# Patient Record
Sex: MALE | Race: WHITE | NOT HISPANIC OR LATINO | Employment: OTHER | URBAN - METROPOLITAN AREA
[De-identification: names, ages, dates, MRNs, and addresses within clinical notes are randomized per-mention and may not be internally consistent; named-entity substitution may affect disease eponyms.]

---

## 2017-08-08 ENCOUNTER — TELEPHONE (OUTPATIENT)
Dept: PRIMARY CARE CLINIC | Facility: CLINIC | Age: 52
End: 2017-08-08

## 2017-08-08 NOTE — TELEPHONE ENCOUNTER
----- Message from Vika Delcid sent at 8/4/2017  3:48 PM CDT -----  Contact: carmelo Kraft want to speak with a nurse regarding patient last visit with Dr. Stover please call back at [phone] patient [phone]

## 2017-09-14 RX ORDER — ZOLPIDEM TARTRATE 10 MG/1
TABLET ORAL
Qty: 30 TABLET | OUTPATIENT
Start: 2017-09-14

## 2017-09-14 RX ORDER — TRAMADOL HYDROCHLORIDE 50 MG/1
TABLET ORAL
Qty: 90 TABLET | OUTPATIENT
Start: 2017-09-14

## 2017-09-15 RX ORDER — LISINOPRIL AND HYDROCHLOROTHIAZIDE 10; 12.5 MG/1; MG/1
1 TABLET ORAL DAILY
Qty: 30 TABLET | Refills: 3 | Status: SHIPPED | OUTPATIENT
Start: 2017-09-15 | End: 2018-02-14

## 2017-09-22 RX ORDER — TIZANIDINE 4 MG/1
TABLET ORAL
Qty: 90 TABLET | Refills: 0 | Status: SHIPPED | OUTPATIENT
Start: 2017-09-22 | End: 2017-11-03 | Stop reason: SDUPTHER

## 2017-11-06 RX ORDER — TIZANIDINE 4 MG/1
TABLET ORAL
Qty: 90 TABLET | Refills: 0 | Status: SHIPPED | OUTPATIENT
Start: 2017-11-06 | End: 2018-03-15 | Stop reason: SDUPTHER

## 2017-11-14 ENCOUNTER — OFFICE VISIT (OUTPATIENT)
Dept: PRIMARY CARE CLINIC | Facility: CLINIC | Age: 52
End: 2017-11-14
Payer: MEDICAID

## 2017-11-14 VITALS
WEIGHT: 219.38 LBS | BODY MASS INDEX: 33.25 KG/M2 | SYSTOLIC BLOOD PRESSURE: 107 MMHG | TEMPERATURE: 98 F | HEIGHT: 68 IN | RESPIRATION RATE: 18 BRPM | DIASTOLIC BLOOD PRESSURE: 69 MMHG | HEART RATE: 84 BPM

## 2017-11-14 DIAGNOSIS — E10.8 TYPE 1 DIABETES MELLITUS WITH COMPLICATION: ICD-10-CM

## 2017-11-14 DIAGNOSIS — I10 ESSENTIAL HYPERTENSION: ICD-10-CM

## 2017-11-14 DIAGNOSIS — R01.1 MURMUR, CARDIAC: ICD-10-CM

## 2017-11-14 DIAGNOSIS — E78.5 HYPERLIPIDEMIA LDL GOAL <70: ICD-10-CM

## 2017-11-14 DIAGNOSIS — M79.672 PAIN OF LEFT HEEL: Primary | ICD-10-CM

## 2017-11-14 DIAGNOSIS — Z12.5 PROSTATE CANCER SCREENING: ICD-10-CM

## 2017-11-14 PROCEDURE — 99999 PR PBB SHADOW E&M-EST. PATIENT-LVL IV: CPT | Mod: PBBFAC,,, | Performed by: NURSE PRACTITIONER

## 2017-11-14 PROCEDURE — 99214 OFFICE O/P EST MOD 30 MIN: CPT | Mod: PBBFAC,PN | Performed by: NURSE PRACTITIONER

## 2017-11-14 PROCEDURE — 99214 OFFICE O/P EST MOD 30 MIN: CPT | Mod: S$PBB,,, | Performed by: NURSE PRACTITIONER

## 2017-11-14 RX ORDER — CANAGLIFLOZIN 300 MG/1
300 TABLET, FILM COATED ORAL DAILY
Refills: 5 | COMMUNITY
Start: 2017-11-03 | End: 2018-02-14

## 2017-11-14 RX ORDER — METFORMIN HYDROCHLORIDE 500 MG/1
1 TABLET, EXTENDED RELEASE ORAL DAILY
Refills: 0 | Status: ON HOLD | COMMUNITY
Start: 2017-10-31 | End: 2018-01-15 | Stop reason: HOSPADM

## 2017-11-14 NOTE — PROGRESS NOTES
Chief Complaint  Chief Complaint   Patient presents with    Ankle Pain       HPI  Cj Barnes is a 52 y.o. male with multiple medical diagnoses as listed in the medical history and problem list that presents for new onset left heel pain.  Patient is new to me but is known to this clinic. Reports new onset left heel pain X 3 weeks, described as worsening. No associated fall or trauma. H/o plantar fasciitis treated with surgical intervention. Pain worsens with pressure and walking. No temporal factors. Has been taking tizanidine and flexeril without noted improvement. H/o DMI currently on insulin pump, reports sugars 120s-140s in am. Reports difficulty getting insulin covered under current insurance due to limitations placed on the amount of insulin allowed monthly. Patient his having to pay out of pocket 3-5 vials/month. Denies cp, sob, palpitations.     PAST MEDICAL HISTORY:  Past Medical History:   Diagnosis Date    Diabetes mellitus type I     since in his 30's    HTN (hypertension)     Hyperlipidemia LDL goal < 70     Insulin pump in place        PAST SURGICAL HISTORY:  Past Surgical History:   Procedure Laterality Date    BACK SURGERY      FOOT TENDON SURGERY      TRIGGER FINGER RELEASE      x 2       SOCIAL HISTORY:  Social History     Social History    Marital status:      Spouse name: N/A    Number of children: N/A    Years of education: N/A     Occupational History    Not on file.     Social History Main Topics    Smoking status: Never Smoker    Smokeless tobacco: Current User     Types: Snuff      Comment: since he was 12 yrs old    Alcohol use No    Drug use: No    Sexual activity: Not on file     Other Topics Concern    Not on file     Social History Narrative        2 grown kids    Delivers seafood       FAMILY HISTORY:  Family History   Problem Relation Age of Onset    Family history unknown: Yes       ALLERGIES AND MEDICATIONS: updated and reviewed.  Review of  patient's allergies indicates:  No Known Allergies  Current Outpatient Prescriptions   Medication Sig Dispense Refill    blood sugar diagnostic Strp 1 each by Misc.(Non-Drug; Combo Route) route 6 (six) times daily. 200 strip 6    insulin lispro (HUMALOG) 100 unit/mL injection Basal rate of 2.35u/h, I:CHO- 1: 10 via insulin pump (animas-one touch ping), needs 5 vials 5 vial 6    INVOKANA 300 mg Tab tablet Take 300 mg by mouth once daily.  5    lisinopril-hydrochlorothiazide (PRINZIDE,ZESTORETIC) 10-12.5 mg per tablet Take 1 tablet by mouth once daily. 30 tablet 3    metFORMIN (GLUCOPHAGE-XR) 500 MG 24 hr tablet Take 1 tablet by mouth once daily.  0    oxycodone-acetaminophen 5-325 mg (PERCOCET) 5-325 mg per tablet Take 1 tablet by mouth every 4 (four) hours as needed for Pain. 40 tablet 0    simvastatin (ZOCOR) 20 MG tablet Take 1 tablet (20 mg total) by mouth every evening. 1 tablet 4    tiZANidine (ZANAFLEX) 4 MG tablet TAKE ONE TABLET BY MOUTH EVERY 8 HOURS AS NEEDED FOR MUSCLE SPASMS 90 tablet 0    tramadol (ULTRAM) 50 mg tablet TAKE ONE TABLET BY MOUTH EVERY 6 HOURS AS NEEDED FOR PAIN 30 tablet 0     No current facility-administered medications for this visit.          ROS  Review of Systems   Constitutional: Negative for chills, fatigue and fever.   HENT: Negative for congestion, rhinorrhea, sinus pressure and sore throat.    Respiratory: Negative for cough, chest tightness and shortness of breath.    Cardiovascular: Negative for chest pain and palpitations.   Gastrointestinal: Negative for abdominal pain, blood in stool, diarrhea, nausea and vomiting.   Endocrine: Negative for polydipsia, polyphagia and polyuria.   Genitourinary: Negative for dysuria, frequency, hematuria and urgency.   Musculoskeletal: Positive for arthralgias. Negative for joint swelling.   Skin: Negative for rash and wound.   Neurological: Negative for dizziness and headaches.   Psychiatric/Behavioral: Negative for dysphoric mood  "and sleep disturbance. The patient is not nervous/anxious.          PHYSICAL EXAM  Vitals:    11/14/17 1025   BP: 107/69   BP Location: Left arm   Patient Position: Sitting   BP Method: Medium (Automatic)   Pulse: 84   Resp: 18   Temp: 97.9 °F (36.6 °C)   TempSrc: Oral   Weight: 99.5 kg (219 lb 6.4 oz)   Height: 5' 8" (1.727 m)    Body mass index is 33.36 kg/m².  Weight: 99.5 kg (219 lb 6.4 oz)   Height: 5' 8" (172.7 cm)     Physical Exam   Constitutional: He is oriented to person, place, and time. He appears well-developed and well-nourished.   HENT:   Head: Normocephalic.   Mouth/Throat: Uvula is midline, oropharynx is clear and moist and mucous membranes are normal.   Eyes: Conjunctivae are normal.   Cardiovascular: Normal rate and regular rhythm.    Murmur heard.   Systolic murmur is present with a grade of 3/6   Pulses:       Radial pulses are 2+ on the right side, and 2+ on the left side.        Dorsalis pedis pulses are 2+ on the right side, and 2+ on the left side.   No noted LE swelling   Pulmonary/Chest: Effort normal and breath sounds normal. He has no wheezes.   Abdominal: Soft. Bowel sounds are normal. There is no tenderness.   Musculoskeletal: He exhibits no edema.   Lymphadenopathy:     He has no cervical adenopathy.   Neurological: He is alert and oriented to person, place, and time.   Skin: Skin is warm and dry. No rash noted.   Psychiatric: He has a normal mood and affect.         Health Maintenance       Date Due Completion Date    Hepatitis C Screening 1965 ---    Foot Exam 11/09/1973 ---    Eye Exam 11/09/1973 ---    TETANUS VACCINE 11/09/1981 ---    Hemoglobin A1c 07/31/2013 1/31/2013    Colonoscopy 11/09/2013 ---    Lipid Panel 01/31/2014 1/31/2013    Influenza Vaccine 08/01/2017 ---            Assessment & Plan    Cj was seen today for ankle pain.    Diagnoses and all orders for this visit:    Pain of left heel  -     Cancel: X-Ray Foot 2 View Left; Future  -     X-Ray " Calcaneus 2 View Left; Future    Murmur, cardiac  -     Echo 2d complete; Future    Type 1 diabetes mellitus with complication  -     Hemoglobin A1c; Future  -     Microalbumin/creatinine urine ratio; Future  -     TSH; Future  -     T4, free; Future    Hyperlipidemia LDL goal <70  -     Lipid panel; Future    Essential hypertension  -     CBC auto differential; Future  -     Comprehensive metabolic panel; Future    Prostate cancer screening  -     PSA, Screening; Future    Health Maintenance reviewed.    Follow-up: dr. joel after labwork for routine visit

## 2017-11-15 ENCOUNTER — TELEPHONE (OUTPATIENT)
Dept: PRIMARY CARE CLINIC | Facility: CLINIC | Age: 52
End: 2017-11-15

## 2017-11-15 DIAGNOSIS — M77.30 CALCANEAL SPUR, UNSPECIFIED LATERALITY: Primary | ICD-10-CM

## 2017-11-15 DIAGNOSIS — I35.0 AORTIC VALVE STENOSIS, ETIOLOGY OF CARDIAC VALVE DISEASE UNSPECIFIED: ICD-10-CM

## 2017-11-15 NOTE — TELEPHONE ENCOUNTER
----- Message from Demetria Morrissey NP sent at 11/15/2017  8:18 AM CST -----  Please call patient and let him know that his xray did show a large heel spur on his left foot. I would recommend NSAIDS, ICE and I will refer him to podiatry if he is interested. Let me know.

## 2017-11-15 NOTE — TELEPHONE ENCOUNTER
----- Message from Idalia Street sent at 11/15/2017  3:40 PM CST -----  Contact: Patient  Cj, patient [phone], Calling for xray results, done yesterday. Please advise. Thanks.

## 2017-11-17 ENCOUNTER — TELEPHONE (OUTPATIENT)
Dept: PRIMARY CARE CLINIC | Facility: CLINIC | Age: 52
End: 2017-11-17

## 2017-11-17 NOTE — TELEPHONE ENCOUNTER
----- Message from Demetria Morrissey NP sent at 11/15/2017  6:23 PM CST -----  Please let patient know that the echocardiogram which he completed demonstrated aortic stenosis. Although he is asymptomatic, I would like to refer him to see cardiology for an evaluation and to determine if any further testing or intervention is needed. Thank you.

## 2017-12-11 ENCOUNTER — CLINICAL SUPPORT (OUTPATIENT)
Dept: PRIMARY CARE CLINIC | Facility: CLINIC | Age: 52
End: 2017-12-11
Payer: MEDICAID

## 2017-12-11 DIAGNOSIS — E78.5 HYPERLIPIDEMIA LDL GOAL <70: ICD-10-CM

## 2017-12-11 DIAGNOSIS — Z12.5 PROSTATE CANCER SCREENING: ICD-10-CM

## 2017-12-11 DIAGNOSIS — I10 ESSENTIAL HYPERTENSION: ICD-10-CM

## 2017-12-11 DIAGNOSIS — E10.8 TYPE 1 DIABETES MELLITUS WITH COMPLICATION: ICD-10-CM

## 2017-12-11 LAB
ALBUMIN SERPL BCP-MCNC: 3.9 G/DL
ALP SERPL-CCNC: 82 U/L
ALT SERPL W/O P-5'-P-CCNC: 27 U/L
ANION GAP SERPL CALC-SCNC: 10 MMOL/L
AST SERPL-CCNC: 25 U/L
BASOPHILS # BLD AUTO: 0.06 K/UL
BASOPHILS NFR BLD: 0.8 %
BILIRUB SERPL-MCNC: 0.8 MG/DL
BUN SERPL-MCNC: 16 MG/DL
CALCIUM SERPL-MCNC: 9.9 MG/DL
CHLORIDE SERPL-SCNC: 103 MMOL/L
CHOLEST SERPL-MCNC: 150 MG/DL
CHOLEST/HDLC SERPL: 3.4 {RATIO}
CO2 SERPL-SCNC: 26 MMOL/L
CREAT SERPL-MCNC: 1 MG/DL
CREAT UR-MCNC: 34 MG/DL
DIFFERENTIAL METHOD: NORMAL
EOSINOPHIL # BLD AUTO: 0.2 K/UL
EOSINOPHIL NFR BLD: 2.2 %
ERYTHROCYTE [DISTWIDTH] IN BLOOD BY AUTOMATED COUNT: 13.3 %
EST. GFR  (AFRICAN AMERICAN): >60 ML/MIN/1.73 M^2
EST. GFR  (NON AFRICAN AMERICAN): >60 ML/MIN/1.73 M^2
GLUCOSE SERPL-MCNC: 187 MG/DL
HCT VFR BLD AUTO: 48.3 %
HDLC SERPL-MCNC: 44 MG/DL
HDLC SERPL: 29.3 %
HGB BLD-MCNC: 16 G/DL
IMM GRANULOCYTES # BLD AUTO: 0.02 K/UL
IMM GRANULOCYTES NFR BLD AUTO: 0.3 %
LDLC SERPL CALC-MCNC: 92.8 MG/DL
LYMPHOCYTES # BLD AUTO: 1.5 K/UL
LYMPHOCYTES NFR BLD: 20.6 %
MCH RBC QN AUTO: 29.4 PG
MCHC RBC AUTO-ENTMCNC: 33.1 G/DL
MCV RBC AUTO: 89 FL
MICROALBUMIN UR DL<=1MG/L-MCNC: <2.5 UG/ML
MICROALBUMIN/CREATININE RATIO: NORMAL UG/MG
MONOCYTES # BLD AUTO: 0.5 K/UL
MONOCYTES NFR BLD: 6.3 %
NEUTROPHILS # BLD AUTO: 5 K/UL
NEUTROPHILS NFR BLD: 69.8 %
NONHDLC SERPL-MCNC: 106 MG/DL
NRBC BLD-RTO: 0 /100 WBC
PLATELET # BLD AUTO: 200 K/UL
PMV BLD AUTO: 11 FL
POTASSIUM SERPL-SCNC: 4.7 MMOL/L
PROT SERPL-MCNC: 7.5 G/DL
RBC # BLD AUTO: 5.45 M/UL
SODIUM SERPL-SCNC: 139 MMOL/L
T4 FREE SERPL-MCNC: 1.24 NG/DL
TRIGL SERPL-MCNC: 66 MG/DL
TSH SERPL DL<=0.005 MIU/L-ACNC: 0.84 UIU/ML
WBC # BLD AUTO: 7.14 K/UL

## 2017-12-11 PROCEDURE — 99212 OFFICE O/P EST SF 10 MIN: CPT | Mod: PBBFAC,PN

## 2017-12-11 PROCEDURE — 82570 ASSAY OF URINE CREATININE: CPT

## 2017-12-11 PROCEDURE — 84443 ASSAY THYROID STIM HORMONE: CPT

## 2017-12-11 PROCEDURE — 84153 ASSAY OF PSA TOTAL: CPT

## 2017-12-11 PROCEDURE — 84439 ASSAY OF FREE THYROXINE: CPT

## 2017-12-11 PROCEDURE — 80053 COMPREHEN METABOLIC PANEL: CPT

## 2017-12-11 PROCEDURE — 85025 COMPLETE CBC W/AUTO DIFF WBC: CPT

## 2017-12-11 PROCEDURE — 80061 LIPID PANEL: CPT

## 2017-12-11 PROCEDURE — 99999 PR PBB SHADOW E&M-EST. PATIENT-LVL II: CPT | Mod: PBBFAC,,,

## 2017-12-11 PROCEDURE — 83036 HEMOGLOBIN GLYCOSYLATED A1C: CPT

## 2017-12-12 LAB
COMPLEXED PSA SERPL-MCNC: 0.38 NG/ML
ESTIMATED AVG GLUCOSE: 194 MG/DL
HBA1C MFR BLD HPLC: 8.4 %

## 2017-12-18 ENCOUNTER — TELEPHONE (OUTPATIENT)
Dept: PRIMARY CARE CLINIC | Facility: CLINIC | Age: 52
End: 2017-12-18

## 2017-12-18 DIAGNOSIS — E10.8 TYPE 1 DIABETES MELLITUS WITH COMPLICATION: Primary | ICD-10-CM

## 2017-12-29 ENCOUNTER — TELEPHONE (OUTPATIENT)
Dept: PRIMARY CARE CLINIC | Facility: CLINIC | Age: 52
End: 2017-12-29

## 2017-12-29 NOTE — TELEPHONE ENCOUNTER
----- Message from Hood Orozco sent at 12/28/2017  9:35 AM CST -----  Contact: [employer] - Rena [phone]  A certificate for medical necessity was faxed and request for patient chart notes was faxed 12/20/2017 and 12/27/2017.Thanks!

## 2018-01-02 NOTE — TELEPHONE ENCOUNTER
Tried calling the company again and was on hold for over 10 minutes without someone picking up. The pt says its to get a new pump and he will call the person he has been working with and have them fax over the paper I need.

## 2018-01-09 ENCOUNTER — TELEPHONE (OUTPATIENT)
Dept: PRIMARY CARE CLINIC | Facility: CLINIC | Age: 53
End: 2018-01-09

## 2018-01-09 NOTE — TELEPHONE ENCOUNTER
----- Message from Mary Mcmillan sent at 1/9/2018 10:11 AM CST -----  Rena with [employer] is requesting conformation of certificate of medical necessity for an insulin pump and chart notes, she states she has faxed on 12/26/17 and 01/05/18 with no response, contact her at [phone]. Or fax information to [phone].    Thank you

## 2018-01-09 NOTE — TELEPHONE ENCOUNTER
Spoke to Bharti with the supply dept and she will have the rep fax over the medical form that we need to sign for pts pump

## 2018-01-12 ENCOUNTER — TELEPHONE (OUTPATIENT)
Dept: PRIMARY CARE CLINIC | Facility: CLINIC | Age: 53
End: 2018-01-12

## 2018-01-12 NOTE — TELEPHONE ENCOUNTER
----- Message from Idalia Street sent at 1/11/2018  2:32 PM CST -----  Contact: Rena with [employer] phone [phone] fax [phone]  Rena with [employer] phone [phone] fax [phone], Calling to inquire about certificate of medical necessity for insulin pump and also needs clinical notes, it was faxed on 01/09/2018. Please advise. Thanks.

## 2018-01-15 PROBLEM — Q23.1 AORTIC REGURGITATION, CONGENITAL: Status: ACTIVE | Noted: 2018-01-15

## 2018-01-23 ENCOUNTER — OFFICE VISIT (OUTPATIENT)
Dept: CARDIOTHORACIC SURGERY | Facility: CLINIC | Age: 53
End: 2018-01-23
Payer: MEDICAID

## 2018-01-23 VITALS
HEIGHT: 68 IN | WEIGHT: 218.13 LBS | BODY MASS INDEX: 33.06 KG/M2 | DIASTOLIC BLOOD PRESSURE: 69 MMHG | HEART RATE: 61 BPM | TEMPERATURE: 97 F | SYSTOLIC BLOOD PRESSURE: 125 MMHG | OXYGEN SATURATION: 98 %

## 2018-01-23 DIAGNOSIS — I35.0 NONRHEUMATIC AORTIC VALVE STENOSIS: Primary | ICD-10-CM

## 2018-01-23 PROCEDURE — 99213 OFFICE O/P EST LOW 20 MIN: CPT | Mod: PBBFAC | Performed by: THORACIC SURGERY (CARDIOTHORACIC VASCULAR SURGERY)

## 2018-01-23 PROCEDURE — 99999 PR PBB SHADOW E&M-EST. PATIENT-LVL III: CPT | Mod: PBBFAC,,, | Performed by: THORACIC SURGERY (CARDIOTHORACIC VASCULAR SURGERY)

## 2018-01-23 PROCEDURE — 99205 OFFICE O/P NEW HI 60 MIN: CPT | Mod: S$PBB,,, | Performed by: THORACIC SURGERY (CARDIOTHORACIC VASCULAR SURGERY)

## 2018-01-23 RX ORDER — HUMAN INSULIN 100 [IU]/ML
INJECTION, SUSPENSION SUBCUTANEOUS
Refills: 0 | COMMUNITY
Start: 2017-11-07 | End: 2018-02-14

## 2018-01-23 NOTE — PROGRESS NOTES
History & Physical    SUBJECTIVE:     History of Present Illness:  Patient is a 52 y.o. male with bicuspid aortic valve, AS, diabetes type2, and CAD. He denies GORDON, SOB, or angina but underwent catheterization and echo due to severe diabetes and family history of heart disease. Cath showed mild to moderate 3 vessel CAD; 55% midcircumflex, 60% 4th OM, mild LAD and diagonal. EF 55%, no aortic coarctation. Aortic valve area measured 0.9cm2. Patient reports several uncles who have  in their 50's from heart disease. He is currently controlling his diabetes with an insulin pump and his last HbA1c was 8.2. Patient is a nonsmoker.   Patient has history of stage 2 colon cancer in 2015 with resection, no chemo or radiation.     NYHA I  STS mortality 1.3%, morbidity or mortality 14%, stroke 1.3%.    Chief Complaint   Patient presents with    Consult       Review of patient's allergies indicates:  No Known Allergies    Current Outpatient Prescriptions   Medication Sig Dispense Refill    blood sugar diagnostic Strp 1 each by Misc.(Non-Drug; Combo Route) route 6 (six) times daily. 200 strip 6    insulin lispro (HUMALOG) 100 unit/mL injection Basal rate of 2.35u/h, I:CHO- 1: 10 via insulin pump (animas-one touch ping), needs 5 vials 5 vial 6    INVOKANA 300 mg Tab tablet Take 300 mg by mouth once daily.  5    lisinopril-hydrochlorothiazide (PRINZIDE,ZESTORETIC) 10-12.5 mg per tablet Take 1 tablet by mouth once daily. 30 tablet 3    NOVOLIN N 100 unit/mL injection USE AS DIRECTED WITH INSULIN PUMP  0    simvastatin (ZOCOR) 20 MG tablet Take 1 tablet (20 mg total) by mouth every evening. 1 tablet 4    tiZANidine (ZANAFLEX) 4 MG tablet TAKE ONE TABLET BY MOUTH EVERY 8 HOURS AS NEEDED FOR MUSCLE SPASMS 90 tablet 0    zolpidem (AMBIEN CR) 12.5 MG CR tablet Take 12.5 mg by mouth nightly as needed for Insomnia.      oxycodone-acetaminophen 5-325 mg (PERCOCET) 5-325 mg per tablet Take 1 tablet by mouth every 4 (four) hours  "as needed for Pain. 40 tablet 0    tramadol (ULTRAM) 50 mg tablet TAKE ONE TABLET BY MOUTH EVERY 6 HOURS AS NEEDED FOR PAIN 30 tablet 0     No current facility-administered medications for this visit.        Past Medical History:   Diagnosis Date    Aortic stenosis 2018    Cancer 2014    Diabetes mellitus type I     since in his 30's    Heel fracture     HTN (hypertension)     Hyperlipidemia LDL goal < 70     Insulin pump in place     MVP (mitral valve prolapse)      Past Surgical History:   Procedure Laterality Date    BACK SURGERY      COLON SURGERY  2014    FOOT TENDON SURGERY      right and left heart cath Bilateral 01/15/2018    TRIGGER FINGER RELEASE      x 2     Family History   Problem Relation Age of Onset    Family history unknown: Yes     Social History   Substance Use Topics    Smoking status: Never Smoker    Smokeless tobacco: Current User     Types: Snuff      Comment: since he was 12 yrs old    Alcohol use Yes      Comment: socially        Review of Systems:  Review of Systems   Constitutional: Negative for activity change and appetite change.   HENT: Negative for congestion and rhinorrhea.    Respiratory: Negative for chest tightness and shortness of breath.    Cardiovascular: Negative for chest pain and palpitations.   Gastrointestinal: Negative for abdominal distention and abdominal pain.   Genitourinary: Negative for difficulty urinating and dysuria.   Musculoskeletal: Positive for back pain and myalgias.   Neurological: Negative for dizziness and headaches.   Psychiatric/Behavioral: Negative for agitation and behavioral problems.       OBJECTIVE:     Vital Signs (Most Recent)  Temp: 97.4 °F (36.3 °C) (01/23/18 1051)  Pulse: 61 (01/23/18 1051)  BP: 125/69 (01/23/18 1051)  SpO2: 98 % (01/23/18 1051)  5' 8" (1.727 m)  98.9 kg (218 lb 2.3 oz)     Physical Exam:  Physical Exam   Constitutional: He is oriented to person, place, and time. He appears well-developed and well-nourished. " No distress.   HENT:   Head: Normocephalic.   Eyes: EOM are normal.   Cardiovascular: Normal rate and regular rhythm.    Murmur heard.  Pulmonary/Chest: Effort normal and breath sounds normal.   Abdominal: Soft. He exhibits no distension. There is no tenderness.   Neurological: He is alert and oriented to person, place, and time.   Skin: Skin is warm and dry.   Psychiatric: He has a normal mood and affect. His behavior is normal.       Laboratory  CBC: Reviewed  BMP: Reviewed  HbA1c 8.2        ASSESSMENT/PLAN:     Cj Barnes is a 52 y.o. male with asymptomatic bicuspid aortic valve and mild to moderate CAD.     PLAN:    Discussed with patient that given his AS is not causing symptoms, it would be better to wait for surgical repair. At this time, does not require bypass surgery and operating on AS while asymptomatic would potentially set him up for a redo sternotomy and increased complications if he needs a bypass at a later date.   Patient to return to clinic if having worsening symptoms. Until then, continue ASA and statin.   Will refer to Endocrinology clinic for better glucose control.      CTS Attending Note:    I have personally taken the history and examined this patient and agree with the resident's note as stated above.  52-year-old gentleman with at least moderate aortic stenosis.  He and his wife were quite concerned about his risk of sudden cardiac death given his family history of cardiac disease.  I reviewed his angiogram.  Despite his long history of diabetes, his coronaries looked surprisingly good.  I did not feel that any of his coronary lesions were of sufficient severity to support a bypass.  He is asymptomatic for his aortic stenosis.  He is able to walk several blocks until he has to stop, and this is due to back pain.  I recommended ongoing observation.  When he ultimately does become symptomatic for his aortic stenosis, I believe it would be reasonable to repeat an angiogram.  It may  be that by then his coronary lesions will have progressed, and a mammary graft could be an option for him.  If his coronaries continue to look good, then aortic valve replacement alone could be performed.  I felt that currently it was best 6 it tight rather than to proceed with any sort of cardiac operation.

## 2018-01-23 NOTE — LETTER
[facility]  [address]  [facility] [address]  Phone: [phone] January 23, 2018      Palomo Turner MD  [address] [place] [address]  [address] Floor  [facility] [address]    Patient: Cj Barnes   MR Number: [id number]   YOB: 1965   Date of Visit: 1/23/2018     Dear Dr. Turner:    I had the pleasure of seeing your patient Mr. Cj Barnes in clinic today.  As you know, he is a very pleasant 52-year-old gentleman with a family history of cardiac disease. He is an insulin-dependent diabetic. He recently underwent a thorough cardiac evaluation.  An echo demonstrated at least moderate aortic stenosis.  He is asymptomatic for this, as he can walk several blocks before being limited by back pain.  He denies dyspnea.  He also had an angiogram which, given his long history of diabetes, I thought looked surprisingly good. I recommended no intervention at this time.  I did not think that any of his coronary lesions were of sufficient severity to support a bypass.  I do not believe we should webster to address his aortic valve surgically, as he is asymptomatic.  It may be several years before he develops symptoms.  At that time, his coronary disease may have progressed and a mammary graft to the anterior wall could be an option for him.    Thank you for sending this pleasant gentleman to me.  It was a pleasure to meet him.  If I can be of assistance in the future, please do not hesitate to let me know.    Sincerely,        Ryan Knight MD   Chief, Division of Thoracic & Cardiovascular Surgery  [facility]    PEP/ecs      CC  Garry Stover MD

## 2018-02-14 ENCOUNTER — LAB VISIT (OUTPATIENT)
Dept: LAB | Facility: HOSPITAL | Age: 53
End: 2018-02-14
Attending: INTERNAL MEDICINE
Payer: MEDICAID

## 2018-02-14 ENCOUNTER — OFFICE VISIT (OUTPATIENT)
Dept: ENDOCRINOLOGY | Facility: CLINIC | Age: 53
End: 2018-02-14
Payer: MEDICAID

## 2018-02-14 VITALS
DIASTOLIC BLOOD PRESSURE: 60 MMHG | HEIGHT: 68 IN | BODY MASS INDEX: 33.18 KG/M2 | WEIGHT: 218.94 LBS | SYSTOLIC BLOOD PRESSURE: 100 MMHG

## 2018-02-14 DIAGNOSIS — E10.65 TYPE 1 DIABETES MELLITUS WITH HYPERGLYCEMIA: ICD-10-CM

## 2018-02-14 LAB
ALBUMIN SERPL BCP-MCNC: 4 G/DL
ALP SERPL-CCNC: 95 U/L
ALT SERPL W/O P-5'-P-CCNC: 33 U/L
ANION GAP SERPL CALC-SCNC: 11 MMOL/L
AST SERPL-CCNC: 24 U/L
BILIRUB SERPL-MCNC: 0.9 MG/DL
BUN SERPL-MCNC: 17 MG/DL
C PEPTIDE SERPL-MCNC: 1.55 NG/ML
CALCIUM SERPL-MCNC: 9.7 MG/DL
CHLORIDE SERPL-SCNC: 100 MMOL/L
CO2 SERPL-SCNC: 28 MMOL/L
CREAT SERPL-MCNC: 0.9 MG/DL
EST. GFR  (AFRICAN AMERICAN): >60 ML/MIN/1.73 M^2
EST. GFR  (NON AFRICAN AMERICAN): >60 ML/MIN/1.73 M^2
GLUCOSE SERPL-MCNC: 166 MG/DL
POTASSIUM SERPL-SCNC: 4.2 MMOL/L
PROT SERPL-MCNC: 7.7 G/DL
SODIUM SERPL-SCNC: 139 MMOL/L

## 2018-02-14 PROCEDURE — 80053 COMPREHEN METABOLIC PANEL: CPT

## 2018-02-14 PROCEDURE — 3008F BODY MASS INDEX DOCD: CPT | Mod: ,,, | Performed by: INTERNAL MEDICINE

## 2018-02-14 PROCEDURE — 99204 OFFICE O/P NEW MOD 45 MIN: CPT | Mod: S$PBB,,, | Performed by: INTERNAL MEDICINE

## 2018-02-14 PROCEDURE — 99999 PR PBB SHADOW E&M-EST. PATIENT-LVL III: CPT | Mod: PBBFAC,,, | Performed by: INTERNAL MEDICINE

## 2018-02-14 PROCEDURE — 84681 ASSAY OF C-PEPTIDE: CPT

## 2018-02-14 PROCEDURE — 82985 ASSAY OF GLYCATED PROTEIN: CPT

## 2018-02-14 PROCEDURE — 99213 OFFICE O/P EST LOW 20 MIN: CPT | Mod: PBBFAC | Performed by: INTERNAL MEDICINE

## 2018-02-14 RX ORDER — LISINOPRIL 10 MG/1
10 TABLET ORAL DAILY
Qty: 90 TABLET | Refills: 3 | Status: SHIPPED | OUTPATIENT
Start: 2018-02-14 | End: 2019-04-09 | Stop reason: SDUPTHER

## 2018-02-14 RX ORDER — INSULIN LISPRO 100 [IU]/ML
INJECTION, SOLUTION INTRAVENOUS; SUBCUTANEOUS
Qty: 6 VIAL | Refills: 6 | Status: SHIPPED | OUTPATIENT
Start: 2018-02-14 | End: 2018-04-30 | Stop reason: SDUPTHER

## 2018-02-14 RX ORDER — SIMVASTATIN 20 MG/1
20 TABLET, FILM COATED ORAL NIGHTLY
Qty: 90 TABLET | Refills: 3 | Status: SHIPPED | OUTPATIENT
Start: 2018-02-14 | End: 2019-04-09 | Stop reason: SDUPTHER

## 2018-02-14 NOTE — PROGRESS NOTES
"    /60   Ht 5' 8" (1.727 m)   Wt 99.3 kg (218 lb 14.7 oz)   BMI 33.29 kg/m²      Patient Name: Cj Barnes  YOB: 1965    PRESENTING HISTORY       History of Present Illness:  Mr. Cj Barnes is a 52 y.o. male w/ significant PMHx of DM (diagnosed in his early 30s, first as Type II then Type I after 5 years) on an insulin pump, aortic stenosis, HTN, HLD, Stage II CRC s/p resection in 2015 without recurrence who was referred to   Endocrinology clinic after his A1c increased to 8.4% from 7.2% over a period of approximately 6-8 months. He says that he has not had any changes to his diet, medications, or other medical problems that may have contributed and says that he still uses the same amount of insulin. He has used an insulin pump since 2006, having used his current pump for approx 1.5-2 years. It has a basal rate of 2.6U and he uses carb counting to bolus himself prandially, estimating 10-15U per meal. Pt was initially managed by an endocrinology NP but for the last 2-3 years says he has been managed by his PCP; he was put on Invokana approx 2 years ago to try to control his A1c.    He has never been in DKA / hospitalized for his diabetes. Denies recent weight gain/loss, changes in appetite, thirst, or urine output.    Has [place] Ping pump, uses humalog  Bolus  1:15  1:50 sensitivity  120 +/- 10  Basal midnight 2.6, noon 2.5  FS reviewed - most in low to mid 100s, 2 episodes of FS in 50-60s (per pt has hypoglycemic symptom of eye twitching) after he overbolused for the amount of food.    Also on Invokana.    May be started on medtronic pump soon, currently getting authorized.             Review of Systems   Constitutional: Negative for chills, fever and weight loss.   Eyes: Negative for blurred vision.   Respiratory: Negative for cough.    Cardiovascular: Negative for chest pain.   Gastrointestinal: Negative for nausea and vomiting.   Genitourinary: Negative for dysuria and " hematuria.   Musculoskeletal: Negative for joint pain and myalgias.   Skin: Negative for itching and rash.   Neurological: Negative for dizziness and headaches.   Endo/Heme/Allergies: Does not bruise/bleed easily.         PAST HISTORY:     Past Medical History:   Diagnosis Date    Aortic stenosis 2018    Cancer 2014    Diabetes mellitus type I     since in his 30's    Heel fracture     HTN (hypertension)     Hyperlipidemia LDL goal < 70     Insulin pump in place     MVP (mitral valve prolapse)        Past Surgical History:   Procedure Laterality Date    BACK SURGERY      COLON SURGERY  2014    FOOT TENDON SURGERY      right and left heart cath Bilateral 01/15/2018    TRIGGER FINGER RELEASE      x 2       Family History   Problem Relation Age of Onset    Family history unknown: Yes       Social History     Social History    Marital status:      Spouse name: N/A    Number of children: N/A    Years of education: N/A     Social History Main Topics    Smoking status: Never Smoker    Smokeless tobacco: Current User     Types: Snuff      Comment: since he was 12 yrs old    Alcohol use Yes      Comment: socially    Drug use: No    Sexual activity: Not Asked     Other Topics Concern    None     Social History Narrative        2 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    oxycodone-acetaminophen 5-325 mg (PERCOCET) 5-325 mg per tablet Take 1 tablet by mouth every 4 (four) hours as needed for Pain.    tiZANidine (ZANAFLEX) 4 MG tablet TAKE ONE TABLET BY MOUTH EVERY 8 HOURS AS NEEDED FOR MUSCLE SPASMS    tramadol (ULTRAM) 50 mg tablet TAKE ONE TABLET BY MOUTH EVERY 6 HOURS AS NEEDED FOR PAIN    zolpidem (AMBIEN CR) 12.5 MG CR tablet Take 12.5 mg by mouth nightly as needed for Insomnia.     "[DISCONTINUED] insulin lispro (HUMALOG) 100 unit/mL injection Basal rate of 2.35u/h, I:CHO- 1: 10 via insulin pump (animas-one touch ping), needs 5 vials    [DISCONTINUED] INVOKANA 300 mg Tab tablet Take 300 mg by mouth once daily.    [DISCONTINUED] lisinopril-hydrochlorothiazide (PRINZIDE,ZESTORETIC) 10-12.5 mg per tablet Take 1 tablet by mouth once daily.    [DISCONTINUED] NOVOLIN N 100 unit/mL injection USE AS DIRECTED WITH INSULIN PUMP    [DISCONTINUED] simvastatin (ZOCOR) 20 MG tablet Take 1 tablet (20 mg total) by mouth every evening.     No current facility-administered medications on file prior to visit.        Review of patient's allergies indicates:  No Known Allergies    OBJECTIVE:   Vital Signs:  Vitals:    02/14/18 0910   BP: 100/60   Weight: 99.3 kg (218 lb 14.7 oz)   Height: 5' 8" (1.727 m)       Physical Exam   Constitutional: He appears well-developed.   HENT:   Head: Normocephalic and atraumatic.   Right Ear: External ear normal.   Left Ear: External ear normal.   Nose: Nose normal.   Eyes: EOM are normal. Pupils are equal, round, and reactive to light.   Neck: No tracheal deviation present. No thyromegaly present.   Cardiovascular: Normal rate.    Murmur heard.   Systolic murmur is present with a grade of 3/6   Pulses:       Dorsalis pedis pulses are 2+ on the right side, and 2+ on the left side.        Posterior tibial pulses are 2+ on the right side, and 2+ on the left side.   Aortic window murmur 3/6   Pulmonary/Chest: Effort normal and breath sounds normal.   Abdominal: Soft. There is no tenderness. No hernia.   Musculoskeletal: He exhibits no edema.        Right foot: There is normal range of motion and no deformity.        Left foot: There is normal range of motion and no deformity.   Feet:   Right Foot:   Protective Sensation: 5 sites tested. 3 sites sensed.   Left Foot:   Protective Sensation: 5 sites tested. 2 sites sensed.   Neurological: He displays normal reflexes. No cranial nerve " deficit.   Skin: No rash noted.   Psychiatric: He has a normal mood and affect. Judgment normal.   Vitals reviewed.      ASSESSMENT & PLAN:       Mr. Cj Barnes is a 52 y.o. male w/ significant PMHx of DM1 on an insulin pump, aortic stenosis, HTN, HLD, Stage II CRC s/p resection in 2015, here for Dm.     Dm1  A1c seems higher than sugars would suggest, FS are fairly good although pt checks twice daily only sometimes, counseled against excessive bolusing, continue current settings; will check fructosamine  Stop Invokana due to inc risk of DKA  Obtain C peptide and cmp to document low native insulin reserves  Refer to ophtho  Pt asked to call us when medtronic pump received, will refer to DM educator to set this up  Hypoglycemia counseling provided, prescribed glucagon kit    Hl: LDL in 90s, continue simvastatin, may need dose elevation in the future    HTN: will stop HCTZ component of lisinopril/HCTZ due to some lower bps    RTC 3 months

## 2018-02-14 NOTE — LETTER
February 14, 2018      Demetria Morrissey, KELSIE  [address] [address]           [facility]  [address]  [place] [address]  Phone: [phone]  Fax: [phone]          Patient: Cj Barnes   MR Number: [id number]   YOB: 1965   Date of Visit: 2/14/2018       Dear Demetria Morrissey:    Thank you for referring Cj Barnes to me for evaluation. Attached you will find relevant portions of my assessment and plan of care.    If you have questions, please do not hesitate to call me. I look forward to following Cj Barnes along with you.    Sincerely,    Rossy Barger MD    Enclosure  CC:  No Recipients    If you would like to receive this communication electronically, please contact [email or web address] or [phone] to request more information on [employer] Link access.    For providers and/or their staff who would like to refer a patient to [facility], please contact us through our one-stop-shop provider referral line, [facility], at [phone].    If you feel you have received this communication in error or would no longer like to receive these types of communications, please e-mail [email or web address]

## 2018-02-15 ENCOUNTER — TELEPHONE (OUTPATIENT)
Dept: ENDOCRINOLOGY | Facility: CLINIC | Age: 53
End: 2018-02-15

## 2018-02-15 DIAGNOSIS — E11.8 TYPE 2 DIABETES MELLITUS WITH COMPLICATION, UNSPECIFIED LONG TERM INSULIN USE STATUS: Primary | ICD-10-CM

## 2018-02-15 RX ORDER — METFORMIN HYDROCHLORIDE 500 MG/1
500 TABLET, EXTENDED RELEASE ORAL
Qty: 90 TABLET | Refills: 3 | Status: SHIPPED | OUTPATIENT
Start: 2018-02-15 | End: 2019-06-24 | Stop reason: SDUPTHER

## 2018-02-15 RX ORDER — INSULIN PUMP SYRINGE, 3 ML
EACH MISCELLANEOUS
Qty: 1 EACH | Refills: 0 | Status: SHIPPED | OUTPATIENT
Start: 2018-02-15 | End: 2018-07-25 | Stop reason: SDUPTHER

## 2018-02-15 NOTE — TELEPHONE ENCOUNTER
Talk to pt he agreed to go to ophthalmology doctor on 3/1/18    ----- Message from Rossy Barger MD sent at 2/15/2018  2:07 PM CST -----  Regarding: scheduling ophtho  Can we call pt to schedule ophthomalogy followup here? Thanks

## 2018-02-15 NOTE — PROGRESS NOTES
Discussed w pt, labs not indicative of type 1, especially as he has fair amt of C peptide level despite having DM since his 30s.    Pt can resume SGLT2i, will switch to Jardiance due to lack of amputation risk, can continue metformin 500mg XR daily in addition to pump.  Will also prescribe new meter, as possibly his old meter may be measuring glucoses inaccurately as his FS and A1c don't correspond well.    Pt is agreeable.    Rossy Barger MD    Component      Latest Ref Rng & Units 2/14/2018   Sodium      136 - 145 mmol/L 139   Potassium      3.5 - 5.1 mmol/L 4.2   Chloride      95 - 110 mmol/L 100   CO2      23 - 29 mmol/L 28   Glucose      70 - 110 mg/dL 166 (H)   BUN, Bld      6 - 20 mg/dL 17   Creatinine      0.5 - 1.4 mg/dL 0.9   Calcium      8.7 - 10.5 mg/dL 9.7   Total Protein      6.0 - 8.4 g/dL 7.7   Albumin      3.5 - 5.2 g/dL 4.0   Total Bilirubin      0.1 - 1.0 mg/dL 0.9   Alkaline Phosphatase      55 - 135 U/L 95   AST      10 - 40 U/L 24   ALT      10 - 44 U/L 33   Anion Gap      8 - 16 mmol/L 11   eGFR if African American      >60 mL/min/1.73 m:2 >60.0   eGFR if non African American      >60 mL/min/1.73 m:2 >60.0   C-Peptide      0.78 - 5.19 ng/mL 1.55

## 2018-02-16 LAB — FRUCTOSAMINE SERPL-SCNC: 328 UMOL /L (ref 0–285)

## 2018-02-21 ENCOUNTER — TELEPHONE (OUTPATIENT)
Dept: PRIMARY CARE CLINIC | Facility: CLINIC | Age: 53
End: 2018-02-21

## 2018-02-21 NOTE — TELEPHONE ENCOUNTER
----- Message from Idalia Street sent at 2/21/2018  1:35 PM CST -----  Contact: Bettie with [employer] phone [phone] ext [phone] fax [phone]  Bettie with [employer] phone [phone] ext [phone] fax [phone], Calling for last two clinicals to be faxed, they have received the order for his diabetic supplies. Please advise. Thanks.

## 2018-02-28 DIAGNOSIS — E10.65 UNCONTROLLED TYPE 1 DIABETES MELLITUS WITH HYPERGLYCEMIA: Primary | ICD-10-CM

## 2018-03-01 ENCOUNTER — OFFICE VISIT (OUTPATIENT)
Dept: OPTOMETRY | Facility: CLINIC | Age: 53
End: 2018-03-01
Payer: MEDICAID

## 2018-03-01 DIAGNOSIS — E10.3299 TYPE 1 DIABETES MELLITUS WITH MILD NONPROLIFERATIVE RETINOPATHY WITHOUT MACULAR EDEMA, UNSPECIFIED LATERALITY: ICD-10-CM

## 2018-03-01 DIAGNOSIS — H52.4 PRESBYOPIA: ICD-10-CM

## 2018-03-01 DIAGNOSIS — E11.3299 NPDR (NONPROLIFERATIVE DIABETIC RETINOPATHY): Primary | ICD-10-CM

## 2018-03-01 DIAGNOSIS — H25.13 NS (NUCLEAR SCLEROSIS), BILATERAL: ICD-10-CM

## 2018-03-01 DIAGNOSIS — I10 ESSENTIAL HYPERTENSION: ICD-10-CM

## 2018-03-01 PROCEDURE — 92004 COMPRE OPH EXAM NEW PT 1/>: CPT | Mod: S$PBB,,, | Performed by: OPTOMETRIST

## 2018-03-01 PROCEDURE — 99999 PR PBB SHADOW E&M-EST. PATIENT-LVL III: CPT | Mod: PBBFAC,,, | Performed by: OPTOMETRIST

## 2018-03-01 PROCEDURE — 92250 FUNDUS PHOTOGRAPHY W/I&R: CPT | Mod: PBBFAC | Performed by: OPTOMETRIST

## 2018-03-01 PROCEDURE — 92015 DETERMINE REFRACTIVE STATE: CPT | Mod: ,,, | Performed by: OPTOMETRIST

## 2018-03-01 PROCEDURE — 99213 OFFICE O/P EST LOW 20 MIN: CPT | Mod: PBBFAC | Performed by: OPTOMETRIST

## 2018-03-01 NOTE — LETTER
March 1, 2018      Rossy Bagrer MD  [address]  9th [address]  [facility] [address]           [facility] - Optometry  [address]  [place] [address]  Phone: [phone]  Fax: [phone]          Patient: Cj Barnes   MR Number: [id number]   YOB: 1965   Date of Visit: 3/1/2018       Dear Dr. Rossy Barger:    Thank you for referring Cj Barnes to me for evaluation. Attached you will find relevant portions of my assessment and plan of care.    If you have questions, please do not hesitate to call me. I look forward to following Cj Barnes along with you.    Sincerely,    Agnes Meredith, OD    Enclosure  CC:  No Recipients    If you would like to receive this communication electronically, please contact [email or web address] or [phone] to request more information on [employer] Link access.    For providers and/or their staff who would like to refer a patient to [facility], please contact us through our one-stop-shop provider referral line, [facility], at [phone].    If you feel you have received this communication in error or would no longer like to receive these types of communications, please e-mail [email or web address]

## 2018-03-07 ENCOUNTER — CLINICAL SUPPORT (OUTPATIENT)
Dept: DIABETES | Facility: CLINIC | Age: 53
End: 2018-03-07
Payer: MEDICAID

## 2018-03-07 DIAGNOSIS — E10.65 UNCONTROLLED TYPE 1 DIABETES MELLITUS WITH HYPERGLYCEMIA: ICD-10-CM

## 2018-03-07 PROCEDURE — G0108 DIAB MANAGE TRN  PER INDIV: HCPCS | Mod: PBBFAC | Performed by: DIETITIAN, REGISTERED

## 2018-03-07 NOTE — PROGRESS NOTES
Diabetes Education  Author: Mary Choudhary RD, CDE  Date: 3/7/2018    Diabetes Education Visit  Diabetes Education Record Assessment/Progress: Initial    Diabetes Type  Diabetes Type : Type II    [place] pump for years previously managed by Nenita Harris NP in [place] however moved back to [place] and was seeing PCP for diabetes. Current animas pump has been on for about 4 years. It was sent as a replacement and he started himself on it. On downloaded report it appears he does not use meter remote (it broke) and insulin on board feature was turned off on pump. TDD 98 units/day (~ 58 units from basal, 40 units bolus).     Patient was seen in clinic today for insulin pump upgrade training and will be switching from animas to a Medtronic 670G Minimed Insulin pump. Pump training was provided per Medtronic protocol using Getting Started Guide.  Details of pump therapy were covered with the patient to include basic features of pump programming, filling of reservoir and priming infusion set. These features were reviewed in detail. Patient arrived with infusion set already inserted to abdomen. Instructed patient on use of new pump features and set change when appropriate. Reviewed site selection, rotation, storage of insulin, treatment of hypoglycemia, hyperglycemia and troubleshooting of pump. Patient instructed to change reservoir and all tubing every three days. No changes were made to pump settings today. Below are from Dr. Barger note.     Pump Settings:   Basal rate:   12:00am-12:00pm 2.6u/hr  12 pm- 12 am 2.5 u/hr    ISF: 50  CHO RATIO:   12:00-12:00 1:15     Blood glucose goal:   12:00-12:00 120-120  Active insulin: 4 hr  Auto off on  Low [place] 50 units  Max Bolus 20units  Max Basal 3.0 u/hr     Written materials provided. Patient verbalized understanding of all instructions given.   Encouraged pt to call me for questions.     Monitoring   Self Monitoring : irregularly  Blood Glucose Logs:  (stored in  meter. meter is only a week old however he has just a few readings)    Exercise   Exercise Type: none    Current Diabetes Treatment   Current Treatment: Insulin pump, Insulin    Social History  Occupation: disability previously     PHQ-2 Total Score: 4    DDS-2 Score  ( > 3 = SIGNIFICANT DISTRESS): 2.5    Barriers to Change  Barriers to Change: None  Learning Challenges : None    Readiness to Learn   Readiness to Learn : Eager    Cultural Influences  Cultural Influences: No    Diabetes Education Assessment/Progress  Diabetes Disease Process (diabetes disease process and treatment options): Discussion, Individual Session, Written Materials Provided  Nutrition (Incorporating nutritional management into one's lifestyle): Discussion  Medications (states correct name, dose, onset, peak, duration, side effects & timing of meds): Discussion, Written Materials Provided, Individual Session, Demonstrates Understanding/Competency(verbalizes/demonstrates)  Monitoring (monitoring blood glucose/other parameters & using results): Discussion, Demonstrates Understanding/Competency (verbalizes/demonstrates), Individual Session, Written Materials Provided  Cognitive (knowledge of self-management skills, functional health literacy): Discussion  Psychosocial (emotional response to diabetes): Discussion  Behavioral (readiness for change, lifestyle practices, self-care behaviors): Discussion    Goals  Patient has selected/evaluated goals during today's session: Yes, selected  Monitoring: Set (monitor BG ac meals and hs)  Start Date: 03/07/18  Target Date: 03/21/18    Diabetes Care Plan/Intervention  Education Plan/Intervention: Individual Follow-Up DSMT    Diabetes Meal Plan  Carbohydrate Per Meal: 45-60g    Education Units of Time   Time Spent: 120 min    Health Maintenance was reviewed today with patient. Discussed with patient importance of routine eye exams, foot exams/foot care, blood work (i.e.: A1c, microalbumin, and lipid),  dental visits, yearly flu vaccine, and pneumonia vaccine as indicated by PCP. Patient verbalized understanding.     Health Maintenance Topics with due status: Not Due       Topic Last Completion Date    Lipid Panel 12/11/2017    Hemoglobin A1c 12/11/2017    Foot Exam 02/14/2018    Low Dose Statin 03/01/2018    Eye Exam 03/01/2018     Health Maintenance Due   Topic Date Due    Hepatitis C Screening  1965    TETANUS VACCINE  11/09/1981    Pneumococcal PPSV23 (Medium Risk) (1) 11/09/1981    Colonoscopy  11/09/2013    Influenza Vaccine  08/01/2017

## 2018-03-15 RX ORDER — TIZANIDINE 4 MG/1
TABLET ORAL
Qty: 90 TABLET | Refills: 0 | Status: SHIPPED | OUTPATIENT
Start: 2018-03-15 | End: 2018-12-17

## 2018-03-20 ENCOUNTER — CLINICAL SUPPORT (OUTPATIENT)
Dept: DIABETES | Facility: CLINIC | Age: 53
End: 2018-03-20
Payer: MEDICAID

## 2018-03-20 DIAGNOSIS — E10.3299 TYPE 1 DIABETES MELLITUS WITH MILD NONPROLIFERATIVE RETINOPATHY WITHOUT MACULAR EDEMA, UNSPECIFIED LATERALITY: ICD-10-CM

## 2018-03-20 PROCEDURE — 99999 PR PBB SHADOW E&M-EST. PATIENT-LVL I: CPT | Mod: PBBFAC,,, | Performed by: DIETITIAN, REGISTERED

## 2018-03-20 PROCEDURE — G0108 DIAB MANAGE TRN  PER INDIV: HCPCS | Mod: PBBFAC | Performed by: DIETITIAN, REGISTERED

## 2018-03-20 PROCEDURE — 99211 OFF/OP EST MAY X REQ PHY/QHP: CPT | Mod: PBBFAC | Performed by: DIETITIAN, REGISTERED

## 2018-03-23 ENCOUNTER — PATIENT MESSAGE (OUTPATIENT)
Dept: DIABETES | Facility: CLINIC | Age: 53
End: 2018-03-23

## 2018-04-03 ENCOUNTER — TELEPHONE (OUTPATIENT)
Dept: ENDOCRINOLOGY | Facility: CLINIC | Age: 53
End: 2018-04-03

## 2018-04-04 NOTE — PROGRESS NOTES
Diabetes Education  Author: Chantal Lubin RD  Date: 3/20/2018    Diabetes Education Visit  Diabetes Education Record Assessment/Progress: Comprehensive/Group (670 pump upgrade - 1 week f/u)    Diabetes Type  Diabetes Type : Type I    Diabetes History  Diabetes Diagnosis: >10 years (per chart review: dx in 30's as Type 2, then confirmed Type 1 5yrs later; Current CPeptide low/normal - 1.55)    Nutrition  Meal Planning:  (typically 2-3 meals daily)    Monitoring   Self Monitoring :  (SMBG 3-4 times daily (does not use CGM sensor); range )  Blood Glucose Logs: Yes (from pump download; see Media tab)  In the last month, how often have you had a low blood sugar reaction?: once  What are your symptoms of low blood sugar?:  (weak)  How do you treat low blood sugar?:  (something sweet)      Current Diabetes Treatment   Current Treatment: Insulin pump, Oral Medication, Insulin (Humalog via 670G, no sensor; Jardiance; Metformin)      Pump Settings:  Basal:   MN - 12p: 2.6 un/hr   12p - MN: 2.5 un/hr    ICR: 1:15     ISF: 1:50    Target B-120          Social History  Preferred Learning Method: Face to Face  Primary Support: Self  Smoking Status: Never a Smoker    Barriers to Change  Barriers to Change: None  Learning Challenges : None    Readiness to Learn   Readiness to Learn : Acceptance    Cultural Influences  Cultural Influences: No      Diabetes Education Assessment/Progress  Nutrition (Incorporating nutritional management into one's lifestyle): Discussion, Instructed    Medications (states correct name, dose, onset, peak, duration, side effects & timing of meds): Discussion, Demonstration, Instructed, Individual Session, Written Materials Provided, Comprehends Key Points; Reviewed basic pump functions. Discussed recent use of basal rates, boluses, corrections, and overrides. Daylight savings time was last week and when pt tried to change the time, he accidentally switched AM and PM; fixed today. See media tab  for full pump download. No changes to settings made today.    Monitoring (monitoring blood glucose/other parameters & using results): Discussion, Instructed  Acute Complications (preventing, detecting, and treating acute complications): Discussion, Instructed, Demonstrates Understanding/Competency (verbalizes/demonstrates) Encouraged to continue testing BG at least AC/HS. Reviewed goal BG's.        Goals  Patient has selected/evaluated goals during today's session: Yes, evaluated    Monitoring: In Progress (monitor BG ac meals and hs)         Diabetes Care Plan/Intervention  Education Plan/Intervention: Individual Follow-Up DSMT      Diabetes Meal Plan  Carbohydrate Per Meal: 45-60g      Education Units of Time   Time Spent: 60 min      Health Maintenance was reviewed today with patient. Discussed with patient importance of routine eye exams, foot exams/foot care, blood work (i.e.: A1c, microalbumin, and lipid), dental visits, yearly flu vaccine, and pneumonia vaccine as indicated by PCP. Patient verbalized understanding.       Health Maintenance Topics with due status: Not Due       Topic Last Completion Date    Lipid Panel 12/11/2017    Hemoglobin A1c 12/11/2017    Foot Exam 02/14/2018    Low Dose Statin 03/01/2018    Eye Exam 03/01/2018     Health Maintenance Due   Topic Date Due    Hepatitis C Screening  1965    TETANUS VACCINE  11/09/1981    Pneumococcal PPSV23 (Medium Risk) (1) 11/09/1981    Colonoscopy  11/09/2013    Influenza Vaccine  08/01/2017

## 2018-04-11 ENCOUNTER — PATIENT MESSAGE (OUTPATIENT)
Dept: ENDOCRINOLOGY | Facility: CLINIC | Age: 53
End: 2018-04-11

## 2018-04-11 NOTE — TELEPHONE ENCOUNTER
Arnel Cornejo - the patient states insurance is not covering Contour next strips - does he get his supplies through a intermediary company we can contact?    Best,  Rossy Barger

## 2018-04-18 ENCOUNTER — PATIENT MESSAGE (OUTPATIENT)
Dept: ENDOCRINOLOGY | Facility: CLINIC | Age: 53
End: 2018-04-18

## 2018-04-18 DIAGNOSIS — E10.65 TYPE 1 DIABETES MELLITUS WITH HYPERGLYCEMIA: ICD-10-CM

## 2018-04-30 DIAGNOSIS — E10.65 TYPE 1 DIABETES MELLITUS WITH HYPERGLYCEMIA: ICD-10-CM

## 2018-04-30 RX ORDER — INSULIN LISPRO 100 [IU]/ML
INJECTION, SOLUTION INTRAVENOUS; SUBCUTANEOUS
Qty: 6 VIAL | Refills: 6 | Status: SHIPPED | OUTPATIENT
Start: 2018-04-30 | End: 2018-08-31

## 2018-05-21 PROBLEM — E16.2 HYPOGLYCEMIA: Status: ACTIVE | Noted: 2018-05-21

## 2018-05-21 NOTE — PROGRESS NOTES
CHIEF COMPLAINT: Type 1 Diabetes     HPI:  Mr. Cj Barnes is a 52 y.o. 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    He has used an insulin pump since 2006, having used his current pump for approx 1.5-2 years (animas).    It had a basal rate of 2.6U and he uses carb counting to bolus himself prandially, estimating 10-15U per meal. Pt was initially managed by an endocrinology NP but for the last 2-3 years says he has been managed by his PCP; he was put on Invokana approx 2 years ago to try to control his A1c.    Seen by Dr. Barger in 2/2018 and is now being seen by me for the second time. Seen by me in 2013.    F/u w/ Merry RD in 3/2018.    He is now on  670 G medtronic pump.     Lab Results   Component Value Date    HGBA1C 8.4 (H) 12/11/2017     Needs a1c.    PREVIOUS DIABETES MEDICATIONS TRIED  [place] pump  invokana    CURRENT DIABETIC MEDS: medtronic insulin pump 670 G, humalog, metformin 500 mg w/ breakfast, jardiance 25 mg daily    Pt is monitoring blood glucose readings 4 times a day.  Needs >100 strips per month related to fluctuations with blood glucose reading, a1c trends, and activity level.    Last Podiatry Exam: 2018    REVIEW OF SYSTEMS  General: no weakness, fatigue, or weight changes #5 gain .   Eyes: no double or blurred vision, eye pain, or redness; Last Eye Exam=March 2018, wears bifocals  Cardiovascular: no chest pain, palpitations, edema, or +murmurs.   Respiratory: no cough or dyspnea.   GI: no heartburn, nausea, or changes in bowel patterns; good appetite.   Skin: no rashes, dryness, itching, or reactions at insulin injection sites.  Neuro: no numbness, tingling, tremors, or vertigo. (L) foot fx/spurx few weeks  "ago, +lower back pain  Endocrine: no polyuria, polydipsia, polyphagia, heat or cold intolerance.     Vital Signs  /82   Pulse 68   Ht 5' 8" (1.727 m)   Wt 97.4 kg (214 lb 12.8 oz)   BMI 32.66 kg/m²     Hemoglobin A1C   Date Value Ref Range Status   12/11/2017 8.4 (H) 4.0 - 5.6 % Final     Comment:     According to ADA guidelines, hemoglobin A1c <7.0% represents  optimal control in non-pregnant diabetic patients. Different  metrics may apply to specific patient populations.   Standards of Medical Care in Diabetes-2016.  For the purpose of screening for the presence of diabetes:  <5.7%     Consistent with the absence of diabetes  5.7-6.4%  Consistent with increasing risk for diabetes   (prediabetes)  >or=6.5%  Consistent with diabetes  Currently, no consensus exists for use of hemoglobin A1c  for diagnosis of diabetes for children.  This Hemoglobin A1c assay has significant interference with fetal   hemoglobin   (HbF). The results are invalid for patients with abnormal amounts of   HbF,   including those with known Hereditary Persistence   of Fetal Hemoglobin. Heterozygous hemoglobin variants (HbAS, HbAC,   HbAD, HbAE, HbA2) do not significantly interfere with this assay;   however, presence of multiple variants in a sample may impact the %   interference.     01/31/2013 7.7 (H) 4.0 - 6.2 % Final   12/21/2011 7.5 (H) 4.0 - 6.2 % Final        Chemistry        Component Value Date/Time     02/14/2018 1137    K 4.2 02/14/2018 1137     02/14/2018 1137    CO2 28 02/14/2018 1137    BUN 17 02/14/2018 1137    CREATININE 0.9 02/14/2018 1137     (H) 02/14/2018 1137        Component Value Date/Time    CALCIUM 9.7 02/14/2018 1137    ALKPHOS 95 02/14/2018 1137    AST 24 02/14/2018 1137    ALT 33 02/14/2018 1137    BILITOT 0.9 02/14/2018 1137    ESTGFRAFRICA >60.0 02/14/2018 1137    EGFRNONAA >60.0 02/14/2018 1137           Lab Results   Component Value Date    TSH 0.844 12/11/2017      Lab Results "   Component Value Date    CHOL 150 12/11/2017    CHOL 154 01/31/2013    CHOL 140 09/24/2010     Lab Results   Component Value Date    HDL 44 12/11/2017    HDL 39 (L) 01/31/2013    HDL 40 09/24/2010     Lab Results   Component Value Date    LDLCALC 92.8 12/11/2017    LDLCALC 100.0 01/31/2013    LDLCALC 82.8 09/24/2010     Lab Results   Component Value Date    TRIG 66 12/11/2017    TRIG 73 01/31/2013    TRIG 86 09/24/2010     Lab Results   Component Value Date    CHOLHDL 29.3 12/11/2017    CHOLHDL 25.3 01/31/2013    CHOLHDL 28.6 09/24/2010         PHYSICAL EXAMINATION  Constitutional: Appears well, no distress  Neck: Supple, trachea midline.   Respiratory: CTA without wheezes, even and unlabored.  Cardiovascular: RRR; no carotid bruits or + murmurs; (+) DP pulses; no edema.   Lymph: no lymphadenopathy palpated  Skin: warm and dry; no injection site reactions, no acanthosis nigracans observed.  Neuro:patient alert and cooperative, normal affect; steady gait.    Diabetes Foot Exam:   Protective Sensation (w/ 10 gram monofilament):  Right: Intact  Left: Intact    Visual Inspection:  Dry Skin -  Bilateral and Onychomycosis -  Left    Pedal Pulses:   Right: Present  Left: Present    (R) foot 1st, 3rd digits (discolored, whitish upper part of nails)  (L) great toe -fungal infection     Assessment/Plan  1. 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  a1c has improved, change iob to 3 hours next visit w/ DE  No changes at this time, noted basal is weighed more throughout day, ICR 1:15, basal 2.6 u/hr mn-12n, 2.5 u/hr noon-mn    Hemoglobin A1c next time     Ambulatory Referral to Diabetes Education in 1-2 weeks.  Counseling >35 mins   2. Hyperlipidemia with target low density lipoprotein (LDL) cholesterol less than 70  mg/dL  Lab Results   Component Value Date    LDLCALC 92.8 12/11/2017     Continue statin   3. Insulin pump fitting or adjustment  See download, no changes   4. Insulin pump in place  See above   5. Essential hypertension  Controlled, continue med(s)   6. Tobacco use  No tobacco, on tobaccoless (snuff)   7. Hypoglycemia  Not often, see download   8. Aortic regurgitation, congenital  F/u with cards.         FOLLOW UP  Follow-up in about 3 months (around 8/22/2018).

## 2018-05-22 ENCOUNTER — OFFICE VISIT (OUTPATIENT)
Dept: ENDOCRINOLOGY | Facility: CLINIC | Age: 53
End: 2018-05-22
Payer: MEDICAID

## 2018-05-22 ENCOUNTER — LAB VISIT (OUTPATIENT)
Dept: LAB | Facility: HOSPITAL | Age: 53
End: 2018-05-22
Payer: MEDICAID

## 2018-05-22 VITALS
BODY MASS INDEX: 32.56 KG/M2 | DIASTOLIC BLOOD PRESSURE: 82 MMHG | SYSTOLIC BLOOD PRESSURE: 128 MMHG | HEIGHT: 68 IN | WEIGHT: 214.81 LBS | HEART RATE: 68 BPM

## 2018-05-22 DIAGNOSIS — E78.5 HYPERLIPIDEMIA WITH TARGET LOW DENSITY LIPOPROTEIN (LDL) CHOLESTEROL LESS THAN 70 MG/DL: ICD-10-CM

## 2018-05-22 DIAGNOSIS — Q23.1 AORTIC REGURGITATION, CONGENITAL: ICD-10-CM

## 2018-05-22 DIAGNOSIS — Z46.81 INSULIN PUMP FITTING OR ADJUSTMENT: ICD-10-CM

## 2018-05-22 DIAGNOSIS — E10.3299 TYPE 1 DIABETES MELLITUS WITH MILD NONPROLIFERATIVE RETINOPATHY WITHOUT MACULAR EDEMA, UNSPECIFIED LATERALITY: ICD-10-CM

## 2018-05-22 DIAGNOSIS — I10 ESSENTIAL HYPERTENSION: ICD-10-CM

## 2018-05-22 DIAGNOSIS — Z96.41 INSULIN PUMP IN PLACE: ICD-10-CM

## 2018-05-22 DIAGNOSIS — E16.2 HYPOGLYCEMIA: ICD-10-CM

## 2018-05-22 DIAGNOSIS — E10.3299 TYPE 1 DIABETES MELLITUS WITH MILD NONPROLIFERATIVE RETINOPATHY WITHOUT MACULAR EDEMA, UNSPECIFIED LATERALITY: Primary | ICD-10-CM

## 2018-05-22 DIAGNOSIS — Z72.0 TOBACCO USE: ICD-10-CM

## 2018-05-22 LAB
ESTIMATED AVG GLUCOSE: 160 MG/DL
HBA1C MFR BLD HPLC: 7.2 %

## 2018-05-22 PROCEDURE — 83036 HEMOGLOBIN GLYCOSYLATED A1C: CPT

## 2018-05-22 PROCEDURE — 99215 OFFICE O/P EST HI 40 MIN: CPT | Mod: S$PBB,,, | Performed by: NURSE PRACTITIONER

## 2018-05-22 PROCEDURE — 99214 OFFICE O/P EST MOD 30 MIN: CPT | Mod: PBBFAC | Performed by: NURSE PRACTITIONER

## 2018-05-22 PROCEDURE — 36415 COLL VENOUS BLD VENIPUNCTURE: CPT

## 2018-05-22 PROCEDURE — 99999 PR PBB SHADOW E&M-EST. PATIENT-LVL IV: CPT | Mod: PBBFAC,,, | Performed by: NURSE PRACTITIONER

## 2018-05-22 RX ORDER — ASPIRIN 81 MG/1
81 TABLET ORAL DAILY
Status: ON HOLD | COMMUNITY
End: 2019-08-14 | Stop reason: HOSPADM

## 2018-05-22 NOTE — PATIENT INSTRUCTIONS
Snacks can be an important part of a balanced, healthy meal plan. They allow you to eat more frequently, feeling full and satisfied throughout the day. Also, they allow you to spread carbohydrates evenly, which may stabilize blood sugars.  Plus, snacks are enjoyable!     The amount of carbohydrate needed at snacks varies. Generally, about 15-30 grams of carbohydrate per snack is recommended.  Below you will find some tasty treats.       0-5 gm carb   Crystal Light   Vitamin Water Zero   Herbal tea, unsweetened   2 tsp peanut butter on celery   1./2 cup sugar-free jell-o   1 sugar-free popsicle   ¼ cup blueberries   8oz Blue Yanni unsweetened almond milk   5 baby carrots & celery sticks, cucumbers, bell peppers dipped in ¼ cup salsa, 2Tbsp light ranch dressing or 2Tbsp plain Greek yogurt   10 Goldfish crackers   ½ oz low-fat cheese or string cheese   1 closed handful of nuts, unsalted   1 Tbsp of sunflower seeds, unsalted   1 cup Smart Pop popcorn   1 whole grain brown rice cake        15 gm carb   1 small piece of fruit or ½ banana or 1/2 cup lite canned fruit   3 adan cracker squares   3 cups Smart Pop popcorn, top spray butter, [place] lite salt or cinnamon and Truvia   5 Vanilla Wafers   ½ cup low fat, no added sugar ice cream or frozen yogurt (Blue bell, Blue Bunny, Weight Watchers, Skinny Cow)   ½ turkey, ham, or chicken sandwich   ½ c fruit with ½ c Cottage cheese   4-6 unsalted wheat crackers with 1 oz low fat cheese or 1 tbsp peanut butter    30-45 goldfish crackers (depending on flavor)    7-8 Orthodoxy mini brown rice cakes (caramel, apple cinnamon, chocolate)    12 Orthodoxy mini brown rice cakes (cheddar, bbq, ranch)    1/3 cup hummus dip with raw veg   1/2 whole wheat robni, 1Tbsp hummus   Mini Pizza (1/2 whole wheat English muffin, low-fat  cheese, tomato sauce)   100 calorie snack pack (Oreo, Chips Ahoy, Ritz Mix, Baked Cheetos)   4-6 oz. light or Greek Style yogurt  (Gin Harris, Candida, [facility])   ½ cup sugar-free pudding     6 in. wheat tortilla or robin oven toasted chips (topped with spray butter flavoring, cinnamon, Truvia OR spray butter, garlic powder, chili powder)    18 BBQ Popchips (available at Target, Whole Foods, Fresh Market)                   Diabetes Support Group Meetings         Date: Topic:   February 8 Health Promotion/Cooking Demo   March 8 Taking Care of Your Kidneys   April 12 Taking Care of Your Feet   May 10 Ease Your Mind with Diabetes   Meena 14 Summer Treats/Cooking Demo   July 12 Super Market Sweep   August 9 Taking Care of Your Eyes   Sept 13 Technology/ADA updates   October 11 Recipes & Treats/Cooking Demo   November 8 Heart Health/Pump it up!   December 13 Year-End Close Out        Meetings are held in the Any Room (A) of the [facility] located at [address] [place] [address]. Please call [phone] for additional information.    Free service, offered every 2nd Thursday of every month! Family members and/or friends are welcome as well!  Support group is for patients with type 1 or type 2 diabetes.    From 3:30p to 4:30p

## 2018-05-25 PROBLEM — R29.90 NEUROLOGICAL SYMPTOMS: Status: ACTIVE | Noted: 2018-05-25

## 2018-05-26 PROBLEM — G45.9 TIA (TRANSIENT ISCHEMIC ATTACK): Status: ACTIVE | Noted: 2018-05-26

## 2018-06-04 ENCOUNTER — PATIENT OUTREACH (OUTPATIENT)
Dept: DIABETES | Facility: CLINIC | Age: 53
End: 2018-06-04

## 2018-06-04 ENCOUNTER — PATIENT MESSAGE (OUTPATIENT)
Dept: DIABETES | Facility: CLINIC | Age: 53
End: 2018-06-04

## 2018-06-04 NOTE — PROGRESS NOTES
Pt was scheduled for diabetes education today for automode start. However, he is not wearing the CGMS, has Medicaid which does not currently cover the CGM, and only way to do automode is if pt wants to pay out of pocket for it. I called and discussed this with him. He verbalized understanding and is not interested in paying out of pocket for CGM. Offered can still keep appt for downloading pump for pattern management and/or troubleshooting. He verbalized pump use is going very well, BGs much more controlled and does not feel he needs to come in at this time. Is having difficulty with getting strips for linking meter covered. I discussed the [employer] Reimbursement Program, which can help get coverage. He is willing to get set up with them. I sent contact information via [facility] portal.

## 2018-06-07 ENCOUNTER — TELEPHONE (OUTPATIENT)
Dept: PRIMARY CARE CLINIC | Facility: CLINIC | Age: 53
End: 2018-06-07

## 2018-06-07 NOTE — TELEPHONE ENCOUNTER
Patient discharged from the hospital on May 27th. Appointment made to see Dr. Stover tomorrow morning

## 2018-06-07 NOTE — TELEPHONE ENCOUNTER
----- Message from Jenny Herrera sent at 6/7/2018  9:48 AM CDT -----  Contact: self  Patient [phone] is calling/he was at [facility] Emergency Room on 05 25 18 and diagnosed as having a mini stroke/he is to followup in 7 days from that date with his primary doctor/I do not show any appts soon/please advise

## 2018-06-08 ENCOUNTER — OFFICE VISIT (OUTPATIENT)
Dept: PRIMARY CARE CLINIC | Facility: CLINIC | Age: 53
End: 2018-06-08
Payer: MEDICAID

## 2018-06-08 VITALS
HEART RATE: 75 BPM | HEIGHT: 67 IN | RESPIRATION RATE: 18 BRPM | WEIGHT: 213 LBS | DIASTOLIC BLOOD PRESSURE: 77 MMHG | OXYGEN SATURATION: 97 % | SYSTOLIC BLOOD PRESSURE: 127 MMHG | BODY MASS INDEX: 33.43 KG/M2 | TEMPERATURE: 98 F

## 2018-06-08 DIAGNOSIS — Z11.59 NEED FOR HEPATITIS C SCREENING TEST: ICD-10-CM

## 2018-06-08 DIAGNOSIS — I35.0 NONRHEUMATIC AORTIC VALVE STENOSIS: ICD-10-CM

## 2018-06-08 DIAGNOSIS — G45.9 TRANSIENT CEREBRAL ISCHEMIA, UNSPECIFIED TYPE: Primary | ICD-10-CM

## 2018-06-08 DIAGNOSIS — E78.5 HYPERLIPIDEMIA WITH TARGET LOW DENSITY LIPOPROTEIN (LDL) CHOLESTEROL LESS THAN 70 MG/DL: ICD-10-CM

## 2018-06-08 PROBLEM — R29.90 NEUROLOGICAL SYMPTOMS: Status: RESOLVED | Noted: 2018-05-25 | Resolved: 2018-06-08

## 2018-06-08 PROCEDURE — 99213 OFFICE O/P EST LOW 20 MIN: CPT | Mod: PBBFAC,PN | Performed by: FAMILY MEDICINE

## 2018-06-08 PROCEDURE — 99214 OFFICE O/P EST MOD 30 MIN: CPT | Mod: S$PBB,,, | Performed by: FAMILY MEDICINE

## 2018-06-08 PROCEDURE — 99999 PR PBB SHADOW E&M-EST. PATIENT-LVL III: CPT | Mod: PBBFAC,,, | Performed by: FAMILY MEDICINE

## 2018-06-08 PROCEDURE — 99495 TRANSJ CARE MGMT MOD F2F 14D: CPT | Mod: PBBFAC,PN | Performed by: FAMILY MEDICINE

## 2018-06-08 NOTE — PROGRESS NOTES
"Subjective:       Patient ID: Cj Barnes is a 52 y.o. male.    Chief Complaint: Transient Ischemic Attack (patient says he was in [facility] and was told he had a TIA )    Had episode of visual disturbance at home ~2 weeks ago, went back to bed after drinking few sips of soda. Woke up in the middle of the night and got dressed saying he needed to go to work, was confused and disoriented, was "dragging my left arm." Was taken to ER, symptoms resolved after a few hours. MRI brain negative, carotid U/S normal, echo showed severe AS. He remains asymptomatic from his AS (no CP or GORDON). Was on baby ASA prior to TIA, discharged on Plavix and low-dose ASA.      Review of Systems   Constitutional: Negative for fever.   HENT: Negative for trouble swallowing.    Eyes: Negative for photophobia.   Respiratory: Negative for shortness of breath.    Cardiovascular: Negative for chest pain and palpitations.   Gastrointestinal: Negative for nausea and vomiting.   Genitourinary: Negative for difficulty urinating.   Musculoskeletal: Negative for joint swelling.   Skin: Negative for rash.   Neurological: Negative for dizziness and seizures.   Hematological: Does not bruise/bleed easily.   Psychiatric/Behavioral: Negative for confusion.       Objective:      Vitals:    06/08/18 0815   BP: 127/77   BP Location: Left arm   Patient Position: Sitting   BP Method: Large (Automatic)   Pulse: 75   Resp: 18   Temp: 97.8 °F (36.6 °C)   TempSrc: Oral   SpO2: 97%   Weight: 96.6 kg (213 lb)   Height: 5' 7" (1.702 m)     Lab Results   Component Value Date    WBC 7.20 05/26/2018    HGB 15.6 05/26/2018    HCT 46.0 05/26/2018     05/26/2018    CHOL 127 05/25/2018    TRIG 81 05/25/2018    HDL 44 05/25/2018    ALT 24 05/26/2018    AST 23 05/26/2018     05/26/2018    K 3.5 05/26/2018     05/26/2018    CREATININE 0.9 05/26/2018    BUN 16 05/26/2018    CO2 28 05/26/2018    TSH 0.57 05/25/2018    PSA 0.38 12/11/2017    INR 1.0 05/25/2018 "    HGBA1C 7.2 (H) 05/22/2018     Physical Exam   Constitutional: He is oriented to person, place, and time. He appears well-developed and well-nourished.   HENT:   Head: Normocephalic and atraumatic.   Eyes: EOM are normal.   Neck: Neck supple. No JVD present.   Cardiovascular: Normal rate and regular rhythm.    Murmur heard.   Systolic murmur is present with a grade of 3/6   Pulmonary/Chest: Effort normal and breath sounds normal.   Musculoskeletal: He exhibits no edema.   Neurological: He is alert and oriented to person, place, and time. No cranial nerve deficit.   Skin: Skin is warm and dry.   Psychiatric: He has a normal mood and affect. His behavior is normal.   Nursing note and vitals reviewed.      Assessment:       1. Transient cerebral ischemia, unspecified type    2. Nonrheumatic aortic valve stenosis    3. Hyperlipidemia with target low density lipoprotein (LDL) cholesterol less than 70 mg/dL    4. Need for hepatitis C screening test        Plan:       Transient cerebral ischemia, unspecified type  Comments:  continue Plavix and low-dose ASA, statin  Orders:  -     Ambulatory referral to Cardiology    Nonrheumatic aortic valve stenosis  Comments:  wants to transfer care to [facility] cardiologist  Orders:  -     Ambulatory referral to Cardiology    Hyperlipidemia with target low density lipoprotein (LDL) cholesterol less than 70 mg/dL  Comments:  LDL at goal, continue statin    Need for hepatitis C screening test  -     Hepatitis C antibody; Future; Expected date: 06/08/2018      Medication List with Changes/Refills   Current Medications    ASPIRIN (ECOTRIN) 81 MG EC TABLET    Take 81 mg by mouth once daily.    BLOOD SUGAR DIAGNOSTIC STRP    1 each by Misc.(Non-Drug; Combo Route) route 6 (six) times daily. Contour next strips    BLOOD-GLUCOSE METER KIT    To check BG 3-4 times daily, give pt same meter as his old one    CLOPIDOGREL (PLAVIX) 75 MG TABLET    Take 1 tablet (75 mg total) by mouth once daily.     EMPAGLIFLOZIN (JARDIANCE) 25 MG TAB    Take 25 mg by mouth once daily.    GLUCAGON (HUMAN RECOMBINANT) INJ 1MG/ML KIT    Inject 1 mL (1 mg total) into the muscle as needed.    INSULIN LISPRO (HUMALOG) 100 UNIT/ML INJECTION    180 units every 1.5 days    LISINOPRIL 10 MG TABLET    Take 1 tablet (10 mg total) by mouth once daily.    METFORMIN (GLUCOPHAGE-XR) 500 MG 24 HR TABLET    Take 1 tablet (500 mg total) by mouth daily with breakfast.    SIMVASTATIN (ZOCOR) 20 MG TABLET    Take 1 tablet (20 mg total) by mouth every evening.    TIZANIDINE (ZANAFLEX) 4 MG TABLET    TAKE ONE TABLET BY MOUTH EVERY 8 HOURS AS NEEDED FOR MUSCLE SPASMS    TRAMADOL (ULTRAM) 50 MG TABLET    TAKE ONE TABLET BY MOUTH EVERY 6 HOURS AS NEEDED FOR PAIN    ZOLPIDEM (AMBIEN CR) 12.5 MG CR TABLET    Take 12.5 mg by mouth nightly as needed for Insomnia.   Discontinued Medications    ETODOLAC (LODINE) 200 MG CAP    Take 1 capsule (200 mg total) by mouth 3 (three) times daily.

## 2018-06-11 ENCOUNTER — PATIENT MESSAGE (OUTPATIENT)
Dept: ENDOCRINOLOGY | Facility: CLINIC | Age: 53
End: 2018-06-11

## 2018-06-12 RX ORDER — INSULIN LISPRO 100 [IU]/ML
INJECTION, SOLUTION INTRAVENOUS; SUBCUTANEOUS
Qty: 6 VIAL | Refills: 11 | Status: SHIPPED | OUTPATIENT
Start: 2018-06-12 | End: 2018-07-13 | Stop reason: SDUPTHER

## 2018-06-14 ENCOUNTER — OFFICE VISIT (OUTPATIENT)
Dept: CARDIOLOGY | Facility: CLINIC | Age: 53
End: 2018-06-14
Payer: MEDICAID

## 2018-06-14 VITALS
HEIGHT: 67 IN | SYSTOLIC BLOOD PRESSURE: 126 MMHG | HEART RATE: 79 BPM | DIASTOLIC BLOOD PRESSURE: 78 MMHG | WEIGHT: 215.5 LBS | BODY MASS INDEX: 33.82 KG/M2

## 2018-06-14 DIAGNOSIS — I35.0 AORTIC STENOSIS: Primary | ICD-10-CM

## 2018-06-14 DIAGNOSIS — E10.3299 TYPE 1 DIABETES MELLITUS WITH MILD NONPROLIFERATIVE RETINOPATHY WITHOUT MACULAR EDEMA, UNSPECIFIED LATERALITY: ICD-10-CM

## 2018-06-14 DIAGNOSIS — G45.9 TRANSIENT CEREBRAL ISCHEMIA, UNSPECIFIED TYPE: ICD-10-CM

## 2018-06-14 DIAGNOSIS — I25.10 CORONARY ARTERY DISEASE INVOLVING NATIVE CORONARY ARTERY OF NATIVE HEART WITHOUT ANGINA PECTORIS: ICD-10-CM

## 2018-06-14 DIAGNOSIS — I35.0 NONRHEUMATIC AORTIC VALVE STENOSIS: ICD-10-CM

## 2018-06-14 PROCEDURE — 99999 PR PBB SHADOW E&M-EST. PATIENT-LVL III: CPT | Mod: PBBFAC,,, | Performed by: INTERNAL MEDICINE

## 2018-06-14 PROCEDURE — 99205 OFFICE O/P NEW HI 60 MIN: CPT | Mod: S$PBB,,, | Performed by: INTERNAL MEDICINE

## 2018-06-14 PROCEDURE — 93005 ELECTROCARDIOGRAM TRACING: CPT | Mod: PBBFAC,PN | Performed by: INTERNAL MEDICINE

## 2018-06-14 PROCEDURE — 99213 OFFICE O/P EST LOW 20 MIN: CPT | Mod: PBBFAC,PN,25 | Performed by: INTERNAL MEDICINE

## 2018-06-14 PROCEDURE — 93010 ELECTROCARDIOGRAM REPORT: CPT | Mod: S$PBB,,, | Performed by: INTERNAL MEDICINE

## 2018-06-14 NOTE — LETTER
June 14, 2018      Garry Stover MD  [address]  [address]  [place] [address]           [facility]  [address], [facility] [address]  [place] [address]  Phone: [phone]  Fax: [phone]          Patient: Cj Barnes   MR Number: [id number]   YOB: 1965   Date of Visit: 6/14/2018       Dear Dr. Garry Stover:    Thank you for referring Cj Barnes to me for evaluation. Attached you will find relevant portions of my assessment and plan of care.    If you have questions, please do not hesitate to call me. I look forward to following Cj Barnes along with you.    Sincerely,    Ryan Alexander MD    Enclosure  CC:  No Recipients    If you would like to receive this communication electronically, please contact [email or web address] or [phone] to request more information on [employer] access.    For providers and/or their staff who would like to refer a patient to [facility], please contact us through our one-stop-shop provider referral line, [facility], at [phone].    If you feel you have received this communication in error or would no longer like to receive these types of communications, please e-mail [email or web address]

## 2018-06-14 NOTE — PROGRESS NOTES
"  Subjective:      Patient ID: Cj Barnes is a 52 y.o. male.    Chief Complaint: Transient Ischemic Attack    HPI:Pt referred by Dr Stover.  Pt diagnosed with bicuspid aortic valve by Dr Turner about 6 months ago.  Pt also had a coronary angiogram which showed 55% mid circumflex stenosis and 60% 4th o.m. Stenosis and mild LAD disease. CAM 0.9 cm squared at time of cath. LVEF WNLPt obtained a second opinion from Dr Knight who did not recommend surgery at this time. Pt walks. Pt cuts the grass.  Pt is mostly limited by back pain (he has had prior back surgery).  There is no hx of chest pain or shortness of breath or fainting.    In May pt switched insulin pumps.  Pt awoke at 11 to urinate.  While standing at urinal pt began to feel lightheaded. "Every which way I would turn I would see the same image."  Pt lay down in bed and had a couple of swallows of Dr Pepper and went back to sleep.  Pt awoke from sleep at 3 AM and got dressed and left house to go to work (but pt is unemployed.)  At that point pt's wife took pt to ER at [facility][note 3].  Pt was admitted to hospital and diagnosed with a TIA.  Pt has had no reoccurrence of any visual problems or confusion.  Pt thinks he may have had an insulin reaction.   Review of Systems   Cardiovascular: Negative for chest pain, claudication, dyspnea on exertion, irregular heartbeat, leg swelling, near-syncope, orthopnea, palpitations and syncope.      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  Past Medical History:   Diagnosis Date    Aortic stenosis 2018    Cancer 2014    Colon cancer     Coronary artery disease     Diabetes mellitus type I     since in his 30's    Heart murmur     Heel fracture     HTN (hypertension)     Hyperlipidemia LDL goal < 70     Insulin pump in place     MVP (mitral valve prolapse)     Stenosis of aortic and mitral valves         Past Surgical History:   Procedure Laterality Date    BACK SURGERY      COLON SURGERY  2014    " FOOT TENDON SURGERY      right and left heart cath Bilateral 01/15/2018    TRIGGER FINGER RELEASE      x 2       Family History   Problem Relation Age of Onset    Heart disease Mother     Lung cancer Mother     Heart attack Father     Diabetes Father     HIV Brother        Social History     Social History    Marital status:      Spouse name: N/A    Number of children: N/A    Years of education: N/A     Social History Main Topics    Smoking status: Never Smoker    Smokeless tobacco: Current User     Types: Snuff      Comment: since he was 12 yrs old    Alcohol use No      Comment: socially    Drug use: No    Sexual activity: Not Asked     Other Topics Concern    None     Social History Narrative        2 grown kids    Delivers seafood       Current Outpatient Prescriptions on File Prior to Visit   Medication Sig Dispense Refill    aspirin (ECOTRIN) 81 MG EC tablet Take 81 mg by mouth once daily.      blood sugar diagnostic Strp 1 each by Misc.(Non-Drug; Combo Route) route 6 (six) times daily. Contour next strips 200 strip 6    blood-glucose meter kit To check BG 3-4 times daily, give pt same meter as his old one 1 each 0    clopidogrel (PLAVIX) 75 mg tablet Take 1 tablet (75 mg total) by mouth once daily. 30 tablet 11    empagliflozin (JARDIANCE) 25 mg Tab Take 25 mg by mouth once daily. 30 tablet 5    glucagon (human recombinant) inj 1mg/mL kit Inject 1 mL (1 mg total) into the muscle as needed. 1 kit 0    insulin lispro (ADMELOG U-100 INSULIN LISPRO) 100 unit/mL injection Uses 180 units every 1.5 days via pump 6 vial 11    insulin lispro (HUMALOG) 100 unit/mL injection 180 units every 1.5 days (Patient taking differently:  Units once daily. 180 units every 1.5 days) 6 vial 6    lisinopril 10 MG tablet Take 1 tablet (10 mg total) by mouth once daily. 90 tablet 3    metFORMIN (GLUCOPHAGE-XR) 500 MG 24 hr tablet Take 1 tablet (500 mg total) by mouth daily with breakfast.  "90 tablet 3    simvastatin (ZOCOR) 20 MG tablet Take 1 tablet (20 mg total) by mouth every evening. 90 tablet 3    tiZANidine (ZANAFLEX) 4 MG tablet TAKE ONE TABLET BY MOUTH EVERY 8 HOURS AS NEEDED FOR MUSCLE SPASMS 90 tablet 0    tramadol (ULTRAM) 50 mg tablet TAKE ONE TABLET BY MOUTH EVERY 6 HOURS AS NEEDED FOR PAIN 30 tablet 0    zolpidem (AMBIEN CR) 12.5 MG CR tablet Take 12.5 mg by mouth nightly as needed for Insomnia.       No current facility-administered medications on file prior to visit.        Review of patient's allergies indicates:  No Known Allergies  Objective:     Vitals:    06/14/18 0809   BP: 126/78   BP Location: Right arm   Patient Position: Sitting   BP Method: Medium (Automatic)   Pulse: 79   Weight: 97.8 kg (215 lb 8 oz)   Height: 5' 7" (1.702 m)        Physical Exam   Constitutional: He is oriented to person, place, and time. He appears well-developed and well-nourished. No distress.   Eyes: No scleral icterus.   Neck: No JVD present. Carotid bruit is not present.   Cardiovascular: Regular rhythm.  Exam reveals no gallop and no friction rub.    Murmur (Iv/VI systolic ejection murmur radiates to neck) heard.  Pulses:       Posterior tibial pulses are 2+ on the right side, and 2+ on the left side.   Pulmonary/Chest: Effort normal and breath sounds normal. No respiratory distress.   Abdominal: Soft. He exhibits no abdominal bruit, no pulsatile midline mass and no mass. There is no hepatosplenomegaly. There is no tenderness.   Musculoskeletal: He exhibits no edema.   Neurological: He is alert and oriented to person, place, and time.   Skin: Skin is warm and dry. He is not diaphoretic.   Psychiatric: He has a normal mood and affect. His behavior is normal. Judgment and thought content normal.   Vitals reviewed.   Insulin pump noted    ECG today--NSR, low T wave lead I    Carotid ultrasond with plaque but no stenosis    MRI of brain -- no stroke    Recent CBC and CMP OK except for " hyperglycemia    CXR OK    Assessment:     1. Aortic stenosis    2. Nonrheumatic aortic valve stenosis    3. Transient cerebral ischemia, unspecified type    4. Type 1 diabetes mellitus with mild nonproliferative retinopathy without macular edema, unspecified laterality    5. Coronary artery disease involving native coronary artery of native heart without angina pectoris      Plan:   Cj was seen today for transient ischemic attack.    Diagnoses and all orders for this visit:    Aortic stenosis  -     IN OFFICE EKG 12-LEAD (to Muse)  -     Transthoracic echo (TTE) complete; Future    Nonrheumatic aortic valve stenosis  -     IN OFFICE EKG 12-LEAD (to Muse)    Transient cerebral ischemia, unspecified type    Type 1 diabetes mellitus with mild nonproliferative retinopathy without macular edema, unspecified laterality    Coronary artery disease involving native coronary artery of native heart without angina pectoris     Note that TIA could have been triggered by hypoglycemia.  Pt knows to check his sugar if he has any funny feelings.    Pt is aware that he will be a candidate for AVR or TAVR if he develops symptoms of chest pain or shortness of breath or fainting.  Discussed in detail with pt.    RTC 6 months with echocardiogram    Agree with current medical regimen    Pt encouraged to discontinue using oral tobacco.    Follow-up in about 6 months (around 12/14/2018), or with echo.

## 2018-07-12 RX ORDER — EMPAGLIFLOZIN 25 MG/1
TABLET, FILM COATED ORAL
Qty: 30 TABLET | Refills: 11 | Status: SHIPPED | OUTPATIENT
Start: 2018-07-12 | End: 2018-09-04 | Stop reason: SDUPTHER

## 2018-07-13 RX ORDER — INSULIN LISPRO 100 [IU]/ML
INJECTION, SOLUTION INTRAVENOUS; SUBCUTANEOUS
Qty: 6 VIAL | Refills: 11 | Status: SHIPPED | OUTPATIENT
Start: 2018-07-13 | End: 2019-04-09 | Stop reason: SDUPTHER

## 2018-07-13 NOTE — TELEPHONE ENCOUNTER
----- Message from Bernie Huerta sent at 7/13/2018 10:37 AM CDT -----  Contact: [facility] Medicaid  FYI    She just called to inform you that medicaid is nolonger covering the insulin lispro (HUMALOG) 100 unit/mL injection and you can substitute it with Admelog.      Thank you

## 2018-07-25 DIAGNOSIS — E10.8 TYPE 1 DIABETES MELLITUS WITH COMPLICATION: Primary | ICD-10-CM

## 2018-07-25 RX ORDER — BLOOD-GLUCOSE METER
EACH MISCELLANEOUS
Qty: 200 STRIP | Refills: 11 | Status: SHIPPED | OUTPATIENT
Start: 2018-07-25 | End: 2019-06-17

## 2018-08-10 ENCOUNTER — TELEPHONE (OUTPATIENT)
Dept: ENDOCRINOLOGY | Facility: CLINIC | Age: 53
End: 2018-08-10

## 2018-08-10 NOTE — TELEPHONE ENCOUNTER
----- Message from Cecilia Damian sent at 8/10/2018 10:37 AM CDT -----  Contact: Cover my meds  .Pharmacy Calling    Reason for call:  If received the PA for Humalog  Pharmacy Name: [facility]  Prescription Name: insulin lispro (HUMALOG) 100 unit/mL injection  Phone Number: [phone], Ref- [id number]  Additional Information:

## 2018-08-31 ENCOUNTER — OFFICE VISIT (OUTPATIENT)
Dept: ENDOCRINOLOGY | Facility: CLINIC | Age: 53
End: 2018-08-31
Payer: MEDICAID

## 2018-08-31 VITALS
BODY MASS INDEX: 33.38 KG/M2 | WEIGHT: 220.25 LBS | DIASTOLIC BLOOD PRESSURE: 80 MMHG | SYSTOLIC BLOOD PRESSURE: 128 MMHG | HEIGHT: 68 IN | HEART RATE: 74 BPM

## 2018-08-31 DIAGNOSIS — I10 ESSENTIAL HYPERTENSION: ICD-10-CM

## 2018-08-31 DIAGNOSIS — Q23.1 AORTIC REGURGITATION, CONGENITAL: ICD-10-CM

## 2018-08-31 DIAGNOSIS — E78.5 HYPERLIPIDEMIA WITH TARGET LOW DENSITY LIPOPROTEIN (LDL) CHOLESTEROL LESS THAN 70 MG/DL: ICD-10-CM

## 2018-08-31 DIAGNOSIS — E10.42 DIABETIC POLYNEUROPATHY ASSOCIATED WITH TYPE 1 DIABETES MELLITUS: ICD-10-CM

## 2018-08-31 DIAGNOSIS — I25.10 CORONARY ARTERY DISEASE INVOLVING NATIVE CORONARY ARTERY OF NATIVE HEART WITHOUT ANGINA PECTORIS: ICD-10-CM

## 2018-08-31 DIAGNOSIS — E16.2 HYPOGLYCEMIA: ICD-10-CM

## 2018-08-31 DIAGNOSIS — E10.3299 TYPE 1 DIABETES MELLITUS WITH MILD NONPROLIFERATIVE RETINOPATHY WITHOUT MACULAR EDEMA, UNSPECIFIED LATERALITY: Primary | ICD-10-CM

## 2018-08-31 DIAGNOSIS — E10.65 TYPE 1 DIABETES MELLITUS WITH HYPERGLYCEMIA: ICD-10-CM

## 2018-08-31 DIAGNOSIS — Z46.81 INSULIN PUMP FITTING OR ADJUSTMENT: ICD-10-CM

## 2018-08-31 PROCEDURE — 99999 PR PBB SHADOW E&M-EST. PATIENT-LVL V: CPT | Mod: PBBFAC,,, | Performed by: NURSE PRACTITIONER

## 2018-08-31 PROCEDURE — 99214 OFFICE O/P EST MOD 30 MIN: CPT | Mod: S$PBB,,, | Performed by: NURSE PRACTITIONER

## 2018-08-31 PROCEDURE — 99215 OFFICE O/P EST HI 40 MIN: CPT | Mod: PBBFAC | Performed by: NURSE PRACTITIONER

## 2018-08-31 RX ORDER — PREGABALIN 150 MG/1
150 CAPSULE ORAL 2 TIMES DAILY
Qty: 60 CAPSULE | Refills: 6 | Status: SHIPPED | OUTPATIENT
Start: 2018-08-31 | End: 2018-12-06

## 2018-08-31 NOTE — PATIENT INSTRUCTIONS
Snacks can be an important part of a balanced, healthy meal plan. They allow you to eat more frequently, feeling full and satisfied throughout the day. Also, they allow you to spread carbohydrates evenly, which may stabilize blood sugars.  Plus, snacks are enjoyable!     The amount of carbohydrate needed at snacks varies. Generally, about 15-30 grams of carbohydrate per snack is recommended.  Below you will find some tasty treats.       0-5 gm carb   Crystal Light   Vitamin Water Zero   Herbal tea, unsweetened   2 tsp peanut butter on celery   1./2 cup sugar-free jell-o   1 sugar-free popsicle   ¼ cup blueberries   8oz Blue Yanni unsweetened almond milk   5 baby carrots & celery sticks, cucumbers, bell peppers dipped in ¼ cup salsa, 2Tbsp light ranch dressing or 2Tbsp plain Greek yogurt   10 Goldfish crackers   ½ oz low-fat cheese or string cheese   1 closed handful of nuts, unsalted   1 Tbsp of sunflower seeds, unsalted   1 cup Smart Pop popcorn   1 whole grain brown rice cake        15 gm carb   1 small piece of fruit or ½ banana or 1/2 cup lite canned fruit   3 adan cracker squares   3 cups Smart Pop popcorn, top spray butter, [place] lite salt or cinnamon and Truvia   5 Vanilla Wafers   ½ cup low fat, no added sugar ice cream or frozen yogurt (Blue bell, Blue Bunny, Weight Watchers, Skinny Cow)   ½ turkey, ham, or chicken sandwich   ½ c fruit with ½ c Cottage cheese   4-6 unsalted wheat crackers with 1 oz low fat cheese or 1 tbsp peanut butter    30-45 goldfish crackers (depending on flavor)    7-8 Temple mini brown rice cakes (caramel, apple cinnamon, chocolate)    12 Temple mini brown rice cakes (cheddar, bbq, ranch)    1/3 cup hummus dip with raw veg   1/2 whole wheat robin, 1Tbsp hummus   Mini Pizza (1/2 whole wheat English muffin, low-fat  cheese, tomato sauce)   100 calorie snack pack (Oreo, Chips Ahoy, Ritz Mix, Baked Cheetos)   4-6 oz. light or Greek Style yogurt  (Gin Harris, Candida, [facility])   ½ cup sugar-free pudding     6 in. wheat tortilla or robin oven toasted chips (topped with spray butter flavoring, cinnamon, Truvia OR spray butter, garlic powder, chili powder)    18 BBQ Popchips (available at Target, Whole Foods, Fresh Market)                   Diabetes Support Group Meetings         Date: Topic:   February 8 Health Promotion/Cooking Demo   March 8 Taking Care of Your Kidneys   April 12 Taking Care of Your Feet   May 10 Ease Your Mind with Diabetes   Meena 14 Summer Treats/Cooking Demo   July 12 Super Market Sweep   August 9 Taking Care of Your Eyes   Sept 13 Technology/ADA updates   October 11 Recipes & Treats/Cooking Demo   November 8 Heart Health/Pump it up!   December 13 Year-End Close Out        Meetings are held in the Any Room (A) of the [facility] located at [address] [place] [address]. Please call [phone] for additional information.    Free service, offered every 2nd Thursday of every month! Family members and/or friends are welcome as well!  Support group is for patients with type 1 or type 2 diabetes.    From 3:30p to 4:30p

## 2018-09-03 ENCOUNTER — PATIENT MESSAGE (OUTPATIENT)
Dept: ENDOCRINOLOGY | Facility: CLINIC | Age: 53
End: 2018-09-03

## 2018-09-06 ENCOUNTER — PATIENT MESSAGE (OUTPATIENT)
Dept: ENDOCRINOLOGY | Facility: CLINIC | Age: 53
End: 2018-09-06

## 2018-09-06 ENCOUNTER — TELEPHONE (OUTPATIENT)
Dept: PHARMACY | Facility: CLINIC | Age: 53
End: 2018-09-06

## 2018-09-12 ENCOUNTER — TELEPHONE (OUTPATIENT)
Dept: ENDOCRINOLOGY | Facility: CLINIC | Age: 53
End: 2018-09-12

## 2018-09-12 ENCOUNTER — PATIENT MESSAGE (OUTPATIENT)
Dept: ENDOCRINOLOGY | Facility: CLINIC | Age: 53
End: 2018-09-12

## 2018-09-12 DIAGNOSIS — L98.9 LESION OF SKIN OF FOOT: ICD-10-CM

## 2018-09-13 ENCOUNTER — TELEPHONE (OUTPATIENT)
Dept: PODIATRY | Facility: CLINIC | Age: 53
End: 2018-09-13

## 2018-09-13 NOTE — TELEPHONE ENCOUNTER
Call placed to patient re [facility] referral.  Pt has not seen Podiatry at [facility] since 2013.  Saw a different podiatrist on [place] once since that time.  Has small laceration to heel.  First available appt is not for a few months.  Currently lives in [place].  Contact info provided for [employer] as well as my contact info if no appt available with them.

## 2018-12-06 ENCOUNTER — TELEPHONE (OUTPATIENT)
Dept: ENDOCRINOLOGY | Facility: CLINIC | Age: 53
End: 2018-12-06

## 2018-12-06 ENCOUNTER — PATIENT MESSAGE (OUTPATIENT)
Dept: ENDOCRINOLOGY | Facility: CLINIC | Age: 53
End: 2018-12-06

## 2018-12-06 DIAGNOSIS — E10.3299 TYPE 1 DIABETES MELLITUS WITH MILD NONPROLIFERATIVE RETINOPATHY WITHOUT MACULAR EDEMA, UNSPECIFIED LATERALITY: Primary | ICD-10-CM

## 2018-12-06 RX ORDER — GABAPENTIN 300 MG/1
600 CAPSULE ORAL 3 TIMES DAILY
Qty: 180 CAPSULE | Refills: 6 | Status: SHIPPED | OUTPATIENT
Start: 2018-12-06 | End: 2019-12-12 | Stop reason: SDUPTHER

## 2018-12-17 ENCOUNTER — OFFICE VISIT (OUTPATIENT)
Dept: CARDIOLOGY | Facility: CLINIC | Age: 53
End: 2018-12-17
Payer: MEDICARE

## 2018-12-17 VITALS
HEART RATE: 73 BPM | SYSTOLIC BLOOD PRESSURE: 120 MMHG | WEIGHT: 226.5 LBS | HEIGHT: 68 IN | BODY MASS INDEX: 34.33 KG/M2 | DIASTOLIC BLOOD PRESSURE: 76 MMHG

## 2018-12-17 DIAGNOSIS — I35.0 AORTIC VALVE STENOSIS, ETIOLOGY OF CARDIAC VALVE DISEASE UNSPECIFIED: Primary | ICD-10-CM

## 2018-12-17 DIAGNOSIS — Q23.1 AORTIC REGURGITATION, CONGENITAL: ICD-10-CM

## 2018-12-17 DIAGNOSIS — Z46.81 INSULIN PUMP FITTING OR ADJUSTMENT: ICD-10-CM

## 2018-12-17 DIAGNOSIS — I35.0 AORTIC STENOSIS, SEVERE: ICD-10-CM

## 2018-12-17 DIAGNOSIS — E78.5 HYPERLIPIDEMIA WITH TARGET LOW DENSITY LIPOPROTEIN (LDL) CHOLESTEROL LESS THAN 70 MG/DL: ICD-10-CM

## 2018-12-17 DIAGNOSIS — I10 ESSENTIAL HYPERTENSION: ICD-10-CM

## 2018-12-17 PROCEDURE — 3078F DIAST BP <80 MM HG: CPT | Mod: CPTII,S$GLB,, | Performed by: INTERNAL MEDICINE

## 2018-12-17 PROCEDURE — 99214 OFFICE O/P EST MOD 30 MIN: CPT | Mod: 25,S$GLB,, | Performed by: INTERNAL MEDICINE

## 2018-12-17 PROCEDURE — 93000 ELECTROCARDIOGRAM COMPLETE: CPT | Mod: S$GLB,,, | Performed by: INTERNAL MEDICINE

## 2018-12-17 PROCEDURE — 3008F BODY MASS INDEX DOCD: CPT | Mod: CPTII,S$GLB,, | Performed by: INTERNAL MEDICINE

## 2018-12-17 PROCEDURE — 3074F SYST BP LT 130 MM HG: CPT | Mod: CPTII,S$GLB,, | Performed by: INTERNAL MEDICINE

## 2018-12-17 PROCEDURE — 99999 PR PBB SHADOW E&M-EST. PATIENT-LVL II: CPT | Mod: PBBFAC,,, | Performed by: INTERNAL MEDICINE

## 2018-12-17 RX ORDER — CYCLOBENZAPRINE HCL 10 MG
10 TABLET ORAL DAILY PRN
Refills: 1 | Status: ON HOLD | COMMUNITY
Start: 2018-10-24 | End: 2019-08-14 | Stop reason: HOSPADM

## 2018-12-17 RX ORDER — ERTUGLIFLOZIN 15 MG/1
TABLET, FILM COATED ORAL
Refills: 2 | COMMUNITY
Start: 2018-12-01 | End: 2019-01-07 | Stop reason: SDUPTHER

## 2018-12-17 NOTE — PROGRESS NOTES
Subjective:      Patient ID: Cj Barnes is a 53 y.o. male.    Chief Complaint: Follow-up (Aortic Stenosis)    HPI:  Mostly limited by chronic low back spasms (pt has had low back surgery).  Pt can walk a half mile to a mile.  Two weeks ago pt was cleaning out dog cage and struck xyphoid process which was sore for a couple of weeks.  Pt just switched from   Review of Systems   Cardiovascular: Negative for chest pain, claudication, dyspnea on exertion, irregular heartbeat, leg swelling, near-syncope, orthopnea, palpitations and syncope.      Last HgbA1C 7  Past Medical History:   Diagnosis Date    Aortic stenosis 2018    Cancer 2014    Colon cancer     Coronary artery disease     Diabetes mellitus type I     since in his 30's    Heart murmur     Heel fracture     HTN (hypertension)     Hyperlipidemia LDL goal < 70     Insulin pump in place     MVP (mitral valve prolapse)     Stenosis of aortic and mitral valves         Past Surgical History:   Procedure Laterality Date    BACK SURGERY      COLON SURGERY  2014    FASCIOTOMY, PLANTAR Right 10/4/2013    [facility]formed [facility] at [facility]ST FLR    FASCIOTOMY, PLANTAR, ENDOSCOPIC Right 10/4/2013    Performed by R[facility]bins [facility] at [facility] OR 1ST FLR    FOOT TENDON SURGERY      Left heart cath Left 1/15/2018    Performed [facility] Turner [facility] at [facility]    right and left heart cath Bilateral 01/15/2018    Right heart cath Right 1/15/2018    Performed by [facility] at [facility]    TRIGGER FINGER RELEASE      x 2       Family History   Problem Relation Age of Onset    Heart disease Mother     Lung cancer Mother     Heart attack Father     Diabetes Father     HIV Brother        Social History     Socioeconomic History    Marital status:      Spouse name: None    Number of children: None    Years of education: None    Highest education level: None   Social Needs    Financial resource strain: None     Food insecurity - worry: None    Food insecurity - inability: None    Transportation needs - medical: None    Transportation needs - non-medical: None   Occupational History    None   Tobacco Use    Smoking status: Never Smoker    Smokeless tobacco: Current User     Types: Snuff    Tobacco comment: since he was 12 yrs old   Substance and Sexual Activity    Alcohol use: No     Comment: socially    Drug use: No    Sexual activity: None   Other Topics Concern    None   Social History Narrative        2 grown kids    Delivers seafood       Current Outpatient Medications on File Prior to Visit   Medication Sig Dispense Refill    aspirin (ECOTRIN) 81 MG EC tablet Take 81 mg by mouth once daily.      blood sugar diagnostic Strp 1 each by Misc.(Non-Drug; Combo Route) route 6 (six) times daily. Contour next strips. Pt needs contour jackelyn for compatibility w/ insulin pump (medtronic 670g). Necessity 200 strip 6    clopidogrel (PLAVIX) 75 mg tablet Take 1 tablet (75 mg total) by mouth once daily. 30 tablet 11    cyclobenzaprine (FLEXERIL) 10 MG tablet Take 10 mg by mouth daily as needed.  1    flash glucose scanning reader (FREESTYLE BUCK READER) Misc 1 Device by Misc.(Non-Drug; Combo Route) route continuous. 1 each 1    flash glucose sensor (FREESTYLE BUCK SENSOR) Kit 1 Device by Misc.(Non-Drug; Combo Route) route continuous. 3 kit 11    gabapentin (NEURONTIN) 300 MG capsule Take 2 capsules (600 mg total) by mouth 3 (three) times daily. 180 capsule 6    insulin lispro (ADMELOG U-100 INSULIN LISPRO) 100 unit/mL injection Uses 180 units every 1.5 days via pump 6 vial 11    lisinopril 10 MG tablet Take 1 tablet (10 mg total) by mouth once daily. 90 tablet 3    metFORMIN (GLUCOPHAGE-XR) 500 MG 24 hr tablet Take 1 tablet (500 mg total) by mouth daily with breakfast. 90 tablet 3    ONETOUCH VERIO Strp USE TO TEST for blood sugar THREE TO FOUR TIMES DAILY 200 strip 11    simvastatin (ZOCOR) 20 MG  "tablet Take 1 tablet (20 mg total) by mouth every evening. 90 tablet 3    STEGLATRO 15 mg Tab TAKE ONE TABLET BY MOUTH DAILY FOR DIABETES  2    tramadol (ULTRAM) 50 mg tablet TAKE ONE TABLET BY MOUTH EVERY 6 HOURS AS NEEDED FOR PAIN 30 tablet 0    zolpidem (AMBIEN CR) 12.5 MG CR tablet Take 12.5 mg by mouth nightly as needed for Insomnia.      [DISCONTINUED] canagliflozin (INVOKANA) 300 mg Tab tablet Take 1 tablet (300 mg total) by mouth once daily. 30 tablet 6    [DISCONTINUED] empagliflozin (JARDIANCE) 25 mg Tab Take 1 tablet by mouth once daily. 30 tablet 3    [DISCONTINUED] glucagon (human recombinant) inj 1mg/mL kit Inject 1 mL (1 mg total) into the muscle as needed. 1 kit 0    [DISCONTINUED] tiZANidine (ZANAFLEX) 4 MG tablet TAKE ONE TABLET BY MOUTH EVERY 8 HOURS AS NEEDED FOR MUSCLE SPASMS 90 tablet 0     No current facility-administered medications on file prior to visit.        Review of patient's allergies indicates:  No Known Allergies  Objective:     Vitals:    12/17/18 0821   BP: 120/76   BP Location: Left arm   Patient Position: Sitting   BP Method: Large (Automatic)   Pulse: 73   Weight: 102.7 kg (226 lb 8.4 oz)   Height: 5' 8" (1.727 m)        Physical Exam   Constitutional: He is oriented to person, place, and time. He appears well-developed and well-nourished. No distress.   Eyes: No scleral icterus.   Neck: No JVD present. Carotid bruit is not present.   Cardiovascular: Regular rhythm. Exam reveals no gallop and no friction rub.   Murmur (IV/VI systolic murmur) heard.  Pulmonary/Chest: Effort normal and breath sounds normal. No respiratory distress.   Musculoskeletal: He exhibits no edema.   Neurological: He is alert and oriented to person, place, and time.   Skin: Skin is warm and dry. He is not diaphoretic.   Psychiatric: He has a normal mood and affect. His behavior is normal. Judgment and thought content normal.   Vitals reviewed.     Recent echocardiogram shows severe aortic stenosis " and mild aortic insufficiency  Assessment:     1. Aortic valve stenosis, etiology of cardiac valve disease unspecified    2. Aortic regurgitation, congenital    3. Hyperlipidemia with target low density lipoprotein (LDL) cholesterol less than 70 mg/dL    4. Essential hypertension    5. Insulin pump fitting or adjustment    6. Aortic stenosis, severe      Plan:   Cj was seen today for follow-up.    Diagnoses and all orders for this visit:    Aortic valve stenosis, etiology of cardiac valve disease unspecified  -     IN OFFICE EKG 12-LEAD (to Muse)    Aortic regurgitation, congenital    Hyperlipidemia with target low density lipoprotein (LDL) cholesterol less than 70 mg/dL    Essential hypertension    Insulin pump fitting or adjustment    Aortic stenosis, severe  -     IN OFFICE EKG 12-LEAD (to Muse)     Long discussion with pt regarding timing of aortic valve surgery hinges on symptoms of chest pain or shortness of breath or fainting.  Pt does not have these symptoms at present.  Pt is aware that he has severe aortic stenosis. Reviewed also that Dr Knight felt medical management was appropriate at the time of pt's visit with him.    RTC 6 months    F/u with Hannah Stover and endocrinologist    Follow-up in about 6 months (around 6/17/2019).

## 2019-01-07 ENCOUNTER — PATIENT MESSAGE (OUTPATIENT)
Dept: ENDOCRINOLOGY | Facility: CLINIC | Age: 54
End: 2019-01-07

## 2019-01-07 RX ORDER — ERTUGLIFLOZIN 15 MG/1
TABLET, FILM COATED ORAL
Qty: 30 TABLET | Refills: 6 | Status: SHIPPED | OUTPATIENT
Start: 2019-01-07 | End: 2019-04-23 | Stop reason: ALTCHOICE

## 2019-02-08 ENCOUNTER — OFFICE VISIT (OUTPATIENT)
Dept: PODIATRY | Facility: CLINIC | Age: 54
End: 2019-02-08
Payer: MEDICARE

## 2019-02-08 VITALS
DIASTOLIC BLOOD PRESSURE: 82 MMHG | HEART RATE: 67 BPM | BODY MASS INDEX: 34.25 KG/M2 | RESPIRATION RATE: 18 BRPM | HEIGHT: 68 IN | WEIGHT: 226 LBS | SYSTOLIC BLOOD PRESSURE: 139 MMHG

## 2019-02-08 DIAGNOSIS — E10.3299 TYPE 1 DIABETES MELLITUS WITH MILD NONPROLIFERATIVE RETINOPATHY WITHOUT MACULAR EDEMA, UNSPECIFIED LATERALITY: Primary | ICD-10-CM

## 2019-02-08 PROCEDURE — 3008F BODY MASS INDEX DOCD: CPT | Mod: CPTII,S$GLB,, | Performed by: PODIATRIST

## 2019-02-08 PROCEDURE — 99499 UNLISTED E&M SERVICE: CPT | Mod: S$GLB,,, | Performed by: PODIATRIST

## 2019-02-08 PROCEDURE — 99999 PR PBB SHADOW E&M-EST. PATIENT-LVL IV: ICD-10-PCS | Mod: PBBFAC,,, | Performed by: PODIATRIST

## 2019-02-08 PROCEDURE — 99499 RISK ADDL DX/OHS AUDIT: ICD-10-PCS | Mod: S$GLB,,, | Performed by: PODIATRIST

## 2019-02-08 PROCEDURE — 3079F PR MOST RECENT DIASTOLIC BLOOD PRESSURE 80-89 MM HG: ICD-10-PCS | Mod: CPTII,S$GLB,, | Performed by: PODIATRIST

## 2019-02-08 PROCEDURE — 99999 PR PBB SHADOW E&M-EST. PATIENT-LVL IV: CPT | Mod: PBBFAC,,, | Performed by: PODIATRIST

## 2019-02-08 PROCEDURE — 99203 PR OFFICE/OUTPT VISIT, NEW, LEVL III, 30-44 MIN: ICD-10-PCS | Mod: S$GLB,,, | Performed by: PODIATRIST

## 2019-02-08 PROCEDURE — 3075F PR MOST RECENT SYSTOLIC BLOOD PRESS GE 130-139MM HG: ICD-10-PCS | Mod: CPTII,S$GLB,, | Performed by: PODIATRIST

## 2019-02-08 PROCEDURE — 3045F PR MOST RECENT HEMOGLOBIN A1C LEVEL 7.0-9.0%: ICD-10-PCS | Mod: CPTII,S$GLB,, | Performed by: PODIATRIST

## 2019-02-08 PROCEDURE — 3045F PR MOST RECENT HEMOGLOBIN A1C LEVEL 7.0-9.0%: CPT | Mod: CPTII,S$GLB,, | Performed by: PODIATRIST

## 2019-02-08 PROCEDURE — 99203 OFFICE O/P NEW LOW 30 MIN: CPT | Mod: S$GLB,,, | Performed by: PODIATRIST

## 2019-02-08 PROCEDURE — 3079F DIAST BP 80-89 MM HG: CPT | Mod: CPTII,S$GLB,, | Performed by: PODIATRIST

## 2019-02-08 PROCEDURE — 3008F PR BODY MASS INDEX (BMI) DOCUMENTED: ICD-10-PCS | Mod: CPTII,S$GLB,, | Performed by: PODIATRIST

## 2019-02-08 PROCEDURE — 3075F SYST BP GE 130 - 139MM HG: CPT | Mod: CPTII,S$GLB,, | Performed by: PODIATRIST

## 2019-02-08 RX ORDER — EMPAGLIFLOZIN 25 MG/1
TABLET, FILM COATED ORAL
Refills: 11 | COMMUNITY
Start: 2019-01-07 | End: 2019-11-14 | Stop reason: SDUPTHER

## 2019-02-08 RX ORDER — CLOTRIMAZOLE AND BETAMETHASONE DIPROPIONATE 10; .64 MG/G; MG/G
CREAM TOPICAL
Refills: 0 | COMMUNITY
Start: 2019-02-05 | End: 2021-06-10

## 2019-02-08 NOTE — PATIENT INSTRUCTIONS
Vitamin B complex ( B6 and B12)  Alpha Lipoic Acid ( ALA)            Long-Term Complications of Diabetes    Diabetes can cause health problems over time. These are called complications. They are more likely to happen if your blood sugar is often too high. Over time, high blood sugar can damage blood vessels in your body. It is important to keep your blood sugar in your target range. This can help prevent or delay complications from diabetes.  Possible complications  Complications of diabetes include:  · Eye problems, including damage to the blood vessels in the eyes (retinopathy), pressure in the eye (glaucoma), and clouding of the eyes lens (a cataract). Eye problems can eventually lead to irreversible blindness.   · Tooth and gum problems (periodontal disease), causing loss of teeth and bone  · Blood vessel (vascular) disease leading to circulation problems, heart attack or stroke, or a need for amputation of a limb   · Problems with sexual function leading to erectile dysfunction in men and sexual discomfort in women   · Kidney disease (nephropathy) can eventually lead to kidney failure, which may require dialysis or kidney transplant   · Nerve problems (neuropathy), causing pain or loss of feeling in your feet and other parts of your body, potentially leading to an amputation of a limb   · High blood pressure (hypertension), putting strain on your heart and blood vessels  · Serious infections, possibly leading to loss of toes, feet, or limbs  How to avoid complications  The serious consequences of these complications may be avoidable for most people with diabetes by managing your blood glucose, blood pressure, and cholesterol levels. This can help you feel better and stay healthy. You can manage diabetes by tracking your blood sugar. You can also eat healthy and exercise to avoid gaining weight. And you should take medicine if directed by your healthcare provider.  Date Last Reviewed: 5/1/2016  © 3837-4219 [employer] [address] [place] [address], [place] [address]. All rights reserved. This information is not intended as a substitute for professional medical care. Always follow your healthcare professional's instructions.

## 2019-02-10 NOTE — PROGRESS NOTES
Subjective:      Patient ID: Cj Barnes is a 53 y.o. male.    Chief Complaint: PCP (LAISHA Archer, JULIENNE 8/31/18 RODRIGUE); Diabetic Foot Exam; Foot Problem (Burning, Cramps at night ); and Callouses    Cj is a 53 y.o. male who presents to the clinic upon referral from Dr. Dan ref. provider found  for evaluation and treatment of diabetic feet. Cj has a past medical history of Aortic stenosis (2018), Cancer (2014), Colon cancer, Coronary artery disease, Diabetes mellitus type I, Heart murmur, Heel fracture, HTN (hypertension), Hyperlipidemia LDL goal < 70, Insulin pump in place, MVP (mitral valve prolapse), and Stenosis of aortic and mitral valves. Patient relates no major problem with feet. Only complaints today consist of yearly comprehensive diabetic  foot examination  .    PCP: Garry Stover MD    Date Last Seen by PCP:   Chief Complaint   Patient presents with    PCP     LAISHA Archer, JULIENNE 8/31/18 ENDO    Diabetic Foot Exam    Foot Problem     Burning, Cramps at night     Callouses         Current shoe gear: Casual shoes    Hemoglobin A1C   Date Value Ref Range Status   08/24/2018 7.0 (H) 4.0 - 5.6 % Final     Comment:     ADA Screening Guidelines:  5.7-6.4%  Consistent with prediabetes  >or=6.5%  Consistent with diabetes  High levels of fetal hemoglobin interfere with the HbA1C  assay. Heterozygous hemoglobin variants (HbS, HgC, etc)do  not significantly interfere with this assay.   However, presence of multiple variants may affect accuracy.     05/22/2018 7.2 (H) 4.0 - 5.6 % Final     Comment:     According to ADA guidelines, hemoglobin A1c <7.0% represents  optimal control in non-pregnant diabetic patients. Different  metrics may apply to specific patient populations.   Standards of Medical Care in Diabetes-2016.  For the purpose of screening for the presence of diabetes:  <5.7%     Consistent with the absence of diabetes  5.7-6.4%  Consistent with increasing risk  for diabetes   (prediabetes)  >or=6.5%  Consistent with diabetes  Currently, no consensus exists for use of hemoglobin A1c  for diagnosis of diabetes for children.  This Hemoglobin A1c assay has significant interference with fetal   hemoglobin   (HbF). The results are invalid for patients with abnormal amounts of   HbF,   including those with known Hereditary Persistence   of Fetal Hemoglobin. Heterozygous hemoglobin variants (HbAS, HbAC,   HbAD, HbAE, HbA2) do not significantly interfere with this assay;   however, presence of multiple variants in a sample may impact the %   interference.     12/11/2017 8.4 (H) 4.0 - 5.6 % Final     Comment:     According to ADA guidelines, hemoglobin A1c <7.0% represents  optimal control in non-pregnant diabetic patients. Different  metrics may apply to specific patient populations.   Standards of Medical Care in Diabetes-2016.  For the purpose of screening for the presence of diabetes:  <5.7%     Consistent with the absence of diabetes  5.7-6.4%  Consistent with increasing risk for diabetes   (prediabetes)  >or=6.5%  Consistent with diabetes  Currently, no consensus exists for use of hemoglobin A1c  for diagnosis of diabetes for children.  This Hemoglobin A1c assay has significant interference with fetal   hemoglobin   (HbF). The results are invalid for patients with abnormal amounts of   HbF,   including those with known Hereditary Persistence   of Fetal Hemoglobin. Heterozygous hemoglobin variants (HbAS, HbAC,   HbAD, HbAE, HbA2) do not significantly interfere with this assay;   however, presence of multiple variants in a sample may impact the %   interference.                 Patient Active Problem List   Diagnosis    Essential hypertension    Insulin pump fitting or adjustment    HTN (hypertension)    Diabetes mellitus type I    Hyperlipidemia with target low density lipoprotein (LDL) cholesterol less than 70 mg/dL    Insulin pump in place    Tobacco use    Plantar  fasciitis    Pain in limb    Aortic stenosis    Aortic regurgitation, congenital    Hypoglycemia    TIA (transient ischemic attack)    Coronary artery disease involving native coronary artery of native heart without angina pectoris    Lesion of skin of foot       Current Outpatient Medications on File Prior to Visit   Medication Sig Dispense Refill    aspirin (ECOTRIN) 81 MG EC tablet Take 81 mg by mouth once daily.      blood sugar diagnostic Strp 1 each by Misc.(Non-Drug; Combo Route) route 6 (six) times daily. Contour next strips. Pt needs contour jackelyn for compatibility w/ insulin pump (medtronic 670g). Necessity 200 strip 6    clopidogrel (PLAVIX) 75 mg tablet Take 1 tablet (75 mg total) by mouth once daily. 30 tablet 11    clotrimazole-betamethasone 1-0.05% (LOTRISONE) cream APPLY A SMALL AMOUNT TO AFFECTED AREA THREE TIMES DAILY UNTIL CLEAR  0    cyclobenzaprine (FLEXERIL) 10 MG tablet Take 10 mg by mouth daily as needed.  1    flash glucose scanning reader (FREESTYLE BUCK READER) Misc 1 Device by Misc.(Non-Drug; Combo Route) route continuous. 1 each 1    flash glucose sensor (FREESTYLE BUCK SENSOR) Kit 1 Device by Misc.(Non-Drug; Combo Route) route continuous. 3 kit 11    gabapentin (NEURONTIN) 300 MG capsule Take 2 capsules (600 mg total) by mouth 3 (three) times daily. 180 capsule 6    insulin lispro (ADMELOG U-100 INSULIN LISPRO) 100 unit/mL injection Uses 180 units every 1.5 days via pump 6 vial 11    JARDIANCE 25 mg Tab TAKE ONE TABLET BY MOUTH DAILY FOR DIABETES  11    lisinopril 10 MG tablet Take 1 tablet (10 mg total) by mouth once daily. 90 tablet 3    metFORMIN (GLUCOPHAGE-XR) 500 MG 24 hr tablet Take 1 tablet (500 mg total) by mouth daily with breakfast. 90 tablet 3    ONETOUCH VERIO Strp USE TO TEST for blood sugar THREE TO FOUR TIMES DAILY 200 strip 11    simvastatin (ZOCOR) 20 MG tablet Take 1 tablet (20 mg total) by mouth every evening. 90 tablet 3    STEGLATRO 15 mg  Tab Take 1 tablet by mouth for diabetes. 30 tablet 6    tramadol (ULTRAM) 50 mg tablet TAKE ONE TABLET BY MOUTH EVERY 6 HOURS AS NEEDED FOR PAIN 30 tablet 0    zolpidem (AMBIEN CR) 12.5 MG CR tablet Take 12.5 mg by mouth nightly as needed for Insomnia.       No current facility-administered medications on file prior to visit.        Review of patient's allergies indicates:  No Known Allergies    Past Surgical History:   Procedure Laterality Date    BACK SURGERY      COLON SURGERY  2014    FASCIOTOMY, PLANTAR Right 10/4/2013    [facility]formed [facility] at [facility] OR 1ST FLR    FASCIOTOMY, PLANTAR, ENDOSCOPIC Right 10/4/2013    Per[facility] by [facility] at [facility] OR 1ST FLR    FOOT TENDON SURGERY      Left heart cath Left 1/15/2018    [facility]    right and left heart cath Bilateral 01/15/2018    Right heart cath Right 1/15/2018    [facility]    TRIGGER FINGER RELEASE      x 2       Family History   Problem Relation Age of Onset    Heart disease Mother     Lung cancer Mother     Heart attack Father     Diabetes Father     HIV Brother        Social History     Socioeconomic History    Marital status:      Spouse name: Not on file    Number of children: Not on file    Years of education: Not on file    Highest education level: Not on file   Social Needs    Financial resource strain: Not on file    Food insecurity - worry: Not on file    Food insecurity - inability: Not on file    Transportation needs - medical: Not on file    Transportation needs - non-medical: Not on file   Occupational History    Not on file   Tobacco Use    Smoking status: Never Smoker    Smokeless tobacco: Current User     Types: Snuff    Tobacco comment: since he was 12 yrs old   Substance and Sexual Activity    Alcohol use: No     Comment: socially    Drug use: No    Sexual activity: Not on file   Other Topics Concern  "   Not on file   Social History Narrative        2 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   Cardiovascular: Negative for leg swelling.   Musculoskeletal: Negative for arthritis, joint pain, joint swelling and myalgias.   Gastrointestinal: Negative for nausea and vomiting.   Neurological: Negative for loss of balance, numbness and paresthesias.           Objective:       Vitals:    02/08/19 1012   BP: 139/82   Pulse: 67   Resp: 18   Weight: 102.5 kg (226 lb)   Height: 5' 8" (1.727 m)   PainSc: 0-No pain        Physical Exam   Constitutional: He appears well-developed and well-nourished.  Non-toxic appearance. He does not have a sickly appearance. No distress.   alert and oriented x 3.    Cardiovascular:   Pulses:       Dorsalis pedis pulses are 2+ on the right side, and 2+ on the left side.        Posterior tibial pulses are 2+ on the right side, and 2+ on the left side.    Capillary refill time is within normal limits. Digital hair present.    Pulmonary/Chest: No respiratory distress.   Musculoskeletal: He exhibits no deformity.        Right ankle: No tenderness. No lateral malleolus, no medial malleolus, no AITFL, no CF ligament and no posterior TFL tenderness found. Achilles tendon exhibits no pain, no defect and normal Tse's test results.        Left ankle: No tenderness. No lateral malleolus, no medial malleolus, no AITFL, no CF ligament and no posterior TFL tenderness found. Achilles tendon exhibits no pain, no defect and normal Tse's test results.        Right foot: There is no tenderness and no bony tenderness.        Left foot: There is no tenderness and no bony tenderness.   Adequate joint range of motion without pain, limitation, nor crepitation Bilateral feet and ankle joints. Muscle strength is 5/5 in all groups bilaterally.           Feet:   Right Foot:   Protective Sensation: 5 sites tested. 5 sites " sensed.   Left Foot:   Protective Sensation: 5 sites tested. 5 sites sensed.   Lymphadenopathy:   No lymphatic streaking     Neurological: He displays no atrophy. No sensory deficit.   Light touch present     Skin: Skin is warm, dry and intact. No rash noted. He is not diaphoretic. No cyanosis. No pallor. Nails show no clubbing.   Skin is of normal turgor.   Normal temperature gradient.  Examination of the skin reveals no evidence of significant rashes, open lesions, suspicious appearing nevi or other concerning lesions.      Toenails 1-5 bilaterally are neatly trimmed; of normal color and thickness         Psychiatric: His mood appears not anxious. His affect is not inappropriate. His speech is not slurred. He is not combative. He is communicative. He is attentive.   Nursing note and vitals reviewed.            Assessment:       Encounter Diagnosis   Name Primary?   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      - Shoe inspection. Diabetic Foot Education. Patient reminded of the importance of good nutrition and blood sugar control to help prevent podiatric complications of diabetes. Patient instructed on proper foot hygeine. We discussed wearing proper shoe gear, daily foot inspections, never walking without protective shoe gear, caution putting sharp instruments to feet     - Discussed DM foot care:  Wear comfortable, proper fitting shoes. Wash feet daily. Dry well. After drying, apply moisturizer to feet (no lotion to webspaces). Inspect feet daily for skin breaks, blisters, swelling, or  redness. Wear cotton socks (preferably white)  Change socks every day. Do NOT walk barefoot. Do NOT use heating pads or warm/hot water soaks     - Discussed importance of daily moisturizer to the feet such as Gold bonds diabetic foot cream    - Patient is low risk for developing lower extremity issues secondary to diabetes. I recommend continued yearly diabetic foot examinations.     - Patients PCP can perform yearly foot checks . Currently  patient has no pedal manifestations of DM    - Patients with out pedal manifestations of DM, do not qualify for nail/callus trimming     - RTC in  PRN     .

## 2019-03-26 ENCOUNTER — TELEPHONE (OUTPATIENT)
Dept: PRIMARY CARE CLINIC | Facility: CLINIC | Age: 54
End: 2019-03-26

## 2019-03-26 NOTE — TELEPHONE ENCOUNTER
----- Message from Bethanie Hays sent at 3/26/2019 11:08 AM CDT -----  Contact: Ofe with [facility]  Type: Needs Medical Advice    Who Called:  Ofe  Best Call Back Number: [phone] option 2  Additional Information: Ofe is calling to verify if the request for the patient's diabetic supply and pump needs any additional things.Please advise.

## 2019-04-08 ENCOUNTER — PATIENT MESSAGE (OUTPATIENT)
Dept: ENDOCRINOLOGY | Facility: CLINIC | Age: 54
End: 2019-04-08

## 2019-04-08 DIAGNOSIS — E10.65 TYPE 1 DIABETES MELLITUS WITH HYPERGLYCEMIA: ICD-10-CM

## 2019-04-09 RX ORDER — INSULIN LISPRO 100 [IU]/ML
INJECTION, SOLUTION INTRAVENOUS; SUBCUTANEOUS
Qty: 6 VIAL | Refills: 11 | Status: SHIPPED | OUTPATIENT
Start: 2019-04-09 | End: 2019-04-23

## 2019-04-09 RX ORDER — LISINOPRIL 10 MG/1
10 TABLET ORAL DAILY
Qty: 90 TABLET | Refills: 3 | Status: ON HOLD | OUTPATIENT
Start: 2019-04-09 | End: 2019-08-14 | Stop reason: HOSPADM

## 2019-04-09 RX ORDER — SIMVASTATIN 20 MG/1
20 TABLET, FILM COATED ORAL NIGHTLY
Qty: 90 TABLET | Refills: 3 | Status: SHIPPED | OUTPATIENT
Start: 2019-04-09 | End: 2020-01-02

## 2019-04-23 ENCOUNTER — OFFICE VISIT (OUTPATIENT)
Dept: ENDOCRINOLOGY | Facility: CLINIC | Age: 54
End: 2019-04-23
Payer: MEDICARE

## 2019-04-23 ENCOUNTER — CLINICAL SUPPORT (OUTPATIENT)
Dept: PRIMARY CARE CLINIC | Facility: CLINIC | Age: 54
End: 2019-04-23
Payer: MEDICARE

## 2019-04-23 VITALS
DIASTOLIC BLOOD PRESSURE: 78 MMHG | OXYGEN SATURATION: 98 % | SYSTOLIC BLOOD PRESSURE: 114 MMHG | HEIGHT: 68 IN | WEIGHT: 223 LBS | BODY MASS INDEX: 33.8 KG/M2 | HEART RATE: 64 BPM

## 2019-04-23 DIAGNOSIS — H60.501 ACUTE OTITIS EXTERNA OF RIGHT EAR, UNSPECIFIED TYPE: ICD-10-CM

## 2019-04-23 DIAGNOSIS — E10.3299 TYPE 1 DIABETES MELLITUS WITH MILD NONPROLIFERATIVE RETINOPATHY WITHOUT MACULAR EDEMA, UNSPECIFIED LATERALITY: ICD-10-CM

## 2019-04-23 DIAGNOSIS — E78.5 HYPERLIPIDEMIA WITH TARGET LOW DENSITY LIPOPROTEIN (LDL) CHOLESTEROL LESS THAN 70 MG/DL: ICD-10-CM

## 2019-04-23 DIAGNOSIS — I10 ESSENTIAL HYPERTENSION: ICD-10-CM

## 2019-04-23 DIAGNOSIS — G45.9 TIA (TRANSIENT ISCHEMIC ATTACK): ICD-10-CM

## 2019-04-23 DIAGNOSIS — Z46.81 INSULIN PUMP FITTING OR ADJUSTMENT: ICD-10-CM

## 2019-04-23 DIAGNOSIS — E10.649 TYPE 1 DIABETES MELLITUS WITH HYPOGLYCEMIA UNAWARENESS: ICD-10-CM

## 2019-04-23 DIAGNOSIS — E66.09 CLASS 1 OBESITY DUE TO EXCESS CALORIES WITH SERIOUS COMORBIDITY AND BODY MASS INDEX (BMI) OF 33.0 TO 33.9 IN ADULT: ICD-10-CM

## 2019-04-23 DIAGNOSIS — Z96.41 INSULIN PUMP IN PLACE: ICD-10-CM

## 2019-04-23 DIAGNOSIS — I25.10 CORONARY ARTERY DISEASE INVOLVING NATIVE CORONARY ARTERY OF NATIVE HEART WITHOUT ANGINA PECTORIS: ICD-10-CM

## 2019-04-23 DIAGNOSIS — E10.3299 TYPE 1 DIABETES MELLITUS WITH MILD NONPROLIFERATIVE RETINOPATHY WITHOUT MACULAR EDEMA, UNSPECIFIED LATERALITY: Primary | ICD-10-CM

## 2019-04-23 DIAGNOSIS — E10.42 TYPE 1 DIABETES MELLITUS WITH DIABETIC POLYNEUROPATHY: ICD-10-CM

## 2019-04-23 PROBLEM — E16.2 HYPOGLYCEMIA: Status: RESOLVED | Noted: 2018-05-21 | Resolved: 2019-04-23

## 2019-04-23 PROBLEM — E66.811 CLASS 1 OBESITY DUE TO EXCESS CALORIES WITH SERIOUS COMORBIDITY AND BODY MASS INDEX (BMI) OF 33.0 TO 33.9 IN ADULT: Status: ACTIVE | Noted: 2019-04-23

## 2019-04-23 PROBLEM — L98.9 LESION OF SKIN OF FOOT: Status: RESOLVED | Noted: 2018-09-12 | Resolved: 2019-04-23

## 2019-04-23 PROCEDURE — 99499 RISK ADDL DX/OHS AUDIT: ICD-10-PCS | Mod: S$GLB,,, | Performed by: NURSE PRACTITIONER

## 2019-04-23 PROCEDURE — 99499 UNLISTED E&M SERVICE: CPT | Mod: S$GLB,,, | Performed by: NURSE PRACTITIONER

## 2019-04-23 PROCEDURE — 3008F BODY MASS INDEX DOCD: CPT | Mod: CPTII,S$GLB,, | Performed by: NURSE PRACTITIONER

## 2019-04-23 PROCEDURE — 3074F PR MOST RECENT SYSTOLIC BLOOD PRESSURE < 130 MM HG: ICD-10-PCS | Mod: CPTII,S$GLB,, | Performed by: NURSE PRACTITIONER

## 2019-04-23 PROCEDURE — 3045F PR MOST RECENT HEMOGLOBIN A1C LEVEL 7.0-9.0%: CPT | Mod: CPTII,S$GLB,, | Performed by: NURSE PRACTITIONER

## 2019-04-23 PROCEDURE — 99214 OFFICE O/P EST MOD 30 MIN: CPT | Mod: S$GLB,,, | Performed by: NURSE PRACTITIONER

## 2019-04-23 PROCEDURE — 99999 PR PBB SHADOW E&M-EST. PATIENT-LVL IV: ICD-10-PCS | Mod: PBBFAC,,, | Performed by: NURSE PRACTITIONER

## 2019-04-23 PROCEDURE — 3074F SYST BP LT 130 MM HG: CPT | Mod: CPTII,S$GLB,, | Performed by: NURSE PRACTITIONER

## 2019-04-23 PROCEDURE — 82043 UR ALBUMIN QUANTITATIVE: CPT

## 2019-04-23 PROCEDURE — 3078F PR MOST RECENT DIASTOLIC BLOOD PRESSURE < 80 MM HG: ICD-10-PCS | Mod: CPTII,S$GLB,, | Performed by: NURSE PRACTITIONER

## 2019-04-23 PROCEDURE — 99999 PR PBB SHADOW E&M-EST. PATIENT-LVL IV: CPT | Mod: PBBFAC,,, | Performed by: NURSE PRACTITIONER

## 2019-04-23 PROCEDURE — 3045F PR MOST RECENT HEMOGLOBIN A1C LEVEL 7.0-9.0%: ICD-10-PCS | Mod: CPTII,S$GLB,, | Performed by: NURSE PRACTITIONER

## 2019-04-23 PROCEDURE — 3078F DIAST BP <80 MM HG: CPT | Mod: CPTII,S$GLB,, | Performed by: NURSE PRACTITIONER

## 2019-04-23 PROCEDURE — 3008F PR BODY MASS INDEX (BMI) DOCUMENTED: ICD-10-PCS | Mod: CPTII,S$GLB,, | Performed by: NURSE PRACTITIONER

## 2019-04-23 PROCEDURE — 99214 PR OFFICE/OUTPT VISIT, EST, LEVL IV, 30-39 MIN: ICD-10-PCS | Mod: S$GLB,,, | Performed by: NURSE PRACTITIONER

## 2019-04-23 RX ORDER — INSULIN ASPART 100 [IU]/ML
INJECTION, SOLUTION INTRAVENOUS; SUBCUTANEOUS
Refills: 11 | COMMUNITY
Start: 2019-04-11 | End: 2020-04-14 | Stop reason: SDUPTHER

## 2019-04-23 RX ORDER — FLUTICASONE PROPIONATE 50 MCG
SPRAY, SUSPENSION (ML) NASAL
COMMUNITY
End: 2019-07-23

## 2019-04-23 RX ORDER — NEOMYCIN SULFATE, POLYMYXIN B SULFATE AND HYDROCORTISONE 10; 3.5; 1 MG/ML; MG/ML; [USP'U]/ML
3 SUSPENSION/ DROPS AURICULAR (OTIC) 3 TIMES DAILY
Qty: 10 ML | Refills: 0 | Status: SHIPPED | OUTPATIENT
Start: 2019-04-23 | End: 2019-04-30

## 2019-04-23 NOTE — PROGRESS NOTES
CC:   Chief Complaint   Patient presents with    Diabetes     T1       HPI: Cj Barnes is a 53 y.o. male presents for a follow up visit today for the management of T1DM. He previously followed at [facility]. Seen last by NAVIN Hinton NP in 8/2018.     The patient was diagnosed in his early 30's. Initially diagnoseyd with T2DM, 5 years later he was dx as T1.    He has used an insulin pump since 2006, previous on animas.  He is now on  670 G medtronic pump, does not have coverage for guardian sensor (out of pocket $1800).  However he has not tried to submit for coverage since switching to [employer] Medicare insurance.    Family hx of diabetes: Mother, father, 3 brothers - no one with T1    Hospitalized for diabetes: denies     No personal or FH of thyroid cancer or personal of pancreatic cancer or pancreatitis.     Now on disability.    Started on [employer] in December 2018  Medicaid kicked him off in January 2019.   [employer] is going to kick him off in June 2019    Freestyle nannette? - he never went to get it filled.  He did not think it would be worth anything since it does not communicate directly to his pump.  He likes 90 day supplies on his medications.     Patient also reports right ear canal tenderness. He reports taking a sharp object in his ear last week when he was on a cruise.  He reports scant bloody drainage.  The ear canal is tender when he puts his hearing aids in.  He would like for me to look at it today in clinic.    MEDICATIONS, ALLERGIES, LABS, VS's, MEDICAL, SURGICAL, SOCIAL, AND FAMILY HISTORY reviewed and updated in the appropriate sections during this encounter    DIABETES COMPLICATIONS: retinopathy, peripheral neuropathy, cardiovascular disease and cerebrovascular disease  TIA     Diabetes Management Status    ASA:  Yes - 81 mg daily and on Plavix     Statin: Taking  ACE/ARB: Taking    Screening or Prevention Patient's value Goal Complete/Controlled?   HgA1C Testing  and Control   Lab Results   Component Value Date    HGBA1C 7.2 (H) 04/24/2019      Annually/Less than 8% Yes   Lipid profile : 04/24/2019 Annually Yes   LDL control Lab Results   Component Value Date    LDLCALC 77 04/24/2019    Annually/Less than 100 mg/dl  Yes   Nephropathy screening Lab Results   Component Value Date    LABMICR <2.5 04/23/2019     Lab Results   Component Value Date    PROTEINUA Negative 05/25/2018    Annually Yes   Blood pressure BP Readings from Last 1 Encounters:   04/23/19 114/78    Less than 140/90 Yes   Dilated retinal exam : 03/01/2018- has an appointment scheduled for 5/1/19 Annually No   Foot exam   : 04/23/2019 Annually Yes       CURRENT A1C:    Hemoglobin A1C   Date Value Ref Range Status   04/24/2019 7.2 (H) 4.0 - 5.6 % Final     Comment:     ADA Screening Guidelines:  5.7-6.4%  Consistent with prediabetes  >or=6.5%  Consistent with diabetes  High levels of fetal hemoglobin interfere with the HbA1C  assay. Heterozygous hemoglobin variants (HbS, HgC, etc)do  not significantly interfere with this assay.   However, presence of multiple variants may affect accuracy.     08/24/2018 7.0 (H) 4.0 - 5.6 % Final     Comment:     ADA Screening Guidelines:  5.7-6.4%  Consistent with prediabetes  >or=6.5%  Consistent with diabetes  High levels of fetal hemoglobin interfere with the HbA1C  assay. Heterozygous hemoglobin variants (HbS, HgC, etc)do  not significantly interfere with this assay.   However, presence of multiple variants may affect accuracy.     05/22/2018 7.2 (H) 4.0 - 5.6 % Final     Comment:     According to ADA guidelines, hemoglobin A1c <7.0% represents  optimal control in non-pregnant diabetic patients. Different  metrics may apply to specific patient populations.   Standards of Medical Care in Diabetes-2016.  For the purpose of screening for the presence of diabetes:  <5.7%     Consistent with the absence of diabetes  5.7-6.4%  Consistent with increasing risk for diabetes    (prediabetes)  >or=6.5%  Consistent with diabetes  Currently, no consensus exists for use of hemoglobin A1c  for diagnosis of diabetes for children.  This Hemoglobin A1c assay has significant interference with fetal   hemoglobin   (HbF). The results are invalid for patients with abnormal amounts of   HbF,   including those with known Hereditary Persistence   of Fetal Hemoglobin. Heterozygous hemoglobin variants (HbAS, HbAC,   HbAD, HbAE, HbA2) do not significantly interfere with this assay;   however, presence of multiple variants in a sample may impact the %   interference.         GOAL A1C: 6.5-7% - without hypoglycemia    DM MEDICATIONS USED IN THE PAST: Medtronic 670 G   Metformin   Steglatro- lack of coverage.   Jardiance     CURRENT DIABETES MEDICATIONS: Medtronic insulin pump, Metformin 500 mg daily with breakfast, Jardiance 25 mg daily     Insulin Pump: Medtronic 670G with Novolog in manual mood as he has no sensor- 100% of the time.   Average BG entered into pump is +/- 131  Pump settings:  Basal:  2.65 units/hr     ICR: 1:12    ISF: 20    Target:  120    IOB:  3. 15     Temp basals: none     Pump site change: q 3 days   Cartridge change: q 3 days  Insulin TDD:  80 units   Basal 79%   Bolus 21 %   CHO intake: 2.1- meals per day- CHO - 188 +/- 64 grams per day.   Using the bolus wizard: yes   Overriding: no    Back up Lantus/Levemir: none     Patient's pump was downloaded in clinic today and reviewed with patient.   Please see attached documents for pump download.     Sensor type: No sensors.       DO YOU USE THE [facility] EMAIL SYSTEM? Yes-     BLOOD GLUCOSE MONITORING:  BG monitoring 2-4 times per day.   No logs or meter to clinic today for review.   He enters BG readings into his pump   Reports readings on average run 130-170's     HYPOGLYCEMIA:  Rarely   He doesn't feel BG readings until the 40-50's - once in the 50's he will have twitching to his eye.   Glucagon kit: no   Medic alert bracelet: yes      MEALS: eating 2 meals per day   BF: ~ 1-2 breakfast burrito   Lunch: bag of chips   Dinner: ~ eating out home cooked style- [employer], [address]. He loves salads- light Italian or michael   Snack: occ- dry cereal   Drinks: un sweet tea, water, coke zero.   He CHO counts- he feels comfortable.      CURRENT EXERCISE:  No- due to back pain.     Review of Systems  Review of Systems   Constitutional: Negative for appetite change, fatigue and unexpected weight change.   HENT: Positive for ear pain. Negative for trouble swallowing.    Eyes: Negative for visual disturbance.   Respiratory: Negative for shortness of breath.    Cardiovascular: Negative for chest pain.   Gastrointestinal: Negative for nausea.   Endocrine: Negative for polydipsia, polyphagia and polyuria.   Genitourinary:        No Nocturia    Skin: Negative for wound.   Neurological: Negative for numbness.       Physical Exam   Physical Exam   Constitutional: He is oriented to person, place, and time. He appears well-developed and well-nourished. No distress.   HENT:   Head: Normocephalic and atraumatic.   Left Ear: External ear normal.   Nose: Nose normal.   Right ear canal with small amount of dry blood.  No active bleeding.  His TM is fully intact. No purulent drainage or erythema or swelling to canal noted.   Neck: Normal range of motion. Neck supple. No tracheal deviation present. No thyromegaly present.   Cardiovascular: Normal rate and regular rhythm.   Murmur heard.  Pulses:       Dorsalis pedis pulses are 2+ on the right side, and 2+ on the left side.        Posterior tibial pulses are 2+ on the right side, and 2+ on the left side.   No edema    Pulmonary/Chest: Effort normal and breath sounds normal. No respiratory distress.   Abdominal: Soft. There is no tenderness. No hernia.   Musculoskeletal: He exhibits no edema.        Right foot: There is normal range of motion and no deformity.   Feet:   Right Foot:   Protective Sensation: 10 sites tested. 10  sites sensed.   Skin Integrity: Negative for ulcer, blister, callus or dry skin.   Left Foot:   Protective Sensation: 10 sites tested. 10 sites sensed.   Skin Integrity: Negative for ulcer, blister, callus or dry skin.   Neurological: He is alert and oriented to person, place, and time. No cranial nerve deficit.   Skin: Skin is warm and dry. Capillary refill takes less than 2 seconds. No rash noted. He is not diaphoretic.   Insulin pump sites are normal appearing. No lipo hypertropthy or atrophy- to abdomen      Psychiatric: He has a normal mood and affect. His behavior is normal. Judgment normal.   Nursing note and vitals reviewed.      FOOT EXAMINATION: Appropriate footwear   Decreased vibratory sensation bilaterally     Protective Sensation (w/ 10 gram monofilament):  Right: Intact  Left: Intact    Visual Inspection:  Normal -  Bilateral, Nails Intact - without Evidence of Foot Deformity- Bilateral and Onychomycosis -  Right     Pedal Pulses:   Right: Present  Left: Present    Posterior tibialis:   Right:Present  Left: Present        Lab Results   Component Value Date    TSH 1.13 04/24/2019           Diabetes mellitus type I  Controlled based on reported BG readings entered into pump.   Need labs today.   Will evaluate A1c level.   Patient is very basal heavy with 79% total daily dose of insulin coming from basal and only 20% coming from prandial.  Would like for patient to meet with diabetes education to discussed pump optimization and may need insulin dose adjustments particularly with cutting back on basal insulin and increasing his insulin to carb ratio    I will reach out to [employer] reps.  To see if patient would be eligible for the guardian sensor as that would allow him to fully optimized his pump and use auto mode.  This would also be imperative as patient has a history of hypoglycemia unawareness.     -- Reviewed goals of therapy are to get the best control we can without hypoglycemia  -- Refer to  diabetes education- pump optimization. May need to adjust ICR as patient is very basal heavy.  If we are able to switch him into auto mode he will definitely need adjustments to his insulin to carb ratio.    Continue with current pump settings for now  Basal:  2.65 units/hr   ICR: 1:12  ISF: 20  Target:  120  IOB:  3. 15     -- Reviewed patient's current insulin regimen. Clarified proper insulin dose and timing in relation to meals.  Encouraged pump optimization  -- Advised frequent self blood glucose monitoring.  Patient encouraged to document glucose results and bring them to every clinic visit.  Enter all blood sugar readings into pump.  Please monitor blood sugar 4 times per day.  Account for all carbohydrate intake.  He reports he feels confident his carbohydrate counting.  -- Hypoglycemia precautions discussed. Instructed on precautions before driving.    -- Call for Bg repeatedly < 90 or > 180.   -- Close adherence to lifestyle changes recommended.   -- Periodic follow ups for eye evaluations, foot care and dental care suggested.    -- follow up with Ophthalmology as scheduled  --referral to pharmacy assistance to help us with insurance management.  --if unable to get personal sensor consider professional CGM in the future to evaluate insulin pump settings.  -- rx sent for glucagon kit  --continue to wear medic alert bracelet  --Back up basal insulin sent to be on follow-up pharmacy in case of emergencies.- this would be for insulin pump failure    Insulin pump fitting or adjustment  No insulin pump changes today   Awaiting lab results.   As of now patient is very basal heavy and may need adjustments to lower basal rate increase insulin carb ratio to make balance more a 50/50.    Insulin pump in place  See above    Type 1 diabetes mellitus with hypoglycemia unawareness  Avoid hypoglycemia.  Patient would greatly benefit from a personal continuous glucose monitor such as the guardian sensor made by [employer]  as patient is already wearing a Medtronic 670 G pump this would allow patient to go into auto mode and be able to predict low blood sugars.  It will allow the pump to turn off prior to hypoglycemia occurring.  In addition to helping to manage glycemic control, it will also help to prevent hypoglycemia.  At this time, patient is not fully optimizing his insulin pump therapy as he does not have the sensor that communicates with his insulin pump    Coronary artery disease involving native coronary artery of native heart without angina pectoris  Avoid hypoglycemia  On aspirin and Plavix    Essential hypertension  BP goal is < 140/90.   Tolerating ACEi  Controlled   Blood pressure goals discussed with patient      Hyperlipidemia with target low density lipoprotein (LDL) cholesterol less than 70 mg/dL  On statin per ADA recommendations  LDL goal < 70 (due to hx of TIA). LDL at goal. LFTs WNL. Continue statin.   Lipids tomorrow.       TIA (transient ischemic attack)  Optimize blood sugar readings    Class 1 obesity due to excess calories with serious comorbidity and body mass index (BMI) of 33.0 to 33.9 in adult  Body mass index is 33.91 kg/m².  Increases insulin resistance.   Discussed DM diet and exercise.   Patient does exhibit insulin resistance and is on an SGLT 2 and metformin.    Regarding otitis externa due to self-inflicted injury will sent in antibiotic eyedrops to prevent infection.  Discussed warning signs symptoms for infection.  Discussed following up immediately signs symptoms of infection were to occur.  Patient verbalized understanding plan of care.      Follow up in about 3 months (around 7/23/2019).  Will get labs tomorrow as patient will then be fasting.  Will get labs prior to patient's next appointment.  Will work on insurance coverage and trying to get patient the sensor that communicates to his pump.    Addendum 4/29/19- patient is very basal heavy. Will adjust pump to prepare for possible auto mode  in near future when patient is able to get his sensor. Based on weight and current TDD of 80 units per day   Basal 1.7 units/hr   ICR 1:6   ISF: 1:20  Target 120  IOB 3 hours.   Patient will see education in 2 weeks and insulin pump changes will be made at that time.  Patient will then follow up 2 weeks later for pump download to evaluate effectiveness of insulin pump changes      Orders Placed This Encounter   Procedures    Hemoglobin A1c     Standing Status:   Future     Number of Occurrences:   1     Standing Expiration Date:   6/21/2020    Lipid panel     Standing Status:   Future     Number of Occurrences:   1     Standing Expiration Date:   6/21/2020    Microalbumin/creatinine urine ratio     Standing Status:   Future     Number of Occurrences:   1     Standing Expiration Date:   10/23/2020     Order Specific Question:   Specimen Source     Answer:   Urine    TSH     Standing Status:   Future     Number of Occurrences:   1     Standing Expiration Date:   10/23/2020    Comprehensive metabolic panel     Standing Status:   Future     Number of Occurrences:   1     Standing Expiration Date:   10/23/2020    Ambulatory Referral to Pharmacy Assistance     Referral Priority:   Routine     Referral Type:   Consultation     Referral Reason:   Specialty Services Required     Number of Visits Requested:   1    Ambulatory Referral to Diabetes Education     Referral Priority:   Routine     Referral Type:   Consultation     Referral Reason:   Specialty Services Required     Requested Specialty:   Diabetes     Number of Visits Requested:   1         Recommendations were discussed with the patient in detail  The patient verbalized understanding and agrees with the plan outlined as above.

## 2019-04-23 NOTE — PATIENT INSTRUCTIONS
Please contact Pharmacy Patient Assistance Program to determine eligibility for possible medication financial assistance [phone] or [phone].           Snacks can be an important part of a balanced, healthy meal plan. They allow you to eat more frequently, feeling full and satisfied throughout the day. Also, they allow you to spread carbohydrates evenly, which may stabilize blood sugars.  Plus, snacks are enjoyable!     The amount of carbohydrate needed at snacks varies. Generally, about 15-30 grams of carbohydrate per snack is recommended.  Below you will find some tasty treats.       0-5 gm carb   Crystal Light   Vitamin Water Zero   Herbal tea, unsweetened   2 tsp peanut butter on celery   1./2 cup sugar-free jell-o   1 sugar-free popsicle   ¼ cup blueberries   8oz Blue Yanni unsweetened almond milk   5 baby carrots & celery sticks, cucumbers, bell peppers dipped in ¼ cup salsa, 2Tbsp light ranch dressing or 2Tbsp plain Greek yogurt   10 Goldfish crackers   ½ oz low-fat cheese or string cheese   1 closed handful of nuts, unsalted   1 Tbsp of sunflower seeds, unsalted   1 cup Smart Pop popcorn   1 whole grain brown rice cake        15 gm carb   1 small piece of fruit or ½ banana or 1/2 cup lite canned fruit   3 adan cracker squares   3 cups Smart Pop popcorn, top spray butter, [place] lite salt or cinnamon and Truvia   5 Vanilla Wafers   ½ cup low fat, no added sugar ice cream or frozen yogurt (Blue bell, Blue Bunny, Weight Watchers, Skinny Cow)   ½ turkey, ham, or chicken sandwich   ½ c fruit with ½ c Cottage cheese   4-6 unsalted wheat crackers with 1 oz low fat cheese or 1 tbsp peanut butter    30-45 goldfish crackers (depending on flavor)    7-8 Islam mini brown rice cakes (caramel, apple cinnamon, chocolate)    12 Islam mini brown rice cakes (cheddar, bbq, ranch)    1/3 cup hummus dip with raw veg   1/2 whole wheat robin, 1Tbsp hummus   Mini Pizza (1/2 whole wheat  English muffin, low-fat  cheese, tomato sauce)   100 calorie snack pack (Oreo, Chips Ahoy, Ritz Mix, Baked Cheetos)   4-6 oz. light or Greek Style yogurt (Chobani, Yoplait, Okios, Stoneyfield)   ½ cup sugar-free pudding     6 in. wheat tortilla or robin oven toasted chips (topped with spray butter flavoring, cinnamon, Truvia OR spray butter, garlic powder, chili powder)    18 BBQ Popchips (available at [employer], [employer], [employer])

## 2019-04-24 LAB
ALBUMIN/CREAT UR: NORMAL UG/MG (ref 0–30)
CREAT UR-MCNC: 29 MG/DL (ref 23–375)
MICROALBUMIN UR DL<=1MG/L-MCNC: <2.5 UG/ML

## 2019-04-24 NOTE — ASSESSMENT & PLAN NOTE
On statin per ADA recommendations  LDL goal < 70 (due to hx of TIA). LDL at goal. LFTs WNL. Continue statin.   Lipids tomorrow.

## 2019-04-24 NOTE — ASSESSMENT & PLAN NOTE
Controlled based on reported BG readings entered into pump.   Need labs today.   Will evaluate A1c level.   Patient is very basal heavy with 79% total daily dose of insulin coming from basal and only 20% coming from prandial.  Would like for patient to meet with diabetes education to discussed pump optimization and may need insulin dose adjustments particularly with cutting back on basal insulin and increasing his insulin to carb ratio    I will reach out to [employer] reps.  To see if patient would be eligible for the guardian sensor as that would allow him to fully optimized his pump and use auto mode.  This would also be imperative as patient has a history of hypoglycemia unawareness.     -- Reviewed goals of therapy are to get the best control we can without hypoglycemia  -- Refer to diabetes education- pump optimization. May need to adjust ICR as patient is very basal heavy.  If we are able to switch him into auto mode he will definitely need adjustments to his insulin to carb ratio.    Continue with current pump settings for now  Basal:  2.65 units/hr   ICR: 1:12  ISF: 20  Target:  120  IOB:  3. 15     -- Reviewed patient's current insulin regimen. Clarified proper insulin dose and timing in relation to meals.  Encouraged pump optimization  -- Advised frequent self blood glucose monitoring.  Patient encouraged to document glucose results and bring them to every clinic visit.  Enter all blood sugar readings into pump.  Please monitor blood sugar 4 times per day.  Account for all carbohydrate intake.  He reports he feels confident his carbohydrate counting.  -- Hypoglycemia precautions discussed. Instructed on precautions before driving.    -- Call for Bg repeatedly < 90 or > 180.   -- Close adherence to lifestyle changes recommended.   -- Periodic follow ups for eye evaluations, foot care and dental care suggested.    -- follow up with Ophthalmology as scheduled  --referral to pharmacy assistance to help us with  insurance management.  --if unable to get personal sensor consider professional CGM in the future to evaluate insulin pump settings.  -- rx sent for glucagon kit  --continue to wear medic alert bracelet  --Back up basal insulin sent to be on follow-up pharmacy in case of emergencies.- this would be for insulin pump failure

## 2019-04-24 NOTE — ASSESSMENT & PLAN NOTE
Avoid hypoglycemia.  Patient would greatly benefit from a personal continuous glucose monitor such as the guardian sensor made by [employer] as patient is already wearing a Medtronic 670 G pump this would allow patient to go into auto mode and be able to predict low blood sugars.  It will allow the pump to turn off prior to hypoglycemia occurring.  In addition to helping to manage glycemic control, it will also help to prevent hypoglycemia.  At this time, patient is not fully optimizing his insulin pump therapy as he does not have the sensor that communicates with his insulin pump

## 2019-04-24 NOTE — ASSESSMENT & PLAN NOTE
No insulin pump changes today   Awaiting lab results.   As of now patient is very basal heavy and may need adjustments to lower basal rate increase insulin carb ratio to make balance more a 50/50.

## 2019-04-24 NOTE — ASSESSMENT & PLAN NOTE
BP goal is < 140/90.   Tolerating ACEi  Controlled   Blood pressure goals discussed with patient

## 2019-04-24 NOTE — ASSESSMENT & PLAN NOTE
Body mass index is 33.91 kg/m².  Increases insulin resistance.   Discussed DM diet and exercise.   Patient does exhibit insulin resistance and is on an SGLT 2 and metformin.

## 2019-04-25 ENCOUNTER — TELEPHONE (OUTPATIENT)
Dept: PHARMACY | Facility: CLINIC | Age: 54
End: 2019-04-25

## 2019-04-25 ENCOUNTER — TELEPHONE (OUTPATIENT)
Dept: ENDOCRINOLOGY | Facility: CLINIC | Age: 54
End: 2019-04-25

## 2019-04-25 DIAGNOSIS — Z23 IMMUNIZATION DUE: Primary | ICD-10-CM

## 2019-04-25 DIAGNOSIS — E10.42 TYPE 1 DIABETES MELLITUS WITH DIABETIC POLYNEUROPATHY: ICD-10-CM

## 2019-04-25 DIAGNOSIS — Z46.81 INSULIN PUMP FITTING OR ADJUSTMENT: ICD-10-CM

## 2019-04-25 NOTE — TELEPHONE ENCOUNTER
----- Message from Jodi Quintero NP sent at 4/25/2019 10:35 AM CDT -----  Please call patient and have him schedule an appointment with Katia within the next 1-2 weeks for pump optimization.  Referrals been placed    Please also complete people[employer] necessity form for insulin pump paperwork.  Patient is due for new prescription for his pump supplies.  He wears a Medtronic 670 G

## 2019-04-25 NOTE — TELEPHONE ENCOUNTER
Discussed lab results with patient.  Paperwork has been faxed to [employer] to try to get patient the Medtronic guardian 3 sensor to that patient will be able to go into auto mode.  Will be in touch with patient's once we here information regarding the sensor coverage.  Also provided patient with information for the [facility] Care coordinator Marisa that he can call her to discuss this [employer] coverage and plans that will be available as he no longer has Medicaid nor does he qualify for Medicaid.    Discussed with patient again that he is very basal heavy and would like to make insulin pump changes to that he is more 50/50 to basal and prandial insulin coverage.   Will place referral for diabetes education for patient to come in and meet with Gena to discussed pump optimization.    Patient would like a shingle shot.  Rx sent to pharmacy 1st dose and then repeat in 2-6 months

## 2019-04-25 NOTE — TELEPHONE ENCOUNTER
I spoke with the patient ans at this moment he is not in need of assistance with his mediocation because he is receiving asisstance that where he can afford his medication. I will mail my card for fuure assistance if needed.

## 2019-05-01 ENCOUNTER — PATIENT OUTREACH (OUTPATIENT)
Dept: ADMINISTRATIVE | Facility: HOSPITAL | Age: 54
End: 2019-05-01

## 2019-05-01 ENCOUNTER — OFFICE VISIT (OUTPATIENT)
Dept: OPTOMETRY | Facility: CLINIC | Age: 54
End: 2019-05-01
Payer: MEDICARE

## 2019-05-01 DIAGNOSIS — E10.3299 TYPE 1 DIABETES MELLITUS WITH MILD NONPROLIFERATIVE RETINOPATHY WITHOUT MACULAR EDEMA, UNSPECIFIED LATERALITY: Primary | ICD-10-CM

## 2019-05-01 DIAGNOSIS — H25.13 NS (NUCLEAR SCLEROSIS), BILATERAL: ICD-10-CM

## 2019-05-01 DIAGNOSIS — Z11.59 NEED FOR HEPATITIS C SCREENING TEST: ICD-10-CM

## 2019-05-01 DIAGNOSIS — I10 ESSENTIAL HYPERTENSION: ICD-10-CM

## 2019-05-01 DIAGNOSIS — H35.049 DIABETIC RETINAL MICROANEURYSMS: ICD-10-CM

## 2019-05-01 DIAGNOSIS — E11.319 DIABETIC RETINAL MICROANEURYSMS: ICD-10-CM

## 2019-05-01 DIAGNOSIS — E10.3293 MILD NONPROLIFERATIVE DIABETIC RETINOPATHY OF BOTH EYES WITHOUT MACULAR EDEMA ASSOCIATED WITH TYPE 1 DIABETES MELLITUS: ICD-10-CM

## 2019-05-01 DIAGNOSIS — H52.4 PRESBYOPIA: ICD-10-CM

## 2019-05-01 PROCEDURE — 99499 RISK ADDL DX/OHS AUDIT: ICD-10-PCS | Mod: S$GLB,,, | Performed by: OPTOMETRIST

## 2019-05-01 PROCEDURE — 92014 PR EYE EXAM, EST PATIENT,COMPREHESV: ICD-10-PCS | Mod: S$GLB,,, | Performed by: OPTOMETRIST

## 2019-05-01 PROCEDURE — 92014 COMPRE OPH EXAM EST PT 1/>: CPT | Mod: S$GLB,,, | Performed by: OPTOMETRIST

## 2019-05-01 PROCEDURE — 99999 PR PBB SHADOW E&M-EST. PATIENT-LVL II: ICD-10-PCS | Mod: PBBFAC,,, | Performed by: OPTOMETRIST

## 2019-05-01 PROCEDURE — 99999 PR PBB SHADOW E&M-EST. PATIENT-LVL II: CPT | Mod: PBBFAC,,, | Performed by: OPTOMETRIST

## 2019-05-01 PROCEDURE — 92015 PR REFRACTION: ICD-10-PCS | Mod: S$GLB,,, | Performed by: OPTOMETRIST

## 2019-05-01 PROCEDURE — 92015 DETERMINE REFRACTIVE STATE: CPT | Mod: S$GLB,,, | Performed by: OPTOMETRIST

## 2019-05-01 PROCEDURE — 99499 UNLISTED E&M SERVICE: CPT | Mod: S$GLB,,, | Performed by: OPTOMETRIST

## 2019-05-01 NOTE — PROGRESS NOTES
HPI     Last eye exam was 3/1/18 with Dr Meredith.    Patient denies diplopia,  flashes/floaters, pain, and   itching/burning/tearing.    Finds it to be a little blurry reading thru bifocal. Thinks its time for   new rx.  Headaches 2-3 times a week more towards end of day.     Using any eye gtts? No     Hemoglobin A1C       Date                     Value               Ref Range             Status                04/24/2019               7.2 (H)             4.0 - 5.6 %           Final                 08/24/2018               7.0 (H)             4.0 - 5.6 %           Final                  05/22/2018               7.2 (H)             4.0 - 5.6 %           Final                  Last edited by Amy Lira on 5/1/2019  1:50 PM. (History)            Assessment /Plan     For exam results, see Encounter Report.    Type 1 diabetes mellitus with mild nonproliferative retinopathy without macular edema, unspecified laterality  Mild nonproliferative diabetic retinopathy of both eyes without macular edema associated with type 1 diabetes mellitus  Diabetic retinal microaneurysms   Stable since photos in March 2018   Discussed importance of A1c control, monitor yearly with DFE/photos    Essential hypertension  NS (nuclear sclerosis), bilateral   Mild, monitor    Presbyopia   Rx specs    RTC 1 year

## 2019-05-01 NOTE — Clinical Note
Dilated retinal exam today with Mild NPDR, no progression since last visit, will continue to monitor yearly

## 2019-05-01 NOTE — PROGRESS NOTES
Immunizations reviewed. Legacy reviewed. Placed ordered for Hepatitis C Screening. Patient scheduled for ophthalmology appt - routine eye exam 05/01/2019. Message sent to patient through portal to verify who ophthalmologist is. EFAX sent to Dr. Vasquez to obtain Colonoscopy report to upload to patient's chart. Pre-visit chart review completed.

## 2019-05-01 NOTE — LETTER
May 1, 2019    Dr. Guru Vasquez and staff              [facility]   [address] [place] [address]  P: [phone]  F: [phone] May 1, 2019     Patient: Cj Barnes    YOB: 1965   Date of Visit: 5/1/2019         [facility]    We are seeing Cj Barnes in the clinic today at [facility].  Garry Stover MD is their PCP.  She/He has an outstanding lab/procedure at this time when reviewing their chart.  To help with our Health Maintenance records will you please supply the following:      []  Mammogram                             [x]  Colonoscopy   []  Pap Smear                                []  Outside Lab Results   []  Dexa scans                               []  Eye Exam   []  Foot Exam                                 [] Other___________   []  Outside Immunizations                                               Please Fax to [facility] at [phone].    Thank you for your help, Alvin Hernandez LPN-GLORIA.  If I can be of any assistance you can call at [phone]

## 2019-05-08 ENCOUNTER — PATIENT MESSAGE (OUTPATIENT)
Dept: ENDOCRINOLOGY | Facility: CLINIC | Age: 54
End: 2019-05-08

## 2019-05-08 DIAGNOSIS — E10.42 TYPE 1 DIABETES MELLITUS WITH DIABETIC POLYNEUROPATHY: Primary | ICD-10-CM

## 2019-05-09 DIAGNOSIS — E10.65 TYPE 1 DIABETES MELLITUS WITH HYPERGLYCEMIA: ICD-10-CM

## 2019-05-09 NOTE — TELEPHONE ENCOUNTER
[facility] rep Marisa called stating that the patient is requesting Em Contour test strips.

## 2019-05-10 DIAGNOSIS — E10.65 TYPE 1 DIABETES MELLITUS WITH HYPERGLYCEMIA: ICD-10-CM

## 2019-05-16 ENCOUNTER — CLINICAL SUPPORT (OUTPATIENT)
Dept: DIABETES | Facility: CLINIC | Age: 54
End: 2019-05-16
Payer: MEDICARE

## 2019-05-16 ENCOUNTER — TELEPHONE (OUTPATIENT)
Dept: DIABETES | Facility: CLINIC | Age: 54
End: 2019-05-16

## 2019-05-16 VITALS — BODY MASS INDEX: 33.8 KG/M2 | WEIGHT: 223 LBS | HEIGHT: 68 IN

## 2019-05-16 DIAGNOSIS — E10.649 TYPE 1 DIABETES MELLITUS WITH HYPOGLYCEMIA UNAWARENESS: Primary | ICD-10-CM

## 2019-05-16 DIAGNOSIS — E10.42 TYPE 1 DIABETES MELLITUS WITH DIABETIC POLYNEUROPATHY: ICD-10-CM

## 2019-05-16 PROCEDURE — 99999 PR PBB SHADOW E&M-EST. PATIENT-LVL III: CPT | Mod: PBBFAC,,, | Performed by: DIETITIAN, REGISTERED

## 2019-05-16 PROCEDURE — G0108 DIAB MANAGE TRN  PER INDIV: HCPCS | Mod: S$GLB,,, | Performed by: DIETITIAN, REGISTERED

## 2019-05-16 PROCEDURE — 99999 PR PBB SHADOW E&M-EST. PATIENT-LVL III: ICD-10-PCS | Mod: PBBFAC,,, | Performed by: DIETITIAN, REGISTERED

## 2019-05-16 PROCEDURE — G0108 PR DIAB MANAGE TRN  PER INDIV: ICD-10-PCS | Mod: S$GLB,,, | Performed by: DIETITIAN, REGISTERED

## 2019-05-16 RX ORDER — ALCOHOL ANTISEPTIC PADS
PADS, MEDICATED (EA) TOPICAL
Refills: 11 | COMMUNITY
Start: 2019-04-30

## 2019-05-17 NOTE — TELEPHONE ENCOUNTER
Patient received a letter from [facility] stating he was approved for the Guardian sensor and that he should contact [facility]. I reached out to Daniel. Daniel states that the patient needs to contact [facility] not them. Left message for nurse navigator at [facility]

## 2019-05-17 NOTE — PROGRESS NOTES
Diabetes Education  Author: Katia Pham RD  Date: 5/16/2019    Diabetes Care Management Summary  Diabetes Education Record Assessment/Progress: Initial(670 pump upgrade - 1 week f/u)  Current Diabetes Risk Level: Moderate     Last A1c:   Lab Results   Component Value Date    HGBA1C 7.2 (H) 04/24/2019     Last visit with Diabetes Educator: : 03/20/2018    Discussed ICR and ISF, patient approved for sensor but has not received it, I spoke with Daniel he suggested contacting N to see where supplies will be coming from, patient is not apposed to adjusting basal rate, ICR, or ISF would like to have sensor to help better guide adjustments, currently checking blood sugars qac, diet is high in carbohydrates - patient doses insulin to cover the carbohydrates he eats    Diabetes Type  Diabetes Type : Type I    Diabetes History  Diabetes Diagnosis: >10 years  Current Treatment: Insulin pump  Reviewed Problem List with Patient: Yes    Health Maintenance was reviewed today with patient. Discussed with patient importance of routine eye exams, foot exams/foot care, blood work (i.e.: A1c, microalbumin, and lipid), dental visits, yearly flu vaccine, and pneumonia vaccine as indicated by PCP. Patient verbalized understanding.     Health Maintenance Topics with due status: Not Due       Topic Last Completion Date    Colonoscopy 04/25/2016    Influenza Vaccine 12/17/2018    Foot Exam 04/23/2019    Lipid Panel 04/24/2019    Hemoglobin A1c 04/24/2019    High Dose Statin 05/01/2019    Eye Exam 05/01/2019     Health Maintenance Due   Topic Date Due    Hepatitis C Screening  1965    TETANUS VACCINE  11/09/1981       Nutrition  Meal Planning: 3 meals per day, water, snacks between meal, diet drinks  What type of beverages do you drink?: water, diet soda/tea  Meal Plan 24 Hour Recall - Breakfast: Tornatos x2 and Twist Doughnut x1  Meal Plan 24 Hour Recall - Lunch: Chips or [place]  Meal Plan 24 Hour Recall - Dinner: Pizza Thin  Crust  Meal Plan 24 Hour Recall - Snack: Chips     Monitoring   Monitoring: Cont Next EZ USB  Self Monitoring : SMBG 3 tiimes a day, approved for a sensor, waiting for sensor to come in(SMBG 3-4 times daily (does not use CGM sensor); range )  Blood Glucose Logs: Yes(from pump download; see Media tab)  Do you use a personal continuous glucose monitor?: No  In the last month, how often have you had a low blood sugar reaction?: once  What are your symptoms of low blood sugar?: shaky, dizzy  How do you treat low blood sugar?: juice, glucose tablets  Can you tell when your blood sugar is too high?: sometimes  How do you treat high blood sugar?: insulin    Exercise   Exercise Type: none  Frequency: Never    Current Diabetes Treatment   Current Treatment: Insulin pump    Social History  Preferred Learning Method: Face to Face  Primary Support: Self, Spouse  Educational Level: High School  Occupation: retired  Smoking Status: Never a Smoker  Alcohol Use: Never    PHQ-2 Total Score: 0       DDS-2 Score  ( > 3 = SIGNIFICANT DISTRESS): 1.5                   Barriers to Change  Barriers to Change: None  Learning Challenges : None    Readiness to Learn   Readiness to Learn : Acceptance    Cultural Influences  Cultural Influences: No    Diabetes Education Assessment/Progress  Diabetes Disease Process (diabetes disease process and treatment options): Discussion, Individual Session  Nutrition (Incorporating nutritional management into one's lifestyle): Discussion, Individual Session  Physical Activity (incorporating physical activity into one's lifestyle): Discussion, Individual Session  Medications (states correct name, dose, onset, peak, duration, side effects & timing of meds): Discussion, Individual Session, Comprehends Key Points  Monitoring (monitoring blood glucose/other parameters & using results): Discussion, Instructed, Individual Session, Comprehends Key Points, Written Materials Provided(Guardian Sensor)  Acute  Complications (preventing, detecting, and treating acute complications): Discussion, Individual Session, Comprehends Key Points  Chronic Complications (preventing, detecting, and treating chronic complications): Discussion, Individual Session  Clinical (diabetes, other pertinent medical history, and relevant comorbidities reviewed during visit): Discussion, Individual Session  Cognitive (knowledge of self-management skills, functional health literacy): Discussion  Psychosocial (emotional response to diabetes): Discussion, Individual Session  Diabetes Distress and Support Systems: Discussion, Individual Session  Behavioral (readiness for change, lifestyle practices, self-care behaviors): Discussion    Goals  Patient has selected/evaluated goals during today's session: Yes, evaluated  Monitoring: In Progress         Diabetes Care Plan/Intervention  Education Plan/Intervention: Individual Follow-Up DSMT, Sensor Start, Sensor Training    Diabetes Meal Plan  Restrictions: Low Fat, Low Sodium, Restricted Carbohydrate  Calories: 1800  Carbohydrate Per Meal: 30-45g  Carbohydrate Per Snack : 15-20g  Fat: 50  Protein: 135    Today's Self-Management Care Plan was developed with the patient's input and is based on barriers identified during today's assessment.    The long and short-term goals in the care plan were written with the patient/caregiver's input. The patient has agreed to work toward these goals to improve his overall diabetes control.      The patient received a copy of today's self-management plan and verbalized understanding of the care plan, goals, and all of today's instructions.      The patient was encouraged to communicate with his physician and care team regarding his condition(s) and treatment.  I provided the patient with my contact information today and encouraged him to contact me via phone or patient portal as needed.     Education Units of Time   Time Spent: 60 min

## 2019-05-21 ENCOUNTER — TELEPHONE (OUTPATIENT)
Dept: ENDOCRINOLOGY | Facility: CLINIC | Age: 54
End: 2019-05-21

## 2019-05-21 NOTE — TELEPHONE ENCOUNTER
----- Message from Jodi Quintero NP sent at 5/20/2019 10:22 PM CDT -----  Can we please contact [employer] and see what is going on with his guardian sensor.  It was reported that it was approved but he has not yet received it.  Thank you   Katia has already reached out last week and has not heard anything about it.

## 2019-06-17 ENCOUNTER — OFFICE VISIT (OUTPATIENT)
Dept: CARDIOLOGY | Facility: CLINIC | Age: 54
End: 2019-06-17
Payer: MEDICARE

## 2019-06-17 VITALS
SYSTOLIC BLOOD PRESSURE: 129 MMHG | HEART RATE: 58 BPM | DIASTOLIC BLOOD PRESSURE: 75 MMHG | WEIGHT: 225.31 LBS | BODY MASS INDEX: 34.15 KG/M2 | HEIGHT: 68 IN

## 2019-06-17 DIAGNOSIS — I25.10 CORONARY ARTERY DISEASE INVOLVING NATIVE CORONARY ARTERY OF NATIVE HEART WITHOUT ANGINA PECTORIS: ICD-10-CM

## 2019-06-17 DIAGNOSIS — I10 ESSENTIAL HYPERTENSION: ICD-10-CM

## 2019-06-17 DIAGNOSIS — G45.9 TIA (TRANSIENT ISCHEMIC ATTACK): ICD-10-CM

## 2019-06-17 DIAGNOSIS — E78.5 HYPERLIPIDEMIA WITH TARGET LOW DENSITY LIPOPROTEIN (LDL) CHOLESTEROL LESS THAN 70 MG/DL: ICD-10-CM

## 2019-06-17 DIAGNOSIS — I35.0 NONRHEUMATIC AORTIC (VALVE) STENOSIS: ICD-10-CM

## 2019-06-17 DIAGNOSIS — Q23.1 AORTIC REGURGITATION, CONGENITAL: ICD-10-CM

## 2019-06-17 DIAGNOSIS — I35.0 NONRHEUMATIC AORTIC VALVE STENOSIS: Primary | ICD-10-CM

## 2019-06-17 DIAGNOSIS — Z96.41 INSULIN PUMP IN PLACE: ICD-10-CM

## 2019-06-17 PROCEDURE — 3078F PR MOST RECENT DIASTOLIC BLOOD PRESSURE < 80 MM HG: ICD-10-PCS | Mod: CPTII,S$GLB,, | Performed by: INTERNAL MEDICINE

## 2019-06-17 PROCEDURE — 3074F PR MOST RECENT SYSTOLIC BLOOD PRESSURE < 130 MM HG: ICD-10-PCS | Mod: CPTII,S$GLB,, | Performed by: INTERNAL MEDICINE

## 2019-06-17 PROCEDURE — 3008F PR BODY MASS INDEX (BMI) DOCUMENTED: ICD-10-PCS | Mod: CPTII,S$GLB,, | Performed by: INTERNAL MEDICINE

## 2019-06-17 PROCEDURE — 93005 ELECTROCARDIOGRAM TRACING: CPT | Mod: S$GLB,,, | Performed by: INTERNAL MEDICINE

## 2019-06-17 PROCEDURE — 3078F DIAST BP <80 MM HG: CPT | Mod: CPTII,S$GLB,, | Performed by: INTERNAL MEDICINE

## 2019-06-17 PROCEDURE — 99214 OFFICE O/P EST MOD 30 MIN: CPT | Mod: S$GLB,,, | Performed by: INTERNAL MEDICINE

## 2019-06-17 PROCEDURE — 93005 EKG 12-LEAD: ICD-10-PCS | Mod: S$GLB,,, | Performed by: INTERNAL MEDICINE

## 2019-06-17 PROCEDURE — 99214 PR OFFICE/OUTPT VISIT, EST, LEVL IV, 30-39 MIN: ICD-10-PCS | Mod: S$GLB,,, | Performed by: INTERNAL MEDICINE

## 2019-06-17 PROCEDURE — 99999 PR PBB SHADOW E&M-EST. PATIENT-LVL III: ICD-10-PCS | Mod: PBBFAC,,, | Performed by: INTERNAL MEDICINE

## 2019-06-17 PROCEDURE — 93010 EKG 12-LEAD: ICD-10-PCS | Mod: S$GLB,,, | Performed by: INTERNAL MEDICINE

## 2019-06-17 PROCEDURE — 99499 RISK ADDL DX/OHS AUDIT: ICD-10-PCS | Mod: S$GLB,,, | Performed by: INTERNAL MEDICINE

## 2019-06-17 PROCEDURE — 3074F SYST BP LT 130 MM HG: CPT | Mod: CPTII,S$GLB,, | Performed by: INTERNAL MEDICINE

## 2019-06-17 PROCEDURE — 93010 ELECTROCARDIOGRAM REPORT: CPT | Mod: S$GLB,,, | Performed by: INTERNAL MEDICINE

## 2019-06-17 PROCEDURE — 3008F BODY MASS INDEX DOCD: CPT | Mod: CPTII,S$GLB,, | Performed by: INTERNAL MEDICINE

## 2019-06-17 PROCEDURE — 99999 PR PBB SHADOW E&M-EST. PATIENT-LVL III: CPT | Mod: PBBFAC,,, | Performed by: INTERNAL MEDICINE

## 2019-06-17 PROCEDURE — 99499 UNLISTED E&M SERVICE: CPT | Mod: S$GLB,,, | Performed by: INTERNAL MEDICINE

## 2019-06-17 NOTE — PROGRESS NOTES
"  Subjective:      Patient ID: Cj Barnes is a 53 y.o. male.    Chief Complaint: Follow-up (Aortic valve stenosis)    HPI:  "I walk a lot."  Pt has to stop and rest after walking a half mile due to low back pain.    "When I am stressed I feel tight in the chest"  The chest tightness lasts a minute or two and abates by "sitting and chilling." Pt is under stress since wife is ill.  Pt reports no change in the pattern of chest tightness over the past year.    No falls or faints.    Last HgbA1 C 7.2.    New insurance covers a new sugar sensor    I have chronic right hip and right shoulder pain.  The shoulder pain is positional.    Review of Systems   Cardiovascular: Negative for chest pain, claudication, dyspnea on exertion, irregular heartbeat, leg swelling, near-syncope, orthopnea, palpitations and syncope.      Pt states his TIA was really an episode of hypoglycemia  Past Medical History:   Diagnosis Date    Aortic stenosis 2018    Cancer 2014    Colon cancer     Coronary artery disease     Diabetes mellitus type I     since in his 30's    Heart murmur     Heel fracture     HTN (hypertension)     Hyperlipidemia LDL goal < 70     Insulin pump in place     MVP (mitral valve prolapse)     Stenosis of aortic and mitral valves         Past Surgical History:   Procedure Laterality Date    BACK SURGERY      COLON SURGERY  2014    FASCIOTOMY, PLANTAR Right 10/4/2013    [facility] OR [facility]ST FLR    FASCIOTOMY, PLANTAR, ENDOSCOPIC Right 10/4/2013    [facility] at [facility] OR [facility]ST FLR    FOOT TENDON SURGERY      Left heart cath Left 1/15/2018    [facility] C[facility]    right and left heart cath Bilateral 01/15/2018    Right heart cath Right 1/15/2018    Per[facility] C[facility]    TRIGGER FINGER RELEASE      x 2       Family History   Problem Relation Age of Onset    Heart disease Mother     Lung " cancer Mother     Heart attack Father     Diabetes Father     HIV Brother        Social History     Socioeconomic History    Marital status:      Spouse name: Not on file    Number of children: Not on file    Years of education: Not on file    Highest education level: Not on file   Occupational History    Not on file   Social Needs    Financial resource strain: Not on file    Food insecurity:     Worry: Not on file     Inability: Not on file    Transportation needs:     Medical: Not on file     Non-medical: Not on file   Tobacco Use    Smoking status: Never Smoker    Smokeless tobacco: Current User     Types: Snuff    Tobacco comment: since he was 12 yrs old   Substance and Sexual Activity    Alcohol use: No     Comment: socially    Drug use: No    Sexual activity: Not on file   Lifestyle    Physical activity:     Days per week: Not on file     Minutes per session: Not on file    Stress: Not on file   Relationships    Social connections:     Talks on phone: Not on file     Gets together: Not on file     Attends Jehovah's witness service: Not on file     Active member of club or organization: Not on file     Attends meetings of clubs or organizations: Not on file     Relationship status: Not on file   Other Topics Concern    Not on file   Social History Narrative        2 grown kids    Delivers seafood       Current Outpatient Medications on File Prior to Visit   Medication Sig Dispense Refill    aspirin (ECOTRIN) 81 MG EC tablet Take 81 mg by mouth once daily.      blood sugar diagnostic Strp 1 each by Misc.(Non-Drug; Combo Route) route 6 (six) times daily. Contour next strips. Pt needs contour jackelyn for compatibility w/ insulin pump (medtronic 670g). Necessity 200 strip 6    blood-glucose sensor (GUARDIAN SENSOR 3) Apple 1 each by Misc.(Non-Drug; Combo Route) route once a week. 12 each 3    clopidogrel (PLAVIX) 75 mg tablet Take 1 tablet (75 mg total) by mouth once daily. 30 tablet 11  "   clotrimazole-betamethasone 1-0.05% (LOTRISONE) cream APPLY A SMALL AMOUNT TO AFFECTED AREA THREE TIMES DAILY UNTIL CLEAR  0    COMFORT EZ PEN NEEDLES 33 gauge x 3/16" Ndle USE AS DIRECTED with Levemir pens  11    cyclobenzaprine (FLEXERIL) 10 MG tablet Take 10 mg by mouth daily as needed.  1    fluticasone (FLONASE) 50 mcg/actuation nasal spray fluticasone propionate 50 mcg/actuation nasal spray,suspension   Spray 2 sprays every day by intranasal route.      gabapentin (NEURONTIN) 300 MG capsule Take 2 capsules (600 mg total) by mouth 3 (three) times daily. 180 capsule 6    glucagon, human recombinant, (GLUCAGON EMERGENCY KIT, HUMAN,) 1 mg SolR Inject 1 mg into the muscle as needed. 1 each 0    insulin detemir U-100 (LEVEMIR FLEXTOUCH U-100 INSULN) 100 unit/mL (3 mL) SubQ InPn pen Inject 48 Units into the skin every evening. 3 mL 0    JARDIANCE 25 mg Tab TAKE ONE TABLET BY MOUTH DAILY FOR DIABETES  11    lisinopril 10 MG tablet Take 1 tablet (10 mg total) by mouth once daily. 90 tablet 3    metFORMIN (GLUCOPHAGE-XR) 500 MG 24 hr tablet Take 1 tablet (500 mg total) by mouth daily with breakfast. 90 tablet 3    NOVOLOG U-100 INSULIN ASPART 100 unit/mL injection INJECT 180 UNITS SUBCUTANEOUSLY EVERY ONE AND A HALF DAYS  11    simvastatin (ZOCOR) 20 MG tablet Take 1 tablet (20 mg total) by mouth every evening. 90 tablet 3    [DISCONTINUED] ONETOUCH VERIO Strp USE TO TEST for blood sugar THREE TO FOUR TIMES DAILY 200 strip 11     No current facility-administered medications on file prior to visit.        Review of patient's allergies indicates:  No Known Allergies  Objective:     Vitals:    06/17/19 0802   BP: 129/75   BP Location: Left arm   Patient Position: Sitting   BP Method: Large (Automatic)   Pulse: (!) 58   Weight: 102.2 kg (225 lb 5 oz)   Height: 5' 8" (1.727 m)        Physical Exam   Constitutional: He is oriented to person, place, and time. He appears well-developed and well-nourished. No " distress.   Eyes: No scleral icterus.   Neck: No JVD present. Carotid bruit is not present.   Cardiovascular: Regular rhythm. Exam reveals no gallop and no friction rub.   Murmur (IV/VI musical systolic ejection murmur) heard.  Pulmonary/Chest: Effort normal and breath sounds normal. No respiratory distress.   Musculoskeletal: He exhibits no edema.   Neurological: He is alert and oriented to person, place, and time.   Skin: Skin is warm and dry. He is not diaphoretic.   Psychiatric: He has a normal mood and affect. His behavior is normal. Judgment and thought content normal.   Vitals reviewed.     ECG: sinus bradycardia, probable LVH, f;at ST segments, low T waves    Not cor angio last year by Dr Turner showed CAD best treated medically    NOte last echo 12/19 showed severe AS  Assessment:     1. Nonrheumatic aortic valve stenosis    2. Aortic regurgitation, congenital    3. Coronary artery disease involving native coronary artery of native heart without angina pectoris    4. Insulin pump in place    5. Hyperlipidemia with target low density lipoprotein (LDL) cholesterol less than 70 mg/dL    6. Essential hypertension    7. TIA (transient ischemic attack)    8. Nonrheumatic aortic (valve) stenosis      Plan:   Cj was seen today for follow-up.    Diagnoses and all orders for this visit:    Nonrheumatic aortic valve stenosis  -     IN OFFICE EKG 12-LEAD (to Muse)  -     Transthoracic echo (TTE) 2D with Color Flow; Future    Aortic regurgitation, congenital    Coronary artery disease involving native coronary artery of native heart without angina pectoris    Insulin pump in place    Hyperlipidemia with target low density lipoprotein (LDL) cholesterol less than 70 mg/dL    Essential hypertension    TIA (transient ischemic attack)    Nonrheumatic aortic (valve) stenosis  -     IN OFFICE EKG 12-LEAD (to Muse)     It is possible that pt's chest tightness triggered by strong emotion is a symptom related to his  aortic stenosis and non-flow-restrictive CAD.  However pt has no symptoms with exertion.    Will repeat echocardiogram with doppler    Discussed timing of AVR typically hinges on symptoms of chest pain or shortness of breath or fainting.  Long discussion with pt regarding SAVR vs TAVR and how best choice may also be related to whether or not pt's CAD progresses (whether or not CABG or PTCA could be warranted.    ADA wt reducing diet discussed in detail.  Follow up in about 6 months (around 12/17/2019).

## 2019-06-18 RX ORDER — CLOPIDOGREL BISULFATE 75 MG/1
75 TABLET ORAL DAILY
Qty: 30 TABLET | Refills: 11 | Status: ON HOLD | OUTPATIENT
Start: 2019-06-18 | End: 2019-08-14 | Stop reason: HOSPADM

## 2019-06-24 ENCOUNTER — PATIENT MESSAGE (OUTPATIENT)
Dept: DIABETES | Facility: CLINIC | Age: 54
End: 2019-06-24

## 2019-06-24 DIAGNOSIS — E10.649 TYPE 1 DIABETES MELLITUS WITH HYPOGLYCEMIA UNAWARENESS: Primary | ICD-10-CM

## 2019-06-24 RX ORDER — METFORMIN HYDROCHLORIDE 500 MG/1
500 TABLET, EXTENDED RELEASE ORAL
Qty: 90 TABLET | Refills: 3 | Status: SHIPPED | OUTPATIENT
Start: 2019-06-24 | End: 2020-03-17

## 2019-07-01 ENCOUNTER — TELEPHONE (OUTPATIENT)
Dept: DIABETES | Facility: CLINIC | Age: 54
End: 2019-07-01

## 2019-07-01 NOTE — TELEPHONE ENCOUNTER
----- Message from Palomo Caceres sent at 7/1/2019 10:09 AM CDT -----  Type: Needs Medical Advice    Who Called:  Altagracia/[facility]    Best Call Back Number: [phone],  Fax [phone]  Additional Information: Caller states that the patient had orders for a Medtronic Guardian Insulin pump but [employer] doesn't cover for that item.    Dexcom G5 insulin pump or a Freestyle Bear are available.  Please call to advise

## 2019-07-01 NOTE — TELEPHONE ENCOUNTER
Returned a phone call to  regarding a request for Medtronic insulin pump. I spoke with the rep but we were trying to figure out when it was sent in and who sent it. The request was for the Medtronic Guardian 3 sensors. After finding the paperwork she said she would fax a copy to me to compare to the paperwork in the office. Will go over the paperwork once it's received.

## 2019-07-01 NOTE — TELEPHONE ENCOUNTER
----- Message from Alina Poole sent at 7/1/2019 11:35 AM CDT -----  Type: Needs Medical Advice    Who Called:  Patient  Best Call Back Number: [phone] (home)     Additional Information: Insurance will cover the Dextron Insulin Pump with the continuous meter. Insurance will be sending in the paper work. Please call to advise.

## 2019-07-05 ENCOUNTER — TELEPHONE (OUTPATIENT)
Dept: DIABETES | Facility: CLINIC | Age: 54
End: 2019-07-05

## 2019-07-05 NOTE — TELEPHONE ENCOUNTER
Spoke with patient regarding the changes to our schedule. The patient was fine with moving to the 11am spot. Patient also states that he received a call from [email or web address] saying the the Guardian 3 sensors were not covered but they will cover dexcom pump and sensors. Patient states that he would like to get the dexcom pump and CGM. Will check in to this and get back to the patient with and update.

## 2019-07-09 ENCOUNTER — PATIENT OUTREACH (OUTPATIENT)
Dept: ADMINISTRATIVE | Facility: HOSPITAL | Age: 54
End: 2019-07-09

## 2019-07-09 NOTE — PROGRESS NOTES
Immunizations reviewed. Legacy reviewed. Portal message sent to patient. Pre-visit chart review completed.

## 2019-07-11 ENCOUNTER — TELEPHONE (OUTPATIENT)
Dept: DIABETES | Facility: CLINIC | Age: 54
End: 2019-07-11

## 2019-07-11 NOTE — TELEPHONE ENCOUNTER
----- Message from Idalia Street sent at 7/11/2019 11:06 AM CDT -----  Contact: [facility] [phone]  [facility] [phone], Calling for an order, Medical Necessity Form and clinicals for a Free Style Reina glucometer and supplies. Please advise. Thanks.

## 2019-07-11 NOTE — TELEPHONE ENCOUNTER
Left a VM in regards to a request of information from [facility] for a Freestyle Bear. Neither of the providers put the request in and we wanted to be sure this was made per patient's request.

## 2019-07-11 NOTE — TELEPHONE ENCOUNTER
Spoke with Hilda about an order request for the Freestyle Bear. We are currently waiting on a call from the patient regarding this mater. Once we have the permission of the patient the orders will be sent.

## 2019-07-17 ENCOUNTER — TELEPHONE (OUTPATIENT)
Dept: CARDIOLOGY | Facility: CLINIC | Age: 54
End: 2019-07-17

## 2019-07-18 NOTE — TELEPHONE ENCOUNTER
Echocardiogram shows severe aortic stenosis is slightly worse than last year.  Pt is experiencing atypical chest discomfort which may be due to aortic stenosis.  Recommend f/u consultation with Dr Knight to be evaluated for AVR

## 2019-07-23 ENCOUNTER — TELEPHONE (OUTPATIENT)
Dept: DIABETES | Facility: CLINIC | Age: 54
End: 2019-07-23

## 2019-07-23 ENCOUNTER — OFFICE VISIT (OUTPATIENT)
Dept: DIABETES | Facility: CLINIC | Age: 54
End: 2019-07-23
Payer: MEDICARE

## 2019-07-23 VITALS
BODY MASS INDEX: 35.28 KG/M2 | DIASTOLIC BLOOD PRESSURE: 70 MMHG | SYSTOLIC BLOOD PRESSURE: 126 MMHG | HEIGHT: 67 IN | WEIGHT: 224.81 LBS | HEART RATE: 60 BPM

## 2019-07-23 DIAGNOSIS — E10.42 TYPE 1 DIABETES MELLITUS WITH DIABETIC POLYNEUROPATHY: Primary | ICD-10-CM

## 2019-07-23 DIAGNOSIS — G45.9 TIA (TRANSIENT ISCHEMIC ATTACK): ICD-10-CM

## 2019-07-23 DIAGNOSIS — Z96.41 INSULIN PUMP IN PLACE: ICD-10-CM

## 2019-07-23 DIAGNOSIS — Z46.81 INSULIN PUMP FITTING OR ADJUSTMENT: ICD-10-CM

## 2019-07-23 DIAGNOSIS — E10.649 TYPE 1 DIABETES MELLITUS WITH HYPOGLYCEMIA UNAWARENESS: ICD-10-CM

## 2019-07-23 DIAGNOSIS — E66.09 CLASS 1 OBESITY DUE TO EXCESS CALORIES WITH SERIOUS COMORBIDITY AND BODY MASS INDEX (BMI) OF 33.0 TO 33.9 IN ADULT: ICD-10-CM

## 2019-07-23 DIAGNOSIS — I25.10 CORONARY ARTERY DISEASE INVOLVING NATIVE CORONARY ARTERY OF NATIVE HEART WITHOUT ANGINA PECTORIS: ICD-10-CM

## 2019-07-23 DIAGNOSIS — E78.5 HYPERLIPIDEMIA WITH TARGET LOW DENSITY LIPOPROTEIN (LDL) CHOLESTEROL LESS THAN 70 MG/DL: ICD-10-CM

## 2019-07-23 DIAGNOSIS — I35.0 NONRHEUMATIC AORTIC VALVE STENOSIS: ICD-10-CM

## 2019-07-23 DIAGNOSIS — I10 ESSENTIAL HYPERTENSION: ICD-10-CM

## 2019-07-23 PROCEDURE — 99999 PR PBB SHADOW E&M-EST. PATIENT-LVL III: CPT | Mod: PBBFAC,,, | Performed by: NURSE PRACTITIONER

## 2019-07-23 PROCEDURE — 3008F PR BODY MASS INDEX (BMI) DOCUMENTED: ICD-10-PCS | Mod: CPTII,S$GLB,, | Performed by: NURSE PRACTITIONER

## 2019-07-23 PROCEDURE — 99499 UNLISTED E&M SERVICE: CPT | Mod: S$GLB,,, | Performed by: NURSE PRACTITIONER

## 2019-07-23 PROCEDURE — 3078F PR MOST RECENT DIASTOLIC BLOOD PRESSURE < 80 MM HG: ICD-10-PCS | Mod: CPTII,S$GLB,, | Performed by: NURSE PRACTITIONER

## 2019-07-23 PROCEDURE — 3045F PR MOST RECENT HEMOGLOBIN A1C LEVEL 7.0-9.0%: ICD-10-PCS | Mod: CPTII,S$GLB,, | Performed by: NURSE PRACTITIONER

## 2019-07-23 PROCEDURE — 3078F DIAST BP <80 MM HG: CPT | Mod: CPTII,S$GLB,, | Performed by: NURSE PRACTITIONER

## 2019-07-23 PROCEDURE — 3045F PR MOST RECENT HEMOGLOBIN A1C LEVEL 7.0-9.0%: CPT | Mod: CPTII,S$GLB,, | Performed by: NURSE PRACTITIONER

## 2019-07-23 PROCEDURE — 3074F PR MOST RECENT SYSTOLIC BLOOD PRESSURE < 130 MM HG: ICD-10-PCS | Mod: CPTII,S$GLB,, | Performed by: NURSE PRACTITIONER

## 2019-07-23 PROCEDURE — 99499 RISK ADDL DX/OHS AUDIT: ICD-10-PCS | Mod: S$GLB,,, | Performed by: NURSE PRACTITIONER

## 2019-07-23 PROCEDURE — 3008F BODY MASS INDEX DOCD: CPT | Mod: CPTII,S$GLB,, | Performed by: NURSE PRACTITIONER

## 2019-07-23 PROCEDURE — 99999 PR PBB SHADOW E&M-EST. PATIENT-LVL III: ICD-10-PCS | Mod: PBBFAC,,, | Performed by: NURSE PRACTITIONER

## 2019-07-23 PROCEDURE — 99214 PR OFFICE/OUTPT VISIT, EST, LEVL IV, 30-39 MIN: ICD-10-PCS | Mod: S$GLB,,, | Performed by: NURSE PRACTITIONER

## 2019-07-23 PROCEDURE — 3074F SYST BP LT 130 MM HG: CPT | Mod: CPTII,S$GLB,, | Performed by: NURSE PRACTITIONER

## 2019-07-23 PROCEDURE — 99214 OFFICE O/P EST MOD 30 MIN: CPT | Mod: S$GLB,,, | Performed by: NURSE PRACTITIONER

## 2019-07-23 NOTE — Clinical Note
Once we switch him into auto mode, I want to get him back 1-2 weeks later to do a download and see if we need to adjust his ICR. His target is at 120 and I think that is fair. Let me know what you think about this plan! I told him he would be making 3 trips here to get all this done and he was on board!! Thanks, Jodi Amatory it wouldn't let me send it all at once!

## 2019-07-23 NOTE — ASSESSMENT & PLAN NOTE
Last A1c near goal  Reported BG readings at goal.   A1c today.   Medication changes:   Discussed with patient again today that he is very basal heavy.  He prefers to stay with current settings for now until we switch him over into auto mode once he receives his sensor.  When we place him into auto mode we will need to adjust his insulin to carbohydrate ratio.  He verbalized understanding.   For now, continue current pump settings: adjusted IOB to 3 hours.   Pump settings:  Basal:  2.65 units/hr     ICR: 1:12    ISF: 20    Target:  120    IOB:  3 hours       ADDENDUM 9/5/17- he presents to clinic to start Guardian 3 Sensor today with diabetes education. His pump settings were adjusted during recent admission for AVR and then readmission for Sepsis. Basal rate decreased to 0.4 units/hr and ICR at 1:12. He reports his BG readings have been 150-180's. Significantly decreased insulin needs. He attest to 20 # weight loss and major dietary changes since AVR. Adjust basal rate to 0.45 units/hr. Continue ICR 1:12. Start Guardian Sensor and then he will follow up in 10-14 days for download and then possible auto mode conversion. Will evaluate ICR at that time.       -- Reviewed goals of therapy are to get the best control we can without hypoglycemia  -- Refer to diabetes education-- Guardian Sensor.  -- adjust pump settings with sensor start.   -- Reviewed patient's current insulin regimen. Clarified proper insulin dose and timing in relation to meals, etc. Insulin injection sites and proper rotation instructed.    -- Advised frequent self blood glucose monitoring.  Patient encouraged to document glucose results and bring them to every clinic visit  -- discussed with patient that with the guardian sensor he will need to calibrate his pump at least 3 times per day in order to stay in auto mode.  He verbalized understanding  -- Hypoglycemia precautions discussed. Instructed on precautions before driving.    -- Call for Bg  repeatedly < 90 or > 180.   -- Close adherence to lifestyle changes recommended.   -- Periodic follow ups for eye evaluations, foot care and dental care suggested.    -- discussed the importance of optimizing his blood sugar readings before he has cardiac surgery.

## 2019-07-23 NOTE — Clinical Note
I anticipate that calibration may be a challenge for him as he reports he checks his blood sugar 2-4 times per day but what he is entering into the pump is only about 1.2 calibration per day.  I did explain to him that he has to calibrate the pump 3 times per day to be in auto mode and he agreed to do so.At last visit I attempted to make him more basal and bolus even.  But somehow he went back to his old settings were he is very basal heavy we will need to adjust his pump when we put him on the guardian sensor because he is going to need a higher carb ratio than a 1-12.  So my plan is when he comes in to have the sensor placed let us drop his basal rate to 2 units/hour and adjust his insulin to carb ratio to 1:8. I will want him to wear the sensor for about 7-10 days for the sensor to get to know him and then get him back in to download his pump and switch him over to auto mode. When we switch him into auto mode we will likely need to adjust his ICR to 1:6, but we will see how his download looks first.

## 2019-07-23 NOTE — ASSESSMENT & PLAN NOTE
No insulin pump changes today - he prefers to wait until we start him on the guardian sensor to make some changes..   As of now patient is very basal heavy and may need adjustments to lower basal rate increase insulin carb ratio to make balance more a 50/50.-- adjustments will have to be made when he starts the sensor and goes into auto mode.

## 2019-07-23 NOTE — ASSESSMENT & PLAN NOTE
Avoid hypoglycemia.  Patient would greatly benefit from a personal continuous glucose monitor such as the guardian sensor made by [employer] as patient is already wearing a Medtronic 670 G pump this would allow patient to go into auto mode and be able to predict low blood sugars.  It will allow the pump to turn off prior to hypoglycemia occurring.  In addition to helping to manage glycemic control, it will also help to prevent hypoglycemia.  At this time, patient is not fully optimizing his insulin pump therapy as he does not have the sensor that communicates with his insulin pump

## 2019-07-23 NOTE — TELEPHONE ENCOUNTER
Spoke with Tristen at [facility] and she says the codes are incorrect and she needs a pending CAROL from [facility].

## 2019-07-23 NOTE — ASSESSMENT & PLAN NOTE
Patient reports that he needs to have aortic valve replacement surgery.  He is meeting with the cardiothoracic surgeon the near future to discuss surgical treatment options.  We discussed the importance of optimizing his blood sugar readings prior to surgery.

## 2019-07-23 NOTE — PROGRESS NOTES
CC:   Chief Complaint   Patient presents with    Diabetes Mellitus     type 1        HPI: Cj Barnes is a 53 y.o. male presents for a follow up visit today for the management of T1DM.   The patient was diagnosed in his early 30's. Initially diagnosed with T2DM, 5 years later he was dx as T1.    He has used an insulin pump since 2006, previous on animas.  He is now on  670 G medtronic pump, does not have the guardian sensor-- approved as of yesterday for sensor- Guardian 3-- per [employer] being sent to [employer] awaiting send out after we went through the appeal process. We have been working on this since his last visit.    Following last visit we attempted to make him more basal and bolus even.  He reports he followed up with diabetes education where he reported his blood sugar readings were running above goal so his pump settings were placed back on his previous pump settings were he is very basal heavy and his BG readings have been at goal     Family hx of diabetes: Mother, father, 3 brothers - no one with T1     Hospitalized for diabetes: denies     No personal or FH of thyroid cancer or personal of pancreatic cancer or pancreatitis.     He needs to have open heart surgery for aortic valve replacement.   Echo from 7/2019  · Moderate concentric left ventricular hypertrophy.  · Normal left ventricular systolic function. The estimated ejection fraction is 60%  · Normal LV diastolic function.  · No wall motion abnormalities.  · Normal right ventricular systolic function.  · The aortic valve is bileaflet.  · Mild aortic regurgitation.  · Severe aortic valve stenosis.  · Aortic valve area is 0.54 cm2; peak velocity is 5.13 m/s; mean gradient is 60 mmHg.       DIABETES COMPLICATIONS: retinopathy, peripheral neuropathy, cardiovascular disease and cerebrovascular disease  TIA     Diabetes Management Status    ASA:  Yes - 81 mg daily and on Plavix     Statin: Taking  ACE/ARB: Taking    Screening or  Prevention Patient's value Goal Complete/Controlled?   HgA1C Testing and Control   Lab Results   Component Value Date    HGBA1C 6.6 (H) 07/23/2019      Annually/Less than 8% Yes   Lipid profile : 04/24/2019 Annually Yes   LDL control Lab Results   Component Value Date    LDLCALC 77 04/24/2019    Annually/Less than 100 mg/dl  Yes   Nephropathy screening Lab Results   Component Value Date    LABMICR <2.5 04/23/2019     Lab Results   Component Value Date    PROTEINUA Negative 08/16/2019    Annually Yes   Blood pressure BP Readings from Last 1 Encounters:   09/05/19 (!) 154/76    Less than 140/90 Yes   Dilated retinal exam : 05/01/2019- Annually No   Foot exam   : 04/23/2019 Annually Yes       CURRENT A1C:    Hemoglobin A1C   Date Value Ref Range Status   07/23/2019 6.6 (H) 4.0 - 5.6 % Final     Comment:     ADA Screening Guidelines:  5.7-6.4%  Consistent with prediabetes  >or=6.5%  Consistent with diabetes  High levels of fetal hemoglobin interfere with the HbA1C  assay. Heterozygous hemoglobin variants (HbS, HgC, etc)do  not significantly interfere with this assay.   However, presence of multiple variants may affect accuracy.     04/24/2019 7.2 (H) 4.0 - 5.6 % Final     Comment:     ADA Screening Guidelines:  5.7-6.4%  Consistent with prediabetes  >or=6.5%  Consistent with diabetes  High levels of fetal hemoglobin interfere with the HbA1C  assay. Heterozygous hemoglobin variants (HbS, HgC, etc)do  not significantly interfere with this assay.   However, presence of multiple variants may affect accuracy.     08/24/2018 7.0 (H) 4.0 - 5.6 % Final     Comment:     ADA Screening Guidelines:  5.7-6.4%  Consistent with prediabetes  >or=6.5%  Consistent with diabetes  High levels of fetal hemoglobin interfere with the HbA1C  assay. Heterozygous hemoglobin variants (HbS, HgC, etc)do  not significantly interfere with this assay.   However, presence of multiple variants may affect accuracy.         GOAL A1C: 6.5-7% - without  hypoglycemia    DM MEDICATIONS USED IN THE PAST: Medtronic 670 G   Metformin   Steglatro- lack of coverage.   Jardiance     CURRENT DIABETES MEDICATIONS: Medtronic insulin pump, Metformin 500 mg daily with breakfast, Jardiance 25 mg daily     Insulin Pump: Medtronic 670G with Novolog in manual mood as he has no sensor- 100% of the time.   Average BG entered into pump is 119 +/- 27    Pump settings:  Basal:  2.65 units/hr     ICR: 1:12    ISF: 20    Target:  120    IOB:  3.15    Temp basals: none     Pump site change: q 3.3 days   Cartridge change: q 3.3 days  Insulin TDD:  88 units   Basal 72%   Bolus 28 %   CHO intake: 2.7- meals per day- CHO - 291 +/-  42 grams per day.   Using the bolus wizard: yes   Overriding: no    Back up Lantus/Levemir: Yes     Patient's pump was downloaded in clinic today and reviewed with patient.   Please see attached documents for pump download.     Sensor type: No sensors yet     BLOOD GLUCOSE MONITORING:  BG monitoring 2-4 times per day.   No logs or meter to clinic today for review.   He enters BG readings into his pump   Reports readings on average run 100-110's--- once it was high because he didn't take his insulin when he ate.     HYPOGLYCEMIA:  Rarely   He doesn't feel BG readings until the 40-50's - once in the 50's he will have twitching to his eye.   Glucagon kit: yes  Medic alert bracelet: yes     MEALS: eating 2 meals per day   He accounts for all CHO intake into his pump.     BF: ~ 1-2 breakfast burrito    Lunch: bag of chips or something light.   Dinner: ~ eating out home cooked style- [employer], [address]. He loves salads- light Italian or michael   Snack: occ- dry cereal   Drinks: un sweet tea, water, coke zero.   He CHO counts- he feels comfortable.      CURRENT EXERCISE:  No- due to back pain.     Review of Systems  Review of Systems   Constitutional: Positive for fatigue. Negative for appetite change and unexpected weight change.   HENT: Negative for trouble swallowing.     Eyes: Negative for visual disturbance.   Respiratory: Negative for shortness of breath.    Cardiovascular: Negative for chest pain.   Gastrointestinal: Negative for nausea.   Endocrine: Negative for polydipsia, polyphagia and polyuria.   Genitourinary:        No Nocturia    Musculoskeletal: Positive for back pain.   Skin: Negative for wound.   Neurological: Negative for numbness.   Psychiatric/Behavioral: Positive for sleep disturbance (due to thoughts of surgery ).       Physical Exam   Physical Exam   Constitutional: He is oriented to person, place, and time. He appears well-developed and well-nourished. No distress.   HENT:   Head: Normocephalic and atraumatic.   Left Ear: External ear normal.   Nose: Nose normal.   Neck: Normal range of motion. Neck supple. No tracheal deviation present. No thyromegaly present.   Cardiovascular: Normal rate and regular rhythm.   Murmur heard.  No edema    Pulmonary/Chest: Effort normal and breath sounds normal. No respiratory distress.   Abdominal: Soft. There is no tenderness. No hernia.   Musculoskeletal: He exhibits no edema.   Neurological: He is alert and oriented to person, place, and time. No cranial nerve deficit.   Skin: Skin is warm and dry. Capillary refill takes less than 2 seconds. No rash noted. He is not diaphoretic.   Insulin pump sites are normal appearing. No lipo hypertropthy or atrophy- to abdomen    Psychiatric: He has a normal mood and affect. His behavior is normal. Judgment normal.   Nursing note and vitals reviewed.      FOOT EXAMINATION: Appropriate footwear       Lab Results   Component Value Date    TSH 1.13 04/24/2019       Diabetes mellitus type I  Last A1c near goal  Reported BG readings at goal.   A1c today.   Medication changes:   Discussed with patient again today that he is very basal heavy.  He prefers to stay with current settings for now until we switch him over into auto mode once he receives his sensor.  When we place him into auto mode  we will need to adjust his insulin to carbohydrate ratio.  He verbalized understanding.   For now, continue current pump settings: adjusted IOB to 3 hours.   Pump settings:  Basal:  2.65 units/hr     ICR: 1:12    ISF: 20    Target:  120    IOB:  3 hours       ADDENDUM 9/5/17- he presents to clinic to start Guardian 3 Sensor today with diabetes education. His pump settings were adjusted during recent admission for AVR and then readmission for Sepsis. Basal rate decreased to 0.4 units/hr and ICR at 1:12. He reports his BG readings have been 150-180's. Significantly decreased insulin needs. He attest to 20 # weight loss and major dietary changes since AVR. Adjust basal rate to 0.45 units/hr. Continue ICR 1:12. Start Guardian Sensor and then he will follow up in 10-14 days for download and then possible auto mode conversion. Will evaluate ICR at that time.       -- Reviewed goals of therapy are to get the best control we can without hypoglycemia  -- Refer to diabetes education-- Guardian Sensor.  -- adjust pump settings with sensor start.   -- Reviewed patient's current insulin regimen. Clarified proper insulin dose and timing in relation to meals, etc. Insulin injection sites and proper rotation instructed.    -- Advised frequent self blood glucose monitoring.  Patient encouraged to document glucose results and bring them to every clinic visit  -- discussed with patient that with the guardian sensor he will need to calibrate his pump at least 3 times per day in order to stay in auto mode.  He verbalized understanding  -- Hypoglycemia precautions discussed. Instructed on precautions before driving.    -- Call for Bg repeatedly < 90 or > 180.   -- Close adherence to lifestyle changes recommended.   -- Periodic follow ups for eye evaluations, foot care and dental care suggested.    -- discussed the importance of optimizing his blood sugar readings before he has cardiac surgery.      Insulin pump fitting or adjustment  No  insulin pump changes today - he prefers to wait until we start him on the guardian sensor to make some changes..   As of now patient is very basal heavy and may need adjustments to lower basal rate increase insulin carb ratio to make balance more a 50/50.-- adjustments will have to be made when he starts the sensor and goes into auto mode.    Insulin pump in place  See above    Type 1 diabetes mellitus with hypoglycemia unawareness  Avoid hypoglycemia.  Patient would greatly benefit from a personal continuous glucose monitor such as the guardian sensor made by [employer] as patient is already wearing a Medtronic 670 G pump this would allow patient to go into auto mode and be able to predict low blood sugars.  It will allow the pump to turn off prior to hypoglycemia occurring.  In addition to helping to manage glycemic control, it will also help to prevent hypoglycemia.  At this time, patient is not fully optimizing his insulin pump therapy as he does not have the sensor that communicates with his insulin pump    Coronary artery disease involving native coronary artery of native heart without angina pectoris  Avoid hypoglycemia  On aspirin and Plavix    Essential hypertension  BP goal is < 140/90.   Tolerating ACEi  Controlled   Blood pressure goals discussed with patient      Hyperlipidemia with target low density lipoprotein (LDL) cholesterol less than 70 mg/dL  On statin per ADA recommendations  LDL goal < 70 (due to hx of TIA). LDL near goal. LFTs WNL. Continue statin.   Follows with cardiology     TIA (transient ischemic attack)  Optimize blood sugar readings    Class 1 obesity due to excess calories with serious comorbidity and body mass index (BMI) of 33.0 to 33.9 in adult  Body mass index is 35.21 kg/m².  Increases insulin resistance.   Discussed DM diet and exercise.   Patient does exhibit insulin resistance and is on an SGLT 2 and metformin.    Aortic stenosis  Patient reports that he needs to have aortic  valve replacement surgery.  He is meeting with the cardiothoracic surgeon the near future to discuss surgical treatment options.  We discussed the importance of optimizing his blood sugar readings prior to surgery.      Follow up in about 4 months (around 11/23/2019).  CMP, A1, and Hep C antibody today.   F/u with me in 4-5 months with labs prior  DM education once he gets his sensor.       Orders Placed This Encounter   Procedures    Hemoglobin A1c     Standing Status:   Future     Number of Occurrences:   1     Standing Expiration Date:   9/20/2020    Comprehensive metabolic panel     Standing Status:   Future     Number of Occurrences:   1     Standing Expiration Date:   1/23/2021       Recommendations were discussed with the patient in detail  The patient verbalized understanding and agrees with the plan outlined as above.

## 2019-07-23 NOTE — ASSESSMENT & PLAN NOTE
On statin per ADA recommendations  LDL goal < 70 (due to hx of TIA). LDL near goal. LFTs WNL. Continue statin.   Follows with cardiology

## 2019-07-23 NOTE — TELEPHONE ENCOUNTER
Spoke to a rep with [email or web address] in regards to a request for the Kanari 3 continuous glucose monitor. CGM was approved on 07/22/2019 by [employer] through [employer]. [employer] will contact the patient for shipping details.

## 2019-07-23 NOTE — ASSESSMENT & PLAN NOTE
Body mass index is 35.21 kg/m².  Increases insulin resistance.   Discussed DM diet and exercise.   Patient does exhibit insulin resistance and is on an SGLT 2 and metformin.

## 2019-07-23 NOTE — Clinical Note
Hey!So he got approved for the guardian 3 sensor through [employer]!It only took 3 months and me appealing the denial.Supposedly has been sent over to [employer] and we are just waiting for them to ship it to the patient.I told him once he receives the sensor he needs to call and schedule an appointment with you to have the sensor placed in to learn how to use the sensor.

## 2019-07-23 NOTE — PROGRESS NOTES
Patient, Cj Barnes (MRN #[id number]), presented with a recorded BMI of 35.21 kg/m^2 and a documented comorbidity(s):  - Hypertension  - Hyperlipidemia  to which the severe obesity is a contributing factor. This is consistent with the definition of severe obesity (BMI 35.0-39.9) with comorbidity (ICD-10 E66.01, Z68.35). The patient's severe obesity was monitored, evaluated, addressed and/or treated. This addendum to the medical record is made on 09/05/2019.

## 2019-07-25 ENCOUNTER — OFFICE VISIT (OUTPATIENT)
Dept: CARDIOTHORACIC SURGERY | Facility: CLINIC | Age: 54
End: 2019-07-25
Payer: MEDICARE

## 2019-07-25 VITALS
BODY MASS INDEX: 29.86 KG/M2 | HEART RATE: 66 BPM | WEIGHT: 220.44 LBS | DIASTOLIC BLOOD PRESSURE: 73 MMHG | HEIGHT: 72 IN | SYSTOLIC BLOOD PRESSURE: 129 MMHG | TEMPERATURE: 98 F

## 2019-07-25 DIAGNOSIS — I35.0 NONRHEUMATIC AORTIC VALVE STENOSIS: Primary | ICD-10-CM

## 2019-07-25 DIAGNOSIS — I25.10 CORONARY ARTERY DISEASE INVOLVING NATIVE CORONARY ARTERY OF NATIVE HEART WITHOUT ANGINA PECTORIS: ICD-10-CM

## 2019-07-25 PROCEDURE — 3078F PR MOST RECENT DIASTOLIC BLOOD PRESSURE < 80 MM HG: ICD-10-PCS | Mod: CPTII,S$GLB,, | Performed by: THORACIC SURGERY (CARDIOTHORACIC VASCULAR SURGERY)

## 2019-07-25 PROCEDURE — 3078F DIAST BP <80 MM HG: CPT | Mod: CPTII,S$GLB,, | Performed by: THORACIC SURGERY (CARDIOTHORACIC VASCULAR SURGERY)

## 2019-07-25 PROCEDURE — 99999 PR PBB SHADOW E&M-EST. PATIENT-LVL III: CPT | Mod: PBBFAC,,, | Performed by: THORACIC SURGERY (CARDIOTHORACIC VASCULAR SURGERY)

## 2019-07-25 PROCEDURE — 3074F PR MOST RECENT SYSTOLIC BLOOD PRESSURE < 130 MM HG: ICD-10-PCS | Mod: CPTII,S$GLB,, | Performed by: THORACIC SURGERY (CARDIOTHORACIC VASCULAR SURGERY)

## 2019-07-25 PROCEDURE — 99499 RISK ADDL DX/OHS AUDIT: ICD-10-PCS | Mod: S$GLB,,, | Performed by: THORACIC SURGERY (CARDIOTHORACIC VASCULAR SURGERY)

## 2019-07-25 PROCEDURE — 99215 OFFICE O/P EST HI 40 MIN: CPT | Mod: S$GLB,,, | Performed by: THORACIC SURGERY (CARDIOTHORACIC VASCULAR SURGERY)

## 2019-07-25 PROCEDURE — 3074F SYST BP LT 130 MM HG: CPT | Mod: CPTII,S$GLB,, | Performed by: THORACIC SURGERY (CARDIOTHORACIC VASCULAR SURGERY)

## 2019-07-25 PROCEDURE — 99999 PR PBB SHADOW E&M-EST. PATIENT-LVL III: ICD-10-PCS | Mod: PBBFAC,,, | Performed by: THORACIC SURGERY (CARDIOTHORACIC VASCULAR SURGERY)

## 2019-07-25 PROCEDURE — 3008F BODY MASS INDEX DOCD: CPT | Mod: CPTII,S$GLB,, | Performed by: THORACIC SURGERY (CARDIOTHORACIC VASCULAR SURGERY)

## 2019-07-25 PROCEDURE — 99215 PR OFFICE/OUTPT VISIT, EST, LEVL V, 40-54 MIN: ICD-10-PCS | Mod: S$GLB,,, | Performed by: THORACIC SURGERY (CARDIOTHORACIC VASCULAR SURGERY)

## 2019-07-25 PROCEDURE — 99499 UNLISTED E&M SERVICE: CPT | Mod: S$GLB,,, | Performed by: THORACIC SURGERY (CARDIOTHORACIC VASCULAR SURGERY)

## 2019-07-25 PROCEDURE — 3008F PR BODY MASS INDEX (BMI) DOCUMENTED: ICD-10-PCS | Mod: CPTII,S$GLB,, | Performed by: THORACIC SURGERY (CARDIOTHORACIC VASCULAR SURGERY)

## 2019-07-25 NOTE — PROGRESS NOTES
Subjective:      Patient ID: Cj Barnes is a 53 y.o. male.    Chief Complaint: Follow-up      HPI:  Cj Barnes is a 53 y.o. male who presents for follow up aortic stenosis. Last seen in clinic in 2018. Medical conditions include bicuspid aortic valve, AS, diabetes type2, and CAD. Has noticed SOB over the past 1 month. Can walk 1.5-2 blocks before having difficulty. Reports some mid-sternal chest discomfort when stressed which resolves with rest. Energy level has been very low. Denies dizziness, orthopnea, and swelling. Has family history of severe diabetes and heart disease. Cath from 2018 showed mild to moderate 3 vessel CAD; 55% midcircumflex, 60% 4th OM, mild LAD and diagonal. EF 55%, no aortic coarctation. In 2018 aortic valve area measured 0.9cm2 and today is 0.54cm2. Patient reports several uncles who have  in their 50's from heart disease. He is currently controlling his diabetes with an insulin pump and his last HbA1c was 8.2. Patient is a nonsmoker. Patient has history of stage 2 colon cancer in 2015 with resection, no chemo or radiation.      NYHA I  STS mortality 1.3%, morbidity or mortality 14%, stroke 1.3%.  Current medications Reviewed    Review of Systems   Constitutional: Positive for activity change and fatigue.   HENT: Negative for nosebleeds.    Eyes: Negative for visual disturbance.   Respiratory: Positive for shortness of breath (with exertion ). Negative for cough.    Cardiovascular: Negative for chest pain, palpitations and leg swelling.   Gastrointestinal: Negative for nausea and vomiting.   Musculoskeletal: Negative for gait problem.   Skin: Negative for color change and pallor.   Neurological: Negative for dizziness, seizures, syncope, weakness, numbness and headaches.   Hematological: Does not bruise/bleed easily.   Psychiatric/Behavioral: Negative for sleep disturbance.     Objective:   Physical Exam   Constitutional: He is oriented to person, place, and time. He  appears well-developed and well-nourished. No distress.   HENT:   Head: Normocephalic and atraumatic.   Eyes: Pupils are equal, round, and reactive to light. EOM are normal.   Neck: Normal range of motion. No JVD present.   Cardiovascular: Normal rate, regular rhythm and normal pulses.   Murmur heard.  Pulmonary/Chest: Effort normal and breath sounds normal. No respiratory distress.   Abdominal: Soft. He exhibits no distension.   Musculoskeletal: Normal range of motion. He exhibits no edema.   Neurological: He is alert and oriented to person, place, and time.   Skin: Skin is warm, dry and intact. Capillary refill takes less than 2 seconds. He is not diaphoretic. No erythema. No pallor.   Psychiatric: He has a normal mood and affect. His speech is normal and behavior is normal. Judgment and thought content normal.   Vitals reviewed.      Diagnotic Results:  TTE 2019  · Moderate concentric left ventricular hypertrophy.  · Normal left ventricular systolic function. The estimated ejection fraction is 60%  · Normal LV diastolic function.  · No wall motion abnormalities.  · Normal right ventricular systolic function.  · The aortic valve is bileaflet.  · Mild aortic regurgitation.  · Severe aortic valve stenosis.  · Aortic valve area is 0.54 cm2; peak velocity is 5.13 m/s; mean gradient is 60 mmHg.    TTE 2018  · Normal left ventricular systolic function. The estimated ejection fraction is 60%  · Normal LV diastolic function.  · Normal right ventricular systolic function.  · Normal atrial size.  · Mild aortic regurgitation.  · Severe aortic valve stenosis.  · Aortic valve area is 0.39 cm2; peak velocity is 4.52 m/s; mean gradient is 53.66 mmHg.  · Mild concentric left ventricular hypertrophy  · Congenitally bicuspid aortic valve  · The maximum instantaneous gradient across the aortic valve during systolie is between 64 and 82 mm Hg  Assessment:   1. Severe AS with bicuspid valve  Plan:       CTS Attending Note:    I have  personally taken the history and examined this patient and agree with the WALE's note as stated above. 53-year-old gentleman who I had originally seen when he was asymptomatic.  He now notes significant dyspnea on exertion.  His echo demonstrated severe or even critical aortic stenosis.  He has a bicuspid valve.  I discussed his situation with him and his wife in detail.  I recommended aortic valve replacement, and he agreed with this.  We discussed the advantages and disadvantages of tissue and mechanical prostheses.  He desires a durable solution, and as a result requested a mechanical valve.  I reviewed his angiogram from 18 months ago.  Given the mild to moderate severity of the lesions demonstrated, I do not believe that another angiogram is needed given the fact that he would like to proceed sooner rather than later with aortic valve replacement.  We did not discuss the risks and benefits of the proposed procedure since he has not yet had a CT scan and it is unclear if he will require aortic root replacement or just aortic valve replacement.

## 2019-07-29 DIAGNOSIS — I35.0 NONRHEUMATIC AORTIC VALVE STENOSIS: Primary | ICD-10-CM

## 2019-07-31 ENCOUNTER — TELEPHONE (OUTPATIENT)
Dept: DIABETES | Facility: CLINIC | Age: 54
End: 2019-07-31

## 2019-07-31 NOTE — TELEPHONE ENCOUNTER
----- Message from Idalia Jad sent at 7/31/2019  2:22 PM CDT -----  Contact: Patient  Type: Needs Medical Advice    Who Called:  Daniel, patient  Symptoms (please be specific):  N/A  How long has patient had these symptoms:  N/A  Pharmacy name and phone #:    [facility] [place] [address]  [address]  [place] [address]  Phone: [phone] Fax: [phone]  Best Call Back Number: [phone]  Additional Information: Calling to talk to someone regarding the Guardian 3 Sensor. [email or web address] Daric is telling him that Jodi needs to submit another Medical Necessity Form. Please call him. Thanks.

## 2019-07-31 NOTE — TELEPHONE ENCOUNTER
Spoke with Brenda and she spoke with Ms. Stevens who says that she is changing the codes now and sending them to [facility].

## 2019-07-31 NOTE — TELEPHONE ENCOUNTER
Brenda with [facility] is saying that the CAROL plus code corrections were sent to [facility] on 07/30. She says she will send them again.

## 2019-07-31 NOTE — TELEPHONE ENCOUNTER
Briana Quesada stated the CAORL and codes were sent after 3pm yesterday but codes were never changed.

## 2019-07-31 NOTE — TELEPHONE ENCOUNTER
Spoke with the pt who says he called [employer] and they are asking that "[employer] change the codes for the sensors and send an CAROL. "[employer] is saying they want us to resubmit the necessity form with proper codes. Will contact CLAUDIO.

## 2019-07-31 NOTE — TELEPHONE ENCOUNTER
----- Message from Nhan Rutledge sent at 7/31/2019  2:15 PM CDT -----  Contact: Enriqueta with [facility]  Enriqueta is requesting medical necessity forms for CGM diabetic supplies.    CPT:  and  need to be changed to  with 3 units reqeusted    Fax #: [phone]  Call Back #: [phone]  Thanks

## 2019-08-01 ENCOUNTER — TELEPHONE (OUTPATIENT)
Dept: PREADMISSION TESTING | Facility: HOSPITAL | Age: 54
End: 2019-08-01

## 2019-08-01 NOTE — TELEPHONE ENCOUNTER
----- Message from Grisel Hardwick RN sent at 8/1/2019  3:08 PM CDT -----  Hello,  Would you schedule this pt for an Anes visit. His preop is 8/8 and surgery 8/9 with . Thank you.  -Grisel  [id number]

## 2019-08-02 ENCOUNTER — TELEPHONE (OUTPATIENT)
Dept: PREADMISSION TESTING | Facility: HOSPITAL | Age: 54
End: 2019-08-02

## 2019-08-02 NOTE — TELEPHONE ENCOUNTER
----- Message from Grisel Hardwick RN sent at 8/1/2019  3:08 PM CDT -----  Hello,  Would you schedule this pt for an Anes visit. His preop is 8/8 and surgery 8/9 with . Thank you.  -Grisel  [id number]

## 2019-08-05 DIAGNOSIS — I35.0 NONRHEUMATIC AORTIC VALVE STENOSIS: Primary | ICD-10-CM

## 2019-08-05 DIAGNOSIS — Z79.01 MONITORING FOR ANTICOAGULANT USE: ICD-10-CM

## 2019-08-05 DIAGNOSIS — Z51.81 MONITORING FOR ANTICOAGULANT USE: ICD-10-CM

## 2019-08-05 DIAGNOSIS — Z79.899 ENCOUNTER FOR LONG-TERM (CURRENT) USE OF MEDICATIONS: ICD-10-CM

## 2019-08-06 ENCOUNTER — PATIENT MESSAGE (OUTPATIENT)
Dept: DIABETES | Facility: CLINIC | Age: 54
End: 2019-08-06

## 2019-08-08 ENCOUNTER — HOSPITAL ENCOUNTER (OUTPATIENT)
Dept: RADIOLOGY | Facility: HOSPITAL | Age: 54
Discharge: HOME | DRG: 220 | End: 2019-08-08
Attending: THORACIC SURGERY (CARDIOTHORACIC VASCULAR SURGERY)
Payer: MEDICARE

## 2019-08-08 ENCOUNTER — ANESTHESIA EVENT (OUTPATIENT)
Dept: SURGERY | Facility: HOSPITAL | Age: 54
DRG: 220 | End: 2019-08-08
Payer: MEDICARE

## 2019-08-08 ENCOUNTER — HOSPITAL ENCOUNTER (OUTPATIENT)
Dept: PULMONOLOGY | Facility: CLINIC | Age: 54
Discharge: HOME | DRG: 220 | End: 2019-08-08
Payer: MEDICARE

## 2019-08-08 ENCOUNTER — DOCUMENTATION ONLY (OUTPATIENT)
Dept: CARDIOTHORACIC SURGERY | Facility: CLINIC | Age: 54
End: 2019-08-08

## 2019-08-08 ENCOUNTER — CLINICAL SUPPORT (OUTPATIENT)
Dept: CARDIOLOGY | Facility: CLINIC | Age: 54
DRG: 220 | End: 2019-08-08
Attending: THORACIC SURGERY (CARDIOTHORACIC VASCULAR SURGERY)
Payer: MEDICARE

## 2019-08-08 ENCOUNTER — OFFICE VISIT (OUTPATIENT)
Dept: CARDIOTHORACIC SURGERY | Facility: CLINIC | Age: 54
DRG: 220 | End: 2019-08-08
Payer: MEDICARE

## 2019-08-08 ENCOUNTER — HOSPITAL ENCOUNTER (OUTPATIENT)
Dept: PREADMISSION TESTING | Facility: HOSPITAL | Age: 54
Discharge: HOME | DRG: 220 | End: 2019-08-08
Attending: ANESTHESIOLOGY
Payer: MEDICARE

## 2019-08-08 VITALS
BODY MASS INDEX: 34.04 KG/M2 | HEART RATE: 52 BPM | SYSTOLIC BLOOD PRESSURE: 129 MMHG | OXYGEN SATURATION: 97 % | HEIGHT: 68 IN | WEIGHT: 224.63 LBS | DIASTOLIC BLOOD PRESSURE: 74 MMHG | TEMPERATURE: 98 F

## 2019-08-08 VITALS
WEIGHT: 225.81 LBS | DIASTOLIC BLOOD PRESSURE: 68 MMHG | TEMPERATURE: 98 F | OXYGEN SATURATION: 97 % | HEART RATE: 66 BPM | BODY MASS INDEX: 35.44 KG/M2 | SYSTOLIC BLOOD PRESSURE: 120 MMHG | HEIGHT: 67 IN

## 2019-08-08 DIAGNOSIS — I35.0 NONRHEUMATIC AORTIC VALVE STENOSIS: ICD-10-CM

## 2019-08-08 DIAGNOSIS — I35.0 NONRHEUMATIC AORTIC VALVE STENOSIS: Primary | ICD-10-CM

## 2019-08-08 LAB
LEFT CBA DIAS: 23 CM/S
LEFT CBA SYS: 85 CM/S
LEFT CCA DIST DIAS: 21 CM/S
LEFT CCA DIST SYS: 58 CM/S
LEFT CCA MID DIAS: 21 CM/S
LEFT CCA MID SYS: 66 CM/S
LEFT CCA PROX DIAS: 15 CM/S
LEFT CCA PROX SYS: 59 CM/S
LEFT ECA DIAS: 10 CM/S
LEFT ECA SYS: 67 CM/S
LEFT ICA DIST DIAS: 25 CM/S
LEFT ICA DIST SYS: 60 CM/S
LEFT ICA MID DIAS: 20 CM/S
LEFT ICA MID SYS: 45 CM/S
LEFT ICA PROX DIAS: 14 CM/S
LEFT ICA PROX SYS: 43 CM/S
LEFT VERTEBRAL DIAS: 13 CM/S
LEFT VERTEBRAL SYS: 40 CM/S
OHS CV CAROTID RIGHT ICA EDV HIGHEST: 25
OHS CV CAROTID ULTRASOUND LEFT ICA/CCA RATIO: 0.91
OHS CV CAROTID ULTRASOUND RIGHT ICA/CCA RATIO: 0.94
OHS CV PV CAROTID LEFT HIGHEST CCA: 66
OHS CV PV CAROTID LEFT HIGHEST ICA: 60
OHS CV PV CAROTID RIGHT HIGHEST CCA: 63
OHS CV PV CAROTID RIGHT HIGHEST ICA: 59
OHS CV US CAROTID LEFT HIGHEST EDV: 25
PRE FEV1 FVC: 81
PRE FEV1: 2.75
PRE FVC: 3.41
PREDICTED FEV1 FVC: 81
PREDICTED FEV1: 3.38
PREDICTED FVC: 4.15
RIGHT ARM DIASTOLIC BLOOD PRESSURE: 60 MMHG
RIGHT ARM SYSTOLIC BLOOD PRESSURE: 100 MMHG
RIGHT CBA DIAS: 17 CM/S
RIGHT CBA SYS: 57 CM/S
RIGHT CCA DIST DIAS: 17 CM/S
RIGHT CCA DIST SYS: 63 CM/S
RIGHT CCA MID DIAS: 15 CM/S
RIGHT CCA MID SYS: 51 CM/S
RIGHT CCA PROX DIAS: 15 CM/S
RIGHT CCA PROX SYS: 56 CM/S
RIGHT ECA DIAS: 13 CM/S
RIGHT ECA SYS: 67 CM/S
RIGHT ICA DIST DIAS: 25 CM/S
RIGHT ICA DIST SYS: 59 CM/S
RIGHT ICA MID DIAS: 21 CM/S
RIGHT ICA MID SYS: 51 CM/S
RIGHT ICA PROX DIAS: 17 CM/S
RIGHT ICA PROX SYS: 41 CM/S
RIGHT VERTEBRAL DIAS: 8 CM/S
RIGHT VERTEBRAL SYS: 24 CM/S

## 2019-08-08 PROCEDURE — 99999 PR PBB SHADOW E&M-EST. PATIENT-LVL I: ICD-10-PCS | Mod: PBBFAC,,,

## 2019-08-08 PROCEDURE — 93880 EXTRACRANIAL BILAT STUDY: CPT | Mod: S$GLB,,, | Performed by: INTERNAL MEDICINE

## 2019-08-08 PROCEDURE — 71046 XR CHEST PA AND LATERAL: ICD-10-PCS | Mod: 26,,, | Performed by: RADIOLOGY

## 2019-08-08 PROCEDURE — 94727 GAS DIL/WSHOT DETER LNG VOL: CPT | Mod: S$GLB,,, | Performed by: INTERNAL MEDICINE

## 2019-08-08 PROCEDURE — 93880 CV US DOPPLER CAROTID (CUPID ONLY): ICD-10-PCS | Mod: S$GLB,,, | Performed by: INTERNAL MEDICINE

## 2019-08-08 PROCEDURE — 94729 PR C02/MEMBANE DIFFUSE CAPACITY: ICD-10-PCS | Mod: S$GLB,,, | Performed by: INTERNAL MEDICINE

## 2019-08-08 PROCEDURE — 71046 X-RAY EXAM CHEST 2 VIEWS: CPT | Mod: TC,FY

## 2019-08-08 PROCEDURE — 71250 CT THORAX DX C-: CPT | Mod: TC

## 2019-08-08 PROCEDURE — 94010 BREATHING CAPACITY TEST: ICD-10-PCS | Mod: S$GLB,,, | Performed by: INTERNAL MEDICINE

## 2019-08-08 PROCEDURE — 99999 PR PBB SHADOW E&M-EST. PATIENT-LVL IV: CPT | Mod: PBBFAC,,, | Performed by: THORACIC SURGERY (CARDIOTHORACIC VASCULAR SURGERY)

## 2019-08-08 PROCEDURE — 71046 X-RAY EXAM CHEST 2 VIEWS: CPT | Mod: 26,,, | Performed by: RADIOLOGY

## 2019-08-08 PROCEDURE — 94729 DIFFUSING CAPACITY: CPT | Mod: S$GLB,,, | Performed by: INTERNAL MEDICINE

## 2019-08-08 PROCEDURE — 94010 BREATHING CAPACITY TEST: CPT | Mod: S$GLB,,, | Performed by: INTERNAL MEDICINE

## 2019-08-08 PROCEDURE — 99999 PR PBB SHADOW E&M-EST. PATIENT-LVL IV: ICD-10-PCS | Mod: PBBFAC,,, | Performed by: THORACIC SURGERY (CARDIOTHORACIC VASCULAR SURGERY)

## 2019-08-08 PROCEDURE — 99499 UNLISTED E&M SERVICE: CPT | Mod: S$GLB,,, | Performed by: THORACIC SURGERY (CARDIOTHORACIC VASCULAR SURGERY)

## 2019-08-08 PROCEDURE — 71250 CT CHEST WITHOUT CONTRAST: ICD-10-PCS | Mod: 26,,, | Performed by: RADIOLOGY

## 2019-08-08 PROCEDURE — 99499 NO LOS: ICD-10-PCS | Mod: S$GLB,,, | Performed by: THORACIC SURGERY (CARDIOTHORACIC VASCULAR SURGERY)

## 2019-08-08 PROCEDURE — 94727 PR PULM FUNCTION TEST BY GAS: ICD-10-PCS | Mod: S$GLB,,, | Performed by: INTERNAL MEDICINE

## 2019-08-08 PROCEDURE — 99999 PR PBB SHADOW E&M-EST. PATIENT-LVL I: CPT | Mod: PBBFAC,,,

## 2019-08-08 PROCEDURE — 71250 CT THORAX DX C-: CPT | Mod: 26,,, | Performed by: RADIOLOGY

## 2019-08-08 RX ORDER — METOCLOPRAMIDE HYDROCHLORIDE 5 MG/ML
5 INJECTION INTRAMUSCULAR; INTRAVENOUS EVERY 6 HOURS PRN
Status: CANCELLED | OUTPATIENT
Start: 2019-08-08

## 2019-08-08 RX ORDER — PROPOFOL 10 MG/ML
5 INJECTION, EMULSION INTRAVENOUS CONTINUOUS
Status: CANCELLED | OUTPATIENT
Start: 2019-08-08

## 2019-08-08 RX ORDER — FENTANYL CITRATE 50 UG/ML
25 INJECTION, SOLUTION INTRAMUSCULAR; INTRAVENOUS
Status: CANCELLED | OUTPATIENT
Start: 2019-08-08

## 2019-08-08 RX ORDER — OXYCODONE HYDROCHLORIDE 5 MG/1
5 TABLET ORAL EVERY 4 HOURS PRN
Status: CANCELLED | OUTPATIENT
Start: 2019-08-08

## 2019-08-08 RX ORDER — PANTOPRAZOLE SODIUM 40 MG/10ML
40 INJECTION, POWDER, LYOPHILIZED, FOR SOLUTION INTRAVENOUS DAILY
Status: CANCELLED | OUTPATIENT
Start: 2019-08-09

## 2019-08-08 RX ORDER — FENTANYL CITRATE 50 UG/ML
50 INJECTION, SOLUTION INTRAMUSCULAR; INTRAVENOUS
Status: CANCELLED | OUTPATIENT
Start: 2019-08-11

## 2019-08-08 RX ORDER — IPRATROPIUM BROMIDE AND ALBUTEROL SULFATE 2.5; .5 MG/3ML; MG/3ML
3 SOLUTION RESPIRATORY (INHALATION) EVERY 4 HOURS
Status: CANCELLED | OUTPATIENT
Start: 2019-08-08 | End: 2019-08-09

## 2019-08-08 RX ORDER — BISACODYL 10 MG
10 SUPPOSITORY, RECTAL RECTAL EVERY 12 HOURS PRN
Status: CANCELLED | OUTPATIENT
Start: 2019-08-08

## 2019-08-08 RX ORDER — MUPIROCIN 20 MG/G
1 OINTMENT TOPICAL 2 TIMES DAILY
Status: CANCELLED | OUTPATIENT
Start: 2019-08-08 | End: 2019-08-13

## 2019-08-08 RX ORDER — OXYCODONE HYDROCHLORIDE 5 MG/1
10 TABLET ORAL EVERY 4 HOURS PRN
Status: CANCELLED | OUTPATIENT
Start: 2019-08-08

## 2019-08-08 RX ORDER — ASPIRIN 300 MG/1
300 SUPPOSITORY RECTAL ONCE AS NEEDED
Status: CANCELLED | OUTPATIENT
Start: 2019-08-08 | End: 2031-01-04

## 2019-08-08 RX ORDER — POTASSIUM CHLORIDE 750 MG/1
20 TABLET, EXTENDED RELEASE ORAL EVERY 12 HOURS
Status: CANCELLED | OUTPATIENT
Start: 2019-08-08

## 2019-08-08 RX ORDER — ATORVASTATIN CALCIUM 10 MG/1
40 TABLET, FILM COATED ORAL NIGHTLY
Status: CANCELLED | OUTPATIENT
Start: 2019-08-08

## 2019-08-08 RX ORDER — ASPIRIN 325 MG
325 TABLET ORAL ONCE
Status: CANCELLED | OUTPATIENT
Start: 2019-08-08 | End: 2019-08-08

## 2019-08-08 RX ORDER — FENTANYL CITRATE 50 UG/ML
25 INJECTION, SOLUTION INTRAMUSCULAR; INTRAVENOUS
Status: CANCELLED | OUTPATIENT
Start: 2019-08-08 | End: 2019-08-10

## 2019-08-08 RX ORDER — DOCUSATE SODIUM 100 MG/1
100 CAPSULE, LIQUID FILLED ORAL 2 TIMES DAILY
Status: CANCELLED | OUTPATIENT
Start: 2019-08-08

## 2019-08-08 RX ORDER — ACETAMINOPHEN 325 MG/1
650 TABLET ORAL EVERY 4 HOURS PRN
Status: CANCELLED | OUTPATIENT
Start: 2019-08-08

## 2019-08-08 RX ORDER — POLYETHYLENE GLYCOL 3350 17 G/17G
17 POWDER, FOR SOLUTION ORAL DAILY
Status: CANCELLED | OUTPATIENT
Start: 2019-08-09

## 2019-08-08 RX ORDER — PANTOPRAZOLE SODIUM 40 MG/1
40 TABLET, DELAYED RELEASE ORAL
Status: CANCELLED | OUTPATIENT
Start: 2019-08-09

## 2019-08-08 RX ORDER — POTASSIUM CHLORIDE 14.9 MG/ML
60 INJECTION INTRAVENOUS
Status: CANCELLED | OUTPATIENT
Start: 2019-08-08

## 2019-08-08 RX ORDER — ONDANSETRON 2 MG/ML
4 INJECTION INTRAMUSCULAR; INTRAVENOUS EVERY 12 HOURS PRN
Status: CANCELLED | OUTPATIENT
Start: 2019-08-08

## 2019-08-08 RX ORDER — SODIUM CHLORIDE 9 MG/ML
INJECTION, SOLUTION INTRAVENOUS CONTINUOUS
Status: CANCELLED | OUTPATIENT
Start: 2019-08-08

## 2019-08-08 RX ORDER — LIDOCAINE HYDROCHLORIDE 10 MG/ML
1 INJECTION, SOLUTION EPIDURAL; INFILTRATION; INTRACAUDAL; PERINEURAL
Status: CANCELLED | OUTPATIENT
Start: 2019-08-08

## 2019-08-08 RX ORDER — ASPIRIN 325 MG
325 TABLET ORAL DAILY
Status: CANCELLED | OUTPATIENT
Start: 2019-08-09

## 2019-08-08 RX ORDER — POTASSIUM CHLORIDE 29.8 MG/ML
40 INJECTION INTRAVENOUS
Status: CANCELLED | OUTPATIENT
Start: 2019-08-08

## 2019-08-08 RX ORDER — ALBUMIN HUMAN 50 G/1000ML
500 SOLUTION INTRAVENOUS ONCE AS NEEDED
Status: CANCELLED | OUTPATIENT
Start: 2019-08-08 | End: 2031-01-04

## 2019-08-08 RX ORDER — DEXTROSE MONOHYDRATE, SODIUM CHLORIDE, AND POTASSIUM CHLORIDE 50; 1.49; 4.5 G/1000ML; G/1000ML; G/1000ML
INJECTION, SOLUTION INTRAVENOUS CONTINUOUS
Status: CANCELLED | OUTPATIENT
Start: 2019-08-08

## 2019-08-08 RX ORDER — METOPROLOL TARTRATE 25 MG/1
25 TABLET ORAL
Status: CANCELLED | OUTPATIENT
Start: 2019-08-08

## 2019-08-08 RX ORDER — ASPIRIN 325 MG
325 TABLET, DELAYED RELEASE (ENTERIC COATED) ORAL DAILY
Status: CANCELLED | OUTPATIENT
Start: 2019-08-09

## 2019-08-08 RX ORDER — IPRATROPIUM BROMIDE AND ALBUTEROL SULFATE 2.5; .5 MG/3ML; MG/3ML
3 SOLUTION RESPIRATORY (INHALATION) EVERY 4 HOURS PRN
Status: CANCELLED | OUTPATIENT
Start: 2019-08-08 | End: 2019-08-09

## 2019-08-08 RX ORDER — MUPIROCIN 20 MG/G
1 OINTMENT TOPICAL
Status: CANCELLED | OUTPATIENT
Start: 2019-08-08

## 2019-08-08 RX ORDER — POTASSIUM CHLORIDE 14.9 MG/ML
20 INJECTION INTRAVENOUS
Status: CANCELLED | OUTPATIENT
Start: 2019-08-08

## 2019-08-08 RX ORDER — SODIUM CHLORIDE 0.9 % (FLUSH) 0.9 %
10 SYRINGE (ML) INJECTION
Status: CANCELLED | OUTPATIENT
Start: 2019-08-08

## 2019-08-08 NOTE — PROGRESS NOTES
Subjective:      Patient ID: Cj Barnes is a 53 y.o. male.     Chief Complaint: Follow-up        HPI:  Cj Barnes is a 53 y.o. male who presents for follow up aortic stenosis. Last seen in clinic in 2018. Medical conditions include bicuspid aortic valve, AS, diabetes type2, and CAD. Has noticed SOB over the past 1 month. Can walk 1.5-2 blocks before having difficulty. Reports some mid-sternal chest discomfort when stressed which resolves with rest. Energy level has been very low. Denies dizziness, orthopnea, and swelling. Has family history of severe diabetes and heart disease. Cath from 2018 showed mild to moderate 3 vessel CAD; 55% midcircumflex, 60% 4th OM, mild LAD and diagonal. EF 55%, no aortic coarctation. In 2018 aortic valve area measured 0.9cm2 and today is 0.54cm2. Patient reports several uncles who have  in their 50's from heart disease. He is currently controlling his diabetes with an insulin pump and his last HbA1c was 8.2. Patient is a nonsmoker. Patient has history of stage 2 colon cancer in 2015 with resection, no chemo or radiation.      NYHA I  STS mortality 1.3%, morbidity or mortality 14%, stroke 1.3%.  Current medications Reviewed     Review of Systems   Constitutional: Positive for activity change and fatigue.   HENT: Negative for nosebleeds.    Eyes: Negative for visual disturbance.   Respiratory: Positive for shortness of breath (with exertion ). Negative for cough.    Cardiovascular: Negative for chest pain, palpitations and leg swelling.   Gastrointestinal: Negative for nausea and vomiting.   Musculoskeletal: Negative for gait problem.   Skin: Negative for color change and pallor.   Neurological: Negative for dizziness, seizures, syncope, weakness, numbness and headaches.   Hematological: Does not bruise/bleed easily.   Psychiatric/Behavioral: Negative for sleep disturbance.      Objective:   Physical Exam   Constitutional: He is oriented to person, place, and time. He  appears well-developed and well-nourished. No distress.   HENT:   Head: Normocephalic and atraumatic.   Eyes: Pupils are equal, round, and reactive to light. EOM are normal.   Neck: Normal range of motion. No JVD present.   Cardiovascular: Normal rate, regular rhythm and normal pulses.   Murmur heard.  Pulmonary/Chest: Effort normal and breath sounds normal. No respiratory distress.   Abdominal: Soft. He exhibits no distension.   Musculoskeletal: Normal range of motion. He exhibits no edema.   Neurological: He is alert and oriented to person, place, and time.   Skin: Skin is warm, dry and intact. Capillary refill takes less than 2 seconds. He is not diaphoretic. No erythema. No pallor.   Psychiatric: He has a normal mood and affect. His speech is normal and behavior is normal. Judgment and thought content normal.   Vitals reviewed.        Diagnotic Results:  TTE 2019  · Moderate concentric left ventricular hypertrophy.  · Normal left ventricular systolic function. The estimated ejection fraction is 60%  · Normal LV diastolic function.  · No wall motion abnormalities.  · Normal right ventricular systolic function.  · The aortic valve is bileaflet.  · Mild aortic regurgitation.  · Severe aortic valve stenosis.  · Aortic valve area is 0.54 cm2; peak velocity is 5.13 m/s; mean gradient is 60 mmHg.     TTE 2018  · Normal left ventricular systolic function. The estimated ejection fraction is 60%  · Normal LV diastolic function.  · Normal right ventricular systolic function.  · Normal atrial size.  · Mild aortic regurgitation.  · Severe aortic valve stenosis.  · Aortic valve area is 0.39 cm2; peak velocity is 4.52 m/s; mean gradient is 53.66 mmHg.  · Mild concentric left ventricular hypertrophy  · Congenitally bicuspid aortic valve  · The maximum instantaneous gradient across the aortic valve during systolie is between 64 and 82 mm Hg\    Carotid US   · Scattered areas of mild to moderate heterogeneous mildly calcified  plaque in the left carotid bulb and left common carotid artery with no significant stenosis in either carotid artery system.  · Normal antegrade vertebral flow bilaterally.    CT Chest   Dense calcification of the aortic valve and aortic valve annulus including the junction of the aortic and mitral valve annuli.  Ascending thoracic aorta is free of calcification.  Findings compatible with hepatic steatosis.  Assessment:   1. Severe AS with bicuspid valve  Plan:     CTS Attending Note:    I have personally taken the history and examined this patient and agree with the WALE's note as stated above. 53-year-old gentleman with severe aortic stenosis.  I reviewed his CT scan.  His ascending aorta is roughly 4.1 cm in diameter.  I recommended aortic root replacement.  He agreed with this.  We reviewed the risks and benefits.  His questions have been answered, and he wishes to proceed.  He desires a mechanical prosthesis.

## 2019-08-08 NOTE — ANESTHESIA PREPROCEDURE EVALUATION
[facility]  Anesthesia Pre-Operative Evaluation         Patient Name: Cj Barnes  YOB: 1965  MRN: [id number]    SUBJECTIVE:     Pre-operative evaluation for Procedure(s) (LRB):  Replacement-valve-aortic (N/A)  BENTALL PROCEDURE (N/A)     08/08/2019    Cj Barens is a 53 y.o. male w/ a significant PMHx of T1DM w/insulin pump, TIA, HTN, stage 2 colon CA s/p resection '15, bicuspid aortic valve with critical stenosis (CAM 0.54 cm2). Scheduled for AVR and possible Bentall procedure with Dr. Knight. [facility] Jan 2018 mild to moderate 3 vessel CAD; 55% midcircumflex, 60% 4th OM, mild LAD and diagonal. EF 60%    Seen in pre-op clinic 8/8, patient with reports of progressively worsening AS symptoms in past several months including GORDON and angina. Has carotid US, PFT, CT chest pending for pre-op workup. Discussed with pt possible regional nerve block, awake a-line, intraop monitoring with swan-capo and CAMI, likely need to remain intubated post-operatively in SICU if Bentall is performed. Case discussed with Dr. Santiago.   8/8/2019  Cj Kumari, CA1     Prev airway: None documented.      Patient Active Problem List   Diagnosis    Essential hypertension    Insulin pump fitting or adjustment    Diabetes mellitus type I    Hyperlipidemia with target low density lipoprotein (LDL) cholesterol less than 70 mg/dL    Insulin pump in place    Tobacco use    Plantar fasciitis    Aortic stenosis    Aortic regurgitation, congenital    TIA (transient ischemic attack)    Coronary artery disease involving native coronary artery of native heart without angina pectoris    Type 1 diabetes mellitus with hypoglycemia unawareness    Class 1 obesity due to excess calories with serious comorbidity and body mass index (BMI) of 33.0 to 33.9 in adult       Review of patient's allergies indicates:  No Known Allergies    Current Inpatient Medications:      Current Outpatient Medications on  "File Prior to Encounter   Medication Sig Dispense Refill    aspirin (ECOTRIN) 81 MG EC tablet Take 81 mg by mouth once daily.      blood sugar diagnostic Strp 1 each by Misc.(Non-Drug; Combo Route) route 6 (six) times daily. Contour next strips. Pt needs contour jackelyn for compatibility w/ insulin pump (medtronic 670g). Necessity 200 strip 6    blood-glucose sensor (GUARDIAN SENSOR 3) Apple 1 each by Misc.(Non-Drug; Combo Route) route once a week. 12 each 3    clopidogrel (PLAVIX) 75 mg tablet Take 1 tablet (75 mg total) by mouth once daily. 30 tablet 11    clotrimazole-betamethasone 1-0.05% (LOTRISONE) cream APPLY A SMALL AMOUNT TO AFFECTED AREA THREE TIMES DAILY UNTIL CLEAR  0    COMFORT EZ PEN NEEDLES 33 gauge x 3/16" Ndle USE AS DIRECTED with Levemir pens  11    cyclobenzaprine (FLEXERIL) 10 MG tablet Take 10 mg by mouth daily as needed.  1    gabapentin (NEURONTIN) 300 MG capsule Take 2 capsules (600 mg total) by mouth 3 (three) times daily. 180 capsule 6    glucagon, human recombinant, (GLUCAGON EMERGENCY KIT, HUMAN,) 1 mg SolR Inject 1 mg into the muscle as needed. 1 each 0    insulin detemir U-100 (LEVEMIR FLEXTOUCH U-100 INSULN) 100 unit/mL (3 mL) SubQ InPn pen Inject 48 Units into the skin every evening. 3 mL 0    JARDIANCE 25 mg Tab TAKE ONE TABLET BY MOUTH DAILY FOR DIABETES  11    lisinopril 10 MG tablet Take 1 tablet (10 mg total) by mouth once daily. 90 tablet 3    metFORMIN (GLUCOPHAGE-XR) 500 MG 24 hr tablet Take 1 tablet (500 mg total) by mouth daily with breakfast. 90 tablet 3    NOVOLOG U-100 INSULIN ASPART 100 unit/mL injection INJECT 180 UNITS SUBCUTANEOUSLY EVERY ONE AND A HALF DAYS  11    simvastatin (ZOCOR) 20 MG tablet Take 1 tablet (20 mg total) by mouth every evening. 90 tablet 3     No current facility-administered medications on file prior to encounter.        Past Surgical History:   Procedure Laterality Date    BACK SURGERY      COLON SURGERY  2014    FASCIOTOMY, " PLANTAR Right 10/4/2013    Performed by Ruma Robbins DPM at [facility] OR 1ST FLR    FASCIOTOMY, PLANTAR, ENDOSCOPIC Right 10/4/2013    Performed by Ruma Robbins DPM at [facility] OR 1ST FLR    FOOT TENDON SURGERY      Left heart cath Left 1/15/2018    [facility] Palomo Turner [facility] LAB    right and left heart cath Bilateral 01/15/2018    Right heart cath Right 1/15/2018    Per[facility] Palomo Turner [facility] C[facility] LAB    TRIGGER FINGER RELEASE      x 2       Social History     Socioeconomic History    Marital status:      Spouse name: Not on file    Number of children: Not on file    Years of education: Not on file    Highest education level: Not on file   Occupational History    Not on file   Social Needs    Financial resource strain: Not on file    Food insecurity:     Worry: Not on file     Inability: Not on file    Transportation needs:     Medical: Not on file     Non-medical: Not on file   Tobacco Use    Smoking status: Never Smoker    Smokeless tobacco: Current User     Types: Snuff    Tobacco comment: since he was 12 yrs old   Substance and Sexual Activity    Alcohol use: No     Comment: socially    Drug use: No    Sexual activity: Not on file   Lifestyle    Physical activity:     Days per week: Not on file     Minutes per session: Not on file    Stress: Not on file   Relationships    Social connections:     Talks on phone: Not on file     Gets together: Not on file     Attends Voodoo service: Not on file     Active member of club or organization: Not on file     Attends meetings of clubs or organizations: Not on file     Relationship status: Not on file   Other Topics Concern    Not on file   Social History Narrative        2 grown kids    Delivers seafood       OBJECTIVE:     Vital Signs Range (Last 24H):  Temp:  [36.7 °C (98 °F)]   Pulse:  [66]   BP: (120)/(68)   SpO2:  [97 %]       Significant Labs:  Lab Results   Component Value Date    WBC 7.20  05/26/2018    HGB 15.6 05/26/2018    HCT 46.0 05/26/2018     05/26/2018    CHOL 123 04/24/2019    TRIG 44 04/24/2019    HDL 37 (L) 04/24/2019    ALT 24 07/23/2019    AST 21 07/23/2019     07/23/2019    K 3.9 07/23/2019     07/23/2019    CREATININE 0.9 07/23/2019    BUN 13 07/23/2019    CO2 28 07/23/2019    TSH 1.13 04/24/2019    PSA 0.38 12/11/2017    INR 1.0 05/25/2018    HGBA1C 6.6 (H) 07/23/2019       Diagnostic Studies: No relevant studies.    EKG:   Results for orders placed or performed in visit on 06/17/19   IN OFFICE EKG 12-LEAD (to Elora)    Collection Time: 06/17/19  8:03 AM    Narrative    Test Reason : I35.0,I35.0,    Vent. Rate : 059 BPM     Atrial Rate : 059 BPM     P-R Int : 170 ms          QRS Dur : 084 ms      QT Int : 416 ms       P-R-T Axes : 059 003 063 degrees     QTc Int : 411 ms    Sinus bradycardia  Nonspecific T wave abnormality  Borderline Abnormal ECG  When compared with ECG of 17-DEC-2018 09:26,  No significant change was found  Confirmed by ANANT COLÓN MD ([id number]) on 6/19/2019 9:10:27 AM    Referred By:  LAURENT           Confirmed By:ANANT COLÓN MD         2D ECHO:  TTE 7/17/2019  · Moderate concentric left ventricular hypertrophy.  · Normal left ventricular systolic function. The estimated ejection fraction is 60%  · Normal LV diastolic function.  · No wall motion abnormalities.  · Normal right ventricular systolic function.  · The aortic valve is bileaflet.  · Mild aortic regurgitation.  · Severe aortic valve stenosis.  · Aortic valve area is 0.54 cm2; peak velocity is 5.13 m/s; mean gradient is 60 mmHg.        ASSESSMENT/PLAN:       Anesthesia Evaluation    I have reviewed the Patient Summary Reports.    I have reviewed the Nursing Notes.   I have reviewed the Medications.     Review of Systems  Anesthesia Hx:  History of prior surgery of interest to airway management or planning: Denies Family Hx of Anesthesia complications.   Denies Personal Hx of Anesthesia  complications.   Hematology/Oncology:         -- Denies Anemia: --  Cancer in past history:    Cardiovascular:   Hypertension Valvular problems/Murmurs CAD   Denies CABG/stent.  GORDON    Pulmonary:   Denies Pneumonia Denies COPD.  Denies Asthma. Shortness of breath    Renal/:   Denies Chronic Renal Disease.     Hepatic/GI:   Denies GERD. Denies Liver Disease.  Denies Hepatitis.    Neurological:   TIA,    Endocrine:   Diabetes, well controlled, type 1        Physical Exam  General:  Well nourished, Obesity    Airway/Jaw/Neck:  Airway Findings: Mouth Opening: Normal Tongue: Normal  General Airway Assessment: Adult  Mallampati: II  Improves to I with phonation.  TM Distance: Normal, at least 6 cm  Jaw/Neck Findings:  Neck ROM: Normal ROM     Eyes/Ears/Nose:  EYES/EARS/NOSE FINDINGS: Normal   Dental:  Dental Findings: In tact    Chest/Lungs:  Chest/Lungs Findings: Clear to auscultation     Heart/Vascular:  Heart Findings: Rate: Normal  Rhythm: Regular Rhythm  Sounds: Normal     Abdomen:  Abdomen Findings:  Normal     Musculoskeletal:  Musculoskeletal Findings:    Skin:  Skin Findings:     Mental Status:  Mental Status Findings:  Cooperative, Alert and Oriented         Anesthesia Plan  Type of Anesthesia, risks & benefits discussed:  Anesthesia Type:  general, regional  Patient's Preference:   Intra-op Monitoring Plan: arterial line, central line, standard ASA monitors and Stanton-Salvador  Intra-op Monitoring Plan Comments:   Post Op Pain Control Plan: multimodal analgesia, IV/PO Opioids PRN, peripheral nerve block and per primary service following discharge from PACU  Post Op Pain Control Plan Comments:   Induction:   IV  Beta Blocker:  Patient is not currently on a Beta-Blocker (No further documentation required).       Informed Consent:    ASA Score: 4     Day of Surgery Review of History & Physical:    H&P update referred to the surgeon.         Ready For Surgery From Anesthesia Perspective.

## 2019-08-08 NOTE — DISCHARGE INSTRUCTIONS
Your surgery has been scheduled for:__________________________________________    You should report to:  ____[facility], located on the [place] side of the first floor of the           [facility] ([phone])  ____[facility], located on the [facility] side of the            Second floor of the [facility] ([phone])  ____3rd Floor [facility] located on the [facility] side of the [facility] [phone]  Please Note   - Tell your doctor if you take Aspirin, products containing Aspirin, herbal medications  or blood thinners, such as Coumadin, Ticlid, or Plavix.  (Consult your provider regarding holding or stopping before surgery).  - Arrange for someone to drive you home following surgery.  You will not be allowed to leave the surgical facility alone or drive yourself home following sedation and anesthesia.  Before Surgery  - Stop taking all herbal medications 14days prior to surgery  - No Motrin/Advil (Ibuprofen) 7 days before surgery  - No Aleve (Naproxen) 7 days before surgery  - No Goody's/BC powder 7 days before surgery  - Stop Taking Asprin, products containing Asprin _____days before surgery  - Stop taking blood thinners_______days before surgery  - Refrain from drinking alcoholic beverages for 24hours before and after surgery  - Stop or limit smoking _________days before surgery  - You may take Tylenol for pain  Night before Surgery  - Take a shower or bath (shower is recommended).  Bathe with Hibiclens soap or an antibacterial soap from the neck down.  If not supplied by your surgeon, hibiclens soap will need to be purchased over the counter in pharmacy.  Rinse soap off thoroughly.  - Shampoo your hair with your regular shampoo                           Food and Beverage Instructions  1. Stop ALL solid food, gum, candy (including vitamins) 8 hours before surgery/procedure time.  2. The patient should be  ENCOURAGED to drink carbohydrate-rich clear liquids (sports drinks, clear juices) until 2 hours prior to surgery/procedure time.  3. CLEAR liquids include only water, black coffee NO creamer, clear oral rehydration drinks, clear sports drinks or clear fruit juices (no orange juice, no pulpy juices, no apple cider). Advise patients if they can read newsprint through the liquid, it qualifies as clear liquid.   4. IF IN DOUBT, drink water instead.   5. NOTHING  TO DRINK 2 hours before to arrival for surgery/procedure time. If you are told to take medication on the morning of surgery, it may be taken with a sip of water.     FOLLOW YOUR SURGEON'S INSTRUCTIONS REGARDING FOOD AND BEVERAGES IF DIFFERENT THAN ABOVE INSTRUCTIONS.    The Day of Surgery  - Take another bath or shower with hibiclens or any antibacterial soap, to reduce the chance of infection.  - Take heart and blood pressure medications with a small sip of water, as advised by the perioperative team.  - Do not take fluid pills  - You may brush your teeth and rinse your mouth, but do not swall any additional water.   - Do not apply perfumes, powder, body lotions or deodorant on the day of surgery.  - Nail polish should be removed.  - Do not wear makeup or moisturizer  - Wear comfortable clothes, such as a button front shirt and loose fitting pants.  - Leave all jewelry, including body piercings, and valuables at home.    - Bring any devices you will neeed after surgery such as crutches or canes.  - If you have sleep apnea, please bring your CPAP machine  In the event that your physical condition changes including the onset of a cold or respiratory illness, or if you have to delay or cancel your surgery, please notify your surgeon.    Anesthesia: General Anesthesia     You are watched continuously during your procedure by your anesthesia provider.     Youre due to have surgery. During surgery, youll be given medicine called anesthesia or anesthetic. This will  keep you comfortable and pain-free. Your anesthesia provider will use general anesthesia.  What is general anesthesia?  General anesthesia puts you into a state like deep sleep. It goes into the bloodstream (IV anesthetics), into the lungs (gas anesthetics), or both. You feel nothing during the procedure. You will not remember it. During the procedure, the anesthesia provider monitors you continuously. He or she checks your heart rate and rhythm, blood pressure, breathing, and blood oxygen.  · IV anesthetics. IV anesthetics are given through an IV line in your arm. Theyre often given first. This is so you are asleep before a gas anesthetic is started. Some kinds of IV anesthetics relieve pain. Others relax you. Your doctor will decide which kind is best in your case.  · Gas anesthetics. Gas anesthetics are breathed into the lungs. They are often used to keep you asleep. They can be given through a facemask or a tube placed in your larynx or trachea (breathing tube).  ? If you have a facemask, your anesthesia provider will most likely place it over your nose and mouth while youre still awake. Youll breathe oxygen through the mask as your IV anesthetic is started. Gas anesthetic may be added through the mask.  ? If you have a tube in the larynx or trachea, it will be inserted into your throat after youre asleep.  Anesthesia tools and medicines  You will likely have:  · IV anesthetics. These are put into an IV line into your bloodstream.  · Gas anesthetics. You breathe these anesthetics into your lungs, where they pass into your bloodstream.  · Pulse oximeter. This is a small clip that is attached to the end of your finger. This measures your blood oxygen level.  · Electrocardiography leads (electrodes). These are small sticky pads that are placed on your chest. They record your heart rate and rhythm.  · Blood pressure cuff. This reads your blood pressure.  Risks and possible complications  General anesthesia has  some risks. These include:  · Breathing problems  · Nausea and vomiting  · Sore throat or hoarseness (usually temporary)  · Allergic reaction to the anesthetic  · Irregular heartbeat (rare)  · Cardiac arrest (rare)   Anesthesia safety  · Follow all instructions you are given for how long not to eat or drink before your procedure.  · Be sure your doctor knows what medicines and drugs you take. This includes over-the-counter medicines, herbs, supplements, alcohol or other drugs. You will be asked when those were last taken.  · Have an adult family member or friend drive you home after the procedure.  · For the first 24 hours after your surgery:  ? Do not drive or use heavy equipment.  ? Do not make important decisions or sign legal documents. If important decisions or signing legal documents is necessary during the first 24 hours after surgery, have a trusted family member or spouse act on your behalf.  ? Avoid alcohol.  ? Have a responsible adult stay with you. He or she can watch for problems and help keep you safe.  Date Last Reviewed: 12/1/2016 © 2000-2017 [employer]. [address] [place] [address], [place] [address]. All rights reserved. This information is not intended as a substitute for professional medical care. Always follow your healthcare professional's instructions

## 2019-08-08 NOTE — PROGRESS NOTES
"PREPARING FOR SURGERY  Your surgery has been scheduled for:   Day:  Friday___            Date:  ___8/9/19__  Arrival Time: 5:00 am  Start Time: 7:00am  You should report to the second-floor surgery center, located on the [facility] side of the second floor of the [facility]. The phone number is [phone].     PLEASE NOTE  · If you are allergic to any medications, please inform your doctor or the nurse responsible for your care.  · Tell the doctor if you take aspirin, products containing aspirin, herbal medications or blood thinners, such as Coumadin, Pradaxa, or Plavix.  · Notify your doctor if you are diabetic and provide information about the medications you take.  · Arrange for someone to drive you home following surgery. You will not be allowed to leave the surgical facility alone or drive yourself home following sedation and anesthesia.  · If you have not already done so, please bring a list of your medications with you the day of your surgery.     BEFORE SURGERY  Stop taking all herbal medications 14 days prior to surgery  Stop taking aspirin, products containing aspirin 0 days before surgery  Stop taking blood thinners 5 days before surgery  Refrain from drinking alcohol beverages for 24 hours before and after surgery  Stop or limit smoking 0 days before surgery  Other:        ------  THE NIGHT BEFORE SURGERY  Eat a light supper on the night before your surgery, no greasy or fatty foods.  DO NOT EAT OR DRINK ANYTHING AFTER MIDNIGHT, INCLUDING GUM, HARD CANDY, MINTS, OR CHEWING TOBACCO  Take a complete shower. Wash your body from the neck down with Hibiclens (chlorhexidine gluconate) soap. Hibiclens soap may be purchased over the counter at the pharmacy. Keep the soap away from your eyes, ears, and mouth. After washing with Hibiclens, rinse thoroughly. You may also use any soap labeled "antibacterial". Shampoo your hair with your regular shampoo.     THE DAY OF SURGERY  Take another shower " with Hibiclens or any antibacterial soap, to reduce the change of infection.  Medications to take the morning of surgery: N/A_ with a small sip of water. Do not take diuretics or fluid pills.  Diabetic medication instructions will be given by the preop center. They will call you before your surgery.  You may brush your teeth and rinse your mouth, but do not shallow any water.  Do not apply perfume, powder, body lotions or deodorant on the day of surgery.  Do not wear any makeup, including mascara and false eyelashes.  Nail polish should be removed.  Wear comfortable clothes, such as button front shirt and loose-fitting pants.  Leave all jewelry, including body piercings and valuables at home.  Hairpins and clasps must be removed before you enter the operating room.  You may wear glasses, dentures and hearing aids before and after surgery. They may need to be removed before going into the operating room. Contact lenses worn before surgery must be removed before entering the operating room. Please bring a case for your hearing aids, glasses and/or contacts.  Bring any devices you will need after surgery such as crutches or canes.  If you have sleep apnea, please bring your CPAP machine.  If you have an implantable device, such as a pacemaker or AICD, please bring the device information card, if you have one.     If you have any questions or concerns, please don't hesitate to call.     Grisel Hardwick RN, MSN  [facility]  [facility] [place] [address]  [phone] fax

## 2019-08-09 ENCOUNTER — ANESTHESIA (OUTPATIENT)
Dept: SURGERY | Facility: HOSPITAL | Age: 54
DRG: 220 | End: 2019-08-09
Payer: MEDICARE

## 2019-08-09 ENCOUNTER — HOSPITAL ENCOUNTER (INPATIENT)
Facility: HOSPITAL | Age: 54
LOS: 5 days | Discharge: HOME | DRG: 220 | End: 2019-08-14
Attending: THORACIC SURGERY (CARDIOTHORACIC VASCULAR SURGERY) | Admitting: THORACIC SURGERY (CARDIOTHORACIC VASCULAR SURGERY)
Payer: MEDICARE

## 2019-08-09 DIAGNOSIS — Z98.890 HISTORY OF HEART SURGERY: ICD-10-CM

## 2019-08-09 DIAGNOSIS — Z95.828 S/P ASCENDING AORTIC REPLACEMENT: Primary | ICD-10-CM

## 2019-08-09 DIAGNOSIS — I49.9 ARRHYTHMIA: ICD-10-CM

## 2019-08-09 DIAGNOSIS — E66.09 CLASS 1 OBESITY DUE TO EXCESS CALORIES WITH SERIOUS COMORBIDITY AND BODY MASS INDEX (BMI) OF 33.0 TO 33.9 IN ADULT: ICD-10-CM

## 2019-08-09 DIAGNOSIS — Z96.41 INSULIN PUMP IN PLACE: ICD-10-CM

## 2019-08-09 DIAGNOSIS — E10.42 TYPE 1 DIABETES MELLITUS WITH DIABETIC POLYNEUROPATHY: ICD-10-CM

## 2019-08-09 DIAGNOSIS — I35.0 NONRHEUMATIC AORTIC VALVE STENOSIS: ICD-10-CM

## 2019-08-09 LAB
ALLENS TEST: ABNORMAL
ANION GAP SERPL CALC-SCNC: 8 MMOL/L (ref 8–16)
APTT BLDCRRT: 22.9 SEC (ref 21–32)
BASOPHILS # BLD AUTO: 0.07 K/UL (ref 0–0.2)
BASOPHILS NFR BLD: 0.3 % (ref 0–1.9)
BLD PROD TYP BPU: NORMAL
BLOOD UNIT EXPIRATION DATE: NORMAL
BLOOD UNIT TYPE CODE: 5100
BLOOD UNIT TYPE CODE: 8400
BLOOD UNIT TYPE CODE: 9500
BLOOD UNIT TYPE: NORMAL
BUN SERPL-MCNC: 19 MG/DL (ref 6–20)
CALCIUM SERPL-MCNC: 8.8 MG/DL (ref 8.7–10.5)
CHLORIDE SERPL-SCNC: 112 MMOL/L (ref 95–110)
CO2 SERPL-SCNC: 19 MMOL/L (ref 23–29)
CODING SYSTEM: NORMAL
CREAT SERPL-MCNC: 0.8 MG/DL (ref 0.5–1.4)
DELSYS: ABNORMAL
DIFFERENTIAL METHOD: ABNORMAL
DISPENSE STATUS: NORMAL
EOSINOPHIL # BLD AUTO: 0 K/UL (ref 0–0.5)
EOSINOPHIL NFR BLD: 0.1 % (ref 0–8)
ERYTHROCYTE [DISTWIDTH] IN BLOOD BY AUTOMATED COUNT: 13.6 % (ref 11.5–14.5)
EST. GFR  (AFRICAN AMERICAN): >60 ML/MIN/1.73 M^2
EST. GFR  (NON AFRICAN AMERICAN): >60 ML/MIN/1.73 M^2
FIO2: 40
FIO2: 50
FIO2: 55
FIO2: 60
FIO2: 70
FIO2: 80
GLUCOSE SERPL-MCNC: 126 MG/DL (ref 70–110)
GLUCOSE SERPL-MCNC: 134 MG/DL (ref 70–110)
GLUCOSE SERPL-MCNC: 135 MG/DL (ref 70–110)
GLUCOSE SERPL-MCNC: 137 MG/DL (ref 70–110)
GLUCOSE SERPL-MCNC: 140 MG/DL (ref 70–110)
GLUCOSE SERPL-MCNC: 146 MG/DL (ref 70–110)
GLUCOSE SERPL-MCNC: 156 MG/DL (ref 70–110)
GLUCOSE SERPL-MCNC: 168 MG/DL (ref 70–110)
HCO3 UR-SCNC: 18.7 MMOL/L (ref 24–28)
HCO3 UR-SCNC: 19.7 MMOL/L (ref 24–28)
HCO3 UR-SCNC: 21.1 MMOL/L (ref 24–28)
HCO3 UR-SCNC: 21.4 MMOL/L (ref 24–28)
HCO3 UR-SCNC: 22.3 MMOL/L (ref 24–28)
HCO3 UR-SCNC: 22.5 MMOL/L (ref 24–28)
HCO3 UR-SCNC: 24.6 MMOL/L (ref 24–28)
HCO3 UR-SCNC: 24.8 MMOL/L (ref 24–28)
HCO3 UR-SCNC: 24.8 MMOL/L (ref 24–28)
HCO3 UR-SCNC: 25.2 MMOL/L (ref 24–28)
HCO3 UR-SCNC: 25.4 MMOL/L (ref 24–28)
HCO3 UR-SCNC: 26 MMOL/L (ref 24–28)
HCO3 UR-SCNC: 27 MMOL/L (ref 24–28)
HCO3 UR-SCNC: 27.7 MMOL/L (ref 24–28)
HCO3 UR-SCNC: 30.1 MMOL/L (ref 24–28)
HCT VFR BLD AUTO: 35.7 % (ref 40–54)
HCT VFR BLD CALC: 29 %PCV (ref 36–54)
HCT VFR BLD CALC: 33 %PCV (ref 36–54)
HCT VFR BLD CALC: 35 %PCV (ref 36–54)
HGB BLD-MCNC: 11.9 G/DL (ref 14–18)
IMM GRANULOCYTES # BLD AUTO: 0.19 K/UL (ref 0–0.04)
IMM GRANULOCYTES NFR BLD AUTO: 0.8 % (ref 0–0.5)
INR PPP: 1.2 (ref 0.8–1.2)
LDH SERPL L TO P-CCNC: 0.85 MMOL/L (ref 0.36–1.25)
LDH SERPL L TO P-CCNC: 1.05 MMOL/L (ref 0.36–1.25)
LDH SERPL L TO P-CCNC: 1.14 MMOL/L (ref 0.5–2.2)
LDH SERPL L TO P-CCNC: 1.44 MMOL/L (ref 0.36–1.25)
LDH SERPL L TO P-CCNC: 2.95 MMOL/L (ref 0.36–1.25)
LYMPHOCYTES # BLD AUTO: 1.1 K/UL (ref 1–4.8)
LYMPHOCYTES NFR BLD: 4.9 % (ref 18–48)
MAGNESIUM SERPL-MCNC: 2.6 MG/DL (ref 1.6–2.6)
MAGNESIUM SERPL-MCNC: 2.6 MG/DL (ref 1.6–2.6)
MCH RBC QN AUTO: 29.3 PG (ref 27–31)
MCHC RBC AUTO-ENTMCNC: 33.3 G/DL (ref 32–36)
MCV RBC AUTO: 88 FL (ref 82–98)
MODE: ABNORMAL
MONOCYTES # BLD AUTO: 1.4 K/UL (ref 0.3–1)
MONOCYTES NFR BLD: 6.4 % (ref 4–15)
NEUTROPHILS # BLD AUTO: 19.7 K/UL (ref 1.8–7.7)
NEUTROPHILS NFR BLD: 87.5 % (ref 38–73)
NRBC BLD-RTO: 0 /100 WBC
NUM UNITS TRANS FFP: NORMAL
NUM UNITS TRANS FFP: NORMAL
PCO2 BLDA: 33.3 MMHG (ref 35–45)
PCO2 BLDA: 35.6 MMHG (ref 35–45)
PCO2 BLDA: 36.9 MMHG (ref 35–45)
PCO2 BLDA: 39.3 MMHG (ref 35–45)
PCO2 BLDA: 40 MMHG (ref 35–45)
PCO2 BLDA: 40.3 MMHG (ref 35–45)
PCO2 BLDA: 41.4 MMHG (ref 35–45)
PCO2 BLDA: 41.4 MMHG (ref 35–45)
PCO2 BLDA: 43.5 MMHG (ref 35–45)
PCO2 BLDA: 46.2 MMHG (ref 35–45)
PCO2 BLDA: 46.6 MMHG (ref 35–45)
PCO2 BLDA: 48 MMHG (ref 35–45)
PCO2 BLDA: 48.2 MMHG (ref 35–45)
PCO2 BLDA: 51.6 MMHG (ref 35–45)
PCO2 BLDA: 52.7 MMHG (ref 35–45)
PH SMN: 7.22 [PH] (ref 7.35–7.45)
PH SMN: 7.28 [PH] (ref 7.35–7.45)
PH SMN: 7.33 [PH] (ref 7.35–7.45)
PH SMN: 7.34 [PH] (ref 7.35–7.45)
PH SMN: 7.35 [PH] (ref 7.35–7.45)
PH SMN: 7.36 [PH] (ref 7.35–7.45)
PH SMN: 7.36 [PH] (ref 7.35–7.45)
PH SMN: 7.37 [PH] (ref 7.35–7.45)
PH SMN: 7.39 [PH] (ref 7.35–7.45)
PH SMN: 7.39 [PH] (ref 7.35–7.45)
PH SMN: 7.4 [PH] (ref 7.35–7.45)
PH SMN: 7.41 [PH] (ref 7.35–7.45)
PH SMN: 7.49 [PH] (ref 7.35–7.45)
PHOSPHATE SERPL-MCNC: 3.7 MG/DL (ref 2.7–4.5)
PHOSPHATE SERPL-MCNC: 3.7 MG/DL (ref 2.7–4.5)
PLATELET # BLD AUTO: 176 K/UL (ref 150–350)
PMV BLD AUTO: 10.1 FL (ref 9.2–12.9)
PO2 BLDA: 130 MMHG (ref 80–100)
PO2 BLDA: 144 MMHG (ref 80–100)
PO2 BLDA: 185 MMHG (ref 80–100)
PO2 BLDA: 206 MMHG (ref 80–100)
PO2 BLDA: 227 MMHG (ref 80–100)
PO2 BLDA: 247 MMHG (ref 80–100)
PO2 BLDA: 250 MMHG (ref 80–100)
PO2 BLDA: 265 MMHG (ref 80–100)
PO2 BLDA: 287 MMHG (ref 80–100)
PO2 BLDA: 304 MMHG (ref 80–100)
PO2 BLDA: 305 MMHG (ref 80–100)
PO2 BLDA: 312 MMHG (ref 80–100)
PO2 BLDA: 47 MMHG (ref 40–60)
PO2 BLDA: 501 MMHG (ref 80–100)
PO2 BLDA: 87 MMHG (ref 80–100)
POC BE: -1 MMOL/L
POC BE: -2 MMOL/L
POC BE: -4 MMOL/L
POC BE: -4 MMOL/L
POC BE: -6 MMOL/L
POC BE: -6 MMOL/L
POC BE: -8 MMOL/L
POC BE: 0 MMOL/L
POC BE: 0 MMOL/L
POC BE: 1 MMOL/L
POC BE: 2 MMOL/L
POC BE: 2 MMOL/L
POC BE: 7 MMOL/L
POC IONIZED CALCIUM: 0.92 MMOL/L (ref 1.06–1.42)
POC IONIZED CALCIUM: 1.04 MMOL/L (ref 1.06–1.42)
POC IONIZED CALCIUM: 1.05 MMOL/L (ref 1.06–1.42)
POC IONIZED CALCIUM: 1.08 MMOL/L (ref 1.06–1.42)
POC IONIZED CALCIUM: 1.24 MMOL/L (ref 1.06–1.42)
POC SATURATED O2: 100 % (ref 95–100)
POC SATURATED O2: 82 % (ref 95–100)
POC SATURATED O2: 96 % (ref 95–100)
POC SATURATED O2: 98 % (ref 95–100)
POC SATURATED O2: 99 % (ref 95–100)
POC TCO2: 20 MMOL/L (ref 23–27)
POC TCO2: 21 MMOL/L (ref 23–27)
POC TCO2: 22 MMOL/L (ref 23–27)
POC TCO2: 23 MMOL/L (ref 23–27)
POC TCO2: 24 MMOL/L (ref 23–27)
POC TCO2: 24 MMOL/L (ref 23–27)
POC TCO2: 26 MMOL/L (ref 23–27)
POC TCO2: 26 MMOL/L (ref 23–27)
POC TCO2: 26 MMOL/L (ref 24–29)
POC TCO2: 27 MMOL/L (ref 23–27)
POC TCO2: 28 MMOL/L (ref 23–27)
POC TCO2: 29 MMOL/L (ref 23–27)
POC TCO2: 31 MMOL/L (ref 23–27)
POCT GLUCOSE: 116 MG/DL (ref 70–110)
POCT GLUCOSE: 127 MG/DL (ref 70–110)
POCT GLUCOSE: 133 MG/DL (ref 70–110)
POCT GLUCOSE: 134 MG/DL (ref 70–110)
POCT GLUCOSE: 143 MG/DL (ref 70–110)
POCT GLUCOSE: 150 MG/DL (ref 70–110)
POCT GLUCOSE: 154 MG/DL (ref 70–110)
POCT GLUCOSE: 155 MG/DL (ref 70–110)
POCT GLUCOSE: 178 MG/DL (ref 70–110)
POCT GLUCOSE: 91 MG/DL (ref 70–110)
POTASSIUM BLD-SCNC: 3.5 MMOL/L (ref 3.5–5.1)
POTASSIUM BLD-SCNC: 3.9 MMOL/L (ref 3.5–5.1)
POTASSIUM BLD-SCNC: 4.4 MMOL/L (ref 3.5–5.1)
POTASSIUM BLD-SCNC: 4.5 MMOL/L (ref 3.5–5.1)
POTASSIUM BLD-SCNC: 4.6 MMOL/L (ref 3.5–5.1)
POTASSIUM BLD-SCNC: 4.7 MMOL/L (ref 3.5–5.1)
POTASSIUM SERPL-SCNC: 4.1 MMOL/L (ref 3.5–5.1)
POTASSIUM SERPL-SCNC: 4.6 MMOL/L (ref 3.5–5.1)
PROTHROMBIN TIME: 11.9 SEC (ref 9–12.5)
RBC # BLD AUTO: 4.06 M/UL (ref 4.6–6.2)
SAMPLE: ABNORMAL
SITE: ABNORMAL
SODIUM BLD-SCNC: 140 MMOL/L (ref 136–145)
SODIUM BLD-SCNC: 140 MMOL/L (ref 136–145)
SODIUM BLD-SCNC: 141 MMOL/L (ref 136–145)
SODIUM BLD-SCNC: 141 MMOL/L (ref 136–145)
SODIUM BLD-SCNC: 142 MMOL/L (ref 136–145)
SODIUM BLD-SCNC: 146 MMOL/L (ref 136–145)
SODIUM SERPL-SCNC: 139 MMOL/L (ref 136–145)
SP02: 100
TRANS PLATPHERESIS VOL PATIENT: NORMAL ML
WBC # BLD AUTO: 22.54 K/UL (ref 3.9–12.7)

## 2019-08-09 PROCEDURE — 94799 UNLISTED PULMONARY SVC/PX: CPT

## 2019-08-09 PROCEDURE — 37000008 HC ANESTHESIA 1ST 15 MINUTES: Performed by: THORACIC SURGERY (CARDIOTHORACIC VASCULAR SURGERY)

## 2019-08-09 PROCEDURE — 93312 ECHO TRANSESOPHAGEAL: CPT | Mod: 26,59,, | Performed by: ANESTHESIOLOGY

## 2019-08-09 PROCEDURE — 88304 TISSUE EXAM BY PATHOLOGIST: CPT | Mod: 26,,, | Performed by: PATHOLOGY

## 2019-08-09 PROCEDURE — 36000712 HC OR TIME LEV V 1ST 15 MIN: Performed by: THORACIC SURGERY (CARDIOTHORACIC VASCULAR SURGERY)

## 2019-08-09 PROCEDURE — 36000713 HC OR TIME LEV V EA ADD 15 MIN: Performed by: THORACIC SURGERY (CARDIOTHORACIC VASCULAR SURGERY)

## 2019-08-09 PROCEDURE — 63600175 PHARM REV CODE 636 W HCPCS: Performed by: PHYSICIAN ASSISTANT

## 2019-08-09 PROCEDURE — 27000175 HC ADULT BYPASS PUMP

## 2019-08-09 PROCEDURE — C2618 PROBE/NEEDLE, CRYO: HCPCS | Performed by: THORACIC SURGERY (CARDIOTHORACIC VASCULAR SURGERY)

## 2019-08-09 PROCEDURE — 80048 BASIC METABOLIC PNL TOTAL CA: CPT

## 2019-08-09 PROCEDURE — 93312 TEE: ICD-10-PCS | Mod: 26,59,, | Performed by: ANESTHESIOLOGY

## 2019-08-09 PROCEDURE — 25000003 PHARM REV CODE 250: Performed by: STUDENT IN AN ORGANIZED HEALTH CARE EDUCATION/TRAINING PROGRAM

## 2019-08-09 PROCEDURE — 99223 1ST HOSP IP/OBS HIGH 75: CPT | Mod: ,,, | Performed by: SURGERY

## 2019-08-09 PROCEDURE — 27100088 HC CELL SAVER

## 2019-08-09 PROCEDURE — 25000003 PHARM REV CODE 250: Performed by: THORACIC SURGERY (CARDIOTHORACIC VASCULAR SURGERY)

## 2019-08-09 PROCEDURE — 36556 INSERT NON-TUNNEL CV CATH: CPT | Mod: 59,,, | Performed by: ANESTHESIOLOGY

## 2019-08-09 PROCEDURE — P9035 PLATELET PHERES LEUKOREDUCED: HCPCS

## 2019-08-09 PROCEDURE — 36592 COLLECT BLOOD FROM PICC: CPT

## 2019-08-09 PROCEDURE — 63600175 PHARM REV CODE 636 W HCPCS: Performed by: STUDENT IN AN ORGANIZED HEALTH CARE EDUCATION/TRAINING PROGRAM

## 2019-08-09 PROCEDURE — 93010 ELECTROCARDIOGRAM REPORT: CPT | Mod: ,,, | Performed by: INTERNAL MEDICINE

## 2019-08-09 PROCEDURE — 94003 VENT MGMT INPAT SUBQ DAY: CPT

## 2019-08-09 PROCEDURE — 36620 ARTERIAL: ICD-10-PCS | Mod: 59,,, | Performed by: ANESTHESIOLOGY

## 2019-08-09 PROCEDURE — 37799 UNLISTED PX VASCULAR SURGERY: CPT

## 2019-08-09 PROCEDURE — 84132 ASSAY OF SERUM POTASSIUM: CPT

## 2019-08-09 PROCEDURE — 88305 TISSUE EXAM BY PATHOLOGIST: CPT | Performed by: PATHOLOGY

## 2019-08-09 PROCEDURE — 94761 N-INVAS EAR/PLS OXIMETRY MLT: CPT

## 2019-08-09 PROCEDURE — 25000003 PHARM REV CODE 250

## 2019-08-09 PROCEDURE — 83735 ASSAY OF MAGNESIUM: CPT

## 2019-08-09 PROCEDURE — 20000000 HC ICU ROOM

## 2019-08-09 PROCEDURE — 27201037 HC PRESSURE MONITORING SET UP

## 2019-08-09 PROCEDURE — 27800595 HC HEART VALVES

## 2019-08-09 PROCEDURE — D9220A PRA ANESTHESIA: ICD-10-PCS | Mod: ,,, | Performed by: ANESTHESIOLOGY

## 2019-08-09 PROCEDURE — 88304 TISSUE SPECIMEN TO PATHOLOGY - SURGERY: ICD-10-PCS | Mod: 26,,, | Performed by: PATHOLOGY

## 2019-08-09 PROCEDURE — D9220A PRA ANESTHESIA: Mod: ,,, | Performed by: ANESTHESIOLOGY

## 2019-08-09 PROCEDURE — 27000191 HC C-V MONITORING

## 2019-08-09 PROCEDURE — 99223 PR INITIAL HOSPITAL CARE,LEVL III: ICD-10-PCS | Mod: ,,, | Performed by: SURGERY

## 2019-08-09 PROCEDURE — 27201423 OPTIME MED/SURG SUP & DEVICES STERILE SUPPLY: Performed by: THORACIC SURGERY (CARDIOTHORACIC VASCULAR SURGERY)

## 2019-08-09 PROCEDURE — 27200953 HC CARDIOPLEGIA SYSTEM

## 2019-08-09 PROCEDURE — 88305 TISSUE SPECIMEN TO PATHOLOGY - SURGERY: ICD-10-PCS | Mod: 26,,, | Performed by: PATHOLOGY

## 2019-08-09 PROCEDURE — 36556 PR INSERT NON-TUNNEL CV CATH 5+ YRS OLD: ICD-10-PCS | Mod: 59,,, | Performed by: ANESTHESIOLOGY

## 2019-08-09 PROCEDURE — 82803 BLOOD GASES ANY COMBINATION: CPT

## 2019-08-09 PROCEDURE — 27000221 HC OXYGEN, UP TO 24 HOURS

## 2019-08-09 PROCEDURE — 25000242 PHARM REV CODE 250 ALT 637 W/ HCPCS: Performed by: PHYSICIAN ASSISTANT

## 2019-08-09 PROCEDURE — 84100 ASSAY OF PHOSPHORUS: CPT

## 2019-08-09 PROCEDURE — 64450: ICD-10-PCS | Mod: 59,50,, | Performed by: ANESTHESIOLOGY

## 2019-08-09 PROCEDURE — 85002 BLEEDING TIME TEST: CPT

## 2019-08-09 PROCEDURE — 36620 INSERTION CATHETER ARTERY: CPT | Mod: 59,,, | Performed by: ANESTHESIOLOGY

## 2019-08-09 PROCEDURE — 94640 AIRWAY INHALATION TREATMENT: CPT

## 2019-08-09 PROCEDURE — 25000003 PHARM REV CODE 250: Performed by: PHYSICIAN ASSISTANT

## 2019-08-09 PROCEDURE — C1729 CATH, DRAINAGE: HCPCS | Performed by: THORACIC SURGERY (CARDIOTHORACIC VASCULAR SURGERY)

## 2019-08-09 PROCEDURE — 99900035 HC TECH TIME PER 15 MIN (STAT)

## 2019-08-09 PROCEDURE — P9045 ALBUMIN (HUMAN), 5%, 250 ML: HCPCS | Mod: JG | Performed by: PHYSICIAN ASSISTANT

## 2019-08-09 PROCEDURE — 64450 NJX AA&/STRD OTHER PN/BRANCH: CPT | Mod: 59,50,, | Performed by: ANESTHESIOLOGY

## 2019-08-09 PROCEDURE — C1768 GRAFT, VASCULAR: HCPCS | Performed by: THORACIC SURGERY (CARDIOTHORACIC VASCULAR SURGERY)

## 2019-08-09 PROCEDURE — 82962 GLUCOSE BLOOD TEST: CPT | Performed by: THORACIC SURGERY (CARDIOTHORACIC VASCULAR SURGERY)

## 2019-08-09 PROCEDURE — C1898 LEAD, PMKR, OTHER THAN TRANS: HCPCS | Performed by: THORACIC SURGERY (CARDIOTHORACIC VASCULAR SURGERY)

## 2019-08-09 PROCEDURE — 85025 COMPLETE CBC W/AUTO DIFF WBC: CPT

## 2019-08-09 PROCEDURE — 37000009 HC ANESTHESIA EA ADD 15 MINS: Performed by: THORACIC SURGERY (CARDIOTHORACIC VASCULAR SURGERY)

## 2019-08-09 PROCEDURE — 93005 ELECTROCARDIOGRAM TRACING: CPT

## 2019-08-09 PROCEDURE — 85520 HEPARIN ASSAY: CPT

## 2019-08-09 PROCEDURE — 93010 EKG 12-LEAD: ICD-10-PCS | Mod: ,,, | Performed by: INTERNAL MEDICINE

## 2019-08-09 PROCEDURE — 99223 1ST HOSP IP/OBS HIGH 75: CPT | Mod: ,,, | Performed by: NURSE PRACTITIONER

## 2019-08-09 PROCEDURE — 25000003 PHARM REV CODE 250: Performed by: ANESTHESIOLOGY

## 2019-08-09 PROCEDURE — 85610 PROTHROMBIN TIME: CPT

## 2019-08-09 PROCEDURE — 85730 THROMBOPLASTIN TIME PARTIAL: CPT

## 2019-08-09 PROCEDURE — 99223 PR INITIAL HOSPITAL CARE,LEVL III: ICD-10-PCS | Mod: ,,, | Performed by: NURSE PRACTITIONER

## 2019-08-09 DEVICE — FELT TEFLON 1INX6IN: Type: IMPLANTABLE DEVICE | Site: HEART | Status: FUNCTIONAL

## 2019-08-09 DEVICE — IMPLANTABLE DEVICE: Type: IMPLANTABLE DEVICE | Site: HEART | Status: FUNCTIONAL

## 2019-08-09 RX ORDER — CEFAZOLIN SODIUM 1 G/3ML
2 INJECTION, POWDER, FOR SOLUTION INTRAMUSCULAR; INTRAVENOUS
Status: COMPLETED | OUTPATIENT
Start: 2019-08-09 | End: 2019-08-11

## 2019-08-09 RX ORDER — ROCURONIUM BROMIDE 10 MG/ML
INJECTION, SOLUTION INTRAVENOUS
Status: DISCONTINUED | OUTPATIENT
Start: 2019-08-09 | End: 2019-08-09

## 2019-08-09 RX ORDER — POTASSIUM CHLORIDE 29.8 MG/ML
40 INJECTION INTRAVENOUS
Status: DISCONTINUED | OUTPATIENT
Start: 2019-08-09 | End: 2019-08-11

## 2019-08-09 RX ORDER — PANTOPRAZOLE SODIUM 40 MG/10ML
40 INJECTION, POWDER, LYOPHILIZED, FOR SOLUTION INTRAVENOUS DAILY
Status: DISCONTINUED | OUTPATIENT
Start: 2019-08-09 | End: 2019-08-09

## 2019-08-09 RX ORDER — LIDOCAINE HYDROCHLORIDE 10 MG/ML
1 INJECTION, SOLUTION EPIDURAL; INFILTRATION; INTRACAUDAL; PERINEURAL
Status: COMPLETED | OUTPATIENT
Start: 2019-08-09 | End: 2019-08-09

## 2019-08-09 RX ORDER — BACITRACIN 50000 [IU]/1
INJECTION, POWDER, FOR SOLUTION INTRAMUSCULAR
Status: DISCONTINUED | OUTPATIENT
Start: 2019-08-09 | End: 2019-08-09

## 2019-08-09 RX ORDER — ACETAMINOPHEN 10 MG/ML
INJECTION, SOLUTION INTRAVENOUS
Status: DISCONTINUED | OUTPATIENT
Start: 2019-08-09 | End: 2019-08-09

## 2019-08-09 RX ORDER — POTASSIUM CHLORIDE 14.9 MG/ML
60 INJECTION INTRAVENOUS
Status: DISCONTINUED | OUTPATIENT
Start: 2019-08-09 | End: 2019-08-11

## 2019-08-09 RX ORDER — PROPOFOL 10 MG/ML
VIAL (ML) INTRAVENOUS
Status: DISCONTINUED | OUTPATIENT
Start: 2019-08-09 | End: 2019-08-09

## 2019-08-09 RX ORDER — CLOPIDOGREL BISULFATE 75 MG/1
75 TABLET ORAL DAILY
Status: DISCONTINUED | OUTPATIENT
Start: 2019-08-10 | End: 2019-08-10

## 2019-08-09 RX ORDER — GABAPENTIN 300 MG/1
600 CAPSULE ORAL 3 TIMES DAILY
Status: DISCONTINUED | OUTPATIENT
Start: 2019-08-11 | End: 2019-08-14 | Stop reason: HOSPADM

## 2019-08-09 RX ORDER — NICARDIPINE HYDROCHLORIDE 0.2 MG/ML
INJECTION INTRAVENOUS
Status: COMPLETED
Start: 2019-08-09 | End: 2019-08-09

## 2019-08-09 RX ORDER — HEPARIN SODIUM 1000 [USP'U]/ML
INJECTION, SOLUTION INTRAVENOUS; SUBCUTANEOUS
Status: DISCONTINUED | OUTPATIENT
Start: 2019-08-09 | End: 2019-08-09

## 2019-08-09 RX ORDER — PROPOFOL 10 MG/ML
5 INJECTION, EMULSION INTRAVENOUS CONTINUOUS
Status: DISCONTINUED | OUTPATIENT
Start: 2019-08-09 | End: 2019-08-10

## 2019-08-09 RX ORDER — TRANEXAMIC ACID 100 MG/ML
INJECTION, SOLUTION INTRAVENOUS
Status: DISCONTINUED | OUTPATIENT
Start: 2019-08-09 | End: 2019-08-09

## 2019-08-09 RX ORDER — SIMVASTATIN 5 MG/1
20 TABLET, FILM COATED ORAL NIGHTLY
Status: DISCONTINUED | OUTPATIENT
Start: 2019-08-10 | End: 2019-08-14 | Stop reason: HOSPADM

## 2019-08-09 RX ORDER — ASPIRIN 300 MG/1
300 SUPPOSITORY RECTAL ONCE AS NEEDED
Status: DISCONTINUED | OUTPATIENT
Start: 2019-08-09 | End: 2019-08-11

## 2019-08-09 RX ORDER — LIDOCAINE HCL/PF 100 MG/5ML
SYRINGE (ML) INTRAVENOUS
Status: DISCONTINUED | OUTPATIENT
Start: 2019-08-09 | End: 2019-08-09

## 2019-08-09 RX ORDER — MIDAZOLAM HYDROCHLORIDE 1 MG/ML
INJECTION INTRAMUSCULAR; INTRAVENOUS
Status: DISCONTINUED | OUTPATIENT
Start: 2019-08-09 | End: 2019-08-09

## 2019-08-09 RX ORDER — OXYCODONE HYDROCHLORIDE 5 MG/1
5 TABLET ORAL EVERY 4 HOURS PRN
Status: DISCONTINUED | OUTPATIENT
Start: 2019-08-09 | End: 2019-08-14 | Stop reason: HOSPADM

## 2019-08-09 RX ORDER — SODIUM CHLORIDE 9 MG/ML
INJECTION, SOLUTION INTRAVENOUS CONTINUOUS PRN
Status: DISCONTINUED | OUTPATIENT
Start: 2019-08-09 | End: 2019-08-09

## 2019-08-09 RX ORDER — SODIUM CHLORIDE 0.9 % (FLUSH) 0.9 %
10 SYRINGE (ML) INJECTION
Status: DISCONTINUED | OUTPATIENT
Start: 2019-08-09 | End: 2019-08-14 | Stop reason: HOSPADM

## 2019-08-09 RX ORDER — ASPIRIN 325 MG
325 TABLET ORAL ONCE
Status: COMPLETED | OUTPATIENT
Start: 2019-08-09 | End: 2019-08-09

## 2019-08-09 RX ORDER — MUPIROCIN 20 MG/G
1 OINTMENT TOPICAL
Status: COMPLETED | OUTPATIENT
Start: 2019-08-09 | End: 2019-08-09

## 2019-08-09 RX ORDER — MAGNESIUM SULFATE HEPTAHYDRATE 40 MG/ML
4 INJECTION, SOLUTION INTRAVENOUS
Status: DISCONTINUED | OUTPATIENT
Start: 2019-08-09 | End: 2019-08-11

## 2019-08-09 RX ORDER — IPRATROPIUM BROMIDE AND ALBUTEROL SULFATE 2.5; .5 MG/3ML; MG/3ML
3 SOLUTION RESPIRATORY (INHALATION) EVERY 4 HOURS
Status: COMPLETED | OUTPATIENT
Start: 2019-08-09 | End: 2019-08-10

## 2019-08-09 RX ORDER — ACETAMINOPHEN 500 MG
1000 TABLET ORAL ONCE
Status: COMPLETED | OUTPATIENT
Start: 2019-08-09 | End: 2019-08-09

## 2019-08-09 RX ORDER — TRANEXAMIC ACID 100 MG/ML
INJECTION, SOLUTION INTRAVENOUS CONTINUOUS PRN
Status: DISCONTINUED | OUTPATIENT
Start: 2019-08-09 | End: 2019-08-09

## 2019-08-09 RX ORDER — ASPIRIN 325 MG
325 TABLET ORAL DAILY
Status: DISCONTINUED | OUTPATIENT
Start: 2019-08-10 | End: 2019-08-11

## 2019-08-09 RX ORDER — FENTANYL CITRATE 50 UG/ML
INJECTION, SOLUTION INTRAMUSCULAR; INTRAVENOUS
Status: DISCONTINUED | OUTPATIENT
Start: 2019-08-09 | End: 2019-08-09

## 2019-08-09 RX ORDER — BISACODYL 10 MG
10 SUPPOSITORY, RECTAL RECTAL EVERY 12 HOURS PRN
Status: DISCONTINUED | OUTPATIENT
Start: 2019-08-09 | End: 2019-08-14 | Stop reason: HOSPADM

## 2019-08-09 RX ORDER — POTASSIUM CHLORIDE 14.9 MG/ML
20 INJECTION INTRAVENOUS
Status: DISCONTINUED | OUTPATIENT
Start: 2019-08-09 | End: 2019-08-11

## 2019-08-09 RX ORDER — NOREPINEPHRINE BITARTRATE/D5W 4MG/250ML
0.02 PLASTIC BAG, INJECTION (ML) INTRAVENOUS CONTINUOUS
Status: DISCONTINUED | OUTPATIENT
Start: 2019-08-09 | End: 2019-08-10

## 2019-08-09 RX ORDER — IPRATROPIUM BROMIDE AND ALBUTEROL SULFATE 2.5; .5 MG/3ML; MG/3ML
3 SOLUTION RESPIRATORY (INHALATION) EVERY 4 HOURS PRN
Status: ACTIVE | OUTPATIENT
Start: 2019-08-09 | End: 2019-08-10

## 2019-08-09 RX ORDER — NICARDIPINE HYDROCHLORIDE 0.2 MG/ML
1 INJECTION INTRAVENOUS CONTINUOUS
Status: DISCONTINUED | OUTPATIENT
Start: 2019-08-09 | End: 2019-08-11

## 2019-08-09 RX ORDER — MUPIROCIN 20 MG/G
1 OINTMENT TOPICAL 2 TIMES DAILY
Status: COMPLETED | OUTPATIENT
Start: 2019-08-09 | End: 2019-08-14

## 2019-08-09 RX ORDER — PHENYLEPHRINE HYDROCHLORIDE 10 MG/ML
INJECTION INTRAVENOUS
Status: DISCONTINUED | OUTPATIENT
Start: 2019-08-09 | End: 2019-08-09

## 2019-08-09 RX ORDER — DEXTROSE MONOHYDRATE, SODIUM CHLORIDE, AND POTASSIUM CHLORIDE 50; 1.49; 4.5 G/1000ML; G/1000ML; G/1000ML
INJECTION, SOLUTION INTRAVENOUS CONTINUOUS
Status: DISCONTINUED | OUTPATIENT
Start: 2019-08-09 | End: 2019-08-10

## 2019-08-09 RX ORDER — CEFAZOLIN SODIUM 1 G/3ML
2 INJECTION, POWDER, FOR SOLUTION INTRAMUSCULAR; INTRAVENOUS
Status: COMPLETED | OUTPATIENT
Start: 2019-08-09 | End: 2019-08-09

## 2019-08-09 RX ORDER — METOPROLOL TARTRATE 25 MG/1
25 TABLET, FILM COATED ORAL
Status: COMPLETED | OUTPATIENT
Start: 2019-08-09 | End: 2019-08-09

## 2019-08-09 RX ORDER — FENTANYL CITRATE 50 UG/ML
50 INJECTION, SOLUTION INTRAMUSCULAR; INTRAVENOUS
Status: DISCONTINUED | OUTPATIENT
Start: 2019-08-12 | End: 2019-08-11

## 2019-08-09 RX ORDER — DOCUSATE SODIUM 100 MG/1
100 CAPSULE, LIQUID FILLED ORAL 2 TIMES DAILY
Status: DISCONTINUED | OUTPATIENT
Start: 2019-08-09 | End: 2019-08-14 | Stop reason: HOSPADM

## 2019-08-09 RX ORDER — ONDANSETRON 2 MG/ML
4 INJECTION INTRAMUSCULAR; INTRAVENOUS EVERY 12 HOURS PRN
Status: DISCONTINUED | OUTPATIENT
Start: 2019-08-09 | End: 2019-08-14 | Stop reason: HOSPADM

## 2019-08-09 RX ORDER — POLYETHYLENE GLYCOL 3350 17 G/17G
17 POWDER, FOR SOLUTION ORAL DAILY
Status: DISCONTINUED | OUTPATIENT
Start: 2019-08-10 | End: 2019-08-14 | Stop reason: HOSPADM

## 2019-08-09 RX ORDER — FAMOTIDINE 20 MG/50ML
20 INJECTION, SOLUTION INTRAVENOUS 2 TIMES DAILY
Status: DISCONTINUED | OUTPATIENT
Start: 2019-08-09 | End: 2019-08-09

## 2019-08-09 RX ORDER — ACETAMINOPHEN 325 MG/1
650 TABLET ORAL EVERY 4 HOURS PRN
Status: DISCONTINUED | OUTPATIENT
Start: 2019-08-09 | End: 2019-08-14 | Stop reason: HOSPADM

## 2019-08-09 RX ORDER — ONDANSETRON 2 MG/ML
INJECTION INTRAMUSCULAR; INTRAVENOUS
Status: DISCONTINUED | OUTPATIENT
Start: 2019-08-09 | End: 2019-08-09

## 2019-08-09 RX ORDER — FENTANYL CITRATE 50 UG/ML
25 INJECTION, SOLUTION INTRAMUSCULAR; INTRAVENOUS
Status: DISCONTINUED | OUTPATIENT
Start: 2019-08-09 | End: 2019-08-11

## 2019-08-09 RX ORDER — SODIUM CHLORIDE 9 MG/ML
INJECTION, SOLUTION INTRAVENOUS CONTINUOUS
Status: DISCONTINUED | OUTPATIENT
Start: 2019-08-09 | End: 2019-08-09

## 2019-08-09 RX ORDER — KETAMINE HYDROCHLORIDE 10 MG/ML
INJECTION, SOLUTION INTRAMUSCULAR; INTRAVENOUS
Status: DISCONTINUED | OUTPATIENT
Start: 2019-08-09 | End: 2019-08-09

## 2019-08-09 RX ORDER — FAMOTIDINE 10 MG/ML
20 INJECTION INTRAVENOUS 2 TIMES DAILY
Status: DISCONTINUED | OUTPATIENT
Start: 2019-08-09 | End: 2019-08-14 | Stop reason: HOSPADM

## 2019-08-09 RX ORDER — ALBUMIN HUMAN 50 G/1000ML
500 SOLUTION INTRAVENOUS ONCE AS NEEDED
Status: COMPLETED | OUTPATIENT
Start: 2019-08-09 | End: 2019-08-09

## 2019-08-09 RX ORDER — MAGNESIUM SULFATE HEPTAHYDRATE 40 MG/ML
2 INJECTION, SOLUTION INTRAVENOUS
Status: DISCONTINUED | OUTPATIENT
Start: 2019-08-09 | End: 2019-08-11

## 2019-08-09 RX ORDER — METOCLOPRAMIDE HYDROCHLORIDE 5 MG/ML
5 INJECTION INTRAMUSCULAR; INTRAVENOUS EVERY 6 HOURS PRN
Status: DISCONTINUED | OUTPATIENT
Start: 2019-08-09 | End: 2019-08-14 | Stop reason: HOSPADM

## 2019-08-09 RX ORDER — BUPIVACAINE HYDROCHLORIDE AND EPINEPHRINE 5; 5 MG/ML; UG/ML
INJECTION, SOLUTION EPIDURAL; INTRACAUDAL; PERINEURAL
Status: COMPLETED | OUTPATIENT
Start: 2019-08-09 | End: 2019-08-09

## 2019-08-09 RX ORDER — DEXMEDETOMIDINE HYDROCHLORIDE 100 UG/ML
INJECTION, SOLUTION INTRAVENOUS
Status: DISCONTINUED | OUTPATIENT
Start: 2019-08-09 | End: 2019-08-09

## 2019-08-09 RX ORDER — OXYCODONE HYDROCHLORIDE 5 MG/1
10 TABLET ORAL EVERY 4 HOURS PRN
Status: DISCONTINUED | OUTPATIENT
Start: 2019-08-09 | End: 2019-08-14 | Stop reason: HOSPADM

## 2019-08-09 RX ADMIN — SODIUM CHLORIDE: 0.9 INJECTION, SOLUTION INTRAVENOUS at 06:08

## 2019-08-09 RX ADMIN — KETAMINE HYDROCHLORIDE 20 MG: 10 INJECTION, SOLUTION INTRAMUSCULAR; INTRAVENOUS at 09:08

## 2019-08-09 RX ADMIN — HEPARIN SODIUM 5000 UNITS: 1000 INJECTION, SOLUTION INTRAVENOUS; SUBCUTANEOUS at 09:08

## 2019-08-09 RX ADMIN — MIDAZOLAM HYDROCHLORIDE 2 MG: 1 INJECTION, SOLUTION INTRAMUSCULAR; INTRAVENOUS at 06:08

## 2019-08-09 RX ADMIN — FENTANYL CITRATE 150 MCG: 50 INJECTION, SOLUTION INTRAMUSCULAR; INTRAVENOUS at 12:08

## 2019-08-09 RX ADMIN — NICARDIPINE HYDROCHLORIDE 5 MG/HR: 0.2 INJECTION, SOLUTION INTRAVENOUS at 08:08

## 2019-08-09 RX ADMIN — ACETAMINOPHEN 1000 MG: 10 INJECTION, SOLUTION INTRAVENOUS at 12:08

## 2019-08-09 RX ADMIN — SODIUM CHLORIDE, SODIUM GLUCONATE, SODIUM ACETATE, POTASSIUM CHLORIDE, MAGNESIUM CHLORIDE, SODIUM PHOSPHATE, DIBASIC, AND POTASSIUM PHOSPHATE: .53; .5; .37; .037; .03; .012; .00082 INJECTION, SOLUTION INTRAVENOUS at 07:08

## 2019-08-09 RX ADMIN — HEPARIN SODIUM 25000 UNITS: 1000 INJECTION, SOLUTION INTRAVENOUS; SUBCUTANEOUS at 08:08

## 2019-08-09 RX ADMIN — ROCURONIUM BROMIDE 100 MG: 10 INJECTION, SOLUTION INTRAVENOUS at 07:08

## 2019-08-09 RX ADMIN — CALCIUM CHLORIDE 1 G: 100 INJECTION, SOLUTION INTRAVENOUS at 12:08

## 2019-08-09 RX ADMIN — KETAMINE HYDROCHLORIDE 20 MG: 10 INJECTION, SOLUTION INTRAMUSCULAR; INTRAVENOUS at 08:08

## 2019-08-09 RX ADMIN — CEFAZOLIN 2 G: 1 INJECTION, POWDER, FOR SOLUTION INTRAMUSCULAR; INTRAVENOUS at 08:08

## 2019-08-09 RX ADMIN — ROCURONIUM BROMIDE 20 MG: 10 INJECTION, SOLUTION INTRAVENOUS at 11:08

## 2019-08-09 RX ADMIN — EPINEPHRINE 0.04 MCG/KG/MIN: 1 INJECTION INTRAMUSCULAR; INTRAVENOUS; SUBCUTANEOUS at 12:08

## 2019-08-09 RX ADMIN — FENTANYL CITRATE 25 MCG: 50 INJECTION INTRAMUSCULAR; INTRAVENOUS at 04:08

## 2019-08-09 RX ADMIN — SODIUM CHLORIDE 2 UNITS/HR: 9 INJECTION, SOLUTION INTRAVENOUS at 07:08

## 2019-08-09 RX ADMIN — IPRATROPIUM BROMIDE AND ALBUTEROL SULFATE 3 ML: .5; 3 SOLUTION RESPIRATORY (INHALATION) at 04:08

## 2019-08-09 RX ADMIN — ROCURONIUM BROMIDE 30 MG: 10 INJECTION, SOLUTION INTRAVENOUS at 08:08

## 2019-08-09 RX ADMIN — PHENYLEPHRINE HYDROCHLORIDE 100 MCG: 10 INJECTION INTRAVENOUS at 08:08

## 2019-08-09 RX ADMIN — ROCURONIUM BROMIDE 20 MG: 10 INJECTION, SOLUTION INTRAVENOUS at 01:08

## 2019-08-09 RX ADMIN — LIDOCAINE HYDROCHLORIDE 100 MG: 20 INJECTION, SOLUTION INTRAVENOUS at 07:08

## 2019-08-09 RX ADMIN — ACETAMINOPHEN 1000 MG: 500 TABLET, FILM COATED ORAL at 06:08

## 2019-08-09 RX ADMIN — NOREPINEPHRINE BITARTRATE 0.04 MCG/KG/MIN: 1 INJECTION, SOLUTION, CONCENTRATE INTRAVENOUS at 07:08

## 2019-08-09 RX ADMIN — MIDAZOLAM HYDROCHLORIDE 2 MG: 1 INJECTION, SOLUTION INTRAMUSCULAR; INTRAVENOUS at 07:08

## 2019-08-09 RX ADMIN — BUPIVACAINE HYDROCHLORIDE AND EPINEPHRINE BITARTRATE 30 ML: 5; .005 INJECTION, SOLUTION EPIDURAL; INTRACAUDAL; PERINEURAL at 07:08

## 2019-08-09 RX ADMIN — ASPIRIN 325 MG ORAL TABLET 325 MG: 325 PILL ORAL at 08:08

## 2019-08-09 RX ADMIN — PROPOFOL 90 MG: 10 INJECTION, EMULSION INTRAVENOUS at 07:08

## 2019-08-09 RX ADMIN — KETAMINE HYDROCHLORIDE 20 MG: 10 INJECTION, SOLUTION INTRAMUSCULAR; INTRAVENOUS at 10:08

## 2019-08-09 RX ADMIN — MUPIROCIN 1 G: 20 OINTMENT TOPICAL at 06:08

## 2019-08-09 RX ADMIN — CEFAZOLIN 2 G: 330 INJECTION, POWDER, FOR SOLUTION INTRAMUSCULAR; INTRAVENOUS at 12:08

## 2019-08-09 RX ADMIN — FENTANYL CITRATE 200 MCG: 50 INJECTION, SOLUTION INTRAMUSCULAR; INTRAVENOUS at 07:08

## 2019-08-09 RX ADMIN — PROPOFOL 40 MG: 10 INJECTION, EMULSION INTRAVENOUS at 08:08

## 2019-08-09 RX ADMIN — DOCUSATE SODIUM 100 MG: 100 CAPSULE, LIQUID FILLED ORAL at 08:08

## 2019-08-09 RX ADMIN — LIDOCAINE HYDROCHLORIDE 0.2 MG: 10 INJECTION, SOLUTION EPIDURAL; INFILTRATION; INTRACAUDAL; PERINEURAL at 06:08

## 2019-08-09 RX ADMIN — POTASSIUM CHLORIDE, DEXTROSE MONOHYDRATE AND SODIUM CHLORIDE: 150; 5; 450 INJECTION, SOLUTION INTRAVENOUS at 04:08

## 2019-08-09 RX ADMIN — FENTANYL CITRATE 50 MCG: 50 INJECTION, SOLUTION INTRAMUSCULAR; INTRAVENOUS at 08:08

## 2019-08-09 RX ADMIN — ROCURONIUM BROMIDE 30 MG: 10 INJECTION, SOLUTION INTRAVENOUS at 12:08

## 2019-08-09 RX ADMIN — PROPOFOL 50 MCG/KG/MIN: 10 INJECTION, EMULSION INTRAVENOUS at 03:08

## 2019-08-09 RX ADMIN — IPRATROPIUM BROMIDE AND ALBUTEROL SULFATE 3 ML: .5; 3 SOLUTION RESPIRATORY (INHALATION) at 11:08

## 2019-08-09 RX ADMIN — DEXMEDETOMIDINE HYDROCHLORIDE 25 MCG: 100 INJECTION, SOLUTION, CONCENTRATE INTRAVENOUS at 01:08

## 2019-08-09 RX ADMIN — KETAMINE HYDROCHLORIDE 30 MG: 10 INJECTION, SOLUTION INTRAMUSCULAR; INTRAVENOUS at 07:08

## 2019-08-09 RX ADMIN — NICARDIPINE HYDROCHLORIDE 5 MG/HR: 0.2 INJECTION, SOLUTION INTRAVENOUS at 04:08

## 2019-08-09 RX ADMIN — OXYCODONE HYDROCHLORIDE 10 MG: 10 TABLET ORAL at 08:08

## 2019-08-09 RX ADMIN — ONDANSETRON 4 MG: 2 INJECTION INTRAMUSCULAR; INTRAVENOUS at 02:08

## 2019-08-09 RX ADMIN — CALCIUM CHLORIDE 1 G: 100 INJECTION, SOLUTION INTRAVENOUS at 01:08

## 2019-08-09 RX ADMIN — TRANEXAMIC ACID 100 MG: 100 INJECTION, SOLUTION INTRAVENOUS at 07:08

## 2019-08-09 RX ADMIN — METOPROLOL TARTRATE 25 MG: 25 TABLET ORAL at 06:08

## 2019-08-09 RX ADMIN — TRANEXAMIC ACID 1 MG/KG/HR: 100 INJECTION, SOLUTION INTRAVENOUS at 07:08

## 2019-08-09 RX ADMIN — IPRATROPIUM BROMIDE AND ALBUTEROL SULFATE 3 ML: .5; 3 SOLUTION RESPIRATORY (INHALATION) at 08:08

## 2019-08-09 RX ADMIN — ALBUMIN (HUMAN) 500 ML: 12.5 SOLUTION INTRAVENOUS at 03:08

## 2019-08-09 RX ADMIN — KETAMINE HYDROCHLORIDE 10 MG: 10 INJECTION, SOLUTION INTRAMUSCULAR; INTRAVENOUS at 12:08

## 2019-08-09 RX ADMIN — MUPIROCIN 1 G: 20 OINTMENT TOPICAL at 08:08

## 2019-08-09 RX ADMIN — CEFAZOLIN 2 G: 330 INJECTION, POWDER, FOR SOLUTION INTRAMUSCULAR; INTRAVENOUS at 08:08

## 2019-08-09 NOTE — PROGRESS NOTES
Pt extubated to 2L NC. Pt tolerated well. Pt resting comfortably with no issues. O2 sats 98%. Will continue to monitor.

## 2019-08-09 NOTE — SUBJECTIVE & OBJECTIVE
Interval HPI:   Overnight events:   BG is above goal on current Intensive IV insulin infusion.   Patient is intubated in SICU.   Eating:   NPO  Nausea: No  Hypoglycemia and intervention: No  Fever: No  TPN and/or TF: No  If yes, type of TF/TPN and rate: None    PMH, PSH, FH, SH reviewed     ROS:  Unable to obtain due to: Sedation,Intubation,Altered mental status,Critical illness,Reviewed ROS from note dated 07/25/2019 per Yasmin Ch MD    Current Medications and/or Treatments Impacting Glycemic Control  Immunotherapy:    Immunosuppressants     None        Steroids:   Hormones (From admission, onward)    None        Pressors:    Autonomic Drugs (From admission, onward)    Start     Stop Route Frequency Ordered    08/09/19 1530  norepinephrine 4 mg in dextrose 5% 250 mL infusion (premix) (titrating)     Question Answer Comment   Titrate by: (in mcg/kg/min) 0.02    Titrate interval: (in minutes) 0.02    Titrate to maintain: (MAP or SBP) MAP    Greater than: (in mmHg) 60    Maximum dose: (in mcg/kg/min) 3        -- IV Continuous 08/09/19 1519    08/09/19 1500  EPINEPHrine (ADRENALIN) 5 mg in sodium chloride 0.9% 250 mL infusion     Question Answer Comment   Titrate by: (in mcg/kg/min) 0.02    Titrate interval: (in minutes) 5    Titrate to maintain: (SBP or MAP or Cardiac Index) MAP    Greater than: (in mmHg) 65    Cardiac index greater than: (in L/min) 2.2    Maximum dose: (in mcg/kg/min) 2        -- IV Continuous 08/09/19 1448        Hyperglycemia/Diabetes Medications:   Antihyperglycemics (From admission, onward)    Start     Stop Route Frequency Ordered    08/09/19 1500  insulin regular (Humulin R) 100 Units in sodium chloride 0.9% 100 mL infusion     Question:  Insulin rate changes (DO NOT MODIFY ANSWER)  Answer:  \\[email or web address] [email or web address]    -- IV Continuous 08/09/19 1448             PHYSICAL EXAMINATION:  Vitals:    08/09/19 1530   BP:    Pulse: 79    Resp: (!) 7   Temp:      Body mass index is 33.45 kg/m².    Physical Exam   Constitutional: Well developed, well nourished, NAD. Sedated.  ENT: External ears no masses with nose patent  Neck: Supple; trachea midline; no thyromegaly.  Cardiovascular: Heart sounds present with no LE edema.   Lungs: lungs anterior bilaterally clear to auscultation. Intubated on ventilator.  Abdomen: Soft, no masses, no hernias.  MS: No clubbing or cyanosis of nails noted unable to assess gait.  Skin: No rashes, lesions, or ulcers; no nodules. Mid-sternal incision with dressing; chest tubes x2.   Foot: nails in good condition, no amputations noted.

## 2019-08-09 NOTE — ANESTHESIA PROCEDURE NOTES
[facility]    Diagnosis: severe AS  Patient location during procedure: OR  Procedure start time: 8/9/2019 8:01 AM  Timeout: 8/9/2019 8:00 AM  Procedure end time: 8/9/2019 8:15 AM  Surgery related to: severe AS  Staffing  Anesthesiologist: Valentino Tran Jr., MD  Performed: anesthesiologist   Preanesthetic Checklist  Completed: patient identified, surgical consent, pre-op evaluation, timeout performed, risks and benefits discussed, monitors and equipment checked, anesthesia consent given, oxygen available, suction available, hand hygiene performed and patient being monitored  Setup & Induction  Patient preparation: bite block inserted  Probe Insertion: easy  Exam: complete      Findings  Impression  Other Findings  Pre:  Severe AS, mild AI of true bicuspid AV  Effaced ascending aorta with maximum diameter 4  All other valves structurally and functionally normal  Normal biventricular function  LVH    Post:  AVR with no paravalvular leak, mean gradient ~18mmHg  No other significant changes  Probe Removal      Exam     Left Heart  Left Atruim: normal        LVAD:no  Estimated Ejection Fraction: > 55% normal  Regional Wall Abnormalities: no RWMA        Left Ventricle Diastolic Function    Lateral E': 11 cm/s  E/E' Ratio: 7    Right Heart  Right Ventricle: normal  Right Atria: cm and normal    Intra Atrial Septum  PFO: no shunt by color flow doppler  no IAS aneurysm  no lipomatous hypertrophy  no Atrial Septal Defect (Asd)    Right Ventricle  Size: normal    Aortic Valve:  Stenosis: severe  Morphology: bicuspid aortic valve  Regurgitation: mild (2+) aortic regurgitation     Mitral Valve:  Morphology:normal  Jet Description: noneStenosis: no significant stenosis.    Tricuspid Valve:  Morphology: normal  Regurgitation: none    Pulmonic Valve:  Morphology:normal  Regurgitation(color flow): none    Great Vessels  Ascending Aorta Diameter: 4 cm ascending aorta   Ascending Aorta Atherosclerosis: 1=nl-min dz  Aortic Arch  Atherosclerosis: 1=nl-min dz  IABP: no  Descending Aorta Atherosclerosis: 1=nl-min dz  Aorta    Descending aorta IABP: no    Effusions  Effusions: none    Summary  Findings discussed with surgeon.    Other Findings   Pre:  Severe AS, mild AI of true bicuspid AV  Effaced ascending aorta with maximum diameter 4  All other valves structurally and functionally normal  Normal biventricular function  LVH    Post:  AVR with no paravalvular leak, mean gradient ~18mmHg  No other significant changes

## 2019-08-09 NOTE — ANESTHESIA PROCEDURE NOTES
Central Line    Diagnosis: Aortic Stenosis  Patient location during procedure: done in OR  Procedure start time: 8/9/2019 7:21 AM  Timeout: 8/9/2019 7:20 AM  Procedure end time: 8/9/2019 7:30 AM    Staffing  Authorizing Provider: Valentino Tran Jr., MD  Performing Provider: Haja Hameed MD    Staffing  Anesthesiologist: Valentino Tran Jr., MD  Resident/CRNA: Haja Hameed MD  Performed: resident/CRNA   Anesthesiologist was present at the time of the procedure.  Preanesthetic Checklist  Completed: patient identified, site marked, surgical consent, pre-op evaluation, timeout performed, IV checked, risks and benefits discussed, monitors and equipment checked and anesthesia consent given  Indication   Indication: vascular access, hemodynamic monitoring, med administration     Anesthesia   general anesthesia    Central Line   Skin Prep: skin prepped with ChloraPrep, skin prep agent completely dried prior to procedure  maximum sterile barriers used during central venous catheter insertion  hand hygiene performed prior to central venous catheter insertion  Location: right, internal jugular.   Catheter type: double lumen  Catheter Size: 9 Fr  Inserted Catheter Length: 16 cm  Ultrasound: vascular probe with ultrasound  Vessel Caliber: medium, patent, compressibility normal  Needle advanced into vessel with real time Ultrasound guidance.   Manometry: Venous cannualation confirmed by visual estimation of blood vessel pressure using manometry.  Insertion Attempts: 1   Securement:line sutured, chlorhexidine patch, sterile dressing applied and blood return through all ports    Post-Procedure   Adverse Events:none    Guidewire   Additional Notes  7Fr Franklin inserted steriley through introducer

## 2019-08-09 NOTE — HOSPITAL COURSE
Cj Barnes is a 53 y.o. male with history of type 2 DM (controlled with insulin pump), CAD, Bicuspid AV with aortic stenosis now s/p Bentall procedure with a 23 mm Carbomedics mechanical valved conduit on 8/9. Arrived to [facility] intubated on multiple pressors. Past surgical history includes Stage 2 colon cancer in 2015 with resection, no chemotherapy or radiation.

## 2019-08-09 NOTE — ANESTHESIA PROCEDURE NOTES
Transverse Thoracic Block    Patient location during procedure: OR   Block not for primary anesthetic.  Reason for block: at surgeon's request and post-op pain management   Post-op Pain Location: Sternum  Start time: 8/9/2019 7:36 AM  Timeout: 8/9/2019 7:35 AM   End time: 8/9/2019 7:41 AM    Staffing  Authorizing Provider: Valentino Tran Jr., MD  Performing Provider: Haja Hameed MD    Preanesthetic Checklist  Completed: patient identified, site marked, surgical consent, pre-op evaluation, timeout performed, IV checked, risks and benefits discussed and monitors and equipment checked  Peripheral Block  Patient position: supine  Prep: ChloraPrep  Patient monitoring: heart rate, cardiac monitor, continuous pulse ox, continuous capnometry and frequent blood pressure checks  Block type: Transversus thoracis (transverse thoracic)  Laterality: bilateral  Injection technique: single shot  Needle  Needle type: Stimuplex   Needle gauge: 22 G  Needle length: 2 in  Needle localization: ultrasound guidance   -ultrasound image captured on disc.  Assessment  Injection assessment: negative aspiration, negative parasthesia and local visualized surrounding nerve  Paresthesia pain: none  Heart rate change: no  Slow fractionated injection: yes  Additional Notes  15cc of 0.5% bupivacaine injected bilaterally under ultrasound guidance.  No evidence of pneumothorax or intravascular injection

## 2019-08-09 NOTE — H&P
[facility]  Critical Care - Surgery  Progress Note    Patient Name: Cj Barnes  MRN: [id number]  Admission Date: 8/9/2019  Hospital Length of Stay: 0 days  Code Status: Full Code  Attending Provider: Ryan Knight MD  Primary Care Provider: Garry Stover MD   Principal Problem: S/P ascending aortic replacement    Subjective:     Hospital/ICU Course:  Cj Barnes is a 53 y.o. male  with history of type 2 DM (controlled with insulin pump), CAD, Bicuspid AV with aortic stenosis now s/p Bentall procedure with a 23 mm Carbomedics mechanical valved conduit on 8/9. Arrived to [facility] intubated on multiple pressors. Past surgical history includes Stage 2 colon cancer in 2015 with resection, no chemotherapy or radiation.     Interval History/Significant Events: Patient arrived to [facility] on minimal pressors (epi, levo) intubated.     Follow-up For: Procedure(s) (LRB):  Replacement-valve-aortic (N/A)  BENTALL PROCEDURE (N/A)    Post-Operative Day: Day of Surgery    Objective:     Vital Signs (Most Recent):  Temp: 98.1 °F (36.7 °C) (08/09/19 1515)  Pulse: 83 (08/09/19 1640)  Resp: 11 (08/09/19 1640)  BP: 125/73 (08/09/19 1630)  SpO2: 100 % (08/09/19 1640) Vital Signs (24h Range):  Temp:  [97.7 °F (36.5 °C)-98.1 °F (36.7 °C)] 98.1 °F (36.7 °C)  Pulse:  [52-87] 83  Resp:  [0-16] 11  SpO2:  [96 %-100 %] 100 %  BP: (104-139)/(59-74) 125/73  Arterial Line BP: ()/(52-64) 113/61     Weight: 99.8 kg (220 lb)  Body mass index is 33.45 kg/m².      Intake/Output Summary (Last 24 hours) at 8/9/2019 1649  Last data filed at 8/9/2019 1600  Gross per 24 hour   Intake 3011 ml   Output 1235 ml   Net 1776 ml       Physical Exam   Constitutional: He appears well-developed and well-nourished.   HENT:   Intubated   Eyes: Pupils are equal, round, and reactive to light. Conjunctivae and EOM are normal.   Neck:   Right IJ CVC c/d/i   Cardiovascular: Normal rate, regular rhythm, normal heart sounds and intact  distal pulses.   Chest drains with serosanguinous output   Pulmonary/Chest:   Mehcanically intubated   Abdominal: Soft. Bowel sounds are normal.   Musculoskeletal: He exhibits no deformity.   Neurological:   Pharmacologically sedated   Skin: Skin is warm and dry.   Nursing note and vitals reviewed.      Vents:  Vent Mode: SIMV (08/09/19 1625)  Set Rate: 12 bmp (08/09/19 1625)  Vt Set: 500 mL (08/09/19 1625)  Pressure Support: 0 cmH20 (08/09/19 1625)  PEEP/CPAP: 5 cmH20 (08/09/19 1625)  Oxygen Concentration (%): 39 (08/09/19 1640)  Peak Airway Pressure: 18 cmH2O (08/09/19 1625)  Plateau Pressure: 0 cmH20 (08/09/19 1625)  Total Ve: 7.72 mL (08/09/19 1625)  F/VT Ratio<105 (RSBI): (!) 0 (08/09/19 1625)    Lines/Drains/Airways     Central Venous Catheter Line                 Percutaneous Central Line Insertion/Assessment - double lumen  08/09/19 0721 less than 1 day          Drain                 Urethral Catheter 08/09/19 0740 Straight-tip;Non-latex;Temperature probe 14 Fr. less than 1 day         Y Chest Tube 1 and 2 08/09/19 1244 1 Anterior Mediastinal 19 Fr. 2 Posterior Mediastinal 19 Fr. less than 1 day          Airway                 Airway - Non-Surgical 08/09/19 0622 less than 1 day         Airway - Non-Surgical 08/09/19 0717 Endotracheal Tube less than 1 day          Arterial Line                 Arterial Line 08/09/19 0705 Left Radial less than 1 day          Line                 Pacer Wires 08/09/19 1240 less than 1 day          Peripheral Intravenous Line                 Peripheral IV - Single Lumen 08/09/19 0634 18 G Left Forearm less than 1 day         Peripheral IV - Single Lumen 08/09/19 0719 16 G Right Hand less than 1 day                Significant Labs:    CBC/Anemia Profile:  Recent Labs   Lab 08/08/19  0958  08/09/19  1224 08/09/19  1515 08/09/19  1612   WBC 7.79  --   --  22.54*  --    HGB 15.3  --   --  11.9*  --    HCT 46.2   < > 33* 35.7* 29*     --   --  176  --    MCV 88  --   --  88  --     RDW 13.6  --   --  13.6  --     < > = values in this interval not displayed.        Chemistries:  Recent Labs   Lab 08/08/19  0958 08/09/19  1515    139   K 3.9 4.1    112*   CO2 23 19*   BUN 19 19   CREATININE 0.9 0.8   CALCIUM 9.4 8.8   MG  --  2.6  2.6   PHOS  --  3.7  3.7       All pertinent labs within the past 24 hours have been reviewed.    Significant Imaging:  I have reviewed all pertinent imaging results/findings within the past 24 hours.    Assessment/Plan:     Aortic stenosis  52 yo Man with history of T2DM, CAD , AS 2/2 congenital bicuspid AV s/p Bentall with 23mm Mechanical aortic valve. Received 3L crystalloid and no cell saver intraoperatively.    Neuro:  Sedation: Propofol, wean for SBT  Analgesia: Fentanyl, dilaudid oxycodone when tolerating PO.    CV:   AS s/p Bentall 23mm   Cardene gtt prn for goal Systolic <110, MAP goal 60-70  Lactate 2.95 on arrival , Resuscitate with albumin  Trend ABG, Lactate Q6    Pulm:   Intubated  SIMV 500/RR 14/ Peep 5 - wean to extubate    Renal:   Monitor UOP  qAM BMP    FEN/GI  Resuscitate with albumin  Goal K > 4, Mg > 2.0  NPO for now pending extubation    Heme/ID  Afebrile, post op leukocyotosis  H/H stable  Ancef 2g Q8 for 5 doses postop    Endo:  Type 2 DM with insulin pump     Dispo: Continue SICU care    Discussed with CTS      Critical secondary to Patient has a condition that poses threat to life and bodily function: post op from [facility]     Critical care was time spent personally by me on the following activities: development of treatment plan with patient or surrogate and bedside caregivers, discussions with consultants, evaluation of patient's response to treatment, examination of patient, ordering and performing treatments and interventions, ordering and review of laboratory studies, ordering and review of radiographic studies, pulse oximetry, re-evaluation of patient's condition.  This critical care time did not overlap with that of any  other provider or involve time for any procedures.     Sj Kennedy MD  Critical Care - Surgery  [facility]

## 2019-08-09 NOTE — ANESTHESIA PROCEDURE NOTES
Arterial    Diagnosis: Aortic Stenosis    Patient location during procedure: done in OR  Procedure start time: 8/9/2019 7:05 AM  Timeout: 8/9/2019 7:04 AM  Procedure end time: 8/9/2019 7:07 AM    Staffing  Authorizing Provider: Valentino Tran Jr., MD  Performing Provider: Haja Hameed MD    Anesthesiologist was present at the time of the procedure.    Preanesthetic Checklist  Completed: patient identified, site marked, surgical consent, pre-op evaluation, timeout performed, IV checked, risks and benefits discussed, monitors and equipment checked and anesthesia consent givenArterial  Skin Prep: chlorhexidine gluconate  Local Infiltration: lidocaine  Orientation: left  Location: radial  Catheter Size: 20 G  Catheter placement by Ultrasound guidance. Heme positive aspiration all ports.  Vessel Caliber: patent  Needle advanced into vessel with real time Ultrasound guidance.Insertion Attempts: 2  Assessment  Dressing: secured with tape and tegaderm  Patient: Tolerated well

## 2019-08-09 NOTE — ASSESSMENT & PLAN NOTE
54 yo Man with history of T2DM, CAD , AS 2/2 congenital bicuspid AV s/p Bentall with 23mm Mechanical aortic valve. Received 3L crystalloid and no cell saver intraoperatively.    Neuro:  Sedation: Propofol, wean for SBT  Analgesia: Fentanyl, dilaudid oxycodone when tolerating PO.    CV:   AS s/p Bentall 23mm   Cardene gtt prn for goal Systolic <110, MAP goal 60-70  Lactate 2.95 on arrival , Resuscitate with albumin  Trend ABG, Lactate Q6    Pulm:   Intubated  SIMV 500/RR 14/ Peep 5 - wean to extubate    Renal:   Monitor UOP  qAM BMP    FEN/GI  Resuscitate with albumin  Goal K > 4, Mg > 2.0  NPO for now pending extubation    Heme/ID  Afebrile, post op leukocyotosis  H/H stable  Ancef 2g Q8 for 5 doses postop    Endo:  Type 2 DM with insulin pump     Dispo: Continue SICU care    Discussed with CTS

## 2019-08-09 NOTE — OP NOTE
DATE OF PROCEDURE:  08/09/2019.    ATTENDING SURGEON:  Ryan Knight M.D.    ASSISTANT:  Yasmin Ch M.D., cardiothoracic resident.    PREOPERATIVE DIAGNOSES:  1.  Severe aortic stenosis.  2.  Ascending aortic aneurysm.  3.  Diabetes mellitus.  4.  Obesity.    POSTOPERATIVE DIAGNOSES:  1.  Severe aortic stenosis.  2.  Ascending aortic aneurysm.  3.  Diabetes mellitus.  4.  Obesity.    OPERATION PERFORMED:  Aortic root replacement (Bentall procedure) with a 23 mm   Carbomedics mechanical valved conduit.    ANESTHESIA:  General endotracheal.    ESTIMATED BLOOD LOSS:  100 mL    BRIEF HISTORY:  Mr. Barnes is a 53-year-old gentleman with severe aortic stenosis.    He has a bicuspid aortic valve.  He has been followed for some time for his   aortic stenosis, had been asymptomatic; however, he noted progressive dyspnea on   exertion.  A repeat echo demonstrated severe aortic stenosis.  A preoperative   CT scan demonstrated dilation of the proximal ascending aorta greater than 4 cm.    He now presents for aortic root replacement.    PROCEDURE IN DETAIL:  After obtaining informed and written consent, the patient   was brought to the Operating Room and placed on the operating table in supine   position.  After induction of adequate general endotracheal anesthesia, the   patient's chest, abdomen, pelvis and bilateral lower extremities were prepped   and draped in the usual sterile fashion.  An upper midline skin incision was   made and a median sternotomy was performed.  A pericardial well was created.    The patient was systemically heparinized.  Cannulation sutures were placed in   the proximal aortic arch and in the right atrial appendage.  The aortic cannula   was inserted, followed by the venous.  Antegrade and retrograde cardioplegia   catheters were placed.  The patient was then put on cardiopulmonary bypass.    Once on bypass, a left ventricular vent was placed through the right superior   pulmonary vein.   The aorta was  from the pulmonary artery.  The aortic   cross-clamp was applied and the heart was arrested using cold blood enhanced   antegrade cardioplegia.  A prompt electromechanical arrest was achieved.  Once   the cardioplegia was all in, the ascending aorta was transected at the level of   the right pulmonary artery.  It was resected distally to within 1 cm of the   aortic clamp, which had been placed just proximal to the origin of the   innominate artery.  Proximally, it was resected to the level of the sinotubular   junction.  Valve exposure sutures were placed.  The valve was evaluated.  It was   congenitally bicuspid, with fusion between the left and right cusps.  The cusp   themselves were very heavily calcified.  The ostium of the left main coronary   was identified and cut out as a Carrel patch.  It was marked with a silk suture   to prevent rotation.  The same was done for the right.  We then addressed the   valve.  The leaflets were resected sharply.  Extensive annular debridement was   performed.  Once the annulus was satisfactory, the ventricle was irrigated with   a liter of cold saline.  The annulus was sized and a 23 mm valve was selected.    While the valve was being retrieved, multiple pledgeted 2-0 Ethibond sutures   were placed circumferentially around the annulus.  Once the sutures were all in,   the valved conduit was brought up.  The sutures were passed through the sewing   ring and it was slid into position.  It seated nicely.  The sutures were tied   and then cut.  The valve leaflets were tested and demonstrated full range of   motion.  A site suitable for reimplantation of the left main was identified and   marked.  A defect was created in the Valsalva portion of the Dacron tube graft   using the eye cautery.  The left main button was then reimplanted using a   running 5-0 Prolene suture.  After reimplantation, retrograde cardioplegia was   administered.  Brisk effluent was  seen to come from the ostium of the left main.    The distal portion of the aortic graft was tailored and the graft to aorta   anastomosis was performed using a running 4-0 Prolene suture reinforced with a   felt strip.  A site suitable for reimplantation of the right coronary was   identified and marked.  A defect was created in the Valsalva portion of the   Dacron tube graft using the eye cautery.  The right coronary was then   reimplanted using a running 5-0 Prolene suture.  The hot shot was given   retrograde and de-airing maneuvers were performed.  The aortic cross-clamp was   removed and the patient was subsequently weaned from cardiopulmonary bypass.    The patient did separate easily from bypass.  Once off bypass, all surgical   sites were inspected.  There was good hemostasis.  The valve was evaluated using   CAMI and appeared to be functioning properly.  The test dose of protamine was   administered and this was well tolerated.  The total dose was then given.    [place] through the total dose, all cannulas were removed.  All surgical sites   were again inspected, again there was good hemostasis.  Ventricular pacing wires   were placed.  Drains were placed.  After again confirming adequate hemostasis,   the patient's chest was closed using #6 stainless steel wires to reapproximate   the sternum.  The overlying soft tissues were reapproximated using absorbable   suture material.  The patient's chest was washed and dried and a dry dressing   was applied.  The patient tolerated the procedure well, there were no   complications.  At the conclusion of the case, sponge and instrument counts were   correct.      PB/ABEL  dd: 08/09/2019 15:39:20 (CDT)  td: 08/09/2019 20:13:12 (CD)  Doc ID   #[id number]  Job ID #[id number]    CC:     [id number]

## 2019-08-09 NOTE — TRANSFER OF CARE
"Anesthesia Transfer of Care Note    Patient: Cj Barnes    Procedure(s) Performed: Procedure(s) (LRB):  Replacement-valve-aortic (N/A)  BENTALL PROCEDURE (N/A)    Patient location: ICU    Anesthesia Type: general    Transport from OR: Transported from OR intubated on 100% O2 by AMBU with adequate controlled ventilation. Upon arrival to PACU/ICU, patient attached to ventilator and auscultated to confirm bilateral breath sounds and adequate TV. Continuous ECG monitoring in transport. Continuous SpO2 monitoring in transport. Continuos invasive BP monitoring in transport    Post pain: adequate analgesia    Post assessment: no apparent anesthetic complications    Post vital signs: stable    Level of consciousness: sedated    Nausea/Vomiting: no nausea/vomiting    Complications: none    Transfer of care protocol was followed      Last vitals:   Visit Vitals  BP (!) 104/59   Pulse 79   Temp 36.7 °C (98.1 °F) (Oral)   Resp (!) 7   Ht 5' 8" (1.727 m)   Wt 99.8 kg (220 lb)   SpO2 100%   BMI 33.45 kg/m²     "

## 2019-08-09 NOTE — PLAN OF CARE
Patient prepped for procedure. Questions answered. Insulin pump still intact. Patient clipped and prepped. Form sent to blood bank for type and screen drawn yesterday.

## 2019-08-09 NOTE — BRIEF OP NOTE
[facility]  Cardiothoracic Surgery  Operative Note    SUMMARY     Date of Procedure: 8/9/2019     Procedure: Procedure(s) (LRB):    BENTALL PROCEDURE with a 23 mm Carbomedics mechanical valved conduit [id number]    Surgeon(s) and Role:     * Ryan Knight MD - Primary     * Yasmin Ch MD - Fellow    Assisting Surgeon: None    Pre-Operative Diagnosis: Nonrheumatic aortic valve stenosis [I35.0]    Post-Operative Diagnosis: Post-Op Diagnosis Codes:     * Nonrheumatic aortic valve stenosis [I35.0]    Anesthesia: General        Description of the Findings of the Procedure: Heavily calcified congenitally bicuspid  (L-R fusion) valve.        Complications: None    Estimated Blood Loss (EBL): 100 mL           Implants:   Implant Name Type Inv. Item Serial No.  Lot No. LRB No. Used   LEAD PACEMAKER MYOCARDIAL - [id number]  LEAD PACEMAKER MYOCARDIAL  [employer] [facility] [id number] N/A 2   FELT LESLIE [id number] - [id number]  FELT LESLIE [id number]  [employer] [id number] N/A 1   Carbomedics Carbo-seal Valsalva   [id number] [employer] N/A N/A 1       Specimens:   Specimen (12h ago, onward)    Start     Ordered    08/09/19 1038  Specimen to Pathology - Surgery  Once     Comments:  1.  Aortic valve leaflet for permanent2.  Aortic Aneurysm for permanent     Start Status     08/09/19 1038 Collected (08/09/19 1041) Order ID: [id number]       08/09/19 1040                  Attestation:  I was present and scrubbed for the procedure.

## 2019-08-09 NOTE — SUBJECTIVE & OBJECTIVE
Interval History/Significant Events: Patient arrived to sicu on minimal pressors (epi, levo) intubated.     Follow-up For: Procedure(s) (LRB):  Replacement-valve-aortic (N/A)  BENTALL PROCEDURE (N/A)    Post-Operative Day: Day of Surgery    Objective:     Vital Signs (Most Recent):  Temp: 98.1 °F (36.7 °C) (08/09/19 1515)  Pulse: 83 (08/09/19 1640)  Resp: 11 (08/09/19 1640)  BP: 125/73 (08/09/19 1630)  SpO2: 100 % (08/09/19 1640) Vital Signs (24h Range):  Temp:  [97.7 °F (36.5 °C)-98.1 °F (36.7 °C)] 98.1 °F (36.7 °C)  Pulse:  [52-87] 83  Resp:  [0-16] 11  SpO2:  [96 %-100 %] 100 %  BP: (104-139)/(59-74) 125/73  Arterial Line BP: ()/(52-64) 113/61     Weight: 99.8 kg (220 lb)  Body mass index is 33.45 kg/m².      Intake/Output Summary (Last 24 hours) at 8/9/2019 1649  Last data filed at 8/9/2019 1600  Gross per 24 hour   Intake 3011 ml   Output 1235 ml   Net 1776 ml       Physical Exam   Constitutional: He appears well-developed and well-nourished.   HENT:   Intubated   Eyes: Pupils are equal, round, and reactive to light. Conjunctivae and EOM are normal.   Neck:   Right IJ CVC c/d/i   Cardiovascular: Normal rate, regular rhythm, normal heart sounds and intact distal pulses.   Chest drains with serosanguinous output   Pulmonary/Chest:   Mehcanically intubated   Abdominal: Soft. Bowel sounds are normal.   Musculoskeletal: He exhibits no deformity.   Neurological:   Pharmacologically sedated   Skin: Skin is warm and dry.   Nursing note and vitals reviewed.      Vents:  Vent Mode: SIMV (08/09/19 1625)  Set Rate: 12 bmp (08/09/19 1625)  Vt Set: 500 mL (08/09/19 1625)  Pressure Support: 0 cmH20 (08/09/19 1625)  PEEP/CPAP: 5 cmH20 (08/09/19 1625)  Oxygen Concentration (%): 39 (08/09/19 1640)  Peak Airway Pressure: 18 cmH2O (08/09/19 1625)  Plateau Pressure: 0 cmH20 (08/09/19 1625)  Total Ve: 7.72 mL (08/09/19 1625)  F/VT Ratio<105 (RSBI): (!) 0 (08/09/19 1625)    Lines/Drains/Airways     Central Venous Catheter Line                  Percutaneous Central Line Insertion/Assessment - double lumen  08/09/19 0721 less than 1 day          Drain                 Urethral Catheter 08/09/19 0740 Straight-tip;Non-latex;Temperature probe 14 Fr. less than 1 day         Y Chest Tube 1 and 2 08/09/19 1244 1 Anterior Mediastinal 19 Fr. 2 Posterior Mediastinal 19 Fr. less than 1 day          Airway                 Airway - Non-Surgical 08/09/19 0622 less than 1 day         Airway - Non-Surgical 08/09/19 0717 Endotracheal Tube less than 1 day          Arterial Line                 Arterial Line 08/09/19 0705 Left Radial less than 1 day          Line                 Pacer Wires 08/09/19 1240 less than 1 day          Peripheral Intravenous Line                 Peripheral IV - Single Lumen 08/09/19 0634 18 G Left Forearm less than 1 day         Peripheral IV - Single Lumen 08/09/19 0719 16 G Right Hand less than 1 day                Significant Labs:    CBC/Anemia Profile:  Recent Labs   Lab 08/08/19  0958  08/09/19  1224 08/09/19  1515 08/09/19  1612   WBC 7.79  --   --  22.54*  --    HGB 15.3  --   --  11.9*  --    HCT 46.2   < > 33* 35.7* 29*     --   --  176  --    MCV 88  --   --  88  --    RDW 13.6  --   --  13.6  --     < > = values in this interval not displayed.        Chemistries:  Recent Labs   Lab 08/08/19  0958 08/09/19  1515    139   K 3.9 4.1    112*   CO2 23 19*   BUN 19 19   CREATININE 0.9 0.8   CALCIUM 9.4 8.8   MG  --  2.6  2.6   PHOS  --  3.7  3.7       All pertinent labs within the past 24 hours have been reviewed.    Significant Imaging:  I have reviewed all pertinent imaging results/findings within the past 24 hours.

## 2019-08-09 NOTE — PLAN OF CARE
Problem: Adult Inpatient Plan of Care  Goal: Plan of Care Review  Outcome: Ongoing (interventions implemented as appropriate)  POC reviewed with Pt and family. Pt is AAOX4. Pt follows commands. Pt extubated to 2L NC at 1515. Pt is tolerating it well, no distress noted. Pts HR has been 80s NSR. Pts SBP 110s with MAPs 60-70. Pressors titrated off. Cardene started for MAP goals. Insulin Q1 and titrated per algorithm. CT intact with 20-30cc/hr. Midsternal incision CDI. Saavedra intact with U/O of 60-125cc/hr since arrival. Pain being controlled with PRN meds. Family at bedside. Labs trended and ABGs PRN. VSS. Will continue to monitor.

## 2019-08-09 NOTE — PLAN OF CARE
08/09/19 1452   Discharge Assessment   Assessment Type Discharge Planning Assessment   Confirmed/corrected address and phone number on facesheet? Yes   Assessment information obtained from? Medical Record   Expected Length of Stay (days) 5   Communicated expected length of stay with patient/caregiver yes   Prior to hospitilization cognitive status: Alert/Oriented   Prior to hospitalization functional status: Independent   Current cognitive status: Unable to Assess  (Pt is currently in OR)   Current Functional Status: Partially Dependent   Facility Arrived From: home for planned surgery   Lives With spouse   Able to Return to Prior Arrangements yes   Is patient able to care for self after discharge? No  (Pt will need assistance with driving, sternal precautions)   Who are your caregiver(s) and their phone number(s)? spouse Susannah Barnes [phone]   Readmission Within the Last 30 Days no previous admission in last 30 days   Patient currently being followed by outpatient case management? No   Patient currently receives any other outside agency services? No   Equipment Currently Used at Home none   Do you have any problems affording any of your prescribed medications? No   Is the patient taking medications as prescribed? yes   Does the patient have transportation home? Yes   Transportation Anticipated family or friend will provide   Does the patient receive services at the Coumadin Clinic? No  (Will need referral to [facility] anticoag clinic upon discharge for AVR-mechanical)   Discharge Plan A Home with family   Discharge Plan B Home with family;Home Health   DME Needed Upon Discharge  other (see comments)  (TBD)   Patient/Family in Agreement with Plan unable to assess  (Pt is currently in OR)     Pt admitted for planned WSL-Qjpypxk-rkxlbqslzc valve. The patient is followed by Dr. Ryan Alexander for general cardiology [facility] office. He will need an ambulatory referral to [facility] coumadin clinic with  Dr. Alexander as the following providers for life. The patient lives in [place], [place] with his spouse and has transportation. He was independent with ADL's prior to admission. PT/OT will be consulted to help with discharge dispo. If home health is needed a referral to [facility] will be initiated. Will continue to follow and help with discharge planning throughout admission. Will follow up with patient/caregive upon stabilization on the unit vs step down.       [facility] [place] [address]  [address]  [facility] [address]  Phone: [phone] Fax: [phone]    PCP  Garry Stover MD   [phone]     Cardiologist:  Dr. Ryan Alexander  [facility]  [phone] phone    Payor: [employer] MANAGED MEDICARE / Plan: [employer] CHOICES 65 / Product Type: Medicare Advantage /     Addendum:    The patient is insulin dependent with home insulin pump. Endocrine will be consulted and follow.

## 2019-08-09 NOTE — ASSESSMENT & PLAN NOTE
BG goal 110 - 140    Continue IV insulin infusion protocol  Requires intensive BG monitoring while on protocol     Discharge planning: WILLI

## 2019-08-09 NOTE — PROGRESS NOTES
Pt arrived to SICU [address], Pt connected to continuous monitoring and ventilator. SICU at bedside. Labs and ABG drawn. Orders to titrate off pressors and wean to extubated. Will continue to monitor.

## 2019-08-09 NOTE — CONSULTS
[facility]  Endocrinology  Diabetes Consult Note    Consult Requested by: Ryan Knight MD   Reason for admit: S/P ascending aortic replacement    HISTORY OF PRESENT ILLNESS:  Reason for Consult: Management of T1DM, Hyperglycemia     Surgical Procedure and Date:   BENTALL PROCEDURE: 08/09/2019    Diabetes diagnosis year:    ~ 20 years ago    Home Diabetes Medications:    Medtronic 670G    Basal: 2.65   ICR: 12   ISF: 20   IOB: 3.15   Target: 120     How often checking glucose at home? KELSEY  BG readings on regimen: KELSEY  Hypoglycemia on the regimen?  KELSEY  Missed doses on regimen?  KELSEY    Diabetes Complications include:     Hyperglycemia, Hypoglycemia , Diabetic retinopathy  and Diabetic peripheral neuropathy     Complicating diabetes co morbidities:   History of CVA and Active Cancer, severe obesity, HTN, HLD, CAD, Aortic Stenosis.       HPI:   Patient is a 53 y.o. male with a diagnosis of aortic stenosis, diabetes type2, and CAD. Has noticed SOB over the past 1 month. Patient has history of stage 2 colon cancer in 2015 with resection, no chemo or radiation. Receives primary DM care from BEATRIS Quinetro NP. Endocrinology consulted to manage DM1/hyperglycemia post cardiac surgery.     Lab Results   Component Value Date    HGBA1C 6.6 (H) 07/23/2019       Interval HPI:   Overnight events:   BG is above goal on current Intensive IV insulin infusion.   Patient is intubated in SICU.   Eating:   NPO  Nausea: No  Hypoglycemia and intervention: No  Fever: No  TPN and/or TF: No  If yes, type of TF/TPN and rate: None    PMH, PSH, FH, SH reviewed     ROS:  Unable to obtain due to: Sedation,Intubation,Altered mental status,Critical illness,Reviewed ROS from note dated 07/25/2019 per Yasmin Ch MD    Current Medications and/or Treatments Impacting Glycemic Control  Immunotherapy:    Immunosuppressants     None        Steroids:   Hormones (From admission, onward)    None        Pressors:    Autonomic  Drugs (From admission, onward)    Start     Stop Route Frequency Ordered    08/09/19 1530  norepinephrine 4 mg in dextrose 5% 250 mL infusion (premix) (titrating)     Question Answer Comment   Titrate by: (in mcg/kg/min) 0.02    Titrate interval: (in minutes) 0.02    Titrate to maintain: (MAP or SBP) MAP    Greater than: (in mmHg) 60    Maximum dose: (in mcg/kg/min) 3        -- IV Continuous 08/09/19 1519    08/09/19 1500  EPINEPHrine (ADRENALIN) 5 mg in sodium chloride 0.9% 250 mL infusion     Question Answer Comment   Titrate by: (in mcg/kg/min) 0.02    Titrate interval: (in minutes) 5    Titrate to maintain: (SBP or MAP or Cardiac Index) MAP    Greater than: (in mmHg) 65    Cardiac index greater than: (in L/min) 2.2    Maximum dose: (in mcg/kg/min) 2        -- IV Continuous 08/09/19 1448        Hyperglycemia/Diabetes Medications:   Antihyperglycemics (From admission, onward)    Start     Stop Route Frequency Ordered    08/09/19 1500  insulin regular (Humulin R) 100 Units in sodium chloride 0.9% 100 mL infusion     Question:  Insulin rate changes (DO NOT MODIFY ANSWER)  Answer:  \\[email or web address]    -- IV Continuous 08/09/19 1448             PHYSICAL EXAMINATION:  Vitals:    08/09/19 1530   BP:    Pulse: 79   Resp: (!) 7   Temp:      Body mass index is 33.45 kg/m².    Physical Exam   Constitutional: Well developed, well nourished, NAD. Sedated.  ENT: External ears no masses with nose patent  Neck: Supple; trachea midline; no thyromegaly.  Cardiovascular: Heart sounds present with no LE edema.   Lungs: lungs anterior bilaterally clear to auscultation. Intubated on ventilator.  Abdomen: Soft, no masses, no hernias.  MS: No clubbing or cyanosis of nails noted unable to assess gait.  Skin: No rashes, lesions, or ulcers; no nodules. Mid-sternal incision with dressing; chest tubes x2.   Foot: nails in good condition, no amputations noted.        Labs Reviewed and  Include   Recent Labs   Lab 08/09/19  1515   *   CALCIUM 8.8      K 4.1   CO2 19*   *   BUN 19   CREATININE 0.8     Lab Results   Component Value Date    WBC 22.54 (H) 08/09/2019    HGB 11.9 (L) 08/09/2019    HCT 29 (L) 08/09/2019    MCV 88 08/09/2019     08/09/2019     No results for input(s): TSH, FREET4 in the last 168 hours.  Lab Results   Component Value Date    HGBA1C 6.6 (H) 07/23/2019       Nutritional status:   Body mass index is 33.45 kg/m².  Lab Results   Component Value Date    ALBUMIN 4.6 07/23/2019    ALBUMIN 4.3 04/24/2019    ALBUMIN 4.1 05/26/2018     No results found for: PREALBUMIN    Estimated Creatinine Clearance: 119.5 mL/min (based on SCr of 0.8 mg/dL).    Accu-Checks  Recent Labs     08/09/19  0632 08/09/19  1513 08/09/19  1519   POCTGLUCOSE 116* 150* 178*        ASSESSMENT and PLAN    * S/P ascending aortic replacement  Managed per primary team  Avoid hypoglycemia        Diabetes mellitus type I  BG goal 110 - 140    Continue IV insulin infusion protocol  Requires intensive BG monitoring while on protocol     Discharge planning: TBD        Class 1 obesity due to excess calories with serious comorbidity and body mass index (BMI) of 33.0 to 33.9 in adult  may contribute to insulin resistance            Plan discussed with patient, family, and RN at bedside.       Erickson Cunningham NP  Endocrinology  [facility]

## 2019-08-09 NOTE — HPI
Reason for Consult: Management of T1DM, Hyperglycemia     Surgical Procedure and Date:   BENTALL PROCEDURE: 08/09/2019    Diabetes diagnosis year:    ~ 20 years ago    Home Diabetes Medications:    Medtronic 670G    Basal: 2.65   ICR: 12   ISF: 20   IOB: 3.15   Target: 120     How often checking glucose at home? KELSEY  BG readings on regimen: KELSEY  Hypoglycemia on the regimen?  KELSEY  Missed doses on regimen?  KELSEY    Diabetes Complications include:     Hyperglycemia, Hypoglycemia , Diabetic retinopathy  and Diabetic peripheral neuropathy     Complicating diabetes co morbidities:   History of CVA and Active Cancer, severe obesity, HTN, HLD, CAD, Aortic Stenosis.       HPI:   Patient is a 53 y.o. male with a diagnosis of aortic stenosis, diabetes type2, and CAD. Has noticed SOB over the past 1 month. Patient has history of stage 2 colon cancer in 2015 with resection, no chemo or radiation. Receives primary DM care from BEATRIS Quintero NP. Endocrinology consulted to manage DM1/hyperglycemia post cardiac surgery.     Lab Results   Component Value Date    HGBA1C 6.6 (H) 07/23/2019

## 2019-08-09 NOTE — CARE UPDATE
Received pt. Placed on CMV/ SIMV. ekg and ABG done. Pt. Resting comfortably with size 8 ETT 21 @ the lip.

## 2019-08-09 NOTE — INTERVAL H&P NOTE
The patient has been examined and the H&P has been reviewed:    I concur with the findings and no changes have occurred since H&P was written.    Anesthesia/Surgery risks, benefits and alternative options discussed and understood by patient/family.    Active Hospital Problems    Diagnosis  POA    Nonrheumatic aortic valve stenosis [I35.0]  Yes      Resolved Hospital Problems   No resolved problems to display.

## 2019-08-10 LAB
ALLENS TEST: ABNORMAL
ANION GAP SERPL CALC-SCNC: 13 MMOL/L (ref 8–16)
APTT BLDCRRT: 22.9 SEC (ref 21–32)
APTT BLDCRRT: 23.2 SEC (ref 21–32)
APTT BLDCRRT: 27.1 SEC (ref 21–32)
APTT BLDCRRT: 31.6 SEC (ref 21–32)
BASOPHILS # BLD AUTO: 0.04 K/UL (ref 0–0.2)
BASOPHILS NFR BLD: 0.2 % (ref 0–1.9)
BUN SERPL-MCNC: 19 MG/DL (ref 6–20)
CALCIUM SERPL-MCNC: 8.4 MG/DL (ref 8.7–10.5)
CHLORIDE SERPL-SCNC: 111 MMOL/L (ref 95–110)
CO2 SERPL-SCNC: 18 MMOL/L (ref 23–29)
CREAT SERPL-MCNC: 0.8 MG/DL (ref 0.5–1.4)
DELSYS: ABNORMAL
DIFFERENTIAL METHOD: ABNORMAL
EOSINOPHIL # BLD AUTO: 0 K/UL (ref 0–0.5)
EOSINOPHIL NFR BLD: 0 % (ref 0–8)
ERYTHROCYTE [DISTWIDTH] IN BLOOD BY AUTOMATED COUNT: 14 % (ref 11.5–14.5)
EST. GFR  (AFRICAN AMERICAN): >60 ML/MIN/1.73 M^2
EST. GFR  (NON AFRICAN AMERICAN): >60 ML/MIN/1.73 M^2
FLOW: 2
GLUCOSE SERPL-MCNC: 120 MG/DL (ref 70–110)
GLUCOSE SERPL-MCNC: 123 MG/DL (ref 70–110)
GLUCOSE SERPL-MCNC: 168 MG/DL (ref 70–110)
GLUCOSE SERPL-MCNC: 178 MG/DL (ref 70–110)
GLUCOSE SERPL-MCNC: 191 MG/DL (ref 70–110)
HCO3 UR-SCNC: 18.9 MMOL/L (ref 24–28)
HCO3 UR-SCNC: 20.7 MMOL/L (ref 24–28)
HCO3 UR-SCNC: 20.7 MMOL/L (ref 24–28)
HCO3 UR-SCNC: 21 MMOL/L (ref 24–28)
HCO3 UR-SCNC: 21.1 MMOL/L (ref 24–28)
HCT VFR BLD AUTO: 31.9 % (ref 40–54)
HCT VFR BLD CALC: 29 %PCV (ref 36–54)
HCT VFR BLD CALC: 29 %PCV (ref 36–54)
HCT VFR BLD CALC: 40 %PCV (ref 36–54)
HCT VFR BLD CALC: 41 %PCV (ref 36–54)
HGB BLD-MCNC: 10.4 G/DL (ref 14–18)
IMM GRANULOCYTES # BLD AUTO: 0.13 K/UL (ref 0–0.04)
IMM GRANULOCYTES NFR BLD AUTO: 0.6 % (ref 0–0.5)
INR PPP: 1.1 (ref 0.8–1.2)
LDH SERPL L TO P-CCNC: 1.39 MMOL/L (ref 0.36–1.25)
LYMPHOCYTES # BLD AUTO: 0.7 K/UL (ref 1–4.8)
LYMPHOCYTES NFR BLD: 3.4 % (ref 18–48)
MAGNESIUM SERPL-MCNC: 2 MG/DL (ref 1.6–2.6)
MCH RBC QN AUTO: 29.4 PG (ref 27–31)
MCHC RBC AUTO-ENTMCNC: 32.6 G/DL (ref 32–36)
MCV RBC AUTO: 90 FL (ref 82–98)
MODE: ABNORMAL
MONOCYTES # BLD AUTO: 1.3 K/UL (ref 0.3–1)
MONOCYTES NFR BLD: 6.3 % (ref 4–15)
NEUTROPHILS # BLD AUTO: 19 K/UL (ref 1.8–7.7)
NEUTROPHILS NFR BLD: 89.5 % (ref 38–73)
NRBC BLD-RTO: 0 /100 WBC
PCO2 BLDA: 33 MMHG (ref 35–45)
PCO2 BLDA: 33.2 MMHG (ref 35–45)
PCO2 BLDA: 34.5 MMHG (ref 35–45)
PCO2 BLDA: 34.7 MMHG (ref 35–45)
PCO2 BLDA: 36.6 MMHG (ref 35–45)
PH SMN: 7.32 [PH] (ref 7.35–7.45)
PH SMN: 7.39 [PH] (ref 7.35–7.45)
PH SMN: 7.39 [PH] (ref 7.35–7.45)
PH SMN: 7.41 [PH] (ref 7.35–7.45)
PH SMN: 7.41 [PH] (ref 7.35–7.45)
PHOSPHATE SERPL-MCNC: 4.1 MG/DL (ref 2.7–4.5)
PLATELET # BLD AUTO: 147 K/UL (ref 150–350)
PMV BLD AUTO: 10.7 FL (ref 9.2–12.9)
PO2 BLDA: 132 MMHG (ref 80–100)
PO2 BLDA: 151 MMHG (ref 80–100)
PO2 BLDA: 234 MMHG (ref 80–100)
PO2 BLDA: 326 MMHG (ref 80–100)
PO2 BLDA: 78 MMHG (ref 80–100)
POC BE: -4 MMOL/L
POC BE: -7 MMOL/L
POC IONIZED CALCIUM: 1.12 MMOL/L (ref 1.06–1.42)
POC IONIZED CALCIUM: 1.13 MMOL/L (ref 1.06–1.42)
POC IONIZED CALCIUM: 1.22 MMOL/L (ref 1.06–1.42)
POC IONIZED CALCIUM: 1.27 MMOL/L (ref 1.06–1.42)
POC SATURATED O2: 100 % (ref 95–100)
POC SATURATED O2: 100 % (ref 95–100)
POC SATURATED O2: 94 % (ref 95–100)
POC SATURATED O2: 99 % (ref 95–100)
POC SATURATED O2: 99 % (ref 95–100)
POC TCO2: 20 MMOL/L (ref 23–27)
POC TCO2: 22 MMOL/L (ref 23–27)
POCT GLUCOSE: 117 MG/DL (ref 70–110)
POCT GLUCOSE: 129 MG/DL (ref 70–110)
POCT GLUCOSE: 141 MG/DL (ref 70–110)
POCT GLUCOSE: 152 MG/DL (ref 70–110)
POCT GLUCOSE: 164 MG/DL (ref 70–110)
POCT GLUCOSE: 165 MG/DL (ref 70–110)
POCT GLUCOSE: 172 MG/DL (ref 70–110)
POCT GLUCOSE: 172 MG/DL (ref 70–110)
POCT GLUCOSE: 177 MG/DL (ref 70–110)
POCT GLUCOSE: 192 MG/DL (ref 70–110)
POCT GLUCOSE: 88 MG/DL (ref 70–110)
POTASSIUM BLD-SCNC: 3.6 MMOL/L (ref 3.5–5.1)
POTASSIUM BLD-SCNC: 3.7 MMOL/L (ref 3.5–5.1)
POTASSIUM BLD-SCNC: 3.8 MMOL/L (ref 3.5–5.1)
POTASSIUM BLD-SCNC: 4.1 MMOL/L (ref 3.5–5.1)
POTASSIUM SERPL-SCNC: 3.9 MMOL/L (ref 3.5–5.1)
POTASSIUM SERPL-SCNC: 4 MMOL/L (ref 3.5–5.1)
POTASSIUM SERPL-SCNC: 4.4 MMOL/L (ref 3.5–5.1)
PROTHROMBIN TIME: 10.9 SEC (ref 9–12.5)
RBC # BLD AUTO: 3.54 M/UL (ref 4.6–6.2)
SAMPLE: ABNORMAL
SITE: ABNORMAL
SODIUM BLD-SCNC: 141 MMOL/L (ref 136–145)
SODIUM BLD-SCNC: 143 MMOL/L (ref 136–145)
SODIUM SERPL-SCNC: 142 MMOL/L (ref 136–145)
WBC # BLD AUTO: 21.21 K/UL (ref 3.9–12.7)

## 2019-08-10 PROCEDURE — 85730 THROMBOPLASTIN TIME PARTIAL: CPT | Mod: 91

## 2019-08-10 PROCEDURE — 37799 UNLISTED PX VASCULAR SURGERY: CPT

## 2019-08-10 PROCEDURE — 82803 BLOOD GASES ANY COMBINATION: CPT

## 2019-08-10 PROCEDURE — 85610 PROTHROMBIN TIME: CPT

## 2019-08-10 PROCEDURE — 63600175 PHARM REV CODE 636 W HCPCS: Performed by: STUDENT IN AN ORGANIZED HEALTH CARE EDUCATION/TRAINING PROGRAM

## 2019-08-10 PROCEDURE — P9045 ALBUMIN (HUMAN), 5%, 250 ML: HCPCS | Mod: JG | Performed by: STUDENT IN AN ORGANIZED HEALTH CARE EDUCATION/TRAINING PROGRAM

## 2019-08-10 PROCEDURE — 99900035 HC TECH TIME PER 15 MIN (STAT)

## 2019-08-10 PROCEDURE — 99233 SBSQ HOSP IP/OBS HIGH 50: CPT | Mod: ,,, | Performed by: SURGERY

## 2019-08-10 PROCEDURE — 84132 ASSAY OF SERUM POTASSIUM: CPT | Mod: 91

## 2019-08-10 PROCEDURE — 94761 N-INVAS EAR/PLS OXIMETRY MLT: CPT

## 2019-08-10 PROCEDURE — 80048 BASIC METABOLIC PNL TOTAL CA: CPT

## 2019-08-10 PROCEDURE — 83605 ASSAY OF LACTIC ACID: CPT

## 2019-08-10 PROCEDURE — 99232 SBSQ HOSP IP/OBS MODERATE 35: CPT | Mod: ,,, | Performed by: NURSE PRACTITIONER

## 2019-08-10 PROCEDURE — 25000003 PHARM REV CODE 250: Performed by: PHYSICIAN ASSISTANT

## 2019-08-10 PROCEDURE — 63600175 PHARM REV CODE 636 W HCPCS: Performed by: PHYSICIAN ASSISTANT

## 2019-08-10 PROCEDURE — 25000003 PHARM REV CODE 250: Performed by: THORACIC SURGERY (CARDIOTHORACIC VASCULAR SURGERY)

## 2019-08-10 PROCEDURE — 99232 PR SUBSEQUENT HOSPITAL CARE,LEVL II: ICD-10-PCS | Mod: ,,, | Performed by: NURSE PRACTITIONER

## 2019-08-10 PROCEDURE — 84100 ASSAY OF PHOSPHORUS: CPT

## 2019-08-10 PROCEDURE — 85730 THROMBOPLASTIN TIME PARTIAL: CPT

## 2019-08-10 PROCEDURE — 94640 AIRWAY INHALATION TREATMENT: CPT

## 2019-08-10 PROCEDURE — 99233 PR SUBSEQUENT HOSPITAL CARE,LEVL III: ICD-10-PCS | Mod: ,,, | Performed by: SURGERY

## 2019-08-10 PROCEDURE — 25000003 PHARM REV CODE 250: Performed by: STUDENT IN AN ORGANIZED HEALTH CARE EDUCATION/TRAINING PROGRAM

## 2019-08-10 PROCEDURE — 63600175 PHARM REV CODE 636 W HCPCS: Performed by: NURSE PRACTITIONER

## 2019-08-10 PROCEDURE — 85025 COMPLETE CBC W/AUTO DIFF WBC: CPT

## 2019-08-10 PROCEDURE — 83735 ASSAY OF MAGNESIUM: CPT

## 2019-08-10 PROCEDURE — 25000242 PHARM REV CODE 250 ALT 637 W/ HCPCS: Performed by: PHYSICIAN ASSISTANT

## 2019-08-10 PROCEDURE — 20000000 HC ICU ROOM

## 2019-08-10 RX ORDER — GLUCAGON 1 MG
1 KIT INJECTION
Status: DISCONTINUED | OUTPATIENT
Start: 2019-08-10 | End: 2019-08-12

## 2019-08-10 RX ORDER — INSULIN ASPART 100 [IU]/ML
0-4 INJECTION, SOLUTION INTRAVENOUS; SUBCUTANEOUS
Status: DISCONTINUED | OUTPATIENT
Start: 2019-08-10 | End: 2019-08-12

## 2019-08-10 RX ORDER — ALBUMIN HUMAN 50 G/1000ML
12.5 SOLUTION INTRAVENOUS ONCE
Status: COMPLETED | OUTPATIENT
Start: 2019-08-10 | End: 2019-08-10

## 2019-08-10 RX ORDER — IBUPROFEN 200 MG
16 TABLET ORAL
Status: DISCONTINUED | OUTPATIENT
Start: 2019-08-10 | End: 2019-08-12

## 2019-08-10 RX ORDER — METOPROLOL TARTRATE 25 MG/1
25 TABLET, FILM COATED ORAL 2 TIMES DAILY
Status: DISCONTINUED | OUTPATIENT
Start: 2019-08-10 | End: 2019-08-14 | Stop reason: HOSPADM

## 2019-08-10 RX ORDER — IBUPROFEN 200 MG
24 TABLET ORAL
Status: DISCONTINUED | OUTPATIENT
Start: 2019-08-10 | End: 2019-08-12

## 2019-08-10 RX ORDER — HYDROMORPHONE HYDROCHLORIDE 1 MG/ML
0.5 INJECTION, SOLUTION INTRAMUSCULAR; INTRAVENOUS; SUBCUTANEOUS ONCE
Status: COMPLETED | OUTPATIENT
Start: 2019-08-10 | End: 2019-08-10

## 2019-08-10 RX ORDER — HEPARIN SODIUM 10000 [USP'U]/100ML
600 INJECTION, SOLUTION INTRAVENOUS CONTINUOUS
Status: DISCONTINUED | OUTPATIENT
Start: 2019-08-10 | End: 2019-08-10

## 2019-08-10 RX ORDER — HEPARIN SODIUM 10000 [USP'U]/100ML
1000 INJECTION, SOLUTION INTRAVENOUS CONTINUOUS
Status: DISCONTINUED | OUTPATIENT
Start: 2019-08-10 | End: 2019-08-11

## 2019-08-10 RX ADMIN — CEFAZOLIN 2 G: 1 INJECTION, POWDER, FOR SOLUTION INTRAMUSCULAR; INTRAVENOUS at 01:08

## 2019-08-10 RX ADMIN — DOCUSATE SODIUM 100 MG: 100 CAPSULE, LIQUID FILLED ORAL at 08:08

## 2019-08-10 RX ADMIN — IPRATROPIUM BROMIDE AND ALBUTEROL SULFATE 3 ML: .5; 3 SOLUTION RESPIRATORY (INHALATION) at 11:08

## 2019-08-10 RX ADMIN — ASPIRIN 325 MG ORAL TABLET 325 MG: 325 PILL ORAL at 08:08

## 2019-08-10 RX ADMIN — METOPROLOL TARTRATE 25 MG: 25 TABLET ORAL at 08:08

## 2019-08-10 RX ADMIN — SODIUM CHLORIDE 0.4 UNITS/HR: 9 INJECTION, SOLUTION INTRAVENOUS at 10:08

## 2019-08-10 RX ADMIN — CEFAZOLIN 2 G: 1 INJECTION, POWDER, FOR SOLUTION INTRAMUSCULAR; INTRAVENOUS at 04:08

## 2019-08-10 RX ADMIN — POLYETHYLENE GLYCOL 3350 17 G: 17 POWDER, FOR SOLUTION ORAL at 08:08

## 2019-08-10 RX ADMIN — INSULIN ASPART 1 UNITS: 100 INJECTION, SOLUTION INTRAVENOUS; SUBCUTANEOUS at 05:08

## 2019-08-10 RX ADMIN — IPRATROPIUM BROMIDE AND ALBUTEROL SULFATE 3 ML: .5; 3 SOLUTION RESPIRATORY (INHALATION) at 07:08

## 2019-08-10 RX ADMIN — CLOPIDOGREL BISULFATE 75 MG: 75 TABLET ORAL at 08:08

## 2019-08-10 RX ADMIN — HEPARIN SODIUM AND DEXTROSE 600 UNITS/HR: 10000; 5 INJECTION INTRAVENOUS at 10:08

## 2019-08-10 RX ADMIN — ONDANSETRON 4 MG: 2 INJECTION INTRAMUSCULAR; INTRAVENOUS at 03:08

## 2019-08-10 RX ADMIN — HYDROMORPHONE HYDROCHLORIDE 0.5 MG: 1 INJECTION, SOLUTION INTRAMUSCULAR; INTRAVENOUS; SUBCUTANEOUS at 02:08

## 2019-08-10 RX ADMIN — ALBUMIN (HUMAN) 12.5 G: 12.5 SOLUTION INTRAVENOUS at 10:08

## 2019-08-10 RX ADMIN — OXYCODONE HYDROCHLORIDE 10 MG: 10 TABLET ORAL at 02:08

## 2019-08-10 RX ADMIN — NICARDIPINE HYDROCHLORIDE 2.5 MG/HR: 0.2 INJECTION, SOLUTION INTRAVENOUS at 07:08

## 2019-08-10 RX ADMIN — POTASSIUM CHLORIDE 20 MEQ: 200 INJECTION, SOLUTION INTRAVENOUS at 09:08

## 2019-08-10 RX ADMIN — MUPIROCIN 1 G: 20 OINTMENT TOPICAL at 08:08

## 2019-08-10 RX ADMIN — SIMVASTATIN 20 MG: 5 TABLET, FILM COATED ORAL at 08:08

## 2019-08-10 RX ADMIN — CEFAZOLIN 2 G: 1 INJECTION, POWDER, FOR SOLUTION INTRAMUSCULAR; INTRAVENOUS at 07:08

## 2019-08-10 RX ADMIN — IPRATROPIUM BROMIDE AND ALBUTEROL SULFATE 3 ML: .5; 3 SOLUTION RESPIRATORY (INHALATION) at 04:08

## 2019-08-10 RX ADMIN — METOPROLOL TARTRATE 25 MG: 25 TABLET ORAL at 10:08

## 2019-08-10 RX ADMIN — NICARDIPINE HYDROCHLORIDE 7.5 MG/HR: 0.2 INJECTION, SOLUTION INTRAVENOUS at 03:08

## 2019-08-10 RX ADMIN — OXYCODONE HYDROCHLORIDE 10 MG: 10 TABLET ORAL at 01:08

## 2019-08-10 NOTE — PLAN OF CARE
Problem: Adult Inpatient Plan of Care  Goal: Patient-Specific Goal (Individualization)  Dx: AVR, Bentall procedure  Hx: DM1 (controlled with insulin pump), CAD, Bicuspid AV with aortic stenosis     8/9: AVR and bentall. Post of SICU, Extubated     MAP 60-70  Accu check q1h  Outcome: Ongoing (interventions implemented as appropriate)  Plan of care reviewed with patient. HR 70-90, MAP 60-70 CVP 9-14. Patient wean off oxygen, SPO2> 95%. Patient wean off Cardene. Chest tube output 10-30cc/hr of serosanguinous fluid. Urine output via roa 120-220/hr. Patient medicated with PRN pain medication. Patient out of bed to chair. Plan is to continue to monitor patient vitals, labs, and keep patient comfortable.

## 2019-08-10 NOTE — ANESTHESIA POSTPROCEDURE EVALUATION
Anesthesia Post Evaluation    Patient: Cj Barnes    Procedure(s) Performed: Procedure(s) (LRB):  Replacement-valve-aortic (N/A)  BENTALL PROCEDURE (N/A)    Final Anesthesia Type: general  Patient location during evaluation: PACU  Patient participation: Yes- Able to Participate  Level of consciousness: awake and alert and oriented  Post-procedure vital signs: reviewed and stable  Pain management: adequate  Airway patency: patent  PONV status at discharge: No PONV  Anesthetic complications: no      Cardiovascular status: blood pressure returned to baseline, hemodynamically stable and stable  Respiratory status: unassisted, room air and spontaneous ventilation  Hydration status: euvolemic  Follow-up not needed.          Vitals Value Taken Time   /59 8/9/2019  7:16 PM   Temp 36.9 °C (98.5 °F) 8/9/2019  7:00 PM   Pulse 83 8/9/2019  7:39 PM   Resp 22 8/9/2019  7:39 PM   SpO2 100 % 8/9/2019  7:39 PM   Vitals shown include unvalidated device data.      No case tracking events are documented in the log.      Pain/Isa Score: Pain Rating Prior to Med Admin: 10 (8/9/2019  4:07 PM)

## 2019-08-10 NOTE — PLAN OF CARE
Pt remained free from falls and injury this shift. VSS at this time. Pt pain controlled with PRN medication. Pt SpO2 92-95% on 2L NC. MAP maintained between 60-70, cardene currently @  2.5 mg/hr. Pt midline incision and chest tube dsg remain intact, no drainage noted. Pt had no BM this shift. Pt roa remains intact, output 175-225cc/hr. Pt drips: heparin @ 800units/hr (PTT goal 60-80), insulin @ 0.4units, cardene @ 2.5mg/hr. Pt left a-line came out during shift, replaced with right radial a-line per MD Ariana. No other significant events this shift. Plan of care reviewed with pt and family, all questions answered. Will continue to monitor.

## 2019-08-10 NOTE — ASSESSMENT & PLAN NOTE
52 yo Man with history of T2DM, CAD , AS 2/2 congenital bicuspid AV s/p Bentall with 23mm Mechanical aortic valve. Received 3L crystalloid and no cell saver intraoperatively.    Neuro:  Sedation: None  Analgesia: prn oxycodone    CV:   AS s/p Bentall 23mm   Cardene gtt prn for goal Systolic <110, MAP goal 60-70  Lactate 2.95 on arrival trended down to 1.39 , Resuscitate with albumin if neccessary  ABG, lactate trend    Pulm:   Extubated on room air by 8/10  IS, OOB    Renal:   Monitor UOP  qAM BMP    FEN/GI  Resuscitate with albumin  Goal K > 4, Mg > 2.0  NPO for now pending extubation    Heme/ID  Afebrile, post op leukocyotosis  H/H stable  Ancef 2g Q8 for 5 doses postop  On heparin gtt aptt goal 60-70    Endo:  Type 2 DM with insulin pump (off)  On insulin drip, endo following, appreciate recs    Dispo: Continue SICU care, wean cardene to off    Discussed with CTS

## 2019-08-10 NOTE — SUBJECTIVE & OBJECTIVE
"Interval HPI:   Overnight events:  BG Patient has fallen off of Intensive IV insulin infusion overnight. However, patient is Type 1 and will expect future IV insulin needs. Patient is now extubated as per chart review. Remains on pressor therapy.     Eating:   <25%  Nausea: No  Hypoglycemia and intervention: No  Fever: No  TPN and/or TF: No  If yes, type of TF/TPN and rate: None    BP (!) 120/59 (BP Location: Right arm, Patient Position: Sitting)   Pulse 92   Temp 97.9 °F (36.6 °C) (Oral)   Resp 15   Ht 5' 8" (1.727 m)   Wt 99.8 kg (220 lb)   SpO2 (!) 93%   BMI 33.45 kg/m²     Labs Reviewed and Include    Recent Labs   Lab 08/10/19  0330 08/10/19  0817   *  --    CALCIUM 8.4*  --      --    K 4.4 4.0   CO2 18*  --    *  --    BUN 19  --    CREATININE 0.8  --      Lab Results   Component Value Date    WBC 21.21 (H) 08/10/2019    HGB 10.4 (L) 08/10/2019    HCT 31.9 (L) 08/10/2019    MCV 90 08/10/2019     (L) 08/10/2019     No results for input(s): TSH, FREET4 in the last 168 hours.  Lab Results   Component Value Date    HGBA1C 6.6 (H) 07/23/2019       Nutritional status:   Body mass index is 33.45 kg/m².  Lab Results   Component Value Date    ALBUMIN 4.6 07/23/2019    ALBUMIN 4.3 04/24/2019    ALBUMIN 4.1 05/26/2018     No results found for: PREALBUMIN    Estimated Creatinine Clearance: 119.5 mL/min (based on SCr of 0.8 mg/dL).    Accu-Checks  Recent Labs     08/09/19  2004 08/09/19 2059 08/09/19  2321 08/10/19  0100 08/10/19  0133 08/10/19  0206 08/10/19  0330 08/10/19  0416 08/10/19  0510 08/10/19  0606   POCTGLUCOSE 143* 154* 91 88 117* 129* 165* 164* 152* 141*       Current Medications and/or Treatments Impacting Glycemic Control  Immunotherapy:    Immunosuppressants     None        Steroids:   Hormones (From admission, onward)    None        Pressors:    Autonomic Drugs (From admission, onward)    Start     Stop Route Frequency Ordered    08/09/19 1530  norepinephrine 4 mg in " dextrose 5% 250 mL infusion (premix) (titrating)     Question Answer Comment   Titrate by: (in mcg/kg/min) 0.02    Titrate interval: (in minutes) 0.02    Titrate to maintain: (MAP or SBP) MAP    Greater than: (in mmHg) 60    Maximum dose: (in mcg/kg/min) 3        -- IV Continuous 08/09/19 1519    08/09/19 1500  EPINEPHrine (ADRENALIN) 5 mg in sodium chloride 0.9% 250 mL infusion     Question Answer Comment   Titrate by: (in mcg/kg/min) 0.02    Titrate interval: (in minutes) 5    Titrate to maintain: (SBP or MAP or Cardiac Index) MAP    Greater than: (in mmHg) 65    Cardiac index greater than: (in L/min) 2.2    Maximum dose: (in mcg/kg/min) 2        -- IV Continuous 08/09/19 1448        Hyperglycemia/Diabetes Medications:   Antihyperglycemics (From admission, onward)    Start     Stop Route Frequency Ordered    08/09/19 1500  insulin regular (Humulin R) 100 Units in sodium chloride 0.9% 100 mL infusion     Question:  Insulin rate changes (DO NOT MODIFY ANSWER)  Answer:  [email or web address]    -- IV Continuous 08/09/19 1443

## 2019-08-10 NOTE — ASSESSMENT & PLAN NOTE
BG goal 110 - 140  BG is within or slightly above goal. Patient is Type 1 DM. Expect ongoing need for insulin therapy.     Discontinue IV insulin infusion protocol  Start  transition IV insulin infusion with stepdown parameters. Initial rate starting at 0.4 units/hr.   Patient is type 1 do NOT stop insulin gtt longer than 30 mins      - If Bg > 100 in 30 mins restart insulin gtt at    0.1   and resume previous monitoring schedule.        -If Bg remains < 80, check bg q30 mins until BG > 100 and resume gtt at    0.1    and resume previous monitoring schedule.    Start Low Dose SQ Insulin Correction Scale.  BG monitoring AC/HS/0200    Discharge planning:   WILLI

## 2019-08-10 NOTE — PROGRESS NOTES
"[facility]  Endocrinology  Progress Note    Admit Date: 8/9/2019     Reason for Consult: Management of T1DM, Hyperglycemia     Surgical Procedure and Date:   BENTALL PROCEDURE: 08/09/2019    Diabetes diagnosis year:    ~ 20 years ago    Home Diabetes Medications:    Medtronic 670G    Basal: 2.65   ICR: 12   ISF: 20   IOB: 3.15   Target: 120     How often checking glucose at home? KELSEY  BG readings on regimen: KELSEY  Hypoglycemia on the regimen?  KELSEY  Missed doses on regimen?  KELSEY    Diabetes Complications include:     Hyperglycemia, Hypoglycemia , Diabetic retinopathy  and Diabetic peripheral neuropathy     Complicating diabetes co morbidities:   History of CVA and Active Cancer, severe obesity, HTN, HLD, CAD, Aortic Stenosis.       HPI:   Patient is a 53 y.o. male with a diagnosis of aortic stenosis, diabetes type2, and CAD. Has noticed SOB over the past 1 month. Patient has history of stage 2 colon cancer in 2015 with resection, no chemo or radiation. Receives primary DM care from BEATRIS Quintero NP. Endocrinology consulted to manage DM1/hyperglycemia post cardiac surgery.     Lab Results   Component Value Date    HGBA1C 6.6 (H) 07/23/2019       Interval HPI:   Overnight events:  BG Patient has fallen off of Intensive IV insulin infusion overnight. However, patient is Type 1 and will expect future IV insulin needs. Patient is now extubated as per chart review. Remains on pressor therapy.     Eating:   <25%  Nausea: No  Hypoglycemia and intervention: No  Fever: No  TPN and/or TF: No  If yes, type of TF/TPN and rate: None    BP (!) 120/59 (BP Location: Right arm, Patient Position: Sitting)   Pulse 92   Temp 97.9 °F (36.6 °C) (Oral)   Resp 15   Ht 5' 8" (1.727 m)   Wt 99.8 kg (220 lb)   SpO2 (!) 93%   BMI 33.45 kg/m²      Labs Reviewed and Include    Recent Labs   Lab 08/10/19  0330 08/10/19  0817   *  --    CALCIUM 8.4*  --      --    K 4.4 4.0   CO2 18*  --    *  --    BUN 19  " --    CREATININE 0.8  --      Lab Results   Component Value Date    WBC 21.21 (H) 08/10/2019    HGB 10.4 (L) 08/10/2019    HCT 31.9 (L) 08/10/2019    MCV 90 08/10/2019     (L) 08/10/2019     No results for input(s): TSH, FREET4 in the last 168 hours.  Lab Results   Component Value Date    HGBA1C 6.6 (H) 07/23/2019       Nutritional status:   Body mass index is 33.45 kg/m².  Lab Results   Component Value Date    ALBUMIN 4.6 07/23/2019    ALBUMIN 4.3 04/24/2019    ALBUMIN 4.1 05/26/2018     No results found for: PREALBUMIN    Estimated Creatinine Clearance: 119.5 mL/min (based on SCr of 0.8 mg/dL).    Accu-Checks  Recent Labs     08/09/19  2004 08/09/19 2059 08/09/19  2321 08/10/19  0100 08/10/19  0133 08/10/19  0206 08/10/19  0330 08/10/19  0416 08/10/19  0510 08/10/19  0606   POCTGLUCOSE 143* 154* 91 88 117* 129* 165* 164* 152* 141*       Current Medications and/or Treatments Impacting Glycemic Control  Immunotherapy:    Immunosuppressants     None        Steroids:   Hormones (From admission, onward)    None        Pressors:    Autonomic Drugs (From admission, onward)    Start     Stop Route Frequency Ordered    08/09/19 1530  norepinephrine 4 mg in dextrose 5% 250 mL infusion (premix) (titrating)     Question Answer Comment   Titrate by: (in mcg/kg/min) 0.02    Titrate interval: (in minutes) 0.02    Titrate to maintain: (MAP or SBP) MAP    Greater than: (in mmHg) 60    Maximum dose: (in mcg/kg/min) 3        -- IV Continuous 08/09/19 1519    08/09/19 1500  EPINEPHrine (ADRENALIN) 5 mg in sodium chloride 0.9% 250 mL infusion     Question Answer Comment   Titrate by: (in mcg/kg/min) 0.02    Titrate interval: (in minutes) 5    Titrate to maintain: (SBP or MAP or Cardiac Index) MAP    Greater than: (in mmHg) 65    Cardiac index greater than: (in L/min) 2.2    Maximum dose: (in mcg/kg/min) 2        -- IV Continuous 08/09/19 4069        Hyperglycemia/Diabetes Medications:   Antihyperglycemics (From admission,  onward)    Start     Stop Route Frequency Ordered    08/09/19 1500  insulin regular (Humulin R) 100 Units in sodium chloride 0.9% 100 mL infusion     Question:  Insulin rate changes (DO NOT MODIFY ANSWER)  Answer:  [email or web address]\CTS [email or web address] [id number]    -- IV Continuous 08/09/19 1448          ASSESSMENT and PLAN    * S/P ascending aortic replacement  Managed per primary team  Avoid hypoglycemia        Diabetes mellitus type I  BG goal 110 - 140  BG is within or slightly above goal. Patient is Type 1 DM. Expect ongoing need for insulin therapy.     Discontinue IV insulin infusion protocol  Start  transition IV insulin infusion with stepdown parameters. Initial rate starting at 0.4 units/hr.   Patient is type 1 do NOT stop insulin gtt longer than 30 mins      - If Bg > 100 in 30 mins restart insulin gtt at    0.1   and resume previous monitoring schedule.        -If Bg remains < 80, check bg q30 mins until BG > 100 and resume gtt at    0.1    and resume previous monitoring schedule.    Start Low Dose SQ Insulin Correction Scale.  BG monitoring /HS/0200    Discharge planning:   TBD        Class 1 obesity due to excess calories with serious comorbidity and body mass index (BMI) of 33.0 to 33.9 in adult  may contribute to insulin resistance            Erickson Cunningham NP  Endocrinology  [facility]

## 2019-08-10 NOTE — SUBJECTIVE & OBJECTIVE
Interval History/Significant Events: No acute events overnight. Patient intermittently needs cardene for MAPs 60-70.    Follow-up For: Procedure(s) (LRB):  Replacement-valve-aortic (N/A)  BENTALL PROCEDURE (N/A)    Post-Operative Day: 1 Day Post-Op    Objective:     Vital Signs (Most Recent):  Temp: 97.9 °F (36.6 °C) (08/10/19 1500)  Pulse: 81 (08/10/19 1545)  Resp: 19 (08/10/19 1545)  BP: (!) 113/55 (08/10/19 1545)  SpO2: 95 % (08/10/19 1545) Vital Signs (24h Range):  Temp:  [97.9 °F (36.6 °C)-99.1 °F (37.3 °C)] 97.9 °F (36.6 °C)  Pulse:  [76-98] 81  Resp:  [11-33] 19  SpO2:  [76 %-100 %] 95 %  BP: (102-163)/(55-74) 113/55  Arterial Line BP: ()/(40-71) 129/45     Weight: 99.8 kg (220 lb)  Body mass index is 33.45 kg/m².      Intake/Output Summary (Last 24 hours) at 8/10/2019 1639  Last data filed at 8/10/2019 1500  Gross per 24 hour   Intake 2487.6 ml   Output 3730 ml   Net -1242.4 ml       Physical Exam   Constitutional: He is oriented to person, place, and time. No distress.   HENT:   Head: Normocephalic and atraumatic.   Eyes: Conjunctivae and EOM are normal.   Cardiovascular: Normal rate, regular rhythm and normal heart sounds.   Pulmonary/Chest: Effort normal and breath sounds normal. No stridor. He has no wheezes. He has no rales.   Chest tubes in place, SS output   Abdominal: Soft. Bowel sounds are normal.   Musculoskeletal: He exhibits no edema.   Neurological: He is alert and oriented to person, place, and time.   Skin: He is not diaphoretic.       Vents:  Vent Mode: SIMV (08/09/19 1625)  Set Rate: 12 bmp (08/09/19 1625)  Vt Set: 500 mL (08/09/19 1625)  Pressure Support: 0 cmH20 (08/09/19 1625)  PEEP/CPAP: 5 cmH20 (08/09/19 1625)  Oxygen Concentration (%): 39 (08/09/19 1710)  Peak Airway Pressure: 18 cmH2O (08/09/19 1625)  Plateau Pressure: 0 cmH20 (08/09/19 1625)  Total Ve: 7.72 mL (08/09/19 1625)  Negative Inspiratory Force (cm H2O): -22 (08/09/19 1449)  F/VT Ratio<105 (RSBI): (!) 0 (08/09/19  1625)    Lines/Drains/Airways     Central Venous Catheter Line                 Introducer 08/09/19 right internal jugular 1 day         Percutaneous Central Line Insertion/Assessment - double lumen  08/09/19 0721 1 day          Drain                 Urethral Catheter 08/09/19 0740 Straight-tip;Non-latex;Temperature probe 14 Fr. 1 day         Y Chest Tube 1 and 2 08/09/19 1244 1 Anterior Mediastinal 19 Fr. 2 Posterior Mediastinal 19 Fr. 1 day          Line                 Pacer Wires 08/09/19 1240 1 day          Peripheral Intravenous Line                 Peripheral IV - Single Lumen 08/09/19 0634 18 G Left Forearm 1 day         Peripheral IV - Single Lumen 08/09/19 0719 16 G Right Hand 1 day                Significant Labs:    CBC/Anemia Profile:  Recent Labs   Lab 08/09/19  1515 08/09/19  1612 08/10/19  0330   WBC 22.54*  --  21.21*   HGB 11.9*  --  10.4*   HCT 35.7* 29* 31.9*     --  147*   MCV 88  --  90   RDW 13.6  --  14.0        Chemistries:  Recent Labs   Lab 08/09/19  1515 08/09/19  1905 08/10/19  0330 08/10/19  0817     --  142  --    K 4.1 4.6 4.4 4.0   *  --  111*  --    CO2 19*  --  18*  --    BUN 19  --  19  --    CREATININE 0.8  --  0.8  --    CALCIUM 8.8  --  8.4*  --    MG 2.6  2.6  --  2.0  --    PHOS 3.7  3.7  --  4.1  --        All pertinent labs within the past 24 hours have been reviewed.    Significant Imaging:  I have reviewed and interpreted all pertinent imaging results/findings within the past 24 hours.

## 2019-08-10 NOTE — PROGRESS NOTES
[facility]  Critical Care - Surgery  Progress Note    Patient Name: Cj Barnes  MRN: [id number]  Admission Date: 8/9/2019  Hospital Length of Stay: 1 days  Code Status: Full Code  Attending Provider: Ryan Knight MD  Primary Care Provider: Garry Stover MD   Principal Problem: S/P ascending aortic replacement    Subjective:     Hospital/ICU Course:  Cj Barnes is a 53 y.o. male  with history of type 2 DM (controlled with insulin pump), CAD, Bicuspid AV with aortic stenosis now s/p Bentall procedure with a 23 mm [employer] mechanical valved conduit on 8/9. Arrived to [facility] intubated on multiple pressors. Past surgical history includes Stage 2 colon cancer in 2015 with resection, no chemotherapy or radiation.     Interval History/Significant Events: No acute events overnight. Patient intermittently needs cardene for MAPs 60-70.    Follow-up For: Procedure(s) (LRB):  Replacement-valve-aortic (N/A)  BENTALL PROCEDURE (N/A)    Post-Operative Day: 1 Day Post-Op    Objective:     Vital Signs (Most Recent):  Temp: 97.9 °F (36.6 °C) (08/10/19 1500)  Pulse: 81 (08/10/19 1545)  Resp: 19 (08/10/19 1545)  BP: (!) 113/55 (08/10/19 1545)  SpO2: 95 % (08/10/19 1545) Vital Signs (24h Range):  Temp:  [97.9 °F (36.6 °C)-99.1 °F (37.3 °C)] 97.9 °F (36.6 °C)  Pulse:  [76-98] 81  Resp:  [11-33] 19  SpO2:  [76 %-100 %] 95 %  BP: (102-163)/(55-74) 113/55  Arterial Line BP: ()/(40-71) 129/45     Weight: 99.8 kg (220 lb)  Body mass index is 33.45 kg/m².      Intake/Output Summary (Last 24 hours) at 8/10/2019 1639  Last data filed at 8/10/2019 1500  Gross per 24 hour   Intake 2487.6 ml   Output 3730 ml   Net -1242.4 ml       Physical Exam   Constitutional: He is oriented to person, place, and time. No distress.   HENT:   Head: Normocephalic and atraumatic.   Eyes: Conjunctivae and EOM are normal.   Cardiovascular: Normal rate, regular rhythm and normal heart sounds.   Pulmonary/Chest: Effort  normal and breath sounds normal. No stridor. He has no wheezes. He has no rales.   Chest tubes in place, SS output   Abdominal: Soft. Bowel sounds are normal.   Musculoskeletal: He exhibits no edema.   Neurological: He is alert and oriented to person, place, and time.   Skin: He is not diaphoretic.       Vents:  Vent Mode: SIMV (08/09/19 1625)  Set Rate: 12 bmp (08/09/19 1625)  Vt Set: 500 mL (08/09/19 1625)  Pressure Support: 0 cmH20 (08/09/19 1625)  PEEP/CPAP: 5 cmH20 (08/09/19 1625)  Oxygen Concentration (%): 39 (08/09/19 1710)  Peak Airway Pressure: 18 cmH2O (08/09/19 1625)  Plateau Pressure: 0 cmH20 (08/09/19 1625)  Total Ve: 7.72 mL (08/09/19 1625)  Negative Inspiratory Force (cm H2O): -22 (08/09/19 1449)  F/VT Ratio<105 (RSBI): (!) 0 (08/09/19 1625)    Lines/Drains/Airways     Central Venous Catheter Line                 Introducer 08/09/19 right internal jugular 1 day         Percutaneous Central Line Insertion/Assessment - double lumen  08/09/19 0721 1 day          Drain                 Urethral Catheter 08/09/19 0740 Straight-tip;Non-latex;Temperature probe 14 Fr. 1 day         Y Chest Tube 1 and 2 08/09/19 1244 1 Anterior Mediastinal 19 Fr. 2 Posterior Mediastinal 19 Fr. 1 day          Line                 Pacer Wires 08/09/19 1240 1 day          Peripheral Intravenous Line                 Peripheral IV - Single Lumen 08/09/19 0634 18 G Left Forearm 1 day         Peripheral IV - Single Lumen 08/09/19 0719 16 G Right Hand 1 day                Significant Labs:    CBC/Anemia Profile:  Recent Labs   Lab 08/09/19  1515 08/09/19  1612 08/10/19  0330   WBC 22.54*  --  21.21*   HGB 11.9*  --  10.4*   HCT 35.7* 29* 31.9*     --  147*   MCV 88  --  90   RDW 13.6  --  14.0        Chemistries:  Recent Labs   Lab 08/09/19  1515 08/09/19  1905 08/10/19  0330 08/10/19  0817     --  142  --    K 4.1 4.6 4.4 4.0   *  --  111*  --    CO2 19*  --  18*  --    BUN 19  --  19  --    CREATININE 0.8  --  0.8   --    CALCIUM 8.8  --  8.4*  --    MG 2.6  2.6  --  2.0  --    PHOS 3.7  3.7  --  4.1  --        All pertinent labs within the past 24 hours have been reviewed.    Significant Imaging:  I have reviewed and interpreted all pertinent imaging results/findings within the past 24 hours.    Assessment/Plan:     Aortic stenosis  54 yo Man with history of T2DM, CAD , AS 2/2 congenital bicuspid AV s/p Bentall with 23mm Mechanical aortic valve. Received 3L crystalloid and no cell saver intraoperatively.    Neuro:  Sedation: None  Analgesia: prn oxycodone    CV:   AS s/p Bentall 23mm   Cardene gtt prn for goal Systolic <110, MAP goal 60-70  Lactate 2.95 on arrival trended down to 1.39 , Resuscitate with albumin if neccessary  ABG, lactate trend    Pulm:   Extubated on room air by 8/10  IS, OOB    Renal:   Monitor UOP  qAM BMP    FEN/GI  Resuscitate with albumin  Goal K > 4, Mg > 2.0  NPO for now pending extubation    Heme/ID  Afebrile, post op leukocyotosis  H/H stable  Ancef 2g Q8 for 5 doses postop  On heparin gtt aptt goal 60-70    Endo:  Type 2 DM with insulin pump (off)  On insulin drip, endo following, appreciate recs    Dispo: Continue SICU care, wean cardene to off    Discussed with CTS       Critical care was time spent personally by me on the following activities: development of treatment plan with patient or surrogate and bedside caregivers, discussions with consultants, evaluation of patient's response to treatment, examination of patient, ordering and performing treatments and interventions, ordering and review of laboratory studies, ordering and review of radiographic studies, pulse oximetry, re-evaluation of patient's condition.  This critical care time did not overlap with that of any other provider or involve time for any procedures.     Lis Lane MD  Critical Care - Surgery  [facility]

## 2019-08-11 LAB
ALLENS TEST: ABNORMAL
ANION GAP SERPL CALC-SCNC: 12 MMOL/L (ref 8–16)
ANISOCYTOSIS BLD QL SMEAR: SLIGHT
APTT BLDCRRT: 34.6 SEC (ref 21–32)
APTT BLDCRRT: 36.8 SEC (ref 21–32)
BASOPHILS # BLD AUTO: 0.06 K/UL (ref 0–0.2)
BASOPHILS NFR BLD: 0.2 % (ref 0–1.9)
BUN SERPL-MCNC: 30 MG/DL (ref 6–20)
CALCIUM SERPL-MCNC: 8.2 MG/DL (ref 8.7–10.5)
CHLORIDE SERPL-SCNC: 104 MMOL/L (ref 95–110)
CO2 SERPL-SCNC: 20 MMOL/L (ref 23–29)
CREAT SERPL-MCNC: 1 MG/DL (ref 0.5–1.4)
DELSYS: ABNORMAL
DIFFERENTIAL METHOD: ABNORMAL
EOSINOPHIL # BLD AUTO: 0 K/UL (ref 0–0.5)
EOSINOPHIL NFR BLD: 0 % (ref 0–8)
ERYTHROCYTE [DISTWIDTH] IN BLOOD BY AUTOMATED COUNT: 14.3 % (ref 11.5–14.5)
EST. GFR  (AFRICAN AMERICAN): >60 ML/MIN/1.73 M^2
EST. GFR  (NON AFRICAN AMERICAN): >60 ML/MIN/1.73 M^2
FLOW: 3
GLUCOSE SERPL-MCNC: 200 MG/DL (ref 70–110)
HCO3 UR-SCNC: 18.9 MMOL/L (ref 24–28)
HCT VFR BLD AUTO: 26.4 % (ref 40–54)
HGB BLD-MCNC: 8.8 G/DL (ref 14–18)
IMM GRANULOCYTES # BLD AUTO: 0.4 K/UL (ref 0–0.04)
IMM GRANULOCYTES NFR BLD AUTO: 1.5 % (ref 0–0.5)
LYMPHOCYTES # BLD AUTO: 1.3 K/UL (ref 1–4.8)
LYMPHOCYTES NFR BLD: 4.8 % (ref 18–48)
MAGNESIUM SERPL-MCNC: 2.1 MG/DL (ref 1.6–2.6)
MCH RBC QN AUTO: 29.7 PG (ref 27–31)
MCHC RBC AUTO-ENTMCNC: 33.3 G/DL (ref 32–36)
MCV RBC AUTO: 89 FL (ref 82–98)
MODE: ABNORMAL
MONOCYTES # BLD AUTO: 2 K/UL (ref 0.3–1)
MONOCYTES NFR BLD: 7.3 % (ref 4–15)
NEUTROPHILS # BLD AUTO: 23.1 K/UL (ref 1.8–7.7)
NEUTROPHILS NFR BLD: 86.2 % (ref 38–73)
NRBC BLD-RTO: 0 /100 WBC
PCO2 BLDA: 33.3 MMHG (ref 35–45)
PH SMN: 7.36 [PH] (ref 7.35–7.45)
PHOSPHATE SERPL-MCNC: 2.9 MG/DL (ref 2.7–4.5)
PLATELET # BLD AUTO: 142 K/UL (ref 150–350)
PLATELET BLD QL SMEAR: ABNORMAL
PMV BLD AUTO: 10.8 FL (ref 9.2–12.9)
PO2 BLDA: 58 MMHG (ref 80–100)
POC BE: -6 MMOL/L
POC SATURATED O2: 89 % (ref 95–100)
POC TCO2: 20 MMOL/L (ref 23–27)
POCT GLUCOSE: 176 MG/DL (ref 70–110)
POCT GLUCOSE: 186 MG/DL (ref 70–110)
POCT GLUCOSE: 199 MG/DL (ref 70–110)
POCT GLUCOSE: 219 MG/DL (ref 70–110)
POTASSIUM SERPL-SCNC: 3.9 MMOL/L (ref 3.5–5.1)
POTASSIUM SERPL-SCNC: 4 MMOL/L (ref 3.5–5.1)
RBC # BLD AUTO: 2.96 M/UL (ref 4.6–6.2)
SAMPLE: ABNORMAL
SITE: ABNORMAL
SODIUM SERPL-SCNC: 136 MMOL/L (ref 136–145)
WBC # BLD AUTO: 26.85 K/UL (ref 3.9–12.7)

## 2019-08-11 PROCEDURE — 94664 DEMO&/EVAL PT USE INHALER: CPT

## 2019-08-11 PROCEDURE — 27000221 HC OXYGEN, UP TO 24 HOURS

## 2019-08-11 PROCEDURE — 25000003 PHARM REV CODE 250: Performed by: STUDENT IN AN ORGANIZED HEALTH CARE EDUCATION/TRAINING PROGRAM

## 2019-08-11 PROCEDURE — 94799 UNLISTED PULMONARY SVC/PX: CPT

## 2019-08-11 PROCEDURE — 82803 BLOOD GASES ANY COMBINATION: CPT

## 2019-08-11 PROCEDURE — 63600175 PHARM REV CODE 636 W HCPCS: Performed by: NURSE PRACTITIONER

## 2019-08-11 PROCEDURE — 85025 COMPLETE CBC W/AUTO DIFF WBC: CPT

## 2019-08-11 PROCEDURE — 84132 ASSAY OF SERUM POTASSIUM: CPT

## 2019-08-11 PROCEDURE — 94761 N-INVAS EAR/PLS OXIMETRY MLT: CPT

## 2019-08-11 PROCEDURE — 27000646 HC AEROBIKA DEVICE

## 2019-08-11 PROCEDURE — 85730 THROMBOPLASTIN TIME PARTIAL: CPT | Mod: 91

## 2019-08-11 PROCEDURE — 63600175 PHARM REV CODE 636 W HCPCS: Performed by: PHYSICIAN ASSISTANT

## 2019-08-11 PROCEDURE — 97161 PT EVAL LOW COMPLEX 20 MIN: CPT

## 2019-08-11 PROCEDURE — 84100 ASSAY OF PHOSPHORUS: CPT

## 2019-08-11 PROCEDURE — 25000003 PHARM REV CODE 250: Performed by: PHYSICIAN ASSISTANT

## 2019-08-11 PROCEDURE — 99232 PR SUBSEQUENT HOSPITAL CARE,LEVL II: ICD-10-PCS | Mod: ,,, | Performed by: NURSE PRACTITIONER

## 2019-08-11 PROCEDURE — 85730 THROMBOPLASTIN TIME PARTIAL: CPT

## 2019-08-11 PROCEDURE — 63600175 PHARM REV CODE 636 W HCPCS: Performed by: STUDENT IN AN ORGANIZED HEALTH CARE EDUCATION/TRAINING PROGRAM

## 2019-08-11 PROCEDURE — 80048 BASIC METABOLIC PNL TOTAL CA: CPT

## 2019-08-11 PROCEDURE — 20600001 HC STEP DOWN PRIVATE ROOM

## 2019-08-11 PROCEDURE — 37799 UNLISTED PX VASCULAR SURGERY: CPT

## 2019-08-11 PROCEDURE — 99900035 HC TECH TIME PER 15 MIN (STAT)

## 2019-08-11 PROCEDURE — 97530 THERAPEUTIC ACTIVITIES: CPT

## 2019-08-11 PROCEDURE — 99232 SBSQ HOSP IP/OBS MODERATE 35: CPT | Mod: ,,, | Performed by: NURSE PRACTITIONER

## 2019-08-11 PROCEDURE — 99233 SBSQ HOSP IP/OBS HIGH 50: CPT | Mod: ,,, | Performed by: SURGERY

## 2019-08-11 PROCEDURE — 83735 ASSAY OF MAGNESIUM: CPT

## 2019-08-11 PROCEDURE — 99233 PR SUBSEQUENT HOSPITAL CARE,LEVL III: ICD-10-PCS | Mod: ,,, | Performed by: SURGERY

## 2019-08-11 RX ORDER — FUROSEMIDE 10 MG/ML
20 INJECTION INTRAMUSCULAR; INTRAVENOUS 2 TIMES DAILY
Status: DISCONTINUED | OUTPATIENT
Start: 2019-08-11 | End: 2019-08-14 | Stop reason: HOSPADM

## 2019-08-11 RX ORDER — PANTOPRAZOLE SODIUM 40 MG/1
40 TABLET, DELAYED RELEASE ORAL
Status: DISCONTINUED | OUTPATIENT
Start: 2019-08-12 | End: 2019-08-14 | Stop reason: HOSPADM

## 2019-08-11 RX ORDER — HYDROMORPHONE HYDROCHLORIDE 1 MG/ML
0.5 INJECTION, SOLUTION INTRAMUSCULAR; INTRAVENOUS; SUBCUTANEOUS ONCE
Status: DISCONTINUED | OUTPATIENT
Start: 2019-08-11 | End: 2019-08-11

## 2019-08-11 RX ORDER — AMLODIPINE BESYLATE 5 MG/1
5 TABLET ORAL DAILY
Status: DISCONTINUED | OUTPATIENT
Start: 2019-08-11 | End: 2019-08-14 | Stop reason: HOSPADM

## 2019-08-11 RX ORDER — HEPARIN SODIUM 10000 [USP'U]/100ML
1300 INJECTION, SOLUTION INTRAVENOUS CONTINUOUS
Status: DISCONTINUED | OUTPATIENT
Start: 2019-08-11 | End: 2019-08-12

## 2019-08-11 RX ORDER — HEPARIN SODIUM 10000 [USP'U]/100ML
1200 INJECTION, SOLUTION INTRAVENOUS CONTINUOUS
Status: DISCONTINUED | OUTPATIENT
Start: 2019-08-11 | End: 2019-08-11

## 2019-08-11 RX ORDER — ATORVASTATIN CALCIUM 20 MG/1
40 TABLET, FILM COATED ORAL NIGHTLY
Status: DISCONTINUED | OUTPATIENT
Start: 2019-08-11 | End: 2019-08-11

## 2019-08-11 RX ORDER — ASPIRIN 325 MG
325 TABLET, DELAYED RELEASE (ENTERIC COATED) ORAL DAILY
Status: DISCONTINUED | OUTPATIENT
Start: 2019-08-12 | End: 2019-08-14 | Stop reason: HOSPADM

## 2019-08-11 RX ORDER — WARFARIN SODIUM 5 MG/1
5 TABLET ORAL ONCE
Status: COMPLETED | OUTPATIENT
Start: 2019-08-11 | End: 2019-08-11

## 2019-08-11 RX ORDER — LISINOPRIL 5 MG/1
5 TABLET ORAL DAILY
Status: DISCONTINUED | OUTPATIENT
Start: 2019-08-11 | End: 2019-08-14 | Stop reason: HOSPADM

## 2019-08-11 RX ORDER — POTASSIUM CHLORIDE 20 MEQ/1
20 TABLET, EXTENDED RELEASE ORAL EVERY 12 HOURS
Status: DISCONTINUED | OUTPATIENT
Start: 2019-08-11 | End: 2019-08-14 | Stop reason: HOSPADM

## 2019-08-11 RX ADMIN — ASPIRIN 325 MG ORAL TABLET 325 MG: 325 PILL ORAL at 08:08

## 2019-08-11 RX ADMIN — MUPIROCIN 1 G: 20 OINTMENT TOPICAL at 08:08

## 2019-08-11 RX ADMIN — HEPARIN SODIUM AND DEXTROSE 1000 UNITS/HR: 10000; 5 INJECTION INTRAVENOUS at 12:08

## 2019-08-11 RX ADMIN — METOPROLOL TARTRATE 25 MG: 25 TABLET ORAL at 08:08

## 2019-08-11 RX ADMIN — SIMVASTATIN 20 MG: 5 TABLET, FILM COATED ORAL at 09:08

## 2019-08-11 RX ADMIN — AMLODIPINE BESYLATE 5 MG: 5 TABLET ORAL at 11:08

## 2019-08-11 RX ADMIN — FUROSEMIDE 20 MG: 10 INJECTION, SOLUTION INTRAMUSCULAR; INTRAVENOUS at 05:08

## 2019-08-11 RX ADMIN — HEPARIN SODIUM AND DEXTROSE 1200 UNITS/HR: 10000; 5 INJECTION INTRAVENOUS at 06:08

## 2019-08-11 RX ADMIN — CEFAZOLIN 2 G: 1 INJECTION, POWDER, FOR SOLUTION INTRAMUSCULAR; INTRAVENOUS at 03:08

## 2019-08-11 RX ADMIN — DOCUSATE SODIUM 100 MG: 100 CAPSULE, LIQUID FILLED ORAL at 09:08

## 2019-08-11 RX ADMIN — HEPARIN SODIUM AND DEXTROSE 1200 UNITS/HR: 10000; 5 INJECTION INTRAVENOUS at 12:08

## 2019-08-11 RX ADMIN — GABAPENTIN 600 MG: 300 CAPSULE ORAL at 03:08

## 2019-08-11 RX ADMIN — OXYCODONE HYDROCHLORIDE 10 MG: 10 TABLET ORAL at 09:08

## 2019-08-11 RX ADMIN — WARFARIN SODIUM 5 MG: 5 TABLET ORAL at 05:08

## 2019-08-11 RX ADMIN — INSULIN ASPART 1 UNITS: 100 INJECTION, SOLUTION INTRAVENOUS; SUBCUTANEOUS at 11:08

## 2019-08-11 RX ADMIN — INSULIN ASPART 1 UNITS: 100 INJECTION, SOLUTION INTRAVENOUS; SUBCUTANEOUS at 05:08

## 2019-08-11 RX ADMIN — FUROSEMIDE 20 MG: 10 INJECTION, SOLUTION INTRAMUSCULAR; INTRAVENOUS at 10:08

## 2019-08-11 RX ADMIN — OXYCODONE HYDROCHLORIDE 10 MG: 10 TABLET ORAL at 12:08

## 2019-08-11 RX ADMIN — METOPROLOL TARTRATE 25 MG: 25 TABLET ORAL at 09:08

## 2019-08-11 RX ADMIN — LISINOPRIL 5 MG: 2.5 TABLET ORAL at 10:08

## 2019-08-11 RX ADMIN — MUPIROCIN 1 G: 20 OINTMENT TOPICAL at 09:08

## 2019-08-11 RX ADMIN — GABAPENTIN 600 MG: 300 CAPSULE ORAL at 09:08

## 2019-08-11 RX ADMIN — POTASSIUM CHLORIDE 20 MEQ: 20 TABLET, EXTENDED RELEASE ORAL at 09:08

## 2019-08-11 RX ADMIN — DOCUSATE SODIUM 100 MG: 100 CAPSULE, LIQUID FILLED ORAL at 08:08

## 2019-08-11 RX ADMIN — INSULIN ASPART 1 UNITS: 100 INJECTION, SOLUTION INTRAVENOUS; SUBCUTANEOUS at 10:08

## 2019-08-11 RX ADMIN — OXYCODONE HYDROCHLORIDE 10 MG: 10 TABLET ORAL at 08:08

## 2019-08-11 RX ADMIN — INSULIN ASPART 1 UNITS: 100 INJECTION, SOLUTION INTRAVENOUS; SUBCUTANEOUS at 07:08

## 2019-08-11 RX ADMIN — GABAPENTIN 600 MG: 300 CAPSULE ORAL at 08:08

## 2019-08-11 RX ADMIN — POLYETHYLENE GLYCOL 3350 17 G: 17 POWDER, FOR SOLUTION ORAL at 08:08

## 2019-08-11 RX ADMIN — POTASSIUM CHLORIDE 20 MEQ: 200 INJECTION, SOLUTION INTRAVENOUS at 09:08

## 2019-08-11 NOTE — PROGRESS NOTES
"[facility]  Endocrinology  Progress Note    Admit Date: 8/9/2019     Reason for Consult: Management of T1DM, Hyperglycemia     Surgical Procedure and Date:   BENTALL PROCEDURE: 08/09/2019    Diabetes diagnosis year:    ~ 20 years ago    Home Diabetes Medications:    Medtronic 670G    Basal: 2.65   ICR: 12   ISF: 20   IOB: 3.15   Target: 120     How often checking glucose at home? KELSEY  BG readings on regimen: KELSEY  Hypoglycemia on the regimen?  KELSEY  Missed doses on regimen?  KELSEY    Diabetes Complications include:     Hyperglycemia, Hypoglycemia , Diabetic retinopathy  and Diabetic peripheral neuropathy     Complicating diabetes co morbidities:   History of CVA and Active Cancer, severe obesity, HTN, HLD, CAD, Aortic Stenosis.       HPI:   Patient is a 53 y.o. male with a diagnosis of aortic stenosis, diabetes type2, and CAD. Has noticed SOB over the past 1 month. Patient has history of stage 2 colon cancer in 2015 with resection, no chemo or radiation. Receives primary DM care from BEATRIS Quintero NP. Endocrinology consulted to manage DM1/hyperglycemia post cardiac surgery.     Lab Results   Component Value Date    HGBA1C 6.6 (H) 07/23/2019       Interval HPI:   Overnight events:  BG is above goal on current IV/SQ insulin regimen. Otherwise NAEON. Patient does not currently have his home pump supplies with him in hospital.     Eating:   <25% - 50%  Nausea: No  Hypoglycemia and intervention: No  Fever: No  TPN and/or TF: No  If yes, type of TF/TPN and rate: None    /61 (BP Location: Right arm, Patient Position: Sitting)   Pulse 74   Temp 98.9 °F (37.2 °C) (Oral)   Resp 16   Ht 5' 8" (1.727 m)   Wt 107.5 kg (236 lb 15.9 oz)   SpO2 99%   BMI 36.03 kg/m²      Labs Reviewed and Include    Recent Labs   Lab 08/11/19  0359 08/11/19  0724   *  --    CALCIUM 8.2*  --      --    K 4.0 3.9   CO2 20*  --      --    BUN 30*  --    CREATININE 1.0  --      Lab Results   Component Value " Date    WBC 26.85 (H) 08/11/2019    HGB 8.8 (L) 08/11/2019    HCT 26.4 (L) 08/11/2019    MCV 89 08/11/2019     (L) 08/11/2019     No results for input(s): TSH, FREET4 in the last 168 hours.  Lab Results   Component Value Date    HGBA1C 6.6 (H) 07/23/2019       Nutritional status:   Body mass index is 36.03 kg/m².  Lab Results   Component Value Date    ALBUMIN 4.6 07/23/2019    ALBUMIN 4.3 04/24/2019    ALBUMIN 4.1 05/26/2018     No results found for: PREALBUMIN    Estimated Creatinine Clearance: 99.2 mL/min (based on SCr of 1 mg/dL).    Accu-Checks  Recent Labs     08/10/19  0206 08/10/19  0330 08/10/19  0416 08/10/19  0510 08/10/19  0606 08/10/19  0816 08/10/19  1041 08/10/19  1706 08/10/19  2114 08/11/19  0118   POCTGLUCOSE 129* 165* 164* 152* 141* 172* 172* 192* 177* 176*       Current Medications and/or Treatments Impacting Glycemic Control  Immunotherapy:    Immunosuppressants     None        Steroids:   Hormones (From admission, onward)    None        Pressors:    Autonomic Drugs (From admission, onward)    None        Hyperglycemia/Diabetes Medications:   Antihyperglycemics (From admission, onward)    Start     Stop Route Frequency Ordered    08/10/19 1017  insulin aspart U-100 pen 0-4 Units      -- SubQ As needed (PRN) 08/10/19 0918    08/10/19 1015  insulin regular (Humulin R) 100 Units in sodium chloride 0.9% 100 mL infusion      -- IV Continuous 08/10/19 0918          ASSESSMENT and PLAN    * S/P ascending aortic replacement  Managed per primary team  Avoid hypoglycemia        Diabetes mellitus type I  BG goal 110 - 140  BG is within or slightly above goal. Patient is Type 1 DM. Expect ongoing need for insulin therapy.     Discontinue IV insulin infusion protocol  Increase transition IV insulin infusion with stepdown parameters. Initial rate starting at 0.5 units/hr. BG above goal in regards to basal requirements.   Patient is type 1 do NOT stop insulin gtt longer than 30 mins      - If Bg > 100 in  30 mins restart insulin gtt at    0.1   and resume previous monitoring schedule.        -If Bg remains < 80, check bg q30 mins until BG > 100 and resume gtt at    0.1    and resume previous monitoring schedule.    Plan to have patient use his home insulin infusion pump once he has all of his supplies, and is able to sign release at bedside.     Start Low Dose SQ Insulin Correction Scale.  BG monitoring AC/HS/0200    Discharge planning:   TBD        Class 1 obesity due to excess calories with serious comorbidity and body mass index (BMI) of 33.0 to 33.9 in adult  may contribute to insulin resistance            Erickson Cunningham NP  Endocrinology  [facility]

## 2019-08-11 NOTE — PROGRESS NOTES
[facility]  Critical Care - Surgery  Progress Note    Patient Name: Cj Barnes  MRN: [id number]  Admission Date: 8/9/2019  Hospital Length of Stay: 2 days  Code Status: Full Code  Attending Provider: Ryan Knight MD  Primary Care Provider: Garry Stover MD   Principal Problem: S/P ascending aortic replacement    Subjective:     Hospital/ICU Course:  Cj Barnes is a 53 y.o. male  with history of type 2 DM (controlled with insulin pump), CAD, Bicuspid AV with aortic stenosis now s/p Bentall procedure with a 23 mm [employer] mechanical valved conduit on 8/9. Arrived to [facility] intubated on multiple pressors. Past surgical history includes Stage 2 colon cancer in 2015 with resection, no chemotherapy or radiation.     Interval History/Significant Events: No acute events overnight. Patient off cardene this am. Started norvasc 10mg.    Follow-up For: Procedure(s) (LRB):  Replacement-valve-aortic (N/A)  BENTALL PROCEDURE (N/A)    Post-Operative Day: 1 Day Post-Op    Objective:     Vital Signs (Most Recent):  Temp: 98.9 °F (37.2 °C) (08/11/19 0700)  Pulse: 76 (08/11/19 1049)  Resp: 19 (08/11/19 1049)  BP: (!) 114/49 (08/11/19 1000)  SpO2: 99 % (08/11/19 1049) Vital Signs (24h Range):  Temp:  [97.9 °F (36.6 °C)-99.5 °F (37.5 °C)] 98.9 °F (37.2 °C)  Pulse:  [71-98] 76  Resp:  [13-30] 19  SpO2:  [87 %-100 %] 99 %  BP: (113-163)/(49-74) 114/49  Arterial Line BP: ()/(36-55) 118/50     Weight: 107.5 kg (236 lb 15.9 oz)  Body mass index is 36.03 kg/m².      Intake/Output Summary (Last 24 hours) at 8/11/2019 1053  Last data filed at 8/11/2019 1000  Gross per 24 hour   Intake 2451 ml   Output 3670 ml   Net -1219 ml       Physical Exam   Constitutional: He is oriented to person, place, and time. No distress.   HENT:   Head: Normocephalic and atraumatic.   Eyes: Conjunctivae and EOM are normal.   Cardiovascular: Normal rate, regular rhythm and normal heart sounds.   Pulmonary/Chest: Effort  normal and breath sounds normal. No stridor. He has no wheezes. He has no rales.   Chest tubes in place, SS output   Abdominal: Soft. Bowel sounds are normal.   Musculoskeletal: He exhibits no edema.   Neurological: He is alert and oriented to person, place, and time.   Skin: He is not diaphoretic.       Vents:  Vent Mode: SIMV (08/09/19 1625)  Set Rate: 12 bmp (08/09/19 1625)  Vt Set: 500 mL (08/09/19 1625)  Pressure Support: 0 cmH20 (08/09/19 1625)  PEEP/CPAP: 5 cmH20 (08/09/19 1625)  Oxygen Concentration (%): 39 (08/09/19 1710)  Peak Airway Pressure: 18 cmH2O (08/09/19 1625)  Plateau Pressure: 0 cmH20 (08/09/19 1625)  Total Ve: 7.72 mL (08/09/19 1625)  Negative Inspiratory Force (cm H2O): -22 (08/09/19 1449)  F/VT Ratio<105 (RSBI): (!) 0 (08/09/19 1625)    Lines/Drains/Airways     Central Venous Catheter Line                 Introducer 08/09/19 right internal jugular 2 days         Percutaneous Central Line Insertion/Assessment - double lumen  08/09/19 0721 2 days          Drain                 Urethral Catheter 08/09/19 0740 Straight-tip;Non-latex;Temperature probe 14 Fr. 2 days         Y Chest Tube 1 and 2 08/09/19 1244 1 Anterior Mediastinal 19 Fr. 2 Posterior Mediastinal 19 Fr. 1 day          Arterial Line                 Arterial Line 08/10/19 Right Radial 1 day          Line                 Pacer Wires 08/09/19 1240 1 day          Peripheral Intravenous Line                 Peripheral IV - Single Lumen 08/09/19 0634 18 G Left Forearm 2 days         Peripheral IV - Single Lumen 08/09/19 0719 16 G Right Hand 2 days                Significant Labs:    CBC/Anemia Profile:  Recent Labs   Lab 08/09/19  1515 08/09/19  1612 08/10/19  0330 08/11/19  0359   WBC 22.54*  --  21.21* 26.85*   HGB 11.9*  --  10.4* 8.8*   HCT 35.7* 29* 31.9* 26.4*     --  147* 142*   MCV 88  --  90 89   RDW 13.6  --  14.0 14.3        Chemistries:  Recent Labs   Lab 08/09/19  1515  08/10/19  0330  08/10/19  1934 08/11/19  0354  08/11/19  0724     --  142  --   --  136  --    K 4.1   < > 4.4   < > 3.9 4.0 3.9   *  --  111*  --   --  104  --    CO2 19*  --  18*  --   --  20*  --    BUN 19  --  19  --   --  30*  --    CREATININE 0.8  --  0.8  --   --  1.0  --    CALCIUM 8.8  --  8.4*  --   --  8.2*  --    MG 2.6  2.6  --  2.0  --   --  2.1  --    PHOS 3.7  3.7  --  4.1  --   --  2.9  --     < > = values in this interval not displayed.       All pertinent labs within the past 24 hours have been reviewed.    Significant Imaging:  I have reviewed and interpreted all pertinent imaging results/findings within the past 24 hours.    Assessment/Plan:     Aortic stenosis  54 yo Man with history of T2DM, CAD , AS 2/2 congenital bicuspid AV s/p Bentall with 23mm Mechanical aortic valve. Received 3L crystalloid and no cell saver intraoperatively.    Neuro:  Sedation: None  Analgesia: prn oxycodone, home gabapentin TID    CV:   AS s/p Bentall 23mm   Cardene gtt prn for goal Systolic <110, MAP goal 60-70  Lactate 2.95 on arrival trended down to 1.39 , Resuscitate with albumin if neccessary  ABG, lactate trend  norvasc 5mg and lisinopril 5mg started    Pulm:   Extubated on room air by 8/10  IS, OOB    Renal:   Monitor UOP  qAM BMP    FEN/GI  Resuscitate with albumin  Goal K > 4, Mg > 2.0  Diabetic diet with fluid restriction    Heme/ID  Afebrile, post op leukocyotosis  H/H stable  Ancef 2g Q8 for 5 doses postop  On heparin gtt aptt goal 60-70    Endo:  Type 2 DM with insulin pump (off)  On insulin drip, endo following, appreciate recs    Dispo: Stable for step down.  Discussed with CTS         Critical care was time spent personally by me on the following activities: development of treatment plan with patient or surrogate and bedside caregivers, discussions with consultants, evaluation of patient's response to treatment, examination of patient, ordering and performing treatments and interventions, ordering and review of laboratory studies,  ordering and review of radiographic studies, pulse oximetry, re-evaluation of patient's condition.  This critical care time did not overlap with that of any other provider or involve time for any procedures.     Lis Lane MD  Critical Care - Surgery  [facility]

## 2019-08-11 NOTE — SUBJECTIVE & OBJECTIVE
"Interval HPI:   Overnight events:  BG is above goal on current IV/SQ insulin regimen. Otherwise NAEON. Patient does not currently have his home pump supplies with him in hospital.     Eating:   <25% - 50%  Nausea: No  Hypoglycemia and intervention: No  Fever: No  TPN and/or TF: No  If yes, type of TF/TPN and rate: None    /61 (BP Location: Right arm, Patient Position: Sitting)   Pulse 74   Temp 98.9 °F (37.2 °C) (Oral)   Resp 16   Ht 5' 8" (1.727 m)   Wt 107.5 kg (236 lb 15.9 oz)   SpO2 99%   BMI 36.03 kg/m²     Labs Reviewed and Include    Recent Labs   Lab 08/11/19  0359 08/11/19  0724   *  --    CALCIUM 8.2*  --      --    K 4.0 3.9   CO2 20*  --      --    BUN 30*  --    CREATININE 1.0  --      Lab Results   Component Value Date    WBC 26.85 (H) 08/11/2019    HGB 8.8 (L) 08/11/2019    HCT 26.4 (L) 08/11/2019    MCV 89 08/11/2019     (L) 08/11/2019     No results for input(s): TSH, FREET4 in the last 168 hours.  Lab Results   Component Value Date    HGBA1C 6.6 (H) 07/23/2019       Nutritional status:   Body mass index is 36.03 kg/m².  Lab Results   Component Value Date    ALBUMIN 4.6 07/23/2019    ALBUMIN 4.3 04/24/2019    ALBUMIN 4.1 05/26/2018     No results found for: PREALBUMIN    Estimated Creatinine Clearance: 99.2 mL/min (based on SCr of 1 mg/dL).    Accu-Checks  Recent Labs     08/10/19  0206 08/10/19  0330 08/10/19  0416 08/10/19  0510 08/10/19  0606 08/10/19  0816 08/10/19  1041 08/10/19  1706 08/10/19  2114 08/11/19  0118   POCTGLUCOSE 129* 165* 164* 152* 141* 172* 172* 192* 177* 176*       Current Medications and/or Treatments Impacting Glycemic Control  Immunotherapy:    Immunosuppressants     None        Steroids:   Hormones (From admission, onward)    None        Pressors:    Autonomic Drugs (From admission, onward)    None        Hyperglycemia/Diabetes Medications:   Antihyperglycemics (From admission, onward)    Start     Stop Route Frequency Ordered    " 08/10/19 1017  insulin aspart U-100 pen 0-4 Units      -- SubQ As needed (PRN) 08/10/19 0918    08/10/19 1015  insulin regular (Humulin R) 100 Units in sodium chloride 0.9% 100 mL infusion      -- IV Continuous 08/10/19 0918

## 2019-08-11 NOTE — ASSESSMENT & PLAN NOTE
BG goal 110 - 140  BG is within or slightly above goal. Patient is Type 1 DM. Expect ongoing need for insulin therapy.     Discontinue IV insulin infusion protocol  Increase transition IV insulin infusion with stepdown parameters. Initial rate starting at 0.5 units/hr. BG above goal in regards to basal requirements.   Patient is type 1 do NOT stop insulin gtt longer than 30 mins      - If Bg > 100 in 30 mins restart insulin gtt at    0.1   and resume previous monitoring schedule.        -If Bg remains < 80, check bg q30 mins until BG > 100 and resume gtt at    0.1    and resume previous monitoring schedule.    Plan to have patient use his home insulin infusion pump once he has all of his supplies, and is able to sign release at bedside.     Start Low Dose SQ Insulin Correction Scale.  BG monitoring AC/HS/0200    Discharge planning:   WILLI

## 2019-08-11 NOTE — SUBJECTIVE & OBJECTIVE
Interval History/Significant Events: No acute events overnight. Patient off cardene this am. Started norvasc 10mg.    Follow-up For: Procedure(s) (LRB):  Replacement-valve-aortic (N/A)  BENTALL PROCEDURE (N/A)    Post-Operative Day: 1 Day Post-Op    Objective:     Vital Signs (Most Recent):  Temp: 98.9 °F (37.2 °C) (08/11/19 0700)  Pulse: 76 (08/11/19 1049)  Resp: 19 (08/11/19 1049)  BP: (!) 114/49 (08/11/19 1000)  SpO2: 99 % (08/11/19 1049) Vital Signs (24h Range):  Temp:  [97.9 °F (36.6 °C)-99.5 °F (37.5 °C)] 98.9 °F (37.2 °C)  Pulse:  [71-98] 76  Resp:  [13-30] 19  SpO2:  [87 %-100 %] 99 %  BP: (113-163)/(49-74) 114/49  Arterial Line BP: ()/(36-55) 118/50     Weight: 107.5 kg (236 lb 15.9 oz)  Body mass index is 36.03 kg/m².      Intake/Output Summary (Last 24 hours) at 8/11/2019 1053  Last data filed at 8/11/2019 1000  Gross per 24 hour   Intake 2451 ml   Output 3670 ml   Net -1219 ml       Physical Exam   Constitutional: He is oriented to person, place, and time. No distress.   HENT:   Head: Normocephalic and atraumatic.   Eyes: Conjunctivae and EOM are normal.   Cardiovascular: Normal rate, regular rhythm and normal heart sounds.   Pulmonary/Chest: Effort normal and breath sounds normal. No stridor. He has no wheezes. He has no rales.   Chest tubes in place, SS output   Abdominal: Soft. Bowel sounds are normal.   Musculoskeletal: He exhibits no edema.   Neurological: He is alert and oriented to person, place, and time.   Skin: He is not diaphoretic.       Vents:  Vent Mode: SIMV (08/09/19 1625)  Set Rate: 12 bmp (08/09/19 1625)  Vt Set: 500 mL (08/09/19 1625)  Pressure Support: 0 cmH20 (08/09/19 1625)  PEEP/CPAP: 5 cmH20 (08/09/19 1625)  Oxygen Concentration (%): 39 (08/09/19 1710)  Peak Airway Pressure: 18 cmH2O (08/09/19 1625)  Plateau Pressure: 0 cmH20 (08/09/19 1625)  Total Ve: 7.72 mL (08/09/19 1625)  Negative Inspiratory Force (cm H2O): -22 (08/09/19 1449)  F/VT Ratio<105 (RSBI): (!) 0 (08/09/19  1625)    Lines/Drains/Airways     Central Venous Catheter Line                 Introducer 08/09/19 right internal jugular 2 days         Percutaneous Central Line Insertion/Assessment - double lumen  08/09/19 0721 2 days          Drain                 Urethral Catheter 08/09/19 0740 Straight-tip;Non-latex;Temperature probe 14 Fr. 2 days         Y Chest Tube 1 and 2 08/09/19 1244 1 Anterior Mediastinal 19 Fr. 2 Posterior Mediastinal 19 Fr. 1 day          Arterial Line                 Arterial Line 08/10/19 Right Radial 1 day          Line                 Pacer Wires 08/09/19 1240 1 day          Peripheral Intravenous Line                 Peripheral IV - Single Lumen 08/09/19 0634 18 G Left Forearm 2 days         Peripheral IV - Single Lumen 08/09/19 0719 16 G Right Hand 2 days                Significant Labs:    CBC/Anemia Profile:  Recent Labs   Lab 08/09/19  1515 08/09/19  1612 08/10/19  0330 08/11/19 0359   WBC 22.54*  --  21.21* 26.85*   HGB 11.9*  --  10.4* 8.8*   HCT 35.7* 29* 31.9* 26.4*     --  147* 142*   MCV 88  --  90 89   RDW 13.6  --  14.0 14.3        Chemistries:  Recent Labs   Lab 08/09/19  1515  08/10/19  0330  08/10/19  1934 08/11/19  0359 08/11/19  0724     --  142  --   --  136  --    K 4.1   < > 4.4   < > 3.9 4.0 3.9   *  --  111*  --   --  104  --    CO2 19*  --  18*  --   --  20*  --    BUN 19  --  19  --   --  30*  --    CREATININE 0.8  --  0.8  --   --  1.0  --    CALCIUM 8.8  --  8.4*  --   --  8.2*  --    MG 2.6  2.6  --  2.0  --   --  2.1  --    PHOS 3.7  3.7  --  4.1  --   --  2.9  --     < > = values in this interval not displayed.       All pertinent labs within the past 24 hours have been reviewed.    Significant Imaging:  I have reviewed and interpreted all pertinent imaging results/findings within the past 24 hours.

## 2019-08-11 NOTE — NURSING
Pt admitted to room. AAOX4. Denies any pain. SR on monitor. VS stable. Chest tube to suction. IV heparin and insulin infusing as ordered to L arm IV. Call light in reach. Pt oriented to room.

## 2019-08-11 NOTE — ASSESSMENT & PLAN NOTE
54 yo Man with history of T2DM, CAD , AS 2/2 congenital bicuspid AV s/p Bentall with 23mm Mechanical aortic valve. Received 3L crystalloid and no cell saver intraoperatively.    Neuro:  Sedation: None  Analgesia: prn oxycodone, home gabapentin TID    CV:   AS s/p Bentall 23mm   Cardene gtt prn for goal Systolic <110, MAP goal 60-70  Lactate 2.95 on arrival trended down to 1.39 , Resuscitate with albumin if neccessary  ABG, lactate trend  norvasc 5mg and lisinopril 5mg started    Pulm:   Extubated on room air by 8/10  IS, OOB    Renal:   Monitor UOP  qAM BMP    FEN/GI  Resuscitate with albumin  Goal K > 4, Mg > 2.0  Diabetic diet with fluid restriction    Heme/ID  Afebrile, post op leukocyotosis  H/H stable  Ancef 2g Q8 for 5 doses postop  On heparin gtt aptt goal 60-70    Endo:  Type 2 DM with insulin pump (off)  On insulin drip, endo following, appreciate recs    Dispo: Stable for step down.  Discussed with CTS

## 2019-08-11 NOTE — PT/OT/SLP EVAL
"Physical Therapy Evaluation    Patient Name:  Cj Barnes   MRN:  [id number]    Recommendations:     Discharge Recommendations:  home   Discharge Equipment Recommendations: none   Barriers to discharge: Inaccessible home    Assessment:     Cj Barnes is a 53 y.o. male admitted with a medical diagnosis of S/P ascending aortic replacement.  He presents with the following impairments/functional limitations:  weakness, impaired endurance, impaired functional mobilty, gait instability, impaired cardiopulmonary response to activity, pain, decreased upper extremity function . Pt is motivated to progress with functional mobility.     Rehab Prognosis: Good; patient would benefit from acute skilled PT services to address these deficits and reach maximum level of function.    Recent Surgery: Procedure(s) (LRB):  Replacement-valve-aortic (N/A)  BENTALL PROCEDURE (N/A) 2 Days Post-Op    Plan:     During this hospitalization, patient to be seen 4 x/week to address the identified rehab impairments via gait training, therapeutic activities, therapeutic exercises, neuromuscular re-education and progress toward the following goals:    · Plan of Care Expires:  09/10/19    Subjective   "I have bursitis in my R shoulder"    Pain/Comfort:  · Pain Rating 1: 7/10  · Location - Side 1: Right  · Location 1: shoulder(pt states he has bursitis and it is really hurting him)  · Pain Addressed 1: Pre-medicate for activity, Reposition, Nurse notified  · Pain Rating Post-Intervention 1: 7/10    Patients cultural, spiritual, Adventism conflicts given the current situation: no    Living Environment:  Pt lives with his wife in a 1 story home with 2 CHARANJIT with single rail  Prior to admission, patients level of function was independent.  Equipment used at home: none.  Upon discharge, patient will have assistance from wife.    Objective:     Communicated with nurse prior to session.  Patient found up in chair with arterial line, blood " pressure cuff, chest tube, roa catheter, oxygen, peripheral IV, telemetry, pulse ox (continuous)  upon PT entry to room.    General Precautions: Standard, fall, sternal   Orthopedic Precautions:N/A   Braces: N/A     Exams:  · Cognitive Exam:  Patient is oriented to Person, Place, Time and Situation  · Sensation:    · -       Intact  light/touch B LE  · RLE ROM: WFL  · RLE Strength: WFL  · LLE ROM: WFL  · LLE Strength: WFL    Functional Mobility:  · Transfers:     · Sit to Stand:  minimum assistance with no AD; pt educated to scoot to the edge of the chair and to maintain sternal prec when coming to stand and when sitting.  · Gait: 120ft with no AD with minimal assist due to instability at times, especially with change of direction. pt improved as he progressed with gait trial. Limited gait distance due to SOB.  · Nurse present throughout gait trial with portable monitor and oxygen    Therapeutic Activities and Exercises:   pt educated in sternal prec with functional mobility    AM-PAC 6 CLICK MOBILITY  Total Score:16     Patient left up in chair with all lines intact, call button in reach, nurse notified and nurse present.    GOALS:   Multidisciplinary Problems     Physical Therapy Goals        Problem: Physical Therapy Goal    Goal Priority Disciplines Outcome Goal Variances Interventions   Physical Therapy Goal     PT, PT/OT Ongoing (interventions implemented as appropriate)     Description:  Pt goals until 8/20/19    1. Pt supine to sit with sternal prec with SBA-not met  2. Pt sit to supine with sternal prec with SBA-not met  3. Pt sit to stand with no AD with supervision-not met  4. Pt to perform gait 200ft with no AD with supervision.-not met  5. Pt to up/down 2 steps with R UE rail with CGA.-not met                        History:     Past Medical History:   Diagnosis Date    Aortic stenosis 2018    Cancer 2014    Colon cancer     Coronary artery disease     Diabetes mellitus type I     since in his  30's    Heart murmur     Heel fracture     HTN (hypertension)     Hyperlipidemia LDL goal < 70     Insulin pump in place     MVP (mitral valve prolapse)     Stenosis of aortic and mitral valves        Past Surgical History:   Procedure Laterality Date    BACK SURGERY      COLON SURGERY  2014    FASCIOTOMY, PLANTAR Right 10/4/2013    [facility]formed [facility]ST [facility]    FASCIOTOMY, PLANTAR, ENDOSCOPIC Right 10/4/2013    Performed [facility] R[facility]bins D[facility] at [facility] OR [facility]ST [facility]    FOOT TENDON SURGERY      Left heart cath Left 1/15/2018    Performed [facility] Palomo Turner [facility] at [facility]    right and left heart cath Bilateral 01/15/2018    Right heart cath Right 1/15/2018    Performed by Palomo Turner MD at [facility]    TRIGGER FINGER RELEASE      x 2       Time Tracking:     PT Received On: 08/11/19  PT Start Time: 0944     PT Stop Time: 1006  PT Total Time (min): 22 min     Billable Minutes: Evaluation 12 and Therapeutic Activity 10      Yadira Ibrahim, PT  08/11/2019

## 2019-08-11 NOTE — PLAN OF CARE
Problem: Adult Inpatient Plan of Care  Goal: Patient-Specific Goal (Individualization)  Dx: AVR, Bentall procedure  Hx: DM1 (controlled with insulin pump), CAD, Bicuspid AV with aortic stenosis     8/9: AVR and bentall. Post of SICU, Extubated     MAP 60-70  Accu check q1h   Outcome: Ongoing (interventions implemented as appropriate)  Plan of care reviewed with patient. HR 70-90, MAP 60-70 CVP 13. Oxygen requirements increased  2-5L NC SPO2> 92%. Patient wean off Cardene. Chest tube output 20-40cc/q4hr of serosanguinous fluid. Urine output via roa /hr. Patient medicated with PRN pain medication. Patient out of bed to chair. Plan is to continue to monitor patient vitals, labs, and keep patient comfortable.

## 2019-08-11 NOTE — NURSING TRANSFER
Nursing Transfer Note      8/11/2019     Transfer To: [id number] from [id number], Report given to BLADIMIR Saul    Transfer via stretcher    Transfer with O2, cardiac monitoring    Transported by RN and PCT    Medicines sent: Mupirocin, insulin pen, insulin drip, heparin, drip    Chart send with patient: Yes    Notified: daughter    Patient reassessed at: (08/11/19, 1810)    Upon arrival to floor: cardiac monitor applied, patient oriented to room, call bell in reach and bed in lowest position

## 2019-08-12 PROBLEM — D62 ACUTE BLOOD LOSS ANEMIA: Status: ACTIVE | Noted: 2019-08-12

## 2019-08-12 PROBLEM — E83.39 HYPOPHOSPHATEMIA: Status: ACTIVE | Noted: 2019-08-12

## 2019-08-12 LAB
ALLENS TEST: ABNORMAL
ANION GAP SERPL CALC-SCNC: 12 MMOL/L (ref 8–16)
APTT BLDCRRT: 36.3 SEC (ref 21–32)
APTT BLDCRRT: 39.9 SEC (ref 21–32)
APTT BLDCRRT: 39.9 SEC (ref 21–32)
BASOPHILS # BLD AUTO: 0.05 K/UL (ref 0–0.2)
BASOPHILS NFR BLD: 0.3 % (ref 0–1.9)
BUN SERPL-MCNC: 30 MG/DL (ref 6–20)
CALCIUM SERPL-MCNC: 8.4 MG/DL (ref 8.7–10.5)
CHLORIDE SERPL-SCNC: 101 MMOL/L (ref 95–110)
CO2 SERPL-SCNC: 23 MMOL/L (ref 23–29)
CREAT SERPL-MCNC: 0.7 MG/DL (ref 0.5–1.4)
DELSYS: ABNORMAL
DIFFERENTIAL METHOD: ABNORMAL
EOSINOPHIL # BLD AUTO: 0 K/UL (ref 0–0.5)
EOSINOPHIL NFR BLD: 0.2 % (ref 0–8)
ERYTHROCYTE [DISTWIDTH] IN BLOOD BY AUTOMATED COUNT: 13.9 % (ref 11.5–14.5)
EST. GFR  (AFRICAN AMERICAN): >60 ML/MIN/1.73 M^2
EST. GFR  (NON AFRICAN AMERICAN): >60 ML/MIN/1.73 M^2
GLUCOSE SERPL-MCNC: 148 MG/DL (ref 70–110)
HCT VFR BLD AUTO: 25.5 % (ref 40–54)
HGB BLD-MCNC: 8.5 G/DL (ref 14–18)
IMM GRANULOCYTES # BLD AUTO: 0.2 K/UL (ref 0–0.04)
IMM GRANULOCYTES NFR BLD AUTO: 1.1 % (ref 0–0.5)
INR PPP: 1.3 (ref 0.8–1.2)
LDH SERPL L TO P-CCNC: 3.84 MMOL/L (ref 0.36–1.25)
LYMPHOCYTES # BLD AUTO: 1.3 K/UL (ref 1–4.8)
LYMPHOCYTES NFR BLD: 7 % (ref 18–48)
MAGNESIUM SERPL-MCNC: 2.1 MG/DL (ref 1.6–2.6)
MCH RBC QN AUTO: 29.6 PG (ref 27–31)
MCHC RBC AUTO-ENTMCNC: 33.3 G/DL (ref 32–36)
MCV RBC AUTO: 89 FL (ref 82–98)
MODE: ABNORMAL
MONOCYTES # BLD AUTO: 1.3 K/UL (ref 0.3–1)
MONOCYTES NFR BLD: 7.1 % (ref 4–15)
NEUTROPHILS # BLD AUTO: 15.2 K/UL (ref 1.8–7.7)
NEUTROPHILS NFR BLD: 84.3 % (ref 38–73)
NRBC BLD-RTO: 0 /100 WBC
PHOSPHATE SERPL-MCNC: 2 MG/DL (ref 2.7–4.5)
PLATELET # BLD AUTO: 139 K/UL (ref 150–350)
PMV BLD AUTO: 10.9 FL (ref 9.2–12.9)
POCT GLUCOSE: 130 MG/DL (ref 70–110)
POCT GLUCOSE: 145 MG/DL (ref 70–110)
POCT GLUCOSE: 148 MG/DL (ref 70–110)
POCT GLUCOSE: 185 MG/DL (ref 70–110)
POCT GLUCOSE: 204 MG/DL (ref 70–110)
POCT GLUCOSE: 215 MG/DL (ref 70–110)
POCT GLUCOSE: >500 MG/DL (ref 70–110)
POTASSIUM SERPL-SCNC: 3.9 MMOL/L (ref 3.5–5.1)
PROTHROMBIN TIME: 12.6 SEC (ref 9–12.5)
RBC # BLD AUTO: 2.87 M/UL (ref 4.6–6.2)
SAMPLE: ABNORMAL
SITE: ABNORMAL
SODIUM SERPL-SCNC: 136 MMOL/L (ref 136–145)
WBC # BLD AUTO: 17.97 K/UL (ref 3.9–12.7)

## 2019-08-12 PROCEDURE — 63600175 PHARM REV CODE 636 W HCPCS: Performed by: STUDENT IN AN ORGANIZED HEALTH CARE EDUCATION/TRAINING PROGRAM

## 2019-08-12 PROCEDURE — 84100 ASSAY OF PHOSPHORUS: CPT

## 2019-08-12 PROCEDURE — 85025 COMPLETE CBC W/AUTO DIFF WBC: CPT

## 2019-08-12 PROCEDURE — 20600001 HC STEP DOWN PRIVATE ROOM

## 2019-08-12 PROCEDURE — 97802 MEDICAL NUTRITION INDIV IN: CPT

## 2019-08-12 PROCEDURE — 85730 THROMBOPLASTIN TIME PARTIAL: CPT

## 2019-08-12 PROCEDURE — 99232 PR SUBSEQUENT HOSPITAL CARE,LEVL II: ICD-10-PCS | Mod: ,,, | Performed by: NURSE PRACTITIONER

## 2019-08-12 PROCEDURE — 83735 ASSAY OF MAGNESIUM: CPT

## 2019-08-12 PROCEDURE — 25000003 PHARM REV CODE 250: Performed by: STUDENT IN AN ORGANIZED HEALTH CARE EDUCATION/TRAINING PROGRAM

## 2019-08-12 PROCEDURE — 36415 COLL VENOUS BLD VENIPUNCTURE: CPT

## 2019-08-12 PROCEDURE — 85610 PROTHROMBIN TIME: CPT

## 2019-08-12 PROCEDURE — 80048 BASIC METABOLIC PNL TOTAL CA: CPT

## 2019-08-12 PROCEDURE — 25000003 PHARM REV CODE 250: Performed by: PHYSICIAN ASSISTANT

## 2019-08-12 PROCEDURE — 99232 SBSQ HOSP IP/OBS MODERATE 35: CPT | Mod: ,,, | Performed by: NURSE PRACTITIONER

## 2019-08-12 RX ORDER — HEPARIN SODIUM 10000 [USP'U]/100ML
1400 INJECTION, SOLUTION INTRAVENOUS CONTINUOUS
Status: DISCONTINUED | OUTPATIENT
Start: 2019-08-12 | End: 2019-08-13

## 2019-08-12 RX ORDER — GLUCAGON 1 MG
1 KIT INJECTION
Status: DISCONTINUED | OUTPATIENT
Start: 2019-08-12 | End: 2019-08-14 | Stop reason: HOSPADM

## 2019-08-12 RX ORDER — WARFARIN SODIUM 5 MG/1
5 TABLET ORAL ONCE
Status: COMPLETED | OUTPATIENT
Start: 2019-08-12 | End: 2019-08-12

## 2019-08-12 RX ORDER — IBUPROFEN 200 MG
24 TABLET ORAL
Status: DISCONTINUED | OUTPATIENT
Start: 2019-08-12 | End: 2019-08-14 | Stop reason: HOSPADM

## 2019-08-12 RX ORDER — IBUPROFEN 200 MG
16 TABLET ORAL
Status: DISCONTINUED | OUTPATIENT
Start: 2019-08-12 | End: 2019-08-14 | Stop reason: HOSPADM

## 2019-08-12 RX ADMIN — METOPROLOL TARTRATE 25 MG: 25 TABLET ORAL at 08:08

## 2019-08-12 RX ADMIN — INSULIN ASPART 1 UNITS: 100 INJECTION, SOLUTION INTRAVENOUS; SUBCUTANEOUS at 11:08

## 2019-08-12 RX ADMIN — POTASSIUM CHLORIDE 20 MEQ: 20 TABLET, EXTENDED RELEASE ORAL at 08:08

## 2019-08-12 RX ADMIN — GABAPENTIN 600 MG: 300 CAPSULE ORAL at 09:08

## 2019-08-12 RX ADMIN — SODIUM CHLORIDE 0.4 UNITS/HR: 9 INJECTION, SOLUTION INTRAVENOUS at 08:08

## 2019-08-12 RX ADMIN — MUPIROCIN 1 G: 20 OINTMENT TOPICAL at 09:08

## 2019-08-12 RX ADMIN — LISINOPRIL 5 MG: 2.5 TABLET ORAL at 09:08

## 2019-08-12 RX ADMIN — POLYETHYLENE GLYCOL 3350 17 G: 17 POWDER, FOR SOLUTION ORAL at 09:08

## 2019-08-12 RX ADMIN — WARFARIN SODIUM 5 MG: 5 TABLET ORAL at 05:08

## 2019-08-12 RX ADMIN — DIBASIC SODIUM PHOSPHATE, MONOBASIC POTASSIUM PHOSPHATE AND MONOBASIC SODIUM PHOSPHATE 2 TABLET: 852; 155; 130 TABLET ORAL at 09:08

## 2019-08-12 RX ADMIN — ASPIRIN 325 MG: 325 TABLET, DELAYED RELEASE ORAL at 09:08

## 2019-08-12 RX ADMIN — POTASSIUM CHLORIDE 20 MEQ: 20 TABLET, EXTENDED RELEASE ORAL at 09:08

## 2019-08-12 RX ADMIN — GABAPENTIN 600 MG: 300 CAPSULE ORAL at 08:08

## 2019-08-12 RX ADMIN — AMLODIPINE BESYLATE 5 MG: 5 TABLET ORAL at 09:08

## 2019-08-12 RX ADMIN — GABAPENTIN 600 MG: 300 CAPSULE ORAL at 02:08

## 2019-08-12 RX ADMIN — HEPARIN SODIUM AND DEXTROSE 1400 UNITS/HR: 10000; 5 INJECTION INTRAVENOUS at 05:08

## 2019-08-12 RX ADMIN — DOCUSATE SODIUM 100 MG: 100 CAPSULE, LIQUID FILLED ORAL at 09:08

## 2019-08-12 RX ADMIN — METOPROLOL TARTRATE 25 MG: 25 TABLET ORAL at 09:08

## 2019-08-12 RX ADMIN — OXYCODONE HYDROCHLORIDE 10 MG: 10 TABLET ORAL at 08:08

## 2019-08-12 RX ADMIN — SIMVASTATIN 20 MG: 5 TABLET, FILM COATED ORAL at 08:08

## 2019-08-12 RX ADMIN — PANTOPRAZOLE SODIUM 40 MG: 40 TABLET, DELAYED RELEASE ORAL at 09:08

## 2019-08-12 RX ADMIN — FUROSEMIDE 20 MG: 10 INJECTION, SOLUTION INTRAMUSCULAR; INTRAVENOUS at 09:08

## 2019-08-12 RX ADMIN — FUROSEMIDE 20 MG: 10 INJECTION, SOLUTION INTRAMUSCULAR; INTRAVENOUS at 05:08

## 2019-08-12 NOTE — SUBJECTIVE & OBJECTIVE
Interval History: NAEON. APTT came back at >150 so will hold heparin drip and re-draw lab. Wean O2 as tolerated. Reports history of bursitis in right shoulder which is flaring up.     Review of Systems   Constitution: Negative for malaise/fatigue.   Cardiovascular: Negative for chest pain, dyspnea on exertion, leg swelling and palpitations.   Respiratory: Negative for cough and shortness of breath.    Hematologic/Lymphatic: Negative for bleeding problem.   Gastrointestinal: Negative for abdominal pain.   Genitourinary: Negative for dysuria.   Neurological: Negative for headaches and weakness.     Medications:  Continuous Infusions:   heparin (porcine) in 5 % dex Stopped (08/12/19 0720)    insulin (HUMAN R) infusion (adults) 0.4 Units/hr (08/12/19 0801)     Scheduled Meds:   amLODIPine  5 mg Oral Daily    aspirin  325 mg Oral Daily    docusate sodium  100 mg Oral BID    furosemide  20 mg Intravenous BID    gabapentin  600 mg Oral TID    lisinopril  5 mg Oral Daily    metoprolol tartrate  25 mg Oral BID    mupirocin  1 g Nasal BID    pantoprazole  40 mg Oral Before breakfast    polyethylene glycol  17 g Oral Daily    potassium chloride  20 mEq Oral Q12H    simvastatin  20 mg Oral QHS     PRN Meds:acetaminophen, bisacodyl, Dextrose 10% Bolus, Dextrose 10% Bolus, glucagon (human recombinant), glucose, glucose, insulin aspart U-100, metoclopramide HCl, ondansetron, oxyCODONE, oxyCODONE, sodium chloride 0.9%     Objective:     Vital Signs (Most Recent):  Temp: 97.9 °F (36.6 °C) (08/12/19 0803)  Pulse: 79 (08/12/19 0806)  Resp: 18 (08/12/19 0803)  BP: (!) 114/57 (08/12/19 0803)  SpO2: 97 % (08/12/19 0803) Vital Signs (24h Range):  Temp:  [97.9 °F (36.6 °C)-98.5 °F (36.9 °C)] 97.9 °F (36.6 °C)  Pulse:  [73-82] 79  Resp:  [13-32] 18  SpO2:  [93 %-100 %] 97 %  BP: (101-128)/(46-68) 114/57  Arterial Line BP: ()/(37-66) 92/37     Weight: 99.2 kg (218 lb 11.1 oz)  Body mass index is 33.25 kg/m².    SpO2: 97  %  O2 Device (Oxygen Therapy): nasal cannula    Intake/Output - Last 3 Shifts       08/10 0700 - 08/11 0659 08/11 0700 - 08/12 0659 08/12 0700 - 08/13 0659    P.O. 1980 480     I.V. (mL/kg) 831 (7.7) 332.1 (3.1)     Blood       Total Intake(mL/kg) 2811 (26.1) 812.1 (7.6)     Urine (mL/kg/hr) 3425 (1.3) 2510 (1)     Stool  0     Chest Tube 230 110     Total Output 3655 2620     Net -844 -1807.9            Stool Occurrence  3 x           Lines/Drains/Airways     Drain                 Y Chest Tube 1 and 2 08/09/19 1244 1 Anterior Mediastinal 19 Fr. 2 Posterior Mediastinal 19 Fr. 2 days          Line                 Pacer Wires 08/09/19 1240 2 days          Peripheral Intravenous Line                 Peripheral IV - Single Lumen 08/09/19 0634 18 G Left Forearm 3 days         Peripheral IV - Single Lumen 08/09/19 0719 16 G Right Hand 3 days                Physical Exam   Constitutional: He is oriented to person, place, and time. He appears well-developed and well-nourished. No distress.   HENT:   Head: Normocephalic and atraumatic.   Eyes: Pupils are equal, round, and reactive to light. EOM are normal.   Neck: Normal range of motion.   Cardiovascular: Normal rate, regular rhythm and normal pulses.   Pulmonary/Chest: Effort normal.   2L NC    Abdominal: Soft. He exhibits no distension.   Musculoskeletal: Normal range of motion. He exhibits no edema.   Neurological: He is alert and oriented to person, place, and time.   Skin: Skin is warm, dry and intact. Capillary refill takes less than 2 seconds. He is not diaphoretic.   Midline incision with island dressing   Chest tube sites with dressing    Psychiatric: He has a normal mood and affect. His speech is normal and behavior is normal. Judgment and thought content normal.   Vitals reviewed.      Significant Labs:  BMP:   Recent Labs   Lab 08/12/19  0550   *      K 3.9      CO2 23   BUN 30*   CREATININE 0.7   CALCIUM 8.4*   MG 2.1     CBC:   Recent Labs    Lab 08/12/19  0551   WBC 17.97*   RBC 2.87*   HGB 8.5*   HCT 25.5*   *   MCV 89   MCH 29.6   MCHC 33.3       Significant Diagnostics:  I have reviewed all pertinent imaging results/findings within the past 24 hours.

## 2019-08-12 NOTE — ASSESSMENT & PLAN NOTE
BG goal 110 - 140  BG is within  goal. Patient is Type 1 DM.     Discontinue  transition IV insulin infusion with stepdown parameters. Initial rate starting at 0.4 units/hr.  Discontinue Low Dose SQ Insulin Correction Scale.  Allow patient to be start his home insulin pump.       ** Please call Endocrine for any BG related issues **  ** Please notify Endocrine for any change and/or advance in diet**      Discharge Recommendations:  Patient is to continue his home insulin pump.    After discussing evaluation results, patient agrees with proposed plan of care, has no further concerns, and agrees to follow-up with his PCP/DM specialist in [place], [place].

## 2019-08-12 NOTE — HOSPITAL COURSE
On 8/9/19, the patient was taken to the Operating Room for the above stated procedure. Please see the previously dictated operative report for complete details. Postoperatively, the patient was taken from the  Operating Room to the ICU where the vital signs were monitored and pain was kept under control. The patient was weaned from the drips and extubated in the ICU per protocol. Once hemodynamically stable, the patient was transferred to the Cardiac Step-Down floor for continued strengthening and ambulation. On postoperative day 5, the patient was ready for discharge to home. At the time of discharge, the patient was ambulating unassisted. Pain was well controlled with oral analgesics and the patient was tolerating the diet.     MOBILITY AND ACTIVITY: As tolerated. Patient may shower. No heavy lifting of greater than 5 pounds and no driving.     DIET: An 1800-calorie ADA with a 1500 mL fluid restriction.     WOUND CARE INSTRUCTIONS: Check for redness, swelling and drainage around the  incision or wound. Patient is to call for any obvious bleeding, drainage, pus from the wound, unusual problems or difficulties or temperature of greater than 101   degrees.     FOLLOWUP: Follow up with Dr. Knight in approximately 3 weeks. Prior to this  appointment, the patient will have a chest x-ray and EKG.     Patient not placed on Ace-Inhibitor at the time of discharge due to potential for hypotension       DISCHARGE CONDITION: At the time of discharge, the patient was in sinus rhythm and afebrile with stable vital signs.

## 2019-08-12 NOTE — PROGRESS NOTES
1030 AM: Patient ambulated in the colbert 250 feet. Patient tolerated well, no SOB, was visibly tired by the end of the walk and stated that he wanted to rest for a while.    1400: Patient ambulated in the colbert 250 feet with PCT. Patient tolerated well, no SOB, no complaints of pain.    1550: Patient ambulated in colbert 250 feet. Patient tolerated well, no SOB, no complaints of pain, patient was able to walk the whole time without feeling tired.

## 2019-08-12 NOTE — SUBJECTIVE & OBJECTIVE
"Interval HPI:   Overnight events:  BG is within goal on current IV/SQ insulin regimen. NAEON. Patient requesting to be placed on his home insulin pump.     Eatin%  Nausea: No  Hypoglycemia and intervention: No  Fever: No  TPN and/or TF: No  If yes, type of TF/TPN and rate: None    BP (!) 111/59 (BP Location: Left arm, Patient Position: Sitting)   Pulse 77   Temp 97.8 °F (36.6 °C) (Oral)   Resp 18   Ht 5' 8" (1.727 m)   Wt 99.2 kg (218 lb 11.1 oz)   SpO2 (!) 93%   BMI 33.25 kg/m²     Labs Reviewed and Include    Recent Labs   Lab 19  0550   *   CALCIUM 8.4*      K 3.9   CO2 23      BUN 30*   CREATININE 0.7     Lab Results   Component Value Date    WBC 17.97 (H) 2019    HGB 8.5 (L) 2019    HCT 30 (L) 2019    MCV 89 2019     (L) 2019     No results for input(s): TSH, FREET4 in the last 168 hours.  Lab Results   Component Value Date    HGBA1C 6.6 (H) 2019       Nutritional status:   Body mass index is 33.25 kg/m².  Lab Results   Component Value Date    ALBUMIN 4.6 2019    ALBUMIN 4.3 2019    ALBUMIN 4.1 2018     No results found for: PREALBUMIN    Estimated Creatinine Clearance: 136.1 mL/min (based on SCr of 0.7 mg/dL).    Accu-Checks  Recent Labs     19  0118 19  0723 19  1139 19  1142 19  1702 19  2212 19  0129 19  0741 19  1145 19  1637   POCTGLUCOSE 176* 199* >500* 219* 215* 186* 148* 130* 204* 145*       Current Medications and/or Treatments Impacting Glycemic Control  Immunotherapy:    Immunosuppressants     None        Steroids:   Hormones (From admission, onward)    None        Pressors:    Autonomic Drugs (From admission, onward)    None        Hyperglycemia/Diabetes Medications:   Antihyperglycemics (From admission, onward)    Start     Stop Route Frequency Ordered    08/10/19 1017  insulin aspart U-100 pen 0-4 Units      -- SubQ As needed (PRN) " 08/10/19 0918    08/10/19 1015  insulin regular (Humulin R) 100 Units in sodium chloride 0.9% 100 mL infusion      -- IV Continuous 08/10/19 0918

## 2019-08-12 NOTE — HPI
Mr. Barnes is a 53-year-old gentleman with severe aortic stenosis.    He has a bicuspid aortic valve.  He has been followed for some time for his   aortic stenosis, had been asymptomatic; however, he noted progressive dyspnea on   exertion.  A repeat echo demonstrated severe aortic stenosis.  A preoperative   CT scan demonstrated dilation of the proximal ascending aorta greater than 4 cm.    He now presents for aortic root replacement.

## 2019-08-12 NOTE — PROGRESS NOTES
Patient's aPTT was 39.9. Notified MD [place], orders given to increase heparin drip to 1400 units/hr. Will continue to monitor.

## 2019-08-12 NOTE — CONSULTS
"  [facility]  Adult Nutrition  Consult Note    SUMMARY     Recommendations    Recommendation:   1. Continue diabetic diet with fluid restriction per MD.   2. If PO intake remains < 50%, recommend Boost Plus TID (chocolate or strawberry, pt does not like vanilla).   3. RD to monitor & follow up.    Goals: PO intake > 50%  Nutrition Goal Status: new  Communication of RD Recs: other (comment)(POC)    Reason for Assessment    Reason For Assessment: consult  Diagnosis: (s/p ascending aortic replacement)  Relevant Medical History: HTN, DM, HLD, aortic stenosis, CAD, colon ca s/p resection 2015  Interdisciplinary Rounds: did not attend  General Information Comments: Pt reports fair appetite and 25% intake of meals since surgery on Friday. Per RN documentation, intake appears to have improved today to 100%. Pt endorses good appetite/PO intake PTA. Pt endorses losing a pound or two since admission, states his UBW is 220-225 lbs. Pt is currently 218 lbs. Per chart, wt has been stable between 218-223 lbs x 4 months. Pt appears nourished, NFPE not indicated at this time.     Provided and discussed handouts on low Na and cardiac TLC diets. Reviewed sodium restriction, foods high in sodium, and encouraed pt to use herbs/spices/salt-free seasonings to flavor food instead of salt. Encouraged pt to limit saturated/trans fat and consume more omega-3 fats. Reviewed food sources of these fats. Pt voiced understanding; all questions and concerns answered.    Nutrition Discharge Planning: Adequate PO intake to meet needs    Nutrition Risk Screen    Nutrition Risk Screen: no indicators present    Nutrition/Diet History    Spiritual, Cultural Beliefs, Congregational Practices, Values that Affect Care: no    Anthropometrics    Temp: 97.8 °F (36.6 °C)  Height Method: Stated  Height: 5' 8" (172.7 cm)  Height (inches): 68 in  Weight Method: Standard Scale  Weight: 99.2 kg (218 lb 11.1 oz)  Weight (lb): 218.7 lb  Ideal Body " Weight (IBW), Male: 154 lb  % Ideal Body Weight, Male (lb): 142.86 lb  BMI (Calculated): 33.5       Lab/Procedures/Meds    Pertinent Labs Reviewed: reviewed  Pertinent Labs Comments: BUN 30, Glu 148, Ca 8.4, phos 2.0, Mg 2.1  Pertinent Medications Reviewed: reviewed  Pertinent Medications Comments: lasix, heparin, insulin, lisinopril, pantoprazole, warfarin    Estimated/Assessed Needs    Weight Used For Calorie Calculations: 99.2 kg (218 lb 11.1 oz)  Energy Calorie Requirements (kcal): 1982 kcal/day  Energy Need Method: [place]-St Jeor(PAL 1.1)  Protein Requirements:  g/day  Weight Used For Protein Calculations: 99.2 kg (218 lb 11.1 oz)  Fluid Requirements (mL): per MD or 1 mL/kcal     RDA Method (mL): 1982  CHO Requirement: 248g CHO      Nutrition Prescription Ordered    Current Diet Order: Diabetic  Nutrition Order Comments: 1500 mL FR    Evaluation of Received Nutrient/Fluid Intake    I/O: -2.1L since admit  Comments: LBM 8/12  % Intake of Estimated Energy Needs: 25 - 100 %  % Meal Intake: 25 - 100 %    Nutrition Risk    Level of Risk/Frequency of Follow-up: (1x/week)     Assessment and Plan    Nutrition Problem  Inadequate energy intake    Related to (etiology):   Decreased appetite s/p surgery    Signs and Symptoms (as evidenced by):   Decreased PO intake ~25% of meals since Friday    Interventions (treatment strategy):  Collaboration of care with other providers  Boost Plus TID    Nutrition Diagnosis Status:   New     Monitor and Evaluation    Food and Nutrient Intake: energy intake, food and beverage intake  Food and Nutrient Adminstration: diet order  Knowledge/Beliefs/Attitudes: food and nutrition knowledge/skill  Anthropometric Measurements: weight, weight change, body mass index  Biochemical Data, Medical Tests and Procedures: electrolyte and renal panel, gastrointestinal profile, glucose/endocrine profile, inflammatory profile, lipid profile  Nutrition-Focused Physical Findings: overall appearance      Malnutrition Assessment  Pt does not meet criteria for malnutrition at this time.    Nutrition Follow-Up    RD Follow-up?: Yes

## 2019-08-12 NOTE — ASSESSMENT & PLAN NOTE
54 yo Man with history of T2DM, CAD , AS 2/2 congenital bicuspid AV s/p Bentall with 23mm Mechanical aortic valve. Received 3L crystalloid and no cell saver intraoperatively.    -prn oxycodone, home gabapentin TID  - MAP goal 60-70  - norvasc 5mg and lisinopril 5mg started  - NC 2L, wean as tolerated   - IS, OOB  - Monitor UOP  - Goal K > 4, Mg > 2.0  Afebrile, post op leukocyotosis expected   Ancef 2g Q8 for 5 doses postop  On heparin gtt aptt goal 60-70

## 2019-08-12 NOTE — PT/OT/SLP PROGRESS
Occupational Therapy      Patient Name:  Cj Barnes   MRN:  [id number]    Patient not seen today secondary to Pt seen up in bedside chair upon attempt 1129; pt completed mobility with nursing staff this AM.    Cont progressive mobility with nursing staff. OT to follow up at later time as appropriate.   PT recommendation for d/c: Home.    IFEANYI Bunch  8/12/2019

## 2019-08-12 NOTE — ASSESSMENT & PLAN NOTE
Insulin Pump:  Patient has the ability and wherewithal to manage the pump.  Order has been placed to notify nurse of patient using own pump.     Settings:  Basal rate: 0.4 unit/hr  Bolus dose/Carb ratio: 12  ISF for Correction Dose: 20    Pump type:  Medtronic Minimed 670G     SN: [id number]  Infusion set type: plastic cannula  Change infusion set: Every 2-3 days (last change was 08/12/2019)  Change insertion site: with change of infusion set. (Last change was 08/12/2019)  Location of insertion site: Abdomen  State of insertion site: appears clean

## 2019-08-12 NOTE — CONSULTS
"Cardiac Rehab     Cj Barnes   [id number]   8/12/2019       Activity taught: Yes    Cardiac Rehab Phase Taught: Phase 1 & 2    Risk Factors-Modifiable: diabetes, hyperlipidemia, hypertension, obesity    Risk Factors-Non modifiable: gender    Teaching Method: Verbal, Living with Heart Disease    Understanding: yes    Response: Patient states that he will consider attending Phase II cardiac rehab.  Questions answered & encouraged for patient to contact rehab team with any additional questions.      Comments: S/P AVR. Discussed cardiac rehab and risk factor modification. Team to refer patient to [facility] Phase [facility]. Educational materials were used in the process and given to the patient. They included "Your Guide to Living with Heart Disease", Phase Two Cardiac Rehabilitation information along with a sample Mediterranean diet.The patient expressed understanding of the teaching and expressed desire to take a role in modifying the risk factors when they return home.    Hilda Chavarria RN  Cardiac Rehab Nurse      "

## 2019-08-12 NOTE — PLAN OF CARE
Problem: Adult Inpatient Plan of Care  Goal: Plan of Care Review  Outcome: Revised  Plan of care discussed with patient. Patient is free of fall/trauma/injury. Denies CP, SOB, or pain/discomfort. Patient has mediastinal chest tube in place hooked up to suction. IV heparin and IV insulin infusing. Coumadin to start tonight. Patient ambulating x1 assist. Patient had 2 BM today. All questions addressed. Will continue to monitor

## 2019-08-12 NOTE — PROGRESS NOTES
"[facility]  Endocrinology  Progress Note    Admit Date: 2019     Reason for Consult: Management of T1DM, Hyperglycemia     Surgical Procedure and Date:   BENTALL PROCEDURE: 2019    Diabetes diagnosis year:    ~ 20 years ago    Home Diabetes Medications:    Medtronic 670G    Basal: 2.65   ICR: 12   ISF: 20   IOB: 3.15   Target: 120     How often checking glucose at home? KELSEY  BG readings on regimen: KELSEY  Hypoglycemia on the regimen?  KELSEY  Missed doses on regimen?  KELSEY    Diabetes Complications include:     Hyperglycemia, Hypoglycemia , Diabetic retinopathy  and Diabetic peripheral neuropathy     Complicating diabetes co morbidities:   History of CVA and Active Cancer, severe obesity, HTN, HLD, CAD, Aortic Stenosis.       HPI:   Patient is a 53 y.o. male with a diagnosis of aortic stenosis, diabetes type2, and CAD. Has noticed SOB over the past 1 month. Patient has history of stage 2 colon cancer in  with resection, no chemo or radiation. Receives primary DM care from BEATRIS Quintero NP. Endocrinology consulted to manage DM1/hyperglycemia post cardiac surgery.     Lab Results   Component Value Date    HGBA1C 6.6 (H) 2019       Interval HPI:   Overnight events:  BG is within goal on current IV/SQ insulin regimen. NAEON. Patient requesting to be placed on his home insulin pump.     Eatin%  Nausea: No  Hypoglycemia and intervention: No  Fever: No  TPN and/or TF: No  If yes, type of TF/TPN and rate: None    BP (!) 111/59 (BP Location: Left arm, Patient Position: Sitting)   Pulse 77   Temp 97.8 °F (36.6 °C) (Oral)   Resp 18   Ht 5' 8" (1.727 m)   Wt 99.2 kg (218 lb 11.1 oz)   SpO2 (!) 93%   BMI 33.25 kg/m²      Labs Reviewed and Include    Recent Labs   Lab 19  0550   *   CALCIUM 8.4*      K 3.9   CO2 23      BUN 30*   CREATININE 0.7     Lab Results   Component Value Date    WBC 17.97 (H) 2019    HGB 8.5 (L) 2019    HCT 30 (L) " 08/12/2019    MCV 89 08/12/2019     (L) 08/12/2019     No results for input(s): TSH, FREET4 in the last 168 hours.  Lab Results   Component Value Date    HGBA1C 6.6 (H) 07/23/2019       Nutritional status:   Body mass index is 33.25 kg/m².  Lab Results   Component Value Date    ALBUMIN 4.6 07/23/2019    ALBUMIN 4.3 04/24/2019    ALBUMIN 4.1 05/26/2018     No results found for: PREALBUMIN    Estimated Creatinine Clearance: 136.1 mL/min (based on SCr of 0.7 mg/dL).    Accu-Checks  Recent Labs     08/11/19  0118 08/11/19  0723 08/11/19  1139 08/11/19  1142 08/11/19  1702 08/11/19  2212 08/12/19  0129 08/12/19  0741 08/12/19  1145 08/12/19  1637   POCTGLUCOSE 176* 199* >500* 219* 215* 186* 148* 130* 204* 145*       Current Medications and/or Treatments Impacting Glycemic Control  Immunotherapy:    Immunosuppressants     None        Steroids:   Hormones (From admission, onward)    None        Pressors:    Autonomic Drugs (From admission, onward)    None        Hyperglycemia/Diabetes Medications:   Antihyperglycemics (From admission, onward)    Start     Stop Route Frequency Ordered    08/10/19 1017  insulin aspart U-100 pen 0-4 Units      -- SubQ As needed (PRN) 08/10/19 0918    08/10/19 1015  insulin regular (Humulin R) 100 Units in sodium chloride 0.9% 100 mL infusion      -- IV Continuous 08/10/19 0918          ASSESSMENT and PLAN    * S/P ascending aortic replacement  Managed per primary team  Avoid hypoglycemia        Diabetes mellitus type I  BG goal 110 - 140  BG is within  goal. Patient is Type 1 DM.     Discontinue  transition IV insulin infusion with stepdown parameters. Initial rate starting at 0.4 units/hr.  Discontinue Low Dose SQ Insulin Correction Scale.  Allow patient to be start his home insulin pump.       ** Please call Endocrine for any BG related issues **  ** Please notify Endocrine for any change and/or advance in diet**      Discharge Recommendations:  Patient is to continue his home  insulin pump.    After discussing evaluation results, patient agrees with proposed plan of care, has no further concerns, and agrees to follow-up with his PCP/DM specialist in [place], [place].       Class 1 obesity due to excess calories with serious comorbidity and body mass index (BMI) of 33.0 to 33.9 in adult  may contribute to insulin resistance        Insulin pump in place  Insulin Pump:  Patient has the ability and wherewithal to manage the pump.  Order has been placed to notify nurse of patient using own pump.     Settings:  Basal rate: 0.4 unit/hr  Bolus dose/Carb ratio: 12  ISF for Correction Dose: 20    Pump type:  DialedIN Minimed 670G     SN: [id number]  Infusion set type: plastic cannula  Change infusion set: Every 2-3 days (last change was 08/12/2019)  Change insertion site: with change of infusion set. (Last change was 08/12/2019)  Location of insertion site: Abdomen  State of insertion site: appears clean            Erickson Cunningham NP  Endocrinology  [facility]

## 2019-08-12 NOTE — PROGRESS NOTES
[facility]  Cardiothoracic Surgery  Progress Note    Patient Name: Cj Barnes  MRN: [id number]  Admission Date: 8/9/2019  Hospital Length of Stay: 3 days  Code Status: Full Code   Attending Physician: Ryan Knight MD   Referring Provider: Ryan Knight MD  Principal Problem:S/P ascending aortic replacement            Subjective:     Post-Op Info:  Procedure(s) (LRB):  Replacement-valve-aortic (N/A)  BENTALL PROCEDURE (N/A)   3 Days Post-Op     Interval History: NAEON. APTT came back at >150 so will hold heparin drip and re-draw lab. Wean O2 as tolerated. Reports history of bursitis in right shoulder which is flaring up.     Review of Systems   Constitution: Negative for malaise/fatigue.   Cardiovascular: Negative for chest pain, dyspnea on exertion, leg swelling and palpitations.   Respiratory: Negative for cough and shortness of breath.    Hematologic/Lymphatic: Negative for bleeding problem.   Gastrointestinal: Negative for abdominal pain.   Genitourinary: Negative for dysuria.   Neurological: Negative for headaches and weakness.     Medications:  Continuous Infusions:   heparin (porcine) in 5 % dex Stopped (08/12/19 0720)    insulin (HUMAN R) infusion (adults) 0.4 Units/hr (08/12/19 0801)     Scheduled Meds:   amLODIPine  5 mg Oral Daily    aspirin  325 mg Oral Daily    docusate sodium  100 mg Oral BID    furosemide  20 mg Intravenous BID    gabapentin  600 mg Oral TID    lisinopril  5 mg Oral Daily    metoprolol tartrate  25 mg Oral BID    mupirocin  1 g Nasal BID    pantoprazole  40 mg Oral Before breakfast    polyethylene glycol  17 g Oral Daily    potassium chloride  20 mEq Oral Q12H    simvastatin  20 mg Oral QHS     PRN Meds:acetaminophen, bisacodyl, Dextrose 10% Bolus, Dextrose 10% Bolus, glucagon (human recombinant), glucose, glucose, insulin aspart U-100, metoclopramide HCl, ondansetron, oxyCODONE, oxyCODONE, sodium chloride 0.9%     Objective:      Vital Signs (Most Recent):  Temp: 97.9 °F (36.6 °C) (08/12/19 0803)  Pulse: 79 (08/12/19 0806)  Resp: 18 (08/12/19 0803)  BP: (!) 114/57 (08/12/19 0803)  SpO2: 97 % (08/12/19 0803) Vital Signs (24h Range):  Temp:  [97.9 °F (36.6 °C)-98.5 °F (36.9 °C)] 97.9 °F (36.6 °C)  Pulse:  [73-82] 79  Resp:  [13-32] 18  SpO2:  [93 %-100 %] 97 %  BP: (101-128)/(46-68) 114/57  Arterial Line BP: ()/(37-66) 92/37     Weight: 99.2 kg (218 lb 11.1 oz)  Body mass index is 33.25 kg/m².    SpO2: 97 %  O2 Device (Oxygen Therapy): nasal cannula    Intake/Output - Last 3 Shifts       08/10 0700 - 08/11 0659 08/11 0700 - 08/12 0659 08/12 0700 - 08/13 0659    P.O. 1980 480     I.V. (mL/kg) 831 (7.7) 332.1 (3.1)     Blood       Total Intake(mL/kg) 2811 (26.1) 812.1 (7.6)     Urine (mL/kg/hr) 3425 (1.3) 2510 (1)     Stool  0     Chest Tube 230 110     Total Output 3655 2620     Net -844 -1807.9            Stool Occurrence  3 x           Lines/Drains/Airways     Drain                 Y Chest Tube 1 and 2 08/09/19 1244 1 Anterior Mediastinal 19 Fr. 2 Posterior Mediastinal 19 Fr. 2 days          Line                 Pacer Wires 08/09/19 1240 2 days          Peripheral Intravenous Line                 Peripheral IV - Single Lumen 08/09/19 0634 18 G Left Forearm 3 days         Peripheral IV - Single Lumen 08/09/19 0719 16 G Right Hand 3 days                Physical Exam   Constitutional: He is oriented to person, place, and time. He appears well-developed and well-nourished. No distress.   HENT:   Head: Normocephalic and atraumatic.   Eyes: Pupils are equal, round, and reactive to light. EOM are normal.   Neck: Normal range of motion.   Cardiovascular: Normal rate, regular rhythm and normal pulses.   Pulmonary/Chest: Effort normal.   2L NC    Abdominal: Soft. He exhibits no distension.   Musculoskeletal: Normal range of motion. He exhibits no edema.   Neurological: He is alert and oriented to person, place, and time.   Skin: Skin is warm,  dry and intact. Capillary refill takes less than 2 seconds. He is not diaphoretic.   Midline incision with island dressing   Chest tube sites with dressing    Psychiatric: He has a normal mood and affect. His speech is normal and behavior is normal. Judgment and thought content normal.   Vitals reviewed.      Significant Labs:  BMP:   Recent Labs   Lab 08/12/19  0550   *      K 3.9      CO2 23   BUN 30*   CREATININE 0.7   CALCIUM 8.4*   MG 2.1     CBC:   Recent Labs   Lab 08/12/19  0551   WBC 17.97*   RBC 2.87*   HGB 8.5*   HCT 25.5*   *   MCV 89   MCH 29.6   MCHC 33.3       Significant Diagnostics:  I have reviewed all pertinent imaging results/findings within the past 24 hours.    Assessment/Plan:     * S/P ascending aortic replacement  54 yo Man with history of T2DM, CAD , AS 2/2 congenital bicuspid AV s/p Bentall with 23mm Mechanical aortic valve. Received 3L crystalloid and no cell saver intraoperatively.    -prn oxycodone, home gabapentin TID  - MAP goal 60-70  - norvasc 5mg and lisinopril 5mg started  - NC 2L, wean as tolerated   - IS, OOB  - Monitor UOP  - Goal K > 4, Mg > 2.0  Afebrile, post op leukocyotosis expected   Ancef 2g Q8 for 5 doses postop  On heparin gtt aptt goal 60-70            Hypophosphatemia  Phos level 2 today   Replaced with k phos     Acute blood loss anemia  Expected post operatively   CBC daily   H/H 8.5/25.5    Class 1 obesity due to excess calories with serious comorbidity and body mass index (BMI) of 33.0 to 33.9 in adult  Cardiac diet     Aortic stenosis  See s/p aortic root replacement     Diabetes mellitus type I  Endocrine following       Dispo: D/C later this week     Bhumika Reyes PA-C  Cardiothoracic Surgery  [facility]

## 2019-08-12 NOTE — PLAN OF CARE
Problem: Adult Inpatient Plan of Care  Goal: Plan of Care Review  Recommendations     Recommendation:   1. Continue diabetic diet with fluid restriction per MD.   2. If PO intake remains < 50%, recommend Boost Plus TID (chocolate or strawberry, pt does not like vanilla).   3. RD to monitor & follow up.     Goals: PO intake > 50%  Nutrition Goal Status: new  Communication of RD Recs: other (comment)(POC)    Assessment and Plan     Nutrition Problem  Inadequate energy intake     Related to (etiology):   Decreased appetite s/p surgery     Signs and Symptoms (as evidenced by):   Decreased PO intake ~25% of meals since Friday     Interventions (treatment strategy):  Collaboration of care with other providers  Boost Plus TID     Nutrition Diagnosis Status:   New

## 2019-08-13 LAB
ANION GAP SERPL CALC-SCNC: 11 MMOL/L (ref 8–16)
APTT BLDCRRT: 47.9 SEC (ref 21–32)
APTT BLDCRRT: 67.7 SEC (ref 21–32)
APTT BLDCRRT: 67.7 SEC (ref 21–32)
BASOPHILS # BLD AUTO: 0.04 K/UL (ref 0–0.2)
BASOPHILS NFR BLD: 0.4 % (ref 0–1.9)
BUN SERPL-MCNC: 24 MG/DL (ref 6–20)
CALCIUM SERPL-MCNC: 8.2 MG/DL (ref 8.7–10.5)
CHLORIDE SERPL-SCNC: 98 MMOL/L (ref 95–110)
CO2 SERPL-SCNC: 28 MMOL/L (ref 23–29)
CREAT SERPL-MCNC: 0.7 MG/DL (ref 0.5–1.4)
DIFFERENTIAL METHOD: ABNORMAL
EOSINOPHIL # BLD AUTO: 0.1 K/UL (ref 0–0.5)
EOSINOPHIL NFR BLD: 0.9 % (ref 0–8)
ERYTHROCYTE [DISTWIDTH] IN BLOOD BY AUTOMATED COUNT: 14.1 % (ref 11.5–14.5)
EST. GFR  (AFRICAN AMERICAN): >60 ML/MIN/1.73 M^2
EST. GFR  (NON AFRICAN AMERICAN): >60 ML/MIN/1.73 M^2
GLUCOSE SERPL-MCNC: 112 MG/DL (ref 70–110)
HCT VFR BLD AUTO: 26.4 % (ref 40–54)
HGB BLD-MCNC: 8.6 G/DL (ref 14–18)
IMM GRANULOCYTES # BLD AUTO: 0.12 K/UL (ref 0–0.04)
IMM GRANULOCYTES NFR BLD AUTO: 1.1 % (ref 0–0.5)
INR PPP: 3.3 (ref 0.8–1.2)
LYMPHOCYTES # BLD AUTO: 1.3 K/UL (ref 1–4.8)
LYMPHOCYTES NFR BLD: 11.7 % (ref 18–48)
MAGNESIUM SERPL-MCNC: 2 MG/DL (ref 1.6–2.6)
MCH RBC QN AUTO: 29.7 PG (ref 27–31)
MCHC RBC AUTO-ENTMCNC: 32.6 G/DL (ref 32–36)
MCV RBC AUTO: 91 FL (ref 82–98)
MONOCYTES # BLD AUTO: 0.8 K/UL (ref 0.3–1)
MONOCYTES NFR BLD: 7.7 % (ref 4–15)
NEUTROPHILS # BLD AUTO: 8.5 K/UL (ref 1.8–7.7)
NEUTROPHILS NFR BLD: 78.2 % (ref 38–73)
NRBC BLD-RTO: 0 /100 WBC
PHOSPHATE SERPL-MCNC: 1.9 MG/DL (ref 2.7–4.5)
PLATELET # BLD AUTO: 173 K/UL (ref 150–350)
PMV BLD AUTO: 10.7 FL (ref 9.2–12.9)
POCT GLUCOSE: 108 MG/DL (ref 70–110)
POCT GLUCOSE: 139 MG/DL (ref 70–110)
POCT GLUCOSE: 145 MG/DL (ref 70–110)
POCT GLUCOSE: 153 MG/DL (ref 70–110)
POCT GLUCOSE: 228 MG/DL (ref 70–110)
POTASSIUM SERPL-SCNC: 3.8 MMOL/L (ref 3.5–5.1)
PROTHROMBIN TIME: 32.4 SEC (ref 9–12.5)
RBC # BLD AUTO: 2.9 M/UL (ref 4.6–6.2)
SODIUM SERPL-SCNC: 137 MMOL/L (ref 136–145)
WBC # BLD AUTO: 10.82 K/UL (ref 3.9–12.7)

## 2019-08-13 PROCEDURE — 94799 UNLISTED PULMONARY SVC/PX: CPT

## 2019-08-13 PROCEDURE — 94761 N-INVAS EAR/PLS OXIMETRY MLT: CPT

## 2019-08-13 PROCEDURE — 85730 THROMBOPLASTIN TIME PARTIAL: CPT

## 2019-08-13 PROCEDURE — 99232 SBSQ HOSP IP/OBS MODERATE 35: CPT | Mod: ,,, | Performed by: NURSE PRACTITIONER

## 2019-08-13 PROCEDURE — 97165 OT EVAL LOW COMPLEX 30 MIN: CPT

## 2019-08-13 PROCEDURE — 25000003 PHARM REV CODE 250: Performed by: PHYSICIAN ASSISTANT

## 2019-08-13 PROCEDURE — 25000003 PHARM REV CODE 250: Performed by: STUDENT IN AN ORGANIZED HEALTH CARE EDUCATION/TRAINING PROGRAM

## 2019-08-13 PROCEDURE — 63600175 PHARM REV CODE 636 W HCPCS: Performed by: PHYSICIAN ASSISTANT

## 2019-08-13 PROCEDURE — 85610 PROTHROMBIN TIME: CPT

## 2019-08-13 PROCEDURE — 99232 PR SUBSEQUENT HOSPITAL CARE,LEVL II: ICD-10-PCS | Mod: ,,, | Performed by: NURSE PRACTITIONER

## 2019-08-13 PROCEDURE — 83735 ASSAY OF MAGNESIUM: CPT

## 2019-08-13 PROCEDURE — 20600001 HC STEP DOWN PRIVATE ROOM

## 2019-08-13 PROCEDURE — 63600175 PHARM REV CODE 636 W HCPCS: Performed by: STUDENT IN AN ORGANIZED HEALTH CARE EDUCATION/TRAINING PROGRAM

## 2019-08-13 PROCEDURE — 36415 COLL VENOUS BLD VENIPUNCTURE: CPT

## 2019-08-13 PROCEDURE — 84100 ASSAY OF PHOSPHORUS: CPT

## 2019-08-13 PROCEDURE — 97116 GAIT TRAINING THERAPY: CPT

## 2019-08-13 PROCEDURE — 85025 COMPLETE CBC W/AUTO DIFF WBC: CPT

## 2019-08-13 PROCEDURE — 80048 BASIC METABOLIC PNL TOTAL CA: CPT

## 2019-08-13 RX ORDER — POTASSIUM CHLORIDE 20 MEQ/1
TABLET, EXTENDED RELEASE ORAL
Qty: 30 TABLET | Refills: 11 | Status: CANCELLED | OUTPATIENT
Start: 2019-08-13

## 2019-08-13 RX ORDER — METOPROLOL TARTRATE 25 MG/1
25 TABLET, FILM COATED ORAL 2 TIMES DAILY
Qty: 60 TABLET | Refills: 11 | Status: CANCELLED | OUTPATIENT
Start: 2019-08-13 | End: 2020-08-12

## 2019-08-13 RX ORDER — POTASSIUM CHLORIDE 20 MEQ/1
20 TABLET, EXTENDED RELEASE ORAL ONCE
Status: COMPLETED | OUTPATIENT
Start: 2019-08-13 | End: 2019-08-13

## 2019-08-13 RX ORDER — LISINOPRIL 5 MG/1
5 TABLET ORAL DAILY
Qty: 90 TABLET | Refills: 3 | Status: CANCELLED | OUTPATIENT
Start: 2019-08-14 | End: 2020-08-13

## 2019-08-13 RX ORDER — ASPIRIN 325 MG
325 TABLET, DELAYED RELEASE (ENTERIC COATED) ORAL DAILY
Refills: 0 | Status: CANCELLED | COMMUNITY
Start: 2019-08-14 | End: 2020-08-13

## 2019-08-13 RX ORDER — PANTOPRAZOLE SODIUM 40 MG/1
40 TABLET, DELAYED RELEASE ORAL
Qty: 30 TABLET | Refills: 11 | Status: CANCELLED | OUTPATIENT
Start: 2019-08-14 | End: 2020-08-13

## 2019-08-13 RX ORDER — FUROSEMIDE 20 MG/1
TABLET ORAL
Qty: 30 TABLET | Refills: 11 | Status: CANCELLED | OUTPATIENT
Start: 2019-08-13

## 2019-08-13 RX ORDER — DOCUSATE SODIUM 100 MG/1
100 CAPSULE, LIQUID FILLED ORAL 2 TIMES DAILY
Refills: 0 | Status: CANCELLED | COMMUNITY
Start: 2019-08-13

## 2019-08-13 RX ORDER — AMLODIPINE BESYLATE 5 MG/1
5 TABLET ORAL DAILY
Qty: 30 TABLET | Refills: 11 | Status: CANCELLED | OUTPATIENT
Start: 2019-08-14 | End: 2020-08-13

## 2019-08-13 RX ORDER — OXYCODONE HYDROCHLORIDE 5 MG/1
5 TABLET ORAL EVERY 4 HOURS PRN
Qty: 42 TABLET | Refills: 0 | Status: CANCELLED | OUTPATIENT
Start: 2019-08-13

## 2019-08-13 RX ADMIN — OXYCODONE HYDROCHLORIDE 10 MG: 10 TABLET ORAL at 09:08

## 2019-08-13 RX ADMIN — PANTOPRAZOLE SODIUM 40 MG: 40 TABLET, DELAYED RELEASE ORAL at 05:08

## 2019-08-13 RX ADMIN — METOPROLOL TARTRATE 25 MG: 25 TABLET ORAL at 09:08

## 2019-08-13 RX ADMIN — AMLODIPINE BESYLATE 5 MG: 5 TABLET ORAL at 08:08

## 2019-08-13 RX ADMIN — POTASSIUM CHLORIDE 20 MEQ: 20 TABLET, EXTENDED RELEASE ORAL at 08:08

## 2019-08-13 RX ADMIN — SODIUM PHOSPHATE, MONOBASIC, MONOHYDRATE 15 MMOL: 276; 142 INJECTION, SOLUTION INTRAVENOUS at 09:08

## 2019-08-13 RX ADMIN — METOPROLOL TARTRATE 25 MG: 25 TABLET ORAL at 08:08

## 2019-08-13 RX ADMIN — POTASSIUM CHLORIDE 20 MEQ: 20 TABLET, EXTENDED RELEASE ORAL at 09:08

## 2019-08-13 RX ADMIN — GABAPENTIN 600 MG: 300 CAPSULE ORAL at 09:08

## 2019-08-13 RX ADMIN — FUROSEMIDE 20 MG: 10 INJECTION, SOLUTION INTRAMUSCULAR; INTRAVENOUS at 05:08

## 2019-08-13 RX ADMIN — DOCUSATE SODIUM 100 MG: 100 CAPSULE, LIQUID FILLED ORAL at 08:08

## 2019-08-13 RX ADMIN — SIMVASTATIN 20 MG: 5 TABLET, FILM COATED ORAL at 09:08

## 2019-08-13 RX ADMIN — MUPIROCIN 1 G: 20 OINTMENT TOPICAL at 08:08

## 2019-08-13 RX ADMIN — MUPIROCIN 1 G: 20 OINTMENT TOPICAL at 09:08

## 2019-08-13 RX ADMIN — ASPIRIN 325 MG: 325 TABLET, DELAYED RELEASE ORAL at 08:08

## 2019-08-13 RX ADMIN — LISINOPRIL 5 MG: 2.5 TABLET ORAL at 08:08

## 2019-08-13 RX ADMIN — HEPARIN SODIUM AND DEXTROSE 1600 UNITS/HR: 10000; 5 INJECTION INTRAVENOUS at 05:08

## 2019-08-13 RX ADMIN — GABAPENTIN 600 MG: 300 CAPSULE ORAL at 02:08

## 2019-08-13 RX ADMIN — GABAPENTIN 600 MG: 300 CAPSULE ORAL at 08:08

## 2019-08-13 RX ADMIN — FUROSEMIDE 20 MG: 10 INJECTION, SOLUTION INTRAMUSCULAR; INTRAVENOUS at 08:08

## 2019-08-13 NOTE — SUBJECTIVE & OBJECTIVE
"Interval HPI:   Overnight events:   BG is reasonably controlled on current home insulin pump. NAEON    Eatin%  Nausea: No  Hypoglycemia and intervention: No  Fever: No  TPN and/or TF: No  If yes, type of TF/TPN and rate: None    /60 (BP Location: Right arm, Patient Position: Sitting)   Pulse 77   Temp 98.8 °F (37.1 °C) (Oral)   Resp 18   Ht 5' 8" (1.727 m)   Wt 99.2 kg (218 lb 11.1 oz)   SpO2 97%   BMI 33.25 kg/m²     Labs Reviewed and Include    Recent Labs   Lab 19  0446   *   CALCIUM 8.2*      K 3.8   CO2 28   CL 98   BUN 24*   CREATININE 0.7     Lab Results   Component Value Date    WBC 10.82 2019    HGB 8.6 (L) 2019    HCT 26.4 (L) 2019    MCV 91 2019     2019     No results for input(s): TSH, FREET4 in the last 168 hours.  Lab Results   Component Value Date    HGBA1C 6.6 (H) 2019       Nutritional status:   Body mass index is 33.25 kg/m².  Lab Results   Component Value Date    ALBUMIN 4.6 2019    ALBUMIN 4.3 2019    ALBUMIN 4.1 2018     No results found for: PREALBUMIN    Estimated Creatinine Clearance: 136.1 mL/min (based on SCr of 0.7 mg/dL).    Accu-Checks  Recent Labs     19  1139 19  1142 19  1702 19  2212 19  0129 19  0741 19  1145 19  1637 19  2048 19  0147   POCTGLUCOSE >500* 219* 215* 186* 148* 130* 204* 145* 185* 108       Current Medications and/or Treatments Impacting Glycemic Control  Immunotherapy:    Immunosuppressants     None        Steroids:   Hormones (From admission, onward)    None        Pressors:    Autonomic Drugs (From admission, onward)    None        Hyperglycemia/Diabetes Medications:   Antihyperglycemics (From admission, onward)    Start     Stop Route Frequency Ordered    19 1845  insulin regular (Humulin R) insulin pump from home     Question Answer Comment   Basal Rate #1 0.4    Target number 120    Sensitivity #1 " 20    Carbohydrate coverage #1 12    Is the patient competent? Yes    Basal rate #1 time 1308-0667    Carbohydrate coverage #1 time 12    Sensitivity #1 time 9981-2270        -- SubQ Continuous 08/12/19 1730

## 2019-08-13 NOTE — PROGRESS NOTES
1010: Patient ambulated 500 feet without complications, no SOB, no complaints of pain. Patient stated that it felt good to walk.    1030: Patient ambulated with OT.    1045: Patient ambulated with PT.    1530: Patient ambulated with  feet, no SOB, no complaints of pain, patient stated that he knew how to do all of the PT/OT therapies due to having back surgery prior to admit.

## 2019-08-13 NOTE — PLAN OF CARE
Problem: Occupational Therapy Goal  Goal: Occupational Therapy Goal  Outcome: Outcome(s) achieved Date Met: 08/13/19  Intial eval completed   No goals established; pt at baseline with mobility and ADLs. Pt compliant with sternal precautions   Pt d/c from acute OT 8/13/19    Clair Zuñiga, OTR/L  Pager: [phone]  8/13/2019

## 2019-08-13 NOTE — ASSESSMENT & PLAN NOTE
Insulin Pump:  Patient has the ability and wherewithal to manage the pump.  Order has been placed to notify nurse of patient using own pump.     Settings:  Basal rate: 0.4 unit/hr  Bolus dose/Carb ratio: 12  ISF for Correction Dose: 20    Pump type:  Medtronic Minimed 670G     SN: [id number]  Infusion set type: plastic cannula  Change infusion set: Every 2-3 days (last change was 08/12/2019)  Change insertion site: with change of infusion set. (Last change was 08/12/2019)  Location of insertion site: Left Anterior Thigh.   State of insertion site: appears clean

## 2019-08-13 NOTE — ASSESSMENT & PLAN NOTE
54 yo Man with history of T2DM, CAD , AS 2/2 congenital bicuspid AV s/p Bentall with 23mm Mechanical aortic valve. Received 3L crystalloid and no cell saver intraoperatively.    -prn oxycodone, home gabapentin TID  - MAP goal 60-70  - norvasc 5mg and lisinopril 5mg started  - Room air  - IS, OOB  - Monitor UOP  - Goal K > 4, Mg > 2.0  Afebrile, post op leukocyotosis expected   Ancef 2g Q8 for 5 doses postop  D/C heparin drip for INR 3.3  Hold coumadin tonight

## 2019-08-13 NOTE — ASSESSMENT & PLAN NOTE
BG goal 110 - 140  BG is within  goal. Patient is Type 1 DM.       Allow patient to continue to use his home insulin pump.       ** Please call Endocrine for any BG related issues **  ** Please notify Endocrine for any change and/or advance in diet**      Discharge Recommendations:  Patient is to continue his home insulin pump.    After discussing evaluation results, patient agrees with proposed plan of care, has no further concerns, and agrees to follow-up with his PCP/DM specialist in [place]

## 2019-08-13 NOTE — PLAN OF CARE
08/13/19 1636   Discharge Assessment   Assessment Type Discharge Planning Reassessment   Discharge Plan A Home with family   Discharge Plan B Home with family;Home Health   DME Needed Upon Discharge  none   Patient/Family in Agreement with Plan yes     PT/OT recs home no needs. The patient will need ambulatory referral to [facility] coumadin clinic under the care of Dr. Ryan Alexander for INR monitoring for AVR w/mechanical valve for life. Will confirm dietician has educated patient on dietary constraints with coumadin. Possible discharge to home tomorrow. No anticipated discharge needs.

## 2019-08-13 NOTE — PLAN OF CARE
Problem: Adult Inpatient Plan of Care  Goal: Plan of Care Review  Outcome: Ongoing (interventions implemented as appropriate)  Patient remained free of falls, injury, trauma, and skin breakdown. VSS. Patient complaint of shoulder pain; oxycodone administered. Upon reassessment pt stated he had no pain.  Strict I&O.  Chest tube site cleaned with chlorhexidine and dressing changed.  Midsternal incision assessed; steri strips intact and wound edges approximated.  Blood glucose monitored and insulin administered via patient's personal insulin pump.  Chest tubes assessed and output monitored and documented.  Plan of care reviewed; patient verbalized understanding.  All questions and concerns addressed.  Will continue to monitor.

## 2019-08-13 NOTE — PROGRESS NOTES
"[facility]  Endocrinology  Progress Note    Admit Date: 2019     Reason for Consult: Management of T1DM, Hyperglycemia     Surgical Procedure and Date:   BENTALL PROCEDURE: 2019    Diabetes diagnosis year:    ~ 20 years ago    Home Diabetes Medications:    Medtronic 670G    Basal: 2.65   ICR: 12   ISF: 20   IOB: 3.15   Target: 120     How often checking glucose at home? KELSEY  BG readings on regimen: KELSEY  Hypoglycemia on the regimen?  KELSEY  Missed doses on regimen?  KELSEY    Diabetes Complications include:     Hyperglycemia, Hypoglycemia , Diabetic retinopathy  and Diabetic peripheral neuropathy     Complicating diabetes co morbidities:   History of CVA and Active Cancer, severe obesity, HTN, HLD, CAD, Aortic Stenosis.       HPI:   Patient is a 53 y.o. male with a diagnosis of aortic stenosis, diabetes type2, and CAD. Has noticed SOB over the past 1 month. Patient has history of stage 2 colon cancer in  with resection, no chemo or radiation. Receives primary DM care from BEATRIS Quintero NP. Endocrinology consulted to manage DM1/hyperglycemia post cardiac surgery.     Lab Results   Component Value Date    HGBA1C 6.6 (H) 2019       Interval HPI:   Overnight events:   BG is reasonably controlled on current home insulin pump. NAEON    Eatin%  Nausea: No  Hypoglycemia and intervention: No  Fever: No  TPN and/or TF: No  If yes, type of TF/TPN and rate: None    /60 (BP Location: Right arm, Patient Position: Sitting)   Pulse 77   Temp 98.8 °F (37.1 °C) (Oral)   Resp 18   Ht 5' 8" (1.727 m)   Wt 99.2 kg (218 lb 11.1 oz)   SpO2 97%   BMI 33.25 kg/m²      Labs Reviewed and Include    Recent Labs   Lab 19  0446   *   CALCIUM 8.2*      K 3.8   CO2 28   CL 98   BUN 24*   CREATININE 0.7     Lab Results   Component Value Date    WBC 10.82 2019    HGB 8.6 (L) 2019    HCT 26.4 (L) 2019    MCV 91 2019     2019     No results for " input(s): TSH, FREET4 in the last 168 hours.  Lab Results   Component Value Date    HGBA1C 6.6 (H) 07/23/2019       Nutritional status:   Body mass index is 33.25 kg/m².  Lab Results   Component Value Date    ALBUMIN 4.6 07/23/2019    ALBUMIN 4.3 04/24/2019    ALBUMIN 4.1 05/26/2018     No results found for: PREALBUMIN    Estimated Creatinine Clearance: 136.1 mL/min (based on SCr of 0.7 mg/dL).    Accu-Checks  Recent Labs     08/11/19  1139 08/11/19  1142 08/11/19  1702 08/11/19  2212 08/12/19  0129 08/12/19  0741 08/12/19  1145 08/12/19  1637 08/12/19  2048 08/13/19  0147   POCTGLUCOSE >500* 219* 215* 186* 148* 130* 204* 145* 185* 108       Current Medications and/or Treatments Impacting Glycemic Control  Immunotherapy:    Immunosuppressants     None        Steroids:   Hormones (From admission, onward)    None        Pressors:    Autonomic Drugs (From admission, onward)    None        Hyperglycemia/Diabetes Medications:   Antihyperglycemics (From admission, onward)    Start     Stop Route Frequency Ordered    08/12/19 1845  insulin regular (Humulin R) insulin pump from home     Question Answer Comment   Basal Rate #1 0.4    Target number 120    Sensitivity #1 20    Carbohydrate coverage #1 12    Is the patient competent? Yes    Basal rate #1 time 3212-5631    Carbohydrate coverage #1 time 12    Sensitivity #1 time [id number]        -- SubQ Continuous 08/12/19 7153          ASSESSMENT and PLAN    * S/P ascending aortic replacement  Managed per primary team  Avoid hypoglycemia        Diabetes mellitus type I  BG goal 110 - 140  BG is within  goal. Patient is Type 1 DM.       Allow patient to continue to use his home insulin pump.       ** Please call Endocrine for any BG related issues **  ** Please notify Endocrine for any change and/or advance in diet**      Discharge Recommendations:  Patient is to continue his home insulin pump.    After discussing evaluation results, patient agrees with proposed plan of care, has  no further concerns, and agrees to follow-up with his PCP/DM specialist in [place], [place].       Insulin pump in place  Insulin Pump:  Patient has the ability and wherewithal to manage the pump.  Order has been placed to notify nurse of patient using own pump.     Settings:  Basal rate: 0.4 unit/hr  Bolus dose/Carb ratio: 12  ISF for Correction Dose: 20    Pump type:  Medtronic Minimed 670G     SN: [id number]  Infusion set type: plastic cannula  Change infusion set: Every 2-3 days (last change was 08/12/2019)  Change insertion site: with change of infusion set. (Last change was 08/12/2019)  Location of insertion site: Left Anterior Thigh.   State of insertion site: appears clean        Class 1 obesity due to excess calories with serious comorbidity and body mass index (BMI) of 33.0 to 33.9 in adult  may contribute to insulin resistance            Erickson Cunningham NP  Endocrinology  [facility]

## 2019-08-13 NOTE — PT/OT/SLP EVAL
Occupational Therapy   Evaluation and Discharge Note    Name: Cj Barnes  MRN: [id number]  Admitting Diagnosis:  S/P ascending aortic replacement 4 Days Post-Op    Recommendations:     Discharge Recommendations: home  Discharge Equipment Recommendations:  Shower chair   Barriers to discharge:  None    Assessment:     Cj Barnes is a 53 y.o. male with a medical diagnosis of S/P ascending aortic replacement. At this time, patient is functioning at their prior level of function and does not require further acute OT services.     Plan:     During this hospitalization, patient does not require further acute OT services.  Please re-consult if situation changes.    · Plan of Care Reviewed with: patient    Subjective     Chief Complaint: none stated   Patient/Family Comments/goals: to return home     Occupational Profile:  Living Environment: Pt lives with his wife in a [facility] with 2 CHARANJIT. Upon d/c, pt plans to stay at his sons house for a few days which is a [place] with no CHARANJIT. Pt has a tub/shower combo with no DME present.  Previous level of function: PTA, pt was (I) with ADLs and functional mobility   Roles and Routines: , father   Equipment Used at home:  none  Assistance upon Discharge: Pt will have assistance from family upon d/c.     Pain/Comfort:  · Pain Rating 1: 0/10  · Pain Rating Post-Intervention 1: 0/10    Patients cultural, spiritual, Roman Catholic conflicts given the current situation: no    Objective:     Communicated with: RN prior to session.  Patient found ambulating hallway with PT with telemetry, peripheral IV upon OT entry to room. Pt agreeable to therapy session     General Precautions: Standard, fall, sternal   Orthopedic Precautions:N/A   Braces: N/A     Occupational Performance:    Bed Mobility:    ·  NP this date.     Functional Mobility/Transfers:  · Patient completed Sit <> Stand Transfer with supervision  with  no assistive device   · Functional Mobility: Pt found ambulating hallway  with PT with supervision using no AD. See PT noted for further assessment of gait.     Activities of Daily Living:  · Lower Body Dressing: independence for socks   · Toileting: independence voiding in urinal     Cognitive/Visual Perceptual:  Cognitive/Psychosocial Skills:     -       Oriented to: Person, Place, Time and Situation   -       Follows Commands/attention:Follows multistep  commands  -       Communication: clear/fluent  -       Memory: No Deficits noted  -       Safety awareness/insight to disability: intact   -       Mood/Affect/Coping skills/emotional control: Appropriate to situation  Visual/Perceptual:      -Intact wears glasses    Physical Exam:  Balance:    - Static sit: (I)  -  Dynamic sit: (I)  - Static standing: (I)  - Dynamic Standing: (S)    Postural examination/scapula alignment:    -       No postural abnormalities identified  Skin integrity: Visible skin intact  Edema:  None noted  Sensation:    -       Intact  Upper Extremity Range of Motion:     -       Right Upper Extremity: WFL  -       Left Upper Extremity: WFL  Upper Extremity Strength:    -       Right Upper Extremity: WFL  -       Left Upper Extremity: WFL    AMPAC 6 Click ADL:  AMPAC Total Score: 23    Treatment & Education:  - Pt educated on role of OT, POC, and DME recs: shower chair   - Pt verbalized 3/3 sternal precautions with education provided on the importance of maintaining precautions with bed mobility and functional transfers from various surfaces. Pt verbalized understanding and demonstrated understanding by adhering to precautions with use of cardiac pillow throughout session.   - Educated pt on being appropriate to transfer with nsg and PCT with supervision for safety   - All questions and concerns answered within OT scope of practice.   - Pt completed ADLs and functional mobility for treatment session as noted above   - Pt verbalized understanding. Pt expressed no further concerns/questions.  - whiteboard updated    Education:    Patient left up in chair with all lines intact, call button in reach and RN notified    GOALS:   Multidisciplinary Problems     Occupational Therapy Goals     Not on file          Multidisciplinary Problems (Resolved)        Problem: Occupational Therapy Goal    Goal Priority Disciplines Outcome Interventions   Occupational Therapy Goal   (Resolved)     OT, PT/OT Outcome(s) achieved                    History:     Past Medical History:   Diagnosis Date    Aortic stenosis 2018    Cancer 2014    Colon cancer     Coronary artery disease     Diabetes mellitus type I     since in his 30's    Heart murmur     Heel fracture     HTN (hypertension)     Hyperlipidemia LDL goal < 70     Insulin pump in place     MVP (mitral valve prolapse)     Stenosis of aortic and mitral valves        Past Surgical History:   Procedure Laterality Date    BACK SURGERY      BENTALL PROCEDURE N/A 8/9/2019    Performed by [facility] at [facility] OR 2ND FLR    COLON SURGERY  2014    FASCIOTOMY, PLANTAR Right 10/4/2013    Performed by Ruma oRbbins DPM at [facility] OR 1ST FLR    FASCIOTOMY, PLANTAR, ENDOSCOPIC Right 10/4/2013    Performed by Ruma Robbins DPM at [facility] OR 1ST FLR    FOOT TENDON SURGERY      Left heart cath Left 1/15/2018    Performed by Palomo Turner [facility] at [facility] CATH LAB    Replacement-valve-aortic N/A 8/9/2019    Performed by Ryan Knight MD at [facility] OR 2ND FLR    right and left heart cath Bilateral 01/15/2018    Right heart cath Right 1/15/2018    Performed by Palomo Turner MD at [facility] CATH LAB    TRIGGER FINGER RELEASE      x 2       Time Tracking:     OT Date of Treatment: 08/13/19  OT Start Time: 1015  OT Stop Time: 1023  OT Total Time (min): 8 min    Billable Minutes:Evaluation 8    Clair Zuñiga OT  8/13/2019

## 2019-08-13 NOTE — PT/OT/SLP PROGRESS
"Physical Therapy Treatment    Patient Name:  Cj Barnes   MRN:  [id number]    Recommendations:     Discharge Recommendations:  home   Discharge Equipment Recommendations: none   Barriers to discharge: Inaccessible home    Assessment:     Cj Barnes is a 53 y.o. male admitted with a medical diagnosis of S/P ascending aortic replacement.  He presents with the following impairments/functional limitations:  impaired endurance, impaired cardiopulmonary response to activity, orthopedic precautions. Pt tolerates session well with focus on transfers, gait training, and stair training. Pt progressing excellently with pt performing stair training with Supervision while performing transfers and gait with independence. Pts endurance improving and pt is ambulating with NSG per progressive mobility. Pt will continue to benefit from therapy services to address impairments listed above.     Rehab Prognosis: Good; patient would benefit from acute skilled PT services to address these deficits and reach maximum level of function.    Recent Surgery: Procedure(s) (LRB):  Replacement-valve-aortic (N/A)  BENTALL PROCEDURE (N/A) 4 Days Post-Op    Plan:     During this hospitalization, patient to be seen 4 x/week to address the identified rehab impairments via gait training, therapeutic activities, therapeutic exercises, neuromuscular re-education and progress toward the following goals:    · Plan of Care Expires:  09/10/19    Subjective     Chief Complaint: no c/o  Patient/Family Comments/goals: "Oh I feel great. I just am a bit sore."   Pain/Comfort:  · Pain Rating 1: 0/10  · Pain Rating Post-Intervention 1: 0/10      Objective:     Communicated with NSG prior to session.  Patient found up in chair with telemetry, peripheral IV upon PTA entry to room.     General Precautions: Standard, fall, sternal   Orthopedic Precautions:N/A   Braces: N/A     Functional Mobility:  · Transfers:     · Sit to Stand:  independence with no " AD  · Gait: Pt ambulates 178 ft with no AD and independence. Pt with slightly slowed leona and increased lateral sway. Pt clutches sternal brace pillow throughout gait trial. No overt LOB noted.   · Stairs:  Pt ascended/descended 2 stair(s) with No Assistive Device with no handrails with Supervision or Set-up Assistance.       AM-PAC 6 CLICK MOBILITY  Turning over in bed (including adjusting bedclothes, sheets and blankets)?: 2  Sitting down on and standing up from a chair with arms (e.g., wheelchair, bedside commode, etc.): 4  Moving from lying on back to sitting on the side of the bed?: 2  Moving to and from a bed to a chair (including a wheelchair)?: 4  Need to walk in hospital room?: 4  Climbing 3-5 steps with a railing?: 3  Basic Mobility Total Score: 19       Therapeutic Activities and Exercises:  Pt assisted with functional mobility as noted above.     Patient left up in chair with all lines intact, call button in reach and OT present.    GOALS:   Multidisciplinary Problems     Physical Therapy Goals        Problem: Physical Therapy Goal    Goal Priority Disciplines Outcome Goal Variances Interventions   Physical Therapy Goal     PT, PT/OT Ongoing (interventions implemented as appropriate)     Description:  Pt goals until 8/20/19    1. Pt supine to sit with sternal prec with SBA-not met  2. Pt sit to supine with sternal prec with SBA-not met  3. Pt sit to stand with no AD with supervision- met  4. Pt to perform gait 200ft with no AD with supervision.-not met  5. Pt to up/down 2 steps with R UE rail with CGA.- (met - no rail use d/t sternal prec.)                         Time Tracking:     PT Received On: 08/13/19  PT Start Time: 1012     PT Stop Time: 1020  PT Total Time (min): 8 min     Billable Minutes: Gait Training 8    Treatment Type: Treatment  PT/PTA: PTA     PTA Visit Number: 1     John Maki, PTA  08/13/2019

## 2019-08-13 NOTE — SUBJECTIVE & OBJECTIVE
Interval History: NAEON. Denies shoulder pain this AM. Chest tubes and pacer wires removed. INR 3.3 so will hold coumadin tonight and discontinue heparin. Plan to work with PT today and possibly discharge tomorrow.     Review of Systems   Constitution: Negative for malaise/fatigue.   Cardiovascular: Negative for chest pain, dyspnea on exertion, leg swelling and palpitations.   Respiratory: Negative for cough and shortness of breath.    Hematologic/Lymphatic: Negative for bleeding problem.   Gastrointestinal: Negative for abdominal pain.   Genitourinary: Negative for dysuria.   Neurological: Negative for headaches and weakness.     Medications:  Continuous Infusions:   Home Insulin Pump       Scheduled Meds:   amLODIPine  5 mg Oral Daily    aspirin  325 mg Oral Daily    docusate sodium  100 mg Oral BID    furosemide  20 mg Intravenous BID    gabapentin  600 mg Oral TID    lisinopril  5 mg Oral Daily    metoprolol tartrate  25 mg Oral BID    mupirocin  1 g Nasal BID    pantoprazole  40 mg Oral Before breakfast    polyethylene glycol  17 g Oral Daily    potassium chloride  20 mEq Oral Q12H    simvastatin  20 mg Oral QHS    sodium phosphate IVPB  15 mmol Intravenous Once     PRN Meds:acetaminophen, bisacodyl, Dextrose 10% Bolus, Dextrose 10% Bolus, glucagon (human recombinant), glucose, glucose, metoclopramide HCl, ondansetron, oxyCODONE, oxyCODONE, sodium chloride 0.9%     Objective:     Vital Signs (Most Recent):  Temp: 96.4 °F (35.8 °C) (08/13/19 1124)  Pulse: 73 (08/13/19 1124)  Resp: 16 (08/13/19 1124)  BP: (!) 117/56 (08/13/19 1124)  SpO2: 95 % (08/13/19 1124) Vital Signs (24h Range):  Temp:  [96.4 °F (35.8 °C)-98.8 °F (37.1 °C)] 96.4 °F (35.8 °C)  Pulse:  [66-88] 73  Resp:  [16-18] 16  SpO2:  [91 %-97 %] 95 %  BP: ()/(51-60) 117/56     Weight: 99.2 kg (218 lb 11.1 oz)  Body mass index is 33.25 kg/m².    SpO2: 95 %  O2 Device (Oxygen Therapy): room air    Intake/Output - Last 3 Shifts        08/11 0700 - 08/12 0659 08/12 0700 - 08/13 0659 08/13 0700 - 08/14 0659    P.O. 480 240 480    I.V. (mL/kg) 332.1 (3.1)  16 (0.2)    Total Intake(mL/kg) 812.1 (7.6) 240 (2.4) 496 (5)    Urine (mL/kg/hr) 2510 (1) 2860 (1.2) 700 (1.2)    Stool 0 0 0    Chest Tube 110 65 10    Total Output 2620 2925 710    Net -1807.9 -2685 -214           Stool Occurrence 3 x 3 x 2 x          Lines/Drains/Airways     Drain                 Y Chest Tube 1 and 2 08/09/19 1244 1 Anterior Mediastinal 19 Fr. 2 Posterior Mediastinal 19 Fr. 4 days          Line                 Pacer Wires 08/09/19 1240 4 days          Peripheral Intravenous Line                 Peripheral IV - Single Lumen 08/09/19 0634 18 G Left Forearm 4 days         Peripheral IV - Single Lumen 08/09/19 0719 16 G Right Hand 4 days                Physical Exam   Constitutional: He is oriented to person, place, and time. He appears well-developed and well-nourished. No distress.   HENT:   Head: Normocephalic and atraumatic.   Eyes: Pupils are equal, round, and reactive to light. EOM are normal.   Neck: Normal range of motion.   Cardiovascular: Normal rate, regular rhythm and normal pulses.   Pulmonary/Chest: Effort normal. No respiratory distress.   Abdominal: Soft.   Musculoskeletal: Normal range of motion. He exhibits no edema.   Neurological: He is alert and oriented to person, place, and time.   Skin: Skin is warm, dry and intact. Capillary refill takes less than 2 seconds. He is not diaphoretic.   Midline sternal incision c/d/i      Psychiatric: He has a normal mood and affect. His speech is normal and behavior is normal. Judgment and thought content normal.   Vitals reviewed.      Significant Labs:  BMP:   Recent Labs   Lab 08/13/19  0446   *      K 3.8   CL 98   CO2 28   BUN 24*   CREATININE 0.7   CALCIUM 8.2*   MG 2.0     CBC:   Recent Labs   Lab 08/13/19 0446   WBC 10.82   RBC 2.90*   HGB 8.6*   HCT 26.4*      MCV 91   MCH 29.7   MCHC 32.6        Significant Diagnostics:  I have reviewed all pertinent imaging results/findings within the past 24 hours.

## 2019-08-13 NOTE — PROGRESS NOTES
[facility]  Cardiothoracic Surgery  Progress Note    Patient Name: Cj Barnes  MRN: [id number]  Admission Date: 8/9/2019  Hospital Length of Stay: 4 days  Code Status: Full Code   Attending Physician: Ryan Knight MD   Referring Provider: Ryan Knight MD  Principal Problem:S/P ascending aortic replacement            Subjective:     Post-Op Info:  Procedure(s) (LRB):  Replacement-valve-aortic (N/A)  BENTALL PROCEDURE (N/A)   4 Days Post-Op     Interval History: NAEON. Denies shoulder pain this AM. Chest tubes and pacer wires removed. INR 3.3 so will hold coumadin tonight and discontinue heparin. Plan to work with PT today and possibly discharge tomorrow.     Review of Systems   Constitution: Negative for malaise/fatigue.   Cardiovascular: Negative for chest pain, dyspnea on exertion, leg swelling and palpitations.   Respiratory: Negative for cough and shortness of breath.    Hematologic/Lymphatic: Negative for bleeding problem.   Gastrointestinal: Negative for abdominal pain.   Genitourinary: Negative for dysuria.   Neurological: Negative for headaches and weakness.     Medications:  Continuous Infusions:   Home Insulin Pump       Scheduled Meds:   amLODIPine  5 mg Oral Daily    aspirin  325 mg Oral Daily    docusate sodium  100 mg Oral BID    furosemide  20 mg Intravenous BID    gabapentin  600 mg Oral TID    lisinopril  5 mg Oral Daily    metoprolol tartrate  25 mg Oral BID    mupirocin  1 g Nasal BID    pantoprazole  40 mg Oral Before breakfast    polyethylene glycol  17 g Oral Daily    potassium chloride  20 mEq Oral Q12H    simvastatin  20 mg Oral QHS    sodium phosphate IVPB  15 mmol Intravenous Once     PRN Meds:acetaminophen, bisacodyl, Dextrose 10% Bolus, Dextrose 10% Bolus, glucagon (human recombinant), glucose, glucose, metoclopramide HCl, ondansetron, oxyCODONE, oxyCODONE, sodium chloride 0.9%     Objective:     Vital Signs (Most Recent):  Temp: 96.4  °F (35.8 °C) (08/13/19 1124)  Pulse: 73 (08/13/19 1124)  Resp: 16 (08/13/19 1124)  BP: (!) 117/56 (08/13/19 1124)  SpO2: 95 % (08/13/19 1124) Vital Signs (24h Range):  Temp:  [96.4 °F (35.8 °C)-98.8 °F (37.1 °C)] 96.4 °F (35.8 °C)  Pulse:  [66-88] 73  Resp:  [16-18] 16  SpO2:  [91 %-97 %] 95 %  BP: ()/(51-60) 117/56     Weight: 99.2 kg (218 lb 11.1 oz)  Body mass index is 33.25 kg/m².    SpO2: 95 %  O2 Device (Oxygen Therapy): room air    Intake/Output - Last 3 Shifts       08/11 0700 - 08/12 0659 08/12 0700 - 08/13 0659 08/13 0700 - 08/14 0659    P.O. 480 240 480    I.V. (mL/kg) 332.1 (3.1)  16 (0.2)    Total Intake(mL/kg) 812.1 (7.6) 240 (2.4) 496 (5)    Urine (mL/kg/hr) 2510 (1) 2860 (1.2) 700 (1.2)    Stool 0 0 0    Chest Tube 110 65 10    Total Output 2620 2925 710    Net -1807.9 -2685 -214           Stool Occurrence 3 x 3 x 2 x          Lines/Drains/Airways     Drain                 Y Chest Tube 1 and 2 08/09/19 1244 1 Anterior Mediastinal 19 Fr. 2 Posterior Mediastinal 19 Fr. 4 days          Line                 Pacer Wires 08/09/19 1240 4 days          Peripheral Intravenous Line                 Peripheral IV - Single Lumen 08/09/19 0634 18 G Left Forearm 4 days         Peripheral IV - Single Lumen 08/09/19 0719 16 G Right Hand 4 days                Physical Exam   Constitutional: He is oriented to person, place, and time. He appears well-developed and well-nourished. No distress.   HENT:   Head: Normocephalic and atraumatic.   Eyes: Pupils are equal, round, and reactive to light. EOM are normal.   Neck: Normal range of motion.   Cardiovascular: Normal rate, regular rhythm and normal pulses.   Pulmonary/Chest: Effort normal. No respiratory distress.   Abdominal: Soft.   Musculoskeletal: Normal range of motion. He exhibits no edema.   Neurological: He is alert and oriented to person, place, and time.   Skin: Skin is warm, dry and intact. Capillary refill takes less than 2 seconds. He is not diaphoretic.    Midline sternal incision c/d/i      Psychiatric: He has a normal mood and affect. His speech is normal and behavior is normal. Judgment and thought content normal.   Vitals reviewed.      Significant Labs:  BMP:   Recent Labs   Lab 08/13/19  0446   *      K 3.8   CL 98   CO2 28   BUN 24*   CREATININE 0.7   CALCIUM 8.2*   MG 2.0     CBC:   Recent Labs   Lab 08/13/19  0446   WBC 10.82   RBC 2.90*   HGB 8.6*   HCT 26.4*      MCV 91   MCH 29.7   MCHC 32.6       Significant Diagnostics:  I have reviewed all pertinent imaging results/findings within the past 24 hours.    Assessment/Plan:     * S/P ascending aortic replacement  52 yo Man with history of T2DM, CAD , AS 2/2 congenital bicuspid AV s/p Bentall with 23mm Mechanical aortic valve. Received 3L crystalloid and no cell saver intraoperatively.    -prn oxycodone, home gabapentin TID  - MAP goal 60-70  - norvasc 5mg and lisinopril 5mg started  - Room air  - IS, OOB  - Monitor UOP  - Goal K > 4, Mg > 2.0  Afebrile, post op leukocyotosis expected   Ancef 2g Q8 for 5 doses postop  D/C heparin drip for INR 3.3  Hold coumadin tonight             Hypophosphatemia  Phos level 1.9  Replaced with IV phos     Acute blood loss anemia  Expected post operatively   CBC daily   H/H 8.6/26.4    Class 1 obesity due to excess calories with serious comorbidity and body mass index (BMI) of 33.0 to 33.9 in adult  Cardiac diet     Aortic stenosis  See s/p aortic root replacement     Diabetes mellitus type I  Endocrine following   Insulin pump resumed     Dispo: CTSU. Likely discharge tomorrow     Bhumika Reyes PA-C  Cardiothoracic Surgery  [facility]

## 2019-08-13 NOTE — PLAN OF CARE
Problem: Physical Therapy Goal  Goal: Physical Therapy Goal  Pt goals until 8/20/19    1. Pt supine to sit with sternal prec with SBA-not met  2. Pt sit to supine with sternal prec with SBA-not met  3. Pt sit to stand with no AD with supervision- met  4. Pt to perform gait 200ft with no AD with supervision.-not met  5. Pt to up/down 2 steps with R UE rail with CGA.- (met - no rail use d/t sternal prec.)       Outcome: Ongoing (interventions implemented as appropriate)  2 goals met. Goals remain appropriate.

## 2019-08-13 NOTE — PROGRESS NOTES
RN notified MD Gregorio of pt's recent aPTT result of 47.9.  Telephone orders provided by MD to increase Heparin infusion to 1600 u/hr. No patient complaints at this time. Will continue to monitor.

## 2019-08-14 ENCOUNTER — ANTI-COAG VISIT (OUTPATIENT)
Dept: CARDIOLOGY | Facility: CLINIC | Age: 54
End: 2019-08-14
Payer: MEDICARE

## 2019-08-14 VITALS
BODY MASS INDEX: 32.58 KG/M2 | SYSTOLIC BLOOD PRESSURE: 112 MMHG | HEIGHT: 68 IN | HEART RATE: 71 BPM | OXYGEN SATURATION: 97 % | WEIGHT: 214.94 LBS | RESPIRATION RATE: 16 BRPM | TEMPERATURE: 98 F | DIASTOLIC BLOOD PRESSURE: 56 MMHG

## 2019-08-14 DIAGNOSIS — Z95.828 S/P ASCENDING AORTIC REPLACEMENT: ICD-10-CM

## 2019-08-14 DIAGNOSIS — G45.9 TIA (TRANSIENT ISCHEMIC ATTACK): ICD-10-CM

## 2019-08-14 DIAGNOSIS — Z79.01 LONG TERM (CURRENT) USE OF ANTICOAGULANTS: ICD-10-CM

## 2019-08-14 LAB
ANION GAP SERPL CALC-SCNC: 9 MMOL/L (ref 8–16)
BASOPHILS # BLD AUTO: 0.04 K/UL (ref 0–0.2)
BASOPHILS NFR BLD: 0.4 % (ref 0–1.9)
BUN SERPL-MCNC: 17 MG/DL (ref 6–20)
CALCIUM SERPL-MCNC: 8.2 MG/DL (ref 8.7–10.5)
CHLORIDE SERPL-SCNC: 98 MMOL/L (ref 95–110)
CO2 SERPL-SCNC: 30 MMOL/L (ref 23–29)
CREAT SERPL-MCNC: 0.7 MG/DL (ref 0.5–1.4)
DIFFERENTIAL METHOD: ABNORMAL
EOSINOPHIL # BLD AUTO: 0.1 K/UL (ref 0–0.5)
EOSINOPHIL NFR BLD: 0.9 % (ref 0–8)
ERYTHROCYTE [DISTWIDTH] IN BLOOD BY AUTOMATED COUNT: 14.1 % (ref 11.5–14.5)
EST. GFR  (AFRICAN AMERICAN): >60 ML/MIN/1.73 M^2
EST. GFR  (NON AFRICAN AMERICAN): >60 ML/MIN/1.73 M^2
GLUCOSE SERPL-MCNC: 153 MG/DL (ref 70–110)
HCT VFR BLD AUTO: 27.1 % (ref 40–54)
HGB BLD-MCNC: 9 G/DL (ref 14–18)
IMM GRANULOCYTES # BLD AUTO: 0.12 K/UL (ref 0–0.04)
IMM GRANULOCYTES NFR BLD AUTO: 1.2 % (ref 0–0.5)
INR PPP: 2.7 (ref 0.8–1.2)
LYMPHOCYTES # BLD AUTO: 1.4 K/UL (ref 1–4.8)
LYMPHOCYTES NFR BLD: 13.1 % (ref 18–48)
MAGNESIUM SERPL-MCNC: 2 MG/DL (ref 1.6–2.6)
MCH RBC QN AUTO: 29.4 PG (ref 27–31)
MCHC RBC AUTO-ENTMCNC: 33.2 G/DL (ref 32–36)
MCV RBC AUTO: 89 FL (ref 82–98)
MONOCYTES # BLD AUTO: 1.1 K/UL (ref 0.3–1)
MONOCYTES NFR BLD: 10.2 % (ref 4–15)
NEUTROPHILS # BLD AUTO: 7.6 K/UL (ref 1.8–7.7)
NEUTROPHILS NFR BLD: 74.2 % (ref 38–73)
NRBC BLD-RTO: 0 /100 WBC
PHOSPHATE SERPL-MCNC: 2.4 MG/DL (ref 2.7–4.5)
PLATELET # BLD AUTO: 199 K/UL (ref 150–350)
PMV BLD AUTO: 10.3 FL (ref 9.2–12.9)
POCT GLUCOSE: 140 MG/DL (ref 70–110)
POCT GLUCOSE: 158 MG/DL (ref 70–110)
POTASSIUM SERPL-SCNC: 4.1 MMOL/L (ref 3.5–5.1)
PROTHROMBIN TIME: 26.2 SEC (ref 9–12.5)
RBC # BLD AUTO: 3.06 M/UL (ref 4.6–6.2)
SODIUM SERPL-SCNC: 137 MMOL/L (ref 136–145)
WBC # BLD AUTO: 10.29 K/UL (ref 3.9–12.7)

## 2019-08-14 PROCEDURE — 85610 PROTHROMBIN TIME: CPT

## 2019-08-14 PROCEDURE — 25000003 PHARM REV CODE 250: Performed by: STUDENT IN AN ORGANIZED HEALTH CARE EDUCATION/TRAINING PROGRAM

## 2019-08-14 PROCEDURE — 25000003 PHARM REV CODE 250: Performed by: PHYSICIAN ASSISTANT

## 2019-08-14 PROCEDURE — 83735 ASSAY OF MAGNESIUM: CPT

## 2019-08-14 PROCEDURE — 63600175 PHARM REV CODE 636 W HCPCS: Performed by: STUDENT IN AN ORGANIZED HEALTH CARE EDUCATION/TRAINING PROGRAM

## 2019-08-14 PROCEDURE — 93793 PR ANTICOAGULANT MGMT FOR PT TAKING WARFARIN: ICD-10-PCS | Mod: S$GLB,,,

## 2019-08-14 PROCEDURE — 93793 ANTICOAG MGMT PT WARFARIN: CPT | Mod: S$GLB,,,

## 2019-08-14 PROCEDURE — 84100 ASSAY OF PHOSPHORUS: CPT

## 2019-08-14 PROCEDURE — 36415 COLL VENOUS BLD VENIPUNCTURE: CPT

## 2019-08-14 PROCEDURE — 80048 BASIC METABOLIC PNL TOTAL CA: CPT

## 2019-08-14 PROCEDURE — 85025 COMPLETE CBC W/AUTO DIFF WBC: CPT

## 2019-08-14 RX ORDER — FUROSEMIDE 20 MG/1
TABLET ORAL
Qty: 30 TABLET | Refills: 11 | Status: ON HOLD | OUTPATIENT
Start: 2019-08-14 | End: 2019-08-21 | Stop reason: HOSPADM

## 2019-08-14 RX ORDER — LISINOPRIL 5 MG/1
5 TABLET ORAL DAILY
Qty: 90 TABLET | Refills: 3 | Status: ON HOLD | OUTPATIENT
Start: 2019-08-14 | End: 2019-08-21 | Stop reason: HOSPADM

## 2019-08-14 RX ORDER — DOCUSATE SODIUM 100 MG/1
100 CAPSULE, LIQUID FILLED ORAL 2 TIMES DAILY
Refills: 0 | COMMUNITY
Start: 2019-08-14 | End: 2019-09-05 | Stop reason: ALTCHOICE

## 2019-08-14 RX ORDER — WARFARIN 3 MG/1
3 TABLET ORAL DAILY
Qty: 30 TABLET | Refills: 11 | Status: ON HOLD | OUTPATIENT
Start: 2019-08-14 | End: 2019-08-21 | Stop reason: HOSPADM

## 2019-08-14 RX ORDER — ASPIRIN 325 MG
325 TABLET, DELAYED RELEASE (ENTERIC COATED) ORAL DAILY
Refills: 0 | Status: ON HOLD | COMMUNITY
Start: 2019-08-14 | End: 2019-08-21 | Stop reason: HOSPADM

## 2019-08-14 RX ORDER — OXYCODONE HYDROCHLORIDE 5 MG/1
5 TABLET ORAL EVERY 4 HOURS PRN
Qty: 42 TABLET | Refills: 0 | Status: ON HOLD | OUTPATIENT
Start: 2019-08-14 | End: 2019-08-21 | Stop reason: HOSPADM

## 2019-08-14 RX ORDER — PANTOPRAZOLE SODIUM 40 MG/1
40 TABLET, DELAYED RELEASE ORAL
Qty: 30 TABLET | Refills: 11 | Status: SHIPPED | OUTPATIENT
Start: 2019-08-15 | End: 2019-12-12 | Stop reason: SDUPTHER

## 2019-08-14 RX ORDER — POTASSIUM CHLORIDE 20 MEQ/1
TABLET, EXTENDED RELEASE ORAL
Qty: 42 TABLET | Refills: 3 | Status: ON HOLD | OUTPATIENT
Start: 2019-08-14 | End: 2019-08-21 | Stop reason: HOSPADM

## 2019-08-14 RX ORDER — AMLODIPINE BESYLATE 5 MG/1
5 TABLET ORAL DAILY
Qty: 30 TABLET | Refills: 11 | Status: ON HOLD | OUTPATIENT
Start: 2019-08-14 | End: 2019-08-21 | Stop reason: HOSPADM

## 2019-08-14 RX ORDER — METOPROLOL TARTRATE 25 MG/1
25 TABLET, FILM COATED ORAL 2 TIMES DAILY
Qty: 60 TABLET | Refills: 11 | Status: ON HOLD | OUTPATIENT
Start: 2019-08-14 | End: 2019-08-21 | Stop reason: HOSPADM

## 2019-08-14 RX ADMIN — DOCUSATE SODIUM 100 MG: 100 CAPSULE, LIQUID FILLED ORAL at 09:08

## 2019-08-14 RX ADMIN — PANTOPRAZOLE SODIUM 40 MG: 40 TABLET, DELAYED RELEASE ORAL at 06:08

## 2019-08-14 RX ADMIN — GABAPENTIN 600 MG: 300 CAPSULE ORAL at 09:08

## 2019-08-14 RX ADMIN — LISINOPRIL 5 MG: 2.5 TABLET ORAL at 09:08

## 2019-08-14 RX ADMIN — POTASSIUM CHLORIDE 20 MEQ: 20 TABLET, EXTENDED RELEASE ORAL at 09:08

## 2019-08-14 RX ADMIN — ASPIRIN 325 MG: 325 TABLET, DELAYED RELEASE ORAL at 09:08

## 2019-08-14 RX ADMIN — METOPROLOL TARTRATE 25 MG: 25 TABLET ORAL at 09:08

## 2019-08-14 RX ADMIN — DIBASIC SODIUM PHOSPHATE, MONOBASIC POTASSIUM PHOSPHATE AND MONOBASIC SODIUM PHOSPHATE 1 TABLET: 852; 155; 130 TABLET ORAL at 09:08

## 2019-08-14 RX ADMIN — POLYETHYLENE GLYCOL 3350 17 G: 17 POWDER, FOR SOLUTION ORAL at 09:08

## 2019-08-14 RX ADMIN — MUPIROCIN 1 G: 20 OINTMENT TOPICAL at 09:08

## 2019-08-14 RX ADMIN — AMLODIPINE BESYLATE 5 MG: 5 TABLET ORAL at 09:08

## 2019-08-14 RX ADMIN — FUROSEMIDE 20 MG: 10 INJECTION, SOLUTION INTRAMUSCULAR; INTRAVENOUS at 09:08

## 2019-08-14 NOTE — PLAN OF CARE
Problem: Adult Inpatient Plan of Care  Goal: Patient-Specific Goal (Individualization)  Dx: AVR, Bentall procedure  Hx: DM1 (controlled with insulin pump), CAD, Bicuspid AV with aortic stenosis     8/9: AVR and bentall. Post of SICU, Extubated     MAP 60-70  Accu check q1h   Outcome: Ongoing (interventions implemented as appropriate)  Plan of care discussed with patient.  Patient ambulating with standby assistance, fall precautions in place.Continuing to encourage sternal precautions, IS, and ambulation. Patient has no complaints of pain. Discussed medications and care. Patient has no questions at this time. Will continue to monitor.

## 2019-08-14 NOTE — PROGRESS NOTES
54 y/o male s/p s/p Bentall procedure with 23mm Mechanical aortic valve replacement. His other PMH includes aortic stenosis 2/2 congenital bicuspid AV, DM, CAD, HTN, HLD.    Calendar updated with inpatient warfarin doses/INRs. Will contact patient to confirm receipt of warfarin and set him up for education and monitoring.

## 2019-08-14 NOTE — NURSING
Patient is ready for discharge. Patient stable alert and oriented. IVs removed. No complaints of pain. Discussed discharge plan. Reviewed medications and side effects, appointments, and answered questions with patient and family. Oxycodone RX given to patient. CTS home care instructions reviewed with pt, copy given to pt.

## 2019-08-14 NOTE — PLAN OF CARE
Problem: Adult Inpatient Plan of Care  Goal: Plan of Care Review  Outcome: Ongoing (interventions implemented as appropriate)  Patient remained free of falls, injury, trauma, and skin breakdown. VSS. BG ranged from 228-140; insulin administered via patient personal pump.  Chest tubes removed; dressing monitored and to be changed in AM.  Patient titrated off of oxygen via NC.  Plan of care reviewed; patient verbalized understanding. No patient complaints at this time; will continue to monitor.

## 2019-08-14 NOTE — DISCHARGE SUMMARY
[facility]  Cardiothoracic Surgery  Discharge Summary      Patient Name: Cj Barnes  MRN: [id number]  Admission Date: 8/9/2019  Hospital Length of Stay: 5 days  Discharge Date and Time:  08/14/2019 8:08 AM  Attending Physician: Ryan Knight MD   Discharging Provider: Bhumika Reyes PA-C  Primary Care Provider: Garry Stover MD    HPI:   Mr. Barnes is a 53-year-old gentleman with severe aortic stenosis.    He has a bicuspid aortic valve.  He has been followed for some time for his   aortic stenosis, had been asymptomatic; however, he noted progressive dyspnea on   exertion.  A repeat echo demonstrated severe aortic stenosis.  A preoperative   CT scan demonstrated dilation of the proximal ascending aorta greater than 4 cm.    He now presents for aortic root replacement.    Procedure(s) (LRB):  Replacement-valve-aortic (N/A)  BENTALL PROCEDURE (N/A)        Hospital Course: On 8/9/19, the patient was taken to the Operating Room for the above stated procedure. Please see the previously dictated operative report for complete details. Postoperatively, the patient was taken from the  Operating Room to the ICU where the vital signs were monitored and pain was kept under control. The patient was weaned from the drips and extubated in the ICU per protocol. Once hemodynamically stable, the patient was transferred to the Cardiac Step-Down floor for continued strengthening and ambulation. On postoperative day 5, the patient was ready for discharge to home. At the time of discharge, the patient was ambulating unassisted. Pain was well controlled with oral analgesics and the patient was tolerating the diet.     MOBILITY AND ACTIVITY: As tolerated. Patient may shower. No heavy lifting of greater than 5 pounds and no driving.     DIET: An 1800-calorie ADA with a 1500 mL fluid restriction.     WOUND CARE INSTRUCTIONS: Check for redness, swelling and drainage around the  incision or wound. Patient  is to call for any obvious bleeding, drainage, pus from the wound, unusual problems or difficulties or temperature of greater than 101   degrees.     FOLLOWUP: Follow up with Dr. Knight in approximately 3 weeks. Prior to this  appointment, the patient will have a chest x-ray and EKG.     Patient not placed on Ace-Inhibitor at the time of discharge due to potential for hypotension       DISCHARGE CONDITION: At the time of discharge, the patient was in sinus rhythm and afebrile with stable vital signs.      Consults (From admission, onward)        Status Ordering Provider     Consult Case Management/Social Work  Once     Provider:  (Not yet assigned)    Completed KEYA EID     Consult to Endocrinology  Once     Provider:  (Not yet assigned)    Completed KEYA EID     Inpatient consult to Cardiac Rehab  Once     Provider:  (Not yet assigned)    Completed KEYA EID     Inpatient consult to Registered Dietitian/Nutritionist  Once     Provider:  (Not yet assigned)    Completed KEYA EID.            No new Assessment & Plan notes have been filed under this hospital service since the last note was generated.  Service: Cardiothoracic Surgery    Final Active Diagnoses:    Diagnosis Date Noted POA    PRINCIPAL PROBLEM:  S/P ascending aortic replacement [Z95.828] 08/09/2019 Not Applicable    Acute blood loss anemia [D62] 08/12/2019 No    Hypophosphatemia [E83.39] 08/12/2019 No    Class 1 obesity due to excess calories with serious comorbidity and body mass index (BMI) of 33.0 to 33.9 in adult [E66.09, Z68.33] 04/23/2019 Not Applicable    Aortic stenosis [I35.0] 11/15/2017 Yes    Diabetes mellitus type I [E10.9]  Yes    Insulin pump in place [Z96.41]  Not Applicable      Problems Resolved During this Admission:    Diagnosis Date Noted Date Resolved POA    Nonrheumatic aortic valve stenosis [I35.0] 08/09/2019 08/09/2019 Yes      Discharged Condition: stable    Disposition:  Home or Self Care    Follow Up:  Follow-up Information     Ryan Knight MD In 3 weeks.    Specialties:  Cardiothoracic Surgery, Transplant  Contact information:  Angelo Medina  [facility] [id number] [phone]                 Patient Instructions:      Ambulatory referral to Anticoagulation Monitoring   Referral Priority: Routine Referral Type: Consultation   Referral Reason: Specialty Services Required   Requested Specialty: Cardiology   Number of Visits Requested: 1     Medications:  Reconciled Home Medications:      Medication List      START taking these medications    amLODIPine 5 MG tablet  Commonly known as:  NORVASC  Take 1 tablet (5 mg total) by mouth once daily.     docusate sodium 100 MG capsule  Commonly known as:  COLACE  Take 1 capsule (100 mg total) by mouth 2 (two) times daily.     furosemide 20 MG tablet  Commonly known as:  LASIX  Take one tablet by mouth twice daily for 7 days then decrease to once daily     insulin regular 100 unit/mL Soln  Use home settings Basal 0.4    Bolus/carb 12    ISF 20     metoprolol tartrate 25 MG tablet  Commonly known as:  LOPRESSOR  Take 1 tablet (25 mg total) by mouth 2 (two) times daily.     oxyCODONE 5 MG immediate release tablet  Commonly known as:  ROXICODONE  Take 1 tablet (5 mg total) by mouth every 4 (four) hours as needed.     pantoprazole 40 MG tablet  Commonly known as:  PROTONIX  Take 1 tablet (40 mg total) by mouth before breakfast.  Start taking on:  8/15/2019     potassium chloride SA 20 MEQ tablet  Commonly known as:  K-DUR,KLOR-CON  Take one tablet by mouth twice daily for 7 days then decrease to once daily     warfarin 3 MG tablet  Commonly known as:  COUMADIN  Take 1 tablet (3 mg total) by mouth Daily.        CHANGE how you take these medications    aspirin 325 MG EC tablet  Commonly known as:  ECOTRIN  Take 1 tablet (325 mg total) by mouth once daily.  What changed:    · medication strength  · how much to take     lisinopril 5 MG  "tablet  Commonly known as:  PRINIVIL,ZESTRIL  Take 1 tablet (5 mg total) by mouth once daily.  What changed:    · medication strength  · how much to take        CONTINUE taking these medications    blood sugar diagnostic Strp  1 each by Misc.(Non-Drug; Combo Route) route 6 (six) times daily. Contour next strips. Pt needs contour jackelyn for compatibility w/ insulin pump (medtronic 670g). Necessity     blood-glucose sensor Apple  Commonly known as:  GUARDIAN SENSOR 3  1 each by Misc.(Non-Drug; Combo Route) route once a week.     clotrimazole-betamethasone 1-0.05% cream  Commonly known as:  LOTRISONE  APPLY A SMALL AMOUNT TO AFFECTED AREA THREE TIMES DAILY UNTIL CLEAR     COMFORT EZ PEN NEEDLES 33 gauge x 3/16" Ndle  Generic drug:  pen needle, diabetic  USE AS DIRECTED with Levemir pens     gabapentin 300 MG capsule  Commonly known as:  NEURONTIN  Take 2 capsules (600 mg total) by mouth 3 (three) times daily.     glucagon (human recombinant) 1 mg Solr  Commonly known as:  GLUCAGON EMERGENCY KIT (HUMAN)  Inject 1 mg into the muscle as needed.     insulin detemir U-100 100 unit/mL (3 mL) Inpn pen  Commonly known as:  LEVEMIR FLEXTOUCH U-100 INSULN  Inject 48 Units into the skin every evening.     JARDIANCE 25 mg Tab  Generic drug:  empagliflozin  TAKE ONE TABLET BY MOUTH DAILY FOR DIABETES     metFORMIN 500 MG 24 hr tablet  Commonly known as:  GLUCOPHAGE-XR  Take 1 tablet (500 mg total) by mouth daily with breakfast.     NovoLOG U-100 Insulin aspart 100 unit/mL injection  Generic drug:  insulin aspart U-100  INJECT 180 UNITS SUBCUTANEOUSLY EVERY ONE AND A HALF DAYS     simvastatin 20 MG tablet  Commonly known as:  ZOCOR  Take 1 tablet (20 mg total) by mouth every evening.        STOP taking these medications    clopidogrel 75 mg tablet  Commonly known as:  PLAVIX     cyclobenzaprine 10 MG tablet  Commonly known as:  FLEXERIL          Time spent on the discharge of patient: 35 minutes  INR at discharge 2.7  Sent home with " Coumadin 3mg  Bhumika Reyes PA-C  Cardiothoracic Surgery  [facility]

## 2019-08-15 ENCOUNTER — ANTI-COAG VISIT (OUTPATIENT)
Dept: CARDIOLOGY | Facility: CLINIC | Age: 54
End: 2019-08-15
Payer: MEDICARE

## 2019-08-15 DIAGNOSIS — G45.9 TIA (TRANSIENT ISCHEMIC ATTACK): ICD-10-CM

## 2019-08-15 DIAGNOSIS — Z95.828 S/P ASCENDING AORTIC REPLACEMENT: ICD-10-CM

## 2019-08-15 DIAGNOSIS — Z79.01 LONG TERM (CURRENT) USE OF ANTICOAGULANTS: Primary | ICD-10-CM

## 2019-08-15 LAB — INR PPP: 3.6 (ref 2–3)

## 2019-08-15 PROCEDURE — 85610 POCT INR: ICD-10-PCS | Mod: QW,S$GLB,,

## 2019-08-15 PROCEDURE — 93793 ANTICOAG MGMT PT WARFARIN: CPT | Mod: S$GLB,,,

## 2019-08-15 PROCEDURE — 93793 PR ANTICOAGULANT MGMT FOR PT TAKING WARFARIN: ICD-10-PCS | Mod: S$GLB,,,

## 2019-08-15 PROCEDURE — 85610 PROTHROMBIN TIME: CPT | Mod: QW,S$GLB,,

## 2019-08-15 NOTE — PROGRESS NOTES
INR not at goal. Medications, chart, and patient findings reviewed. See calendar for adjustments to dose and follow up plan.  Pt remains elevated after only having a few doses of coumadin (3 doses).  He denies any changes.  Will reduce pt's dose substantially & re-assess 8/19. Pt would like to incorporate cabbage once a week into his diet (which is fine at this time).

## 2019-08-15 NOTE — PLAN OF CARE
08/15/19 1001   Post-Acute Status   Post-Acute Authorization Other   Other Status No Post-Acute Service Needs     Home no needs.

## 2019-08-15 NOTE — PROGRESS NOTES
Patient's daughter in law Yadira verified his Coumadin tablet as 3 mg strength tab. Calendar updated on doses he took at home since discharge.   I instructed her for him to take 1 tab daily after 5 pm and clinic education appointment today which she verbalized understanding.

## 2019-08-15 NOTE — PLAN OF CARE
08/15/19 0956   Final Note   Assessment Type Final Discharge Note   Anticipated Discharge Disposition Home   What phone number can be called within the next 1-3 days to see how you are doing after discharge? [phone]   Hospital Follow Up  Appt(s) scheduled? Yes   Discharge plans and expectations educations in teach back method with documentation complete? Yes   Right Care Referral Info   Post Acute Recommendation No Care     Pt discharge to home in care of his spouse. [facility] referral for anticoagulation with coumadin initiated under his general cardiologist care Dr. Ryan Alexander. No other discharge needs assessed. Follow up appt to be scheduled by clinic staff.    Future Appointments   Date Time Provider Department Center   8/15/2019  2:30 PM LAB, COUMADIN 2 [facility] COUMAD Gigi azucena   11/25/2019  8:00 AM Jodi Quintero NP [facility]

## 2019-08-16 ENCOUNTER — NURSE TRIAGE (OUTPATIENT)
Dept: ADMINISTRATIVE | Facility: CLINIC | Age: 54
End: 2019-08-16

## 2019-08-16 ENCOUNTER — HOSPITAL ENCOUNTER (INPATIENT)
Facility: HOSPITAL | Age: 54
LOS: 5 days | Discharge: HOME | DRG: 309 | End: 2019-08-21
Attending: THORACIC SURGERY (CARDIOTHORACIC VASCULAR SURGERY) | Admitting: THORACIC SURGERY (CARDIOTHORACIC VASCULAR SURGERY)
Payer: MEDICARE

## 2019-08-16 DIAGNOSIS — I49.9 ARRHYTHMIA: ICD-10-CM

## 2019-08-16 DIAGNOSIS — D64.9 ANEMIA, UNSPECIFIED TYPE: ICD-10-CM

## 2019-08-16 DIAGNOSIS — A41.9 SEPSIS: ICD-10-CM

## 2019-08-16 DIAGNOSIS — I49.9 CARDIAC ARRHYTHMIA: ICD-10-CM

## 2019-08-16 DIAGNOSIS — Z98.890 H/O AORTIC ROOT REPAIR: ICD-10-CM

## 2019-08-16 DIAGNOSIS — Z96.41 INSULIN PUMP IN PLACE: ICD-10-CM

## 2019-08-16 DIAGNOSIS — I48.92 ATRIAL FLUTTER, UNSPECIFIED TYPE: ICD-10-CM

## 2019-08-16 DIAGNOSIS — I48.92 ATRIAL FLUTTER WITH RAPID VENTRICULAR RESPONSE: ICD-10-CM

## 2019-08-16 DIAGNOSIS — I48.91 A-FIB: ICD-10-CM

## 2019-08-16 DIAGNOSIS — I48.91 ATRIAL FIBRILLATION: ICD-10-CM

## 2019-08-16 DIAGNOSIS — R00.0 TACHYCARDIA: ICD-10-CM

## 2019-08-16 DIAGNOSIS — E10.42 TYPE 1 DIABETES MELLITUS WITH DIABETIC POLYNEUROPATHY: ICD-10-CM

## 2019-08-16 DIAGNOSIS — Z95.2 H/O MECHANICAL AORTIC VALVE REPLACEMENT: ICD-10-CM

## 2019-08-16 DIAGNOSIS — I48.91 ATRIAL FIBRILLATION STATUS POST CARDIOVERSION: ICD-10-CM

## 2019-08-16 DIAGNOSIS — Z95.2 S/P AVR (AORTIC VALVE REPLACEMENT): ICD-10-CM

## 2019-08-16 DIAGNOSIS — R13.10 DYSPHAGIA, UNSPECIFIED TYPE: ICD-10-CM

## 2019-08-16 LAB
ALBUMIN SERPL BCP-MCNC: 2.4 G/DL (ref 3.5–5.2)
ALLENS TEST: ABNORMAL
ALP SERPL-CCNC: 61 U/L (ref 55–135)
ALT SERPL W/O P-5'-P-CCNC: 18 U/L (ref 10–44)
ANION GAP SERPL CALC-SCNC: 14 MMOL/L (ref 8–16)
APTT BLDCRRT: 38.7 SEC (ref 21–32)
AST SERPL-CCNC: 21 U/L (ref 10–40)
BASOPHILS # BLD AUTO: 0.05 K/UL (ref 0–0.2)
BASOPHILS NFR BLD: 0.3 % (ref 0–1.9)
BILIRUB SERPL-MCNC: 0.8 MG/DL (ref 0.1–1)
BUN SERPL-MCNC: 15 MG/DL (ref 6–20)
CALCIUM SERPL-MCNC: 8.1 MG/DL (ref 8.7–10.5)
CHLORIDE SERPL-SCNC: 104 MMOL/L (ref 95–110)
CO2 SERPL-SCNC: 16 MMOL/L (ref 23–29)
CREAT SERPL-MCNC: 0.8 MG/DL (ref 0.5–1.4)
DELSYS: ABNORMAL
DIFFERENTIAL METHOD: ABNORMAL
EOSINOPHIL # BLD AUTO: 0.1 K/UL (ref 0–0.5)
EOSINOPHIL NFR BLD: 0.5 % (ref 0–8)
ERYTHROCYTE [DISTWIDTH] IN BLOOD BY AUTOMATED COUNT: 14.6 % (ref 11.5–14.5)
EST. GFR  (AFRICAN AMERICAN): >60 ML/MIN/1.73 M^2
EST. GFR  (NON AFRICAN AMERICAN): >60 ML/MIN/1.73 M^2
FLOW: 2
GLUCOSE SERPL-MCNC: 74 MG/DL (ref 70–110)
HCO3 UR-SCNC: 13 MMOL/L (ref 24–28)
HCT VFR BLD AUTO: 28.8 % (ref 40–54)
HGB BLD-MCNC: 9 G/DL (ref 14–18)
IMM GRANULOCYTES # BLD AUTO: 0.57 K/UL (ref 0–0.04)
IMM GRANULOCYTES NFR BLD AUTO: 3.6 % (ref 0–0.5)
INR PPP: 5.2 (ref 0.8–1.2)
LACTATE SERPL-SCNC: 0.9 MMOL/L (ref 0.5–2.2)
LDH SERPL L TO P-CCNC: 0.75 MMOL/L (ref 0.36–1.25)
LYMPHOCYTES # BLD AUTO: 1.4 K/UL (ref 1–4.8)
LYMPHOCYTES NFR BLD: 8.5 % (ref 18–48)
MAGNESIUM SERPL-MCNC: 2.1 MG/DL (ref 1.6–2.6)
MCH RBC QN AUTO: 29.4 PG (ref 27–31)
MCHC RBC AUTO-ENTMCNC: 31.3 G/DL (ref 32–36)
MCV RBC AUTO: 94 FL (ref 82–98)
MODE: ABNORMAL
MONOCYTES # BLD AUTO: 1.1 K/UL (ref 0.3–1)
MONOCYTES NFR BLD: 6.5 % (ref 4–15)
NEUTROPHILS # BLD AUTO: 12.9 K/UL (ref 1.8–7.7)
NEUTROPHILS NFR BLD: 80.6 % (ref 38–73)
NRBC BLD-RTO: 0 /100 WBC
PCO2 BLDA: 25.2 MMHG (ref 35–45)
PH SMN: 7.32 [PH] (ref 7.35–7.45)
PHOSPHATE SERPL-MCNC: 3.3 MG/DL (ref 2.7–4.5)
PLATELET # BLD AUTO: 333 K/UL (ref 150–350)
PMV BLD AUTO: 9.4 FL (ref 9.2–12.9)
PO2 BLDA: 119 MMHG (ref 80–100)
POC BE: -13 MMOL/L
POC SATURATED O2: 98 % (ref 95–100)
POC TCO2: 14 MMOL/L (ref 23–27)
POCT GLUCOSE: 80 MG/DL (ref 70–110)
POTASSIUM SERPL-SCNC: 4.2 MMOL/L (ref 3.5–5.1)
PROT SERPL-MCNC: 5.8 G/DL (ref 6–8.4)
PROTHROMBIN TIME: 51.7 SEC (ref 9–12.5)
RBC # BLD AUTO: 3.06 M/UL (ref 4.6–6.2)
SAMPLE: ABNORMAL
SITE: ABNORMAL
SODIUM SERPL-SCNC: 134 MMOL/L (ref 136–145)
WBC # BLD AUTO: 16.04 K/UL (ref 3.9–12.7)

## 2019-08-16 PROCEDURE — 99291 CRITICAL CARE FIRST HOUR: CPT | Mod: ,,, | Performed by: ANESTHESIOLOGY

## 2019-08-16 PROCEDURE — 25000003 PHARM REV CODE 250: Performed by: STUDENT IN AN ORGANIZED HEALTH CARE EDUCATION/TRAINING PROGRAM

## 2019-08-16 PROCEDURE — 93005 ELECTROCARDIOGRAM TRACING: CPT

## 2019-08-16 PROCEDURE — 36415 COLL VENOUS BLD VENIPUNCTURE: CPT

## 2019-08-16 PROCEDURE — 85730 THROMBOPLASTIN TIME PARTIAL: CPT | Mod: 91

## 2019-08-16 PROCEDURE — 85347 COAGULATION TIME ACTIVATED: CPT

## 2019-08-16 PROCEDURE — 25000003 PHARM REV CODE 250

## 2019-08-16 PROCEDURE — 27000221 HC OXYGEN, UP TO 24 HOURS

## 2019-08-16 PROCEDURE — 93010 EKG 12-LEAD: ICD-10-PCS | Mod: ,,, | Performed by: INTERNAL MEDICINE

## 2019-08-16 PROCEDURE — 84100 ASSAY OF PHOSPHORUS: CPT

## 2019-08-16 PROCEDURE — 85025 COMPLETE CBC W/AUTO DIFF WBC: CPT

## 2019-08-16 PROCEDURE — 94761 N-INVAS EAR/PLS OXIMETRY MLT: CPT

## 2019-08-16 PROCEDURE — 85610 PROTHROMBIN TIME: CPT | Mod: 91

## 2019-08-16 PROCEDURE — 20000000 HC ICU ROOM

## 2019-08-16 PROCEDURE — 36600 WITHDRAWAL OF ARTERIAL BLOOD: CPT

## 2019-08-16 PROCEDURE — 82803 BLOOD GASES ANY COMBINATION: CPT

## 2019-08-16 PROCEDURE — S0028 INJECTION, FAMOTIDINE, 20 MG: HCPCS | Performed by: STUDENT IN AN ORGANIZED HEALTH CARE EDUCATION/TRAINING PROGRAM

## 2019-08-16 PROCEDURE — 93010 ELECTROCARDIOGRAM REPORT: CPT | Mod: ,,, | Performed by: INTERNAL MEDICINE

## 2019-08-16 PROCEDURE — 83605 ASSAY OF LACTIC ACID: CPT

## 2019-08-16 PROCEDURE — 80053 COMPREHEN METABOLIC PANEL: CPT | Mod: 91

## 2019-08-16 PROCEDURE — 83605 ASSAY OF LACTIC ACID: CPT | Mod: 91

## 2019-08-16 PROCEDURE — 83735 ASSAY OF MAGNESIUM: CPT | Mod: 91

## 2019-08-16 PROCEDURE — 99291 PR CRITICAL CARE, E/M 30-74 MINUTES: ICD-10-PCS | Mod: ,,, | Performed by: ANESTHESIOLOGY

## 2019-08-16 PROCEDURE — 99900035 HC TECH TIME PER 15 MIN (STAT)

## 2019-08-16 PROCEDURE — 63600175 PHARM REV CODE 636 W HCPCS: Performed by: STUDENT IN AN ORGANIZED HEALTH CARE EDUCATION/TRAINING PROGRAM

## 2019-08-16 RX ORDER — OXYCODONE HYDROCHLORIDE 5 MG/1
5 TABLET ORAL EVERY 4 HOURS PRN
Status: DISCONTINUED | OUTPATIENT
Start: 2019-08-16 | End: 2019-08-21 | Stop reason: HOSPADM

## 2019-08-16 RX ORDER — POTASSIUM CHLORIDE 20 MEQ/15ML
40 SOLUTION ORAL
Status: DISCONTINUED | OUTPATIENT
Start: 2019-08-16 | End: 2019-08-20

## 2019-08-16 RX ORDER — INDOMETHACIN 25 MG/1
50 CAPSULE ORAL ONCE
Status: DISCONTINUED | OUTPATIENT
Start: 2019-08-16 | End: 2019-08-16

## 2019-08-16 RX ORDER — POLYETHYLENE GLYCOL 3350 17 G/17G
17 POWDER, FOR SOLUTION ORAL DAILY
Status: DISCONTINUED | OUTPATIENT
Start: 2019-08-17 | End: 2019-08-21 | Stop reason: HOSPADM

## 2019-08-16 RX ORDER — GLUCAGON 1 MG
1 KIT INJECTION
Status: DISCONTINUED | OUTPATIENT
Start: 2019-08-16 | End: 2019-08-20

## 2019-08-16 RX ORDER — LANOLIN ALCOHOL/MO/W.PET/CERES
800 CREAM (GRAM) TOPICAL
Status: DISCONTINUED | OUTPATIENT
Start: 2019-08-16 | End: 2019-08-20

## 2019-08-16 RX ORDER — POTASSIUM CHLORIDE 20 MEQ/15ML
60 SOLUTION ORAL
Status: DISCONTINUED | OUTPATIENT
Start: 2019-08-16 | End: 2019-08-20

## 2019-08-16 RX ORDER — SODIUM,POTASSIUM PHOSPHATES 280-250MG
2 POWDER IN PACKET (EA) ORAL
Status: DISCONTINUED | OUTPATIENT
Start: 2019-08-16 | End: 2019-08-20

## 2019-08-16 RX ORDER — VANCOMYCIN 1.75 GRAM/500 ML IN 0.9 % SODIUM CHLORIDE INTRAVENOUS
1750
Status: DISCONTINUED | OUTPATIENT
Start: 2019-08-16 | End: 2019-08-18

## 2019-08-16 RX ORDER — INDOMETHACIN 25 MG/1
100 CAPSULE ORAL ONCE
Status: COMPLETED | OUTPATIENT
Start: 2019-08-16 | End: 2019-08-16

## 2019-08-16 RX ORDER — AMOXICILLIN 250 MG
1 CAPSULE ORAL 2 TIMES DAILY
Status: DISCONTINUED | OUTPATIENT
Start: 2019-08-16 | End: 2019-08-21 | Stop reason: HOSPADM

## 2019-08-16 RX ORDER — ACETAMINOPHEN 325 MG/1
650 TABLET ORAL EVERY 4 HOURS PRN
Status: DISCONTINUED | OUTPATIENT
Start: 2019-08-16 | End: 2019-08-21

## 2019-08-16 RX ORDER — OXYCODONE HYDROCHLORIDE 5 MG/1
10 TABLET ORAL EVERY 4 HOURS PRN
Status: DISCONTINUED | OUTPATIENT
Start: 2019-08-16 | End: 2019-08-21

## 2019-08-16 RX ORDER — INSULIN ASPART 100 [IU]/ML
1-10 INJECTION, SOLUTION INTRAVENOUS; SUBCUTANEOUS EVERY 6 HOURS PRN
Status: DISCONTINUED | OUTPATIENT
Start: 2019-08-16 | End: 2019-08-20

## 2019-08-16 RX ORDER — SODIUM CHLORIDE 0.9 % (FLUSH) 0.9 %
10 SYRINGE (ML) INJECTION
Status: DISCONTINUED | OUTPATIENT
Start: 2019-08-16 | End: 2019-08-21

## 2019-08-16 RX ORDER — ESMOLOL HYDROCHLORIDE 10 MG/ML
INJECTION INTRAVENOUS
Status: COMPLETED
Start: 2019-08-16 | End: 2019-08-16

## 2019-08-16 RX ORDER — FAMOTIDINE 10 MG/ML
20 INJECTION INTRAVENOUS EVERY 12 HOURS
Status: DISCONTINUED | OUTPATIENT
Start: 2019-08-16 | End: 2019-08-18

## 2019-08-16 RX ORDER — ESMOLOL HYDROCHLORIDE 10 MG/ML
50 INJECTION INTRAVENOUS ONCE
Status: COMPLETED | OUTPATIENT
Start: 2019-08-16 | End: 2019-08-16

## 2019-08-16 RX ADMIN — OXYCODONE HYDROCHLORIDE 10 MG: 10 TABLET ORAL at 09:08

## 2019-08-16 RX ADMIN — SODIUM CHLORIDE, SODIUM LACTATE, POTASSIUM CHLORIDE, AND CALCIUM CHLORIDE 1000 ML: .6; .31; .03; .02 INJECTION, SOLUTION INTRAVENOUS at 06:08

## 2019-08-16 RX ADMIN — ESMOLOL HYDROCHLORIDE 50 MG: 10 INJECTION INTRAVENOUS at 07:08

## 2019-08-16 RX ADMIN — SENNOSIDES,DOCUSATE SODIUM 1 TABLET: 8.6; 5 TABLET, FILM COATED ORAL at 09:08

## 2019-08-16 RX ADMIN — PIPERACILLIN AND TAZOBACTAM 4.5 G: 4; .5 INJECTION, POWDER, LYOPHILIZED, FOR SOLUTION INTRAVENOUS; PARENTERAL at 10:08

## 2019-08-16 RX ADMIN — AMIODARONE HYDROCHLORIDE 1 MG/MIN: 1.8 INJECTION, SOLUTION INTRAVENOUS at 08:08

## 2019-08-16 RX ADMIN — SODIUM BICARBONATE 100 MEQ: 84 INJECTION, SOLUTION INTRAVENOUS at 09:08

## 2019-08-16 RX ADMIN — FAMOTIDINE 20 MG: 10 INJECTION, SOLUTION INTRAVENOUS at 09:08

## 2019-08-16 RX ADMIN — AMIODARONE HYDROCHLORIDE 150 MG: 1.5 INJECTION, SOLUTION INTRAVENOUS at 07:08

## 2019-08-16 NOTE — TELEPHONE ENCOUNTER
Spoke with patient, he is going to drop his grandson off at school and then report to the emergency room. Verbalized understanding.

## 2019-08-16 NOTE — TELEPHONE ENCOUNTER
Reason for Disposition   [1] Difficulty breathing AND [2] not severe    Additional Information   Negative: Severe difficulty breathing (e.g., struggling for each breath, speaks in single words, stridor)   Negative: Sounds like a life-threatening emergency to the triager   Negative: [1] Drooling or spitting out saliva (because can't swallow) AND [2] new onset   Negative: [1] Bleeding from mouth AND [2] won't stop after 10 minutes of direct pressure   Negative: Electrical burn of mouth   Negative: Chemical burn of mouth    Protocols used: MOUTH SYMPTOMS-A-  Pt called c/o tongue dryness since surgery on 8/9/19. Pt stated he has pain 6-7/10 to tongue and throat. Pt is also c/o of difficulty breathing. Pt will be going to [facility].

## 2019-08-17 PROBLEM — I48.91 ATRIAL FIBRILLATION WITH RVR: Status: ACTIVE | Noted: 2019-08-16

## 2019-08-17 LAB
ANION GAP SERPL CALC-SCNC: 16 MMOL/L (ref 8–16)
APTT BLDCRRT: 41.8 SEC (ref 21–32)
ASCENDING AORTA: 2.71 CM
AV INDEX (PROSTH): 0.4
AV INDEX (PROSTH): 0.74
AV MEAN GRADIENT: 10 MMHG
AV MEAN GRADIENT: 12 MMHG
AV PEAK GRADIENT: 20 MMHG
AV PEAK GRADIENT: 20 MMHG
AV VALVE AREA: 1.68 CM2
AV VALVE AREA: 2.99 CM2
AV VELOCITY RATIO: 0.33
AV VELOCITY RATIO: 0.7
BASOPHILS # BLD AUTO: 0.04 K/UL (ref 0–0.2)
BASOPHILS NFR BLD: 0.3 % (ref 0–1.9)
BSA FOR ECHO PROCEDURE: 2.23 M2
BSA FOR ECHO PROCEDURE: 2.23 M2
BUN SERPL-MCNC: 13 MG/DL (ref 6–20)
CALCIUM SERPL-MCNC: 7.8 MG/DL (ref 8.7–10.5)
CHLORIDE SERPL-SCNC: 104 MMOL/L (ref 95–110)
CO2 SERPL-SCNC: 17 MMOL/L (ref 23–29)
CREAT SERPL-MCNC: 0.7 MG/DL (ref 0.5–1.4)
CV ECHO LV RWT: 0.31 CM
CV ECHO LV RWT: 0.4 CM
DIFFERENTIAL METHOD: ABNORMAL
DOP CALC AO PEAK VEL: 2.21 M/S
DOP CALC AO PEAK VEL: 2.21 M/S
DOP CALC AO VTI: 26.65 CM
DOP CALC AO VTI: 28.92 CM
DOP CALC LVOT AREA: 4 CM2
DOP CALC LVOT AREA: 4.2 CM2
DOP CALC LVOT DIAMETER: 2.27 CM
DOP CALC LVOT DIAMETER: 2.3 CM
DOP CALC LVOT PEAK VEL: 0.73 M/S
DOP CALC LVOT PEAK VEL: 1.54 M/S
DOP CALC LVOT STROKE VOLUME: 44.81 CM3
DOP CALC LVOT STROKE VOLUME: 86.56 CM3
DOP CALCLVOT PEAK VEL VTI: 10.79 CM
DOP CALCLVOT PEAK VEL VTI: 21.4 CM
E WAVE DECELERATION TIME: 97.5 MSEC
E/A RATIO: 2.66
E/E' RATIO: 10.38 M/S
E/E' RATIO: 10.64 M/S
ECHO LV POSTERIOR WALL: 0.84 CM (ref 0.6–1.1)
ECHO LV POSTERIOR WALL: 1 CM (ref 0.6–1.1)
EOSINOPHIL # BLD AUTO: 0.1 K/UL (ref 0–0.5)
EOSINOPHIL NFR BLD: 0.8 % (ref 0–8)
ERYTHROCYTE [DISTWIDTH] IN BLOOD BY AUTOMATED COUNT: 14.9 % (ref 11.5–14.5)
EST. GFR  (AFRICAN AMERICAN): >60 ML/MIN/1.73 M^2
EST. GFR  (NON AFRICAN AMERICAN): >60 ML/MIN/1.73 M^2
FRACTIONAL SHORTENING: 22 % (ref 28–44)
FRACTIONAL SHORTENING: 23 % (ref 28–44)
GLUCOSE SERPL-MCNC: 117 MG/DL (ref 70–110)
HCT VFR BLD AUTO: 26 % (ref 40–54)
HGB BLD-MCNC: 8 G/DL (ref 14–18)
IMM GRANULOCYTES # BLD AUTO: 0.53 K/UL (ref 0–0.04)
IMM GRANULOCYTES NFR BLD AUTO: 4.2 % (ref 0–0.5)
INR PPP: 4.8 (ref 0.8–1.2)
INTERVENTRICULAR SEPTUM: 0.84 CM (ref 0.6–1.1)
INTERVENTRICULAR SEPTUM: 1.15 CM (ref 0.6–1.1)
IVRT: 0.07 MSEC
LA MAJOR: 3.56 CM
LA MAJOR: 5.17 CM
LA MINOR: 3.62 CM
LA MINOR: 5.25 CM
LA WIDTH: 3.53 CM
LA WIDTH: 4.17 CM
LEFT ATRIUM SIZE: 3.16 CM
LEFT ATRIUM SIZE: 4.06 CM
LEFT ATRIUM VOLUME INDEX: 15.7 ML/M2
LEFT ATRIUM VOLUME INDEX: 34.6 ML/M2
LEFT ATRIUM VOLUME: 34.04 CM3
LEFT ATRIUM VOLUME: 74.97 CM3
LEFT INTERNAL DIMENSION IN SYSTOLE: 3.9 CM (ref 2.1–4)
LEFT INTERNAL DIMENSION IN SYSTOLE: 4.14 CM (ref 2.1–4)
LEFT VENTRICLE DIASTOLIC VOLUME INDEX: 21.25 ML/M2
LEFT VENTRICLE DIASTOLIC VOLUME INDEX: 65.29 ML/M2
LEFT VENTRICLE DIASTOLIC VOLUME: 141.36 ML
LEFT VENTRICLE DIASTOLIC VOLUME: 46.04 ML
LEFT VENTRICLE MASS INDEX: 76 G/M2
LEFT VENTRICLE MASS INDEX: 93 G/M2
LEFT VENTRICLE SYSTOLIC VOLUME INDEX: 13.3 ML/M2
LEFT VENTRICLE SYSTOLIC VOLUME INDEX: 35.1 ML/M2
LEFT VENTRICLE SYSTOLIC VOLUME: 28.75 ML
LEFT VENTRICLE SYSTOLIC VOLUME: 76.04 ML
LEFT VENTRICULAR INTERNAL DIMENSION IN DIASTOLE: 5 CM (ref 3.5–6)
LEFT VENTRICULAR INTERNAL DIMENSION IN DIASTOLE: 5.4 CM (ref 3.5–6)
LEFT VENTRICULAR MASS: 164.86 G
LEFT VENTRICULAR MASS: 200.72 G
LV LATERAL E/E' RATIO: 10.64 M/S
LV LATERAL E/E' RATIO: 9.08 M/S
LV SEPTAL E/E' RATIO: 10.64 M/S
LV SEPTAL E/E' RATIO: 12.11 M/S
LYMPHOCYTES # BLD AUTO: 1.1 K/UL (ref 1–4.8)
LYMPHOCYTES NFR BLD: 9 % (ref 18–48)
MAGNESIUM SERPL-MCNC: 2 MG/DL (ref 1.6–2.6)
MCH RBC QN AUTO: 29 PG (ref 27–31)
MCHC RBC AUTO-ENTMCNC: 30.8 G/DL (ref 32–36)
MCV RBC AUTO: 94 FL (ref 82–98)
MONOCYTES # BLD AUTO: 0.9 K/UL (ref 0.3–1)
MONOCYTES NFR BLD: 6.8 % (ref 4–15)
MV PEAK A VEL: 0.44 M/S
MV PEAK E VEL: 1.09 M/S
MV PEAK E VEL: 1.17 M/S
NEUTROPHILS # BLD AUTO: 9.9 K/UL (ref 1.8–7.7)
NEUTROPHILS NFR BLD: 78.9 % (ref 38–73)
NRBC BLD-RTO: 0 /100 WBC
PHOSPHATE SERPL-MCNC: 3 MG/DL (ref 2.7–4.5)
PISA TR MAX VEL: 2.3 M/S
PLATELET # BLD AUTO: 287 K/UL (ref 150–350)
PMV BLD AUTO: 9 FL (ref 9.2–12.9)
POCT GLUCOSE: 69 MG/DL (ref 70–110)
POCT GLUCOSE: 72 MG/DL (ref 70–110)
POCT GLUCOSE: 75 MG/DL (ref 70–110)
POCT GLUCOSE: 77 MG/DL (ref 70–110)
POTASSIUM SERPL-SCNC: 3.9 MMOL/L (ref 3.5–5.1)
PROTHROMBIN TIME: 47 SEC (ref 9–12.5)
RA MAJOR: 3.58 CM
RA MAJOR: 5.28 CM
RA PRESSURE: 8 MMHG
RA WIDTH: 4.16 CM
RA WIDTH: 4.54 CM
RBC # BLD AUTO: 2.76 M/UL (ref 4.6–6.2)
RIGHT VENTRICULAR END-DIASTOLIC DIMENSION: 4.3 CM
RIGHT VENTRICULAR END-DIASTOLIC DIMENSION: 4.33 CM
RV TISSUE DOPPLER FREE WALL SYSTOLIC VELOCITY 1 (APICAL 4 CHAMBER VIEW): 4.79 CM/S
SINUS: 2.81 CM
SODIUM SERPL-SCNC: 137 MMOL/L (ref 136–145)
STJ: 2.6 CM
TDI LATERAL: 0.11 M/S
TDI LATERAL: 0.12 M/S
TDI SEPTAL: 0.09 M/S
TDI SEPTAL: 0.11 M/S
TDI: 0.11 M/S
TDI: 0.11 M/S
TR MAX PG: 21 MMHG
TRICUSPID ANNULAR PLANE SYSTOLIC EXCURSION: 1.13 CM
TV REST PULMONARY ARTERY PRESSURE: 29 MMHG
WBC # BLD AUTO: 12.52 K/UL (ref 3.9–12.7)

## 2019-08-17 PROCEDURE — S0028 INJECTION, FAMOTIDINE, 20 MG: HCPCS | Performed by: STUDENT IN AN ORGANIZED HEALTH CARE EDUCATION/TRAINING PROGRAM

## 2019-08-17 PROCEDURE — 99291 PR CRITICAL CARE, E/M 30-74 MINUTES: ICD-10-PCS | Mod: ,,, | Performed by: ANESTHESIOLOGY

## 2019-08-17 PROCEDURE — 93005 ELECTROCARDIOGRAM TRACING: CPT

## 2019-08-17 PROCEDURE — 93010 ELECTROCARDIOGRAM REPORT: CPT | Mod: ,,, | Performed by: INTERNAL MEDICINE

## 2019-08-17 PROCEDURE — 20000000 HC ICU ROOM

## 2019-08-17 PROCEDURE — 85730 THROMBOPLASTIN TIME PARTIAL: CPT

## 2019-08-17 PROCEDURE — 83735 ASSAY OF MAGNESIUM: CPT

## 2019-08-17 PROCEDURE — 93010 EKG 12-LEAD: ICD-10-PCS | Mod: ,,, | Performed by: INTERNAL MEDICINE

## 2019-08-17 PROCEDURE — 80048 BASIC METABOLIC PNL TOTAL CA: CPT

## 2019-08-17 PROCEDURE — 63600175 PHARM REV CODE 636 W HCPCS: Performed by: STUDENT IN AN ORGANIZED HEALTH CARE EDUCATION/TRAINING PROGRAM

## 2019-08-17 PROCEDURE — 25000003 PHARM REV CODE 250: Performed by: STUDENT IN AN ORGANIZED HEALTH CARE EDUCATION/TRAINING PROGRAM

## 2019-08-17 PROCEDURE — 85610 PROTHROMBIN TIME: CPT

## 2019-08-17 PROCEDURE — 27000221 HC OXYGEN, UP TO 24 HOURS

## 2019-08-17 PROCEDURE — 99291 CRITICAL CARE FIRST HOUR: CPT | Mod: ,,, | Performed by: ANESTHESIOLOGY

## 2019-08-17 PROCEDURE — 85025 COMPLETE CBC W/AUTO DIFF WBC: CPT

## 2019-08-17 PROCEDURE — 84100 ASSAY OF PHOSPHORUS: CPT

## 2019-08-17 PROCEDURE — 99900035 HC TECH TIME PER 15 MIN (STAT)

## 2019-08-17 PROCEDURE — 94761 N-INVAS EAR/PLS OXIMETRY MLT: CPT

## 2019-08-17 RX ORDER — METOPROLOL TARTRATE 25 MG/1
25 TABLET, FILM COATED ORAL 2 TIMES DAILY
Status: DISCONTINUED | OUTPATIENT
Start: 2019-08-17 | End: 2019-08-20

## 2019-08-17 RX ADMIN — AMIODARONE HYDROCHLORIDE 0.5 MG/MIN: 1.8 INJECTION, SOLUTION INTRAVENOUS at 09:08

## 2019-08-17 RX ADMIN — OXYCODONE HYDROCHLORIDE 5 MG: 5 TABLET ORAL at 04:08

## 2019-08-17 RX ADMIN — FAMOTIDINE 20 MG: 10 INJECTION, SOLUTION INTRAVENOUS at 08:08

## 2019-08-17 RX ADMIN — PIPERACILLIN AND TAZOBACTAM 4.5 G: 4; .5 INJECTION, POWDER, LYOPHILIZED, FOR SOLUTION INTRAVENOUS; PARENTERAL at 08:08

## 2019-08-17 RX ADMIN — SENNOSIDES,DOCUSATE SODIUM 1 TABLET: 8.6; 5 TABLET, FILM COATED ORAL at 08:08

## 2019-08-17 RX ADMIN — AMIODARONE HYDROCHLORIDE 1 MG/MIN: 1.8 INJECTION, SOLUTION INTRAVENOUS at 01:08

## 2019-08-17 RX ADMIN — POLYETHYLENE GLYCOL 3350 17 G: 17 POWDER, FOR SOLUTION ORAL at 08:08

## 2019-08-17 RX ADMIN — METOPROLOL TARTRATE 25 MG: 25 TABLET ORAL at 08:08

## 2019-08-17 RX ADMIN — VANCOMYCIN HYDROCHLORIDE 1750 MG: 100 INJECTION, POWDER, LYOPHILIZED, FOR SOLUTION INTRAVENOUS at 12:08

## 2019-08-17 RX ADMIN — AMIODARONE HYDROCHLORIDE 0.5 MG/MIN: 1.8 INJECTION, SOLUTION INTRAVENOUS at 10:08

## 2019-08-17 RX ADMIN — PIPERACILLIN AND TAZOBACTAM 4.5 G: 4; .5 INJECTION, POWDER, LYOPHILIZED, FOR SOLUTION INTRAVENOUS; PARENTERAL at 05:08

## 2019-08-17 RX ADMIN — METOPROLOL TARTRATE 25 MG: 25 TABLET ORAL at 10:08

## 2019-08-17 RX ADMIN — VANCOMYCIN HYDROCHLORIDE 1750 MG: 100 INJECTION, POWDER, LYOPHILIZED, FOR SOLUTION INTRAVENOUS at 10:08

## 2019-08-17 RX ADMIN — PIPERACILLIN AND TAZOBACTAM 4.5 G: 4; .5 INJECTION, POWDER, LYOPHILIZED, FOR SOLUTION INTRAVENOUS; PARENTERAL at 10:08

## 2019-08-17 NOTE — H&P
[facility]  Critical Care - Surgery  History & Physical    Patient Name: Cj Barnes  MRN: [id number]  Admission Date: 8/16/2019  Code Status: Full Code  Attending Physician: Ryan Knight MD   Primary Care Provider: Garry Stover MD   Principal Problem: <principal problem not specified>    Subjective:     HPI:  53-year-old white male presents to the ER complaining of dry tongue, sore throat, hoarse voice and decrease appetite since last Friday.  Patient is status post Bentall w/ aortic valve replacement by  on 8/9/19.  Patient is having difficulty swallowing, has noticed a change in his voice and has generalized throat pain. During this work up he was found to be tachycardic and hypotensive. In the ED he received 3 L of NS and was given adenosine IV x1 for SVT. He remained tachycardic. Patient denied SOB, CP, diaphoresis or dizziness. Patient claims compliance with home medications with no changes in medications recently. Denied any symptoms except voice hoarseness.        Hospital/ICU Course:  He arrived to the SICU with no acute complaints. EKG was obtained showing most likely atrial flutter with ventricular response of 150 bpm. MAPs remained in 70s. He was given an additional bolus of 1L LR. CXR was obtained that was negative for acute pathology. Labs were drawn and CT chest from 08/12 was interpreted. Esmolol bolus 50mg was administered with little improvement in tachycardia. Amiodarone bolus and gtt was started. Cultures pending.                 Past Medical History:   Diagnosis Date    Aortic stenosis 2018    Cancer 2014    Colon cancer     Coronary artery disease     Diabetes mellitus type I     since in his 30's    Heart murmur     Heel fracture     HTN (hypertension)     Hyperlipidemia LDL goal < 70     Insulin pump in place     MVP (mitral valve prolapse)     Stenosis of aortic and mitral valves        Past Surgical History:   Procedure Laterality  Date    BACK SURGERY      BENTALL PROCEDURE N/A 8/9/2019    Performed by [facility] at [facility] OR 2ND FLR    COLON SURGERY  2014    FASCIOTOMY, PLANTAR Right 10/4/2013    Performed by Ruma Robbins DPM at [facility] OR 1ST FLR    FASCIOTOMY, PLANTAR, ENDOSCOPIC Right 10/4/2013    Performed by Ruma [facility]bins DPM at [facility] OR 1ST FLR    FOOT TENDON SURGERY      Left heart cath Left 1/15/2018    Performed by Palomo Turner [facility] at [facility]    Replacement-valve-aortic N/A 8/9/2019    Performed by [facility] MD at [facility] OR 2ND FLR    right and left heart cath Bilateral 01/15/2018    Right heart cath Right 1/15/2018    Performed by [facility] at [facility] [place] [facility]    TRIGGER FINGER RELEASE      x 2       Review of patient's allergies indicates:  No Known Allergies    Family History     Problem Relation (Age of Onset)    Diabetes Father    HIV Brother    Heart attack Father    Heart disease Mother    Lung cancer Mother        Tobacco Use    Smoking status: Never Smoker    Smokeless tobacco: Current User     Types: Snuff    Tobacco comment: since he was 12 yrs old   Substance and Sexual Activity    Alcohol use: No     Comment: socially    Drug use: No    Sexual activity: Not on file      Review of Systems   Negative except aforementioned in HPI  Objective:     Vital Signs (Most Recent):  Temp: 98.1 °F (36.7 °C) (08/16/19 1900)  Pulse: (!) 145 (08/16/19 2100)  Resp: 18 (08/16/19 2100)  BP: 104/65 (08/16/19 2100)  SpO2: 100 % (08/16/19 2100) Vital Signs (24h Range):  Temp:  [98.1 °F (36.7 °C)-98.7 °F (37.1 °C)] 98.1 °F (36.7 °C)  Pulse:  [127-151] 145  Resp:  [10-30] 18  SpO2:  [98 %-100 %] 100 %  BP: ()/(46-84) 104/65     Weight: 102 kg (224 lb 13.9 oz)  Body mass index is 34.19 kg/m².      Intake/Output Summary (Last 24 hours) at 8/16/2019 2122  Last data filed at 8/16/2019 2016  Gross per 24 hour   Intake 100 ml   Output 900 ml   Net -800 ml       Physical Exam    Constitutional: He is oriented to person, place, and time. He appears well-developed. No distress.   HENT:   Head: Normocephalic and atraumatic.   Eyes: Pupils are equal, round, and reactive to light. EOM are normal.   Neck: Normal range of motion. Neck supple. No JVD present.   Cardiovascular: Intact distal pulses.   Regularly irregular w/ RVR.    Pulmonary/Chest: Effort normal and breath sounds normal. No stridor. No respiratory distress.   Abdominal: Soft. He exhibits no distension. There is no guarding.   Musculoskeletal: Normal range of motion. He exhibits no edema.   Neurological: He is alert and oriented to person, place, and time.   Skin: Skin is warm and dry. Capillary refill takes less than 2 seconds.   Psychiatric: He has a normal mood and affect.       Vents:       Lines/Drains/Airways     Peripheral Intravenous Line                 Peripheral IV - Single Lumen 08/16/19 0821 18 G Right Antecubital less than 1 day         Peripheral IV - Single Lumen 08/16/19 1218 18 G Right Forearm less than 1 day                Significant Labs:    CBC/Anemia Profile:  Recent Labs   Lab 08/16/19  0822 08/16/19  1749   WBC 20.30* 16.04*   HGB 9.9* 9.0*   HCT 30.9* 28.8*   * 333   MCV 90 94   RDW 14.7* 14.6*        Chemistries:  Recent Labs   Lab 08/16/19  0822 08/16/19  0906 08/16/19  1749   *  --  134*   K 4.7  --  4.2   CL 95*  --  104   CO2 15*  --  16*   BUN 20  --  15   CREATININE 0.9  --  0.8   CALCIUM 7.8*  --  8.1*   ALBUMIN 3.2*  --  2.4*   PROT 6.7  --  5.8*   BILITOT 1.7*  --  0.8   ALKPHOS 59  --  61   ALT 23  --  18   AST 23  --  21   MG  --  2.1 2.1   PHOS  --   --  3.3       ABGs:   Recent Labs   Lab 08/16/19 2005   PH 7.321*   PCO2 25.2*   HCO3 13.0*   POCSATURATED 98   BE -13     Blood Culture: No results for input(s): LABBLOO in the last 48 hours.  Coagulation:   Recent Labs   Lab 08/16/19  1830   INR 5.2*   APTT 38.7*     Lactic Acid:   Recent Labs   Lab 08/16/19  0822 08/16/19  1218  08/16/19  1749   LACTATE 1.7 1.4 0.9       Significant Imaging: I have reviewed all pertinent imaging results/findings within the past 24 hours.    Assessment/Plan:     Cardiac arrhythmia  Pt is a 52y/o WM w/ a PMHx of DM type 1, HTN, HLD, and CAD s/p Bentall procedure 08/9/2019 who was incidentally found to have aymptomatic tachycardia and hypotension in the ED.    Neuro:  Sedation: none  Pain: oxy PRNs  AAOx3    CV:   EKG suggestive of atrial flutter w/ RVR.   HD stable 100s/60s  Amio gtt   Withholding home anti-HTNs     Pulm:  CXR clear  ABG: metabolic acidosis w/ respiratory compensation. 50mEq HCO3 - x2   ABG q4H.   Sating well on 2L NC    Renal:  Monitoring UOP.   Cr stable.   mIVF and boluses off.   Trend BMP    FENGI:  No fluids  NPO  Famotidine BID    ID:  Pending cultures.  Vanc and zoysn IV    Heme:  INR 5.   Withholding all anticoagulation  CBC daily. H/H stable.    Endo:   Accuchecks q4.   Insulin pump in place.   SSI    PPx:   Famotidine BID  SCDs           Critical care was time spent personally by me on the following activities: development of treatment plan with patient or surrogate and bedside caregivers, discussions with consultants, evaluation of patient's response to treatment, examination of patient, ordering and performing treatments and interventions, ordering and review of laboratory studies, ordering and review of radiographic studies, pulse oximetry, re-evaluation of patient's condition.  This critical care time did not overlap with that of any other provider or involve time for any procedures.     Derek Redding MD  Critical Care - Surgery  [facility]

## 2019-08-17 NOTE — SUBJECTIVE & OBJECTIVE
Past Medical History:   Diagnosis Date    Aortic stenosis 2018    Cancer 2014    Colon cancer     Coronary artery disease     Diabetes mellitus type I     since in his 30's    Heart murmur     Heel fracture     HTN (hypertension)     Hyperlipidemia LDL goal < 70     Insulin pump in place     MVP (mitral valve prolapse)     Stenosis of aortic and mitral valves        Past Surgical History:   Procedure Laterality Date    BACK SURGERY      BENTALL PROCEDURE N/A 8/9/2019    Performed by [facility] at [facility] 2ND FLR    COLON SURGERY  2014    FASCIOTOMY, PLANTAR Right 10/4/2013    Performed by R[facility]bins DPM at [facility] OR 1ST FLR    FASCIOTOMY, PLANTAR, ENDOSCOPIC Right 10/4/2013    Performed by [facility] D[facility] at [facility] OR 1ST FLR    FOOT TENDON SURGERY      Left heart cath Left 1/15/2018    Performed by [facility] at [facility] CATH LAB    Replacement-valve-aortic N/A 8/9/2019    Performed by [facility] at [facility] OR 2ND FLR    right and left heart cath Bilateral 01/15/2018    Right heart cath Right 1/15/2018    Performed by [facility] at [facility] CATH LAB    TRIGGER FINGER RELEASE      x 2       Review of patient's allergies indicates:  No Known Allergies    No current facility-administered medications on file prior to encounter.      Current Outpatient Medications on File Prior to Encounter   Medication Sig    amLODIPine (NORVASC) 5 MG tablet Take 1 tablet (5 mg total) by mouth once daily.    aspirin (ECOTRIN) 325 MG EC tablet Take 1 tablet (325 mg total) by mouth once daily.    blood sugar diagnostic Strp 1 each by Misc.(Non-Drug; Combo Route) route 6 (six) times daily. Contour next strips. Pt needs contour jackelyn for compatibility w/ insulin pump (medtronic 670g). Necessity    blood-glucose sensor (GUARDIAN SENSOR 3) Apple 1 each by Misc.(Non-Drug; Combo Route) route once a week.    clotrimazole-betamethasone 1-0.05% (LOTRISONE) cream APPLY A SMALL AMOUNT  "TO AFFECTED AREA THREE TIMES DAILY UNTIL CLEAR    COMFORT EZ PEN NEEDLES 33 gauge x 3/16" Ndle USE AS DIRECTED with Levemir pens    docusate sodium (COLACE) 100 MG capsule Take 1 capsule (100 mg total) by mouth 2 (two) times daily.    furosemide (LASIX) 20 MG tablet Take one tablet by mouth twice daily for 7 days then decrease to once daily    gabapentin (NEURONTIN) 300 MG capsule Take 2 capsules (600 mg total) by mouth 3 (three) times daily.    glucagon, human recombinant, (GLUCAGON EMERGENCY KIT, HUMAN,) 1 mg SolR Inject 1 mg into the muscle as needed.    insulin detemir U-100 (LEVEMIR FLEXTOUCH U-100 INSULN) 100 unit/mL (3 mL) SubQ InPn pen Inject 48 Units into the skin every evening.    insulin regular 100 unit/mL Soln Use home settings Basal 0.4    Bolus/carb 12    ISF 20    JARDIANCE 25 mg Tab TAKE ONE TABLET BY MOUTH DAILY FOR DIABETES    lisinopril (PRINIVIL,ZESTRIL) 5 MG tablet Take 1 tablet (5 mg total) by mouth once daily.    metFORMIN (GLUCOPHAGE-XR) 500 MG 24 hr tablet Take 1 tablet (500 mg total) by mouth daily with breakfast.    metoprolol tartrate (LOPRESSOR) 25 MG tablet Take 1 tablet (25 mg total) by mouth 2 (two) times daily.    NOVOLOG U-100 INSULIN ASPART 100 unit/mL injection INJECT 180 UNITS SUBCUTANEOUSLY EVERY ONE AND A HALF DAYS    oxyCODONE (ROXICODONE) 5 MG immediate release tablet Take 1 tablet (5 mg total) by mouth every 4 (four) hours as needed.    pantoprazole (PROTONIX) 40 MG tablet Take 1 tablet (40 mg total) by mouth before breakfast.    potassium chloride SA (K-DUR,KLOR-CON) 20 MEQ tablet Take one tablet by mouth twice daily for 7 days then decrease to once daily    simvastatin (ZOCOR) 20 MG tablet Take 1 tablet (20 mg total) by mouth every evening.    warfarin (COUMADIN) 3 MG tablet Take 1 tablet (3 mg total) by mouth Daily.     Family History     Problem Relation (Age of Onset)    Diabetes Father    HIV Brother    Heart attack Father    Heart disease Mother    " Lung cancer Mother        Tobacco Use    Smoking status: Never Smoker    Smokeless tobacco: Current User     Types: Snuff    Tobacco comment: since he was 12 yrs old   Substance and Sexual Activity    Alcohol use: No     Comment: socially    Drug use: No    Sexual activity: Not on file     Review of Systems   Constitution: Negative for chills and fever.   HENT: Positive for hoarse voice.    Cardiovascular: Negative for chest pain, claudication, dyspnea on exertion, irregular heartbeat, leg swelling, near-syncope, orthopnea, palpitations, paroxysmal nocturnal dyspnea and syncope.   Respiratory: Negative for cough and shortness of breath.    Musculoskeletal: Negative for joint pain and joint swelling.   Gastrointestinal: Negative for abdominal pain, nausea and vomiting.   Neurological: Negative for focal weakness and headaches.   All other systems reviewed and are negative.    Objective:     Vital Signs (Most Recent):  Temp: 98.5 °F (36.9 °C) (08/17/19 1100)  Pulse: 110 (08/17/19 1400)  Resp: (!) 24 (08/17/19 1400)  BP: 109/75 (08/17/19 1400)  SpO2: 100 % (08/17/19 1400) Vital Signs (24h Range):  Temp:  [97.8 °F (36.6 °C)-98.5 °F (36.9 °C)] 98.5 °F (36.9 °C)  Pulse:  [106-150] 110  Resp:  [7-34] 24  SpO2:  [97 %-100 %] 100 %  BP: ()/() 109/75     Weight: 103.9 kg (229 lb)  Body mass index is 34.82 kg/m².    SpO2: 100 %  O2 Device (Oxygen Therapy): nasal cannula      Intake/Output Summary (Last 24 hours) at 8/17/2019 1748  Last data filed at 8/17/2019 1300  Gross per 24 hour   Intake 2069 ml   Output 3725 ml   Net -1656 ml       Lines/Drains/Airways     Peripheral Intravenous Line                 Peripheral IV - Single Lumen 08/16/19 0821 18 G Right Antecubital 1 day         Peripheral IV - Single Lumen 08/16/19 1218 18 G Right Forearm 1 day                Physical Exam   Constitutional: He appears well-developed and well-nourished. No distress.   HENT:   Head: Normocephalic and atraumatic.   Right  Ear: External ear normal.   Left Ear: External ear normal.   Mouth/Throat: No oropharyngeal exudate.   Eyes: Pupils are equal, round, and reactive to light. Conjunctivae and EOM are normal. Right eye exhibits no discharge. Left eye exhibits no discharge. No scleral icterus.   Neck: Normal range of motion. Neck supple.   Cardiovascular: Normal rate, S1 normal, S2 normal, normal heart sounds and intact distal pulses. An irregularly irregular rhythm present.  No extrasystoles are present. Exam reveals no gallop, no S3, no S4, no distant heart sounds, no friction rub and no opening snap.   No murmur heard.  Pulses:       Carotid pulses are 2+ on the right side, and 2+ on the left side.       Radial pulses are 2+ on the right side, and 2+ on the left side.        Femoral pulses are 2+ on the right side, and 2+ on the left side.       Dorsalis pedis pulses are 2+ on the right side, and 2+ on the left side.        Posterior tibial pulses are 2+ on the right side, and 2+ on the left side.   Pulmonary/Chest: Effort normal and breath sounds normal. No respiratory distress. He has no wheezes. He has no rales. He exhibits no tenderness.   Healing incision site. No evidence of infection.   Abdominal: Soft. Bowel sounds are normal. He exhibits no distension. There is no tenderness.   Musculoskeletal: Normal range of motion. He exhibits no edema or deformity.   Neurological: He is alert.   Skin: Skin is warm and dry. No rash noted. He is not diaphoretic. No erythema.   Nursing note and vitals reviewed.      Significant Labs: All pertinent lab results from the last 24 hours have been reviewed.    Significant Imaging:     CLINICAL HISTORY:  tachycardia;    TECHNIQUE:  Single frontal view of the chest was performed.    COMPARISON:  CT thorax same day and chest radiograph 08/12/2019    FINDINGS:  Monitoring leads overlie the chest.  Cardiomediastinal silhouette is midline and prominent grossly similar to prior noting postoperative  changes of prior sternotomy and cardiac valve repair.  Previous mediastinal drain appears to been removed.  There are small bilateral pleural effusions.  No large consolidation or pneumothorax.  No acute osseous process seen.    ECG: . Course afib versus flutter with variable block. Nonspecific ST-T changes.

## 2019-08-17 NOTE — TREATMENT PLAN
SICU Staff Addendum --> link to resident progress note from 8/17/19  I have reviewed and concur with the resident's history, physical, assessment, and plan.  I have personally interviewed and examined the patient at bedside.  See below for any additional findings.    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 Lactate low, blood pressure stable despite poor rate control.  - Atrial flutter rate uncontrolled on amiodarone infusion; metoprolol started this morning; seek EP consult for further mgmt recommendations  - No further studies/interventions planned; OK to eat  - May hold anticoagulation if patient is ambulating regularly  - D/C abx when outside culture results confirmed to be negative  - appreciate ENT recs re: dysphagia/dysphonia; Can start steroids after cultures NGTD    35 minutes of critical care time was spent personally by me on the following activities: development of treatment plan with patient or surrogate and bedside caregivers, discussions with consultants, evaluation of patient's response to treatment, examination of patient, ordering and performing treatments and interventions, ordering and review of laboratory studies, ordering and review of radiographic studies, pulse oximetry, re-evaluation of patient's condition.  This critical care time did not overlap with that of any other provider or involve time for any procedures.    Jose David Hanks MD  Anesthesia Critical Care  [facility] [phone]

## 2019-08-17 NOTE — PROGRESS NOTES
Pharmacokinetic Initial Assessment: IV Vancomycin    Assessment/Plan:    Received intravenous vancomycin 2000 mg in ED on 8/16 at 0938.  Start maintenance dose of vancomycin 1750mg IV every 12 hours.  Desired empiric serum trough concentration is 15 to 20 mcg/mL  Draw vancomycin trough level 30 min prior to fourth dose on 8/17 at approximately 2230.    Pharmacy will continue to follow and monitor vancomycin.      Please contact pharmacy at extension [phone] with any questions regarding this assessment.     Thank you for the consult,   Kasia ChaviraD     Patient brief summary:  Cj Barnes is a 53 y.o. male initiated on antimicrobial therapy with IV Vancomycin for treatment of suspected bacteremia    Drug Allergies:   Review of patient's allergies indicates:  No Known Allergies    Actual Body Weight:   102 kg    Renal Function:   Estimated Creatinine Clearance: 120.7 mL/min (based on SCr of 0.8 mg/dL).,         CBC (last 72 hours):  Recent Labs   Lab Result Units 08/14/19  0340 08/16/19  0822 08/16/19  1749   WBC K/uL 10.29 20.30* 16.04*   Hemoglobin g/dL 9.0* 9.9* 9.0*   Hematocrit % 27.1* 30.9* 28.8*   Platelets K/uL 199 366* 333   Gran% % 74.2* 70.0 80.6*   Lymph% % 13.1* 6.0* 8.5*   Mono% % 10.2 8.0 6.5   Eosinophil% % 0.9 0.0 0.5   Basophil% % 0.4 0.0 0.3   Differential Method  Automated Manual Automated       Metabolic Panel (last 72 hours):  Recent Labs   Lab Result Units 08/14/19  0340 08/16/19  0822 08/16/19  0906 08/16/19  1204 08/16/19  1749   Sodium mmol/L 137 128*  --   --  134*   Potassium mmol/L 4.1 4.7  --   --  4.2   Chloride mmol/L 98 95*  --   --  104   CO2 mmol/L 30* 15*  --   --  16*   Glucose mg/dL 153* 119*  --   --  74   Glucose, UA   --   --   --  3+*  --    BUN, Bld mg/dL 17 20  --   --  15   Creatinine mg/dL 0.7 0.9  --   --  0.8   Albumin g/dL  --  3.2*  --   --  2.4*   Total Bilirubin mg/dL  --  1.7*  --   --  0.8   Alkaline Phosphatase U/L  --  59  --   --  61   AST U/L  --   23  --   --  21   ALT U/L  --  23  --   --  18   Magnesium mg/dL 2.0  --  2.1  --  2.1   Phosphorus mg/dL 2.4*  --   --   --  3.3       Drug levels (last 3 results):  No results for input(s): VANCOMYCINRA, VANCOMYCINPE, VANCOMYCINTR in the last 72 hours.    Microbiologic Results:  Microbiology Results (last 7 days)     ** No results found for the last 168 hours. **

## 2019-08-17 NOTE — PLAN OF CARE
Problem: Adult Inpatient Plan of Care  Goal: Plan of Care Review  Dr. Knight at the bedside to the discuss the plan of care in regards to ENT consult, EP, and continuation of abx therapy.  All questions and concerns were answered and addressed.  Patient verbalized understanding.    Problem: Diabetes Comorbidity  Goal: Blood Glucose Level Within Desired Range  Outcome: Ongoing (interventions implemented as appropriate)  Continuation of blood glucose monitoring. Patient currently wearing a glucose pump.  Patient given results from capillary glucose to enter into his glucose pump.  Patient verbalized understanding to plan of care    Problem: Fall Injury Risk  Goal: Absence of Fall and Fall-Related Injury  Outcome: Ongoing (interventions implemented as appropriate)  Patient instructed to use the call light that is within reach when he feel the need to ambulate.  Patient verbalized understanding to plan of care

## 2019-08-17 NOTE — HPI
53-year-old white male presents to the ER complaining of dry tongue, sore throat, hoarse voice and decrease appetite since last Friday.  Patient is status post Bentall w/ aortic valve replacement by  on 8/9/19.  Patient is having difficulty swallowing, has noticed a change in his voice and has generalized throat pain. During this work up he was found to be tachycardic and hypotensive. In the ED he received 3 L of NS and was given adenosine IV x1 for SVT. He remained tachycardic. Patient denied SOB, CP, diaphoresis or dizziness. Patient claims compliance with home medications with no changes in medications recently. Denied any symptoms except voice hoarseness.

## 2019-08-17 NOTE — PROGRESS NOTES
[facility]  Critical Care - Surgery  Progress Note    Patient Name: Cj Barnes  MRN: [id number]  Admission Date: 8/16/2019  Hospital Length of Stay: 1 days  Code Status: Full Code  Attending Provider: Ryan Knight MD  Primary Care Provider: Garry Stover MD   Principal Problem: <principal problem not specified>    Subjective:     Hospital/ICU Course:  He arrived to the SICU with no acute complaints. EKG was obtained showing most likely atrial flutter with ventricular response of 150 bpm. MAPs remained in 70s. He was given an additional bolus of 1L LR. CXR was obtained that was negative for acute pathology. Labs were drawn and CT chest from 08/12 was interpreted. Esmolol bolus 50mg was administered with little improvement in tachycardia. Amiodarone bolus and gtt was started. Cultures pending.            Interval History/Significant Events: Clinically stable overnight. Remains in aflutter/afibb, blood pressures stable.     Objective:     Vital Signs (Most Recent):  Temp: 97.8 °F (36.6 °C) (08/17/19 0700)  Pulse: (!) 143 (08/17/19 1114)  Resp: 17 (08/17/19 1114)  BP: 115/71 (08/17/19 1045)  SpO2: 100 % (08/17/19 1114) Vital Signs (24h Range):  Temp:  [97.8 °F (36.6 °C)-98.5 °F (36.9 °C)] 97.8 °F (36.6 °C)  Pulse:  [114-151] 143  Resp:  [7-34] 17  SpO2:  [99 %-100 %] 100 %  BP: ()/() 115/71     Weight: 104 kg (229 lb 4.5 oz)  Body mass index is 34.86 kg/m².      Intake/Output Summary (Last 24 hours) at 8/17/2019 1309  Last data filed at 8/17/2019 0800  Gross per 24 hour   Intake 2069 ml   Output 2750 ml   Net -681 ml       Physical Exam  Constitutional: He is oriented to person, place, and time. He appears well-developed. No distress.   HENT:   Head: Normocephalic and atraumatic.   Eyes: Pupils are equal, round, and reactive to light. EOM are normal.   Neck: Normal range of motion. Neck supple. No JVD present.  No stridor  Cardiovascular: Intact distal pulses.   Irregularly  irregular  Pulmonary/Chest: Effort normal and breath sounds normal. No stridor. No respiratory distress.   Abdominal: Soft. He exhibits no distension. There is no guarding.   Musculoskeletal: Normal range of motion. He exhibits no edema.   Neurological: He is alert and oriented to person, place, and time.   Skin: Skin is warm and dry. Capillary refill takes less than 2 seconds.   Psychiatric: He has a normal mood and affect.     Vents:       Lines/Drains/Airways     Peripheral Intravenous Line                 Peripheral IV - Single Lumen 08/16/19 0821 18 G Right Antecubital 1 day         Peripheral IV - Single Lumen 08/16/19 1218 18 G Right Forearm 1 day                Significant Labs:    CBC/Anemia Profile:  Recent Labs   Lab 08/16/19 0822 08/16/19 1749 08/17/19  0250   WBC 20.30* 16.04* 12.52   HGB 9.9* 9.0* 8.0*   HCT 30.9* 28.8* 26.0*   * 333 287   MCV 90 94 94   RDW 14.7* 14.6* 14.9*        Chemistries:  Recent Labs   Lab 08/16/19 0822 08/16/19  0906 08/16/19 1749 08/17/19  0250   *  --  134* 137   K 4.7  --  4.2 3.9   CL 95*  --  104 104   CO2 15*  --  16* 17*   BUN 20  --  15 13   CREATININE 0.9  --  0.8 0.7   CALCIUM 7.8*  --  8.1* 7.8*   ALBUMIN 3.2*  --  2.4*  --    PROT 6.7  --  5.8*  --    BILITOT 1.7*  --  0.8  --    ALKPHOS 59  --  61  --    ALT 23  --  18  --    AST 23  --  21  --    MG  --  2.1 2.1 2.0   PHOS  --   --  3.3 3.0       All pertinent labs within the past 24 hours have been reviewed.    Significant Imaging:  I have reviewed all pertinent imaging results/findings within the past 24 hours.    Assessment/Plan:     Cardiac arrhythmia  Pt is a 52y/o WM w/ a PMHx of DM type 1, HTN, HLD, and CAD s/p Bentall procedure 08/9/2019 who was incidentally found to have aymptomatic tachycardia and hypotension in the ED.    Plan: Can discontinue antibiotics when outside cultures result with no growth. Consulted electrophysiology for recommenations regarding tachyarrythmia. Beta blocker  continued.  Neuro:  Sedation: none  Pain: oxy PRNs  AAOx3    CV:   EKG suggestive of atrial flutter w/ RVR.   Blood pressure stables  Amio gtt   Continue home metoprolol    Pulm:  CXR clear  ABG PRN  Sating well on 2L NC   ENT consulted, post-extubation granulomas. Can start prednisone 5mg BID tomorrow when sepsis is ruled out.    Renal:  Monitoring UOP.   Cr stable.   Trend BMP    FENGI:  Cardiac Diet as tolerated, pending SPLP evaluation   Dual anti-acid. Famotidine + PPI    ID:  Pending cultures.  Vanc and zoysn IV    Heme:  INR 5.   Withholding all anticoagulation  CBC daily. H/H stable.    Endo:   Accuchecks q4.   Insulin pump in place.   SSI    PPx:   Famotidine BID  SCDs             Critical Care Daily Checklist:    A: Awake: RASS Goal/Actual Goal:    Actual: Macias Agitation Sedation Scale (RASS): Alert and calm   B: Spontaneous Breathing Trial Performed?     C: SAT & SBT Coordinated?  Extubated                      D: Delirium: CAM-ICU     E: Early Mobility Performed? Yes   F: Feeding Goal:    Status:     Current Diet Order   Procedures    Diet NPO      AS: Analgesia/Sedation Analgesics prn   T: Thromboembolic Prophylaxis Lovenox   H: HOB > 300 Yes   U: Stress Ulcer Prophylaxis (if needed) Famotidine   G: Glucose Control SSI   B: Bowel Function     I: Indwelling Catheter (Lines & Saavedra) Necessity none    D: De-escalation of Antimicrobials/Pharmacotherapies Pending cultures    Plan for the day/ETD Consult electrophysiology    Code Status:  Family/Goals of Care: Full Code         Critical secondary to cardiogenic shock     Critical care was time spent personally by me on the following activities: development of treatment plan with patient or surrogate and bedside caregivers, discussions with consultants, evaluation of patient's response to treatment, examination of patient, ordering and performing treatments and interventions, ordering and review of laboratory studies, ordering and review of radiographic  studies, pulse oximetry, re-evaluation of patient's condition.  This critical care time did not overlap with that of any other provider or involve time for any procedures.     Sj Kennedy MD  Critical Care - Surgery  [facility]

## 2019-08-17 NOTE — CONSULTS
[facility]  Cardiology  Consult Note    Patient Name: Cj Barnes  MRN: [id number]  Admission Date: 8/16/2019  Hospital Length of Stay: 1 days  Code Status: Full Code   Attending Provider: Ryan Knight MD   Consulting Provider: Michael Hollingsworth MD  Primary Care Physician: Garry Stover MD  Principal Problem:<principal problem not specified>    Patient information was obtained from patient and ER records.     Inpatient consult to Electrophysiology  Consult performed by: Michael Hollingsworth MD  Consult ordered by: Sj Kennedy MD        Subjective:     Chief Complaint:  dysphagia     HPI:   53-year-old white male presents to the ER complaining of dry tongue, sore throat, hoarse voice and decrease appetite since last Friday.  Patient is status post Bentall w/ aortic valve replacement by  on 8/9/19.  Patient is having difficulty swallowing, has noticed a change in his voice and has generalized throat pain. During this work up he was found to be tachycardic and hypotensive. In the ED he received 3 L of NS and was given adenosine IV x1 for SVT. He remained tachycardic. Patient denied SOB, CP, diaphoresis or dizziness. Patient claims compliance with home medications with no changes in medications recently. Denied any symptoms except voice hoarseness.      Since admit patient has had narrow complex tachyarryhtmia that appears irregular. Started on amiodarone gtt and given lopressor today. HR up to 150. Cardiology consulted to assist with management. Of note, TTE with new depressed EF 40%. Patient denies CP, dyspnea, palpitations, presyncope or syncope.       TTE 8/17  · Mildly decreased left ventricular systolic function. The estimated ejection fraction is 40%  · Left ventricular diastolic dysfunction.  · There is a 23 mm bileaflet tilting disc mechanical aortic valve present. Prosthetic aortic valve is normal.  · Prosthetic graft present in the ascending aorta.  · Fluid  collection partially visualized on image 8,9,11 no appreciable flow  · Mild tricuspid regurgitation.  · Mildly reduced right ventricular systolic function.  · Mild right ventricular enlargement.  · Mild right atrial enlargement.  · Septal wall has abnormal motion.  · The estimated PA systolic pressure is 29 mm Hg  Intermediate central venous pressure (8 mm Hg).    Past Medical History:   Diagnosis Date    Aortic stenosis 2018    Cancer 2014    Colon cancer     Coronary artery disease     Diabetes mellitus type I     since in his 30's    Heart murmur     Heel fracture     HTN (hypertension)     Hyperlipidemia LDL goal < 70     Insulin pump in place     MVP (mitral valve prolapse)     Stenosis of aortic and mitral valves        Past Surgical History:   Procedure Laterality Date    BACK SURGERY      BENTALL PROCEDURE N/A 8/9/2019    Performed by [facility] at [facility] OR 2ND FLR    COLON SURGERY  2014    FASCIOTOMY, PLANTAR Right 10/4/2013    Performed by Ruma Robbins DPM at [facility] OR 1ST FLR    FASCIOTOMY, PLANTAR, ENDOSCOPIC Right 10/4/2013    Performed by Ruma Robbins DPM at [facility] OR 1ST FLR    FOOT TENDON SURGERY      Left heart cath Left 1/15/2018    Performed by Palomo Turner MD at [facility] CATH LAB    Replacement-valve-aortic N/A 8/9/2019    Performed by Ryan Knight MD at [facility] OR 2ND FLR    right and left heart cath Bilateral 01/15/2018    Right heart cath Right 1/15/2018    Performed by Palomo Turner MD at [facility] CATH LAB    TRIGGER FINGER RELEASE      x 2       Review of patient's allergies indicates:  No Known Allergies    No current facility-administered medications on file prior to encounter.      Current Outpatient Medications on File Prior to Encounter   Medication Sig    amLODIPine (NORVASC) 5 MG tablet Take 1 tablet (5 mg total) by mouth once daily.    aspirin (ECOTRIN) 325 MG EC tablet Take 1 tablet (325 mg total) by mouth once daily.    blood sugar  "diagnostic Strp 1 each by Misc.(Non-Drug; Combo Route) route 6 (six) times daily. Contour next strips. Pt needs contour jackelyn for compatibility w/ insulin pump (medtronic 670g). Necessity    blood-glucose sensor (GUARDIAN SENSOR 3) Apple 1 each by Misc.(Non-Drug; Combo Route) route once a week.    clotrimazole-betamethasone 1-0.05% (LOTRISONE) cream APPLY A SMALL AMOUNT TO AFFECTED AREA THREE TIMES DAILY UNTIL CLEAR    COMFORT EZ PEN NEEDLES 33 gauge x 3/16" Ndle USE AS DIRECTED with Levemir pens    docusate sodium (COLACE) 100 MG capsule Take 1 capsule (100 mg total) by mouth 2 (two) times daily.    furosemide (LASIX) 20 MG tablet Take one tablet by mouth twice daily for 7 days then decrease to once daily    gabapentin (NEURONTIN) 300 MG capsule Take 2 capsules (600 mg total) by mouth 3 (three) times daily.    glucagon, human recombinant, (GLUCAGON EMERGENCY KIT, HUMAN,) 1 mg SolR Inject 1 mg into the muscle as needed.    insulin detemir U-100 (LEVEMIR FLEXTOUCH U-100 INSULN) 100 unit/mL (3 mL) SubQ InPn pen Inject 48 Units into the skin every evening.    insulin regular 100 unit/mL Soln Use home settings Basal 0.4    Bolus/carb 12    ISF 20    JARDIANCE 25 mg Tab TAKE ONE TABLET BY MOUTH DAILY FOR DIABETES    lisinopril (PRINIVIL,ZESTRIL) 5 MG tablet Take 1 tablet (5 mg total) by mouth once daily.    metFORMIN (GLUCOPHAGE-XR) 500 MG 24 hr tablet Take 1 tablet (500 mg total) by mouth daily with breakfast.    metoprolol tartrate (LOPRESSOR) 25 MG tablet Take 1 tablet (25 mg total) by mouth 2 (two) times daily.    NOVOLOG U-100 INSULIN ASPART 100 unit/mL injection INJECT 180 UNITS SUBCUTANEOUSLY EVERY ONE AND A HALF DAYS    oxyCODONE (ROXICODONE) 5 MG immediate release tablet Take 1 tablet (5 mg total) by mouth every 4 (four) hours as needed.    pantoprazole (PROTONIX) 40 MG tablet Take 1 tablet (40 mg total) by mouth before breakfast.    potassium chloride SA (K-DUR,KLOR-CON) 20 MEQ tablet Take one " tablet by mouth twice daily for 7 days then decrease to once daily    simvastatin (ZOCOR) 20 MG tablet Take 1 tablet (20 mg total) by mouth every evening.    warfarin (COUMADIN) 3 MG tablet Take 1 tablet (3 mg total) by mouth Daily.     Family History     Problem Relation (Age of Onset)    Diabetes Father    HIV Brother    Heart attack Father    Heart disease Mother    Lung cancer Mother        Tobacco Use    Smoking status: Never Smoker    Smokeless tobacco: Current User     Types: Snuff    Tobacco comment: since he was 12 yrs old   Substance and Sexual Activity    Alcohol use: No     Comment: socially    Drug use: No    Sexual activity: Not on file     Review of Systems   Constitution: Negative for chills and fever.   HENT: Positive for hoarse voice.    Cardiovascular: Negative for chest pain, claudication, dyspnea on exertion, irregular heartbeat, leg swelling, near-syncope, orthopnea, palpitations, paroxysmal nocturnal dyspnea and syncope.   Respiratory: Negative for cough and shortness of breath.    Musculoskeletal: Negative for joint pain and joint swelling.   Gastrointestinal: Negative for abdominal pain, nausea and vomiting.   Neurological: Negative for focal weakness and headaches.   All other systems reviewed and are negative.    Objective:     Vital Signs (Most Recent):  Temp: 98.5 °F (36.9 °C) (08/17/19 1100)  Pulse: 110 (08/17/19 1400)  Resp: (!) 24 (08/17/19 1400)  BP: 109/75 (08/17/19 1400)  SpO2: 100 % (08/17/19 1400) Vital Signs (24h Range):  Temp:  [97.8 °F (36.6 °C)-98.5 °F (36.9 °C)] 98.5 °F (36.9 °C)  Pulse:  [106-150] 110  Resp:  [7-34] 24  SpO2:  [97 %-100 %] 100 %  BP: ()/() 109/75     Weight: 103.9 kg (229 lb)  Body mass index is 34.82 kg/m².    SpO2: 100 %  O2 Device (Oxygen Therapy): nasal cannula      Intake/Output Summary (Last 24 hours) at 8/17/2019 2824  Last data filed at 8/17/2019 1300  Gross per 24 hour   Intake 2069 ml   Output 3725 ml   Net -1656 ml        Lines/Drains/Airways     Peripheral Intravenous Line                 Peripheral IV - Single Lumen 08/16/19 0821 18 G Right Antecubital 1 day         Peripheral IV - Single Lumen 08/16/19 1218 18 G Right Forearm 1 day                Physical Exam   Constitutional: He appears well-developed and well-nourished. No distress.   HENT:   Head: Normocephalic and atraumatic.   Right Ear: External ear normal.   Left Ear: External ear normal.   Mouth/Throat: No oropharyngeal exudate.   Eyes: Pupils are equal, round, and reactive to light. Conjunctivae and EOM are normal. Right eye exhibits no discharge. Left eye exhibits no discharge. No scleral icterus.   Neck: Normal range of motion. Neck supple.   Cardiovascular: Normal rate, S1 normal, S2 normal, normal heart sounds and intact distal pulses. An irregularly irregular rhythm present.  No extrasystoles are present. Exam reveals no gallop, no S3, no S4, no distant heart sounds, no friction rub and no opening snap.   No murmur heard.  Pulses:       Carotid pulses are 2+ on the right side, and 2+ on the left side.       Radial pulses are 2+ on the right side, and 2+ on the left side.        Femoral pulses are 2+ on the right side, and 2+ on the left side.       Dorsalis pedis pulses are 2+ on the right side, and 2+ on the left side.        Posterior tibial pulses are 2+ on the right side, and 2+ on the left side.   Pulmonary/Chest: Effort normal and breath sounds normal. No respiratory distress. He has no wheezes. He has no rales. He exhibits no tenderness.   Healing incision site. No evidence of infection.   Abdominal: Soft. Bowel sounds are normal. He exhibits no distension. There is no tenderness.   Musculoskeletal: Normal range of motion. He exhibits no edema or deformity.   Neurological: He is alert.   Skin: Skin is warm and dry. No rash noted. He is not diaphoretic. No erythema.   Nursing note and vitals reviewed.      Significant Labs: All pertinent lab results from  the last 24 hours have been reviewed.    Significant Imaging:     CLINICAL HISTORY:  tachycardia;    TECHNIQUE:  Single frontal view of the chest was performed.    COMPARISON:  CT thorax same day and chest radiograph 08/12/2019    FINDINGS:  Monitoring leads overlie the chest.  Cardiomediastinal silhouette is midline and prominent grossly similar to prior noting postoperative changes of prior sternotomy and cardiac valve repair.  Previous mediastinal drain appears to been removed.  There are small bilateral pleural effusions.  No large consolidation or pneumothorax.  No acute osseous process seen.    ECG: . Course afib versus flutter with variable block. Nonspecific ST-T changes.     Assessment and Plan:     Atrial fibrillation with RVR  CHADS-VASc 4 (4.8% annual CVA risk). S/p Bentall procedure on 8/9. Newly diagnosed afib. Started on amiodarone gtt and oral metoprolol with HR in the low 100s at this time. ECG with likely course afib, but other consideration would be aflutter with variable block. TSH checked 3 months prior and normal. Electrolytes stable. TTE with newly depressed EF 40% (?tachycardia induced CM). No symptoms currently. On coumadin with supratherapeutic INR (held at this time).   - Continue amiodarone gtt at 0.5 mg/min for now  - Continue lopressor 25 mg PO BID  - Restart anticoagulation when appropriate        VTE Risk Mitigation (From admission, onward)        Ordered     Place sequential compression device  Until discontinued      08/16/19 1819     IP VTE HIGH RISK PATIENT  Once      08/16/19 1819          Thank you for your consult. I will follow-up with patient. Please contact us if you have any additional questions.    Michael Hollingsworth MD  Cardiology   [facility]

## 2019-08-17 NOTE — CARE UPDATE
Mr. Barnes is a 53 y.o. M with h/o bicuspid aortic valve s/p aortic root replacement on 8/9/19 who presents today for 1-week h/o sore throat and cough. There is moderate amount of complex fluid at the base of the aortic root.     Review of stat echo ordered by primary team:    EF 30-40% (reduced due to tachycardia)  RV appears mildly enlarged  Placement of recently placed aortic valve difficult to visualize however no significant AI  Mitral valve okay  IVC not visualized    Formal TTE report to finalize in the morning.    A/P: Echo suggestive of largely normal findings except for the tachycardia induced cardiomyopathy. Recommend formal 2D ECHO in the morning, especially with better rate control.   If concern for perivalvular abscess, will need to order CAMI to evaluate AV root.    Results discussed with Dr. Shun Carlson.    Shanthi Tracy MD  Cardiology, PGY IV  [phone]

## 2019-08-17 NOTE — CONSULTS
Otolaryngology-Head & Neck Surgery           History & Physical    CHIEF COMPLAINT:  Dysphagia/dysphonia following       HISTORY OF PRESENT ILLNESS:  Cj Barnes is a 53 y.o. male 1 week s/p Bentall procedure admitted as a transfer from OSH for hypotension and tachycardia (concerning for pericardial effusion/SOB/sepsis). Reports dysphonia and dysphagia ever since surgery; Otolaryngology consulted for evaluation. Reports that his voice sounds different. Has phonatory dyspnea but uncertain if this is from his lungs or voice. No hemoptysis. No recent trauma outside of intubation jack-postoperatively. States that foods go down but has some difficulty getting dry foods down, needing liquids after. Has not seen SLP. No MBSS yet. No prior ENT/HNS type surgery, no other facial/neck surgery. Prior to surgery, he never had any of the above dysphonia symptoms. Does reports longterm prior dysphagia symptoms.       REVIEW OF SYSTEMS: 12 point ROS reviewed and pertinent discussed above    MEDICATIONS:   Reviewed and/or reconciled in EPIC    ALLERGIES:  Reviewed and/or reconciled in [facility]    PAST MEDICAL/SURGICAL HISTORY:   Past Medical History:   Diagnosis Date    Aortic stenosis 2018    Cancer 2014    Colon cancer     Coronary artery disease     Diabetes mellitus type I     since in his 30's    Heart murmur     Heel fracture     HTN (hypertension)     Hyperlipidemia LDL goal < 70     Insulin pump in place     MVP (mitral valve prolapse)     Stenosis of aortic and mitral valves       Past Surgical History:   Procedure Laterality Date    BACK SURGERY      BENTALL PROCEDURE N/A 8/9/2019    Performed by Ryan Knight MD at [facility] OR 2ND FLR    COLON SURGERY  2014    FASCIOTOMY, PLANTAR Right 10/4/2013    Performed by Ruma Robbins DPM at [facility] OR 1ST FLR    FASCIOTOMY, PLANTAR, ENDOSCOPIC Right 10/4/2013    Performed by Ruma Robbins DPM at [facility] OR 1ST FLR    FOOT TENDON SURGERY      Left  heart cath Left 1/15/2018    Performed by Palomo Turner [facility]    Replacement-valve-aortic N/A 8/9/2019    [facility]    right and left heart cath Bilateral 01/15/2018    Right heart cath Right 1/15/2018    Performed by Palomo Turner [facility] at [facility] CATH LAB    TRIGGER FINGER RELEASE      x 2       FAMILY HISTORY:    Family History   Problem Relation Age of Onset    Heart disease Mother     Lung cancer Mother     Heart attack Father     Diabetes Father     HIV Brother        SOCIAL HISTORY:    Social History     Socioeconomic History    Marital status:      Spouse name: Not on file    Number of children: Not on file    Years of education: Not on file    Highest education level: Not on file   Occupational History    Not on file   Social Needs    Financial resource strain: Not on file    Food insecurity:     Worry: Not on file     Inability: Not on file    Transportation needs:     Medical: Not on file     Non-medical: Not on file   Tobacco Use    Smoking status: Never Smoker    Smokeless tobacco: Current User     Types: Snuff    Tobacco comment: since he was 12 yrs old   Substance and Sexual Activity    Alcohol use: No     Comment: socially    Drug use: No    Sexual activity: Not on file   Lifestyle    Physical activity:     Days per week: Not on file     Minutes per session: Not on file    Stress: Not on file   Relationships    Social connections:     Talks on phone: Not on file     Gets together: Not on file     Attends [employer] service: Not on file     Active member of club or organization: Not on file     Attends meetings of clubs or organizations: Not on file     Relationship status: Not on file   Other Topics Concern    Not on file   Social History Narrative        2 grown kids    Delivers seafood       PHYSICAL EXAM:  VITAL SIGNS:   Vitals:    08/17/19 0800 08/17/19 0815 08/17/19 0830 08/17/19 1045   BP: (!)  160/59  (!) 155/61 115/71   BP Location: Left arm      Patient Position: Lying      Pulse: (!) 142 (!) 145 (!) 132 (!) 150   Resp: (!) 22 12 20    Temp:       TempSrc:       SpO2: 100% 100% 100%    Weight:       Height:         GENERAL:  NAD but appears chronically sick  VOICE: Weak slighty breathy dysphonia  HEAD: Atraumatic, Normocephalic, no lesions  FACE: Symmetric, HB 1/6 bilat, no lesions, no obvious sinus tenderness, salivary glands nontender  EYES: Sclera anicteric, PERRLA, EOMI  NOSE: Dorsum straight, septum midline, normal turbinate size, normal mucosa  RIGHT EAR: Pinna and external ear appears normal  LEFT EAR: Pinna and external ear appears normal  HEARING: Grossly intact  ORAL CAVITY: Healthy mucosa, no masses or lesions including lips, gums, floor of mouth, palate, or tongue.  OROPHARYNX: Tonsils 2+, palate intact, normal pharyngeal wall movement  NECK: Supple, no palpable nodes, no masses, trachea midline, no thyroid masses  Cardiovascular system: Pulse intact, s/p Bentall procedure  RESPIRATORY:  NC in place. Does have phonatory dyspnea maintaining extending conversation.  EXTREMITIES:  2+ DP pulses b/l, no edema.  SKIN:  Warm, no lesions on exposed skin.  NEUROMUSCULAR:  Cranial nerves II-XII grossly intact.    PSYCH:  Patient is alert and oriented to person, time, place.     Nasal/Nasopharyngo/Laryn/Hypopharyngoscopy Procedures    Procedure:  Diagnostic nasal, nasopharyngoscopy, laryngoscopy and hypopharyngoscopy.    Routine preparation with local atomizer with 1% neosynephrine and lidocaine . With customary flexible endoscope.     NOSE:   External:  No gross deformity   Intranasal:    Mucosa:  No polyps, ulcers or lesions.    Septum:  No gross deformity.    Turbinates:  Not enlarged.    Nasopharynx:  No lesions.   Mucosa:  No lesions.   Adenoids:  Present.   Posterior Choanae:  Patent.   Eustachian Tubes:  Patent.  Larynx/hypopharynx:   Epiglottis:  No lesions, without edema.   AE Folds:  No  lesions.   Vocal cords:  Normal mobility. Bilateral TVC and posterior glottic intubation granulomas. Non obstructing. Full closure of glottis at maximum adduction. No over mass/tumor process.   Subglottis: No obvious stenosis   Hypopharynx:  No lesions.   Piriform sinus:  No pooling or lesions.   Post Cricoid:  No edema or erythema        LABORATORY/IMAGING STUDIES: Reviewed    ASSESSMENT/PLAN: Cj Barnes is a 53 y.o. male 1 week s/p Bentall procedure admitted as a transfer from OSH for hypotension and tachycardia (concerning for pericardial effusion/SOB/sepsis). Reports dysphonia and dysphagia ever since surgery.  Bilateral intubations (non obstructing) granulomas. Bilateral TVC mobility.     -No surgical intervention per ENT/HNS  -Recommend SLP evaluation and treatment  -Recommend dual acid suppression  -Sometimes steroid taper may be considered, but given concern for infection, will avoid this  -Recommend patient follow up with Dr. Mena as outpatient to ensure resolution of symptoms/laryngeal process  -Page with questions/concerns    Humberto Espinoza MD  Otolaryngology-HNS   Resident Physician PGY4  [phone]

## 2019-08-17 NOTE — HPI
53-year-old white male presents to the ER complaining of dry tongue, sore throat, hoarse voice and decrease appetite since last Friday.  Patient is status post Bentall w/ aortic valve replacement by  on 8/9/19.  Patient is having difficulty swallowing, has noticed a change in his voice and has generalized throat pain. During this work up he was found to be tachycardic and hypotensive. In the ED he received 3 L of NS and was given adenosine IV x1 for SVT. He remained tachycardic. Patient denied SOB, CP, diaphoresis or dizziness. Patient claims compliance with home medications with no changes in medications recently. Denied any symptoms except voice hoarseness.      Since admit patient has had narrow complex tachyarryhtmia that appears irregular. Started on amiodarone gtt and given lopressor today. HR up to 150. Cardiology consulted to assist with management. Of note, TTE with new depressed EF 40%. Patient denies CP, dyspnea, palpitations, presyncope or syncope.       TTE 8/17  · Mildly decreased left ventricular systolic function. The estimated ejection fraction is 40%  · Left ventricular diastolic dysfunction.  · There is a 23 mm bileaflet tilting disc mechanical aortic valve present. Prosthetic aortic valve is normal.  · Prosthetic graft present in the ascending aorta.  · Fluid collection partially visualized on image 8,9,11 no appreciable flow  · Mild tricuspid regurgitation.  · Mildly reduced right ventricular systolic function.  · Mild right ventricular enlargement.  · Mild right atrial enlargement.  · Septal wall has abnormal motion.  · The estimated PA systolic pressure is 29 mm Hg  · Intermediate central venous pressure (8 mm Hg).

## 2019-08-17 NOTE — ASSESSMENT & PLAN NOTE
Pt is a 52y/o WM w/ a PMHx of DM type 1, HTN, HLD, and CAD s/p Bentall procedure 08/9/2019 who was incidentally found to have aymptomatic tachycardia and hypotension in the ED.    Plan: Can discontinue antibiotics when outside cultures result with no growth. Consulted electrophysiology for recommenations regarding tachyarrythmia. Beta blocker continued.  Neuro:  Sedation: none  Pain: oxy PRNs  AAOx3    CV:   EKG suggestive of atrial flutter w/ RVR.   Blood pressure stables  Amio gtt   Continue home metoprolol    Pulm:  CXR clear  ABG PRN  Sating well on 2L NC   ENT consulted, post-extubation granulomas. Can start prednisone 5mg BID tomorrow when sepsis is ruled out.    Renal:  Monitoring UOP.   Cr stable.   Trend BMP    FENGI:  Cardiac Diet as tolerated, pending SPLP evaluation   Dual anti-acid. Famotidine + PPI    ID:  Pending cultures.  Vanc and zoysn IV    Heme:  INR 5.   Withholding all anticoagulation  CBC daily. H/H stable.    Endo:   Accuchecks q4.   Insulin pump in place.   SSI    PPx:   Famotidine BID  SCDs

## 2019-08-17 NOTE — SUBJECTIVE & OBJECTIVE
Past Medical History:   Diagnosis Date    Aortic stenosis 2018    Cancer 2014    Colon cancer     Coronary artery disease     Diabetes mellitus type I     since in his 30's    Heart murmur     Heel fracture     HTN (hypertension)     Hyperlipidemia LDL goal < 70     Insulin pump in place     MVP (mitral valve prolapse)     Stenosis of aortic and mitral valves        Past Surgical History:   Procedure Laterality Date    BACK SURGERY      BENTALL PROCEDURE N/A 8/9/2019    Performed [facility] OR 2ND FLR    COLON SURGERY  2014    FASCIOTOMY, PLANTAR Right 10/4/2013    Performed by [facility]bins DPM at [facility] OR 1ST FLR    FASCIOTOMY, PLANTAR, ENDOSCOPIC Right 10/4/2013    Performed by [facility] D[facility] at [facility] OR 1ST FLR    FOOT TENDON SURGERY      Left heart cath Left 1/15/2018    [facility] CATH LAB    Replacement-valve-aortic N/A 8/9/2019    Performed by [facility] MD at [facility] OR 2ND FLR    right and left heart cath Bilateral 01/15/2018    Right heart cath Right 1/15/2018    Performed by Palomo [facility] at [facility] CATH LAB    TRIGGER FINGER RELEASE      x 2       Review of patient's allergies indicates:  No Known Allergies    Family History     Problem Relation (Age of Onset)    Diabetes Father    HIV Brother    Heart attack Father    Heart disease Mother    Lung cancer Mother        Tobacco Use    Smoking status: Never Smoker    Smokeless tobacco: Current User     Types: Snuff    Tobacco comment: since he was 12 yrs old   Substance and Sexual Activity    Alcohol use: No     Comment: socially    Drug use: No    Sexual activity: Not on file      Review of Systems   Negative except aforementioned in HPI  Objective:     Vital Signs (Most Recent):  Temp: 98.1 °F (36.7 °C) (08/16/19 1900)  Pulse: (!) 145 (08/16/19 2100)  Resp: 18 (08/16/19 2100)  BP: 104/65 (08/16/19 2100)  SpO2: 100 % (08/16/19 2100) Vital Signs (24h  Range):  Temp:  [98.1 °F (36.7 °C)-98.7 °F (37.1 °C)] 98.1 °F (36.7 °C)  Pulse:  [127-151] 145  Resp:  [10-30] 18  SpO2:  [98 %-100 %] 100 %  BP: ()/(46-84) 104/65     Weight: 102 kg (224 lb 13.9 oz)  Body mass index is 34.19 kg/m².      Intake/Output Summary (Last 24 hours) at 8/16/2019 2122  Last data filed at 8/16/2019 2016  Gross per 24 hour   Intake 100 ml   Output 900 ml   Net -800 ml       Physical Exam   Constitutional: He is oriented to person, place, and time. He appears well-developed. No distress.   HENT:   Head: Normocephalic and atraumatic.   Eyes: Pupils are equal, round, and reactive to light. EOM are normal.   Neck: Normal range of motion. Neck supple. No JVD present.   Cardiovascular: Intact distal pulses.   Regularly irregular w/ RVR.    Pulmonary/Chest: Effort normal and breath sounds normal. No stridor. No respiratory distress.   Abdominal: Soft. He exhibits no distension. There is no guarding.   Musculoskeletal: Normal range of motion. He exhibits no edema.   Neurological: He is alert and oriented to person, place, and time.   Skin: Skin is warm and dry. Capillary refill takes less than 2 seconds.   Psychiatric: He has a normal mood and affect.       Vents:       Lines/Drains/Airways     Peripheral Intravenous Line                 Peripheral IV - Single Lumen 08/16/19 0821 18 G Right Antecubital less than 1 day         Peripheral IV - Single Lumen 08/16/19 1218 18 G Right Forearm less than 1 day                Significant Labs:    CBC/Anemia Profile:  Recent Labs   Lab 08/16/19  0822 08/16/19  1749   WBC 20.30* 16.04*   HGB 9.9* 9.0*   HCT 30.9* 28.8*   * 333   MCV 90 94   RDW 14.7* 14.6*        Chemistries:  Recent Labs   Lab 08/16/19  0822 08/16/19  0906 08/16/19  1749   *  --  134*   K 4.7  --  4.2   CL 95*  --  104   CO2 15*  --  16*   BUN 20  --  15   CREATININE 0.9  --  0.8   CALCIUM 7.8*  --  8.1*   ALBUMIN 3.2*  --  2.4*   PROT 6.7  --  5.8*   BILITOT 1.7*  --  0.8    ALKPHOS 59  --  61   ALT 23  --  18   AST 23  --  21   MG  --  2.1 2.1   PHOS  --   --  3.3       ABGs:   Recent Labs   Lab 08/16/19 2005   PH 7.321*   PCO2 25.2*   HCO3 13.0*   POCSATURATED 98   BE -13     Blood Culture: No results for input(s): LABBLOO in the last 48 hours.  Coagulation:   Recent Labs   Lab 08/16/19  1830   INR 5.2*   APTT 38.7*     Lactic Acid:   Recent Labs   Lab 08/16/19  0822 08/16/19  1218 08/16/19  1749   LACTATE 1.7 1.4 0.9       Significant Imaging: I have reviewed all pertinent imaging results/findings within the past 24 hours.

## 2019-08-17 NOTE — HOSPITAL COURSE
He arrived to the SICU with no acute complaints. EKG was obtained showing most likely atrial flutter with ventricular response of 150 bpm. MAPs remained in 70s. He was given an additional bolus of 1L LR. CXR was obtained that was negative for acute pathology. Labs were drawn and CT chest from 08/12 was interpreted. Esmolol bolus 50mg was administered with little improvement in tachycardia. Amiodarone bolus and gtt was started. Cultures pending.

## 2019-08-17 NOTE — ASSESSMENT & PLAN NOTE
Pt is a 54y/o WM w/ a PMHx of DM type 1, HTN, HLD, and CAD s/p Bentall procedure 08/9/2019 who was incidentally found to have aymptomatic tachycardia and hypotension in the ED.    Neuro:  Sedation: none  Pain: oxy PRNs  AAOx3    CV:   EKG suggestive of atrial flutter w/ RVR.   HD stable 100s/60s  Amio gtt   Withholding home anti-HTNs     Pulm:  CXR clear  ABG: metabolic acidosis w/ respiratory compensation. 50mEq HCO3 - x2   ABG q4H.   Sating well on 2L NC    Renal:  Monitoring UOP.   Cr stable.   mIVF and boluses off.   Trend BMP    FENGI:  No fluids  NPO  Famotidine BID    ID:  Pending cultures.  Vanc and zoysn IV    Heme:  INR 5.   Withholding all anticoagulation  CBC daily. H/H stable.    Endo:   Accuchecks q4.   Insulin pump in place.   SSI    PPx:   Famotidine BID  SCDs

## 2019-08-17 NOTE — NURSING
Patient arrived to SICU room 61902 via ambulance. Report taken from [place] Nurse Mason.  SICU team called to bedside to assess patient. HR in 150s upon arrival, BP stable.  Dr. Hanks and team arrived to room, EKG ordered and obtained, all ordered labs sent. Chest xray ordered and obtained. 1 L LR bolus given per MD order. IV push esmolol bolus ordered and given by Dr. Hanks. Pt HR remained in 140s after bolus given, MAP >60 maintained. Pt AOx4, following commands. Pt denies chest pain at this time. Pt afebrile afebrile. O2 sats 100% on 2L NC. BG/labs closely monitored and reported results to MD. Plan to give amio bolus and start amio gtt. MD updated on current condition, vitals, labs, and gtts.

## 2019-08-17 NOTE — ASSESSMENT & PLAN NOTE
CHADS-VASc 4 (4.8% annual CVA risk). S/p Bentall procedure on 8/9. Newly diagnosed afib. Started on amiodarone gtt and oral metoprolol with HR in the low 100s at this time. ECG with likely course afib, but other consideration would be aflutter with variable block. TSH checked 3 months prior and normal. Electrolytes stable. TTE with newly depressed EF 40% (?tachycardia induced CM). No symptoms currently. On coumadin with supratherapeutic INR (held at this time).   - Continue amiodarone gtt at 0.5 mg/min for now  - Continue lopressor 25 mg PO BID  - Restart anticoagulation when appropriate

## 2019-08-17 NOTE — SUBJECTIVE & OBJECTIVE
Interval History/Significant Events: Clinically stable overnight. Remains in aflutter/afibb, blood pressures stable.     Objective:     Vital Signs (Most Recent):  Temp: 97.8 °F (36.6 °C) (08/17/19 0700)  Pulse: (!) 143 (08/17/19 1114)  Resp: 17 (08/17/19 1114)  BP: 115/71 (08/17/19 1045)  SpO2: 100 % (08/17/19 1114) Vital Signs (24h Range):  Temp:  [97.8 °F (36.6 °C)-98.5 °F (36.9 °C)] 97.8 °F (36.6 °C)  Pulse:  [114-151] 143  Resp:  [7-34] 17  SpO2:  [99 %-100 %] 100 %  BP: ()/() 115/71     Weight: 104 kg (229 lb 4.5 oz)  Body mass index is 34.86 kg/m².      Intake/Output Summary (Last 24 hours) at 8/17/2019 1309  Last data filed at 8/17/2019 0800  Gross per 24 hour   Intake 2069 ml   Output 2750 ml   Net -681 ml       Physical Exam  Constitutional: He is oriented to person, place, and time. He appears well-developed. No distress.   HENT:   Head: Normocephalic and atraumatic.   Eyes: Pupils are equal, round, and reactive to light. EOM are normal.   Neck: Normal range of motion. Neck supple. No JVD present.  No stridor  Cardiovascular: Intact distal pulses.   Irregularly irregular  Pulmonary/Chest: Effort normal and breath sounds normal. No stridor. No respiratory distress.   Abdominal: Soft. He exhibits no distension. There is no guarding.   Musculoskeletal: Normal range of motion. He exhibits no edema.   Neurological: He is alert and oriented to person, place, and time.   Skin: Skin is warm and dry. Capillary refill takes less than 2 seconds.   Psychiatric: He has a normal mood and affect.     Vents:       Lines/Drains/Airways     Peripheral Intravenous Line                 Peripheral IV - Single Lumen 08/16/19 0821 18 G Right Antecubital 1 day         Peripheral IV - Single Lumen 08/16/19 1218 18 G Right Forearm 1 day                Significant Labs:    CBC/Anemia Profile:  Recent Labs   Lab 08/16/19  0822 08/16/19  1749 08/17/19  0250   WBC 20.30* 16.04* 12.52   HGB 9.9* 9.0* 8.0*   HCT 30.9* 28.8*  26.0*   * 333 287   MCV 90 94 94   RDW 14.7* 14.6* 14.9*        Chemistries:  Recent Labs   Lab 08/16/19  0822 08/16/19  0906 08/16/19  1749 08/17/19  0250   *  --  134* 137   K 4.7  --  4.2 3.9   CL 95*  --  104 104   CO2 15*  --  16* 17*   BUN 20  --  15 13   CREATININE 0.9  --  0.8 0.7   CALCIUM 7.8*  --  8.1* 7.8*   ALBUMIN 3.2*  --  2.4*  --    PROT 6.7  --  5.8*  --    BILITOT 1.7*  --  0.8  --    ALKPHOS 59  --  61  --    ALT 23  --  18  --    AST 23  --  21  --    MG  --  2.1 2.1 2.0   PHOS  --   --  3.3 3.0       All pertinent labs within the past 24 hours have been reviewed.    Significant Imaging:  I have reviewed all pertinent imaging results/findings within the past 24 hours.

## 2019-08-18 PROBLEM — R49.0 HOARSE VOICE QUALITY: Status: ACTIVE | Noted: 2019-08-18

## 2019-08-18 PROBLEM — A41.9 SEPSIS: Status: ACTIVE | Noted: 2019-08-18

## 2019-08-18 PROBLEM — I48.92 ATRIAL FLUTTER, PAROXYSMAL: Status: ACTIVE | Noted: 2019-08-16

## 2019-08-18 PROBLEM — R13.10 TROUBLE SWALLOWING: Status: ACTIVE | Noted: 2019-08-18

## 2019-08-18 LAB
ANION GAP SERPL CALC-SCNC: 13 MMOL/L (ref 8–16)
APTT BLDCRRT: 42.4 SEC (ref 21–32)
BASOPHILS # BLD AUTO: 0.04 K/UL (ref 0–0.2)
BASOPHILS NFR BLD: 0.4 % (ref 0–1.9)
BUN SERPL-MCNC: 12 MG/DL (ref 6–20)
CALCIUM SERPL-MCNC: 7.9 MG/DL (ref 8.7–10.5)
CHLORIDE SERPL-SCNC: 106 MMOL/L (ref 95–110)
CO2 SERPL-SCNC: 18 MMOL/L (ref 23–29)
CREAT SERPL-MCNC: 0.8 MG/DL (ref 0.5–1.4)
DIFFERENTIAL METHOD: ABNORMAL
EOSINOPHIL # BLD AUTO: 0.1 K/UL (ref 0–0.5)
EOSINOPHIL NFR BLD: 1.4 % (ref 0–8)
ERYTHROCYTE [DISTWIDTH] IN BLOOD BY AUTOMATED COUNT: 15.4 % (ref 11.5–14.5)
EST. GFR  (AFRICAN AMERICAN): >60 ML/MIN/1.73 M^2
EST. GFR  (NON AFRICAN AMERICAN): >60 ML/MIN/1.73 M^2
GLUCOSE SERPL-MCNC: 166 MG/DL (ref 70–110)
HCT VFR BLD AUTO: 27.6 % (ref 40–54)
HGB BLD-MCNC: 8.3 G/DL (ref 14–18)
IMM GRANULOCYTES # BLD AUTO: 0.39 K/UL (ref 0–0.04)
IMM GRANULOCYTES NFR BLD AUTO: 3.9 % (ref 0–0.5)
INR PPP: 3.9 (ref 0.8–1.2)
LYMPHOCYTES # BLD AUTO: 1 K/UL (ref 1–4.8)
LYMPHOCYTES NFR BLD: 10.3 % (ref 18–48)
MAGNESIUM SERPL-MCNC: 2.1 MG/DL (ref 1.6–2.6)
MCH RBC QN AUTO: 28.5 PG (ref 27–31)
MCHC RBC AUTO-ENTMCNC: 30.1 G/DL (ref 32–36)
MCV RBC AUTO: 95 FL (ref 82–98)
MONOCYTES # BLD AUTO: 0.7 K/UL (ref 0.3–1)
MONOCYTES NFR BLD: 6.5 % (ref 4–15)
NEUTROPHILS # BLD AUTO: 7.8 K/UL (ref 1.8–7.7)
NEUTROPHILS NFR BLD: 77.5 % (ref 38–73)
NRBC BLD-RTO: 0 /100 WBC
PHOSPHATE SERPL-MCNC: 2.7 MG/DL (ref 2.7–4.5)
PLATELET # BLD AUTO: 307 K/UL (ref 150–350)
PMV BLD AUTO: 9 FL (ref 9.2–12.9)
POCT GLUCOSE: 101 MG/DL (ref 70–110)
POCT GLUCOSE: 105 MG/DL (ref 70–110)
POCT GLUCOSE: 147 MG/DL (ref 70–110)
POCT GLUCOSE: 223 MG/DL (ref 70–110)
POCT GLUCOSE: 64 MG/DL (ref 70–110)
POCT GLUCOSE: 99 MG/DL (ref 70–110)
POTASSIUM SERPL-SCNC: 3.9 MMOL/L (ref 3.5–5.1)
PROTHROMBIN TIME: 37.8 SEC (ref 9–12.5)
RBC # BLD AUTO: 2.91 M/UL (ref 4.6–6.2)
SODIUM SERPL-SCNC: 137 MMOL/L (ref 136–145)
VANCOMYCIN TROUGH SERPL-MCNC: 8.6 UG/ML (ref 10–22)
WBC # BLD AUTO: 10.11 K/UL (ref 3.9–12.7)

## 2019-08-18 PROCEDURE — 99233 PR SUBSEQUENT HOSPITAL CARE,LEVL III: ICD-10-PCS | Mod: ,,, | Performed by: ANESTHESIOLOGY

## 2019-08-18 PROCEDURE — 25000003 PHARM REV CODE 250: Performed by: STUDENT IN AN ORGANIZED HEALTH CARE EDUCATION/TRAINING PROGRAM

## 2019-08-18 PROCEDURE — 83735 ASSAY OF MAGNESIUM: CPT

## 2019-08-18 PROCEDURE — 99223 1ST HOSP IP/OBS HIGH 75: CPT | Mod: ,,, | Performed by: INTERNAL MEDICINE

## 2019-08-18 PROCEDURE — 63600175 PHARM REV CODE 636 W HCPCS: Performed by: STUDENT IN AN ORGANIZED HEALTH CARE EDUCATION/TRAINING PROGRAM

## 2019-08-18 PROCEDURE — 97535 SELF CARE MNGMENT TRAINING: CPT

## 2019-08-18 PROCEDURE — 94761 N-INVAS EAR/PLS OXIMETRY MLT: CPT

## 2019-08-18 PROCEDURE — 93010 EKG 12-LEAD: ICD-10-PCS | Mod: ,,, | Performed by: INTERNAL MEDICINE

## 2019-08-18 PROCEDURE — S0028 INJECTION, FAMOTIDINE, 20 MG: HCPCS | Performed by: STUDENT IN AN ORGANIZED HEALTH CARE EDUCATION/TRAINING PROGRAM

## 2019-08-18 PROCEDURE — 93010 ELECTROCARDIOGRAM REPORT: CPT | Mod: ,,, | Performed by: INTERNAL MEDICINE

## 2019-08-18 PROCEDURE — 20600001 HC STEP DOWN PRIVATE ROOM

## 2019-08-18 PROCEDURE — 93005 ELECTROCARDIOGRAM TRACING: CPT

## 2019-08-18 PROCEDURE — 27000221 HC OXYGEN, UP TO 24 HOURS

## 2019-08-18 PROCEDURE — 63600175 PHARM REV CODE 636 W HCPCS: Performed by: THORACIC SURGERY (CARDIOTHORACIC VASCULAR SURGERY)

## 2019-08-18 PROCEDURE — 99223 PR INITIAL HOSPITAL CARE,LEVL III: ICD-10-PCS | Mod: ,,, | Performed by: INTERNAL MEDICINE

## 2019-08-18 PROCEDURE — 99233 SBSQ HOSP IP/OBS HIGH 50: CPT | Mod: ,,, | Performed by: ANESTHESIOLOGY

## 2019-08-18 PROCEDURE — 99900035 HC TECH TIME PER 15 MIN (STAT)

## 2019-08-18 PROCEDURE — 85610 PROTHROMBIN TIME: CPT

## 2019-08-18 PROCEDURE — 80202 ASSAY OF VANCOMYCIN: CPT

## 2019-08-18 PROCEDURE — 80048 BASIC METABOLIC PNL TOTAL CA: CPT

## 2019-08-18 PROCEDURE — 85730 THROMBOPLASTIN TIME PARTIAL: CPT

## 2019-08-18 PROCEDURE — 84100 ASSAY OF PHOSPHORUS: CPT

## 2019-08-18 PROCEDURE — 85025 COMPLETE CBC W/AUTO DIFF WBC: CPT

## 2019-08-18 RX ORDER — PANTOPRAZOLE SODIUM 40 MG/1
40 TABLET, DELAYED RELEASE ORAL DAILY
Status: DISCONTINUED | OUTPATIENT
Start: 2019-08-18 | End: 2019-08-21 | Stop reason: HOSPADM

## 2019-08-18 RX ORDER — AMIODARONE HYDROCHLORIDE 200 MG/1
400 TABLET ORAL 2 TIMES DAILY
Status: DISCONTINUED | OUTPATIENT
Start: 2019-08-18 | End: 2019-08-21 | Stop reason: HOSPADM

## 2019-08-18 RX ADMIN — METOPROLOL TARTRATE 25 MG: 25 TABLET ORAL at 08:08

## 2019-08-18 RX ADMIN — FAMOTIDINE 20 MG: 10 INJECTION, SOLUTION INTRAVENOUS at 08:08

## 2019-08-18 RX ADMIN — POTASSIUM & SODIUM PHOSPHATES POWDER PACK 280-160-250 MG 2 PACKET: 280-160-250 PACK at 02:08

## 2019-08-18 RX ADMIN — POLYETHYLENE GLYCOL 3350 17 G: 17 POWDER, FOR SOLUTION ORAL at 08:08

## 2019-08-18 RX ADMIN — SENNOSIDES,DOCUSATE SODIUM 1 TABLET: 8.6; 5 TABLET, FILM COATED ORAL at 08:08

## 2019-08-18 RX ADMIN — AMIODARONE HYDROCHLORIDE 400 MG: 200 TABLET ORAL at 08:08

## 2019-08-18 RX ADMIN — PIPERACILLIN AND TAZOBACTAM 4.5 G: 4; .5 INJECTION, POWDER, LYOPHILIZED, FOR SOLUTION INTRAVENOUS; PARENTERAL at 02:08

## 2019-08-18 RX ADMIN — PANTOPRAZOLE SODIUM 40 MG: 40 TABLET, DELAYED RELEASE ORAL at 08:08

## 2019-08-18 RX ADMIN — OXYCODONE HYDROCHLORIDE 10 MG: 10 TABLET ORAL at 02:08

## 2019-08-18 RX ADMIN — PIPERACILLIN AND TAZOBACTAM 4.5 G: 4; .5 INJECTION, POWDER, LYOPHILIZED, FOR SOLUTION INTRAVENOUS; PARENTERAL at 08:08

## 2019-08-18 RX ADMIN — VANCOMYCIN HYDROCHLORIDE 1250 MG: 1.25 INJECTION, POWDER, LYOPHILIZED, FOR SOLUTION INTRAVENOUS at 11:08

## 2019-08-18 RX ADMIN — AMIODARONE HYDROCHLORIDE 400 MG: 200 TABLET ORAL at 10:08

## 2019-08-18 RX ADMIN — DEXTROSE 125 ML: 10 SOLUTION INTRAVENOUS at 12:08

## 2019-08-18 NOTE — TREATMENT PLAN
SICU Staff Addendum --> Link to resident progress note from 8/18/19  I have reviewed and concur with the resident's history, physical, assessment, and plan.  I have personally interviewed and examined the patient at bedside.  See below for any additional findings.    Reason for admission:  Atrial flutter, paroxysmal  Present on Admission:   Atrial flutter, paroxysmal   Trouble swallowing   Hoarse voice quality   Sepsis      Goals for Today:   - Stable for stepdown out of ICU  - EP consult for possible DCCV  - INR remains supratherapeutic, holding warfarin    Jose David Hanks MD  Anesthesia Critical Care  Spectra [id number]

## 2019-08-18 NOTE — SUBJECTIVE & OBJECTIVE
"No current facility-administered medications on file prior to encounter.      Current Outpatient Medications on File Prior to Encounter   Medication Sig    amLODIPine (NORVASC) 5 MG tablet Take 1 tablet (5 mg total) by mouth once daily.    aspirin (ECOTRIN) 325 MG EC tablet Take 1 tablet (325 mg total) by mouth once daily.    blood sugar diagnostic Strp 1 each by Misc.(Non-Drug; Combo Route) route 6 (six) times daily. Contour next strips. Pt needs contour jackelyn for compatibility w/ insulin pump (medtronic 670g). Necessity    blood-glucose sensor (GUARDIAN SENSOR 3) Apple 1 each by Misc.(Non-Drug; Combo Route) route once a week.    clotrimazole-betamethasone 1-0.05% (LOTRISONE) cream APPLY A SMALL AMOUNT TO AFFECTED AREA THREE TIMES DAILY UNTIL CLEAR    COMFORT EZ PEN NEEDLES 33 gauge x 3/16" Ndle USE AS DIRECTED with Levemir pens    docusate sodium (COLACE) 100 MG capsule Take 1 capsule (100 mg total) by mouth 2 (two) times daily.    furosemide (LASIX) 20 MG tablet Take one tablet by mouth twice daily for 7 days then decrease to once daily    gabapentin (NEURONTIN) 300 MG capsule Take 2 capsules (600 mg total) by mouth 3 (three) times daily.    glucagon, human recombinant, (GLUCAGON EMERGENCY KIT, HUMAN,) 1 mg SolR Inject 1 mg into the muscle as needed.    insulin detemir U-100 (LEVEMIR FLEXTOUCH U-100 INSULN) 100 unit/mL (3 mL) SubQ InPn pen Inject 48 Units into the skin every evening.    insulin regular 100 unit/mL Soln Use home settings Basal 0.4    Bolus/carb 12    ISF 20    JARDIANCE 25 mg Tab TAKE ONE TABLET BY MOUTH DAILY FOR DIABETES    lisinopril (PRINIVIL,ZESTRIL) 5 MG tablet Take 1 tablet (5 mg total) by mouth once daily.    metFORMIN (GLUCOPHAGE-XR) 500 MG 24 hr tablet Take 1 tablet (500 mg total) by mouth daily with breakfast.    metoprolol tartrate (LOPRESSOR) 25 MG tablet Take 1 tablet (25 mg total) by mouth 2 (two) times daily.    NOVOLOG U-100 INSULIN ASPART 100 unit/mL injection " INJECT 180 UNITS SUBCUTANEOUSLY EVERY ONE AND A HALF DAYS    oxyCODONE (ROXICODONE) 5 MG immediate release tablet Take 1 tablet (5 mg total) by mouth every 4 (four) hours as needed.    pantoprazole (PROTONIX) 40 MG tablet Take 1 tablet (40 mg total) by mouth before breakfast.    potassium chloride SA (K-DUR,KLOR-CON) 20 MEQ tablet Take one tablet by mouth twice daily for 7 days then decrease to once daily    simvastatin (ZOCOR) 20 MG tablet Take 1 tablet (20 mg total) by mouth every evening.    warfarin (COUMADIN) 3 MG tablet Take 1 tablet (3 mg total) by mouth Daily.       Review of patient's allergies indicates:  No Known Allergies    Past Medical History:   Diagnosis Date    Aortic stenosis 2018    Cancer 2014    Colon cancer     Coronary artery disease     Diabetes mellitus type I     since in his 30's    Heart murmur     Heel fracture     HTN (hypertension)     Hyperlipidemia LDL goal < 70     Insulin pump in place     MVP (mitral valve prolapse)     Stenosis of aortic and mitral valves      Past Surgical History:   Procedure Laterality Date    BACK SURGERY      BENTALL PROCEDURE N/A 8/9/2019    [facility] at [facility] OR 2ND FLR    COLON SURGERY  2014    FASCIOTOMY, PLANTAR Right 10/4/2013    Performed by Ruma Robbins DPM at [facility] OR 1ST FLR    FASCIOTOMY, PLANTAR, ENDOSCOPIC Right 10/4/2013    Performed by [facility]bins D[facility] at [facility] OR 1ST FLR    FOOT TENDON SURGERY      Left heart cath Left 1/15/2018    [facility] Palomo Turner [facility] at [facility] CATH LAB    Replacement-valve-aortic N/A 8/9/2019    Performed by [facility] Knight [facility] at [facility] 2ND FLR    right and left heart cath Bilateral 01/15/2018    Right heart cath Right 1/15/2018    Per[facility] by [facility]omo Turner [facility] at [facility] CATH LAB    TRIGGER FINGER RELEASE      x 2     Family History     Problem Relation (Age of Onset)    Diabetes Father    HIV Brother    Heart attack Father    Heart disease Mother     Lung cancer Mother        Tobacco Use    Smoking status: Never Smoker    Smokeless tobacco: Current User     Types: Snuff    Tobacco comment: since he was 12 yrs old   Substance and Sexual Activity    Alcohol use: No     Comment: socially    Drug use: No    Sexual activity: Not on file     Review of Systems   Constitutional: Negative for activity change, fatigue and fever.   HENT: Positive for trouble swallowing and voice change. Negative for facial swelling.    Eyes: Negative for pain.   Respiratory: Negative for chest tightness and shortness of breath.    Cardiovascular: Negative for chest pain and palpitations.   Gastrointestinal: Negative for abdominal distention and constipation.   Musculoskeletal: Negative for arthralgias.   Skin: Negative for pallor.   Neurological: Negative for dizziness and light-headedness.     Objective:     Vital Signs (Most Recent):  Temp: 97.8 °F (36.6 °C) (08/18/19 0700)  Pulse: 90 (08/18/19 1030)  Resp: 18 (08/18/19 1030)  BP: 109/65 (08/18/19 1030)  SpO2: 100 % (08/18/19 1030) Vital Signs (24h Range):  Temp:  [97.8 °F (36.6 °C)-98.5 °F (36.9 °C)] 97.8 °F (36.6 °C)  Pulse:  [] 90  Resp:  [11-32] 18  SpO2:  [95 %-100 %] 100 %  BP: ()/(56-94) 109/65     Weight: 103.9 kg (229 lb 0.9 oz)  Body mass index is 34.83 kg/m².    SpO2: 100 %  O2 Device (Oxygen Therapy): nasal cannula     Intake/Output - Last 3 Shifts       08/16 0700 - 08/17 0659 08/17 0700 - 08/18 0659 08/18 0700 - 08/19 0659    P.O.   600    I.V. (mL/kg) 369 (3.5) 582 (5.6)     IV Piggyback 1700 1225     Total Intake(mL/kg) 2069 (19.9) 1807 (17.4) 600 (5.8)    Urine (mL/kg/hr) 2300 (0.9) 3525 (1.4) 500 (1.2)    Stool  0     Total Output 2300 3525 500    Net -231 -1718 +100           Urine Occurrence  1 x     Stool Occurrence  1 x            Lines/Drains/Airways     Peripheral Intravenous Line                 Peripheral IV - Single Lumen 08/16/19 0821 18 G Right Antecubital 2 days         Peripheral IV -  Single Lumen 08/16/19 1218 18 G Right Forearm 1 day                 Physical Exam   Constitutional: He is oriented to person, place, and time.   HENT:   Head: Normocephalic and atraumatic.   Hoarse voice   Eyes: Pupils are equal, round, and reactive to light. No scleral icterus.   Neck: Normal range of motion.   Cardiovascular:   Tachycardia at 150   Pulmonary/Chest: Effort normal and breath sounds normal.   Abdominal: There is no tenderness.   Musculoskeletal: He exhibits no edema.   Neurological: He is alert and oriented to person, place, and time.   Skin: Skin is warm. Capillary refill takes less than 2 seconds.   Psychiatric: His behavior is normal.       Significant Labs:  All pertinent labs from the last 24 hours have been reviewed.    Significant Diagnostics:  CT: I have reviewed all pertinent results/findings within the past 24 hours

## 2019-08-18 NOTE — PLAN OF CARE
"Dx: Cardiac Arrhythmia     Shift Events: D10 admin for glucose > 70 x1    Neuro: AAO x4, Follows Commands and Moves All Extremities    Vital Signs: /63 (BP Location: Left arm, Patient Position: Lying)   Pulse 89   Temp 97.8 °F (36.6 °C) (Oral)   Resp (!) 21   Ht 5' 8" (1.727 m)   Wt 103.9 kg (229 lb)   SpO2 100%   BMI 34.82 kg/m²  2L NC    Diet: NPO and Sips with Meds    Gtts: Amiodarone    Urine Output: Voids Spontaneously 1600 cc/shift    Drains: Personal Insulin Pump     Labs/Accuchecks: Accu q6h, patient w/ insulin pump to left thigh    Skin: Heels, elbows, and sacrum w/o redness or breakdown.       "

## 2019-08-18 NOTE — ASSESSMENT & PLAN NOTE
Atypical atrial flutter with variable block, likely exacerbated by post-op state. CHADS-VASc 4 (4.8% annual CVA risk). S/p Bentall procedure on 8/9. Started on amiodarone gtt and oral metoprolol with variable heart rate. TSH checked 3 months prior and normal. Electrolytes stable. TTE with newly depressed EF 40% (?tachycardia induced CM). No symptoms currently. On coumadin with supratherapeutic INR (held at this time).     - agree with transition to PO amiodartone  - Continue lopressor 25 mg PO BID      - plan for CAMI/DCCV once CAMI available

## 2019-08-18 NOTE — ASSESSMENT & PLAN NOTE
Pt is a 54y/o WM w/ a PMHx of DM type 1, HTN, HLD, and CAD s/p Bentall procedure 08/9/2019 who was incidentally found to have aymptomatic tachycardia and hypotension in the ED.    Neuro:  Sedation: none  Pain: oxy PRNs  AAOx3    CV:   EKG suggestive of atrial flutter w/ RVR.   Plan for DCCV with EP tomorrow 8/19  Blood pressure stables  D/c Amio gtt - start PO amio  Continue home metoprolol    Pulm:  CXR clear  ABG PRN  Sating well on 2L NC  ENT consulted, post-extubation granulomas. Can start prednisone 5mg BID tomorrow when sepsis is ruled out.    Renal:  Monitoring UOP.   Cr stable.   Trend BMP    FENGI:  Cardiac Diet as tolerated, passed bedside swallow, good per SLP  Dual anti-acid. Famotidine + PPI    ID:  Cultures NGTD, No leukocytosis, afebrile   D/c vanc and zosyn      Heme:  INR 3.9, continue to hold coumadin.   Withholding all anticoagulation  CBC daily. H/H stable.    Endo:   Accuchecks q4.   Insulin pump in place.   SSI    PPx:   Famotidine BID  SCDs    Dispo:  Stepdown to CTSU

## 2019-08-18 NOTE — PROGRESS NOTES
[facility]  Cardiac Electrophysiology  Progress Note    Admission Date: 8/16/2019  Code Status: Full Code   Attending Physician: Ryan Knight MD   Expected Discharge Date:   Principal Problem:<principal problem not specified>    Subjective:     Interval History: Intermittent RVR overnight, otherwise doing well, HR improved while sleeping    Tele: Jumps from 3:1 and 4:1 conduction up to 2:1 conduction    ROS  Objective:     Vital Signs (Most Recent):  Temp: 97.8 °F (36.6 °C) (08/18/19 0300)  Pulse: 97 (08/18/19 0945)  Resp: 14 (08/18/19 0945)  BP: 110/64 (08/18/19 0930)  SpO2: 100 % (08/18/19 0945) Vital Signs (24h Range):  Temp:  [97.8 °F (36.6 °C)-98.5 °F (36.9 °C)] 97.8 °F (36.6 °C)  Pulse:  [] 97  Resp:  [11-32] 14  SpO2:  [95 %-100 %] 100 %  BP: ()/(56-94) 110/64     Weight: 103.9 kg (229 lb 0.9 oz)  Body mass index is 34.83 kg/m².     SpO2: 100 %  O2 Device (Oxygen Therapy): nasal cannula    Physical Exam   Constitutional: He appears well-developed and well-nourished. No distress.   HENT:   Head: Normocephalic and atraumatic.   Right Ear: External ear normal.   Left Ear: External ear normal.   Mouth/Throat: No oropharyngeal exudate.   Eyes: Pupils are equal, round, and reactive to light. Conjunctivae and EOM are normal. Right eye exhibits no discharge. Left eye exhibits no discharge. No scleral icterus.   Neck: Normal range of motion. Neck supple.   Cardiovascular: Normal rate, S1 normal, S2 normal, normal heart sounds and intact distal pulses. An irregularly irregular rhythm present.  No extrasystoles are present. Exam reveals no gallop, no S3, no S4, no distant heart sounds, no friction rub and no opening snap.   No murmur heard.  Pulses:       Carotid pulses are 2+ on the right side, and 2+ on the left side.       Radial pulses are 2+ on the right side, and 2+ on the left side.        Femoral pulses are 2+ on the right side, and 2+ on the left side.       Dorsalis pedis  pulses are 2+ on the right side, and 2+ on the left side.        Posterior tibial pulses are 2+ on the right side, and 2+ on the left side.   Pulmonary/Chest: Effort normal and breath sounds normal. No respiratory distress. He has no wheezes. He has no rales. He exhibits no tenderness.   Healing incision site. No evidence of infection.   Abdominal: Soft. Bowel sounds are normal. He exhibits no distension. There is no tenderness.   Musculoskeletal: Normal range of motion. He exhibits no edema or deformity.   Neurological: He is alert.   Skin: Skin is warm and dry. No rash noted. He is not diaphoretic. No erythema.   Nursing note and vitals reviewed.      Significant Labs:   EP:   Recent Labs   Lab 08/16/19  1749 08/16/19  1830 08/17/19  0250 08/18/19  0250   *  --  137 137   K 4.2  --  3.9 3.9     --  104 106   CO2 16*  --  17* 18*   GLU 74  --  117* 166*   BUN 15  --  13 12   CREATININE 0.8  --  0.7 0.8   CALCIUM 8.1*  --  7.8* 7.9*   PROT 5.8*  --   --   --    ALBUMIN 2.4*  --   --   --    BILITOT 0.8  --   --   --    ALKPHOS 61  --   --   --    AST 21  --   --   --    ALT 18  --   --   --    ANIONGAP 14  --  16 13   ESTGFRAFRICA >60.0  --  >60.0 >60.0   EGFRNONAA >60.0  --  >60.0 >60.0   WBC 16.04*  --  12.52 10.11   HGB 9.0*  --  8.0* 8.3*   HCT 28.8*  --  26.0* 27.6*     --  287 307   INR  --  5.2* 4.8* 3.9*       Significant Imaging:   EKG: atypical (appears counterclockwise) atrial flutter    Assessment and Plan:     Atrial flutter, paroxysmal  Atypical atrial flutter with variable block, likely exacerbated by post-op state. CHADS-VASc 4 (4.8% annual CVA risk). S/p Bentall procedure on 8/9. Started on amiodarone gtt and oral metoprolol with variable heart rate. TSH checked 3 months prior and normal. Electrolytes stable. TTE with newly depressed EF 40% (?tachycardia induced CM). No symptoms currently. On coumadin with supratherapeutic INR (held at this time).     - agree with transition to PO  amiodartone  - Continue lopressor 25 mg PO BID      - plan for CAMI/DCCV once CAMI available          Rashel Sunshine MD  Cardiac Electrophysiology  [facility]

## 2019-08-18 NOTE — SUBJECTIVE & OBJECTIVE
Interval History: Intermittent RVR overnight, otherwise doing well, HR improved while sleeping    Tele: Jumps from 3:1 and 4:1 conduction up to 2:1 conduction    ROS  Objective:     Vital Signs (Most Recent):  Temp: 97.8 °F (36.6 °C) (08/18/19 0300)  Pulse: 97 (08/18/19 0945)  Resp: 14 (08/18/19 0945)  BP: 110/64 (08/18/19 0930)  SpO2: 100 % (08/18/19 0945) Vital Signs (24h Range):  Temp:  [97.8 °F (36.6 °C)-98.5 °F (36.9 °C)] 97.8 °F (36.6 °C)  Pulse:  [] 97  Resp:  [11-32] 14  SpO2:  [95 %-100 %] 100 %  BP: ()/(56-94) 110/64     Weight: 103.9 kg (229 lb 0.9 oz)  Body mass index is 34.83 kg/m².     SpO2: 100 %  O2 Device (Oxygen Therapy): nasal cannula    Physical Exam   Constitutional: He appears well-developed and well-nourished. No distress.   HENT:   Head: Normocephalic and atraumatic.   Right Ear: External ear normal.   Left Ear: External ear normal.   Mouth/Throat: No oropharyngeal exudate.   Eyes: Pupils are equal, round, and reactive to light. Conjunctivae and EOM are normal. Right eye exhibits no discharge. Left eye exhibits no discharge. No scleral icterus.   Neck: Normal range of motion. Neck supple.   Cardiovascular: Normal rate, S1 normal, S2 normal, normal heart sounds and intact distal pulses. An irregularly irregular rhythm present.  No extrasystoles are present. Exam reveals no gallop, no S3, no S4, no distant heart sounds, no friction rub and no opening snap.   No murmur heard.  Pulses:       Carotid pulses are 2+ on the right side, and 2+ on the left side.       Radial pulses are 2+ on the right side, and 2+ on the left side.        Femoral pulses are 2+ on the right side, and 2+ on the left side.       Dorsalis pedis pulses are 2+ on the right side, and 2+ on the left side.        Posterior tibial pulses are 2+ on the right side, and 2+ on the left side.   Pulmonary/Chest: Effort normal and breath sounds normal. No respiratory distress. He has no wheezes. He has no rales. He exhibits  no tenderness.   Healing incision site. No evidence of infection.   Abdominal: Soft. Bowel sounds are normal. He exhibits no distension. There is no tenderness.   Musculoskeletal: Normal range of motion. He exhibits no edema or deformity.   Neurological: He is alert.   Skin: Skin is warm and dry. No rash noted. He is not diaphoretic. No erythema.   Nursing note and vitals reviewed.      Significant Labs:   EP:   Recent Labs   Lab 08/16/19  1749 08/16/19  1830 08/17/19  0250 08/18/19  0250   *  --  137 137   K 4.2  --  3.9 3.9     --  104 106   CO2 16*  --  17* 18*   GLU 74  --  117* 166*   BUN 15  --  13 12   CREATININE 0.8  --  0.7 0.8   CALCIUM 8.1*  --  7.8* 7.9*   PROT 5.8*  --   --   --    ALBUMIN 2.4*  --   --   --    BILITOT 0.8  --   --   --    ALKPHOS 61  --   --   --    AST 21  --   --   --    ALT 18  --   --   --    ANIONGAP 14  --  16 13   ESTGFRAFRICA >60.0  --  >60.0 >60.0   EGFRNONAA >60.0  --  >60.0 >60.0   WBC 16.04*  --  12.52 10.11   HGB 9.0*  --  8.0* 8.3*   HCT 28.8*  --  26.0* 27.6*     --  287 307   INR  --  5.2* 4.8* 3.9*       Significant Imaging:   EKG: atypical (appears counterclockwise) atrial flutter

## 2019-08-18 NOTE — PT/OT/SLP PROGRESS
Speech Language Pathology      Cj Barnes  MRN: [id number]    Consult received and chart reviewed. Pt readmitted 8/16 for voice disturbance and difficulty swallowing. Pt originally admitted 8/9 for  CAD s/p Bentall procedure. Pt noted voice and swallow discomfort has been persistent since extubation and initial surgery.   ENT assessment 8/17 revealed bilateral granulomas of true vocal folds posteriorly with otherwise intact true vocal fold mobility. Pt not appropriate for any voice treatment at this time with presence of true vocal fold granulomas.     Given overall profile, SLP discussed with team pt most appropriate for a modified barium swallow study to objectively evaluated swallow function and help determine least restrictive diet. Team in agreement with plan. Orders place and study to be scheduled 08/19/19.    SLP to bedside and RN reports pt did well on RN dysphagia screen and team starting pt on cardiac diet. Despite pt being advance on diet team would like to move forward with modified barium swallow assessment. SLP discussed plan directly with pt who demonstrated understanding and agreement.       Vero Farmer, GLORIA-SLP

## 2019-08-18 NOTE — PROGRESS NOTES
Patient admitted to CTSU. Patient alert and oriented to room. Cardiac monitoring maintained throughout transfer. Patient connected to telemetry, assessed, denies any pain, oriented to room, and instructed to call for assistance or any needs. Call bell in reach. Reviewed goals for today. Will continue to monitor

## 2019-08-18 NOTE — ASSESSMENT & PLAN NOTE
ICU admission. Pt is currently stable without hypotension. EKG in ICU demonstrates likely atrial flutter. Rate control with esmolol was unsuccessful. Start Amiodarone for rhythm control. Support as necessary. Start abx for possible sepsis but will likely d/c if cx are negative. Consult EP for recs. Obtain ECHO.

## 2019-08-18 NOTE — SUBJECTIVE & OBJECTIVE
Interval History/Significant Events: No acute events overnight.  Cardiology planning for DCCV tomorrow 8/19. Stable for stepdown to floor. Cardiac diet, passed bedside SLP.       Objective:     Vital Signs (Most Recent):  Temp: 98 °F (36.7 °C) (08/18/19 1500)  Pulse: (!) 145 (08/18/19 1730)  Resp: 19 (08/18/19 1730)  BP: 116/86 (08/18/19 1730)  SpO2: 100 % (08/18/19 1730) Vital Signs (24h Range):  Temp:  [97.8 °F (36.6 °C)-98.7 °F (37.1 °C)] 98 °F (36.7 °C)  Pulse:  [] 145  Resp:  [11-30] 19  SpO2:  [73 %-100 %] 100 %  BP: ()/(55-94) 116/86     Weight: 103.9 kg (229 lb 0.9 oz)  Body mass index is 34.83 kg/m².      Intake/Output Summary (Last 24 hours) at 8/18/2019 1757  Last data filed at 8/18/2019 1742  Gross per 24 hour   Intake 3270.4 ml   Output 3400 ml   Net -129.6 ml       Physical Exam   Constitutional: He is oriented to person, place, and time.   HENT:   Head: Normocephalic and atraumatic.   Hoarse voice   Eyes: Pupils are equal, round, and reactive to light. No scleral icterus.   Neck: Normal range of motion.   Cardiovascular:   Tachycardia at 100   Pulmonary/Chest: Effort normal and breath sounds normal.   Abdominal: There is no tenderness.   Musculoskeletal: He exhibits no edema.   Neurological: He is alert and oriented to person, place, and time.   Skin: Skin is warm. Capillary refill takes less than 2 seconds.   Psychiatric: His behavior is normal.       Vents:       Lines/Drains/Airways     Peripheral Intravenous Line                 Peripheral IV - Single Lumen 08/16/19 0821 18 G Right Antecubital 2 days         Peripheral IV - Single Lumen 08/16/19 1218 18 G Right Forearm 2 days                Significant Labs:    CBC/Anemia Profile:  Recent Labs   Lab 08/17/19  0250 08/18/19  0250   WBC 12.52 10.11   HGB 8.0* 8.3*   HCT 26.0* 27.6*    307   MCV 94 95   RDW 14.9* 15.4*        Chemistries:  Recent Labs   Lab 08/17/19  0250 08/18/19  0250    137   K 3.9 3.9    106   CO2  17* 18*   BUN 13 12   CREATININE 0.7 0.8   CALCIUM 7.8* 7.9*   MG 2.0 2.1   PHOS 3.0 2.7       Coagulation:   Recent Labs   Lab 08/18/19  0250   INR 3.9*   APTT 42.4*       Significant Imaging:  I have reviewed all pertinent imaging results/findings within the past 24 hours.

## 2019-08-18 NOTE — PROGRESS NOTES
Pharmacokinetic Assessment Follow Up: IV Vancomycin    Vancomycin serum concentration assessment(s):  Vancomycin trough concentration today resulted at 8.6 mcg/mL (~45 min late; dosing regimen has been very sporadic with most doses given earlier than 12 hours apart).  RF appears adequate - Scr stable; UOP 1.4 mL/kg/hr    Vancomycin Regimen Plan:  1.  Vancomycin 1250 mg IV Q8H (only increase TDD by 250 mg given more frequent administration)  2.  Obtain vanc trough prior to fourth dose of new regimen: 8/19 @ 1200  3.  Continue to monitor RF closely and make adjustments as needed    Drug levels (last 3 results):  Recent Labs   Lab Result Units 08/18/19  1000   Vancomycin-Trough ug/mL 8.6*     Pharmacy will continue to follow and monitor vancomycin.    Thank you for the consult,   Belinda Palacios, PharmD, [facility]  [id number]           Patient brief summary:  Cj Barnes is a 53 y.o. male initiated on antimicrobial therapy with IV Vancomycin for treatment of bacteremia    Drug Allergies:   Review of patient's allergies indicates:  No Known Allergies    Actual Body Weight:   103.9 kg    Renal Function:   Estimated Creatinine Clearance: 121.9 mL/min (based on SCr of 0.8 mg/dL).,         CBC (last 72 hours):  Recent Labs   Lab Result Units 08/16/19  0822 08/16/19  1749 08/17/19  0250 08/18/19  0250   WBC K/uL 20.30* 16.04* 12.52 10.11   Hemoglobin g/dL 9.9* 9.0* 8.0* 8.3*   Hematocrit % 30.9* 28.8* 26.0* 27.6*   Platelets K/uL 366* 333 287 307   Gran% % 70.0 80.6* 78.9* 77.5*   Lymph% % 6.0* 8.5* 9.0* 10.3*   Mono% % 8.0 6.5 6.8 6.5   Eosinophil% % 0.0 0.5 0.8 1.4   Basophil% % 0.0 0.3 0.3 0.4   Differential Method  Manual Automated Automated Automated       Metabolic Panel (last 72 hours):  Recent Labs   Lab Result Units 08/16/19  0822 08/16/19  0906 08/16/19  1204 08/16/19  1749 08/17/19  0250 08/18/19  0250   Sodium mmol/L 128*  --   --  134* 137 137   Potassium mmol/L 4.7  --   --  4.2 3.9 3.9   Chloride mmol/L 95*  --    --  104 104 106   CO2 mmol/L 15*  --   --  16* 17* 18*   Glucose mg/dL 119*  --   --  74 117* 166*   Glucose, UA   --   --  3+*  --   --   --    BUN, Bld mg/dL 20  --   --  15 13 12   Creatinine mg/dL 0.9  --   --  0.8 0.7 0.8   Albumin g/dL 3.2*  --   --  2.4*  --   --    Total Bilirubin mg/dL 1.7*  --   --  0.8  --   --    Alkaline Phosphatase U/L 59  --   --  61  --   --    AST U/L 23  --   --  21  --   --    ALT U/L 23  --   --  18  --   --    Magnesium mg/dL  --  2.1  --  2.1 2.0 2.1   Phosphorus mg/dL  --   --   --  3.3 3.0 2.7       Vancomycin Administrations:  vancomycin given in the last 96 hours                   vancomycin 1750 mg in 0.9% sodium chloride 500 mL IVPB (mg) 1,750 mg New Bag 08/17/19 2216     1,750 mg New Bag  1200     1,750 mg New Bag  0000    vancomycin (VANCOCIN) 2,000 mg in dextrose 5 % 500 mL IVPB (mg) 2,000 mg New Bag 08/16/19 0938                Microbiologic Results:  Microbiology Results (last 7 days)     ** No results found for the last 168 hours. **

## 2019-08-18 NOTE — PROGRESS NOTES
Therapy with Vancomycin complete and/or consult discontinued by provider.  Pharmacy will sign off, please re-consult as needed.

## 2019-08-18 NOTE — H&P
"[facility]  Cardiothoracic Surgery  History & Physical    Patient Name: Cj Barnes  MRN: [id number]  Admission Date: 8/16/2019  Attending Physician: Ryan Knight MD  Referring Provider: Umang Infante    Patient information was obtained from patient and ER records.     Subjective:     Chief Complaint/Reason for Admission: tachyrhythmia     History of Present Illness: 52 yo male who underwent a Bentall procedure with mech valve on 8/9 presenting to ED with trouble swallowing and dry tongue but found to have significant leukocytosis, tachycardia with intermittent hypotension, and fluid collection around surgical area on CT. He denies fevers, chest pain, SOB, palpitations. He was given 3L of fluids in the ED with no change in HR. Given concern of possible intrathoracic infection given CT findings and labile hemodynamics in the setting of recent heart surgery, the CTS team was consulted.    No current facility-administered medications on file prior to encounter.      Current Outpatient Medications on File Prior to Encounter   Medication Sig    amLODIPine (NORVASC) 5 MG tablet Take 1 tablet (5 mg total) by mouth once daily.    aspirin (ECOTRIN) 325 MG EC tablet Take 1 tablet (325 mg total) by mouth once daily.    blood sugar diagnostic Strp 1 each by Misc.(Non-Drug; Combo Route) route 6 (six) times daily. Contour next strips. Pt needs contour jackelyn for compatibility w/ insulin pump (medtronic 670g). Necessity    blood-glucose sensor (GUARDIAN SENSOR 3) Apple 1 each by Misc.(Non-Drug; Combo Route) route once a week.    clotrimazole-betamethasone 1-0.05% (LOTRISONE) cream APPLY A SMALL AMOUNT TO AFFECTED AREA THREE TIMES DAILY UNTIL CLEAR    COMFORT EZ PEN NEEDLES 33 gauge x 3/16" Ndle USE AS DIRECTED with Levemir pens    docusate sodium (COLACE) 100 MG capsule Take 1 capsule (100 mg total) by mouth 2 (two) times daily.    furosemide (LASIX) 20 MG tablet Take one tablet by " mouth twice daily for 7 days then decrease to once daily    gabapentin (NEURONTIN) 300 MG capsule Take 2 capsules (600 mg total) by mouth 3 (three) times daily.    glucagon, human recombinant, (GLUCAGON EMERGENCY KIT, HUMAN,) 1 mg SolR Inject 1 mg into the muscle as needed.    insulin detemir U-100 (LEVEMIR FLEXTOUCH U-100 INSULN) 100 unit/mL (3 mL) SubQ InPn pen Inject 48 Units into the skin every evening.    insulin regular 100 unit/mL Soln Use home settings Basal 0.4    Bolus/carb 12    ISF 20    JARDIANCE 25 mg Tab TAKE ONE TABLET BY MOUTH DAILY FOR DIABETES    lisinopril (PRINIVIL,ZESTRIL) 5 MG tablet Take 1 tablet (5 mg total) by mouth once daily.    metFORMIN (GLUCOPHAGE-XR) 500 MG 24 hr tablet Take 1 tablet (500 mg total) by mouth daily with breakfast.    metoprolol tartrate (LOPRESSOR) 25 MG tablet Take 1 tablet (25 mg total) by mouth 2 (two) times daily.    NOVOLOG U-100 INSULIN ASPART 100 unit/mL injection INJECT 180 UNITS SUBCUTANEOUSLY EVERY ONE AND A HALF DAYS    oxyCODONE (ROXICODONE) 5 MG immediate release tablet Take 1 tablet (5 mg total) by mouth every 4 (four) hours as needed.    pantoprazole (PROTONIX) 40 MG tablet Take 1 tablet (40 mg total) by mouth before breakfast.    potassium chloride SA (K-DUR,KLOR-CON) 20 MEQ tablet Take one tablet by mouth twice daily for 7 days then decrease to once daily    simvastatin (ZOCOR) 20 MG tablet Take 1 tablet (20 mg total) by mouth every evening.    warfarin (COUMADIN) 3 MG tablet Take 1 tablet (3 mg total) by mouth Daily.       Review of patient's allergies indicates:  No Known Allergies    Past Medical History:   Diagnosis Date    Aortic stenosis 2018    Cancer 2014    Colon cancer     Coronary artery disease     Diabetes mellitus type I     since in his 30's    Heart murmur     Heel fracture     HTN (hypertension)     Hyperlipidemia LDL goal < 70     Insulin pump in place     MVP (mitral valve prolapse)     Stenosis of aortic and  mitral valves      Past Surgical History:   Procedure Laterality Date    BACK SURGERY      BENTALL PROCEDURE N/A 8/9/2019    Performed by Ryan Knight MD at [facility] OR 2ND FLR    COLON SURGERY  2014    FASCIOTOMY, PLANTAR Right 10/4/2013    Performed by Ruma Robbins DPM at [facility] OR 1ST FLR    FASCIOTOMY, PLANTAR, ENDOSCOPIC Right 10/4/2013    Performed by Ruma Robbins DPM at [facility] OR 1ST FLR    FOOT TENDON SURGERY      Left heart cath Left 1/15/2018    Performed by Palomo Turner [facility] at [facility] C[facility]    Replacement-valve-aortic N/A 8/9/2019    Performed by Ryan Knight MD at [facility] OR 2ND [facility]    right and left heart cath Bilateral 01/15/2018    Right heart cath Right 1/15/2018    Performed by Palomo Turner MD at [facility] CATH LAB    TRIGGER FINGER RELEASE      x 2     Family History     Problem Relation (Age of Onset)    Diabetes Father    HIV Brother    Heart attack Father    Heart disease Mother    Lung cancer Mother        Tobacco Use    Smoking status: Never Smoker    Smokeless tobacco: Current User     Types: Snuff    Tobacco comment: since he was 12 yrs old   Substance and Sexual Activity    Alcohol use: No     Comment: socially    Drug use: No    Sexual activity: Not on file     Review of Systems   Constitutional: Negative for activity change, fatigue and fever.   HENT: Positive for trouble swallowing and voice change. Negative for facial swelling.    Eyes: Negative for pain.   Respiratory: Negative for chest tightness and shortness of breath.    Cardiovascular: Negative for chest pain and palpitations.   Gastrointestinal: Negative for abdominal distention and constipation.   Musculoskeletal: Negative for arthralgias.   Skin: Negative for pallor.   Neurological: Negative for dizziness and light-headedness.     Objective:     Vital Signs (Most Recent):  Temp: 97.8 °F (36.6 °C) (08/18/19 0700)  Pulse: 90 (08/18/19 1030)  Resp: 18 (08/18/19 1030)  BP: 109/65 (08/18/19 1030)  SpO2:  100 % (08/18/19 1030) Vital Signs (24h Range):  Temp:  [97.8 °F (36.6 °C)-98.5 °F (36.9 °C)] 97.8 °F (36.6 °C)  Pulse:  [] 90  Resp:  [11-32] 18  SpO2:  [95 %-100 %] 100 %  BP: ()/(56-94) 109/65     Weight: 103.9 kg (229 lb 0.9 oz)  Body mass index is 34.83 kg/m².    SpO2: 100 %  O2 Device (Oxygen Therapy): nasal cannula     Intake/Output - Last 3 Shifts       08/16 0700 - 08/17 0659 08/17 0700 - 08/18 0659 08/18 0700 - 08/19 0659    P.O.   600    I.V. (mL/kg) 369 (3.5) 582 (5.6)     IV Piggyback 1700 1225     Total Intake(mL/kg) 2069 (19.9) 1807 (17.4) 600 (5.8)    Urine (mL/kg/hr) 2300 (0.9) 3525 (1.4) 500 (1.2)    Stool  0     Total Output 2300 3525 500    Net -231 -1718 +100           Urine Occurrence  1 x     Stool Occurrence  1 x            Lines/Drains/Airways     Peripheral Intravenous Line                 Peripheral IV - Single Lumen 08/16/19 0821 18 G Right Antecubital 2 days         Peripheral IV - Single Lumen 08/16/19 1218 18 G Right Forearm 1 day                 Physical Exam   Constitutional: He is oriented to person, place, and time.   HENT:   Head: Normocephalic and atraumatic.   Hoarse voice   Eyes: Pupils are equal, round, and reactive to light. No scleral icterus.   Neck: Normal range of motion.   Cardiovascular:   Tachycardia at 150   Pulmonary/Chest: Effort normal and breath sounds normal.   Abdominal: There is no tenderness.   Musculoskeletal: He exhibits no edema.   Neurological: He is alert and oriented to person, place, and time.   Skin: Skin is warm. Capillary refill takes less than 2 seconds.   Psychiatric: His behavior is normal.       Significant Labs:  All pertinent labs from the last 24 hours have been reviewed.    Significant Diagnostics:  CT: I have reviewed all pertinent results/findings within the past 24 hours    Assessment/Plan:     * Atrial flutter, paroxysmal  ICU admission. Pt is currently stable without hypotension. EKG in ICU demonstrates likely atrial flutter.  Rate control with esmolol was unsuccessful. Start Amiodarone for rhythm control. Support as necessary. Start abx for possible sepsis but will likely d/c if cx are negative. Consult EP for recs. Obtain ECHO.    Trouble swallowing  Keep NPO for now. Consult ENT for trouble swallowing and hoarse voice.        Jordon Bush MD  Cardiothoracic Surgery  [facility]

## 2019-08-18 NOTE — PROGRESS NOTES
[facility]  Critical Care - Surgery  Progress Note    Patient Name: Cj Barnes  MRN: [id number]  Admission Date: 8/16/2019  Hospital Length of Stay: 2 days  Code Status: Full Code  Attending Provider: Ryan Knight MD  Primary Care Provider: Garry Stover MD   Principal Problem: Atrial flutter    Subjective:     Hospital/ICU Course:  He arrived to the SICU with no acute complaints. EKG was obtained showing most likely atrial flutter with ventricular response of 150 bpm. MAPs remained in 70s. He was given an additional bolus of 1L LR. CXR was obtained that was negative for acute pathology. Labs were drawn and CT chest from 08/12 was interpreted. Esmolol bolus 50mg was administered with little improvement in tachycardia. Amiodarone bolus and gtt was started. Cultures pending.            Interval History/Significant Events: No acute events overnight.  Cardiology planning for DCCV tomorrow 8/19. Stable for stepdown to floor. Cardiac diet, passed bedside SLP.       Objective:     Vital Signs (Most Recent):  Temp: 98 °F (36.7 °C) (08/18/19 1500)  Pulse: (!) 145 (08/18/19 1730)  Resp: 19 (08/18/19 1730)  BP: 116/86 (08/18/19 1730)  SpO2: 100 % (08/18/19 1730) Vital Signs (24h Range):  Temp:  [97.8 °F (36.6 °C)-98.7 °F (37.1 °C)] 98 °F (36.7 °C)  Pulse:  [] 145  Resp:  [11-30] 19  SpO2:  [73 %-100 %] 100 %  BP: ()/(55-94) 116/86     Weight: 103.9 kg (229 lb 0.9 oz)  Body mass index is 34.83 kg/m².      Intake/Output Summary (Last 24 hours) at 8/18/2019 1757  Last data filed at 8/18/2019 1742  Gross per 24 hour   Intake 3270.4 ml   Output 3400 ml   Net -129.6 ml       Physical Exam   Constitutional: He is oriented to person, place, and time.   HENT:   Head: Normocephalic and atraumatic.   Hoarse voice   Eyes: Pupils are equal, round, and reactive to light. No scleral icterus.   Neck: Normal range of motion.   Cardiovascular:   Tachycardia at 100   Pulmonary/Chest: Effort normal  and breath sounds normal.   Abdominal: There is no tenderness.   Musculoskeletal: He exhibits no edema.   Neurological: He is alert and oriented to person, place, and time.   Skin: Skin is warm. Capillary refill takes less than 2 seconds.   Psychiatric: His behavior is normal.       Vents:       Lines/Drains/Airways     Peripheral Intravenous Line                 Peripheral IV - Single Lumen 08/16/19 0821 18 G Right Antecubital 2 days         Peripheral IV - Single Lumen 08/16/19 1218 18 G Right Forearm 2 days                Significant Labs:    CBC/Anemia Profile:  Recent Labs   Lab 08/17/19  0250 08/18/19  0250   WBC 12.52 10.11   HGB 8.0* 8.3*   HCT 26.0* 27.6*    307   MCV 94 95   RDW 14.9* 15.4*        Chemistries:  Recent Labs   Lab 08/17/19  0250 08/18/19  0250    137   K 3.9 3.9    106   CO2 17* 18*   BUN 13 12   CREATININE 0.7 0.8   CALCIUM 7.8* 7.9*   MG 2.0 2.1   PHOS 3.0 2.7       Coagulation:   Recent Labs   Lab 08/18/19  0250   INR 3.9*   APTT 42.4*       Significant Imaging:  I have reviewed all pertinent imaging results/findings within the past 24 hours.    Assessment/Plan:     * Atrial flutter  Pt is a 52y/o WM w/ a PMHx of DM type 1, HTN, HLD, and CAD s/p Bentall procedure 08/9/2019 who was incidentally found to have aymptomatic tachycardia and hypotension in the ED.    Neuro:  Sedation: none  Pain: oxy PRNs  AAOx3    CV:   EKG suggestive of atrial flutter w/ RVR.   Plan for DCCV with EP tomorrow 8/19  Blood pressure stables  D/c Amio gtt - start PO amio  Continue home metoprolol    Pulm:  CXR clear  ABG PRN  Sating well on 2L NC  ENT consulted, post-extubation granulomas. Can start prednisone 5mg BID tomorrow when sepsis is ruled out.    Renal:  Monitoring UOP.   Cr stable.   Trend BMP    FENGI:  Cardiac Diet as tolerated, passed bedside swallow, good per SLP  Dual anti-acid. Famotidine + PPI    ID:  Cultures NGTD, No leukocytosis, afebrile   D/c vanc and zosyn      Heme:  INR  3.9, continue to hold coumadin.   Withholding all anticoagulation  CBC daily. H/H stable.    Endo:   Accuchecks q4.   Insulin pump in place.   SSI    PPx:   Famotidine BID  SCDs    Dispo:  Stepdown to CTSU      Critical care was time spent personally by me on the following activities: development of treatment plan with patient or surrogate and bedside caregivers, discussions with consultants, evaluation of patient's response to treatment, examination of patient, ordering and performing treatments and interventions, ordering and review of laboratory studies, ordering and review of radiographic studies, pulse oximetry, re-evaluation of patient's condition.  This critical care time did not overlap with that of any other provider or involve time for any procedures.     Caren Gregorio MD  Critical Care - Surgery  [facility]

## 2019-08-19 ENCOUNTER — ANESTHESIA EVENT (OUTPATIENT)
Dept: MEDSURG UNIT | Facility: HOSPITAL | Age: 54
DRG: 309 | End: 2019-08-19
Payer: MEDICARE

## 2019-08-19 ENCOUNTER — ANESTHESIA (OUTPATIENT)
Dept: MEDSURG UNIT | Facility: HOSPITAL | Age: 54
DRG: 309 | End: 2019-08-19
Payer: MEDICARE

## 2019-08-19 LAB
ALLENS TEST: ABNORMAL
APTT BLDCRRT: 36 SEC (ref 21–32)
BASOPHILS # BLD AUTO: 0.04 K/UL (ref 0–0.2)
BASOPHILS NFR BLD: 0.4 % (ref 0–1.9)
DELSYS: ABNORMAL
DIFFERENTIAL METHOD: ABNORMAL
EOSINOPHIL # BLD AUTO: 0.1 K/UL (ref 0–0.5)
EOSINOPHIL NFR BLD: 1.2 % (ref 0–8)
ERYTHROCYTE [DISTWIDTH] IN BLOOD BY AUTOMATED COUNT: 15.2 % (ref 11.5–14.5)
HCO3 UR-SCNC: 13.6 MMOL/L (ref 24–28)
HCT VFR BLD AUTO: 27.4 % (ref 40–54)
HGB BLD-MCNC: 8.8 G/DL (ref 14–18)
IMM GRANULOCYTES # BLD AUTO: 0.34 K/UL (ref 0–0.04)
IMM GRANULOCYTES NFR BLD AUTO: 3.6 % (ref 0–0.5)
INR PPP: 2.7 (ref 0.8–1.2)
LDH SERPL L TO P-CCNC: 0.93 MMOL/L (ref 0.36–1.25)
LYMPHOCYTES # BLD AUTO: 1.3 K/UL (ref 1–4.8)
LYMPHOCYTES NFR BLD: 13.4 % (ref 18–48)
MAGNESIUM SERPL-MCNC: 1.9 MG/DL (ref 1.6–2.6)
MCH RBC QN AUTO: 29.1 PG (ref 27–31)
MCHC RBC AUTO-ENTMCNC: 32.1 G/DL (ref 32–36)
MCV RBC AUTO: 91 FL (ref 82–98)
MONOCYTES # BLD AUTO: 0.7 K/UL (ref 0.3–1)
MONOCYTES NFR BLD: 7.2 % (ref 4–15)
NEUTROPHILS # BLD AUTO: 7 K/UL (ref 1.8–7.7)
NEUTROPHILS NFR BLD: 74.2 % (ref 38–73)
NRBC BLD-RTO: 0 /100 WBC
PCO2 BLDA: 25.3 MMHG (ref 35–45)
PH SMN: 7.34 [PH] (ref 7.35–7.45)
PHOSPHATE SERPL-MCNC: 3.2 MG/DL (ref 2.7–4.5)
PLATELET # BLD AUTO: 330 K/UL (ref 150–350)
PMV BLD AUTO: 8.9 FL (ref 9.2–12.9)
PO2 BLDA: 112 MMHG (ref 80–100)
POC BE: -12 MMOL/L
POC SATURATED O2: 98 % (ref 95–100)
POC TCO2: 14 MMOL/L (ref 23–27)
POCT GLUCOSE: 137 MG/DL (ref 70–110)
POCT GLUCOSE: 145 MG/DL (ref 70–110)
POCT GLUCOSE: 150 MG/DL (ref 70–110)
POCT GLUCOSE: 151 MG/DL (ref 70–110)
POCT GLUCOSE: 193 MG/DL (ref 70–110)
PROTHROMBIN TIME: 26.2 SEC (ref 9–12.5)
RBC # BLD AUTO: 3.02 M/UL (ref 4.6–6.2)
SAMPLE: ABNORMAL
SITE: ABNORMAL
WBC # BLD AUTO: 9.36 K/UL (ref 3.9–12.7)

## 2019-08-19 PROCEDURE — 93010 EKG 12-LEAD: ICD-10-PCS | Mod: ,,, | Performed by: INTERNAL MEDICINE

## 2019-08-19 PROCEDURE — 84100 ASSAY OF PHOSPHORUS: CPT

## 2019-08-19 PROCEDURE — 25000003 PHARM REV CODE 250: Performed by: STUDENT IN AN ORGANIZED HEALTH CARE EDUCATION/TRAINING PROGRAM

## 2019-08-19 PROCEDURE — D9220A PRA ANESTHESIA: Mod: CRNA,,, | Performed by: NURSE ANESTHETIST, CERTIFIED REGISTERED

## 2019-08-19 PROCEDURE — 82962 GLUCOSE BLOOD TEST: CPT | Performed by: INTERNAL MEDICINE

## 2019-08-19 PROCEDURE — 93010 ELECTROCARDIOGRAM REPORT: CPT | Mod: ,,, | Performed by: INTERNAL MEDICINE

## 2019-08-19 PROCEDURE — 37000009 HC ANESTHESIA EA ADD 15 MINS: Performed by: INTERNAL MEDICINE

## 2019-08-19 PROCEDURE — 85730 THROMBOPLASTIN TIME PARTIAL: CPT

## 2019-08-19 PROCEDURE — 36415 COLL VENOUS BLD VENIPUNCTURE: CPT

## 2019-08-19 PROCEDURE — D9220A PRA ANESTHESIA: ICD-10-PCS | Mod: ANES,,, | Performed by: ANESTHESIOLOGY

## 2019-08-19 PROCEDURE — D9220A PRA ANESTHESIA: Mod: ANES,,, | Performed by: ANESTHESIOLOGY

## 2019-08-19 PROCEDURE — 92960 CARDIOVERSION ELECTRIC EXT: CPT | Performed by: INTERNAL MEDICINE

## 2019-08-19 PROCEDURE — 92960 CARDIOVERSION ELECTRIC EXT: CPT | Mod: ,,, | Performed by: INTERNAL MEDICINE

## 2019-08-19 PROCEDURE — 85025 COMPLETE CBC W/AUTO DIFF WBC: CPT

## 2019-08-19 PROCEDURE — 37000008 HC ANESTHESIA 1ST 15 MINUTES: Performed by: INTERNAL MEDICINE

## 2019-08-19 PROCEDURE — 20600001 HC STEP DOWN PRIVATE ROOM

## 2019-08-19 PROCEDURE — 63600175 PHARM REV CODE 636 W HCPCS: Performed by: STUDENT IN AN ORGANIZED HEALTH CARE EDUCATION/TRAINING PROGRAM

## 2019-08-19 PROCEDURE — 63600175 PHARM REV CODE 636 W HCPCS: Performed by: NURSE ANESTHETIST, CERTIFIED REGISTERED

## 2019-08-19 PROCEDURE — 93005 ELECTROCARDIOGRAM TRACING: CPT

## 2019-08-19 PROCEDURE — 92611 MOTION FLUOROSCOPY/SWALLOW: CPT

## 2019-08-19 PROCEDURE — 25000003 PHARM REV CODE 250: Performed by: INTERNAL MEDICINE

## 2019-08-19 PROCEDURE — 85610 PROTHROMBIN TIME: CPT

## 2019-08-19 PROCEDURE — 92960 PR CARDIOVERSION, ELECTIVE;EXTERN: ICD-10-PCS | Mod: ,,, | Performed by: INTERNAL MEDICINE

## 2019-08-19 PROCEDURE — 83735 ASSAY OF MAGNESIUM: CPT

## 2019-08-19 PROCEDURE — D9220A PRA ANESTHESIA: ICD-10-PCS | Mod: CRNA,,, | Performed by: NURSE ANESTHETIST, CERTIFIED REGISTERED

## 2019-08-19 PROCEDURE — 25000003 PHARM REV CODE 250: Performed by: NURSE ANESTHETIST, CERTIFIED REGISTERED

## 2019-08-19 RX ORDER — SILVER SULFADIAZINE 10 G/1000G
CREAM TOPICAL
Status: DISCONTINUED | OUTPATIENT
Start: 2019-08-19 | End: 2019-08-21

## 2019-08-19 RX ORDER — FENTANYL CITRATE 50 UG/ML
25 INJECTION, SOLUTION INTRAMUSCULAR; INTRAVENOUS EVERY 5 MIN PRN
Status: DISCONTINUED | OUTPATIENT
Start: 2019-08-19 | End: 2019-08-20

## 2019-08-19 RX ORDER — MEPERIDINE HYDROCHLORIDE 50 MG/ML
12.5 INJECTION INTRAMUSCULAR; INTRAVENOUS; SUBCUTANEOUS ONCE AS NEEDED
Status: ACTIVE | OUTPATIENT
Start: 2019-08-19 | End: 2019-08-20

## 2019-08-19 RX ORDER — HYDROMORPHONE HYDROCHLORIDE 1 MG/ML
0.2 INJECTION, SOLUTION INTRAMUSCULAR; INTRAVENOUS; SUBCUTANEOUS EVERY 5 MIN PRN
Status: DISCONTINUED | OUTPATIENT
Start: 2019-08-19 | End: 2019-08-20

## 2019-08-19 RX ORDER — ONDANSETRON 2 MG/ML
4 INJECTION INTRAMUSCULAR; INTRAVENOUS ONCE AS NEEDED
Status: DISCONTINUED | OUTPATIENT
Start: 2019-08-19 | End: 2019-08-21

## 2019-08-19 RX ORDER — DIPHENHYDRAMINE HYDROCHLORIDE 50 MG/ML
25 INJECTION INTRAMUSCULAR; INTRAVENOUS EVERY 6 HOURS PRN
Status: DISCONTINUED | OUTPATIENT
Start: 2019-08-19 | End: 2019-08-21

## 2019-08-19 RX ORDER — EPHEDRINE SULFATE 50 MG/ML
INJECTION, SOLUTION INTRAVENOUS
Status: DISCONTINUED | OUTPATIENT
Start: 2019-08-19 | End: 2019-08-19

## 2019-08-19 RX ORDER — PROPOFOL 10 MG/ML
VIAL (ML) INTRAVENOUS
Status: DISCONTINUED | OUTPATIENT
Start: 2019-08-19 | End: 2019-08-19

## 2019-08-19 RX ORDER — LIDOCAINE HCL/PF 100 MG/5ML
SYRINGE (ML) INTRAVENOUS
Status: DISCONTINUED | OUTPATIENT
Start: 2019-08-19 | End: 2019-08-19

## 2019-08-19 RX ADMIN — EPHEDRINE SULFATE 10 MG: 50 INJECTION, SOLUTION INTRAMUSCULAR; INTRAVENOUS; SUBCUTANEOUS at 12:08

## 2019-08-19 RX ADMIN — AMIODARONE HYDROCHLORIDE 400 MG: 200 TABLET ORAL at 08:08

## 2019-08-19 RX ADMIN — METOPROLOL TARTRATE 25 MG: 25 TABLET ORAL at 10:08

## 2019-08-19 RX ADMIN — PANTOPRAZOLE SODIUM 40 MG: 40 TABLET, DELAYED RELEASE ORAL at 10:08

## 2019-08-19 RX ADMIN — SENNOSIDES,DOCUSATE SODIUM 1 TABLET: 8.6; 5 TABLET, FILM COATED ORAL at 10:08

## 2019-08-19 RX ADMIN — LIDOCAINE HYDROCHLORIDE 100 MG: 20 INJECTION, SOLUTION INTRAVENOUS at 12:08

## 2019-08-19 RX ADMIN — INSULIN ASPART 2 UNITS: 100 INJECTION, SOLUTION INTRAVENOUS; SUBCUTANEOUS at 08:08

## 2019-08-19 RX ADMIN — INSULIN ASPART 2 UNITS: 100 INJECTION, SOLUTION INTRAVENOUS; SUBCUTANEOUS at 11:08

## 2019-08-19 RX ADMIN — PROPOFOL 100 MG: 10 INJECTION, EMULSION INTRAVENOUS at 12:08

## 2019-08-19 RX ADMIN — AMIODARONE HYDROCHLORIDE 400 MG: 200 TABLET ORAL at 10:08

## 2019-08-19 RX ADMIN — SODIUM CHLORIDE, SODIUM GLUCONATE, SODIUM ACETATE, POTASSIUM CHLORIDE, MAGNESIUM CHLORIDE, SODIUM PHOSPHATE, DIBASIC, AND POTASSIUM PHOSPHATE: .53; .5; .37; .037; .03; .012; .00082 INJECTION, SOLUTION INTRAVENOUS at 12:08

## 2019-08-19 RX ADMIN — METOPROLOL TARTRATE 25 MG: 25 TABLET ORAL at 08:08

## 2019-08-19 RX ADMIN — POLYETHYLENE GLYCOL 3350 17 G: 17 POWDER, FOR SOLUTION ORAL at 10:08

## 2019-08-19 NOTE — PLAN OF CARE
Problem: Adult Inpatient Plan of Care  Goal: Plan of Care Review  Outcome: Ongoing (interventions implemented as appropriate)  Patient free of falls/traumas/injuries. TTE/Cardioversion successful. Transitioned to an Adult Regular Diet. Mg 1.9 today. Cough drops at bedside. BG q6hr. SSI administered as needed. Mid-sternal chest incision C/D/I. Pending d/c home tomorrow. Skin clean, dry, and intact. Patient educated on plan of care and verbalized understanding. Patients VS stable and no distress. Will continue to monitor.

## 2019-08-19 NOTE — PLAN OF CARE
Problem: SLP Goal  Goal: SLP Goal  Outcome: Ongoing (interventions implemented as appropriate)  Regular diet with thin liquids recommended.  Sit up at 90 degree angle and alternate solids and liquids.    Micaela Simon MA/[facility]  Speech Language Pathologist  Pager [phone]  8/19/2019

## 2019-08-19 NOTE — NURSING TRANSFER
Nursing Transfer Note      8/19/2019     Transfer To: EP    Transfer via stretcher    Transfer with cardiac monitoring    Transported by Transport    Medicines sent: no    Chart send with patient: Yes

## 2019-08-19 NOTE — PROGRESS NOTES
Pt's wife re updated over phone by ep pacu rn. Verbalizes understanding.  Pt's wife in pt's room 6726.  12 lead ekg done per md order. nsr noted.

## 2019-08-19 NOTE — NURSING TRANSFER
Nursing Transfer Note      8/19/2019     Transfer From: EP LAB    Transfer via stretcher    Transfer with cardiac monitoring    Transported by PCT    Medicines sent: no    Chart send with patient: Yes    Notified: spouse    Patient reassessed at: 14:30 8/19/2019   Upon arrival to floor: cardiac monitor applied, patient oriented to room, call bell in reach and bed in lowest position, diet order obtained

## 2019-08-19 NOTE — PLAN OF CARE
Extended Emergency Contact Information  Primary Emergency Contact: Susannah Barnes  [place] [address]            [place] [address] [place] [facility]  Home Phone: [phone]  Mobile Phone: [phone]  Relation: Spouse    Garry Stover MD  [address] / [place] [address]    Future Appointments   Date Time Provider Department Center   9/19/2019  1:00 PM Angel Mena MD [facility]   11/25/2019  8:00 AM [facility]     Payor: [employer] MEDICARE / Plan: Kidbox 65 / Product Type: Medicare Advantage /       [facility] [place] [address]  [address]  [place] [address]  Phone: [phone] Fax: [phone]       08/19/19 1697   Discharge Assessment   Assessment Type Discharge Planning Assessment   Confirmed/corrected address and phone number on facesheet? No   Assessment information obtained from? Medical Record   Expected Length of Stay (days) 4   Communicated expected length of stay with patient/caregiver no   Prior to hospitilization cognitive status: Alert/Oriented   Prior to hospitalization functional status: Needs Assistance   Current cognitive status: Alert/Oriented   Current Functional Status: Needs Assistance   Lives With spouse   Is patient able to care for self after discharge? Yes   Patient's perception of discharge disposition home or selfcare   Readmission Within the Last 30 Days previous discharge plan unsuccessful   If yes, most recent facility name: [facility]   Patient currently being followed by outpatient case management? No   Patient currently receives any other outside agency services? No   Equipment Currently Used at Home glucometer;medication pump   Do you have any problems affording any of your prescribed medications? No   Is the patient taking medications as prescribed? yes   Does the patient have transportation home? Yes    Transportation Anticipated family or friend will provide   Does the patient receive services at the [facility]mad[facility] Clinic? Yes   Discharge Plan A Home   Discharge Plan B Home;Home Health   DME Needed Upon Discharge  none   Patient/Family in Agreement with Plan unable to assess

## 2019-08-19 NOTE — PLAN OF CARE
HR 70s-130s (not sustained, team aware) a-flutter, confirmed with 12 lead EKG in the AM. Amiodarone gtt stopped, pt started on PO Amiodarone. Up to [facility] and in recliner from [phone]. Pt transferred to CTSU, tolerated well.

## 2019-08-19 NOTE — CARE UPDATE
"RAPID RESPONSE NURSE PROACTIVE ROUNDING NOTE     Time of Visit:     Admit Date: 2019  LOS: 2  Code Status: Full Code   Date of Visit: 2019  : 1965  Age: 53 y.o.  Sex: male  Race: White  Bed: [facility] 3092/[facility] 3092 A:   MRN: [id number]  Was the patient discharged from an ICU this admission? yes   Was the patient discharged from a PACU within last 24 hours?  no  Did the patient receive conscious sedation/general anesthesia in last 24 hours?  no  Was the patient in the ED within the past 24 hours?  no  Was the patient started on NIPPV within the past 24 hours?  no  Attending Physician: Ryan Knight MD  Primary Service: Networked reference to record PCT     ASSESSMENT     Diagnosis: Atrial flutter    Abnormal Vital Signs: /63 (Patient Position: Lying)   Pulse (!) 135   Temp 97.8 °F (36.6 °C) (Oral)   Resp 16   Ht 5' 8" (1.727 m)   Wt 103.9 kg (229 lb 0.9 oz)   SpO2 96%   BMI 34.83 kg/m²      Clinical Issues: Dysrythmia    Patient  has a past medical history of Aortic stenosis, Cancer, Colon cancer, Coronary artery disease, Diabetes mellitus type I, Heart murmur, Heel fracture, HTN (hypertension), Hyperlipidemia LDL goal < 70, Insulin pump in place, MVP (mitral valve prolapse), and Stenosis of aortic and mitral valves.    Proactive rounding for MEWS 4. SD from SICU this evening. Pt remains in A Flutter rate 120's-130's, scheduled for DCC tomorrow. Pt states that he feels great. Alert talkative, NAD. Taking PO Amio      INTERVENTIONS/ RECOMMENDATIONS     Continue to monitor    Discussed plan of care with RNCoral.    PHYSICIAN ESCALATION     Yes/No  no    Orders received and case discussed with NA.    Disposition: Remain in room 3092.    FOLLOW-UP     Call back the Rapid Response Nurse, Lupe Clemens RN at [phone] for additional questions or concerns.          "

## 2019-08-19 NOTE — PLAN OF CARE
Pt's vss. nsr noted.  sats 96% on room air. Cardioversion x1, silvadene to chest and back. Transferred with pt in pt's gown pocket.  Tele box on, confirmed by tele tech.  Pt's wife re updated over phone by ep pacu rn. Verbalizes understanding. See flowsheet for full assessment.  midsternal incision amol, well approx with steri strips amol.  Pt denies pain. See flowsheet for full assessment. 12 lead ekg done per md order. nsr noted.

## 2019-08-19 NOTE — PLAN OF CARE
Problem: Adult Inpatient Plan of Care  Goal: Plan of Care Review  Outcome: Ongoing (interventions implemented as appropriate)  POC reviewed with pt with all questions answered. VSS and pt has no complaint of SOB or pain. O2 >95% on room air. Aflutter on tele. HR 110s-120s. Pt given cough drops for cough. NPO after 0000 for CAMI and cardioversion. Midsternal incision SIGRID with steristrips and was cleaned with soap and water. Fall precautions maintained and pt remains free from falls.

## 2019-08-19 NOTE — NURSING TRANSFER
Nursing Transfer Note      8/19/2019     Transfer To: Cath Lab    Transfer via stretcher    Transfer with cardiac monitoring    Transported by Transport    Medicines sent: no    Chart send with patient: Yes    Notified: spouse

## 2019-08-19 NOTE — NURSING TRANSFER
Nursing Transfer Note      8/18/2019     [facility]    Transfer via wheelchair    Transfer with cardiac monitoring, 2L NC O2    Transported by RN and PCT    Medicines sent: Vancomycin    Chart send with patient: Yes    Notified: spouse    Patient reassessed at: 1700 08/18/2019    Upon arrival to floor: cardiac monitor applied, patient oriented to room, call bell in reach and bed in lowest position

## 2019-08-19 NOTE — NURSING TRANSFER
Nursing Transfer Note      8/19/2019     Transfer To: ep pacu 4 to 3092    Transfer via stretcher    Transfer with cardiac monitoring, tele box on 3092. Confirmed by tele tech    Transported by suzanne cath lab pct    Medicines sent: silvadene    Chart send with patient: Yes    Notified: spouse    Patient reassessed at: 8/19/19 1345  Upon arrival to floor: cardiac monitor applied, patient oriented to room, call bell in reach and bed in lowest position

## 2019-08-19 NOTE — ANESTHESIA PREPROCEDURE EVALUATION
[facility]  Anesthesia Pre-Operative Evaluation         Patient Name: Cj Barnes  YOB: 1965  MRN: [id number]    SUBJECTIVE:     Pre-operative evaluation for Procedure(s) (LRB):  cardioversion     08/19/2019    Cj Barnes is a 53 y.o. male w/ a significant PMHx of T1DM w/insulin pump, TIA, HTN, stage 2 colon CA s/p resection '15     Seen in pre-op clinic 8/8, patient with reports of progressively worsening AS symptoms in past several months including GORDON and angina. Now s/p bentall    Prev airway:  Easy mask with oral airway - grade 3 view with palmer 2      Patient Active Problem List   Diagnosis    Essential hypertension    Insulin pump fitting or adjustment    Diabetes mellitus type I    Hyperlipidemia with target low density lipoprotein (LDL) cholesterol less than 70 mg/dL    Insulin pump in place    Tobacco use    Plantar fasciitis    Aortic stenosis    Aortic regurgitation, congenital    TIA (transient ischemic attack)    Coronary artery disease involving native coronary artery of native heart without angina pectoris    Type 1 diabetes mellitus with hypoglycemia unawareness    Class 1 obesity due to excess calories with serious comorbidity and body mass index (BMI) of 33.0 to 33.9 in adult    S/P ascending aortic replacement    Acute blood loss anemia    Hypophosphatemia    Long term (current) use of anticoagulants    Atrial flutter    Trouble swallowing    Hoarse voice quality    Sepsis       Review of patient's allergies indicates:  No Known Allergies    Current Inpatient Medications:      Current Facility-Administered Medications on File Prior to Visit   Medication Dose Route Frequency Provider Last Rate Last Dose    acetaminophen tablet 650 mg  650 mg Oral Q4H PRN Caren Gregorio MD        amiodarone tablet 400 mg  400 mg Oral BID Caren Gregorio MD   400 mg at 08/19/19 1045    dextrose 10% (D10W) Bolus  12.5 g  Intravenous PRN Derek Redding MD   125 mL at 08/18/19 0009    glucagon (human recombinant) injection 1 mg  1 mg Intramuscular PRN Derek Redding MD        insulin aspart U-100 pen 1-10 Units  1-10 Units Subcutaneous Q6H PRN Derek Redding MD   2 Units at 08/19/19 1154    lactated ringers bolus 1,000 mL  1,000 mL Intravenous Once Derek Redding MD        magnesium oxide tablet 800 mg  800 mg Oral PRN Caren Gregorio MD        magnesium oxide tablet 800 mg  800 mg Oral PRN Caren Gregorio MD        metoprolol tartrate (LOPRESSOR) tablet 25 mg  25 mg Oral BID Caren Gregorio MD   25 mg at 08/19/19 1044    oxyCODONE immediate release tablet 5 mg  5 mg Oral Q4H PRN Caren Gregorio MD   5 mg at 08/17/19 0400    oxyCODONE immediate release tablet Tab 10 mg  10 mg Oral Q4H PRN Caren Gregorio MD   10 mg at 08/18/19 0205    pantoprazole EC tablet 40 mg  40 mg Oral Daily Sj Kennedy MD   40 mg at 08/19/19 1045    polyethylene glycol packet 17 g  17 g Oral Daily Caren Gregorio MD   17 g at 08/19/19 1044    potassium chloride 10% oral solution 40 mEq  40 mEq Oral PRN Caren Gregorio MD        potassium chloride 10% oral solution 40 mEq  40 mEq Oral PRN Caren Gregorio MD        potassium chloride 10% oral solution 60 mEq  60 mEq Oral PRN Caren Gregorio MD        potassium, sodium phosphates 280-160-250 mg packet 2 packet  2 packet Oral PRN Caren Gregoiro MD   2 packet at 08/18/19 1445    potassium, sodium phosphates 280-160-250 mg packet 2 packet  2 packet Oral PRN Caren Gregorio MD        potassium, sodium phosphates 280-160-250 mg packet 2 packet  2 packet Oral PRN Caren Gregorio MD        promethazine (PHENERGAN) 6.25 mg in dextrose 5 % 50 mL IVPB  6.25 mg Intravenous Q6H PRN EstrellaMD Beau Durant-docusate 8.6-50 mg per tablet 1 tablet  1 tablet Oral BID Caren  "Dilcia Gregorio MD   1 tablet at 08/19/19 1045    sodium chloride 0.9% flush 10 mL  10 mL Intravenous PRN EstrellaDilcia Gregorio MD         Current Outpatient Medications on File Prior to Visit   Medication Sig Dispense Refill    amLODIPine (NORVASC) 5 MG tablet Take 1 tablet (5 mg total) by mouth once daily. 30 tablet 11    aspirin (ECOTRIN) 325 MG EC tablet Take 1 tablet (325 mg total) by mouth once daily.  0    blood sugar diagnostic Strp 1 each by Misc.(Non-Drug; Combo Route) route 6 (six) times daily. Contour next strips. Pt needs contour jackelyn for compatibility w/ insulin pump (medtronic 670g). Necessity 200 strip 6    blood-glucose sensor (GUARDIAN SENSOR 3) Apple 1 each by Misc.(Non-Drug; Combo Route) route once a week. 12 each 3    clotrimazole-betamethasone 1-0.05% (LOTRISONE) cream APPLY A SMALL AMOUNT TO AFFECTED AREA THREE TIMES DAILY UNTIL CLEAR  0    COMFORT EZ PEN NEEDLES 33 gauge x 3/16" Ndle USE AS DIRECTED with Levemir pens  11    docusate sodium (COLACE) 100 MG capsule Take 1 capsule (100 mg total) by mouth 2 (two) times daily.  0    furosemide (LASIX) 20 MG tablet Take one tablet by mouth twice daily for 7 days then decrease to once daily 30 tablet 11    gabapentin (NEURONTIN) 300 MG capsule Take 2 capsules (600 mg total) by mouth 3 (three) times daily. 180 capsule 6    glucagon, human recombinant, (GLUCAGON EMERGENCY KIT, HUMAN,) 1 mg SolR Inject 1 mg into the muscle as needed. 1 each 0    insulin detemir U-100 (LEVEMIR FLEXTOUCH U-100 INSULN) 100 unit/mL (3 mL) SubQ InPn pen Inject 48 Units into the skin every evening. 3 mL 0    insulin regular 100 unit/mL Soln Use home settings Basal 0.4    Bolus/carb 12    ISF 20 1 Product 0    JARDIANCE 25 mg Tab TAKE ONE TABLET BY MOUTH DAILY FOR DIABETES  11    lisinopril (PRINIVIL,ZESTRIL) 5 MG tablet Take 1 tablet (5 mg total) by mouth once daily. 90 tablet 3    metFORMIN (GLUCOPHAGE-XR) 500 MG 24 hr tablet Take 1 tablet (500 mg " total) by mouth daily with breakfast. 90 tablet 3    metoprolol tartrate (LOPRESSOR) 25 MG tablet Take 1 tablet (25 mg total) by mouth 2 (two) times daily. 60 tablet 11    NOVOLOG U-100 INSULIN ASPART 100 unit/mL injection INJECT 180 UNITS SUBCUTANEOUSLY EVERY ONE AND A HALF DAYS  11    oxyCODONE (ROXICODONE) 5 MG immediate release tablet Take 1 tablet (5 mg total) by mouth every 4 (four) hours as needed. 42 tablet 0    pantoprazole (PROTONIX) 40 MG tablet Take 1 tablet (40 mg total) by mouth before breakfast. 30 tablet 11    potassium chloride SA (K-DUR,KLOR-CON) 20 MEQ tablet Take one tablet by mouth twice daily for 7 days then decrease to once daily 42 tablet 3    simvastatin (ZOCOR) 20 MG tablet Take 1 tablet (20 mg total) by mouth every evening. 90 tablet 3    warfarin (COUMADIN) 3 MG tablet Take 1 tablet (3 mg total) by mouth Daily. 30 tablet 11       Past Surgical History:   Procedure Laterality Date    BACK SURGERY      BENTALL PROCEDURE N/A 8/9/2019    [facility]    COLON SURGERY  2014    FASCIOTOMY, PLANTAR Right 10/4/2013    Performed by Ruma Robbins DPM at [facility]    FASCIOTOMY, PLANTAR, ENDOSCOPIC Right 10/4/2013    Performed by Ruma [facility]bins [facility]    FOOT TENDON SURGERY      Left heart cath Left 1/15/2018    Performed by Palomo Turner MD at [facility]    Replacement-valve-aortic N/A 8/9/2019    Performed by Ryan Knight MD at [facility]    right and left heart cath Bilateral 01/15/2018    Right heart cath Right 1/15/2018    Performed by Palomo Turner MD at [facility]    TRIGGER FINGER RELEASE      x 2       Social History     Socioeconomic History    Marital status:      Spouse name: Not on file    Number of children: Not on file    Years of education: Not on file    Highest education level: Not on file   Occupational History    Not on file   Social Needs    Financial  resource strain: Not on file    Food insecurity:     Worry: Not on file     Inability: Not on file    Transportation needs:     Medical: Not on file     Non-medical: Not on file   Tobacco Use    Smoking status: Never Smoker    Smokeless tobacco: Current User     Types: Snuff    Tobacco comment: since he was 12 yrs old   Substance and Sexual Activity    Alcohol use: No     Comment: socially    Drug use: No    Sexual activity: Not on file   Lifestyle    Physical activity:     Days per week: Not on file     Minutes per session: Not on file    Stress: Not on file   Relationships    Social connections:     Talks on phone: Not on file     Gets together: Not on file     Attends Evangelical service: Not on file     Active member of club or organization: Not on file     Attends meetings of clubs or organizations: Not on file     Relationship status: Not on file   Other Topics Concern    Not on file   Social History Narrative        2 grown kids    Delivers seafood       OBJECTIVE:     Vital Signs Range (Last 24H):  Temp:  [36.4 °C (97.5 °F)-36.8 °C (98.2 °F)]   Pulse:  []   Resp:  [14-31]   BP: ()/(56-86)   SpO2:  [73 %-100 %]       Significant Labs:  Lab Results   Component Value Date    WBC 9.36 08/19/2019    HGB 8.8 (L) 08/19/2019    HCT 27.4 (L) 08/19/2019     08/19/2019    CHOL 123 04/24/2019    TRIG 44 04/24/2019    HDL 37 (L) 04/24/2019    ALT 18 08/16/2019    AST 21 08/16/2019     08/18/2019    K 3.9 08/18/2019     08/18/2019    CREATININE 0.8 08/18/2019    BUN 12 08/18/2019    CO2 18 (L) 08/18/2019    TSH 1.13 04/24/2019    PSA 0.38 12/11/2017    INR 2.7 (H) 08/19/2019    HGBA1C 6.6 (H) 07/23/2019       Diagnostic Studies: No relevant studies.    EKG:   Results for orders placed or performed during the hospital encounter of 08/16/19   EKG 12-lead    Collection Time: 08/18/19  8:35 AM    Narrative    Test Reason : I49.9,    Vent. Rate : 109 BPM     Atrial Rate : 278  BPM     P-R Int : 000 ms          QRS Dur : 090 ms      QT Int : 328 ms       P-R-T Axes : 042 044 -63 degrees     QTc Int : 441 ms    Atrial flutter with variable A-V block  T wave abnormality, consider inferior ischemia  Abnormal ECG  When compared with ECG of 17-AUG-2019 14:40,  Nonspecific T wave abnormality no longer evident in Anterior leads  QT has lengthened    Referred By: RANGEL LEE           Confirmed By:          2D ECHO:  · Mildly decreased left ventricular systolic function. The estimated ejection fraction is 40%  · Left ventricular diastolic dysfunction.  · There is a 23 mm bileaflet tilting disc mechanical aortic valve present. Prosthetic aortic valve is normal.  · Prosthetic graft present in the ascending aorta.  · Fluid collection partially visualized on image 8,9,11 no appreciable flow  · Mild tricuspid regurgitation.  · Mildly reduced right ventricular systolic function.  · Mild right ventricular enlargement.  · Mild right atrial enlargement.  · Septal wall has abnormal motion.  · The estimated PA systolic pressure is 29 mm Hg  · Intermediate central venous pressure (8 mm Hg).      ASSESSMENT/PLAN:       Pre-op Assessment    I have reviewed the Patient Summary Reports.     I have reviewed the Nursing Notes.   I have reviewed the Medications.     Review of Systems  Anesthesia Hx:  History of prior surgery of interest to airway management or planning: Denies Family Hx of Anesthesia complications.   Denies Personal Hx of Anesthesia complications.   Hematology/Oncology:         -- Denies Anemia: --  Cancer in past history:    Cardiovascular:   Hypertension Valvular problems/Murmurs CAD   Denies CABG/stent.  GORDON    Pulmonary:   Denies Pneumonia Denies COPD.  Denies Asthma. Shortness of breath    Renal/:   Denies Chronic Renal Disease.     Hepatic/GI:   Denies GERD. Denies Liver Disease.  Denies Hepatitis.    Neurological:   TIA,    Endocrine:   Diabetes, well controlled, type 1         Physical Exam  General:  Well nourished, Obesity    Airway/Jaw/Neck:  Airway Findings: Mouth Opening: Normal Tongue: Normal  General Airway Assessment: Adult, Possible difficult intubation  Mallampati: II  Improves to I with phonation.  TM Distance: Normal, at least 6 cm  Jaw/Neck Findings:  Neck ROM: Normal ROM     Eyes/Ears/Nose:  EYES/EARS/NOSE FINDINGS: Normal   Dental:  Dental Findings: In tact    Chest/Lungs:  Chest/Lungs Findings: Clear to auscultation     Heart/Vascular:  Heart Findings: Rate: Normal  Rhythm: Regular Rhythm  Sounds: Normal     Abdomen:  Abdomen Findings:  Normal     Musculoskeletal:  Musculoskeletal Findings:    Skin:  Skin Findings:     Mental Status:  Mental Status Findings:  Cooperative, Alert and Oriented         Anesthesia Plan  Type of Anesthesia, risks & benefits discussed:  Anesthesia Type:  general, MAC  Patient's Preference:   Intra-op Monitoring Plan: standard ASA monitors  Intra-op Monitoring Plan Comments:   Post Op Pain Control Plan: multimodal analgesia, IV/PO Opioids PRN, peripheral nerve block and per primary service following discharge from PACU  Post Op Pain Control Plan Comments:   Induction:   IV  Beta Blocker:  Patient is not currently on a Beta-Blocker (No further documentation required).       Informed Consent:    ASA Score: 3     Day of Surgery Review of History & Physical:    H&P update referred to the surgeon.         Ready For Surgery From Anesthesia Perspective.

## 2019-08-19 NOTE — PROCEDURES
Modified Barium Swallow    Patient Name:  Cj Barnes   MRN:  [id number]      Recommendations:     Recommendations:                 Diet recommendations:  Regular, Thin   Aspiration Precautions: Alternating bites/sips, HOB to 90 degrees, Small bites/sips and Standard aspiration precautions   General Precautions: Standard, aspiration  Communication strategies:  none    Referral     Reason for Referral  Patient was referred for a Modified Barium Swallow Study to assess the efficiency of his/her swallow function, rule out aspiration and make recommendations regarding safe dietary consistencies, effective compensatory strategies, and safe eating environment.     Diagnosis: Atrial flutter       History:     Past Medical History:   Diagnosis Date    Aortic stenosis 2018    Cancer 2014    Colon cancer     Coronary artery disease     Diabetes mellitus type I     since in his 30's    Heart murmur     Heel fracture     HTN (hypertension)     Hyperlipidemia LDL goal < 70     Insulin pump in place     MVP (mitral valve prolapse)     Stenosis of aortic and mitral valves        Objective:     Current Respiratory Status: 08/19/19    Alert: yes    Cooperative: yes    Follows Directions: yes    Visualization  · Patient was seen in the lateral view    Oral Peripheral Examination  ·  wfl    Consistencies Assessed  · Thin 2 teaspoons followed by 10 sips of thin liquid via cup with and without a straw  · Puree 2teaspoons  · Solids 1/2 cracker    Oral Preparation/Oral Phase  · WFL- Pt with adequate bolus acceptance, containment, control and timely A-P transfer across consistencies     Pharyngeal Phase   No delay in initiation of pharyngeal swallow with adequate laryngeal excursion noted.  Trace flash penetration observed x2 when swallowing larger bolus' of thin liquid.  NO aspiration observed on any consistency.  Mild-mod valleculae and pyriform sinus stasis was noted across all consistencies  followingg the swallow most  of which was cleared with liquid wash or dry swallow.      Cervical Esophageal Phase  · UES appeared to accommodate all bolus types without stasis or retrograde movement observed     Assessment:     Impressions  ·   Patient demonstrates mild pharyngeal dysphagia characterized by pharyngeal stasis with no aspiration on any consistency.  .     Prognosis: Good    Barriers:  · None    Plan  REgular diet with thin liquids recommended.  Sit up at 90 degree angle during meals.  Alternate solids and liquids.      Education  Results were discussed with patient.    Goals:   Multidisciplinary Problems     SLP Goals        Problem: SLP Goal    Goal Priority Disciplines Outcome   SLP Goal     SLP Ongoing (interventions implemented as appropriate)                   Plan:   · Patient to be seen:      · Plan of Care expires:     · Plan of Care reviewed with:  patient        Discharge recommendations:  home   Barriers to Discharge:  None    Time Tracking:   SLP Treatment Date:   08/19/19  Speech Start Time:  0810  Speech Stop Time:  0830     Speech Total Time (min):  20 min    Micaela Simon MA, CCC-SLP  08/19/2019

## 2019-08-19 NOTE — ASSESSMENT & PLAN NOTE
52 yo male who underwent a Bentall procedure with mech valve on 8/9 presenting to ED with trouble swallowing and dry tongue but found to have significant leukocytosis, tachycardia with intermittent hypotension, and fluid collection around surgical area on CT. He denies fevers, chest pain, SOB, palpitations. He was given 3L of fluids in the ED with no change in HR.     Pt is currently stable without hypotension. EKG in ICU demonstrates likely atrial flutter. Rate control with esmolol was unsuccessful. Start Amiodarone for rhythm control. Support as necessary.     INR 2.7  Withholding all anticoagulation       CBC daily. H/H stable.    EKG suggestive of atrial flutter w/ RVR.   Plan for DCCV with EP today 8/19  Blood pressure stable  D/c Amio gtt - start PO amio  Continue home metoprolol

## 2019-08-19 NOTE — PROGRESS NOTES
[facility]  Cardiothoracic Surgery  Progress Note    Patient Name: Cj Barnes  MRN: [id number]  Admission Date: 8/16/2019  Hospital Length of Stay: 3 days  Code Status: Full Code   Attending Physician: Ryan Knight MD   Referring Provider: Umang Infante  Principal Problem:Atrial flutter            Subjective:     Post-Op Info:  Procedure(s) (LRB):  CARDIOVERSION (N/A)   Day of Surgery     Interval History: Pt going for cardioversion today. Had barium swallow this AM and SLP recommend regular diet with thin liquids. Cultures remain negative.     Review of Systems   Constitution: Negative for malaise/fatigue.   Cardiovascular: Negative for chest pain, dyspnea on exertion and leg swelling.   Respiratory:        Hoarse    Hematologic/Lymphatic: Negative for bleeding problem.   Gastrointestinal: Negative for abdominal pain.     Medications:  Continuous Infusions:  Scheduled Meds:   amiodarone  400 mg Oral BID    lactated ringers  1,000 mL Intravenous Once    metoprolol tartrate  25 mg Oral BID    pantoprazole  40 mg Oral Daily    polyethylene glycol  17 g Oral Daily    senna-docusate 8.6-50 mg  1 tablet Oral BID     PRN Meds:acetaminophen, Dextrose 10% Bolus, glucagon (human recombinant), insulin aspart U-100, magnesium oxide, magnesium oxide, oxyCODONE, oxyCODONE, potassium chloride 10%, potassium chloride 10%, potassium chloride 10%, potassium, sodium phosphates, potassium, sodium phosphates, potassium, sodium phosphates, promethazine (PHENERGAN) IVPB, sodium chloride 0.9%     Objective:     Vital Signs (Most Recent):  Temp: 98.1 °F (36.7 °C) (08/19/19 1038)  Pulse: (!) 122 (08/19/19 0912)  Resp: 18 (08/19/19 1038)  BP: 114/75 (08/19/19 1038)  SpO2: 98 % (08/19/19 1038) Vital Signs (24h Range):  Temp:  [97.5 °F (36.4 °C)-98.2 °F (36.8 °C)] 98.1 °F (36.7 °C)  Pulse:  [] 122  Resp:  [14-31] 18  SpO2:  [73 %-100 %] 98 %  BP: ()/(56-86) 114/75     Weight: 103.9  kg (229 lb 0.9 oz)  Body mass index is 34.83 kg/m².    SpO2: 98 %  O2 Device (Oxygen Therapy): room air    Intake/Output - Last 3 Shifts       08/17 0700 - 08/18 0659 08/18 0700 - 08/19 0659 08/19 0700 - 08/20 0659    P.O.  1560 0    I.V. (mL/kg) 582 (5.6) 33.4 (0.3)     IV Piggyback 1225 350     Total Intake(mL/kg) 1807 (17.4) 1943.4 (18.7) 0 (0)    Urine (mL/kg/hr) 3525 (1.4) 1800 (0.7) 900 (1.7)    Stool 0 0 0    Total Output 3525 1800 900    Net -1718 +143.4 -900           Urine Occurrence 1 x      Stool Occurrence 1 x 1 x 1 x          Lines/Drains/Airways     Peripheral Intravenous Line                 Peripheral IV - Single Lumen 08/16/19 0821 18 G Right Antecubital 3 days         Peripheral IV - Single Lumen 08/16/19 1218 18 G Right Forearm 2 days                Physical Exam   Constitutional: He is oriented to person, place, and time. He appears well-developed and well-nourished. No distress.   HENT:   Head: Normocephalic and atraumatic.   Eyes: Pupils are equal, round, and reactive to light. EOM are normal.   Neck: Normal range of motion.   Cardiovascular: Regular rhythm and normal pulses. Exam reveals no gallop and no friction rub.   No murmur heard.  Tachycardic   Afib/flutter    Pulmonary/Chest: Effort normal. No respiratory distress.   Hoarse voice    Musculoskeletal: Normal range of motion.   Neurological: He is alert and oriented to person, place, and time.   Skin: Skin is warm, dry and intact. Capillary refill takes less than 2 seconds. He is not diaphoretic.   Psychiatric: He has a normal mood and affect. His speech is normal and behavior is normal. Judgment and thought content normal.   Vitals reviewed.      Significant Labs:  BMP:   Recent Labs   Lab 08/18/19  0250 08/19/19  0003   *  --      --    K 3.9  --      --    CO2 18*  --    BUN 12  --    CREATININE 0.8  --    CALCIUM 7.9*  --    MG 2.1 1.9     CBC:   Recent Labs   Lab 08/19/19  0003   WBC 9.36   RBC 3.02*   HGB 8.8*    HCT 27.4*      MCV 91   MCH 29.1   MCHC 32.1       Significant Diagnostics:  Barium swallow   Transit: Normal oral transit time.    Penetration/Aspiration: Laryngeal penetration with thin liquids.  No subglottic aspiration.  Vallecular and piriform sinus stasis which partially cleared with subsequent swallows.    Miscellaneous: N/A.    TTE 8/17/19  · Mildly decreased left ventricular systolic function. The estimated ejection fraction is 40%  · Left ventricular diastolic dysfunction.  · There is a 23 mm bileaflet tilting disc mechanical aortic valve present. Prosthetic aortic valve is normal.  · Prosthetic graft present in the ascending aorta.  · Fluid collection partially visualized on image 8,9,11 no appreciable flow  · Mild tricuspid regurgitation.  · Mildly reduced right ventricular systolic function.  · Mild right ventricular enlargement.  · Mild right atrial enlargement.  · Septal wall has abnormal motion.  · The estimated PA systolic pressure is 29 mm Hg  · Intermediate central venous pressure (8 mm Hg).         Assessment/Plan:     * Atrial flutter  54 yo male who underwent a Bentall procedure with mech valve on 8/9 presenting to ED with trouble swallowing and dry tongue but found to have significant leukocytosis, tachycardia with intermittent hypotension, and fluid collection around surgical area on CT. He denies fevers, chest pain, SOB, palpitations. He was given 3L of fluids in the ED with no change in HR.     Pt is currently stable without hypotension. EKG in ICU demonstrates likely atrial flutter. Rate control with esmolol was unsuccessful. Start Amiodarone for rhythm control. Support as necessary.     INR 2.7  Withholding all anticoagulation       CBC daily. H/H stable.    EKG suggestive of atrial flutter w/ RVR.   Plan for DCCV with EP today 8/19  Blood pressure stable  D/c Amio gtt - start PO amio  Continue home metoprolol    Trouble swallowing  Had barium swallow today --> Patient  demonstrates mild pharyngeal dysphagia characterized by pharyngeal stasis with no aspiration on any consistency.  Thin liquid diet     CXR clear  ABG PRN  Sating well on room air   ENT consulted, post-extubation granulomas. Discuss starting prednisone 5mg BID      Cardiac Diet as tolerated, passed bedside swallow, good per SLP  Dual anti-acid. Famotidine + PPI     Cultures NGTD, No leukocytosis, afebrile   D/c vanc and zosyn            Dispo: [facility]TSU. Likely discharge tomorrow     Bhumika Reyes PA-C  Cardiothoracic Surgery  [facility]

## 2019-08-19 NOTE — TRANSFER OF CARE
"Anesthesia Transfer of Care Note    Patient: Cj Barnes    Procedure(s) Performed: Procedure(s) (LRB):  CARDIOVERSION (N/A)    Patient location: PACU    Anesthesia Type: general    Transport from OR: Transported from OR on room air with adequate spontaneous ventilation    Post pain: adequate analgesia    Post assessment: no apparent anesthetic complications and tolerated procedure well    Post vital signs: stable    Level of consciousness: awake and alert    Nausea/Vomiting: no nausea/vomiting    Complications: none    Transfer of care protocol was followed      Last vitals:   Visit Vitals  /75 (BP Location: Left arm, Patient Position: Sitting)   Pulse 98   Temp 36.7 °C (98.1 °F) (Oral)   Resp 18   Ht 5' 8" (1.727 m)   Wt 103.9 kg (229 lb 0.9 oz)   SpO2 98%   BMI 34.83 kg/m²     "

## 2019-08-19 NOTE — ASSESSMENT & PLAN NOTE
Had barium swallow today --> Patient demonstrates mild pharyngeal dysphagia characterized by pharyngeal stasis with no aspiration on any consistency.  Thin liquid diet     CXR clear  ABG PRN  Sating well on room air   ENT consulted, post-extubation granulomas. Discuss starting prednisone 5mg BID      Cardiac Diet as tolerated, passed bedside swallow, good per SLP  Dual anti-acid. Famotidine + PPI     Cultures NGTD, No leukocytosis, afebrile   D/c vanc and zosyn

## 2019-08-19 NOTE — PROGRESS NOTES
[facility]  Cardiothoracic Surgery  Progress Note    Patient Name: Cj Barnes  MRN: [id number]  Admission Date: 8/16/2019  Hospital Length of Stay: 3 days  Code Status: Full Code   Attending Physician: Ryan Knight MD   Referring Provider: Umang Infante  Principal Problem:Atrial flutter            Subjective:     Post-Op Info:  Procedure(s) (LRB):  CARDIOVERSION (N/A)   Day of Surgery     No new subjective & objective note has been filed under this hospital service since the last note was generated.    Assessment/Plan:     * Atrial flutter  52 yo male who [facility] presenting to ED with trouble swallowing and dry tongue but found to have significant leukocytosis, tachycardia with intermittent hypotension, and fluid collection around surgical area on CT. He denies fevers, chest pain, SOB, palpitations. He was given 3L of fluids in the ED with no change in HR.     Pt is currently stable without hypotension. EKG in ICU demonstrates likely atrial flutter. Rate control with esmolol was unsuccessful. Start Amiodarone for rhythm control. Support as necessary.     INR 2.7  Withholding all anticoagulation       CBC daily. H/H stable.    EKG suggestive of atrial flutter w/ RVR.   Plan for DCCV with EP today 8/19  Blood pressure stable  D/c Amio gtt - start PO amio  Continue home metoprolol    Trouble swallowing  Had barium swallow today --> Patient demonstrates mild pharyngeal dysphagia characterized by pharyngeal stasis with no aspiration on any consistency.  Thin liquid diet     CXR clear  ABG PRN  Sating well on room air   ENT consulted, post-extubation granulomas. Discuss starting prednisone 5mg BID      Cardiac Diet as tolerated, passed bedside swallow, good per SLP  Dual anti-acid. Famotidine + PPI     Cultures NGTD, No leukocytosis, afebrile   D/c vanc and zosyn            Dispo: CTSU. Likely discharge tomorrow     Bhumika LAY  BULL Reyes  Cardiothoracic Surgery  [facility]

## 2019-08-19 NOTE — SUBJECTIVE & OBJECTIVE
Interval History: Pt going for cardioversion today. Had barium swallow this AM and SLP recommend regular diet with thin liquids. Cultures remain negative.     Review of Systems   Constitution: Negative for malaise/fatigue.   Cardiovascular: Negative for chest pain, dyspnea on exertion and leg swelling.   Respiratory:        Hoarse    Hematologic/Lymphatic: Negative for bleeding problem.   Gastrointestinal: Negative for abdominal pain.     Medications:  Continuous Infusions:  Scheduled Meds:   amiodarone  400 mg Oral BID    lactated ringers  1,000 mL Intravenous Once    metoprolol tartrate  25 mg Oral BID    pantoprazole  40 mg Oral Daily    polyethylene glycol  17 g Oral Daily    senna-docusate 8.6-50 mg  1 tablet Oral BID     PRN Meds:acetaminophen, Dextrose 10% Bolus, glucagon (human recombinant), insulin aspart U-100, magnesium oxide, magnesium oxide, oxyCODONE, oxyCODONE, potassium chloride 10%, potassium chloride 10%, potassium chloride 10%, potassium, sodium phosphates, potassium, sodium phosphates, potassium, sodium phosphates, promethazine (PHENERGAN) IVPB, sodium chloride 0.9%     Objective:     Vital Signs (Most Recent):  Temp: 98.1 °F (36.7 °C) (08/19/19 1038)  Pulse: (!) 122 (08/19/19 0912)  Resp: 18 (08/19/19 1038)  BP: 114/75 (08/19/19 1038)  SpO2: 98 % (08/19/19 1038) Vital Signs (24h Range):  Temp:  [97.5 °F (36.4 °C)-98.2 °F (36.8 °C)] 98.1 °F (36.7 °C)  Pulse:  [] 122  Resp:  [14-31] 18  SpO2:  [73 %-100 %] 98 %  BP: ()/(56-86) 114/75     Weight: 103.9 kg (229 lb 0.9 oz)  Body mass index is 34.83 kg/m².    SpO2: 98 %  O2 Device (Oxygen Therapy): room air    Intake/Output - Last 3 Shifts       08/17 0700 - 08/18 0659 08/18 0700 - 08/19 0659 08/19 0700 - 08/20 0659    P.O.  1560 0    I.V. (mL/kg) 582 (5.6) 33.4 (0.3)     IV Piggyback 1225 350     Total Intake(mL/kg) 1807 (17.4) 1943.4 (18.7) 0 (0)    Urine (mL/kg/hr) 3525 (1.4) 1800 (0.7) 900 (1.7)    Stool 0 0 0    Total Output  3525 1800 900    Net -1718 +143.4 -900           Urine Occurrence 1 x      Stool Occurrence 1 x 1 x 1 x          Lines/Drains/Airways     Peripheral Intravenous Line                 Peripheral IV - Single Lumen 08/16/19 0821 18 G Right Antecubital 3 days         Peripheral IV - Single Lumen 08/16/19 1218 18 G Right Forearm 2 days                Physical Exam   Constitutional: He is oriented to person, place, and time. He appears well-developed and well-nourished. No distress.   HENT:   Head: Normocephalic and atraumatic.   Eyes: Pupils are equal, round, and reactive to light. EOM are normal.   Neck: Normal range of motion.   Cardiovascular: Regular rhythm and normal pulses. Exam reveals no gallop and no friction rub.   No murmur heard.  Tachycardic   Afib/flutter    Pulmonary/Chest: Effort normal. No respiratory distress.   Hoarse voice    Musculoskeletal: Normal range of motion.   Neurological: He is alert and oriented to person, place, and time.   Skin: Skin is warm, dry and intact. Capillary refill takes less than 2 seconds. He is not diaphoretic.   Psychiatric: He has a normal mood and affect. His speech is normal and behavior is normal. Judgment and thought content normal.   Vitals reviewed.      Significant Labs:  BMP:   Recent Labs   Lab 08/18/19  0250 08/19/19  0003   *  --      --    K 3.9  --      --    CO2 18*  --    BUN 12  --    CREATININE 0.8  --    CALCIUM 7.9*  --    MG 2.1 1.9     CBC:   Recent Labs   Lab 08/19/19  0003   WBC 9.36   RBC 3.02*   HGB 8.8*   HCT 27.4*      MCV 91   MCH 29.1   MCHC 32.1       Significant Diagnostics:  Barium swallow   Transit: Normal oral transit time.    Penetration/Aspiration: Laryngeal penetration with thin liquids.  No subglottic aspiration.  Vallecular and piriform sinus stasis which partially cleared with subsequent swallows.    Miscellaneous: N/A.    TTE 8/17/19  · Mildly decreased left ventricular systolic function. The estimated  ejection fraction is 40%  · Left ventricular diastolic dysfunction.  · There is a 23 mm bileaflet tilting disc mechanical aortic valve present. Prosthetic aortic valve is normal.  · Prosthetic graft present in the ascending aorta.  · Fluid collection partially visualized on image 8,9,11 no appreciable flow  · Mild tricuspid regurgitation.  · Mildly reduced right ventricular systolic function.  · Mild right ventricular enlargement.  · Mild right atrial enlargement.  · Septal wall has abnormal motion.  · The estimated PA systolic pressure is 29 mm Hg  · Intermediate central venous pressure (8 mm Hg).

## 2019-08-19 NOTE — ANESTHESIA POSTPROCEDURE EVALUATION
Anesthesia Post Evaluation    Patient: Cj Barnes    Procedure(s) Performed: Procedure(s) (LRB):  CARDIOVERSION (N/A)    Final Anesthesia Type: general  Patient location during evaluation: PACU  Patient participation: Yes- Able to Participate  Level of consciousness: awake and alert  Post-procedure vital signs: reviewed and stable  Pain management: adequate  Airway patency: patent  PONV status at discharge: No PONV  Anesthetic complications: no      Cardiovascular status: blood pressure returned to baseline  Respiratory status: unassisted  Hydration status: euvolemic  Follow-up not needed.          Vitals Value Taken Time   /61 8/19/2019  3:34 PM   Temp 36.5 °C (97.7 °F) 8/19/2019  3:34 PM   Pulse 68 8/19/2019  6:00 PM   Resp 18 8/19/2019  3:34 PM   SpO2 96 % 8/19/2019  3:34 PM         No case tracking events are documented in the log.      Pain/Isa Score: Pain Rating Prior to Med Admin: 0 (8/19/2019  1:45 PM)  Pain Rating Post Med Admin: 0 (8/18/2019  3:05 AM)  Isa Score: 9 (8/19/2019  1:45 PM)

## 2019-08-19 NOTE — NURSING
Rhythm: normal sinus rhythm    Ulcers: NA    Mobility: ad jeffrey    Bowel regimen: no issues Last BM: 2019    Line days: PIV: 3,    Labs:   H/H: 8.8/27.4   Cr: 0.8   INR: 2.7    Electrolytes:   K+: -   M.9   Phos: 3.2

## 2019-08-19 NOTE — NURSING
Received a call from EP fellow. EP fellow wondering why patient is not receiving coumadin especially after being cardioverted today. Paged Transplant Resident. Transplant resident informed Surgery Resident about situation. Patient is supra-therapeutic currently (INR 2.7). Resident said they would look into the situation and see what will be done. No new orders at this time. Tried to page EP fellow and inform him. No answer. Will pass onto night shift nurse.

## 2019-08-19 NOTE — ASSESSMENT & PLAN NOTE
54 yo male who underwent a Bentall procedure with mech valve on 8/9 presenting to ED with trouble swallowing and dry tongue but found to have significant leukocytosis, tachycardia with intermittent hypotension, and fluid collection around surgical area on CT. He denies fevers, chest pain, SOB, palpitations. He was given 3L of fluids in the ED with no change in HR.     Pt is currently stable without hypotension. EKG in ICU demonstrates likely atrial flutter. Rate control with esmolol was unsuccessful. Start Amiodarone for rhythm control. Support as necessary.     INR 3.9, continue to hold coumadin.   Withholding all anticoagulation       CBC daily. H/H stable.    EKG suggestive of atrial flutter w/ RVR.   Plan for DCCV with EP today 8/19  Blood pressure stable  D/c Amio gtt - start PO amio  Continue home metoprolol

## 2019-08-20 DIAGNOSIS — I35.0 SEVERE AORTIC STENOSIS: Primary | ICD-10-CM

## 2019-08-20 PROBLEM — A41.9 SEPSIS: Status: RESOLVED | Noted: 2019-08-18 | Resolved: 2019-08-20

## 2019-08-20 PROBLEM — Z95.2 S/P AVR (AORTIC VALVE REPLACEMENT): Status: ACTIVE | Noted: 2019-08-20

## 2019-08-20 PROBLEM — E87.1 HYPONATREMIA: Status: ACTIVE | Noted: 2019-08-20

## 2019-08-20 PROBLEM — D64.9 ANEMIA: Status: ACTIVE | Noted: 2019-08-20

## 2019-08-20 PROBLEM — E83.51 HYPOCALCEMIA: Status: ACTIVE | Noted: 2019-08-20

## 2019-08-20 PROBLEM — D72.829 LEUKOCYTOSIS: Status: ACTIVE | Noted: 2019-08-20

## 2019-08-20 LAB
ANION GAP SERPL CALC-SCNC: 15 MMOL/L (ref 8–16)
APTT BLDCRRT: 29 SEC (ref 21–32)
APTT BLDCRRT: 29.4 SEC (ref 21–32)
APTT BLDCRRT: 29.7 SEC (ref 21–32)
BASOPHILS # BLD AUTO: 0.06 K/UL (ref 0–0.2)
BASOPHILS NFR BLD: 0.5 % (ref 0–1.9)
BUN SERPL-MCNC: 9 MG/DL (ref 6–20)
CALCIUM SERPL-MCNC: 8.6 MG/DL (ref 8.7–10.5)
CHLORIDE SERPL-SCNC: 100 MMOL/L (ref 95–110)
CO2 SERPL-SCNC: 19 MMOL/L (ref 23–29)
CREAT SERPL-MCNC: 0.7 MG/DL (ref 0.5–1.4)
DIFFERENTIAL METHOD: ABNORMAL
EOSINOPHIL # BLD AUTO: 0.1 K/UL (ref 0–0.5)
EOSINOPHIL NFR BLD: 0.4 % (ref 0–8)
ERYTHROCYTE [DISTWIDTH] IN BLOOD BY AUTOMATED COUNT: 15.1 % (ref 11.5–14.5)
EST. GFR  (AFRICAN AMERICAN): >60 ML/MIN/1.73 M^2
EST. GFR  (NON AFRICAN AMERICAN): >60 ML/MIN/1.73 M^2
GLUCOSE SERPL-MCNC: 133 MG/DL (ref 70–110)
HCT VFR BLD AUTO: 28.8 % (ref 40–54)
HGB BLD-MCNC: 8.9 G/DL (ref 14–18)
IMM GRANULOCYTES # BLD AUTO: 0.45 K/UL (ref 0–0.04)
IMM GRANULOCYTES NFR BLD AUTO: 3.5 % (ref 0–0.5)
INR PPP: 1.7 (ref 0.8–1.2)
LYMPHOCYTES # BLD AUTO: 1.3 K/UL (ref 1–4.8)
LYMPHOCYTES NFR BLD: 10.2 % (ref 18–48)
MAGNESIUM SERPL-MCNC: 1.8 MG/DL (ref 1.6–2.6)
MCH RBC QN AUTO: 28.8 PG (ref 27–31)
MCHC RBC AUTO-ENTMCNC: 30.9 G/DL (ref 32–36)
MCV RBC AUTO: 93 FL (ref 82–98)
MONOCYTES # BLD AUTO: 1 K/UL (ref 0.3–1)
MONOCYTES NFR BLD: 7.4 % (ref 4–15)
NEUTROPHILS # BLD AUTO: 10.1 K/UL (ref 1.8–7.7)
NEUTROPHILS NFR BLD: 78 % (ref 38–73)
NRBC BLD-RTO: 0 /100 WBC
PHOSPHATE SERPL-MCNC: 3.2 MG/DL (ref 2.7–4.5)
PLATELET # BLD AUTO: 351 K/UL (ref 150–350)
PMV BLD AUTO: 9 FL (ref 9.2–12.9)
POCT GLUCOSE: 151 MG/DL (ref 70–110)
POCT GLUCOSE: 181 MG/DL (ref 70–110)
POTASSIUM SERPL-SCNC: 3.5 MMOL/L (ref 3.5–5.1)
PROTHROMBIN TIME: 16.9 SEC (ref 9–12.5)
RBC # BLD AUTO: 3.09 M/UL (ref 4.6–6.2)
SODIUM SERPL-SCNC: 134 MMOL/L (ref 136–145)
WBC # BLD AUTO: 12.9 K/UL (ref 3.9–12.7)

## 2019-08-20 PROCEDURE — 20600001 HC STEP DOWN PRIVATE ROOM

## 2019-08-20 PROCEDURE — 99223 PR INITIAL HOSPITAL CARE,LEVL III: ICD-10-PCS | Mod: ,,, | Performed by: NURSE PRACTITIONER

## 2019-08-20 PROCEDURE — 83735 ASSAY OF MAGNESIUM: CPT

## 2019-08-20 PROCEDURE — 25000003 PHARM REV CODE 250: Performed by: STUDENT IN AN ORGANIZED HEALTH CARE EDUCATION/TRAINING PROGRAM

## 2019-08-20 PROCEDURE — 85730 THROMBOPLASTIN TIME PARTIAL: CPT | Mod: 91

## 2019-08-20 PROCEDURE — 85025 COMPLETE CBC W/AUTO DIFF WBC: CPT

## 2019-08-20 PROCEDURE — 99223 1ST HOSP IP/OBS HIGH 75: CPT | Mod: ,,, | Performed by: NURSE PRACTITIONER

## 2019-08-20 PROCEDURE — 85610 PROTHROMBIN TIME: CPT

## 2019-08-20 PROCEDURE — 97802 MEDICAL NUTRITION INDIV IN: CPT

## 2019-08-20 PROCEDURE — 36415 COLL VENOUS BLD VENIPUNCTURE: CPT

## 2019-08-20 PROCEDURE — 94761 N-INVAS EAR/PLS OXIMETRY MLT: CPT

## 2019-08-20 PROCEDURE — 80048 BASIC METABOLIC PNL TOTAL CA: CPT

## 2019-08-20 PROCEDURE — 63600175 PHARM REV CODE 636 W HCPCS: Performed by: NURSE PRACTITIONER

## 2019-08-20 PROCEDURE — 85730 THROMBOPLASTIN TIME PARTIAL: CPT

## 2019-08-20 PROCEDURE — 25000003 PHARM REV CODE 250: Performed by: NURSE PRACTITIONER

## 2019-08-20 PROCEDURE — 84100 ASSAY OF PHOSPHORUS: CPT

## 2019-08-20 RX ORDER — WARFARIN SODIUM 5 MG/1
5 TABLET ORAL DAILY
Status: DISCONTINUED | OUTPATIENT
Start: 2019-08-20 | End: 2019-08-21 | Stop reason: HOSPADM

## 2019-08-20 RX ORDER — ASPIRIN 81 MG/1
81 TABLET ORAL DAILY
Status: DISCONTINUED | OUTPATIENT
Start: 2019-08-20 | End: 2019-08-21 | Stop reason: HOSPADM

## 2019-08-20 RX ORDER — IBUPROFEN 200 MG
24 TABLET ORAL
Status: DISCONTINUED | OUTPATIENT
Start: 2019-08-20 | End: 2019-08-21

## 2019-08-20 RX ORDER — IBUPROFEN 200 MG
16 TABLET ORAL
Status: DISCONTINUED | OUTPATIENT
Start: 2019-08-20 | End: 2019-08-21

## 2019-08-20 RX ORDER — HEPARIN SODIUM 10000 [USP'U]/100ML
1200 INJECTION, SOLUTION INTRAVENOUS CONTINUOUS
Status: DISCONTINUED | OUTPATIENT
Start: 2019-08-20 | End: 2019-08-21

## 2019-08-20 RX ORDER — GLUCAGON 1 MG
1 KIT INJECTION
Status: DISCONTINUED | OUTPATIENT
Start: 2019-08-20 | End: 2019-08-21

## 2019-08-20 RX ORDER — METOPROLOL TARTRATE 25 MG/1
12.5 TABLET ORAL 2 TIMES DAILY
Status: DISCONTINUED | OUTPATIENT
Start: 2019-08-20 | End: 2019-08-21 | Stop reason: HOSPADM

## 2019-08-20 RX ADMIN — ASPIRIN 81 MG: 81 TABLET, COATED ORAL at 11:08

## 2019-08-20 RX ADMIN — METOPROLOL TARTRATE 12.5 MG: 25 TABLET, FILM COATED ORAL at 09:08

## 2019-08-20 RX ADMIN — HEPARIN SODIUM AND DEXTROSE 800 UNITS/HR: 10000; 5 INJECTION INTRAVENOUS at 11:08

## 2019-08-20 RX ADMIN — WARFARIN SODIUM 5 MG: 5 TABLET ORAL at 04:08

## 2019-08-20 RX ADMIN — HEPARIN SODIUM AND DEXTROSE 900 UNITS/HR: 10000; 5 INJECTION INTRAVENOUS at 05:08

## 2019-08-20 RX ADMIN — AMIODARONE HYDROCHLORIDE 400 MG: 200 TABLET ORAL at 08:08

## 2019-08-20 RX ADMIN — PANTOPRAZOLE SODIUM 40 MG: 40 TABLET, DELAYED RELEASE ORAL at 08:08

## 2019-08-20 RX ADMIN — SENNOSIDES,DOCUSATE SODIUM 1 TABLET: 8.6; 5 TABLET, FILM COATED ORAL at 08:08

## 2019-08-20 RX ADMIN — METOPROLOL TARTRATE 12.5 MG: 25 TABLET, FILM COATED ORAL at 08:08

## 2019-08-20 RX ADMIN — POLYETHYLENE GLYCOL 3350 17 G: 17 POWDER, FOR SOLUTION ORAL at 08:08

## 2019-08-20 NOTE — PLAN OF CARE
Atrial fibrillation with RVR : s/p DCCV 8/19/19 : now in sinus rhythm:   CHADS-VASc 4 (4.8% annual CVA risk). S/p Bentall procedure on 8/9. Newly diagnosed afib. TTE with newly depressed EF 40% (?tachycardia induced CM). No symptoms currently. On coumadin subtherapeutic(brdging per primary team)    -Continues to be in sinus rhythm this am. - Continue amiodarone loading , 400 bid for 14 days and then 200 qd thereafter.   - Continue BB   - cont theraupatic anticoagulation to reduce stroke risk.    EP service will sign off, please call back if there are any new questions or concerns.

## 2019-08-20 NOTE — ASSESSMENT & PLAN NOTE
Had barium swallow today --> Patient demonstrates mild pharyngeal dysphagia characterized by pharyngeal stasis with no aspiration on any consistency.  Thin liquid diet     Sating well on room air   ENT consulted, post-extubation granulomas.  Cardiac Diet as tolerated, passed bedside swallow, good per SLP  Dual anti-acid. Famotidine + PPI

## 2019-08-20 NOTE — PLAN OF CARE
Problem: Adult Inpatient Plan of Care  Goal: Plan of Care Review  Outcome: Ongoing (interventions implemented as appropriate)  POC reviewed with pt with all questions answered. VSS and pt has no complaint of pain or SOB. Blood glucose monitored and pt given insulin per MD order. NSR on tele 60s. Fall and sternal precautions maintained and pt remains free from falls.

## 2019-08-20 NOTE — HPI
Reason for Consult: Management of T1DM, Hyperglycemia     Surgical Procedure and Date: BENTALL PROCEDURE: 08/09/2019    Diabetes diagnosis year: 20 years ago    Home Diabetes Medications:  Metformin 500 mg daily and Jardiance 25 mg daily  Medtronic 670G    Basal: 0.4    ICR: 12   ISF: 20   IOB: 3 hr    Target: 120     How often checking glucose at home? 4-5x daily   BG readings on regimen: 130-140s  Hypoglycemia on the regimen? No  Missed doses on regimen?  No    Diabetes Complications include:     Hyperglycemia, Hypoglycemia , Diabetic retinopathy and Diabetic peripheral neuropathy     Complicating diabetes co morbidities:   History of CVA and Active Cancer, severe obesity, HTN, HLD, CAD, Aortic Stenosis.     Lab Results   Component Value Date    HGBA1C 6.6 (H) 07/23/2019       HPI:   Patient is a 53 y.o. male with a diagnosis of aortic stenosis s/p Bentall w/ aortic valve replacement by  on 8/9/19, diabetes type 2, CAD. Also with history of stage 2 colon cancer with resection in 2015. He presented to the ED 8/16/19 with complaints of dry tongue, sore throat, and decreased appetite. In the ED, he was found to be tachycardic and hypotensive and admitted to CTSU. Receives primary DM care from BEATRIS Quintero NP. Endocrinology consulted to manage DM1/hyperglycemia.

## 2019-08-20 NOTE — NURSING
BG @ 16:30 before dinner: 156. Charted in log. Notified patient. Patient administered bolus from insulin pump. Instructed patient to log his carb count after dinner on log paper @ bedside.

## 2019-08-20 NOTE — ASSESSMENT & PLAN NOTE
NSR today   EP following  INR 1.7  Restart heparin gtt   Coumadin 5 tonight  ASA 81  DCCV with EP 8/19  PO amio  Continue metoprolol

## 2019-08-20 NOTE — CONSULTS
Food & Nutrition  Education    Diet Education: Vitamin K and Coumadin  Time Spent: 15 minutes  Learners: Pt    Nutrition Education provided with handouts: Yes    Comments: Provided and discussed handout on Vitamin K and Coumadin. Reviewed food sources of vitamin K and emphasized the importance of keeping vitamin K intake consistent while on Coumadin. Pt voiced understanding.    All questions and concerns answered. Dietitian's contact information provided.     Follow-Up: Yes  Please Re-consult as needed    Thanks!

## 2019-08-20 NOTE — PLAN OF CARE
Problem: Adult Inpatient Plan of Care  Goal: Plan of Care Review  Outcome: Ongoing (interventions implemented as appropriate)  Patient free of falls/traumas/injuries. Heparin gtts initiated and infusing at 800 units/hr. APTT goal 60-80. BG monitored AC/HS. Patient with own insulin pump. HR closely monitored 58-60s. INR 1.7. Mid-sternal chest incision C/D/I and cleansed with chlorhexidine. Skin clean, dry, and intact. Patient educated on plan of care and verbalized understanding. Patients VS stable and no distress. Will continue to monitor.

## 2019-08-20 NOTE — NURSING
Informed Transplant Resident Gurwinder Ervin MD of patient's latest APTT of 29.0 to see if any infusion rates need to be changed. MD Arcadio said to increase Heparin infusion rate by 100 units. Heparin rate will be adjusted to 900 units/hour.

## 2019-08-20 NOTE — SUBJECTIVE & OBJECTIVE
Interval History: NAEON. Pt will be placed back on heparin gtt due to low INR and will likey D/C tomorrow     Review of Systems   Constitution: Negative for malaise/fatigue.   HENT: Positive for hoarse voice.    Cardiovascular: Negative for chest pain, claudication, dyspnea on exertion, irregular heartbeat, leg swelling and palpitations.   Respiratory: Negative for cough and shortness of breath.    Hematologic/Lymphatic: Negative for bleeding problem.   Gastrointestinal: Negative for abdominal pain.   Genitourinary: Negative for dysuria.   Neurological: Negative for headaches and weakness.     Medications:  Continuous Infusions:   heparin (porcine) in 5 % dex       Scheduled Meds:   amiodarone  400 mg Oral BID    aspirin  81 mg Oral Daily    metoprolol tartrate  12.5 mg Oral BID    pantoprazole  40 mg Oral Daily    polyethylene glycol  17 g Oral Daily    senna-docusate 8.6-50 mg  1 tablet Oral BID    warfarin  5 mg Oral Daily     PRN Meds:acetaminophen, Dextrose 10% Bolus, diphenhydrAMINE, glucagon (human recombinant), insulin aspart U-100, meperidine, ondansetron, oxyCODONE, oxyCODONE, promethazine (PHENERGAN) IVPB, silver sulfADIAZINE 1%, sodium chloride 0.9%     Objective:     Vital Signs (Most Recent):  Temp: 98.5 °F (36.9 °C) (08/20/19 0722)  Pulse: (!) 57 (08/20/19 1000)  Resp: 18 (08/20/19 0722)  BP: 134/60 (08/20/19 0722)  SpO2: 100 % (08/20/19 0722) Vital Signs (24h Range):  Temp:  [97.5 °F (36.4 °C)-99.6 °F (37.6 °C)] 98.5 °F (36.9 °C)  Pulse:  [57-98] 57  Resp:  [16-20] 18  SpO2:  [96 %-100 %] 100 %  BP: (106-149)/(55-69) 134/60     Weight: 103.9 kg (229 lb 0.9 oz)  Body mass index is 34.83 kg/m².    SpO2: 100 %  O2 Device (Oxygen Therapy): room air    Intake/Output - Last 3 Shifts       08/18 0700 - 08/19 0659 08/19 0700 - 08/20 0659 08/20 0700 - 08/21 0659    P.O. 1560 720     I.V. (mL/kg) 33.4 (0.3) 250 (2.4)     IV Piggyback 350      Total Intake(mL/kg) 1943.4 (18.7) 970 (9.3)     Urine  (mL/kg/hr) 1800 (0.7) 1850 (0.7)     Emesis/NG output  6     Stool 0 0     Total Output 1800 1856     Net +143.4 -886            Stool Occurrence 1 x 1 x 1 x          Lines/Drains/Airways     Peripheral Intravenous Line                 Peripheral IV - Single Lumen 08/16/19 0821 18 G Right Antecubital 4 days         Peripheral IV - Single Lumen 08/16/19 1218 18 G Right Forearm 3 days                Physical Exam   Constitutional: He is oriented to person, place, and time. He appears well-developed and well-nourished.   Cardiovascular: Normal rate, regular rhythm and normal heart sounds.   Pulmonary/Chest: Effort normal and breath sounds normal.   Abdominal: Soft.   Neurological: He is alert and oriented to person, place, and time.   Skin: Skin is warm, dry and intact.   Psychiatric: He has a normal mood and affect.       Significant Labs:  BMP:   Recent Labs   Lab 08/20/19  0321   *   *   K 3.5      CO2 19*   BUN 9   CREATININE 0.7   CALCIUM 8.6*   MG 1.8     CBC:   Recent Labs   Lab 08/20/19 0321   WBC 12.90*   RBC 3.09*   HGB 8.9*   HCT 28.8*   *   MCV 93   MCH 28.8   MCHC 30.9*     Coagulation:   Recent Labs   Lab 08/20/19 0321   INR 1.7*   APTT 29.4       Significant Diagnostics:  I have reviewed all pertinent imaging results/findings within the past 24 hours.

## 2019-08-20 NOTE — ASSESSMENT & PLAN NOTE
Insulin Pump:  Patient has the ability and wherewithal to manage the pump.  Order has been placed to notify nurse of patient using own pump.      Settings:  Basal rate: 0.4 unit/hr  Bolus dose/Carb ratio: 12  ISF for Correction Dose: 20     Pump type:  [employer] Minimed 670G     SN: [id number]  Infusion set type: plastic cannula  Change infusion set: Every 2-3 days  Change insertion site: with change of infusion set.  Location of insertion site: Left Anterior Thigh.   State of insertion site: appears clean

## 2019-08-20 NOTE — NURSING
Rhythm: normal sinus/atrial flutter  Ulcers: NA    Mobility: up with assistance    Bowel regimen: no issues Last BM: 2019    Line days:  PIV: 4     Labs:   H/H: 8.9/28.8   Cr: 0.8   INR: 1.7    Electrolytes:   K+: 3.5   M.8   Phos: -

## 2019-08-20 NOTE — NURSING
Received a phone call from EP fellow MD Shaikh () . He had concerns on why pt is not on coumadin after being cardioverted today. RN informed oncall CTS MD. CTS resident stated that she will let primary team make the decision on whether or not to add coumadin to the pt's MAR. EP fellow MD Shaikh informed.

## 2019-08-20 NOTE — CONSULTS
[facility]  Endocrinology  Diabetes Consult Note    Consult Requested by: Ryan Knight MD   Reason for admit: S/P AVR (aortic valve replacement)    HISTORY OF PRESENT ILLNESS:  Reason for Consult: Management of T1DM, Hyperglycemia     Surgical Procedure and Date: BENTALL PROCEDURE: 2019    Diabetes diagnosis year: 20 years ago    Home Diabetes Medications:  Metformin 500 mg daily and Jardiance 25 mg daily  Medtronic 670G    Basal:  0.4    ICR: 12   ISF: 20   IOB: 3 hr    Target: 120     How often checking glucose at home? 4-5x daily   BG readings on regimen: 130-140s  Hypoglycemia on the regimen? No  Missed doses on regimen?  No    Diabetes Complications include:     Hyperglycemia, Hypoglycemia , Diabetic retinopathy and Diabetic peripheral neuropathy     Complicating diabetes co morbidities:   History of CVA and Active Cancer, severe obesity, HTN, HLD, CAD, Aortic Stenosis.     Lab Results   Component Value Date    HGBA1C 6.6 (H) 2019       HPI:   Patient is a 53 y.o. male with a diagnosis of aortic stenosis s/p Bentall w/ aortic valve replacement by  on 19, diabetes type 2, CAD. Also with history of stage 2 colon cancer with resection in . He presented to the ED 19 with complaints of dry tongue, sore throat, and decreased appetite. In the ED, he was found to be tachycardic and hypotensive and admitted to CTSU. Receives primary DM care from BEATRIS Quintero NP. Endocrinology consulted to manage DM1/hyperglycemia.         Interval HPI:   Overnight events: Remains in CTSU. BG at or slightly above goal ranges on home insulin pump and receiving prn SQ insulin correction scale per primary team. Diet progressed to regular today.   Eatin%  Nausea: No  Hypoglycemia and intervention: No  Fever: No  TPN and/or TF: No  If yes, type of TF/TPN and rate: none    PMH, PSH, FH, SH  reviewed     ROS:  Constitutional: Negative for weight changes.  Eyes: Negative for  visual disturbance.  Respiratory: Negative for cough.   Cardiovascular: Negative for chest pain.  Gastrointestinal: Negative for nausea.  Endocrine: Negative for polyuria, polydipsia.  Musculoskeletal: Negative for back pain.  Skin: Negative for rash.  Neurological: Negative for syncope.  Psychiatric/Behavioral: Negative for depression.    Review of Systems    Current Medications and/or Treatments Impacting Glycemic Control  Immunotherapy:    Immunosuppressants     None        Steroids:   Hormones (From admission, onward)    None        Pressors:    Autonomic Drugs (From admission, onward)    Start     Stop Route Frequency Ordered    08/20/19 0900  metoprolol tartrate (LOPRESSOR) split tablet 12.5 mg      -- Oral 2 times daily 08/20/19 0847        Hyperglycemia/Diabetes Medications:   Antihyperglycemics (From admission, onward)    Start     Stop Route Frequency Ordered    08/20/19 1230  insulin aspart U-100 (NovoLOG) insulin pump from home     Question Answer Comment   Basal Rate #1 0.4    Target number 120    Sensitivity #1 20    Carbohydrate coverage #1 1:12    Is the patient competent? Yes    Basal rate #1 time 2485-8338    Carbohydrate coverage #1 time 3716-3777    Sensitivity #1 time 1358-3094        -- SubQ Continuous 08/20/19 1132             PHYSICAL EXAMINATION:  Vitals:    08/20/19 1121   BP:    Pulse: (!) 57   Resp:    Temp:      Body mass index is 34.83 kg/m².    Physical Exam   Constitutional: Well developed, well nourished, obese, NAD.  ENT: External ears no masses with nose patent; normal hearing.  Neck: Supple; trachea midline.  Cardiovascular: Normal heart sounds, no LE edema. DP +2 bilaterally.  Lungs: Normal effort; lungs anterior bilaterally clear to auscultation.  Abdomen: Soft, no masses, no hernias.  MS: No clubbing or cyanosis of nails noted; unable to assess gait.  Skin: No rashes, lesions, or ulcers; no nodules. Insulin pump in place, site CDI.   Psychiatric: Good judgement and insight;  normal mood and affect.  Neurological: Cranial nerves are grossly intact.   Foot: Nails in good condition, no amputations noted.          Labs Reviewed and Include   Recent Labs   Lab 08/20/19  0321   *   CALCIUM 8.6*   *   K 3.5   CO2 19*      BUN 9   CREATININE 0.7     Lab Results   Component Value Date    WBC 12.90 (H) 08/20/2019    HGB 8.9 (L) 08/20/2019    HCT 28.8 (L) 08/20/2019    MCV 93 08/20/2019     (H) 08/20/2019     No results for input(s): TSH, FREET4 in the last 168 hours.  Lab Results   Component Value Date    HGBA1C 6.6 (H) 07/23/2019       Nutritional status:   Body mass index is 34.83 kg/m².  Lab Results   Component Value Date    ALBUMIN 2.4 (L) 08/16/2019    ALBUMIN 3.2 (L) 08/16/2019    ALBUMIN 4.6 07/23/2019     No results found for: PREALBUMIN    Estimated Creatinine Clearance: 139.3 mL/min (based on SCr of 0.7 mg/dL).    Accu-Checks  Recent Labs     08/18/19  1203 08/18/19  1729 08/18/19  1730 08/19/19  0547 08/19/19  1144 08/19/19  1249 08/19/19  1334 08/19/19  2008 08/20/19  0709 08/20/19  1118   POCTGLUCOSE 101 223* 147* 137* 151* 150* 145* 193* 151* 181*        ASSESSMENT and PLAN    * S/P AVR (aortic valve replacement)  Managed per primary team  Optimize BG control      Diabetes mellitus type I  BG goal 140-180  BG is within goal. Patient is Type 1 DM.      Allow patient to continue to use his home insulin pump.      ** Please call Endocrine for any BG related issues **  ** Please notify Endocrine for any change and/or advance in diet**     Discharge Recommendations:  Patient is to continue his home insulin pump.    After discussing evaluation results, patient agrees with proposed plan of care, has no further concerns, and agrees to follow-up with his PCP/DM specialist in [place], [place].          Insulin pump in place  Insulin Pump:  Patient has the ability and wherewithal to manage the pump.  Order has been placed to notify nurse of patient using own pump.       Settings:  Basal rate: 0.4 unit/hr  Bolus dose/Carb ratio: 12  ISF for Correction Dose: 20     Pump type:  Medtronic Minimed 670G     SN: [id number]  Infusion set type: plastic cannula  Change infusion set: Every 2-3 days  Change insertion site: with change of infusion set.  Location of insertion site: Left Anterior Thigh.   State of insertion site: appears clean       Trouble swallowing  Progressed to regular thin liquid diet per primary      Anemia  May alter the accuracy of a1c results.           Plan discussed with patient, family, and RN at bedside.     Dilma Terrell NP  Endocrinology  [facility]

## 2019-08-20 NOTE — PROGRESS NOTES
[facility]  Cardiothoracic Surgery  Progress Note    Patient Name: Cj Barnes  MRN: [id number]  Admission Date: 8/16/2019  Hospital Length of Stay: 4 days  Code Status: Full Code   Attending Physician: Ryan Knight MD   Referring Provider: Umang Infante  Principal Problem:S/P AVR (aortic valve replacement)      Subjective:     Post-Op Info:  Procedure(s) (LRB):  CARDIOVERSION (N/A)   1 Day Post-Op     Interval History: NAEON. Pt will be placed back on heparin gtt due to low INR and will likey D/C tomorrow     Review of Systems   Constitution: Negative for malaise/fatigue.   HENT: Positive for hoarse voice.    Cardiovascular: Negative for chest pain, claudication, dyspnea on exertion, irregular heartbeat, leg swelling and palpitations.   Respiratory: Negative for cough and shortness of breath.    Hematologic/Lymphatic: Negative for bleeding problem.   Gastrointestinal: Negative for abdominal pain.   Genitourinary: Negative for dysuria.   Neurological: Negative for headaches and weakness.     Medications:  Continuous Infusions:   heparin (porcine) in 5 % dex       Scheduled Meds:   amiodarone  400 mg Oral BID    aspirin  81 mg Oral Daily    metoprolol tartrate  12.5 mg Oral BID    pantoprazole  40 mg Oral Daily    polyethylene glycol  17 g Oral Daily    senna-docusate 8.6-50 mg  1 tablet Oral BID    warfarin  5 mg Oral Daily     PRN Meds:acetaminophen, Dextrose 10% Bolus, diphenhydrAMINE, glucagon (human recombinant), insulin aspart U-100, meperidine, ondansetron, oxyCODONE, oxyCODONE, promethazine (PHENERGAN) IVPB, silver sulfADIAZINE 1%, sodium chloride 0.9%     Objective:     Vital Signs (Most Recent):  Temp: 98.5 °F (36.9 °C) (08/20/19 0722)  Pulse: (!) 57 (08/20/19 1000)  Resp: 18 (08/20/19 0722)  BP: 134/60 (08/20/19 0722)  SpO2: 100 % (08/20/19 0722) Vital Signs (24h Range):  Temp:  [97.5 °F (36.4 °C)-99.6 °F (37.6 °C)] 98.5 °F (36.9 °C)  Pulse:  [57-98]  57  Resp:  [16-20] 18  SpO2:  [96 %-100 %] 100 %  BP: (106-149)/(55-69) 134/60     Weight: 103.9 kg (229 lb 0.9 oz)  Body mass index is 34.83 kg/m².    SpO2: 100 %  O2 Device (Oxygen Therapy): room air    Intake/Output - Last 3 Shifts       08/18 0700 - 08/19 0659 08/19 0700 - 08/20 0659 08/20 0700 - 08/21 0659    P.O. 1560 720     I.V. (mL/kg) 33.4 (0.3) 250 (2.4)     IV Piggyback 350      Total Intake(mL/kg) 1943.4 (18.7) 970 (9.3)     Urine (mL/kg/hr) 1800 (0.7) 1850 (0.7)     Emesis/NG output  6     Stool 0 0     Total Output 1800 1856     Net +143.4 -886            Stool Occurrence 1 x 1 x 1 x          Lines/Drains/Airways     Peripheral Intravenous Line                 Peripheral IV - Single Lumen 08/16/19 0821 18 G Right Antecubital 4 days         Peripheral IV - Single Lumen 08/16/19 1218 18 G Right Forearm 3 days                Physical Exam   Constitutional: He is oriented to person, place, and time. He appears well-developed and well-nourished.   Cardiovascular: Normal rate, regular rhythm and normal heart sounds.   Pulmonary/Chest: Effort normal and breath sounds normal.   Abdominal: Soft.   Neurological: He is alert and oriented to person, place, and time.   Skin: Skin is warm, dry and intact.   Psychiatric: He has a normal mood and affect.       Significant Labs:  BMP:   Recent Labs   Lab 08/20/19  0321   *   *   K 3.5      CO2 19*   BUN 9   CREATININE 0.7   CALCIUM 8.6*   MG 1.8     CBC:   Recent Labs   Lab 08/20/19 0321   WBC 12.90*   RBC 3.09*   HGB 8.9*   HCT 28.8*   *   MCV 93   MCH 28.8   MCHC 30.9*     Coagulation:   Recent Labs   Lab 08/20/19 0321   INR 1.7*   APTT 29.4       Significant Diagnostics:  I have reviewed all pertinent imaging results/findings within the past 24 hours.    Assessment/Plan:     * S/P AVR (aortic valve replacement)  Sternal Precautions  PT/OT/SLP  Ambulate  ASA   Metop  Chest tubes and wires removed      Leukocytosis  Continue to monitor    CBC daily     Hyponatremia  Continue to monitor   BMP daily     Anemia  Expected ABLA  Continue to monitor   CBC daily     Hypocalcemia  Continue to monitor   BMP daily     Trouble swallowing  Had barium swallow today --> Patient demonstrates mild pharyngeal dysphagia characterized by pharyngeal stasis with no aspiration on any consistency.  Thin liquid diet     Sating well on room air   ENT consulted, post-extubation granulomas.  Cardiac Diet as tolerated, passed bedside swallow, good per SLP  Dual anti-acid. Famotidine + PPI            Atrial flutter  NSR today   EP following  INR 1.7  Restart heparin gtt   Coumadin 5 tonight  ASA 81  DCCV with EP 8/19  PO amio  Continue metoprolol    Diabetes mellitus type I  Endocrine consulted for medical management         Demetria Rodriguez, KELSIE  Cardiothoracic Surgery  [facility]

## 2019-08-20 NOTE — ASSESSMENT & PLAN NOTE
BG goal 140-180  BG is within goal. Patient is Type 1 DM.      Allow patient to continue to use his home insulin pump.      ** Please call Endocrine for any BG related issues **  ** Please notify Endocrine for any change and/or advance in diet**     Discharge Recommendations:  Patient is to continue his home insulin pump.    After discussing evaluation results, patient agrees with proposed plan of care, has no further concerns, and agrees to follow-up with his PCP/DM specialist in [place]

## 2019-08-20 NOTE — SUBJECTIVE & OBJECTIVE
Interval HPI:   Overnight events: Remains in CTSU. BG at or slightly above goal ranges on home insulin pump and receiving prn SQ insulin correction scale per primary team. Diet progressed to regular today.   Eatin%  Nausea: No  Hypoglycemia and intervention: No  Fever: No  TPN and/or TF: No  If yes, type of TF/TPN and rate: none    PMH, PSH, FH, SH  reviewed     ROS:  Constitutional: Negative for weight changes.  Eyes: Negative for visual disturbance.  Respiratory: Negative for cough.   Cardiovascular: Negative for chest pain.  Gastrointestinal: Negative for nausea.  Endocrine: Negative for polyuria, polydipsia.  Musculoskeletal: Negative for back pain.  Skin: Negative for rash.  Neurological: Negative for syncope.  Psychiatric/Behavioral: Negative for depression.    Review of Systems    Current Medications and/or Treatments Impacting Glycemic Control  Immunotherapy:    Immunosuppressants     None        Steroids:   Hormones (From admission, onward)    None        Pressors:    Autonomic Drugs (From admission, onward)    Start     Stop Route Frequency Ordered    19 0900  metoprolol tartrate (LOPRESSOR) split tablet 12.5 mg      -- Oral 2 times daily 19 0847        Hyperglycemia/Diabetes Medications:   Antihyperglycemics (From admission, onward)    Start     Stop Route Frequency Ordered    19 1230  insulin aspart U-100 (NovoLOG) insulin pump from home     Question Answer Comment   Basal Rate #1 0.4    Target number 120    Sensitivity #1 20    Carbohydrate coverage #1 1:12    Is the patient competent? Yes    Basal rate #1 time 4888-9092    Carbohydrate coverage #1 time 5197-3411    Sensitivity #1 time 7441-2263        -- SubQ Continuous 19 1132             PHYSICAL EXAMINATION:  Vitals:    19 1121   BP:    Pulse: (!) 57   Resp:    Temp:      Body mass index is 34.83 kg/m².    Physical Exam   Constitutional: Well developed, well nourished, obese, NAD.  ENT: External ears no masses  with nose patent; normal hearing.  Neck: Supple; trachea midline.  Cardiovascular: Normal heart sounds, no LE edema. DP +2 bilaterally.  Lungs: Normal effort; lungs anterior bilaterally clear to auscultation.  Abdomen: Soft, no masses, no hernias.  MS: No clubbing or cyanosis of nails noted; unable to assess gait.  Skin: No rashes, lesions, or ulcers; no nodules. Insulin pump in place, site CDI.   Psychiatric: Good judgement and insight; normal mood and affect.  Neurological: Cranial nerves are grossly intact.   Foot: Nails in good condition, no amputations noted.

## 2019-08-21 ENCOUNTER — NURSE TRIAGE (OUTPATIENT)
Dept: ADMINISTRATIVE | Facility: CLINIC | Age: 54
End: 2019-08-21

## 2019-08-21 ENCOUNTER — ANTI-COAG VISIT (OUTPATIENT)
Dept: CARDIOLOGY | Facility: CLINIC | Age: 54
End: 2019-08-21
Payer: MEDICARE

## 2019-08-21 VITALS
OXYGEN SATURATION: 100 % | HEART RATE: 77 BPM | TEMPERATURE: 98 F | WEIGHT: 229.06 LBS | HEIGHT: 68 IN | SYSTOLIC BLOOD PRESSURE: 143 MMHG | RESPIRATION RATE: 16 BRPM | BODY MASS INDEX: 34.72 KG/M2 | DIASTOLIC BLOOD PRESSURE: 67 MMHG

## 2019-08-21 DIAGNOSIS — G45.9 TIA (TRANSIENT ISCHEMIC ATTACK): ICD-10-CM

## 2019-08-21 DIAGNOSIS — Z79.01 LONG TERM (CURRENT) USE OF ANTICOAGULANTS: ICD-10-CM

## 2019-08-21 DIAGNOSIS — Z95.828 S/P ASCENDING AORTIC REPLACEMENT: ICD-10-CM

## 2019-08-21 LAB
ANION GAP SERPL CALC-SCNC: 13 MMOL/L (ref 8–16)
APTT BLDCRRT: 30.9 SEC (ref 21–32)
APTT BLDCRRT: 30.9 SEC (ref 21–32)
APTT BLDCRRT: 32.5 SEC (ref 21–32)
APTT BLDCRRT: 35.4 SEC (ref 21–32)
BASOPHILS # BLD AUTO: 0.03 K/UL (ref 0–0.2)
BASOPHILS NFR BLD: 0.3 % (ref 0–1.9)
BUN SERPL-MCNC: 9 MG/DL (ref 6–20)
CALCIUM SERPL-MCNC: 8.8 MG/DL (ref 8.7–10.5)
CHLORIDE SERPL-SCNC: 98 MMOL/L (ref 95–110)
CO2 SERPL-SCNC: 23 MMOL/L (ref 23–29)
CREAT SERPL-MCNC: 0.6 MG/DL (ref 0.5–1.4)
DIFFERENTIAL METHOD: ABNORMAL
EOSINOPHIL # BLD AUTO: 0 K/UL (ref 0–0.5)
EOSINOPHIL NFR BLD: 0.4 % (ref 0–8)
ERYTHROCYTE [DISTWIDTH] IN BLOOD BY AUTOMATED COUNT: 14.8 % (ref 11.5–14.5)
EST. GFR  (AFRICAN AMERICAN): >60 ML/MIN/1.73 M^2
EST. GFR  (NON AFRICAN AMERICAN): >60 ML/MIN/1.73 M^2
GLUCOSE SERPL-MCNC: 144 MG/DL (ref 70–110)
HCT VFR BLD AUTO: 27.8 % (ref 40–54)
HGB BLD-MCNC: 8.8 G/DL (ref 14–18)
IMM GRANULOCYTES # BLD AUTO: 0.2 K/UL (ref 0–0.04)
IMM GRANULOCYTES NFR BLD AUTO: 1.9 % (ref 0–0.5)
INR PPP: 1.8
INR PPP: 1.8 (ref 0.8–1.2)
LYMPHOCYTES # BLD AUTO: 1 K/UL (ref 1–4.8)
LYMPHOCYTES NFR BLD: 9.4 % (ref 18–48)
MAGNESIUM SERPL-MCNC: 1.7 MG/DL (ref 1.6–2.6)
MCH RBC QN AUTO: 28.8 PG (ref 27–31)
MCHC RBC AUTO-ENTMCNC: 31.7 G/DL (ref 32–36)
MCV RBC AUTO: 91 FL (ref 82–98)
MONOCYTES # BLD AUTO: 0.6 K/UL (ref 0.3–1)
MONOCYTES NFR BLD: 6 % (ref 4–15)
NEUTROPHILS # BLD AUTO: 8.7 K/UL (ref 1.8–7.7)
NEUTROPHILS NFR BLD: 82 % (ref 38–73)
NRBC BLD-RTO: 0 /100 WBC
PHOSPHATE SERPL-MCNC: 3.4 MG/DL (ref 2.7–4.5)
PLATELET # BLD AUTO: 312 K/UL (ref 150–350)
PMV BLD AUTO: 8.9 FL (ref 9.2–12.9)
POCT GLUCOSE: 144 MG/DL (ref 70–110)
POCT GLUCOSE: 147 MG/DL (ref 70–110)
POCT GLUCOSE: 156 MG/DL (ref 70–110)
POCT GLUCOSE: 169 MG/DL (ref 70–110)
POCT GLUCOSE: 205 MG/DL (ref 70–110)
POTASSIUM SERPL-SCNC: 3.7 MMOL/L (ref 3.5–5.1)
PROTHROMBIN TIME: 17.5 SEC (ref 9–12.5)
RBC # BLD AUTO: 3.06 M/UL (ref 4.6–6.2)
SODIUM SERPL-SCNC: 134 MMOL/L (ref 136–145)
WBC # BLD AUTO: 10.58 K/UL (ref 3.9–12.7)

## 2019-08-21 PROCEDURE — 36415 COLL VENOUS BLD VENIPUNCTURE: CPT

## 2019-08-21 PROCEDURE — 80048 BASIC METABOLIC PNL TOTAL CA: CPT

## 2019-08-21 PROCEDURE — 83735 ASSAY OF MAGNESIUM: CPT

## 2019-08-21 PROCEDURE — 85730 THROMBOPLASTIN TIME PARTIAL: CPT

## 2019-08-21 PROCEDURE — 25000003 PHARM REV CODE 250: Performed by: NURSE PRACTITIONER

## 2019-08-21 PROCEDURE — 99232 PR SUBSEQUENT HOSPITAL CARE,LEVL II: ICD-10-PCS | Mod: ,,, | Performed by: NURSE PRACTITIONER

## 2019-08-21 PROCEDURE — 84100 ASSAY OF PHOSPHORUS: CPT

## 2019-08-21 PROCEDURE — 25000003 PHARM REV CODE 250: Performed by: STUDENT IN AN ORGANIZED HEALTH CARE EDUCATION/TRAINING PROGRAM

## 2019-08-21 PROCEDURE — 85025 COMPLETE CBC W/AUTO DIFF WBC: CPT

## 2019-08-21 PROCEDURE — 99232 SBSQ HOSP IP/OBS MODERATE 35: CPT | Mod: ,,, | Performed by: NURSE PRACTITIONER

## 2019-08-21 PROCEDURE — 85730 THROMBOPLASTIN TIME PARTIAL: CPT | Mod: 91

## 2019-08-21 PROCEDURE — 93793 PR ANTICOAGULANT MGMT FOR PT TAKING WARFARIN: ICD-10-PCS | Mod: S$GLB,,,

## 2019-08-21 PROCEDURE — 63600175 PHARM REV CODE 636 W HCPCS: Performed by: NURSE PRACTITIONER

## 2019-08-21 PROCEDURE — 85610 PROTHROMBIN TIME: CPT

## 2019-08-21 PROCEDURE — 93793 ANTICOAG MGMT PT WARFARIN: CPT | Mod: S$GLB,,,

## 2019-08-21 RX ORDER — METOPROLOL TARTRATE 25 MG/1
12.5 TABLET, FILM COATED ORAL 2 TIMES DAILY
Qty: 30 TABLET | Refills: 11 | Status: SHIPPED | OUTPATIENT
Start: 2019-08-21 | End: 2019-09-05 | Stop reason: ALTCHOICE

## 2019-08-21 RX ORDER — LANOLIN ALCOHOL/MO/W.PET/CERES
800 CREAM (GRAM) TOPICAL ONCE
Status: COMPLETED | OUTPATIENT
Start: 2019-08-21 | End: 2019-08-21

## 2019-08-21 RX ORDER — WARFARIN SODIUM 5 MG/1
5 TABLET ORAL DAILY
Qty: 30 TABLET | Refills: 11 | Status: SHIPPED | OUTPATIENT
Start: 2019-08-21 | End: 2019-12-12

## 2019-08-21 RX ORDER — OXYCODONE HYDROCHLORIDE 5 MG/1
5 TABLET ORAL EVERY 4 HOURS PRN
Qty: 42 TABLET | Refills: 0 | Status: SHIPPED | OUTPATIENT
Start: 2019-08-21 | End: 2019-08-28

## 2019-08-21 RX ORDER — ASPIRIN 81 MG/1
81 TABLET ORAL DAILY
Refills: 0 | COMMUNITY
Start: 2019-08-22 | End: 2024-03-11

## 2019-08-21 RX ORDER — ENOXAPARIN SODIUM 100 MG/ML
1 INJECTION SUBCUTANEOUS 2 TIMES DAILY
Qty: 20 ML | Refills: 0 | Status: SHIPPED | OUTPATIENT
Start: 2019-08-21 | End: 2019-08-31

## 2019-08-21 RX ORDER — ENOXAPARIN SODIUM 100 MG/ML
1 INJECTION SUBCUTANEOUS ONCE
Status: COMPLETED | OUTPATIENT
Start: 2019-08-21 | End: 2019-08-21

## 2019-08-21 RX ORDER — POTASSIUM CHLORIDE 20 MEQ/1
40 TABLET, EXTENDED RELEASE ORAL ONCE
Status: COMPLETED | OUTPATIENT
Start: 2019-08-21 | End: 2019-08-21

## 2019-08-21 RX ORDER — AMIODARONE HYDROCHLORIDE 400 MG/1
400 TABLET ORAL 2 TIMES DAILY
Qty: 60 TABLET | Refills: 11 | Status: SHIPPED | OUTPATIENT
Start: 2019-08-21 | End: 2019-09-26

## 2019-08-21 RX ADMIN — MAGNESIUM OXIDE TAB 400 MG (241.3 MG ELEMENTAL MG) 800 MG: 400 (241.3 MG) TAB at 08:08

## 2019-08-21 RX ADMIN — POLYETHYLENE GLYCOL 3350 17 G: 17 POWDER, FOR SOLUTION ORAL at 08:08

## 2019-08-21 RX ADMIN — POTASSIUM CHLORIDE 40 MEQ: 20 TABLET, EXTENDED RELEASE ORAL at 10:08

## 2019-08-21 RX ADMIN — ASPIRIN 81 MG: 81 TABLET, COATED ORAL at 08:08

## 2019-08-21 RX ADMIN — PANTOPRAZOLE SODIUM 40 MG: 40 TABLET, DELAYED RELEASE ORAL at 08:08

## 2019-08-21 RX ADMIN — METOPROLOL TARTRATE 12.5 MG: 25 TABLET, FILM COATED ORAL at 08:08

## 2019-08-21 RX ADMIN — AMIODARONE HYDROCHLORIDE 400 MG: 200 TABLET ORAL at 08:08

## 2019-08-21 RX ADMIN — SENNOSIDES,DOCUSATE SODIUM 1 TABLET: 8.6; 5 TABLET, FILM COATED ORAL at 08:08

## 2019-08-21 RX ADMIN — ENOXAPARIN SODIUM 100 MG: 100 INJECTION SUBCUTANEOUS at 12:08

## 2019-08-21 NOTE — ASSESSMENT & PLAN NOTE
Insulin Pump:  Patient has the ability and wherewithal to manage the pump.  Order has been placed to notify nurse of patient using own pump.      Settings:  Basal rate: 0.4 unit/hr  Bolus dose/Carb ratio: 12  ISF for Correction Dose: 20     Pump type:  [employer]Racer[employer] Minimed 670G     SN: [id number]  Infusion set type: plastic cannula  Change infusion set: Every 2-3 days  Change insertion site: with change of infusion set.  Location of insertion site: Left Anterior Thigh.   State of insertion site: appears clean

## 2019-08-21 NOTE — PLAN OF CARE
Problem: Adult Inpatient Plan of Care  Goal: Plan of Care Review  Outcome: Ongoing (interventions implemented as appropriate)  POC reviewed with pt with all questions answered. VSS and pt has no complaint of pain or SOB. Pt receiving heparin gtt. Blood glucose monitored and pt is altering insulin pump as needed. NSR on tele, HR 60s. Fall and sternal precautions maintained and pt remains free from falls.

## 2019-08-21 NOTE — NURSING
"Telephone Encounter by Jelani Valdes RN at 01/18/17 10:18 AM     Author:  Jelani Valdes RN Service:  (none) Author Type:  Registered Nurse     Filed:  01/18/17 10:30 AM Encounter Date:  1/18/2017 Status:  Signed     :  Jelani Valdes RN (Registered Nurse)              Ana Crisostomo    Patient Age: 59year old    ACCT STATUS: COPAY  MESSAGE:   Patient notified[TD1.1T] of results of labs. [TD1.1M]  Vangie Meng verbalized understanding of information given.[TD1.1T]  Patient states last time she ate was 2 days ago, states she had an orange, low fat yogurt, the same day patient states she was nauseous, and vomited, and had diarrhea- one episode. This morning nauseous, no BM, no breakfast, took prilosec, drinking a lot of water. Patient states last night she had upper middle abdomen, feels like indigestion, 6/10 and patient took pain pill Tramadol. Denies fever, aches. Patient complaints of chills, light headedness and dizziness, fatigue-patient has MS. Patient wants to know what to eat this morning- advised plain oatmeal or plain toast.  Advised patient to proceed to the ER if symptoms would to worsen.[TD1.1M]   Vangie Meng verbalized understanding of information given.[TD1.1T]  Msg to EAG to advise.[TD1.1M]  Electronically Signed by: Jelani Valdes RN , 1/18/2017[TD1.2T]             Next and Last Visit with Provider and Department  Next visit with Arvin Alcantar. is on 01/25/2017 at  8:15 AM in [address]  Next visit with GASTROENTEROLOGY is on 01/25/2017 at  8:15 AM in [facility] visit with Arvin Alcantar. was on 10/31/2016 at  8:15 AM in [facility] visit with GASTROENTEROLOGY was on 10/31/2016 at  8:15 AM in [address]: As of 12/09/2016 weight is 192 lbs. (87.091 kg). Height is 5' 2""(1.575 m).    BMI is 35.11 kg/(m^2) calculated from:     Height 5' 2\"" (1.575 m) as of 12/9/16     Weight 192 lb (87.091 kg) as of " Patient completed 2/4 walks for a total of 800 feet.  Patient denied chest pain and SOB.   12/9/16      No Known Allergies  Current outpatient prescriptions       Medication  Sig Dispense Refill   â¢ Diclofenac Sodium 1 % GEL apply to top of R foot twice a day 100 g 2   â¢ tramadol (ULTRAM) 50 MG tablet Take 50 mg by mouth as needed. 0   â¢ timolol (TIMOPTIC) 0.5 % ophthalmic solution INSTILL 1 DROP INTO BOTH EYES TWICE DAILY 15 mL 3   â¢ Zolpidem Tartrate 10 MG TABS TAKE 1 TABLET EVERY NIGHT AS NEEDED FOR SLEEP 90 Tab 2   â¢ Meclizine HCl 25 MG TABS Take 25 mg by mouth every 6 (six) hours as needed (dizziness/vertigo). 30 Tab 5   â¢ Minoxidil 5 % FOAM Apply twice daily to scalp 60 g 11   â¢ betamethasone dipropionate (DIPROLENE) 0.05 % cream Apply only to the effected area and use only for 2 weeks. 45 g 0   â¢ MAGNESIUM OR Take  by mouth. â¢ Probiotic Product (PROBIOTIC OR) Take  by mouth. â¢ gabapentin (NEURONTIN) 300 MG Cap TAKE 1 CAPSULE IN THE MORNING, 1 CAPSULE IN THE AFTERNOON, 2 CAPSULES AT NIGHT & TAKE 1 EXTRA CAPSULE AT NIGHT AS NEEDED 450 Cap 3   â¢ baclofen (LIORESAL) 10 MG tablet TAKE 1 TABLET EVERY MORNING  AND TAKE 2 TABLETS EVERY NIGHT 270 Tab 3   â¢ Glatiramer Acetate (COPAXONE) 20 MG/ML SOSY INJECT 20 MG INTO THE SKIN DAILY. 180 mL 3   â¢ omeprazole (PRILOSEC) 40 MG Cap Take 1 Cap by mouth daily. 90 Cap 3   â¢ fluticasone (FLONASE) 50 MCG/ACT nasal spray Use 1 Spray in each nostril daily. 16 g 2   â¢ aspirin 325 MG tablet Take 1 Tab by mouth daily. 0   â¢ fexofenadine (ALLEGRA) 180 MG tablet Take 1 Tab by mouth daily. 30 Tab 1   â¢ Cholecalciferol (VITAMIN D) 400 UNITS Cap Take 400 Units by mouth daily. 30 Cap 5   â¢ Multiple Vitamin (MULTIVITAMIN OR) Take 1 Tab by mouth daily.         PHARMACY to use:[TD1.1T] n/a[TD1.1M]          Pharmacy preference(s) on file:    [address] INFO:[TD1.1T] DO NOT LEAVE A MESSAGE - contact patient directly[TD1.1M]  ROUTING:[TD1.1T] Patient's physician/staff[TD1.1M]        PCP: Oumou Cervantes MD         INS: [facility] MED RISK / Plan: N/A / Product Type: *No Product type* / Note: This is the primary coverage, but no account was found for this location or the patient's primary location.    ADDRESS:  [address] [place] [address][FP3.1D]         Revision History        User Key Date/Time User Provider Type Action    > TD1.2 01/18/17 10:30 AM Clayton Alonso RN Registered Nurse Sign     TD1.1 01/18/17 10:18 AM Clayton Alonso RN Registered Nurse     M - Manual, T - Template

## 2019-08-21 NOTE — TELEPHONE ENCOUNTER
Caretaker is calling to find out which medications pt was given today and which ones he is still supposed to take, I advised her that someone from his discharge floor would be contacting her to let her know. I then contacted Sofia Rowan and she informed me she would have bedside RN contact them.

## 2019-08-21 NOTE — ASSESSMENT & PLAN NOTE
BG goal 140-180  BG is within goal. Patient is Type 1 DM.      Allow patient to continue to use his home insulin pump.      ** Please call Endocrine for any BG related issues **  ** Please notify Endocrine for any change and/or advance in diet**     Discharge Recommendations:  Patient is to continue his home insulin pump with current settings.   After discussing evaluation results, patient agrees with proposed plan of care, has no further concerns, and agrees to follow-up with his PCP/DM specialist in [place], [place].

## 2019-08-21 NOTE — NURSING
Received and acknowledge discharge instructions ordered at 1048 hours.  Provided a copy of AVS to patient.  Discharge instructions explained to patient.   Discontinued PIVs in right forearm antecubital, tips intact.  Removed telemtry.  Informed patient of future follow-up appointments.  Administered all ordered medications.  Explained medication regime to include new, continued, and discontinued medications.  Patient informed when next dose is due for all medications. Completed MIS.  Discussed when to call the doctor. Discussed heart failure tips, diagnosing heart failure, treatment plan, diet, procedures, tracking weight, signs of a flare-up to include:  swelling, shortness of breath dizziness, chest pain and cough.  Incision and Lovenox education completed.  Patient acknowledged understanding of all instructions.  Will continue to monitor patient until off unit.

## 2019-08-21 NOTE — NURSING
Called [facility] @ [phone], to follow up with request for [place].  Patient's spouse did not bring clothes for patient to change into when discharged.

## 2019-08-21 NOTE — PLAN OF CARE
08/21/19 1341   Final Note   Assessment Type Final Discharge Note   Anticipated Discharge Disposition Home   What phone number can be called within the next 1-3 days to see how you are doing after discharge? [phone]   Hospital Follow Up  Appt(s) scheduled? Yes   Discharge plans and expectations educations in teach back method with documentation complete? Yes   Right Care Referral Info   Post Acute Recommendation No Care     Pt discharging home w/lovenox and follow up with [facility] coumadin clinic with lab draws to be done at [facility] lab under the care of Dr. Alexander. Bedside delivery of meds completed. Discharge to home in care of family. Follow up appt scheduled as follows:    Future Appointments   Date Time Provider Department Center   9/5/2019  3:30 PM [facility] [id number] LB LIMIT [facility] XRAY OP [facility]   9/5/2019  4:00 PM EKG, APPT [facility] EKG [facility]   9/5/2019  4:30 PM Ryan Knight MD [facility] CARDVAS [facility]   9/19/2019  1:00 PM Angel Mena MD [facility] VOI CLARISSA [facility]   11/25/2019  8:00 AM Jodi Quintero NP [facility]     Secure message sent form on call RN from [facility] On call line regarding confusion of AVS instructions for medications from the patients caregiver. Spoke with bedside RN to ask if the patient was given AVS instructions of last dose received and when the next dose would be and any medications adjustments being made to his medication regimen. Updated AVS reviewed with the patients caregiver Yadira Tammy, updated AVS emailed to [email or web address] per her request. Reviewed the AVS in it's entirety. She understood all instructions AEB repeating the information back to the  with 100 % accuracy. Will follow up tomorrow to confirm all instructions were followed and the patient/caregiver remain comfortable with the information received and discharge instructions.

## 2019-08-21 NOTE — NURSING
Aptt 30.9. Heparin gtt currently infusing at 900units/hr. Aptt goal 60-80. MD Ervin notified. Ordered to increase to 1000units/hr. Next Appt lab draw due at 0800.

## 2019-08-21 NOTE — SUBJECTIVE & OBJECTIVE
"Interval HPI:   Overnight events: Remains in CTSU. BG within goal ranges on home insulin pump.   Eatin-75%  Nausea: No  Hypoglycemia and intervention: No  Fever: No  TPN and/or TF: No  If yes, type of TF/TPN and rate: none    BP (!) 119/59 (BP Location: Left arm, Patient Position: Sitting)   Pulse 68   Temp 97.9 °F (36.6 °C) (Oral)   Resp 16   Ht 5' 8" (1.727 m)   Wt 103.9 kg (229 lb 0.9 oz)   SpO2 95%   BMI 34.83 kg/m²     Labs Reviewed and Include    Recent Labs   Lab 19  0447   *   CALCIUM 8.8   *   K 3.7   CO2 23   CL 98   BUN 9   CREATININE 0.6     Lab Results   Component Value Date    WBC 10.58 2019    HGB 8.8 (L) 2019    HCT 27.8 (L) 2019    MCV 91 2019     2019     No results for input(s): TSH, FREET4 in the last 168 hours.  Lab Results   Component Value Date    HGBA1C 6.6 (H) 2019       Nutritional status:   Body mass index is 34.83 kg/m².  Lab Results   Component Value Date    ALBUMIN 2.4 (L) 2019    ALBUMIN 3.2 (L) 2019    ALBUMIN 4.6 2019     No results found for: PREALBUMIN    Estimated Creatinine Clearance: 162.5 mL/min (based on SCr of 0.6 mg/dL).    Accu-Checks  Recent Labs     19  1144 19  1249 19  1334 19  0709 19  1118 19  1621 19  0022 19  0551 19  0722   POCTGLUCOSE 151* 150* 145* 193* 151* 181* 156* 169* 144* 147*       Current Medications and/or Treatments Impacting Glycemic Control  Immunotherapy:    Immunosuppressants     None        Steroids:   Hormones (From admission, onward)    None        Pressors:    Autonomic Drugs (From admission, onward)    Start     Stop Route Frequency Ordered    19 0900  metoprolol tartrate (LOPRESSOR) split tablet 12.5 mg      -- Oral 2 times daily 19 7186        Hyperglycemia/Diabetes Medications:   Antihyperglycemics (From admission, onward)    Start     Stop Route Frequency Ordered    " 08/20/19 1230  insulin aspart U-100 (NovoLOG) insulin pump from home     Question Answer Comment   Basal Rate #1 0.4    Target number 120    Sensitivity #1 20    Carbohydrate coverage #1 1:12    Is the patient competent? Yes    Basal rate #1 time 1123-2699    Carbohydrate coverage #1 time 0060-5967    Sensitivity #1 time [id number]        -- SubQ Continuous 08/20/19 1132

## 2019-08-21 NOTE — NURSING
Spoke with Demetria Rodriguez NP about extending change date of PIVs. She approved extension of one day.

## 2019-08-21 NOTE — PROGRESS NOTES
"[facility]  Endocrinology  Progress Note    Admit Date: 2019     Reason for Consult: Management of T1DM, Hyperglycemia     Surgical Procedure and Date: BENTALL PROCEDURE: 2019    Diabetes diagnosis year: 20 years ago    Home Diabetes Medications:  Metformin 500 mg daily and Jardiance 25 mg daily  Medtronic 670G    Basal:  0.4    ICR: 12   ISF: 20   IOB: 3 hr    Target: 120     How often checking glucose at home? 4-5x daily   BG readings on regimen: 130-140s  Hypoglycemia on the regimen? No  Missed doses on regimen?  No    Diabetes Complications include:     Hyperglycemia, Hypoglycemia , Diabetic retinopathy and Diabetic peripheral neuropathy     Complicating diabetes co morbidities:   History of CVA and Active Cancer, severe obesity, HTN, HLD, CAD, Aortic Stenosis.     Lab Results   Component Value Date    HGBA1C 6.6 (H) 2019       HPI:   Patient is a 53 y.o. male with a diagnosis of aortic stenosis s/p Bentall w/ aortic valve replacement by  on 19, diabetes type 2, CAD. Also with history of stage 2 colon cancer with resection in . He presented to the ED 19 with complaints of dry tongue, sore throat, and decreased appetite. In the ED, he was found to be tachycardic and hypotensive and admitted to CTSU. Receives primary DM care from BEATRIS Quintero NP. Endocrinology consulted to manage DM1/hyperglycemia.         Interval HPI:   Overnight events: Remains in CTSU. BG within goal ranges on home insulin pump.   Eatin-75%  Nausea: No  Hypoglycemia and intervention: No  Fever: No  TPN and/or TF: No  If yes, type of TF/TPN and rate: none    BP (!) 119/59 (BP Location: Left arm, Patient Position: Sitting)   Pulse 68   Temp 97.9 °F (36.6 °C) (Oral)   Resp 16   Ht 5' 8" (1.727 m)   Wt 103.9 kg (229 lb 0.9 oz)   SpO2 95%   BMI 34.83 kg/m²      Labs Reviewed and Include    Recent Labs   Lab 19  5457   *   CALCIUM 8.8   *   K 3.7   CO2 23 "   CL 98   BUN 9   CREATININE 0.6     Lab Results   Component Value Date    WBC 10.58 08/21/2019    HGB 8.8 (L) 08/21/2019    HCT 27.8 (L) 08/21/2019    MCV 91 08/21/2019     08/21/2019     No results for input(s): TSH, FREET4 in the last 168 hours.  Lab Results   Component Value Date    HGBA1C 6.6 (H) 07/23/2019       Nutritional status:   Body mass index is 34.83 kg/m².  Lab Results   Component Value Date    ALBUMIN 2.4 (L) 08/16/2019    ALBUMIN 3.2 (L) 08/16/2019    ALBUMIN 4.6 07/23/2019     No results found for: PREALBUMIN    Estimated Creatinine Clearance: 162.5 mL/min (based on SCr of 0.6 mg/dL).    Accu-Checks  Recent Labs     08/19/19  1144 08/19/19  1249 08/19/19  1334 08/19/19  2008 08/20/19  0709 08/20/19  1118 08/20/19  1621 08/21/19  0022 08/21/19  0551 08/21/19  0722   POCTGLUCOSE 151* 150* 145* 193* 151* 181* 156* 169* 144* 147*       Current Medications and/or Treatments Impacting Glycemic Control  Immunotherapy:    Immunosuppressants     None        Steroids:   Hormones (From admission, onward)    None        Pressors:    Autonomic Drugs (From admission, onward)    Start     Stop Route Frequency Ordered    08/20/19 0900  metoprolol tartrate (LOPRESSOR) split tablet 12.5 mg      -- Oral 2 times daily 08/20/19 0847        Hyperglycemia/Diabetes Medications:   Antihyperglycemics (From admission, onward)    Start     Stop Route Frequency Ordered    08/20/19 1230  insulin aspart U-100 (NovoLOG) insulin pump from home     Question Answer Comment   Basal Rate #1 0.4    Target number 120    Sensitivity #1 20    Carbohydrate coverage #1 1:12    Is the patient competent? Yes    Basal rate #1 time 1180-9103    Carbohydrate coverage #1 time 7973-8461    Sensitivity #1 time [phone]        -- SubQ Continuous 08/20/19 1132          ASSESSMENT and PLAN    * S/P AVR (aortic valve replacement)  Managed per primary team  Optimize BG control      Diabetes mellitus type I  BG goal 140-180  BG is within goal.  Patient is Type 1 DM.      Allow patient to continue to use his home insulin pump.      ** Please call Endocrine for any BG related issues **  ** Please notify Endocrine for any change and/or advance in diet**     Discharge Recommendations:  Patient is to continue his home insulin pump with current settings.   After discussing evaluation results, patient agrees with proposed plan of care, has no further concerns, and agrees to follow-up with his PCP/DM specialist in [place], [place].          Insulin pump in place  Insulin Pump:  Patient has the ability and wherewithal to manage the pump.  Order has been placed to notify nurse of patient using own pump.      Settings:  Basal rate: 0.4 unit/hr  Bolus dose/Carb ratio: 12  ISF for Correction Dose: 20     Pump type:  Medtronic Minimed 670G     SN: [id number]  Infusion set type: plastic cannula  Change infusion set: Every 2-3 days  Change insertion site: with change of infusion set.  Location of insertion site: Left Anterior Thigh.   State of insertion site: appears clean       Trouble swallowing  Progressed to regular thin liquid diet per primary      Anemia  May alter the accuracy of a1c results.           Dilma Terrell, KELSIE  Endocrinology  [facility]

## 2019-08-21 NOTE — HOSPITAL COURSE
On 8/16/19, the patient was taken to the Operating Room for the above stated procedure. Please see the previously dictated operative report for complete details. Postoperatively, the patient was taken from the  Operating Room to the ICU where the vital signs were monitored and pain was kept under control. The patient was weaned from the drips and extubated in the ICU per protocol. On POD # 3 patient required cardioversion for A.Flutter. Pt remained in NSR after procedure. Once hemodynamically stable, the patient was transferred to the Cardiac Step-Down floor for continued strengthening and ambulation. On postoperative day 5, the patient was ready for discharge to home. At the time of discharge, the patient was ambulating unassisted. Pain was well controlled with oral analgesics and the patient was tolerating the diet. Pt was d/c home with Lovenox for subtherapeutic INR of 1.8.      MOBILITY AND ACTIVITY: As tolerated. Patient may shower. No heavy lifting of greater than 5 pounds and no driving.     DIET: Cardiac Diet     WOUND CARE INSTRUCTIONS: Check for redness, swelling and drainage around the  incision or wound. Patient is to call for any obvious bleeding, drainage, pus from the wound, unusual problems or difficulties or temperature of greater than 101   degrees.     FOLLOWUP: Follow up with Dr. Knight in approximately 3 weeks. Prior to this  appointment, the patient will have a chest x-ray and EKG.     Patient not placed on Ace-Inhibitor at the time of discharge due to potential for hypotension     DISCHARGE CONDITION: At the time of discharge, the patient was in sinus rhythm and afebrile with stable vital signs.

## 2019-08-21 NOTE — DISCHARGE SUMMARY
[facility]  Cardiothoracic Surgery  Discharge Summary      Patient Name: Cj Barnes  MRN: [id number]  Admission Date: 8/16/2019  Hospital Length of Stay: 5 days  Discharge Date and Time:  08/21/2019 11:14 AM  Attending Physician: Ryan Knight MD   Discharging Provider: Demetria Rodriguez NP  Primary Care Provider: Garry Stover MD    HPI:   Mr. Barnes is a 53-year-old gentleman who underwent a Bentall procedure with mech valve on 8/9 presenting to ED with trouble swallowing and dry tongue but found to have significant leukocytosis, tachycardia with intermittent hypotension, and fluid collection around surgical area on CT. He denies fevers, chest pain, SOB, palpitations. He was given 3L of fluids in the ED with no change in HR. Given concern of possible intrathoracic infection given CT findings and labile hemodynamics in the setting of recent heart surgery, the CTS team was consulted.     Procedure(s) (LRB):  CARDIOVERSION (N/A)      Indwelling Lines/Drains at time of discharge:   Lines/Drains/Airways          None        Hospital Course: On 8/09/19, the patient was taken to the Operating Room for the above stated procedure. Please see the previously dictated operative report for complete details. Postoperatively, the patient was taken from the  Operating Room to the ICU where the vital signs were monitored and pain was kept under control. Pt was treated with IV abx that were d/c before discharge. On Day # 3 patient required cardioversion for A.Flutter. Pt remained in NSR after procedure. Once hemodynamically stable, the patient was transferred to the Cardiac Step-Down floor for continued strengthening and ambulation. On postoperative day 5, the patient was ready for discharge to home. At the time of discharge, the patient was ambulating unassisted. Pain was well controlled with oral analgesics and the patient was tolerating the diet. Pt was d/c home with Lovenox for subtherapeutic INR  of 1.8.      MOBILITY AND ACTIVITY: As tolerated. Patient may shower. No heavy lifting of greater than 5 pounds and no driving.     DIET: Cardiac Diet     WOUND CARE INSTRUCTIONS: Check for redness, swelling and drainage around the  incision or wound. Patient is to call for any obvious bleeding, drainage, pus from the wound, unusual problems or difficulties or temperature of greater than 101   degrees.     FOLLOWUP: Follow up with Dr. Knight in approximately 3 weeks. Prior to this  appointment, the patient will have a chest x-ray and EKG.     Patient not placed on Ace-Inhibitor at the time of discharge due to potential for hypotension     DISCHARGE CONDITION: At the time of discharge, the patient was in sinus rhythm and afebrile with stable vital signs.      Consults (From admission, onward)        Status Ordering Provider     Inpatient consult to Electrophysiology  Once     Provider:  (Not yet assigned)    Completed MARIANNE BOOKER     Inpatient consult to Endocrinology  Once     Provider:  (Not yet assigned)    Completed SRAVAN MCPHERSON     Inpatient consult to ENT  Once     Provider:  (Not yet assigned)    Completed CHERYL MONTELONGO     Inpatient consult to Registered Dietitian/Nutritionist  Once     Provider:  (Not yet assigned)    Completed SRAVAN MCPHERSON          Pending Diagnostic Studies:     Procedure Component Value Units Date/Time    APTT [[id number]] Collected:  08/21/19 1037    Order Status:  Sent Lab Status:  In process Updated:  08/21/19 1102    Specimen:  Blood     CBC auto differential [[id number]] Collected:  08/16/19 1830    Order Status:  Sent Lab Status:  In process Updated:  08/16/19 1930    Specimen:  Blood     Comprehensive metabolic panel [[id number]] Collected:  08/16/19 1830    Order Status:  Sent Lab Status:  In process Updated:  08/16/19 1930    Specimen:  Blood     Comprehensive metabolic panel [[id number]] Collected:  08/16/19 1830    Order Status:  Sent Lab Status:  In  process Updated:  08/16/19 1930    Specimen:  Blood     Magnesium [[id number]] Collected:  08/16/19 1830    Order Status:  Sent Lab Status:  In process Updated:  08/16/19 1930    Specimen:  Blood     Phosphorus [[id number]] Collected:  08/16/19 1830    Order Status:  Sent Lab Status:  In process Updated:  08/16/19 1930    Specimen:  Blood           No new Assessment & Plan notes have been filed under this hospital service since the last note was generated.  Service: Cardiothoracic Surgery    Final Active Diagnoses:    Diagnosis Date Noted POA    PRINCIPAL PROBLEM:  S/P AVR (aortic valve replacement) [Z95.2] 08/20/2019 Not Applicable    Hypocalcemia [E83.51] 08/20/2019 No    Anemia [D64.9] 08/20/2019 No    Hyponatremia [E87.1] 08/20/2019 No    Leukocytosis [D72.829] 08/20/2019 No    Trouble swallowing [R13.10] 08/18/2019 Yes    Hoarse voice quality [R49.0] 08/18/2019 Yes    Atrial flutter [I48.92] 08/16/2019 Yes    Diabetes mellitus type I [E10.9]  Yes    Insulin pump in place [Z96.41]  Not Applicable      Problems Resolved During this Admission:    Diagnosis Date Noted Date Resolved POA    Sepsis [A41.9] 08/18/2019 08/20/2019 Yes      Discharged Condition: stable    Disposition: Home or Self Care    Follow Up:  Follow-up Information     Ryan Knight MD In 3 weeks.    Specialties:  Cardiothoracic Surgery, Transplant  Contact information:  [address] [place] [address] [id number] [phone]                 Patient Instructions:   No discharge procedures on file.  Medications:  Reconciled Home Medications:      Medication List      START taking these medications    amiodarone 400 MG tablet  Commonly known as:  PACERONE  Take 1 tablet (400 mg total) by mouth 2 (two) times daily.     enoxaparin 100 mg/mL Syrg  Commonly known as:  LOVENOX  Inject 1 mL (100 mg total) into the skin 2 (two) times daily for 10 days        CHANGE how you take these medications    aspirin 81 MG EC tablet  Commonly known as:   "ECOTRIN  Take 1 tablet (81 mg total) by mouth once daily.  Start taking on:  8/22/2019  What changed:    · medication strength  · how much to take     metoprolol tartrate 25 MG tablet  Commonly known as:  LOPRESSOR  Take 0.5 tablets (12.5 mg total) by mouth 2 (two) times daily.  What changed:  how much to take     oxyCODONE 5 MG immediate release tablet  Commonly known as:  ROXICODONE  Take 1 tablet (5 mg total) by mouth every 4 (four) hours as needed for Pain.  What changed:  reasons to take this     warfarin 5 MG tablet  Commonly known as:  COUMADIN  Take 1 tablet (5 mg total) by mouth Daily.  What changed:    · medication strength  · how much to take        CONTINUE taking these medications    blood sugar diagnostic Strp  1 each by Misc.(Non-Drug; Combo Route) route 6 (six) times daily. Contour next strips. Pt needs contour jackelyn for compatibility w/ insulin pump (medtronic 670g). Necessity     blood-glucose sensor Apple  Commonly known as:  GUARDIAN SENSOR 3  1 each by Misc.(Non-Drug; Combo Route) route once a week.     clotrimazole-betamethasone 1-0.05% cream  Commonly known as:  LOTRISONE  APPLY A SMALL AMOUNT TO AFFECTED AREA THREE TIMES DAILY UNTIL CLEAR     COMFORT EZ PEN NEEDLES 33 gauge x 3/16" Ndle  Generic drug:  pen needle, diabetic  USE AS DIRECTED with Levemir pens     docusate sodium 100 MG capsule  Commonly known as:  COLACE  Take 1 capsule (100 mg total) by mouth 2 (two) times daily.     gabapentin 300 MG capsule  Commonly known as:  NEURONTIN  Take 2 capsules (600 mg total) by mouth 3 (three) times daily.     glucagon (human recombinant) 1 mg Solr  Commonly known as:  GLUCAGON EMERGENCY KIT (HUMAN)  Inject 1 mg into the muscle as needed.     insulin detemir U-100 100 unit/mL (3 mL) Inpn pen  Commonly known as:  LEVEMIR FLEXTOUCH U-100 INSULN  Inject 48 Units into the skin every evening.     insulin regular 100 unit/mL Soln  Use home settings Basal 0.4    Bolus/carb 12    ISF 20     JARDIANCE 25 mg " Tab  Generic drug:  empagliflozin  TAKE ONE TABLET BY MOUTH DAILY FOR DIABETES     metFORMIN 500 MG 24 hr tablet  Commonly known as:  GLUCOPHAGE-XR  Take 1 tablet (500 mg total) by mouth daily with breakfast.     NovoLOG U-100 Insulin aspart 100 unit/mL injection  Generic drug:  insulin aspart U-100  INJECT 180 UNITS SUBCUTANEOUSLY EVERY ONE AND A HALF DAYS     pantoprazole 40 MG tablet  Commonly known as:  PROTONIX  Take 1 tablet (40 mg total) by mouth before breakfast.     simvastatin 20 MG tablet  Commonly known as:  ZOCOR  Take 1 tablet (20 mg total) by mouth every evening.        STOP taking these medications    amLODIPine 5 MG tablet  Commonly known as:  NORVASC     furosemide 20 MG tablet  Commonly known as:  LASIX     lisinopril 5 MG tablet  Commonly known as:  PRINIVIL,ZESTRIL     potassium chloride SA 20 MEQ tablet  Commonly known as:  K-DUR,KLOR-CON          INR 1.8 at discharge, lovenox started at d/c. INR goal 2.5-3.5      Time spent on the discharge of patient: 35 minutes    Demetria Rodriguez NP  Cardiothoracic Surgery  [facility]

## 2019-08-22 ENCOUNTER — TELEPHONE (OUTPATIENT)
Dept: DIABETES | Facility: CLINIC | Age: 54
End: 2019-08-22

## 2019-08-22 NOTE — PHYSICIAN QUERY
PT Name: Cj Barnes  MR #: [id number]     Physician Query Form - Diagnosis Clarification      CDS/: Ashtyn Thornton               Contact information:[email or web address]    This form is a permanent document in the medical record.     Query Date: August 22, 2019    By submitting this query, we are merely seeking further clarification of documentation.  Please utilize your independent clinical judgment when addressing the question(s) below.     The medical record contains the following:      Findings Supporting Clinical Information Location in Medical Record   Cardiogenic shock Critical secondary to cardiogenic shock     BP: ()/()    EXTREMITIES:  2+ DP pulses b/l, no edema.   SKIN:  Warm, no lesions on exposed skin      During this work up he was found to be tachycardic and hypotensive. In the ED he received 3 L of  NS and was given adenosine IV x1 for SVT. He remained tachycardic   Critical care surgery pn 8-17 only       Otolaryngology-HNS cn 8-17            Cardiology cn 8-17     Please clarify if the Cardiogenic shock diagnosis has been:    [  ] Ruled In   [ X ] Ruled Out   [  ] Other/Clarification of findings (please specify):     [  ] Clinically undetermined     Please document in your progress notes daily for the duration of treatment, until resolved, and include in your discharge summary.

## 2019-08-22 NOTE — PROGRESS NOTES
Admitted [facility] 8/16-8/21 with c/o difficulty swallowing. INR initially high and warfarin held - subsequently subtherapeutic and required heparin gtt. Calendar updated with inpatient warfarin doses/INRs. Of note, patient was also started on amiodarone 800mg/day for newly diagnosed atrial fibrillation. Warfarin was increased to 5mg daily (likely will be much too high of a dose with addition of amiodarone). See calendar for dose/plan.

## 2019-08-22 NOTE — PROGRESS NOTES
I called and spoke to the patient who asked the instructions be given to his daughter in law Yadira.  I instructed Yadira for the patient to take Coumadin 3 mg 8/22/19, lab appointment for INR 8/23/19 at 9:00am which she verbalized understanding.  She reports new and/or changes to his home medications since he was discharged from hospital 8/21/19.  She reports he was started on Lovenox 100 mg BID, his Aspirin was changed to 81 mg daily, his Metoprolol was changed to 12.5 mg BID, she verified he takes his Amiodarone 400 mg BID.  Chart routed to pharmacist to review.

## 2019-08-22 NOTE — TELEPHONE ENCOUNTER
----- Message from Jodi Quintero NP sent at 8/22/2019  1:51 PM CDT -----  Please call patient and ask him if he has received his Guardian Sensor? If he has, we need to schedule an appointment for him to see Oksana. He will need an hour long appointment and let us know because someone is going to come and help teach oksana about the sensor.

## 2019-08-22 NOTE — TELEPHONE ENCOUNTER
Pt says he just go out the hospital and is in sever pain. He will try to schedule that appointment next week.

## 2019-08-23 ENCOUNTER — ANTI-COAG VISIT (OUTPATIENT)
Dept: CARDIOLOGY | Facility: CLINIC | Age: 54
End: 2019-08-23
Payer: MEDICARE

## 2019-08-23 ENCOUNTER — PATIENT OUTREACH (OUTPATIENT)
Dept: ADMINISTRATIVE | Facility: CLINIC | Age: 54
End: 2019-08-23

## 2019-08-23 DIAGNOSIS — Z95.828 S/P ASCENDING AORTIC REPLACEMENT: ICD-10-CM

## 2019-08-23 DIAGNOSIS — Z79.01 LONG TERM (CURRENT) USE OF ANTICOAGULANTS: ICD-10-CM

## 2019-08-23 DIAGNOSIS — G45.9 TIA (TRANSIENT ISCHEMIC ATTACK): ICD-10-CM

## 2019-08-23 PROCEDURE — 93793 ANTICOAG MGMT PT WARFARIN: CPT | Mod: S$GLB,,,

## 2019-08-23 PROCEDURE — 93793 PR ANTICOAGULANT MGMT FOR PT TAKING WARFARIN: ICD-10-PCS | Mod: S$GLB,,,

## 2019-08-23 NOTE — PROGRESS NOTES
Wife advised to have patient *Hold* coumadin today. Take (1.5mg Sat/Sun). Eat (Cabbage) once week. Wife verbalized understanding.

## 2019-08-23 NOTE — PATIENT INSTRUCTIONS
After Your Transcatheter Aortic Valve Replacement (TAVR)  You have just had surgery to replace your aortic valve with a new biological tissue valve. When you go home, follow all instructions for medicines, pain control, diet, activity, and wound care. Make sure to keep all your follow-up appointments. You should feel better after your surgery. Complete recovery may take several weeks. How long depends on whether the surgery was done through your groin, underneath the collarbone, or between your ribs. All of these methods are considered less invasive than open heart surgery. This means fewer complications and a shorter recovery time. Below are some general guidelines to follow as you heal.     A pill organizer can help you keep track of the medicines you take each day.   Recovering at home  Follow your healthcare providers directions for recovery at home. It may take several weeks to get back to your normal routine. Using the groin artery is the least invasive method and heals the fastest. TAVR done between your ribs requires a larger incision takes longer to recover from.  During your recovery:  · Take medicine as directed. Take pain medicine, blood thinners, and any other medicine exactly as your healthcare provider advises. You will likely need to take aspirin and another blood thinner (antiplatelets) for a period. Youll need to take the aspirin for the rest of your life. Be sure your doctor knows about all other medicines you take, including over-the-counter medicines and dietary supplements.  · Care for your incision. Its normal for your incision to be bruised, itchy, or sore while its healing. Your incision may take a week or more to heal. A surgery site between your ribs will take longer to heal than one in your groin. Care for the bandage and incision as advised. Wash it every day with warm water and soap. Gently pat it dry. Dont put powder, lotion, or ointment on the incision until its  healed.  · Shower carefully. Unless youre told otherwise, you can shower once you get home. Use warm water and a mild soap. Avoid hot water because it can make you feel lightheaded. Dont take a bath until your healthcare provider says its OK. Also dont sit in a swimming pool or hot tub until your doctor says its OK. If your surgery was between your ribs, you may need to keep the incision site dry for a certain period.  · Avoid activities as directed. Your doctor may tell you to avoid strenuous activities for a week or more. This includes no heavy lifting. Instructions on what to do are different depending on how your surgery was done. Your doctor will give you specific instructions that are appropriate for you.  · Dont drive if you are taking opioid pain medicine. These medicines can make you feel sleepy and it is unsafe to drive or operate heavy machinery while taking them.  · Walk regularly. One of the best ways to get stronger is to walk. If your doctor agrees, start with short walks at home. Walk a little more each day. Take someone with you until you feel OK to walk alone. Your healthcare provider may recommend a cardiac rehab program. This will allow you to be closely monitored  by trained personnel during exercise. Most insurance companies will cover these programs.  Call 911  Call 911 for immediate care if you have:  · Chest pain  · Severe difficulty breathing  · Signs of stroke such as sudden numbness or weakness in the face, arms, or legs  When should I call my healthcare provider?  Seek immediate medical help if you have any of the following symptoms:  · Bowel movement that is bright red  · Bleeding that is frequent or persistent, such as nosebleeds  · Chills or fever of 100.4°F (38°C) or higher  · Dizziness, lightheadedness, or fainting  · Redness, swelling, bleeding, warmth, or fluid draining at the incision site  · Weight gain of more than 2 to 3 pounds in 24 hours or more than 5 pounds in 1  week  · Swelling in your hands, feet, or ankles  · Shortness of breath that doesnt get better when you rest  · Pain that gets worse or doesnt go away  · Fast, slow, or irregular pulse  · Worsening or severe fatigue  · Other signs or symptoms as indicated by your healthcare provider   Follow-up care  Follow-up visits with your healthcare provider help make sure youre recovering well. In fact, to keep feeling your best, youll need regular checkups for the rest of your life. During these visits, you may have:  · Blood tests to monitor the function of your heart and kidneys, and check for anemia  · Echocardiograms to check how well your heart and new heart valve are working  · Electrocardiograms (ECGs) to show if your heart rhythm has changed after your valve replacement  Staying healthy after a heart valve replacement  · Learn to take your own blood pressure and pulse. Keep a record of your results. Ask your healthcare provider which readings mean that you need medical care.  · Weigh yourself everyday and keep a record of this. It is normal for your weight to fluctuate 2 to 3 pounds in a day, anything more than this could mean you are retaining fluid and developing heart failure  · Tell all your healthcare providers and dentists youve had a valve replacement. Before any dental procedure, you will need to take an antibiotic to protect your new heart valve.  · Take any prescribed blood thinners exactly as directed.  · Make lifestyle changes as advised by your healthcare provider. Keep in mind that healthy habits such as exercise, and a healthy diet can strengthen your heart.  · Alert your healthcare provider to any new symptoms.  Date Last Reviewed: 5/1/2016  © 8521-2461 [employer]. [address] [place] [address], [place] [address]. All rights reserved. This information is not intended as a substitute for professional medical care. Always follow your healthcare professional's instructions.

## 2019-08-26 ENCOUNTER — ANTI-COAG VISIT (OUTPATIENT)
Dept: CARDIOLOGY | Facility: CLINIC | Age: 54
End: 2019-08-26
Payer: MEDICARE

## 2019-08-26 DIAGNOSIS — Z95.828 S/P ASCENDING AORTIC REPLACEMENT: ICD-10-CM

## 2019-08-26 DIAGNOSIS — G45.9 TIA (TRANSIENT ISCHEMIC ATTACK): ICD-10-CM

## 2019-08-26 DIAGNOSIS — Z79.01 LONG TERM (CURRENT) USE OF ANTICOAGULANTS: ICD-10-CM

## 2019-08-26 PROCEDURE — 93793 PR ANTICOAGULANT MGMT FOR PT TAKING WARFARIN: ICD-10-PCS | Mod: S$GLB,,,

## 2019-08-26 PROCEDURE — 93793 ANTICOAG MGMT PT WARFARIN: CPT | Mod: S$GLB,,,

## 2019-08-26 NOTE — PROGRESS NOTES
INR now at goal. Medications, chart, and patient findings reviewed. See calendar for adjustments to dose and follow up plan.

## 2019-08-29 ENCOUNTER — ANTI-COAG VISIT (OUTPATIENT)
Dept: CARDIOLOGY | Facility: CLINIC | Age: 54
End: 2019-08-29
Payer: MEDICARE

## 2019-08-29 DIAGNOSIS — Z95.828 S/P ASCENDING AORTIC REPLACEMENT: ICD-10-CM

## 2019-08-29 DIAGNOSIS — G45.9 TIA (TRANSIENT ISCHEMIC ATTACK): ICD-10-CM

## 2019-08-29 DIAGNOSIS — Z79.01 LONG TERM (CURRENT) USE OF ANTICOAGULANTS: ICD-10-CM

## 2019-08-29 PROCEDURE — 93793 PR ANTICOAGULANT MGMT FOR PT TAKING WARFARIN: ICD-10-PCS | Mod: S$GLB,,,

## 2019-08-29 PROCEDURE — 93793 ANTICOAG MGMT PT WARFARIN: CPT | Mod: S$GLB,,,

## 2019-08-29 NOTE — PROGRESS NOTES
INR remains at lowest end of goal. Medications, chart, and patient findings reviewed. See calendar for adjustments to dose and follow up plan.

## 2019-09-03 ENCOUNTER — ANTI-COAG VISIT (OUTPATIENT)
Dept: CARDIOLOGY | Facility: CLINIC | Age: 54
End: 2019-09-03
Payer: MEDICARE

## 2019-09-03 DIAGNOSIS — Z95.828 S/P ASCENDING AORTIC REPLACEMENT: ICD-10-CM

## 2019-09-03 DIAGNOSIS — G45.9 TIA (TRANSIENT ISCHEMIC ATTACK): ICD-10-CM

## 2019-09-03 DIAGNOSIS — Z79.01 LONG TERM (CURRENT) USE OF ANTICOAGULANTS: ICD-10-CM

## 2019-09-03 PROCEDURE — 93793 PR ANTICOAGULANT MGMT FOR PT TAKING WARFARIN: ICD-10-PCS | Mod: S$GLB,,,

## 2019-09-03 PROCEDURE — 93793 ANTICOAG MGMT PT WARFARIN: CPT | Mod: S$GLB,,,

## 2019-09-05 ENCOUNTER — HOSPITAL ENCOUNTER (OUTPATIENT)
Dept: RADIOLOGY | Facility: HOSPITAL | Age: 54
Discharge: HOME | End: 2019-09-05
Attending: THORACIC SURGERY (CARDIOTHORACIC VASCULAR SURGERY)
Payer: MEDICARE

## 2019-09-05 ENCOUNTER — HOSPITAL ENCOUNTER (OUTPATIENT)
Dept: CARDIOLOGY | Facility: CLINIC | Age: 54
Discharge: HOME | End: 2019-09-05
Attending: THORACIC SURGERY (CARDIOTHORACIC VASCULAR SURGERY)
Payer: MEDICARE

## 2019-09-05 ENCOUNTER — OFFICE VISIT (OUTPATIENT)
Dept: CARDIOTHORACIC SURGERY | Facility: CLINIC | Age: 54
End: 2019-09-05
Payer: MEDICARE

## 2019-09-05 ENCOUNTER — OFFICE VISIT (OUTPATIENT)
Dept: PRIMARY CARE CLINIC | Facility: CLINIC | Age: 54
End: 2019-09-05
Payer: MEDICARE

## 2019-09-05 ENCOUNTER — ANTI-COAG VISIT (OUTPATIENT)
Dept: CARDIOLOGY | Facility: CLINIC | Age: 54
End: 2019-09-05
Payer: MEDICARE

## 2019-09-05 ENCOUNTER — CLINICAL SUPPORT (OUTPATIENT)
Dept: DIABETES | Facility: CLINIC | Age: 54
End: 2019-09-05
Payer: MEDICARE

## 2019-09-05 VITALS
RESPIRATION RATE: 18 BRPM | DIASTOLIC BLOOD PRESSURE: 71 MMHG | TEMPERATURE: 98 F | WEIGHT: 202.38 LBS | SYSTOLIC BLOOD PRESSURE: 169 MMHG | HEART RATE: 51 BPM | OXYGEN SATURATION: 99 % | HEIGHT: 68 IN | BODY MASS INDEX: 30.67 KG/M2

## 2019-09-05 VITALS — BODY MASS INDEX: 28.79 KG/M2 | HEIGHT: 68 IN | WEIGHT: 190 LBS

## 2019-09-05 VITALS
TEMPERATURE: 97 F | DIASTOLIC BLOOD PRESSURE: 76 MMHG | OXYGEN SATURATION: 99 % | WEIGHT: 203.63 LBS | HEART RATE: 58 BPM | HEIGHT: 68 IN | SYSTOLIC BLOOD PRESSURE: 154 MMHG | BODY MASS INDEX: 30.86 KG/M2

## 2019-09-05 DIAGNOSIS — T81.49XS INFECTION FOLLOWING A PROCEDURE, OTHER SURGICAL SITE, SEQUELA: ICD-10-CM

## 2019-09-05 DIAGNOSIS — E78.5 HYPERLIPIDEMIA WITH TARGET LOW DENSITY LIPOPROTEIN (LDL) CHOLESTEROL LESS THAN 70 MG/DL: ICD-10-CM

## 2019-09-05 DIAGNOSIS — Z95.828 S/P ASCENDING AORTIC REPLACEMENT: Primary | ICD-10-CM

## 2019-09-05 DIAGNOSIS — E10.42 TYPE 1 DIABETES MELLITUS WITH DIABETIC POLYNEUROPATHY: Primary | ICD-10-CM

## 2019-09-05 DIAGNOSIS — Z00.00 ROUTINE PHYSICAL EXAMINATION: Primary | ICD-10-CM

## 2019-09-05 DIAGNOSIS — I48.3 TYPICAL ATRIAL FLUTTER: ICD-10-CM

## 2019-09-05 DIAGNOSIS — R58 HEMORRHAGE: ICD-10-CM

## 2019-09-05 DIAGNOSIS — Z79.01 LONG TERM (CURRENT) USE OF ANTICOAGULANTS: ICD-10-CM

## 2019-09-05 DIAGNOSIS — Z96.41 INSULIN PUMP IN PLACE: ICD-10-CM

## 2019-09-05 DIAGNOSIS — Z95.828 S/P ASCENDING AORTIC REPLACEMENT: ICD-10-CM

## 2019-09-05 DIAGNOSIS — Z12.5 PROSTATE CANCER SCREENING: ICD-10-CM

## 2019-09-05 DIAGNOSIS — I35.0 SEVERE AORTIC STENOSIS: ICD-10-CM

## 2019-09-05 DIAGNOSIS — G45.9 TIA (TRANSIENT ISCHEMIC ATTACK): ICD-10-CM

## 2019-09-05 DIAGNOSIS — I10 ESSENTIAL HYPERTENSION: ICD-10-CM

## 2019-09-05 DIAGNOSIS — Z95.2 S/P AVR (AORTIC VALVE REPLACEMENT): ICD-10-CM

## 2019-09-05 DIAGNOSIS — Z23 NEED FOR VACCINATION: ICD-10-CM

## 2019-09-05 DIAGNOSIS — D62 ACUTE BLOOD LOSS ANEMIA: ICD-10-CM

## 2019-09-05 DIAGNOSIS — E10.649 TYPE 1 DIABETES MELLITUS WITH HYPOGLYCEMIA UNAWARENESS: ICD-10-CM

## 2019-09-05 DIAGNOSIS — R13.10 DYSPHAGIA, UNSPECIFIED TYPE: ICD-10-CM

## 2019-09-05 PROBLEM — D72.829 LEUKOCYTOSIS: Status: RESOLVED | Noted: 2019-08-20 | Resolved: 2019-09-05

## 2019-09-05 PROBLEM — E83.51 HYPOCALCEMIA: Status: RESOLVED | Noted: 2019-08-20 | Resolved: 2019-09-05

## 2019-09-05 PROBLEM — R49.0 HOARSE VOICE QUALITY: Status: RESOLVED | Noted: 2019-08-18 | Resolved: 2019-09-05

## 2019-09-05 PROBLEM — E87.1 HYPONATREMIA: Status: RESOLVED | Noted: 2019-08-20 | Resolved: 2019-09-05

## 2019-09-05 PROCEDURE — 99214 PR OFFICE/OUTPT VISIT, EST, LEVL IV, 30-39 MIN: ICD-10-PCS | Mod: S$GLB,,, | Performed by: NURSE PRACTITIONER

## 2019-09-05 PROCEDURE — 93793 PR ANTICOAGULANT MGMT FOR PT TAKING WARFARIN: ICD-10-PCS | Mod: S$GLB,,,

## 2019-09-05 PROCEDURE — 99999 PR PBB SHADOW E&M-EST. PATIENT-LVL III: ICD-10-PCS | Mod: PBBFAC,,, | Performed by: THORACIC SURGERY (CARDIOTHORACIC VASCULAR SURGERY)

## 2019-09-05 PROCEDURE — 71046 XR CHEST PA AND LATERAL: ICD-10-PCS | Mod: 26,,, | Performed by: RADIOLOGY

## 2019-09-05 PROCEDURE — 99499 UNLISTED E&M SERVICE: CPT | Mod: S$GLB,,, | Performed by: NURSE PRACTITIONER

## 2019-09-05 PROCEDURE — 99024 POSTOP FOLLOW-UP VISIT: CPT | Mod: S$GLB,,, | Performed by: THORACIC SURGERY (CARDIOTHORACIC VASCULAR SURGERY)

## 2019-09-05 PROCEDURE — 99024 PR POST-OP FOLLOW-UP VISIT: ICD-10-PCS | Mod: S$GLB,,, | Performed by: THORACIC SURGERY (CARDIOTHORACIC VASCULAR SURGERY)

## 2019-09-05 PROCEDURE — 99999 PR PBB SHADOW E&M-EST. PATIENT-LVL III: ICD-10-PCS | Mod: PBBFAC,,, | Performed by: DIETITIAN, REGISTERED

## 2019-09-05 PROCEDURE — G0108 PR DIAB MANAGE TRN  PER INDIV: ICD-10-PCS | Mod: S$GLB,,, | Performed by: DIETITIAN, REGISTERED

## 2019-09-05 PROCEDURE — 3077F SYST BP >= 140 MM HG: CPT | Mod: CPTII,S$GLB,, | Performed by: NURSE PRACTITIONER

## 2019-09-05 PROCEDURE — G0008 ADMIN INFLUENZA VIRUS VAC: HCPCS | Mod: S$GLB,,, | Performed by: NURSE PRACTITIONER

## 2019-09-05 PROCEDURE — G0108 DIAB MANAGE TRN  PER INDIV: HCPCS | Mod: S$GLB,,, | Performed by: DIETITIAN, REGISTERED

## 2019-09-05 PROCEDURE — 99999 PR PBB SHADOW E&M-EST. PATIENT-LVL IV: CPT | Mod: PBBFAC,,, | Performed by: NURSE PRACTITIONER

## 2019-09-05 PROCEDURE — 3077F PR MOST RECENT SYSTOLIC BLOOD PRESSURE >= 140 MM HG: ICD-10-PCS | Mod: CPTII,S$GLB,, | Performed by: NURSE PRACTITIONER

## 2019-09-05 PROCEDURE — 90686 FLU VACCINE (QUAD) GREATER THAN OR EQUAL TO 3YO PRESERVATIVE FREE IM: ICD-10-PCS | Mod: S$GLB,,, | Performed by: NURSE PRACTITIONER

## 2019-09-05 PROCEDURE — 93010 EKG 12-LEAD: ICD-10-PCS | Mod: S$GLB,,, | Performed by: INTERNAL MEDICINE

## 2019-09-05 PROCEDURE — 93005 ELECTROCARDIOGRAM TRACING: CPT | Mod: S$GLB,,, | Performed by: THORACIC SURGERY (CARDIOTHORACIC VASCULAR SURGERY)

## 2019-09-05 PROCEDURE — 99999 PR PBB SHADOW E&M-EST. PATIENT-LVL III: CPT | Mod: PBBFAC,,, | Performed by: DIETITIAN, REGISTERED

## 2019-09-05 PROCEDURE — 99999 PR PBB SHADOW E&M-EST. PATIENT-LVL III: CPT | Mod: PBBFAC,,, | Performed by: THORACIC SURGERY (CARDIOTHORACIC VASCULAR SURGERY)

## 2019-09-05 PROCEDURE — 3008F PR BODY MASS INDEX (BMI) DOCUMENTED: ICD-10-PCS | Mod: CPTII,S$GLB,, | Performed by: NURSE PRACTITIONER

## 2019-09-05 PROCEDURE — 71046 X-RAY EXAM CHEST 2 VIEWS: CPT | Mod: 26,,, | Performed by: RADIOLOGY

## 2019-09-05 PROCEDURE — 93005 EKG 12-LEAD: ICD-10-PCS | Mod: S$GLB,,, | Performed by: THORACIC SURGERY (CARDIOTHORACIC VASCULAR SURGERY)

## 2019-09-05 PROCEDURE — 3078F DIAST BP <80 MM HG: CPT | Mod: CPTII,S$GLB,, | Performed by: NURSE PRACTITIONER

## 2019-09-05 PROCEDURE — 99999 PR PBB SHADOW E&M-EST. PATIENT-LVL IV: ICD-10-PCS | Mod: PBBFAC,,, | Performed by: NURSE PRACTITIONER

## 2019-09-05 PROCEDURE — 93010 ELECTROCARDIOGRAM REPORT: CPT | Mod: S$GLB,,, | Performed by: INTERNAL MEDICINE

## 2019-09-05 PROCEDURE — 90686 IIV4 VACC NO PRSV 0.5 ML IM: CPT | Mod: S$GLB,,, | Performed by: NURSE PRACTITIONER

## 2019-09-05 PROCEDURE — 3008F BODY MASS INDEX DOCD: CPT | Mod: CPTII,S$GLB,, | Performed by: NURSE PRACTITIONER

## 2019-09-05 PROCEDURE — G0008 FLU VACCINE (QUAD) GREATER THAN OR EQUAL TO 3YO PRESERVATIVE FREE IM: ICD-10-PCS | Mod: S$GLB,,, | Performed by: NURSE PRACTITIONER

## 2019-09-05 PROCEDURE — 93793 ANTICOAG MGMT PT WARFARIN: CPT | Mod: S$GLB,,,

## 2019-09-05 PROCEDURE — 99214 OFFICE O/P EST MOD 30 MIN: CPT | Mod: S$GLB,,, | Performed by: NURSE PRACTITIONER

## 2019-09-05 PROCEDURE — 3078F PR MOST RECENT DIASTOLIC BLOOD PRESSURE < 80 MM HG: ICD-10-PCS | Mod: CPTII,S$GLB,, | Performed by: NURSE PRACTITIONER

## 2019-09-05 PROCEDURE — 71046 X-RAY EXAM CHEST 2 VIEWS: CPT | Mod: TC

## 2019-09-05 PROCEDURE — 99499 RISK ADDL DX/OHS AUDIT: ICD-10-PCS | Mod: S$GLB,,, | Performed by: NURSE PRACTITIONER

## 2019-09-05 NOTE — PROGRESS NOTES
Chief Complaint  Chief Complaint   Patient presents with    Hospital Follow Up     heart surgery       HPI    Cj Barnes is a 53 y.o. male that presents for hospital follow-up.    Patient with recent diagnosis of severe aortic valvular stenosis with incidentally noted dilatation of the ascending aorta.  Under went Bentall procedure and ascending aorta replacement on 08/09/2019 by Dr. Knight at [facility].  Tolerated procedure well and discharged from [facility] on 08/14/2019 to home.  Back to the emergency room on 08/16/2019 with complaints dizziness, palpitations, difficulty swallowing.  Noted oropharyngeal laceration with infection and noted hemorrhage an aortic root on CT of the chest.  Blood cultures negative during admission.  Underwent cardioversion 08/19/2019 for atrial flutter.  Discharged on 08/21/2019 on amiodarone, continues on Coumadin for artificial valve.  Currently under the care of Dr. Alexander for PT INR checks continues to be subtherapeutic.  Upcoming appointment today with Dr. Alexander.  Also has an upcoming appointment with Dr. navarro we know in the next month.  Patient with history of type 1 diabetes with recent A1c of 6.2 on 09/25/2019.  Currently under the care of Endocrinology Jodi Quintero.  Upcoming appointment this month.  Presents to clinic today for hospital follow-up.  Most recent lab work with significant anemia on 08/20/2019.  No repeat CBC since peer denies dizziness. Denies fever chills.  Denies chest pain or palpitations.         PAST MEDICAL HISTORY:  Past Medical History:   Diagnosis Date    Aortic stenosis 2018    Cancer 2014    Colon cancer     Coronary artery disease     Diabetes mellitus type I     since in his 30's    Heart murmur     Heel fracture     HTN (hypertension)     Hyperlipidemia LDL goal < 70     Insulin pump in place     MVP (mitral valve prolapse)     Stenosis of aortic and mitral valves        PAST SURGICAL HISTORY:  Past Surgical  History:   Procedure Laterality Date    BACK SURGERY      BENTALL PROCEDURE N/A 8/9/2019    Performed by [facility] at [facility] OR 2ND FLR    CARDIOVERSION N/A 8/19/2019    Performed by Juan Zapata MD at [facility] EP LAB    COLON SURGERY  2014    FASCIOTOMY, PLANTAR Right 10/4/2013    Performed by Ruma Robbins DPM at [facility] OR 1ST FLR    FASCIOTOMY, PLANTAR, ENDOSCOPIC Right 10/4/2013    Performed by Ruma Robbins DPM at [facility] OR 1ST FLR    FOOT TENDON SURGERY      Left heart cath Left 1/15/2018    Per[facility] at [facility] CATH LAB    Replacement-valve-aortic N/A 8/9/2019    Performed by Ryan Knight [facility] at [facility] OR 2ND FLR    right and left heart cath Bilateral 01/15/2018    Right heart cath Right 1/15/2018    Performed by Palomo Turner [facility] at [facility] CATH LAB    TRIGGER FINGER RELEASE      x 2       SOCIAL HISTORY:  Social History     Socioeconomic History    Marital status:      Spouse name: Not on file    Number of children: Not on file    Years of education: Not on file    Highest education level: Not on file   Occupational History    Not on file   Social Needs    Financial resource strain: Not on file    Food insecurity:     Worry: Not on file     Inability: Not on file    Transportation needs:     Medical: Not on file     Non-medical: Not on file   Tobacco Use    Smoking status: Never Smoker    Smokeless tobacco: Current User     Types: Snuff    Tobacco comment: since he was 12 yrs old   Substance and Sexual Activity    Alcohol use: No     Comment: socially    Drug use: No    Sexual activity: Not on file   Lifestyle    Physical activity:     Days per week: Not on file     Minutes per session: Not on file    Stress: Not on file   Relationships    Social connections:     Talks on phone: Not on file     Gets together: Not on file     Attends [employer] service: Not on file     Active member of club or organization: Not on file     Attends meetings  "of clubs or organizations: Not on file     Relationship status: Not on file   Other Topics Concern    Not on file   Social History Narrative        2 grown kids    Delivers seafood       FAMILY HISTORY:  Family History   Problem Relation Age of Onset    Heart disease Mother     Lung cancer Mother     Heart attack Father     Diabetes Father     HIV Brother        ALLERGIES AND MEDICATIONS: updated and reviewed.  Review of patient's allergies indicates:  No Known Allergies  Current Outpatient Medications   Medication Sig Dispense Refill    amiodarone (PACERONE) 400 MG tablet Take 1 tablet (400 mg total) by mouth 2 (two) times daily. 60 tablet 11    aspirin (ECOTRIN) 81 MG EC tablet Take 1 tablet (81 mg total) by mouth once daily.  0    blood sugar diagnostic Strp 1 each by Misc.(Non-Drug; Combo Route) route 6 (six) times daily. Contour next strips. Pt needs contour jackelyn for compatibility w/ insulin pump (medtronic 670g). Necessity 200 strip 6    blood-glucose sensor (GUARDIAN SENSOR 3) Apple 1 each by Misc.(Non-Drug; Combo Route) route once a week. 12 each 3    clotrimazole-betamethasone 1-0.05% (LOTRISONE) cream APPLY A SMALL AMOUNT TO AFFECTED AREA THREE TIMES DAILY UNTIL CLEAR  0    COMFORT EZ PEN NEEDLES 33 gauge x 3/16" Ndle USE AS DIRECTED with Levemir pens  11    docusate sodium (COLACE) 100 MG capsule Take 1 capsule (100 mg total) by mouth 2 (two) times daily.  0    gabapentin (NEURONTIN) 300 MG capsule Take 2 capsules (600 mg total) by mouth 3 (three) times daily. 180 capsule 6    glucagon, human recombinant, (GLUCAGON EMERGENCY KIT, HUMAN,) 1 mg SolR Inject 1 mg into the muscle as needed. 1 each 0    insulin detemir U-100 (LEVEMIR FLEXTOUCH U-100 INSULN) 100 unit/mL (3 mL) SubQ InPn pen Inject 48 Units into the skin every evening. 3 mL 0    insulin regular 100 unit/mL Soln Use home settings Basal 0.4    Bolus/carb 12    ISF 20 1 Product 0    JARDIANCE 25 mg Tab TAKE ONE TABLET BY " "MOUTH DAILY FOR DIABETES  11    metFORMIN (GLUCOPHAGE-XR) 500 MG 24 hr tablet Take 1 tablet (500 mg total) by mouth daily with breakfast. 90 tablet 3    metoprolol tartrate (LOPRESSOR) 25 MG tablet Take 0.5 tablets (12.5 mg total) by mouth 2 (two) times daily. 30 tablet 11    NOVOLOG U-100 INSULIN ASPART 100 unit/mL injection INJECT 180 UNITS SUBCUTANEOUSLY EVERY ONE AND A HALF DAYS  11    pantoprazole (PROTONIX) 40 MG tablet Take 1 tablet (40 mg total) by mouth before breakfast. 30 tablet 11    simvastatin (ZOCOR) 20 MG tablet Take 1 tablet (20 mg total) by mouth every evening. 90 tablet 3    warfarin (COUMADIN) 5 MG tablet Take 1 tablet (5 mg total) by mouth Daily. (Patient taking differently: Take 3 mg by mouth Daily. ) 30 tablet 11     No current facility-administered medications for this visit.          ROS  Review of Systems   Constitutional: Negative for chills and fever.   HENT: Negative for ear pain, postnasal drip and sinus pain.    Respiratory: Negative for cough and shortness of breath.    Cardiovascular: Negative for chest pain.   Gastrointestinal: Negative for diarrhea, nausea and vomiting.   Psychiatric/Behavioral: Positive for sleep disturbance.           PHYSICAL EXAM  Vitals:    09/05/19 0838   BP: (!) 169/71   BP Location: Right arm   Patient Position: Sitting   BP Method: Medium (Automatic)   Pulse: (!) 51   Resp: 18   Temp: 97.7 °F (36.5 °C)   TempSrc: Oral   SpO2: 99%   Weight: 91.8 kg (202 lb 6.4 oz)   Height: 5' 8" (1.727 m)    Body mass index is 30.77 kg/m².  Weight: 91.8 kg (202 lb 6.4 oz)   Height: 5' 8" (172.7 cm)     Physical Exam   Constitutional: He is oriented to person, place, and time. He appears well-developed and well-nourished.   HENT:   Head: Normocephalic.   Right Ear: Tympanic membrane normal.   Left Ear: Tympanic membrane normal.   Mouth/Throat: Uvula is midline, oropharynx is clear and moist and mucous membranes are normal.   Eyes: Conjunctivae are normal. "   Cardiovascular: Normal rate, regular rhythm, normal heart sounds and normal pulses.   No murmur heard.  Pulses:       Radial pulses are 2+ on the right side, and 2+ on the left side.   No LE swelling noted   Pulmonary/Chest: Effort normal and breath sounds normal. He has no wheezes.   Abdominal: Soft. Bowel sounds are normal. There is no tenderness.   Musculoskeletal: He exhibits no edema.   Lymphadenopathy:     He has no cervical adenopathy.   Neurological: He is alert and oriented to person, place, and time.   Skin: Skin is warm and dry. No rash noted.   Mid sternal incision clean dry and intact with Steri-Strips.  Well-approximated with no noted drainage or erythema.   Psychiatric: He has a normal mood and affect.         Health Maintenance       Date Due Completion Date    TETANUS VACCINE 11/09/1981 ---    Shingles Vaccine (1 of 2) 11/09/2013 ---    Influenza Vaccine (1) 09/01/2019 12/17/2018    Hemoglobin A1c 01/23/2020 7/23/2019    Foot Exam 04/23/2020 4/23/2019    Urine Microalbumin 04/23/2020 4/23/2019    Lipid Panel 04/24/2020 4/24/2019    Eye Exam 05/01/2020 5/1/2019    High Dose Statin 09/05/2020 9/5/2019    Colonoscopy 04/25/2026 4/25/2016 (Done)    Override on 4/25/2016: Done            Assessment & Plan    Problem List Items Addressed This Visit        Unprioritized    S/P ascending aortic replacement    S/P AVR (aortic valve replacement)  Follow-up with cardiothoracic surgery as planned.      Long term (current) use of anticoagulants  Patient expresses frustration with inability to control INR.  Instructed him to meet with Katia Pham with her education on what foods may influence his INR more than others.      Trouble swallowing    Relevant Orders    CBC auto differential    Atrial flutter  Continue on amiodarone.  Follow up with Dr. Alexander as planned.      Essential hypertension    Controlled at this time.  Continue on current regimen.    Hyperlipidemia with target low density lipoprotein (LDL)  "cholesterol less than 70 mg/dL    Relevant Orders    Lipid panel  Continue on Zocor 20 mg.  Recheck lipid panel today.      Acute blood loss anemia      Other Visit Diagnoses     Routine physical examination    -  Primary    Relevant Orders    CBC auto differential    Comprehensive metabolic panel    Need for vaccination        Relevant Orders    Influenza - Quadrivalent (6 months+) (PF) (Completed)    Prostate cancer screening        Relevant Orders    PSA, Screening    Infection following a procedure, other surgical site, sequela         Relevant Orders    CBC auto differential  Repeat CBC today to evaluate for continued leukocytosis.      Hemorrhage         Relevant Orders    CBC auto differential          Follow-up: Follow up in about 4 weeks (around 10/3/2019).    Demetria Morrissey    Medication List with Changes/Refills   Current Medications    AMIODARONE (PACERONE) 400 MG TABLET    Take 1 tablet (400 mg total) by mouth 2 (two) times daily.    ASPIRIN (ECOTRIN) 81 MG EC TABLET    Take 1 tablet (81 mg total) by mouth once daily.    BLOOD SUGAR DIAGNOSTIC STRP    1 each by Misc.(Non-Drug; Combo Route) route 6 (six) times daily. Contour next strips. Pt needs contour jackelyn for compatibility w/ insulin pump (medtronic 670g). Necessity    BLOOD-GLUCOSE SENSOR (GUARDIAN SENSOR 3) MORIS    1 each by Misc.(Non-Drug; Combo Route) route once a week.    CLOTRIMAZOLE-BETAMETHASONE 1-0.05% (LOTRISONE) CREAM    APPLY A SMALL AMOUNT TO AFFECTED AREA THREE TIMES DAILY UNTIL CLEAR    COMFORT EZ PEN NEEDLES 33 GAUGE X 3/16" NDLE    USE AS DIRECTED with Levemir pens    DOCUSATE SODIUM (COLACE) 100 MG CAPSULE    Take 1 capsule (100 mg total) by mouth 2 (two) times daily.    GABAPENTIN (NEURONTIN) 300 MG CAPSULE    Take 2 capsules (600 mg total) by mouth 3 (three) times daily.    GLUCAGON, HUMAN RECOMBINANT, (GLUCAGON EMERGENCY KIT, HUMAN,) 1 MG SOLR    Inject 1 mg into the muscle as needed.    INSULIN DETEMIR U-100 (LEVEMIR FLEXTOUCH " U-100 INSULN) 100 UNIT/ML (3 ML) SUBQ INPN PEN    Inject 48 Units into the skin every evening.    INSULIN REGULAR 100 UNIT/ML SOLN    Use home settings Basal 0.4    Bolus/carb 12    ISF 20    JARDIANCE 25 MG TAB    TAKE ONE TABLET BY MOUTH DAILY FOR DIABETES    METFORMIN (GLUCOPHAGE-XR) 500 MG 24 HR TABLET    Take 1 tablet (500 mg total) by mouth daily with breakfast.    METOPROLOL TARTRATE (LOPRESSOR) 25 MG TABLET    Take 0.5 tablets (12.5 mg total) by mouth 2 (two) times daily.    NOVOLOG U-100 INSULIN ASPART 100 UNIT/ML INJECTION    INJECT 180 UNITS SUBCUTANEOUSLY EVERY ONE AND A HALF DAYS    PANTOPRAZOLE (PROTONIX) 40 MG TABLET    Take 1 tablet (40 mg total) by mouth before breakfast.    SIMVASTATIN (ZOCOR) 20 MG TABLET    Take 1 tablet (20 mg total) by mouth every evening.    WARFARIN (COUMADIN) 5 MG TABLET    Take 1 tablet (5 mg total) by mouth Daily.

## 2019-09-05 NOTE — PROGRESS NOTES
Verified pt ID using name and . NKDA. Administered flu vaccine in left deltoid per physician order using aseptic technique. Aspirated and no blood return noted. Pt tolerated well with no adverse reactions noted.

## 2019-09-05 NOTE — PROGRESS NOTES
Diabetes Education  Author: Katia Pham RD  Date: 9/5/2019    Diabetes Care Management Summary  Diabetes Education Record Assessment/Progress: Comprehensive/Group(670 pump upgrade - 1 week f/u)  Current Diabetes Risk Level: Moderate     Last A1c:   Lab Results   Component Value Date    HGBA1C 6.6 (H) 07/23/2019     Last visit with Diabetes Educator: : 03/20/2018      Diabetes Type  Diabetes Type : Type I    Diabetes History  Diabetes Diagnosis: >10 years  Current Treatment: Oral Medication, Diet, Insulin pump, Insulin(novolog via Metronic 670G, metformin, jardiance)  Reviewed Problem List with Patient: Yes    Health Maintenance was reviewed today with patient. Discussed with patient importance of routine eye exams, foot exams/foot care, blood work (i.e.: A1c, microalbumin, and lipid), dental visits, yearly flu vaccine, and pneumonia vaccine as indicated by PCP. Patient verbalized understanding.     Health Maintenance Topics with due status: Not Due       Topic Last Completion Date    Colonoscopy 04/25/2016    Foot Exam 04/23/2019    Urine Microalbumin 04/23/2019    Lipid Panel 04/24/2019    Eye Exam 05/01/2019    Hemoglobin A1c 07/23/2019    High Dose Statin 09/05/2019     Health Maintenance Due   Topic Date Due    TETANUS VACCINE  11[birth year]1981       Nutrition  Meal Planning: water, 3 meals per day, snacks between meal, eats out seldom  What type of sweetener do you use?: none  What type of beverages do you drink?: water, diet soda/tea  Meal Plan 24 Hour Recall - Breakfast: Tornatos x2 and Twist Doughnut x1  Meal Plan 24 Hour Recall - Lunch: Chips or [place]  Meal Plan 24 Hour Recall - Dinner: Pizza Thin Crust  Meal Plan 24 Hour Recall - Snack: Chips     Monitoring   Self Monitoring : SMBG 3 tiimes a day, approved for a sensor, waiting for sensor to come in(SMBG 3-4 times daily (does not use CGM sensor); range )  Blood Glucose Logs: Yes(from pump download; see Media tab)  Do you use a personal continuous  glucose monitor?: Yes  What kind of glucose monitor do you use?: Medtronic Guardian(starting today at time of visit)  In the last month, how often have you had a low blood sugar reaction?: once  What are your symptoms of low blood sugar?: shaky, dizzy  How do you treat low blood sugar?:  juice, glucose tablets  Can you tell when your blood sugar is too high?: yes  How do you treat high blood sugar?: insulin    Exercise   Exercise Type: none  Frequency: Never    Current Diabetes Treatment   Current Treatment: Oral Medication, Diet, Insulin pump, Insulin(novolog via Metronic 670G, metformin, jardiance)    Social History  Preferred Learning Method: Face to Face  Primary Support: Self, Spouse, Family  Educational Level: High School  Occupation: retired  Smoking Status: Never a Smoker  Alcohol Use: Never            DDS-2 Score  ( > 3 = SIGNIFICANT DISTRESS): 1                   Barriers to Change  Barriers to Change: None  Learning Challenges : None    Readiness to Learn   Readiness to Learn : Eager    Cultural Influences  Cultural Influences: No    Diabetes Education Assessment/Progress  Diabetes Disease Process (diabetes disease process and treatment options): Discussion, Individual Session  Nutrition (Incorporating nutritional management into one's lifestyle): Discussion, Individual Session  Physical Activity (incorporating physical activity into one's lifestyle): Discussion, Individual Session  Medications (states correct name, dose, onset, peak, duration, side effects & timing of meds): Discussion, Individual Session, Comprehends Key Points, Instructed, Written Materials Provided, Demonstration  Monitoring (monitoring blood glucose/other parameters & using results): Discussion, Instructed, Individual Session, Comprehends Key Points, Written Materials Provided, Demonstration(Guardian Sensor)  Acute Complications (preventing, detecting, and treating acute complications): Discussion, Individual Session, Comprehends Key  Points  Chronic Complications (preventing, detecting, and treating chronic complications): Discussion, Individual Session, Comprehends Key Points  Clinical (diabetes, other pertinent medical history, and relevant comorbidities reviewed during visit): Discussion, Individual Session, Comprehends Key Points  Cognitive (knowledge of self-management skills, functional health literacy): Discussion, Comprehends Key Points, Individual Session  Psychosocial (emotional response to diabetes): Discussion, Individual Session, Comprehends Key Points  Diabetes Distress and Support Systems: Discussion, Individual Session, Comprehends Key Points  Behavioral (readiness for change, lifestyle practices, self-care behaviors): Discussion, Comprehends Key Points, Individual Session  Patient is here in clinic today for initial start of 4vets continuous glucose monitoring system (CGMA). Reviewed with patient how to enter blood sugar on pump with in two hours. Patient inserted sensor on left back of arm and inserted transmitter. High and low alerts were setup on pump. Hypoglycemia trigger set to alert and suspend basal insulin if blood sugar is approaching 60, and to alert for hyperglycemia if reaches 250. Patient provided with phone number for 24-hour help line if needed. Discussed various types of possible alarms and whatto do. Patient aware that blood glucose must be entered within every 12 hours for claibration. Questions addressed. Initialization finished. No futher questions.    New insulin pump settings:  Basal Rate: 0.45  ICR: 1:12  ISF: 20  Target BS: 120  IOB: 3.15    Goals  Patient has selected/evaluated goals during today's session: Yes, evaluated  Monitoring: In Progress    Diabetes Self-Management Support Plan  Diabetes Learning: DM support group  Stress Management: family  Review Status: Patient has selected and agrees to support plan., Patient was provided Diabetes Self-Management Support Plan document that includes support  options.    Diabetes Care Plan/Intervention  Education Plan/Intervention: Individual Follow-Up DSMT, Support Group for Diabetes Distress    Diabetes Meal Plan  Restrictions: Low Fat, Low Sodium, Restricted Carbohydrate  Calories: 1800  Carbohydrate Per Meal: 30-45g  Carbohydrate Per Snack : 15-20g  Fat: 50  Protein: 135    Today's Self-Management Care Plan was developed with the patient's input and is based on barriers identified during today's assessment.    The long and short-term goals in the care plan were written with the patient/caregiver's input. The patient has agreed to work toward these goals to improve his overall diabetes control.      The patient received a copy of today's self-management plan and verbalized understanding of the care plan, goals, and all of today's instructions.      The patient was encouraged to communicate with his physician and care team regarding his condition(s) and treatment.  I provided the patient with my contact information today and encouraged him to contact me via phone or patient portal as needed.     Education Units of Time   Time Spent: 60 min

## 2019-09-05 NOTE — PROGRESS NOTES
INR at goal. Medications and chart reviewed. No changes noted to necessitate adjustment of warfarin or follow-up plan. See calendar.

## 2019-09-05 NOTE — PROGRESS NOTES
Patient seen and examined. Patient is progressively increasing activity. No significant complaints.     Sternum: stable, incision CDI  Chest xray: Acceptable post op chest  EKG: Bradycardic, HR 56     Assessment:  S/P Bentall procedure 8/9/19     Plan:  Can begin driving as long as he has power steering  Can begin cardiac rehab in the next couple of weeks  We will refer to cardiology to assume care   DC lasix  DC potassium  DC Metoprolol due to bradycardia   Follow up with EP         No scheduled appointment, RTC prn    CTS Attending Note:    I have personally taken the history and examined this patient and agree with the WALE's note as stated above.

## 2019-09-06 ENCOUNTER — TELEPHONE (OUTPATIENT)
Dept: DIABETES | Facility: CLINIC | Age: 54
End: 2019-09-06

## 2019-09-06 DIAGNOSIS — Z95.828 S/P ASCENDING AORTIC REPLACEMENT: Primary | ICD-10-CM

## 2019-09-06 NOTE — TELEPHONE ENCOUNTER
----- Message from Jodi Quintero NP sent at 9/5/2019 10:32 PM CDT -----  Make sure to schedule a follow up visit with oksana for sensor / pump download in the next 10-14 days. Check with oksana to see if this was set up. It is not currently scheduled.

## 2019-09-09 RX ORDER — OXYCODONE HYDROCHLORIDE 5 MG/1
TABLET ORAL
Qty: 42 TABLET | OUTPATIENT
Start: 2019-09-09

## 2019-09-10 ENCOUNTER — DOCUMENTATION ONLY (OUTPATIENT)
Dept: CARDIOTHORACIC SURGERY | Facility: CLINIC | Age: 54
End: 2019-09-10

## 2019-09-10 ENCOUNTER — ANTI-COAG VISIT (OUTPATIENT)
Dept: CARDIOLOGY | Facility: CLINIC | Age: 54
End: 2019-09-10
Payer: MEDICARE

## 2019-09-10 DIAGNOSIS — I35.0 NONRHEUMATIC AORTIC VALVE STENOSIS: Primary | ICD-10-CM

## 2019-09-10 DIAGNOSIS — I48.91 ATRIAL FIBRILLATION, UNSPECIFIED TYPE: ICD-10-CM

## 2019-09-10 DIAGNOSIS — G45.9 TIA (TRANSIENT ISCHEMIC ATTACK): ICD-10-CM

## 2019-09-10 DIAGNOSIS — Z79.01 LONG TERM (CURRENT) USE OF ANTICOAGULANTS: ICD-10-CM

## 2019-09-10 DIAGNOSIS — Z95.828 S/P ASCENDING AORTIC REPLACEMENT: ICD-10-CM

## 2019-09-10 PROCEDURE — 93793 PR ANTICOAGULANT MGMT FOR PT TAKING WARFARIN: ICD-10-PCS | Mod: S$GLB,,,

## 2019-09-10 PROCEDURE — 93793 ANTICOAG MGMT PT WARFARIN: CPT | Mod: S$GLB,,,

## 2019-09-10 NOTE — PROGRESS NOTES
FROM 9/5/19 OFFICE VISIT:    Pt here for post-op appointment. Reminded patient to keep incision clean and dry, and to adhere to the 5 lb weight restriction for 6 wks post-surgery, then 20lb restriction for 6 additional wks. Patient verbalized understanding. Patient cleared to drive. Patients appt to be set up with EP for A-fib. Also, patient made aware that Phase II Cardiac Rehab orders will be placed ([facility] don't do CR II, pt chose [facility] location), and the C.R. Facility will reach out to them.

## 2019-09-16 ENCOUNTER — TELEPHONE (OUTPATIENT)
Dept: CARDIOLOGY | Facility: CLINIC | Age: 54
End: 2019-09-16

## 2019-09-16 ENCOUNTER — TELEPHONE (OUTPATIENT)
Dept: CARDIAC REHAB | Facility: CLINIC | Age: 54
End: 2019-09-16

## 2019-09-16 NOTE — TELEPHONE ENCOUNTER
Spoke with patient about setting an appointment with EP clinic. Made patient aware that EP clinic is not available in [facility] at this time and he would have to go to [facility] [place]. Patient verbalized understanding. Appointment established with Dr Ramirez for date and time selected by patient. No further issues discussed.

## 2019-09-16 NOTE — TELEPHONE ENCOUNTER
----- Message from Elba Marsh sent at 9/16/2019 12:43 PM CDT -----  Contact: Pt wife Susannah  Pt would like to schedule an appt with Cardiac Rehab. Please call pt wife Susannah @ [phone]. Thank you.

## 2019-09-16 NOTE — TELEPHONE ENCOUNTER
----- Message from Grisel Hardwick RN sent at 9/10/2019 11:04 AM CDT -----  Hi,  This pt saw Dr. Eugene ROWAN/BRENDA Riley and would like this pt to f/u with electrophysiology for A-fib at [facility]. Thank you.  -Grisel  [id number]

## 2019-09-19 ENCOUNTER — ANTI-COAG VISIT (OUTPATIENT)
Dept: CARDIOLOGY | Facility: CLINIC | Age: 54
End: 2019-09-19
Payer: MEDICARE

## 2019-09-19 ENCOUNTER — OFFICE VISIT (OUTPATIENT)
Dept: OTOLARYNGOLOGY | Facility: CLINIC | Age: 54
End: 2019-09-19
Payer: MEDICARE

## 2019-09-19 ENCOUNTER — LAB VISIT (OUTPATIENT)
Dept: LAB | Facility: HOSPITAL | Age: 54
End: 2019-09-19
Payer: MEDICARE

## 2019-09-19 VITALS
WEIGHT: 205 LBS | DIASTOLIC BLOOD PRESSURE: 65 MMHG | SYSTOLIC BLOOD PRESSURE: 119 MMHG | HEART RATE: 65 BPM | TEMPERATURE: 98 F | BODY MASS INDEX: 31.17 KG/M2

## 2019-09-19 DIAGNOSIS — J38.3 VOCAL FOLD ATROPHY: ICD-10-CM

## 2019-09-19 DIAGNOSIS — G45.9 TIA (TRANSIENT ISCHEMIC ATTACK): ICD-10-CM

## 2019-09-19 DIAGNOSIS — Z79.01 LONG TERM (CURRENT) USE OF ANTICOAGULANTS: ICD-10-CM

## 2019-09-19 DIAGNOSIS — Z95.828 S/P ASCENDING AORTIC REPLACEMENT: ICD-10-CM

## 2019-09-19 DIAGNOSIS — R13.14 DYSPHAGIA, PHARYNGOESOPHAGEAL: ICD-10-CM

## 2019-09-19 DIAGNOSIS — R49.0 DYSPHONIA: Primary | ICD-10-CM

## 2019-09-19 LAB
INR PPP: 2.1 (ref 0.8–1.2)
PROTHROMBIN TIME: 20.6 SEC (ref 9–12.5)

## 2019-09-19 PROCEDURE — 99999 PR PBB SHADOW E&M-EST. PATIENT-LVL III: CPT | Mod: PBBFAC,,, | Performed by: OTOLARYNGOLOGY

## 2019-09-19 PROCEDURE — 99203 OFFICE O/P NEW LOW 30 MIN: CPT | Mod: 25,S$GLB,, | Performed by: OTOLARYNGOLOGY

## 2019-09-19 PROCEDURE — 99203 PR OFFICE/OUTPT VISIT, NEW, LEVL III, 30-44 MIN: ICD-10-PCS | Mod: 25,S$GLB,, | Performed by: OTOLARYNGOLOGY

## 2019-09-19 PROCEDURE — 93793 PR ANTICOAGULANT MGMT FOR PT TAKING WARFARIN: ICD-10-PCS | Mod: S$GLB,,,

## 2019-09-19 PROCEDURE — 85610 PROTHROMBIN TIME: CPT

## 2019-09-19 PROCEDURE — 31579 PR LARYNGOSCOPY, FLEX/RIGID TELESCOPIC, W/STROBOSCOPY: ICD-10-PCS | Mod: S$GLB,,, | Performed by: OTOLARYNGOLOGY

## 2019-09-19 PROCEDURE — 93793 ANTICOAG MGMT PT WARFARIN: CPT | Mod: S$GLB,,,

## 2019-09-19 PROCEDURE — 3008F PR BODY MASS INDEX (BMI) DOCUMENTED: ICD-10-PCS | Mod: CPTII,S$GLB,, | Performed by: OTOLARYNGOLOGY

## 2019-09-19 PROCEDURE — 3078F PR MOST RECENT DIASTOLIC BLOOD PRESSURE < 80 MM HG: ICD-10-PCS | Mod: CPTII,S$GLB,, | Performed by: OTOLARYNGOLOGY

## 2019-09-19 PROCEDURE — 36415 COLL VENOUS BLD VENIPUNCTURE: CPT

## 2019-09-19 PROCEDURE — 3074F SYST BP LT 130 MM HG: CPT | Mod: CPTII,S$GLB,, | Performed by: OTOLARYNGOLOGY

## 2019-09-19 PROCEDURE — 3008F BODY MASS INDEX DOCD: CPT | Mod: CPTII,S$GLB,, | Performed by: OTOLARYNGOLOGY

## 2019-09-19 PROCEDURE — 99999 PR PBB SHADOW E&M-EST. PATIENT-LVL III: ICD-10-PCS | Mod: PBBFAC,,, | Performed by: OTOLARYNGOLOGY

## 2019-09-19 PROCEDURE — 3074F PR MOST RECENT SYSTOLIC BLOOD PRESSURE < 130 MM HG: ICD-10-PCS | Mod: CPTII,S$GLB,, | Performed by: OTOLARYNGOLOGY

## 2019-09-19 PROCEDURE — 31579 LARYNGOSCOPY TELESCOPIC: CPT | Mod: S$GLB,,, | Performed by: OTOLARYNGOLOGY

## 2019-09-19 PROCEDURE — 3078F DIAST BP <80 MM HG: CPT | Mod: CPTII,S$GLB,, | Performed by: OTOLARYNGOLOGY

## 2019-09-19 NOTE — PATIENT INSTRUCTIONS
WHAT TO EXPECT FROM VOICE THERAPY    Purpose  The purpose of voice therapy is to help you find a better, more efficient way to use your voice or to reduce symptoms such as coughing, throat clearing, or difficulty breathing.  Depending on your symptoms, you may learn how to produce clearer voice quality, how to reduce fatigue or pain associated with speaking, how to take care of your voice, and how to eliminate chronic coughing or throat clearing.      Process: Evaluation  First, you may go through some initial testing.  In most cases, a videostroboscopy will be performed in order to allow your speech pathologist and your physician to look at your vocal cords to aid in deciding if you would benefit from voice therapy.  Next, you may work with the speech pathologist to assess the current capabilities of your voice.  Following evaluation, your speech pathologist will design a therapeutic plan to improve your voice as well as other symptoms that may bother you.  At the time of evaluation, your speech pathologist may provide you with exercises to try at home.      Process: Therapy  Most patients benefit from 2-8 sessions over 1-3 months.  Voice therapy involves changing the behavior of your vocal cords and speaking habits, so it is very important that you attend your appointments and do home practices as instructed by your speech pathologist.  Home practice and participation in therapy are critical to meeting your desired voice goals, so of course, we are able to work with you to schedule appointments that are convenient for you.

## 2019-09-19 NOTE — LETTER
September 24, 2019      Gina Paul MD  [address] [place] [address] [place] [address]           [facility]  [address]  [facility] [address]  Phone: [phone]  Fax: [phone]          Patient: Cj Barnes   MR Number: [id number]   YOB: 1965   Date of Visit: 9/19/2019       Dear Gina Paul:    Thank you for referring Cj Barnes to me for evaluation. Attached you will find relevant portions of my assessment and plan of care.    If you have questions, please do not hesitate to call me. I look forward to following Cj Barnes along with you.    Sincerely,    Angel Mena MD    Enclosure  CC:  No Recipients    If you would like to receive this communication electronically, please contact [email or web address] or [phone] to request more information on ipsy Link access.    For providers and/or their staff who would like to refer a patient to [facility], please contact us through our one-stop-shop provider referral line, [facility] [place], at [phone].    If you feel you have received this communication in error or would no longer like to receive these types of communications, please e-mail [email or web address]

## 2019-09-19 NOTE — PROGRESS NOTES
[facility]  Department of Otorhinolaryngology and Communication Sciences    Cj Barnes is a 53 y.o. male who [facility] to the [facility] for consultation at the kind request of Gina Paul for further management of dysphonia.     He complains of sudden voice change and cough. Quieter, weaker, higher pitched. Onset was sudden. Duration is since August 9, the date of his open heart surgery (valve replacement, aorta repair). Time course is constant. Symptoms are stable; he initially had no voice immediately after surgery but his voice improved to what it is today about a day or two later.  He denies any exacerbating factors. He denies any alleviating factors. Associated symptoms include cough.  He has a persistent non-productive cough.  He feels like there is mucus in his chest but nothing comes up.  He reports it is not a cough in response to a tickle but rather to get something up.      He was readmitted for 5 days a few weeks after surgery for trouble swallowing, leukocytosis, tachycardia, hypotension, and fluid collection around surgical area on CT. He underwent a course of antibiotics.  Feels much better at this point other than the voice and the cough. Flexible laryngoscopy by the inpatient team showed some posterior glottic granulation.    Had an MBS 8/19/2019. SLP note is not available for review. Radiology report: Laryngeal penetration with thin liquids.  No subglottic aspiration.  Vallecular and piriform sinus stasis which partially cleared with subsequent swallows    He is tolrating an unrestircted diet PO at this point.    Voice Handicap Index-10 (VHI-10) score is 17.   Reflux Symptom Index (RSI) score is 26.   Eating Assessment Tool-10 (EAT-10) score is 0.   Dyspnea Index (DI) score is 0.  Cough Severity Index (CSI) score is 24.    Past Medical History  He has a past medical history of Aortic stenosis, Cancer, Colon cancer, Coronary artery disease, Diabetes mellitus type I, Heart  murmur, Heel fracture, HTN (hypertension), Hyperlipidemia LDL goal < 70, Insulin pump in place, MVP (mitral valve prolapse), and Stenosis of aortic and mitral valves.    Past Surgical History  He has a past surgical history that includes Trigger finger release; Back surgery; Colon surgery (2014); Foot Tendon Surgery; right and left heart cath (Bilateral, 01/15/2018); Aortic valve replacement (N/A, 8/9/2019); Bentall procedure for replacement of aortic valve, aortic root, and ascending aorta (N/A, 8/9/2019); and Treatment of cardiac arrhythmia (N/A, 8/19/2019).    Family History  His family history includes Diabetes in his father; HIV in his brother; Heart attack in his father; Heart disease in his mother; Lung cancer in his mother.    Social History  He reports that he has never smoked. His smokeless tobacco use includes snuff. He reports that he does not drink alcohol or use drugs.    Allergies  He has No Known Allergies.    Medications  He has a current medication list which includes the following prescription(s): amiodarone, aspirin, blood sugar diagnostic, blood-glucose sensor, clotrimazole-betamethasone 1-0.05%, comfort ez pen needles, gabapentin, glucagon (human recombinant), insulin detemir u-100, insulin regular 100 unit/mL Soln, jardiance, metformin, novolog u-100 insulin aspart, pantoprazole, simvastatin, and warfarin.    Review of Systems   Constitutional: Negative for fever.   HENT: Positive for sore throat.    Eyes: Negative for visual disturbance.   Respiratory: Positive for wheezing.    Cardiovascular: Positive for chest pain.   Gastrointestinal: Negative for nausea.   Musculoskeletal: Negative for arthralgias.   Skin: Negative for rash.   Neurological: Negative for tremors.   Hematological: Bruises/bleeds easily.   Psychiatric/Behavioral: The patient is not nervous/anxious.           Objective:     /65   Pulse 65   Temp 97.7 °F (36.5 °C)   Wt 93 kg (205 lb 0.4 oz)   BMI 31.17 kg/m²       Physical Exam    Constitutional: comfortable, well dressed  Psychiatric: appropriate affect  Respiratory: comfortably breathing, symmetric chest rise, no stridor  Voice: high pitch, breathy, weak, impaired projection  Cardiovascular: upper extremities non-edematous  Lymphatic: no cervical lymphadenopathy  Neurologic: alert and oriented to time, place, person, and situation; cranial nerves 3-12 grossly intact  Head: normocephalic  Eyes: conjunctivae and sclerae clear  Ears: normal pinnae, normal external auditory canals, tympanic membranes intact  Nose: mucosa pink and noncongested, no masses, no mucopurulence, no polyps  Oral cavity / oropharynx: no mucosal lesions  Neck: soft, full range of motion, laryngotracheal complex palpable with appropriate landmarks, larynx elevates on swallowing  Indirect laryngoscopy: limited due to gag    Procedure  Flexible Laryngeal Videostroboscopy (01662): Laryngeal videostroboscopy is indicated to assess laryngeal vibratory biomechanics and vocal fold oscillation, which cannot be assessed with a plain light examination. This was carried out transnasally with a distal chip videoendoscope. After verbal consent was obtained, the patient was positioned and the nose was topically decongested with 1% phenylephrine and topically anesthetized with 4% lidocaine. The endoscope was passed through the most patent nasal cavity and positioned to image the nasopharynx, larynx, and hypopharynx in detail. The following features were examined: nasopharyngeal, laryngeal, hypopharyngeal masses; velopharyngeal strength, closure, and symmetry of motion; vocal fold range and symmetry of motion; laryngeal mucosal edema, erythema, inflammation, and hydration; salivary pooling; and gross laryngeal sensation. During phonation, the vocal folds were assessed for glottal closure; mucosal wave; vocal fold lesions; vibratory periodicity, amplitude, and phase symmetry; and vertical height match. The equipment was  removed. The patient tolerated the procedure well without complication. All findings were normal except:  - minimal bilateral vocal fold atrophy  - minimal glottal edema with minimal pliability impairment bilaterally  - adduction, abduction, elongation intact  - visible portions of subglottis/trachea patent      Assessment:     Cj Barnes is a 53 y.o. male with mild dysphonia following CT surgery. I note mild vocal fold atrophy and glottic edema 2/2 coughing. He has a prior history of intubation related granulomas.     Plan:        I had a discussion with the patient regarding his condition and the further workup and management options.      Reassurance was provided. He may benefit from SLP voice therapy, but he defers on this at present.    He will follow up with me in the future on an as-needed basis.    All questions were answered, and the patient is in agreement with the above.     Angel Mena M.D.  [facility]  Department of Otorhinolaryngology and Communication Sciences

## 2019-09-20 ENCOUNTER — OFFICE VISIT (OUTPATIENT)
Dept: PRIMARY CARE CLINIC | Facility: CLINIC | Age: 54
End: 2019-09-20
Payer: MEDICARE

## 2019-09-20 ENCOUNTER — PATIENT OUTREACH (OUTPATIENT)
Dept: ADMINISTRATIVE | Facility: OTHER | Age: 54
End: 2019-09-20

## 2019-09-20 ENCOUNTER — CLINICAL SUPPORT (OUTPATIENT)
Dept: DIABETES | Facility: CLINIC | Age: 54
End: 2019-09-20
Payer: MEDICARE

## 2019-09-20 VITALS
HEIGHT: 68 IN | RESPIRATION RATE: 18 BRPM | DIASTOLIC BLOOD PRESSURE: 69 MMHG | OXYGEN SATURATION: 98 % | SYSTOLIC BLOOD PRESSURE: 125 MMHG | WEIGHT: 204 LBS | BODY MASS INDEX: 30.92 KG/M2 | TEMPERATURE: 98 F | HEART RATE: 68 BPM

## 2019-09-20 VITALS — BODY MASS INDEX: 30.8 KG/M2 | WEIGHT: 203.19 LBS | HEIGHT: 68 IN

## 2019-09-20 DIAGNOSIS — Z46.81 INSULIN PUMP FITTING OR ADJUSTMENT: ICD-10-CM

## 2019-09-20 DIAGNOSIS — E10.42 TYPE 1 DIABETES MELLITUS WITH DIABETIC POLYNEUROPATHY: ICD-10-CM

## 2019-09-20 DIAGNOSIS — E10.649 TYPE 1 DIABETES MELLITUS WITH HYPOGLYCEMIA UNAWARENESS: ICD-10-CM

## 2019-09-20 DIAGNOSIS — Z96.41 INSULIN PUMP IN PLACE: ICD-10-CM

## 2019-09-20 DIAGNOSIS — J40 BRONCHITIS: Primary | ICD-10-CM

## 2019-09-20 PROCEDURE — G0108 PR DIAB MANAGE TRN  PER INDIV: ICD-10-PCS | Mod: S$GLB,,, | Performed by: DIETITIAN, REGISTERED

## 2019-09-20 PROCEDURE — 3078F PR MOST RECENT DIASTOLIC BLOOD PRESSURE < 80 MM HG: ICD-10-PCS | Mod: CPTII,S$GLB,, | Performed by: NURSE PRACTITIONER

## 2019-09-20 PROCEDURE — 99999 PR PBB SHADOW E&M-EST. PATIENT-LVL III: ICD-10-PCS | Mod: PBBFAC,,, | Performed by: DIETITIAN, REGISTERED

## 2019-09-20 PROCEDURE — 95251 PR GLUCOSE MONITOR, 72 HOUR, PHYS INTERP: ICD-10-PCS | Mod: S$GLB,,, | Performed by: NURSE PRACTITIONER

## 2019-09-20 PROCEDURE — 94640 AIRWAY INHALATION TREATMENT: CPT | Mod: 59,S$GLB,, | Performed by: NURSE PRACTITIONER

## 2019-09-20 PROCEDURE — G0108 DIAB MANAGE TRN  PER INDIV: HCPCS | Mod: S$GLB,,, | Performed by: DIETITIAN, REGISTERED

## 2019-09-20 PROCEDURE — 3008F PR BODY MASS INDEX (BMI) DOCUMENTED: ICD-10-PCS | Mod: CPTII,S$GLB,, | Performed by: NURSE PRACTITIONER

## 2019-09-20 PROCEDURE — 3074F PR MOST RECENT SYSTOLIC BLOOD PRESSURE < 130 MM HG: ICD-10-PCS | Mod: CPTII,S$GLB,, | Performed by: NURSE PRACTITIONER

## 2019-09-20 PROCEDURE — 99999 PR PBB SHADOW E&M-EST. PATIENT-LVL V: ICD-10-PCS | Mod: PBBFAC,,, | Performed by: NURSE PRACTITIONER

## 2019-09-20 PROCEDURE — 3078F DIAST BP <80 MM HG: CPT | Mod: CPTII,S$GLB,, | Performed by: NURSE PRACTITIONER

## 2019-09-20 PROCEDURE — 95251 CONT GLUC MNTR ANALYSIS I&R: CPT | Mod: S$GLB,,, | Performed by: NURSE PRACTITIONER

## 2019-09-20 PROCEDURE — 94640 PR INHAL RX, AIRWAY OBST/DX SPUTUM INDUCT: ICD-10-PCS | Mod: 59,S$GLB,, | Performed by: NURSE PRACTITIONER

## 2019-09-20 PROCEDURE — 3008F BODY MASS INDEX DOCD: CPT | Mod: CPTII,S$GLB,, | Performed by: NURSE PRACTITIONER

## 2019-09-20 PROCEDURE — 99999 PR PBB SHADOW E&M-EST. PATIENT-LVL III: CPT | Mod: PBBFAC,,, | Performed by: DIETITIAN, REGISTERED

## 2019-09-20 PROCEDURE — 99999 PR PBB SHADOW E&M-EST. PATIENT-LVL V: CPT | Mod: PBBFAC,,, | Performed by: NURSE PRACTITIONER

## 2019-09-20 PROCEDURE — 99214 PR OFFICE/OUTPT VISIT, EST, LEVL IV, 30-39 MIN: ICD-10-PCS | Mod: 25,S$GLB,, | Performed by: NURSE PRACTITIONER

## 2019-09-20 PROCEDURE — 99214 OFFICE O/P EST MOD 30 MIN: CPT | Mod: 25,S$GLB,, | Performed by: NURSE PRACTITIONER

## 2019-09-20 PROCEDURE — 3074F SYST BP LT 130 MM HG: CPT | Mod: CPTII,S$GLB,, | Performed by: NURSE PRACTITIONER

## 2019-09-20 RX ORDER — IPRATROPIUM BROMIDE AND ALBUTEROL SULFATE 2.5; .5 MG/3ML; MG/3ML
3 SOLUTION RESPIRATORY (INHALATION) ONCE
Status: COMPLETED | OUTPATIENT
Start: 2019-09-20 | End: 2019-09-20

## 2019-09-20 RX ORDER — DOXYCYCLINE 100 MG/1
100 CAPSULE ORAL 2 TIMES DAILY
Qty: 14 CAPSULE | Refills: 0 | Status: SHIPPED | OUTPATIENT
Start: 2019-09-20 | End: 2020-02-04

## 2019-09-20 RX ORDER — ALBUTEROL SULFATE 90 UG/1
2 AEROSOL, METERED RESPIRATORY (INHALATION) EVERY 4 HOURS PRN
Qty: 1 INHALER | Refills: 5 | Status: SHIPPED | OUTPATIENT
Start: 2019-09-20 | End: 2020-02-04

## 2019-09-20 RX ORDER — CLOPIDOGREL BISULFATE 75 MG/1
75 TABLET ORAL DAILY
Refills: 11 | COMMUNITY
Start: 2019-09-09 | End: 2019-12-12

## 2019-09-20 RX ORDER — LISINOPRIL 10 MG/1
10 TABLET ORAL DAILY
Refills: 3 | COMMUNITY
Start: 2019-08-30 | End: 2019-10-01 | Stop reason: HOSPADM

## 2019-09-20 RX ORDER — PROMETHAZINE HYDROCHLORIDE AND CODEINE PHOSPHATE 6.25; 1 MG/5ML; MG/5ML
5 SOLUTION ORAL EVERY 4 HOURS PRN
Qty: 118 ML | Refills: 0 | Status: SHIPPED | OUTPATIENT
Start: 2019-09-20 | End: 2019-09-30

## 2019-09-20 RX ADMIN — IPRATROPIUM BROMIDE AND ALBUTEROL SULFATE 3 ML: 2.5; .5 SOLUTION RESPIRATORY (INHALATION) at 03:09

## 2019-09-20 NOTE — PROGRESS NOTES
Chief Complaint  Chief Complaint   Patient presents with    Chest Congestion    Cough       HPI    Cj Barnes is a 53 y.o. male that presents for cough, sore throat.    Reports the onset of symptoms approximately 7 days ago. Cough - non-productive white. Wheezing. No shortness of breath. Chest tightness. No h/o asthma or copd. No sore throat. Intermittent rhinorrhea. No n/v/d. Taking OTC diabetic tussin with no relief. No other OTC medications.  Recently underwent open heart surgery//Bentall procedure cleared by Cardiology.  Patient denies chest pain or lower extremity swelling.         PAST MEDICAL HISTORY:  Past Medical History:   Diagnosis Date    Aortic stenosis 2018    Cancer 2014    Colon cancer     Coronary artery disease     Diabetes mellitus type I     since in his 30's    Heart murmur     Heel fracture     HTN (hypertension)     Hyperlipidemia LDL goal < 70     Insulin pump in place     MVP (mitral valve prolapse)     Stenosis of aortic and mitral valves        PAST SURGICAL HISTORY:  Past Surgical History:   Procedure Laterality Date    BACK SURGERY      BENTALL PROCEDURE N/A 8/9/2019    Performed by Ryan Knight MD at [facility] OR 2ND FLR    CARDIOVERSION N/A 8/19/2019    Performed by Juan Zapata MD at [facility] EP LAB    COLON SURGERY  2014    FASCIOTOMY, PLANTAR Right 10/4/2013    Performed by Ruma Robbins DPM at [facility] OR 1ST FLR    FASCIOTOMY, PLANTAR, ENDOSCOPIC Right 10/4/2013    Performed by Ruma Robbins DPM at [facility] OR 1ST FLR    FOOT TENDON SURGERY      Left heart cath Left 1/15/2018    Performed by Palomo Turner MD at [facility] CATH LAB    Replacement-valve-aortic N/A 8/9/2019    Performed by Ryan Knight MD at [facility] OR 2ND FLR    right and left heart cath Bilateral 01/15/2018    Right heart cath Right 1/15/2018    Performed by Palomo Turner MD at [facility] CATH LAB    TRIGGER FINGER RELEASE      x 2       SOCIAL HISTORY:  Social History      Socioeconomic History    Marital status:      Spouse name: Not on file    Number of children: Not on file    Years of education: Not on file    Highest education level: Not on file   Occupational History    Not on file   Social Needs    Financial resource strain: Not on file    Food insecurity:     Worry: Not on file     Inability: Not on file    Transportation needs:     Medical: Not on file     Non-medical: Not on file   Tobacco Use    Smoking status: Never Smoker    Smokeless tobacco: Former User     Types: Snuff    Tobacco comment: since he was 12 yrs old   Substance and Sexual Activity    Alcohol use: No     Comment: socially    Drug use: No    Sexual activity: Not on file   Lifestyle    Physical activity:     Days per week: Not on file     Minutes per session: Not on file    Stress: Not on file   Relationships    Social connections:     Talks on phone: Not on file     Gets together: Not on file     Attends Spiritism service: Not on file     Active member of club or organization: Not on file     Attends meetings of clubs or organizations: Not on file     Relationship status: Not on file   Other Topics Concern    Not on file   Social History Narrative        2 grown kids    Delivers seafood       FAMILY HISTORY:  Family History   Problem Relation Age of Onset    Heart disease Mother     Lung cancer Mother     Heart attack Father     Diabetes Father     HIV Brother        ALLERGIES AND MEDICATIONS: updated and reviewed.  Review of patient's allergies indicates:  No Known Allergies  Current Outpatient Medications   Medication Sig Dispense Refill    amiodarone (PACERONE) 400 MG tablet Take 1 tablet (400 mg total) by mouth 2 (two) times daily. 60 tablet 11    aspirin (ECOTRIN) 81 MG EC tablet Take 1 tablet (81 mg total) by mouth once daily.  0    blood sugar diagnostic Strp 1 each by Misc.(Non-Drug; Combo Route) route 6 (six) times daily. Contour next strips. Pt needs  "contour jackelyn for compatibility w/ insulin pump (medtronic 670g). Necessity 200 strip 6    blood-glucose sensor (GUARDIAN SENSOR 3) Apple 1 each by Misc.(Non-Drug; Combo Route) route once a week. 12 each 3    clopidogrel (PLAVIX) 75 mg tablet Take 75 mg by mouth once daily.  11    clotrimazole-betamethasone 1-0.05% (LOTRISONE) cream APPLY A SMALL AMOUNT TO AFFECTED AREA THREE TIMES DAILY UNTIL CLEAR  0    COMFORT EZ PEN NEEDLES 33 gauge x 3/16" Ndle USE AS DIRECTED with Levemir pens  11    gabapentin (NEURONTIN) 300 MG capsule Take 2 capsules (600 mg total) by mouth 3 (three) times daily. 180 capsule 6    glucagon, human recombinant, (GLUCAGON EMERGENCY KIT, HUMAN,) 1 mg SolR Inject 1 mg into the muscle as needed. 1 each 0    insulin detemir U-100 (LEVEMIR FLEXTOUCH U-100 INSULN) 100 unit/mL (3 mL) SubQ InPn pen Inject 48 Units into the skin every evening. 3 mL 0    insulin regular 100 unit/mL Soln Use home settings Basal 0.4    Bolus/carb 12    ISF 20 1 Product 0    JARDIANCE 25 mg Tab TAKE ONE TABLET BY MOUTH DAILY FOR DIABETES  11    lisinopril 10 MG tablet Take 10 mg by mouth once daily.  3    metFORMIN (GLUCOPHAGE-XR) 500 MG 24 hr tablet Take 1 tablet (500 mg total) by mouth daily with breakfast. 90 tablet 3    NOVOLOG U-100 INSULIN ASPART 100 unit/mL injection INJECT 180 UNITS SUBCUTANEOUSLY EVERY ONE AND A HALF DAYS  11    pantoprazole (PROTONIX) 40 MG tablet Take 1 tablet (40 mg total) by mouth before breakfast. 30 tablet 11    simvastatin (ZOCOR) 20 MG tablet Take 1 tablet (20 mg total) by mouth every evening. 90 tablet 3    warfarin (COUMADIN) 5 MG tablet Take 1 tablet (5 mg total) by mouth Daily. (Patient taking differently: Take 3 mg by mouth Daily. ) 30 tablet 11    albuterol (PROVENTIL/VENTOLIN HFA) 90 mcg/actuation inhaler Inhale 2 puffs into the lungs every 4 (four) hours as needed for Wheezing or Shortness of Breath. Rescue 1 Inhaler 5    doxycycline (MONODOX) 100 MG capsule Take 1 " "capsule (100 mg total) by mouth 2 (two) times daily. 14 capsule 0    promethazine-codeine 6.25-10 mg/5 ml (PHENERGAN WITH CODEINE) 6.25-10 mg/5 mL syrup Take 5 mLs by mouth every 4 (four) hours as needed. 118 mL 0     Current Facility-Administered Medications   Medication Dose Route Frequency Provider Last Rate Last Dose    albuterol-ipratropium 2.5 mg-0.5 mg/3 mL nebulizer solution 3 mL  3 mL Nebulization Once Demetria Morrissey NP             ROS  Review of Systems   Constitutional: Negative for chills and fever.   HENT: Negative for ear pain, postnasal drip and sinus pain.    Respiratory: Positive for cough, chest tightness, shortness of breath and wheezing.    Cardiovascular: Negative for chest pain, palpitations and leg swelling.   Gastrointestinal: Negative for diarrhea, nausea and vomiting.           PHYSICAL EXAM  Vitals:    09/20/19 1511   BP: 125/69   BP Location: Right arm   Patient Position: Sitting   BP Method: Medium (Automatic)   Pulse: 68   Resp: 18   Temp: 97.9 °F (36.6 °C)   TempSrc: Oral   SpO2: 98%   Weight: 92.5 kg (204 lb)   Height: 5' 8" (1.727 m)    Body mass index is 31.02 kg/m².  Weight: 92.5 kg (204 lb)   Height: 5' 8" (172.7 cm)     Physical Exam   Constitutional: He is oriented to person, place, and time. He appears well-developed and well-nourished.   HENT:   Head: Normocephalic.   Right Ear: Tympanic membrane normal.   Left Ear: Tympanic membrane normal.   Mouth/Throat: Uvula is midline, oropharynx is clear and moist and mucous membranes are normal.   Eyes: Conjunctivae are normal.   Cardiovascular: Normal rate, regular rhythm, normal heart sounds and normal pulses.   No murmur heard.  Pulses:       Radial pulses are 2+ on the right side, and 2+ on the left side.   No LE swelling noted   Pulmonary/Chest: Effort normal. He has decreased breath sounds. He has wheezes.   Abdominal: Soft. Bowel sounds are normal. There is no tenderness.   Musculoskeletal: He exhibits no edema. "   Lymphadenopathy:     He has no cervical adenopathy.   Neurological: He is alert and oriented to person, place, and time.   Skin: Skin is warm and dry. No rash noted.   Psychiatric: He has a normal mood and affect.         Health Maintenance       Date Due Completion Date    TETANUS VACCINE 11/09/1981 ---    Shingles Vaccine (1 of 2) 11/09/2013 ---    Hemoglobin A1c 01/23/2020 7/23/2019    Foot Exam 04/23/2020 4/23/2019    Lipid Panel 04/24/2020 4/24/2019    Eye Exam 05/01/2020 5/1/2019    High Dose Statin 09/19/2020 9/19/2019    Colonoscopy 04/25/2026 4/25/2016 (Done)    Override on 4/25/2016: Done            Assessment & Plan    Problem List Items Addressed This Visit     None      Visit Diagnoses     Bronchitis    -  Primary    Relevant Medications    albuterol-ipratropium 2.5 mg-0.5 mg/3 mL nebulizer solution 3 mL (Start on 9/20/2019  4:30 PM)    albuterol (PROVENTIL/VENTOLIN HFA) 90 mcg/actuation inhaler    doxycycline (MONODOX) 100 MG capsule    promethazine-codeine 6.25-10 mg/5 ml (PHENERGAN WITH CODEINE) 6.25-10 mg/5 mL syrup          Follow-up: No follow-ups on file.    Demetria Morrissey    Medication List with Changes/Refills   New Medications    ALBUTEROL (PROVENTIL/VENTOLIN HFA) 90 MCG/ACTUATION INHALER    Inhale 2 puffs into the lungs every 4 (four) hours as needed for Wheezing or Shortness of Breath. Rescue    DOXYCYCLINE (MONODOX) 100 MG CAPSULE    Take 1 capsule (100 mg total) by mouth 2 (two) times daily.    PROMETHAZINE-CODEINE 6.25-10 MG/5 ML (PHENERGAN WITH CODEINE) 6.25-10 MG/5 ML SYRUP    Take 5 mLs by mouth every 4 (four) hours as needed.   Current Medications    AMIODARONE (PACERONE) 400 MG TABLET    Take 1 tablet (400 mg total) by mouth 2 (two) times daily.    ASPIRIN (ECOTRIN) 81 MG EC TABLET    Take 1 tablet (81 mg total) by mouth once daily.    BLOOD SUGAR DIAGNOSTIC STRP    1 each by Misc.(Non-Drug; Combo Route) route 6 (six) times daily. Contour next strips. Pt needs contour jackelyn for  "compatibility w/ insulin pump (medtronic 670g). Necessity    BLOOD-GLUCOSE SENSOR (GUARDIAN SENSOR 3) MORIS    1 each by Misc.(Non-Drug; Combo Route) route once a week.    CLOPIDOGREL (PLAVIX) 75 MG TABLET    Take 75 mg by mouth once daily.    CLOTRIMAZOLE-BETAMETHASONE 1-0.05% (LOTRISONE) CREAM    APPLY A SMALL AMOUNT TO AFFECTED AREA THREE TIMES DAILY UNTIL CLEAR    COMFORT EZ PEN NEEDLES 33 GAUGE X 3/16" NDLE    USE AS DIRECTED with Levemir pens    GABAPENTIN (NEURONTIN) 300 MG CAPSULE    Take 2 capsules (600 mg total) by mouth 3 (three) times daily.    GLUCAGON, HUMAN RECOMBINANT, (GLUCAGON EMERGENCY KIT, HUMAN,) 1 MG SOLR    Inject 1 mg into the muscle as needed.    INSULIN DETEMIR U-100 (LEVEMIR FLEXTOUCH U-100 INSULN) 100 UNIT/ML (3 ML) SUBQ INPN PEN    Inject 48 Units into the skin every evening.    INSULIN REGULAR 100 UNIT/ML SOLN    Use home settings Basal 0.4    Bolus/carb 12    ISF 20    JARDIANCE 25 MG TAB    TAKE ONE TABLET BY MOUTH DAILY FOR DIABETES    LISINOPRIL 10 MG TABLET    Take 10 mg by mouth once daily.    METFORMIN (GLUCOPHAGE-XR) 500 MG 24 HR TABLET    Take 1 tablet (500 mg total) by mouth daily with breakfast.    NOVOLOG U-100 INSULIN ASPART 100 UNIT/ML INJECTION    INJECT 180 UNITS SUBCUTANEOUSLY EVERY ONE AND A HALF DAYS    PANTOPRAZOLE (PROTONIX) 40 MG TABLET    Take 1 tablet (40 mg total) by mouth before breakfast.    SIMVASTATIN (ZOCOR) 20 MG TABLET    Take 1 tablet (20 mg total) by mouth every evening.    WARFARIN (COUMADIN) 5 MG TABLET    Take 1 tablet (5 mg total) by mouth Daily.     "

## 2019-09-20 NOTE — PROGRESS NOTES
Diabetes Education  Author: Katia Pham RD  Date: 9/20/2019    Diabetes Care Management Summary  Diabetes Education Record Assessment/Progress: Comprehensive/Group(670 pump upgrade - 1 week f/u)  Current Diabetes Risk Level: Moderate     Last A1c:   Lab Results   Component Value Date    HGBA1C 6.6 (H) 07/23/2019     Last visit with Diabetes Educator: : 03/20/2018      Diabetes Type  Diabetes Type : Type I    Diabetes History  Current Treatment: Insulin pump, Diet, Oral Medication((novolog via Metronic 670G, metformin, jardiance))  Reviewed Problem List with Patient: Yes    Health Maintenance was reviewed today with patient. Discussed with patient importance of routine eye exams, foot exams/foot care, blood work (i.e.: A1c, microalbumin, and lipid), dental visits, yearly flu vaccine, and pneumonia vaccine as indicated by PCP. Patient verbalized understanding.     Health Maintenance Topics with due status: Not Due       Topic Last Completion Date    Colonoscopy 04/25/2016    Foot Exam 04/23/2019    Lipid Panel 04/24/2019    Eye Exam 05/01/2019    Hemoglobin A1c 07/23/2019    High Dose Statin 09/19/2019     Health Maintenance Due   Topic Date Due    TETANUS VACCINE  11/09/1981       Nutrition  Meal Planning: 3 meals per day, snacks between meal, water, eats out seldom, diet drinks  What type of sweetener do you use?: none  What type of beverages do you drink?: water, diet soda/tea  Meal Plan 24 Hour Recall - Breakfast: 2 biscuits  Meal Plan 24 Hour Recall - Lunch: Chips or [place]  Meal Plan 24 Hour Recall - Dinner: Pizza Thin Crust  Meal Plan 24 Hour Recall - Snack: Chips     Monitoring   Self Monitoring : SMBG 3 times a day even with sensor(SMBG 3-4 times daily (does not use CGM sensor); range )  Blood Glucose Logs: Yes(from pump download; see Media tab)  Do you use a personal continuous glucose monitor?: Yes  What kind of glucose monitor do you use?: Medtronic Guardian  In the last month, how often have  you had a low blood sugar reaction?: once    Exercise   Exercise Type: none(will start cardiac rehab in a couple weeks)  Frequency: Never    Current Diabetes Treatment   Current Treatment: Insulin pump, Diet, Oral Medication((novolog via Metronic 670G, metformin, jardiance))    Social History  Preferred Learning Method: Face to Face  Primary Support: Self, Spouse, Family  Educational Level: High School  Occupation: retired  Smoking Status: Never a Smoker  Alcohol Use: Never                                Barriers to Change  Barriers to Change: None  Learning Challenges : None    Readiness to Learn   Readiness to Learn : Acceptance    Cultural Influences  Cultural Influences: No    Diabetes Education Assessment/Progress  Diabetes Disease Process (diabetes disease process and treatment options): Discussion, Individual Session  Nutrition (Incorporating nutritional management into one's lifestyle): Discussion, Individual Session  Physical Activity (incorporating physical activity into one's lifestyle): Discussion, Individual Session  Medications (states correct name, dose, onset, peak, duration, side effects & timing of meds): Discussion, Individual Session, Comprehends Key Points, Instructed  Monitoring (monitoring blood glucose/other parameters & using results): Discussion, Individual Session, Comprehends Key Points(Guardian Sensor)  Acute Complications (preventing, detecting, and treating acute complications): Discussion, Individual Session, Comprehends Key Points  Chronic Complications (preventing, detecting, and treating chronic complications): Discussion, Individual Session, Comprehends Key Points  Clinical (diabetes, other pertinent medical history, and relevant comorbidities reviewed during visit): Discussion, Individual Session, Comprehends Key Points  Cognitive (knowledge of self-management skills, functional health literacy): Discussion, Comprehends Key Points, Individual Session  Psychosocial (emotional  response to diabetes): Discussion, Individual Session, Comprehends Key Points  Diabetes Distress and Support Systems: Discussion, Individual Session, Comprehends Key Points  Behavioral (readiness for change, lifestyle practices, self-care behaviors): Discussion, Comprehends Key Points, Individual Session  Explained automode to patient, patient also was subtracting out the IOB from bolus - reviewed that when you bolus, the pump subtracts out the insulin on board for you, patient states understanding, patient also states he put in 130g carbohydrate for 2 biscuits, grand biscuits are 25g of carbohydrate each. Will continue to review carbohydrate counting    Goals  Patient has selected/evaluated goals during today's session: Yes, evaluated  Monitoring: % Met  Met Percentage : 100%         Diabetes Care Plan/Intervention  Education Plan/Intervention: Individual Follow-Up DSMT, Support Group for Diabetes Distress    Diabetes Meal Plan  Restrictions: Low Fat, Low Sodium, Restricted Carbohydrate  Calories: 1800  Carbohydrate Per Meal: 30-45g  Carbohydrate Per Snack : 15-20g  Fat: 50  Protein: 135    Today's Self-Management Care Plan was developed with the patient's input and is based on barriers identified during today's assessment.    The long and short-term goals in the care plan were written with the patient/caregiver's input. The patient has agreed to work toward these goals to improve his overall diabetes control.      The patient received a copy of today's self-management plan and verbalized understanding of the care plan, goals, and all of today's instructions.      The patient was encouraged to communicate with his physician and care team regarding his condition(s) and treatment.  I provided the patient with my contact information today and encouraged him to contact me via phone or patient portal as needed.     Education Units of Time   Time Spent: 60 min

## 2019-09-20 NOTE — PROGRESS NOTES
Verified pt ID using name and . NKDA. P: 66 O2 Sat: 96%. Administered Duo-Neb via inhalation per physician order. Pt tolerated well with no adverse reactions noted.

## 2019-09-23 ENCOUNTER — OFFICE VISIT (OUTPATIENT)
Dept: CARDIOLOGY | Facility: CLINIC | Age: 54
End: 2019-09-23
Payer: MEDICARE

## 2019-09-23 VITALS
SYSTOLIC BLOOD PRESSURE: 152 MMHG | HEART RATE: 62 BPM | WEIGHT: 205.56 LBS | BODY MASS INDEX: 31.26 KG/M2 | DIASTOLIC BLOOD PRESSURE: 67 MMHG

## 2019-09-23 DIAGNOSIS — I25.110 ATHEROSCLEROSIS OF NATIVE CORONARY ARTERY OF NATIVE HEART WITH UNSTABLE ANGINA PECTORIS: ICD-10-CM

## 2019-09-23 DIAGNOSIS — G89.29 CHRONIC CHEST PAIN: ICD-10-CM

## 2019-09-23 DIAGNOSIS — Z91.89 AT RISK FOR CORONARY ARTERY DISEASE: ICD-10-CM

## 2019-09-23 DIAGNOSIS — R07.9 CHRONIC CHEST PAIN: ICD-10-CM

## 2019-09-23 PROCEDURE — 3077F SYST BP >= 140 MM HG: CPT | Mod: CPTII,S$GLB,, | Performed by: INTERNAL MEDICINE

## 2019-09-23 PROCEDURE — 3078F DIAST BP <80 MM HG: CPT | Mod: CPTII,S$GLB,, | Performed by: INTERNAL MEDICINE

## 2019-09-23 PROCEDURE — 99999 PR PBB SHADOW E&M-EST. PATIENT-LVL IV: ICD-10-PCS | Mod: PBBFAC,,, | Performed by: INTERNAL MEDICINE

## 2019-09-23 PROCEDURE — 99214 OFFICE O/P EST MOD 30 MIN: CPT | Mod: 25,S$GLB,, | Performed by: INTERNAL MEDICINE

## 2019-09-23 PROCEDURE — 3077F PR MOST RECENT SYSTOLIC BLOOD PRESSURE >= 140 MM HG: ICD-10-PCS | Mod: CPTII,S$GLB,, | Performed by: INTERNAL MEDICINE

## 2019-09-23 PROCEDURE — 3008F PR BODY MASS INDEX (BMI) DOCUMENTED: ICD-10-PCS | Mod: CPTII,S$GLB,, | Performed by: INTERNAL MEDICINE

## 2019-09-23 PROCEDURE — 3008F BODY MASS INDEX DOCD: CPT | Mod: CPTII,S$GLB,, | Performed by: INTERNAL MEDICINE

## 2019-09-23 PROCEDURE — 93000 ELECTROCARDIOGRAM COMPLETE: CPT | Mod: S$GLB,,, | Performed by: INTERNAL MEDICINE

## 2019-09-23 PROCEDURE — 99214 PR OFFICE/OUTPT VISIT, EST, LEVL IV, 30-39 MIN: ICD-10-PCS | Mod: 25,S$GLB,, | Performed by: INTERNAL MEDICINE

## 2019-09-23 PROCEDURE — 93000 EKG 12-LEAD: ICD-10-PCS | Mod: S$GLB,,, | Performed by: INTERNAL MEDICINE

## 2019-09-23 PROCEDURE — 3078F PR MOST RECENT DIASTOLIC BLOOD PRESSURE < 80 MM HG: ICD-10-PCS | Mod: CPTII,S$GLB,, | Performed by: INTERNAL MEDICINE

## 2019-09-23 PROCEDURE — 99999 PR PBB SHADOW E&M-EST. PATIENT-LVL IV: CPT | Mod: PBBFAC,,, | Performed by: INTERNAL MEDICINE

## 2019-09-23 RX ORDER — METOPROLOL TARTRATE 25 MG/1
25 TABLET, FILM COATED ORAL 2 TIMES DAILY
COMMUNITY
End: 2019-12-12

## 2019-09-23 NOTE — ASSESSMENT & PLAN NOTE
Patient experiencing some chest pain and he has a history of native coronary artery disease as documented on his cardiac catheterization in 2018.  He will undergo a nuclear myocardial perfusion scan to evaluate the significance of his coronary artery disease.  In reviewing the operative report it is not clear that anything was done about the coronary artery disease that was present.

## 2019-09-23 NOTE — PROGRESS NOTES
Subjective:    Patient ID:  Cj Barnes is a 53 y.o. y.o. male who presents for initial visit for Aortic Stenosis      Patient is status post aortic root replacement.  The procedure was just done a couple months ago..  He is currently complaining of some atypical chest pain that he feels is related to the surgery.      Past Medical History:   Diagnosis Date    Aortic stenosis 2018    Cancer 2014    Colon cancer     Coronary artery disease     Diabetes mellitus type I     since in his 30's    Heart murmur     Heel fracture     HTN (hypertension)     Hyperlipidemia LDL goal < 70     Insulin pump in place     MVP (mitral valve prolapse)     Stenosis of aortic and mitral valves         Social History     Socioeconomic History    Marital status:      Spouse name: Not on file    Number of children: Not on file    Years of education: Not on file    Highest education level: Not on file   Occupational History    Not on file   Social Needs    Financial resource strain: Not on file    Food insecurity:     Worry: Not on file     Inability: Not on file    Transportation needs:     Medical: Not on file     Non-medical: Not on file   Tobacco Use    Smoking status: Never Smoker    Smokeless tobacco: Former User     Types: Snuff    Tobacco comment: since he was 12 yrs old   Substance and Sexual Activity    Alcohol use: No     Comment: socially    Drug use: No    Sexual activity: Not on file   Lifestyle    Physical activity:     Days per week: Not on file     Minutes per session: Not on file    Stress: Not on file   Relationships    Social connections:     Talks on phone: Not on file     Gets together: Not on file     Attends Sikhism service: Not on file     Active member of club or organization: Not on file     Attends meetings of clubs or organizations: Not on file     Relationship status: Not on file   Other Topics Concern    Not on file   Social History Narrative        2 grown  kids    Delivers seafood        Family History   Problem Relation Age of Onset    Heart disease Mother     Lung cancer Mother     Heart attack Father     Diabetes Father     HIV Brother         Review of Systems   Constitutional: Negative.    HENT: Negative.    Eyes: Negative.    Cardiovascular: Positive for chest pain.   Gastrointestinal: Negative.    Genitourinary: Negative.    Musculoskeletal: Negative.    Skin: Negative.    Neurological: Negative.    Endo/Heme/Allergies: Negative.    Psychiatric/Behavioral: Negative.         Objective:     BP (!) 152/67   Pulse 62   Wt 93.2 kg (205 lb 9.3 oz)   BMI 31.26 kg/m²     Physical Exam   Constitutional: He is oriented to person, place, and time and well-developed, well-nourished, and in no distress.   HENT:   Head: Normocephalic and atraumatic.   Eyes: Pupils are equal, round, and reactive to light. No scleral icterus.   Neck: Normal range of motion. Neck supple. No thyromegaly present.   Cardiovascular: Normal rate and regular rhythm. Exam reveals no gallop and no friction rub.   No murmur heard.  Midline surgical scars present.   Pulmonary/Chest: Effort normal and breath sounds normal.   Abdominal: Soft. Bowel sounds are normal. He exhibits no distension and no mass. There is no tenderness.   Musculoskeletal: Normal range of motion. He exhibits no edema.   Lymphadenopathy:     He has no cervical adenopathy.   Neurological: He is alert and oriented to person, place, and time. No cranial nerve deficit.   Skin: Skin is warm and dry. No erythema.   Psychiatric: Memory, affect and judgment normal.       Labs:     Lab Results   Component Value Date     (L) 08/21/2019    K 3.7 08/21/2019    CL 98 08/21/2019    CO2 23 08/21/2019    BUN 9 08/21/2019    CREATININE 0.6 08/21/2019    ANIONGAP 13 08/21/2019     Lab Results   Component Value Date    HGBA1C 6.6 (H) 07/23/2019     Lab Results   Component Value Date     (H) 08/16/2019       Lab Results   Component  "Value Date    WBC 10.58 08/21/2019    HGB 8.8 (L) 08/21/2019    HCT 27.8 (L) 08/21/2019    HCT 29 (L) 08/09/2019     08/21/2019    GRAN 8.7 (H) 08/21/2019    GRAN 82.0 (H) 08/21/2019     Lab Results   Component Value Date    CHOL 123 04/24/2019    HDL 37 (L) 04/24/2019    LDLCALC 77 04/24/2019    TRIG 44 04/24/2019              .  Meds:     Current Outpatient Medications:     albuterol (PROVENTIL/VENTOLIN HFA) 90 mcg/actuation inhaler, Inhale 2 puffs into the lungs every 4 (four) hours as needed for Wheezing or Shortness of Breath. Rescue, Disp: 1 Inhaler, Rfl: 5    amiodarone (PACERONE) 400 MG tablet, Take 1 tablet (400 mg total) by mouth 2 (two) times daily., Disp: 60 tablet, Rfl: 11    aspirin (ECOTRIN) 81 MG EC tablet, Take 1 tablet (81 mg total) by mouth once daily., Disp: , Rfl: 0    blood sugar diagnostic Strp, 1 each by Misc.(Non-Drug; Combo Route) route 6 (six) times daily. Contour next strips. Pt needs contour jackelyn for compatibility w/ insulin pump (medtronic 670g). Necessity, Disp: 200 strip, Rfl: 6    blood-glucose sensor (GUARDIAN SENSOR 3) Apple, 1 each by Misc.(Non-Drug; Combo Route) route once a week., Disp: 12 each, Rfl: 3    COMFORT EZ PEN NEEDLES 33 gauge x 3/16" Ndle, USE AS DIRECTED with Levemir pens, Disp: , Rfl: 11    doxycycline (MONODOX) 100 MG capsule, Take 1 capsule (100 mg total) by mouth 2 (two) times daily., Disp: 14 capsule, Rfl: 0    gabapentin (NEURONTIN) 300 MG capsule, Take 2 capsules (600 mg total) by mouth 3 (three) times daily., Disp: 180 capsule, Rfl: 6    glucagon, human recombinant, (GLUCAGON EMERGENCY KIT, HUMAN,) 1 mg SolR, Inject 1 mg into the muscle as needed., Disp: 1 each, Rfl: 0    insulin detemir U-100 (LEVEMIR FLEXTOUCH U-100 INSULN) 100 unit/mL (3 mL) SubQ InPn pen, Inject 48 Units into the skin every evening., Disp: 3 mL, Rfl: 0    insulin regular 100 unit/mL Soln, Use home settings Basal 0.4    Bolus/carb 12    ISF 20, Disp: 1 Product, Rfl: 0    " JARDIANCE 25 mg Tab, TAKE ONE TABLET BY MOUTH DAILY FOR DIABETES, Disp: , Rfl: 11    metFORMIN (GLUCOPHAGE-XR) 500 MG 24 hr tablet, Take 1 tablet (500 mg total) by mouth daily with breakfast., Disp: 90 tablet, Rfl: 3    metoprolol tartrate (LOPRESSOR) 25 MG tablet, Take 25 mg by mouth 2 (two) times daily., Disp: , Rfl:     NOVOLOG U-100 INSULIN ASPART 100 unit/mL injection, INJECT 180 UNITS SUBCUTANEOUSLY EVERY ONE AND A HALF DAYS, Disp: , Rfl: 11    pantoprazole (PROTONIX) 40 MG tablet, Take 1 tablet (40 mg total) by mouth before breakfast., Disp: 30 tablet, Rfl: 11    promethazine-codeine 6.25-10 mg/5 ml (PHENERGAN WITH CODEINE) 6.25-10 mg/5 mL syrup, Take 5 mLs by mouth every 4 (four) hours as needed., Disp: 118 mL, Rfl: 0    simvastatin (ZOCOR) 20 MG tablet, Take 1 tablet (20 mg total) by mouth every evening., Disp: 90 tablet, Rfl: 3    warfarin (COUMADIN) 5 MG tablet, Take 1 tablet (5 mg total) by mouth Daily. (Patient taking differently: Take 3 mg by mouth Daily. ), Disp: 30 tablet, Rfl: 11    clopidogrel (PLAVIX) 75 mg tablet, Take 75 mg by mouth once daily., Disp: , Rfl: 11    clotrimazole-betamethasone 1-0.05% (LOTRISONE) cream, APPLY A SMALL AMOUNT TO AFFECTED AREA THREE TIMES DAILY UNTIL CLEAR, Disp: , Rfl: 0    lisinopril 10 MG tablet, Take 10 mg by mouth once daily., Disp: , Rfl: 3      Assessment & Plan:     No problem-specific Assessment & Plan notes found for this encounter.

## 2019-09-24 DIAGNOSIS — G45.9 TIA (TRANSIENT ISCHEMIC ATTACK): Primary | ICD-10-CM

## 2019-09-26 ENCOUNTER — INITIAL CONSULT (OUTPATIENT)
Dept: ELECTROPHYSIOLOGY | Facility: CLINIC | Age: 54
End: 2019-09-26
Payer: MEDICARE

## 2019-09-26 ENCOUNTER — ANTI-COAG VISIT (OUTPATIENT)
Dept: CARDIOLOGY | Facility: CLINIC | Age: 54
End: 2019-09-26
Payer: MEDICARE

## 2019-09-26 ENCOUNTER — LAB VISIT (OUTPATIENT)
Dept: LAB | Facility: HOSPITAL | Age: 54
End: 2019-09-26
Attending: INTERNAL MEDICINE
Payer: MEDICARE

## 2019-09-26 ENCOUNTER — PATIENT OUTREACH (OUTPATIENT)
Dept: ADMINISTRATIVE | Facility: OTHER | Age: 54
End: 2019-09-26

## 2019-09-26 VITALS
BODY MASS INDEX: 30.01 KG/M2 | HEART RATE: 65 BPM | WEIGHT: 198 LBS | HEIGHT: 68 IN | SYSTOLIC BLOOD PRESSURE: 139 MMHG | DIASTOLIC BLOOD PRESSURE: 71 MMHG

## 2019-09-26 DIAGNOSIS — G45.9 TIA (TRANSIENT ISCHEMIC ATTACK): ICD-10-CM

## 2019-09-26 DIAGNOSIS — I10 ESSENTIAL HYPERTENSION: ICD-10-CM

## 2019-09-26 DIAGNOSIS — Z95.2 HEART VALVE REPLACED BY TRANSPLANT: Primary | ICD-10-CM

## 2019-09-26 DIAGNOSIS — E10.649 TYPE 1 DIABETES MELLITUS WITH HYPOGLYCEMIA UNAWARENESS: ICD-10-CM

## 2019-09-26 DIAGNOSIS — Z95.2 S/P AVR (AORTIC VALVE REPLACEMENT): ICD-10-CM

## 2019-09-26 DIAGNOSIS — I35.0 NONRHEUMATIC AORTIC VALVE STENOSIS: ICD-10-CM

## 2019-09-26 DIAGNOSIS — Z79.01 LONG TERM (CURRENT) USE OF ANTICOAGULANTS: ICD-10-CM

## 2019-09-26 DIAGNOSIS — E10.42 TYPE 1 DIABETES MELLITUS WITH DIABETIC POLYNEUROPATHY: ICD-10-CM

## 2019-09-26 DIAGNOSIS — I25.110 ATHEROSCLEROSIS OF NATIVE CORONARY ARTERY OF NATIVE HEART WITH UNSTABLE ANGINA PECTORIS: ICD-10-CM

## 2019-09-26 DIAGNOSIS — Z95.828 S/P ASCENDING AORTIC REPLACEMENT: ICD-10-CM

## 2019-09-26 DIAGNOSIS — I48.3 TYPICAL ATRIAL FLUTTER: Primary | ICD-10-CM

## 2019-09-26 LAB
INR PPP: 1.9 (ref 0.8–1.2)
PROTHROMBIN TIME: 18.5 SEC (ref 9–12.5)

## 2019-09-26 PROCEDURE — 93005 RHYTHM STRIP: ICD-10-PCS | Mod: S$GLB,,, | Performed by: INTERNAL MEDICINE

## 2019-09-26 PROCEDURE — 93793 ANTICOAG MGMT PT WARFARIN: CPT | Mod: S$GLB,,,

## 2019-09-26 PROCEDURE — 93010 ELECTROCARDIOGRAM REPORT: CPT | Mod: S$GLB,,, | Performed by: INTERNAL MEDICINE

## 2019-09-26 PROCEDURE — 3075F PR MOST RECENT SYSTOLIC BLOOD PRESS GE 130-139MM HG: ICD-10-PCS | Mod: CPTII,S$GLB,, | Performed by: INTERNAL MEDICINE

## 2019-09-26 PROCEDURE — 3044F HG A1C LEVEL LT 7.0%: CPT | Mod: CPTII,S$GLB,, | Performed by: INTERNAL MEDICINE

## 2019-09-26 PROCEDURE — 99999 PR PBB SHADOW E&M-EST. PATIENT-LVL IV: ICD-10-PCS | Mod: PBBFAC,,, | Performed by: INTERNAL MEDICINE

## 2019-09-26 PROCEDURE — 3044F PR MOST RECENT HEMOGLOBIN A1C LEVEL <7.0%: ICD-10-PCS | Mod: CPTII,S$GLB,, | Performed by: INTERNAL MEDICINE

## 2019-09-26 PROCEDURE — 93010 RHYTHM STRIP: ICD-10-PCS | Mod: S$GLB,,, | Performed by: INTERNAL MEDICINE

## 2019-09-26 PROCEDURE — 3075F SYST BP GE 130 - 139MM HG: CPT | Mod: CPTII,S$GLB,, | Performed by: INTERNAL MEDICINE

## 2019-09-26 PROCEDURE — 99214 OFFICE O/P EST MOD 30 MIN: CPT | Mod: S$GLB,,, | Performed by: INTERNAL MEDICINE

## 2019-09-26 PROCEDURE — 99999 PR PBB SHADOW E&M-EST. PATIENT-LVL IV: CPT | Mod: PBBFAC,,, | Performed by: INTERNAL MEDICINE

## 2019-09-26 PROCEDURE — 99214 PR OFFICE/OUTPT VISIT, EST, LEVL IV, 30-39 MIN: ICD-10-PCS | Mod: S$GLB,,, | Performed by: INTERNAL MEDICINE

## 2019-09-26 PROCEDURE — 3078F PR MOST RECENT DIASTOLIC BLOOD PRESSURE < 80 MM HG: ICD-10-PCS | Mod: CPTII,S$GLB,, | Performed by: INTERNAL MEDICINE

## 2019-09-26 PROCEDURE — 3008F BODY MASS INDEX DOCD: CPT | Mod: CPTII,S$GLB,, | Performed by: INTERNAL MEDICINE

## 2019-09-26 PROCEDURE — 36415 COLL VENOUS BLD VENIPUNCTURE: CPT

## 2019-09-26 PROCEDURE — 93005 ELECTROCARDIOGRAM TRACING: CPT | Mod: S$GLB,,, | Performed by: INTERNAL MEDICINE

## 2019-09-26 PROCEDURE — 99499 UNLISTED E&M SERVICE: CPT | Mod: S$GLB,,, | Performed by: INTERNAL MEDICINE

## 2019-09-26 PROCEDURE — 93793 PR ANTICOAGULANT MGMT FOR PT TAKING WARFARIN: ICD-10-PCS | Mod: S$GLB,,,

## 2019-09-26 PROCEDURE — 3078F DIAST BP <80 MM HG: CPT | Mod: CPTII,S$GLB,, | Performed by: INTERNAL MEDICINE

## 2019-09-26 PROCEDURE — 99499 RISK ADDL DX/OHS AUDIT: ICD-10-PCS | Mod: S$GLB,,, | Performed by: INTERNAL MEDICINE

## 2019-09-26 PROCEDURE — 3008F PR BODY MASS INDEX (BMI) DOCUMENTED: ICD-10-PCS | Mod: CPTII,S$GLB,, | Performed by: INTERNAL MEDICINE

## 2019-09-26 PROCEDURE — 85610 PROTHROMBIN TIME: CPT

## 2019-09-26 RX ORDER — AMIODARONE HYDROCHLORIDE 200 MG/1
200 TABLET ORAL DAILY
Qty: 30 TABLET | Refills: 3 | Status: SHIPPED | OUTPATIENT
Start: 2019-09-26 | End: 2019-12-12 | Stop reason: SDUPTHER

## 2019-09-26 NOTE — LETTER
September 26, 2019      Ryan Knight MD  [address]  [facility] [address]           [facility] - Arrhythmia  [address]  [facility] [address]  Phone: [phone]  Fax: [phone]          Patient: Cj Barnes   MR Number: [id number]   YOB: 1965   Date of Visit: 9/26/2019       Dear Dr. Ryan Knight:    Thank you for referring Cj Barnes to me for evaluation. Attached you will find relevant portions of my assessment and plan of care.    If you have questions, please do not hesitate to call me. I look forward to following Cj Barnes along with you.    Sincerely,    Danyel Ramirez MD    Enclosure  CC:  No Recipients    If you would like to receive this communication electronically, please contact [email or web address] or [phone] to request more information on [facility] access.    For providers and/or their staff who would like to refer a patient to [facility], please contact us through our one-stop-shop provider referral line, [facility] [place], at [phone].    If you feel you have received this communication in error or would no longer like to receive these types of communications, please e-mail [email or web address]

## 2019-09-26 NOTE — PROGRESS NOTES
Subjective:    Patient ID:  Cj Barens is a 53 y.o. male who presents for follow-up of Atrial Flutter    Primary Cardiologist: Adriel Palm MD  Primary Care Physician: Garry Stover MD    HPI  I had the pleasure of seeing Mr. Barnes today in our electrophysiology clinic in follow-up for his atrial arrhythmia. As you are aware he is a pleasant 53 year-old man with hypertension, type 1 diabetes mellitus, severe bicupid aortic valve stenosis and coronary artery disease. He underwent AVR on 8/9/2019. He returned to the ER on 8/16/2019 with sore throat and poor appetite. He was treated with antibiotics. He was noted to be in atrial flutter with RVR. He underwent DCCV by Dr. Zapata and was initiated on amiodarone. He remains on 400mg twice daily. His ECHO at that time noted a LVEF 40%. He presents for EP follow-up and feels well. Notes chest soreness from healing surgical area.    My interpretation of today's in clinic ECG is sinus rhythm at 65 bpm with normal intervals.    Review of Systems   Constitution: Negative for fever and malaise/fatigue.   HENT: Negative for congestion and sore throat.    Eyes: Negative for blurred vision and visual disturbance.   Cardiovascular: Negative for chest pain, dyspnea on exertion, irregular heartbeat, leg swelling, near-syncope, orthopnea, palpitations, paroxysmal nocturnal dyspnea and syncope.   Respiratory: Negative for cough and shortness of breath.    Hematologic/Lymphatic: Negative for bleeding problem. Does not bruise/bleed easily.   Skin: Negative.    Musculoskeletal:        Sternal discomfort     Gastrointestinal: Negative for bloating and abdominal pain.   Neurological: Negative for dizziness and focal weakness.        Objective:    Physical Exam   Constitutional: He is oriented to person, place, and time. He appears well-developed and well-nourished. No distress.   HENT:   Head: Normocephalic and atraumatic.   Eyes: Conjunctivae are normal. Right eye exhibits no  discharge. Left eye exhibits no discharge.   Neck: Neck supple. No JVD present.   Cardiovascular: Normal rate and regular rhythm. Exam reveals no gallop and no friction rub.   No murmur heard.  Pulmonary/Chest: Effort normal and breath sounds normal. No respiratory distress. He has no wheezes. He has no rales.   Abdominal: Soft. Bowel sounds are normal. He exhibits no distension. There is no tenderness. There is no rebound.   Musculoskeletal: He exhibits no edema.   Neurological: He is alert and oriented to person, place, and time.   Skin: Skin is warm and dry. He is not diaphoretic.   Psychiatric: He has a normal mood and affect. His behavior is normal. Judgment normal.   Vitals reviewed.        Assessment:       1. Typical atrial flutter    2. Essential hypertension    3. Atherosclerosis of native coronary artery of native heart with unstable angina pectoris    4. S/P ascending aortic replacement    5. S/P AVR (aortic valve replacement)    6. Type 1 diabetes mellitus with diabetic polyneuropathy         Plan:       In summary, Mr. Barnes  is a pleasant 53 year-old man with hypertension, type 1 diabetes mellitus, severe bicupid aortic valve stenosis and coronary artery disease with post-operative (post AVR) atrial flutter s/p DCCV. He is chronically anticoagulated now on warfarin for mechanical valve. He is maintaining sinus rhythm on amiodarone. Reduce amiodarone to 200mg daily. Will continue for 2 months then likely stop. Would like updated echo just prior to return visit to see if the EF of 40% during tachycardia was rate/rhythm related.    Thank you for allowing me to participate in the care of this patient. Please do not hesitate to call me with any questions or concerns.    Danyel Ramirez MD, PhD  Cardiac Electrophysiology

## 2019-10-01 ENCOUNTER — OFFICE VISIT (OUTPATIENT)
Dept: CARDIOLOGY | Facility: CLINIC | Age: 54
End: 2019-10-01
Payer: MEDICARE

## 2019-10-01 VITALS
BODY MASS INDEX: 30.11 KG/M2 | DIASTOLIC BLOOD PRESSURE: 82 MMHG | WEIGHT: 198 LBS | SYSTOLIC BLOOD PRESSURE: 130 MMHG | HEART RATE: 65 BPM

## 2019-10-01 DIAGNOSIS — I06.0 RHEUMATIC AORTIC STENOSIS: ICD-10-CM

## 2019-10-01 PROCEDURE — 99999 PR PBB SHADOW E&M-EST. PATIENT-LVL III: ICD-10-PCS | Mod: PBBFAC,,, | Performed by: INTERNAL MEDICINE

## 2019-10-01 PROCEDURE — 99212 PR OFFICE/OUTPT VISIT, EST, LEVL II, 10-19 MIN: ICD-10-PCS | Mod: 25,S$GLB,, | Performed by: INTERNAL MEDICINE

## 2019-10-01 PROCEDURE — 3075F PR MOST RECENT SYSTOLIC BLOOD PRESS GE 130-139MM HG: ICD-10-PCS | Mod: CPTII,S$GLB,, | Performed by: INTERNAL MEDICINE

## 2019-10-01 PROCEDURE — 3008F PR BODY MASS INDEX (BMI) DOCUMENTED: ICD-10-PCS | Mod: CPTII,S$GLB,, | Performed by: INTERNAL MEDICINE

## 2019-10-01 PROCEDURE — 99999 PR PBB SHADOW E&M-EST. PATIENT-LVL III: CPT | Mod: PBBFAC,,, | Performed by: INTERNAL MEDICINE

## 2019-10-01 PROCEDURE — 3075F SYST BP GE 130 - 139MM HG: CPT | Mod: CPTII,S$GLB,, | Performed by: INTERNAL MEDICINE

## 2019-10-01 PROCEDURE — 3008F BODY MASS INDEX DOCD: CPT | Mod: CPTII,S$GLB,, | Performed by: INTERNAL MEDICINE

## 2019-10-01 PROCEDURE — 3079F PR MOST RECENT DIASTOLIC BLOOD PRESSURE 80-89 MM HG: ICD-10-PCS | Mod: CPTII,S$GLB,, | Performed by: INTERNAL MEDICINE

## 2019-10-01 PROCEDURE — 99212 OFFICE O/P EST SF 10 MIN: CPT | Mod: 25,S$GLB,, | Performed by: INTERNAL MEDICINE

## 2019-10-01 PROCEDURE — 3079F DIAST BP 80-89 MM HG: CPT | Mod: CPTII,S$GLB,, | Performed by: INTERNAL MEDICINE

## 2019-10-01 NOTE — PROGRESS NOTES
Subjective:    Patient ID:  Cj Barnes is a 53 y.o. y.o. male who presents for initial visit for Follow-up      Patient presents for follow-up after stress test today in which she showed wide complex tachycardia.      Past Medical History:   Diagnosis Date    Aortic stenosis 2018    Cancer 2014    Colon cancer     Coronary artery disease     Diabetes mellitus type I     since in his 30's    Heart murmur     Heel fracture     HTN (hypertension)     Hyperlipidemia LDL goal < 70     Insulin pump in place     MVP (mitral valve prolapse)     Stenosis of aortic and mitral valves         Social History     Socioeconomic History    Marital status:      Spouse name: Not on file    Number of children: Not on file    Years of education: Not on file    Highest education level: Not on file   Occupational History    Not on file   Social Needs    Financial resource strain: Not on file    Food insecurity:     Worry: Not on file     Inability: Not on file    Transportation needs:     Medical: Not on file     Non-medical: Not on file   Tobacco Use    Smoking status: Never Smoker    Smokeless tobacco: Former User     Types: Snuff    Tobacco comment: since he was 12 yrs old   Substance and Sexual Activity    Alcohol use: No     Comment: socially    Drug use: No    Sexual activity: Not on file   Lifestyle    Physical activity:     Days per week: Not on file     Minutes per session: Not on file    Stress: Not on file   Relationships    Social connections:     Talks on phone: Not on file     Gets together: Not on file     Attends Islam service: Not on file     Active member of club or organization: Not on file     Attends meetings of clubs or organizations: Not on file     Relationship status: Not on file   Other Topics Concern    Not on file   Social History Narrative        2 grown kids    Delivers seafood        Family History   Problem Relation Age of Onset    Heart disease  Mother     Lung cancer Mother     Heart attack Father     Diabetes Father     HIV Brother         Review of Systems   Constitutional: Negative.    HENT: Negative.    Eyes: Negative.    Respiratory: Negative.    Cardiovascular:        History of aortic valve replacement with coagulation therapy.   Gastrointestinal: Negative.    Genitourinary: Negative.    Musculoskeletal: Negative.    Skin: Negative.    Neurological: Negative.    Endo/Heme/Allergies: Negative.    Psychiatric/Behavioral: Negative.         Objective:     /82   Pulse 65   Wt 89.8 kg (198 lb)   BMI 30.11 kg/m²     Physical Exam   Constitutional: He is oriented to person, place, and time and well-developed, well-nourished, and in no distress.   HENT:   Head: Normocephalic and atraumatic.   Eyes: Pupils are equal, round, and reactive to light. No scleral icterus.   Neck: Normal range of motion. Neck supple. No thyromegaly present.   Abdominal: Soft. Bowel sounds are normal. He exhibits no distension and no mass. There is no tenderness.   Musculoskeletal: Normal range of motion. He exhibits no edema.   Lymphadenopathy:     He has no cervical adenopathy.   Neurological: He is alert and oriented to person, place, and time. No cranial nerve deficit.   Skin: Skin is warm and dry. No erythema.   Psychiatric: Memory, affect and judgment normal.       Labs:     Lab Results   Component Value Date     (L) 08/21/2019    K 3.7 08/21/2019    CL 98 08/21/2019    CO2 23 08/21/2019    BUN 9 08/21/2019    CREATININE 0.6 08/21/2019    ANIONGAP 13 08/21/2019     Lab Results   Component Value Date    HGBA1C 6.1 (H) 09/30/2019     Lab Results   Component Value Date     (H) 08/16/2019       Lab Results   Component Value Date    WBC 8.40 09/30/2019    HGB 12.3 (L) 09/30/2019    HCT 38.4 (L) 09/30/2019    HCT 29 (L) 08/09/2019     09/30/2019    GRAN 6.1 09/30/2019    GRAN 71.9 09/30/2019     Lab Results   Component Value Date    CHOL 153  "09/30/2019    HDL 53 09/30/2019    LDLCALC 73 09/30/2019    TRIG 137 09/30/2019              .  Meds:     Current Outpatient Medications:     albuterol (PROVENTIL/VENTOLIN HFA) 90 mcg/actuation inhaler, Inhale 2 puffs into the lungs every 4 (four) hours as needed for Wheezing or Shortness of Breath. Rescue, Disp: 1 Inhaler, Rfl: 5    amiodarone (PACERONE) 200 MG Tab, Take 1 tablet (200 mg total) by mouth once daily., Disp: 30 tablet, Rfl: 3    aspirin (ECOTRIN) 81 MG EC tablet, Take 1 tablet (81 mg total) by mouth once daily., Disp: , Rfl: 0    blood sugar diagnostic Strp, 1 each by Misc.(Non-Drug; Combo Route) route 6 (six) times daily. Contour next strips. Pt needs contour jackelyn for compatibility w/ insulin pump (medtronic 670g). Necessity, Disp: 200 strip, Rfl: 6    blood-glucose sensor (GUARDIAN SENSOR 3) Apple, 1 each by Misc.(Non-Drug; Combo Route) route once a week., Disp: 12 each, Rfl: 3    clopidogrel (PLAVIX) 75 mg tablet, Take 75 mg by mouth once daily., Disp: , Rfl: 11    clotrimazole-betamethasone 1-0.05% (LOTRISONE) cream, APPLY A SMALL AMOUNT TO AFFECTED AREA THREE TIMES DAILY UNTIL CLEAR, Disp: , Rfl: 0    COMFORT EZ PEN NEEDLES 33 gauge x 3/16" Ndle, USE AS DIRECTED with Levemir pens, Disp: , Rfl: 11    doxycycline (MONODOX) 100 MG capsule, Take 1 capsule (100 mg total) by mouth 2 (two) times daily., Disp: 14 capsule, Rfl: 0    gabapentin (NEURONTIN) 300 MG capsule, Take 2 capsules (600 mg total) by mouth 3 (three) times daily., Disp: 180 capsule, Rfl: 6    glucagon, human recombinant, (GLUCAGON EMERGENCY KIT, HUMAN,) 1 mg SolR, Inject 1 mg into the muscle as needed., Disp: 1 each, Rfl: 0    insulin detemir U-100 (LEVEMIR FLEXTOUCH U-100 INSULN) 100 unit/mL (3 mL) SubQ InPn pen, Inject 48 Units into the skin every evening., Disp: 3 mL, Rfl: 0    insulin regular 100 unit/mL Soln, Use home settings Basal 0.4    Bolus/carb 12    ISF 20, Disp: 1 Product, Rfl: 0    JARDIANCE 25 mg Tab, TAKE " ONE TABLET BY MOUTH DAILY FOR DIABETES, Disp: , Rfl: 11    metFORMIN (GLUCOPHAGE-XR) 500 MG 24 hr tablet, Take 1 tablet (500 mg total) by mouth daily with breakfast., Disp: 90 tablet, Rfl: 3    metoprolol tartrate (LOPRESSOR) 25 MG tablet, Take 25 mg by mouth 2 (two) times daily., Disp: , Rfl:     NOVOLOG U-100 INSULIN ASPART 100 unit/mL injection, INJECT 180 UNITS SUBCUTANEOUSLY EVERY ONE AND A HALF DAYS, Disp: , Rfl: 11    pantoprazole (PROTONIX) 40 MG tablet, Take 1 tablet (40 mg total) by mouth before breakfast., Disp: 30 tablet, Rfl: 11    simvastatin (ZOCOR) 20 MG tablet, Take 1 tablet (20 mg total) by mouth every evening., Disp: 90 tablet, Rfl: 3    warfarin (COUMADIN) 5 MG tablet, Take 1 tablet (5 mg total) by mouth Daily. (Patient taking differently: Take 3 mg by mouth Daily. ), Disp: 30 tablet, Rfl: 11      Assessment & Plan:     No problem-specific Assessment & Plan notes found for this encounter.

## 2019-10-01 NOTE — ASSESSMENT & PLAN NOTE
Patient had wide complex tachycardia on stress testing today.  It was initially felt that this was ventricular tachycardia.  His electrophysiologist was contacted in the images were shown to him and he states that it is left bundle branch block rate related.

## 2019-10-02 ENCOUNTER — PATIENT MESSAGE (OUTPATIENT)
Dept: ADMINISTRATIVE | Facility: OTHER | Age: 54
End: 2019-10-02

## 2019-10-02 ENCOUNTER — PATIENT OUTREACH (OUTPATIENT)
Dept: ADMINISTRATIVE | Facility: OTHER | Age: 54
End: 2019-10-02

## 2019-10-03 ENCOUNTER — NUTRITION (OUTPATIENT)
Dept: DIABETES | Facility: CLINIC | Age: 54
End: 2019-10-03
Payer: MEDICARE

## 2019-10-03 ENCOUNTER — ANTI-COAG VISIT (OUTPATIENT)
Dept: CARDIOLOGY | Facility: CLINIC | Age: 54
End: 2019-10-03
Payer: MEDICARE

## 2019-10-03 VITALS — BODY MASS INDEX: 30.01 KG/M2 | HEIGHT: 68 IN | WEIGHT: 198 LBS

## 2019-10-03 DIAGNOSIS — Z95.828 S/P ASCENDING AORTIC REPLACEMENT: ICD-10-CM

## 2019-10-03 DIAGNOSIS — Z96.41 INSULIN PUMP IN PLACE: ICD-10-CM

## 2019-10-03 DIAGNOSIS — E10.649 TYPE 1 DIABETES MELLITUS WITH HYPOGLYCEMIA UNAWARENESS: ICD-10-CM

## 2019-10-03 DIAGNOSIS — E10.42 TYPE 1 DIABETES MELLITUS WITH DIABETIC POLYNEUROPATHY: Primary | ICD-10-CM

## 2019-10-03 DIAGNOSIS — Z79.01 LONG TERM (CURRENT) USE OF ANTICOAGULANTS: ICD-10-CM

## 2019-10-03 DIAGNOSIS — G45.9 TIA (TRANSIENT ISCHEMIC ATTACK): ICD-10-CM

## 2019-10-03 PROCEDURE — G0108 DIAB MANAGE TRN  PER INDIV: HCPCS | Mod: S$GLB,,, | Performed by: DIETITIAN, REGISTERED

## 2019-10-03 PROCEDURE — G0108 PR DIAB MANAGE TRN  PER INDIV: ICD-10-PCS | Mod: S$GLB,,, | Performed by: DIETITIAN, REGISTERED

## 2019-10-03 PROCEDURE — 93793 PR ANTICOAGULANT MGMT FOR PT TAKING WARFARIN: ICD-10-PCS | Mod: S$GLB,,,

## 2019-10-03 PROCEDURE — 99999 PR PBB SHADOW E&M-EST. PATIENT-LVL III: ICD-10-PCS | Mod: PBBFAC,,, | Performed by: DIETITIAN, REGISTERED

## 2019-10-03 PROCEDURE — 93793 ANTICOAG MGMT PT WARFARIN: CPT | Mod: S$GLB,,,

## 2019-10-03 PROCEDURE — 99999 PR PBB SHADOW E&M-EST. PATIENT-LVL III: CPT | Mod: PBBFAC,,, | Performed by: DIETITIAN, REGISTERED

## 2019-10-03 NOTE — PROGRESS NOTES
Diabetes Education  Author: Katia Pham RD  Date: 10/3/2019    Diabetes Care Management Summary  Diabetes Education Record Assessment/Progress: Comprehensive/Group(670 pump upgrade - 1 week f/u)  Current Diabetes Risk Level: Moderate     Last A1c:   Lab Results   Component Value Date    HGBA1C 6.1 (H) 09/30/2019     Last visit with Diabetes Educator: : 03/20/2018      Diabetes Type  Diabetes Type : Type I    Diabetes History  Current Treatment: Insulin pump, Insulin, Oral Medication  Reviewed Problem List with Patient: Yes    Health Maintenance was reviewed today with patient. Discussed with patient importance of routine eye exams, foot exams/foot care, blood work (i.e.: A1c, microalbumin, and lipid), dental visits, yearly flu vaccine, and pneumonia vaccine as indicated by PCP. Patient verbalized understanding.     Health Maintenance Topics with due status: Not Due       Topic Last Completion Date    Foot Exam 04/23/2019    Urine Microalbumin 04/23/2019    Eye Exam 05/01/2019    High Dose Statin 09/26/2019    Lipid Panel 09/30/2019    Hemoglobin A1c 09/30/2019     Health Maintenance Due   Topic Date Due    TETANUS VACCINE  11/09/1981    Colonoscopy  04/25/2017       Nutrition  What type of sweetener do you use?: none  What type of beverages do you drink?: water, diet soda/tea  Meal Plan 24 Hour Recall - Breakfast: 2 biscuits  Meal Plan 24 Hour Recall - Lunch: Chips or Line [place]  Meal Plan 24 Hour Recall - Dinner: Pizza Thin Crust  Meal Plan 24 Hour Recall - Snack: Chips     Monitoring   Self Monitoring : SMBG 3 times a day even with sensor(SMBG 3-4 times daily (does not use CGM sensor); range )  Blood Glucose Logs: Yes(from pump download; see Media tab)  Do you use a personal continuous glucose monitor?: Yes  What kind of glucose monitor do you use?: Medtronic Guardian  In the last month, how often have you had a low blood sugar reaction?: once  What are your symptoms of low blood sugar?: eye twitches  Can  you tell when your blood sugar is too high?: yes    Exercise   Exercise Type: none  Frequency: Never    Current Diabetes Treatment   Current Treatment: Insulin pump, Insulin, Oral Medication    Social History  Preferred Learning Method: Face to Face, Hands On  Primary Support: Self  Occupation: retired  Smoking Status: Never a Smoker  Alcohol Use: Never                                Barriers to Change  Barriers to Change: None  Learning Challenges : None    Readiness to Learn   Readiness to Learn : Acceptance    Cultural Influences  Cultural Influences: No    Diabetes Education Assessment/Progress  Diabetes Disease Process (diabetes disease process and treatment options): Discussion, Individual Session  Nutrition (Incorporating nutritional management into one's lifestyle): Discussion, Individual Session  Physical Activity (incorporating physical activity into one's lifestyle): Discussion, Individual Session  Medications (states correct name, dose, onset, peak, duration, side effects & timing of meds): Discussion, Individual Session, Comprehends Key Points, Instructed  Monitoring (monitoring blood glucose/other parameters & using results): Discussion, Individual Session, Comprehends Key Points(Guardian Sensor)  Acute Complications (preventing, detecting, and treating acute complications): Discussion, Individual Session, Comprehends Key Points  Chronic Complications (preventing, detecting, and treating chronic complications): Discussion, Individual Session, Comprehends Key Points  Clinical (diabetes, other pertinent medical history, and relevant comorbidities reviewed during visit): Discussion, Individual Session, Comprehends Key Points  Cognitive (knowledge of self-management skills, functional health literacy): Discussion, Comprehends Key Points, Individual Session  Psychosocial (emotional response to diabetes): Discussion, Individual Session, Comprehends Key Points  Diabetes Distress and Support Systems:  Discussion, Individual Session, Comprehends Key Points  Behavioral (readiness for change, lifestyle practices, self-care behaviors): Discussion, Comprehends Key Points, Individual Session    Education completed:   Discussed automode vs manual mode and how to change between the 2  Increased Basal rate from 0.45 to 0.475 during the hours of 12A-12P, continuing with 0.45 during the hours of 12P-12A  Should receive sensors soon and will come by to be put back into automode     Goals  Patient has selected/evaluated goals during today's session: Yes, evaluated    Diabetes Self-Management Support Plan  Diabetes Learning: DM support group  Medication: Medication Assistance  Review Status: Patient has selected and agrees to support plan., Patient was provided Diabetes Self-Management Support Plan document that includes support options.    Diabetes Care Plan/Intervention  Education Plan/Intervention: Individual Follow-Up DSMT, Support Group for Diabetes Distress    Diabetes Meal Plan  Restrictions: Low Fat, Low Sodium, Restricted Carbohydrate  Calories: 1800  Carbohydrate Per Meal: 30-45g  Carbohydrate Per Snack : 15-20g  Fat: 50  Protein: 135    Today's Self-Management Care Plan was developed with the patient's input and is based on barriers identified during today's assessment.    The long and short-term goals in the care plan were written with the patient/caregiver's input. The patient has agreed to work toward these goals to improve his overall diabetes control.      The patient received a copy of today's self-management plan and verbalized understanding of the care plan, goals, and all of today's instructions.      The patient was encouraged to communicate with his physician and care team regarding his condition(s) and treatment.  I provided the patient with my contact information today and encouraged him to contact me via phone or patient portal as needed.     Education Units of Time   Time Spent: 30 min

## 2019-10-08 ENCOUNTER — PATIENT OUTREACH (OUTPATIENT)
Dept: ADMINISTRATIVE | Facility: OTHER | Age: 54
End: 2019-10-08

## 2019-10-08 ENCOUNTER — HOSPITAL ENCOUNTER (OUTPATIENT)
Dept: CARDIOLOGY | Facility: CLINIC | Age: 54
Discharge: HOME | End: 2019-10-08
Attending: THORACIC SURGERY (CARDIOTHORACIC VASCULAR SURGERY)
Payer: MEDICARE

## 2019-10-08 VITALS
WEIGHT: 204 LBS | SYSTOLIC BLOOD PRESSURE: 138 MMHG | HEART RATE: 65 BPM | DIASTOLIC BLOOD PRESSURE: 76 MMHG | BODY MASS INDEX: 30.92 KG/M2 | HEIGHT: 68 IN

## 2019-10-08 DIAGNOSIS — Z95.2 HEART VALVE REPLACED BY TRANSPLANT: ICD-10-CM

## 2019-10-08 LAB
CV STRESS BASE HR: 65 BPM
DIASTOLIC BLOOD PRESSURE: 76 MMHG
OHS CV CPX 1 MINUTE RECOVERY HEART RATE: 126 BPM
OHS CV CPX 85 PERCENT MAX PREDICTED HEART RATE MALE: 140
OHS CV CPX ABDOMINAL GIRTH: 46.5 CM
OHS CV CPX ANAEROBIC THRESHOLD DIASTOLIC BLOOD PRESSURE: 66 MMHG
OHS CV CPX ANAEROBIC THRESHOLD HEART RATE: 108
OHS CV CPX ANAEROBIC THRESHOLD RATE PRESSURE PRODUCT: NORMAL
OHS CV CPX ANAEROBIC THRESHOLD SYSTOLIC BLOOD PRESSURE: 106
OHS CV CPX DATA GRADE - AT: 8.9
OHS CV CPX DATA GRADE - PEAK: 16
OHS CV CPX DATA O2 SAT - PEAK: 94
OHS CV CPX DATA O2 SAT - REST: 97
OHS CV CPX DATA SPEED - AT: 2.7
OHS CV CPX DATA SPEED - PEAK: 4.2
OHS CV CPX DATA TIME - AT: 4.47
OHS CV CPX DATA TIME - PEAK: 7.57
OHS CV CPX DATA VE/VCO2 - AT: 33
OHS CV CPX DATA VE/VCO2 - PEAK: 39
OHS CV CPX DATA VE/VO2 - AT: 33
OHS CV CPX DATA VE/VO2 - PEAK: 51
OHS CV CPX DATA VO2 - AT: 15
OHS CV CPX DATA VO2 - PEAK: 19.2
OHS CV CPX DATA VO2 - REST: 4
OHS CV CPX ESTIMATED METS: 13
OHS CV CPX FEV1/FVC: 0.88
OHS CV CPX FORCED EXPIRATORY VOLUME: 2.74
OHS CV CPX FORCED VITAL CAPACITY (FVC): 3.11
OHS CV CPX HIGHEST VO: 28.4
OHS CV CPX MAX PREDICTED HEART RATE: 165
OHS CV CPX MAXIMAL VOLUNTARY VENTILATION (MVV) PREDICTED: 109.6
OHS CV CPX MAXIMAL VOLUNTARY VENTILATION (MVV): 115
OHS CV CPX MAXIUMUM EXERCISE VENTILATION (VE MAX): 87.5
OHS CV CPX PATIENT AGE: 55
OHS CV CPX PATIENT HEIGHT IN: 68
OHS CV CPX PATIENT IS FEMALE AGE 11-19: 0
OHS CV CPX PATIENT IS FEMALE AGE GREATER THAN 19: 0
OHS CV CPX PATIENT IS FEMALE AGE LESS THAN 11: 0
OHS CV CPX PATIENT IS FEMALE: 0
OHS CV CPX PATIENT IS MALE AGE 11-25: 0
OHS CV CPX PATIENT IS MALE AGE GREATER THAN 25: 1
OHS CV CPX PATIENT IS MALE AGE LESS THAN 11: 0
OHS CV CPX PATIENT IS MALE GREATER THAN 18: 1
OHS CV CPX PATIENT IS MALE LESS THAN OR EQUAL TO 18: 0
OHS CV CPX PATIENT IS MALE: 1
OHS CV CPX PATIENT WEIGHT RETURNED IN OZ: 3264
OHS CV CPX PEAK DIASTOLIC BLOOD PRESSURE: 62 MMHG
OHS CV CPX PEAK HEAR RATE: 131 BPM
OHS CV CPX PEAK RATE PRESSURE PRODUCT: NORMAL
OHS CV CPX PEAK SYSTOLIC BLOOD PRESSURE: 128 MMHG
OHS CV CPX PERCENT BODY FAT: 20.8
OHS CV CPX PERCENT MAX PREDICTED HEART RATE ACHIEVED: 79
OHS CV CPX PREDICTED VO2: 28.4 ML/KG/MIN
OHS CV CPX RATE PRESSURE PRODUCT PRESENTING: 8970
OHS CV CPX REST PET CO2: 31
OHS CV CPX VE/VCO2 SLOPE: 30.9
STRESS ECHO POST EXERCISE DUR MIN: 7 MINUTES
STRESS ECHO POST EXERCISE DUR SEC: 34 SECONDS
STRESS ST DEPRESSION: 1.5 MM
SYSTOLIC BLOOD PRESSURE: 138 MMHG

## 2019-10-08 PROCEDURE — 94621 CARDIOPULM EXERCISE TESTING: CPT | Mod: S$GLB,,, | Performed by: INTERNAL MEDICINE

## 2019-10-08 PROCEDURE — 94621 CARDIOPULMONARY EXERCISE TESTING (CUPID ONLY): ICD-10-PCS | Mod: S$GLB,,, | Performed by: INTERNAL MEDICINE

## 2019-10-09 ENCOUNTER — OFFICE VISIT (OUTPATIENT)
Dept: CARDIOLOGY | Facility: CLINIC | Age: 54
End: 2019-10-09
Payer: MEDICARE

## 2019-10-09 ENCOUNTER — TELEPHONE (OUTPATIENT)
Dept: CARDIAC REHAB | Facility: CLINIC | Age: 54
End: 2019-10-09

## 2019-10-09 ENCOUNTER — PATIENT MESSAGE (OUTPATIENT)
Dept: DIABETES | Facility: CLINIC | Age: 54
End: 2019-10-09

## 2019-10-09 VITALS
WEIGHT: 207.25 LBS | HEART RATE: 82 BPM | SYSTOLIC BLOOD PRESSURE: 126 MMHG | OXYGEN SATURATION: 96 % | BODY MASS INDEX: 31.51 KG/M2 | DIASTOLIC BLOOD PRESSURE: 73 MMHG

## 2019-10-09 DIAGNOSIS — Q23.1 AORTIC REGURGITATION, CONGENITAL: ICD-10-CM

## 2019-10-09 PROCEDURE — 99213 OFFICE O/P EST LOW 20 MIN: CPT | Mod: S$GLB,,, | Performed by: INTERNAL MEDICINE

## 2019-10-09 PROCEDURE — 3008F BODY MASS INDEX DOCD: CPT | Mod: CPTII,S$GLB,, | Performed by: INTERNAL MEDICINE

## 2019-10-09 PROCEDURE — 3074F PR MOST RECENT SYSTOLIC BLOOD PRESSURE < 130 MM HG: ICD-10-PCS | Mod: CPTII,S$GLB,, | Performed by: INTERNAL MEDICINE

## 2019-10-09 PROCEDURE — 99213 PR OFFICE/OUTPT VISIT, EST, LEVL III, 20-29 MIN: ICD-10-PCS | Mod: S$GLB,,, | Performed by: INTERNAL MEDICINE

## 2019-10-09 PROCEDURE — 3078F DIAST BP <80 MM HG: CPT | Mod: CPTII,S$GLB,, | Performed by: INTERNAL MEDICINE

## 2019-10-09 PROCEDURE — 3074F SYST BP LT 130 MM HG: CPT | Mod: CPTII,S$GLB,, | Performed by: INTERNAL MEDICINE

## 2019-10-09 PROCEDURE — 99999 PR PBB SHADOW E&M-EST. PATIENT-LVL IV: CPT | Mod: PBBFAC,,, | Performed by: INTERNAL MEDICINE

## 2019-10-09 PROCEDURE — 3008F PR BODY MASS INDEX (BMI) DOCUMENTED: ICD-10-PCS | Mod: CPTII,S$GLB,, | Performed by: INTERNAL MEDICINE

## 2019-10-09 PROCEDURE — 3078F PR MOST RECENT DIASTOLIC BLOOD PRESSURE < 80 MM HG: ICD-10-PCS | Mod: CPTII,S$GLB,, | Performed by: INTERNAL MEDICINE

## 2019-10-09 PROCEDURE — 99999 PR PBB SHADOW E&M-EST. PATIENT-LVL IV: ICD-10-PCS | Mod: PBBFAC,,, | Performed by: INTERNAL MEDICINE

## 2019-10-09 NOTE — PROGRESS NOTES
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 FLUTTER, HTN, HX OF TIA, SMOKER, EF=40%(8-17-19)    ANTHROPOMETRICS:     PRE   Abdominal girth (in) 46.5   Height (in) 68   Weight (lbs) 204   BMI 31.1   % Body Fat 20.8%       EXERCISE RESULTS:     PRE   Peak VO2 (CPX only) 19.2   Actual METS (CPX only) 5.5   Estimated METS 13.0       LAB RESULTS:    Lab Results   Component Value Date    MPV 8.6 (L) 09/30/2019       Lab Results   Component Value Date    CHOL 153 09/30/2019     Lab Results   Component Value Date    HDL 53 09/30/2019     Lab Results   Component Value Date    LDLCALC 73 09/30/2019     Lab Results   Component Value Date    TRIG 137 09/30/2019     Lab Results   Component Value Date    CHOLHDL 34.6 09/30/2019       Lab Results   Component Value Date    GLUF 166 (H) 09/30/2019     Lab Results   Component Value Date    HGBA1C 6.1 (H) 09/30/2019        No results found for: HSCRP      DONALD SCORES:     PRE   Anxiety 15   Depression 13   Somatic 4   Hostility 13     SF-36 SCORES:     PRE   Physical Function 21   Social Function 9   Mental Health 17   Pain 7   Change in Health 4   Physical Role Limitation 0   Mental Role Limitation 0   Energy/Fatigue 13   Health Perceptions 13   Total Score 84     PHQ-9:     PRE   PHQ-9 11       EDUCATION SCORES:     PRE   Education Score 50

## 2019-10-09 NOTE — PROGRESS NOTES
Subjective:    Patient ID:  Cj Barnes is a 53 y.o. y.o. male who presents for initial visit for Follow-up      This is a follow up visit from stress testing. He develops exercise induced LBBB on the treadmill. He has no evidence of ischemia.      Past Medical History:   Diagnosis Date    Aortic stenosis 2018    Cancer 2014    Colon cancer     Coronary artery disease     Diabetes mellitus type I     since in his 30's    Heart murmur     Heel fracture     HTN (hypertension)     Hyperlipidemia LDL goal < 70     Insulin pump in place     MVP (mitral valve prolapse)     Stenosis of aortic and mitral valves         Social History     Socioeconomic History    Marital status:      Spouse name: Not on file    Number of children: Not on file    Years of education: Not on file    Highest education level: Not on file   Occupational History    Not on file   Social Needs    Financial resource strain: Not on file    Food insecurity:     Worry: Not on file     Inability: Not on file    Transportation needs:     Medical: Not on file     Non-medical: Not on file   Tobacco Use    Smoking status: Never Smoker    Smokeless tobacco: Former User     Types: Snuff    Tobacco comment: since he was 12 yrs old   Substance and Sexual Activity    Alcohol use: No     Comment: socially    Drug use: No    Sexual activity: Not on file   Lifestyle    Physical activity:     Days per week: Not on file     Minutes per session: Not on file    Stress: Not on file   Relationships    Social connections:     Talks on phone: Not on file     Gets together: Not on file     Attends Congregation service: Not on file     Active member of club or organization: Not on file     Attends meetings of clubs or organizations: Not on file     Relationship status: Not on file   Other Topics Concern    Not on file   Social History Narrative        2 grown kids    Delivers seafood        Family History   Problem Relation Age  of Onset    Heart disease Mother     Lung cancer Mother     Heart attack Father     Diabetes Father     HIV Brother         ROS     Objective:     /73   Pulse 82   Wt 94 kg (207 lb 3.7 oz)   SpO2 96%   BMI 31.51 kg/m²     Physical Exam   Constitutional: He is oriented to person, place, and time and well-developed, well-nourished, and in no distress.   HENT:   Head: Normocephalic and atraumatic.   Eyes: Pupils are equal, round, and reactive to light. No scleral icterus.   Neck: Normal range of motion. Neck supple. No thyromegaly present.   Cardiovascular: Normal rate, regular rhythm and S1 normal.   Murmur heard.   Crescendo systolic murmur is present with a grade of 2/6.  Pulses:       Carotid pulses are 3+ on the right side, and 3+ on the left side.  S 2 accentuated   Pulmonary/Chest: Effort normal and breath sounds normal.   Abdominal: Soft. Bowel sounds are normal. He exhibits no distension and no mass. There is no tenderness.   Musculoskeletal: Normal range of motion. He exhibits no edema.   Lymphadenopathy:     He has no cervical adenopathy.   Neurological: He is alert and oriented to person, place, and time. No cranial nerve deficit.   Skin: Skin is warm and dry. No erythema.   Psychiatric: Mood, memory, affect and judgment normal.       Labs:     Lab Results   Component Value Date     (L) 08/21/2019    K 3.7 08/21/2019    CL 98 08/21/2019    CO2 23 08/21/2019    BUN 9 08/21/2019    CREATININE 0.6 08/21/2019    ANIONGAP 13 08/21/2019     Lab Results   Component Value Date    HGBA1C 6.1 (H) 09/30/2019     Lab Results   Component Value Date     (H) 08/16/2019       Lab Results   Component Value Date    WBC 8.40 09/30/2019    HGB 12.3 (L) 09/30/2019    HCT 38.4 (L) 09/30/2019    HCT 29 (L) 08/09/2019     09/30/2019    GRAN 6.1 09/30/2019    GRAN 71.9 09/30/2019     Lab Results   Component Value Date    CHOL 153 09/30/2019    HDL 53 09/30/2019    LDLCALC 73 09/30/2019    TRIG 137  "09/30/2019              .  Meds:     Current Outpatient Medications:     albuterol (PROVENTIL/VENTOLIN HFA) 90 mcg/actuation inhaler, Inhale 2 puffs into the lungs every 4 (four) hours as needed for Wheezing or Shortness of Breath. Rescue, Disp: 1 Inhaler, Rfl: 5    amiodarone (PACERONE) 200 MG Tab, Take 1 tablet (200 mg total) by mouth once daily., Disp: 30 tablet, Rfl: 3    aspirin (ECOTRIN) 81 MG EC tablet, Take 1 tablet (81 mg total) by mouth once daily., Disp: , Rfl: 0    blood sugar diagnostic Strp, 1 each by Misc.(Non-Drug; Combo Route) route 6 (six) times daily. Contour next strips. Pt needs contour jackelyn for compatibility w/ insulin pump (medtronic 670g). Necessity, Disp: 200 strip, Rfl: 6    blood-glucose sensor (GUARDIAN SENSOR 3) Apple, 1 each by Misc.(Non-Drug; Combo Route) route once a week., Disp: 12 each, Rfl: 3    clotrimazole-betamethasone 1-0.05% (LOTRISONE) cream, APPLY A SMALL AMOUNT TO AFFECTED AREA THREE TIMES DAILY UNTIL CLEAR, Disp: , Rfl: 0    COMFORT EZ PEN NEEDLES 33 gauge x 3/16" Ndle, USE AS DIRECTED with Levemir pens, Disp: , Rfl: 11    doxycycline (MONODOX) 100 MG capsule, Take 1 capsule (100 mg total) by mouth 2 (two) times daily., Disp: 14 capsule, Rfl: 0    gabapentin (NEURONTIN) 300 MG capsule, Take 2 capsules (600 mg total) by mouth 3 (three) times daily., Disp: 180 capsule, Rfl: 6    glucagon, human recombinant, (GLUCAGON EMERGENCY KIT, HUMAN,) 1 mg SolR, Inject 1 mg into the muscle as needed., Disp: 1 each, Rfl: 0    insulin detemir U-100 (LEVEMIR FLEXTOUCH U-100 INSULN) 100 unit/mL (3 mL) SubQ InPn pen, Inject 48 Units into the skin every evening., Disp: 3 mL, Rfl: 0    insulin regular 100 unit/mL Soln, Use home settings Basal 0.4    Bolus/carb 12    ISF 20, Disp: 1 Product, Rfl: 0    JARDIANCE 25 mg Tab, TAKE ONE TABLET BY MOUTH DAILY FOR DIABETES, Disp: , Rfl: 11    metFORMIN (GLUCOPHAGE-XR) 500 MG 24 hr tablet, Take 1 tablet (500 mg total) by mouth daily with " breakfast., Disp: 90 tablet, Rfl: 3    metoprolol tartrate (LOPRESSOR) 25 MG tablet, Take 25 mg by mouth 2 (two) times daily., Disp: , Rfl:     NOVOLOG U-100 INSULIN ASPART 100 unit/mL injection, INJECT 180 UNITS SUBCUTANEOUSLY EVERY ONE AND A HALF DAYS, Disp: , Rfl: 11    pantoprazole (PROTONIX) 40 MG tablet, Take 1 tablet (40 mg total) by mouth before breakfast., Disp: 30 tablet, Rfl: 11    simvastatin (ZOCOR) 20 MG tablet, Take 1 tablet (20 mg total) by mouth every evening., Disp: 90 tablet, Rfl: 3    warfarin (COUMADIN) 5 MG tablet, Take 1 tablet (5 mg total) by mouth Daily. (Patient taking differently: Take 3 mg by mouth Daily. ), Disp: 30 tablet, Rfl: 11    clopidogrel (PLAVIX) 75 mg tablet, Take 75 mg by mouth once daily., Disp: , Rfl: 11      Assessment & Plan:     No problem-specific Assessment & Plan notes found for this encounter.

## 2019-10-10 ENCOUNTER — ANTI-COAG VISIT (OUTPATIENT)
Dept: CARDIOLOGY | Facility: CLINIC | Age: 54
End: 2019-10-10
Payer: MEDICARE

## 2019-10-10 ENCOUNTER — CLINICAL SUPPORT (OUTPATIENT)
Dept: CARDIAC REHAB | Facility: CLINIC | Age: 54
End: 2019-10-10
Payer: MEDICARE

## 2019-10-10 VITALS — DIASTOLIC BLOOD PRESSURE: 80 MMHG | HEART RATE: 82 BPM | OXYGEN SATURATION: 98 % | SYSTOLIC BLOOD PRESSURE: 142 MMHG

## 2019-10-10 DIAGNOSIS — Z79.01 LONG TERM (CURRENT) USE OF ANTICOAGULANTS: ICD-10-CM

## 2019-10-10 DIAGNOSIS — I35.0 NONRHEUMATIC AORTIC VALVE STENOSIS: ICD-10-CM

## 2019-10-10 DIAGNOSIS — Z95.828 S/P ASCENDING AORTIC REPLACEMENT: ICD-10-CM

## 2019-10-10 DIAGNOSIS — Z95.2 HEART VALVE REPLACED: ICD-10-CM

## 2019-10-10 DIAGNOSIS — G45.9 TIA (TRANSIENT ISCHEMIC ATTACK): ICD-10-CM

## 2019-10-10 DIAGNOSIS — I35.0 AORTIC VALVE STENOSIS, ETIOLOGY OF CARDIAC VALVE DISEASE UNSPECIFIED: ICD-10-CM

## 2019-10-10 PROCEDURE — 99999 PR PBB SHADOW E&M-EST. PATIENT-LVL IV: CPT | Mod: PBBFAC,,,

## 2019-10-10 PROCEDURE — 93798 PR CARDIAC REHAB/MONITOR: ICD-10-PCS | Mod: S$GLB,,, | Performed by: INTERNAL MEDICINE

## 2019-10-10 PROCEDURE — 93793 ANTICOAG MGMT PT WARFARIN: CPT | Mod: S$GLB,,,

## 2019-10-10 PROCEDURE — 93793 PR ANTICOAGULANT MGMT FOR PT TAKING WARFARIN: ICD-10-PCS | Mod: S$GLB,,,

## 2019-10-10 PROCEDURE — 99999 PR PBB SHADOW E&M-EST. PATIENT-LVL IV: ICD-10-PCS | Mod: PBBFAC,,,

## 2019-10-10 PROCEDURE — 93798 PHYS/QHP OP CAR RHAB W/ECG: CPT | Mod: S$GLB,,, | Performed by: INTERNAL MEDICINE

## 2019-10-10 NOTE — PROGRESS NOTES
INR not at goal - low again without explanation, will bridge and increase dose. Medications, chart, and patient findings reviewed. See calendar for adjustments to dose and follow up plan.

## 2019-10-10 NOTE — PROGRESS NOTES
Exercise:  Met with the patient for orientation.  Consent forms were signed, entry CPX test results were reviewed, proper attire and shoes were discussed, and strength assessment was performed.         Entry CPX test results included weight (204 lb), abdominal girth (46.5 in), BMI (31.1), and body composition (20.8%).  An estimated MET Level of 13.0 and a Peak VO2 of 19.2ml/kg/min (5.5 actual METS) were measured.  This places the patient in the moderate risk category.  Upon exit CPX an estimated MET Level of 16.9 will be desired to achieve the goal of a 30% improvement.     Based on entry CPX test, the target heart rate range for patient is 103 to 121 bpm.  The patient will attend cardiac rehab class 3 times per week which will include both aerobic and resistance training which will be modified to fit limitations.  Aerobic exercise will be 30-60 minutes with a goal intensity of 12-15 on the RPE scale.  Resistance training will incorporate free weights 1-2 sets, 10-15 repetitions with a beginning weight of 3 to 5 pounds based on strength assessment.    There were some limitations noted by patient due to an injury to both right shoulder and hip.  Patient was advised to express any discomfort during exercise.  The patient stated he is walking 2 days/week for about 20 to 25 minutes.  Patient was encouraged to continue exercising aerobically at least two additional days per week outside of attending cardiac rehab class for a minimum of 30 minutes.  The patient stated understanding.      The patient will begin Cardiac Rehab on Wednesday, October 16 at 9:00am until a place is available in the 7:00am class.    Exercise prescription will be adjusted based on tolerance of exercise intensity by patient.    Oliva Dawson., CEP

## 2019-10-10 NOTE — PROGRESS NOTES
Patient here for Phase II Cardiac Rehab orientation.     BP (!) 142/80 (BP Location: Left arm, Patient Position: Standing, BP Method: Large (Automatic))   Pulse 82   SpO2 98%     ASSESSMENT:  Heart Sounds: regular rate and rhythm  Prosthetic Valve: Yes  Lung Sounds: clear to auscultation bilaterally  Capillary Refill: normal  Left Radial Pulse: Normal (+2)  Right Radial Pulse: Normal (+2)  Left Pedal Pulse: Normal (+2)  Right Pedal Pulse: Normal (+2)  Right Edema: none  Left Edema none  Strength: normal  Range of Motion: full range of motion  Existing Limitations:    Site   [x] Arthritis, bursitis Right shoulder and hip   [] Amputation, atrophy    [] Other:    []     Diabetic patient's foot examination comments: Normal -  Bilateral  Incisional site: healing well, there is small pustular area proximal incision appears to look like a pimple on incision but no drainage and mild redness. Instructed pt to take a picture and send to Doctor Eugene to see if any intervention is necessary  Special needs: N/A    QOL:  Discussed [place], SF36, and PHQ-9 Questionnaire scores with patient. Patient does admit to feeling depressed but has not sought help. Discussed with pt and given referral to see someone in either Behavioral health or Psychiatry.  Patient denies any overwhelming stress or anxiety.  Patient has been instructed to notify staff in the event that circumstances worsen.  Patient verbalizes understanding.    RISK FACTORS:  The following risk factors are present:  diabetes, hyperlipidemia, hypertension, positive family history, stress    GOALS:  Patient has set the following goals:  Decrease cholesterol level  Increase exercise tolerance  Increase knowledge of CAD  Decrease blood pressure  Weight loss  Demonstrate accurate pulse taking  ID & manage personal areas of stress  Control diabetes by adjusting diet and exercise  Learn more about healthy eating    Discussed Cardiac Rehab program in depth with patient.   Medication list updated per patient & marked as reviewed.  Patient has been instructed to notify staff of any problems while attending rehab (ie: chest pain, shortness of breath, lightheadedness, dizziness).  Patient has been instructed to monitor blood pressure readings outside of rehab & to keep a daily log of the readings.  Patient verbalizes understanding.    Junaid Wisdom RN  Cardiac Rehab Nurse

## 2019-10-10 NOTE — PROGRESS NOTES
"Cj Barnes, a 53 y.o. male, is here for nutrition counseling and education pertinent to his diagnosis of   1. Nonrheumatic aortic valve stenosis        Patient will be starting Phase II Cardiac Rehabilitation.     NUTRITION ASSESSMENT:    Anthropometrics:  · Height: 5'8"  · Weight: 207 lbs  · BMI:  31.3  · Abdominal girth: 46.5 inches  · Body fat: 20.8%      Drug Allergies and Intolerances:  Review of patient's allergies indicates:  No Known Allergies    Food Allergies and Intolerances:  intollerant of broccoli and cucumbers, per patient    Past Medical History:  Past Medical History:   Diagnosis Date    Aortic stenosis 2018    Cancer 2014    Colon cancer     Coronary artery disease     Diabetes mellitus type I     since in his 30's    Heart murmur     Heel fracture     HTN (hypertension)     Hyperlipidemia LDL goal < 70     Insulin pump in place     MVP (mitral valve prolapse)     Stenosis of aortic and mitral valves        Past Surgical History:  Past Surgical History:   Procedure Laterality Date    AORTIC VALVE REPLACEMENT N/A 8/9/2019    Procedure: Replacement-valve-aortic;  Surgeon: Ryan Knight MD;  Location: [address];  Service: Cardiothoracic;  Laterality: N/A;    BACK SURGERY      BENTALL PROCEDURE FOR REPLACEMENT OF AORTIC VALVE, AORTIC ROOT, AND ASCENDING AORTA N/A 8/9/2019    Procedure: BENTALL PROCEDURE;  Surgeon: Ryan Knight MD;  Location: [address];  Service: Cardiothoracic;  Laterality: N/A;    COLON SURGERY  2014    FOOT TENDON SURGERY      right and left heart cath Bilateral 01/15/2018    TREATMENT OF CARDIAC ARRHYTHMIA N/A 8/19/2019    Procedure: CARDIOVERSION;  Surgeon: Juan Zapata MD;  Location: [facility];  Service: Cardiology;  Laterality: N/A;  afib, dccv only, anes, GP, 3092    TRIGGER FINGER RELEASE      x 2       Medications:  Current Outpatient Medications   Medication Sig    albuterol (PROVENTIL/VENTOLIN HFA) 90 mcg/actuation " "inhaler Inhale 2 puffs into the lungs every 4 (four) hours as needed for Wheezing or Shortness of Breath. Rescue    amiodarone (PACERONE) 200 MG Tab Take 1 tablet (200 mg total) by mouth once daily.    aspirin (ECOTRIN) 81 MG EC tablet Take 1 tablet (81 mg total) by mouth once daily.    blood sugar diagnostic Strp 1 each by Misc.(Non-Drug; Combo Route) route 6 (six) times daily. Contour next strips. Pt needs contour jackelyn for compatibility w/ insulin pump (medtronic 670g). Necessity    blood-glucose sensor (GUARDIAN SENSOR 3) Apple 1 each by Misc.(Non-Drug; Combo Route) route once a week.    clopidogrel (PLAVIX) 75 mg tablet Take 75 mg by mouth once daily.    clotrimazole-betamethasone 1-0.05% (LOTRISONE) cream APPLY A SMALL AMOUNT TO AFFECTED AREA THREE TIMES DAILY UNTIL CLEAR    COMFORT EZ PEN NEEDLES 33 gauge x 3/16" Ndle USE AS DIRECTED with Levemir pens    doxycycline (MONODOX) 100 MG capsule Take 1 capsule (100 mg total) by mouth 2 (two) times daily.    gabapentin (NEURONTIN) 300 MG capsule Take 2 capsules (600 mg total) by mouth 3 (three) times daily.    glucagon, human recombinant, (GLUCAGON EMERGENCY KIT, HUMAN,) 1 mg SolR Inject 1 mg into the muscle as needed.    insulin detemir U-100 (LEVEMIR FLEXTOUCH U-100 INSULN) 100 unit/mL (3 mL) SubQ InPn pen Inject 48 Units into the skin every evening.    insulin regular 100 unit/mL Soln Use home settings Basal 0.4    Bolus/carb 12    ISF 20    JARDIANCE 25 mg Tab TAKE ONE TABLET BY MOUTH DAILY FOR DIABETES    metFORMIN (GLUCOPHAGE-XR) 500 MG 24 hr tablet Take 1 tablet (500 mg total) by mouth daily with breakfast.    metoprolol tartrate (LOPRESSOR) 25 MG tablet Take 25 mg by mouth 2 (two) times daily.    NOVOLOG U-100 INSULIN ASPART 100 unit/mL injection INJECT 180 UNITS SUBCUTANEOUSLY EVERY ONE AND A HALF DAYS    pantoprazole (PROTONIX) 40 MG tablet Take 1 tablet (40 mg total) by mouth before breakfast.    simvastatin (ZOCOR) 20 MG tablet Take 1 " tablet (20 mg total) by mouth every evening.    warfarin (COUMADIN) 5 MG tablet Take 1 tablet (5 mg total) by mouth Daily. (Patient taking differently: Take 3 mg by mouth Daily. )     No current facility-administered medications for this visit.        Vitamins and Supplements:  none    Labs:  Labs reviewed with patient. Patient confirms he is taking Zocor for cholesterol control.    Lab Results   Component Value Date    CHOL 153 09/30/2019     Lab Results   Component Value Date    HDL 53 09/30/2019     Lab Results   Component Value Date    LDLCALC 73 09/30/2019     Lab Results   Component Value Date    TRIG 137 09/30/2019     Lab Results   Component Value Date    CHOLHDL 34.6 09/30/2019         Lab Results   Component Value Date    GLUF 166 (H) 09/30/2019     Lab Results   Component Value Date    HGBA1C 6.1 (H) 09/30/2019       Nutrition/Diet History:  Patient eats 2 meals daily and 1-2 snacks daily.  Seasons food with salt-free seasoning.  denies use of a salt shaker at the table on prepared foods. Dines out 4 per week at restaurants such as [employer], [employer], restaurants in [place].  Chooses fried foods 1 times per week.  Chooses fish 2 times per week.   Beverages:    Alcohol: nonsmoker, occasionally will have 2 beers    Typical Week:  · Breakfast: scrambled eggs OR sausage/egg biscuit OR croissant  · Lunch: chips, crackers, OR sandwich  · Dinner: red beans, pork chops and corn, steak and potatoes      Difficulty Chewing or Swallowing: no  Current Exercise: See Exercise Physiologist Note  Food Safety/Food Preparation: family member - pt either eats dinner out at casual restaurants or his daughter cooks for him  Living Arrangements/Family Support: Lives alone  Cultural/Spiritual/Personal Preferences: none identified  Barriers to Education: none identified  Stage of Change Related to Diet Habits: Patient is contemplation - ambivalent about change     History of diabetes since 1985 and is followed by [facility].   Diabetic medications currently taking include Metforming, jardiance, insulin with CGM.  Patient has a home glucometer and checks his glucose 4 times a day.  Reports typical morning glucose ranges between 100-130 mg/dL.  Patient verbalizes understanding to bring home glucometer and check glucose pre and post each exercise session.  Per cardiac rehab protocols, patient's glucose must be between 90 and 270 mg/dL to exercise.  Patient denies any recent glucose levels less than 60 mg/dL or greater than 300 mg/dL.  Patient aware of signs and symptoms of hypoglycemia  Patient verbalizes importance of notifying rehab staff if symptoms of hypoglycemia occur while at cardiac rehab.  Abnormal labs will be reported to Dr. Lopez by rehab staff.        NUTRITION DIAGNOSIS:  1. Food and nutrition related knowledge deficit related to the lack of prior nutrition education as evidenced by diet history and 24 hour recall        NUTRITION INTERVENTION:    Nutrition Prescription:  · Total Energy Estimated Needs: 2150 Kcal/d (for weight loss)  · Method for Estimating Needs: [place]  · Total Protein Estimated Needs:  g/d  · Method for Estimating Needs: 0.8-1.2 g/Kg BW  · Total Fluid Estimated Needs: 1 mL/Kcal    Meals and Snacks: 2 gram sodium, Mediterranean diet  Goal(s)  1. Patient has a rehab weight loss goal of 10 lbs, 1 lb per week  2. Patient willing to increase fish intake (non-fried varieties) to a goal of 2-3 servings per week.   3. Patient willing to increase fruit and vegetable intake    Nutrition Education-Content: Purpose of the nutrition education, priority modifications, nutrition relationship to health/disease, and recommended modifications  Goal(s)  1. Understand and adhere to Mediterranean diet      Comments: Discussed ways to incorporate healthy snacks, eating on a schedule, and monitoring sodium intake for heart health.    Nutrition Education:   Person taught: patient   Comprehension: good     Preferred Learning Method: verbal and written   Education Needed/Provided: Nutrition counseling and education related to cardiac rehabilitation   Outcome/Evaluation: Verbalizes understanding and was able to demonstrate comprehension on verification.    Nutrition Education Method: Weekly nutrition lectures on the Mediterranean diet, cooking, shopping, and dining out    Written Materials Provided:  3 Day Food Record, Introduction to Mediterranean Diet    Strategies Implemented: Motivational interviewing, Goal setting, Self-Monitoring, and Problem Solving    Provided RD Contact Information: Yes      NUTRITION MONITORING:  Patient to participate in Cardiac Rehab sessions three times a week  12, 24, and Discharge Session Follow Up  Weekly Dietitian Weight Check  Follow Up Plan for Ongoing Self-Management Support      Amy Arnold, MS, RD, LDN

## 2019-10-14 ENCOUNTER — ANTI-COAG VISIT (OUTPATIENT)
Dept: CARDIOLOGY | Facility: CLINIC | Age: 54
End: 2019-10-14
Payer: MEDICARE

## 2019-10-14 DIAGNOSIS — G45.9 TIA (TRANSIENT ISCHEMIC ATTACK): ICD-10-CM

## 2019-10-14 DIAGNOSIS — Z95.828 S/P ASCENDING AORTIC REPLACEMENT: ICD-10-CM

## 2019-10-14 DIAGNOSIS — Z79.01 LONG TERM (CURRENT) USE OF ANTICOAGULANTS: ICD-10-CM

## 2019-10-14 PROCEDURE — 93793 ANTICOAG MGMT PT WARFARIN: CPT | Mod: S$GLB,,,

## 2019-10-14 PROCEDURE — 93793 PR ANTICOAGULANT MGMT FOR PT TAKING WARFARIN: ICD-10-PCS | Mod: S$GLB,,,

## 2019-10-15 ENCOUNTER — TELEPHONE (OUTPATIENT)
Dept: DIABETES | Facility: CLINIC | Age: 54
End: 2019-10-15

## 2019-10-15 NOTE — TELEPHONE ENCOUNTER
----- Message from Demetria Boo sent at 10/14/2019  6:55 PM CDT -----  Contact: Pt daughter in law Yadira   Pt daughter in law May is requesting a call back in regards to pt medication dosage.     Pt daughter michele May can be contacted at [phone]

## 2019-10-16 ENCOUNTER — CLINICAL SUPPORT (OUTPATIENT)
Dept: CARDIAC REHAB | Facility: CLINIC | Age: 54
End: 2019-10-16
Payer: MEDICARE

## 2019-10-16 DIAGNOSIS — I35.0 AORTIC VALVE STENOSIS, ETIOLOGY OF CARDIAC VALVE DISEASE UNSPECIFIED: ICD-10-CM

## 2019-10-16 DIAGNOSIS — Z95.2 HEART VALVE REPLACED BY TRANSPLANT: ICD-10-CM

## 2019-10-16 PROCEDURE — 93798 PR CARDIAC REHAB/MONITOR: ICD-10-PCS | Mod: S$GLB,,, | Performed by: INTERNAL MEDICINE

## 2019-10-16 PROCEDURE — 93798 PHYS/QHP OP CAR RHAB W/ECG: CPT | Mod: S$GLB,,, | Performed by: INTERNAL MEDICINE

## 2019-10-16 NOTE — PROGRESS NOTES
Patient here for 1st day of Phase II Cardiac rehab session.  Normal sinus rhythm noted on monitor.  Patient tolerated exercise session well with no complaints.  Will continue to monitor patient.

## 2019-10-17 ENCOUNTER — ANTI-COAG VISIT (OUTPATIENT)
Dept: CARDIOLOGY | Facility: CLINIC | Age: 54
End: 2019-10-17
Payer: MEDICARE

## 2019-10-17 DIAGNOSIS — G45.9 TIA (TRANSIENT ISCHEMIC ATTACK): ICD-10-CM

## 2019-10-17 DIAGNOSIS — Z95.828 S/P ASCENDING AORTIC REPLACEMENT: ICD-10-CM

## 2019-10-17 DIAGNOSIS — Z79.01 LONG TERM (CURRENT) USE OF ANTICOAGULANTS: ICD-10-CM

## 2019-10-17 PROCEDURE — 93793 PR ANTICOAGULANT MGMT FOR PT TAKING WARFARIN: ICD-10-PCS | Mod: S$GLB,,,

## 2019-10-17 PROCEDURE — 93793 ANTICOAG MGMT PT WARFARIN: CPT | Mod: S$GLB,,,

## 2019-10-18 ENCOUNTER — CLINICAL SUPPORT (OUTPATIENT)
Dept: CARDIAC REHAB | Facility: CLINIC | Age: 54
End: 2019-10-18
Payer: MEDICARE

## 2019-10-18 ENCOUNTER — TELEPHONE (OUTPATIENT)
Dept: CARDIOLOGY | Facility: CLINIC | Age: 54
End: 2019-10-18

## 2019-10-18 DIAGNOSIS — I35.0 AORTIC VALVE STENOSIS, ETIOLOGY OF CARDIAC VALVE DISEASE UNSPECIFIED: ICD-10-CM

## 2019-10-18 DIAGNOSIS — Z95.2 HEART VALVE REPLACED: ICD-10-CM

## 2019-10-18 PROCEDURE — 93798 PHYS/QHP OP CAR RHAB W/ECG: CPT | Mod: S$GLB,,, | Performed by: INTERNAL MEDICINE

## 2019-10-18 PROCEDURE — 93798 PR CARDIAC REHAB/MONITOR: ICD-10-PCS | Mod: S$GLB,,, | Performed by: INTERNAL MEDICINE

## 2019-10-18 NOTE — TELEPHONE ENCOUNTER
"----- Message from Junaid Wisdom RN sent at 10/18/2019  8:47 AM CDT -----  Mr. Barnes is attending cardiac rehab and has some elevated B/P readings. Mr. Barnes states all of his blood pressure medicines have been discontinued by Dr. Ramirez. Mr. Barnes has stopped taking Lisinopril 10mg qd (pt states he was actually on higher dose before hospitalization) ; Metoprolol 25 mg twice a day; and Plavix 75 mg ("because of the warfarin").  I do not see evidence of these meds being discontinued in his Epic chart. Please advise because pt is not taking these medications.  Junaid Wisdom RN  [facility]  [phone]  "

## 2019-10-18 NOTE — TELEPHONE ENCOUNTER
Call was answered but patient was not available to talk. Call back message was left and will be passed to the patient.

## 2019-10-18 NOTE — TELEPHONE ENCOUNTER
Spoke with patient about his medications. He stated that he spoke with Dr Palm and was given instructions by the provider. No further issues discussed.

## 2019-10-21 ENCOUNTER — CLINICAL SUPPORT (OUTPATIENT)
Dept: CARDIAC REHAB | Facility: CLINIC | Age: 54
End: 2019-10-21
Payer: MEDICARE

## 2019-10-21 ENCOUNTER — ANTI-COAG VISIT (OUTPATIENT)
Dept: CARDIOLOGY | Facility: CLINIC | Age: 54
End: 2019-10-21
Payer: MEDICARE

## 2019-10-21 DIAGNOSIS — Z95.828 S/P ASCENDING AORTIC REPLACEMENT: ICD-10-CM

## 2019-10-21 DIAGNOSIS — G45.9 TIA (TRANSIENT ISCHEMIC ATTACK): ICD-10-CM

## 2019-10-21 DIAGNOSIS — Z79.01 LONG TERM (CURRENT) USE OF ANTICOAGULANTS: ICD-10-CM

## 2019-10-21 DIAGNOSIS — Z95.2 HEART VALVE REPLACED BY TRANSPLANT: ICD-10-CM

## 2019-10-21 DIAGNOSIS — I35.0 AORTIC VALVE STENOSIS, ETIOLOGY OF CARDIAC VALVE DISEASE UNSPECIFIED: ICD-10-CM

## 2019-10-21 PROCEDURE — 93798 PHYS/QHP OP CAR RHAB W/ECG: CPT | Mod: S$GLB,,, | Performed by: INTERNAL MEDICINE

## 2019-10-21 PROCEDURE — 93793 ANTICOAG MGMT PT WARFARIN: CPT | Mod: S$GLB,,,

## 2019-10-21 PROCEDURE — 93798 PR CARDIAC REHAB/MONITOR: ICD-10-PCS | Mod: S$GLB,,, | Performed by: INTERNAL MEDICINE

## 2019-10-21 PROCEDURE — 93793 PR ANTICOAGULANT MGMT FOR PT TAKING WARFARIN: ICD-10-PCS | Mod: S$GLB,,,

## 2019-10-21 NOTE — PROGRESS NOTES
INR at low end of goal. Medications, chart, and patient findings reviewed. See calendar for adjustments to dose and follow up plan.

## 2019-10-23 ENCOUNTER — CLINICAL SUPPORT (OUTPATIENT)
Dept: CARDIAC REHAB | Facility: CLINIC | Age: 54
End: 2019-10-23
Payer: MEDICARE

## 2019-10-23 DIAGNOSIS — Z95.2 HEART VALVE REPLACED: ICD-10-CM

## 2019-10-23 DIAGNOSIS — I35.0 AORTIC VALVE STENOSIS, ETIOLOGY OF CARDIAC VALVE DISEASE UNSPECIFIED: ICD-10-CM

## 2019-10-23 PROCEDURE — 93798 PHYS/QHP OP CAR RHAB W/ECG: CPT | Mod: S$GLB,,, | Performed by: INTERNAL MEDICINE

## 2019-10-23 PROCEDURE — 93798 PR CARDIAC REHAB/MONITOR: ICD-10-PCS | Mod: S$GLB,,, | Performed by: INTERNAL MEDICINE

## 2019-10-25 ENCOUNTER — CLINICAL SUPPORT (OUTPATIENT)
Dept: CARDIAC REHAB | Facility: CLINIC | Age: 54
End: 2019-10-25
Payer: MEDICARE

## 2019-10-25 DIAGNOSIS — I35.0 NODULAR CALCIFIC AORTIC VALVE STENOSIS: ICD-10-CM

## 2019-10-25 DIAGNOSIS — Z95.2 HEART VALVE REPLACED BY TRANSPLANT: ICD-10-CM

## 2019-10-25 PROCEDURE — 93798 PHYS/QHP OP CAR RHAB W/ECG: CPT | Mod: S$GLB,,, | Performed by: INTERNAL MEDICINE

## 2019-10-25 PROCEDURE — 93798 PR CARDIAC REHAB/MONITOR: ICD-10-PCS | Mod: S$GLB,,, | Performed by: INTERNAL MEDICINE

## 2019-10-28 ENCOUNTER — ANTI-COAG VISIT (OUTPATIENT)
Dept: CARDIOLOGY | Facility: CLINIC | Age: 54
End: 2019-10-28
Payer: MEDICARE

## 2019-10-28 ENCOUNTER — CLINICAL SUPPORT (OUTPATIENT)
Dept: CARDIAC REHAB | Facility: CLINIC | Age: 54
End: 2019-10-28
Payer: MEDICARE

## 2019-10-28 DIAGNOSIS — Z79.01 LONG TERM (CURRENT) USE OF ANTICOAGULANTS: ICD-10-CM

## 2019-10-28 DIAGNOSIS — G45.9 TIA (TRANSIENT ISCHEMIC ATTACK): ICD-10-CM

## 2019-10-28 DIAGNOSIS — Z95.828 S/P ASCENDING AORTIC REPLACEMENT: ICD-10-CM

## 2019-10-28 DIAGNOSIS — Z95.2 HEART VALVE REPLACED: ICD-10-CM

## 2019-10-28 DIAGNOSIS — I35.0 AORTIC VALVE STENOSIS, ETIOLOGY OF CARDIAC VALVE DISEASE UNSPECIFIED: ICD-10-CM

## 2019-10-28 PROCEDURE — 93793 PR ANTICOAGULANT MGMT FOR PT TAKING WARFARIN: ICD-10-PCS | Mod: S$GLB,,,

## 2019-10-28 PROCEDURE — 93798 PR CARDIAC REHAB/MONITOR: ICD-10-PCS | Mod: S$GLB,,, | Performed by: INTERNAL MEDICINE

## 2019-10-28 PROCEDURE — 93798 PHYS/QHP OP CAR RHAB W/ECG: CPT | Mod: S$GLB,,, | Performed by: INTERNAL MEDICINE

## 2019-10-28 PROCEDURE — 93793 ANTICOAG MGMT PT WARFARIN: CPT | Mod: S$GLB,,,

## 2019-10-30 ENCOUNTER — CLINICAL SUPPORT (OUTPATIENT)
Dept: CARDIAC REHAB | Facility: CLINIC | Age: 54
End: 2019-10-30
Payer: MEDICARE

## 2019-10-30 DIAGNOSIS — Z95.2 HEART VALVE REPLACED BY TRANSPLANT: ICD-10-CM

## 2019-10-30 DIAGNOSIS — I35.0 AORTIC VALVE STENOSIS, ETIOLOGY OF CARDIAC VALVE DISEASE UNSPECIFIED: ICD-10-CM

## 2019-10-30 PROCEDURE — 93798 PR CARDIAC REHAB/MONITOR: ICD-10-PCS | Mod: S$GLB,,, | Performed by: INTERNAL MEDICINE

## 2019-10-30 PROCEDURE — 93798 PHYS/QHP OP CAR RHAB W/ECG: CPT | Mod: S$GLB,,, | Performed by: INTERNAL MEDICINE

## 2019-11-01 ENCOUNTER — CLINICAL SUPPORT (OUTPATIENT)
Dept: CARDIAC REHAB | Facility: CLINIC | Age: 54
End: 2019-11-01
Payer: MEDICARE

## 2019-11-01 DIAGNOSIS — Z95.2 HEART VALVE REPLACED BY TRANSPLANT: ICD-10-CM

## 2019-11-01 DIAGNOSIS — I35.0 NODULAR CALCIFIC AORTIC VALVE STENOSIS: ICD-10-CM

## 2019-11-01 PROCEDURE — 93798 PR CARDIAC REHAB/MONITOR: ICD-10-PCS | Mod: S$GLB,,, | Performed by: DIETITIAN, REGISTERED

## 2019-11-01 PROCEDURE — 93798 PHYS/QHP OP CAR RHAB W/ECG: CPT | Mod: S$GLB,,, | Performed by: DIETITIAN, REGISTERED

## 2019-11-04 ENCOUNTER — CLINICAL SUPPORT (OUTPATIENT)
Dept: CARDIAC REHAB | Facility: CLINIC | Age: 54
End: 2019-11-04
Payer: MEDICARE

## 2019-11-04 DIAGNOSIS — I35.0 AORTIC VALVE STENOSIS, ETIOLOGY OF CARDIAC VALVE DISEASE UNSPECIFIED: ICD-10-CM

## 2019-11-04 DIAGNOSIS — Z95.2 HEART VALVE REPLACED: ICD-10-CM

## 2019-11-04 PROCEDURE — 93798 PR CARDIAC REHAB/MONITOR: ICD-10-PCS | Mod: S$GLB,,, | Performed by: INTERNAL MEDICINE

## 2019-11-04 PROCEDURE — 93798 PHYS/QHP OP CAR RHAB W/ECG: CPT | Mod: S$GLB,,, | Performed by: INTERNAL MEDICINE

## 2019-11-05 ENCOUNTER — PATIENT MESSAGE (OUTPATIENT)
Dept: ADMINISTRATIVE | Facility: OTHER | Age: 54
End: 2019-11-05

## 2019-11-06 ENCOUNTER — CLINICAL SUPPORT (OUTPATIENT)
Dept: CARDIAC REHAB | Facility: CLINIC | Age: 54
End: 2019-11-06
Payer: MEDICARE

## 2019-11-06 DIAGNOSIS — I35.0 NODULAR CALCIFIC AORTIC VALVE STENOSIS: ICD-10-CM

## 2019-11-06 DIAGNOSIS — Z95.2 HEART VALVE REPLACED BY TRANSPLANT: ICD-10-CM

## 2019-11-06 PROCEDURE — 93798 PR CARDIAC REHAB/MONITOR: ICD-10-PCS | Mod: S$GLB,,, | Performed by: DIETITIAN, REGISTERED

## 2019-11-06 PROCEDURE — 93798 PHYS/QHP OP CAR RHAB W/ECG: CPT | Mod: S$GLB,,, | Performed by: DIETITIAN, REGISTERED

## 2019-11-08 ENCOUNTER — CLINICAL SUPPORT (OUTPATIENT)
Dept: CARDIAC REHAB | Facility: CLINIC | Age: 54
End: 2019-11-08
Payer: MEDICARE

## 2019-11-08 DIAGNOSIS — Z95.2 HEART VALVE REPLACED BY TRANSPLANT: ICD-10-CM

## 2019-11-08 DIAGNOSIS — I35.0 NODULAR CALCIFIC AORTIC VALVE STENOSIS: ICD-10-CM

## 2019-11-08 PROCEDURE — 93798 PR CARDIAC REHAB/MONITOR: ICD-10-PCS | Mod: S$GLB,,, | Performed by: INTERNAL MEDICINE

## 2019-11-08 PROCEDURE — 93798 PHYS/QHP OP CAR RHAB W/ECG: CPT | Mod: S$GLB,,, | Performed by: INTERNAL MEDICINE

## 2019-11-08 PROCEDURE — 99999 PR PBB SHADOW E&M-EST. PATIENT-LVL I: CPT | Mod: PBBFAC,,,

## 2019-11-08 PROCEDURE — 99999 PR PBB SHADOW E&M-EST. PATIENT-LVL I: ICD-10-PCS | Mod: PBBFAC,,,

## 2019-11-11 ENCOUNTER — ANTI-COAG VISIT (OUTPATIENT)
Dept: CARDIOLOGY | Facility: CLINIC | Age: 54
End: 2019-11-11
Payer: MEDICARE

## 2019-11-11 ENCOUNTER — CLINICAL SUPPORT (OUTPATIENT)
Dept: CARDIAC REHAB | Facility: CLINIC | Age: 54
End: 2019-11-11
Payer: MEDICARE

## 2019-11-11 DIAGNOSIS — Z79.01 LONG TERM (CURRENT) USE OF ANTICOAGULANTS: ICD-10-CM

## 2019-11-11 DIAGNOSIS — Z95.2 HEART VALVE REPLACED: ICD-10-CM

## 2019-11-11 DIAGNOSIS — Z95.828 S/P ASCENDING AORTIC REPLACEMENT: ICD-10-CM

## 2019-11-11 DIAGNOSIS — G45.9 TIA (TRANSIENT ISCHEMIC ATTACK): ICD-10-CM

## 2019-11-11 DIAGNOSIS — I35.0 AORTIC VALVE STENOSIS, ETIOLOGY OF CARDIAC VALVE DISEASE UNSPECIFIED: ICD-10-CM

## 2019-11-11 PROCEDURE — 93798 PR CARDIAC REHAB/MONITOR: ICD-10-PCS | Mod: S$GLB,,, | Performed by: INTERNAL MEDICINE

## 2019-11-11 PROCEDURE — 93793 PR ANTICOAGULANT MGMT FOR PT TAKING WARFARIN: ICD-10-PCS | Mod: S$GLB,,,

## 2019-11-11 PROCEDURE — 93793 ANTICOAG MGMT PT WARFARIN: CPT | Mod: S$GLB,,,

## 2019-11-11 PROCEDURE — 93798 PHYS/QHP OP CAR RHAB W/ECG: CPT | Mod: S$GLB,,, | Performed by: INTERNAL MEDICINE

## 2019-11-13 ENCOUNTER — CLINICAL SUPPORT (OUTPATIENT)
Dept: CARDIAC REHAB | Facility: CLINIC | Age: 54
End: 2019-11-13
Payer: MEDICARE

## 2019-11-13 ENCOUNTER — PATIENT OUTREACH (OUTPATIENT)
Dept: ADMINISTRATIVE | Facility: OTHER | Age: 54
End: 2019-11-13

## 2019-11-13 ENCOUNTER — OFFICE VISIT (OUTPATIENT)
Dept: CARDIOLOGY | Facility: CLINIC | Age: 54
End: 2019-11-13
Payer: MEDICARE

## 2019-11-13 VITALS — WEIGHT: 209.56 LBS | BODY MASS INDEX: 31.86 KG/M2

## 2019-11-13 DIAGNOSIS — I35.0 AORTIC VALVE STENOSIS, ETIOLOGY OF CARDIAC VALVE DISEASE UNSPECIFIED: ICD-10-CM

## 2019-11-13 DIAGNOSIS — I35.1 NONRHEUMATIC AORTIC VALVE INSUFFICIENCY: ICD-10-CM

## 2019-11-13 DIAGNOSIS — Z95.2 HEART VALVE REPLACED BY TRANSPLANT: ICD-10-CM

## 2019-11-13 DIAGNOSIS — I10 HYPERTENSION, UNSPECIFIED TYPE: Primary | ICD-10-CM

## 2019-11-13 DIAGNOSIS — I25.10 CORONARY ARTERY DISEASE INVOLVING NATIVE CORONARY ARTERY OF NATIVE HEART WITHOUT ANGINA PECTORIS: ICD-10-CM

## 2019-11-13 PROCEDURE — 93005 EKG 12-LEAD: ICD-10-PCS | Mod: S$GLB,,, | Performed by: INTERNAL MEDICINE

## 2019-11-13 PROCEDURE — 99999 PR PBB SHADOW E&M-EST. PATIENT-LVL III: ICD-10-PCS | Mod: PBBFAC,,, | Performed by: INTERNAL MEDICINE

## 2019-11-13 PROCEDURE — 99213 OFFICE O/P EST LOW 20 MIN: CPT | Mod: S$GLB,,, | Performed by: INTERNAL MEDICINE

## 2019-11-13 PROCEDURE — 99499 UNLISTED E&M SERVICE: CPT | Mod: S$GLB,,, | Performed by: INTERNAL MEDICINE

## 2019-11-13 PROCEDURE — 3008F PR BODY MASS INDEX (BMI) DOCUMENTED: ICD-10-PCS | Mod: CPTII,S$GLB,, | Performed by: INTERNAL MEDICINE

## 2019-11-13 PROCEDURE — 99213 PR OFFICE/OUTPT VISIT, EST, LEVL III, 20-29 MIN: ICD-10-PCS | Mod: S$GLB,,, | Performed by: INTERNAL MEDICINE

## 2019-11-13 PROCEDURE — 93005 ELECTROCARDIOGRAM TRACING: CPT | Mod: S$GLB,,, | Performed by: INTERNAL MEDICINE

## 2019-11-13 PROCEDURE — 99999 PR PBB SHADOW E&M-EST. PATIENT-LVL I: CPT | Mod: PBBFAC,,,

## 2019-11-13 PROCEDURE — 99999 PR PBB SHADOW E&M-EST. PATIENT-LVL I: ICD-10-PCS | Mod: PBBFAC,,,

## 2019-11-13 PROCEDURE — 99499 RISK ADDL DX/OHS AUDIT: ICD-10-PCS | Mod: S$GLB,,, | Performed by: INTERNAL MEDICINE

## 2019-11-13 PROCEDURE — 3008F BODY MASS INDEX DOCD: CPT | Mod: CPTII,S$GLB,, | Performed by: INTERNAL MEDICINE

## 2019-11-13 PROCEDURE — 99999 PR PBB SHADOW E&M-EST. PATIENT-LVL III: CPT | Mod: PBBFAC,,, | Performed by: INTERNAL MEDICINE

## 2019-11-13 PROCEDURE — 93798 PR CARDIAC REHAB/MONITOR: ICD-10-PCS | Mod: S$GLB,,, | Performed by: INTERNAL MEDICINE

## 2019-11-13 PROCEDURE — 93798 PHYS/QHP OP CAR RHAB W/ECG: CPT | Mod: S$GLB,,, | Performed by: INTERNAL MEDICINE

## 2019-11-13 NOTE — ASSESSMENT & PLAN NOTE
The murmur that was heard by the visiting nurse is the valve in its to flow associated with the valve is no pathology there.

## 2019-11-13 NOTE — PROGRESS NOTES
Subjective:    Patient ID:  Cj Barnes is a 54 y.o. y.o. male who presents for initial visit for Follow-up      HPI this patient is known to have a mechanical aortic valve.  He also has a history of hypertension he has been taking lisinopril 10 mg p.o. q.day is blood pressure is recorded 3 times a week when he goes to the rehab center.  He states that sometimes his blood pressure is elevated.  On evaluation today he presents for blood pressure evaluation and also to listen to a heart murmur that he was told was heard by 1 of his visiting nurses so he is concerned about that.    Past Medical History:   Diagnosis Date    Aortic stenosis 2018    Cancer 2014    Colon cancer     Coronary artery disease     Diabetes mellitus type I     since in his 30's    Heart murmur     Heel fracture     HTN (hypertension)     Hyperlipidemia LDL goal < 70     Insulin pump in place     MVP (mitral valve prolapse)     Stenosis of aortic and mitral valves         Social History     Tobacco Use    Smoking status: Never Smoker    Smokeless tobacco: Former User     Types: Snuff    Tobacco comment: since he was 12 yrs old   Substance Use Topics    Alcohol use: No     Comment: socially    Drug use: No        family history includes Diabetes in his father; HIV in his brother; Heart attack in his father; Heart disease in his mother; Lung cancer in his mother.      Review of Systems   Constitutional: Negative.    HENT: Negative.    Eyes: Negative.    Cardiovascular:        Prosthetic aortic valve.  Rate-related right bundle branch block.  Hypertension.   Gastrointestinal: Negative.    Genitourinary: Negative.    Musculoskeletal: Negative.    Skin: Negative.    Neurological: Negative.    Endo/Heme/Allergies: Negative.    Psychiatric/Behavioral: Negative.         Objective:     Wt 95.1 kg (209 lb 8.8 oz)   BMI 31.86 kg/m²     Physical Exam   Constitutional: He is oriented to person, place, and time and well-developed,  well-nourished, and in no distress.   HENT:   Head: Normocephalic and atraumatic.   Eyes: Pupils are equal, round, and reactive to light. No scleral icterus.   Neck: Normal range of motion. Neck supple. No thyromegaly present.   Cardiovascular:   The patient has sharp valve clicks and a systolic murmur related to the flow the valve.   Abdominal: Soft. Bowel sounds are normal. He exhibits no distension and no mass. There is no tenderness.   Musculoskeletal: Normal range of motion. He exhibits no edema.   Lymphadenopathy:     He has no cervical adenopathy.   Neurological: He is alert and oriented to person, place, and time. No cranial nerve deficit.   Skin: Skin is warm and dry. No erythema.   Psychiatric: Memory, affect and judgment normal.       Labs:     Lab Results   Component Value Date     (L) 08/21/2019    K 3.7 08/21/2019    CL 98 08/21/2019    CO2 23 08/21/2019    BUN 9 08/21/2019    CREATININE 0.6 08/21/2019    ANIONGAP 13 08/21/2019     Lab Results   Component Value Date    HGBA1C 6.1 (H) 09/30/2019     Lab Results   Component Value Date     (H) 08/16/2019       Lab Results   Component Value Date    WBC 8.40 09/30/2019    HGB 12.3 (L) 09/30/2019    HCT 38.4 (L) 09/30/2019    HCT 29 (L) 08/09/2019     09/30/2019    GRAN 6.1 09/30/2019    GRAN 71.9 09/30/2019     Lab Results   Component Value Date    CHOL 153 09/30/2019    HDL 53 09/30/2019    LDLCALC 73 09/30/2019    TRIG 137 09/30/2019              .  Meds:     Current Outpatient Medications:     albuterol (PROVENTIL/VENTOLIN HFA) 90 mcg/actuation inhaler, Inhale 2 puffs into the lungs every 4 (four) hours as needed for Wheezing or Shortness of Breath. Rescue, Disp: 1 Inhaler, Rfl: 5    amiodarone (PACERONE) 200 MG Tab, Take 1 tablet (200 mg total) by mouth once daily., Disp: 30 tablet, Rfl: 3    aspirin (ECOTRIN) 81 MG EC tablet, Take 1 tablet (81 mg total) by mouth once daily., Disp: , Rfl: 0    blood sugar diagnostic Strp, 1 each  "by Misc.(Non-Drug; Combo Route) route 6 (six) times daily. Contour next strips. Pt needs contour jackelyn for compatibility w/ insulin pump (medtronic 670g). Necessity, Disp: 200 strip, Rfl: 6    blood-glucose sensor (GUARDIAN SENSOR 3) Apple, 1 each by Misc.(Non-Drug; Combo Route) route once a week., Disp: 12 each, Rfl: 3    clotrimazole-betamethasone 1-0.05% (LOTRISONE) cream, APPLY A SMALL AMOUNT TO AFFECTED AREA THREE TIMES DAILY UNTIL CLEAR, Disp: , Rfl: 0    COMFORT EZ PEN NEEDLES 33 gauge x 3/16" Ndle, USE AS DIRECTED with Levemir pens, Disp: , Rfl: 11    doxycycline (MONODOX) 100 MG capsule, Take 1 capsule (100 mg total) by mouth 2 (two) times daily., Disp: 14 capsule, Rfl: 0    gabapentin (NEURONTIN) 300 MG capsule, Take 2 capsules (600 mg total) by mouth 3 (three) times daily., Disp: 180 capsule, Rfl: 6    glucagon, human recombinant, (GLUCAGON EMERGENCY KIT, HUMAN,) 1 mg SolR, Inject 1 mg into the muscle as needed., Disp: 1 each, Rfl: 0    insulin detemir U-100 (LEVEMIR FLEXTOUCH U-100 INSULN) 100 unit/mL (3 mL) SubQ InPn pen, Inject 48 Units into the skin every evening., Disp: 3 mL, Rfl: 0    insulin regular 100 unit/mL Soln, Use home settings Basal 0.4    Bolus/carb 12    ISF 20, Disp: 1 Product, Rfl: 0    JARDIANCE 25 mg Tab, TAKE ONE TABLET BY MOUTH DAILY FOR DIABETES, Disp: , Rfl: 11    metFORMIN (GLUCOPHAGE-XR) 500 MG 24 hr tablet, Take 1 tablet (500 mg total) by mouth daily with breakfast., Disp: 90 tablet, Rfl: 3    metoprolol tartrate (LOPRESSOR) 25 MG tablet, Take 25 mg by mouth 2 (two) times daily., Disp: , Rfl:     NOVOLOG U-100 INSULIN ASPART 100 unit/mL injection, INJECT 180 UNITS SUBCUTANEOUSLY EVERY ONE AND A HALF DAYS, Disp: , Rfl: 11    pantoprazole (PROTONIX) 40 MG tablet, Take 1 tablet (40 mg total) by mouth before breakfast., Disp: 30 tablet, Rfl: 11    simvastatin (ZOCOR) 20 MG tablet, Take 1 tablet (20 mg total) by mouth every evening., Disp: 90 tablet, Rfl: 3    warfarin " (COUMADIN) 5 MG tablet, Take 1 tablet (5 mg total) by mouth Daily. (Patient taking differently: Take 3 mg by mouth Daily. ), Disp: 30 tablet, Rfl: 11    clopidogrel (PLAVIX) 75 mg tablet, Take 75 mg by mouth once daily., Disp: , Rfl: 11      Assessment & Plan:     No problem-specific Assessment & Plan notes found for this encounter.

## 2019-11-13 NOTE — PROGRESS NOTES
INR slightly above goal. Medications and chart reviewed. No changes noted to necessitate adjustment of warfarin or follow-up plan. See calendar.

## 2019-11-14 RX ORDER — EMPAGLIFLOZIN 25 MG/1
TABLET, FILM COATED ORAL
Qty: 30 TABLET | Refills: 4 | Status: SHIPPED | OUTPATIENT
Start: 2019-11-14 | End: 2020-03-10

## 2019-11-15 ENCOUNTER — CLINICAL SUPPORT (OUTPATIENT)
Dept: CARDIAC REHAB | Facility: CLINIC | Age: 54
End: 2019-11-15
Payer: MEDICARE

## 2019-11-15 ENCOUNTER — PATIENT MESSAGE (OUTPATIENT)
Dept: ADMINISTRATIVE | Facility: OTHER | Age: 54
End: 2019-11-15

## 2019-11-15 ENCOUNTER — TELEPHONE (OUTPATIENT)
Dept: CARDIOLOGY | Facility: CLINIC | Age: 54
End: 2019-11-15

## 2019-11-15 DIAGNOSIS — Z95.2 HEART VALVE REPLACED: ICD-10-CM

## 2019-11-15 DIAGNOSIS — I35.0 AORTIC VALVE STENOSIS, ETIOLOGY OF CARDIAC VALVE DISEASE UNSPECIFIED: ICD-10-CM

## 2019-11-15 PROCEDURE — 93798 PR CARDIAC REHAB/MONITOR: ICD-10-PCS | Mod: S$GLB,,, | Performed by: INTERNAL MEDICINE

## 2019-11-15 PROCEDURE — 93798 PHYS/QHP OP CAR RHAB W/ECG: CPT | Mod: S$GLB,,, | Performed by: INTERNAL MEDICINE

## 2019-11-15 NOTE — TELEPHONE ENCOUNTER
----- Message from Junaid Wisdom RN sent at 11/15/2019 10:32 AM CST -----  Patient's cumulative blood pressure readings can be reviewed under the Card Procedures 11/8/2019 scan. One scan has the class monitoring from that day the other scan has his blood pressures from Oct to now just open the scan to view.  Junaid Wisdom RN  [facility]  [phone]

## 2019-11-20 ENCOUNTER — CLINICAL SUPPORT (OUTPATIENT)
Dept: CARDIAC REHAB | Facility: CLINIC | Age: 54
End: 2019-11-20
Payer: MEDICARE

## 2019-11-20 ENCOUNTER — PATIENT OUTREACH (OUTPATIENT)
Dept: ADMINISTRATIVE | Facility: OTHER | Age: 54
End: 2019-11-20

## 2019-11-20 DIAGNOSIS — Z95.2 HEART VALVE REPLACED BY TRANSPLANT: ICD-10-CM

## 2019-11-20 DIAGNOSIS — I35.0 NODULAR CALCIFIC AORTIC VALVE STENOSIS: ICD-10-CM

## 2019-11-20 PROCEDURE — 93798 PR CARDIAC REHAB/MONITOR: ICD-10-PCS | Mod: S$GLB,,, | Performed by: INTERNAL MEDICINE

## 2019-11-20 PROCEDURE — 99999 PR PBB SHADOW E&M-EST. PATIENT-LVL I: ICD-10-PCS | Mod: PBBFAC,,,

## 2019-11-20 PROCEDURE — 93798 PHYS/QHP OP CAR RHAB W/ECG: CPT | Mod: S$GLB,,, | Performed by: INTERNAL MEDICINE

## 2019-11-20 PROCEDURE — 99999 PR PBB SHADOW E&M-EST. PATIENT-LVL I: CPT | Mod: PBBFAC,,,

## 2019-11-22 ENCOUNTER — CLINICAL SUPPORT (OUTPATIENT)
Dept: CARDIAC REHAB | Facility: CLINIC | Age: 54
End: 2019-11-22
Payer: MEDICARE

## 2019-11-22 DIAGNOSIS — Z95.2 HEART VALVE REPLACED BY TRANSPLANT: ICD-10-CM

## 2019-11-22 DIAGNOSIS — I06.0 RHEUMATIC AORTIC STENOSIS: ICD-10-CM

## 2019-11-22 PROCEDURE — 93798 PR CARDIAC REHAB/MONITOR: ICD-10-PCS | Mod: S$GLB,,, | Performed by: INTERNAL MEDICINE

## 2019-11-22 PROCEDURE — 93798 PHYS/QHP OP CAR RHAB W/ECG: CPT | Mod: S$GLB,,, | Performed by: INTERNAL MEDICINE

## 2019-11-25 ENCOUNTER — CLINICAL SUPPORT (OUTPATIENT)
Dept: CARDIAC REHAB | Facility: CLINIC | Age: 54
End: 2019-11-25
Payer: MEDICARE

## 2019-11-25 ENCOUNTER — TELEPHONE (OUTPATIENT)
Dept: CARDIOLOGY | Facility: CLINIC | Age: 54
End: 2019-11-25

## 2019-11-25 ENCOUNTER — ANTI-COAG VISIT (OUTPATIENT)
Dept: CARDIOLOGY | Facility: CLINIC | Age: 54
End: 2019-11-25
Payer: MEDICARE

## 2019-11-25 DIAGNOSIS — Z95.828 S/P ASCENDING AORTIC REPLACEMENT: ICD-10-CM

## 2019-11-25 DIAGNOSIS — I35.0 AORTIC VALVE STENOSIS, ETIOLOGY OF CARDIAC VALVE DISEASE UNSPECIFIED: ICD-10-CM

## 2019-11-25 DIAGNOSIS — Z95.2 HEART VALVE REPLACED: ICD-10-CM

## 2019-11-25 DIAGNOSIS — Z79.01 LONG TERM (CURRENT) USE OF ANTICOAGULANTS: ICD-10-CM

## 2019-11-25 DIAGNOSIS — G45.9 TIA (TRANSIENT ISCHEMIC ATTACK): ICD-10-CM

## 2019-11-25 PROCEDURE — 93793 PR ANTICOAGULANT MGMT FOR PT TAKING WARFARIN: ICD-10-PCS | Mod: S$GLB,,,

## 2019-11-25 PROCEDURE — 93798 PHYS/QHP OP CAR RHAB W/ECG: CPT | Mod: S$GLB,,, | Performed by: INTERNAL MEDICINE

## 2019-11-25 PROCEDURE — 93793 ANTICOAG MGMT PT WARFARIN: CPT | Mod: S$GLB,,,

## 2019-11-25 PROCEDURE — 93798 PR CARDIAC REHAB/MONITOR: ICD-10-PCS | Mod: S$GLB,,, | Performed by: INTERNAL MEDICINE

## 2019-11-25 NOTE — TELEPHONE ENCOUNTER
Spoke to Dorota Jain in the Lab who called in critical PT INR for the pt. Message forwarded to Demetria Rodriguez. NP

## 2019-11-26 ENCOUNTER — HOSPITAL ENCOUNTER (OUTPATIENT)
Dept: CARDIOLOGY | Facility: CLINIC | Age: 54
Discharge: HOME | End: 2019-11-26
Attending: INTERNAL MEDICINE
Payer: MEDICARE

## 2019-11-26 ENCOUNTER — PATIENT OUTREACH (OUTPATIENT)
Dept: ADMINISTRATIVE | Facility: OTHER | Age: 54
End: 2019-11-26

## 2019-11-26 VITALS
WEIGHT: 209 LBS | HEART RATE: 70 BPM | BODY MASS INDEX: 31.67 KG/M2 | DIASTOLIC BLOOD PRESSURE: 65 MMHG | SYSTOLIC BLOOD PRESSURE: 135 MMHG | HEIGHT: 68 IN

## 2019-11-26 DIAGNOSIS — I48.3 TYPICAL ATRIAL FLUTTER: ICD-10-CM

## 2019-11-26 LAB
ASCENDING AORTA: 3.6 CM
AV INDEX (PROSTH): 0.55
AV MEAN GRADIENT: 16 MMHG
AV PEAK GRADIENT: 34 MMHG
AV VALVE AREA: 1.66 CM2
AV VELOCITY RATIO: 0.57
BSA FOR ECHO PROCEDURE: 2.13 M2
CV ECHO LV RWT: 0.34 CM
DOP CALC AO PEAK VEL: 2.92 M/S
DOP CALC AO VTI: 63.93 CM
DOP CALC LVOT AREA: 3 CM2
DOP CALC LVOT DIAMETER: 1.97 CM
DOP CALC LVOT PEAK VEL: 1.66 M/S
DOP CALC LVOT STROKE VOLUME: 106.17 CM3
DOP CALCLVOT PEAK VEL VTI: 34.85 CM
E WAVE DECELERATION TIME: 147.03 MSEC
E/A RATIO: 1.41
E/E' RATIO: 10.36 M/S
ECHO LV POSTERIOR WALL: 0.95 CM (ref 0.6–1.1)
FRACTIONAL SHORTENING: 30 % (ref 28–44)
INTERVENTRICULAR SEPTUM: 0.85 CM (ref 0.6–1.1)
IVRT: 0.08 MSEC
LA MAJOR: 6.28 CM
LA MINOR: 6.2 CM
LA WIDTH: 4.64 CM
LEFT ATRIUM SIZE: 4.6 CM
LEFT ATRIUM VOLUME INDEX: 54.4 ML/M2
LEFT ATRIUM VOLUME: 113.2 CM3
LEFT INTERNAL DIMENSION IN SYSTOLE: 3.95 CM (ref 2.1–4)
LEFT VENTRICLE DIASTOLIC VOLUME INDEX: 74.06 ML/M2
LEFT VENTRICLE DIASTOLIC VOLUME: 154.23 ML
LEFT VENTRICLE MASS INDEX: 92 G/M2
LEFT VENTRICLE SYSTOLIC VOLUME INDEX: 32.5 ML/M2
LEFT VENTRICLE SYSTOLIC VOLUME: 67.75 ML
LEFT VENTRICULAR INTERNAL DIMENSION IN DIASTOLE: 5.61 CM (ref 3.5–6)
LEFT VENTRICULAR MASS: 192.22 G
LV LATERAL E/E' RATIO: 8.77 M/S
LV SEPTAL E/E' RATIO: 12.67 M/S
MV PEAK A VEL: 0.81 M/S
MV PEAK E VEL: 1.14 M/S
PISA TR MAX VEL: 2.45 M/S
PULM VEIN S/D RATIO: 1.24
PV PEAK D VEL: 0.55 M/S
PV PEAK S VEL: 0.68 M/S
RA MAJOR: 5.75 CM
RA PRESSURE: 3 MMHG
RA WIDTH: 3.96 CM
RIGHT VENTRICULAR END-DIASTOLIC DIMENSION: 4.32 CM
SINUS: 3.44 CM
STJ: 3.04 CM
TDI LATERAL: 0.13 M/S
TDI SEPTAL: 0.09 M/S
TDI: 0.11 M/S
TR MAX PG: 24 MMHG
TRICUSPID ANNULAR PLANE SYSTOLIC EXCURSION: 1.95 CM
TV REST PULMONARY ARTERY PRESSURE: 27 MMHG

## 2019-11-26 PROCEDURE — 93306 ECHO (CUPID ONLY): ICD-10-PCS | Mod: S$GLB,,, | Performed by: INTERNAL MEDICINE

## 2019-11-26 PROCEDURE — 93306 TTE W/DOPPLER COMPLETE: CPT | Mod: S$GLB,,, | Performed by: INTERNAL MEDICINE

## 2019-11-27 ENCOUNTER — ANTI-COAG VISIT (OUTPATIENT)
Dept: CARDIOLOGY | Facility: CLINIC | Age: 54
End: 2019-11-27
Payer: MEDICARE

## 2019-11-27 ENCOUNTER — CLINICAL SUPPORT (OUTPATIENT)
Dept: CARDIAC REHAB | Facility: CLINIC | Age: 54
End: 2019-11-27
Payer: MEDICARE

## 2019-11-27 ENCOUNTER — TELEPHONE (OUTPATIENT)
Dept: ELECTROPHYSIOLOGY | Facility: CLINIC | Age: 54
End: 2019-11-27

## 2019-11-27 DIAGNOSIS — I35.0 AORTIC VALVE STENOSIS, ETIOLOGY OF CARDIAC VALVE DISEASE UNSPECIFIED: ICD-10-CM

## 2019-11-27 DIAGNOSIS — Z95.2 HEART VALVE REPLACED: ICD-10-CM

## 2019-11-27 DIAGNOSIS — Z95.828 S/P ASCENDING AORTIC REPLACEMENT: ICD-10-CM

## 2019-11-27 DIAGNOSIS — G45.9 TIA (TRANSIENT ISCHEMIC ATTACK): ICD-10-CM

## 2019-11-27 DIAGNOSIS — Z79.01 LONG TERM (CURRENT) USE OF ANTICOAGULANTS: ICD-10-CM

## 2019-11-27 PROCEDURE — 93793 PR ANTICOAGULANT MGMT FOR PT TAKING WARFARIN: ICD-10-PCS | Mod: S$GLB,,,

## 2019-11-27 PROCEDURE — 93798 PHYS/QHP OP CAR RHAB W/ECG: CPT | Mod: S$GLB,,, | Performed by: INTERNAL MEDICINE

## 2019-11-27 PROCEDURE — 99999 PR PBB SHADOW E&M-EST. PATIENT-LVL I: ICD-10-PCS | Mod: PBBFAC,,,

## 2019-11-27 PROCEDURE — 93793 ANTICOAG MGMT PT WARFARIN: CPT | Mod: S$GLB,,,

## 2019-11-27 PROCEDURE — 99999 PR PBB SHADOW E&M-EST. PATIENT-LVL I: CPT | Mod: PBBFAC,,,

## 2019-11-27 PROCEDURE — 93798 PR CARDIAC REHAB/MONITOR: ICD-10-PCS | Mod: S$GLB,,, | Performed by: INTERNAL MEDICINE

## 2019-11-27 NOTE — PROGRESS NOTES
Wife advised to have patient take (Warfarin 3mg daily, except 4.5mg Mon/Fri). Also eat (Cabbage) once a week. Wife verbalized understanding.

## 2019-11-27 NOTE — TELEPHONE ENCOUNTER
----- Message from Danyel Ramirez MD sent at 11/26/2019  5:48 PM CST -----  Please notify the patient that his heart function has returned to normal.

## 2019-11-29 ENCOUNTER — CLINICAL SUPPORT (OUTPATIENT)
Dept: DIABETES | Facility: CLINIC | Age: 54
End: 2019-11-29
Payer: MEDICARE

## 2019-11-29 VITALS — BODY MASS INDEX: 31.67 KG/M2 | HEIGHT: 68 IN | WEIGHT: 209 LBS

## 2019-11-29 DIAGNOSIS — Z46.81 INSULIN PUMP FITTING OR ADJUSTMENT: ICD-10-CM

## 2019-11-29 DIAGNOSIS — Z96.41 INSULIN PUMP IN PLACE: ICD-10-CM

## 2019-11-29 DIAGNOSIS — E10.65 UNCONTROLLED TYPE 1 DIABETES MELLITUS WITH HYPERGLYCEMIA: ICD-10-CM

## 2019-11-29 DIAGNOSIS — E10.3299 TYPE 1 DIABETES MELLITUS WITH MILD NONPROLIFERATIVE RETINOPATHY WITHOUT MACULAR EDEMA, UNSPECIFIED LATERALITY: ICD-10-CM

## 2019-11-29 DIAGNOSIS — E10.649 TYPE 1 DIABETES MELLITUS WITH HYPOGLYCEMIA UNAWARENESS: ICD-10-CM

## 2019-11-29 DIAGNOSIS — E10.42 TYPE 1 DIABETES MELLITUS WITH DIABETIC POLYNEUROPATHY: Primary | ICD-10-CM

## 2019-11-29 PROCEDURE — 99999 PR PBB SHADOW E&M-EST. PATIENT-LVL III: ICD-10-PCS | Mod: PBBFAC,,, | Performed by: DIETITIAN, REGISTERED

## 2019-11-29 PROCEDURE — 99999 PR PBB SHADOW E&M-EST. PATIENT-LVL III: CPT | Mod: PBBFAC,,, | Performed by: DIETITIAN, REGISTERED

## 2019-11-29 PROCEDURE — G0108 PR DIAB MANAGE TRN  PER INDIV: ICD-10-PCS | Mod: S$GLB,,, | Performed by: DIETITIAN, REGISTERED

## 2019-11-29 PROCEDURE — G0108 DIAB MANAGE TRN  PER INDIV: HCPCS | Mod: S$GLB,,, | Performed by: DIETITIAN, REGISTERED

## 2019-11-29 NOTE — PROGRESS NOTES
Diabetes Education  Author: Katia Pham RD  Date: 11/29/2019    Diabetes Care Management Summary  Diabetes Education Record Assessment/Progress: Comprehensive/Group(670 pump upgrade - 1 week f/u)  Current Diabetes Risk Level: Moderate     Last A1c:   Lab Results   Component Value Date    HGBA1C 6.1 (H) 09/30/2019     Last visit with Diabetes Educator: : 03/20/2018      Diabetes Type  Diabetes Type : Type I    Diabetes History  Current Treatment: Insulin pump, Insulin, Oral Medication  Reviewed Problem List with Patient: Yes    Health Maintenance was reviewed today with patient. Discussed with patient importance of routine eye exams, foot exams/foot care, blood work (i.e.: A1c, microalbumin, and lipid), dental visits, yearly flu vaccine, and pneumonia vaccine as indicated by PCP. Patient verbalized understanding.     Health Maintenance Topics with due status: Not Due       Topic Last Completion Date    Foot Exam 04/23/2019    Eye Exam 05/01/2019    Urine Microalbumin 08/16/2019    Lipid Panel 09/30/2019    Hemoglobin A1c 09/30/2019    High Dose Statin 11/13/2019     Health Maintenance Due   Topic Date Due    TETANUS VACCINE  11/09/1981    Colonoscopy  04/25/2017       Nutrition  Meal Planning: water, 3 meals per day, diet drinks, eats out seldom  What type of sweetener do you use?: none  What type of beverages do you drink?: water, diet soda/tea  Meal Plan 24 Hour Recall - Breakfast: 2 biscuits  Meal Plan 24 Hour Recall - Lunch: Thanksgiving: [place] dressing, oyster dressing, artichoke and green bean casserole  Meal Plan 24 Hour Recall - Dinner: Thanksgiving: [place] dressing, oyster dressing, artichoke and green bean casserole  Meal Plan 24 Hour Recall - Snack: Chips, Thanksgiving desserts like pie    Monitoring   Self Monitoring : SMBG 3 times a day (SMBG 3-4 times daily (does not use CGM sensor); range )  Blood Glucose Logs: Yes(from pump download; see Media tab)  Do you use a personal continuous  glucose monitor?: No(quit using he guardian)  In the last month, how often have you had a low blood sugar reaction?: never  What are your symptoms of low blood sugar?: shaky, dizzy  How do you treat low blood sugar?: glucose tablets or juice  Can you tell when your blood sugar is too high?: sometimes  How do you treat high blood sugar?: bolus insulin    Pump settings:  Basal:  00:00 - 06:00 0.525 units/hr; 06:00 - 12:00 0.475; 12:00 - 22:00 - 0.45; 22:00 - 00:00 0.525     ICR: 1:12  ISF: 20  Target:  120  IOB:  3.15  Temp basals: none   Pump site change: q 3.3 days   Cartridge change: q 3.3 days    Exercise   Exercise Type: use exercise equipment(Cardiac rehab)  Intensity: Moderate  Frequency: 3-5 Times per week  Duration: 1 hour    Current Diabetes Treatment   Current Treatment: Insulin pump, Insulin, Oral Medication    Social History  Preferred Learning Method: Face to Face  Primary Support: Self, Spouse, Family  Educational Level: High School  Occupation: retired  Smoking Status: Never a Smoker  Alcohol Use: Never                                Barriers to Change  Barriers to Change: None  Learning Challenges : None    Readiness to Learn   Readiness to Learn : Acceptance    Cultural Influences  Cultural Influences: No    Diabetes Education Assessment/Progress  Diabetes Disease Process (diabetes disease process and treatment options): Discussion, Individual Session  Nutrition (Incorporating nutritional management into one's lifestyle): Discussion, Individual Session  Physical Activity (incorporating physical activity into one's lifestyle): Discussion, Individual Session  Medications (states correct name, dose, onset, peak, duration, side effects & timing of meds): Discussion, Individual Session, Comprehends Key Points, Instructed, Demonstration  Monitoring (monitoring blood glucose/other parameters & using results): Discussion, Individual Session, Comprehends Key Points, Demonstration, Instructed(Guardian  Sensor)  Acute Complications (preventing, detecting, and treating acute complications): Discussion, Individual Session, Comprehends Key Points  Chronic Complications (preventing, detecting, and treating chronic complications): Discussion, Individual Session, Comprehends Key Points  Clinical (diabetes, other pertinent medical history, and relevant comorbidities reviewed during visit): Discussion, Individual Session, Comprehends Key Points  Cognitive (knowledge of self-management skills, functional health literacy): Discussion, Comprehends Key Points, Individual Session  Psychosocial (emotional response to diabetes): Discussion, Individual Session, Comprehends Key Points  Diabetes Distress and Support Systems: Discussion, Individual Session, Comprehends Key Points  Behavioral (readiness for change, lifestyle practices, self-care behaviors): Discussion, Comprehends Key Points, Individual Session  Patient states automode was keeping his blood sugars too high, wants to adjust basal rate overnight in manual mode due to morning blood sugars being elevated. Discussed current meal patterns and blood sugar. Patient will return in 2 weeks for follow-up     Goals  Patient has selected/evaluated goals during today's session: Yes, evaluated  Monitoring: % Met  Met Percentage : 100%         Diabetes Care Plan/Intervention  Education Plan/Intervention: Individual Follow-Up DSMT, Support Group for Diabetes Distress    Diabetes Meal Plan  Restrictions: Low Fat, Low Sodium, Restricted Carbohydrate  Calories: 1800  Carbohydrate Per Meal: 30-45g  Carbohydrate Per Snack : 15-20g  Fat: 50  Protein: 135    Today's Self-Management Care Plan was developed with the patient's input and is based on barriers identified during today's assessment.    The long and short-term goals in the care plan were written with the patient/caregiver's input. The patient has agreed to work toward these goals to improve his overall diabetes control.      The  patient received a copy of today's self-management plan and verbalized understanding of the care plan, goals, and all of today's instructions.      The patient was encouraged to communicate with his physician and care team regarding his condition(s) and treatment.  I provided the patient with my contact information today and encouraged him to contact me via phone or patient portal as needed.     Education Units of Time   Time Spent: 30 min

## 2019-12-02 ENCOUNTER — CLINICAL SUPPORT (OUTPATIENT)
Dept: CARDIAC REHAB | Facility: CLINIC | Age: 54
End: 2019-12-02
Payer: MEDICARE

## 2019-12-02 DIAGNOSIS — I35.0 AORTIC VALVE STENOSIS, ETIOLOGY OF CARDIAC VALVE DISEASE UNSPECIFIED: ICD-10-CM

## 2019-12-02 DIAGNOSIS — Z95.2 HEART VALVE REPLACED: ICD-10-CM

## 2019-12-02 PROCEDURE — 93798 PHYS/QHP OP CAR RHAB W/ECG: CPT | Mod: S$GLB,,, | Performed by: INTERNAL MEDICINE

## 2019-12-02 PROCEDURE — 93798 PR CARDIAC REHAB/MONITOR: ICD-10-PCS | Mod: S$GLB,,, | Performed by: INTERNAL MEDICINE

## 2019-12-04 ENCOUNTER — ANTI-COAG VISIT (OUTPATIENT)
Dept: CARDIOLOGY | Facility: CLINIC | Age: 54
End: 2019-12-04
Payer: MEDICARE

## 2019-12-04 ENCOUNTER — CLINICAL SUPPORT (OUTPATIENT)
Dept: CARDIAC REHAB | Facility: CLINIC | Age: 54
End: 2019-12-04
Payer: MEDICARE

## 2019-12-04 DIAGNOSIS — Z79.01 LONG TERM (CURRENT) USE OF ANTICOAGULANTS: ICD-10-CM

## 2019-12-04 DIAGNOSIS — G45.9 TIA (TRANSIENT ISCHEMIC ATTACK): ICD-10-CM

## 2019-12-04 DIAGNOSIS — Z95.2 HEART VALVE REPLACED: ICD-10-CM

## 2019-12-04 DIAGNOSIS — I35.0 AORTIC VALVE STENOSIS, ETIOLOGY OF CARDIAC VALVE DISEASE UNSPECIFIED: ICD-10-CM

## 2019-12-04 DIAGNOSIS — Z95.828 S/P ASCENDING AORTIC REPLACEMENT: ICD-10-CM

## 2019-12-04 PROCEDURE — 93798 PHYS/QHP OP CAR RHAB W/ECG: CPT | Mod: S$GLB,,, | Performed by: INTERNAL MEDICINE

## 2019-12-04 PROCEDURE — 93793 ANTICOAG MGMT PT WARFARIN: CPT | Mod: S$GLB,,,

## 2019-12-04 PROCEDURE — 93798 PR CARDIAC REHAB/MONITOR: ICD-10-PCS | Mod: S$GLB,,, | Performed by: INTERNAL MEDICINE

## 2019-12-04 PROCEDURE — 93793 PR ANTICOAGULANT MGMT FOR PT TAKING WARFARIN: ICD-10-PCS | Mod: S$GLB,,,

## 2019-12-06 ENCOUNTER — CLINICAL SUPPORT (OUTPATIENT)
Dept: CARDIAC REHAB | Facility: CLINIC | Age: 54
End: 2019-12-06
Payer: MEDICARE

## 2019-12-06 DIAGNOSIS — I35.0 NODULAR CALCIFIC AORTIC VALVE STENOSIS: ICD-10-CM

## 2019-12-06 DIAGNOSIS — Z95.2 HEART VALVE REPLACED BY TRANSPLANT: ICD-10-CM

## 2019-12-06 PROCEDURE — 93798 PR CARDIAC REHAB/MONITOR: ICD-10-PCS | Mod: S$GLB,,, | Performed by: INTERNAL MEDICINE

## 2019-12-06 PROCEDURE — 93798 PHYS/QHP OP CAR RHAB W/ECG: CPT | Mod: S$GLB,,, | Performed by: INTERNAL MEDICINE

## 2019-12-09 ENCOUNTER — CLINICAL SUPPORT (OUTPATIENT)
Dept: CARDIAC REHAB | Facility: CLINIC | Age: 54
End: 2019-12-09
Payer: MEDICARE

## 2019-12-09 DIAGNOSIS — I35.0 AORTIC VALVE STENOSIS, ETIOLOGY OF CARDIAC VALVE DISEASE UNSPECIFIED: ICD-10-CM

## 2019-12-09 DIAGNOSIS — Z95.2 HEART VALVE REPLACED: ICD-10-CM

## 2019-12-09 PROCEDURE — 93798 PR CARDIAC REHAB/MONITOR: ICD-10-PCS | Mod: S$GLB,,, | Performed by: INTERNAL MEDICINE

## 2019-12-09 PROCEDURE — 93798 PHYS/QHP OP CAR RHAB W/ECG: CPT | Mod: S$GLB,,, | Performed by: INTERNAL MEDICINE

## 2019-12-11 ENCOUNTER — ANTI-COAG VISIT (OUTPATIENT)
Dept: CARDIOLOGY | Facility: CLINIC | Age: 54
End: 2019-12-11
Payer: MEDICARE

## 2019-12-11 ENCOUNTER — CLINICAL SUPPORT (OUTPATIENT)
Dept: CARDIAC REHAB | Facility: CLINIC | Age: 54
End: 2019-12-11
Payer: MEDICARE

## 2019-12-11 DIAGNOSIS — Z95.828 S/P ASCENDING AORTIC REPLACEMENT: ICD-10-CM

## 2019-12-11 DIAGNOSIS — I35.0 AORTIC VALVE STENOSIS, ETIOLOGY OF CARDIAC VALVE DISEASE UNSPECIFIED: ICD-10-CM

## 2019-12-11 DIAGNOSIS — Z95.2 HEART VALVE REPLACED: ICD-10-CM

## 2019-12-11 DIAGNOSIS — G45.9 TIA (TRANSIENT ISCHEMIC ATTACK): ICD-10-CM

## 2019-12-11 DIAGNOSIS — Z79.01 LONG TERM (CURRENT) USE OF ANTICOAGULANTS: ICD-10-CM

## 2019-12-11 PROCEDURE — 93793 PR ANTICOAGULANT MGMT FOR PT TAKING WARFARIN: ICD-10-PCS | Mod: S$GLB,,,

## 2019-12-11 PROCEDURE — 93798 PR CARDIAC REHAB/MONITOR: ICD-10-PCS | Mod: S$GLB,,, | Performed by: INTERNAL MEDICINE

## 2019-12-11 PROCEDURE — 93793 ANTICOAG MGMT PT WARFARIN: CPT | Mod: S$GLB,,,

## 2019-12-11 PROCEDURE — 93798 PHYS/QHP OP CAR RHAB W/ECG: CPT | Mod: S$GLB,,, | Performed by: INTERNAL MEDICINE

## 2019-12-12 ENCOUNTER — OFFICE VISIT (OUTPATIENT)
Dept: CARDIOLOGY | Facility: CLINIC | Age: 54
End: 2019-12-12
Payer: MEDICARE

## 2019-12-12 ENCOUNTER — PATIENT OUTREACH (OUTPATIENT)
Dept: ADMINISTRATIVE | Facility: OTHER | Age: 54
End: 2019-12-12

## 2019-12-12 VITALS
OXYGEN SATURATION: 97 % | WEIGHT: 209.31 LBS | HEART RATE: 60 BPM | BODY MASS INDEX: 31.83 KG/M2 | SYSTOLIC BLOOD PRESSURE: 129 MMHG | DIASTOLIC BLOOD PRESSURE: 56 MMHG

## 2019-12-12 DIAGNOSIS — I10 ESSENTIAL HYPERTENSION: ICD-10-CM

## 2019-12-12 DIAGNOSIS — Z95.2 S/P AVR (AORTIC VALVE REPLACEMENT): ICD-10-CM

## 2019-12-12 PROCEDURE — 3008F PR BODY MASS INDEX (BMI) DOCUMENTED: ICD-10-PCS | Mod: CPTII,S$GLB,, | Performed by: INTERNAL MEDICINE

## 2019-12-12 PROCEDURE — 99213 OFFICE O/P EST LOW 20 MIN: CPT | Mod: S$GLB,,, | Performed by: INTERNAL MEDICINE

## 2019-12-12 PROCEDURE — 99999 PR PBB SHADOW E&M-EST. PATIENT-LVL III: CPT | Mod: PBBFAC,,, | Performed by: INTERNAL MEDICINE

## 2019-12-12 PROCEDURE — 3008F BODY MASS INDEX DOCD: CPT | Mod: CPTII,S$GLB,, | Performed by: INTERNAL MEDICINE

## 2019-12-12 PROCEDURE — 3074F PR MOST RECENT SYSTOLIC BLOOD PRESSURE < 130 MM HG: ICD-10-PCS | Mod: CPTII,S$GLB,, | Performed by: INTERNAL MEDICINE

## 2019-12-12 PROCEDURE — 99213 PR OFFICE/OUTPT VISIT, EST, LEVL III, 20-29 MIN: ICD-10-PCS | Mod: S$GLB,,, | Performed by: INTERNAL MEDICINE

## 2019-12-12 PROCEDURE — 3078F DIAST BP <80 MM HG: CPT | Mod: CPTII,S$GLB,, | Performed by: INTERNAL MEDICINE

## 2019-12-12 PROCEDURE — 99999 PR PBB SHADOW E&M-EST. PATIENT-LVL III: ICD-10-PCS | Mod: PBBFAC,,, | Performed by: INTERNAL MEDICINE

## 2019-12-12 PROCEDURE — 3078F PR MOST RECENT DIASTOLIC BLOOD PRESSURE < 80 MM HG: ICD-10-PCS | Mod: CPTII,S$GLB,, | Performed by: INTERNAL MEDICINE

## 2019-12-12 PROCEDURE — 3074F SYST BP LT 130 MM HG: CPT | Mod: CPTII,S$GLB,, | Performed by: INTERNAL MEDICINE

## 2019-12-12 RX ORDER — LISINOPRIL 10 MG/1
10 TABLET ORAL DAILY
Qty: 90 TABLET | Refills: 3 | Status: SHIPPED | OUTPATIENT
Start: 2019-12-12 | End: 2020-06-05

## 2019-12-12 RX ORDER — GABAPENTIN 300 MG/1
600 CAPSULE ORAL 3 TIMES DAILY
Qty: 540 CAPSULE | Refills: 3 | Status: SHIPPED | OUTPATIENT
Start: 2019-12-12 | End: 2020-10-14

## 2019-12-12 RX ORDER — WARFARIN 3 MG/1
6 TABLET ORAL DAILY
Qty: 60 TABLET | Refills: 11 | Status: SHIPPED | OUTPATIENT
Start: 2019-12-12 | End: 2020-05-14 | Stop reason: SDUPTHER

## 2019-12-12 RX ORDER — AMIODARONE HYDROCHLORIDE 200 MG/1
200 TABLET ORAL DAILY
Qty: 90 TABLET | Refills: 3 | Status: SHIPPED | OUTPATIENT
Start: 2019-12-12 | End: 2020-07-15

## 2019-12-12 RX ORDER — PANTOPRAZOLE SODIUM 40 MG/1
40 TABLET, DELAYED RELEASE ORAL
Qty: 90 TABLET | Refills: 3 | Status: SHIPPED | OUTPATIENT
Start: 2019-12-12 | End: 2020-05-27

## 2019-12-12 NOTE — PROGRESS NOTES
Subjective:    Patient ID:  Cj Barnes is a 54 y.o. y.o. male who presents for initial visit for Follow-up      This is a follow-up visit for this gentleman who has a bioprosthetic aortic valve on Coumadin who also has a history of hypertension and arrhythmias.  He is currently feeling fine in mainly is interested in getting a device were he can check his own pro times.      Past Medical History:   Diagnosis Date    Aortic stenosis 2018    Cancer 2014    Colon cancer     Coronary artery disease     Diabetes mellitus type I     since in his 30's    Heart murmur     Heel fracture     HTN (hypertension)     Hyperlipidemia LDL goal < 70     Insulin pump in place     MVP (mitral valve prolapse)     Stenosis of aortic and mitral valves         Social History     Tobacco Use    Smoking status: Never Smoker    Smokeless tobacco: Former User     Types: Snuff    Tobacco comment: since he was 12 yrs old   Substance Use Topics    Alcohol use: No     Comment: socially    Drug use: No        family history includes Diabetes in his father; HIV in his brother; Heart attack in his father; Heart disease in his mother; Lung cancer in his mother.      Review of Systems   Constitutional: Negative.    HENT: Negative.    Eyes: Negative.    Cardiovascular:        History of bioprosthetic aortic valve for aortic stenosis.  History of hypertension.   Gastrointestinal: Negative.    Genitourinary: Negative.    Musculoskeletal: Negative.    Skin: Negative.    Neurological: Negative.    Endo/Heme/Allergies: Negative.    Psychiatric/Behavioral: Negative.         Objective:     BP (!) 129/56 (BP Location: Left arm, Patient Position: Sitting, BP Method: Large (Automatic))   Pulse 60   Wt 95 kg (209 lb 5.2 oz)   SpO2 97%   BMI 31.83 kg/m²     Physical Exam   Constitutional: He is oriented to person, place, and time and well-developed, well-nourished, and in no distress.   HENT:   Head: Normocephalic and atraumatic.    Eyes: Pupils are equal, round, and reactive to light. No scleral icterus.   Neck: Normal range of motion. Neck supple. No thyromegaly present.   Cardiovascular: Normal heart sounds.   Pulmonary/Chest: Effort normal and breath sounds normal.   Abdominal: Soft. Bowel sounds are normal. He exhibits no distension and no mass. There is no tenderness.   Musculoskeletal: Normal range of motion. He exhibits no edema.   Lymphadenopathy:     He has no cervical adenopathy.   Neurological: He is alert and oriented to person, place, and time. No cranial nerve deficit.   Skin: Skin is warm and dry. No erythema.   Psychiatric: Memory, affect and judgment normal.       Labs:     Lab Results   Component Value Date     (L) 08/21/2019    K 3.7 08/21/2019    CL 98 08/21/2019    CO2 23 08/21/2019    BUN 9 08/21/2019    CREATININE 0.6 08/21/2019    ANIONGAP 13 08/21/2019     Lab Results   Component Value Date    HGBA1C 6.1 (H) 09/30/2019     Lab Results   Component Value Date     (H) 08/16/2019       Lab Results   Component Value Date    WBC 8.40 09/30/2019    HGB 12.3 (L) 09/30/2019    HCT 38.4 (L) 09/30/2019    HCT 29 (L) 08/09/2019     09/30/2019    GRAN 6.1 09/30/2019    GRAN 71.9 09/30/2019     Lab Results   Component Value Date    CHOL 153 09/30/2019    HDL 53 09/30/2019    LDLCALC 73 09/30/2019    TRIG 137 09/30/2019         Results for orders placed or performed in visit on 11/13/19   IN OFFICE EKG 12-LEAD (to [place])    Collection Time: 11/13/19  5:07 PM    Narrative    Test Reason : I10,I35.1,    Vent. Rate : 068 BPM     Atrial Rate : 068 BPM     P-R Int : 176 ms          QRS Dur : 096 ms      QT Int : 380 ms       P-R-T Axes : 081 007 104 degrees     QTc Int : 404 ms    Normal sinus rhythm  Possible Anterior infarct ,age undetermined  Abnormal ECG  When compared with ECG of 08-OCT-2019 14:17,  Criteria for Anterior infarct Now present  QT has shortened  Confirmed by JAZLYN SALAS MD ([id number]) on 11/18/2019  "1:35:31 PM    Referred By:             Confirmed By:JAZLYN SALAS MD        .  Meds:     Current Outpatient Medications:     albuterol (PROVENTIL/VENTOLIN HFA) 90 mcg/actuation inhaler, Inhale 2 puffs into the lungs every 4 (four) hours as needed for Wheezing or Shortness of Breath. Rescue, Disp: 1 Inhaler, Rfl: 5    amiodarone (PACERONE) 200 MG Tab, Take 1 tablet (200 mg total) by mouth once daily., Disp: 90 tablet, Rfl: 3    aspirin (ECOTRIN) 81 MG EC tablet, Take 1 tablet (81 mg total) by mouth once daily., Disp: , Rfl: 0    blood sugar diagnostic Strp, 1 each by Misc.(Non-Drug; Combo Route) route 6 (six) times daily. Contour next strips. Pt needs contour jackelyn for compatibility w/ insulin pump (medtronic 670g). Necessity, Disp: 200 strip, Rfl: 6    blood-glucose sensor (GUARDIAN SENSOR 3) Apple, 1 each by Misc.(Non-Drug; Combo Route) route once a week., Disp: 12 each, Rfl: 3    clotrimazole-betamethasone 1-0.05% (LOTRISONE) cream, APPLY A SMALL AMOUNT TO AFFECTED AREA THREE TIMES DAILY UNTIL CLEAR, Disp: , Rfl: 0    COMFORT EZ PEN NEEDLES 33 gauge x 3/16" Ndle, USE AS DIRECTED with Levemir pens, Disp: , Rfl: 11    doxycycline (MONODOX) 100 MG capsule, Take 1 capsule (100 mg total) by mouth 2 (two) times daily., Disp: 14 capsule, Rfl: 0    glucagon, human recombinant, (GLUCAGON EMERGENCY KIT, HUMAN,) 1 mg SolR, Inject 1 mg into the muscle as needed., Disp: 1 each, Rfl: 0    insulin detemir U-100 (LEVEMIR FLEXTOUCH U-100 INSULN) 100 unit/mL (3 mL) SubQ InPn pen, Inject 48 Units into the skin every evening., Disp: 3 mL, Rfl: 0    insulin regular 100 unit/mL Soln, Use home settings Basal 0.4    Bolus/carb 12    ISF 20, Disp: 1 Product, Rfl: 0    JARDIANCE 25 mg Tab, TAKE ONE TABLET BY MOUTH DAILY FOR DIABETES, Disp: 30 tablet, Rfl: 4    metFORMIN (GLUCOPHAGE-XR) 500 MG 24 hr tablet, Take 1 tablet (500 mg total) by mouth daily with breakfast., Disp: 90 tablet, Rfl: 3    NOVOLOG U-100 INSULIN ASPART 100 " unit/mL injection, INJECT 180 UNITS SUBCUTANEOUSLY EVERY ONE AND A HALF DAYS, Disp: , Rfl: 11    pantoprazole (PROTONIX) 40 MG tablet, Take 1 tablet (40 mg total) by mouth before breakfast., Disp: 90 tablet, Rfl: 3    simvastatin (ZOCOR) 20 MG tablet, Take 1 tablet (20 mg total) by mouth every evening., Disp: 90 tablet, Rfl: 3    warfarin (COUMADIN) 3 MG tablet, Take 2 tablets (6 mg total) by mouth Daily., Disp: 60 tablet, Rfl: 11    gabapentin (NEURONTIN) 300 MG capsule, Take 2 capsules (600 mg total) by mouth 3 (three) times daily., Disp: 540 capsule, Rfl: 3    lisinopril 10 MG tablet, Take 1 tablet (10 mg total) by mouth once daily., Disp: 90 tablet, Rfl: 3      Assessment & Plan:     S/P AVR (aortic valve replacement)  Patient doing well with his aortic valve replacement and his blood pressure.    HTN (hypertension)  Patient's blood pressure is stable.

## 2019-12-12 NOTE — PROGRESS NOTES
Wife advised to have patient take (Warfarin 4.5mg Mon/Wed/Fri, except 3mg AOD). Also eat cabbage once a week. Wife verbalized understanding.

## 2019-12-13 ENCOUNTER — CLINICAL SUPPORT (OUTPATIENT)
Dept: CARDIAC REHAB | Facility: CLINIC | Age: 54
End: 2019-12-13
Payer: MEDICARE

## 2019-12-13 DIAGNOSIS — E78.00 HYPERCHOLESTEREMIA: ICD-10-CM

## 2019-12-13 DIAGNOSIS — E10.42 TYPE 1 DIABETES MELLITUS WITH POLYNEUROPATHY: ICD-10-CM

## 2019-12-13 DIAGNOSIS — Z95.2 HEART VALVE REPLACED BY TRANSPLANT: ICD-10-CM

## 2019-12-13 DIAGNOSIS — Z95.2 HEART VALVE REPLACED BY TRANSPLANT: Primary | ICD-10-CM

## 2019-12-13 DIAGNOSIS — I35.0 NODULAR CALCIFIC AORTIC VALVE STENOSIS: ICD-10-CM

## 2019-12-13 DIAGNOSIS — I35.0 AORTIC STENOSIS: ICD-10-CM

## 2019-12-13 DIAGNOSIS — I10 HYPERTENSION: ICD-10-CM

## 2019-12-13 PROCEDURE — 93798 PR CARDIAC REHAB/MONITOR: ICD-10-PCS | Mod: S$GLB,,, | Performed by: INTERNAL MEDICINE

## 2019-12-13 PROCEDURE — 93798 PHYS/QHP OP CAR RHAB W/ECG: CPT | Mod: S$GLB,,, | Performed by: INTERNAL MEDICINE

## 2019-12-16 ENCOUNTER — CLINICAL SUPPORT (OUTPATIENT)
Dept: CARDIAC REHAB | Facility: CLINIC | Age: 54
End: 2019-12-16
Payer: MEDICARE

## 2019-12-16 DIAGNOSIS — Z95.2 HEART VALVE REPLACED BY TRANSPLANT: ICD-10-CM

## 2019-12-16 DIAGNOSIS — I35.0 AORTIC VALVE STENOSIS, ETIOLOGY OF CARDIAC VALVE DISEASE UNSPECIFIED: ICD-10-CM

## 2019-12-16 PROCEDURE — 93798 PR CARDIAC REHAB/MONITOR: ICD-10-PCS | Mod: S$GLB,,, | Performed by: INTERNAL MEDICINE

## 2019-12-16 PROCEDURE — 93798 PHYS/QHP OP CAR RHAB W/ECG: CPT | Mod: S$GLB,,, | Performed by: INTERNAL MEDICINE

## 2019-12-18 ENCOUNTER — ANTI-COAG VISIT (OUTPATIENT)
Dept: CARDIOLOGY | Facility: CLINIC | Age: 54
End: 2019-12-18
Payer: MEDICARE

## 2019-12-18 ENCOUNTER — CLINICAL SUPPORT (OUTPATIENT)
Dept: CARDIAC REHAB | Facility: CLINIC | Age: 54
End: 2019-12-18
Payer: MEDICARE

## 2019-12-18 DIAGNOSIS — G45.9 TIA (TRANSIENT ISCHEMIC ATTACK): ICD-10-CM

## 2019-12-18 DIAGNOSIS — Z95.2 HEART VALVE REPLACED BY TRANSPLANT: ICD-10-CM

## 2019-12-18 DIAGNOSIS — Z95.828 S/P ASCENDING AORTIC REPLACEMENT: ICD-10-CM

## 2019-12-18 DIAGNOSIS — I35.0 AORTIC VALVE STENOSIS, ETIOLOGY OF CARDIAC VALVE DISEASE UNSPECIFIED: ICD-10-CM

## 2019-12-18 DIAGNOSIS — Z79.01 LONG TERM (CURRENT) USE OF ANTICOAGULANTS: ICD-10-CM

## 2019-12-18 PROCEDURE — 93793 PR ANTICOAGULANT MGMT FOR PT TAKING WARFARIN: ICD-10-PCS | Mod: S$GLB,,, | Performed by: PHARMACIST

## 2019-12-18 PROCEDURE — 93798 PHYS/QHP OP CAR RHAB W/ECG: CPT | Mod: S$GLB,,, | Performed by: INTERNAL MEDICINE

## 2019-12-18 PROCEDURE — 93798 PR CARDIAC REHAB/MONITOR: ICD-10-PCS | Mod: S$GLB,,, | Performed by: INTERNAL MEDICINE

## 2019-12-18 PROCEDURE — 93793 ANTICOAG MGMT PT WARFARIN: CPT | Mod: S$GLB,,, | Performed by: PHARMACIST

## 2019-12-19 NOTE — PROGRESS NOTES
I discussed with patient him being put on waiting list to be processed for a home INR meter which can take a while to get accomplished until he gets a meter he will have to get his INRs at clinic or lab appointments and patient needs to keep 12/23/19 lab appointment for his next INR which he verbalized understanding.  Patient states his cardiologist is not Dr Alexander anymore and is Dr Adriel Palm.

## 2019-12-20 ENCOUNTER — CLINICAL SUPPORT (OUTPATIENT)
Dept: CARDIAC REHAB | Facility: CLINIC | Age: 54
End: 2019-12-20
Payer: MEDICARE

## 2019-12-20 DIAGNOSIS — Z95.2 HEART VALVE REPLACED: ICD-10-CM

## 2019-12-20 DIAGNOSIS — I35.0 AORTIC VALVE STENOSIS, ETIOLOGY OF CARDIAC VALVE DISEASE UNSPECIFIED: ICD-10-CM

## 2019-12-20 PROCEDURE — 93798 PR CARDIAC REHAB/MONITOR: ICD-10-PCS | Mod: S$GLB,,, | Performed by: INTERNAL MEDICINE

## 2019-12-20 PROCEDURE — 93798 PHYS/QHP OP CAR RHAB W/ECG: CPT | Mod: S$GLB,,, | Performed by: INTERNAL MEDICINE

## 2019-12-23 ENCOUNTER — ANTI-COAG VISIT (OUTPATIENT)
Dept: CARDIOLOGY | Facility: CLINIC | Age: 54
End: 2019-12-23
Payer: MEDICARE

## 2019-12-23 ENCOUNTER — CLINICAL SUPPORT (OUTPATIENT)
Dept: CARDIAC REHAB | Facility: CLINIC | Age: 54
End: 2019-12-23
Payer: MEDICARE

## 2019-12-23 DIAGNOSIS — I35.0 AORTIC VALVE STENOSIS, ETIOLOGY OF CARDIAC VALVE DISEASE UNSPECIFIED: ICD-10-CM

## 2019-12-23 DIAGNOSIS — Z79.01 LONG TERM (CURRENT) USE OF ANTICOAGULANTS: ICD-10-CM

## 2019-12-23 DIAGNOSIS — Z95.828 S/P ASCENDING AORTIC REPLACEMENT: ICD-10-CM

## 2019-12-23 DIAGNOSIS — G45.9 TIA (TRANSIENT ISCHEMIC ATTACK): ICD-10-CM

## 2019-12-23 DIAGNOSIS — Z95.2 HEART VALVE REPLACED: ICD-10-CM

## 2019-12-23 PROCEDURE — 93793 ANTICOAG MGMT PT WARFARIN: CPT | Mod: S$GLB,,,

## 2019-12-23 PROCEDURE — 93793 PR ANTICOAGULANT MGMT FOR PT TAKING WARFARIN: ICD-10-PCS | Mod: S$GLB,,,

## 2019-12-23 PROCEDURE — 93798 PR CARDIAC REHAB/MONITOR: ICD-10-PCS | Mod: S$GLB,,, | Performed by: INTERNAL MEDICINE

## 2019-12-23 PROCEDURE — 93798 PHYS/QHP OP CAR RHAB W/ECG: CPT | Mod: S$GLB,,, | Performed by: INTERNAL MEDICINE

## 2019-12-24 NOTE — PROGRESS NOTES
Deandra advised to have patient take (Warfarin 6mg Tues) patient only took (4.5mg Mon) and resume 3mg daily except Mon/Wed/Fri 4.5mg). (Cabbage 1 week). Verbalized understanding.

## 2019-12-29 ENCOUNTER — PATIENT OUTREACH (OUTPATIENT)
Dept: ADMINISTRATIVE | Facility: OTHER | Age: 54
End: 2019-12-29

## 2019-12-30 ENCOUNTER — ANTI-COAG VISIT (OUTPATIENT)
Dept: CARDIOLOGY | Facility: CLINIC | Age: 54
End: 2019-12-30
Payer: MEDICARE

## 2019-12-30 DIAGNOSIS — Z79.01 LONG TERM (CURRENT) USE OF ANTICOAGULANTS: ICD-10-CM

## 2019-12-30 DIAGNOSIS — G45.9 TIA (TRANSIENT ISCHEMIC ATTACK): ICD-10-CM

## 2019-12-30 DIAGNOSIS — Z95.828 S/P ASCENDING AORTIC REPLACEMENT: ICD-10-CM

## 2019-12-30 PROCEDURE — 93793 ANTICOAG MGMT PT WARFARIN: CPT | Mod: S$GLB,,,

## 2019-12-30 PROCEDURE — 93793 PR ANTICOAGULANT MGMT FOR PT TAKING WARFARIN: ICD-10-PCS | Mod: S$GLB,,,

## 2020-01-02 DIAGNOSIS — E10.65 TYPE 1 DIABETES MELLITUS WITH HYPERGLYCEMIA: ICD-10-CM

## 2020-01-02 RX ORDER — SIMVASTATIN 20 MG/1
TABLET, FILM COATED ORAL
Qty: 90 TABLET | Refills: 3 | Status: SHIPPED | OUTPATIENT
Start: 2020-01-02 | End: 2020-04-14

## 2020-01-03 ENCOUNTER — CLINICAL SUPPORT (OUTPATIENT)
Dept: CARDIAC REHAB | Facility: CLINIC | Age: 55
End: 2020-01-03
Payer: MEDICARE

## 2020-01-03 ENCOUNTER — TELEPHONE (OUTPATIENT)
Dept: CARDIOLOGY | Facility: CLINIC | Age: 55
End: 2020-01-03

## 2020-01-03 DIAGNOSIS — I35.0 AORTIC VALVE STENOSIS, ETIOLOGY OF CARDIAC VALVE DISEASE UNSPECIFIED: ICD-10-CM

## 2020-01-03 DIAGNOSIS — Z95.2 HEART VALVE REPLACED: ICD-10-CM

## 2020-01-03 PROCEDURE — 93798 PHYS/QHP OP CAR RHAB W/ECG: CPT | Mod: S$GLB,,, | Performed by: INTERNAL MEDICINE

## 2020-01-03 PROCEDURE — 93798 PR CARDIAC REHAB/MONITOR: ICD-10-PCS | Mod: S$GLB,,, | Performed by: INTERNAL MEDICINE

## 2020-01-03 NOTE — TELEPHONE ENCOUNTER
Spoke with patient about the conversation with the  [facility]. I was informed by Keyla the coumadin would not need to be stopped for the procedure and they are requesting the notes from the most recent office visit, EKG, stress test results, and a notation clearing the patient for surgery from the provider. Patient verbalized understanding of the information provided to him. No further issues discussed.

## 2020-01-03 NOTE — TELEPHONE ENCOUNTER
----- Message from Kaitlin Garcia sent at 1/3/2020 12:00 PM CST -----  Contact: Leida with [facility] [place]#[phone]  She needs to speak with William regarding pt clearance. Please call

## 2020-01-03 NOTE — TELEPHONE ENCOUNTER
Information has been faxed to the clinic and they have been confirmed as received by Lupe at that location. No further issues discussed.

## 2020-01-03 NOTE — TELEPHONE ENCOUNTER
----- Message from Angelina Ochoa sent at 1/3/2020 10:45 AM CST -----  Contact: pt [phone]  Pt is returning call for Willaim

## 2020-01-03 NOTE — TELEPHONE ENCOUNTER
"I received a fax requested today to clear this patient for elbow surgery scheduled for January 6, 2019.  This patient is on anticoagulation for prosthetic aortic valve.  I have been inform by the LPN that the surgical center has stated that the anticoagulation is not a factor for them because they will be "tying" above the surgical site with a tourniquet.  I last saw the patient on December 12 2019.  He has no evidence of coronary artery disease based on a negative nuclear SPECT myocardial perfusion scan dated October 1, 2019.  The patient however does have a low ejection fraction of 40%.    There are no absolute contraindications to the planned elbow surgery.  However there are some associated risks that need to be addressed:    1) the patient needs to be prophylaxed with antibiotics 2 g of amoxicillin p.o. 30-60 minutes prior to surgery.    2) patient is on anticoagulation ( Warfarin)(1/2 life 20-60 hours) and that needs to be taken into consideration and adressed appropriately by the surgeon.     3) Low EF= 40-45%     His most recent INR was 2.9 on December 30th.  He therefore has an increased bleeding risk.              "

## 2020-01-06 ENCOUNTER — PATIENT OUTREACH (OUTPATIENT)
Dept: ADMINISTRATIVE | Facility: OTHER | Age: 55
End: 2020-01-06

## 2020-01-08 ENCOUNTER — CLINICAL SUPPORT (OUTPATIENT)
Dept: CARDIAC REHAB | Facility: CLINIC | Age: 55
End: 2020-01-08
Payer: MEDICARE

## 2020-01-08 ENCOUNTER — ANTI-COAG VISIT (OUTPATIENT)
Dept: CARDIOLOGY | Facility: CLINIC | Age: 55
End: 2020-01-08
Payer: MEDICARE

## 2020-01-08 DIAGNOSIS — I35.0 AORTIC VALVE STENOSIS, ETIOLOGY OF CARDIAC VALVE DISEASE UNSPECIFIED: ICD-10-CM

## 2020-01-08 DIAGNOSIS — G45.9 TIA (TRANSIENT ISCHEMIC ATTACK): ICD-10-CM

## 2020-01-08 DIAGNOSIS — Z79.01 LONG TERM (CURRENT) USE OF ANTICOAGULANTS: ICD-10-CM

## 2020-01-08 DIAGNOSIS — Z95.828 S/P ASCENDING AORTIC REPLACEMENT: ICD-10-CM

## 2020-01-08 DIAGNOSIS — Z95.2 HEART VALVE REPLACED: ICD-10-CM

## 2020-01-08 PROCEDURE — 93798 PHYS/QHP OP CAR RHAB W/ECG: CPT | Mod: S$GLB,,, | Performed by: INTERNAL MEDICINE

## 2020-01-08 PROCEDURE — 93793 ANTICOAG MGMT PT WARFARIN: CPT | Mod: S$GLB,,,

## 2020-01-08 PROCEDURE — 93798 PR CARDIAC REHAB/MONITOR: ICD-10-PCS | Mod: S$GLB,,, | Performed by: INTERNAL MEDICINE

## 2020-01-08 PROCEDURE — 93793 PR ANTICOAGULANT MGMT FOR PT TAKING WARFARIN: ICD-10-PCS | Mod: S$GLB,,,

## 2020-01-09 ENCOUNTER — HOSPITAL ENCOUNTER (OUTPATIENT)
Dept: CARDIOLOGY | Facility: CLINIC | Age: 55
Discharge: HOME | End: 2020-01-09
Attending: INTERNAL MEDICINE
Payer: MEDICARE

## 2020-01-09 VITALS
HEART RATE: 56 BPM | WEIGHT: 203 LBS | SYSTOLIC BLOOD PRESSURE: 116 MMHG | HEIGHT: 68 IN | DIASTOLIC BLOOD PRESSURE: 67 MMHG | BODY MASS INDEX: 30.77 KG/M2

## 2020-01-09 DIAGNOSIS — E78.00 HYPERCHOLESTEREMIA: ICD-10-CM

## 2020-01-09 DIAGNOSIS — I35.0 NODULAR CALCIFIC AORTIC VALVE STENOSIS: ICD-10-CM

## 2020-01-09 DIAGNOSIS — I10 HYPERTENSION: ICD-10-CM

## 2020-01-09 DIAGNOSIS — Z95.2 HEART VALVE REPLACED BY TRANSPLANT: ICD-10-CM

## 2020-01-09 LAB
CV STRESS BASE HR: 56 BPM
DIASTOLIC BLOOD PRESSURE: 67 MMHG
OHS CV CPX 1 MINUTE RECOVERY HEART RATE: 110 BPM
OHS CV CPX 85 PERCENT MAX PREDICTED HEART RATE MALE: 139
OHS CV CPX ABDOMINAL GIRTH: 43.5 CM
OHS CV CPX ANAEROBIC THRESHOLD DIASTOLIC BLOOD PRESSURE: 70 MMHG
OHS CV CPX ANAEROBIC THRESHOLD HEART RATE: 108
OHS CV CPX ANAEROBIC THRESHOLD RATE PRESSURE PRODUCT: NORMAL
OHS CV CPX ANAEROBIC THRESHOLD SYSTOLIC BLOOD PRESSURE: 143
OHS CV CPX DATA GRADE - AT: 11.4
OHS CV CPX DATA GRADE - PEAK: 14.5
OHS CV CPX DATA O2 SAT - PEAK: 96
OHS CV CPX DATA O2 SAT - REST: 97
OHS CV CPX DATA SPEED - AT: 3.2
OHS CV CPX DATA SPEED - PEAK: 3.8
OHS CV CPX DATA TIME - AT: 5.72
OHS CV CPX DATA TIME - PEAK: 6.88
OHS CV CPX DATA VE/VCO2 - AT: 32
OHS CV CPX DATA VE/VCO2 - PEAK: 32
OHS CV CPX DATA VE/VO2 - AT: 29
OHS CV CPX DATA VE/VO2 - PEAK: 35
OHS CV CPX DATA VO2 - AT: 17.6
OHS CV CPX DATA VO2 - PEAK: 19.7
OHS CV CPX DATA VO2 - REST: 3.8
OHS CV CPX ESTIMATED METS: 11
OHS CV CPX FEV1/FVC: 0.84
OHS CV CPX FORCED EXPIRATORY VOLUME: 2.96
OHS CV CPX FORCED VITAL CAPACITY (FVC): 3.54
OHS CV CPX HIGHEST VO: 28
OHS CV CPX MAX PREDICTED HEART RATE: 164
OHS CV CPX MAXIMAL VOLUNTARY VENTILATION (MVV) PREDICTED: 118.4
OHS CV CPX MAXIMAL VOLUNTARY VENTILATION (MVV): 125
OHS CV CPX MAXIUMUM EXERCISE VENTILATION (VE MAX): 60.7
OHS CV CPX PATIENT AGE: 56
OHS CV CPX PATIENT HEIGHT IN: 68
OHS CV CPX PATIENT IS FEMALE AGE 11-19: 0
OHS CV CPX PATIENT IS FEMALE AGE GREATER THAN 19: 0
OHS CV CPX PATIENT IS FEMALE AGE LESS THAN 11: 0
OHS CV CPX PATIENT IS FEMALE: 0
OHS CV CPX PATIENT IS MALE AGE 11-25: 0
OHS CV CPX PATIENT IS MALE AGE GREATER THAN 25: 1
OHS CV CPX PATIENT IS MALE AGE LESS THAN 11: 0
OHS CV CPX PATIENT IS MALE GREATER THAN 18: 1
OHS CV CPX PATIENT IS MALE LESS THAN OR EQUAL TO 18: 0
OHS CV CPX PATIENT IS MALE: 1
OHS CV CPX PATIENT WEIGHT RETURNED IN OZ: 3248
OHS CV CPX PEAK DIASTOLIC BLOOD PRESSURE: 65 MMHG
OHS CV CPX PEAK HEAR RATE: 123 BPM
OHS CV CPX PEAK RATE PRESSURE PRODUCT: NORMAL
OHS CV CPX PEAK SYSTOLIC BLOOD PRESSURE: 146 MMHG
OHS CV CPX PERCENT BODY FAT: 22
OHS CV CPX PERCENT MAX PREDICTED HEART RATE ACHIEVED: 75
OHS CV CPX PREDICTED VO2: 28 ML/KG/MIN
OHS CV CPX RATE PRESSURE PRODUCT PRESENTING: 6496
OHS CV CPX REST PET CO2: 31
OHS CV CPX VE/VCO2 SLOPE: 27.9
STRESS ECHO POST EXERCISE DUR MIN: 6 MINUTES
STRESS ECHO POST EXERCISE DUR SEC: 53 SECONDS
SYSTOLIC BLOOD PRESSURE: 116 MMHG

## 2020-01-09 PROCEDURE — 94621 CARDIOPULMONARY EXERCISE TESTING (CUPID ONLY): ICD-10-PCS | Mod: S$GLB,,, | Performed by: INTERNAL MEDICINE

## 2020-01-09 PROCEDURE — 94621 CARDIOPULM EXERCISE TESTING: CPT | Mod: S$GLB,,, | Performed by: INTERNAL MEDICINE

## 2020-01-10 ENCOUNTER — CLINICAL SUPPORT (OUTPATIENT)
Dept: CARDIAC REHAB | Facility: CLINIC | Age: 55
End: 2020-01-10
Payer: MEDICARE

## 2020-01-10 ENCOUNTER — OFFICE VISIT (OUTPATIENT)
Dept: CARDIOLOGY | Facility: CLINIC | Age: 55
End: 2020-01-10
Payer: MEDICARE

## 2020-01-10 VITALS
HEIGHT: 68 IN | HEART RATE: 56 BPM | OXYGEN SATURATION: 95 % | BODY MASS INDEX: 31.56 KG/M2 | SYSTOLIC BLOOD PRESSURE: 100 MMHG | DIASTOLIC BLOOD PRESSURE: 55 MMHG | WEIGHT: 208.25 LBS

## 2020-01-10 DIAGNOSIS — Z01.818 PRE-OP EVALUATION: ICD-10-CM

## 2020-01-10 DIAGNOSIS — I35.0 NODULAR CALCIFIC AORTIC VALVE STENOSIS: ICD-10-CM

## 2020-01-10 DIAGNOSIS — Z95.2 HEART VALVE REPLACED BY TRANSPLANT: ICD-10-CM

## 2020-01-10 DIAGNOSIS — Z95.2 S/P AVR (AORTIC VALVE REPLACEMENT): ICD-10-CM

## 2020-01-10 PROCEDURE — 3078F DIAST BP <80 MM HG: CPT | Mod: CPTII,S$GLB,, | Performed by: INTERNAL MEDICINE

## 2020-01-10 PROCEDURE — 99214 PR OFFICE/OUTPT VISIT, EST, LEVL IV, 30-39 MIN: ICD-10-PCS | Mod: S$GLB,,, | Performed by: INTERNAL MEDICINE

## 2020-01-10 PROCEDURE — 99999 PR PBB SHADOW E&M-EST. PATIENT-LVL III: CPT | Mod: PBBFAC,,, | Performed by: INTERNAL MEDICINE

## 2020-01-10 PROCEDURE — 93798 PHYS/QHP OP CAR RHAB W/ECG: CPT | Mod: S$GLB,,, | Performed by: INTERNAL MEDICINE

## 2020-01-10 PROCEDURE — 99999 PR PBB SHADOW E&M-EST. PATIENT-LVL III: ICD-10-PCS | Mod: PBBFAC,,, | Performed by: INTERNAL MEDICINE

## 2020-01-10 PROCEDURE — 3074F SYST BP LT 130 MM HG: CPT | Mod: CPTII,S$GLB,, | Performed by: INTERNAL MEDICINE

## 2020-01-10 PROCEDURE — 3074F PR MOST RECENT SYSTOLIC BLOOD PRESSURE < 130 MM HG: ICD-10-PCS | Mod: CPTII,S$GLB,, | Performed by: INTERNAL MEDICINE

## 2020-01-10 PROCEDURE — 99214 OFFICE O/P EST MOD 30 MIN: CPT | Mod: S$GLB,,, | Performed by: INTERNAL MEDICINE

## 2020-01-10 PROCEDURE — 3008F PR BODY MASS INDEX (BMI) DOCUMENTED: ICD-10-PCS | Mod: CPTII,S$GLB,, | Performed by: INTERNAL MEDICINE

## 2020-01-10 PROCEDURE — 3078F PR MOST RECENT DIASTOLIC BLOOD PRESSURE < 80 MM HG: ICD-10-PCS | Mod: CPTII,S$GLB,, | Performed by: INTERNAL MEDICINE

## 2020-01-10 PROCEDURE — 93798 PR CARDIAC REHAB/MONITOR: ICD-10-PCS | Mod: S$GLB,,, | Performed by: INTERNAL MEDICINE

## 2020-01-10 PROCEDURE — 3008F BODY MASS INDEX DOCD: CPT | Mod: CPTII,S$GLB,, | Performed by: INTERNAL MEDICINE

## 2020-01-10 RX ORDER — CICLOPIROX OLAMINE 7.7 MG/100ML
SUSPENSION TOPICAL
COMMUNITY
Start: 2019-12-27 | End: 2020-02-04

## 2020-01-10 RX ORDER — AMLODIPINE BESYLATE 5 MG/1
TABLET ORAL
COMMUNITY
Start: 2019-11-21 | End: 2020-02-04

## 2020-01-10 RX ORDER — AMOXICILLIN 500 MG/1
2000 CAPSULE ORAL DAILY PRN
Qty: 12 CAPSULE | Refills: 1 | Status: SHIPPED | OUTPATIENT
Start: 2020-01-10 | End: 2020-01-15

## 2020-01-10 RX ORDER — ENOXAPARIN SODIUM 100 MG/ML
80 INJECTION SUBCUTANEOUS 2 TIMES DAILY
Qty: 20 SYRINGE | Refills: 3 | Status: SHIPPED | OUTPATIENT
Start: 2020-01-10 | End: 2020-02-04

## 2020-01-10 RX ORDER — POTASSIUM CHLORIDE 20 MEQ/1
TABLET, EXTENDED RELEASE ORAL
COMMUNITY
Start: 2019-11-21 | End: 2020-02-04

## 2020-01-10 RX ORDER — CLINDAMYCIN PHOSPHATE 11.9 MG/ML
SOLUTION TOPICAL
COMMUNITY
Start: 2019-12-27 | End: 2020-02-04

## 2020-01-10 NOTE — PROGRESS NOTES
"Subjective:    Patient ID:  Cj Barnes is a 54 y.o. y.o. male who presents for initial visit for Follow-up      Pre op Tennis  (Right )elbow Jan 9 2020 Treadmil stress Test was negative.      Past Medical History:   Diagnosis Date    Aortic stenosis 2018    Cancer 2014    Colon cancer     Coronary artery disease     Diabetes mellitus type I     since in his 30's    Heart murmur     Heel fracture     HTN (hypertension)     Hyperlipidemia LDL goal < 70     Insulin pump in place     MVP (mitral valve prolapse)     Stenosis of aortic and mitral valves         Social History     Tobacco Use    Smoking status: Never Smoker    Smokeless tobacco: Former User     Types: Snuff    Tobacco comment: since he was 12 yrs old   Substance Use Topics    Alcohol use: No     Comment: socially    Drug use: No        family history includes Diabetes in his father; HIV in his brother; Heart attack in his father; Heart disease in his mother; Lung cancer in his mother.      Review of Systems   Constitutional: Negative.    HENT: Negative.    Eyes: Negative.    Cardiovascular:        Prosthetic aortic valve   Gastrointestinal: Negative.    Genitourinary: Negative.    Musculoskeletal: Negative.    Skin: Negative.    Neurological: Negative.    Endo/Heme/Allergies: Negative.    Psychiatric/Behavioral: Negative.         Objective:     BP (!) 100/55 (BP Location: Left arm, Patient Position: Sitting, BP Method: Large (Automatic))   Pulse (!) 56   Ht 5' 8" (1.727 m)   Wt 94.4 kg (208 lb 3.6 oz)   SpO2 95%   BMI 31.66 kg/m²     Physical Exam   Constitutional: He is oriented to person, place, and time and well-developed, well-nourished, and in no distress.   HENT:   Head: Normocephalic and atraumatic.   Eyes: Pupils are equal, round, and reactive to light. No scleral icterus.   Neck: Normal range of motion. Neck supple. No thyromegaly present.   Cardiovascular:   Mechanical aortic valve clicks are sharp   Pulmonary/Chest: " Effort normal and breath sounds normal.   Abdominal: Soft. Bowel sounds are normal. He exhibits no distension and no mass. There is no tenderness.   Musculoskeletal: Normal range of motion. He exhibits no edema.   Lymphadenopathy:     He has no cervical adenopathy.   Neurological: He is alert and oriented to person, place, and time. No cranial nerve deficit.   Skin: Skin is warm and dry. No erythema.   Psychiatric: Memory, affect and judgment normal.       Labs:     Lab Results   Component Value Date     (L) 08/21/2019    K 3.7 08/21/2019    CL 98 08/21/2019    CO2 23 08/21/2019    BUN 9 08/21/2019    CREATININE 0.6 08/21/2019    ANIONGAP 13 08/21/2019     Lab Results   Component Value Date    HGBA1C 7.3 (H) 01/09/2020     Lab Results   Component Value Date     (H) 08/16/2019       Lab Results   Component Value Date    WBC 6.31 01/09/2020    HGB 12.2 (L) 01/09/2020    HCT 42.5 01/09/2020    HCT 29 (L) 08/09/2019     01/09/2020    GRAN 4.5 01/09/2020    GRAN 71.6 01/09/2020     Lab Results   Component Value Date    CHOL 160 01/09/2020    HDL 52 01/09/2020    LDLCALC 92.8 01/09/2020    TRIG 76 01/09/2020         Results for orders placed or performed in visit on 11/13/19   IN OFFICE EKG 12-LEAD (to [place])    Collection Time: 11/13/19  5:07 PM    Narrative    Test Reason : I10,I35.1,    Vent. Rate : 068 BPM     Atrial Rate : 068 BPM     P-R Int : 176 ms          QRS Dur : 096 ms      QT Int : 380 ms       P-R-T Axes : 081 007 104 degrees     QTc Int : 404 ms    Normal sinus rhythm  Possible Anterior infarct ,age undetermined  Abnormal ECG  When compared with ECG of 08-OCT-2019 14:17,  Criteria for Anterior infarct Now present  QT has shortened  Confirmed by JAZLYN SALAS MD ([id number]) on 11/18/2019 1:35:31 PM    Referred By:             Confirmed By:JAZLYN SALAS MD        .  Meds:     Current Outpatient Medications:     amiodarone (PACERONE) 200 MG Tab, Take 1 tablet (200 mg total) by mouth once daily.,  "Disp: 90 tablet, Rfl: 3    aspirin (ECOTRIN) 81 MG EC tablet, Take 1 tablet (81 mg total) by mouth once daily., Disp: , Rfl: 0    blood sugar diagnostic Strp, 1 each by Misc.(Non-Drug; Combo Route) route 6 (six) times daily. Contour next strips. Pt needs contour jackelyn for compatibility w/ insulin pump (medtronic 670g). Necessity, Disp: 200 strip, Rfl: 6    blood-glucose sensor (GUARDIAN SENSOR 3) Apple, 1 each by Misc.(Non-Drug; Combo Route) route once a week., Disp: 12 each, Rfl: 3    ciclopirox (LOPROX) 0.77 % Susp, , Disp: , Rfl:     clindamycin (CLEOCIN T) 1 % external solution, , Disp: , Rfl:     clotrimazole-betamethasone 1-0.05% (LOTRISONE) cream, APPLY A SMALL AMOUNT TO AFFECTED AREA THREE TIMES DAILY UNTIL CLEAR, Disp: , Rfl: 0    COMFORT EZ PEN NEEDLES 33 gauge x 3/16" Ndle, USE AS DIRECTED with Levemir pens, Disp: , Rfl: 11    gabapentin (NEURONTIN) 300 MG capsule, Take 2 capsules (600 mg total) by mouth 3 (three) times daily., Disp: 540 capsule, Rfl: 3    glucagon, human recombinant, (GLUCAGON EMERGENCY KIT, HUMAN,) 1 mg SolR, Inject 1 mg into the muscle as needed., Disp: 1 each, Rfl: 0    insulin detemir U-100 (LEVEMIR FLEXTOUCH U-100 INSULN) 100 unit/mL (3 mL) SubQ InPn pen, Inject 48 Units into the skin every evening., Disp: 3 mL, Rfl: 0    insulin regular 100 unit/mL Soln, Use home settings Basal 0.4    Bolus/carb 12    ISF 20, Disp: 1 Product, Rfl: 0    JARDIANCE 25 mg Tab, TAKE ONE TABLET BY MOUTH DAILY FOR DIABETES, Disp: 30 tablet, Rfl: 4    lisinopril 10 MG tablet, Take 1 tablet (10 mg total) by mouth once daily., Disp: 90 tablet, Rfl: 3    metFORMIN (GLUCOPHAGE-XR) 500 MG 24 hr tablet, Take 1 tablet (500 mg total) by mouth daily with breakfast., Disp: 90 tablet, Rfl: 3    NOVOLOG U-100 INSULIN ASPART 100 unit/mL injection, INJECT 180 UNITS SUBCUTANEOUSLY EVERY ONE AND A HALF DAYS, Disp: , Rfl: 11    pantoprazole (PROTONIX) 40 MG tablet, Take 1 tablet (40 mg total) by mouth before " breakfast., Disp: 90 tablet, Rfl: 3    simvastatin (ZOCOR) 20 MG tablet, TAKE ONE TABLET BY MOUTH EVERY EVENING, Disp: 90 tablet, Rfl: 3    warfarin (COUMADIN) 3 MG tablet, Take 2 tablets (6 mg total) by mouth Daily., Disp: 60 tablet, Rfl: 11    albuterol (PROVENTIL/VENTOLIN HFA) 90 mcg/actuation inhaler, Inhale 2 puffs into the lungs every 4 (four) hours as needed for Wheezing or Shortness of Breath. Rescue (Patient not taking: Reported on 1/10/2020), Disp: 1 Inhaler, Rfl: 5    amLODIPine (NORVASC) 5 MG tablet, , Disp: , Rfl:     amoxicillin (AMOXIL) 500 MG capsule, Take 4 capsules (2,000 mg total) by mouth daily as needed., Disp: 12 capsule, Rfl: 1    doxycycline (MONODOX) 100 MG capsule, Take 1 capsule (100 mg total) by mouth 2 (two) times daily. (Patient not taking: Reported on 1/10/2020), Disp: 14 capsule, Rfl: 0    enoxaparin (LOVENOX) 80 mg/0.8 mL Syrg, Inject 0.8 mLs (80 mg total) into the skin 2 (two) times daily., Disp: 20 Syringe, Rfl: 3    potassium chloride SA (K-DUR,KLOR-CON) 20 MEQ tablet, , Disp: , Rfl:       Assessment & Plan:     S/P AVR (aortic valve replacement)   the patient needs to be prophylaxed with antibiotics 2 g of amoxicillin p.o. 30-60 minutes prior to surgery    Lovenox Bridge 80mg SQ q 12 hr starting on Jan 12, 2020 and ending on Jan 18,2020    Start Coumadin 4.5 mg post op M W-F then 3.0 mg on all other days     Amoxicillin 2.0 gram 60 mins pre op    [facility] IN[facility] post op for coumadin clinic 2.5-3.5    Pre-op evaluation  LVEF = 40 % and patient has prosthetic aortic valve . Patient cleared for surgery with the conditions as outline in this assessment underS/P AVR (aortic valve replacement)   the patient needs to be prophylaxed with antibiotics 2 g of amoxicillin p.o. 30-60 minutes prior to surgery    Lovenox Bridge 80mg SQ q 12 hr starting on Jan 12, 2020 and ending on Jan 18,2020    Start Coumadin 4.5 mg post op M W-F then 3.0 mg on all other days     Amoxicillin 2.0 gram 60  mins pre op    [facility] INR post op for coumadin clinic 2.5-3.5

## 2020-01-10 NOTE — ASSESSMENT & PLAN NOTE
LVEF = 40 % and patient has prosthetic aortic valve . Patient cleared for surgery with the conditions as outline in this assessment underS/P AVR (aortic valve replacement)   the patient needs to be prophylaxed with antibiotics 2 g of amoxicillin p.o. 30-60 minutes prior to surgery    Lovenox Bridge 80mg SQ q 12 hr starting on Jan 12, 2020 and ending on Jan 18,2020    Start Coumadin 4.5 mg post op M W-F then 3.0 mg on all other days     Amoxicillin 2.0 gram 60 mins pre op    [facility] INR post op for coumadin clinic 2.5-3.5

## 2020-01-10 NOTE — ASSESSMENT & PLAN NOTE
the patient needs to be prophylaxed with antibiotics 2 g of amoxicillin p.o. 30-60 minutes prior to surgery    Lovenox Bridge 80mg SQ q 12 hr starting on Jan 12, 2020 and ending on Jan 18,2020    Start Coumadin 4.5 mg post op M W-F then 3.0 mg on all other days     Amoxicillin 2.0 gram 60 mins pre op    [facility] INR post op for coumadin clinic 2.5-3.5

## 2020-01-13 ENCOUNTER — CLINICAL SUPPORT (OUTPATIENT)
Dept: CARDIAC REHAB | Facility: CLINIC | Age: 55
End: 2020-01-13
Payer: MEDICARE

## 2020-01-13 DIAGNOSIS — I35.0 AORTIC VALVE STENOSIS, ETIOLOGY OF CARDIAC VALVE DISEASE UNSPECIFIED: ICD-10-CM

## 2020-01-13 DIAGNOSIS — Z95.2 HEART VALVE REPLACED: ICD-10-CM

## 2020-01-13 PROCEDURE — 93798 PHYS/QHP OP CAR RHAB W/ECG: CPT | Mod: S$GLB,,, | Performed by: INTERNAL MEDICINE

## 2020-01-13 PROCEDURE — 93798 PR CARDIAC REHAB/MONITOR: ICD-10-PCS | Mod: S$GLB,,, | Performed by: INTERNAL MEDICINE

## 2020-01-13 NOTE — PROGRESS NOTES
HISTORY: S/P AVR (8-9-19), AORTIC STENOSIS, DM, A.FLUTTER, HTN, TIA, SMOKER, EF=40%(8-17-19)    ANTHROPOMETRICS:     PRE POST   Abdominal girth (in) 46.5 43.5   Height (in) 68 68   Weight (lbs) 204 203   BMI 31.1 31.1   % Body Fat 20.8% 22%     EXERCISE RESULTS:     PRE POST   Peak VO2 (CPX only) 19.2 19.7   Actual METS (CPX only) 5.5 5.63   Estimated METS 13.0 11.0       HOME PRESCRIPTION: Cj Smith.  You completed Cardiac Rehab.  Your frequency of exercise should be 5 to 6 times per week.  Intensity of aerobic exercise should keep you in your target heart range of 103 to 113 beats per minute.  Duration of aerobic exercise should be 30 to 60 minutes.  Be sure to do 3-5 minute warm up and cool down stretches.  Resistance training should be perfomed 2-3 days per week on non-consecutive days.  Good luck to you.  Keep up the hard work, and it has been a pleasure working with you.      LAB RESULTS:    Lab Results   Component Value Date    MPV 10.9 01/09/2020    MPV 8.6 (L) 09/30/2019    MPV 8.9 (L) 08/21/2019       Lab Results   Component Value Date    CHOL 160 01/09/2020    CHOL 153 09/30/2019    CHOL 123 04/24/2019     Lab Results   Component Value Date    HDL 52 01/09/2020    HDL 53 09/30/2019    HDL 37 (L) 04/24/2019     Lab Results   Component Value Date    LDLCALC 92.8 01/09/2020    LDLCALC 73 09/30/2019    LDLCALC 77 04/24/2019     Lab Results   Component Value Date    TRIG 76 01/09/2020    TRIG 137 09/30/2019    TRIG 44 04/24/2019     Lab Results   Component Value Date    CHOLHDL 32.5 01/09/2020    CHOLHDL 34.6 09/30/2019    CHOLHDL 30.1 04/24/2019       Lab Results   Component Value Date    GLUF 160 (H) 01/09/2020    GLUF 166 (H) 09/30/2019     Lab Results   Component Value Date    HGBA1C 7.3 (H) 01/09/2020    HGBA1C 6.1 (H) 09/30/2019    HGBA1C 6.6 (H) 07/23/2019        Lab Results   Component Value Date    HSCRP 1.19 01/09/2020         DONALD SCORES:     PRE POST   Anxiety 15 5    Depression 13 1   Somatic 4 3   Hostility 13 3     SF-36 SCORES:     PRE POST   Physical Function 21 18   Social Function 9 7   Mental Health 17 18   Pain 7 8   Change in Health 4 5   Physical Role Limitation 0 1   Mental Role Limitation 0 1   Energy/Fatigue 13 13   Health Perceptions 13 21   Total Score 84 92     PHQ-9:     PRE POST   PHQ-9 11 3       EDUCATION SCORES:     PRE POST   Education Score 50 70

## 2020-01-15 ENCOUNTER — CLINICAL SUPPORT (OUTPATIENT)
Dept: CARDIAC REHAB | Facility: CLINIC | Age: 55
End: 2020-01-15
Payer: MEDICARE

## 2020-01-15 DIAGNOSIS — Z95.2 HEART VALVE REPLACED BY TRANSPLANT: ICD-10-CM

## 2020-01-15 DIAGNOSIS — I35.0 NODULAR CALCIFIC AORTIC VALVE STENOSIS: ICD-10-CM

## 2020-01-15 PROCEDURE — 93798 PHYS/QHP OP CAR RHAB W/ECG: CPT | Mod: S$GLB,,, | Performed by: INTERNAL MEDICINE

## 2020-01-15 PROCEDURE — 93798 PR CARDIAC REHAB/MONITOR: ICD-10-PCS | Mod: S$GLB,,, | Performed by: INTERNAL MEDICINE

## 2020-01-15 RX ORDER — AMOXICILLIN 500 MG/1
CAPSULE ORAL
Qty: 12 CAPSULE | Refills: 1 | Status: SHIPPED | OUTPATIENT
Start: 2020-01-15 | End: 2020-02-04

## 2020-01-17 ENCOUNTER — CLINICAL SUPPORT (OUTPATIENT)
Dept: CARDIAC REHAB | Facility: CLINIC | Age: 55
End: 2020-01-17
Payer: MEDICARE

## 2020-01-17 DIAGNOSIS — Z95.2 HEART VALVE REPLACED: ICD-10-CM

## 2020-01-17 DIAGNOSIS — I35.0 AORTIC VALVE STENOSIS, ETIOLOGY OF CARDIAC VALVE DISEASE UNSPECIFIED: ICD-10-CM

## 2020-01-17 PROCEDURE — 99999 PR PBB SHADOW E&M-EST. PATIENT-LVL III: ICD-10-PCS | Mod: PBBFAC,,,

## 2020-01-17 PROCEDURE — 99999 PR PBB SHADOW E&M-EST. PATIENT-LVL III: CPT | Mod: PBBFAC,,,

## 2020-01-17 PROCEDURE — 93798 PHYS/QHP OP CAR RHAB W/ECG: CPT | Mod: S$GLB,,, | Performed by: INTERNAL MEDICINE

## 2020-01-17 PROCEDURE — 93798 PR CARDIAC REHAB/MONITOR: ICD-10-PCS | Mod: S$GLB,,, | Performed by: INTERNAL MEDICINE

## 2020-01-17 NOTE — PROGRESS NOTES
Exercise:  I met with the patient for exit interview.  The entry and exit CPX test results were discussed as well as current and future exercise routine.      On entry, an estimated MET Level of 13.0 and a Peak VO2 of 19.2ml/kg/min (5.5 actual METS) were measured.  Upon exit, the patient achieved 11.0 estimated METS on the CPX test and a Peak VO2 of 19.7ml/kg/min (5.63 actual METS).      The goal of a 30% improvement to 16.9 estimated METs was not achieved.    Based on the exit CPX test, the target heart range during aerobic exercise for this patient is 103 to 113 bpm.      Exit CPX test results also included weight (203 lb), abdominal girth (43.5 in), BMI (31.1), and body composition (22%).    The patient stated he has intentions to join a gym so that he can use a treadmill and elliptical 3 days/week for about 30 minutes.  He said he will also do resistance training exercises as well as stretching.  We also discussed his plans to walk around [employer] the other 2 days/week.  It was suggested he rethink [employer] walking and maybe increase the days/week to 5 so that he can get the best and most productive exercise routine.  He stated he understood the reasoning.        Aerobic exercise recommendations of 5 to 6 days/week for 30 to 60 minutes, resistance training 2 to 3 non-consecutive days/week and stretching after each session of exercise was reviewed and patient was asked to call if they have and questions in the future.  The patient stated understanding.    The patient was compliant with attendance to the rehab classes.    Oliva Dawson., CEP

## 2020-01-17 NOTE — PROGRESS NOTES
Patient here for exit interview for Phase II Cardiac rehab.    Discussed with patient pre/post labs, stress tests, QOL, risk factors, goals & home exercise prescription.    Pt's quality of life scores improved dramatically.                                 Patient has been instructed to seek attention via PCP/Psychiatry in the event that circumstances change.  Patient verbalizes understanding.       RISK FACTORS:  Diabetes-Hgb A1c has increased to 7.3 pt states he thinks is related to basal pump delivery was decreased not his diet; Hyperlipidemia- under good control; Hypertension- improved in control; Stress- QOL scores have improved greatly. Pt has decreased depression and anxiety.    GOALS:  The following goals have been met:  Decrease cholesterol level  Increase exercise tolerance  Increase knowledge of CAD  Decrease blood pressure  Weight loss is minimal but pt lost 3 inches in abdominal girth  Accurate pulse taking  ID & manage personal areas of stress  Control diabetes by adjusting diet and exercise  Learn more about healthy eating    Home exercise prescription discussed with patient.  Patient has been instructed to follow up with Cardiologist.   Information on Medical Fitness Program at [facility] given to patient.      Junaid Wisdom RN  Cardiac Rehab Nurse

## 2020-01-18 ENCOUNTER — NURSE TRIAGE (OUTPATIENT)
Dept: ADMINISTRATIVE | Facility: CLINIC | Age: 55
End: 2020-01-18

## 2020-01-18 NOTE — TELEPHONE ENCOUNTER
Pt's wife calling, states pt has not been able to sleep well since arm sx on Thursday. Sx was done at another health system. Per triage protocol, I advised that caller get in touch with on call for pt's surgeon within 24 hours. Warm transferred to [facility] .    Reason for Disposition   [1] Caller has NON-URGENT question AND [2] triager unable to answer question    Protocols used: POST-OP SYMPTOMS AND [id number]

## 2020-01-20 ENCOUNTER — CLINICAL SUPPORT (OUTPATIENT)
Dept: CARDIAC REHAB | Facility: CLINIC | Age: 55
End: 2020-01-20
Payer: MEDICARE

## 2020-01-20 DIAGNOSIS — Z95.2 HEART VALVE REPLACED BY TRANSPLANT: ICD-10-CM

## 2020-01-20 DIAGNOSIS — I35.0 NODULAR CALCIFIC AORTIC VALVE STENOSIS: ICD-10-CM

## 2020-01-20 PROCEDURE — 93798 PR CARDIAC REHAB/MONITOR: ICD-10-PCS | Mod: S$GLB,,, | Performed by: INTERNAL MEDICINE

## 2020-01-20 PROCEDURE — 93798 PHYS/QHP OP CAR RHAB W/ECG: CPT | Mod: S$GLB,,, | Performed by: INTERNAL MEDICINE

## 2020-01-30 ENCOUNTER — DOCUMENTATION ONLY (OUTPATIENT)
Dept: SURGERY | Facility: CLINIC | Age: 55
End: 2020-01-30

## 2020-01-30 DIAGNOSIS — Z12.11 SCREENING FOR COLON CANCER: Primary | ICD-10-CM

## 2020-01-30 RX ORDER — SODIUM CHLORIDE 0.9 % (FLUSH) 0.9 %
3 SYRINGE (ML) INJECTION
Status: CANCELLED | OUTPATIENT
Start: 2020-01-30

## 2020-01-30 RX ORDER — SODIUM CHLORIDE, SODIUM LACTATE, POTASSIUM CHLORIDE, CALCIUM CHLORIDE 600; 310; 30; 20 MG/100ML; MG/100ML; MG/100ML; MG/100ML
INJECTION, SOLUTION INTRAVENOUS CONTINUOUS
Status: CANCELLED | OUTPATIENT
Start: 2020-01-30

## 2020-01-30 NOTE — CARE UPDATE
Patient'S SPOUSE (Susannah Barnes) walked into the clinic in reference to a referral to Colorectal Surgery for colon cancer screening for the patient.. Patient's spouse  verbally consented to a Colonoscopy on behalf of the patient,  and requested for the patient to be scheduled for a Colonoscopy on 02/17/2020. A review of the patient's medical history indicates the patient has a Medical history profile that is suggestive of recommendation for Cardiac Clearance  And Medication Clearance to have a Colonoscopy The Colonoscopy was scheduled on the date of the patient's spouse requested, pending Cardiac Clearance/Medication Clearance for the patient to have a Colonoscopy. A Cardiac Clearance and  Medication Clearance request is being requested  to the applicable provider,  for the Colonoscopy procedure to be realized.. The patient's spouse was advised the Colonoscopy is being scheduled pending Cardiac Clearance. Detailed Colonoscopy Prep instructions were explained and discussed with the patient's spouse. Susannah was given the opportunity to ask any questions about the Colonoscopy and the Colonoscopy Prep instructions. Susannah  Acknowledges understanding of all instructions No further issues were discussed

## 2020-01-31 ENCOUNTER — PATIENT MESSAGE (OUTPATIENT)
Dept: CARDIOLOGY | Facility: CLINIC | Age: 55
End: 2020-01-31

## 2020-01-31 RX ORDER — POLYETHYLENE GLYCOL 3350, SODIUM SULFATE ANHYDROUS, SODIUM BICARBONATE, SODIUM CHLORIDE, POTASSIUM CHLORIDE 236; 22.74; 6.74; 5.86; 2.97 G/4L; G/4L; G/4L; G/4L; G/4L
4 POWDER, FOR SOLUTION ORAL ONCE
Qty: 4000 ML | Refills: 0 | Status: SHIPPED | OUTPATIENT
Start: 2020-01-31 | End: 2020-01-31

## 2020-02-06 ENCOUNTER — PATIENT OUTREACH (OUTPATIENT)
Dept: ADMINISTRATIVE | Facility: OTHER | Age: 55
End: 2020-02-06

## 2020-02-10 ENCOUNTER — OFFICE VISIT (OUTPATIENT)
Dept: CARDIOLOGY | Facility: CLINIC | Age: 55
End: 2020-02-10
Payer: MEDICARE

## 2020-02-10 VITALS
HEART RATE: 61 BPM | OXYGEN SATURATION: 98 % | WEIGHT: 211.19 LBS | DIASTOLIC BLOOD PRESSURE: 70 MMHG | BODY MASS INDEX: 32.11 KG/M2 | SYSTOLIC BLOOD PRESSURE: 148 MMHG

## 2020-02-10 DIAGNOSIS — Z95.2 S/P AVR (AORTIC VALVE REPLACEMENT): ICD-10-CM

## 2020-02-10 PROCEDURE — 99214 OFFICE O/P EST MOD 30 MIN: CPT | Mod: S$GLB,,, | Performed by: INTERNAL MEDICINE

## 2020-02-10 PROCEDURE — 3077F SYST BP >= 140 MM HG: CPT | Mod: CPTII,S$GLB,, | Performed by: INTERNAL MEDICINE

## 2020-02-10 PROCEDURE — 3077F PR MOST RECENT SYSTOLIC BLOOD PRESSURE >= 140 MM HG: ICD-10-PCS | Mod: CPTII,S$GLB,, | Performed by: INTERNAL MEDICINE

## 2020-02-10 PROCEDURE — 99214 PR OFFICE/OUTPT VISIT, EST, LEVL IV, 30-39 MIN: ICD-10-PCS | Mod: S$GLB,,, | Performed by: INTERNAL MEDICINE

## 2020-02-10 PROCEDURE — 3008F BODY MASS INDEX DOCD: CPT | Mod: CPTII,S$GLB,, | Performed by: INTERNAL MEDICINE

## 2020-02-10 PROCEDURE — 99999 PR PBB SHADOW E&M-EST. PATIENT-LVL II: ICD-10-PCS | Mod: PBBFAC,,, | Performed by: INTERNAL MEDICINE

## 2020-02-10 PROCEDURE — 3078F PR MOST RECENT DIASTOLIC BLOOD PRESSURE < 80 MM HG: ICD-10-PCS | Mod: CPTII,S$GLB,, | Performed by: INTERNAL MEDICINE

## 2020-02-10 PROCEDURE — 3078F DIAST BP <80 MM HG: CPT | Mod: CPTII,S$GLB,, | Performed by: INTERNAL MEDICINE

## 2020-02-10 PROCEDURE — 99999 PR PBB SHADOW E&M-EST. PATIENT-LVL II: CPT | Mod: PBBFAC,,, | Performed by: INTERNAL MEDICINE

## 2020-02-10 PROCEDURE — 3008F PR BODY MASS INDEX (BMI) DOCUMENTED: ICD-10-PCS | Mod: CPTII,S$GLB,, | Performed by: INTERNAL MEDICINE

## 2020-02-10 RX ORDER — ZOLPIDEM TARTRATE 10 MG/1
10 TABLET ORAL NIGHTLY PRN
Qty: 30 TABLET | Refills: 3 | Status: SHIPPED | OUTPATIENT
Start: 2020-02-10 | End: 2020-05-14 | Stop reason: SDUPTHER

## 2020-02-10 NOTE — PROGRESS NOTES
Subjective:    Patient ID:  Cj Barnes is a 54 y.o. y.o. male who presents for initial visit for No chief complaint on file.      This is a preoperative visit for colonoscopy.      Past Medical History:   Diagnosis Date    Aortic stenosis 2018    Cancer 2014    Colon cancer     Coronary artery disease     Diabetes mellitus type I     since in his 30's    Heart murmur     Heel fracture     HTN (hypertension)     Hyperlipidemia LDL goal < 70     Insulin pump in place     MVP (mitral valve prolapse)     Stenosis of aortic and mitral valves         Social History     Tobacco Use    Smoking status: Never Smoker    Smokeless tobacco: Former User     Types: Snuff    Tobacco comment: since he was 12 yrs old   Substance Use Topics    Alcohol use: No     Comment: socially    Drug use: No        family history includes Diabetes in his father; HIV in his brother; Heart attack in his father; Heart disease in his mother; Lung cancer in his mother.      Review of Systems   Constitutional: Negative.    HENT: Negative.    Eyes: Negative.    Cardiovascular:        Prosthetic aortic valve.     Gastrointestinal: Negative.    Genitourinary: Negative.    Musculoskeletal:        Right elbow injury   Skin: Negative.    Neurological: Negative.    Endo/Heme/Allergies: Negative.    Psychiatric/Behavioral: Negative.         Objective:     BP (!) 148/70 (BP Location: Left arm, Patient Position: Sitting, BP Method: Medium (Automatic))   Pulse 61   Wt 95.8 kg (211 lb 3.2 oz)   SpO2 98%   BMI 32.11 kg/m²     Physical Exam   Constitutional: He is oriented to person, place, and time and well-developed, well-nourished, and in no distress.   HENT:   Head: Normocephalic and atraumatic.   Eyes: Pupils are equal, round, and reactive to light. No scleral icterus.   Neck: Normal range of motion. Neck supple. No thyromegaly present.   Cardiovascular: Intact distal pulses.   Sharp valve clicks S1-S2 otherwise normal.    Pulmonary/Chest: Effort normal and breath sounds normal.   Abdominal: Soft. Bowel sounds are normal. He exhibits no distension and no mass. There is no tenderness.   Musculoskeletal: Normal range of motion. He exhibits no edema.   Lymphadenopathy:     He has no cervical adenopathy.   Neurological: He is alert and oriented to person, place, and time. No cranial nerve deficit.   Skin: Skin is warm and dry. No erythema.   Psychiatric: Memory, affect and judgment normal.       Labs:     Lab Results   Component Value Date     (L) 08/21/2019    K 3.7 08/21/2019    CL 98 08/21/2019    CO2 23 08/21/2019    BUN 9 08/21/2019    CREATININE 0.6 08/21/2019    ANIONGAP 13 08/21/2019     Lab Results   Component Value Date    HGBA1C 7.3 (H) 01/09/2020     Lab Results   Component Value Date     (H) 08/16/2019       Lab Results   Component Value Date    WBC 6.31 01/09/2020    HGB 12.2 (L) 01/09/2020    HCT 42.5 01/09/2020    HCT 29 (L) 08/09/2019     01/09/2020    GRAN 4.5 01/09/2020    GRAN 71.6 01/09/2020     Lab Results   Component Value Date    CHOL 160 01/09/2020    HDL 52 01/09/2020    LDLCALC 92.8 01/09/2020    TRIG 76 01/09/2020         Results for orders placed or performed in visit on 11/13/19   IN OFFICE EKG 12-LEAD (to [place])    Collection Time: 11/13/19  5:07 PM    Narrative    Test Reason : I10,I35.1,    Vent. Rate : 068 BPM     Atrial Rate : 068 BPM     P-R Int : 176 ms          QRS Dur : 096 ms      QT Int : 380 ms       P-R-T Axes : 081 007 104 degrees     QTc Int : 404 ms    Normal sinus rhythm  Possible Anterior infarct ,age undetermined  Abnormal ECG  When compared with ECG of 08-OCT-2019 14:17,  Criteria for Anterior infarct Now present  QT has shortened  Confirmed by JAZLYN SALAS MD ([id number]) on 11/18/2019 1:35:31 PM    Referred By:             Confirmed By:JAZLYN SALAS MD        .  Meds:     Current Outpatient Medications:     amiodarone (PACERONE) 200 MG Tab, Take 1 tablet (200 mg total) by  "mouth once daily., Disp: 90 tablet, Rfl: 3    aspirin (ECOTRIN) 81 MG EC tablet, Take 1 tablet (81 mg total) by mouth once daily., Disp: , Rfl: 0    blood sugar diagnostic Strp, 1 each by Misc.(Non-Drug; Combo Route) route 6 (six) times daily. Contour next strips. Pt needs contour jackelyn for compatibility w/ insulin pump (medtronic 670g). Necessity, Disp: 200 strip, Rfl: 6    blood-glucose sensor (GUARDIAN SENSOR 3) Apple, 1 each by Misc.(Non-Drug; Combo Route) route once a week., Disp: 12 each, Rfl: 3    clotrimazole-betamethasone 1-0.05% (LOTRISONE) cream, APPLY A SMALL AMOUNT TO AFFECTED AREA THREE TIMES DAILY UNTIL CLEAR, Disp: , Rfl: 0    COMFORT EZ PEN NEEDLES 33 gauge x 3/16" Ndle, USE AS DIRECTED with Levemir pens, Disp: , Rfl: 11    gabapentin (NEURONTIN) 300 MG capsule, Take 2 capsules (600 mg total) by mouth 3 (three) times daily., Disp: 540 capsule, Rfl: 3    glucagon, human recombinant, (GLUCAGON EMERGENCY KIT, HUMAN,) 1 mg SolR, Inject 1 mg into the muscle as needed., Disp: 1 each, Rfl: 0    insulin detemir U-100 (LEVEMIR FLEXTOUCH U-100 INSULN) 100 unit/mL (3 mL) SubQ InPn pen, Inject 48 Units into the skin every evening., Disp: 3 mL, Rfl: 0    insulin regular 100 unit/mL Soln, Use home settings Basal 0.4    Bolus/carb 12    ISF 20, Disp: 1 Product, Rfl: 0    JARDIANCE 25 mg Tab, TAKE ONE TABLET BY MOUTH DAILY FOR DIABETES, Disp: 30 tablet, Rfl: 4    lisinopril 10 MG tablet, Take 1 tablet (10 mg total) by mouth once daily., Disp: 90 tablet, Rfl: 3    metFORMIN (GLUCOPHAGE-XR) 500 MG 24 hr tablet, Take 1 tablet (500 mg total) by mouth daily with breakfast., Disp: 90 tablet, Rfl: 3    NOVOLOG U-100 INSULIN ASPART 100 unit/mL injection, INJECT 180 UNITS SUBCUTANEOUSLY EVERY ONE AND A HALF DAYS, Disp: , Rfl: 11    pantoprazole (PROTONIX) 40 MG tablet, Take 1 tablet (40 mg total) by mouth before breakfast., Disp: 90 tablet, Rfl: 3    simvastatin (ZOCOR) 20 MG tablet, TAKE ONE TABLET BY MOUTH " EVERY EVENING, Disp: 90 tablet, Rfl: 3    warfarin (COUMADIN) 3 MG tablet, Take 2 tablets (6 mg total) by mouth Daily., Disp: 60 tablet, Rfl: 11    zolpidem (AMBIEN) 10 mg Tab, Take 1 tablet (10 mg total) by mouth nightly as needed., Disp: 30 tablet, Rfl: 3      Assessment & Plan:     S/P AVR (aortic valve replacement)  Patient is cleared for colonoscopy.  He is advised to start using Lovenox twice a day starting tomorrow 2/11/2020.  Discontinue Lovenox on Saturday 2/15/2020.  And resume his Coumadin after the colonoscopy.  Patient is low risk for bacteremia there for antibiotic prophylaxis is not recommended.

## 2020-02-10 NOTE — ASSESSMENT & PLAN NOTE
Patient is cleared for colonoscopy.  He is advised to start using Lovenox twice a day starting tomorrow 2/11/2020.  Discontinue Lovenox on Saturday 2/15/2020.  And resume his Coumadin after the colonoscopy.  Patient is low risk for bacteremia there for antibiotic prophylaxis is not recommended.

## 2020-02-10 NOTE — TELEPHONE ENCOUNTER
Pt on regular insulin for pump. phn sent letter to switch from novolog to humalog for 2020 formulary

## 2020-02-12 ENCOUNTER — TELEPHONE (OUTPATIENT)
Dept: SURGERY | Facility: CLINIC | Age: 55
End: 2020-02-12

## 2020-02-12 NOTE — TELEPHONE ENCOUNTER
Placed a follow up call to patient related to upcoming Colonoscopy procedure. Patient acknowledges receiving Colonoscopy Prep instructions in the mail. Colonoscopy Prep instructions were summarized with the patient. Patient acknowledges understanding Prep instructions.Patient further acknowledges understanding of instructions as given by providing Cardiologist for Pre and Post medication administration. No further issues were discussed.

## 2020-02-19 ENCOUNTER — TELEPHONE (OUTPATIENT)
Dept: DIABETES | Facility: CLINIC | Age: 55
End: 2020-02-19

## 2020-02-19 NOTE — TELEPHONE ENCOUNTER
Please use this link to start you claim on your pump that was recalled.  [email or web address]

## 2020-02-26 ENCOUNTER — CLINICAL SUPPORT (OUTPATIENT)
Dept: DIABETES | Facility: CLINIC | Age: 55
End: 2020-02-26
Payer: MEDICARE

## 2020-02-26 DIAGNOSIS — E10.3299 TYPE 1 DIABETES MELLITUS WITH MILD NONPROLIFERATIVE RETINOPATHY WITHOUT MACULAR EDEMA, UNSPECIFIED LATERALITY: ICD-10-CM

## 2020-02-26 DIAGNOSIS — E10.42 TYPE 1 DIABETES MELLITUS WITH DIABETIC POLYNEUROPATHY: Primary | ICD-10-CM

## 2020-02-26 DIAGNOSIS — E10.649 TYPE 1 DIABETES MELLITUS WITH HYPOGLYCEMIA UNAWARENESS: ICD-10-CM

## 2020-02-26 DIAGNOSIS — Z96.41 INSULIN PUMP IN PLACE: ICD-10-CM

## 2020-02-26 DIAGNOSIS — E10.65 UNCONTROLLED TYPE 1 DIABETES MELLITUS WITH HYPERGLYCEMIA: ICD-10-CM

## 2020-02-26 PROCEDURE — G0108 DIAB MANAGE TRN  PER INDIV: HCPCS | Mod: S$GLB,,, | Performed by: DIETITIAN, REGISTERED

## 2020-02-26 PROCEDURE — G0108 PR DIAB MANAGE TRN  PER INDIV: ICD-10-PCS | Mod: S$GLB,,, | Performed by: DIETITIAN, REGISTERED

## 2020-02-26 PROCEDURE — 99999 PR PBB SHADOW E&M-EST. PATIENT-LVL III: CPT | Mod: PBBFAC,,, | Performed by: DIETITIAN, REGISTERED

## 2020-02-26 PROCEDURE — 99999 PR PBB SHADOW E&M-EST. PATIENT-LVL III: ICD-10-PCS | Mod: PBBFAC,,, | Performed by: DIETITIAN, REGISTERED

## 2020-02-27 VITALS — BODY MASS INDEX: 31.14 KG/M2 | WEIGHT: 205.5 LBS | HEIGHT: 68 IN

## 2020-02-27 NOTE — PROGRESS NOTES
Diabetes Education  Author: Katia Pham RD, CDE  Date: 2/27/2020    Diabetes Care Management Summary  Diabetes Education Record Assessment/Progress: Comprehensive/Group(670 pump upgrade - 1 week f/u)  Current Diabetes Risk Level: Moderate     Last A1c:   Lab Results   Component Value Date    HGBA1C 7.3 (H) 01/09/2020     Last visit with Diabetes Educator: : 03/20/2018      Diabetes Type  Diabetes Type : Type I    Diabetes History  Diabetes Diagnosis: >10 years  Current Treatment: Insulin pump, Oral Medication, Diet  Reviewed Problem List with Patient: Yes    Health Maintenance was reviewed today with patient. Discussed with patient importance of routine eye exams, foot exams/foot care, blood work (i.e.: A1c, microalbumin, and lipid), dental visits, yearly flu vaccine, and pneumonia vaccine as indicated by PCP. Patient verbalized understanding.     Health Maintenance Topics with due status: Not Due       Topic Last Completion Date    Eye Exam 05/01/2019    Lipid Panel 01/09/2020    Hemoglobin A1c 01/09/2020    High Dose Statin 02/17/2020    Colonoscopy 02/17/2020     Health Maintenance Due   Topic Date Due    TETANUS VACCINE  11/09/1981    Foot Exam  04/23/2020       Nutrition  Meal Planning: 3 meals per day, snacks between meal, diet drinks, water  What type of sweetener do you use?: none  What type of beverages do you drink?: water, diet soda/tea  Meal Plan 24 Hour Recall - Breakfast: 2 biscuits  Meal Plan 24 Hour Recall - Lunch: chips and sandwich  Meal Plan 24 Hour Recall - Dinner: some kind of meat, starch, vegetable  Meal Plan 24 Hour Recall - Snack: chips    Monitoring   Monitoring: Other(true metrix)  Self Monitoring : SMBG 3 times a day (SMBG 3-4 times daily (does not use CGM sensor); range )  Blood Glucose Logs: Yes(from pump download; see Media tab)  Do you use a personal continuous glucose monitor?: No  In the last month, how often have you had a low blood sugar reaction?: once  What are your  "symptoms of low blood sugar?: shaky, dizzy  How do you treat low blood sugar?: glucose tablets  Can you tell when your blood sugar is too high?: sometimes  How do you treat high blood sugar?: insulin bolus    Exercise   Exercise Type: exercise class(cardiac rehab)  Intensity: Moderate  Frequency: 3-5 Times per week  Duration: 1 hour    Current Diabetes Treatment   Current Treatment: Insulin pump, Oral Medication, Diet    Social History  Preferred Learning Method: Face to Face  Primary Support: Self, Spouse, Family  Educational Level: High School  Occupation: retired -   Smoking Status: Never a Smoker  Alcohol Use: Never    .  Current Outpatient Medications on File Prior to Visit   Medication Sig Dispense Refill    amiodarone (PACERONE) 200 MG Tab Take 1 tablet (200 mg total) by mouth once daily. 90 tablet 3    aspirin (ECOTRIN) 81 MG EC tablet Take 1 tablet (81 mg total) by mouth once daily.  0    blood sugar diagnostic Strp 1 each by Misc.(Non-Drug; Combo Route) route 6 (six) times daily. Contour next strips. Pt needs contour jackelyn for compatibility w/ insulin pump (medtronic 670g). Necessity 200 strip 6    blood-glucose sensor (GUARDIAN SENSOR 3) Apple 1 each by Misc.(Non-Drug; Combo Route) route once a week. 12 each 3    clotrimazole-betamethasone 1-0.05% (LOTRISONE) cream APPLY A SMALL AMOUNT TO AFFECTED AREA THREE TIMES DAILY UNTIL CLEAR  0    COMFORT EZ PEN NEEDLES 33 gauge x 3/16" Ndle USE AS DIRECTED with Levemir pens  11    gabapentin (NEURONTIN) 300 MG capsule Take 2 capsules (600 mg total) by mouth 3 (three) times daily. 540 capsule 3    glucagon, human recombinant, (GLUCAGON EMERGENCY KIT, HUMAN,) 1 mg SolR Inject 1 mg into the muscle as needed. 1 each 0    insulin detemir U-100 (LEVEMIR FLEXTOUCH U-100 INSULN) 100 unit/mL (3 mL) SubQ InPn pen Inject 48 Units into the skin every evening. 3 mL 0    insulin regular 100 unit/mL Soln Use home settings Basal 0.4    Bolus/carb 12    ISF 20 " 1 Product 0    JARDIANCE 25 mg Tab TAKE ONE TABLET BY MOUTH DAILY FOR DIABETES 30 tablet 4    lisinopril 10 MG tablet Take 1 tablet (10 mg total) by mouth once daily. 90 tablet 3    metFORMIN (GLUCOPHAGE-XR) 500 MG 24 hr tablet Take 1 tablet (500 mg total) by mouth daily with breakfast. 90 tablet 3    NOVOLOG U-100 INSULIN ASPART 100 unit/mL injection INJECT 180 UNITS SUBCUTANEOUSLY EVERY ONE AND A HALF DAYS  11    pantoprazole (PROTONIX) 40 MG tablet Take 1 tablet (40 mg total) by mouth before breakfast. 90 tablet 3    simvastatin (ZOCOR) 20 MG tablet TAKE ONE TABLET BY MOUTH EVERY EVENING 90 tablet 3    warfarin (COUMADIN) 3 MG tablet Take 2 tablets (6 mg total) by mouth Daily. 60 tablet 11    zolpidem (AMBIEN) 10 mg Tab Take 1 tablet (10 mg total) by mouth nightly as needed. 30 tablet 3     No current facility-administered medications on file prior to visit.      Pump settings:    Basal:  Midnight to noon:0.525 units/hr                Noon to midnight: 0.500     ICR: 1:10     ISF: 20     Target:  120     IOB:  3.15     Temp basals: none      Pump site change: q 3.3 days     Cartridge change: q 3.3 days    Using the bolus wizard: yes     Override: no                              Barriers to Change  Barriers to Change: None  Learning Challenges : None    Readiness to Learn   Readiness to Learn : Acceptance    Cultural Influences  Cultural Influences: No    Diabetes Education Assessment/Progress  Diabetes Disease Process (diabetes disease process and treatment options): Discussion, Individual Session  Nutrition (Incorporating nutritional management into one's lifestyle): Discussion, Individual Session  Physical Activity (incorporating physical activity into one's lifestyle): Discussion, Individual Session  Medications (states correct name, dose, onset, peak, duration, side effects & timing of meds): Discussion, Individual Session, Comprehends Key Points, Instructed, Demonstration  Monitoring (monitoring  blood glucose/other parameters & using results): Discussion, Individual Session, Comprehends Key Points, Demonstration, Instructed(Guardian Sensor)  Acute Complications (preventing, detecting, and treating acute complications): Discussion, Individual Session, Comprehends Key Points  Chronic Complications (preventing, detecting, and treating chronic complications): Discussion, Individual Session, Comprehends Key Points  Clinical (diabetes, other pertinent medical history, and relevant comorbidities reviewed during visit): Discussion, Individual Session, Comprehends Key Points  Cognitive (knowledge of self-management skills, functional health literacy): Discussion, Comprehends Key Points, Individual Session  Psychosocial (emotional response to diabetes): Discussion, Individual Session, Comprehends Key Points  Diabetes Distress and Support Systems: Discussion, Individual Session, Comprehends Key Points  Behavioral (readiness for change, lifestyle practices, self-care behaviors): Discussion, Comprehends Key Points, Individual Session    Goals  Patient has selected/evaluated goals during today's session: Yes, evaluated  Monitoring: % Met  Met Percentage : 75%         Diabetes Care Plan/Intervention  Education Plan/Intervention: Individual Follow-Up DSMT, Support Group for Diabetes Distress    Diabetes Meal Plan  Restrictions: Low Fat, Low Sodium, Restricted Carbohydrate  Calories: 1800  Carbohydrate Per Meal: 30-45g  Carbohydrate Per Snack : 15-20g  Fat: 50  Protein: 135    Today's Self-Management Care Plan was developed with the patient's input and is based on barriers identified during today's assessment.    The long and short-term goals in the care plan were written with the patient/caregiver's input. The patient has agreed to work toward these goals to improve his overall diabetes control.      The patient received a copy of today's self-management plan and verbalized understanding of the care plan, goals, and all of  today's instructions.      The patient was encouraged to communicate with his physician and care team regarding his condition(s) and treatment.  I provided the patient with my contact information today and encouraged him to contact me via phone or patient portal as needed.     Education Units of Time   Time Spent: 30 min

## 2020-03-10 RX ORDER — EMPAGLIFLOZIN 25 MG/1
TABLET, FILM COATED ORAL
Qty: 30 TABLET | Refills: 0 | Status: SHIPPED | OUTPATIENT
Start: 2020-03-10 | End: 2020-03-16 | Stop reason: SDUPTHER

## 2020-03-16 DIAGNOSIS — E10.649 TYPE 1 DIABETES MELLITUS WITH HYPOGLYCEMIA UNAWARENESS: ICD-10-CM

## 2020-03-17 RX ORDER — METFORMIN HYDROCHLORIDE 500 MG/1
TABLET, EXTENDED RELEASE ORAL
Qty: 90 TABLET | Refills: 3 | Status: SHIPPED | OUTPATIENT
Start: 2020-03-17 | End: 2020-05-27 | Stop reason: SDUPTHER

## 2020-03-24 ENCOUNTER — ANTI-COAG VISIT (OUTPATIENT)
Dept: CARDIOLOGY | Facility: CLINIC | Age: 55
End: 2020-03-24
Payer: MEDICARE

## 2020-03-24 DIAGNOSIS — Z95.828 S/P ASCENDING AORTIC REPLACEMENT: ICD-10-CM

## 2020-03-24 DIAGNOSIS — G45.9 TIA (TRANSIENT ISCHEMIC ATTACK): ICD-10-CM

## 2020-03-24 DIAGNOSIS — Z79.01 LONG TERM (CURRENT) USE OF ANTICOAGULANTS: ICD-10-CM

## 2020-03-24 PROCEDURE — 93793 PR ANTICOAGULANT MGMT FOR PT TAKING WARFARIN: ICD-10-PCS | Mod: S$GLB,,,

## 2020-03-24 PROCEDURE — 93793 ANTICOAG MGMT PT WARFARIN: CPT | Mod: S$GLB,,,

## 2020-04-09 ENCOUNTER — TELEPHONE (OUTPATIENT)
Dept: DIABETES | Facility: CLINIC | Age: 55
End: 2020-04-09

## 2020-04-09 NOTE — TELEPHONE ENCOUNTER
[facility] told patient he can no longer get insulin there because of medicare guideline and it is going through a device. Patient switched to [employer]. Last time he got a rx for insulin it was $300 for 3 vials, wondering if he was in the coverage gap or if it wasn't covered under part D and needs to go through part B because of the insulin pump?

## 2020-04-09 NOTE — TELEPHONE ENCOUNTER
Spoke to pt and his new pharmacy [facility]. Pharmacy informed me all that was needed for pt was PA. PA sent through [employer].

## 2020-04-13 ENCOUNTER — TELEPHONE (OUTPATIENT)
Dept: DIABETES | Facility: CLINIC | Age: 55
End: 2020-04-13

## 2020-04-13 NOTE — TELEPHONE ENCOUNTER
----- Message from Paulo Stafford sent at 2020 11:56 AM CDT -----  Contact: pt @ [phone]  Pt states he called insurance company and was advised that a new prescription is needed for insulin, due to one at pharmacy

## 2020-04-13 NOTE — TELEPHONE ENCOUNTER
----- Message from Paulo Stafford sent at 2020 11:56 AM CDT -----  Contact: pt @ [phone]  Pt states he called insurance company and was advised that a new prescription is needed for insulin, due to one at pharmacy

## 2020-04-13 NOTE — TELEPHONE ENCOUNTER
Informed pt through [employer] that he should contact the physician who prescribed the medication

## 2020-04-14 DIAGNOSIS — E10.65 TYPE 1 DIABETES MELLITUS WITH HYPERGLYCEMIA: ICD-10-CM

## 2020-04-14 RX ORDER — INSULIN ASPART 100 [IU]/ML
INJECTION, SOLUTION INTRAVENOUS; SUBCUTANEOUS
Qty: 3 VIAL | Refills: 11 | Status: SHIPPED | OUTPATIENT
Start: 2020-04-14 | End: 2020-04-16

## 2020-04-14 RX ORDER — INSULIN ASPART 100 [IU]/ML
INJECTION, SOLUTION INTRAVENOUS; SUBCUTANEOUS
Qty: 3 VIAL | Refills: 11 | Status: SHIPPED | OUTPATIENT
Start: 2020-04-14 | End: 2020-04-14 | Stop reason: SDUPTHER

## 2020-04-14 RX ORDER — SIMVASTATIN 20 MG/1
TABLET, FILM COATED ORAL
Qty: 90 TABLET | Refills: 3 | Status: SHIPPED | OUTPATIENT
Start: 2020-04-14 | End: 2020-05-27 | Stop reason: SDUPTHER

## 2020-04-14 NOTE — TELEPHONE ENCOUNTER
----- Message from Carmen Calvillo sent at 4/14/2020  3:11 PM CDT -----  Contact: pt   Urgent   WRONG PHARMACY     WILL NOT ACCEPT HIS INSURANCE   Please call in   oked 4/14 Mar    OUT OF INSULIN     NOVOLOG U-100 INSULIN ASPART 100 unit/mL injection    OK'ed   4/14    Wrong pharmacy       CALL IN TO  [facility] [facility]       ([address])    [place]       Once called in please call  [phone]        Thanks

## 2020-04-15 ENCOUNTER — PATIENT MESSAGE (OUTPATIENT)
Dept: DIABETES | Facility: CLINIC | Age: 55
End: 2020-04-15

## 2020-04-16 DIAGNOSIS — E10.649 TYPE 1 DIABETES MELLITUS WITH HYPOGLYCEMIA UNAWARENESS: Primary | ICD-10-CM

## 2020-04-16 RX ORDER — INSULIN LISPRO 100 [IU]/ML
INJECTION, SOLUTION INTRAVENOUS; SUBCUTANEOUS
Qty: 3 VIAL | Refills: 6 | Status: SHIPPED | OUTPATIENT
Start: 2020-04-16 | End: 2020-05-27 | Stop reason: SDUPTHER

## 2020-04-16 RX ORDER — INSULIN LISPRO 100 [IU]/ML
INJECTION, SOLUTION INTRAVENOUS; SUBCUTANEOUS
Qty: 3 BOX | Refills: 11 | OUTPATIENT
Start: 2020-04-16

## 2020-04-28 ENCOUNTER — TELEPHONE (OUTPATIENT)
Dept: CARDIOLOGY | Facility: CLINIC | Age: 55
End: 2020-04-28

## 2020-04-28 NOTE — TELEPHONE ENCOUNTER
----- Message from Cj Hdez sent at 4/28/2020 11:35 AM CDT -----  Contact: Pt  Patient called to speak w/ someone regarding being prescribed a sleep aid, requesting callback     Callback: [phone]

## 2020-05-03 ENCOUNTER — TELEPHONE (OUTPATIENT)
Dept: PRIMARY CARE CLINIC | Facility: CLINIC | Age: 55
End: 2020-05-03

## 2020-05-03 NOTE — TELEPHONE ENCOUNTER
Called patient in regards to his elevated BP at his last visit within the system. He says it was due to not being on his medication after his heart surgery and that he check it at home and it's fine. He says he was not around his machine right this second to tell me the latest numbers. I asked him to monitor it for 2 weeks and send me a message in the portal. He states understanding.     He also asked for a new referral for cardiology since Dr. Palm is no longer here but I reassured him that a cardiologist will be coming to see his patients. Either Dr. Vasquez or Dr. Alexander.

## 2020-05-14 ENCOUNTER — TELEPHONE (OUTPATIENT)
Dept: PRIMARY CARE CLINIC | Facility: CLINIC | Age: 55
End: 2020-05-14

## 2020-05-14 RX ORDER — ZOLPIDEM TARTRATE 10 MG/1
10 TABLET ORAL NIGHTLY PRN
Qty: 30 TABLET | Refills: 3 | Status: SHIPPED | OUTPATIENT
Start: 2020-05-14 | End: 2020-05-27 | Stop reason: SDUPTHER

## 2020-05-14 RX ORDER — WARFARIN 3 MG/1
6 TABLET ORAL DAILY
Qty: 60 TABLET | Refills: 11 | Status: SHIPPED | OUTPATIENT
Start: 2020-05-14 | End: 2021-06-02

## 2020-05-14 NOTE — TELEPHONE ENCOUNTER
Spoke with the patient's wife and informed her that the refill request was filled by Dr Vasquez today. Instructed her to contact the pharmacy to determine if the medications have been filled. She verbalized understanding of the information provided to her. No further issues discussed.

## 2020-05-14 NOTE — TELEPHONE ENCOUNTER
----- Message from Elma Christian sent at 5/14/2020  2:49 PM CDT -----  Contact: Pt`s wife  Pt`s wife called in to request an rx refill for warfarin (COUMADIN) 3 MG tablet She stated that the rx refill was requested 2 weeks ago but has not heard back. She also stated that Dr Adriel Palm was no longer at [facility] but needs the refill called in to day before 6pm    [facility] #[phone]    Pt`s wife can be reached at [phone].    Thank you

## 2020-05-19 ENCOUNTER — ANTI-COAG VISIT (OUTPATIENT)
Dept: CARDIOLOGY | Facility: CLINIC | Age: 55
End: 2020-05-19
Payer: MEDICARE

## 2020-05-19 DIAGNOSIS — Z79.01 LONG TERM (CURRENT) USE OF ANTICOAGULANTS: ICD-10-CM

## 2020-05-19 DIAGNOSIS — Z95.828 S/P ASCENDING AORTIC REPLACEMENT: ICD-10-CM

## 2020-05-19 DIAGNOSIS — G45.9 TIA (TRANSIENT ISCHEMIC ATTACK): ICD-10-CM

## 2020-05-19 PROCEDURE — 93793 ANTICOAG MGMT PT WARFARIN: CPT | Mod: S$GLB,,,

## 2020-05-19 PROCEDURE — 93793 PR ANTICOAGULANT MGMT FOR PT TAKING WARFARIN: ICD-10-PCS | Mod: S$GLB,,,

## 2020-05-25 RX ORDER — EMPAGLIFLOZIN 25 MG/1
TABLET, FILM COATED ORAL
Qty: 30 TABLET | Refills: 0 | Status: SHIPPED | OUTPATIENT
Start: 2020-05-25 | End: 2020-05-27 | Stop reason: SDUPTHER

## 2020-05-27 ENCOUNTER — TELEPHONE (OUTPATIENT)
Dept: PRIMARY CARE CLINIC | Facility: CLINIC | Age: 55
End: 2020-05-27

## 2020-05-27 ENCOUNTER — OFFICE VISIT (OUTPATIENT)
Dept: PRIMARY CARE CLINIC | Facility: CLINIC | Age: 55
End: 2020-05-27
Payer: MEDICARE

## 2020-05-27 ENCOUNTER — TELEPHONE (OUTPATIENT)
Dept: DIABETES | Facility: CLINIC | Age: 55
End: 2020-05-27

## 2020-05-27 VITALS — DIASTOLIC BLOOD PRESSURE: 67 MMHG | SYSTOLIC BLOOD PRESSURE: 123 MMHG

## 2020-05-27 DIAGNOSIS — E10.65 TYPE 1 DIABETES MELLITUS WITH HYPERGLYCEMIA: ICD-10-CM

## 2020-05-27 DIAGNOSIS — E10.649 TYPE 1 DIABETES MELLITUS WITH HYPOGLYCEMIA UNAWARENESS: ICD-10-CM

## 2020-05-27 DIAGNOSIS — G47.00 INSOMNIA, UNSPECIFIED TYPE: ICD-10-CM

## 2020-05-27 DIAGNOSIS — Z11.4 SCREENING FOR HIV (HUMAN IMMUNODEFICIENCY VIRUS): ICD-10-CM

## 2020-05-27 DIAGNOSIS — E78.5 HYPERLIPIDEMIA WITH TARGET LOW DENSITY LIPOPROTEIN (LDL) CHOLESTEROL LESS THAN 70 MG/DL: ICD-10-CM

## 2020-05-27 DIAGNOSIS — Z95.2 S/P AVR (AORTIC VALVE REPLACEMENT): Primary | ICD-10-CM

## 2020-05-27 DIAGNOSIS — I48.92 ATRIAL FLUTTER, UNSPECIFIED TYPE: ICD-10-CM

## 2020-05-27 PROCEDURE — 99214 OFFICE O/P EST MOD 30 MIN: CPT | Mod: 95,,, | Performed by: FAMILY MEDICINE

## 2020-05-27 PROCEDURE — 99499 UNLISTED E&M SERVICE: CPT | Mod: 95,,, | Performed by: FAMILY MEDICINE

## 2020-05-27 PROCEDURE — 99499 RISK ADDL DX/OHS AUDIT: ICD-10-PCS | Mod: 95,,, | Performed by: FAMILY MEDICINE

## 2020-05-27 PROCEDURE — 3051F HG A1C>EQUAL 7.0%<8.0%: CPT | Mod: CPTII,95,, | Performed by: FAMILY MEDICINE

## 2020-05-27 PROCEDURE — 3051F PR MOST RECENT HEMOGLOBIN A1C LEVEL 7.0 - < 8.0%: ICD-10-PCS | Mod: CPTII,95,, | Performed by: FAMILY MEDICINE

## 2020-05-27 PROCEDURE — 99214 PR OFFICE/OUTPT VISIT, EST, LEVL IV, 30-39 MIN: ICD-10-PCS | Mod: 95,,, | Performed by: FAMILY MEDICINE

## 2020-05-27 RX ORDER — SIMVASTATIN 20 MG/1
20 TABLET, FILM COATED ORAL NIGHTLY
Qty: 90 TABLET | Refills: 3 | Status: SHIPPED | OUTPATIENT
Start: 2020-05-27 | End: 2020-06-05

## 2020-05-27 RX ORDER — ZOLPIDEM TARTRATE 10 MG/1
10 TABLET ORAL NIGHTLY PRN
Qty: 30 TABLET | Refills: 2 | Status: SHIPPED | OUTPATIENT
Start: 2020-05-27 | End: 2020-07-15

## 2020-05-27 RX ORDER — PANTOPRAZOLE SODIUM 40 MG/1
40 TABLET, DELAYED RELEASE ORAL
Qty: 90 TABLET | Refills: 3 | Status: CANCELLED | OUTPATIENT
Start: 2020-05-27 | End: 2021-05-27

## 2020-05-27 RX ORDER — METFORMIN HYDROCHLORIDE 500 MG/1
500 TABLET, EXTENDED RELEASE ORAL
Qty: 90 TABLET | Refills: 0 | Status: SHIPPED | OUTPATIENT
Start: 2020-05-27 | End: 2020-06-26 | Stop reason: SDUPTHER

## 2020-05-27 RX ORDER — INSULIN LISPRO 100 [IU]/ML
INJECTION, SOLUTION INTRAVENOUS; SUBCUTANEOUS
Qty: 3 VIAL | Refills: 0 | Status: SHIPPED | OUTPATIENT
Start: 2020-05-27 | End: 2020-06-05 | Stop reason: SDUPTHER

## 2020-05-27 NOTE — TELEPHONE ENCOUNTER
----- Message from Garry Stover MD sent at 5/27/2020  3:13 PM CDT -----  Needs appointment with Dr. Alexander

## 2020-05-27 NOTE — TELEPHONE ENCOUNTER
His Jardiance is now costing $45 for a month supply.   Will send to mail order and see if it is cheaper.   MA will reach out to schedule labs and appointment.

## 2020-05-27 NOTE — PROGRESS NOTES
Subjective:       Patient ID: Cj Barnes is a 54 y.o. male.    Chief Complaint: No chief complaint on file.    The patient location is:  Home  The chief complaint leading to consultation is:  Checkup    Visit type: audiovisual    Face to Face time with patient:  12 minutes  Seventeen minutes of total time spent on the encounter, which includes face to face time and non-face to face time preparing to see the patient (eg, review of tests), Obtaining and/or reviewing separately obtained history, Documenting clinical information in the electronic or other health record, Independently interpreting results (not separately reported) and communicating results to the patient/family/caregiver, or Care coordination (not separately reported).         Each patient to whom he or she provides medical services by telemedicine is:  (1) informed of the relationship between the physician and patient and the respective role of any other health care provider with respect to management of the patient; and (2) notified that he or she may decline to receive medical services by telemedicine and may withdraw from such care at any time.    Notes:  Patient is status post aortic valve replacement last year.  Having trouble sleeping, says he can hear his valve clicking when he tries to rest, so he has been taking Ambien maybe every other day, requesting refill.  No adverse side effects.  Denies any recent hypoglycemia, but having trouble affording Jardiance.  No dizziness or lightheadedness.  Denies chest pain or palpitations.    Review of Systems   Constitutional: Negative for fever.   HENT: Negative for trouble swallowing.    Respiratory: Negative for shortness of breath.    Cardiovascular: Negative for chest pain and palpitations.   Gastrointestinal: Negative for abdominal pain, nausea and vomiting.   Skin: Negative for rash and wound.   Neurological: Negative for dizziness and light-headedness.   Hematological: Bruises/bleeds easily.    Psychiatric/Behavioral: Positive for sleep disturbance.       Objective:      There were no vitals filed for this visit.  Physical Exam   Constitutional: He is oriented to person, place, and time. He appears well-developed and well-nourished.   Pulmonary/Chest: No respiratory distress.   Neurological: He is alert and oriented to person, place, and time.   Psychiatric: He has a normal mood and affect. His behavior is normal.       Lab Results   Component Value Date    WBC 6.90 03/23/2020    HGB 11.6 (L) 03/23/2020    HCT 36.1 (L) 03/23/2020     03/23/2020    CHOL 156 03/23/2020    TRIG 95 03/23/2020    HDL 49 03/23/2020    ALT 23 03/23/2020    AST 25 03/23/2020     03/23/2020    K 3.7 03/23/2020     03/23/2020    CREATININE 1.0 03/23/2020    BUN 26 (H) 03/23/2020    CO2 24 03/23/2020    TSH 1.13 04/24/2019    PSA 0.25 03/23/2020    INR 2.6 (H) 05/19/2020    GLUF 160 (H) 01/09/2020    HGBA1C 7.3 (H) 01/09/2020      Assessment:       1. S/P AVR (aortic valve replacement)    2. Type 1 diabetes mellitus with hypoglycemia unawareness    3. Type 1 diabetes mellitus with hyperglycemia    4. Atrial flutter, unspecified type    5. Insomnia, unspecified type    6. Hyperlipidemia with target low density lipoprotein (LDL) cholesterol less than 70 mg/dL    7. Screening for HIV (human immunodeficiency virus)        Plan:       S/P AVR (aortic valve replacement)  -     Ambulatory referral/consult to Cardiology; Future; Expected date: 06/03/2020    Type 1 diabetes mellitus with hypoglycemia unawareness    Type 1 diabetes mellitus with hyperglycemia  -     empagliflozin (JARDIANCE) 25 mg Tab; Take 1 tablet by mouth once daily.  Dispense: 90 tablet; Refill: 0  -     metFORMIN (GLUCOPHAGE-XR) 500 MG XR 24hr tablet; Take 1 tablet (500 mg total) by mouth daily with breakfast.  Dispense: 90 tablet; Refill: 0  -     simvastatin (ZOCOR) 20 MG tablet; Take 1 tablet (20 mg total) by mouth every evening.  Dispense: 90  "tablet; Refill: 3  -     insulin lispro (HUMALOG U-100 INSULIN) 100 unit/mL injection; To use continuously with insulin pump.  Max total daily dose of 90 units  Dispense: 3 vial; Refill: 0  -     Hemoglobin A1C; Future; Expected date: 05/27/2020  Needs updated labs and endocrinology follow-up  Atrial flutter, unspecified type  -     Ambulatory referral/consult to Cardiology; Future; Expected date: 06/03/2020  Continue current regimen, needs new cardiologist  Insomnia, unspecified type  -     zolpidem (AMBIEN) 10 mg Tab; Take 1 tablet (10 mg total) by mouth nightly as needed.  Dispense: 30 tablet; Refill: 2  Advised to use Ambien sparingly  Hyperlipidemia with target low density lipoprotein (LDL) cholesterol less than 70 mg/dL  -     Comprehensive metabolic panel; Future; Expected date: 05/27/2020  -     Lipid Panel; Future; Expected date: 05/27/2020    Screening for HIV (human immunodeficiency virus)  -     HIV 1/2 Ag/Ab (4th Gen); Future; Expected date: 05/27/2020      Medication List with Changes/Refills   Current Medications    AMIODARONE (PACERONE) 200 MG TAB    Take 1 tablet (200 mg total) by mouth once daily.    ASPIRIN (ECOTRIN) 81 MG EC TABLET    Take 1 tablet (81 mg total) by mouth once daily.    BLOOD SUGAR DIAGNOSTIC STRP    1 each by Misc.(Non-Drug; Combo Route) route 6 (six) times daily. Contour next strips. Pt needs contour jackelyn for compatibility w/ insulin pump (medtronic 670g). Necessity    BLOOD-GLUCOSE SENSOR (GUARDIAN SENSOR 3) MORIS    1 each by Misc.(Non-Drug; Combo Route) route once a week.    CLOTRIMAZOLE-BETAMETHASONE 1-0.05% (LOTRISONE) CREAM    APPLY A SMALL AMOUNT TO AFFECTED AREA THREE TIMES DAILY UNTIL CLEAR    COMFORT EZ PEN NEEDLES 33 GAUGE X 3/16" NDLE    USE AS DIRECTED with Levemir pens    GABAPENTIN (NEURONTIN) 300 MG CAPSULE    Take 2 capsules (600 mg total) by mouth 3 (three) times daily.    GLUCAGON, HUMAN RECOMBINANT, (GLUCAGON EMERGENCY KIT, HUMAN,) 1 MG SOLR    Inject 1 mg " into the muscle as needed.    INSULIN DETEMIR U-100 (LEVEMIR FLEXTOUCH U-100 INSULN) 100 UNIT/ML (3 ML) SUBQ INPN PEN    Inject 48 Units into the skin every evening.    INSULIN REGULAR 100 UNIT/ML SOLN    Use home settings Basal 0.4    Bolus/carb 12    ISF 20    LISINOPRIL 10 MG TABLET    Take 1 tablet (10 mg total) by mouth once daily.    WARFARIN (COUMADIN) 3 MG TABLET    Take 2 tablets (6 mg total) by mouth Daily.   Changed and/or Refilled Medications    Modified Medication Previous Medication    EMPAGLIFLOZIN (JARDIANCE) 25 MG TAB JARDIANCE 25 mg Tab       Take 1 tablet by mouth once daily.    TAKE ONE TABLET BY MOUTH ONCE DAILY    INSULIN LISPRO (HUMALOG U-100 INSULIN) 100 UNIT/ML INJECTION insulin lispro (HUMALOG U-100 INSULIN) 100 unit/mL injection       To use continuously with insulin pump.  Max total daily dose of 90 units    To use continuously with insulin pump.  Max total daily dose of 90 units    METFORMIN (GLUCOPHAGE-XR) 500 MG XR 24HR TABLET metFORMIN (GLUCOPHAGE-XR) 500 MG XR 24hr tablet       Take 1 tablet (500 mg total) by mouth daily with breakfast.    TAKE ONE TABLET BY MOUTH DAILY WITH BREAKFAST    SIMVASTATIN (ZOCOR) 20 MG TABLET simvastatin (ZOCOR) 20 MG tablet       Take 1 tablet (20 mg total) by mouth every evening.    TAKE ONE TABLET BY MOUTH EVERY EVENING    ZOLPIDEM (AMBIEN) 10 MG TAB zolpidem (AMBIEN) 10 mg Tab       Take 1 tablet (10 mg total) by mouth nightly as needed.    Take 1 tablet (10 mg total) by mouth nightly as needed.   Discontinued Medications    PANTOPRAZOLE (PROTONIX) 40 MG TABLET    Take 1 tablet (40 mg total) by mouth before breakfast.

## 2020-05-27 NOTE — TELEPHONE ENCOUNTER
----- Message from Garry Stover MD sent at 5/27/2020  3:13 PM CDT -----  Having trouble affording Jardiance. Due for labs. Says he was waiting for you to get back from maternity leave. Labs ordered. I told him to get labs done next few weeks and f/u with you.

## 2020-06-03 ENCOUNTER — PATIENT OUTREACH (OUTPATIENT)
Dept: ADMINISTRATIVE | Facility: OTHER | Age: 55
End: 2020-06-03

## 2020-06-04 ENCOUNTER — OFFICE VISIT (OUTPATIENT)
Dept: PODIATRY | Facility: CLINIC | Age: 55
End: 2020-06-04
Payer: MEDICARE

## 2020-06-04 VITALS
HEIGHT: 68 IN | HEART RATE: 73 BPM | WEIGHT: 205 LBS | SYSTOLIC BLOOD PRESSURE: 127 MMHG | BODY MASS INDEX: 31.07 KG/M2 | DIASTOLIC BLOOD PRESSURE: 68 MMHG | RESPIRATION RATE: 18 BRPM

## 2020-06-04 DIAGNOSIS — E10.3299 TYPE 1 DIABETES MELLITUS WITH MILD NONPROLIFERATIVE RETINOPATHY WITHOUT MACULAR EDEMA, UNSPECIFIED LATERALITY: Primary | ICD-10-CM

## 2020-06-04 PROCEDURE — 3008F BODY MASS INDEX DOCD: CPT | Mod: CPTII,S$GLB,, | Performed by: PODIATRIST

## 2020-06-04 PROCEDURE — 3078F DIAST BP <80 MM HG: CPT | Mod: CPTII,S$GLB,, | Performed by: PODIATRIST

## 2020-06-04 PROCEDURE — 99213 OFFICE O/P EST LOW 20 MIN: CPT | Mod: S$GLB,,, | Performed by: PODIATRIST

## 2020-06-04 PROCEDURE — 99499 UNLISTED E&M SERVICE: CPT | Mod: S$GLB,,, | Performed by: PODIATRIST

## 2020-06-04 PROCEDURE — 99999 PR PBB SHADOW E&M-EST. PATIENT-LVL III: CPT | Mod: PBBFAC,,, | Performed by: PODIATRIST

## 2020-06-04 PROCEDURE — 3074F SYST BP LT 130 MM HG: CPT | Mod: CPTII,S$GLB,, | Performed by: PODIATRIST

## 2020-06-04 PROCEDURE — 99499 RISK ADDL DX/OHS AUDIT: ICD-10-PCS | Mod: S$GLB,,, | Performed by: PODIATRIST

## 2020-06-04 PROCEDURE — 3074F PR MOST RECENT SYSTOLIC BLOOD PRESSURE < 130 MM HG: ICD-10-PCS | Mod: CPTII,S$GLB,, | Performed by: PODIATRIST

## 2020-06-04 PROCEDURE — 3078F PR MOST RECENT DIASTOLIC BLOOD PRESSURE < 80 MM HG: ICD-10-PCS | Mod: CPTII,S$GLB,, | Performed by: PODIATRIST

## 2020-06-04 PROCEDURE — 3008F PR BODY MASS INDEX (BMI) DOCUMENTED: ICD-10-PCS | Mod: CPTII,S$GLB,, | Performed by: PODIATRIST

## 2020-06-04 PROCEDURE — 99213 PR OFFICE/OUTPT VISIT, EST, LEVL III, 20-29 MIN: ICD-10-PCS | Mod: S$GLB,,, | Performed by: PODIATRIST

## 2020-06-04 PROCEDURE — 99999 PR PBB SHADOW E&M-EST. PATIENT-LVL III: ICD-10-PCS | Mod: PBBFAC,,, | Performed by: PODIATRIST

## 2020-06-04 PROCEDURE — 3051F PR MOST RECENT HEMOGLOBIN A1C LEVEL 7.0 - < 8.0%: ICD-10-PCS | Mod: CPTII,S$GLB,, | Performed by: PODIATRIST

## 2020-06-04 PROCEDURE — 3051F HG A1C>EQUAL 7.0%<8.0%: CPT | Mod: CPTII,S$GLB,, | Performed by: PODIATRIST

## 2020-06-04 NOTE — PROGRESS NOTES
CC:   Chief Complaint   Patient presents with    Diabetes Mellitus       HPI: Cj Barnes is a 54 y.o. male presents for a follow up visit today for the management of T1DM.   The patient was diagnosed in his early 30's. Initially diagnosed with T2DM, 5 years later he was dx as T1.    He has used an insulin pump since 2006, previous on animas.  He is now on  670 G medtronic pump. He has the Guardian sensor but he has stopped wearing it as he reports that in Auto mode he was not receiving enough insulin. He did not like auto mode at all. He reports that he was entering in false CHO  Since last visit he received a new Medtronic pump- due to product recall  His A1c has increased to 7.9%. He feels that he needs more basal insulin. Following his valve replacement surgery last year, his basal rate went from 2.65 units/hr to 0.45 units/hr. He reports that his fasting blood sugar readings are in the 180-200. He reports that he enters all his CHO intake into his pump. He feels confident in his CHO counting.   He is concerned that he will not be able to continue with Jardiance as the cost has significantly increased from $45/month to $100/ month.   He is under a lot of stress with his job.   He is interested in the Dexcom sensor     His BP is markedly above goal in clinic today. Repeated manually by myself and still markedly above goal. He is monitoring his BP at home and shows me his readings which are mostly 130-140/70's   He attest to compliance with medications.    His cholesterol is above goal. He reports he use to take Lipitor but was switched to  Simvastatin for some reason- he is unsure why.       Family hx of diabetes: Mother, father, 3 brothers - no one with T1     Hospitalized for diabetes: denies     No personal or FH of thyroid cancer or personal of pancreatic cancer or pancreatitis.       DIABETES COMPLICATIONS: retinopathy, peripheral neuropathy, cardiovascular disease and cerebrovascular disease  TIA      Diabetes Management Status    ASA:  Yes - 81 mg daily and coumadin     Statin: Taking  ACE/ARB: Taking    Screening or Prevention Patient's value Goal Complete/Controlled?   HgA1C Testing and Control   Lab Results   Component Value Date    HGBA1C 7.9 (H) 06/01/2020      Annually/Less than 8% Yes   Lipid profile : 06/01/2020 Annually Yes   LDL control Lab Results   Component Value Date    LDLCALC 106 (H) 06/01/2020    Annually/Less than 100 mg/dl  Yes   Nephropathy screening Lab Results   Component Value Date    LABMICR <2.5 04/23/2019     Lab Results   Component Value Date    PROTEINUA Negative 08/16/2019    Annually Yes   Blood pressure BP Readings from Last 1 Encounters:   06/05/20 (!) 170/74    Less than 140/90 Yes   Dilated retinal exam : 05/01/2019- Annually No   Foot exam   : 06/05/2020 Annually Yes       CURRENT A1C:    Hemoglobin A1C   Date Value Ref Range Status   06/01/2020 7.9 (H) 4.0 - 5.6 % Final     Comment:     ADA Screening Guidelines:  5.7-6.4%  Consistent with prediabetes  >or=6.5%  Consistent with diabetes  High levels of fetal hemoglobin interfere with the HbA1C  assay. Heterozygous hemoglobin variants (HbS, HgC, etc)do  not significantly interfere with this assay.   However, presence of multiple variants may affect accuracy.     01/09/2020 7.3 (H) 4.0 - 5.6 % Final     Comment:     ADA Screening Guidelines:  5.7-6.4%  Consistent with prediabetes  >or=6.5%  Consistent with diabetes  High levels of fetal hemoglobin interfere with the HbA1C  assay. Heterozygous hemoglobin variants (HbS, HgC, etc)do  not significantly interfere with this assay.   However, presence of multiple variants may affect accuracy.     09/30/2019 6.1 (H) 4.0 - 5.6 % Final     Comment:     ADA Screening Guidelines:  5.7-6.4%  Consistent with prediabetes  >or=6.5%  Consistent with diabetes  High levels of fetal hemoglobin interfere with the HbA1C  assay. Heterozygous hemoglobin variants (HbS, HgC, etc)do  not significantly  interfere with this assay.   However, presence of multiple variants may affect accuracy.         GOAL A1C: 6.5-7% - without hypoglycemia    DM MEDICATIONS USED IN THE PAST: Medtronic 670 G   Metformin   Steglatro- lack of coverage.   Jardiance     CURRENT DIABETES MEDICATIONS: Medtronic insulin pump, Metformin 500 mg daily with breakfast, Jardiance 25 mg daily (now costing him $100 per month)     Insulin Pump: Medtronic 670G with Humalog       Pump settings:  Basal:    12A- 6A-- 0.525 Units/hr  6A-10A-   0.475  Units/hr  10A-12A- 0.525  Units/hr      ICR: 1:10    ISF: 20    Target:  120    IOB:  3.15    Temp basals: none     Pump site change: q 12 days   Cartridge change: q 12 days   Insulin TDD:  Unable to view   CHO intake: per oral recall: ~  CHO per meal   Using the bolus wizard: yes   Overriding: no    Back up Lantus/Levemir: Yes     Addendum--we were able to download his Medtronic pump.  Please see attachment for download details.  On average she is using 51 units of insulin per day with 75% of it being in bolus and only 25% basal.  On download review it looks like he is changing his reservoir and site every 12 days instead of every 3--will review this with patient as it will affect insulin absorption.  He is eating high carbohydrate on average 375 g per day.  Now that I am able to look at the log book he is only entering in his blood sugar readings occasionally.  Not consistently.  Will review this with patient  Sensor type: Not using the sensor     BLOOD GLUCOSE MONITORING:  BG monitoring 4 times per day.   No logs or meter to clinic today for review.   He enters most BG readings into his pump   FB-200's   Throughout the day: 140-160's       HYPOGLYCEMIA:  Rarely   He doesn't feel BG readings until the 40-50's - once in the 50's he will have twitching to his eye.   Glucagon kit: yes  Medic alert bracelet: yes       MEALS: eating 2 meals per day   He accounts for all CHO intake into his pump.      BF: ~ 1-2 breakfast burritos or egg mc muffin or croissant     Lunch: bag of chips or something light.   Dinner: ~ eating out home cooked style- [employer], [address]. He loves salads- light Italian or michael dressing   Snack: occ- dry cereal -- mid night snack   Drinks: un sweet tea, water, coke zero.   He CHO counts- he feels comfortable.      CURRENT EXERCISE:  Was in the gym prior to the virus. -- now walking.     Review of Systems  Review of Systems   Constitutional: Negative for appetite change, fatigue and unexpected weight change.   HENT: Negative for trouble swallowing.    Eyes: Negative for visual disturbance.   Respiratory: Negative for shortness of breath.    Cardiovascular: Negative for chest pain.   Gastrointestinal: Negative for nausea.   Endocrine: Negative for polydipsia, polyphagia and polyuria.   Genitourinary:        No Nocturia    Musculoskeletal: Negative for back pain.   Skin: Negative for wound.   Neurological: Negative for numbness.   Psychiatric/Behavioral: Positive for sleep disturbance (due to valve clicking sounds).       Physical Exam   Physical Exam   Constitutional: He is oriented to person, place, and time. He appears well-developed and well-nourished. No distress.   HENT:   Head: Normocephalic and atraumatic.   Left Ear: External ear normal.   Nose: Nose normal.   Neck: Normal range of motion. Neck supple. No tracheal deviation present. No thyromegaly present.   Cardiovascular: Normal rate and regular rhythm.   + mechanical valve clicks   No edema    Pulmonary/Chest: Effort normal and breath sounds normal. No respiratory distress.   Abdominal: Soft. There is no tenderness. No hernia.   Musculoskeletal: He exhibits no edema.   Neurological: He is alert and oriented to person, place, and time. No cranial nerve deficit.   Skin: Skin is warm and dry. Capillary refill takes less than 2 seconds. No rash noted. He is not diaphoretic.   Insulin pump sites are normal appearing. No lipo  hypertropthy or atrophy- to abdomen    Psychiatric: He has a normal mood and affect. His behavior is normal. Judgment normal.   Nursing note and vitals reviewed.      FOOT EXAMINATION: Appropriate footwear     Protective Sensation (w/ 10 gram monofilament):  Right: Intact  Left: Intact    Visual Inspection:  Normal -  Bilateral, Nails Intact - without Evidence of Foot Deformity- Bilateral and Onychomycosis -  Bilateral    Pedal Pulses:   Right: Present  Left: Present    Posterior tibialis:   Right:Present  Left: Present         Lab Results   Component Value Date    TSH 1.13 04/24/2019       Type 1 diabetes mellitus with hyperglycemia  Uncontrolled   A1c above goal   He may have to stop Jardiance due to cost.   Encouraged patient o cut back on CHO intake -- he reports on average  CHO with each meal.       Medication changes:   Continue Metformin and Jardiance (if able to afford Jardiance)    Pump settings:  Basal:  0.9 units/hr  -- weight based at 0.5units/kg/day TDD     ICR: 1:10    ISF: 20    Target:  120    IOB:  3 hours       -- Reviewed goals of therapy are to get the best control we can without hypoglycemia  -- Refer to diabetes education-- Dexcom start   -- Reviewed patient's current insulin regimen. Clarified proper insulin dose and timing in relation to meals, etc. Insulin injection sites and proper rotation instructed.    -- Advised frequent self blood glucose monitoring.  Patient encouraged to document glucose results and bring them to every clinic visit  --continue to monitor BG 4 times per day will submit paperwork for Dexcom to [employer] [employer]   -- Hypoglycemia precautions discussed. Instructed on precautions before driving.    -- Call for Bg repeatedly < 90 or > 180.   -- Close adherence to lifestyle changes recommended.   -- Periodic follow ups for eye evaluations, foot care and dental care suggested.      Type 1 diabetes mellitus with hypoglycemia unawareness  Avoid hypoglycemia.  Patient would  greatly benefit from a personal continuous glucose monitor such as the Dexcom. It will allow the patient to turn the pump off prior to hypoglycemia occurring.  In addition to helping to manage glycemic control, it will also help to prevent hypoglycemia.    Insulin pump in place  See above for pump changes     Insulin pump fitting or adjustment  See above for pump changes     Atherosclerotic heart disease of native coronary artery with unstable angina pectoris  Avoid hypoglycemia  On aspirin and coumadin     Essential hypertension  BP goal is < 140/90.   Tolerating ACEi  BP markedly above goal in clinic today. BP at/above goal at home.   Increase Lisinopril to 20 mg daily. Nurse visit in 1-2 weeks for BP check   Blood pressure goals discussed with patient      Hyperlipidemia with target low density lipoprotein (LDL) cholesterol less than 70 mg/dL  On statin per ADA recommendations  LDL goal < 70 (due to hx of TIA/cardaic hx). LDL above goal. LFTs WNL.  Change statin back to Lipitor. Rx sent to pharmacy   Follows with cardiology     TIA (transient ischemic attack)  Optimize blood sugar readings    Class 1 obesity due to excess calories with serious comorbidity and body mass index (BMI) of 33.0 to 33.9 in adult  Body mass index is 33.89 kg/m².  Increases insulin resistance.   Discussed DM diet and exercise.   Patient does exhibit insulin resistance and is on an SGLT 2 and metformin.      Follow up in about 4 weeks (around 7/3/2020).-- pump download      ADDENDUM-- called patient to discuss insulin pump download.  Encourage Center in all blood sugar readings in to his insulin pump.  Also encouraged to change his infusion site and insulin reservoir every 3 days.     No orders of the defined types were placed in this encounter.      Recommendations were discussed with the patient in detail  The patient verbalized understanding and agrees with the plan outlined as above.

## 2020-06-05 ENCOUNTER — OFFICE VISIT (OUTPATIENT)
Dept: DIABETES | Facility: CLINIC | Age: 55
End: 2020-06-05
Payer: MEDICARE

## 2020-06-05 VITALS
WEIGHT: 222.88 LBS | SYSTOLIC BLOOD PRESSURE: 170 MMHG | BODY MASS INDEX: 33.78 KG/M2 | OXYGEN SATURATION: 97 % | HEIGHT: 68 IN | HEART RATE: 58 BPM | DIASTOLIC BLOOD PRESSURE: 74 MMHG

## 2020-06-05 DIAGNOSIS — E78.5 HYPERLIPIDEMIA WITH TARGET LOW DENSITY LIPOPROTEIN (LDL) CHOLESTEROL LESS THAN 70 MG/DL: ICD-10-CM

## 2020-06-05 DIAGNOSIS — E10.65 TYPE 1 DIABETES MELLITUS WITH HYPERGLYCEMIA: Primary | ICD-10-CM

## 2020-06-05 DIAGNOSIS — I10 ESSENTIAL HYPERTENSION: ICD-10-CM

## 2020-06-05 DIAGNOSIS — E66.09 CLASS 1 OBESITY DUE TO EXCESS CALORIES WITH SERIOUS COMORBIDITY AND BODY MASS INDEX (BMI) OF 33.0 TO 33.9 IN ADULT: ICD-10-CM

## 2020-06-05 DIAGNOSIS — E10.649 TYPE 1 DIABETES MELLITUS WITH HYPOGLYCEMIA UNAWARENESS: ICD-10-CM

## 2020-06-05 DIAGNOSIS — I25.110 ATHEROSCLEROSIS OF NATIVE CORONARY ARTERY OF NATIVE HEART WITH UNSTABLE ANGINA PECTORIS: ICD-10-CM

## 2020-06-05 DIAGNOSIS — Z96.41 INSULIN PUMP IN PLACE: ICD-10-CM

## 2020-06-05 DIAGNOSIS — Z46.81 INSULIN PUMP FITTING OR ADJUSTMENT: ICD-10-CM

## 2020-06-05 DIAGNOSIS — G45.9 TIA (TRANSIENT ISCHEMIC ATTACK): ICD-10-CM

## 2020-06-05 PROCEDURE — 99499 RISK ADDL DX/OHS AUDIT: ICD-10-PCS | Mod: S$GLB,,, | Performed by: NURSE PRACTITIONER

## 2020-06-05 PROCEDURE — 3008F PR BODY MASS INDEX (BMI) DOCUMENTED: ICD-10-PCS | Mod: CPTII,S$GLB,, | Performed by: NURSE PRACTITIONER

## 2020-06-05 PROCEDURE — 3051F HG A1C>EQUAL 7.0%<8.0%: CPT | Mod: CPTII,S$GLB,, | Performed by: NURSE PRACTITIONER

## 2020-06-05 PROCEDURE — 99214 OFFICE O/P EST MOD 30 MIN: CPT | Mod: S$GLB,,, | Performed by: NURSE PRACTITIONER

## 2020-06-05 PROCEDURE — 99999 PR PBB SHADOW E&M-EST. PATIENT-LVL III: ICD-10-PCS | Mod: PBBFAC,,, | Performed by: NURSE PRACTITIONER

## 2020-06-05 PROCEDURE — 3077F PR MOST RECENT SYSTOLIC BLOOD PRESSURE >= 140 MM HG: ICD-10-PCS | Mod: CPTII,S$GLB,, | Performed by: NURSE PRACTITIONER

## 2020-06-05 PROCEDURE — 3008F BODY MASS INDEX DOCD: CPT | Mod: CPTII,S$GLB,, | Performed by: NURSE PRACTITIONER

## 2020-06-05 PROCEDURE — 3051F PR MOST RECENT HEMOGLOBIN A1C LEVEL 7.0 - < 8.0%: ICD-10-PCS | Mod: CPTII,S$GLB,, | Performed by: NURSE PRACTITIONER

## 2020-06-05 PROCEDURE — 3078F PR MOST RECENT DIASTOLIC BLOOD PRESSURE < 80 MM HG: ICD-10-PCS | Mod: CPTII,S$GLB,, | Performed by: NURSE PRACTITIONER

## 2020-06-05 PROCEDURE — 99214 PR OFFICE/OUTPT VISIT, EST, LEVL IV, 30-39 MIN: ICD-10-PCS | Mod: S$GLB,,, | Performed by: NURSE PRACTITIONER

## 2020-06-05 PROCEDURE — 99499 UNLISTED E&M SERVICE: CPT | Mod: S$GLB,,, | Performed by: NURSE PRACTITIONER

## 2020-06-05 PROCEDURE — 3077F SYST BP >= 140 MM HG: CPT | Mod: CPTII,S$GLB,, | Performed by: NURSE PRACTITIONER

## 2020-06-05 PROCEDURE — 99999 PR PBB SHADOW E&M-EST. PATIENT-LVL III: CPT | Mod: PBBFAC,,, | Performed by: NURSE PRACTITIONER

## 2020-06-05 PROCEDURE — 3078F DIAST BP <80 MM HG: CPT | Mod: CPTII,S$GLB,, | Performed by: NURSE PRACTITIONER

## 2020-06-05 RX ORDER — INSULIN LISPRO 100 [IU]/ML
INJECTION, SOLUTION INTRAVENOUS; SUBCUTANEOUS
Qty: 3 VIAL | Refills: 6 | Status: SHIPPED | OUTPATIENT
Start: 2020-06-05 | End: 2020-06-15 | Stop reason: SDUPTHER

## 2020-06-05 RX ORDER — ATORVASTATIN CALCIUM 40 MG/1
40 TABLET, FILM COATED ORAL NIGHTLY
Qty: 30 TABLET | Refills: 6 | Status: SHIPPED | OUTPATIENT
Start: 2020-06-05 | End: 2020-10-14 | Stop reason: SDUPTHER

## 2020-06-05 RX ORDER — LISINOPRIL 20 MG/1
20 TABLET ORAL DAILY
Qty: 90 TABLET | Refills: 1 | Status: SHIPPED | OUTPATIENT
Start: 2020-06-05 | End: 2020-08-31

## 2020-06-05 NOTE — ASSESSMENT & PLAN NOTE
Body mass index is 33.89 kg/m².  Increases insulin resistance.   Discussed DM diet and exercise.   Patient does exhibit insulin resistance and is on an SGLT 2 and metformin.

## 2020-06-05 NOTE — ASSESSMENT & PLAN NOTE
Uncontrolled   A1c above goal   He may have to stop Jardiance due to cost.   Encouraged patient o cut back on CHO intake -- he reports on average  CHO with each meal.       Medication changes:   Continue Metformin and Jardiance (if able to afford Jardiance)    Pump settings:  Basal:  0.9 units/hr  -- weight based at 0.5units/kg/day TDD     ICR: 1:10    ISF: 20    Target:  120    IOB:  3 hours       -- Reviewed goals of therapy are to get the best control we can without hypoglycemia  -- Refer to diabetes education-- Dexcom start   -- Reviewed patient's current insulin regimen. Clarified proper insulin dose and timing in relation to meals, etc. Insulin injection sites and proper rotation instructed.    -- Advised frequent self blood glucose monitoring.  Patient encouraged to document glucose results and bring them to every clinic visit  --continue to monitor BG 4 times per day will submit paperwork for Dexcom to [employer]   -- Hypoglycemia precautions discussed. Instructed on precautions before driving.    -- Call for Bg repeatedly < 90 or > 180.   -- Close adherence to lifestyle changes recommended.   -- Periodic follow ups for eye evaluations, foot care and dental care suggested.

## 2020-06-05 NOTE — ASSESSMENT & PLAN NOTE
On statin per ADA recommendations  LDL goal < 70 (due to hx of TIA/cardaic hx). LDL above goal. LFTs WNL.  Change statin back to Lipitor. Rx sent to pharmacy   Follows with cardiology

## 2020-06-05 NOTE — ASSESSMENT & PLAN NOTE
Avoid hypoglycemia.  Patient would greatly benefit from a personal continuous glucose monitor such as the Dexcom. It will allow the patient to turn the pump off prior to hypoglycemia occurring.  In addition to helping to manage glycemic control, it will also help to prevent hypoglycemia.

## 2020-06-05 NOTE — ASSESSMENT & PLAN NOTE
BP goal is < 140/90.   Tolerating ACEi  BP markedly above goal in clinic today. BP at/above goal at home.   Increase Lisinopril to 20 mg daily. Nurse visit in 1-2 weeks for BP check   Blood pressure goals discussed with patient

## 2020-06-09 NOTE — PROGRESS NOTES
Subjective:      Patient ID: Cj Barnes is a 54 y.o. male.    Chief Complaint: PCP (Garry Stover MD 5/27/20); Diabetic Foot Exam; and Nail Problem (nail krystal us )    Cj is a 54 y.o. male who presents to the clinic upon referral from Dr. Ni bedolla. provider found  for evaluation and treatment of diabetic feet. Cj has a past medical history of Aortic stenosis (2018), Cancer (2014), Colon cancer, Coronary artery disease, Diabetes mellitus type I, Heart murmur, Heel fracture, HTN (hypertension), Hyperlipidemia LDL goal < 70, Insulin pump in place, MVP (mitral valve prolapse), and Stenosis of aortic and mitral valves. Patient relates no major problem with feet. Only complaints today consist of yearly comprehensive diabetic  foot examination  .    PCP: Garry Stover MD    Date Last Seen by PCP:   Chief Complaint   Patient presents with    PCP     Garry Stover MD 5/27/20    Diabetic Foot Exam    Nail Problem     nail krystal us          Current shoe gear: Casual shoes    Hemoglobin A1C   Date Value Ref Range Status   06/01/2020 7.9 (H) 4.0 - 5.6 % Final     Comment:     ADA Screening Guidelines:  5.7-6.4%  Consistent with prediabetes  >or=6.5%  Consistent with diabetes  High levels of fetal hemoglobin interfere with the HbA1C  assay. Heterozygous hemoglobin variants (HbS, HgC, etc)do  not significantly interfere with this assay.   However, presence of multiple variants may affect accuracy.     01/09/2020 7.3 (H) 4.0 - 5.6 % Final     Comment:     ADA Screening Guidelines:  5.7-6.4%  Consistent with prediabetes  >or=6.5%  Consistent with diabetes  High levels of fetal hemoglobin interfere with the HbA1C  assay. Heterozygous hemoglobin variants (HbS, HgC, etc)do  not significantly interfere with this assay.   However, presence of multiple variants may affect accuracy.     09/30/2019 6.1 (H) 4.0 - 5.6 % Final     Comment:     ADA Screening Guidelines:  5.7-6.4%  Consistent with  prediabetes  >or=6.5%  Consistent with diabetes  High levels of fetal hemoglobin interfere with the HbA1C  assay. Heterozygous hemoglobin variants (HbS, HgC, etc)do  not significantly interfere with this assay.   However, presence of multiple variants may affect accuracy.             Review of Systems   Constitution: Negative for chills, decreased appetite and fever.   Cardiovascular: Negative for leg swelling.   Skin: Positive for nail changes.   Musculoskeletal: Negative for arthritis, joint pain, joint swelling and myalgias.   Gastrointestinal: Negative for nausea and vomiting.   Neurological: Negative for loss of balance, numbness and paresthesias.         Patient Active Problem List   Diagnosis    Essential hypertension    Insulin pump fitting or adjustment    Type 1 diabetes mellitus with hyperglycemia    Hyperlipidemia with target low density lipoprotein (LDL) cholesterol less than 70 mg/dL    Insulin pump in place    Tobacco use    Plantar fasciitis    Aortic stenosis    Aortic regurgitation, congenital    TIA (transient ischemic attack)    Atherosclerotic heart disease of native coronary artery with unstable angina pectoris    Type 1 diabetes mellitus with hypoglycemia unawareness    Class 1 obesity due to excess calories with serious comorbidity and body mass index (BMI) of 33.0 to 33.9 in adult    S/P ascending aortic replacement    Acute blood loss anemia    Hypophosphatemia    Long term (current) use of anticoagulants    Atrial flutter    Trouble swallowing    S/P AVR (aortic valve replacement)    Anemia       Current Outpatient Medications on File Prior to Visit   Medication Sig Dispense Refill    amiodarone (PACERONE) 200 MG Tab Take 1 tablet (200 mg total) by mouth once daily. 90 tablet 3    aspirin (ECOTRIN) 81 MG EC tablet Take 1 tablet (81 mg total) by mouth once daily.  0    blood sugar diagnostic Strp 1 each by Misc.(Non-Drug; Combo Route) route 6 (six) times daily.  "Contour next strips. Pt needs contour jackelyn for compatibility w/ insulin pump (medtronic 670g). Necessity 200 strip 6    clotrimazole-betamethasone 1-0.05% (LOTRISONE) cream APPLY A SMALL AMOUNT TO AFFECTED AREA THREE TIMES DAILY UNTIL CLEAR  0    COMFORT EZ PEN NEEDLES 33 gauge x 3/16" Ndle USE AS DIRECTED with Levemir pens  11    empagliflozin (JARDIANCE) 25 mg Tab Take 1 tablet by mouth once daily. 90 tablet 1    gabapentin (NEURONTIN) 300 MG capsule Take 2 capsules (600 mg total) by mouth 3 (three) times daily. 540 capsule 3    insulin regular 100 unit/mL Soln Use home settings Basal 0.4    Bolus/carb 12    ISF 20 1 Product 0    metFORMIN (GLUCOPHAGE-XR) 500 MG XR 24hr tablet Take 1 tablet (500 mg total) by mouth daily with breakfast. 90 tablet 0    warfarin (COUMADIN) 3 MG tablet Take 2 tablets (6 mg total) by mouth Daily. 60 tablet 11    zolpidem (AMBIEN) 10 mg Tab Take 1 tablet (10 mg total) by mouth nightly as needed. 30 tablet 2    glucagon, human recombinant, (GLUCAGON EMERGENCY KIT, HUMAN,) 1 mg SolR Inject 1 mg into the muscle as needed. 1 each 0    insulin detemir U-100 (LEVEMIR FLEXTOUCH U-100 INSULN) 100 unit/mL (3 mL) SubQ InPn pen Inject 48 Units into the skin every evening. 3 mL 0     No current facility-administered medications on file prior to visit.        Review of patient's allergies indicates:  No Known Allergies    Past Surgical History:   Procedure Laterality Date    AORTIC VALVE REPLACEMENT N/A 8/9/2019    Procedure: Replacement-valve-aortic;  Surgeon: Ryan Knight MD;  Location: [facility] OR [address] [place] [address];  Service: Cardiothoracic;  Laterality: N/A;    BACK SURGERY      BENTALL PROCEDURE FOR REPLACEMENT OF AORTIC VALVE, AORTIC ROOT, AND ASCENDING AORTA N/A 8/9/2019    Procedure: BENTALL PROCEDURE;  Surgeon: Ryan Knight MD;  Location: [facility] OR [address] [place] [address];  Service: Cardiothoracic;  Laterality: N/A;    COLON SURGERY  2014    COLONOSCOPY N/A 2/17/2020    Procedure: " COLONOSCOPY;  Surgeon: Richi Mock MD;  Location: [facility] ENDO;  Service: Colon and Rectal;  Laterality: N/A;    FOOT TENDON SURGERY      right and left heart cath Bilateral 01/15/2018    TREATMENT OF CARDIAC ARRHYTHMIA N/A 8/19/2019    Procedure: CARDIOVERSION;  Surgeon: Juan Zapata MD;  Location: [facility] EP LAB;  Service: Cardiology;  Laterality: N/A;  afib, dccv only, anes, GP, 3092    TRIGGER FINGER RELEASE      x 2       Family History   Problem Relation Age of Onset    Heart disease Mother     Lung cancer Mother     Heart attack Father     Diabetes Father     HIV Brother        Social History     Socioeconomic History    Marital status:      Spouse name: Not on file    Number of children: Not on file    Years of education: Not on file    Highest education level: Not on file   Occupational History    Not on file   Social Needs    Financial resource strain: Not on file    Food insecurity:     Worry: Not on file     Inability: Not on file    Transportation needs:     Medical: Not on file     Non-medical: Not on file   Tobacco Use    Smoking status: Never Smoker    Smokeless tobacco: Former User     Types: Snuff    Tobacco comment: since he was 12 yrs old   Substance and Sexual Activity    Alcohol use: No     Comment: socially    Drug use: No    Sexual activity: Not on file   Lifestyle    Physical activity:     Days per week: Not on file     Minutes per session: Not on file    Stress: Not on file   Relationships    Social connections:     Talks on phone: Not on file     Gets together: Not on file     Attends Jewish service: Not on file     Active member of club or organization: Not on file     Attends meetings of clubs or organizations: Not on file     Relationship status: Not on file   Other Topics Concern    Not on file   Social History Narrative        2 grown kids    Delivers seafood               Objective:       Vitals:    06/04/20 1400   BP: 127/68   Pulse:  "73   Resp: 18   Weight: 93 kg (205 lb 0.4 oz)   Height: 5' 8" (1.727 m)   PainSc: 0-No pain        Physical Exam   Constitutional: He appears well-developed and well-nourished.  Non-toxic appearance. He does not have a sickly appearance. No distress.   alert and oriented x 3.    Cardiovascular:   Pulses:       Dorsalis pedis pulses are 2+ on the right side, and 2+ on the left side.        Posterior tibial pulses are 2+ on the right side, and 2+ on the left side.    Capillary refill time is within normal limits. Digital hair present.    Pulmonary/Chest: No respiratory distress.   Musculoskeletal: He exhibits no deformity.        Right ankle: No tenderness. No lateral malleolus, no medial malleolus, no AITFL, no CF ligament and no posterior TFL tenderness found. Achilles tendon exhibits no pain, no defect and normal Tse's test results.        Left ankle: No tenderness. No lateral malleolus, no medial malleolus, no AITFL, no CF ligament and no posterior TFL tenderness found. Achilles tendon exhibits no pain, no defect and normal Tse's test results.        Right foot: There is no tenderness and no bony tenderness.        Left foot: There is no tenderness and no bony tenderness.   Adequate joint range of motion without pain, limitation, nor crepitation Bilateral feet and ankle joints. Muscle strength is 5/5 in all groups bilaterally.           Feet:   Right Foot:   Protective Sensation: 5 sites tested. 5 sites sensed.   Left Foot:   Protective Sensation: 5 sites tested. 5 sites sensed.   Lymphadenopathy:   No lymphatic streaking     Neurological: He displays no atrophy. No sensory deficit.   Light touch present     Skin: Skin is warm, dry and intact. No rash noted. He is not diaphoretic. No cyanosis. No pallor. Nails show no clubbing.   Skin is of normal turgor.   Normal temperature gradient.  Examination of the skin reveals no evidence of significant rashes, open lesions, suspicious appearing nevi or other " concerning lesions.     Thickened discolored nails w/ subungual debris X 4     Psychiatric: His mood appears not anxious. His affect is not inappropriate. His speech is not slurred. He is not combative. He is communicative. He is attentive.   Nursing note and vitals reviewed.            Assessment:       Encounter Diagnosis   Name Primary?   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      - Shoe inspection. Diabetic Foot Education. Patient reminded of the importance of good nutrition and blood sugar control to help prevent podiatric complications of diabetes. Patient instructed on proper foot hygeine. We discussed wearing proper shoe gear, daily foot inspections, never walking without protective shoe gear, caution putting sharp instruments to feet     - Discussed DM foot care:  Wear comfortable, proper fitting shoes. Wash feet daily. Dry well. After drying, apply moisturizer to feet (no lotion to webspaces). Inspect feet daily for skin breaks, blisters, swelling, or redness. Wear cotton socks (preferably white)  Change socks every day. Do NOT walk barefoot. Do NOT use heating pads or warm/hot water soaks     - Discussed importance of daily moisturizer to the feet such as Gold bonds diabetic foot cream    - Patient is low risk for developing lower extremity issues secondary to diabetes. I recommend continued yearly diabetic foot examinations.     - Patients PCP can perform yearly foot checks . Currently  patient has no pedal manifestations of DM    - Patients with out pedal manifestations of DM, do not qualify  for nail/callus trimming     - Discussed  options for nail fungus including topicals such as Jublia and Penlac, Oral Lamisil, Lasers, and topical OTC remedies such as vicks vapor rub and Kerasal.    - RTC in  PRN     .

## 2020-06-12 ENCOUNTER — TELEPHONE (OUTPATIENT)
Dept: DIABETES | Facility: CLINIC | Age: 55
End: 2020-06-12

## 2020-06-15 DIAGNOSIS — E10.65 TYPE 1 DIABETES MELLITUS WITH HYPERGLYCEMIA: ICD-10-CM

## 2020-06-15 RX ORDER — INSULIN LISPRO 100 [IU]/ML
INJECTION, SOLUTION INTRAVENOUS; SUBCUTANEOUS
Qty: 3 VIAL | Refills: 6 | Status: SHIPPED | OUTPATIENT
Start: 2020-06-15 | End: 2020-10-14 | Stop reason: SDUPTHER

## 2020-06-24 ENCOUNTER — TELEPHONE (OUTPATIENT)
Dept: PODIATRY | Facility: CLINIC | Age: 55
End: 2020-06-24

## 2020-06-24 NOTE — TELEPHONE ENCOUNTER
I called patient he did not request medication from [facility]          ----- Message from Parviz Clemens sent at 6/24/2020  1:52 PM CDT -----  Contact: [facility]  [facility] asking for a callback regarding medication for the pt               Contact info  [phone]

## 2020-06-26 DIAGNOSIS — E10.65 TYPE 1 DIABETES MELLITUS WITH HYPERGLYCEMIA: ICD-10-CM

## 2020-06-26 RX ORDER — METFORMIN HYDROCHLORIDE 500 MG/1
500 TABLET, EXTENDED RELEASE ORAL
Qty: 90 TABLET | Refills: 3 | Status: SHIPPED | OUTPATIENT
Start: 2020-06-26 | End: 2020-10-14 | Stop reason: SDUPTHER

## 2020-07-06 ENCOUNTER — PATIENT OUTREACH (OUTPATIENT)
Dept: ADMINISTRATIVE | Facility: OTHER | Age: 55
End: 2020-07-06

## 2020-07-08 ENCOUNTER — CLINICAL SUPPORT (OUTPATIENT)
Dept: DIABETES | Facility: CLINIC | Age: 55
End: 2020-07-08
Payer: MEDICARE

## 2020-07-08 DIAGNOSIS — E10.649 TYPE 1 DIABETES MELLITUS WITH HYPOGLYCEMIA UNAWARENESS: ICD-10-CM

## 2020-07-08 DIAGNOSIS — E10.65 TYPE 1 DIABETES MELLITUS WITH HYPERGLYCEMIA: Primary | ICD-10-CM

## 2020-07-08 DIAGNOSIS — Z96.41 INSULIN PUMP IN PLACE: ICD-10-CM

## 2020-07-08 PROCEDURE — 95249 CONT GLUC MNTR PT PROV EQP: CPT | Mod: S$GLB,,, | Performed by: DIETITIAN, REGISTERED

## 2020-07-08 PROCEDURE — 99999 PR PBB SHADOW E&M-EST. PATIENT-LVL IV: ICD-10-PCS | Mod: PBBFAC,,, | Performed by: DIETITIAN, REGISTERED

## 2020-07-08 PROCEDURE — 99999 PR PBB SHADOW E&M-EST. PATIENT-LVL IV: CPT | Mod: PBBFAC,,, | Performed by: DIETITIAN, REGISTERED

## 2020-07-08 PROCEDURE — 95249 PR GLUCOSE MONITORING, 72 HRS, SUB-Q SENSOR, PATIENT PROVIDED: ICD-10-PCS | Mod: S$GLB,,, | Performed by: DIETITIAN, REGISTERED

## 2020-07-08 PROCEDURE — G0108 PR DIAB MANAGE TRN  PER INDIV: ICD-10-PCS | Mod: S$GLB,,, | Performed by: DIETITIAN, REGISTERED

## 2020-07-08 PROCEDURE — G0108 DIAB MANAGE TRN  PER INDIV: HCPCS | Mod: S$GLB,,, | Performed by: DIETITIAN, REGISTERED

## 2020-07-09 VITALS — WEIGHT: 222.88 LBS | HEIGHT: 68 IN | BODY MASS INDEX: 33.78 KG/M2

## 2020-07-09 NOTE — PROGRESS NOTES
Diabetes Education  Author: Katia Pham RD, CDE  Date: 7/9/2020    Diabetes Care Management Summary  Diabetes Education Record Assessment/Progress: Comprehensive/Group(670 pump upgrade - 1 week f/u)  Current Diabetes Risk Level: Moderate     Last A1c:   Lab Results   Component Value Date    HGBA1C 7.9 (H) 06/01/2020     Last visit with Diabetes Educator: Last Education Visit: Not Found      Diabetes Type  Diabetes Type : Type I    Diabetes History  Diabetes Diagnosis: >10 years  Current Treatment: Insulin pump, Oral Medication, Diet  Reviewed Problem List with Patient: Yes    Health Maintenance was reviewed today with patient. Discussed with patient importance of routine eye exams, foot exams/foot care, blood work (i.e.: A1c, microalbumin, and lipid), dental visits, yearly flu vaccine, and pneumonia vaccine as indicated by PCP. Patient verbalized understanding.     Health Maintenance Topics with due status: Not Due       Topic Last Completion Date    Influenza Vaccine 09/05/2019    Colorectal Cancer Screening 02/17/2020    Lipid Panel 06/01/2020    Hemoglobin A1c 06/01/2020    High Dose Statin 06/05/2020    Foot Exam 06/05/2020     Health Maintenance Due   Topic Date Due    TETANUS VACCINE  11/09/1981    Shingles Vaccine (1 of 2) 11/09/2013       Nutrition  Meal Planning: diet drinks, 3 meals per day, eats out often, snacks between meal, water  What type of sweetener do you use?: none  What type of beverages do you drink?: water, diet soda/tea, sport drinks  Meal Plan 24 Hour Recall - Breakfast: [place] Pigs in a Speculator  Meal Plan 24 Hour Recall - Lunch: chips  Meal Plan 24 Hour Recall - Dinner: [employer] foot long with 3 macadamia nut cookies  Meal Plan 24 Hour Recall - Snack: chips    Monitoring   Self Monitoring : using Dexcom(SMBG 3-4 times daily (does not use CGM sensor); range )  Blood Glucose Logs: No(from pump download; see Media tab)  Do you use a personal continuous glucose monitor?: Yes  What  kind of glucose monitor do you use?: Dexcom  In the last month, how often have you had a low blood sugar reaction?: more than once a week  What are your symptoms of low blood sugar?: doesn't feel it  How do you treat low blood sugar?: eats something  Can you tell when your blood sugar is too high?: sometimes  How do you treat high blood sugar?: insulin bolus                        Exercise   Exercise Type: none  Frequency: Never    Current Diabetes Treatment   Current Treatment: Insulin pump, Oral Medication, Diet    Social History  Preferred Learning Method: Face to Face  Primary Support: Self, Spouse, Family  Educational Level: High School  Occupation: owns a cab company  Smoking Status: Never a Smoker  Alcohol Use: Never            DDS-2 Score  ( > 3 = SIGNIFICANT DISTRESS): 1                   Barriers to Change  Barriers to Change: None  Learning Challenges : None    Readiness to Learn   Readiness to Learn : Acceptance    Cultural Influences  Cultural Influences: No    Diabetes Education Assessment/Progress  Diabetes Disease Process (diabetes disease process and treatment options): Discussion, Individual Session  Nutrition (Incorporating nutritional management into one's lifestyle): Discussion, Individual Session  Physical Activity (incorporating physical activity into one's lifestyle): Discussion, Individual Session  Medications (states correct name, dose, onset, peak, duration, side effects & timing of meds): Discussion, Individual Session, Comprehends Key Points, Instructed, Demonstration  Monitoring (monitoring blood glucose/other parameters & using results): Discussion, Individual Session, Comprehends Key Points, Demonstration, Instructed, Return Demonstration, Demonstrates Understanding/Competency (verbalizes/demonstrates), Written Materials Provided(Guardian Sensor)  Acute Complications (preventing, detecting, and treating acute complications): Discussion, Individual Session, Comprehends Key  Points  Chronic Complications (preventing, detecting, and treating chronic complications): Discussion, Individual Session, Comprehends Key Points  Clinical (diabetes, other pertinent medical history, and relevant comorbidities reviewed during visit): Discussion, Individual Session, Comprehends Key Points  Cognitive (knowledge of self-management skills, functional health literacy): Discussion, Comprehends Key Points, Individual Session  Psychosocial (emotional response to diabetes): Discussion, Individual Session, Comprehends Key Points  Diabetes Distress and Support Systems: Discussion, Individual Session, Comprehends Key Points  Behavioral (readiness for change, lifestyle practices, self-care behaviors): Discussion, Comprehends Key Points, Individual Session  Dexcom G6 Education  Patient is here in clinic today for initial start of Dexcom continuous glucose monitoring system (CGMA). Reviewed with patient how to insert sensor and transmitter. Patient inserted sensor on right abdomen and inserted transmitter. Time and date was set on monitor and settings were set. Hypoglycemia trigger set for 70 and hyperglycemia set for 250. Patient provided with phone number for 24-hour help line if needed. Discussed various types of possible alarms and what to do. Questions addressed. Initialization finished. No futher questions.    Goals  Patient has selected/evaluated goals during today's session: Yes, evaluated  Monitoring: % Met  Met Percentage : 100%         Diabetes Care Plan/Intervention  Education Plan/Intervention: Individual Follow-Up DSMT, Support Group for Diabetes Distress    Diabetes Meal Plan  Restrictions: Low Fat, Low Sodium, Restricted Carbohydrate  Calories: 1800  Carbohydrate Per Meal: 30-45g  Carbohydrate Per Snack : 15-20g  Fat: 50  Protein: 135    Today's Self-Management Care Plan was developed with the patient's input and is based on barriers identified during today's assessment.    The long and short-term  goals in the care plan were written with the patient/caregiver's input. The patient has agreed to work toward these goals to improve his overall diabetes control.      The patient received a copy of today's self-management plan and verbalized understanding of the care plan, goals, and all of today's instructions.      The patient was encouraged to communicate with his physician and care team regarding his condition(s) and treatment.  I provided the patient with my contact information today and encouraged him to contact me via phone or patient portal as needed.     Education Units of Time   Time Spent: 60 min

## 2020-07-15 ENCOUNTER — OFFICE VISIT (OUTPATIENT)
Dept: DIABETES | Facility: CLINIC | Age: 55
End: 2020-07-15
Payer: MEDICARE

## 2020-07-15 ENCOUNTER — OFFICE VISIT (OUTPATIENT)
Dept: CARDIOLOGY | Facility: CLINIC | Age: 55
End: 2020-07-15
Payer: MEDICARE

## 2020-07-15 VITALS
HEART RATE: 52 BPM | BODY MASS INDEX: 34.92 KG/M2 | SYSTOLIC BLOOD PRESSURE: 151 MMHG | HEIGHT: 68 IN | WEIGHT: 230.38 LBS | OXYGEN SATURATION: 99 % | DIASTOLIC BLOOD PRESSURE: 67 MMHG

## 2020-07-15 VITALS
HEART RATE: 52 BPM | SYSTOLIC BLOOD PRESSURE: 155 MMHG | WEIGHT: 230.38 LBS | HEIGHT: 68 IN | DIASTOLIC BLOOD PRESSURE: 70 MMHG | BODY MASS INDEX: 34.92 KG/M2

## 2020-07-15 DIAGNOSIS — E10.649 TYPE 1 DIABETES MELLITUS WITH HYPOGLYCEMIA UNAWARENESS: ICD-10-CM

## 2020-07-15 DIAGNOSIS — Z79.899 AT RISK FOR AMIODARONE TOXICITY WITH LONG TERM USE: Primary | ICD-10-CM

## 2020-07-15 DIAGNOSIS — E78.5 HYPERLIPIDEMIA WITH TARGET LOW DENSITY LIPOPROTEIN (LDL) CHOLESTEROL LESS THAN 70 MG/DL: ICD-10-CM

## 2020-07-15 DIAGNOSIS — E66.09 CLASS 1 OBESITY DUE TO EXCESS CALORIES WITH SERIOUS COMORBIDITY AND BODY MASS INDEX (BMI) OF 33.0 TO 33.9 IN ADULT: ICD-10-CM

## 2020-07-15 DIAGNOSIS — Z46.81 INSULIN PUMP FITTING OR ADJUSTMENT: ICD-10-CM

## 2020-07-15 DIAGNOSIS — Z91.89 AT RISK FOR AMIODARONE TOXICITY WITH LONG TERM USE: Primary | ICD-10-CM

## 2020-07-15 DIAGNOSIS — I10 ESSENTIAL HYPERTENSION: ICD-10-CM

## 2020-07-15 DIAGNOSIS — I48.92 ATRIAL FLUTTER, UNSPECIFIED TYPE: ICD-10-CM

## 2020-07-15 DIAGNOSIS — Z95.2 S/P AVR (AORTIC VALVE REPLACEMENT): ICD-10-CM

## 2020-07-15 DIAGNOSIS — E10.65 TYPE 1 DIABETES MELLITUS WITH HYPERGLYCEMIA: Primary | ICD-10-CM

## 2020-07-15 DIAGNOSIS — Z95.2 H/O MECHANICAL AORTIC VALVE REPLACEMENT: ICD-10-CM

## 2020-07-15 DIAGNOSIS — G45.9 TIA (TRANSIENT ISCHEMIC ATTACK): ICD-10-CM

## 2020-07-15 DIAGNOSIS — Z95.828 S/P ASCENDING AORTIC REPLACEMENT: ICD-10-CM

## 2020-07-15 DIAGNOSIS — Z79.01 LONG TERM (CURRENT) USE OF ANTICOAGULANTS: ICD-10-CM

## 2020-07-15 DIAGNOSIS — Z96.41 INSULIN PUMP IN PLACE: ICD-10-CM

## 2020-07-15 DIAGNOSIS — Z71.9 HEALTH EDUCATION/COUNSELING: ICD-10-CM

## 2020-07-15 PROCEDURE — 3051F PR MOST RECENT HEMOGLOBIN A1C LEVEL 7.0 - < 8.0%: ICD-10-PCS | Mod: CPTII,S$GLB,, | Performed by: NURSE PRACTITIONER

## 2020-07-15 PROCEDURE — 99215 PR OFFICE/OUTPT VISIT, EST, LEVL V, 40-54 MIN: ICD-10-PCS | Mod: S$GLB,,, | Performed by: INTERNAL MEDICINE

## 2020-07-15 PROCEDURE — 99499 RISK ADDL DX/OHS AUDIT: ICD-10-PCS | Mod: S$GLB,,, | Performed by: INTERNAL MEDICINE

## 2020-07-15 PROCEDURE — 99499 UNLISTED E&M SERVICE: CPT | Mod: S$GLB,,, | Performed by: INTERNAL MEDICINE

## 2020-07-15 PROCEDURE — 3077F SYST BP >= 140 MM HG: CPT | Mod: CPTII,S$GLB,, | Performed by: NURSE PRACTITIONER

## 2020-07-15 PROCEDURE — 3077F PR MOST RECENT SYSTOLIC BLOOD PRESSURE >= 140 MM HG: ICD-10-PCS | Mod: CPTII,S$GLB,, | Performed by: INTERNAL MEDICINE

## 2020-07-15 PROCEDURE — 3051F HG A1C>EQUAL 7.0%<8.0%: CPT | Mod: CPTII,S$GLB,, | Performed by: NURSE PRACTITIONER

## 2020-07-15 PROCEDURE — 93000 EKG 12-LEAD: ICD-10-PCS | Mod: S$GLB,,, | Performed by: INTERNAL MEDICINE

## 2020-07-15 PROCEDURE — 3008F BODY MASS INDEX DOCD: CPT | Mod: CPTII,S$GLB,, | Performed by: INTERNAL MEDICINE

## 2020-07-15 PROCEDURE — 3008F PR BODY MASS INDEX (BMI) DOCUMENTED: ICD-10-PCS | Mod: CPTII,S$GLB,, | Performed by: INTERNAL MEDICINE

## 2020-07-15 PROCEDURE — 99999 PR PBB SHADOW E&M-EST. PATIENT-LVL V: CPT | Mod: PBBFAC,,, | Performed by: NURSE PRACTITIONER

## 2020-07-15 PROCEDURE — 3077F PR MOST RECENT SYSTOLIC BLOOD PRESSURE >= 140 MM HG: ICD-10-PCS | Mod: CPTII,S$GLB,, | Performed by: NURSE PRACTITIONER

## 2020-07-15 PROCEDURE — 93000 ELECTROCARDIOGRAM COMPLETE: CPT | Mod: S$GLB,,, | Performed by: INTERNAL MEDICINE

## 2020-07-15 PROCEDURE — 95251 CONT GLUC MNTR ANALYSIS I&R: CPT | Mod: S$GLB,,, | Performed by: NURSE PRACTITIONER

## 2020-07-15 PROCEDURE — 95251 PR GLUCOSE MONITOR, 72 HOUR, PHYS INTERP: ICD-10-PCS | Mod: S$GLB,,, | Performed by: NURSE PRACTITIONER

## 2020-07-15 PROCEDURE — 99215 OFFICE O/P EST HI 40 MIN: CPT | Mod: S$GLB,,, | Performed by: NURSE PRACTITIONER

## 2020-07-15 PROCEDURE — 3008F BODY MASS INDEX DOCD: CPT | Mod: CPTII,S$GLB,, | Performed by: NURSE PRACTITIONER

## 2020-07-15 PROCEDURE — 99499 RISK ADDL DX/OHS AUDIT: ICD-10-PCS | Mod: S$GLB,,, | Performed by: NURSE PRACTITIONER

## 2020-07-15 PROCEDURE — 99999 PR PBB SHADOW E&M-EST. PATIENT-LVL V: ICD-10-PCS | Mod: PBBFAC,,, | Performed by: NURSE PRACTITIONER

## 2020-07-15 PROCEDURE — 99215 OFFICE O/P EST HI 40 MIN: CPT | Mod: S$GLB,,, | Performed by: INTERNAL MEDICINE

## 2020-07-15 PROCEDURE — 99999 PR PBB SHADOW E&M-EST. PATIENT-LVL III: ICD-10-PCS | Mod: PBBFAC,,, | Performed by: INTERNAL MEDICINE

## 2020-07-15 PROCEDURE — 3078F DIAST BP <80 MM HG: CPT | Mod: CPTII,S$GLB,, | Performed by: INTERNAL MEDICINE

## 2020-07-15 PROCEDURE — 3008F PR BODY MASS INDEX (BMI) DOCUMENTED: ICD-10-PCS | Mod: CPTII,S$GLB,, | Performed by: NURSE PRACTITIONER

## 2020-07-15 PROCEDURE — 3078F PR MOST RECENT DIASTOLIC BLOOD PRESSURE < 80 MM HG: ICD-10-PCS | Mod: CPTII,S$GLB,, | Performed by: INTERNAL MEDICINE

## 2020-07-15 PROCEDURE — 99999 PR PBB SHADOW E&M-EST. PATIENT-LVL III: CPT | Mod: PBBFAC,,, | Performed by: INTERNAL MEDICINE

## 2020-07-15 PROCEDURE — 99215 PR OFFICE/OUTPT VISIT, EST, LEVL V, 40-54 MIN: ICD-10-PCS | Mod: S$GLB,,, | Performed by: NURSE PRACTITIONER

## 2020-07-15 PROCEDURE — 3078F DIAST BP <80 MM HG: CPT | Mod: CPTII,S$GLB,, | Performed by: NURSE PRACTITIONER

## 2020-07-15 PROCEDURE — 99499 UNLISTED E&M SERVICE: CPT | Mod: S$GLB,,, | Performed by: NURSE PRACTITIONER

## 2020-07-15 PROCEDURE — 3077F SYST BP >= 140 MM HG: CPT | Mod: CPTII,S$GLB,, | Performed by: INTERNAL MEDICINE

## 2020-07-15 PROCEDURE — 3078F PR MOST RECENT DIASTOLIC BLOOD PRESSURE < 80 MM HG: ICD-10-PCS | Mod: CPTII,S$GLB,, | Performed by: NURSE PRACTITIONER

## 2020-07-15 RX ORDER — AMIODARONE HYDROCHLORIDE 200 MG/1
100 TABLET ORAL DAILY
Qty: 45 TABLET | Refills: 0 | Status: SHIPPED | OUTPATIENT
Start: 2020-07-15 | End: 2020-07-15 | Stop reason: DRUGHIGH

## 2020-07-15 RX ORDER — HYDROCHLOROTHIAZIDE 12.5 MG/1
12.5 CAPSULE ORAL DAILY
Qty: 90 CAPSULE | Refills: 3 | Status: SHIPPED | OUTPATIENT
Start: 2020-07-15 | End: 2021-03-29

## 2020-07-15 RX ORDER — EMPAGLIFLOZIN 25 MG/1
25 TABLET, FILM COATED ORAL DAILY
Qty: 30 TABLET | Refills: 4 | Status: SHIPPED | OUTPATIENT
Start: 2020-07-15 | End: 2020-10-14 | Stop reason: SDUPTHER

## 2020-07-15 RX ORDER — AMOXICILLIN 500 MG/1
2000 CAPSULE ORAL
Qty: 28 CAPSULE | Refills: 11 | Status: SHIPPED | OUTPATIENT
Start: 2020-07-15 | End: 2021-08-19 | Stop reason: SDUPTHER

## 2020-07-15 RX ORDER — AMIODARONE HYDROCHLORIDE 100 MG/1
100 TABLET ORAL DAILY
Qty: 90 TABLET | Refills: 0 | Status: SHIPPED | OUTPATIENT
Start: 2020-07-15 | End: 2020-08-12

## 2020-07-15 NOTE — PROGRESS NOTES
CC:   Chief Complaint   Patient presents with    Diabetes Mellitus       HPI: Cj Barnes is a 54 y.o. male presents for a follow up visit today for the management of T1DM.   The patient was diagnosed in his early 30's. Initially diagnosed with T2DM, 5 years later he was dx as T1.    He has used an insulin pump since 2006, previous on animas.  He is now on  670 G medtronic pump.  He was previously wearing the Medtronic guardian sensor, but stopped wearing it as he did not feel that auto mode was giving him another insulin and his blood sugar readings were running high.  He did not like auto mode at all  He was entering in false carbohydrates to bring down his blood sugar readings.   Since our last visit he has been off of his Jardiance due to the cost--but he is considering to restart it as he felt his blood sugar readings were better when he was taking the Jardiance.  He is under a lot of stress with his job and his home life--his wife is addicted to Ambien--he is bring her to an addiction clinic tomorrow.  He started the Dexcom on 7/8/2020.  He is really liking the Dexcom and feels his blood sugar readings are better controlled.  When he met with Katia last week to start the Dexcom - we increased his insulin to carbohydrate ratio to a 1:9.  On Medtronic download he is bolus heavy at 70% of his insulin coming from boluses and 30% coming from basal.  On download review--he is entering in 400 carbohydrates per day---discuss this with patient--he reports he is really not eating that many carbohydrates.  He is and during in falls carbohydrates to give himself more insulin with meals.  He reports if he supposed to entering 56 g carbs he will really enter in 90 g of carbs.   He has questions regarding the dual square bolusing  He is now changing his sites more frequently every 3-4 days.     On his dexcom download his average blood sugar is 156 and he is rarely having hypoglycemia.  He is in range 78% of the  time and above range 21% of the time.    His blood pressure is above goal in clinic today--he saw cardiology prior to our appointment and had blood pressure medication adjustments.  He is following up with Cardiology in a month.       Family hx of diabetes: Mother, father, 3 brothers - no one with T1     Hospitalized for diabetes: denies     No personal or FH of thyroid cancer or personal of pancreatic cancer or pancreatitis.       DIABETES COMPLICATIONS: retinopathy, peripheral neuropathy, cardiovascular disease and cerebrovascular disease  TIA     Diabetes Management Status    ASA:  Yes - 81 mg daily and coumadin     Statin: Taking--recently changed to Lipitor at our last visit  ACE/ARB: Taking    Screening or Prevention Patient's value Goal Complete/Controlled?   HgA1C Testing and Control   Lab Results   Component Value Date    HGBA1C 7.9 (H) 06/01/2020      Annually/Less than 8% Yes   Lipid profile : 06/01/2020 Annually Yes   LDL control Lab Results   Component Value Date    LDLCALC 106 (H) 06/01/2020    Annually/Less than 100 mg/dl  Yes   Nephropathy screening Lab Results   Component Value Date    LABMICR <2.5 04/23/2019     Lab Results   Component Value Date    PROTEINUA Negative 08/16/2019    Annually Yes   Blood pressure BP Readings from Last 1 Encounters:   07/15/20 (!) 151/67    Less than 140/90 Yes   Dilated retinal exam : 05/01/2019- Annually No   Foot exam   : 06/05/2020 Annually Yes       CURRENT A1C:    Hemoglobin A1C   Date Value Ref Range Status   06/01/2020 7.9 (H) 4.0 - 5.6 % Final     Comment:     ADA Screening Guidelines:  5.7-6.4%  Consistent with prediabetes  >or=6.5%  Consistent with diabetes  High levels of fetal hemoglobin interfere with the HbA1C  assay. Heterozygous hemoglobin variants (HbS, HgC, etc)do  not significantly interfere with this assay.   However, presence of multiple variants may affect accuracy.     01/09/2020 7.3 (H) 4.0 - 5.6 % Final     Comment:     ADA Screening  Guidelines:  5.7-6.4%  Consistent with prediabetes  >or=6.5%  Consistent with diabetes  High levels of fetal hemoglobin interfere with the HbA1C  assay. Heterozygous hemoglobin variants (HbS, HgC, etc)do  not significantly interfere with this assay.   However, presence of multiple variants may affect accuracy.     2019 6.1 (H) 4.0 - 5.6 % Final     Comment:     ADA Screening Guidelines:  5.7-6.4%  Consistent with prediabetes  >or=6.5%  Consistent with diabetes  High levels of fetal hemoglobin interfere with the HbA1C  assay. Heterozygous hemoglobin variants (HbS, HgC, etc)do  not significantly interfere with this assay.   However, presence of multiple variants may affect accuracy.         GOAL A1C: 6.5-7% - without hypoglycemia    DM MEDICATIONS USED IN THE PAST: Medtronic 670 G   Metformin   Steglatro- lack of coverage.   Jardiance     CURRENT DIABETES MEDICATIONS: Medtronic insulin pump, Metformin 500 mg daily with breakfast  Off Jardiance for about a month now. -plans to restart     Insulin Pump: Medtronic 670G with Humalog   Manual mode only       Pump settings:  Basal:    12AM-12PM-- 0.9 unit/hr     ICR: 1:9     ISF: 20    Target:  120    IOB:  3 hours     Temp basals: none     Pump site change: q 3.3 days   Cartridge change: q 4.3 days   Insulin TDD:  69 units   70% bolus and 30% basal     Using the bolus wizard: yes   Overriding: no--but entering in false carbs    Back up Lantus/Levemir: Yes     BLOOD GLUCOSE MONITORING:   Sensor type: Dexcom   Average BG readin  Time in range: 78%   Limits:   Site change: q10 days    Sensor was downloaded in clinic today and reviewed with patient.   Please see attached document for download.       HYPOGLYCEMIA:  Rarely seen on Dexcom download.   1 episode yesterday and then with rebound hyperglycemia.    He doesn't feel BG readings until the 40-50's - once in the 50's he will have twitching to his eye.   Glucagon kit: yes  Medic alert bracelet: yes        MEALS:   BF: ~ 1-2 breakfast burritos or egg mc muffin or croissant     Lunch: bag of chips or something light.   Dinner: ~ eating out home cooked style- [employer], [address]. He loves salads- light Italian or michael dressing   Snack: occ- dry cereal -- mid night snack   Drinks: un sweet tea, water, coke zero.   He CHO counts- he feels comfortable.      CURRENT EXERCISE:  Was in the gym prior to the virus. -- now walking.     Review of Systems  Review of Systems   Constitutional: Negative for appetite change, fatigue and unexpected weight change.   HENT: Negative for trouble swallowing.    Eyes: Negative for visual disturbance.   Respiratory: Negative for shortness of breath.    Cardiovascular: Negative for chest pain.   Gastrointestinal: Negative for nausea.   Endocrine: Negative for polydipsia, polyphagia and polyuria.   Genitourinary:        No Nocturia    Musculoskeletal: Negative for back pain.   Skin: Negative for wound.   Neurological: Negative for numbness.   Psychiatric/Behavioral: Positive for sleep disturbance (due to valve clicking sounds). The patient is nervous/anxious.         Both personal and work stress       Physical Exam   Physical Exam  Vitals signs and nursing note reviewed.   Constitutional:       General: He is not in acute distress.     Appearance: He is well-developed. He is obese. He is not diaphoretic.   HENT:      Head: Normocephalic and atraumatic.      Left Ear: External ear normal.      Nose: Nose normal.   Neck:      Musculoskeletal: Normal range of motion and neck supple.      Thyroid: No thyromegaly.      Trachea: No tracheal deviation.   Cardiovascular:      Rate and Rhythm: Normal rate and regular rhythm.      Comments: + mechanical valve clicks   No edema   Pulmonary:      Effort: Pulmonary effort is normal. No respiratory distress.      Breath sounds: Normal breath sounds.   Abdominal:      Palpations: Abdomen is soft.      Tenderness: There is no abdominal tenderness.       Hernia: No hernia is present.   Skin:     General: Skin is warm and dry.      Capillary Refill: Capillary refill takes less than 2 seconds.      Findings: No rash.      Comments: Insulin pump sites are normal appearing. No lipo hypertropthy or atrophy- to abdomen    Neurological:      Mental Status: He is alert and oriented to person, place, and time.      Cranial Nerves: No cranial nerve deficit.   Psychiatric:         Behavior: Behavior normal.         Judgment: Judgment normal.         FOOT EXAMINATION: Appropriate footwear       Lab Results   Component Value Date    TSH 1.13 04/24/2019       Type 1 diabetes mellitus with hyperglycemia  Blood sugar readings are near goal on Dexcom download.   We need to work on insulin pump optimization---he needs to stop entering in false carbohydrate intake--we will adjust his insulin pump settings today to prevent him from doing this.  Encouraged patient on cut back on CHO intake -- he reports on average 50-100g CHO with each meal usually      Medication changes:   Continue Metformin  He plans to restart his Jardiance 25 mg daily -- for cardiac/ renal protection as well.     Pump settings:  Basal: 1.1 units/hr     ICR: 1:7    ISF: 20    Target:  120    IOB:  3 hours     Long discussion with patient that if he continues to have to enter in false carbohydrates that he needs to notify me and I will make further insulin pump setting changes    Reviewed with patient how to administer a dual squared bolus       -- Reviewed goals of therapy are to get the best control we can without hypoglycemia  -- Reviewed patient's current insulin regimen. Clarified proper insulin dose and timing in relation to meals, etc. Insulin injection sites and proper rotation instructed.    -- Advised frequent self blood glucose monitoring.  Patient encouraged to document glucose results and bring them to every clinic visit  -Continue to use Dexcom   -- Hypoglycemia precautions discussed. Instructed on  precautions before driving.    -- Call for Bg repeatedly < 90 or > 180.   -- Close adherence to lifestyle changes recommended.   -- Periodic follow ups for eye evaluations, foot care and dental care suggested.      Insulin pump fitting or adjustment  See above for pump changes     Insulin pump in place  See above for pump changes     Type 1 diabetes mellitus with hypoglycemia unawareness  Avoid hypoglycemia.  Continue to use Dexcom with real-time alerts    Essential hypertension  BP goal is < 140/90.   Tolerating ACEi  Blood pressure goals discussed with patient  Blood pressure above goal in clinic today--he is following with Cardiology and saw them this morning  Will defer to Cardiology for management    Hyperlipidemia with target low density lipoprotein (LDL) cholesterol less than 70 mg/dL  He was changed to Lipitor at her last visit  Lipids with RTC    Class 1 obesity due to excess calories with serious comorbidity and body mass index (BMI) of 33.0 to 33.9 in adult  Body mass index is 35.03 kg/m².  Increases insulin resistance.   Discussed DM diet and exercise.   Patient does exhibit insulin resistance and is on an SGLT 2 and metformin.    TIA (transient ischemic attack)  Optimize blood sugar readings        Spent 45 minutes with patient with >50% time spent in counseling, as noted above on insulin pump optimization.      Follow up in about 3 months (around 10/15/2020).   Labs prior        Orders Placed This Encounter   Procedures    Lipid Panel     Standing Status:   Future     Standing Expiration Date:   1/15/2022    Hemoglobin A1C     Standing Status:   Future     Standing Expiration Date:   1/15/2022    Basic metabolic panel     Standing Status:   Future     Standing Expiration Date:   1/15/2022       Recommendations were discussed with the patient in detail  The patient verbalized understanding and agrees with the plan outlined as above.

## 2020-07-15 NOTE — PROGRESS NOTES
Subjective:      Patient ID: Cj Barnes is a 54 y.o. male.    Chief Complaint: Follow-up (Pain in the upper chest, near neck)    HPI:  Under stress due to wife's illness.    Occasional pulling discomfort and sharp shooting pains under clavicles. The discomfort occurs a couple of times a week and lasts a few minutes.    Completed cardiac rehab after AVR and aortic root replacement.    There is no hx of chest pain or shortness of breath with exertion.     Mows the lawn without limitation.    Review of Systems   Cardiovascular: Positive for chest pain. Negative for claudication, dyspnea on exertion, irregular heartbeat, leg swelling, near-syncope, orthopnea, palpitations and syncope.      Pt has 3 mg warfarin one tablet TTSS and one and a half tablets on MWF    Cannot afford Jardience    Pt c/o pain in right tendon and numbness of left 4th and 5th fingers.  Dr Wells moved the ulnar nerve on right.    Past Medical History:   Diagnosis Date    Aortic stenosis 2018    Cancer 2014    Colon cancer     Coronary artery disease     Diabetes mellitus type I     since in his 30's    Heart murmur     Heel fracture     HTN (hypertension)     Hyperlipidemia LDL goal < 70     Insulin pump in place     MVP (mitral valve prolapse)     Stenosis of aortic and mitral valves         Past Surgical History:   Procedure Laterality Date    AORTIC VALVE REPLACEMENT N/A 8/9/2019    Procedure: Replacement-valve-aortic;  Surgeon: Ryan Knight MD;  Location: [address];  Service: Cardiothoracic;  Laterality: N/A;    BACK SURGERY      BENTALL PROCEDURE FOR REPLACEMENT OF AORTIC VALVE, AORTIC ROOT, AND ASCENDING AORTA N/A 8/9/2019    Procedure: BENTALL PROCEDURE;  Surgeon: Ryan Knight MD;  Location: [address];  Service: Cardiothoracic;  Laterality: N/A;    COLON SURGERY  2014    COLONOSCOPY N/A 2/17/2020    Procedure: COLONOSCOPY;  Surgeon: Richi Mock MD;  Location: [facility];  Service:  Colon and Rectal;  Laterality: N/A;    FOOT TENDON SURGERY      right and left heart cath Bilateral 01/15/2018    TREATMENT OF CARDIAC ARRHYTHMIA N/A 8/19/2019    Procedure: CARDIOVERSION;  Surgeon: Juan Zapata MD;  Location: [facility];  Service: Cardiology;  Laterality: N/A;  afib, dccv only, anes, GP, 3092    TRIGGER FINGER RELEASE      x 2       Family History   Problem Relation Age of Onset    Heart disease Mother     Lung cancer Mother     Heart attack Father     Diabetes Father     HIV Brother        Social History     Socioeconomic History    Marital status:      Spouse name: Not on file    Number of children: Not on file    Years of education: Not on file    Highest education level: Not on file   Occupational History    Not on file   Social Needs    Financial resource strain: Not on file    Food insecurity     Worry: Not on file     Inability: Not on file    Transportation needs     Medical: Not on file     Non-medical: Not on file   Tobacco Use    Smoking status: Never Smoker    Smokeless tobacco: Former User     Types: Snuff    Tobacco comment: since he was 12 yrs old   Substance and Sexual Activity    Alcohol use: No     Comment: socially    Drug use: No    Sexual activity: Not on file   Lifestyle    Physical activity     Days per week: Not on file     Minutes per session: Not on file    Stress: Not on file   Relationships    Social connections     Talks on phone: Not on file     Gets together: Not on file     Attends Voodoo service: Not on file     Active member of club or organization: Not on file     Attends meetings of clubs or organizations: Not on file     Relationship status: Not on file   Other Topics Concern    Not on file   Social History Narrative        2 grown kids    Delivers seafood       Current Outpatient Medications on File Prior to Visit   Medication Sig Dispense Refill    aspirin (ECOTRIN) 81 MG EC tablet Take 1 tablet (81 mg total) by  "mouth once daily.  0    atorvastatin (LIPITOR) 40 MG tablet Take 1 tablet (40 mg total) by mouth every evening. 30 tablet 6    gabapentin (NEURONTIN) 300 MG capsule Take 2 capsules (600 mg total) by mouth 3 (three) times daily. 540 capsule 3    insulin lispro (HUMALOG U-100 INSULIN) 100 unit/mL injection To use continuously with insulin pump.  Max total daily dose of 100 units 3 vial 6    insulin regular 100 unit/mL Soln Use home settings Basal 0.4    Bolus/carb 12    ISF 20 1 Product 0    lisinopriL (PRINIVIL,ZESTRIL) 20 MG tablet Take 1 tablet (20 mg total) by mouth once daily. 90 tablet 1    metFORMIN (GLUCOPHAGE-XR) 500 MG XR 24hr tablet Take 1 tablet (500 mg total) by mouth daily with breakfast. 90 tablet 3    warfarin (COUMADIN) 3 MG tablet Take 2 tablets (6 mg total) by mouth Daily. 60 tablet 11    [DISCONTINUED] amiodarone (PACERONE) 200 MG Tab Take 1 tablet (200 mg total) by mouth once daily. 90 tablet 3    blood sugar diagnostic Strp 1 each by Misc.(Non-Drug; Combo Route) route 6 (six) times daily. Contour next strips. Pt needs contour jackelyn for compatibility w/ insulin pump (medtronic 670g). Necessity 200 strip 6    blood-glucose meter,continuous (DEXCOM G6 ) Misc 1 Device by Misc.(Non-Drug; Combo Route) route once. for 1 dose 1 each 0    blood-glucose sensor (DEXCOM G6 SENSOR) Apple 1 sensor every 10 days 9 Device 3    blood-glucose transmitter (DEXCOM G6 TRANSMITTER) Apple 1 transmitter every 3 months 1 Device 3    clotrimazole-betamethasone 1-0.05% (LOTRISONE) cream APPLY A SMALL AMOUNT TO AFFECTED AREA THREE TIMES DAILY UNTIL CLEAR  0    COMFORT EZ PEN NEEDLES 33 gauge x 3/16" Ndle USE AS DIRECTED with Levemir pens  11    empagliflozin (JARDIANCE) 25 mg Tab Take 1 tablet by mouth once daily. 90 tablet 1    glucagon, human recombinant, (GLUCAGON EMERGENCY KIT, HUMAN,) 1 mg SolR Inject 1 mg into the muscle as needed. 1 each 0    insulin detemir U-100 (LEVEMIR FLEXTOUCH U-100 " "INSULN) 100 unit/mL (3 mL) SubQ InPn pen Inject 48 Units into the skin every evening. 3 mL 0    zolpidem (AMBIEN) 10 mg Tab Take 1 tablet (10 mg total) by mouth nightly as needed. 30 tablet 2     No current facility-administered medications on file prior to visit.        Review of patient's allergies indicates:  No Known Allergies  Objective:     Vitals:    07/15/20 0813 07/15/20 0818 07/15/20 0843   BP: (!) 173/72 (!) 154/69 (!) 155/70   BP Location: Left arm Left arm Left arm   Patient Position: Sitting Sitting Sitting   BP Method: Large (Automatic) Large (Automatic)    Pulse: (!) 52     Weight: 104.5 kg (230 lb 6.1 oz)     Height: 5' 8" (1.727 m)          Physical Exam   Constitutional: He is oriented to person, place, and time. He appears well-developed and well-nourished. No distress.   Eyes: No scleral icterus.   Neck: No JVD present. Carotid bruit is not present.   Cardiovascular: Regular rhythm and normal heart sounds. Exam reveals no gallop and no friction rub.   No murmur heard.  Crisp mechanical aortic valve open and close   Pulmonary/Chest: Effort normal and breath sounds normal. No respiratory distress.   Musculoskeletal:         General: No edema.   Neurological: He is alert and oriented to person, place, and time.   Skin: Skin is warm and dry. He is not diaphoretic.   Psychiatric: He has a normal mood and affect. His behavior is normal. Judgment and thought content normal.   Vitals reviewed.     Last INR 2.6    Lab 6/1/20  hgba1c 7.9  HDL 53    CMP WNL except glu 166    ECG: sinus bradycardia, low T waves    Wt up 25 lbs since February    Assessment:     1. At risk for amiodarone toxicity with long term use    2. S/P AVR (aortic valve replacement)    3. Atrial flutter, unspecified type    4. H/O mechanical aortic valve replacement    5. S/P ascending aortic replacement    6. Hyperlipidemia with target low density lipoprotein (LDL) cholesterol less than 70 mg/dL    7. Essential hypertension  "   8. Long term (current) use of anticoagulants    9. Insulin pump in place      Plan:   Cj was seen today for follow-up.    Diagnoses and all orders for this visit:    At risk for amiodarone toxicity with long term use  -     CBC auto differential; Future  -     Comprehensive metabolic panel; Future  -     TSH; Future  -     T4, FREE; Future    S/P AVR (aortic valve replacement)  -     Ambulatory referral/consult to Cardiology  -     IN OFFICE EKG 12-LEAD (to Muse)  -     Protime-INR; Standing    Atrial flutter, unspecified type  -     Ambulatory referral/consult to Cardiology    H/O mechanical aortic valve replacement  -     IN OFFICE EKG 12-LEAD (to Muse)    S/P ascending aortic replacement    Hyperlipidemia with target low density lipoprotein (LDL) cholesterol less than 70 mg/dL    Essential hypertension  -     CBC auto differential; Future  -     Comprehensive metabolic panel; Future    Long term (current) use of anticoagulants  -     Protime-INR; Standing    Insulin pump in place    Other orders  -     Discontinue: amiodarone (PACERONE) 200 MG Tab; Take 0.5 tablets (100 mg total) by mouth once daily.  -     amiodarone (PACERONE) 100 MG Tab; Take 1 tablet (100 mg total) by mouth once daily.  -     hydroCHLOROthiazide (MICROZIDE) 12.5 mg capsule; Take 1 capsule (12.5 mg total) by mouth once daily.  -     amoxicillin (AMOXIL) 500 MG capsule; Take 4 capsules (2,000 mg total) by mouth as needed (take one hour before dental work).    the chest discomfort is musculoskeletal chest wall     Low carb diet    Pt had post-op atrial flutter at the time of surgery last year requiring cardioversion..  Will reduce the amiodarone to 100 mg daily and anticipate stopping the amiodarone over the next month or so    Add HCTZ 12.5 mg daily for HBP    Amoxicillin 500 mg 4 capsules one hour before dental work.  Explained the need for antibiotic prophylaxis prior to dental work.    Discussed risk of stroke or valve thrombosis  from holding warfarin for any procedure vs  Risk of bleeding from procedure.    Rest of meds the same    RTC one month with lab    Monthly INR with f/u by coumadin clinic    Follow up in about 4 weeks (around 8/12/2020).

## 2020-07-15 NOTE — Clinical Note
Hey,   He needs a 3 month follow up with labs prior. He said just to send it to him in the portal please. Thank you

## 2020-07-15 NOTE — LETTER
July 15, 2020      Garry Stover MD  [address]  [address]  [place] [address]           [facility]  [address], [facility] [address]  [place] [address]  Phone: [phone]  Fax: [phone]          Patient: Cj Barnse   MR Number: [id number]   YOB: 1965   Date of Visit: 7/15/2020       Dear Dr. Garry Stover:    Thank you for referring Cj Barnes to me for evaluation. Attached you will find relevant portions of my assessment and plan of care.    If you have questions, please do not hesitate to call me. I look forward to following Cj Barnes along with you.    Sincerely,    Ryan Alexander MD    Enclosure  CC:  No Recipients    If you would like to receive this communication electronically, please contact [email or web address] or [phone] to request more information on [facility] [facility] access.    For providers and/or their staff who would like to refer a patient to [facility], please contact us through our one-stop-shop provider referral line, [facility], at [phone].    If you feel you have received this communication in error or would no longer like to receive these types of communications, please e-mail [email or web address]

## 2020-07-15 NOTE — PROGRESS NOTES
Patient, Cj Barnes (MRN #[id number]), presented with a recorded BMI of 35.03 kg/m^2 and a documented comorbidity(s):  - Hypertension  - Hyperlipidemia  to which the severe obesity is a contributing factor. This is consistent with the definition of severe obesity (BMI 35.0-39.9) with comorbidity (ICD-10 E66.01, Z68.35). The patient's severe obesity was monitored, evaluated, addressed and/or treated. This addendum to the medical record is made on 07/15/2020.

## 2020-07-15 NOTE — ASSESSMENT & PLAN NOTE
Body mass index is 35.03 kg/m².  Increases insulin resistance.   Discussed DM diet and exercise.   Patient does exhibit insulin resistance and is on an SGLT 2 and metformin.

## 2020-07-15 NOTE — ASSESSMENT & PLAN NOTE
Blood sugar readings are near goal on Dexcom download.   We need to work on insulin pump optimization---he needs to stop entering in false carbohydrate intake--we will adjust his insulin pump settings today to prevent him from doing this.  Encouraged patient on cut back on CHO intake -- he reports on average 50-100g CHO with each meal usually      Medication changes:   Continue Metformin  He plans to restart his Jardiance 25 mg daily -- for cardiac/ renal protection as well.     Pump settings:  Basal: 1.1 units/hr     ICR: 1:7    ISF: 20    Target:  120    IOB:  3 hours     Long discussion with patient that if he continues to have to enter in false carbohydrates that he needs to notify me and I will make further insulin pump setting changes    Reviewed with patient how to administer a dual squared bolus       -- Reviewed goals of therapy are to get the best control we can without hypoglycemia  -- Reviewed patient's current insulin regimen. Clarified proper insulin dose and timing in relation to meals, etc. Insulin injection sites and proper rotation instructed.    -- Advised frequent self blood glucose monitoring.  Patient encouraged to document glucose results and bring them to every clinic visit  -Continue to use Dexcom   -- Hypoglycemia precautions discussed. Instructed on precautions before driving.    -- Call for Bg repeatedly < 90 or > 180.   -- Close adherence to lifestyle changes recommended.   -- Periodic follow ups for eye evaluations, foot care and dental care suggested.

## 2020-07-17 ENCOUNTER — TELEPHONE (OUTPATIENT)
Dept: SURGERY | Facility: CLINIC | Age: 55
End: 2020-07-17

## 2020-07-17 NOTE — TELEPHONE ENCOUNTER
----- Message from Shukri Leonard sent at 7/17/2020  2:59 PM CDT -----  Regarding: for diabetes supplies          The caller (Arlin with [employer]) states that they faxed over the request on 7/14/20 for a refill on the Pt's diabetic supplies.      Phone # [phone] Option 2

## 2020-07-17 NOTE — TELEPHONE ENCOUNTER
Called patient at all available numbers in reference to a referral to  Ambulatory Colorectal Surgery for colon cancer screening. Patient's mother refused.

## 2020-07-19 ENCOUNTER — PATIENT OUTREACH (OUTPATIENT)
Dept: ADMINISTRATIVE | Facility: OTHER | Age: 55
End: 2020-07-19

## 2020-07-19 NOTE — PROGRESS NOTES
Chart reviewed.   Immunizations: updated  Orders placed: n/a  Upcoming appts to satisfy GARRETT topics: Optometry 7/20

## 2020-07-20 ENCOUNTER — OFFICE VISIT (OUTPATIENT)
Dept: OPTOMETRY | Facility: CLINIC | Age: 55
End: 2020-07-20
Payer: MEDICARE

## 2020-07-20 DIAGNOSIS — I10 ESSENTIAL HYPERTENSION: ICD-10-CM

## 2020-07-20 DIAGNOSIS — H52.4 PRESBYOPIA: ICD-10-CM

## 2020-07-20 DIAGNOSIS — H25.13 NS (NUCLEAR SCLEROSIS), BILATERAL: ICD-10-CM

## 2020-07-20 DIAGNOSIS — H35.049 DIABETIC RETINAL MICROANEURYSMS: ICD-10-CM

## 2020-07-20 DIAGNOSIS — E10.3293 MILD NONPROLIFERATIVE DIABETIC RETINOPATHY OF BOTH EYES WITHOUT MACULAR EDEMA ASSOCIATED WITH TYPE 1 DIABETES MELLITUS: ICD-10-CM

## 2020-07-20 DIAGNOSIS — E10.3299 TYPE 1 DIABETES MELLITUS WITH MILD NONPROLIFERATIVE RETINOPATHY WITHOUT MACULAR EDEMA, UNSPECIFIED LATERALITY: Primary | ICD-10-CM

## 2020-07-20 DIAGNOSIS — E11.319 DIABETIC RETINAL MICROANEURYSMS: ICD-10-CM

## 2020-07-20 PROCEDURE — 92015 PR REFRACTION: ICD-10-PCS | Mod: S$GLB,,, | Performed by: OPTOMETRIST

## 2020-07-20 PROCEDURE — 92015 DETERMINE REFRACTIVE STATE: CPT | Mod: S$GLB,,, | Performed by: OPTOMETRIST

## 2020-07-20 PROCEDURE — 99499 RISK ADDL DX/OHS AUDIT: ICD-10-PCS | Mod: S$GLB,,, | Performed by: OPTOMETRIST

## 2020-07-20 PROCEDURE — 92014 PR EYE EXAM, EST PATIENT,COMPREHESV: ICD-10-PCS | Mod: S$GLB,,, | Performed by: OPTOMETRIST

## 2020-07-20 PROCEDURE — 92014 COMPRE OPH EXAM EST PT 1/>: CPT | Mod: S$GLB,,, | Performed by: OPTOMETRIST

## 2020-07-20 PROCEDURE — 99999 PR PBB SHADOW E&M-EST. PATIENT-LVL III: ICD-10-PCS | Mod: PBBFAC,,, | Performed by: OPTOMETRIST

## 2020-07-20 PROCEDURE — 99499 UNLISTED E&M SERVICE: CPT | Mod: S$GLB,,, | Performed by: OPTOMETRIST

## 2020-07-20 PROCEDURE — 99999 PR PBB SHADOW E&M-EST. PATIENT-LVL III: CPT | Mod: PBBFAC,,, | Performed by: OPTOMETRIST

## 2020-07-20 NOTE — PROGRESS NOTES
HPI     Patient here for annual eye exam.   Pt did not update glasses last year   Feels like he needs more near [place]  Patient denies diplopia, headaches, flashes/floaters, pain, and   itching/burning/tearing.    Pt does not use any eye drops.    Hemoglobin A1C       Date                     Value               Ref Range             Status                06/01/2020               7.9 (H)             4.0 - 5.6 %           Final                     01/09/2020               7.3 (H)             4.0 - 5.6 %           Final                     09/30/2019               6.1 (H)             4.0 - 5.6 %           Final                  Last edited by Amy Lira on 7/20/2020  2:01 PM. (History)            Assessment /Plan     For exam results, see Encounter Report.          Type 1 diabetes mellitus with mild nonproliferative retinopathy without macular edema, unspecified laterality  Mild nonproliferative diabetic retinopathy of both eyes without macular edema associated with type 1 diabetes mellitus  Diabetic retinal microaneurysms              Stable since photos in March 2018              Discussed importance of A1c control  Monitor yearly   DFE/photos Next visit     Essential hypertension  NS (nuclear sclerosis), bilateral              Mild, monitor     Presbyopia              Rx specs     RTC 1 year

## 2020-07-28 ENCOUNTER — ANTI-COAG VISIT (OUTPATIENT)
Dept: CARDIOLOGY | Facility: CLINIC | Age: 55
End: 2020-07-28
Payer: MEDICARE

## 2020-07-28 DIAGNOSIS — Z79.01 LONG TERM (CURRENT) USE OF ANTICOAGULANTS: ICD-10-CM

## 2020-07-28 DIAGNOSIS — G45.9 TIA (TRANSIENT ISCHEMIC ATTACK): ICD-10-CM

## 2020-07-28 DIAGNOSIS — Z95.828 S/P ASCENDING AORTIC REPLACEMENT: ICD-10-CM

## 2020-07-28 PROCEDURE — 93793 PR ANTICOAGULANT MGMT FOR PT TAKING WARFARIN: ICD-10-PCS | Mod: S$GLB,,,

## 2020-07-28 PROCEDURE — 93793 ANTICOAG MGMT PT WARFARIN: CPT | Mod: S$GLB,,,

## 2020-07-29 ENCOUNTER — TELEPHONE (OUTPATIENT)
Dept: CARDIOLOGY | Facility: CLINIC | Age: 55
End: 2020-07-29

## 2020-07-29 DIAGNOSIS — D50.9 MICROCYTIC ANEMIA: Primary | ICD-10-CM

## 2020-07-29 NOTE — TELEPHONE ENCOUNTER
Discussed lab with pt:  CMP OK except nonfasting glucose 143  Hgb 13 with MCV 76  Pt reports that he is up to date with colonoscopy.  Will check iron levels.  Pt may need to take an iron supplement.

## 2020-07-31 ENCOUNTER — TELEPHONE (OUTPATIENT)
Dept: CARDIOLOGY | Facility: CLINIC | Age: 55
End: 2020-07-31

## 2020-07-31 NOTE — TELEPHONE ENCOUNTER
Lab suggests borderline iron deficiency..  Pt had colonoscopy last year  Recommend OTC iron supplement one a day for 3 months.

## 2020-08-10 ENCOUNTER — PATIENT OUTREACH (OUTPATIENT)
Dept: ADMINISTRATIVE | Facility: OTHER | Age: 55
End: 2020-08-10

## 2020-08-12 ENCOUNTER — OFFICE VISIT (OUTPATIENT)
Dept: CARDIOLOGY | Facility: CLINIC | Age: 55
End: 2020-08-12
Payer: MEDICARE

## 2020-08-12 VITALS
BODY MASS INDEX: 36.2 KG/M2 | HEART RATE: 57 BPM | HEIGHT: 67 IN | DIASTOLIC BLOOD PRESSURE: 67 MMHG | SYSTOLIC BLOOD PRESSURE: 115 MMHG | WEIGHT: 230.63 LBS

## 2020-08-12 DIAGNOSIS — Z79.01 LONG TERM (CURRENT) USE OF ANTICOAGULANTS: ICD-10-CM

## 2020-08-12 DIAGNOSIS — I10 ESSENTIAL HYPERTENSION: ICD-10-CM

## 2020-08-12 DIAGNOSIS — Z95.2 H/O MECHANICAL AORTIC VALVE REPLACEMENT: ICD-10-CM

## 2020-08-12 DIAGNOSIS — E78.5 HYPERLIPIDEMIA WITH TARGET LOW DENSITY LIPOPROTEIN (LDL) CHOLESTEROL LESS THAN 70 MG/DL: ICD-10-CM

## 2020-08-12 DIAGNOSIS — E10.65 TYPE 1 DIABETES MELLITUS WITH HYPERGLYCEMIA: ICD-10-CM

## 2020-08-12 DIAGNOSIS — Z95.828 S/P ASCENDING AORTIC REPLACEMENT: ICD-10-CM

## 2020-08-12 DIAGNOSIS — D50.0 IRON DEFICIENCY ANEMIA DUE TO CHRONIC BLOOD LOSS: Primary | ICD-10-CM

## 2020-08-12 PROCEDURE — 99214 OFFICE O/P EST MOD 30 MIN: CPT | Mod: S$GLB,,, | Performed by: INTERNAL MEDICINE

## 2020-08-12 PROCEDURE — 3008F PR BODY MASS INDEX (BMI) DOCUMENTED: ICD-10-PCS | Mod: CPTII,S$GLB,, | Performed by: INTERNAL MEDICINE

## 2020-08-12 PROCEDURE — 99214 PR OFFICE/OUTPT VISIT, EST, LEVL IV, 30-39 MIN: ICD-10-PCS | Mod: S$GLB,,, | Performed by: INTERNAL MEDICINE

## 2020-08-12 PROCEDURE — 3074F PR MOST RECENT SYSTOLIC BLOOD PRESSURE < 130 MM HG: ICD-10-PCS | Mod: CPTII,S$GLB,, | Performed by: INTERNAL MEDICINE

## 2020-08-12 PROCEDURE — 3008F BODY MASS INDEX DOCD: CPT | Mod: CPTII,S$GLB,, | Performed by: INTERNAL MEDICINE

## 2020-08-12 PROCEDURE — 99999 PR PBB SHADOW E&M-EST. PATIENT-LVL IV: CPT | Mod: PBBFAC,,, | Performed by: INTERNAL MEDICINE

## 2020-08-12 PROCEDURE — 3074F SYST BP LT 130 MM HG: CPT | Mod: CPTII,S$GLB,, | Performed by: INTERNAL MEDICINE

## 2020-08-12 PROCEDURE — 3078F PR MOST RECENT DIASTOLIC BLOOD PRESSURE < 80 MM HG: ICD-10-PCS | Mod: CPTII,S$GLB,, | Performed by: INTERNAL MEDICINE

## 2020-08-12 PROCEDURE — 3078F DIAST BP <80 MM HG: CPT | Mod: CPTII,S$GLB,, | Performed by: INTERNAL MEDICINE

## 2020-08-12 PROCEDURE — 3051F HG A1C>EQUAL 7.0%<8.0%: CPT | Mod: CPTII,S$GLB,, | Performed by: INTERNAL MEDICINE

## 2020-08-12 PROCEDURE — 99999 PR PBB SHADOW E&M-EST. PATIENT-LVL IV: ICD-10-PCS | Mod: PBBFAC,,, | Performed by: INTERNAL MEDICINE

## 2020-08-12 PROCEDURE — 3051F PR MOST RECENT HEMOGLOBIN A1C LEVEL 7.0 - < 8.0%: ICD-10-PCS | Mod: CPTII,S$GLB,, | Performed by: INTERNAL MEDICINE

## 2020-08-12 NOTE — PROGRESS NOTES
"  Subjective:      Patient ID: Cj Barnes is a 54 y.o. male.    Chief Complaint: Follow-up (Stopped Iron supplement due to muscle pain in chest wall.) and Pre-op Exam (needs clearance to hold blood thinner for dental extraction.)    HPI:  Pt went to the ER with RUQ pain attributed to iron.  Pt stopped the oral iron and has had no recurrent RUQ pain.    Pt feels well.    Walks the park    "I have to sleep sitting up because the heart valve is making so much noise"    Review of Systems   Cardiovascular: Positive for dyspnea on exertion (Mild) and orthopnea. Negative for chest pain, claudication, irregular heartbeat, leg swelling, near-syncope, palpitations and syncope.        Past Medical History:   Diagnosis Date    Aortic stenosis 2018    Cancer 2014    Colon cancer     Coronary artery disease     Diabetes mellitus type I     since in his 30's    Heart murmur     Heel fracture     HTN (hypertension)     Hyperlipidemia LDL goal < 70     Insulin pump in place     MVP (mitral valve prolapse)     Stenosis of aortic and mitral valves         Past Surgical History:   Procedure Laterality Date    AORTIC VALVE REPLACEMENT N/A 8/9/2019    Procedure: Replacement-valve-aortic;  Surgeon: Ryan Knight MD;  Location: [address];  Service: Cardiothoracic;  Laterality: N/A;    BACK SURGERY      BENTALL PROCEDURE FOR REPLACEMENT OF AORTIC VALVE, AORTIC ROOT, AND ASCENDING AORTA N/A 8/9/2019    Procedure: BENTALL PROCEDURE;  Surgeon: Ryan Knight MD;  Location: [address];  Service: Cardiothoracic;  Laterality: N/A;    COLON SURGERY  2014    COLONOSCOPY N/A 2/17/2020    Procedure: COLONOSCOPY;  Surgeon: Richi Mock MD;  Location: [facility];  Service: Colon and Rectal;  Laterality: N/A;    FOOT TENDON SURGERY      right and left heart cath Bilateral 01/15/2018    TREATMENT OF CARDIAC ARRHYTHMIA N/A 8/19/2019    Procedure: CARDIOVERSION;  Surgeon: Juan Zapata MD;  Location: [facility];  Service: Cardiology;  Laterality: N/A;  afib, dccv only, anes, GP, 3092    TRIGGER FINGER RELEASE      x 2       Family History   Problem Relation Age of Onset    Heart disease Mother     Lung cancer Mother     Heart attack Father     Diabetes Father     HIV Brother        Social History     Socioeconomic History    Marital status:      Spouse name: Not on file    Number of children: Not on file    Years of education: Not on file    Highest education level: Not on file   Occupational History    Not on file   Social Needs    Financial resource strain: Not on file    Food insecurity     Worry: Not on file     Inability: Not on file    Transportation needs     Medical: Not on file     Non-medical: Not on file   Tobacco Use    Smoking status: Never Smoker    Smokeless tobacco: Former User     Types: Snuff    Tobacco comment: since he was 12 yrs old   Substance and Sexual Activity    Alcohol use: No     Comment: socially    Drug use: No    Sexual activity: Not on file   Lifestyle    Physical activity     Days per week: Not on file     Minutes per session: Not on file    Stress: Not on file   Relationships    Social connections     Talks on phone: Not on file     Gets together: Not on file     Attends Restorationism service: Not on file     Active member of club or organization: Not on file     Attends meetings of clubs or organizations: Not on file     Relationship status: Not on file   Other Topics Concern    Not on file   Social History Narrative        2 grown kids    Delivers seafood       Current Outpatient Medications on File Prior to Visit   Medication Sig Dispense Refill    amiodarone (PACERONE) 100 MG Tab Take 1 tablet (100 mg total) by mouth once daily. 90 tablet 0    aspirin (ECOTRIN) 81 MG EC tablet Take 1 tablet (81 mg total) by mouth once daily.  0    atorvastatin (LIPITOR) 40 MG tablet Take 1 tablet (40 mg total) by mouth every evening. 30 tablet 6     "blood sugar diagnostic Strp 1 each by Misc.(Non-Drug; Combo Route) route 6 (six) times daily. Contour next strips. Pt needs contour jackelyn for compatibility w/ insulin pump ([employer] 670g). Necessity 200 strip 6    blood-glucose meter,continuous (DEXCOM G6 ) Misc 1 Device by Misc.(Non-Drug; Combo Route) route once. for 1 dose 1 each 0    blood-glucose sensor (DEXCOM G6 SENSOR) Apple 1 sensor every 10 days 9 Device 3    blood-glucose transmitter (DEXCOM G6 TRANSMITTER) Apple 1 transmitter every 3 months 1 Device 3    clotrimazole-betamethasone 1-0.05% (LOTRISONE) cream APPLY A SMALL AMOUNT TO AFFECTED AREA THREE TIMES DAILY UNTIL CLEAR  0    COMFORT EZ PEN NEEDLES 33 gauge x 3/16" Ndle USE AS DIRECTED with Levemir pens  11    empagliflozin (JARDIANCE) 25 mg tablet Take 1 tablet (25 mg total) by mouth once daily. 30 tablet 4    gabapentin (NEURONTIN) 300 MG capsule Take 2 capsules (600 mg total) by mouth 3 (three) times daily. 540 capsule 3    glucagon, human recombinant, (GLUCAGON EMERGENCY KIT, HUMAN,) 1 mg SolR Inject 1 mg into the muscle as needed. 1 each 0    hydroCHLOROthiazide (MICROZIDE) 12.5 mg capsule Take 1 capsule (12.5 mg total) by mouth once daily. 90 capsule 3    insulin detemir U-100 (LEVEMIR FLEXTOUCH U-100 INSULN) 100 unit/mL (3 mL) SubQ InPn pen Inject 48 Units into the skin every evening. 3 mL 0    insulin lispro (HUMALOG U-100 INSULIN) 100 unit/mL injection To use continuously with insulin pump.  Max total daily dose of 100 units 3 vial 6    insulin regular 100 unit/mL Soln Use home settings Basal 0.4    Bolus/carb 12    ISF 20 1 Product 0    lisinopriL (PRINIVIL,ZESTRIL) 20 MG tablet Take 1 tablet (20 mg total) by mouth once daily. 90 tablet 1    metFORMIN (GLUCOPHAGE-XR) 500 MG XR 24hr tablet Take 1 tablet (500 mg total) by mouth daily with breakfast. 90 tablet 3    warfarin (COUMADIN) 3 MG tablet Take 2 tablets (6 mg total) by mouth Daily. 60 tablet 11    amoxicillin " "(AMOXIL) 500 MG capsule Take 4 capsules (2,000 mg total) by mouth as needed (take one hour before dental work). (Patient not taking: Reported on 8/12/2020) 28 capsule 11     No current facility-administered medications on file prior to visit.        Review of patient's allergies indicates:  No Known Allergies  Objective:     Vitals:    08/12/20 0930   BP: 115/67   BP Location: Right arm   Patient Position: Sitting   BP Method: Large (Automatic)   Pulse: (!) 57   Weight: 104.6 kg (230 lb 9.6 oz)   Height: 5' 7" (1.702 m)        Physical Exam   Constitutional: He is oriented to person, place, and time. He appears well-developed and well-nourished. No distress.   Eyes: No scleral icterus.   Neck: No JVD present. Carotid bruit is present (bilateral carotid bruits vs. transmitted systolic murmur).   Cardiovascular: Regular rhythm and normal heart sounds. Exam reveals no gallop and no friction rub.   No murmur heard.  Pulmonary/Chest: Effort normal and breath sounds normal. No respiratory distress.   Musculoskeletal:         General: No edema.   Neurological: He is alert and oriented to person, place, and time.   Skin: Skin is warm and dry. He is not diaphoretic.   Psychiatric: He has a normal mood and affect. His behavior is normal. Judgment and thought content normal.   Vitals reviewed.     Wt up 25 lbs since February    Lab 7/28/20:  INR 2.8  Hgb 13  MCV 76  MCH 24.6  Fe 48  TIBC 554  9% iron sat      Assessment:     1. Iron deficiency anemia due to chronic blood loss    2. H/O mechanical aortic valve replacement    3. S/P ascending aortic replacement    4. Essential hypertension    5. Hyperlipidemia with target low density lipoprotein (LDL) cholesterol less than 70 mg/dL    6. Long term (current) use of anticoagulants    7. Type 1 diabetes mellitus with hyperglycemia      Plan:   Cj was seen today for follow-up and pre-op exam.    Diagnoses and all orders for this visit:    Iron deficiency anemia due to " chronic blood loss  -     Ambulatory referral/consult to Gastroenterology; Future  -     Ambulatory referral/consult to Hematology / Oncology; Future    H/O mechanical aortic valve replacement    S/P ascending aortic replacement    Essential hypertension    Hyperlipidemia with target low density lipoprotein (LDL) cholesterol less than 70 mg/dL    Long term (current) use of anticoagulants    Type 1 diabetes mellitus with hyperglycemia     HBP is now controlled with addition of HCTZ    Sinus rhythm is maintained with amiodarone 100 mg dose    I explained to pt that usually a tooth can be pulled while continuing the warfarin  I discussed the need for amoxicillin 2,000 mg an hour before the dental extraction  I discussed the small risk of valve thrombosis and stroke if the warfarin is held for 5 days prior to dental extraction which has to be weighed against the risk of bleeding from dental extraction and the benefit of dental extraction    Will consult GI regarding iron deficiency anemia--consider EGD,  Pt also has a hx of colon cancer (prior partial colectomy by Dr Guru Vasquez) but is apparently up to date with colonoscopy    Will consult hematology to consider IV iron in pt who is intolerant to oral iron    Will discontinue the amiodarone since pt had post - op a fib following heart surgery and has been on amiodarone for a year.    Repeat INR in one week--pt is followed by coumadin clinic    Follow up in about 4 weeks (around 9/9/2020). with ECG

## 2020-08-18 NOTE — PROGRESS NOTES
CC:  anemia    HPI:  Mr Barnes is a 55 yo man who was referred by Dr Alexander for iron deficiency anemia. He has CAD, T1DM, HTN,  MVP, stenosis of aortic and mitral valves s/p aortic valve replacement in Aug 2019 on coumadin. He had a colonoscopy on 2/7/2020 which showed a 5 mm polyp in the transverse colon, one 3 mm polyp in the ascending colon. Repeat colonoscopy in 3 years was recommended. Both are tubular adenomas. He has chronic anemia. CBC on 8/6/20 showed Hb 12.4, MCV 76, plt count 176. 7/30/20: iron 48, TIBC 554, iron saturation 9%. He presents today for further management. Cannot tolerate iron pills due to GI side effects. Has been tired for a few months. He told me he had a tear in the esophagus when he was extubated last year, which later got infected. He still has some trouble with swallowing. Scheduled to see GI on Sep 21.         Past Medical History:   Diagnosis Date    Aortic stenosis 2018    Cancer 2014    Colon cancer     Coronary artery disease     Diabetes mellitus type I     since in his 30's    Heart murmur     Heel fracture     HTN (hypertension)     Hyperlipidemia LDL goal < 70     Insulin pump in place     MVP (mitral valve prolapse)     Stenosis of aortic and mitral valves         Past Surgical History:   Procedure Laterality Date    AORTIC VALVE REPLACEMENT N/A 8/9/2019    Procedure: Replacement-valve-aortic;  Surgeon: Ryan Knight MD;  Location: [address];  Service: Cardiothoracic;  Laterality: N/A;    BACK SURGERY      BENTALL PROCEDURE FOR REPLACEMENT OF AORTIC VALVE, AORTIC ROOT, AND ASCENDING AORTA N/A 8/9/2019    Procedure: BENTALL PROCEDURE;  Surgeon: Ryan Knight MD;  Location: [address];  Service: Cardiothoracic;  Laterality: N/A;    COLON SURGERY  2014    COLONOSCOPY N/A 2/17/2020    Procedure: COLONOSCOPY;  Surgeon: Richi Mock MD;  Location: [facility];  Service: Colon and Rectal;  Laterality: N/A;    FOOT TENDON SURGERY       right and left heart cath Bilateral 01/15/2018    TREATMENT OF CARDIAC ARRHYTHMIA N/A 8/19/2019    Procedure: CARDIOVERSION;  Surgeon: Juan Zapata MD;  Location: [facility];  Service: Cardiology;  Laterality: N/A;  afib, dccv only, anes, GP, 3092    TRIGGER FINGER RELEASE      x 2       Family History   Problem Relation Age of Onset    Heart disease Mother     Lung cancer Mother     Heart attack Father     Diabetes Father     HIV Brother        Social History     Socioeconomic History    Marital status:      Spouse name: Not on file    Number of children: Not on file    Years of education: Not on file    Highest education level: Not on file   Occupational History    Not on file   Social Needs    Financial resource strain: Not on file    Food insecurity     Worry: Not on file     Inability: Not on file    Transportation needs     Medical: Not on file     Non-medical: Not on file   Tobacco Use    Smoking status: Never Smoker    Smokeless tobacco: Former User     Types: Snuff    Tobacco comment: since he was 12 yrs old   Substance and Sexual Activity    Alcohol use: No     Comment: socially    Drug use: No    Sexual activity: Not on file   Lifestyle    Physical activity     Days per week: Not on file     Minutes per session: Not on file    Stress: Not on file   Relationships    Social connections     Talks on phone: Not on file     Gets together: Not on file     Attends Confucianist service: Not on file     Active member of club or organization: Not on file     Attends meetings of clubs or organizations: Not on file     Relationship status: Not on file   Other Topics Concern    Not on file   Social History Narrative        2 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 Negative for appetite change, chills, fever and unexpected weight change.   HENT: Positive for trouble swallowing. Negative for mouth sores, nosebleeds, tinnitus  and voice change (chronic).    Eyes: Negative for pain, redness and visual disturbance.   Respiratory: Negative for cough, shortness of breath and wheezing.    Cardiovascular: Negative for chest pain, palpitations and leg swelling.   Gastrointestinal: Negative for abdominal distention, abdominal pain, blood in stool, constipation, diarrhea, nausea and vomiting.   Endocrine: Negative for polydipsia, polyphagia and polyuria.   Genitourinary: Negative for flank pain, frequency and hematuria.   Musculoskeletal: Negative for arthralgias, back pain, gait problem, joint swelling, myalgias, neck pain and neck stiffness.   Skin: Negative for color change, pallor, rash and wound.   Neurological: Negative for tremors, seizures, syncope, speech difficulty, weakness, light-headedness, numbness and headaches.   Hematological: Negative for adenopathy. Does not bruise/bleed easily.   Psychiatric/Behavioral: Negative for confusion, dysphoric mood and self-injury. The patient is not nervous/anxious.    All other systems reviewed and are negative.      Objective:  Physical Exam   Constitutional: He is oriented to person, place, and time. He appears well-developed and well-nourished. No distress.   HENT:   Head: Normocephalic and atraumatic.   Mouth/Throat: Oropharynx is clear and moist. No oropharyngeal exudate.   Eyes: Pupils are equal, round, and reactive to light. Conjunctivae and EOM are normal. No scleral icterus.   Neck: Normal range of motion. Neck supple. No JVD present. No thyromegaly present.   Cardiovascular: Normal rate, regular rhythm and normal heart sounds. Exam reveals no gallop and no friction rub.   No murmur heard.  Pulmonary/Chest: Effort normal and breath sounds normal. No respiratory distress. He has no wheezes. He has no rales.   Abdominal: Soft. Bowel sounds are normal. He exhibits no distension and no mass. There is no hepatosplenomegaly. There is no abdominal tenderness. There is no rebound. No hernia.    Musculoskeletal: Normal range of motion.         General: No tenderness, deformity or edema.   Lymphadenopathy:     He has no cervical adenopathy.        Right: No supraclavicular adenopathy present.        Left: No supraclavicular adenopathy present.   Neurological: He is alert and oriented to person, place, and time. No cranial nerve deficit. He exhibits normal muscle tone.   Skin: Skin is warm and dry. No rash noted. No erythema. No pallor.   Psychiatric: He has a normal mood and affect. His behavior is normal. Judgment and thought content normal.   Vitals reviewed.      Labs:  CBC on 8/6/20 showed Hb 12.4, MCV 76, plt count 176. 7/30/20: iron 48, TIBC 554, iron saturation 9%.    Assessment and plan:    1. Iron deficiency anemia due to chronic blood loss    2. Nutritional anemia      1.2  - Mr Barnes is a 55 yo man with mechanical aortic valve on coumadin, who presents today for further management of iron deficiency anemia. Had colonoscopy in Feb 2020 which did not reveal any site of bleeding. Cannot tolerate oral iron due to GI side effects  - Check B12, folate, hapto, direct romana, ferritin, retic.   - Discussed side effects of Injectafer which include but are not limited to infusion reaction, shortness of breath, hives, rash, flushing, itching, headaches, skin discoloration at the injection site, nausea, vomiting, low phosphorus level, elevated blood pressure, elevated liver enzymes, low phosphorus level. Patient understands the risks and agrees with proceeding with Injectafer.   - return for injectafer once approved  - monitor labs one month after injectafer  - see me in 4 months  - asked patient to see GI on Sep 21. He still needs an EGD to r/o upper GI blood loss. He understands and agrees with the plan

## 2020-08-19 ENCOUNTER — LAB VISIT (OUTPATIENT)
Dept: LAB | Facility: HOSPITAL | Age: 55
End: 2020-08-19
Attending: INTERNAL MEDICINE
Payer: MEDICARE

## 2020-08-19 ENCOUNTER — OFFICE VISIT (OUTPATIENT)
Dept: HEMATOLOGY/ONCOLOGY | Facility: CLINIC | Age: 55
End: 2020-08-19
Payer: MEDICARE

## 2020-08-19 VITALS
HEIGHT: 67 IN | SYSTOLIC BLOOD PRESSURE: 137 MMHG | BODY MASS INDEX: 35.94 KG/M2 | DIASTOLIC BLOOD PRESSURE: 67 MMHG | RESPIRATION RATE: 16 BRPM | WEIGHT: 229 LBS | HEART RATE: 62 BPM | OXYGEN SATURATION: 99 % | TEMPERATURE: 98 F

## 2020-08-19 DIAGNOSIS — D50.0 IRON DEFICIENCY ANEMIA DUE TO CHRONIC BLOOD LOSS: Primary | ICD-10-CM

## 2020-08-19 DIAGNOSIS — D53.9 NUTRITIONAL ANEMIA: ICD-10-CM

## 2020-08-19 DIAGNOSIS — D50.0 IRON DEFICIENCY ANEMIA DUE TO CHRONIC BLOOD LOSS: ICD-10-CM

## 2020-08-19 LAB
DAT IGG-SP REAG RBC-IMP: NORMAL
FERRITIN SERPL-MCNC: 23 NG/ML (ref 20–300)
FOLATE SERPL-MCNC: 11.2 NG/ML (ref 4–24)
HAPTOGLOB SERPL-MCNC: 80 MG/DL (ref 30–250)
RETICS/RBC NFR AUTO: 1.4 % (ref 0.4–2)
VIT B12 SERPL-MCNC: 420 PG/ML (ref 210–950)

## 2020-08-19 PROCEDURE — 82746 ASSAY OF FOLIC ACID SERUM: CPT

## 2020-08-19 PROCEDURE — 82728 ASSAY OF FERRITIN: CPT

## 2020-08-19 PROCEDURE — 85045 AUTOMATED RETICULOCYTE COUNT: CPT

## 2020-08-19 PROCEDURE — 3078F PR MOST RECENT DIASTOLIC BLOOD PRESSURE < 80 MM HG: ICD-10-PCS | Mod: CPTII,S$GLB,, | Performed by: INTERNAL MEDICINE

## 2020-08-19 PROCEDURE — 3075F SYST BP GE 130 - 139MM HG: CPT | Mod: CPTII,S$GLB,, | Performed by: INTERNAL MEDICINE

## 2020-08-19 PROCEDURE — 86880 COOMBS TEST DIRECT: CPT

## 2020-08-19 PROCEDURE — 99999 PR PBB SHADOW E&M-EST. PATIENT-LVL V: CPT | Mod: PBBFAC,,, | Performed by: INTERNAL MEDICINE

## 2020-08-19 PROCEDURE — 83010 ASSAY OF HAPTOGLOBIN QUANT: CPT

## 2020-08-19 PROCEDURE — 36415 COLL VENOUS BLD VENIPUNCTURE: CPT

## 2020-08-19 PROCEDURE — 99204 OFFICE O/P NEW MOD 45 MIN: CPT | Mod: S$GLB,,, | Performed by: INTERNAL MEDICINE

## 2020-08-19 PROCEDURE — 3075F PR MOST RECENT SYSTOLIC BLOOD PRESS GE 130-139MM HG: ICD-10-PCS | Mod: CPTII,S$GLB,, | Performed by: INTERNAL MEDICINE

## 2020-08-19 PROCEDURE — 99204 PR OFFICE/OUTPT VISIT, NEW, LEVL IV, 45-59 MIN: ICD-10-PCS | Mod: S$GLB,,, | Performed by: INTERNAL MEDICINE

## 2020-08-19 PROCEDURE — 3008F BODY MASS INDEX DOCD: CPT | Mod: CPTII,S$GLB,, | Performed by: INTERNAL MEDICINE

## 2020-08-19 PROCEDURE — 82607 VITAMIN B-12: CPT

## 2020-08-19 PROCEDURE — 3078F DIAST BP <80 MM HG: CPT | Mod: CPTII,S$GLB,, | Performed by: INTERNAL MEDICINE

## 2020-08-19 PROCEDURE — 99999 PR PBB SHADOW E&M-EST. PATIENT-LVL V: ICD-10-PCS | Mod: PBBFAC,,, | Performed by: INTERNAL MEDICINE

## 2020-08-19 PROCEDURE — 3008F PR BODY MASS INDEX (BMI) DOCUMENTED: ICD-10-PCS | Mod: CPTII,S$GLB,, | Performed by: INTERNAL MEDICINE

## 2020-08-19 RX ORDER — DIPHENHYDRAMINE HYDROCHLORIDE 50 MG/ML
50 INJECTION INTRAMUSCULAR; INTRAVENOUS ONCE AS NEEDED
Status: CANCELLED | OUTPATIENT
Start: 2020-08-26

## 2020-08-19 RX ORDER — METHYLPREDNISOLONE SOD SUCC 125 MG
125 VIAL (EA) INJECTION ONCE AS NEEDED
Status: CANCELLED | OUTPATIENT
Start: 2020-08-26

## 2020-08-19 RX ORDER — SODIUM CHLORIDE 0.9 % (FLUSH) 0.9 %
10 SYRINGE (ML) INJECTION
Status: CANCELLED | OUTPATIENT
Start: 2020-08-26

## 2020-08-19 RX ORDER — EPINEPHRINE 0.3 MG/.3ML
0.3 INJECTION SUBCUTANEOUS ONCE AS NEEDED
Status: CANCELLED | OUTPATIENT
Start: 2020-08-26

## 2020-08-19 RX ORDER — HEPARIN 100 UNIT/ML
5 SYRINGE INTRAVENOUS
Status: CANCELLED | OUTPATIENT
Start: 2020-08-26

## 2020-08-19 RX ORDER — SODIUM CHLORIDE 9 MG/ML
INJECTION, SOLUTION INTRAVENOUS CONTINUOUS
Status: CANCELLED | OUTPATIENT
Start: 2020-08-26

## 2020-08-19 NOTE — Clinical Note
Verify with PA re injectafer. Return in 1-2 weeks for injectafer weekly x 2. Check CBC, ferritin, iron one month after the last dose of injectafer. RTC in 4 months with CBC, ferritin, iron checked 1 day prior

## 2020-08-19 NOTE — LETTER
August 19, 2020      Ryan Alexander MD  [address]  [address]  [facility] [place] [facility] [address]           [facility]  [facility]  [place] [address]  Phone: [phone]          Patient: Cj Barnes   MR Number: [id number]   YOB: 1965   Date of Visit: 8/19/2020       Dear Dr. Ryan Alexander:    Thank you for referring Cj Barnes to me for evaluation. Attached you will find relevant portions of my assessment and plan of care.    If you have questions, please do not hesitate to call me. I look forward to following Cj Barnes along with you.    Sincerely,    Monica Cantrell MD    Enclosure  CC:  No Recipients    If you would like to receive this communication electronically, please contact [email or web address] or [phone] to request more information on [employer] Link access.    For providers and/or their staff who would like to refer a patient to [facility], please contact us through our one-stop-shop provider referral line, [facility], at [phone].    If you feel you have received this communication in error or would no longer like to receive these types of communications, please e-mail [email or web address]

## 2020-08-27 ENCOUNTER — TELEPHONE (OUTPATIENT)
Dept: ENDOSCOPY | Facility: HOSPITAL | Age: 55
End: 2020-08-27

## 2020-08-27 ENCOUNTER — OFFICE VISIT (OUTPATIENT)
Dept: GASTROENTEROLOGY | Facility: CLINIC | Age: 55
End: 2020-08-27
Payer: MEDICARE

## 2020-08-27 VITALS
DIASTOLIC BLOOD PRESSURE: 64 MMHG | SYSTOLIC BLOOD PRESSURE: 132 MMHG | HEIGHT: 67 IN | HEART RATE: 71 BPM | BODY MASS INDEX: 36.47 KG/M2 | WEIGHT: 232.38 LBS

## 2020-08-27 DIAGNOSIS — D50.0 IRON DEFICIENCY ANEMIA DUE TO CHRONIC BLOOD LOSS: Primary | ICD-10-CM

## 2020-08-27 DIAGNOSIS — R13.12 OROPHARYNGEAL DYSPHAGIA: ICD-10-CM

## 2020-08-27 DIAGNOSIS — Z90.49 S/P COLON RESECTION: ICD-10-CM

## 2020-08-27 PROCEDURE — 3078F DIAST BP <80 MM HG: CPT | Mod: CPTII,S$GLB,, | Performed by: NURSE PRACTITIONER

## 2020-08-27 PROCEDURE — 3075F PR MOST RECENT SYSTOLIC BLOOD PRESS GE 130-139MM HG: ICD-10-PCS | Mod: CPTII,S$GLB,, | Performed by: NURSE PRACTITIONER

## 2020-08-27 PROCEDURE — 99204 PR OFFICE/OUTPT VISIT, NEW, LEVL IV, 45-59 MIN: ICD-10-PCS | Mod: S$GLB,,, | Performed by: NURSE PRACTITIONER

## 2020-08-27 PROCEDURE — 99999 PR PBB SHADOW E&M-EST. PATIENT-LVL V: ICD-10-PCS | Mod: PBBFAC,,, | Performed by: NURSE PRACTITIONER

## 2020-08-27 PROCEDURE — 3075F SYST BP GE 130 - 139MM HG: CPT | Mod: CPTII,S$GLB,, | Performed by: NURSE PRACTITIONER

## 2020-08-27 PROCEDURE — 99999 PR PBB SHADOW E&M-EST. PATIENT-LVL V: CPT | Mod: PBBFAC,,, | Performed by: NURSE PRACTITIONER

## 2020-08-27 PROCEDURE — 3008F PR BODY MASS INDEX (BMI) DOCUMENTED: ICD-10-PCS | Mod: CPTII,S$GLB,, | Performed by: NURSE PRACTITIONER

## 2020-08-27 PROCEDURE — 99204 OFFICE O/P NEW MOD 45 MIN: CPT | Mod: S$GLB,,, | Performed by: NURSE PRACTITIONER

## 2020-08-27 PROCEDURE — 3078F PR MOST RECENT DIASTOLIC BLOOD PRESSURE < 80 MM HG: ICD-10-PCS | Mod: CPTII,S$GLB,, | Performed by: NURSE PRACTITIONER

## 2020-08-27 PROCEDURE — 3008F BODY MASS INDEX DOCD: CPT | Mod: CPTII,S$GLB,, | Performed by: NURSE PRACTITIONER

## 2020-08-27 NOTE — LETTER
August 27, 2020      Ryan Alexander MD  [address]  [address]  [place] [address]           [facility]  [address]  [place] [address]  Phone: [phone]  Fax: [phone]          Patient: Cj Barnes   MR Number: [id number]   YOB: 1965   Date of Visit: 8/27/2020       Dear Dr. Ryan Alexander:    Thank you for referring Cj Barnes to me for evaluation. Attached you will find relevant portions of my assessment and plan of care.    If you have questions, please do not hesitate to call me. I look forward to following Cj Barnes along with you.    Sincerely,    Belinda Figueroa, NP    Enclosure  CC:  No Recipients    If you would like to receive this communication electronically, please contact [email or web address] or [phone] to request more information on [email or web address] Link access.    For providers and/or their staff who would like to refer a patient to [facility], please contact us through our one-stop-shop provider referral line, [facility], at [phone].    If you feel you have received this communication in error or would no longer like to receive these types of communications, please e-mail [email or web address]

## 2020-08-27 NOTE — TELEPHONE ENCOUNTER
Patient needs to be scheduled for EGD procedure. Is patient cleared from Cardiology standpoint? Please advise.    Thanks,  Juana

## 2020-08-27 NOTE — TELEPHONE ENCOUNTER
Patient needs to be scheduled for EGD procedure. Is patient able to hold Coumadin for 5 days prior to procedure? Please advise.    Thanks,  Juana

## 2020-08-27 NOTE — PROGRESS NOTES
[facility] Consultation Note    Reason for Consult:  The primary encounter diagnosis was Iron deficiency anemia due to chronic blood loss. Diagnoses of Oropharyngeal dysphagia and S/P colon resection were also pertinent to this visit.    PCP:   Garry Stover   [address] / [place] [id number]    Referring MD:  Ryan Alexander Md  [address]  [address]  [place] [address]    Initial History of Present Illness (HPI):  This is a 54 y.o. male here for evaluation of ANTHONY and dysphagia. He is a new patient.   Abdominal pain - no  Reflux - no  Dysphagia -yes, but for 15 years. This is an oropharyngeal dysphagia that he states has been told due to DM. He had a   Bowel habits - normal  GI bleeding - none  NSAID usage - none  He was unable to tolerate p.o. iron as it caused chest pain and abdominal pain  He is s/p colon resection due to Colon Ca in 2015, surgery performed at . Doing well with this.   He is on coumadin for aortic valve replacement      ROS:  Constitutional: No fevers, chills, No weight loss  ENT:  No sore throat, no PND  CV: + chest pain, no palpitation  Pulm: No cough, No shortness of breath, no wheezing  Ophtho: No vision changes  GI: see HPI  Derm: No rash, no itching  Heme: No easy bruising  MSK: + arthritis  : No dysuria, No hematuria  Neuro: No syncope, No seizure  Psych: No uncontrolled anxiety, No uncontrolled depression    Medical History:  has a past medical history of Anemia, Aortic stenosis (2018), Cancer (2014), Colon cancer, Coronary artery disease, Diabetes mellitus type I, Heart murmur, Heel fracture, HTN (hypertension), Hyperlipidemia LDL goal < 70, Insulin pump in place, MVP (mitral valve prolapse), and Stenosis of aortic and mitral valves.    Surgical History:  has a past surgical history that includes Trigger finger release; Back surgery; Colon surgery (2014); Foot Tendon Surgery; right and left heart cath (Bilateral,  "01/15/2018); Aortic valve replacement (N/A, 8/9/2019); Bentall procedure for replacement of aortic valve, aortic root, and ascending aorta (N/A, 8/9/2019); Treatment of cardiac arrhythmia (N/A, 8/19/2019); and Colonoscopy (N/A, 2/17/2020).    Family History: family history includes Diabetes in his father; HIV in his brother; Heart attack in his father; Heart disease in his mother; Lung cancer in his mother..     Social History:  reports that he has never smoked. He quit smokeless tobacco use about a year ago.  His smokeless tobacco use included snuff. He reports that he does not drink alcohol or use drugs.    Review of patient's allergies indicates:  No Known Allergies    Medication List with Changes/Refills   Current Medications    AMOXICILLIN (AMOXIL) 500 MG CAPSULE    Take 4 capsules (2,000 mg total) by mouth as needed (take one hour before dental work).    ASPIRIN (ECOTRIN) 81 MG EC TABLET    Take 1 tablet (81 mg total) by mouth once daily.    ATORVASTATIN (LIPITOR) 40 MG TABLET    Take 1 tablet (40 mg total) by mouth every evening.    BLOOD SUGAR DIAGNOSTIC STRP    1 each by Misc.(Non-Drug; Combo Route) route 6 (six) times daily. Contour next strips. Pt needs contour jackelyn for compatibility w/ insulin pump (medtronic 670g). Necessity    BLOOD-GLUCOSE METER,CONTINUOUS (DEXCOM G6 ) MISC    1 Device by Misc.(Non-Drug; Combo Route) route once. for 1 dose    BLOOD-GLUCOSE SENSOR (DEXCOM G6 SENSOR) MORIS    1 sensor every 10 days    BLOOD-GLUCOSE TRANSMITTER (DEXCOM G6 TRANSMITTER) MORIS    1 transmitter every 3 months    CLOTRIMAZOLE-BETAMETHASONE 1-0.05% (LOTRISONE) CREAM    APPLY A SMALL AMOUNT TO AFFECTED AREA THREE TIMES DAILY UNTIL CLEAR    COMFORT EZ PEN NEEDLES 33 GAUGE X 3/16" NDLE    USE AS DIRECTED with Levemir pens    EMPAGLIFLOZIN (JARDIANCE) 25 MG TABLET    Take 1 tablet (25 mg total) by mouth once daily.    GABAPENTIN (NEURONTIN) 300 MG CAPSULE    Take 2 capsules (600 mg total) by mouth 3 (three) " "times daily.    GLUCAGON, HUMAN RECOMBINANT, (GLUCAGON EMERGENCY KIT, HUMAN,) 1 MG SOLR    Inject 1 mg into the muscle as needed.    HYDROCHLOROTHIAZIDE (MICROZIDE) 12.5 MG CAPSULE    Take 1 capsule (12.5 mg total) by mouth once daily.    INSULIN DETEMIR U-100 (LEVEMIR FLEXTOUCH U-100 INSULN) 100 UNIT/ML (3 ML) SUBQ INPN PEN    Inject 48 Units into the skin every evening.    INSULIN LISPRO (HUMALOG U-100 INSULIN) 100 UNIT/ML INJECTION    To use continuously with insulin pump.  Max total daily dose of 100 units    INSULIN REGULAR 100 UNIT/ML SOLN    Use home settings Basal 0.4    Bolus/carb 12    ISF 20    LISINOPRIL (PRINIVIL,ZESTRIL) 20 MG TABLET    Take 1 tablet (20 mg total) by mouth once daily.    METFORMIN (GLUCOPHAGE-XR) 500 MG XR 24HR TABLET    Take 1 tablet (500 mg total) by mouth daily with breakfast.    WARFARIN (COUMADIN) 3 MG TABLET    Take 2 tablets (6 mg total) by mouth Daily.         Objective Findings:    Vital Signs:  /64   Pulse 71   Ht 5' 7" (1.702 m)   Wt 105.4 kg (232 lb 5.8 oz)   BMI 36.39 kg/m²   Body mass index is 36.39 kg/m².    Physical Exam:  General Appearance: Well appearing, NAD noted  Eyes:    No scleral icterus  ENT:  No lesions or masses   Lungs: BBS CTA ,  no wheezes  Heart:  HRRR, S1 & S2 normal, no murmurs heard  Abdomen:  Non distended, soft, no guarding, no rebound, no tenderness, no appreciated ascites  Musculoskeletal:  No major joint deformities  Skin: No petechiae or rash on exposed skin areas  Neurologic:  Alert and oriented x4  Psychiatric:  Normal speech mentation and affect    Labs reviewed:  Lab Results   Component Value Date    WBC 6.60 08/06/2020    HGB 12.4 (L) 08/06/2020    HCT 38.5 (L) 08/06/2020     08/06/2020    CHOL 173 06/01/2020    TRIG 72 06/01/2020    HDL 53 06/01/2020    ALT 25 08/06/2020    AST 28 08/06/2020     08/06/2020    K 3.9 08/06/2020    CL 99 (L) 08/06/2020    CREATININE 1.2 08/06/2020    BUN 22 (H) 08/06/2020    CO2 26 " 08/06/2020    TSH 1.86 07/28/2020    PSA 0.25 03/23/2020    INR 2.8 (H) 08/06/2020    GLUF 160 (H) 01/09/2020    HGBA1C 7.9 (H) 06/01/2020           Imaging reviewed:  Mod barium speech swallow 8/2019   · Patient demonstrates mild pharyngeal dysphagia characterized by pharyngeal stasis with no aspiration on any consistency.     ·   Endoscopy reviewed:    Colonoscopy 2/2020  - One 5 mm polyp in the transverse colon, removed                         with a cold snare. Resected and retrieved.                         - One 3 mm polyp in the ascending colon, removed                         with a cold biopsy forceps. Resected and retrieved.                         - The examination was otherwise normal.     Medical Decision Making:    Assessment:    Cj Barnes is a 54 y.o. male here for:    1. Iron deficiency anemia due to chronic blood loss    2. Oropharyngeal dysphagia    3. S/P colon resection       Id a started about a year ago.  Patient denies overt signs of GI bleeding.  The dysphagia has been present for 15 years, so I cannot be sure if this is related to his ANTHONY.  He is on Coumadin for bowel replacement.  Reports he has never had an EGD.  His colonoscopy was 6 months ago so we will proceed with an EGD for now.  I did briefly discuss video capsule endoscopy in the future.    Recommendations:  1. EGD  2.  Consider VCE in the future pending EGD results  3.  Follow up with Hematology for possible IV iron infusions due to intolerance for p.o. iron intake    F/u pending EGD    Order summary:  Orders Placed This Encounter    Case request GI: EGD (ESOPHAGOGASTRODUODENOSCOPY)         Thank you for allowing me to participate in the care of Cj Barnes    MARIALUISA Castellanos

## 2020-08-27 NOTE — TELEPHONE ENCOUNTER
Enid Turk, PharmD 32 minutes ago (9:09 AM)        We have cleared this patient to hold warfarin x5 days prior to EGD. We are planning to bridge Please let us know if you have any questions or concerns otherwise we will proceed as planned.      GI - please send procedure date once scheduled     Thanks  [facility]   [phone]             Documentation

## 2020-08-27 NOTE — PROGRESS NOTES
8/27 received request to clear patient for EGD. Will plan to hold warfarin x5 days prior to scope but will need bridge with LMWH 2/2 mechanical AVR. Procedure team and Dr. Alexander notified.

## 2020-08-27 NOTE — TELEPHONE ENCOUNTER
Patient in office to schedule EGD procedure ordered. While reviewing patient's chart, it was noted that patient is currently taking Coumadin. Explained to patient that approval to hold blood thinner and cardiac clearance will need to be requested and  received prior to scheduling procedure,and once received, then patient will be contacted to schedule procedure. Patient verbalized understanding.

## 2020-08-27 NOTE — TELEPHONE ENCOUNTER
We have cleared this patient to hold warfarin x5 days prior to EGD. We are planning to bridge Please let us know if you have any questions or concerns otherwise we will proceed as planned.     GI - please send procedure date once scheduled    Thanks  [facility]  Ph [phone]

## 2020-08-27 NOTE — TELEPHONE ENCOUNTER
Ryan Alexander MD  You 36 minutes ago (12:45 PM)     Patient is medically stable for endoscopy.  There is no need for antibiotic prophylaxis. If necessary the warfarin could be held for endoscopy although there would be a small risk of valve thrombosis and stroke.    Message text

## 2020-09-01 ENCOUNTER — TELEPHONE (OUTPATIENT)
Dept: HEMATOLOGY/ONCOLOGY | Facility: CLINIC | Age: 55
End: 2020-09-01

## 2020-09-01 NOTE — TELEPHONE ENCOUNTER
Called patient to schedule iron infusion. Scheduled for Tuesday 8/8 & 8/15. Scheduled other appointments that was not scheduled.  Patient will check the portal for the appts.      ----- Message from Maryellen Avina sent at 8/25/2020  7:25 AM CDT -----  Regarding: FW: 8/19 pending injectafer(du)  Laurel Tatum RN  P [facility]; Cyndi Roman MA         I've flipped the referral so that patient can move forward with injectifer. Please add to remind me's and assist with remaining scheduling of infusions and follow up.   Thanks   Laurel   Previous Messages      ----- Message -----   From: Monica Cantrell MD   Sent: 8/19/2020   3:21 PM CDT   To: Cyndi Roman MA     Verify with PA re injectafer. Return in 1-2 weeks for injectafer weekly x 2. Check CBC, ferritin, iron one month after the last dose of injectafer. RTC in 4 months with CBC, ferritin, iron checked 1 day prior       ----- Message -----  From: Fatmata Iyer  Sent: 8/19/2020   3:27 PM CDT  To: Fatmata Iyer, [facility]  Subject: 8/19 pending injectafer(du)                          Verify with PA re injectafer. Return in 1-2 weeks for injectafer weekly x 2. Check CBC, ferritin, iron one month after the last dose of injectafer. RTC in 4 months with CBC, ferritin, iron checked 1 day prior

## 2020-09-02 ENCOUNTER — TELEPHONE (OUTPATIENT)
Dept: ENDOSCOPY | Facility: HOSPITAL | Age: 55
End: 2020-09-02

## 2020-09-02 DIAGNOSIS — Z01.812 PRE-PROCEDURE LAB EXAM: Primary | ICD-10-CM

## 2020-09-03 ENCOUNTER — ANTI-COAG VISIT (OUTPATIENT)
Dept: CARDIOLOGY | Facility: CLINIC | Age: 55
End: 2020-09-03
Payer: MEDICARE

## 2020-09-03 DIAGNOSIS — Z79.01 LONG TERM (CURRENT) USE OF ANTICOAGULANTS: Primary | ICD-10-CM

## 2020-09-03 DIAGNOSIS — Z95.828 S/P ASCENDING AORTIC REPLACEMENT: ICD-10-CM

## 2020-09-03 DIAGNOSIS — G45.9 TIA (TRANSIENT ISCHEMIC ATTACK): ICD-10-CM

## 2020-09-03 PROCEDURE — 93793 ANTICOAG MGMT PT WARFARIN: CPT | Mod: S$GLB,,,

## 2020-09-03 PROCEDURE — 93793 PR ANTICOAGULANT MGMT FOR PT TAKING WARFARIN: ICD-10-PCS | Mod: S$GLB,,,

## 2020-09-03 RX ORDER — ENOXAPARIN SODIUM 150 MG/ML
150 INJECTION SUBCUTANEOUS DAILY
Qty: 10 ML | Refills: 0 | Status: SHIPPED | OUTPATIENT
Start: 2020-09-03 | End: 2020-09-13

## 2020-09-03 NOTE — PROGRESS NOTES
INR at goal. Medications and chart reviewed. No changes noted to necessitate adjustment of warfarin or follow-up plan. See calendar.    Patient lovenox bridging instructions below:     Height    67in            Weight   105.4kg           SCr    1.2            CrCl   ~64ml/min              Pre-Procedure Instructions:    Take last dose of warfarin on :       9/4/20                          Start enoxaparin injections the morning of:     9/6/20                            Enoxaparin dose is:      150mg                  Once daily in the morning  Last dose of enoxaparin will be the morning of:   9/9/20                              Post-Procedure Instructions:    Restart warfarin dose on     9/10/20                        Unless directed otherwise by your physician  Restart lovenox injections on the morning of      9/11/20                     Unless directed otherwise by your physician  Call the coumadin clinic your physician has different recommendations post procedure

## 2020-09-07 ENCOUNTER — LAB VISIT (OUTPATIENT)
Dept: URGENT CARE | Facility: CLINIC | Age: 55
End: 2020-09-07
Payer: MEDICARE

## 2020-09-07 DIAGNOSIS — Z01.812 PRE-PROCEDURE LAB EXAM: ICD-10-CM

## 2020-09-07 PROCEDURE — U0003 INFECTIOUS AGENT DETECTION BY NUCLEIC ACID (DNA OR RNA); SEVERE ACUTE RESPIRATORY SYNDROME CORONAVIRUS 2 (SARS-COV-2) (CORONAVIRUS DISEASE [COVID-19]), AMPLIFIED PROBE TECHNIQUE, MAKING USE OF HIGH THROUGHPUT TECHNOLOGIES AS DESCRIBED BY CMS-2020-01-R: HCPCS

## 2020-09-08 ENCOUNTER — INFUSION (OUTPATIENT)
Dept: INFUSION THERAPY | Facility: HOSPITAL | Age: 55
End: 2020-09-08
Attending: INTERNAL MEDICINE
Payer: MEDICARE

## 2020-09-08 VITALS
HEIGHT: 67 IN | RESPIRATION RATE: 16 BRPM | DIASTOLIC BLOOD PRESSURE: 55 MMHG | WEIGHT: 232.38 LBS | HEART RATE: 65 BPM | BODY MASS INDEX: 36.47 KG/M2 | TEMPERATURE: 98 F | SYSTOLIC BLOOD PRESSURE: 110 MMHG | OXYGEN SATURATION: 99 %

## 2020-09-08 DIAGNOSIS — D50.0 IRON DEFICIENCY ANEMIA DUE TO CHRONIC BLOOD LOSS: Primary | ICD-10-CM

## 2020-09-08 LAB — SARS-COV-2 RNA RESP QL NAA+PROBE: NOT DETECTED

## 2020-09-08 PROCEDURE — 63600175 PHARM REV CODE 636 W HCPCS: Mod: JG | Performed by: INTERNAL MEDICINE

## 2020-09-08 PROCEDURE — 25000003 PHARM REV CODE 250: Performed by: INTERNAL MEDICINE

## 2020-09-08 PROCEDURE — 96365 THER/PROPH/DIAG IV INF INIT: CPT

## 2020-09-08 RX ORDER — SODIUM CHLORIDE 9 MG/ML
INJECTION, SOLUTION INTRAVENOUS CONTINUOUS
Status: DISCONTINUED | OUTPATIENT
Start: 2020-09-08 | End: 2020-09-08 | Stop reason: HOSPADM

## 2020-09-08 RX ORDER — SODIUM CHLORIDE 9 MG/ML
INJECTION, SOLUTION INTRAVENOUS CONTINUOUS
Status: CANCELLED | OUTPATIENT
Start: 2020-09-15

## 2020-09-08 RX ORDER — SODIUM CHLORIDE 0.9 % (FLUSH) 0.9 %
10 SYRINGE (ML) INJECTION
Status: CANCELLED | OUTPATIENT
Start: 2020-09-15

## 2020-09-08 RX ORDER — HEPARIN 100 UNIT/ML
5 SYRINGE INTRAVENOUS
Status: CANCELLED | OUTPATIENT
Start: 2020-09-15

## 2020-09-08 RX ORDER — EPINEPHRINE 0.3 MG/.3ML
0.3 INJECTION SUBCUTANEOUS ONCE AS NEEDED
Status: CANCELLED | OUTPATIENT
Start: 2020-09-15

## 2020-09-08 RX ORDER — SODIUM CHLORIDE 0.9 % (FLUSH) 0.9 %
10 SYRINGE (ML) INJECTION
Status: DISCONTINUED | OUTPATIENT
Start: 2020-09-08 | End: 2020-09-08 | Stop reason: HOSPADM

## 2020-09-08 RX ORDER — METHYLPREDNISOLONE SOD SUCC 125 MG
125 VIAL (EA) INJECTION ONCE AS NEEDED
Status: CANCELLED | OUTPATIENT
Start: 2020-09-15

## 2020-09-08 RX ORDER — DIPHENHYDRAMINE HYDROCHLORIDE 50 MG/ML
50 INJECTION INTRAMUSCULAR; INTRAVENOUS ONCE AS NEEDED
Status: CANCELLED | OUTPATIENT
Start: 2020-09-15

## 2020-09-08 RX ORDER — HEPARIN 100 UNIT/ML
5 SYRINGE INTRAVENOUS
Status: DISCONTINUED | OUTPATIENT
Start: 2020-09-08 | End: 2020-09-08 | Stop reason: HOSPADM

## 2020-09-08 RX ADMIN — SODIUM CHLORIDE: 0.9 INJECTION, SOLUTION INTRAVENOUS at 01:09

## 2020-09-08 RX ADMIN — FERRIC CARBOXYMALTOSE INJECTION 750 MG: 50 INJECTION, SOLUTION INTRAVENOUS at 01:09

## 2020-09-08 NOTE — PLAN OF CARE
Pt received Injectafer today and tolerated well, without complications. VSS throughout infusion. After infusion, pt observed for 30' post for s/s of adverse reactions, none found. Educated patient about Injectafer (indications, side effects, possible reactions) and verbalized understanding.  PIV positive for blood return, saline locked and removed prior to DC, catheter tip intact. Pt DC with no distress noted, ambulated off of unit w/o event, pleased.

## 2020-09-10 ENCOUNTER — ANESTHESIA (OUTPATIENT)
Dept: ENDOSCOPY | Facility: HOSPITAL | Age: 55
End: 2020-09-10
Payer: MEDICARE

## 2020-09-10 ENCOUNTER — HOSPITAL ENCOUNTER (OUTPATIENT)
Facility: HOSPITAL | Age: 55
Discharge: HOME | End: 2020-09-10
Attending: INTERNAL MEDICINE | Admitting: INTERNAL MEDICINE
Payer: MEDICARE

## 2020-09-10 ENCOUNTER — ANESTHESIA EVENT (OUTPATIENT)
Dept: ENDOSCOPY | Facility: HOSPITAL | Age: 55
End: 2020-09-10
Payer: MEDICARE

## 2020-09-10 VITALS
BODY MASS INDEX: 36.1 KG/M2 | HEIGHT: 67 IN | OXYGEN SATURATION: 100 % | DIASTOLIC BLOOD PRESSURE: 66 MMHG | WEIGHT: 230 LBS | TEMPERATURE: 97 F | SYSTOLIC BLOOD PRESSURE: 133 MMHG | HEART RATE: 56 BPM | RESPIRATION RATE: 16 BRPM

## 2020-09-10 DIAGNOSIS — D50.9 IDA (IRON DEFICIENCY ANEMIA): ICD-10-CM

## 2020-09-10 PROCEDURE — 43235 EGD DIAGNOSTIC BRUSH WASH: CPT | Performed by: INTERNAL MEDICINE

## 2020-09-10 PROCEDURE — 37000008 HC ANESTHESIA 1ST 15 MINUTES: Performed by: INTERNAL MEDICINE

## 2020-09-10 PROCEDURE — 43235 EGD DIAGNOSTIC BRUSH WASH: CPT | Mod: ,,, | Performed by: INTERNAL MEDICINE

## 2020-09-10 PROCEDURE — 25000003 PHARM REV CODE 250: Performed by: INTERNAL MEDICINE

## 2020-09-10 PROCEDURE — E9220 PRA ENDO ANESTHESIA: ICD-10-PCS | Mod: ,,, | Performed by: NURSE ANESTHETIST, CERTIFIED REGISTERED

## 2020-09-10 PROCEDURE — 63600175 PHARM REV CODE 636 W HCPCS: Performed by: NURSE ANESTHETIST, CERTIFIED REGISTERED

## 2020-09-10 PROCEDURE — E9220 PRA ENDO ANESTHESIA: HCPCS | Mod: ,,, | Performed by: NURSE ANESTHETIST, CERTIFIED REGISTERED

## 2020-09-10 PROCEDURE — 37000009 HC ANESTHESIA EA ADD 15 MINS: Performed by: INTERNAL MEDICINE

## 2020-09-10 PROCEDURE — 43235 PR EGD, FLEX, DIAGNOSTIC: ICD-10-PCS | Mod: ,,, | Performed by: INTERNAL MEDICINE

## 2020-09-10 RX ORDER — PROPOFOL 10 MG/ML
VIAL (ML) INTRAVENOUS
Status: DISCONTINUED | OUTPATIENT
Start: 2020-09-10 | End: 2020-09-10

## 2020-09-10 RX ORDER — SODIUM CHLORIDE 0.9 % (FLUSH) 0.9 %
10 SYRINGE (ML) INJECTION
Status: DISCONTINUED | OUTPATIENT
Start: 2020-09-10 | End: 2020-09-10 | Stop reason: HOSPADM

## 2020-09-10 RX ORDER — SODIUM CHLORIDE 9 MG/ML
INJECTION, SOLUTION INTRAVENOUS CONTINUOUS
Status: DISCONTINUED | OUTPATIENT
Start: 2020-09-10 | End: 2020-09-10 | Stop reason: HOSPADM

## 2020-09-10 RX ORDER — LIDOCAINE HYDROCHLORIDE 20 MG/ML
INJECTION INTRAVENOUS
Status: DISCONTINUED | OUTPATIENT
Start: 2020-09-10 | End: 2020-09-10

## 2020-09-10 RX ORDER — ONDANSETRON 2 MG/ML
4 INJECTION INTRAMUSCULAR; INTRAVENOUS DAILY PRN
Status: DISCONTINUED | OUTPATIENT
Start: 2020-09-10 | End: 2020-09-10 | Stop reason: HOSPADM

## 2020-09-10 RX ORDER — PROPOFOL 10 MG/ML
VIAL (ML) INTRAVENOUS CONTINUOUS PRN
Status: DISCONTINUED | OUTPATIENT
Start: 2020-09-10 | End: 2020-09-10

## 2020-09-10 RX ADMIN — PROPOFOL 20 MG: 10 INJECTION, EMULSION INTRAVENOUS at 08:09

## 2020-09-10 RX ADMIN — LIDOCAINE HYDROCHLORIDE 75 MG: 20 INJECTION, SOLUTION INTRAVENOUS at 08:09

## 2020-09-10 RX ADMIN — SODIUM CHLORIDE: 0.9 INJECTION, SOLUTION INTRAVENOUS at 08:09

## 2020-09-10 RX ADMIN — PROPOFOL 80 MG: 10 INJECTION, EMULSION INTRAVENOUS at 08:09

## 2020-09-10 RX ADMIN — PROPOFOL 150 MCG/KG/MIN: 10 INJECTION, EMULSION INTRAVENOUS at 08:09

## 2020-09-10 NOTE — TRANSFER OF CARE
"Anesthesia Transfer of Care Note    Patient: Cj Barnes    Procedure(s) Performed: Procedure(s) (LRB):  EGD (ESOPHAGOGASTRODUODENOSCOPY) (N/A)    Patient location: GI    Anesthesia Type: general    Transport from OR: Transported from OR on 2-3 L/min O2 by NC with adequate spontaneous ventilation    Post pain: adequate analgesia    Post assessment: no apparent anesthetic complications and tolerated procedure well    Post vital signs: stable    Level of consciousness: sedated    Nausea/Vomiting: no nausea/vomiting    Complications: none    Transfer of care protocol was followed      Last vitals:   Visit Vitals  BP (!) 145/66 (BP Location: Right arm, Patient Position: Lying)   Pulse (!) 58   Temp 36.3 °C (97.3 °F) (Temporal)   Resp 15   Ht 5' 7" (1.702 m)   Wt 104.3 kg (230 lb)   SpO2 100%   BMI 36.02 kg/m²     "

## 2020-09-10 NOTE — [employer] PATIENT INSTRUCTIONS
Discharge Summary/Instructions after an Endoscopic Procedure  Patient Name: Cj Barnes  Patient MRN: [id number]  Patient YOB: 1965  Thursday, September 10, 2020  Abilio Boo MD  RESTRICTIONS:  During your procedure today, you received medications for sedation.  These   medications may affect your judgment, balance and coordination.  Therefore,   for 24 hours, you have the following restrictions:   - DO NOT drive a car, operate machinery, make legal/financial decisions,   sign important papers or drink alcohol.    ACTIVITY:  Today: no heavy lifting, straining or running due to procedural   sedation/anesthesia.  The following day: return to full activity including work.  DIET:  Eat and drink normally unless instructed otherwise.     TREATMENT FOR COMMON SIDE EFFECTS:  - Mild abdominal pain, nausea, belching, bloating or excessive gas:  rest,   eat lightly and use a heating pad.  - Sore Throat: treat with throat lozenges and/or gargle with warm salt   water.  - Because air was used during the procedure, expelling large amounts of air   from your rectum or belching is normal.  - If a bowel prep was taken, you may not have a bowel movement for 1-3 days.    This is normal.  SYMPTOMS TO WATCH FOR AND REPORT TO YOUR PHYSICIAN:  1. Abdominal pain or bloating, other than gas cramps.  2. Chest pain.  3. Back pain.  4. Signs of infection such as: chills or fever occurring within 24 hours   after the procedure.  5. Rectal bleeding, which would show as bright red, maroon, or black stools.   (A tablespoon of blood from the rectum is not serious, especially if   hemorrhoids are present.)  6. Vomiting.  7. Weakness or dizziness.  GO DIRECTLY TO THE NEAREST EMERGENCY ROOM IF YOU HAVE ANY OF THE FOLLOWING:      Difficulty breathing              Chills and/or fever over 101 F   Persistent vomiting and/or vomiting blood   Severe abdominal pain   Severe chest pain   Black, tarry stools   Bleeding- more than one  tablespoon   Any other symptom or condition that you feel may need urgent attention  Your doctor recommends these additional instructions:  If any biopsies were taken, your doctors clinic will contact you in 1 to 2   weeks with any results.  - Patient has a contact number available for emergencies.  The signs and   symptoms of potential delayed complications were discussed with the   patient.  Return to normal activities tomorrow.  Written discharge   instructions were provided to the patient.   - Discharge patient to home (ambulatory).   - Resume previous diet.   - Continue present medications.   - Resume Coumadin (warfarin) at prior dose today.   - Repeat upper endoscopy PRN for retreatment.  For questions, problems or results please call your physician - Abilio Boo MD at Work:  [phone].  [facility], EMERGENCY ROOM PHONE NUMBER: [phone]  IF A COMPLICATION OR EMERGENCY SITUATION ARISES AND YOU ARE UNABLE TO REACH   YOUR PHYSICIAN - GO DIRECTLY TO THE EMERGENCY ROOM.  Abilio Boo MD  9/10/2020 8:46:22 AM  This report has been verified and signed electronically.  [employer]

## 2020-09-10 NOTE — ANESTHESIA POSTPROCEDURE EVALUATION
Anesthesia Post Evaluation    Patient: Cj Barnes    Procedure(s) Performed: Procedure(s) (LRB):  EGD (ESOPHAGOGASTRODUODENOSCOPY) (N/A)    Final Anesthesia Type: general    Patient location during evaluation: PACU  Patient participation: Yes- Able to Participate  Level of consciousness: awake and alert, awake and oriented  Post-procedure vital signs: reviewed and stable  Pain management: adequate  Airway patency: patent    PONV status at discharge: No PONV  Anesthetic complications: no      Cardiovascular status: blood pressure returned to baseline, hemodynamically stable and stable  Respiratory status: unassisted, spontaneous ventilation and room air  Hydration status: euvolemic  Follow-up not needed.          Vitals Value Taken Time   /66 09/10/20 0915   Temp 36.3 °C (97.3 °F) 09/10/20 0855   Pulse 56 09/10/20 0915   Resp 16 09/10/20 0915   SpO2 100 % 09/10/20 0915         Event Time   Out of Recovery 09:27:24         Pain/Isa Score: Isa Score: 10 (9/10/2020  9:15 AM)

## 2020-09-10 NOTE — ANESTHESIA PREPROCEDURE EVALUATION
09/10/2020  Cj Barnes is a 54 y.o., male.  Pre-operative evaluation for Procedure(s) (LRB):  EGD (ESOPHAGOGASTRODUODENOSCOPY) (N/A)    Cj Barnes is a 54 y.o. male     Patient Active Problem List   Diagnosis    Essential hypertension    Insulin pump fitting or adjustment    Type 1 diabetes mellitus with hyperglycemia    Hyperlipidemia with target low density lipoprotein (LDL) cholesterol less than 70 mg/dL    Insulin pump in place    Tobacco use    Plantar fasciitis    Aortic stenosis    Aortic regurgitation, congenital    TIA (transient ischemic attack)    Atherosclerotic heart disease of native coronary artery with unstable angina pectoris    Type 1 diabetes mellitus with hypoglycemia unawareness    Class 1 obesity due to excess calories with serious comorbidity and body mass index (BMI) of 33.0 to 33.9 in adult    S/P ascending aortic replacement    Acute blood loss anemia    Hypophosphatemia    Long term (current) use of anticoagulants    Atrial flutter    Trouble swallowing    H/O mechanical aortic valve replacement    Anemia    At risk for amiodarone toxicity with long term use    Iron deficiency anemia due to chronic blood loss       Review of patient's allergies indicates:  No Known Allergies    No current facility-administered medications on file prior to encounter.      Current Outpatient Medications on File Prior to Encounter   Medication Sig Dispense Refill    amoxicillin (AMOXIL) 500 MG capsule Take 4 capsules (2,000 mg total) by mouth as needed (take one hour before dental work). (Patient not taking: Reported on 8/12/2020) 28 capsule 11    aspirin (ECOTRIN) 81 MG EC tablet Take 1 tablet (81 mg total) by mouth once daily.  0    atorvastatin (LIPITOR) 40 MG tablet Take 1 tablet (40 mg total) by mouth every evening. 30 tablet 6    blood sugar diagnostic  "Strp 1 each by Misc.(Non-Drug; Combo Route) route 6 (six) times daily. Contour next strips. Pt needs contour jackelyn for compatibility w/ insulin pump (medtronic 670g). Necessity 200 strip 6    blood-glucose meter,continuous (DEXCOM G6 ) Misc 1 Device by Misc.(Non-Drug; Combo Route) route once. for 1 dose 1 each 0    blood-glucose sensor (DEXCOM G6 SENSOR) Apple 1 sensor every 10 days 9 Device 3    blood-glucose transmitter (DEXCOM G6 TRANSMITTER) Apple 1 transmitter every 3 months 1 Device 3    clotrimazole-betamethasone 1-0.05% (LOTRISONE) cream APPLY A SMALL AMOUNT TO AFFECTED AREA THREE TIMES DAILY UNTIL CLEAR  0    COMFORT EZ PEN NEEDLES 33 gauge x 3/16" Ndle USE AS DIRECTED with Levemir pens  11    empagliflozin (JARDIANCE) 25 mg tablet Take 1 tablet (25 mg total) by mouth once daily. 30 tablet 4    gabapentin (NEURONTIN) 300 MG capsule Take 2 capsules (600 mg total) by mouth 3 (three) times daily. 540 capsule 3    glucagon, human recombinant, (GLUCAGON EMERGENCY KIT, HUMAN,) 1 mg SolR Inject 1 mg into the muscle as needed. 1 each 0    hydroCHLOROthiazide (MICROZIDE) 12.5 mg capsule Take 1 capsule (12.5 mg total) by mouth once daily. 90 capsule 3    insulin detemir U-100 (LEVEMIR FLEXTOUCH U-100 INSULN) 100 unit/mL (3 mL) SubQ InPn pen Inject 48 Units into the skin every evening. 3 mL 0    insulin lispro (HUMALOG U-100 INSULIN) 100 unit/mL injection To use continuously with insulin pump.  Max total daily dose of 100 units 3 vial 6    insulin regular 100 unit/mL Soln Use home settings Basal 0.4    Bolus/carb 12    ISF 20 1 Product 0    metFORMIN (GLUCOPHAGE-XR) 500 MG XR 24hr tablet Take 1 tablet (500 mg total) by mouth daily with breakfast. 90 tablet 3    warfarin (COUMADIN) 3 MG tablet Take 2 tablets (6 mg total) by mouth Daily. 60 tablet 11       Past Surgical History:   Procedure Laterality Date    AORTIC VALVE REPLACEMENT N/A 8/9/2019    Procedure: Replacement-valve-aortic;  Surgeon: " Ryan Knight MD;  [address];  Service: Cardiothoracic;  Laterality: N/A;    BACK SURGERY      BENTALL PROCEDURE FOR REPLACEMENT OF AORTIC VALVE, AORTIC ROOT, AND ASCENDING AORTA N/A 8/9/2019    Procedure: BENTALL PROCEDURE;  Surgeon: Ryan Knight MD;  [address];  Service: Cardiothoracic;  Laterality: N/A;    COLON SURGERY  2014    COLONOSCOPY N/A 2/17/2020    Procedure: COLONOSCOPY;  Surgeon: Richi Mock MD;  Location: [facility];  Service: Colon and Rectal;  Laterality: N/A;    FOOT TENDON SURGERY      right and left heart cath Bilateral 01/15/2018    TREATMENT OF CARDIAC ARRHYTHMIA N/A 8/19/2019    Procedure: CARDIOVERSION;  Surgeon: Juan Zapata MD;  Location: [facility];  Service: Cardiology;  Laterality: N/A;  afib, dccv only, anes, GP, 3092    TRIGGER FINGER RELEASE      x 2       Social History     Socioeconomic History    Marital status:      Spouse name: Not on file    Number of children: Not on file    Years of education: Not on file    Highest education level: Not on file   Occupational History    Not on file   Social Needs    Financial resource strain: Not on file    Food insecurity     Worry: Not on file     Inability: Not on file    Transportation needs     Medical: Not on file     Non-medical: Not on file   Tobacco Use    Smoking status: Never Smoker    Smokeless tobacco: Former User     Types: Snuff    Tobacco comment: since he was 12 yrs old   Substance and Sexual Activity    Alcohol use: No     Comment: socially    Drug use: No    Sexual activity: Not on file   Lifestyle    Physical activity     Days per week: Not on file     Minutes per session: Not on file    Stress: Not on file   Relationships    Social connections     Talks on phone: Not on file     Gets together: Not on file     Attends Tenriism service: Not on file     Active member of club or organization: Not on file     Attends meetings of clubs or  organizations: Not on file     Relationship status: Not on file   Other Topics Concern    Not on file   Social History Narrative        2 grown kids    Delivers seafood         CBC: No results for input(s): WBC, RBC, HGB, HCT, PLT, MCV, MCH, MCHC in the last 72 hours.    CMP: No results for input(s): NA, K, CL, CO2, BUN, CREATININE, GLU, MG, PHOS, CALCIUM, ALBUMIN, PROT, ALKPHOS, ALT, AST, BILITOT in the last 72 hours.    INR  No results for input(s): PT, INR, PROTIME, APTT in the last 72 hours.        Diagnostic Studies:      EK20  Sinus bradycardia with Premature atrial complexes   Minimal voltage criteria for LVH, may be normal variant   Borderline Abnormal ECG     2D Echo:  No results found for this or any previous visit.  19  · Mild left ventricular enlargement.  · Normal left ventricular systolic function. The estimated ejection fraction is 63%  · Septal wall has abnormal motion.  · Normal LV diastolic function.  · Severe left atrial enlargement.  · Normal right ventricular systolic function.  · Moderate right atrial enlargement.  · Mild aortic regurgitation.  · Normal central venous pressure (3 mm Hg).  · The estimated PA systolic pressure is 27 mm Hg  Anesthesia Evaluation    I have reviewed the Patient Summary Reports.    I have reviewed the Nursing Notes. I have reviewed the NPO Status.   I have reviewed the Medications.     Review of Systems  Cardiovascular:   Hypertension CAD   Angina    Neurological:   TIA,    Endocrine:   Diabetes        Physical Exam  General:  Well nourished    Airway/Jaw/Neck:  Airway Findings: Mouth Opening: Normal Tongue: Normal  General Airway Assessment: Adult  Mallampati: II  TM Distance: Normal, at least 6 cm            Mental Status:  Mental Status Findings:  Cooperative         Anesthesia Plan  Type of Anesthesia, risks & benefits discussed:  Anesthesia Type:  general  Patient's Preference:   Intra-op Monitoring Plan: standard ASA monitors  Intra-op  Monitoring Plan Comments:   Post Op Pain Control Plan: multimodal analgesia  Post Op Pain Control Plan Comments:   Induction:   IV  Beta Blocker:  Patient is not currently on a Beta-Blocker (No further documentation required).       Informed Consent: Patient understands risks and agrees with Anesthesia plan.  Questions answered. Anesthesia consent signed with patient.  ASA Score: 3     Day of Surgery Review of History & Physical: I have interviewed and examined the patient. I have reviewed the patient's H&P dated:            Ready For Surgery From Anesthesia Perspective.

## 2020-09-10 NOTE — H&P
Short Stay Endoscopy History and Physical    PCP - Garry Stover MD  Referring Physician - Belinda Figueroa, NP  [address] [place] [address]    Procedure - Endoscopy  ASA - per anesthesia  Mallampati - per anesthesia  History of Anesthesia problems - no  Family history Anesthesia problems -  no   Plan of anesthesia - General    HPI  54 y.o. male here for EGD for evaluation of ANTHONY, and esophageal dysphagia (has symptoms of sold food getting stuck and sometimes choking, no specific foods, has been happening for about 15 years, never had dilation before). He has history of AS s/p AVR on coumadin last dose 9/5/20, INR 3 9/3/20, currently bridged with therapeutic Lovenox (last dose today).       Reason for procedure: ANTHONY, esophageal solid food dysphagia.       ROS:  Constitutional: No fevers, chills, No weight loss  CV: No chest pain  Pulm: No cough, No shortness of breath  GI: see HPI    Medical History:  has a past medical history of Anemia, Aortic stenosis (2018), Cancer (2014), Colon cancer, Coronary artery disease, Diabetes mellitus type I, Heart murmur, Heel fracture, HTN (hypertension), Hyperlipidemia LDL goal < 70, Insulin pump in place, MVP (mitral valve prolapse), and Stenosis of aortic and mitral valves.    Surgical History:  has a past surgical history that includes Trigger finger release; Back surgery; Colon surgery (2014); Foot Tendon Surgery; right and left heart cath (Bilateral, 01/15/2018); Aortic valve replacement (N/A, 8/9/2019); Bentall procedure for replacement of aortic valve, aortic root, and ascending aorta (N/A, 8/9/2019); Treatment of cardiac arrhythmia (N/A, 8/19/2019); and Colonoscopy (N/A, 2/17/2020).    Family History: family history includes Diabetes in his father; HIV in his brother; Heart attack in his father; Heart disease in his mother; Lung cancer in his mother..    Social History:  reports that he has never smoked. He quit smokeless tobacco use about a year ago.   His smokeless tobacco use included snuff. He reports that he does not drink alcohol or use drugs.    Review of patient's allergies indicates:  No Known Allergies    Medications:   Medications Prior to Admission   Medication Sig Dispense Refill Last Dose    amoxicillin (AMOXIL) 500 MG capsule Take 4 capsules (2,000 mg total) by mouth as needed (take one hour before dental work). 28 capsule 11 9/10/2020 at Unknown time    atorvastatin (LIPITOR) 40 MG tablet Take 1 tablet (40 mg total) by mouth every evening. 30 tablet 6 9/9/2020 at Unknown time    empagliflozin (JARDIANCE) 25 mg tablet Take 1 tablet (25 mg total) by mouth once daily. 30 tablet 4 Past Week at Unknown time    enoxaparin (LOVENOX) 150 mg/mL Syrg Inject 1 mL (150 mg total) into the skin once daily. for 10 days 10 mL 0 9/10/2020 at Unknown time    gabapentin (NEURONTIN) 300 MG capsule Take 2 capsules (600 mg total) by mouth 3 (three) times daily. 540 capsule 3 9/10/2020 at Unknown time    hydroCHLOROthiazide (MICROZIDE) 12.5 mg capsule Take 1 capsule (12.5 mg total) by mouth once daily. 90 capsule 3 9/10/2020 at Unknown time    insulin lispro (HUMALOG U-100 INSULIN) 100 unit/mL injection To use continuously with insulin pump.  Max total daily dose of 100 units 3 vial 6 9/10/2020 at Unknown time    insulin regular 100 unit/mL Soln Use home settings Basal 0.4    Bolus/carb 12    ISF 20 1 Product 0 9/10/2020 at Unknown time    lisinopriL (PRINIVIL,ZESTRIL) 20 MG tablet TAKE ONE TABLET BY MOUTH EVERY DAY 90 tablet 1 9/10/2020 at Unknown time    metFORMIN (GLUCOPHAGE-XR) 500 MG XR 24hr tablet Take 1 tablet (500 mg total) by mouth daily with breakfast. 90 tablet 3 9/9/2020 at Unknown time    warfarin (COUMADIN) 3 MG tablet Take 2 tablets (6 mg total) by mouth Daily. 60 tablet 11 Past Week at Unknown time    aspirin (ECOTRIN) 81 MG EC tablet Take 1 tablet (81 mg total) by mouth once daily.  0     blood sugar diagnostic Strp 1 each by Misc.(Non-Drug;  "Combo Route) route 6 (six) times daily. Contour next strips. Pt needs contour jackelyn for compatibility w/ insulin pump (medtronic 670g). Necessity 200 strip 6     blood-glucose meter,continuous (DEXCOM G6 ) Misc 1 Device by Misc.(Non-Drug; Combo Route) route once. for 1 dose 1 each 0     blood-glucose sensor (DEXCOM G6 SENSOR) Apple 1 sensor every 10 days 9 Device 3     blood-glucose transmitter (DEXCOM G6 TRANSMITTER) Apple 1 transmitter every 3 months 1 Device 3     clotrimazole-betamethasone 1-0.05% (LOTRISONE) cream APPLY A SMALL AMOUNT TO AFFECTED AREA THREE TIMES DAILY UNTIL CLEAR  0     COMFORT EZ PEN NEEDLES 33 gauge x 3/16" Ndle USE AS DIRECTED with Levemir pens  11     glucagon, human recombinant, (GLUCAGON EMERGENCY KIT, HUMAN,) 1 mg SolR Inject 1 mg into the muscle as needed. 1 each 0     insulin detemir U-100 (LEVEMIR FLEXTOUCH U-100 INSULN) 100 unit/mL (3 mL) SubQ InPn pen Inject 48 Units into the skin every evening. 3 mL 0        Physical Exam:    Vital Signs:   Vitals:    09/10/20 0808   BP: (!) 153/74   Pulse: 63   Resp: 16   Temp: 97.9 °F (36.6 °C)       General Appearance: Well appearing in no acute distress  Abdomen: Soft, non tender, non distended with normal bowel sounds, no masses    Labs:  Lab Results   Component Value Date    WBC 6.60 08/06/2020    HGB 12.4 (L) 08/06/2020    HCT 38.5 (L) 08/06/2020     08/06/2020    CHOL 173 06/01/2020    TRIG 72 06/01/2020    HDL 53 06/01/2020    ALT 25 08/06/2020    AST 28 08/06/2020     08/06/2020    K 3.9 08/06/2020    CL 99 (L) 08/06/2020    CREATININE 1.2 08/06/2020    BUN 22 (H) 08/06/2020    CO2 26 08/06/2020    TSH 1.86 07/28/2020    PSA 0.25 03/23/2020    INR 3.0 (H) 09/03/2020    GLUF 160 (H) 01/09/2020    HGBA1C 7.9 (H) 06/01/2020       I have explained the risks and benefits of this endoscopic procedure to the patient including but not limited to bleeding, inflammation, infection, perforation, and death.      Fantasma BERMAN " MD Rima

## 2020-09-15 ENCOUNTER — INFUSION (OUTPATIENT)
Dept: INFUSION THERAPY | Facility: HOSPITAL | Age: 55
End: 2020-09-15
Attending: INTERNAL MEDICINE
Payer: MEDICARE

## 2020-09-15 ENCOUNTER — ANTI-COAG VISIT (OUTPATIENT)
Dept: CARDIOLOGY | Facility: CLINIC | Age: 55
End: 2020-09-15
Payer: MEDICARE

## 2020-09-15 VITALS
HEART RATE: 64 BPM | WEIGHT: 232.38 LBS | DIASTOLIC BLOOD PRESSURE: 63 MMHG | HEIGHT: 67 IN | RESPIRATION RATE: 18 BRPM | TEMPERATURE: 98 F | SYSTOLIC BLOOD PRESSURE: 140 MMHG | BODY MASS INDEX: 36.47 KG/M2

## 2020-09-15 DIAGNOSIS — D50.0 IRON DEFICIENCY ANEMIA DUE TO CHRONIC BLOOD LOSS: Primary | ICD-10-CM

## 2020-09-15 DIAGNOSIS — G45.9 TIA (TRANSIENT ISCHEMIC ATTACK): ICD-10-CM

## 2020-09-15 DIAGNOSIS — Z95.828 S/P ASCENDING AORTIC REPLACEMENT: ICD-10-CM

## 2020-09-15 DIAGNOSIS — Z79.01 LONG TERM (CURRENT) USE OF ANTICOAGULANTS: ICD-10-CM

## 2020-09-15 PROCEDURE — 63600175 PHARM REV CODE 636 W HCPCS: Mod: JG | Performed by: INTERNAL MEDICINE

## 2020-09-15 PROCEDURE — 93793 PR ANTICOAGULANT MGMT FOR PT TAKING WARFARIN: ICD-10-PCS | Mod: S$GLB,,,

## 2020-09-15 PROCEDURE — 93793 ANTICOAG MGMT PT WARFARIN: CPT | Mod: S$GLB,,,

## 2020-09-15 PROCEDURE — 96365 THER/PROPH/DIAG IV INF INIT: CPT

## 2020-09-15 PROCEDURE — 96375 TX/PRO/DX INJ NEW DRUG ADDON: CPT

## 2020-09-15 PROCEDURE — 25000003 PHARM REV CODE 250: Performed by: INTERNAL MEDICINE

## 2020-09-15 RX ORDER — METHYLPREDNISOLONE SOD SUCC 125 MG
125 VIAL (EA) INJECTION ONCE AS NEEDED
Status: CANCELLED | OUTPATIENT
Start: 2020-09-15

## 2020-09-15 RX ORDER — METHYLPREDNISOLONE SOD SUCC 125 MG
125 VIAL (EA) INJECTION ONCE AS NEEDED
Status: DISCONTINUED | OUTPATIENT
Start: 2020-09-15 | End: 2020-09-15 | Stop reason: HOSPADM

## 2020-09-15 RX ORDER — EPINEPHRINE 0.3 MG/.3ML
0.3 INJECTION SUBCUTANEOUS ONCE AS NEEDED
Status: DISCONTINUED | OUTPATIENT
Start: 2020-09-15 | End: 2020-09-15 | Stop reason: HOSPADM

## 2020-09-15 RX ORDER — SODIUM CHLORIDE 0.9 % (FLUSH) 0.9 %
10 SYRINGE (ML) INJECTION
Status: CANCELLED | OUTPATIENT
Start: 2020-09-15

## 2020-09-15 RX ORDER — DIPHENHYDRAMINE HYDROCHLORIDE 50 MG/ML
50 INJECTION INTRAMUSCULAR; INTRAVENOUS ONCE AS NEEDED
Status: CANCELLED | OUTPATIENT
Start: 2020-09-15

## 2020-09-15 RX ORDER — SODIUM CHLORIDE 9 MG/ML
INJECTION, SOLUTION INTRAVENOUS CONTINUOUS
Status: CANCELLED | OUTPATIENT
Start: 2020-09-15

## 2020-09-15 RX ORDER — HEPARIN 100 UNIT/ML
5 SYRINGE INTRAVENOUS
Status: CANCELLED | OUTPATIENT
Start: 2020-09-15

## 2020-09-15 RX ORDER — SODIUM CHLORIDE 9 MG/ML
INJECTION, SOLUTION INTRAVENOUS CONTINUOUS
Status: DISCONTINUED | OUTPATIENT
Start: 2020-09-15 | End: 2020-09-15 | Stop reason: HOSPADM

## 2020-09-15 RX ORDER — EPINEPHRINE 0.3 MG/.3ML
0.3 INJECTION SUBCUTANEOUS ONCE AS NEEDED
Status: CANCELLED | OUTPATIENT
Start: 2020-09-15

## 2020-09-15 RX ORDER — HEPARIN 100 UNIT/ML
5 SYRINGE INTRAVENOUS
Status: DISCONTINUED | OUTPATIENT
Start: 2020-09-15 | End: 2020-09-15 | Stop reason: HOSPADM

## 2020-09-15 RX ORDER — SODIUM CHLORIDE 0.9 % (FLUSH) 0.9 %
10 SYRINGE (ML) INJECTION
Status: DISCONTINUED | OUTPATIENT
Start: 2020-09-15 | End: 2020-09-15 | Stop reason: HOSPADM

## 2020-09-15 RX ORDER — DIPHENHYDRAMINE HYDROCHLORIDE 50 MG/ML
50 INJECTION INTRAMUSCULAR; INTRAVENOUS ONCE AS NEEDED
Status: DISCONTINUED | OUTPATIENT
Start: 2020-09-15 | End: 2020-09-15 | Stop reason: HOSPADM

## 2020-09-15 RX ADMIN — FERRIC CARBOXYMALTOSE INJECTION 750 MG: 50 INJECTION, SOLUTION INTRAVENOUS at 11:09

## 2020-09-15 RX ADMIN — SODIUM CHLORIDE: 0.9 INJECTION, SOLUTION INTRAVENOUS at 11:09

## 2020-09-16 ENCOUNTER — OFFICE VISIT (OUTPATIENT)
Dept: CARDIOLOGY | Facility: CLINIC | Age: 55
End: 2020-09-16
Payer: MEDICARE

## 2020-09-16 VITALS
WEIGHT: 230.63 LBS | BODY MASS INDEX: 36.2 KG/M2 | OXYGEN SATURATION: 98 % | HEIGHT: 67 IN | DIASTOLIC BLOOD PRESSURE: 74 MMHG | HEART RATE: 69 BPM | SYSTOLIC BLOOD PRESSURE: 130 MMHG

## 2020-09-16 DIAGNOSIS — R05.8 ACE-INHIBITOR COUGH: ICD-10-CM

## 2020-09-16 DIAGNOSIS — E78.5 HYPERLIPIDEMIA WITH TARGET LOW DENSITY LIPOPROTEIN (LDL) CHOLESTEROL LESS THAN 70 MG/DL: ICD-10-CM

## 2020-09-16 DIAGNOSIS — E10.65 TYPE 1 DIABETES MELLITUS WITH HYPERGLYCEMIA: ICD-10-CM

## 2020-09-16 DIAGNOSIS — Z95.828 S/P ASCENDING AORTIC REPLACEMENT: ICD-10-CM

## 2020-09-16 DIAGNOSIS — D50.0 IRON DEFICIENCY ANEMIA DUE TO CHRONIC BLOOD LOSS: ICD-10-CM

## 2020-09-16 DIAGNOSIS — I10 ESSENTIAL HYPERTENSION: ICD-10-CM

## 2020-09-16 DIAGNOSIS — T46.4X5A ACE-INHIBITOR COUGH: ICD-10-CM

## 2020-09-16 DIAGNOSIS — Z95.2 H/O MECHANICAL AORTIC VALVE REPLACEMENT: Primary | ICD-10-CM

## 2020-09-16 DIAGNOSIS — I48.92 PAROXYSMAL ATRIAL FLUTTER: ICD-10-CM

## 2020-09-16 PROCEDURE — 3008F BODY MASS INDEX DOCD: CPT | Mod: CPTII,S$GLB,, | Performed by: INTERNAL MEDICINE

## 2020-09-16 PROCEDURE — 3078F DIAST BP <80 MM HG: CPT | Mod: CPTII,S$GLB,, | Performed by: INTERNAL MEDICINE

## 2020-09-16 PROCEDURE — 3075F PR MOST RECENT SYSTOLIC BLOOD PRESS GE 130-139MM HG: ICD-10-PCS | Mod: CPTII,S$GLB,, | Performed by: INTERNAL MEDICINE

## 2020-09-16 PROCEDURE — 93000 EKG 12-LEAD: ICD-10-PCS | Mod: S$GLB,,, | Performed by: INTERNAL MEDICINE

## 2020-09-16 PROCEDURE — 3051F HG A1C>EQUAL 7.0%<8.0%: CPT | Mod: CPTII,S$GLB,, | Performed by: INTERNAL MEDICINE

## 2020-09-16 PROCEDURE — 3051F PR MOST RECENT HEMOGLOBIN A1C LEVEL 7.0 - < 8.0%: ICD-10-PCS | Mod: CPTII,S$GLB,, | Performed by: INTERNAL MEDICINE

## 2020-09-16 PROCEDURE — 99999 PR PBB SHADOW E&M-EST. PATIENT-LVL III: CPT | Mod: PBBFAC,,, | Performed by: INTERNAL MEDICINE

## 2020-09-16 PROCEDURE — 3008F PR BODY MASS INDEX (BMI) DOCUMENTED: ICD-10-PCS | Mod: CPTII,S$GLB,, | Performed by: INTERNAL MEDICINE

## 2020-09-16 PROCEDURE — 99999 PR PBB SHADOW E&M-EST. PATIENT-LVL III: ICD-10-PCS | Mod: PBBFAC,,, | Performed by: INTERNAL MEDICINE

## 2020-09-16 PROCEDURE — 99214 OFFICE O/P EST MOD 30 MIN: CPT | Mod: 25,S$GLB,, | Performed by: INTERNAL MEDICINE

## 2020-09-16 PROCEDURE — 99214 PR OFFICE/OUTPT VISIT, EST, LEVL IV, 30-39 MIN: ICD-10-PCS | Mod: 25,S$GLB,, | Performed by: INTERNAL MEDICINE

## 2020-09-16 PROCEDURE — 3075F SYST BP GE 130 - 139MM HG: CPT | Mod: CPTII,S$GLB,, | Performed by: INTERNAL MEDICINE

## 2020-09-16 PROCEDURE — 3078F PR MOST RECENT DIASTOLIC BLOOD PRESSURE < 80 MM HG: ICD-10-PCS | Mod: CPTII,S$GLB,, | Performed by: INTERNAL MEDICINE

## 2020-09-16 PROCEDURE — 93000 ELECTROCARDIOGRAM COMPLETE: CPT | Mod: S$GLB,,, | Performed by: INTERNAL MEDICINE

## 2020-09-16 RX ORDER — IRBESARTAN 150 MG/1
150 TABLET ORAL DAILY
Qty: 90 TABLET | Refills: 3 | Status: SHIPPED | OUTPATIENT
Start: 2020-09-16 | End: 2021-06-08

## 2020-09-16 NOTE — PROGRESS NOTES
Subjective:      Patient ID: Cj Barnes is a 54 y.o. male.    Chief Complaint: Follow-up    HPI:  Back exercising at the gym    Feels OK    Review of Systems   Cardiovascular: Negative for chest pain, claudication, dyspnea on exertion, irregular heartbeat, leg swelling, near-syncope, orthopnea, palpitations and syncope.        Note: pt received IV iron from Dr Cantrell and is scheduled for f/u with her    EGD showed non-bleeding erosions    Pt c/o chronic dry cough     Pt had Lovenox bridging for dental work      Past Medical History:   Diagnosis Date    Anemia     Aortic stenosis 2018    Cancer 2014    Colon cancer     Coronary artery disease     Diabetes mellitus type I     since in his 30's    Heart murmur     Heel fracture     HTN (hypertension)     Hyperlipidemia LDL goal < 70     Insulin pump in place     MVP (mitral valve prolapse)     Stenosis of aortic and mitral valves         Past Surgical History:   Procedure Laterality Date    AORTIC VALVE REPLACEMENT N/A 8/9/2019    Procedure: Replacement-valve-aortic;  Surgeon: Ryan Knight MD;  Location: [facility] OR [address] [place] [address];  Service: Cardiothoracic;  Laterality: N/A;    BACK SURGERY      BENTALL PROCEDURE FOR REPLACEMENT OF AORTIC VALVE, AORTIC ROOT, AND ASCENDING AORTA N/A 8/9/2019    Procedure: BENTALL PROCEDURE;  Surgeon: Ryan Knight MD;  Location: [facility] OR 2ND FLR;  Service: Cardiothoracic;  Laterality: N/A;    COLON SURGERY  2014    COLONOSCOPY N/A 2/17/2020    Procedure: COLONOSCOPY;  Surgeon: Richi Mock MD;  Location: [facility];  Service: Colon and Rectal;  Laterality: N/A;    ESOPHAGOGASTRODUODENOSCOPY N/A 9/10/2020    Procedure: EGD (ESOPHAGOGASTRODUODENOSCOPY);  Surgeon: Abilio Boo MD;  Location: [facility] (4TH FLR);  Service: Endoscopy;  Laterality: N/A;  Cardiac clearance received, see telephone encounter 8/27/20-[facility]  Approval to hold Coumadin prior to procedure received, see telephone encounter  8/27/20-[facility]  covid-9/7/20-[facility]    FOOT TENDON SURGERY      right and left heart cath Bilateral 01/15/2018    TREATMENT OF CARDIAC ARRHYTHMIA N/A 8/19/2019    Procedure: CARDIOVERSION;  Surgeon: Juan Zapata MD;  Location: [facility] EP LAB;  Service: Cardiology;  Laterality: N/A;  afib, dccv only, anes, GP, [id number]    TRIGGER FINGER RELEASE      x 2       Family History   Problem Relation Age of Onset    Heart disease Mother     Lung cancer Mother     Heart attack Father     Diabetes Father     HIV Brother        Social History     Socioeconomic History    Marital status:      Spouse name: Not on file    Number of children: Not on file    Years of education: Not on file    Highest education level: Not on file   Occupational History    Not on file   Social Needs    Financial resource strain: Not on file    Food insecurity     Worry: Not on file     Inability: Not on file    Transportation needs     Medical: Not on file     Non-medical: Not on file   Tobacco Use    Smoking status: Never Smoker    Smokeless tobacco: Former User     Types: Snuff    Tobacco comment: since he was 12 yrs old   Substance and Sexual Activity    Alcohol use: No     Comment: socially    Drug use: No    Sexual activity: Not on file   Lifestyle    Physical activity     Days per week: Not on file     Minutes per session: Not on file    Stress: Not on file   Relationships    Social connections     Talks on phone: Not on file     Gets together: Not on file     Attends [employer] service: Not on file     Active member of club or organization: Not on file     Attends meetings of clubs or organizations: Not on file     Relationship status: Not on file   Other Topics Concern    Not on file   Social History Narrative        2 grown kids    Delivers seafood       Current Outpatient Medications on File Prior to Visit   Medication Sig Dispense Refill    aspirin (ECOTRIN) 81 MG EC tablet Take 1 tablet  "(81 mg total) by mouth once daily.  0    atorvastatin (LIPITOR) 40 MG tablet Take 1 tablet (40 mg total) by mouth every evening. 30 tablet 6    blood sugar diagnostic Strp 1 each by Misc.(Non-Drug; Combo Route) route 6 (six) times daily. Contour next strips. Pt needs contour jackelyn for compatibility w/ insulin pump (medtronic 670g). Necessity 200 strip 6    blood-glucose meter,continuous (DEXCOM G6 ) Misc 1 Device by Misc.(Non-Drug; Combo Route) route once. for 1 dose 1 each 0    blood-glucose sensor (DEXCOM G6 SENSOR) Apple 1 sensor every 10 days 9 Device 3    blood-glucose transmitter (DEXCOM G6 TRANSMITTER) Apple 1 transmitter every 3 months 1 Device 3    clotrimazole-betamethasone 1-0.05% (LOTRISONE) cream APPLY A SMALL AMOUNT TO AFFECTED AREA THREE TIMES DAILY UNTIL CLEAR  0    COMFORT EZ PEN NEEDLES 33 gauge x 3/16" Ndle USE AS DIRECTED with Levemir pens  11    empagliflozin (JARDIANCE) 25 mg tablet Take 1 tablet (25 mg total) by mouth once daily. 30 tablet 4    gabapentin (NEURONTIN) 300 MG capsule Take 2 capsules (600 mg total) by mouth 3 (three) times daily. 540 capsule 3    glucagon, human recombinant, (GLUCAGON EMERGENCY KIT, HUMAN,) 1 mg SolR Inject 1 mg into the muscle as needed. 1 each 0    hydroCHLOROthiazide (MICROZIDE) 12.5 mg capsule Take 1 capsule (12.5 mg total) by mouth once daily. 90 capsule 3    insulin detemir U-100 (LEVEMIR FLEXTOUCH U-100 INSULN) 100 unit/mL (3 mL) SubQ InPn pen Inject 48 Units into the skin every evening. 3 mL 0    insulin lispro (HUMALOG U-100 INSULIN) 100 unit/mL injection To use continuously with insulin pump.  Max total daily dose of 100 units 3 vial 6    insulin regular 100 unit/mL Soln Use home settings Basal 0.4    Bolus/carb 12    ISF 20 1 Product 0    metFORMIN (GLUCOPHAGE-XR) 500 MG XR 24hr tablet Take 1 tablet (500 mg total) by mouth daily with breakfast. 90 tablet 3    warfarin (COUMADIN) 3 MG tablet Take 2 tablets (6 mg total) by mouth " "Daily. 60 tablet 11    [DISCONTINUED] lisinopriL (PRINIVIL,ZESTRIL) 20 MG tablet TAKE ONE TABLET BY MOUTH EVERY DAY 90 tablet 1    amoxicillin (AMOXIL) 500 MG capsule Take 4 capsules (2,000 mg total) by mouth as needed (take one hour before dental work). (Patient not taking: Reported on 9/16/2020) 28 capsule 11     No current facility-administered medications on file prior to visit.        Review of patient's allergies indicates:  No Known Allergies  Objective:     Vitals:    09/16/20 1502   BP: 130/74   BP Location: Right arm   Patient Position: Sitting   BP Method: Large (Automatic)   Pulse: 69   SpO2: 98%   Weight: 104.6 kg (230 lb 9.6 oz)   Height: 5' 7" (1.702 m)        Physical Exam   Constitutional: He is oriented to person, place, and time. He appears well-developed and well-nourished. No distress.   Eyes: No scleral icterus.   Neck: No JVD present. Carotid bruit is not present.   Cardiovascular: Regular rhythm. Exam reveals no gallop and no friction rub.   No murmur heard.  Crisp mechanical aortic valve open and close noise     Pulmonary/Chest: Effort normal and breath sounds normal. No respiratory distress.   Musculoskeletal:         General: No edema.   Neurological: He is alert and oriented to person, place, and time.   Skin: Skin is warm and dry. He is not diaphoretic.   Psychiatric: He has a normal mood and affect. His behavior is normal. Judgment and thought content normal.   Vitals reviewed.     ECG: NSR, long QT    Lab Visit on 09/15/2020   Component Date Value Ref Range Status    Prothrombin Time 09/15/2020 12.8* 9.0 - 12.5 sec Final    INR 09/15/2020 1.2  0.8 - 1.2 Final   Lab Visit on 09/07/2020   Component Date Value Ref Range Status    SARS-CoV2 (COVID-19) Qualitative P* 09/07/2020 Not Detected  Not Detected Final   Lab Visit on 09/03/2020   Component Date Value Ref Range Status    Prothrombin Time 09/03/2020 29.8* 9.0 - 12.5 sec Final    INR 09/03/2020 3.0* 0.8 - 1.2 Final   Lab Visit " on 08/19/2020   Component Date Value Ref Range Status    Folate 08/19/2020 11.2  4.0 - 24.0 ng/mL Final    Ferritin 08/19/2020 23  20.0 - 300.0 ng/mL Final    Haptoglobin 08/19/2020 80  30 - 250 mg/dL Final    Vitamin B-12 08/19/2020 420  210 - 950 pg/mL Final    Retic 08/19/2020 1.4  0.4 - 2.0 % Final    Direct Elvin (SHELLI) 08/19/2020 NEG   Final   Admission on 08/06/2020, Discharged on 08/06/2020   Component Date Value Ref Range Status    WBC 08/06/2020 6.60  3.90 - 12.70 K/uL Final    RBC 08/06/2020 5.05  4.60 - 6.20 M/uL Final    Hemoglobin 08/06/2020 12.4* 14.0 - 18.0 g/dL Final    Hematocrit 08/06/2020 38.5* 40.0 - 54.0 % Final    Mean Corpuscular Volume 08/06/2020 76* 82 - 98 fL Final    Mean Corpuscular Hemoglobin 08/06/2020 24.6* 27.0 - 31.0 pg Final    Mean Corpuscular Hemoglobin Conc 08/06/2020 32.3  32.0 - 36.0 g/dL Final    RDW 08/06/2020 19.2* 11.5 - 14.5 % Final    Platelets 08/06/2020 176  150 - 350 K/uL Final    MPV 08/06/2020 9.0* 9.2 - 12.9 fL Final    Gran # (ANC) 08/06/2020 4.4  1.8 - 7.7 K/uL Final    Lymph # 08/06/2020 1.5  1.0 - 4.8 K/uL Final    Mono # 08/06/2020 0.4  0.3 - 1.0 K/uL Final    Eos # 08/06/2020 0.2  0.0 - 0.5 K/uL Final    Baso # 08/06/2020 0.10  0.00 - 0.20 K/uL Final    Gran% 08/06/2020 67.4  38.0 - 73.0 % Final    Lymph% 08/06/2020 23.1  18.0 - 48.0 % Final    Mono% 08/06/2020 6.0  4.0 - 15.0 % Final    Eosinophil% 08/06/2020 2.5  0.0 - 8.0 % Final    Basophil% 08/06/2020 1.0  0.0 - 1.9 % Final    Differential Method 08/06/2020 Automated   Final    Sodium 08/06/2020 137  136 - 145 mmol/L Final    Potassium 08/06/2020 3.9  3.5 - 5.1 mmol/L Final    Chloride 08/06/2020 99* 101 - 111 mmol/L Final    CO2 08/06/2020 26  23 - 29 mmol/L Final    Glucose 08/06/2020 128* 74 - 118 mg/dL Final    BUN, Bld 08/06/2020 22* 6 - 20 mg/dL Final    Creatinine 08/06/2020 1.2  0.5 - 1.4 mg/dL Final    Calcium 08/06/2020 8.8  8.6 - 10.0 mg/dL Final    Total  Protein 08/06/2020 7.6  6.0 - 8.4 g/dL Final    Albumin 08/06/2020 4.1  3.5 - 5.2 g/dL Final    Total Bilirubin 08/06/2020 0.9  0.3 - 1.2 mg/dL Final    Alkaline Phosphatase 08/06/2020 64  38 - 126 U/L Final    AST 08/06/2020 28  15 - 41 U/L Final    ALT 08/06/2020 25  17 - 63 U/L Final    Anion Gap 08/06/2020 12  8 - 16 mmol/L Final    eGFR if African American 08/06/2020 >60.0  >60 mL/min/1.73 m^2 Final    eGFR if non African American 08/06/2020 >60.0  >60 mL/min/1.73 m^2 Final    BNP 08/06/2020 31  0 - 99 pg/mL Final    Troponin I 08/06/2020 0.05  0.01 - 0.05 ng/mL Final    Prothrombin Time 08/06/2020 28.0* 9.0 - 12.5 sec Final    INR 08/06/2020 2.8* 0.8 - 1.2 Final   Lab Visit on 07/30/2020   Component Date Value Ref Range Status    Iron 07/30/2020 48  45 - 160 ug/dL Final    Transferrin 07/30/2020 374  200 - 375 mg/dL Final    TIBC 07/30/2020 554* 250 - 450 ug/dL Final    Saturated Iron 07/30/2020 9* 20 - 50 % Final   Lab Visit on 07/28/2020   Component Date Value Ref Range Status    Prothrombin Time 07/28/2020 27.5* 9.0 - 12.5 sec Final    INR 07/28/2020 2.8* 0.8 - 1.2 Final    WBC 07/28/2020 7.10  3.90 - 12.70 K/uL Final    RBC 07/28/2020 5.30  4.60 - 6.20 M/uL Final    Hemoglobin 07/28/2020 13.0* 14.0 - 18.0 g/dL Final    Hematocrit 07/28/2020 40.3  40.0 - 54.0 % Final    Mean Corpuscular Volume 07/28/2020 76* 82 - 98 fL Final    Mean Corpuscular Hemoglobin 07/28/2020 24.6* 27.0 - 31.0 pg Final    Mean Corpuscular Hemoglobin Conc 07/28/2020 32.4  32.0 - 36.0 g/dL Final    RDW 07/28/2020 19.6* 11.5 - 14.5 % Final    Platelets 07/28/2020 188  150 - 350 K/uL Final    MPV 07/28/2020 8.9* 9.2 - 12.9 fL Final    Gran # (ANC) 07/28/2020 4.9  1.8 - 7.7 K/uL Final    Lymph # 07/28/2020 1.6  1.0 - 4.8 K/uL Final    Mono # 07/28/2020 0.4  0.3 - 1.0 K/uL Final    Eos # 07/28/2020 0.1  0.0 - 0.5 K/uL Final    Baso # 07/28/2020 0.00  0.00 - 0.20 K/uL Final    Gran% 07/28/2020 69.7  38.0 -  73.0 % Final    Lymph% 07/28/2020 22.1  18.0 - 48.0 % Final    Mono% 07/28/2020 6.0  4.0 - 15.0 % Final    Eosinophil% 07/28/2020 1.6  0.0 - 8.0 % Final    Basophil% 07/28/2020 0.6  0.0 - 1.9 % Final    Differential Method 07/28/2020 Automated   Final    Sodium 07/28/2020 137  136 - 145 mmol/L Final    Potassium 07/28/2020 3.9  3.5 - 5.1 mmol/L Final    Chloride 07/28/2020 100* 101 - 111 mmol/L Final    CO2 07/28/2020 26  23 - 29 mmol/L Final    Glucose 07/28/2020 143* 74 - 118 mg/dL Final    BUN, Bld 07/28/2020 22* 6 - 20 mg/dL Final    Creatinine 07/28/2020 1.1  0.5 - 1.4 mg/dL Final    Calcium 07/28/2020 8.9  8.6 - 10.0 mg/dL Final    Total Protein 07/28/2020 7.8  6.0 - 8.4 g/dL Final    Albumin 07/28/2020 4.3  3.5 - 5.2 g/dL Final    Total Bilirubin 07/28/2020 1.0  0.3 - 1.2 mg/dL Final    Alkaline Phosphatase 07/28/2020 68  38 - 126 U/L Final    AST 07/28/2020 28  15 - 41 U/L Final    ALT 07/28/2020 27  17 - 63 U/L Final    Anion Gap 07/28/2020 11  8 - 16 mmol/L Final    eGFR if African American 07/28/2020 >60.0  >60 mL/min/1.73 m^2 Final    eGFR if non African American 07/28/2020 >60.0  >60 mL/min/1.73 m^2 Final    TSH 07/28/2020 1.86  0.45 - 5.33 uIU/mL Final    Free T4 07/28/2020 1.11  0.61 - 1.12 ng/dL Final    Prothrombin Time 07/28/2020 27.5* 9.0 - 12.5 sec Final    INR 07/28/2020 2.8* 0.8 - 1.2 Final   Lab Visit on 06/01/2020   Component Date Value Ref Range Status    Sodium 06/01/2020 139  136 - 145 mmol/L Final    Potassium 06/01/2020 3.9  3.5 - 5.1 mmol/L Final    Chloride 06/01/2020 103  101 - 111 mmol/L Final    CO2 06/01/2020 29  23 - 29 mmol/L Final    Glucose 06/01/2020 166* 74 - 118 mg/dL Final    BUN, Bld 06/01/2020 15  6 - 20 mg/dL Final    Creatinine 06/01/2020 0.9  0.5 - 1.4 mg/dL Final    Calcium 06/01/2020 8.6  8.6 - 10.0 mg/dL Final    Total Protein 06/01/2020 6.9  6.0 - 8.4 g/dL Final    Albumin 06/01/2020 4.0  3.5 - 5.2 g/dL Final    Total Bilirubin  06/01/2020 0.8  0.3 - 1.2 mg/dL Final    Alkaline Phosphatase 06/01/2020 61  38 - 126 U/L Final    AST 06/01/2020 25  15 - 41 U/L Final    ALT 06/01/2020 24  17 - 63 U/L Final    Anion Gap 06/01/2020 7* 8 - 16 mmol/L Final    eGFR if African American 06/01/2020 >60.0  >60 mL/min/1.73 m^2 Final    eGFR if non African American 06/01/2020 >60.0  >60 mL/min/1.73 m^2 Final    Cholesterol 06/01/2020 173  80 - 200 mg/dL Final    Triglycerides 06/01/2020 72  30 - 150 mg/dL Final    HDL 06/01/2020 53  40 - 75 mg/dL Final    LDL Cholesterol 06/01/2020 106* <100 mg/dL Final    Hdl/Cholesterol Ratio 06/01/2020 30.6  20.0 - 50.0 % Final    Total Cholesterol/HDL Ratio 06/01/2020 3.3  2.0 - 5.0 Final    Non-HDL Cholesterol 06/01/2020 120  mg/dL Final    Hemoglobin A1C 06/01/2020 7.9* 4.0 - 5.6 % Final    Estimated Avg Glucose 06/01/2020 180* 68 - 131 mg/dL Final    HIV 1/2 Ag/Ab 06/01/2020 Negative  Negative Final   Lab Visit on 05/19/2020   Component Date Value Ref Range Status    Prothrombin Time 05/19/2020 25.9* 9.0 - 12.5 sec Final    INR 05/19/2020 2.6* 0.8 - 1.2 Final   Lab Visit on 03/23/2020   Component Date Value Ref Range Status    WBC 03/23/2020 6.90  3.90 - 12.70 K/uL Final    RBC 03/23/2020 5.03  4.60 - 6.20 M/uL Final    Hemoglobin 03/23/2020 11.6* 14.0 - 18.0 g/dL Final    Hematocrit 03/23/2020 36.1* 40.0 - 54.0 % Final    Mean Corpuscular Volume 03/23/2020 72* 82 - 98 fL Final    Mean Corpuscular Hemoglobin 03/23/2020 23.1* 27.0 - 31.0 pg Final    Mean Corpuscular Hemoglobin Conc 03/23/2020 32.2  32.0 - 36.0 g/dL Final    RDW 03/23/2020 19.4* 11.5 - 14.5 % Final    Platelets 03/23/2020 181  150 - 350 K/uL Final    MPV 03/23/2020 9.0* 9.2 - 12.9 fL Final    Gran # (ANC) 03/23/2020 5.1  1.8 - 7.7 K/uL Final    Lymph # 03/23/2020 0.9* 1.0 - 4.8 K/uL Final    Mono # 03/23/2020 0.4  0.3 - 1.0 K/uL Final    Eos # 03/23/2020 0.2  0.0 - 0.5 K/uL Final    Baso # 03/23/2020 0.30* 0.00 - 0.20  K/uL Final    Gran% 03/23/2020 74.4* 38.0 - 73.0 % Final    Lymph% 03/23/2020 13.3* 18.0 - 48.0 % Final    Mono% 03/23/2020 6.2  4.0 - 15.0 % Final    Eosinophil% 03/23/2020 2.4  0.0 - 8.0 % Final    Basophil% 03/23/2020 3.7* 0.0 - 1.9 % Final    Differential Method 03/23/2020 Automated   Final    Sodium 03/23/2020 139  136 - 145 mmol/L Final    Potassium 03/23/2020 3.7  3.5 - 5.1 mmol/L Final    Chloride 03/23/2020 103  101 - 111 mmol/L Final    CO2 03/23/2020 24  23 - 29 mmol/L Final    Glucose 03/23/2020 164* 74 - 118 mg/dL Final    BUN, Bld 03/23/2020 26* 6 - 20 mg/dL Final    Creatinine 03/23/2020 1.0  0.5 - 1.4 mg/dL Final    Calcium 03/23/2020 8.8  8.6 - 10.0 mg/dL Final    Total Protein 03/23/2020 7.2  6.0 - 8.4 g/dL Final    Albumin 03/23/2020 4.2  3.5 - 5.2 g/dL Final    Total Bilirubin 03/23/2020 0.5  0.3 - 1.2 mg/dL Final    Alkaline Phosphatase 03/23/2020 70  38 - 126 U/L Final    AST 03/23/2020 25  15 - 41 U/L Final    ALT 03/23/2020 23  17 - 63 U/L Final    Anion Gap 03/23/2020 12  8 - 16 mmol/L Final    eGFR if African American 03/23/2020 >60.0  >60 mL/min/1.73 m^2 Final    eGFR if non African American 03/23/2020 >60.0  >60 mL/min/1.73 m^2 Final    Cholesterol 03/23/2020 156  80 - 200 mg/dL Final    Triglycerides 03/23/2020 95  30 - 150 mg/dL Final    HDL 03/23/2020 49  40 - 75 mg/dL Final    LDL Cholesterol 03/23/2020 88  <100 mg/dL Final    Hdl/Cholesterol Ratio 03/23/2020 31.4  20.0 - 50.0 % Final    Total Cholesterol/HDL Ratio 03/23/2020 3.2  2.0 - 5.0 Final    Non-HDL Cholesterol 03/23/2020 107  mg/dL Final    PSA, SCREEN 03/23/2020 0.25  0.00 - 4.00 ng/mL Final   There may be more visits with results that are not included.   (  Assessment:     1. H/O mechanical aortic valve replacement    2. S/P ascending aortic replacement    3. Hyperlipidemia with target low density lipoprotein (LDL) cholesterol less than 70 mg/dL    4. Essential hypertension    5. Type 1  diabetes mellitus with hyperglycemia    6. Paroxysmal atrial flutter    7. ACE-inhibitor cough    8. Iron deficiency anemia due to chronic blood loss      Plan:   Cj was seen today for follow-up.    Diagnoses and all orders for this visit:    H/O mechanical aortic valve replacement  -     IN OFFICE EKG 12-LEAD (to Muse)    S/P ascending aortic replacement    Hyperlipidemia with target low density lipoprotein (LDL) cholesterol less than 70 mg/dL    Essential hypertension    Type 1 diabetes mellitus with hyperglycemia    Paroxysmal atrial flutter    ACE-inhibitor cough    Iron deficiency anemia due to chronic blood loss    Other orders  -     irbesartan (AVAPRO) 150 MG tablet; Take 1 tablet (150 mg total) by mouth once daily.     Pt is doing well    Maintaining sinus rhythm off amiodarone    Same meds except discontinue the cough and substitute irbesartan 150 mg daily for HBP    F/u with Gi     F/u with Dr Cantrell    F/u with PCP and KELSIE Quintero    F/u with coumadin clinic    RTC 2 months    Follow up in about 2 months (around 11/16/2020). with ECG

## 2020-09-21 ENCOUNTER — ANTI-COAG VISIT (OUTPATIENT)
Dept: CARDIOLOGY | Facility: CLINIC | Age: 55
End: 2020-09-21
Payer: MEDICARE

## 2020-09-21 DIAGNOSIS — G45.9 TIA (TRANSIENT ISCHEMIC ATTACK): ICD-10-CM

## 2020-09-21 DIAGNOSIS — Z95.828 S/P ASCENDING AORTIC REPLACEMENT: ICD-10-CM

## 2020-09-21 DIAGNOSIS — Z79.01 LONG TERM (CURRENT) USE OF ANTICOAGULANTS: ICD-10-CM

## 2020-09-21 PROCEDURE — 93793 PR ANTICOAGULANT MGMT FOR PT TAKING WARFARIN: ICD-10-PCS | Mod: S$GLB,,,

## 2020-09-21 PROCEDURE — 93793 ANTICOAG MGMT PT WARFARIN: CPT | Mod: S$GLB,,,

## 2020-09-21 NOTE — PROGRESS NOTES
INR not at goal. Medications, chart, and patient findings reviewed. See calendar for adjustments to dose and follow up plan.  Pt still slightly subtherapeutic since EGD.  Pt is ok to d/c lovenox, take 6mg 9/21 & resume maintenance dose.  Plan to re-assess in 1 week.

## 2020-09-28 ENCOUNTER — ANTI-COAG VISIT (OUTPATIENT)
Dept: CARDIOLOGY | Facility: CLINIC | Age: 55
End: 2020-09-28
Payer: MEDICARE

## 2020-09-28 DIAGNOSIS — G45.9 TIA (TRANSIENT ISCHEMIC ATTACK): ICD-10-CM

## 2020-09-28 DIAGNOSIS — Z79.01 LONG TERM (CURRENT) USE OF ANTICOAGULANTS: ICD-10-CM

## 2020-09-28 DIAGNOSIS — Z95.828 S/P ASCENDING AORTIC REPLACEMENT: ICD-10-CM

## 2020-09-28 PROCEDURE — 93793 ANTICOAG MGMT PT WARFARIN: CPT | Mod: S$GLB,,,

## 2020-09-28 PROCEDURE — 93793 PR ANTICOAGULANT MGMT FOR PT TAKING WARFARIN: ICD-10-PCS | Mod: S$GLB,,,

## 2020-10-13 ENCOUNTER — ANTI-COAG VISIT (OUTPATIENT)
Dept: CARDIOLOGY | Facility: CLINIC | Age: 55
End: 2020-10-13
Payer: MEDICARE

## 2020-10-13 DIAGNOSIS — Z95.828 S/P ASCENDING AORTIC REPLACEMENT: ICD-10-CM

## 2020-10-13 DIAGNOSIS — Z79.01 LONG TERM (CURRENT) USE OF ANTICOAGULANTS: ICD-10-CM

## 2020-10-13 DIAGNOSIS — G45.9 TIA (TRANSIENT ISCHEMIC ATTACK): ICD-10-CM

## 2020-10-13 PROCEDURE — 93793 PR ANTICOAGULANT MGMT FOR PT TAKING WARFARIN: ICD-10-PCS | Mod: S$GLB,,,

## 2020-10-13 PROCEDURE — 93793 ANTICOAG MGMT PT WARFARIN: CPT | Mod: S$GLB,,,

## 2020-10-14 ENCOUNTER — OFFICE VISIT (OUTPATIENT)
Dept: DIABETES | Facility: CLINIC | Age: 55
End: 2020-10-14
Payer: MEDICARE

## 2020-10-14 VITALS
SYSTOLIC BLOOD PRESSURE: 117 MMHG | OXYGEN SATURATION: 97 % | BODY MASS INDEX: 36.31 KG/M2 | WEIGHT: 231.38 LBS | DIASTOLIC BLOOD PRESSURE: 68 MMHG | HEART RATE: 65 BPM | HEIGHT: 67 IN

## 2020-10-14 DIAGNOSIS — E88.819 INSULIN RESISTANCE: ICD-10-CM

## 2020-10-14 DIAGNOSIS — Z71.9 HEALTH EDUCATION/COUNSELING: ICD-10-CM

## 2020-10-14 DIAGNOSIS — E10.65 TYPE 1 DIABETES MELLITUS WITH HYPERGLYCEMIA: Primary | ICD-10-CM

## 2020-10-14 DIAGNOSIS — E10.649 TYPE 1 DIABETES MELLITUS WITH HYPOGLYCEMIA UNAWARENESS: ICD-10-CM

## 2020-10-14 DIAGNOSIS — I10 ESSENTIAL HYPERTENSION: ICD-10-CM

## 2020-10-14 DIAGNOSIS — E66.01 CLASS 2 SEVERE OBESITY DUE TO EXCESS CALORIES WITH SERIOUS COMORBIDITY AND BODY MASS INDEX (BMI) OF 36.0 TO 36.9 IN ADULT: ICD-10-CM

## 2020-10-14 DIAGNOSIS — Z46.81 INSULIN PUMP FITTING OR ADJUSTMENT: ICD-10-CM

## 2020-10-14 DIAGNOSIS — Z96.41 INSULIN PUMP IN PLACE: ICD-10-CM

## 2020-10-14 DIAGNOSIS — E78.5 HYPERLIPIDEMIA WITH TARGET LOW DENSITY LIPOPROTEIN (LDL) CHOLESTEROL LESS THAN 70 MG/DL: ICD-10-CM

## 2020-10-14 PROCEDURE — 95251 CONT GLUC MNTR ANALYSIS I&R: CPT | Mod: S$GLB,,, | Performed by: NURSE PRACTITIONER

## 2020-10-14 PROCEDURE — 99999 PR PBB SHADOW E&M-EST. PATIENT-LVL IV: CPT | Mod: PBBFAC,,, | Performed by: NURSE PRACTITIONER

## 2020-10-14 PROCEDURE — 3008F PR BODY MASS INDEX (BMI) DOCUMENTED: ICD-10-PCS | Mod: CPTII,S$GLB,, | Performed by: NURSE PRACTITIONER

## 2020-10-14 PROCEDURE — 3044F PR MOST RECENT HEMOGLOBIN A1C LEVEL <7.0%: ICD-10-PCS | Mod: CPTII,S$GLB,, | Performed by: NURSE PRACTITIONER

## 2020-10-14 PROCEDURE — 3074F PR MOST RECENT SYSTOLIC BLOOD PRESSURE < 130 MM HG: ICD-10-PCS | Mod: CPTII,S$GLB,, | Performed by: NURSE PRACTITIONER

## 2020-10-14 PROCEDURE — 3078F PR MOST RECENT DIASTOLIC BLOOD PRESSURE < 80 MM HG: ICD-10-PCS | Mod: CPTII,S$GLB,, | Performed by: NURSE PRACTITIONER

## 2020-10-14 PROCEDURE — 3044F HG A1C LEVEL LT 7.0%: CPT | Mod: CPTII,S$GLB,, | Performed by: NURSE PRACTITIONER

## 2020-10-14 PROCEDURE — 3008F BODY MASS INDEX DOCD: CPT | Mod: CPTII,S$GLB,, | Performed by: NURSE PRACTITIONER

## 2020-10-14 PROCEDURE — 99499 UNLISTED E&M SERVICE: CPT | Mod: S$GLB,,, | Performed by: NURSE PRACTITIONER

## 2020-10-14 PROCEDURE — 99214 PR OFFICE/OUTPT VISIT, EST, LEVL IV, 30-39 MIN: ICD-10-PCS | Mod: S$GLB,,, | Performed by: NURSE PRACTITIONER

## 2020-10-14 PROCEDURE — 99999 PR PBB SHADOW E&M-EST. PATIENT-LVL IV: ICD-10-PCS | Mod: PBBFAC,,, | Performed by: NURSE PRACTITIONER

## 2020-10-14 PROCEDURE — 95251 PR GLUCOSE MONITOR, 72 HOUR, PHYS INTERP: ICD-10-PCS | Mod: S$GLB,,, | Performed by: NURSE PRACTITIONER

## 2020-10-14 PROCEDURE — 99214 OFFICE O/P EST MOD 30 MIN: CPT | Mod: S$GLB,,, | Performed by: NURSE PRACTITIONER

## 2020-10-14 PROCEDURE — 3074F SYST BP LT 130 MM HG: CPT | Mod: CPTII,S$GLB,, | Performed by: NURSE PRACTITIONER

## 2020-10-14 PROCEDURE — 3078F DIAST BP <80 MM HG: CPT | Mod: CPTII,S$GLB,, | Performed by: NURSE PRACTITIONER

## 2020-10-14 PROCEDURE — 99499 RISK ADDL DX/OHS AUDIT: ICD-10-PCS | Mod: S$GLB,,, | Performed by: NURSE PRACTITIONER

## 2020-10-14 RX ORDER — METFORMIN HYDROCHLORIDE 500 MG/1
500 TABLET, EXTENDED RELEASE ORAL
Qty: 90 TABLET | Refills: 1 | Status: SHIPPED | OUTPATIENT
Start: 2020-10-14 | End: 2021-02-15 | Stop reason: SDUPTHER

## 2020-10-14 RX ORDER — ATORVASTATIN CALCIUM 40 MG/1
40 TABLET, FILM COATED ORAL NIGHTLY
Qty: 90 TABLET | Refills: 2 | Status: SHIPPED | OUTPATIENT
Start: 2020-10-14 | End: 2020-11-30

## 2020-10-14 RX ORDER — EMPAGLIFLOZIN 25 MG/1
25 TABLET, FILM COATED ORAL DAILY
Qty: 90 TABLET | Refills: 1 | Status: SHIPPED | OUTPATIENT
Start: 2020-10-14 | End: 2020-11-09

## 2020-10-14 RX ORDER — GABAPENTIN 300 MG/1
600 CAPSULE ORAL 3 TIMES DAILY
Qty: 540 CAPSULE | Refills: 2 | Status: SHIPPED | OUTPATIENT
Start: 2020-10-14 | End: 2021-04-05

## 2020-10-14 RX ORDER — INSULIN LISPRO 100 [IU]/ML
INJECTION, SOLUTION INTRAVENOUS; SUBCUTANEOUS
Qty: 5 VIAL | Refills: 6 | Status: SHIPPED | OUTPATIENT
Start: 2020-10-14 | End: 2020-11-03 | Stop reason: SDUPTHER

## 2020-10-14 NOTE — ASSESSMENT & PLAN NOTE
A1c is at goal- may not been accurate due to anemia.   BG readings are near goal on Dexcom download.   Some elevations overnight and denies snacking.   Some post prandial excursions noted on Dexcom download.   He is using the extended bolus feature often-- and not necessarily appropriate so this may be contributing to post prandial excursions   He is no longer entering in false CHO intake, but is eating a very high CHO diet.   Encouraged him to cut back on CHO intake with meals-- encouraged 45-60 grams with meals instead of 100 grams   Also encouraged exercise and weight loss.         Medication changes:   Continue Metformin  Continue Jardiance 25 mg daily     Pump settings:  Basal:   MN-6AM- 1.2 units/hr - 10% increase   6AM-MN-.1 units/hr     ICR: 1:6    ISF: 20    Target:  120    IOB:  3 hours       -- Reviewed goals of therapy are to get the best control we can without hypoglycemia  -- Reviewed patient's current insulin regimen. Clarified proper insulin dose and timing in relation to meals, etc. Insulin injection sites and proper rotation instructed.    -- Advised frequent self blood glucose monitoring.  Patient encouraged to document glucose results and bring them to every clinic visit  -Continue to use Dexcom   -- Hypoglycemia precautions discussed. Instructed on precautions before driving.    -- Call for Bg repeatedly < 90 or > 180.   -- Close adherence to lifestyle changes recommended.   -- Periodic follow ups for eye evaluations, foot care and dental care suggested.      -check urine mac today

## 2020-10-14 NOTE — ASSESSMENT & PLAN NOTE
BP goal is < 140/90.   Tolerating  ARB  Controlled -- has improved with BP medication changes   Blood pressure goals discussed with patient

## 2020-10-14 NOTE — PROGRESS NOTES
CC:   Chief Complaint   Patient presents with    Diabetes Mellitus       HPI: Cj Barnes is a 54 y.o. male presents for a follow up visit today for the management of T1DM.   The patient was diagnosed in his early 30's. Initially diagnosed with T2DM, 5 years later he was dx as T1.    He has used an insulin pump since 2006, previous on animas.  He is now on  670 G medtronic pump.  He was previously wearing the Medtronic guardian sensor, but stopped wearing it as he did not feel that auto mode was giving him another insulin and his blood sugar readings were running high.  He did not like auto mode at all  He was entering in false carbohydrates to bring down his blood sugar readings.       Our last visit was in July 2020.  We discussed insulin pump optimization.  Encouraged him to stop entering in false carbohydrate intake.  We adjusted his insulin pump settings to prevent him from doing this   Also encouraged him to cut back on his carbohydrate intake.-- he is still eating a high CHO diet. About 324 grams of CHO per day. He reports that it is difficult for him because of his coumadin die- he can't get cabbage and salads.   He restarted his Jardiance   Insulin pump settings adjusted.  Increased his basal rate and tightened his carb ratio.  Changed his insulin on board to 3 hr.  His A1c has come down from 7.9% to 6.6%- of note though, he does have iron deficiency anemia.       Family hx of diabetes: Mother, father, 3 brothers - no one with T1     Hospitalized for diabetes: denies     No personal or FH of thyroid cancer or personal of pancreatic cancer or pancreatitis.       DIABETES COMPLICATIONS: retinopathy, peripheral neuropathy, cardiovascular disease and cerebrovascular disease  TIA     Diabetes Management Status    ASA:  Yes - 81 mg daily and coumadin     Statin: Taking--Lipitor   ACE/ARB: Taking-- Irbesartan     Screening or Prevention Patient's value Goal Complete/Controlled?   HgA1C Testing and  Control   Lab Results   Component Value Date    HGBA1C 6.6 (H) 10/08/2020      Annually/Less than 8% Yes   Lipid profile : 10/08/2020 Annually Yes   LDL control Lab Results   Component Value Date    LDLCALC 78 10/08/2020    Annually/Less than 100 mg/dl  Yes   Nephropathy screening Lab Results   Component Value Date    LABMICR <2.5 04/23/2019     Lab Results   Component Value Date    PROTEINUA Negative 08/16/2019    Annually Yes   Blood pressure BP Readings from Last 1 Encounters:   10/14/20 117/68    Less than 140/90 Yes   Dilated retinal exam : 07/20/2020- Annually No   Foot exam   : 10/14/2020 Annually Yes       CURRENT A1C:    Hemoglobin A1C   Date Value Ref Range Status   10/08/2020 6.6 (H) 4.0 - 5.6 % Final     Comment:     ADA Screening Guidelines:  5.7-6.4%  Consistent with prediabetes  >or=6.5%  Consistent with diabetes  High levels of fetal hemoglobin interfere with the HbA1C  assay. Heterozygous hemoglobin variants (HbS, HgC, etc)do  not significantly interfere with this assay.   However, presence of multiple variants may affect accuracy.     06/01/2020 7.9 (H) 4.0 - 5.6 % Final     Comment:     ADA Screening Guidelines:  5.7-6.4%  Consistent with prediabetes  >or=6.5%  Consistent with diabetes  High levels of fetal hemoglobin interfere with the HbA1C  assay. Heterozygous hemoglobin variants (HbS, HgC, etc)do  not significantly interfere with this assay.   However, presence of multiple variants may affect accuracy.     01/09/2020 7.3 (H) 4.0 - 5.6 % Final     Comment:     ADA Screening Guidelines:  5.7-6.4%  Consistent with prediabetes  >or=6.5%  Consistent with diabetes  High levels of fetal hemoglobin interfere with the HbA1C  assay. Heterozygous hemoglobin variants (HbS, HgC, etc)do  not significantly interfere with this assay.   However, presence of multiple variants may affect accuracy.         GOAL A1C: 6.5-7% - without hypoglycemia    DM MEDICATIONS USED IN THE PAST: Medtronic 670 G   Metformin    Steglatro- lack of coverage.   Jardiance     CURRENT DIABETES MEDICATIONS: Medtronic insulin pump, Metformin 500 mg daily with breakfast  Jardiance 25 mg daily      Insulin Pump: Medtronic 670G with Humalog   Manual mode only       Pump settings:  Basal:    12AM-12PM-- 1.1 unit/hr     ICR: 1:7     ISF: 20    Target:  120    IOB:  3 hours     Temp basals: none     Pump site change: q 3.3 days   Cartridge change: q 3.3 days   Insulin TDD:  75 units   64% bolus and 36% basal   CHO- 324 carbs per day +/- 68g     Pump supplies from [employer]    Using the bolus wizard: yes   Overriding: no--    Back up Lantus/Levemir: Yes     BLOOD GLUCOSE MONITORING:   Sensor type: Dexcom   Average BG readin  Time in range: 78%   Limits:   Site change: q10 days     Dexcom from [employer]     Sensor was downloaded in clinic today and reviewed with patient.   Please see attached document for download.       HYPOGLYCEMIA:  0% on dexcom download.   + hx of hypoglycemia unawareness   He doesn't feel BG readings until the 40-50's - once in the 50's he will have twitching to his eye.   Glucagon kit: yes  Medic alert bracelet: yes       MEALS:   BF: ~ 1-2 breakfast burritos or egg mc muffin or croissant     Lunch: bag of chips or something light.   Dinner: ~ eating out home cooked style- [employer], [address]. He loves salads- light Italian or michael dressing -spaghetti and meatballs last night  Snack: occ- dry cereal -- mid night snack   Drinks: un sweet tea, water, coke zero.   He CHO counts- he feels comfortable.   Eating a very high carbohydrate diet       CURRENT EXERCISE:  Was in the gym prior to the virus. -- now walking.     Review of Systems  Review of Systems   Constitutional: Negative for appetite change, fatigue and unexpected weight change.   HENT: Negative for trouble swallowing.    Eyes: Negative for visual disturbance.   Respiratory: Negative for shortness of breath.    Cardiovascular: Negative for chest pain.    Gastrointestinal: Negative for nausea.   Endocrine: Negative for polydipsia, polyphagia and polyuria.   Genitourinary:        No Nocturia    Musculoskeletal: Negative for back pain.   Skin: Negative for wound.   Neurological: Positive for numbness (worse at night ).   Psychiatric/Behavioral: Positive for sleep disturbance (due to valve clicking sounds). The patient is nervous/anxious.         Both personal and work stress       Physical Exam   Physical Exam  Vitals signs and nursing note reviewed.   Constitutional:       General: He is not in acute distress.     Appearance: He is well-developed. He is obese. He is not diaphoretic.   HENT:      Head: Normocephalic and atraumatic.      Left Ear: External ear normal.      Nose: Nose normal.   Neck:      Musculoskeletal: Normal range of motion and neck supple.      Thyroid: No thyromegaly.      Trachea: No tracheal deviation.   Cardiovascular:      Rate and Rhythm: Normal rate and regular rhythm.      Comments: + mechanical valve clicks   No edema   Pulmonary:      Effort: Pulmonary effort is normal. No respiratory distress.      Breath sounds: Normal breath sounds.   Abdominal:      Palpations: Abdomen is soft.      Tenderness: There is no abdominal tenderness.      Hernia: No hernia is present.   Skin:     General: Skin is warm and dry.      Capillary Refill: Capillary refill takes less than 2 seconds.      Findings: No rash.      Comments: Insulin pump sites and Dexcom sites are normal appearing. No lipo hypertropthy or atrophy- to abdomen    Neurological:      Mental Status: He is alert and oriented to person, place, and time.      Cranial Nerves: No cranial nerve deficit.   Psychiatric:         Behavior: Behavior normal.         Judgment: Judgment normal.         FOOT EXAMINATION: Appropriate footwear     Protective Sensation (w/ 10 gram monofilament):  Right: Decreased  Left: Decreased    Visual Inspection:  Normal -  Bilateral, Nails Intact - without Evidence  of Foot Deformity- Bilateral and Onychomycosis -  Bilateral    Pedal Pulses:   Right: Present  Left: Present    Posterior tibialis:   Right:Present  Left: Present        Lab Results   Component Value Date    TSH 1.86 07/28/2020       Type 1 diabetes mellitus with hyperglycemia  A1c is at goal- may not been accurate due to anemia.   BG readings are near goal on Dexcom download.   Some elevations overnight and denies snacking.   Some post prandial excursions noted on Dexcom download.   He is using the extended bolus feature often-- and not necessarily appropriate so this may be contributing to post prandial excursions   He is no longer entering in false CHO intake, but is eating a very high CHO diet.   Encouraged him to cut back on CHO intake with meals-- encouraged 45-60 grams with meals instead of 100 grams   Also encouraged exercise and weight loss.         Medication changes:   Continue Metformin  Continue Jardiance 25 mg daily     Pump settings:  Basal:   MN-6AM- 1.2 units/hr - 10% increase   6AM-MN-.1 units/hr     ICR: 1:6    ISF: 20    Target:  120    IOB:  3 hours       -- Reviewed goals of therapy are to get the best control we can without hypoglycemia  -- Reviewed patient's current insulin regimen. Clarified proper insulin dose and timing in relation to meals, etc. Insulin injection sites and proper rotation instructed.    -- Advised frequent self blood glucose monitoring.  Patient encouraged to document glucose results and bring them to every clinic visit  -Continue to use Dexcom   -- Hypoglycemia precautions discussed. Instructed on precautions before driving.    -- Call for Bg repeatedly < 90 or > 180.   -- Close adherence to lifestyle changes recommended.   -- Periodic follow ups for eye evaluations, foot care and dental care suggested.      -check urine mac today       Insulin pump fitting or adjustment  See above for pump changes     Insulin pump in place  See above for pump changes     Type 1 diabetes  mellitus with hypoglycemia unawareness  Avoid hypoglycemia.  Continue to use Dexcom with real-time alerts    Essential hypertension  BP goal is < 140/90.   Tolerating  ARB  Controlled -- has improved with BP medication changes   Blood pressure goals discussed with patient      Hyperlipidemia with target low density lipoprotein (LDL) cholesterol less than 70 mg/dL  On statin per ADA recommendations  LDL goal < 70. LDL near goal. LFTs WNL. Continue statin.         Obesity due to excess calories with serious comorbidity  Body mass index is 36.24 kg/m².  Increases insulin resistance.   Discussed DM diet and exercise.   Patient does exhibit insulin resistance and is on an SGLT 2 and metformin.        Follow up in about 4 months (around 2/14/2021).   Urine today   A1c and BMP prior          Orders Placed This Encounter   Procedures    Hemoglobin A1C     Standing Status:   Future     Standing Expiration Date:   12/13/2021    Microalbumin/Creatinine Ratio, Urine     Standing Status:   Future     Number of Occurrences:   1     Standing Expiration Date:   4/14/2022     Order Specific Question:   Specimen Source     Answer:   Urine    Basic Metabolic Panel     Standing Status:   Future     Standing Expiration Date:   4/14/2022       Recommendations were discussed with the patient in detail  The patient verbalized understanding and agrees with the plan outlined as above.

## 2020-10-14 NOTE — Clinical Note
Please attach Dexcom and pump download to chart.   Please schedule 4 month follow up with A1c and BMP prior

## 2020-10-14 NOTE — ASSESSMENT & PLAN NOTE
Body mass index is 36.24 kg/m².  Increases insulin resistance.   Discussed DM diet and exercise.   Patient does exhibit insulin resistance and is on an SGLT 2 and metformin.

## 2020-10-27 ENCOUNTER — PATIENT MESSAGE (OUTPATIENT)
Dept: CARDIOLOGY | Facility: CLINIC | Age: 55
End: 2020-10-27

## 2020-10-27 ENCOUNTER — ANTI-COAG VISIT (OUTPATIENT)
Dept: CARDIOLOGY | Facility: CLINIC | Age: 55
End: 2020-10-27
Payer: MEDICARE

## 2020-10-27 DIAGNOSIS — G45.9 TIA (TRANSIENT ISCHEMIC ATTACK): ICD-10-CM

## 2020-10-27 DIAGNOSIS — Z95.828 S/P ASCENDING AORTIC REPLACEMENT: ICD-10-CM

## 2020-10-27 DIAGNOSIS — Z79.01 LONG TERM (CURRENT) USE OF ANTICOAGULANTS: ICD-10-CM

## 2020-10-27 PROCEDURE — 93793 PR ANTICOAGULANT MGMT FOR PT TAKING WARFARIN: ICD-10-PCS | Mod: S$GLB,,,

## 2020-10-27 PROCEDURE — 93793 ANTICOAG MGMT PT WARFARIN: CPT | Mod: S$GLB,,,

## 2020-11-03 DIAGNOSIS — E10.65 TYPE 1 DIABETES MELLITUS WITH HYPERGLYCEMIA: Primary | ICD-10-CM

## 2020-11-03 RX ORDER — INSULIN LISPRO 100 [IU]/ML
INJECTION, SOLUTION INTRAVENOUS; SUBCUTANEOUS
Qty: 5 VIAL | Refills: 6 | Status: SHIPPED | OUTPATIENT
Start: 2020-11-03 | End: 2021-09-20

## 2020-11-10 ENCOUNTER — ANTI-COAG VISIT (OUTPATIENT)
Dept: CARDIOLOGY | Facility: CLINIC | Age: 55
End: 2020-11-10
Payer: MEDICARE

## 2020-11-10 DIAGNOSIS — G45.9 TIA (TRANSIENT ISCHEMIC ATTACK): ICD-10-CM

## 2020-11-10 DIAGNOSIS — Z95.828 S/P ASCENDING AORTIC REPLACEMENT: ICD-10-CM

## 2020-11-10 DIAGNOSIS — Z79.01 LONG TERM (CURRENT) USE OF ANTICOAGULANTS: ICD-10-CM

## 2020-11-10 PROCEDURE — 93793 ANTICOAG MGMT PT WARFARIN: CPT | Mod: S$GLB,,,

## 2020-11-10 PROCEDURE — 93793 PR ANTICOAGULANT MGMT FOR PT TAKING WARFARIN: ICD-10-PCS | Mod: S$GLB,,,

## 2020-11-16 ENCOUNTER — OFFICE VISIT (OUTPATIENT)
Dept: CARDIOLOGY | Facility: CLINIC | Age: 55
End: 2020-11-16
Payer: MEDICARE

## 2020-11-16 VITALS
WEIGHT: 235.44 LBS | BODY MASS INDEX: 36.95 KG/M2 | DIASTOLIC BLOOD PRESSURE: 69 MMHG | OXYGEN SATURATION: 96 % | HEART RATE: 68 BPM | SYSTOLIC BLOOD PRESSURE: 134 MMHG | HEIGHT: 67 IN

## 2020-11-16 DIAGNOSIS — I48.92 PAROXYSMAL ATRIAL FLUTTER: ICD-10-CM

## 2020-11-16 DIAGNOSIS — I10 ESSENTIAL HYPERTENSION: ICD-10-CM

## 2020-11-16 DIAGNOSIS — Z95.2 H/O MECHANICAL AORTIC VALVE REPLACEMENT: Primary | ICD-10-CM

## 2020-11-16 DIAGNOSIS — D50.0 IRON DEFICIENCY ANEMIA DUE TO CHRONIC BLOOD LOSS: ICD-10-CM

## 2020-11-16 DIAGNOSIS — Z79.01 LONG TERM (CURRENT) USE OF ANTICOAGULANTS: ICD-10-CM

## 2020-11-16 DIAGNOSIS — Z95.828 S/P ASCENDING AORTIC REPLACEMENT: ICD-10-CM

## 2020-11-16 DIAGNOSIS — E78.5 HYPERLIPIDEMIA WITH TARGET LOW DENSITY LIPOPROTEIN (LDL) CHOLESTEROL LESS THAN 70 MG/DL: ICD-10-CM

## 2020-11-16 PROCEDURE — 99214 PR OFFICE/OUTPT VISIT, EST, LEVL IV, 30-39 MIN: ICD-10-PCS | Mod: 25,S$GLB,, | Performed by: INTERNAL MEDICINE

## 2020-11-16 PROCEDURE — 93000 ELECTROCARDIOGRAM COMPLETE: CPT | Mod: S$GLB,,, | Performed by: INTERNAL MEDICINE

## 2020-11-16 PROCEDURE — 1125F PR PAIN SEVERITY QUANTIFIED, PAIN PRESENT: ICD-10-PCS | Mod: S$GLB,,, | Performed by: INTERNAL MEDICINE

## 2020-11-16 PROCEDURE — 3008F PR BODY MASS INDEX (BMI) DOCUMENTED: ICD-10-PCS | Mod: CPTII,S$GLB,, | Performed by: INTERNAL MEDICINE

## 2020-11-16 PROCEDURE — 3008F BODY MASS INDEX DOCD: CPT | Mod: CPTII,S$GLB,, | Performed by: INTERNAL MEDICINE

## 2020-11-16 PROCEDURE — 1125F AMNT PAIN NOTED PAIN PRSNT: CPT | Mod: S$GLB,,, | Performed by: INTERNAL MEDICINE

## 2020-11-16 PROCEDURE — 99214 OFFICE O/P EST MOD 30 MIN: CPT | Mod: 25,S$GLB,, | Performed by: INTERNAL MEDICINE

## 2020-11-16 PROCEDURE — 3078F DIAST BP <80 MM HG: CPT | Mod: CPTII,S$GLB,, | Performed by: INTERNAL MEDICINE

## 2020-11-16 PROCEDURE — 99999 PR PBB SHADOW E&M-EST. PATIENT-LVL IV: ICD-10-PCS | Mod: PBBFAC,,, | Performed by: INTERNAL MEDICINE

## 2020-11-16 PROCEDURE — 3075F SYST BP GE 130 - 139MM HG: CPT | Mod: CPTII,S$GLB,, | Performed by: INTERNAL MEDICINE

## 2020-11-16 PROCEDURE — 93000 EKG 12-LEAD: ICD-10-PCS | Mod: S$GLB,,, | Performed by: INTERNAL MEDICINE

## 2020-11-16 PROCEDURE — 3078F PR MOST RECENT DIASTOLIC BLOOD PRESSURE < 80 MM HG: ICD-10-PCS | Mod: CPTII,S$GLB,, | Performed by: INTERNAL MEDICINE

## 2020-11-16 PROCEDURE — 3075F PR MOST RECENT SYSTOLIC BLOOD PRESS GE 130-139MM HG: ICD-10-PCS | Mod: CPTII,S$GLB,, | Performed by: INTERNAL MEDICINE

## 2020-11-16 PROCEDURE — 99999 PR PBB SHADOW E&M-EST. PATIENT-LVL IV: CPT | Mod: PBBFAC,,, | Performed by: INTERNAL MEDICINE

## 2020-11-16 NOTE — PROGRESS NOTES
"  Subjective:      Patient ID: Cj Barnes is a 55 y.o. male.    Chief Complaint: Follow-up    HPI:  Pt has chronic pain in right shoulder dating back years to a fall off of a ladder.    Pt is scheduled for evaluation by [facility] medicine.    Cough abated off ACEI and blood "pressures have been perfect"    Pt completed cardiac rehab and exercises at the gym 3 days a week      Review of Systems   Cardiovascular: Negative for chest pain, claudication, dyspnea on exertion, irregular heartbeat, leg swelling, near-syncope, orthopnea, palpitations and syncope.      Going to [facility] for Thanksgiving      Past Medical History:   Diagnosis Date    Anemia     Aortic stenosis 2018    Cancer 2014    Colon cancer     Coronary artery disease     Diabetes mellitus type I     since in his 30's    Heart murmur     Heel fracture     HTN (hypertension)     Hyperlipidemia LDL goal < 70     Insulin pump in place     MVP (mitral valve prolapse)     Stenosis of aortic and mitral valves         Past Surgical History:   Procedure Laterality Date    AORTIC VALVE REPLACEMENT N/A 8/9/2019    Procedure: Replacement-valve-aortic;  Surgeon: Ryan Knight MD;  Location: [facility] OR [address] [place] [address];  Service: Cardiothoracic;  Laterality: N/A;    BACK SURGERY      BENTALL PROCEDURE FOR REPLACEMENT OF AORTIC VALVE, AORTIC ROOT, AND ASCENDING AORTA N/A 8/9/2019    Procedure: BENTALL PROCEDURE;  Surgeon: Ryan Knight MD;  [facility];  Service: Cardiothoracic;  Laterality: N/A;    COLON SURGERY  2014    COLONOSCOPY N/A 2/17/2020    Procedure: COLONOSCOPY;  Surgeon: Richi Mock MD;  Location: [facility];  Service: Colon and Rectal;  Laterality: N/A;    ESOPHAGOGASTRODUODENOSCOPY N/A 9/10/2020    Procedure: EGD (ESOPHAGOGASTRODUODENOSCOPY);  Surgeon: Abilio Boo MD;  Location: [facility] ENDO (4TH FLR);  Service: Endoscopy;  Laterality: N/A;  Cardiac clearance received, see telephone encounter " 8/27/20-[facility]  Approval to hold Coumadin prior to procedure received, see telephone encounter 8/27/20-[facility]  covid-9/7/20-[facility]    FOOT TENDON SURGERY      right and left heart cath Bilateral 01/15/2018    TREATMENT OF CARDIAC ARRHYTHMIA N/A 8/19/2019    Procedure: CARDIOVERSION;  Surgeon: Juan Zapata MD;  Location: [facility] LAB;  Service: Cardiology;  Laterality: N/A;  afib, dccv only, anes, GP, 3092    TRIGGER FINGER RELEASE      x 2       Family History   Problem Relation Age of Onset    Heart disease Mother     Lung cancer Mother     Heart attack Father     Diabetes Father     HIV Brother        Social History     Socioeconomic History    Marital status:      Spouse name: Not on file    Number of children: Not on file    Years of education: Not on file    Highest education level: Not on file   Occupational History    Not on file   Social Needs    Financial resource strain: Not on file    Food insecurity     Worry: Not on file     Inability: Not on file    Transportation needs     Medical: Not on file     Non-medical: Not on file   Tobacco Use    Smoking status: Never Smoker    Smokeless tobacco: Former User     Types: Snuff    Tobacco comment: since he was 12 yrs old   Substance and Sexual Activity    Alcohol use: No     Comment: socially    Drug use: No    Sexual activity: Not on file   Lifestyle    Physical activity     Days per week: Not on file     Minutes per session: Not on file    Stress: Not on file   Relationships    Social connections     Talks on phone: Not on file     Gets together: Not on file     Attends Taoist service: Not on file     Active member of club or organization: Not on file     Attends meetings of clubs or organizations: Not on file     Relationship status: Not on file   Other Topics Concern    Not on file   Social History Narrative        2 grown kids    Delivers seafood       Current Outpatient Medications on File Prior to  "Visit   Medication Sig Dispense Refill    aspirin (ECOTRIN) 81 MG EC tablet Take 1 tablet (81 mg total) by mouth once daily.  0    atorvastatin (LIPITOR) 40 MG tablet Take 1 tablet (40 mg total) by mouth every evening. 90 tablet 2    blood sugar diagnostic Strp 1 each by Misc.(Non-Drug; Combo Route) route 6 (six) times daily. Contour next strips. Pt needs contour jackelyn for compatibility w/ insulin pump (medtronic 670g). Necessity 200 strip 6    blood-glucose meter,continuous (DEXCOM G6 ) Misc 1 Device by Misc.(Non-Drug; Combo Route) route once. for 1 dose 1 each 0    blood-glucose sensor (DEXCOM G6 SENSOR) Apple 1 sensor every 10 days 9 Device 3    blood-glucose transmitter (DEXCOM G6 TRANSMITTER) Apple 1 transmitter every 3 months 1 Device 3    clotrimazole-betamethasone 1-0.05% (LOTRISONE) cream APPLY A SMALL AMOUNT TO AFFECTED AREA THREE TIMES DAILY UNTIL CLEAR  0    COMFORT EZ PEN NEEDLES 33 gauge x 3/16" Ndle USE AS DIRECTED with Levemir pens  11    gabapentin (NEURONTIN) 300 MG capsule Take 2 capsules (600 mg total) by mouth 3 (three) times daily. 540 capsule 2    glucagon, human recombinant, (GLUCAGON EMERGENCY KIT, HUMAN,) 1 mg SolR Inject 1 mg into the muscle as needed. 1 each 0    hydroCHLOROthiazide (MICROZIDE) 12.5 mg capsule Take 1 capsule (12.5 mg total) by mouth once daily. 90 capsule 3    insulin detemir U-100 (LEVEMIR FLEXTOUCH U-100 INSULN) 100 unit/mL (3 mL) SubQ InPn pen Inject 48 Units into the skin every evening. 3 mL 0    insulin lispro (HUMALOG U-100 INSULIN) 100 unit/mL injection To use continuously with insulin pump.  Max total daily dose of 120 units 5 vial 6    JARDIANCE 25 mg tablet TAKE ONE TABLET BY MOUTH DAILY 30 tablet 4    metFORMIN (GLUCOPHAGE-XR) 500 MG ER 24hr tablet Take 1 tablet (500 mg total) by mouth daily with breakfast. 90 tablet 1    warfarin (COUMADIN) 3 MG tablet Take 2 tablets (6 mg total) by mouth Daily. 60 tablet 11    zolpidem (AMBIEN) 10 mg " "Tab TAKE ONE TABLET BY MOUTH EVERY EVENING AS NEEDED 30 tablet 2    amoxicillin (AMOXIL) 500 MG capsule Take 4 capsules (2,000 mg total) by mouth as needed (take one hour before dental work). (Patient not taking: Reported on 11/16/2020) 28 capsule 11    irbesartan (AVAPRO) 150 MG tablet Take 1 tablet (150 mg total) by mouth once daily. 90 tablet 3     No current facility-administered medications on file prior to visit.        Review of patient's allergies indicates:  No Known Allergies  Objective:     Vitals:    11/16/20 1426   BP: 134/69   BP Location: Right arm   Patient Position: Sitting   BP Method: Large (Automatic)   Pulse: 68   SpO2: 96%   Weight: 106.8 kg (235 lb 7.2 oz)   Height: 5' 7" (1.702 m)        Physical Exam   Constitutional: He is oriented to person, place, and time. He appears well-developed and well-nourished. No distress.   Eyes: No scleral icterus.   Neck: No JVD present. Carotid bruit is not present.   Cardiovascular: Regular rhythm. Exam reveals no gallop and no friction rub.   No murmur heard.  Mechanical aortic valve open and close     Pulmonary/Chest: Effort normal and breath sounds normal. No respiratory distress.   Musculoskeletal:         General: No edema.   Neurological: He is alert and oriented to person, place, and time.   Skin: Skin is warm and dry. He is not diaphoretic.   Psychiatric: He has a normal mood and affect. His behavior is normal. Judgment and thought content normal.   Vitals reviewed.     Wt up 5 lbs over past 2 months    ECG today reviewed by me: NSR, nonspecific T wave abnormality, unchanged.    Lab Visit on 11/10/2020   Component Date Value Ref Range Status    Prothrombin Time 11/10/2020 26.3* 9.0 - 12.5 sec Final    INR 11/10/2020 2.7* 0.8 - 1.2 Final   Lab Visit on 10/27/2020   Component Date Value Ref Range Status    Prothrombin Time 10/27/2020 21.5* 9.0 - 12.5 sec Final    INR 10/27/2020 2.1* 0.8 - 1.2 Final   Lab Visit on 10/14/2020   Component Date Value " Ref Range Status    Microalbumin, Urine 10/14/2020 5.0  ug/mL Final    Creatinine, Urine 10/14/2020 54.0  23.0 - 375.0 mg/dL Final    Microalb/Creat Ratio 10/14/2020 9.3  0.0 - 30.0 ug/mg Final   Lab Visit on 10/13/2020   Component Date Value Ref Range Status    Prothrombin Time 10/13/2020 22.8* 9.0 - 12.5 sec Final    INR 10/13/2020 2.3* 0.8 - 1.2 Final    WBC 10/13/2020 6.90  3.90 - 12.70 K/uL Final    RBC 10/13/2020 5.50  4.60 - 6.20 M/uL Final    Hemoglobin 10/13/2020 15.0  14.0 - 18.0 g/dL Final    Hematocrit 10/13/2020 45.9  40.0 - 54.0 % Final    MCV 10/13/2020 83  82 - 98 fL Final    MCH 10/13/2020 27.4  27.0 - 31.0 pg Final    MCHC 10/13/2020 32.8  32.0 - 36.0 g/dL Final    RDW 10/13/2020 23.9* 11.5 - 14.5 % Final    Platelets 10/13/2020 144* 150 - 350 K/uL Final    MPV 10/13/2020 8.5* 9.2 - 12.9 fL Final    Gran # (ANC) 10/13/2020 4.9  1.8 - 7.7 K/uL Final    Lymph # 10/13/2020 1.4  1.0 - 4.8 K/uL Final    Mono # 10/13/2020 0.4  0.3 - 1.0 K/uL Final    Eos # 10/13/2020 0.1  0.0 - 0.5 K/uL Final    Baso # 10/13/2020 0.00  0.00 - 0.20 K/uL Final    Gran % 10/13/2020 71.5  38.0 - 73.0 % Final    Lymph % 10/13/2020 20.0  18.0 - 48.0 % Final    Mono % 10/13/2020 5.7  4.0 - 15.0 % Final    Eosinophil % 10/13/2020 2.1  0.0 - 8.0 % Final    Basophil % 10/13/2020 0.7  0.0 - 1.9 % Final    Differential Method 10/13/2020 Automated   Final    Ferritin 10/13/2020 311* 20.0 - 300.0 ng/mL Final    Iron 10/13/2020 100  45 - 160 ug/dL Final    Transferrin 10/13/2020 284  200 - 375 mg/dL Final    TIBC 10/13/2020 420  250 - 450 ug/dL Final    Saturated Iron 10/13/2020 24  20 - 50 % Final   Lab Visit on 10/08/2020   Component Date Value Ref Range Status    Cholesterol 10/08/2020 141  80 - 200 mg/dL Final    Triglycerides 10/08/2020 106  30 - 150 mg/dL Final    HDL 10/08/2020 42  40 - 75 mg/dL Final    LDL Cholesterol 10/08/2020 78  <100 mg/dL Final    HDL/Cholesterol Ratio 10/08/2020 29.8   20.0 - 50.0 % Final    Total Cholesterol/HDL Ratio 10/08/2020 3.4  2.0 - 5.0 Final    Non-HDL Cholesterol 10/08/2020 99  mg/dL Final    Hemoglobin A1C 10/08/2020 6.6* 4.0 - 5.6 % Final    Estimated Avg Glucose 10/08/2020 143* 68 - 131 mg/dL Final    Sodium 10/08/2020 137  136 - 145 mmol/L Final    Potassium 10/08/2020 3.6  3.5 - 5.1 mmol/L Final    Chloride 10/08/2020 99* 101 - 111 mmol/L Final    CO2 10/08/2020 29  23 - 29 mmol/L Final    Glucose 10/08/2020 137* 74 - 118 mg/dL Final    BUN 10/08/2020 20  6 - 20 mg/dL Final    Creatinine 10/08/2020 1.0  0.5 - 1.4 mg/dL Final    Calcium 10/08/2020 8.8  8.6 - 10.0 mg/dL Final    Anion Gap 10/08/2020 9  8 - 16 mmol/L Final    eGFR if African American 10/08/2020 >60.0  >60 mL/min/1.73 m^2 Final    eGFR if non African American 10/08/2020 >60.0  >60 mL/min/1.73 m^2 Final   Lab Visit on 09/28/2020   Component Date Value Ref Range Status    Prothrombin Time 09/28/2020 19.9* 9.0 - 12.5 sec Final    INR 09/28/2020 2.0* 0.8 - 1.2 Final   Lab Visit on 09/21/2020   Component Date Value Ref Range Status    Prothrombin Time 09/21/2020 18.6* 9.0 - 12.5 sec Final    INR 09/21/2020 1.8* 0.8 - 1.2 Final   Lab Visit on 09/15/2020   Component Date Value Ref Range Status    Prothrombin Time 09/15/2020 12.8* 9.0 - 12.5 sec Final    INR 09/15/2020 1.2  0.8 - 1.2 Final   Lab Visit on 09/07/2020   Component Date Value Ref Range Status    SARS-CoV2 (COVID-19) Qualitative P* 09/07/2020 Not Detected  Not Detected Final   Lab Visit on 09/03/2020   Component Date Value Ref Range Status    Prothrombin Time 09/03/2020 29.8* 9.0 - 12.5 sec Final    INR 09/03/2020 3.0* 0.8 - 1.2 Final   There may be more visits with results that are not included.   (    Assessment:     1. H/O mechanical aortic valve replacement    2. Essential hypertension    3. Hyperlipidemia with target low density lipoprotein (LDL) cholesterol less than 70 mg/dL    4. Paroxysmal atrial flutter    5. S/P  ascending aortic replacement    6. Long term (current) use of anticoagulants    7. Iron deficiency anemia due to chronic blood loss      Plan:   Cj was seen today for follow-up.    Diagnoses and all orders for this visit:    H/O mechanical aortic valve replacement  -     IN OFFICE EKG 12-LEAD (to Muse)    Essential hypertension    Hyperlipidemia with target low density lipoprotein (LDL) cholesterol less than 70 mg/dL    Paroxysmal atrial flutter    S/P ascending aortic replacement    Long term (current) use of anticoagulants    Iron deficiency anemia due to chronic blood loss     Consider more PT/OT for right shoulder    Pt is medically stable if surgery is needed for right shoulder.  Risk of valve thrombosis discussed    Low carb diet discussed    Pt instructed to use Tylenol for right shoulder pain instead of Motrin due to the risk of bleeding ulcers on warfarin    Same meds    F/u with Coumadin clinic    F/u with Dr Cantrell      F/u with KELSIE Quintero    F/u with Dr Stover      Follow up in about 4 months (around 3/16/2021).

## 2020-11-19 ENCOUNTER — PATIENT MESSAGE (OUTPATIENT)
Dept: CARDIOLOGY | Facility: CLINIC | Age: 55
End: 2020-11-19

## 2020-11-20 ENCOUNTER — TELEPHONE (OUTPATIENT)
Dept: CARDIOLOGY | Facility: CLINIC | Age: 55
End: 2020-11-20

## 2020-11-20 NOTE — TELEPHONE ENCOUNTER
I spoke with pt.  Device is MR conditional based on technical details of MRI Emily field which the radiologist would have to address.

## 2020-12-01 ENCOUNTER — ANTI-COAG VISIT (OUTPATIENT)
Dept: CARDIOLOGY | Facility: CLINIC | Age: 55
End: 2020-12-01
Payer: MEDICARE

## 2020-12-01 DIAGNOSIS — Z79.01 LONG TERM (CURRENT) USE OF ANTICOAGULANTS: ICD-10-CM

## 2020-12-01 DIAGNOSIS — Z95.828 S/P ASCENDING AORTIC REPLACEMENT: ICD-10-CM

## 2020-12-01 DIAGNOSIS — G45.9 TIA (TRANSIENT ISCHEMIC ATTACK): ICD-10-CM

## 2020-12-01 PROCEDURE — 93793 ANTICOAG MGMT PT WARFARIN: CPT | Mod: S$GLB,,,

## 2020-12-01 PROCEDURE — 93793 PR ANTICOAGULANT MGMT FOR PT TAKING WARFARIN: ICD-10-PCS | Mod: S$GLB,,,

## 2020-12-02 NOTE — PROGRESS NOTES
INR not at goal. Medications, chart, and patient findings reviewed. See calendar for adjustments to dose and follow up plan.  See pt findings.  Pt to notify MD of trouble breathing & keep diet consistent.  Will instruction pt to take 6mg of coumadin 12/2 & resume maintenance dose.  Plan to re-assess in 2 weeks.

## 2020-12-15 ENCOUNTER — ANTI-COAG VISIT (OUTPATIENT)
Dept: CARDIOLOGY | Facility: CLINIC | Age: 55
End: 2020-12-15
Payer: MEDICARE

## 2020-12-15 DIAGNOSIS — Z95.828 S/P ASCENDING AORTIC REPLACEMENT: ICD-10-CM

## 2020-12-15 DIAGNOSIS — G45.9 TIA (TRANSIENT ISCHEMIC ATTACK): ICD-10-CM

## 2020-12-15 DIAGNOSIS — Z79.01 LONG TERM (CURRENT) USE OF ANTICOAGULANTS: ICD-10-CM

## 2020-12-15 PROCEDURE — 93793 ANTICOAG MGMT PT WARFARIN: CPT | Mod: S$GLB,,,

## 2020-12-15 PROCEDURE — 93793 PR ANTICOAGULANT MGMT FOR PT TAKING WARFARIN: ICD-10-PCS | Mod: S$GLB,,,

## 2020-12-29 ENCOUNTER — ANTI-COAG VISIT (OUTPATIENT)
Dept: CARDIOLOGY | Facility: CLINIC | Age: 55
End: 2020-12-29
Payer: MEDICARE

## 2020-12-29 DIAGNOSIS — Z95.828 S/P ASCENDING AORTIC REPLACEMENT: ICD-10-CM

## 2020-12-29 DIAGNOSIS — Z79.01 LONG TERM (CURRENT) USE OF ANTICOAGULANTS: ICD-10-CM

## 2020-12-29 DIAGNOSIS — G45.9 TIA (TRANSIENT ISCHEMIC ATTACK): ICD-10-CM

## 2020-12-29 PROCEDURE — 93793 PR ANTICOAGULANT MGMT FOR PT TAKING WARFARIN: ICD-10-PCS | Mod: S$GLB,,,

## 2020-12-29 PROCEDURE — 93793 ANTICOAG MGMT PT WARFARIN: CPT | Mod: S$GLB,,,

## 2021-01-08 DIAGNOSIS — G47.00 INSOMNIA, UNSPECIFIED TYPE: ICD-10-CM

## 2021-01-08 RX ORDER — ZOLPIDEM TARTRATE 10 MG/1
TABLET ORAL
Qty: 30 TABLET | Refills: 1 | Status: SHIPPED | OUTPATIENT
Start: 2021-01-08 | End: 2021-03-02

## 2021-01-12 ENCOUNTER — ANTI-COAG VISIT (OUTPATIENT)
Dept: CARDIOLOGY | Facility: CLINIC | Age: 56
End: 2021-01-12
Payer: MEDICARE

## 2021-01-12 DIAGNOSIS — G45.9 TIA (TRANSIENT ISCHEMIC ATTACK): ICD-10-CM

## 2021-01-12 DIAGNOSIS — Z79.01 LONG TERM (CURRENT) USE OF ANTICOAGULANTS: ICD-10-CM

## 2021-01-12 DIAGNOSIS — Z95.828 S/P ASCENDING AORTIC REPLACEMENT: ICD-10-CM

## 2021-01-12 PROCEDURE — 93793 ANTICOAG MGMT PT WARFARIN: CPT | Mod: S$GLB,,,

## 2021-01-12 PROCEDURE — 93793 PR ANTICOAGULANT MGMT FOR PT TAKING WARFARIN: ICD-10-PCS | Mod: S$GLB,,,

## 2021-01-26 ENCOUNTER — ANTI-COAG VISIT (OUTPATIENT)
Dept: CARDIOLOGY | Facility: CLINIC | Age: 56
End: 2021-01-26
Payer: MEDICARE

## 2021-01-26 DIAGNOSIS — Z79.01 LONG TERM (CURRENT) USE OF ANTICOAGULANTS: ICD-10-CM

## 2021-01-26 DIAGNOSIS — Z95.828 S/P ASCENDING AORTIC REPLACEMENT: Primary | ICD-10-CM

## 2021-01-26 DIAGNOSIS — G45.9 TIA (TRANSIENT ISCHEMIC ATTACK): ICD-10-CM

## 2021-01-26 PROCEDURE — 93793 ANTICOAG MGMT PT WARFARIN: CPT | Mod: S$GLB,,,

## 2021-01-26 PROCEDURE — 93793 PR ANTICOAGULANT MGMT FOR PT TAKING WARFARIN: ICD-10-PCS | Mod: S$GLB,,,

## 2021-02-08 ENCOUNTER — ANTI-COAG VISIT (OUTPATIENT)
Dept: CARDIOLOGY | Facility: CLINIC | Age: 56
End: 2021-02-08
Payer: MEDICARE

## 2021-02-08 DIAGNOSIS — Z79.01 LONG TERM (CURRENT) USE OF ANTICOAGULANTS: ICD-10-CM

## 2021-02-08 DIAGNOSIS — Z95.828 S/P ASCENDING AORTIC REPLACEMENT: Primary | ICD-10-CM

## 2021-02-08 DIAGNOSIS — G45.9 TIA (TRANSIENT ISCHEMIC ATTACK): ICD-10-CM

## 2021-02-08 PROCEDURE — 93793 ANTICOAG MGMT PT WARFARIN: CPT | Mod: S$GLB,,,

## 2021-02-08 PROCEDURE — 93793 PR ANTICOAGULANT MGMT FOR PT TAKING WARFARIN: ICD-10-PCS | Mod: S$GLB,,,

## 2021-02-15 ENCOUNTER — OFFICE VISIT (OUTPATIENT)
Dept: DIABETES | Facility: CLINIC | Age: 56
End: 2021-02-15
Payer: MEDICARE

## 2021-02-15 VITALS
HEIGHT: 67 IN | OXYGEN SATURATION: 100 % | WEIGHT: 239.13 LBS | SYSTOLIC BLOOD PRESSURE: 130 MMHG | DIASTOLIC BLOOD PRESSURE: 63 MMHG | HEART RATE: 67 BPM | BODY MASS INDEX: 37.53 KG/M2

## 2021-02-15 DIAGNOSIS — Z96.41 INSULIN PUMP IN PLACE: ICD-10-CM

## 2021-02-15 DIAGNOSIS — E10.65 TYPE 1 DIABETES MELLITUS WITH HYPERGLYCEMIA: Primary | ICD-10-CM

## 2021-02-15 DIAGNOSIS — E10.649 TYPE 1 DIABETES MELLITUS WITH HYPOGLYCEMIA UNAWARENESS: ICD-10-CM

## 2021-02-15 DIAGNOSIS — Z46.81 INSULIN PUMP FITTING OR ADJUSTMENT: ICD-10-CM

## 2021-02-15 DIAGNOSIS — E66.01 CLASS 2 SEVERE OBESITY DUE TO EXCESS CALORIES WITH SERIOUS COMORBIDITY AND BODY MASS INDEX (BMI) OF 37.0 TO 37.9 IN ADULT: ICD-10-CM

## 2021-02-15 DIAGNOSIS — G45.9 TIA (TRANSIENT ISCHEMIC ATTACK): ICD-10-CM

## 2021-02-15 DIAGNOSIS — I10 ESSENTIAL HYPERTENSION: ICD-10-CM

## 2021-02-15 PROBLEM — E66.812 CLASS 2 SEVERE OBESITY DUE TO EXCESS CALORIES WITH SERIOUS COMORBIDITY AND BODY MASS INDEX (BMI) OF 37.0 TO 37.9 IN ADULT: Status: ACTIVE | Noted: 2019-04-23

## 2021-02-15 PROCEDURE — 99999 PR PBB SHADOW E&M-EST. PATIENT-LVL V: CPT | Mod: PBBFAC,,, | Performed by: NURSE PRACTITIONER

## 2021-02-15 PROCEDURE — 3008F BODY MASS INDEX DOCD: CPT | Mod: CPTII,S$GLB,, | Performed by: NURSE PRACTITIONER

## 2021-02-15 PROCEDURE — 99499 UNLISTED E&M SERVICE: CPT | Mod: S$GLB,,, | Performed by: NURSE PRACTITIONER

## 2021-02-15 PROCEDURE — 3078F PR MOST RECENT DIASTOLIC BLOOD PRESSURE < 80 MM HG: ICD-10-PCS | Mod: CPTII,S$GLB,, | Performed by: NURSE PRACTITIONER

## 2021-02-15 PROCEDURE — 3075F PR MOST RECENT SYSTOLIC BLOOD PRESS GE 130-139MM HG: ICD-10-PCS | Mod: CPTII,S$GLB,, | Performed by: NURSE PRACTITIONER

## 2021-02-15 PROCEDURE — 3078F DIAST BP <80 MM HG: CPT | Mod: CPTII,S$GLB,, | Performed by: NURSE PRACTITIONER

## 2021-02-15 PROCEDURE — 3008F PR BODY MASS INDEX (BMI) DOCUMENTED: ICD-10-PCS | Mod: CPTII,S$GLB,, | Performed by: NURSE PRACTITIONER

## 2021-02-15 PROCEDURE — 3051F PR MOST RECENT HEMOGLOBIN A1C LEVEL 7.0 - < 8.0%: ICD-10-PCS | Mod: CPTII,S$GLB,, | Performed by: NURSE PRACTITIONER

## 2021-02-15 PROCEDURE — 99215 PR OFFICE/OUTPT VISIT, EST, LEVL V, 40-54 MIN: ICD-10-PCS | Mod: S$GLB,,, | Performed by: NURSE PRACTITIONER

## 2021-02-15 PROCEDURE — 95251 CONT GLUC MNTR ANALYSIS I&R: CPT | Mod: S$GLB,,, | Performed by: NURSE PRACTITIONER

## 2021-02-15 PROCEDURE — 1126F PR PAIN SEVERITY QUANTIFIED, NO PAIN PRESENT: ICD-10-PCS | Mod: S$GLB,,, | Performed by: NURSE PRACTITIONER

## 2021-02-15 PROCEDURE — 99499 RISK ADDL DX/OHS AUDIT: ICD-10-PCS | Mod: S$GLB,,, | Performed by: NURSE PRACTITIONER

## 2021-02-15 PROCEDURE — 1126F AMNT PAIN NOTED NONE PRSNT: CPT | Mod: S$GLB,,, | Performed by: NURSE PRACTITIONER

## 2021-02-15 PROCEDURE — 99215 OFFICE O/P EST HI 40 MIN: CPT | Mod: S$GLB,,, | Performed by: NURSE PRACTITIONER

## 2021-02-15 PROCEDURE — 99999 PR PBB SHADOW E&M-EST. PATIENT-LVL V: ICD-10-PCS | Mod: PBBFAC,,, | Performed by: NURSE PRACTITIONER

## 2021-02-15 PROCEDURE — 3051F HG A1C>EQUAL 7.0%<8.0%: CPT | Mod: CPTII,S$GLB,, | Performed by: NURSE PRACTITIONER

## 2021-02-15 PROCEDURE — 3075F SYST BP GE 130 - 139MM HG: CPT | Mod: CPTII,S$GLB,, | Performed by: NURSE PRACTITIONER

## 2021-02-15 PROCEDURE — 95251 PR GLUCOSE MONITOR, 72 HOUR, PHYS INTERP: ICD-10-PCS | Mod: S$GLB,,, | Performed by: NURSE PRACTITIONER

## 2021-02-15 RX ORDER — METFORMIN HYDROCHLORIDE 500 MG/1
500 TABLET, EXTENDED RELEASE ORAL 2 TIMES DAILY WITH MEALS
Qty: 180 TABLET | Refills: 1 | Status: SHIPPED | OUTPATIENT
Start: 2021-02-15 | End: 2021-05-13

## 2021-03-02 ENCOUNTER — ANTI-COAG VISIT (OUTPATIENT)
Dept: CARDIOLOGY | Facility: CLINIC | Age: 56
End: 2021-03-02
Payer: MEDICARE

## 2021-03-02 DIAGNOSIS — G45.9 TIA (TRANSIENT ISCHEMIC ATTACK): ICD-10-CM

## 2021-03-02 DIAGNOSIS — Z95.828 S/P ASCENDING AORTIC REPLACEMENT: Primary | ICD-10-CM

## 2021-03-02 DIAGNOSIS — G47.00 INSOMNIA, UNSPECIFIED TYPE: ICD-10-CM

## 2021-03-02 DIAGNOSIS — Z79.01 LONG TERM (CURRENT) USE OF ANTICOAGULANTS: ICD-10-CM

## 2021-03-02 PROCEDURE — 93793 PR ANTICOAGULANT MGMT FOR PT TAKING WARFARIN: ICD-10-PCS | Mod: S$GLB,,,

## 2021-03-02 PROCEDURE — 93793 ANTICOAG MGMT PT WARFARIN: CPT | Mod: S$GLB,,,

## 2021-03-02 RX ORDER — ZOLPIDEM TARTRATE 10 MG/1
TABLET ORAL
Qty: 30 TABLET | Refills: 0 | Status: SHIPPED | OUTPATIENT
Start: 2021-03-02 | End: 2021-04-14

## 2021-03-17 ENCOUNTER — OFFICE VISIT (OUTPATIENT)
Dept: CARDIOLOGY | Facility: CLINIC | Age: 56
End: 2021-03-17
Payer: MEDICARE

## 2021-03-17 ENCOUNTER — ANTI-COAG VISIT (OUTPATIENT)
Dept: CARDIOLOGY | Facility: CLINIC | Age: 56
End: 2021-03-17
Payer: MEDICARE

## 2021-03-17 VITALS
BODY MASS INDEX: 36.63 KG/M2 | OXYGEN SATURATION: 93 % | DIASTOLIC BLOOD PRESSURE: 67 MMHG | SYSTOLIC BLOOD PRESSURE: 116 MMHG | HEIGHT: 67 IN | HEART RATE: 81 BPM | WEIGHT: 233.38 LBS

## 2021-03-17 DIAGNOSIS — Z95.2 H/O MECHANICAL AORTIC VALVE REPLACEMENT: ICD-10-CM

## 2021-03-17 DIAGNOSIS — G45.9 TIA (TRANSIENT ISCHEMIC ATTACK): ICD-10-CM

## 2021-03-17 DIAGNOSIS — G47.19 EXCESSIVE DAYTIME SLEEPINESS: ICD-10-CM

## 2021-03-17 DIAGNOSIS — Z95.828 S/P ASCENDING AORTIC REPLACEMENT: Primary | ICD-10-CM

## 2021-03-17 DIAGNOSIS — E78.5 HYPERLIPIDEMIA WITH TARGET LOW DENSITY LIPOPROTEIN (LDL) CHOLESTEROL LESS THAN 70 MG/DL: ICD-10-CM

## 2021-03-17 DIAGNOSIS — E08.41 DIABETIC MONONEUROPATHY ASSOCIATED WITH DIABETES MELLITUS DUE TO UNDERLYING CONDITION: ICD-10-CM

## 2021-03-17 DIAGNOSIS — I48.92 PAROXYSMAL ATRIAL FLUTTER: ICD-10-CM

## 2021-03-17 DIAGNOSIS — I10 ESSENTIAL HYPERTENSION: ICD-10-CM

## 2021-03-17 DIAGNOSIS — Z79.01 LONG TERM (CURRENT) USE OF ANTICOAGULANTS: ICD-10-CM

## 2021-03-17 PROCEDURE — 93005 ELECTROCARDIOGRAM TRACING: CPT | Mod: S$GLB,,, | Performed by: INTERNAL MEDICINE

## 2021-03-17 PROCEDURE — 93005 EKG 12-LEAD: ICD-10-PCS | Mod: S$GLB,,, | Performed by: INTERNAL MEDICINE

## 2021-03-17 PROCEDURE — 3008F BODY MASS INDEX DOCD: CPT | Mod: CPTII,S$GLB,, | Performed by: INTERNAL MEDICINE

## 2021-03-17 PROCEDURE — 99999 PR PBB SHADOW E&M-EST. PATIENT-LVL IV: ICD-10-PCS | Mod: PBBFAC,,, | Performed by: INTERNAL MEDICINE

## 2021-03-17 PROCEDURE — 3078F DIAST BP <80 MM HG: CPT | Mod: CPTII,S$GLB,, | Performed by: INTERNAL MEDICINE

## 2021-03-17 PROCEDURE — 3078F PR MOST RECENT DIASTOLIC BLOOD PRESSURE < 80 MM HG: ICD-10-PCS | Mod: CPTII,S$GLB,, | Performed by: INTERNAL MEDICINE

## 2021-03-17 PROCEDURE — 3074F PR MOST RECENT SYSTOLIC BLOOD PRESSURE < 130 MM HG: ICD-10-PCS | Mod: CPTII,S$GLB,, | Performed by: INTERNAL MEDICINE

## 2021-03-17 PROCEDURE — 3074F SYST BP LT 130 MM HG: CPT | Mod: CPTII,S$GLB,, | Performed by: INTERNAL MEDICINE

## 2021-03-17 PROCEDURE — 1126F AMNT PAIN NOTED NONE PRSNT: CPT | Mod: S$GLB,,, | Performed by: INTERNAL MEDICINE

## 2021-03-17 PROCEDURE — 99214 OFFICE O/P EST MOD 30 MIN: CPT | Mod: S$GLB,,, | Performed by: INTERNAL MEDICINE

## 2021-03-17 PROCEDURE — 99499 RISK ADDL DX/OHS AUDIT: ICD-10-PCS | Mod: S$GLB,,, | Performed by: INTERNAL MEDICINE

## 2021-03-17 PROCEDURE — 99214 PR OFFICE/OUTPT VISIT, EST, LEVL IV, 30-39 MIN: ICD-10-PCS | Mod: S$GLB,,, | Performed by: INTERNAL MEDICINE

## 2021-03-17 PROCEDURE — 99499 UNLISTED E&M SERVICE: CPT | Mod: S$GLB,,, | Performed by: INTERNAL MEDICINE

## 2021-03-17 PROCEDURE — 3008F PR BODY MASS INDEX (BMI) DOCUMENTED: ICD-10-PCS | Mod: CPTII,S$GLB,, | Performed by: INTERNAL MEDICINE

## 2021-03-17 PROCEDURE — 1126F PR PAIN SEVERITY QUANTIFIED, NO PAIN PRESENT: ICD-10-PCS | Mod: S$GLB,,, | Performed by: INTERNAL MEDICINE

## 2021-03-17 PROCEDURE — 99999 PR PBB SHADOW E&M-EST. PATIENT-LVL IV: CPT | Mod: PBBFAC,,, | Performed by: INTERNAL MEDICINE

## 2021-03-31 ENCOUNTER — ANTI-COAG VISIT (OUTPATIENT)
Dept: CARDIOLOGY | Facility: CLINIC | Age: 56
End: 2021-03-31
Payer: MEDICARE

## 2021-03-31 DIAGNOSIS — G45.9 TIA (TRANSIENT ISCHEMIC ATTACK): ICD-10-CM

## 2021-03-31 DIAGNOSIS — Z95.828 S/P ASCENDING AORTIC REPLACEMENT: Primary | ICD-10-CM

## 2021-03-31 DIAGNOSIS — Z79.01 LONG TERM (CURRENT) USE OF ANTICOAGULANTS: ICD-10-CM

## 2021-03-31 PROCEDURE — 93793 PR ANTICOAGULANT MGMT FOR PT TAKING WARFARIN: ICD-10-PCS | Mod: S$GLB,,,

## 2021-03-31 PROCEDURE — 93793 ANTICOAG MGMT PT WARFARIN: CPT | Mod: S$GLB,,,

## 2021-04-05 ENCOUNTER — OFFICE VISIT (OUTPATIENT)
Dept: DIABETES | Facility: CLINIC | Age: 56
End: 2021-04-05
Payer: MEDICARE

## 2021-04-05 VITALS
SYSTOLIC BLOOD PRESSURE: 138 MMHG | DIASTOLIC BLOOD PRESSURE: 70 MMHG | HEIGHT: 67 IN | OXYGEN SATURATION: 98 % | HEART RATE: 60 BPM | BODY MASS INDEX: 36.73 KG/M2 | WEIGHT: 234 LBS

## 2021-04-05 DIAGNOSIS — E66.01 CLASS 2 SEVERE OBESITY DUE TO EXCESS CALORIES WITH SERIOUS COMORBIDITY AND BODY MASS INDEX (BMI) OF 36.0 TO 36.9 IN ADULT: ICD-10-CM

## 2021-04-05 DIAGNOSIS — I10 ESSENTIAL HYPERTENSION: ICD-10-CM

## 2021-04-05 DIAGNOSIS — Z46.81 INSULIN PUMP FITTING OR ADJUSTMENT: ICD-10-CM

## 2021-04-05 DIAGNOSIS — Z96.41 INSULIN PUMP IN PLACE: ICD-10-CM

## 2021-04-05 DIAGNOSIS — Z76.89 ENCOUNTER TO ESTABLISH CARE: ICD-10-CM

## 2021-04-05 DIAGNOSIS — E10.65 TYPE 1 DIABETES MELLITUS WITH HYPERGLYCEMIA: Primary | ICD-10-CM

## 2021-04-05 DIAGNOSIS — E10.649 TYPE 1 DIABETES MELLITUS WITH HYPOGLYCEMIA UNAWARENESS: ICD-10-CM

## 2021-04-05 PROCEDURE — 99214 PR OFFICE/OUTPT VISIT, EST, LEVL IV, 30-39 MIN: ICD-10-PCS | Mod: S$GLB,,, | Performed by: NURSE PRACTITIONER

## 2021-04-05 PROCEDURE — 3051F PR MOST RECENT HEMOGLOBIN A1C LEVEL 7.0 - < 8.0%: ICD-10-PCS | Mod: CPTII,S$GLB,, | Performed by: NURSE PRACTITIONER

## 2021-04-05 PROCEDURE — 99214 OFFICE O/P EST MOD 30 MIN: CPT | Mod: S$GLB,,, | Performed by: NURSE PRACTITIONER

## 2021-04-05 PROCEDURE — 3075F PR MOST RECENT SYSTOLIC BLOOD PRESS GE 130-139MM HG: ICD-10-PCS | Mod: CPTII,S$GLB,, | Performed by: NURSE PRACTITIONER

## 2021-04-05 PROCEDURE — 3008F PR BODY MASS INDEX (BMI) DOCUMENTED: ICD-10-PCS | Mod: CPTII,S$GLB,, | Performed by: NURSE PRACTITIONER

## 2021-04-05 PROCEDURE — 1126F AMNT PAIN NOTED NONE PRSNT: CPT | Mod: S$GLB,,, | Performed by: NURSE PRACTITIONER

## 2021-04-05 PROCEDURE — 99499 RISK ADDL DX/OHS AUDIT: ICD-10-PCS | Mod: S$GLB,,, | Performed by: NURSE PRACTITIONER

## 2021-04-05 PROCEDURE — 95251 CONT GLUC MNTR ANALYSIS I&R: CPT | Mod: S$GLB,,, | Performed by: NURSE PRACTITIONER

## 2021-04-05 PROCEDURE — 3008F BODY MASS INDEX DOCD: CPT | Mod: CPTII,S$GLB,, | Performed by: NURSE PRACTITIONER

## 2021-04-05 PROCEDURE — 99499 UNLISTED E&M SERVICE: CPT | Mod: S$GLB,,, | Performed by: NURSE PRACTITIONER

## 2021-04-05 PROCEDURE — 3078F DIAST BP <80 MM HG: CPT | Mod: CPTII,S$GLB,, | Performed by: NURSE PRACTITIONER

## 2021-04-05 PROCEDURE — 3075F SYST BP GE 130 - 139MM HG: CPT | Mod: CPTII,S$GLB,, | Performed by: NURSE PRACTITIONER

## 2021-04-05 PROCEDURE — 99999 PR PBB SHADOW E&M-EST. PATIENT-LVL V: ICD-10-PCS | Mod: PBBFAC,,, | Performed by: NURSE PRACTITIONER

## 2021-04-05 PROCEDURE — 1126F PR PAIN SEVERITY QUANTIFIED, NO PAIN PRESENT: ICD-10-PCS | Mod: S$GLB,,, | Performed by: NURSE PRACTITIONER

## 2021-04-05 PROCEDURE — 95251 PR GLUCOSE MONITOR, 72 HOUR, PHYS INTERP: ICD-10-PCS | Mod: S$GLB,,, | Performed by: NURSE PRACTITIONER

## 2021-04-05 PROCEDURE — 3078F PR MOST RECENT DIASTOLIC BLOOD PRESSURE < 80 MM HG: ICD-10-PCS | Mod: CPTII,S$GLB,, | Performed by: NURSE PRACTITIONER

## 2021-04-05 PROCEDURE — 99999 PR PBB SHADOW E&M-EST. PATIENT-LVL V: CPT | Mod: PBBFAC,,, | Performed by: NURSE PRACTITIONER

## 2021-04-05 PROCEDURE — 3051F HG A1C>EQUAL 7.0%<8.0%: CPT | Mod: CPTII,S$GLB,, | Performed by: NURSE PRACTITIONER

## 2021-04-06 ENCOUNTER — OFFICE VISIT (OUTPATIENT)
Dept: UROLOGY | Facility: CLINIC | Age: 56
End: 2021-04-06
Payer: MEDICARE

## 2021-04-06 VITALS
HEART RATE: 61 BPM | BODY MASS INDEX: 36.16 KG/M2 | DIASTOLIC BLOOD PRESSURE: 80 MMHG | SYSTOLIC BLOOD PRESSURE: 136 MMHG | WEIGHT: 230.38 LBS | HEIGHT: 67 IN

## 2021-04-06 DIAGNOSIS — N40.0 ENLARGED PROSTATE ON RECTAL EXAMINATION: ICD-10-CM

## 2021-04-06 DIAGNOSIS — E10.69 ERECTILE DYSFUNCTION DUE TO TYPE 1 DIABETES MELLITUS: ICD-10-CM

## 2021-04-06 DIAGNOSIS — N40.1 BENIGN PROSTATIC HYPERPLASIA WITH LOWER URINARY TRACT SYMPTOMS, SYMPTOM DETAILS UNSPECIFIED: ICD-10-CM

## 2021-04-06 DIAGNOSIS — N48.1 BALANITIS: ICD-10-CM

## 2021-04-06 DIAGNOSIS — N52.9 ERECTILE DYSFUNCTION DUE TO TYPE 1 DIABETES MELLITUS: ICD-10-CM

## 2021-04-06 DIAGNOSIS — Z12.5 ENCOUNTER FOR PROSTATE CANCER SCREENING: Primary | ICD-10-CM

## 2021-04-06 LAB
BACTERIA #/AREA URNS HPF: NORMAL /HPF
MICROSCOPIC COMMENT: NORMAL
RBC #/AREA URNS HPF: 1 /HPF (ref 0–4)
WBC #/AREA URNS HPF: 0 /HPF (ref 0–5)

## 2021-04-06 PROCEDURE — 3075F PR MOST RECENT SYSTOLIC BLOOD PRESS GE 130-139MM HG: ICD-10-PCS | Mod: CPTII,S$GLB,, | Performed by: NURSE PRACTITIONER

## 2021-04-06 PROCEDURE — 87086 URINE CULTURE/COLONY COUNT: CPT | Performed by: NURSE PRACTITIONER

## 2021-04-06 PROCEDURE — 1126F AMNT PAIN NOTED NONE PRSNT: CPT | Mod: S$GLB,,, | Performed by: NURSE PRACTITIONER

## 2021-04-06 PROCEDURE — 3079F PR MOST RECENT DIASTOLIC BLOOD PRESSURE 80-89 MM HG: ICD-10-PCS | Mod: CPTII,S$GLB,, | Performed by: NURSE PRACTITIONER

## 2021-04-06 PROCEDURE — 1126F PR PAIN SEVERITY QUANTIFIED, NO PAIN PRESENT: ICD-10-PCS | Mod: S$GLB,,, | Performed by: NURSE PRACTITIONER

## 2021-04-06 PROCEDURE — 3051F HG A1C>EQUAL 7.0%<8.0%: CPT | Mod: CPTII,S$GLB,, | Performed by: NURSE PRACTITIONER

## 2021-04-06 PROCEDURE — 99999 PR PBB SHADOW E&M-EST. PATIENT-LVL V: ICD-10-PCS | Mod: PBBFAC,,, | Performed by: NURSE PRACTITIONER

## 2021-04-06 PROCEDURE — 3008F BODY MASS INDEX DOCD: CPT | Mod: CPTII,S$GLB,, | Performed by: NURSE PRACTITIONER

## 2021-04-06 PROCEDURE — 3075F SYST BP GE 130 - 139MM HG: CPT | Mod: CPTII,S$GLB,, | Performed by: NURSE PRACTITIONER

## 2021-04-06 PROCEDURE — 3008F PR BODY MASS INDEX (BMI) DOCUMENTED: ICD-10-PCS | Mod: CPTII,S$GLB,, | Performed by: NURSE PRACTITIONER

## 2021-04-06 PROCEDURE — 99214 PR OFFICE/OUTPT VISIT, EST, LEVL IV, 30-39 MIN: ICD-10-PCS | Mod: S$GLB,,, | Performed by: NURSE PRACTITIONER

## 2021-04-06 PROCEDURE — 99999 PR PBB SHADOW E&M-EST. PATIENT-LVL V: CPT | Mod: PBBFAC,,, | Performed by: NURSE PRACTITIONER

## 2021-04-06 PROCEDURE — 3079F DIAST BP 80-89 MM HG: CPT | Mod: CPTII,S$GLB,, | Performed by: NURSE PRACTITIONER

## 2021-04-06 PROCEDURE — 99214 OFFICE O/P EST MOD 30 MIN: CPT | Mod: S$GLB,,, | Performed by: NURSE PRACTITIONER

## 2021-04-06 PROCEDURE — 81001 URINALYSIS AUTO W/SCOPE: CPT | Performed by: NURSE PRACTITIONER

## 2021-04-06 PROCEDURE — 3051F PR MOST RECENT HEMOGLOBIN A1C LEVEL 7.0 - < 8.0%: ICD-10-PCS | Mod: CPTII,S$GLB,, | Performed by: NURSE PRACTITIONER

## 2021-04-06 RX ORDER — TADALAFIL 5 MG/1
5 TABLET ORAL DAILY
Qty: 30 TABLET | Refills: 11 | Status: SHIPPED | OUTPATIENT
Start: 2021-04-06 | End: 2021-06-10

## 2021-04-06 RX ORDER — NYSTATIN 100000 U/G
CREAM TOPICAL 2 TIMES DAILY
Qty: 1 TUBE | Refills: 5 | Status: SHIPPED | OUTPATIENT
Start: 2021-04-06

## 2021-04-08 LAB — BACTERIA UR CULT: NO GROWTH

## 2021-04-14 ENCOUNTER — ANTI-COAG VISIT (OUTPATIENT)
Dept: CARDIOLOGY | Facility: CLINIC | Age: 56
End: 2021-04-14
Payer: MEDICARE

## 2021-04-14 ENCOUNTER — PATIENT MESSAGE (OUTPATIENT)
Dept: PRIMARY CARE CLINIC | Facility: CLINIC | Age: 56
End: 2021-04-14

## 2021-04-14 DIAGNOSIS — Z95.828 S/P ASCENDING AORTIC REPLACEMENT: Primary | ICD-10-CM

## 2021-04-14 DIAGNOSIS — G45.9 TIA (TRANSIENT ISCHEMIC ATTACK): ICD-10-CM

## 2021-04-14 DIAGNOSIS — Z79.01 LONG TERM (CURRENT) USE OF ANTICOAGULANTS: ICD-10-CM

## 2021-04-14 PROCEDURE — 93793 PR ANTICOAGULANT MGMT FOR PT TAKING WARFARIN: ICD-10-PCS | Mod: S$GLB,,,

## 2021-04-14 PROCEDURE — 93793 ANTICOAG MGMT PT WARFARIN: CPT | Mod: S$GLB,,,

## 2021-04-28 ENCOUNTER — ANTI-COAG VISIT (OUTPATIENT)
Dept: CARDIOLOGY | Facility: CLINIC | Age: 56
End: 2021-04-28
Payer: MEDICARE

## 2021-04-28 DIAGNOSIS — G45.9 TIA (TRANSIENT ISCHEMIC ATTACK): ICD-10-CM

## 2021-04-28 DIAGNOSIS — Z79.01 LONG TERM (CURRENT) USE OF ANTICOAGULANTS: ICD-10-CM

## 2021-04-28 DIAGNOSIS — Z95.828 S/P ASCENDING AORTIC REPLACEMENT: Primary | ICD-10-CM

## 2021-04-28 PROCEDURE — 93793 PR ANTICOAGULANT MGMT FOR PT TAKING WARFARIN: ICD-10-PCS | Mod: S$GLB,,,

## 2021-04-28 PROCEDURE — 93793 ANTICOAG MGMT PT WARFARIN: CPT | Mod: S$GLB,,,

## 2021-05-07 ENCOUNTER — OFFICE VISIT (OUTPATIENT)
Dept: UROLOGY | Facility: CLINIC | Age: 56
End: 2021-05-07
Payer: MEDICARE

## 2021-05-07 VITALS
SYSTOLIC BLOOD PRESSURE: 128 MMHG | DIASTOLIC BLOOD PRESSURE: 76 MMHG | BODY MASS INDEX: 36.8 KG/M2 | HEART RATE: 71 BPM | HEIGHT: 67 IN | WEIGHT: 234.44 LBS

## 2021-05-07 DIAGNOSIS — E10.69 ERECTILE DYSFUNCTION DUE TO TYPE 1 DIABETES MELLITUS: ICD-10-CM

## 2021-05-07 DIAGNOSIS — Z12.5 ENCOUNTER FOR PROSTATE CANCER SCREENING: Primary | ICD-10-CM

## 2021-05-07 DIAGNOSIS — N40.1 BENIGN PROSTATIC HYPERPLASIA WITH LOWER URINARY TRACT SYMPTOMS, SYMPTOM DETAILS UNSPECIFIED: ICD-10-CM

## 2021-05-07 DIAGNOSIS — N52.9 ERECTILE DYSFUNCTION DUE TO TYPE 1 DIABETES MELLITUS: ICD-10-CM

## 2021-05-07 DIAGNOSIS — N48.1 BALANITIS: ICD-10-CM

## 2021-05-07 DIAGNOSIS — N40.0 ENLARGED PROSTATE ON RECTAL EXAMINATION: ICD-10-CM

## 2021-05-07 PROCEDURE — 99999 PR PBB SHADOW E&M-EST. PATIENT-LVL IV: ICD-10-PCS | Mod: PBBFAC,,, | Performed by: NURSE PRACTITIONER

## 2021-05-07 PROCEDURE — 99214 PR OFFICE/OUTPT VISIT, EST, LEVL IV, 30-39 MIN: ICD-10-PCS | Mod: S$GLB,,, | Performed by: NURSE PRACTITIONER

## 2021-05-07 PROCEDURE — 99999 PR PBB SHADOW E&M-EST. PATIENT-LVL IV: CPT | Mod: PBBFAC,,, | Performed by: NURSE PRACTITIONER

## 2021-05-07 PROCEDURE — 1126F PR PAIN SEVERITY QUANTIFIED, NO PAIN PRESENT: ICD-10-PCS | Mod: S$GLB,,, | Performed by: NURSE PRACTITIONER

## 2021-05-07 PROCEDURE — 99214 OFFICE O/P EST MOD 30 MIN: CPT | Mod: S$GLB,,, | Performed by: NURSE PRACTITIONER

## 2021-05-07 PROCEDURE — 3051F PR MOST RECENT HEMOGLOBIN A1C LEVEL 7.0 - < 8.0%: ICD-10-PCS | Mod: CPTII,S$GLB,, | Performed by: NURSE PRACTITIONER

## 2021-05-07 PROCEDURE — 3051F HG A1C>EQUAL 7.0%<8.0%: CPT | Mod: CPTII,S$GLB,, | Performed by: NURSE PRACTITIONER

## 2021-05-07 PROCEDURE — 3008F BODY MASS INDEX DOCD: CPT | Mod: CPTII,S$GLB,, | Performed by: NURSE PRACTITIONER

## 2021-05-07 PROCEDURE — 1126F AMNT PAIN NOTED NONE PRSNT: CPT | Mod: S$GLB,,, | Performed by: NURSE PRACTITIONER

## 2021-05-07 PROCEDURE — 3008F PR BODY MASS INDEX (BMI) DOCUMENTED: ICD-10-PCS | Mod: CPTII,S$GLB,, | Performed by: NURSE PRACTITIONER

## 2021-05-12 ENCOUNTER — ANTI-COAG VISIT (OUTPATIENT)
Dept: CARDIOLOGY | Facility: CLINIC | Age: 56
End: 2021-05-12
Payer: MEDICARE

## 2021-05-12 DIAGNOSIS — G45.9 TIA (TRANSIENT ISCHEMIC ATTACK): ICD-10-CM

## 2021-05-12 DIAGNOSIS — Z79.01 LONG TERM (CURRENT) USE OF ANTICOAGULANTS: ICD-10-CM

## 2021-05-12 DIAGNOSIS — Z95.828 S/P ASCENDING AORTIC REPLACEMENT: Primary | ICD-10-CM

## 2021-05-12 PROCEDURE — 93793 ANTICOAG MGMT PT WARFARIN: CPT | Mod: S$GLB,,,

## 2021-05-12 PROCEDURE — 93793 PR ANTICOAGULANT MGMT FOR PT TAKING WARFARIN: ICD-10-PCS | Mod: S$GLB,,,

## 2021-05-26 ENCOUNTER — ANTI-COAG VISIT (OUTPATIENT)
Dept: CARDIOLOGY | Facility: CLINIC | Age: 56
End: 2021-05-26
Payer: MEDICARE

## 2021-05-26 DIAGNOSIS — Z95.828 S/P ASCENDING AORTIC REPLACEMENT: Primary | ICD-10-CM

## 2021-05-26 DIAGNOSIS — Z79.01 LONG TERM (CURRENT) USE OF ANTICOAGULANTS: ICD-10-CM

## 2021-05-26 DIAGNOSIS — G45.9 TIA (TRANSIENT ISCHEMIC ATTACK): ICD-10-CM

## 2021-05-26 PROCEDURE — 93793 ANTICOAG MGMT PT WARFARIN: CPT | Mod: S$GLB,,,

## 2021-05-26 PROCEDURE — 93793 PR ANTICOAGULANT MGMT FOR PT TAKING WARFARIN: ICD-10-PCS | Mod: S$GLB,,,

## 2021-06-08 ENCOUNTER — PATIENT MESSAGE (OUTPATIENT)
Dept: DIABETES | Facility: CLINIC | Age: 56
End: 2021-06-08

## 2021-06-08 ENCOUNTER — TELEPHONE (OUTPATIENT)
Dept: INTERNAL MEDICINE | Facility: CLINIC | Age: 56
End: 2021-06-08

## 2021-06-08 DIAGNOSIS — Z96.41 INSULIN PUMP IN PLACE: ICD-10-CM

## 2021-06-08 DIAGNOSIS — Z46.81 INSULIN PUMP FITTING OR ADJUSTMENT: ICD-10-CM

## 2021-06-08 DIAGNOSIS — E10.649 TYPE 1 DIABETES MELLITUS WITH HYPOGLYCEMIA UNAWARENESS: ICD-10-CM

## 2021-06-08 DIAGNOSIS — E10.65 TYPE 1 DIABETES MELLITUS WITH HYPERGLYCEMIA: ICD-10-CM

## 2021-06-08 RX ORDER — BLOOD-GLUCOSE SENSOR
EACH MISCELLANEOUS
Qty: 10 EACH | Refills: 3 | Status: SHIPPED | OUTPATIENT
Start: 2021-06-08 | End: 2022-06-17

## 2021-06-09 ENCOUNTER — ANTI-COAG VISIT (OUTPATIENT)
Dept: CARDIOLOGY | Facility: CLINIC | Age: 56
End: 2021-06-09
Payer: MEDICARE

## 2021-06-09 DIAGNOSIS — G45.9 TIA (TRANSIENT ISCHEMIC ATTACK): ICD-10-CM

## 2021-06-09 DIAGNOSIS — Z95.828 S/P ASCENDING AORTIC REPLACEMENT: Primary | ICD-10-CM

## 2021-06-09 DIAGNOSIS — Z79.01 LONG TERM (CURRENT) USE OF ANTICOAGULANTS: ICD-10-CM

## 2021-06-09 PROCEDURE — 93793 PR ANTICOAGULANT MGMT FOR PT TAKING WARFARIN: ICD-10-PCS | Mod: S$GLB,,,

## 2021-06-09 PROCEDURE — 93793 ANTICOAG MGMT PT WARFARIN: CPT | Mod: S$GLB,,,

## 2021-06-10 ENCOUNTER — OFFICE VISIT (OUTPATIENT)
Dept: PRIMARY CARE CLINIC | Facility: CLINIC | Age: 56
End: 2021-06-10
Payer: MEDICARE

## 2021-06-10 VITALS
HEART RATE: 61 BPM | BODY MASS INDEX: 36.72 KG/M2 | HEIGHT: 67 IN | DIASTOLIC BLOOD PRESSURE: 64 MMHG | RESPIRATION RATE: 18 BRPM | SYSTOLIC BLOOD PRESSURE: 102 MMHG | OXYGEN SATURATION: 95 % | WEIGHT: 233.94 LBS

## 2021-06-10 DIAGNOSIS — Z00.00 ROUTINE PHYSICAL EXAMINATION: ICD-10-CM

## 2021-06-10 DIAGNOSIS — E66.01 CLASS 2 SEVERE OBESITY DUE TO EXCESS CALORIES WITH SERIOUS COMORBIDITY AND BODY MASS INDEX (BMI) OF 36.0 TO 36.9 IN ADULT: ICD-10-CM

## 2021-06-10 DIAGNOSIS — Q23.1 AORTIC REGURGITATION, CONGENITAL: Primary | ICD-10-CM

## 2021-06-10 DIAGNOSIS — E08.41 DIABETIC MONONEUROPATHY ASSOCIATED WITH DIABETES MELLITUS DUE TO UNDERLYING CONDITION: ICD-10-CM

## 2021-06-10 DIAGNOSIS — E10.65 TYPE 1 DIABETES MELLITUS WITH HYPERGLYCEMIA: ICD-10-CM

## 2021-06-10 DIAGNOSIS — Z95.828 S/P ASCENDING AORTIC REPLACEMENT: ICD-10-CM

## 2021-06-10 DIAGNOSIS — I10 ESSENTIAL HYPERTENSION: ICD-10-CM

## 2021-06-10 DIAGNOSIS — F33.0 MAJOR DEPRESSIVE DISORDER, RECURRENT, MILD: ICD-10-CM

## 2021-06-10 DIAGNOSIS — E78.5 HYPERLIPIDEMIA WITH TARGET LOW DENSITY LIPOPROTEIN (LDL) CHOLESTEROL LESS THAN 70 MG/DL: ICD-10-CM

## 2021-06-10 DIAGNOSIS — Z95.2 H/O MECHANICAL AORTIC VALVE REPLACEMENT: ICD-10-CM

## 2021-06-10 DIAGNOSIS — I25.119 ATHEROSCLEROSIS OF NATIVE CORONARY ARTERY OF NATIVE HEART WITH ANGINA PECTORIS: ICD-10-CM

## 2021-06-10 PROBLEM — Z91.89 AT RISK FOR AMIODARONE TOXICITY WITH LONG TERM USE: Status: RESOLVED | Noted: 2020-07-15 | Resolved: 2021-06-10

## 2021-06-10 PROBLEM — D64.9 ANEMIA: Status: RESOLVED | Noted: 2019-08-20 | Resolved: 2021-06-10

## 2021-06-10 PROBLEM — D62 ACUTE BLOOD LOSS ANEMIA: Status: RESOLVED | Noted: 2019-08-12 | Resolved: 2021-06-10

## 2021-06-10 PROBLEM — E83.39 HYPOPHOSPHATEMIA: Status: RESOLVED | Noted: 2019-08-12 | Resolved: 2021-06-10

## 2021-06-10 PROBLEM — Z79.899 AT RISK FOR AMIODARONE TOXICITY WITH LONG TERM USE: Status: RESOLVED | Noted: 2020-07-15 | Resolved: 2021-06-10

## 2021-06-10 PROCEDURE — 99214 PR OFFICE/OUTPT VISIT, EST, LEVL IV, 30-39 MIN: ICD-10-PCS | Mod: S$GLB,,, | Performed by: NURSE PRACTITIONER

## 2021-06-10 PROCEDURE — 3051F HG A1C>EQUAL 7.0%<8.0%: CPT | Mod: CPTII,S$GLB,, | Performed by: NURSE PRACTITIONER

## 2021-06-10 PROCEDURE — 1126F AMNT PAIN NOTED NONE PRSNT: CPT | Mod: S$GLB,,, | Performed by: NURSE PRACTITIONER

## 2021-06-10 PROCEDURE — 99499 UNLISTED E&M SERVICE: CPT | Mod: S$GLB,,, | Performed by: NURSE PRACTITIONER

## 2021-06-10 PROCEDURE — 1126F PR PAIN SEVERITY QUANTIFIED, NO PAIN PRESENT: ICD-10-PCS | Mod: S$GLB,,, | Performed by: NURSE PRACTITIONER

## 2021-06-10 PROCEDURE — 99499 RISK ADDL DX/OHS AUDIT: ICD-10-PCS | Mod: S$GLB,,, | Performed by: NURSE PRACTITIONER

## 2021-06-10 PROCEDURE — 3008F BODY MASS INDEX DOCD: CPT | Mod: CPTII,S$GLB,, | Performed by: NURSE PRACTITIONER

## 2021-06-10 PROCEDURE — 3008F PR BODY MASS INDEX (BMI) DOCUMENTED: ICD-10-PCS | Mod: CPTII,S$GLB,, | Performed by: NURSE PRACTITIONER

## 2021-06-10 PROCEDURE — 99214 OFFICE O/P EST MOD 30 MIN: CPT | Mod: S$GLB,,, | Performed by: NURSE PRACTITIONER

## 2021-06-10 PROCEDURE — 3051F PR MOST RECENT HEMOGLOBIN A1C LEVEL 7.0 - < 8.0%: ICD-10-PCS | Mod: CPTII,S$GLB,, | Performed by: NURSE PRACTITIONER

## 2021-06-10 PROCEDURE — 99999 PR PBB SHADOW E&M-EST. PATIENT-LVL IV: ICD-10-PCS | Mod: PBBFAC,,, | Performed by: NURSE PRACTITIONER

## 2021-06-10 PROCEDURE — 99999 PR PBB SHADOW E&M-EST. PATIENT-LVL IV: CPT | Mod: PBBFAC,,, | Performed by: NURSE PRACTITIONER

## 2021-06-10 RX ORDER — LISINOPRIL 20 MG/1
20 TABLET ORAL DAILY
COMMUNITY
Start: 2021-02-11 | End: 2021-06-10

## 2021-06-23 ENCOUNTER — ANTI-COAG VISIT (OUTPATIENT)
Dept: CARDIOLOGY | Facility: CLINIC | Age: 56
End: 2021-06-23
Payer: MEDICARE

## 2021-06-23 DIAGNOSIS — Z79.01 LONG TERM (CURRENT) USE OF ANTICOAGULANTS: ICD-10-CM

## 2021-06-23 DIAGNOSIS — G45.9 TIA (TRANSIENT ISCHEMIC ATTACK): ICD-10-CM

## 2021-06-23 DIAGNOSIS — Z95.828 S/P ASCENDING AORTIC REPLACEMENT: Primary | ICD-10-CM

## 2021-06-23 PROCEDURE — 93793 PR ANTICOAGULANT MGMT FOR PT TAKING WARFARIN: ICD-10-PCS | Mod: S$GLB,,,

## 2021-06-23 PROCEDURE — 93793 ANTICOAG MGMT PT WARFARIN: CPT | Mod: S$GLB,,,

## 2021-06-28 ENCOUNTER — ANTI-COAG VISIT (OUTPATIENT)
Dept: CARDIOLOGY | Facility: CLINIC | Age: 56
End: 2021-06-28
Payer: MEDICARE

## 2021-06-28 DIAGNOSIS — Z95.828 S/P ASCENDING AORTIC REPLACEMENT: Primary | ICD-10-CM

## 2021-06-28 DIAGNOSIS — Z79.01 LONG TERM (CURRENT) USE OF ANTICOAGULANTS: ICD-10-CM

## 2021-06-28 DIAGNOSIS — G45.9 TIA (TRANSIENT ISCHEMIC ATTACK): ICD-10-CM

## 2021-06-28 PROCEDURE — 93793 PR ANTICOAGULANT MGMT FOR PT TAKING WARFARIN: ICD-10-PCS | Mod: S$GLB,,,

## 2021-06-28 PROCEDURE — 93793 ANTICOAG MGMT PT WARFARIN: CPT | Mod: S$GLB,,,

## 2021-06-29 ENCOUNTER — TELEPHONE (OUTPATIENT)
Dept: DIABETES | Facility: CLINIC | Age: 56
End: 2021-06-29

## 2021-07-06 ENCOUNTER — OFFICE VISIT (OUTPATIENT)
Dept: DIABETES | Facility: CLINIC | Age: 56
End: 2021-07-06
Payer: MEDICARE

## 2021-07-06 VITALS
TEMPERATURE: 98 F | SYSTOLIC BLOOD PRESSURE: 102 MMHG | HEART RATE: 96 BPM | OXYGEN SATURATION: 98 % | BODY MASS INDEX: 36.93 KG/M2 | DIASTOLIC BLOOD PRESSURE: 74 MMHG | WEIGHT: 235.31 LBS | HEIGHT: 67 IN

## 2021-07-06 DIAGNOSIS — E10.65 TYPE 1 DIABETES MELLITUS WITH HYPERGLYCEMIA: Primary | ICD-10-CM

## 2021-07-06 DIAGNOSIS — G45.9 TIA (TRANSIENT ISCHEMIC ATTACK): ICD-10-CM

## 2021-07-06 DIAGNOSIS — Z46.81 INSULIN PUMP FITTING OR ADJUSTMENT: ICD-10-CM

## 2021-07-06 DIAGNOSIS — E10.649 TYPE 1 DIABETES MELLITUS WITH HYPOGLYCEMIA UNAWARENESS: ICD-10-CM

## 2021-07-06 DIAGNOSIS — Z96.41 INSULIN PUMP IN PLACE: ICD-10-CM

## 2021-07-06 DIAGNOSIS — E78.5 HYPERLIPIDEMIA WITH TARGET LOW DENSITY LIPOPROTEIN (LDL) CHOLESTEROL LESS THAN 70 MG/DL: ICD-10-CM

## 2021-07-06 DIAGNOSIS — I10 ESSENTIAL HYPERTENSION: ICD-10-CM

## 2021-07-06 DIAGNOSIS — E66.01 CLASS 2 SEVERE OBESITY DUE TO EXCESS CALORIES WITH SERIOUS COMORBIDITY AND BODY MASS INDEX (BMI) OF 36.0 TO 36.9 IN ADULT: ICD-10-CM

## 2021-07-06 DIAGNOSIS — I25.119 ATHEROSCLEROSIS OF NATIVE CORONARY ARTERY OF NATIVE HEART WITH ANGINA PECTORIS: ICD-10-CM

## 2021-07-06 DIAGNOSIS — T14.8XXA BLISTER: ICD-10-CM

## 2021-07-06 PROCEDURE — 99214 PR OFFICE/OUTPT VISIT, EST, LEVL IV, 30-39 MIN: ICD-10-PCS | Mod: S$GLB,,, | Performed by: NURSE PRACTITIONER

## 2021-07-06 PROCEDURE — 99214 OFFICE O/P EST MOD 30 MIN: CPT | Mod: S$GLB,,, | Performed by: NURSE PRACTITIONER

## 2021-07-06 PROCEDURE — 1125F PR PAIN SEVERITY QUANTIFIED, PAIN PRESENT: ICD-10-PCS | Mod: S$GLB,,, | Performed by: NURSE PRACTITIONER

## 2021-07-06 PROCEDURE — 95251 PR GLUCOSE MONITOR, 72 HOUR, PHYS INTERP: ICD-10-PCS | Mod: S$GLB,,, | Performed by: NURSE PRACTITIONER

## 2021-07-06 PROCEDURE — 3051F PR MOST RECENT HEMOGLOBIN A1C LEVEL 7.0 - < 8.0%: ICD-10-PCS | Mod: CPTII,S$GLB,, | Performed by: NURSE PRACTITIONER

## 2021-07-06 PROCEDURE — 99999 PR PBB SHADOW E&M-EST. PATIENT-LVL V: CPT | Mod: PBBFAC,,, | Performed by: NURSE PRACTITIONER

## 2021-07-06 PROCEDURE — 1125F AMNT PAIN NOTED PAIN PRSNT: CPT | Mod: S$GLB,,, | Performed by: NURSE PRACTITIONER

## 2021-07-06 PROCEDURE — 3051F HG A1C>EQUAL 7.0%<8.0%: CPT | Mod: CPTII,S$GLB,, | Performed by: NURSE PRACTITIONER

## 2021-07-06 PROCEDURE — 3008F PR BODY MASS INDEX (BMI) DOCUMENTED: ICD-10-PCS | Mod: CPTII,S$GLB,, | Performed by: NURSE PRACTITIONER

## 2021-07-06 PROCEDURE — 99499 RISK ADDL DX/OHS AUDIT: ICD-10-PCS | Mod: S$GLB,,, | Performed by: NURSE PRACTITIONER

## 2021-07-06 PROCEDURE — 99999 PR PBB SHADOW E&M-EST. PATIENT-LVL V: ICD-10-PCS | Mod: PBBFAC,,, | Performed by: NURSE PRACTITIONER

## 2021-07-06 PROCEDURE — 95251 CONT GLUC MNTR ANALYSIS I&R: CPT | Mod: S$GLB,,, | Performed by: NURSE PRACTITIONER

## 2021-07-06 PROCEDURE — 99499 UNLISTED E&M SERVICE: CPT | Mod: S$GLB,,, | Performed by: NURSE PRACTITIONER

## 2021-07-06 PROCEDURE — 3008F BODY MASS INDEX DOCD: CPT | Mod: CPTII,S$GLB,, | Performed by: NURSE PRACTITIONER

## 2021-07-06 RX ORDER — MUPIROCIN 20 MG/G
OINTMENT TOPICAL
Qty: 22 G | Refills: 1 | Status: SHIPPED | OUTPATIENT
Start: 2021-07-06 | End: 2021-07-21

## 2021-07-12 ENCOUNTER — ANTI-COAG VISIT (OUTPATIENT)
Dept: CARDIOLOGY | Facility: CLINIC | Age: 56
End: 2021-07-12
Payer: MEDICARE

## 2021-07-12 DIAGNOSIS — Z79.01 LONG TERM (CURRENT) USE OF ANTICOAGULANTS: ICD-10-CM

## 2021-07-12 DIAGNOSIS — G45.9 TIA (TRANSIENT ISCHEMIC ATTACK): ICD-10-CM

## 2021-07-12 DIAGNOSIS — Z95.828 S/P ASCENDING AORTIC REPLACEMENT: Primary | ICD-10-CM

## 2021-07-12 PROCEDURE — 93793 PR ANTICOAGULANT MGMT FOR PT TAKING WARFARIN: ICD-10-PCS | Mod: S$GLB,,,

## 2021-07-12 PROCEDURE — 93793 ANTICOAG MGMT PT WARFARIN: CPT | Mod: S$GLB,,,

## 2021-07-19 DIAGNOSIS — G47.00 INSOMNIA, UNSPECIFIED TYPE: ICD-10-CM

## 2021-07-19 RX ORDER — ZOLPIDEM TARTRATE 10 MG/1
TABLET ORAL
Qty: 30 TABLET | Refills: 0 | Status: SHIPPED | OUTPATIENT
Start: 2021-07-19 | End: 2021-08-03

## 2021-07-20 ENCOUNTER — PATIENT MESSAGE (OUTPATIENT)
Dept: PRIMARY CARE CLINIC | Facility: CLINIC | Age: 56
End: 2021-07-20

## 2021-07-26 ENCOUNTER — ANTI-COAG VISIT (OUTPATIENT)
Dept: CARDIOLOGY | Facility: CLINIC | Age: 56
End: 2021-07-26
Payer: MEDICARE

## 2021-07-26 ENCOUNTER — TELEPHONE (OUTPATIENT)
Dept: DIABETES | Facility: CLINIC | Age: 56
End: 2021-07-26

## 2021-07-26 DIAGNOSIS — G45.9 TIA (TRANSIENT ISCHEMIC ATTACK): ICD-10-CM

## 2021-07-26 DIAGNOSIS — Z79.01 LONG TERM (CURRENT) USE OF ANTICOAGULANTS: ICD-10-CM

## 2021-07-26 DIAGNOSIS — Z95.828 S/P ASCENDING AORTIC REPLACEMENT: Primary | ICD-10-CM

## 2021-07-26 PROCEDURE — 93793 PR ANTICOAGULANT MGMT FOR PT TAKING WARFARIN: ICD-10-PCS | Mod: S$GLB,,,

## 2021-07-26 PROCEDURE — 93793 ANTICOAG MGMT PT WARFARIN: CPT | Mod: S$GLB,,,

## 2021-08-09 ENCOUNTER — ANTI-COAG VISIT (OUTPATIENT)
Dept: CARDIOLOGY | Facility: CLINIC | Age: 56
End: 2021-08-09
Payer: MEDICARE

## 2021-08-09 DIAGNOSIS — G45.9 TIA (TRANSIENT ISCHEMIC ATTACK): ICD-10-CM

## 2021-08-09 DIAGNOSIS — Z79.01 LONG TERM (CURRENT) USE OF ANTICOAGULANTS: ICD-10-CM

## 2021-08-09 DIAGNOSIS — Z95.828 S/P ASCENDING AORTIC REPLACEMENT: Primary | ICD-10-CM

## 2021-08-09 PROCEDURE — 93793 PR ANTICOAGULANT MGMT FOR PT TAKING WARFARIN: ICD-10-PCS | Mod: S$GLB,,,

## 2021-08-09 PROCEDURE — 93793 ANTICOAG MGMT PT WARFARIN: CPT | Mod: S$GLB,,,

## 2021-08-10 LAB
LEFT EYE DM RETINOPATHY: POSITIVE
RIGHT EYE DM RETINOPATHY: POSITIVE

## 2021-08-19 RX ORDER — AMOXICILLIN 500 MG/1
2000 CAPSULE ORAL
Qty: 28 CAPSULE | Refills: 11 | Status: SHIPPED | OUTPATIENT
Start: 2021-08-19 | End: 2023-01-25

## 2021-09-10 ENCOUNTER — ANTI-COAG VISIT (OUTPATIENT)
Dept: CARDIOLOGY | Facility: CLINIC | Age: 56
End: 2021-09-10
Payer: MEDICARE

## 2021-09-10 DIAGNOSIS — Z95.828 S/P ASCENDING AORTIC REPLACEMENT: Primary | ICD-10-CM

## 2021-09-10 DIAGNOSIS — G45.9 TIA (TRANSIENT ISCHEMIC ATTACK): ICD-10-CM

## 2021-09-10 DIAGNOSIS — Z79.01 LONG TERM (CURRENT) USE OF ANTICOAGULANTS: ICD-10-CM

## 2021-09-10 PROCEDURE — 93793 ANTICOAG MGMT PT WARFARIN: CPT | Mod: S$GLB,,,

## 2021-09-10 PROCEDURE — 93793 PR ANTICOAGULANT MGMT FOR PT TAKING WARFARIN: ICD-10-PCS | Mod: S$GLB,,,

## 2021-09-15 DIAGNOSIS — G47.00 INSOMNIA, UNSPECIFIED TYPE: ICD-10-CM

## 2021-09-15 RX ORDER — ZOLPIDEM TARTRATE 10 MG/1
TABLET ORAL
Qty: 30 TABLET | Refills: 1 | Status: SHIPPED | OUTPATIENT
Start: 2021-09-15 | End: 2021-10-20

## 2021-10-03 ENCOUNTER — PATIENT MESSAGE (OUTPATIENT)
Dept: UROLOGY | Facility: CLINIC | Age: 56
End: 2021-10-03

## 2021-10-06 ENCOUNTER — ANTI-COAG VISIT (OUTPATIENT)
Dept: CARDIOLOGY | Facility: CLINIC | Age: 56
End: 2021-10-06
Payer: MEDICARE

## 2021-10-06 DIAGNOSIS — G45.9 TIA (TRANSIENT ISCHEMIC ATTACK): ICD-10-CM

## 2021-10-06 DIAGNOSIS — Z95.828 S/P ASCENDING AORTIC REPLACEMENT: Primary | ICD-10-CM

## 2021-10-06 DIAGNOSIS — Z79.01 LONG TERM (CURRENT) USE OF ANTICOAGULANTS: ICD-10-CM

## 2021-10-06 PROCEDURE — 93793 ANTICOAG MGMT PT WARFARIN: CPT | Mod: S$GLB,,,

## 2021-10-06 PROCEDURE — 93793 PR ANTICOAGULANT MGMT FOR PT TAKING WARFARIN: ICD-10-PCS | Mod: S$GLB,,,

## 2021-10-18 ENCOUNTER — ANTI-COAG VISIT (OUTPATIENT)
Dept: CARDIOLOGY | Facility: CLINIC | Age: 56
End: 2021-10-18
Payer: MEDICARE

## 2021-10-18 DIAGNOSIS — Z95.828 S/P ASCENDING AORTIC REPLACEMENT: Primary | ICD-10-CM

## 2021-10-18 DIAGNOSIS — G45.9 TIA (TRANSIENT ISCHEMIC ATTACK): ICD-10-CM

## 2021-10-18 DIAGNOSIS — Z79.01 LONG TERM (CURRENT) USE OF ANTICOAGULANTS: ICD-10-CM

## 2021-10-18 PROCEDURE — 93793 ANTICOAG MGMT PT WARFARIN: CPT | Mod: S$GLB,,,

## 2021-10-18 PROCEDURE — 93793 PR ANTICOAGULANT MGMT FOR PT TAKING WARFARIN: ICD-10-PCS | Mod: S$GLB,,,

## 2021-10-19 ENCOUNTER — NUTRITION (OUTPATIENT)
Dept: DIABETES | Facility: CLINIC | Age: 56
End: 2021-10-19
Payer: MEDICARE

## 2021-10-19 DIAGNOSIS — E10.649 TYPE 1 DIABETES MELLITUS WITH HYPOGLYCEMIA UNAWARENESS: ICD-10-CM

## 2021-10-19 DIAGNOSIS — E10.65 TYPE 1 DIABETES MELLITUS WITH HYPERGLYCEMIA: Primary | ICD-10-CM

## 2021-10-19 PROCEDURE — G0108 DIAB MANAGE TRN  PER INDIV: HCPCS | Mod: S$GLB,,, | Performed by: DIETITIAN, REGISTERED

## 2021-10-19 PROCEDURE — G0108 PR DIAB MANAGE TRN  PER INDIV: ICD-10-PCS | Mod: S$GLB,,, | Performed by: DIETITIAN, REGISTERED

## 2021-10-19 NOTE — PROGRESS NOTES
"CHIEF COMPLAINT: Type 1 Diabetes     HPI:  Mr. Cj Barnes is a 52 y.o. 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    He has used an insulin pump since 2006, previous on animas.  He is now on  670 G medtronic pump, does not have coverage for guardian (out of pocket $1800).    Lab Results   Component Value Date    HGBA1C 7.0 (H) 08/24/2018     Needs a1c.    PREVIOUS DIABETES MEDICATIONS TRIED  [place] pump  invokana    CURRENT DIABETIC MEDS: medtronic insulin pump 670 G (manual), humalog, metformin 500 mg w/ breakfast, jardiance 25 mg daily  Changed to admelog recently.     Pt is monitoring blood glucose readings 4 times a day. Needs contour jackelyn    Needs >100 strips per month related to fluctuations with blood glucose reading, a1c trends, and activity level.    Last Podiatry Exam: 2018    REVIEW OF SYSTEMS  General: no weakness, fatigue, or weight changes #5 gain .   Eyes: no double or blurred vision, eye pain, or redness; Last Eye Exam=March 2018, wears bifocals  Cardiovascular: no chest pain, palpitations, edema, or +murmurs.   Respiratory: no cough or dyspnea.   GI: no heartburn, nausea, or changes in bowel patterns; good appetite.   Skin: no rashes, dryness, itching, or reactions at insulin injection sites.  Neuro: no numbness, tingling, tremors, or vertigo. +lower back pain  Endocrine: no polyuria, polydipsia, polyphagia, heat or cold intolerance.     Vital Signs  /80 (BP Location: Right arm, Patient Position: Sitting, BP Method: Medium (Manual))   Pulse 74   Ht 5' 8" (1.727 m)   Wt 99.9 kg (220 lb 3.8 oz)   BMI 33.49 kg/m²     Hemoglobin A1C   Date Value Ref Range Status   08/24/2018 7.0 (H) 4.0 - 5.6 % Final " Bedside report received from MEHRDAD Alejandre. Care assumed. Shift chart check complete. Orders reviewed.       Comment:     ADA Screening Guidelines:  5.7-6.4%  Consistent with prediabetes  >or=6.5%  Consistent with diabetes  High levels of fetal hemoglobin interfere with the HbA1C  assay. Heterozygous hemoglobin variants (HbS, HgC, etc)do  not significantly interfere with this assay.   However, presence of multiple variants may affect accuracy.     05/22/2018 7.2 (H) 4.0 - 5.6 % Final     Comment:     According to ADA guidelines, hemoglobin A1c <7.0% represents  optimal control in non-pregnant diabetic patients. Different  metrics may apply to specific patient populations.   Standards of Medical Care in Diabetes-2016.  For the purpose of screening for the presence of diabetes:  <5.7%     Consistent with the absence of diabetes  5.7-6.4%  Consistent with increasing risk for diabetes   (prediabetes)  >or=6.5%  Consistent with diabetes  Currently, no consensus exists for use of hemoglobin A1c  for diagnosis of diabetes for children.  This Hemoglobin A1c assay has significant interference with fetal   hemoglobin   (HbF). The results are invalid for patients with abnormal amounts of   HbF,   including those with known Hereditary Persistence   of Fetal Hemoglobin. Heterozygous hemoglobin variants (HbAS, HbAC,   HbAD, HbAE, HbA2) do not significantly interfere with this assay;   however, presence of multiple variants in a sample may impact the %   interference.     12/11/2017 8.4 (H) 4.0 - 5.6 % Final     Comment:     According to ADA guidelines, hemoglobin A1c <7.0% represents  optimal control in non-pregnant diabetic patients. Different  metrics may apply to specific patient populations.   Standards of Medical Care in Diabetes-2016.  For the purpose of screening for the presence of diabetes:  <5.7%     Consistent with the absence of diabetes  5.7-6.4%  Consistent with increasing risk for diabetes   (prediabetes)  >or=6.5%  Consistent with diabetes  Currently, no consensus exists for use of hemoglobin A1c  for diagnosis of  diabetes for children.  This Hemoglobin A1c assay has significant interference with fetal   hemoglobin   (HbF). The results are invalid for patients with abnormal amounts of   HbF,   including those with known Hereditary Persistence   of Fetal Hemoglobin. Heterozygous hemoglobin variants (HbAS, HbAC,   HbAD, HbAE, HbA2) do not significantly interfere with this assay;   however, presence of multiple variants in a sample may impact the %   interference.          Chemistry        Component Value Date/Time     05/26/2018 0429    K 3.5 05/26/2018 0429     05/26/2018 0429    CO2 28 05/26/2018 0429    BUN 16 05/26/2018 0429    CREATININE 0.9 05/26/2018 0429     05/26/2018 0429        Component Value Date/Time    CALCIUM 8.7 05/26/2018 0429    ALKPHOS 74 05/26/2018 0429    AST 23 05/26/2018 0429    ALT 24 05/26/2018 0429    BILITOT 0.9 05/26/2018 0429    ESTGFRAFRICA >60.0 05/26/2018 0429    EGFRNONAA >60.0 05/26/2018 0429           Lab Results   Component Value Date    TSH 0.57 05/25/2018      Lab Results   Component Value Date    CHOL 127 05/25/2018    CHOL 150 12/11/2017    CHOL 154 01/31/2013     Lab Results   Component Value Date    HDL 44 05/25/2018    HDL 44 12/11/2017    HDL 39 (L) 01/31/2013     Lab Results   Component Value Date    LDLCALC 67 05/25/2018    LDLCALC 92.8 12/11/2017    LDLCALC 100.0 01/31/2013     Lab Results   Component Value Date    TRIG 81 05/25/2018    TRIG 66 12/11/2017    TRIG 73 01/31/2013     Lab Results   Component Value Date    CHOLHDL 34.6 05/25/2018    CHOLHDL 29.3 12/11/2017    CHOLHDL 25.3 01/31/2013         PHYSICAL EXAMINATION  Constitutional: Appears well, no distress  Neck: Supple, trachea midline.   Respiratory: CTA without wheezes, even and unlabored.  Cardiovascular: RRR; no carotid bruits or + murmurs; (+) DP pulses; no edema.   Lymph: no lymphadenopathy palpated  Skin: warm and dry; no injection site reactions, no acanthosis nigracans observed.  Neuro:patient  alert and cooperative, normal affect; steady gait.    Diabetes Foot Exam:   Protective Sensation (w/ 10 gram monofilament):  Right: Intact (R) middle section decreased sensation   Left: Intact (L) heel decreased sensation   Decreased vibratory sensation (R) worse than (L)  Calluses middle section, great toes.     Visual Inspection:  Dry Skin -  Bilateral and Onychomycosis -  Left    Pedal Pulses:   Right: Present  Left: Present    (R) foot 1st, 3rd digits (discolored, whitish upper part of nails)  (L) great toe -fungal infection     Assessment/Plan  1. 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   Insulin resistance-watch for DKA-on jardiance and metformin-previously on in 2017.     2. Insulin pump fitting or adjustment  See above   3. Hyperlipidemia with target low density lipoprotein (LDL) cholesterol less than 70 mg/dL  Lab Results   Component Value Date    LDLCALC 67 05/25/2018     At goal    4. Hypoglycemia  See above  Not happening often  Interested in freestyle nannette/rx printed/savings card [facility]   5. Essential hypertension  Microalbumin/creatinine urine ratio  Controlled, continue med(s)   6. Coronary artery disease involving native coronary artery of native heart without angina pectoris  Avoid hypoglycemia, f/u with cards prn    7. Aortic regurgitation, congenital  See above       FOLLOW UP  Follow-up in about 3 months (around 11/30/2018).

## 2021-10-20 DIAGNOSIS — G47.00 INSOMNIA, UNSPECIFIED TYPE: ICD-10-CM

## 2021-10-20 RX ORDER — ZOLPIDEM TARTRATE 10 MG/1
TABLET ORAL
Qty: 30 TABLET | Refills: 3 | Status: SHIPPED | OUTPATIENT
Start: 2021-10-20 | End: 2022-02-09

## 2021-11-01 ENCOUNTER — ANTI-COAG VISIT (OUTPATIENT)
Dept: CARDIOLOGY | Facility: CLINIC | Age: 56
End: 2021-11-01
Payer: MEDICARE

## 2021-11-01 DIAGNOSIS — G45.9 TIA (TRANSIENT ISCHEMIC ATTACK): ICD-10-CM

## 2021-11-01 DIAGNOSIS — Z95.828 S/P ASCENDING AORTIC REPLACEMENT: Primary | ICD-10-CM

## 2021-11-01 DIAGNOSIS — Z79.01 LONG TERM (CURRENT) USE OF ANTICOAGULANTS: ICD-10-CM

## 2021-11-01 PROCEDURE — 93793 ANTICOAG MGMT PT WARFARIN: CPT | Mod: S$GLB,,,

## 2021-11-01 PROCEDURE — 93793 PR ANTICOAGULANT MGMT FOR PT TAKING WARFARIN: ICD-10-PCS | Mod: S$GLB,,,

## 2021-11-02 ENCOUNTER — PATIENT OUTREACH (OUTPATIENT)
Dept: ADMINISTRATIVE | Facility: OTHER | Age: 56
End: 2021-11-02
Payer: MEDICARE

## 2021-11-02 ENCOUNTER — TELEPHONE (OUTPATIENT)
Dept: DIABETES | Facility: CLINIC | Age: 56
End: 2021-11-02
Payer: MEDICARE

## 2021-11-08 ENCOUNTER — TELEPHONE (OUTPATIENT)
Dept: UROLOGY | Facility: CLINIC | Age: 56
End: 2021-11-08
Payer: MEDICARE

## 2021-11-08 ENCOUNTER — OFFICE VISIT (OUTPATIENT)
Dept: DIABETES | Facility: CLINIC | Age: 56
End: 2021-11-08
Payer: MEDICARE

## 2021-11-08 VITALS
HEART RATE: 79 BPM | BODY MASS INDEX: 36.28 KG/M2 | SYSTOLIC BLOOD PRESSURE: 124 MMHG | TEMPERATURE: 98 F | WEIGHT: 231.13 LBS | DIASTOLIC BLOOD PRESSURE: 68 MMHG | HEIGHT: 67 IN | OXYGEN SATURATION: 97 %

## 2021-11-08 DIAGNOSIS — N52.9 ERECTILE DYSFUNCTION DUE TO TYPE 1 DIABETES MELLITUS: Primary | ICD-10-CM

## 2021-11-08 DIAGNOSIS — E66.01 CLASS 2 SEVERE OBESITY DUE TO EXCESS CALORIES WITH SERIOUS COMORBIDITY AND BODY MASS INDEX (BMI) OF 36.0 TO 36.9 IN ADULT: ICD-10-CM

## 2021-11-08 DIAGNOSIS — Z96.41 INSULIN PUMP IN PLACE: ICD-10-CM

## 2021-11-08 DIAGNOSIS — Z46.81 INSULIN PUMP FITTING OR ADJUSTMENT: ICD-10-CM

## 2021-11-08 DIAGNOSIS — I25.119 ATHEROSCLEROSIS OF NATIVE CORONARY ARTERY OF NATIVE HEART WITH ANGINA PECTORIS: ICD-10-CM

## 2021-11-08 DIAGNOSIS — M65.311 TRIGGER FINGER OF RIGHT THUMB: ICD-10-CM

## 2021-11-08 DIAGNOSIS — I10 ESSENTIAL HYPERTENSION: ICD-10-CM

## 2021-11-08 DIAGNOSIS — E10.69 ERECTILE DYSFUNCTION DUE TO TYPE 1 DIABETES MELLITUS: Primary | ICD-10-CM

## 2021-11-08 DIAGNOSIS — E10.649 TYPE 1 DIABETES MELLITUS WITH HYPOGLYCEMIA UNAWARENESS: ICD-10-CM

## 2021-11-08 DIAGNOSIS — G45.9 TIA (TRANSIENT ISCHEMIC ATTACK): ICD-10-CM

## 2021-11-08 DIAGNOSIS — E10.65 TYPE 1 DIABETES MELLITUS WITH HYPERGLYCEMIA: Primary | ICD-10-CM

## 2021-11-08 DIAGNOSIS — E78.5 HYPERLIPIDEMIA WITH TARGET LOW DENSITY LIPOPROTEIN (LDL) CHOLESTEROL LESS THAN 70 MG/DL: ICD-10-CM

## 2021-11-08 PROCEDURE — 4010F ACE/ARB THERAPY RXD/TAKEN: CPT | Mod: CPTII,S$GLB,, | Performed by: NURSE PRACTITIONER

## 2021-11-08 PROCEDURE — 99999 PR PBB SHADOW E&M-EST. PATIENT-LVL V: CPT | Mod: PBBFAC,,, | Performed by: NURSE PRACTITIONER

## 2021-11-08 PROCEDURE — 99215 PR OFFICE/OUTPT VISIT, EST, LEVL V, 40-54 MIN: ICD-10-PCS | Mod: S$GLB,,, | Performed by: NURSE PRACTITIONER

## 2021-11-08 PROCEDURE — 99499 RISK ADDL DX/OHS AUDIT: ICD-10-PCS | Mod: S$GLB,,, | Performed by: NURSE PRACTITIONER

## 2021-11-08 PROCEDURE — 99499 UNLISTED E&M SERVICE: CPT | Mod: S$GLB,,, | Performed by: NURSE PRACTITIONER

## 2021-11-08 PROCEDURE — 99999 PR PBB SHADOW E&M-EST. PATIENT-LVL V: ICD-10-PCS | Mod: PBBFAC,,, | Performed by: NURSE PRACTITIONER

## 2021-11-08 PROCEDURE — 3078F DIAST BP <80 MM HG: CPT | Mod: CPTII,S$GLB,, | Performed by: NURSE PRACTITIONER

## 2021-11-08 PROCEDURE — 3008F PR BODY MASS INDEX (BMI) DOCUMENTED: ICD-10-PCS | Mod: CPTII,S$GLB,, | Performed by: NURSE PRACTITIONER

## 2021-11-08 PROCEDURE — 3074F PR MOST RECENT SYSTOLIC BLOOD PRESSURE < 130 MM HG: ICD-10-PCS | Mod: CPTII,S$GLB,, | Performed by: NURSE PRACTITIONER

## 2021-11-08 PROCEDURE — 1160F PR REVIEW ALL MEDS BY PRESCRIBER/CLIN PHARMACIST DOCUMENTED: ICD-10-PCS | Mod: CPTII,S$GLB,, | Performed by: NURSE PRACTITIONER

## 2021-11-08 PROCEDURE — 3078F PR MOST RECENT DIASTOLIC BLOOD PRESSURE < 80 MM HG: ICD-10-PCS | Mod: CPTII,S$GLB,, | Performed by: NURSE PRACTITIONER

## 2021-11-08 PROCEDURE — 99215 OFFICE O/P EST HI 40 MIN: CPT | Mod: S$GLB,,, | Performed by: NURSE PRACTITIONER

## 2021-11-08 PROCEDURE — 3074F SYST BP LT 130 MM HG: CPT | Mod: CPTII,S$GLB,, | Performed by: NURSE PRACTITIONER

## 2021-11-08 PROCEDURE — 1160F RVW MEDS BY RX/DR IN RCRD: CPT | Mod: CPTII,S$GLB,, | Performed by: NURSE PRACTITIONER

## 2021-11-08 PROCEDURE — 3008F BODY MASS INDEX DOCD: CPT | Mod: CPTII,S$GLB,, | Performed by: NURSE PRACTITIONER

## 2021-11-08 PROCEDURE — 3044F HG A1C LEVEL LT 7.0%: CPT | Mod: CPTII,S$GLB,, | Performed by: NURSE PRACTITIONER

## 2021-11-08 PROCEDURE — 3044F PR MOST RECENT HEMOGLOBIN A1C LEVEL <7.0%: ICD-10-PCS | Mod: CPTII,S$GLB,, | Performed by: NURSE PRACTITIONER

## 2021-11-08 PROCEDURE — 3061F PR NEG MICROALBUMINURIA RESULT DOCUMENTED/REVIEW: ICD-10-PCS | Mod: CPTII,S$GLB,, | Performed by: NURSE PRACTITIONER

## 2021-11-08 PROCEDURE — 3066F PR DOCUMENTATION OF TREATMENT FOR NEPHROPATHY: ICD-10-PCS | Mod: CPTII,S$GLB,, | Performed by: NURSE PRACTITIONER

## 2021-11-08 PROCEDURE — 1159F PR MEDICATION LIST DOCUMENTED IN MEDICAL RECORD: ICD-10-PCS | Mod: CPTII,S$GLB,, | Performed by: NURSE PRACTITIONER

## 2021-11-08 PROCEDURE — 3066F NEPHROPATHY DOC TX: CPT | Mod: CPTII,S$GLB,, | Performed by: NURSE PRACTITIONER

## 2021-11-08 PROCEDURE — 3061F NEG MICROALBUMINURIA REV: CPT | Mod: CPTII,S$GLB,, | Performed by: NURSE PRACTITIONER

## 2021-11-08 PROCEDURE — 95251 PR GLUCOSE MONITOR, 72 HOUR, PHYS INTERP: ICD-10-PCS | Mod: S$GLB,,, | Performed by: NURSE PRACTITIONER

## 2021-11-08 PROCEDURE — 1159F MED LIST DOCD IN RCRD: CPT | Mod: CPTII,S$GLB,, | Performed by: NURSE PRACTITIONER

## 2021-11-08 PROCEDURE — 95251 CONT GLUC MNTR ANALYSIS I&R: CPT | Mod: S$GLB,,, | Performed by: NURSE PRACTITIONER

## 2021-11-08 PROCEDURE — 4010F PR ACE/ARB THEARPY RXD/TAKEN: ICD-10-PCS | Mod: CPTII,S$GLB,, | Performed by: NURSE PRACTITIONER

## 2021-11-08 RX ORDER — TADALAFIL 20 MG/1
20 TABLET ORAL
Qty: 10 TABLET | Refills: 5 | Status: SHIPPED | OUTPATIENT
Start: 2021-11-08 | End: 2021-11-18

## 2021-11-08 RX ORDER — ATORVASTATIN CALCIUM 80 MG/1
80 TABLET, FILM COATED ORAL NIGHTLY
Qty: 90 TABLET | Refills: 1 | Status: SHIPPED | OUTPATIENT
Start: 2021-11-08 | End: 2022-02-03

## 2021-11-10 ENCOUNTER — TELEPHONE (OUTPATIENT)
Dept: ORTHOPEDICS | Facility: CLINIC | Age: 56
End: 2021-11-10
Payer: MEDICARE

## 2021-11-10 DIAGNOSIS — M79.642 LEFT HAND PAIN: Primary | ICD-10-CM

## 2021-11-12 ENCOUNTER — TELEPHONE (OUTPATIENT)
Dept: DIABETES | Facility: CLINIC | Age: 56
End: 2021-11-12
Payer: MEDICARE

## 2021-11-17 ENCOUNTER — TELEPHONE (OUTPATIENT)
Dept: DIABETES | Facility: CLINIC | Age: 56
End: 2021-11-17
Payer: MEDICARE

## 2021-11-22 ENCOUNTER — ANTI-COAG VISIT (OUTPATIENT)
Dept: CARDIOLOGY | Facility: CLINIC | Age: 56
End: 2021-11-22
Payer: MEDICARE

## 2021-11-22 DIAGNOSIS — G45.9 TIA (TRANSIENT ISCHEMIC ATTACK): ICD-10-CM

## 2021-11-22 DIAGNOSIS — Z95.828 S/P ASCENDING AORTIC REPLACEMENT: Primary | ICD-10-CM

## 2021-11-22 DIAGNOSIS — Z79.01 LONG TERM (CURRENT) USE OF ANTICOAGULANTS: ICD-10-CM

## 2021-11-22 PROCEDURE — 93793 PR ANTICOAGULANT MGMT FOR PT TAKING WARFARIN: ICD-10-PCS | Mod: S$GLB,,,

## 2021-11-22 PROCEDURE — 93793 ANTICOAG MGMT PT WARFARIN: CPT | Mod: S$GLB,,,

## 2021-11-26 ENCOUNTER — TELEPHONE (OUTPATIENT)
Dept: ORTHOPEDICS | Facility: CLINIC | Age: 56
End: 2021-11-26
Payer: MEDICARE

## 2021-11-30 ENCOUNTER — PATIENT MESSAGE (OUTPATIENT)
Dept: ORTHOPEDICS | Facility: CLINIC | Age: 56
End: 2021-11-30

## 2021-11-30 ENCOUNTER — OFFICE VISIT (OUTPATIENT)
Dept: ORTHOPEDICS | Facility: CLINIC | Age: 56
End: 2021-11-30
Payer: MEDICARE

## 2021-11-30 ENCOUNTER — TELEPHONE (OUTPATIENT)
Dept: ORTHOPEDICS | Facility: CLINIC | Age: 56
End: 2021-11-30
Payer: MEDICARE

## 2021-11-30 VITALS
HEART RATE: 65 BPM | BODY MASS INDEX: 36.26 KG/M2 | WEIGHT: 231 LBS | HEIGHT: 67 IN | DIASTOLIC BLOOD PRESSURE: 73 MMHG | SYSTOLIC BLOOD PRESSURE: 114 MMHG

## 2021-11-30 DIAGNOSIS — M65.311 TRIGGER FINGER OF RIGHT THUMB: ICD-10-CM

## 2021-11-30 PROCEDURE — 99204 OFFICE O/P NEW MOD 45 MIN: CPT | Mod: S$GLB,,, | Performed by: ORTHOPAEDIC SURGERY

## 2021-11-30 PROCEDURE — 3066F PR DOCUMENTATION OF TREATMENT FOR NEPHROPATHY: ICD-10-PCS | Mod: CPTII,S$GLB,, | Performed by: ORTHOPAEDIC SURGERY

## 2021-11-30 PROCEDURE — 4010F ACE/ARB THERAPY RXD/TAKEN: CPT | Mod: CPTII,S$GLB,, | Performed by: ORTHOPAEDIC SURGERY

## 2021-11-30 PROCEDURE — 99204 PR OFFICE/OUTPT VISIT, NEW, LEVL IV, 45-59 MIN: ICD-10-PCS | Mod: S$GLB,,, | Performed by: ORTHOPAEDIC SURGERY

## 2021-11-30 PROCEDURE — 99999 PR PBB SHADOW E&M-EST. PATIENT-LVL IV: CPT | Mod: PBBFAC,,, | Performed by: ORTHOPAEDIC SURGERY

## 2021-11-30 PROCEDURE — 4010F PR ACE/ARB THEARPY RXD/TAKEN: ICD-10-PCS | Mod: CPTII,S$GLB,, | Performed by: ORTHOPAEDIC SURGERY

## 2021-11-30 PROCEDURE — 3066F NEPHROPATHY DOC TX: CPT | Mod: CPTII,S$GLB,, | Performed by: ORTHOPAEDIC SURGERY

## 2021-11-30 PROCEDURE — 3061F NEG MICROALBUMINURIA REV: CPT | Mod: CPTII,S$GLB,, | Performed by: ORTHOPAEDIC SURGERY

## 2021-11-30 PROCEDURE — 99999 PR PBB SHADOW E&M-EST. PATIENT-LVL IV: ICD-10-PCS | Mod: PBBFAC,,, | Performed by: ORTHOPAEDIC SURGERY

## 2021-11-30 PROCEDURE — 3061F PR NEG MICROALBUMINURIA RESULT DOCUMENTED/REVIEW: ICD-10-PCS | Mod: CPTII,S$GLB,, | Performed by: ORTHOPAEDIC SURGERY

## 2021-12-02 ENCOUNTER — PATIENT OUTREACH (OUTPATIENT)
Dept: ADMINISTRATIVE | Facility: HOSPITAL | Age: 56
End: 2021-12-02
Payer: MEDICARE

## 2021-12-02 DIAGNOSIS — M65.312 TRIGGER FINGER OF LEFT THUMB: Primary | ICD-10-CM

## 2021-12-02 DIAGNOSIS — M65.352 TRIGGER LITTLE FINGER OF LEFT HAND: ICD-10-CM

## 2021-12-06 ENCOUNTER — TELEPHONE (OUTPATIENT)
Dept: DIABETES | Facility: CLINIC | Age: 56
End: 2021-12-06
Payer: MEDICARE

## 2021-12-20 ENCOUNTER — ANTI-COAG VISIT (OUTPATIENT)
Dept: CARDIOLOGY | Facility: CLINIC | Age: 56
End: 2021-12-20
Payer: MEDICARE

## 2021-12-20 DIAGNOSIS — Z95.828 S/P ASCENDING AORTIC REPLACEMENT: ICD-10-CM

## 2021-12-20 DIAGNOSIS — Z79.01 LONG TERM (CURRENT) USE OF ANTICOAGULANTS: Primary | ICD-10-CM

## 2021-12-20 DIAGNOSIS — G45.9 TIA (TRANSIENT ISCHEMIC ATTACK): ICD-10-CM

## 2021-12-20 PROCEDURE — 99211 PR OFFICE/OUTPT VISIT, EST, LEVL I: ICD-10-PCS | Mod: S$GLB,,, | Performed by: INTERNAL MEDICINE

## 2021-12-20 PROCEDURE — 99211 OFF/OP EST MAY X REQ PHY/QHP: CPT | Mod: S$GLB,,, | Performed by: INTERNAL MEDICINE

## 2021-12-20 RX ORDER — ENOXAPARIN SODIUM 150 MG/ML
150 INJECTION SUBCUTANEOUS DAILY
Qty: 10 EACH | Refills: 1 | Status: SHIPPED | OUTPATIENT
Start: 2021-12-20 | End: 2022-05-03 | Stop reason: ALTCHOICE

## 2021-12-22 ENCOUNTER — PATIENT MESSAGE (OUTPATIENT)
Dept: ORTHOPEDICS | Facility: CLINIC | Age: 56
End: 2021-12-22
Payer: MEDICARE

## 2021-12-29 ENCOUNTER — ANTI-COAG VISIT (OUTPATIENT)
Dept: CARDIOLOGY | Facility: CLINIC | Age: 56
End: 2021-12-29
Payer: MEDICARE

## 2021-12-29 ENCOUNTER — TELEPHONE (OUTPATIENT)
Dept: ORTHOPEDICS | Facility: CLINIC | Age: 56
End: 2021-12-29
Payer: MEDICARE

## 2021-12-29 DIAGNOSIS — Z95.828 S/P ASCENDING AORTIC REPLACEMENT: Primary | ICD-10-CM

## 2021-12-29 DIAGNOSIS — Z79.01 LONG TERM (CURRENT) USE OF ANTICOAGULANTS: ICD-10-CM

## 2021-12-29 DIAGNOSIS — G45.9 TIA (TRANSIENT ISCHEMIC ATTACK): ICD-10-CM

## 2021-12-29 DIAGNOSIS — Z01.818 PRE-OP TESTING: Primary | ICD-10-CM

## 2021-12-29 PROCEDURE — 99211 OFF/OP EST MAY X REQ PHY/QHP: CPT | Mod: S$GLB,,, | Performed by: INTERNAL MEDICINE

## 2021-12-29 PROCEDURE — 99211 PR OFFICE/OUTPT VISIT, EST, LEVL I: ICD-10-PCS | Mod: S$GLB,,, | Performed by: INTERNAL MEDICINE

## 2021-12-30 RX ORDER — TRAMADOL HYDROCHLORIDE 50 MG/1
50 TABLET ORAL EVERY 6 HOURS PRN
Qty: 10 TABLET | Refills: 0 | Status: SHIPPED | OUTPATIENT
Start: 2021-12-30 | End: 2022-05-03

## 2022-01-04 ENCOUNTER — TELEPHONE (OUTPATIENT)
Dept: ORTHOPEDICS | Facility: CLINIC | Age: 57
End: 2022-01-04
Payer: MEDICARE

## 2022-01-04 RX ORDER — MUPIROCIN 20 MG/G
OINTMENT TOPICAL
Status: CANCELLED | OUTPATIENT
Start: 2022-01-04

## 2022-01-04 NOTE — PRE ADMISSION SCREENING
Patient states he was instructed to take his lovenox day of surgery per his cardiologist.lori colindres,dr anthony hurd's office notified.

## 2022-01-04 NOTE — PRE ADMISSION SCREENING
Pre admit phone call completed.    Instructions given to patient about NPO status as follows:     The evening before surgery do not eat anything after 9 p.m. ( this includes hard candy, chewing gum and mints).  You may only have GATORADE, POWERADE AND WATER from 9 p.m. until you leave your home. DO NOT  DRINK ANY LIQUIDS ON THE WAY TO THE HOSPITAL.      Patient was also instructed on the below information:    Park in the Parking lot behind the hospital or in the [employer] [facility] across the street from the parking lot.  Parking is complimentary.  If you will be discharged the same day as your procedure, please arrange for a responsible adult to drive you home or  to accompany you if traveling by taxi.  YOU WILL NOT BE PERMITTED TO DRIVE OR TO LEAVE THE HOSPITAL ALONE AFTER SURGERY.  It is strongly recommended that you arrange for someone to remain with you for the first 24 hrs following your surgery.    Patient verbalized understanding of above instructions.

## 2022-01-04 NOTE — H&P
H&P  Orthopaedics     SUBJECTIVE:      History of Present Illness:  Patient is a 56 y.o. male with hx of DM , RHD, who presents with L thumb and small finger triggering. Reports pain started over palmar MCPJ several months ago. He now has catching and locking that is passively correctable. Denies numbness/tingling. Of note pt had aortic valve replacement in 2019 and developed R cuTS postop, he had a R CuTR with Dr. Wells 4-6 mo later with no return of function in the right ulnar nerve.        Scheduled Meds:  Continuous Infusions:  PRN Meds:     Review of patient's allergies indicates:  No Known Allergies          Past Medical History:   Diagnosis Date    Anemia      Aortic stenosis 2018    Cancer 2014    Colon cancer      Coronary artery disease      Diabetes mellitus type I       since in his 30's    Heart murmur      Heel fracture      HTN (hypertension)      Hyperlipidemia LDL goal < 70      Insulin pump in place      MVP (mitral valve prolapse)      Stenosis of aortic and mitral valves              Past Surgical History:   Procedure Laterality Date    AORTIC VALVE REPLACEMENT N/A 8/9/2019     Procedure: Replacement-valve-aortic;  Surgeon: Ryan Knight MD;  [place] [facility] [place] [address] [place] [address];  Service: Cardiothoracic;  Laterality: N/A;    BACK SURGERY        BENTALL PROCEDURE FOR REPLACEMENT OF AORTIC VALVE, AORTIC ROOT, AND ASCENDING AORTA N/A 8/9/2019     Procedure: BENTALL PROCEDURE;  Surgeon: Ryan Knight MD;  [facility] [facility] 2[facility];  Service: Cardiothoracic;  Laterality: N/A;    COLON SURGERY   2014    COLONOSCOPY N/A 2/17/2020     Procedure: COLONOSCOPY;  Surgeon: Richi Mock MD;  Location: [facility];  Service: Colon and Rectal;  Laterality: N/A;    ESOPHAGOGASTRODUODENOSCOPY N/A 9/10/2020     Procedure: EGD (ESOPHAGOGASTRODUODENOSCOPY);  Surgeon: Abilio Boo MD;  Location: [facility] (4TH FLR);  Service: Endoscopy;  Laterality: N/A;  Cardiac clearance received, see  telephone encounter 8/27/20-[facility]  Approval to hold Coumadin prior to procedure received, see telephone encounter 8/27/20-[facility]  covid-9/7/20-[facility]    FOOT TENDON SURGERY        right and left heart cath Bilateral 01/15/2018    TREATMENT OF CARDIAC ARRHYTHMIA N/A 8/19/2019     Procedure: CARDIOVERSION;  Surgeon: Juan Zapata MD;  Location: [facility] EP LAB;  Service: Cardiology;  Laterality: N/A;  afib, dccv only, anes, GP, 3092    TRIGGER FINGER RELEASE         x 2            Family History   Problem Relation Age of Onset    Heart disease Mother      Lung cancer Mother      Heart attack Father      Diabetes Father      HIV Brother        Social History            Tobacco Use    Smoking status: Never Smoker    Smokeless tobacco: Former User       Types: Snuff    Tobacco comment: since he was 12 yrs old   Substance Use Topics    Alcohol use: No       Comment: socially    Drug use: No         Review of Systems:  Patient denies constitutional symptoms, cardiac symptoms, respiratory symptoms, GI symptoms.  The remainder of the musculoskeletal ROS is included in the HPI.        OBJECTIVE:      Vital Signs (Most Recent)  Pulse: 65 (11/30/21 1431)  BP: 114/73 (11/30/21 1431)     Physical Exam:  Gen:  No acute distress  CV:  Peripherally well-perfused.  Pulses 2+ bilaterally.  Lungs:  Normal respiratory effort.  Abdomen:  Soft, non-tender, non-distended  Head/Neck:  Normocephalic.  Atraumatic. No TTP, AROM and PROM intact without pain  Neuro:  CN intact without deficit, SILT throughout B/L Upper & Lower Extremities        MSK:     Bilateral Hand/Wrist Examination:     Observation/Inspection:  Swelling                       none                  Deformity                     none  Discoloration               none                  Scars                           none                  Atrophy                        Atrophy of right 1st dorsal interosseous  TTP a1 pulley SF and thumb right  hand     HAND/WRIST EXAMINATION:  Finkelstein's Test                                Neg  WHAT Test                                         Neg  Snuff box tenderness                          Neg  Valdez's Test                                     Neg  Hook of Hamate Tenderness              Neg  CMC grind                                           Neg  Circumduction test                              Neg     Neurovascular Exam:  Digits WWP, brisk CR < 3s throughout  NVI motor/LTS to M/R/U nerves, radial pulse 2+  Tinel's Test - Carpal Tunnel                Neg  Tinel's Test - Cubital Tunnel               Neg  Phalen's Test                                      Neg  Median Nerve Compression Test       Neg     ROM hand/wrist/elbow full, painless           Diagnostic Results:  X-Ray: Reviewed     ASSESSMENT/PLAN:      A/P: Cj Barnes is a 56 y.o. with L thumb and SF trigger finger              Plan:  - Discussed with pt txt options. Will plan for TFR L SF and thumb

## 2022-01-04 NOTE — PRE ADMISSION SCREENING
Phone interview completed,patient verbalized understanding.states has insulin pump and dexcom meter in place

## 2022-01-04 NOTE — TELEPHONE ENCOUNTER
Spoke c pt. Informed pt of 0600 arrival time for 01/05/22 surgery at the [facility]. Reminded pt of NPO status. Pt expressed understanding & was thankful.

## 2022-01-05 ENCOUNTER — HOSPITAL ENCOUNTER (OUTPATIENT)
Facility: OTHER | Age: 57
Discharge: HOME | End: 2022-01-05
Attending: ORTHOPAEDIC SURGERY | Admitting: ORTHOPAEDIC SURGERY
Payer: MEDICARE

## 2022-01-05 ENCOUNTER — ANESTHESIA (OUTPATIENT)
Dept: SURGERY | Facility: OTHER | Age: 57
End: 2022-01-05
Payer: MEDICARE

## 2022-01-05 ENCOUNTER — ANESTHESIA EVENT (OUTPATIENT)
Dept: SURGERY | Facility: OTHER | Age: 57
End: 2022-01-05
Payer: MEDICARE

## 2022-01-05 VITALS
DIASTOLIC BLOOD PRESSURE: 62 MMHG | OXYGEN SATURATION: 97 % | WEIGHT: 230 LBS | TEMPERATURE: 98 F | BODY MASS INDEX: 36.1 KG/M2 | RESPIRATION RATE: 16 BRPM | SYSTOLIC BLOOD PRESSURE: 113 MMHG | HEART RATE: 62 BPM | HEIGHT: 67 IN

## 2022-01-05 DIAGNOSIS — M65.312 TRIGGER FINGER OF LEFT THUMB: Primary | ICD-10-CM

## 2022-01-05 DIAGNOSIS — M65.30 TRIGGER FINGER: ICD-10-CM

## 2022-01-05 LAB — GLUCOSE SERPL-MCNC: 120 MG/DL (ref 70–110)

## 2022-01-05 PROCEDURE — 36000707: Performed by: ORTHOPAEDIC SURGERY

## 2022-01-05 PROCEDURE — 37000008 HC ANESTHESIA 1ST 15 MINUTES: Performed by: ORTHOPAEDIC SURGERY

## 2022-01-05 PROCEDURE — 36000706: Performed by: ORTHOPAEDIC SURGERY

## 2022-01-05 PROCEDURE — 63600175 PHARM REV CODE 636 W HCPCS: Performed by: STUDENT IN AN ORGANIZED HEALTH CARE EDUCATION/TRAINING PROGRAM

## 2022-01-05 PROCEDURE — 71000016 HC POSTOP RECOV ADDL HR: Performed by: ORTHOPAEDIC SURGERY

## 2022-01-05 PROCEDURE — 26055 INCISE FINGER TENDON SHEATH: CPT | Mod: FA,,, | Performed by: ORTHOPAEDIC SURGERY

## 2022-01-05 PROCEDURE — 71000015 HC POSTOP RECOV 1ST HR: Performed by: ORTHOPAEDIC SURGERY

## 2022-01-05 PROCEDURE — 25000003 PHARM REV CODE 250: Performed by: ORTHOPAEDIC SURGERY

## 2022-01-05 PROCEDURE — 26055 PR INCISE FINGER TENDON SHEATH: ICD-10-PCS | Mod: FA,,, | Performed by: ORTHOPAEDIC SURGERY

## 2022-01-05 PROCEDURE — 25000003 PHARM REV CODE 250: Performed by: NURSE ANESTHETIST, CERTIFIED REGISTERED

## 2022-01-05 PROCEDURE — 63600175 PHARM REV CODE 636 W HCPCS: Performed by: NURSE ANESTHETIST, CERTIFIED REGISTERED

## 2022-01-05 PROCEDURE — 37000009 HC ANESTHESIA EA ADD 15 MINS: Performed by: ORTHOPAEDIC SURGERY

## 2022-01-05 RX ORDER — FENTANYL CITRATE 50 UG/ML
INJECTION, SOLUTION INTRAMUSCULAR; INTRAVENOUS
Status: DISCONTINUED | OUTPATIENT
Start: 2022-01-05 | End: 2022-01-05

## 2022-01-05 RX ORDER — DIPHENHYDRAMINE HYDROCHLORIDE 50 MG/ML
25 INJECTION INTRAMUSCULAR; INTRAVENOUS EVERY 6 HOURS PRN
Status: DISCONTINUED | OUTPATIENT
Start: 2022-01-05 | End: 2022-01-05 | Stop reason: HOSPADM

## 2022-01-05 RX ORDER — BUPIVACAINE HYDROCHLORIDE 2.5 MG/ML
INJECTION, SOLUTION EPIDURAL; INFILTRATION; INTRACAUDAL
Status: DISCONTINUED | OUTPATIENT
Start: 2022-01-05 | End: 2022-01-05 | Stop reason: HOSPADM

## 2022-01-05 RX ORDER — CEFAZOLIN SODIUM 1 G/3ML
2 INJECTION, POWDER, FOR SOLUTION INTRAMUSCULAR; INTRAVENOUS
Status: COMPLETED | OUTPATIENT
Start: 2022-01-05 | End: 2022-01-05

## 2022-01-05 RX ORDER — SODIUM CHLORIDE 0.9 % (FLUSH) 0.9 %
3 SYRINGE (ML) INJECTION
Status: DISCONTINUED | OUTPATIENT
Start: 2022-01-05 | End: 2022-01-05 | Stop reason: HOSPADM

## 2022-01-05 RX ORDER — LIDOCAINE HYDROCHLORIDE 20 MG/ML
INJECTION INTRAVENOUS
Status: DISCONTINUED | OUTPATIENT
Start: 2022-01-05 | End: 2022-01-05

## 2022-01-05 RX ORDER — OXYCODONE HYDROCHLORIDE 5 MG/1
5 TABLET ORAL
Status: DISCONTINUED | OUTPATIENT
Start: 2022-01-05 | End: 2022-01-05 | Stop reason: HOSPADM

## 2022-01-05 RX ORDER — PROPOFOL 10 MG/ML
VIAL (ML) INTRAVENOUS CONTINUOUS PRN
Status: DISCONTINUED | OUTPATIENT
Start: 2022-01-05 | End: 2022-01-05

## 2022-01-05 RX ORDER — MEPERIDINE HYDROCHLORIDE 25 MG/ML
12.5 INJECTION INTRAMUSCULAR; INTRAVENOUS; SUBCUTANEOUS ONCE AS NEEDED
Status: DISCONTINUED | OUTPATIENT
Start: 2022-01-05 | End: 2022-01-05 | Stop reason: HOSPADM

## 2022-01-05 RX ORDER — PROCHLORPERAZINE EDISYLATE 5 MG/ML
5 INJECTION INTRAMUSCULAR; INTRAVENOUS EVERY 30 MIN PRN
Status: DISCONTINUED | OUTPATIENT
Start: 2022-01-05 | End: 2022-01-05 | Stop reason: HOSPADM

## 2022-01-05 RX ORDER — SODIUM CHLORIDE 9 MG/ML
INJECTION, SOLUTION INTRAVENOUS CONTINUOUS
Status: DISCONTINUED | OUTPATIENT
Start: 2022-01-05 | End: 2022-01-05 | Stop reason: HOSPADM

## 2022-01-05 RX ORDER — HYDROMORPHONE HYDROCHLORIDE 2 MG/ML
0.4 INJECTION, SOLUTION INTRAMUSCULAR; INTRAVENOUS; SUBCUTANEOUS EVERY 5 MIN PRN
Status: DISCONTINUED | OUTPATIENT
Start: 2022-01-05 | End: 2022-01-05 | Stop reason: HOSPADM

## 2022-01-05 RX ORDER — PROPOFOL 10 MG/ML
VIAL (ML) INTRAVENOUS
Status: DISCONTINUED | OUTPATIENT
Start: 2022-01-05 | End: 2022-01-05

## 2022-01-05 RX ORDER — BACITRACIN ZINC 500 UNIT/G
OINTMENT (GRAM) TOPICAL
Status: DISCONTINUED | OUTPATIENT
Start: 2022-01-05 | End: 2022-01-05 | Stop reason: HOSPADM

## 2022-01-05 RX ORDER — LIDOCAINE HYDROCHLORIDE 10 MG/ML
INJECTION, SOLUTION EPIDURAL; INFILTRATION; INTRACAUDAL; PERINEURAL
Status: DISCONTINUED | OUTPATIENT
Start: 2022-01-05 | End: 2022-01-05 | Stop reason: HOSPADM

## 2022-01-05 RX ADMIN — CEFAZOLIN 2 G: 330 INJECTION, POWDER, FOR SOLUTION INTRAMUSCULAR; INTRAVENOUS at 07:01

## 2022-01-05 RX ADMIN — FENTANYL CITRATE 50 MCG: 50 INJECTION, SOLUTION INTRAMUSCULAR; INTRAVENOUS at 07:01

## 2022-01-05 RX ADMIN — PROPOFOL 50 MCG/KG/MIN: 10 INJECTION, EMULSION INTRAVENOUS at 07:01

## 2022-01-05 RX ADMIN — PROPOFOL 50 MG: 10 INJECTION, EMULSION INTRAVENOUS at 07:01

## 2022-01-05 RX ADMIN — LIDOCAINE HYDROCHLORIDE 50 MG: 20 INJECTION, SOLUTION INTRAVENOUS at 07:01

## 2022-01-05 NOTE — DISCHARGE INSTRUCTIONS
Anesthesia: Monitored Anesthesia Care (MAC)  Anesthesia Safety  · Have an adult family member or friend drive you home after the procedure.  · For the first 24 hours after your surgery:  ¨ Do not drive or use heavy equipment.  ¨ Do not make important decisions or sign documents.  ¨ Avoid alcohol.  ¨ Have someone stay with you, if possible. They can watch for problems and help keep you safe.

## 2022-01-05 NOTE — PLAN OF CARE
Cj Barnes has met all discharge criteria from Phase II. Vital Signs are stable, ambulating  without difficulty. Discharge instructions given, patient verbalized understanding. Discharged from facility via wheelchair in stable condition.

## 2022-01-05 NOTE — ANESTHESIA PREPROCEDURE EVALUATION
01/05/2022  Cj Barnes is a 56 y.o., male.    Anesthesia Evaluation    I have reviewed the Patient Summary Reports.    I have reviewed the Nursing Notes.    I have reviewed the Medications.     Review of Systems  Anesthesia Hx:  Denies Family Hx of Anesthesia complications.   Denies Personal Hx of Anesthesia complications.   Social:  Non-Smoker    Hematology/Oncology:  Hematology Normal   Oncology Normal     EENT/Dental:EENT/Dental Normal   Cardiovascular:   Hypertension CAD   Angina    Pulmonary:  Pulmonary Normal    Renal/:  Renal/ Normal     Hepatic/GI:  Hepatic/GI Normal    Musculoskeletal:  Musculoskeletal Normal    Neurological:   TIA, Neuromuscular Disease,    Endocrine:   Diabetes    Dermatological:  Skin Normal    Psych:  Psychiatric Normal           Physical Exam   Airway/Jaw/Neck:  Airway Findings: Mallampati: III                Anesthesia Plan  Type of Anesthesia, risks & benefits discussed:  Anesthesia Type:  MAC    Patient's Preference:   Plan Factors:          Intra-op Monitoring Plan:   Intra-op Monitoring Plan Comments:   Post Op Pain Control Plan: multimodal analgesia  Post Op Pain Control Plan Comments:     Induction:    Beta Blocker:         Informed Consent: Patient understands risks and agrees with Anesthesia plan.  Questions answered. Anesthesia consent signed with patient.  ASA Score: 3     Day of Surgery Review of History & Physical:            Ready For Surgery From Anesthesia Perspective.

## 2022-01-05 NOTE — OP NOTE
[facility] ([facility]  Surgery Department  Operative Note    SUMMARY     Date of Procedure: 1/5/2022     Procedure: Procedure(s) (LRB):  RELEASE, TRIGGER FINGER,LEFT,SMALL & THUMB (Left)     Surgeon(s) and Role:     * Dilma Hunter MD - Primary    Assisting Surgeon: None    Pre-Operative Diagnosis: Trigger finger of left thumb [M65.312]  Trigger little finger of left hand [M65.352]    Post-Operative Diagnosis: Post-Op Diagnosis Codes:     * Trigger finger of left thumb [M65.312]     * Trigger little finger of left hand [M65.352]    Anesthesia: Local MAC    Technical Procedures Used: surgery    Description of the Findings of the Procedure:  IMPLANTS: None.   SPECIMENS: None.   COMPLICATIONS: None.   INDICATIONS FOR PROCEDURE: Cj Barnes is a 56 y.o.year-old who has failed   conservative treatment for  left  Thumb and small finger finger trigger finger; therefore,   operative intervention was deemed necessary. Risks and benefits were explained   to the patient in clinic. Consents were performed in clinic.   PROCEDURE IN DETAIL: After the correct site was marked with the patient's   participation in the Holding Area, the patient was brought to the Operating   Room, placed in supine position, underwent MAC anesthesia. A well-padded   nonsterile tourniquet was placed on the right forearm. Time-out was called for   correct site, procedure and patient to be indicated. Under sterile conditions,   an injection of lidocaine 1% without epinephrine was injected at the A1 pulley   Space of the thumb and small. The arm was prepped and draped in normal sterile fashion. Incision was   marked out in a longitudinal fashion along the pulley.  Transverse incision was marked out on the thumb over the A1 pulley. The arm was   exsanguinated using Esmarch. Tourniquet was insufflated 250 mmHg and that is   where it remained for15 minutes. The incision was made 1st with the thumb in the small finger. Careful dissection    down was maintained to the A1 pulley. The neurovascular structures were   retracted out of the way. The A1 pulley was identified 1st for the thumb there was significant adherence and scarring of pulley down to the tendon tendon had some fraying after it was retracted out the tendon she. It was sharply   incised. It was completely incised proximally and distally. Portion of    pulley was also incised. The tendon was then retracted out of the tendon sheath   using a right angle. The finger was placed through range of motion, showed it   did not trigger.  Our attention was turned to the small finger the incision was made careful dissection down to the A1 pulley the small finger of note there was old scar there patient had significant scarring present A1 pulleys released a portion of it was excised tendon was retracted out the tendon sheath showed it did not trigger but again there was significant amount of scar. The area was irrigated with copious amounts of normal saline.   Nylon closed the skin. Sterile dressing was applied. Tourniquet was deflated.   Brisk cap refill ensued. The patient was transferred the Recovery Room in   stable condition.   POSTOPERATIVE PLAN FOR THIS PATIENT: The patient is to keep the dressing clean, dry and   intact. We will take the sutures out at two weeks.      Significant Surgical Tasks Conducted by the Assistant(s), if Applicable: retraction    Complications: No    Estimated Blood Loss (EBL): * No values recorded between 1/5/2022  8:17 AM and 1/5/2022  8:42 AM *           Implants: * No implants in log *    Specimens:   Specimen (24h ago, onward)                None                    Condition: Good    Disposition: PACU - hemodynamically stable.    Attestation: I performed the procedure.    Discharge Note    SUMMARY     Admit Date: 1/5/2022    Discharge Date and Time: 1/5/2022  9:43 AM    Hospital Course (synopsis of major diagnoses, care, treatment, and services provided during the  "course of the hospital stay): surgery     Final Diagnosis: Post-Op Diagnosis Codes:     * Trigger finger of left thumb [M65.312]     * Trigger little finger of left hand [M65.352]    Disposition: Home or Self Care    Follow Up/Patient Instructions:     Medications:  Reconciled Home Medications:      Medication List        CHANGE how you take these medications      aspirin 81 MG EC tablet  Commonly known as: ECOTRIN  Take 1 tablet (81 mg total) by mouth once daily.  What changed: additional instructions     enoxaparin 150 mg/mL Syrg  Commonly known as: LOVENOX  Inject 1 mL (150 mg total) into the skin once daily.  What changed: additional instructions     mupirocin 2 % ointment  Commonly known as: BACTROBAN  APPLY TO THE AFFECTED AREA(S) THREE TIMES DAILY  What changed: See the new instructions.     warfarin 3 MG tablet  Commonly known as: COUMADIN  TAKE TWO TABLETS BY MOUTH DAILY  What changed:   how much to take  how to take this  when to take this  additional instructions            CONTINUE taking these medications      amoxicillin 500 MG capsule  Commonly known as: AMOXIL  Take 4 capsules (2,000 mg total) by mouth as needed (take one hour before dental work).     atorvastatin 80 MG tablet  Commonly known as: LIPITOR  Take 1 tablet (80 mg total) by mouth every evening.     blood sugar diagnostic Strp  1 each by Misc.(Non-Drug; Combo Route) route 6 (six) times daily. Contour next strips. Pt needs contour jackelyn for compatibility w/ insulin pump (medtronic 670g). Necessity     blood-glucose meter,continuous Misc  Commonly known as: DEXCOM G6   1 Device by Misc.(Non-Drug; Combo Route) route once. for 1 dose     blood-glucose transmitter Apple  Commonly known as: DEXCOM G6 TRANSMITTER  1 transmitter every 3 months     COMFORT EZ PEN NEEDLES 33 gauge x 3/16" Ndle  Generic drug: pen needle, diabetic  USE AS DIRECTED with Levemir pens     DEXCOM G6 SENSOR Apple  Generic drug: blood-glucose sensor  1 sensor every 10 " days     gabapentin 300 MG capsule  Commonly known as: NEURONTIN  TAKE TWO CAPSULES BY MOUTH THREE TIMES DAILY FOR NEUROPATHY-(NERVE PAIN)-     GLUCAGON (HUMAN RECOMBINANT) 1 mg Solr  Generic drug: glucagon  Inject 1 mg into the muscle as needed.     hydroCHLOROthiazide 12.5 MG Tab  Commonly known as: HYDRODIURIL  TAKE ONE CAPSULE BY MOUTH ONCE DAILY     insulin detemir U-100 100 unit/mL (3 mL) Inpn pen  Commonly known as: LEVEMIR FLEXTOUCH U-100 INSULN  Inject 48 Units into the skin every evening.     insulin lispro 100 unit/mL injection  USE CONTINOUSLY WITH INSULIN PUMP. MAXIMUM DAILY DOSE  UNITS     irbesartan 150 MG tablet  Commonly known as: AVAPRO  TAKE ONE TABLET BY MOUTH DAILY     JARDIANCE 25 mg tablet  Generic drug: empagliflozin  TAKE ONE TABLET BY MOUTH DAILY     metFORMIN 500 MG ER 24hr tablet  Commonly known as: GLUCOPHAGE-XR  TAKE ONE TABLET BY MOUTH TWICE DAILY WITH MEALS     nystatin cream  Commonly known as: MYCOSTATIN  Apply topically 2 (two) times daily.     tadalafiL 20 MG Tab  Commonly known as: CIALIS  Take 1 tablet (20 mg total) by mouth as needed (take every 3 days as needed for intercourse).     traMADoL 50 mg tablet  Commonly known as: ULTRAM  Take 1 tablet (50 mg total) by mouth every 6 (six) hours as needed for Pain.     zolpidem 10 mg Tab  Commonly known as: AMBIEN  TAKE ONE TABLET BY MOUTH EVERY EVENING AS NEEDED            Discharge Procedure Orders   Ambulatory referral/consult to Physical/Occupational Therapy   Standing Status: Future Standing Exp. Date: 02/05/23      Follow-up Information       Follow up In 2 weeks.

## 2022-01-05 NOTE — BRIEF OP NOTE
[facility] - Surgery ([place])  Brief Operative Note    Surgery Date: 1/5/2022     Surgeon(s) and Role:     * Dilma Hunter MD - Primary    Assisting Surgeon: None    Pre-op Diagnosis:  Trigger finger of left thumb [M65.312]  Trigger little finger of left hand [M65.352]    Post-op Diagnosis:  Post-Op Diagnosis Codes:     * Trigger finger of left thumb [M65.312]     * Trigger little finger of left hand [M65.352]    Procedure(s) (LRB):  RELEASE, TRIGGER FINGER,LEFT,SMALL & THUMB (Left)    Anesthesia: Local MAC    Operative Findings: see op note    Estimated Blood Loss: * No values recorded between 1/5/2022  8:17 AM and 1/5/2022  8:37 AM *         Specimens:   Specimen (24h ago, onward)            None            Discharge Note    OUTCOME: Patient tolerated treatment/procedure well without complication and is now ready for discharge.    DISPOSITION: Home or Self Care    FINAL DIAGNOSIS:  <principal problem not specified>    FOLLOWUP: In clinic    DISCHARGE INSTRUCTIONS:  No discharge procedures on file.

## 2022-01-05 NOTE — ANESTHESIA POSTPROCEDURE EVALUATION
Anesthesia Post Evaluation    Patient: Cj Barnes    Procedure(s) Performed: Procedure(s) (LRB):  RELEASE, TRIGGER FINGER,LEFT,SMALL & THUMB (Left)    Final Anesthesia Type: general      Patient location during evaluation: [facility]  Patient participation: Yes- Able to Participate  Level of consciousness: awake and alert  Post-procedure vital signs: reviewed and stable  Pain management: adequate  Airway patency: patent    PONV status at discharge: No PONV  Anesthetic complications: no      Cardiovascular status: blood pressure returned to baseline  Respiratory status: unassisted, room air and spontaneous ventilation  Hydration status: euvolemic  Follow-up not needed.          Vitals Value Taken Time   /65 01/05/22 0625   Temp 36.7 °C (98 °F) 01/05/22 0625   Pulse 65 01/05/22 0625   Resp 16 01/05/22 0625   SpO2 96 % 01/05/22 0625         No case tracking events are documented in the log.      Pain/Isa Score: No data recorded

## 2022-01-10 ENCOUNTER — TELEPHONE (OUTPATIENT)
Dept: ORTHOPEDICS | Facility: CLINIC | Age: 57
End: 2022-01-10
Payer: MEDICARE

## 2022-01-10 NOTE — TELEPHONE ENCOUNTER
----- Message from Thais Bueno sent at 1/10/2022  9:59 AM CST -----  Regarding: Patient call back  Who called:KALLI GUZMAN [[id number]]    What is the request in detail: Patient is requesting a call back. She states the patient got his bandages wet and needs them changed. She states he can come any time in the afternoon. She would like to further discuss.   Please advise.    Can the clinic reply by [facility]? No    Best call back number: [phone]    Additional Information: N/A

## 2022-01-18 ENCOUNTER — OFFICE VISIT (OUTPATIENT)
Dept: ORTHOPEDICS | Facility: CLINIC | Age: 57
End: 2022-01-18
Payer: MEDICARE

## 2022-01-18 VITALS — BODY MASS INDEX: 36.1 KG/M2 | HEIGHT: 67 IN | WEIGHT: 230 LBS

## 2022-01-18 DIAGNOSIS — Z47.89 ORTHOPEDIC AFTERCARE: ICD-10-CM

## 2022-01-18 DIAGNOSIS — M65.352 TRIGGER LITTLE FINGER OF LEFT HAND: ICD-10-CM

## 2022-01-18 DIAGNOSIS — M65.312 TRIGGER FINGER OF LEFT THUMB: Primary | ICD-10-CM

## 2022-01-18 PROCEDURE — 99024 PR POST-OP FOLLOW-UP VISIT: ICD-10-PCS | Mod: S$GLB,,, | Performed by: PHYSICIAN ASSISTANT

## 2022-01-18 PROCEDURE — 3008F PR BODY MASS INDEX (BMI) DOCUMENTED: ICD-10-PCS | Mod: CPTII,S$GLB,, | Performed by: PHYSICIAN ASSISTANT

## 2022-01-18 PROCEDURE — 1159F MED LIST DOCD IN RCRD: CPT | Mod: CPTII,S$GLB,, | Performed by: PHYSICIAN ASSISTANT

## 2022-01-18 PROCEDURE — 1160F PR REVIEW ALL MEDS BY PRESCRIBER/CLIN PHARMACIST DOCUMENTED: ICD-10-PCS | Mod: CPTII,S$GLB,, | Performed by: PHYSICIAN ASSISTANT

## 2022-01-18 PROCEDURE — 99024 POSTOP FOLLOW-UP VISIT: CPT | Mod: S$GLB,,, | Performed by: PHYSICIAN ASSISTANT

## 2022-01-18 PROCEDURE — 99999 PR PBB SHADOW E&M-EST. PATIENT-LVL IV: ICD-10-PCS | Mod: PBBFAC,,, | Performed by: PHYSICIAN ASSISTANT

## 2022-01-18 PROCEDURE — 3008F BODY MASS INDEX DOCD: CPT | Mod: CPTII,S$GLB,, | Performed by: PHYSICIAN ASSISTANT

## 2022-01-18 PROCEDURE — 99999 PR PBB SHADOW E&M-EST. PATIENT-LVL IV: CPT | Mod: PBBFAC,,, | Performed by: PHYSICIAN ASSISTANT

## 2022-01-18 PROCEDURE — 1159F PR MEDICATION LIST DOCUMENTED IN MEDICAL RECORD: ICD-10-PCS | Mod: CPTII,S$GLB,, | Performed by: PHYSICIAN ASSISTANT

## 2022-01-18 PROCEDURE — 1160F RVW MEDS BY RX/DR IN RCRD: CPT | Mod: CPTII,S$GLB,, | Performed by: PHYSICIAN ASSISTANT

## 2022-01-18 NOTE — PROGRESS NOTES
"Mr. Barnes is here today for a post-operative visit.  He is 13 days status post left small finger and thumb A1 pulley release by Dr. Hunter on 1/5/2022. He reports that he is doing well, has some finger stiffness.  Pain is mild, no current pain.  He is not taking pain medication.  He denies fever, chills, and sweats since the time of the surgery.     Physical exam:    Vitals:    01/18/22 1256   Weight: 104.3 kg (230 lb)   Height: 5' 7" (1.702 m)   PainSc: 0-No pain     Vital signs are stable, patient is afebrile.  Patient is well dressed and well groomed, no acute distress.  Alert and oriented to person, place, and time.  Post op dressing taken down.  Incision is clean, dry, and intact.  There is no erythema or exudate.  There is no sign of any infection. He is NVI. Sutures removed without difficulty.  Fair finger flexion    Assessment: 13 days status post left small finger and thumb A1 pulley release    Plan:  Cj was seen today for post-op evaluation.    Diagnoses and all orders for this visit:    Trigger finger of left thumb    Trigger little finger of left hand    Orthopedic aftercare        - PO instruction reviewed and provided to patient  - Finger ROM handout provided  - Schedule OT  - Discussed scar massage  - Follow up in 4 weeks if needed  - Call with questions or concerns    Per OP note: of note there was old scar there, patient had significant scarring present A1 pulleys    "

## 2022-01-24 ENCOUNTER — ANTI-COAG VISIT (OUTPATIENT)
Dept: CARDIOLOGY | Facility: CLINIC | Age: 57
End: 2022-01-24
Payer: MEDICARE

## 2022-01-24 DIAGNOSIS — Z95.828 S/P ASCENDING AORTIC REPLACEMENT: Primary | ICD-10-CM

## 2022-01-24 DIAGNOSIS — Z79.01 LONG TERM (CURRENT) USE OF ANTICOAGULANTS: ICD-10-CM

## 2022-01-24 DIAGNOSIS — G45.9 TIA (TRANSIENT ISCHEMIC ATTACK): ICD-10-CM

## 2022-01-24 PROCEDURE — 93793 ANTICOAG MGMT PT WARFARIN: CPT | Mod: S$GLB,,,

## 2022-01-24 PROCEDURE — 93793 PR ANTICOAGULANT MGMT FOR PT TAKING WARFARIN: ICD-10-PCS | Mod: S$GLB,,,

## 2022-01-24 NOTE — PROGRESS NOTES
INR at goal. Medications, chart, and patient findings reviewed. See calendar for adjustments to dose and follow up plan.

## 2022-02-04 ENCOUNTER — PATIENT OUTREACH (OUTPATIENT)
Dept: ADMINISTRATIVE | Facility: HOSPITAL | Age: 57
End: 2022-02-04
Payer: MEDICARE

## 2022-02-04 NOTE — LETTER
AUTHORIZATION FOR RELEASE OF   CONFIDENTIAL INFORMATION    [facility] [place]    We are seeing Cj Barnes, date of birth 1965, in the clinic at [facility]. Garry Stover MD is the patient's PCP. Cj Barnes has an outstanding lab/procedure at the time we reviewed his chart. In order to help keep his health information updated, he has authorized us to request the following medical record(s):        (  )  MAMMOGRAM                                      (  )  COLONOSCOPY      (  )  PAP SMEAR                                          (  )  OUTSIDE LAB RESULTS     (  )  DEXA SCAN                                          ( X )  EYE EXAM            (  )  FOOT EXAM                                          (  )  ENTIRE RECORD     (  )  OUTSIDE IMMUNIZATIONS                 (  )  _______________         Please fax records to [facility], Ryan M Truxillo, MD, [phone]    Thank you in advance,      Marii MULLIGAN  Care Coordinator  [facility]  [address] [place] [address] [place] [address]  Phone [phone]  Fax [phone]          Patient Name: Cj Barnes  : 1965  Patient Phone #: [phone]

## 2022-02-09 ENCOUNTER — ANTI-COAG VISIT (OUTPATIENT)
Dept: CARDIOLOGY | Facility: CLINIC | Age: 57
End: 2022-02-09
Payer: MEDICARE

## 2022-02-09 DIAGNOSIS — G45.9 TIA (TRANSIENT ISCHEMIC ATTACK): ICD-10-CM

## 2022-02-09 DIAGNOSIS — Z79.01 LONG TERM (CURRENT) USE OF ANTICOAGULANTS: ICD-10-CM

## 2022-02-09 DIAGNOSIS — Z95.828 S/P ASCENDING AORTIC REPLACEMENT: Primary | ICD-10-CM

## 2022-02-09 DIAGNOSIS — G47.00 INSOMNIA, UNSPECIFIED TYPE: ICD-10-CM

## 2022-02-09 PROCEDURE — 99211 OFF/OP EST MAY X REQ PHY/QHP: CPT | Mod: S$GLB,,, | Performed by: INTERNAL MEDICINE

## 2022-02-09 PROCEDURE — 99211 PR OFFICE/OUTPT VISIT, EST, LEVL I: ICD-10-PCS | Mod: S$GLB,,, | Performed by: INTERNAL MEDICINE

## 2022-02-09 RX ORDER — ZOLPIDEM TARTRATE 10 MG/1
TABLET ORAL
Qty: 30 TABLET | Refills: 1 | Status: SHIPPED | OUTPATIENT
Start: 2022-02-09 | End: 2022-04-05

## 2022-02-09 NOTE — TELEPHONE ENCOUNTER
No new care gaps identified.  Powered by [employer]. Reference number: [id number].   2/09/2022 11:31:48 AM CST

## 2022-02-11 ENCOUNTER — PATIENT OUTREACH (OUTPATIENT)
Dept: ADMINISTRATIVE | Facility: OTHER | Age: 57
End: 2022-02-11
Payer: MEDICARE

## 2022-02-11 NOTE — PROGRESS NOTES
LINKS immunization registry updated  Care Everywhere updated  Health Maintenance updated  Chart reviewed for overdue [facility] Encounters ([facility]) health maintenance testing (CRS, Breast Ca, Diabetic Eye Exam)   Orders entered:N/A

## 2022-02-15 ENCOUNTER — TELEPHONE (OUTPATIENT)
Dept: DIABETES | Facility: CLINIC | Age: 57
End: 2022-02-15
Payer: MEDICARE

## 2022-02-16 ENCOUNTER — OFFICE VISIT (OUTPATIENT)
Dept: DIABETES | Facility: CLINIC | Age: 57
End: 2022-02-16
Payer: MEDICARE

## 2022-02-16 ENCOUNTER — PATIENT MESSAGE (OUTPATIENT)
Dept: DIABETES | Facility: CLINIC | Age: 57
End: 2022-02-16

## 2022-02-16 ENCOUNTER — TELEPHONE (OUTPATIENT)
Dept: DIABETES | Facility: CLINIC | Age: 57
End: 2022-02-16
Payer: MEDICARE

## 2022-02-16 VITALS
HEIGHT: 67 IN | DIASTOLIC BLOOD PRESSURE: 79 MMHG | SYSTOLIC BLOOD PRESSURE: 136 MMHG | WEIGHT: 230 LBS | BODY MASS INDEX: 36.1 KG/M2

## 2022-02-16 DIAGNOSIS — Z96.41 INSULIN PUMP IN PLACE: ICD-10-CM

## 2022-02-16 DIAGNOSIS — G45.9 TIA (TRANSIENT ISCHEMIC ATTACK): ICD-10-CM

## 2022-02-16 DIAGNOSIS — I10 ESSENTIAL HYPERTENSION: ICD-10-CM

## 2022-02-16 DIAGNOSIS — E10.65 TYPE 1 DIABETES MELLITUS WITH HYPERGLYCEMIA: Primary | ICD-10-CM

## 2022-02-16 DIAGNOSIS — E78.5 HYPERLIPIDEMIA WITH TARGET LOW DENSITY LIPOPROTEIN (LDL) CHOLESTEROL LESS THAN 70 MG/DL: ICD-10-CM

## 2022-02-16 DIAGNOSIS — E10.649 TYPE 1 DIABETES MELLITUS WITH HYPOGLYCEMIA UNAWARENESS: ICD-10-CM

## 2022-02-16 DIAGNOSIS — E66.01 CLASS 2 SEVERE OBESITY DUE TO EXCESS CALORIES WITH SERIOUS COMORBIDITY AND BODY MASS INDEX (BMI) OF 36.0 TO 36.9 IN ADULT: ICD-10-CM

## 2022-02-16 DIAGNOSIS — I25.119 ATHEROSCLEROSIS OF NATIVE CORONARY ARTERY OF NATIVE HEART WITH ANGINA PECTORIS: ICD-10-CM

## 2022-02-16 DIAGNOSIS — E10.3299 TYPE 1 DIABETES MELLITUS WITH MILD NONPROLIFERATIVE RETINOPATHY WITHOUT MACULAR EDEMA, UNSPECIFIED LATERALITY: ICD-10-CM

## 2022-02-16 PROCEDURE — 3008F BODY MASS INDEX DOCD: CPT | Mod: CPTII,95,, | Performed by: NURSE PRACTITIONER

## 2022-02-16 PROCEDURE — 1159F MED LIST DOCD IN RCRD: CPT | Mod: CPTII,95,, | Performed by: NURSE PRACTITIONER

## 2022-02-16 PROCEDURE — 3078F PR MOST RECENT DIASTOLIC BLOOD PRESSURE < 80 MM HG: ICD-10-PCS | Mod: CPTII,95,, | Performed by: NURSE PRACTITIONER

## 2022-02-16 PROCEDURE — 99214 OFFICE O/P EST MOD 30 MIN: CPT | Mod: 95,,, | Performed by: NURSE PRACTITIONER

## 2022-02-16 PROCEDURE — 99214 PR OFFICE/OUTPT VISIT, EST, LEVL IV, 30-39 MIN: ICD-10-PCS | Mod: 95,,, | Performed by: NURSE PRACTITIONER

## 2022-02-16 PROCEDURE — 3008F PR BODY MASS INDEX (BMI) DOCUMENTED: ICD-10-PCS | Mod: CPTII,95,, | Performed by: NURSE PRACTITIONER

## 2022-02-16 PROCEDURE — 1159F PR MEDICATION LIST DOCUMENTED IN MEDICAL RECORD: ICD-10-PCS | Mod: CPTII,95,, | Performed by: NURSE PRACTITIONER

## 2022-02-16 PROCEDURE — 4010F PR ACE/ARB THEARPY RXD/TAKEN: ICD-10-PCS | Mod: CPTII,95,, | Performed by: NURSE PRACTITIONER

## 2022-02-16 PROCEDURE — 3044F HG A1C LEVEL LT 7.0%: CPT | Mod: CPTII,95,, | Performed by: NURSE PRACTITIONER

## 2022-02-16 PROCEDURE — 3075F PR MOST RECENT SYSTOLIC BLOOD PRESS GE 130-139MM HG: ICD-10-PCS | Mod: CPTII,95,, | Performed by: NURSE PRACTITIONER

## 2022-02-16 PROCEDURE — 3078F DIAST BP <80 MM HG: CPT | Mod: CPTII,95,, | Performed by: NURSE PRACTITIONER

## 2022-02-16 PROCEDURE — 3075F SYST BP GE 130 - 139MM HG: CPT | Mod: CPTII,95,, | Performed by: NURSE PRACTITIONER

## 2022-02-16 PROCEDURE — 3044F PR MOST RECENT HEMOGLOBIN A1C LEVEL <7.0%: ICD-10-PCS | Mod: CPTII,95,, | Performed by: NURSE PRACTITIONER

## 2022-02-16 PROCEDURE — 1160F PR REVIEW ALL MEDS BY PRESCRIBER/CLIN PHARMACIST DOCUMENTED: ICD-10-PCS | Mod: CPTII,95,, | Performed by: NURSE PRACTITIONER

## 2022-02-16 PROCEDURE — 1160F RVW MEDS BY RX/DR IN RCRD: CPT | Mod: CPTII,95,, | Performed by: NURSE PRACTITIONER

## 2022-02-16 PROCEDURE — 4010F ACE/ARB THERAPY RXD/TAKEN: CPT | Mod: CPTII,95,, | Performed by: NURSE PRACTITIONER

## 2022-02-16 RX ORDER — INSULIN LISPRO 100 [IU]/ML
INJECTION, SOLUTION INTRAVENOUS; SUBCUTANEOUS
Qty: 50 ML | Refills: 11 | Status: SHIPPED | OUTPATIENT
Start: 2022-02-16 | End: 2022-04-19 | Stop reason: SDUPTHER

## 2022-02-16 RX ORDER — METFORMIN HYDROCHLORIDE 500 MG/1
500 TABLET, EXTENDED RELEASE ORAL 2 TIMES DAILY WITH MEALS
Qty: 180 TABLET | Refills: 2 | Status: SHIPPED | OUTPATIENT
Start: 2022-02-16 | End: 2022-07-11 | Stop reason: SDUPTHER

## 2022-02-16 RX ORDER — IRBESARTAN 150 MG/1
150 TABLET ORAL DAILY
Qty: 90 TABLET | Refills: 3 | Status: SHIPPED | OUTPATIENT
Start: 2022-02-16 | End: 2022-07-11 | Stop reason: SDUPTHER

## 2022-02-16 RX ORDER — ROSUVASTATIN CALCIUM 40 MG/1
40 TABLET, COATED ORAL NIGHTLY
Qty: 90 TABLET | Refills: 2 | Status: SHIPPED | OUTPATIENT
Start: 2022-02-16 | End: 2022-07-11 | Stop reason: SDUPTHER

## 2022-02-16 RX ORDER — INSULIN DETEMIR 100 [IU]/ML
48 INJECTION, SOLUTION SUBCUTANEOUS NIGHTLY
Qty: 3 ML | Refills: 2 | Status: SHIPPED | OUTPATIENT
Start: 2022-02-16 | End: 2022-04-19 | Stop reason: SDUPTHER

## 2022-02-16 RX ORDER — GABAPENTIN 300 MG/1
CAPSULE ORAL
Qty: 360 CAPSULE | Refills: 2 | Status: SHIPPED | OUTPATIENT
Start: 2022-02-16 | End: 2022-07-11 | Stop reason: SDUPTHER

## 2022-02-16 RX ORDER — EMPAGLIFLOZIN 25 MG/1
25 TABLET, FILM COATED ORAL DAILY
Qty: 30 TABLET | Refills: 11 | Status: SHIPPED | OUTPATIENT
Start: 2022-02-16 | End: 2022-07-11 | Stop reason: SDUPTHER

## 2022-02-16 RX ORDER — HYDROCHLOROTHIAZIDE 12.5 MG/1
TABLET ORAL
Qty: 90 TABLET | Refills: 2 | Status: SHIPPED | OUTPATIENT
Start: 2022-02-16 | End: 2022-07-11 | Stop reason: SDUPTHER

## 2022-02-16 NOTE — ASSESSMENT & PLAN NOTE
A1c is reasonable and at goal.   BG readings are mostly at goal on his dexcom download.   Encouraged him to cut back on CHO intake with meals-- encouraged 45-60 grams with meals instead of 70- grams   Also encouraged exercise and weight loss.     He is interested in changing to the [employer] pump when his pump expires.   Will submit the paperwork to start the process to Richi Abebe with [employer]       Medication changes:   Continue Jardiance 25 mg daily   Continue Metformin 500 mg to BID- to help with insulin resistance.       Pump settings:  Continue current pump settings.   Basal:  MN-6AM- 1.6 units/hr --  6AM-MN- 1.4 units/hr --     ICR:    MN-10AM: 1:3.5-  10AM- MN: 1:4-     ISF:15-    Target:  100-120    IOB:  3 hours       -- Reviewed goals of therapy are to get the best control we can without hypoglycemia  -- Reviewed patient's current insulin regimen. Clarified proper insulin dose and timing in relation to meals, etc. Insulin injection sites and proper rotation instructed.    -- Advised frequent self blood glucose monitoring.  Patient encouraged to document glucose results and bring them to every clinic visit  -Continue to use Dexcom   -- Hypoglycemia precautions discussed. Instructed on precautions before driving.    -- Call for Bg repeatedly < 70 or > 180.   -- Close adherence to lifestyle changes recommended.   -- Periodic follow ups for eye evaluations, foot care and dental care suggested.

## 2022-02-16 NOTE — ASSESSMENT & PLAN NOTE
On statin per ADA recommendations  LDL goal < 70. LDL still above goal. LFTs WNL.   Change Lipitor 80 mg nightly to Crestor 40 mg nightly

## 2022-02-16 NOTE — ASSESSMENT & PLAN NOTE
Avoid hypoglycemia.  Continue to use Dexcom with real-time alerts  Will improve with [employer]andem pump with control IQ

## 2022-02-16 NOTE — Clinical Note
1. 4 months follow up with me with labs prior 2. Please submit facesheet and 2 sets of chart notes to Richi Abebe with [employer] please! Thank you

## 2022-02-16 NOTE — PROGRESS NOTES
The patient location is: [place]  The chief complaint leading to consultation is: Diabetes management - follow up visit     Visit type: audiovisual    Face to Face time with patient: 20 minutes   30 minutes of total time spent on the encounter, which includes face to face time and non-face to face time preparing to see the patient (eg, review of tests), Obtaining and/or reviewing separately obtained history, Documenting clinical information in the electronic or other health record, Independently interpreting results (not separately reported) and communicating results to the patient/family/caregiver, or Care coordination (not separately reported).         Each patient to whom he or she provides medical services by telemedicine is:  (1) informed of the relationship between the physician and patient and the respective role of any other health care provider with respect to management of the patient; and (2) notified that he or she may decline to receive medical services by telemedicine and may withdraw from such care at any time.    Notes:       CC:   Chief Complaint   Patient presents with    Diabetes Mellitus       HPI: Cj Barnes is a 56 y.o. male presents for a follow up visit today for the management of T1DM.   The patient was diagnosed in his early 30's. Initially diagnosed with T2DM, 5 years later he was dx as T1.    He has used an insulin pump since 2006, previous on animas.  He is now on  670 G medtronic pump.  He was previously wearing the Medtronic guardian sensor, but stopped wearing it as he did not feel that auto mode was giving him another insulin and his blood sugar readings were running high.  He did not like auto mode at all  He was entering in false carbohydrates to bring down his blood sugar readings.   He started wearing the Dexcom personal CGM in 2020 and really likes it.   His Medtronic pump is OOW -2-- he is interested in changing over the the Tandem pump      Family hx of diabetes:  Mother, father, 3 brothers - no one with T1     Hospitalized for diabetes: denies     No personal or FH of thyroid cancer or personal of pancreatic cancer or pancreatitis.     Our last visit was in November 2021. At that visit we made setting adjustments to his pump. Overall he is doing well.   His recent A1c is 6.8%.   He wants to start the process of changing over to the Tandem pump at the end of this month.       DIABETES COMPLICATIONS: retinopathy, peripheral neuropathy, cardiovascular disease and cerebrovascular disease  TIA     Diabetes Management Status    ASA:  Yes - 81 mg daily and coumadin     Statin: Taking--Lipitor   ACE/ARB: Taking-- Irbesartan     Screening or Prevention Patient's value Goal Complete/Controlled?   HgA1C Testing and Control   Lab Results   Component Value Date    HGBA1C 6.8 (H) 02/09/2022      Annually/Less than 8% Yes   Lipid profile : 02/09/2022 Annually Yes   LDL control Lab Results   Component Value Date    LDLCALC 75.8 02/09/2022    Annually/Less than 100 mg/dl  Yes   Nephropathy screening Lab Results   Component Value Date    LABMICR <5.0 11/01/2021     Lab Results   Component Value Date    PROTEINUA Negative 08/16/2019    Annually Yes   Blood pressure BP Readings from Last 1 Encounters:   02/16/22 136/79    Less than 140/90 Yes   Dilated retinal exam : 07/20/2020- [facility]  Annually No   Foot exam   : 11/08/2021 Annually Yes       CURRENT A1C:    Hemoglobin A1C   Date Value Ref Range Status   02/09/2022 6.8 (H) 4.0 - 5.6 % Final     Comment:     ADA Screening Guidelines:  5.7-6.4%  Consistent with prediabetes  >or=6.5%  Consistent with diabetes    High levels of fetal hemoglobin interfere with the HbA1C  assay. Heterozygous hemoglobin variants (HbS, HgC, etc)do  not significantly interfere with this assay.   However, presence of multiple variants may affect accuracy.     11/01/2021 6.7 (H) 4.0 - 5.6 % Final     Comment:     ADA Screening Guidelines:  5.7-6.4%  Consistent  with prediabetes  >or=6.5%  Consistent with diabetes    High levels of fetal hemoglobin interfere with the HbA1C  assay. Heterozygous hemoglobin variants (HbS, HgC, etc)do  not significantly interfere with this assay.   However, presence of multiple variants may affect accuracy.     2021 7.0 (H) 4.0 - 5.6 % Final     Comment:     ADA Screening Guidelines:  5.7-6.4%  Consistent with prediabetes  >or=6.5%  Consistent with diabetes    High levels of fetal hemoglobin interfere with the HbA1C  assay. Heterozygous hemoglobin variants (HbS, HgC, etc)do  not significantly interfere with this assay.   However, presence of multiple variants may affect accuracy.         GOAL A1C: 6.5-7% - without hypoglycemia    DM MEDICATIONS USED IN THE PAST: Medtronic 670 G   Metformin   Steglatro- lack of coverage.   Jardiance   Humalo g     CURRENT DIABETES MEDICATIONS: Medtronic insulin pump, Metformin 500 mg BID  Jardiance 25 mg daily      Insulin Pump: Medtronic 670G with Humalog   Manual mode only        Pump settings:  Basal:  MN-6AM- 1.6 units/hr --increased   6AM-MN- 1.4 units/hr -- increased      ICR:  Tightened his insulin to carbohydrate ratio with breakfast  MN-10AM: 1:3.5--tightened his ratio with breakfast to give him more coverage  10AM- MN: 1:4--tightened      ISF:15-     Target:  100-120     IOB:  3 hours     Set changed every 3 days  Reservoir changed every 3 days    Unable to download Pump because this is a virtual visit     Pump supplies from [employer]      Back up Lantus/Levemir: Yes     BLOOD GLUCOSE MONITORING:   Sensor type: Dexcom G6  Average BG readin  Time in range: 75%  Estimated A1c : 7 %   Site change: q10 days     Dexcom from [employer]     Two weeks prior his average was 159  in range 73% of the time     Sensor was downloaded in clinic today and reviewed with patient.   Please see attached document for download.       HYPOGLYCEMIA:  <1% low   0% very low   + hx of hypoglycemia unawareness   He  doesn't feel BG readings until the 40-50's - once in the 50's he will have twitching to his eye.   Glucagon kit: yes  Medic alert bracelet: yes       MEALS:  Still eating high CHO diet   BF: ~ 1-2 breakfast burritos or egg mc muffin or croissant   --usually 60-80 g of carbs  Lunch: bag of chips or something light.   Dinner: ~ eating out home cooked style- [employer], [address]. He loves salads- light Italian or michael dressing -spaghetti and meatballs last night  Snack: occ- dry cereal -- mid night snack   Drinks: un sweet tea, water, coke zero.   He CHO counts- he feels comfortable.   Eating a very high carbohydrate diet still despite being educated        CURRENT EXERCISE:  Was in the gym prior to the virus.       Review of Systems  Review of Systems   Constitutional: Negative for appetite change, fatigue and unexpected weight change.   HENT: Negative for trouble swallowing.    Eyes: Negative for visual disturbance.   Respiratory: Negative for shortness of breath.    Cardiovascular: Negative for chest pain.   Gastrointestinal: Negative for nausea.   Endocrine: Negative for polydipsia, polyphagia and polyuria.   Genitourinary:        No Nocturia    Musculoskeletal: Positive for arthralgias and joint swelling. Negative for back pain.   Skin: Negative for wound.   Neurological: Positive for numbness (worse at night ).   Psychiatric/Behavioral: Positive for sleep disturbance (due to valve clicking sounds). The patient is nervous/anxious (Since his valve replacement).         Both personal and work stress       Physical Exam   Physical Exam  Constitutional:       General: He is not in acute distress.     Appearance: Normal appearance. He is obese. He is not ill-appearing.   HENT:      Head: Normocephalic and atraumatic.      Nose: Nose normal.   Pulmonary:      Effort: Pulmonary effort is normal.   Neurological:      General: No focal deficit present.      Mental Status: He is alert and oriented to person, place, and time.  Mental status is at baseline.   Psychiatric:         Mood and Affect: Mood normal.         Behavior: Behavior normal.         Thought Content: Thought content normal.         Judgment: Judgment normal.           FOOT EXAMINATION: deferred due to virtual visit         Lab Results   Component Value Date    TSH 0.807 11/01/2021       Type 1 diabetes mellitus with hyperglycemia  A1c is reasonable and at goal.   BG readings are mostly at goal on his dexcom download.   Encouraged him to cut back on CHO intake with meals-- encouraged 45-60 grams with meals instead of 70- grams   Also encouraged exercise and weight loss.     He is interested in changing to the Tandem pump when his pump expires.   Will submit the paperwork to start the process to Richi Abebe with [employer]       Medication changes:   Continue Jardiance 25 mg daily   Continue Metformin 500 mg to BID- to help with insulin resistance.       Pump settings:  Continue current pump settings.   Basal:  MN-6AM- 1.6 units/hr --  6AM-MN- 1.4 units/hr --     ICR:    MN-10AM: 1:3.5-  10AM- MN: 1:4-     ISF:15-    Target:  100-120    IOB:  3 hours       -- Reviewed goals of therapy are to get the best control we can without hypoglycemia  -- Reviewed patient's current insulin regimen. Clarified proper insulin dose and timing in relation to meals, etc. Insulin injection sites and proper rotation instructed.    -- Advised frequent self blood glucose monitoring.  Patient encouraged to document glucose results and bring them to every clinic visit  -Continue to use Dexcom   -- Hypoglycemia precautions discussed. Instructed on precautions before driving.    -- Call for Bg repeatedly < 70 or > 180.   -- Close adherence to lifestyle changes recommended.   -- Periodic follow ups for eye evaluations, foot care and dental care suggested.        Insulin pump in place  See above    Type 1 diabetes mellitus with hypoglycemia unawareness  Avoid hypoglycemia.  Continue to use Dexcom  with real-time alerts  Will improve with Tandem pump with control IQ    Atherosclerosis of native coronary artery of native heart with angina pectoris  Optimize blood sugar readings    Essential hypertension  BP goal is < 140/90.   Tolerating  ARB  Controlled at goal   Blood pressure goals discussed with patient      Hyperlipidemia with target low density lipoprotein (LDL) cholesterol less than 70 mg/dL  On statin per ADA recommendations  LDL goal < 70. LDL still above goal. LFTs WNL.   Change Lipitor 80 mg nightly to Crestor 40 mg nightly     TIA (transient ischemic attack)  Optimize blood sugar readings    Class 2 severe obesity due to excess calories with serious comorbidity and body mass index (BMI) of 36.0 to 36.9 in adult  Body mass index is 36.02 kg/m².  Increases insulin resistance.   Discussed DM diet and exercise.   Patient does exhibit insulin resistance and is on an SGLT 2 and metformin.            Pump backup plan    If the insulin pump is non functional and discontinued for anticipated more than 20 hours, please give daily injections of:   Long acting insulin Levemir 40  units daily   Short acting insulin Novolog/ Humalog 1:4  for meals according to carb ratios and sensitivity factor in the pump.     When the insulin pump is restarted, do not restart basal rates until at least 22 hours after the last long acting insulin injection. You can set a 0% temporary basal setting that will last until this time and use your pump to bolus for meals and correction.     For any technical insulin pump issues, please contact the insulin pump company; the toll free number is printed on the label on the back of the insulin pump.       If your sugar is running high for a few hours and does not respond to two correction doses from the insulin pump, it may mean that you have a bad pump site and the site should be changed          Follow up in about 4 months (around 6/16/2022).   With labs prior       Orders Placed This  Encounter   Procedures    Hemoglobin A1C     Standing Status:   Future     Standing Expiration Date:   8/16/2023    Basic Metabolic Panel     Standing Status:   Future     Standing Expiration Date:   8/16/2023    Lipid Panel     Standing Status:   Future     Standing Expiration Date:   8/16/2023    Ambulatory referral/consult to Diabetes Education     Standing Status:   Future     Standing Expiration Date:   8/16/2023     Referral Priority:   Routine     Referral Type:   Consultation     Referral Reason:   Specialty Services Required     Referred to Provider:   Katia Pham RD, CDE     Requested Specialty:   Diabetes     Number of Visits Requested:   1             Recommendations were discussed with the patient in detail  The patient verbalized understanding and agrees with the plan outlined as above.

## 2022-02-16 NOTE — ASSESSMENT & PLAN NOTE
BP goal is < 140/90.   Tolerating  ARB  Controlled at goal   Blood pressure goals discussed with patient

## 2022-02-16 NOTE — ASSESSMENT & PLAN NOTE
Body mass index is 36.02 kg/m².  Increases insulin resistance.   Discussed DM diet and exercise.   Patient does exhibit insulin resistance and is on an SGLT 2 and metformin.

## 2022-02-24 NOTE — PROGRESS NOTES
Patient called to reschedule 3/02 lab appointment to today 2/24/22, states will [place] of the [place]on a cruise[place] 2/27/22 until 3/07/22

## 2022-02-25 ENCOUNTER — ANTI-COAG VISIT (OUTPATIENT)
Dept: CARDIOLOGY | Facility: CLINIC | Age: 57
End: 2022-02-25
Payer: MEDICARE

## 2022-02-25 DIAGNOSIS — Z95.828 S/P ASCENDING AORTIC REPLACEMENT: Primary | ICD-10-CM

## 2022-02-25 DIAGNOSIS — Z79.01 LONG TERM (CURRENT) USE OF ANTICOAGULANTS: ICD-10-CM

## 2022-02-25 DIAGNOSIS — G45.9 TIA (TRANSIENT ISCHEMIC ATTACK): ICD-10-CM

## 2022-02-25 PROCEDURE — 99211 PR OFFICE/OUTPT VISIT, EST, LEVL I: ICD-10-PCS | Mod: S$GLB,,, | Performed by: INTERNAL MEDICINE

## 2022-02-25 PROCEDURE — 99211 OFF/OP EST MAY X REQ PHY/QHP: CPT | Mod: S$GLB,,, | Performed by: INTERNAL MEDICINE

## 2022-03-17 ENCOUNTER — ANTI-COAG VISIT (OUTPATIENT)
Dept: CARDIOLOGY | Facility: CLINIC | Age: 57
End: 2022-03-17
Payer: MEDICARE

## 2022-03-17 DIAGNOSIS — Z95.828 S/P ASCENDING AORTIC REPLACEMENT: Primary | ICD-10-CM

## 2022-03-17 DIAGNOSIS — Z79.01 LONG TERM (CURRENT) USE OF ANTICOAGULANTS: ICD-10-CM

## 2022-03-17 DIAGNOSIS — G45.9 TIA (TRANSIENT ISCHEMIC ATTACK): ICD-10-CM

## 2022-03-17 PROCEDURE — 99211 PR OFFICE/OUTPT VISIT, EST, LEVL I: ICD-10-PCS | Mod: S$GLB,,, | Performed by: INTERNAL MEDICINE

## 2022-03-17 PROCEDURE — 99211 OFF/OP EST MAY X REQ PHY/QHP: CPT | Mod: S$GLB,,, | Performed by: INTERNAL MEDICINE

## 2022-04-04 DIAGNOSIS — G47.00 INSOMNIA, UNSPECIFIED TYPE: ICD-10-CM

## 2022-04-04 NOTE — TELEPHONE ENCOUNTER
No new care gaps identified.  Powered by [employer] by [employer] [facility] [employer] Reference number: [id number].   4/04/2022 10:33:28 AM CDT

## 2022-04-05 RX ORDER — ZOLPIDEM TARTRATE 10 MG/1
TABLET ORAL
Qty: 30 TABLET | Refills: 0 | Status: SHIPPED | OUTPATIENT
Start: 2022-04-05 | End: 2022-05-03 | Stop reason: SDUPTHER

## 2022-04-07 ENCOUNTER — ANTI-COAG VISIT (OUTPATIENT)
Dept: CARDIOLOGY | Facility: CLINIC | Age: 57
End: 2022-04-07
Payer: MEDICARE

## 2022-04-07 DIAGNOSIS — G45.9 TIA (TRANSIENT ISCHEMIC ATTACK): ICD-10-CM

## 2022-04-07 DIAGNOSIS — Z95.828 S/P ASCENDING AORTIC REPLACEMENT: Primary | ICD-10-CM

## 2022-04-07 DIAGNOSIS — Z79.01 LONG TERM (CURRENT) USE OF ANTICOAGULANTS: ICD-10-CM

## 2022-04-07 PROCEDURE — 99211 OFF/OP EST MAY X REQ PHY/QHP: CPT | Mod: S$GLB,,, | Performed by: INTERNAL MEDICINE

## 2022-04-07 PROCEDURE — 99211 PR OFFICE/OUTPT VISIT, EST, LEVL I: ICD-10-PCS | Mod: S$GLB,,, | Performed by: INTERNAL MEDICINE

## 2022-04-18 ENCOUNTER — PATIENT MESSAGE (OUTPATIENT)
Dept: DIABETES | Facility: CLINIC | Age: 57
End: 2022-04-18
Payer: MEDICARE

## 2022-04-19 DIAGNOSIS — E10.3299 TYPE 1 DIABETES MELLITUS WITH MILD NONPROLIFERATIVE RETINOPATHY WITHOUT MACULAR EDEMA, UNSPECIFIED LATERALITY: ICD-10-CM

## 2022-04-19 DIAGNOSIS — E10.65 TYPE 1 DIABETES MELLITUS WITH HYPERGLYCEMIA: ICD-10-CM

## 2022-04-19 DIAGNOSIS — Z96.41 INSULIN PUMP IN PLACE: ICD-10-CM

## 2022-04-19 RX ORDER — INSULIN LISPRO 100 [IU]/ML
INJECTION, SOLUTION INTRAVENOUS; SUBCUTANEOUS
Qty: 50 ML | Refills: 11 | Status: SHIPPED | OUTPATIENT
Start: 2022-04-19 | End: 2022-07-11 | Stop reason: SDUPTHER

## 2022-04-19 RX ORDER — INSULIN DETEMIR 100 [IU]/ML
48 INJECTION, SOLUTION SUBCUTANEOUS NIGHTLY
Qty: 3 ML | Refills: 2 | Status: SHIPPED | OUTPATIENT
Start: 2022-04-19 | End: 2022-05-03

## 2022-04-21 ENCOUNTER — TELEPHONE (OUTPATIENT)
Dept: DIABETES | Facility: CLINIC | Age: 57
End: 2022-04-21
Payer: MEDICARE

## 2022-04-21 ENCOUNTER — TELEPHONE (OUTPATIENT)
Dept: ADMINISTRATIVE | Facility: HOSPITAL | Age: 57
End: 2022-04-21
Payer: MEDICARE

## 2022-04-21 ENCOUNTER — PATIENT MESSAGE (OUTPATIENT)
Dept: DIABETES | Facility: CLINIC | Age: 57
End: 2022-04-21
Payer: MEDICARE

## 2022-04-21 NOTE — TELEPHONE ENCOUNTER
"Returning patients call.   Spoke with ken with [employer] and he reports that patient was placed "on hold" because they tried to reach him 5 times without any success and unable to leave a voicemail.   Patient denies this.   He would like to be active  Asked that they activate patient   "

## 2022-04-21 NOTE — TELEPHONE ENCOUNTER
----- Message from Alba Orozco LPN sent at 4/21/2022  2:09 PM CDT -----  Do you have a number on ken for blaine, this pt stated he never heard anything back about his pump

## 2022-04-28 ENCOUNTER — PATIENT OUTREACH (OUTPATIENT)
Dept: ADMINISTRATIVE | Facility: HOSPITAL | Age: 57
End: 2022-04-28
Payer: MEDICARE

## 2022-04-28 NOTE — LETTER
AUTHORIZATION FOR RELEASE OF   CONFIDENTIAL INFORMATION    Dear [facility],    We are seeing Cj Barnes, date of birth 1965, in the clinic at [facility]. Garry Stover MD is the patient's PCP. Cj Barnes has an outstanding lab/procedure at the time we reviewed his chart. In order to help keep his health information updated, he has authorized us to request the following medical record(s):        (  )  MAMMOGRAM                                      (  )  COLONOSCOPY      (  )  PAP SMEAR                                          (  )  OUTSIDE LAB RESULTS     (  )  DEXA SCAN                                          ( x )  EYE EXAM            (  )  FOOT EXAM                                          (  )  ENTIRE RECORD     (  )  OUTSIDE IMMUNIZATIONS                 (  )  _______________         [facility] [phone]    Patient Name: Cj Barnes  : 1965  Patient Phone #: [phone]

## 2022-05-03 ENCOUNTER — OFFICE VISIT (OUTPATIENT)
Dept: PRIMARY CARE CLINIC | Facility: CLINIC | Age: 57
End: 2022-05-03
Payer: MEDICARE

## 2022-05-03 VITALS
RESPIRATION RATE: 18 BRPM | HEIGHT: 67 IN | WEIGHT: 233.69 LBS | SYSTOLIC BLOOD PRESSURE: 122 MMHG | OXYGEN SATURATION: 98 % | HEART RATE: 66 BPM | BODY MASS INDEX: 36.68 KG/M2 | DIASTOLIC BLOOD PRESSURE: 64 MMHG

## 2022-05-03 DIAGNOSIS — E10.65 TYPE 1 DIABETES MELLITUS WITH HYPERGLYCEMIA: ICD-10-CM

## 2022-05-03 DIAGNOSIS — F33.0 MAJOR DEPRESSIVE DISORDER, RECURRENT, MILD: ICD-10-CM

## 2022-05-03 DIAGNOSIS — Z00.00 ANNUAL PHYSICAL EXAM: Primary | ICD-10-CM

## 2022-05-03 DIAGNOSIS — F41.1 GAD (GENERALIZED ANXIETY DISORDER): ICD-10-CM

## 2022-05-03 DIAGNOSIS — I48.92 PAROXYSMAL ATRIAL FLUTTER: ICD-10-CM

## 2022-05-03 DIAGNOSIS — Z12.5 PROSTATE CANCER SCREENING: ICD-10-CM

## 2022-05-03 DIAGNOSIS — G47.00 INSOMNIA, UNSPECIFIED TYPE: ICD-10-CM

## 2022-05-03 DIAGNOSIS — Z23 NEED FOR VACCINATION: ICD-10-CM

## 2022-05-03 PROCEDURE — 1160F PR REVIEW ALL MEDS BY PRESCRIBER/CLIN PHARMACIST DOCUMENTED: ICD-10-PCS | Mod: CPTII,S$GLB,, | Performed by: FAMILY MEDICINE

## 2022-05-03 PROCEDURE — 99999 PR PBB SHADOW E&M-EST. PATIENT-LVL IV: CPT | Mod: PBBFAC,,, | Performed by: FAMILY MEDICINE

## 2022-05-03 PROCEDURE — 3078F DIAST BP <80 MM HG: CPT | Mod: CPTII,S$GLB,, | Performed by: FAMILY MEDICINE

## 2022-05-03 PROCEDURE — 99499 RISK ADDL DX/OHS AUDIT: ICD-10-PCS | Mod: S$GLB,,, | Performed by: FAMILY MEDICINE

## 2022-05-03 PROCEDURE — 1159F MED LIST DOCD IN RCRD: CPT | Mod: CPTII,S$GLB,, | Performed by: FAMILY MEDICINE

## 2022-05-03 PROCEDURE — 3008F BODY MASS INDEX DOCD: CPT | Mod: CPTII,S$GLB,, | Performed by: FAMILY MEDICINE

## 2022-05-03 PROCEDURE — 99999 PR PBB SHADOW E&M-EST. PATIENT-LVL IV: ICD-10-PCS | Mod: PBBFAC,,, | Performed by: FAMILY MEDICINE

## 2022-05-03 PROCEDURE — 3044F PR MOST RECENT HEMOGLOBIN A1C LEVEL <7.0%: ICD-10-PCS | Mod: CPTII,S$GLB,, | Performed by: FAMILY MEDICINE

## 2022-05-03 PROCEDURE — 4010F ACE/ARB THERAPY RXD/TAKEN: CPT | Mod: CPTII,S$GLB,, | Performed by: FAMILY MEDICINE

## 2022-05-03 PROCEDURE — 4010F PR ACE/ARB THEARPY RXD/TAKEN: ICD-10-PCS | Mod: CPTII,S$GLB,, | Performed by: FAMILY MEDICINE

## 2022-05-03 PROCEDURE — 99214 PR OFFICE/OUTPT VISIT, EST, LEVL IV, 30-39 MIN: ICD-10-PCS | Mod: S$GLB,,, | Performed by: FAMILY MEDICINE

## 2022-05-03 PROCEDURE — 99214 OFFICE O/P EST MOD 30 MIN: CPT | Mod: S$GLB,,, | Performed by: FAMILY MEDICINE

## 2022-05-03 PROCEDURE — 3074F SYST BP LT 130 MM HG: CPT | Mod: CPTII,S$GLB,, | Performed by: FAMILY MEDICINE

## 2022-05-03 PROCEDURE — 3008F PR BODY MASS INDEX (BMI) DOCUMENTED: ICD-10-PCS | Mod: CPTII,S$GLB,, | Performed by: FAMILY MEDICINE

## 2022-05-03 PROCEDURE — 1159F PR MEDICATION LIST DOCUMENTED IN MEDICAL RECORD: ICD-10-PCS | Mod: CPTII,S$GLB,, | Performed by: FAMILY MEDICINE

## 2022-05-03 PROCEDURE — 1160F RVW MEDS BY RX/DR IN RCRD: CPT | Mod: CPTII,S$GLB,, | Performed by: FAMILY MEDICINE

## 2022-05-03 PROCEDURE — 3074F PR MOST RECENT SYSTOLIC BLOOD PRESSURE < 130 MM HG: ICD-10-PCS | Mod: CPTII,S$GLB,, | Performed by: FAMILY MEDICINE

## 2022-05-03 PROCEDURE — 3078F PR MOST RECENT DIASTOLIC BLOOD PRESSURE < 80 MM HG: ICD-10-PCS | Mod: CPTII,S$GLB,, | Performed by: FAMILY MEDICINE

## 2022-05-03 PROCEDURE — 99499 UNLISTED E&M SERVICE: CPT | Mod: S$GLB,,, | Performed by: FAMILY MEDICINE

## 2022-05-03 PROCEDURE — 3044F HG A1C LEVEL LT 7.0%: CPT | Mod: CPTII,S$GLB,, | Performed by: FAMILY MEDICINE

## 2022-05-03 RX ORDER — ESCITALOPRAM OXALATE 10 MG/1
10 TABLET ORAL DAILY
Qty: 90 TABLET | Refills: 0 | Status: SHIPPED | OUTPATIENT
Start: 2022-05-03 | End: 2022-09-30 | Stop reason: SDUPTHER

## 2022-05-03 RX ORDER — TADALAFIL 5 MG/1
5 TABLET ORAL DAILY
COMMUNITY
Start: 2022-03-14 | End: 2023-05-31

## 2022-05-03 RX ORDER — ZOSTER VACCINE RECOMBINANT, ADJUVANTED 50 MCG/0.5
0.5 KIT INTRAMUSCULAR ONCE
Qty: 1 EACH | Refills: 1 | Status: SHIPPED | OUTPATIENT
Start: 2022-05-03 | End: 2022-05-03

## 2022-05-03 RX ORDER — ZOLPIDEM TARTRATE 10 MG/1
10 TABLET ORAL NIGHTLY PRN
Qty: 30 TABLET | Refills: 3 | Status: SHIPPED | OUTPATIENT
Start: 2022-05-03 | End: 2022-09-12

## 2022-05-03 NOTE — PROGRESS NOTES
"Subjective:       Patient ID: Cj Barnes is a 56 y.o. male.    Chief Complaint: Annual Exam (Fasting for labs) and Anxiety (Gets anxious more now since his heart surgery in 2019)    Here for annual checkup.  Has been struggling with worsening anxiety and irritability since TAVR 3 years ago, having worsening trouble sleeping. No SI/HI, denies depressive syptoms    Review of Systems   Constitutional: Negative for chills, fatigue and fever.   HENT: Negative for congestion and trouble swallowing.    Eyes: Negative for visual disturbance.   Respiratory: Negative for cough, chest tightness and shortness of breath.    Cardiovascular: Negative for chest pain and palpitations.   Gastrointestinal: Negative for abdominal pain, nausea and vomiting.   Genitourinary: Negative for difficulty urinating.   Musculoskeletal: Negative for arthralgias.   Skin: Negative for rash.   Allergic/Immunologic: Negative for immunocompromised state.   Neurological: Negative for dizziness.   Hematological: Does not bruise/bleed easily.   Psychiatric/Behavioral: Positive for sleep disturbance. Negative for self-injury and suicidal ideas. The patient is nervous/anxious.        Objective:      Vitals:    05/03/22 0842   BP: 122/64   BP Location: Left arm   Patient Position: Sitting   BP Method: Large (Manual)   Pulse: 66   Resp: 18   SpO2: 98%   Weight: 106 kg (233 lb 11 oz)   Height: 5' 7" (1.702 m)     Physical Exam  Vitals and nursing note reviewed.   Constitutional:       Appearance: He is well-developed.   HENT:      Head: Normocephalic and atraumatic.   Cardiovascular:      Rate and Rhythm: Normal rate and regular rhythm.      Heart sounds: Murmur heard.   Pulmonary:      Effort: Pulmonary effort is normal.      Breath sounds: Normal breath sounds.   Skin:     General: Skin is warm and dry.   Neurological:      Mental Status: He is alert and oriented to person, place, and time.   Psychiatric:         Mood and Affect: Mood normal.        "  Behavior: Behavior normal.         Thought Content: Thought content normal.         Judgment: Judgment normal.         Lab Results   Component Value Date    WBC 12.20 12/01/2020    HGB 16.1 12/01/2020    HCT 48.5 12/01/2020     12/01/2020    CHOL 134 02/09/2022    TRIG 121 02/09/2022    HDL 34 (L) 02/09/2022    ALT 20 02/09/2022    AST 20 02/09/2022     02/09/2022    K 4.2 02/09/2022     02/09/2022    CREATININE 0.8 02/09/2022    BUN 17 02/09/2022    CO2 24 02/09/2022    TSH 0.807 11/01/2021    PSA 0.25 03/23/2020    INR 2.8 (H) 04/07/2022    GLUF 160 (H) 01/09/2020    HGBA1C 6.8 (H) 02/09/2022      Assessment:       1. Annual physical exam    2. Insomnia, unspecified type    3. Major depressive disorder, recurrent, mild    4. Paroxysmal atrial flutter    5. Prostate cancer screening    6. WILLIAM (generalized anxiety disorder)    7. Type 1 diabetes mellitus with hyperglycemia        Plan:       Annual physical exam    Insomnia, unspecified type  -     zolpidem (AMBIEN) 10 mg Tab; Take 1 tablet (10 mg total) by mouth nightly as needed (insomnia).  Dispense: 30 tablet; Refill: 3  Advised to use sparingly  Major depressive disorder, recurrent, mild  -     EScitalopram oxalate (LEXAPRO) 10 MG tablet; Take 1 tablet (10 mg total) by mouth once daily.  Dispense: 90 tablet; Refill: 0  Start SSRI, reassess in 6 weeks  Paroxysmal atrial flutter  Stable, followed by cardiology  Prostate cancer screening  -     PSA, Screening; Future; Expected date: 05/03/2022    WILLIAM (generalized anxiety disorder)  -     EScitalopram oxalate (LEXAPRO) 10 MG tablet; Take 1 tablet (10 mg total) by mouth once daily.  Dispense: 90 tablet; Refill: 0    Type 1 diabetes mellitus with hyperglycemia  Well controlled, followed by Endo    Medication List with Changes/Refills   New Medications    ESCITALOPRAM OXALATE (LEXAPRO) 10 MG TABLET    Take 1 tablet (10 mg total) by mouth once daily.   Current Medications    AMOXICILLIN (AMOXIL) 500  "MG CAPSULE    Take 4 capsules (2,000 mg total) by mouth as needed (take one hour before dental work).    ASPIRIN (ECOTRIN) 81 MG EC TABLET    Take 1 tablet (81 mg total) by mouth once daily.    BLOOD-GLUCOSE METER,CONTINUOUS (DEXCOM G6 ) MISC    1 Device by Misc.(Non-Drug; Combo Route) route once. for 1 dose    BLOOD-GLUCOSE SENSOR (DEXCOM G6 SENSOR) MORIS    1 sensor every 10 days    BLOOD-GLUCOSE TRANSMITTER (DEXCOM G6 TRANSMITTER) MORIS    1 transmitter every 3 months    COMFORT EZ PEN NEEDLES 33 GAUGE X 3/16" NDLE    USE AS DIRECTED with Levemir pens    EMPAGLIFLOZIN (JARDIANCE) 25 MG TABLET    Take 1 tablet (25 mg total) by mouth once daily.    GABAPENTIN (NEURONTIN) 300 MG CAPSULE    TAKE TWO CAPSULES BY MOUTH THREE TIMES DAILY FOR NEUROPATHY-(NERVE PAIN)-    GLUCAGON, HUMAN RECOMBINANT, (GLUCAGON EMERGENCY KIT, HUMAN,) 1 MG SOLR    Inject 1 mg into the muscle as needed.    HYDROCHLOROTHIAZIDE (HYDRODIURIL) 12.5 MG TAB    TAKE ONE CAPSULE BY MOUTH ONCE DAILY    INSULIN LISPRO 100 UNIT/ML INJECTION    USE CONTINOUSLY WITH INSULIN PUMP. MAXIMUM DAILY DOSE  UNITS    IRBESARTAN (AVAPRO) 150 MG TABLET    Take 1 tablet (150 mg total) by mouth once daily.    METFORMIN (GLUCOPHAGE-XR) 500 MG ER 24HR TABLET    Take 1 tablet (500 mg total) by mouth 2 (two) times daily with meals.    MUPIROCIN (BACTROBAN) 2 % OINTMENT    APPLY TO THE AFFECTED AREA(S) THREE TIMES DAILY    NYSTATIN (MYCOSTATIN) CREAM    Apply topically 2 (two) times daily.    ROSUVASTATIN (CRESTOR) 40 MG TAB    Take 1 tablet (40 mg total) by mouth every evening.    TADALAFIL (CIALIS) 20 MG TAB    Take 1 tablet (20 mg total) by mouth as needed (take every 3 days as needed for intercourse).    TADALAFIL (CIALIS) 5 MG TABLET    Take 5 mg by mouth once daily.    WARFARIN (COUMADIN) 3 MG TABLET    TAKE TWO TABLETS BY MOUTH DAILY   Changed and/or Refilled Medications    Modified Medication Previous Medication    ZOLPIDEM (AMBIEN) 10 MG TAB zolpidem " (AMBIEN) 10 mg Tab       Take 1 tablet (10 mg total) by mouth nightly as needed (insomnia).    TAKE ONE TABLET BY MOUTH EVERY EVENING AS NEEDED   Discontinued Medications    BLOOD SUGAR DIAGNOSTIC STRP    1 each by Misc.(Non-Drug; Combo Route) route 6 (six) times daily. Contour next strips. Pt needs contour jackelyn for compatibility w/ insulin pump (medtronic 670g). Necessity    ENOXAPARIN (LOVENOX) 150 MG/ML SYRG    Inject 1 mL (150 mg total) into the skin once daily.    INSULIN DETEMIR U-100 (LEVEMIR FLEXTOUCH U-100 INSULN) 100 UNIT/ML (3 ML) INPN PEN    Inject 48 Units into the skin every evening.    TRAMADOL (ULTRAM) 50 MG TABLET    Take 1 tablet (50 mg total) by mouth every 6 (six) hours as needed for Pain.

## 2022-05-05 ENCOUNTER — ANTI-COAG VISIT (OUTPATIENT)
Dept: CARDIOLOGY | Facility: CLINIC | Age: 57
End: 2022-05-05
Payer: MEDICARE

## 2022-05-05 DIAGNOSIS — G45.9 TIA (TRANSIENT ISCHEMIC ATTACK): ICD-10-CM

## 2022-05-05 DIAGNOSIS — Z79.01 LONG TERM (CURRENT) USE OF ANTICOAGULANTS: ICD-10-CM

## 2022-05-05 DIAGNOSIS — Z95.828 S/P ASCENDING AORTIC REPLACEMENT: Primary | ICD-10-CM

## 2022-05-05 PROCEDURE — 93793 ANTICOAG MGMT PT WARFARIN: CPT | Mod: S$GLB,,,

## 2022-05-05 PROCEDURE — 93793 PR ANTICOAGULANT MGMT FOR PT TAKING WARFARIN: ICD-10-PCS | Mod: S$GLB,,,

## 2022-05-09 ENCOUNTER — PATIENT OUTREACH (OUTPATIENT)
Dept: PRIMARY CARE CLINIC | Facility: CLINIC | Age: 57
End: 2022-05-09
Payer: MEDICARE

## 2022-05-09 NOTE — TELEPHONE ENCOUNTER
[facility] on 5/3 while he was here in the office.  Fax: [phone]    Update: 5/9/2022 we still haven't received the eye exam yet. I re-faxed again

## 2022-05-10 ENCOUNTER — PATIENT OUTREACH (OUTPATIENT)
Dept: PRIMARY CARE CLINIC | Facility: CLINIC | Age: 57
End: 2022-05-10
Payer: MEDICARE

## 2022-05-23 ENCOUNTER — PATIENT MESSAGE (OUTPATIENT)
Dept: DIABETES | Facility: CLINIC | Age: 57
End: 2022-05-23
Payer: MEDICARE

## 2022-06-02 ENCOUNTER — ANTI-COAG VISIT (OUTPATIENT)
Dept: CARDIOLOGY | Facility: CLINIC | Age: 57
End: 2022-06-02
Payer: MEDICARE

## 2022-06-02 DIAGNOSIS — Z95.828 S/P ASCENDING AORTIC REPLACEMENT: Primary | ICD-10-CM

## 2022-06-02 DIAGNOSIS — G45.9 TIA (TRANSIENT ISCHEMIC ATTACK): ICD-10-CM

## 2022-06-02 DIAGNOSIS — Z79.01 LONG TERM (CURRENT) USE OF ANTICOAGULANTS: ICD-10-CM

## 2022-06-02 PROCEDURE — 93793 PR ANTICOAGULANT MGMT FOR PT TAKING WARFARIN: ICD-10-PCS | Mod: S$GLB,,,

## 2022-06-02 PROCEDURE — 93793 ANTICOAG MGMT PT WARFARIN: CPT | Mod: S$GLB,,,

## 2022-06-06 ENCOUNTER — TELEPHONE (OUTPATIENT)
Dept: DIABETES | Facility: CLINIC | Age: 57
End: 2022-06-06
Payer: MEDICARE

## 2022-06-06 NOTE — TELEPHONE ENCOUNTER
Please push back his follow up visit with me -- I would like to see him after he starts the tandem pump  No need to see me before then   I would like to see his pump download after he start it  Put him down to see me on July 11th at 12 pm please   Tandem pump download and dexcom download

## 2022-06-14 ENCOUNTER — OFFICE VISIT (OUTPATIENT)
Dept: PODIATRY | Facility: CLINIC | Age: 57
End: 2022-06-14
Payer: MEDICARE

## 2022-06-14 ENCOUNTER — HOSPITAL ENCOUNTER (OUTPATIENT)
Dept: RADIOLOGY | Facility: HOSPITAL | Age: 57
Discharge: HOME | End: 2022-06-14
Attending: PODIATRIST
Payer: MEDICARE

## 2022-06-14 VITALS
HEIGHT: 67 IN | DIASTOLIC BLOOD PRESSURE: 68 MMHG | BODY MASS INDEX: 36.68 KG/M2 | HEART RATE: 86 BPM | WEIGHT: 233.69 LBS | SYSTOLIC BLOOD PRESSURE: 125 MMHG

## 2022-06-14 DIAGNOSIS — M92.61 HAGLUND'S DEFORMITY OF RIGHT HEEL: ICD-10-CM

## 2022-06-14 DIAGNOSIS — M92.61 HAGLUND'S DEFORMITY OF RIGHT HEEL: Primary | ICD-10-CM

## 2022-06-14 DIAGNOSIS — M79.671 RIGHT FOOT PAIN: ICD-10-CM

## 2022-06-14 PROCEDURE — 99214 PR OFFICE/OUTPT VISIT, EST, LEVL IV, 30-39 MIN: ICD-10-PCS | Mod: S$GLB,,, | Performed by: PODIATRIST

## 2022-06-14 PROCEDURE — 3078F PR MOST RECENT DIASTOLIC BLOOD PRESSURE < 80 MM HG: ICD-10-PCS | Mod: CPTII,S$GLB,, | Performed by: PODIATRIST

## 2022-06-14 PROCEDURE — 3008F PR BODY MASS INDEX (BMI) DOCUMENTED: ICD-10-PCS | Mod: CPTII,S$GLB,, | Performed by: PODIATRIST

## 2022-06-14 PROCEDURE — 3074F PR MOST RECENT SYSTOLIC BLOOD PRESSURE < 130 MM HG: ICD-10-PCS | Mod: CPTII,S$GLB,, | Performed by: PODIATRIST

## 2022-06-14 PROCEDURE — 73630 X-RAY EXAM OF FOOT: CPT | Mod: TC,RT

## 2022-06-14 PROCEDURE — 99214 OFFICE O/P EST MOD 30 MIN: CPT | Mod: S$GLB,,, | Performed by: PODIATRIST

## 2022-06-14 PROCEDURE — 99499 RISK ADDL DX/OHS AUDIT: ICD-10-PCS | Mod: S$GLB,,, | Performed by: PODIATRIST

## 2022-06-14 PROCEDURE — 4010F ACE/ARB THERAPY RXD/TAKEN: CPT | Mod: CPTII,S$GLB,, | Performed by: PODIATRIST

## 2022-06-14 PROCEDURE — 4010F PR ACE/ARB THEARPY RXD/TAKEN: ICD-10-PCS | Mod: CPTII,S$GLB,, | Performed by: PODIATRIST

## 2022-06-14 PROCEDURE — 73630 X-RAY EXAM OF FOOT: CPT | Mod: 26,RT,, | Performed by: RADIOLOGY

## 2022-06-14 PROCEDURE — 3051F PR MOST RECENT HEMOGLOBIN A1C LEVEL 7.0 - < 8.0%: ICD-10-PCS | Mod: CPTII,S$GLB,, | Performed by: PODIATRIST

## 2022-06-14 PROCEDURE — 99999 PR PBB SHADOW E&M-EST. PATIENT-LVL IV: ICD-10-PCS | Mod: PBBFAC,,, | Performed by: PODIATRIST

## 2022-06-14 PROCEDURE — 3078F DIAST BP <80 MM HG: CPT | Mod: CPTII,S$GLB,, | Performed by: PODIATRIST

## 2022-06-14 PROCEDURE — 3051F HG A1C>EQUAL 7.0%<8.0%: CPT | Mod: CPTII,S$GLB,, | Performed by: PODIATRIST

## 2022-06-14 PROCEDURE — 3008F BODY MASS INDEX DOCD: CPT | Mod: CPTII,S$GLB,, | Performed by: PODIATRIST

## 2022-06-14 PROCEDURE — 73630 XR FOOT COMPLETE 3 VIEW RIGHT: ICD-10-PCS | Mod: 26,RT,, | Performed by: RADIOLOGY

## 2022-06-14 PROCEDURE — 99999 PR PBB SHADOW E&M-EST. PATIENT-LVL IV: CPT | Mod: PBBFAC,,, | Performed by: PODIATRIST

## 2022-06-14 PROCEDURE — 3074F SYST BP LT 130 MM HG: CPT | Mod: CPTII,S$GLB,, | Performed by: PODIATRIST

## 2022-06-14 PROCEDURE — 99499 UNLISTED E&M SERVICE: CPT | Mod: S$GLB,,, | Performed by: PODIATRIST

## 2022-06-15 ENCOUNTER — TELEPHONE (OUTPATIENT)
Dept: PRIMARY CARE CLINIC | Facility: CLINIC | Age: 57
End: 2022-06-15
Payer: MEDICARE

## 2022-06-15 NOTE — TELEPHONE ENCOUNTER
----- Message from Alexander Bragg MD sent at 6/12/2022  7:08 AM CDT -----  Please call the patient regarding his PSA level normal 0.40 not much change from 2 yrs ago keep appt routine f/u with Dr Painter

## 2022-06-17 ENCOUNTER — TELEPHONE (OUTPATIENT)
Dept: DIABETES | Facility: CLINIC | Age: 57
End: 2022-06-17
Payer: MEDICARE

## 2022-06-17 ENCOUNTER — PATIENT MESSAGE (OUTPATIENT)
Dept: DIABETES | Facility: CLINIC | Age: 57
End: 2022-06-17
Payer: MEDICARE

## 2022-06-17 DIAGNOSIS — Z96.41 INSULIN PUMP IN PLACE: ICD-10-CM

## 2022-06-17 DIAGNOSIS — E10.649 TYPE 1 DIABETES MELLITUS WITH HYPOGLYCEMIA UNAWARENESS: ICD-10-CM

## 2022-06-17 DIAGNOSIS — Z46.81 INSULIN PUMP FITTING OR ADJUSTMENT: ICD-10-CM

## 2022-06-17 DIAGNOSIS — E10.65 TYPE 1 DIABETES MELLITUS WITH HYPERGLYCEMIA: ICD-10-CM

## 2022-06-17 RX ORDER — BLOOD-GLUCOSE,RECEIVER,CONT
1 EACH MISCELLANEOUS ONCE
Qty: 1 EACH | Refills: 0 | OUTPATIENT
Start: 2022-06-17 | End: 2022-06-17

## 2022-06-17 RX ORDER — BLOOD-GLUCOSE TRANSMITTER
EACH MISCELLANEOUS
Qty: 1 EACH | Refills: 3 | Status: SHIPPED | OUTPATIENT
Start: 2022-06-17 | End: 2023-03-10 | Stop reason: SDUPTHER

## 2022-06-17 RX ORDER — BLOOD-GLUCOSE SENSOR
EACH MISCELLANEOUS
Qty: 10 EACH | Refills: 3 | Status: SHIPPED | OUTPATIENT
Start: 2022-06-17 | End: 2023-03-10 | Stop reason: SDUPTHER

## 2022-06-17 NOTE — TELEPHONE ENCOUNTER
Dexcom Order faxed to [facility] along with chart notes and rx for dexcom sensors and transmitters

## 2022-06-22 ENCOUNTER — NUTRITION (OUTPATIENT)
Dept: DIABETES | Facility: CLINIC | Age: 57
End: 2022-06-22
Payer: MEDICARE

## 2022-06-22 DIAGNOSIS — E10.65 TYPE 1 DIABETES MELLITUS WITH HYPERGLYCEMIA: ICD-10-CM

## 2022-06-22 PROCEDURE — G0108 DIAB MANAGE TRN  PER INDIV: HCPCS | Mod: S$GLB,,, | Performed by: DIETITIAN, REGISTERED

## 2022-06-22 PROCEDURE — G0108 PR DIAB MANAGE TRN  PER INDIV: ICD-10-PCS | Mod: S$GLB,,, | Performed by: DIETITIAN, REGISTERED

## 2022-06-27 NOTE — PROGRESS NOTES
Diabetes Care Specialist Follow-up Note  Author: Katia Pham RD, CDE  Date: 6/27/2022    Program Intake  Reason for Diabetes Program Visit:: Intervention  Type of Intervention:: Individual  Individual: Device Training  Device Training: Insulin Pump Start (changing from medtronic to tandem)  Current diabetes risk level:: low  In the last 12 months, have you:: used emergency room services  Was the ER or hospital admission related to diabetes?: Yes  Permission to speak with others about care:: no    Lab Results   Component Value Date    HGBA1C 7.1 (H) 06/10/2022     A1c Pre Diabetes Care Specialist Intervention:  7.2%    Clinical    Patient Health Rating  Compared to other people your age, how would you rate your health?: Good    Problem Review  Reviewed Problem List with Patient: yes  Active comorbidities affecting diabetes self-care.: yes  Comorbidities: Cardiovascular Disease, Hypertension, Neuropathy  Reviewed health maintenance: yes    Clinical Assessment  Current Diabetes Treatment: Oral Medication, Insulin, Insulin pump  Have you ever experienced hypoglycemia (low blood sugar)?: yes  In the last month, how often have you experienced low blood sugar?: once every other week  Are you able to tell when your blood sugar is low?: Yes  What symptoms do you experience?: Dizzy/Light-headed, Shaky  Have you ever been hospitalized because your blood sugar was too low?: no  How do you treat hypoglycemia (low blood sugar)?: 1/2 can soda/fruit juice, 4 glucose tablets, 5-6 pieces of hard candy  Have you ever experienced hyperglycemia (high blood sugar)?: yes  In the last month, how often have you experienced high blood sugar?: once every other week  Are you able to tell when your blood sugar is high?: Yes  What are your symptoms?: blurry vision, fatigue  Have you ever been hospitalized because your blood sugar was high?: no    Medication Information  How do you obtain your medications?: Patient drives  How many days a week  do you miss your medications?: Never  Do you use a pill box or medication chart to help you manage your medications?: No  Do you sometimes have difficulty refilling your medications?: Yes (see comment)  Medication adherence impacting ability to self-manage diabetes?: No    Labs  Do you have regular lab work to monitor your medications?: Yes  Type of Regular Lab Work: A1c, Cholesterol, Microalbumin, CBC, BMP  Where do you get your labs drawn?: [facility]  Lab Compliance Barriers: No    Nutritional Status  Diet: Regular  Meal Plan 24 Hour Recall: Breakfast, Lunch, Dinner, Snack  Meal Plan 24 Hour Recall - Breakfast: [place] [employer]  Meal Plan 24 Hour Recall - Lunch: chips  Meal Plan 24 Hour Recall - Dinner: [employer] foot [employer] with 3 macadamia nut cookies  Meal Plan 24 Hour Recall - Snack: chips  Change in appetite?: No  Dentation:: Intact  Recent Changes in Weight: No Recent Weight Change  Current nutritional status an area of need that is impacting patient's ability to self-manage diabetes?: No    Additional Social History    Support  Does anyone support you with your diabetes care?: yes  Who supports you?: son/daughter, spouse, self, family member  Who takes you to your medical appointments?: self  Does the current support meet the patient's needs?: Yes  Is Support an area impacting ability to self-manage diabetes?: No    Access to Mass Media & Technology  Does the patient have access to any of the following devices or technologies?: Smart phone  Media or technology needs impacting ability to self-manage diabetes?: No    Cognitive/Behavioral Health  Alert and Oriented: Yes  Difficulty Thinking: No  Requires Prompting: No  Requires assistance for routine expression?: No  Cognitive or behavioral barriers impacting ability to self-manage diabetes?: No    Culture/Scientology  Culture or Scientology beliefs that may impact ability to access healthcare: No    Communication  Language preference: English  Hearing  Problems: Yes  Hearing Assistance: Hearing aid  Vision Problems: Yes  Vision problem type:: Decreased Vision  Vision Assistance: Glasses  Communication needs impacting ability to self-manage diabetes?: No    Health Literacy  Preferred Learning Method: Face to Face, Hands On, Demonstration  How often do you need to have someone help you read instructions, pamphlets, or written material from your doctor or pharmacy?: Never  Health literacy needs impacting ability to self-manage diabetes?: No      Diabetes Self-Management Skills Assessment     Diabetes Disease Process/Treatment Options  Patient/caregiver able to state what happens when someone has diabetes.: yes  Patient/caregiver knows what type of diabetes they have.: yes  Diabetes Type : Type I  Patient/caregiver able to identify at least three signs and symptoms of diabetes.: yes  Identified signs and symptoms:: blurred vision, fatigue, frequent urination, increased thirst, sexual dysfunction, frequent infections  Diabetes Disease Process/Treatment Options: Skills Assessment Completed: No  Assessment indicates:: Adequate understanding  Deferred due to:: Other (comment) (previously completed, there has been no change and patient has adequate understanding)  Area of need?: No    Nutrition/Healthy Eating  Challenges to healthy eating:: portion control, snacking between meals and at night  Method of carbohydrate measurement:: carb counting/reading labels  Patient can identify foods that impact blood sugar.: yes  Patient-identified foods:: fruit/fruit juice, starches (bread, pasta, rice, cereal), milk, soda, starchy vegetables (corn, peas, beans), sweets, yogurt  Nutrition/Healthy Eating Skills Assessment Completed:: Yes  Assessment indicates:: Adequate understanding  Deffered due to:: Other (comment) (previously completed, there has been no change and patient has adequate understanding)  Area of need?: No    Physical Activity/Exercise  Patient's daily activity level::  moderately active  Patient formally exercises outside of work.: no  Reasons for not exercising:: time constraints, work schedule  Exercise Type: walking  Patient can identify forms of physical activity.: yes  Stated forms of physical activity:: any movement performed by muscles that uses energy, moving to burn calories, manual activity at work, yardwork  Patient can identify reasons why exercise/physical activity is important in diabetes management.: yes  Identified reasons:: helps insulin work better, lowers blood glucose, blood pressure, and cholesterol, strengthens heart, muscles, and bones  Physical Activity/Exercise Skills Assessment Completed: : Yes  Assessment indicates:: Adequate understanding  Deffered due to:: Other (comment) (previously completed, there has been no change and patient has adequate understanding)  Area of need?: No    Medications  Patient is able to describe current diabetes management routine.: yes  Diabetes management routine:: diet, insulin, oral medications, insulin pump  Patient is able to identify current diabetes medications, dosages, and appropriate timing of medications.: yes  Patient understands the purpose of the medications taken for diabetes.: yes  Patient reports problems or concerns with current medication regimen.: yes  Medication regimen problems/concerns:: other (see comments) (starting tandem today)  Medication Skills Assessment Completed:: Yes  Assessment indicates:: Adequate understanding, Instruction Needed  Area of need?: Yes    Home Blood Glucose Monitoring  Patient states that blood sugar is checked at home daily.: yes  Monitoring Method:: personal continuous glucose monitor  Personal CGM type:: Dexcom G6  Patient is able to use personal CGM appropriately.: yes  CGM Report reviewed?: yes  Home Blood Glucose Monitoring Skills Assessment Completed: : Yes  Assessment indicates:: Adequate understanding, Instruction Needed (starting tandem - adding dexcom to  tandem)  Area of need?: Yes    Acute Complications  Patient is able to identify types of acute complications: Yes  Patient Identified:: Hypoglycemia, Hyperglycemia, Diabetic Ketoacidosis  Patient is able to state the basic meaning of hypoglycemia?: Yes  Able to state the blood sugar range for hypoglycemia?: yes  Patient can identify general symptoms of hypoglycemia: yes  Patient identified:: shakiness, sweating, dizziness  Able to state proper treatment of hypoglycemia?: yes  Patient identified:: 1/2 can soda/fruit juice, 5-6 pieces of hard candy, 1 tablespoon sugar/honey, 1 tube glucose gel, 4 glucose tablets  Patient is able to state the basic meaning of hyperglycemia?: Yes  Able to state the blood sugar range for hyperglycemia?: yes  Patient able to state proper treatment of hyperglycemia?: yes  Patient identified:: contact provider, take medication as recommended, increase water intake, monitor blood sugar  Patient able to verbalize sick day plan?: yes  Patient-stated sick day plan:: call provider if blood sugar does not improve, check urine for ketones, drink more fluids, drink sugar-free/calorie containing clear liquids if unable to take solid food, seek medical attention for nausea, diarrhea, vomiting, fever with high blood sugar  Acute Complications Skills Assessment Completed: : Yes  Assessment indicates:: Adequate understanding  Deffered due to:: Other (comment) (previously completed, there has been no change and patient has adequate understanding)  Area of need?: No    Chronic Complications  Patient can identify major chronic complications of diabetes.: yes  Stated chronic complications:: heart disease/heart attack, sexual dysfunction, stroke, kidney disease, neuropathy/nerve damage, retinopathy  Patient can identify ways to prevent or delay diabetes complications.: yes  Stated ways to prevent complications:: controlling blood sugar, maintaining optimal blood glucose control, healthy eating and regular  activity  Patient is aware that having diabetes increases risk of heart disease?: Yes  Patient is aware that heart disease is the leading cause of death and disability in people with diabetes?: Yes  Patient able to state risk factors for heart disease?: Yes  Patient stated risk factors for heart disease:: High blood pressure, High cholesterol, Diet, Limited activity, Medication non-adherance, Having diabetes  Patient is taking statin?: Yes  Chronic Complications Skills Assessment Completed: : No  Assessment indicates:: Adequate understanding  Deferred due to:: Other (comment) (previously completed, there has been no change and patient has adequate understanding)  Area of need?: No    Psychosocial/Coping  Patient can identify ways of coping with chronic disease.: yes  Patient-stated ways of coping with chronic disease:: support from loved ones  Psychosocial/Coping Skills Assessment Completed: : No  Assessment indicates:: Adequate understanding  Deffered due to:: Other (comment) (previously completed, there has been no change and patient has adequate understanding)  Area of need?: No      During today's follow-up visit,  the following areas required further assessment and content was provided/reviewed.    Based on today's diabetes care assessment, the following areas of need were identified:      Social 6/22/2022   Support No   Access to Mass Media/Tech No   Cognitive/Behavioral Health No   Culture/Orthodoxy No   Communication No   Health Literacy No        Clinical 6/22/2022   Medication Adherence No   Lab Compliance No   Nutritional Status No        Diabetes Self-Management Skills 6/22/2022   Diabetes Disease Process/Treatment Options No   Nutrition/Healthy Eating No   Physical Activity/Exercise No   Medication Yes   Home Blood Glucose Monitoring Yes   Acute Complications No   Chronic Complications No   Psychosocial/Coping No        Today's interventions were provided through individual discussion, instruction, and  written materials were provided.    Patient verbalized understanding of instruction and written materials.  Pt was able to return back demonstration of instructions today. Patient understood key points, needs reinforcement and further instruction.     Diabetes Self-Management Care Plan Review:  Diabetes Self-Management Care Plan Review and Evaluation of Progress:    During today's follow-up Brian Diabetes Self-Management Care Plan progress was reviewed and progress was evaluated including his/her input. Cj has agreed to continue his/her journey to improve/maintain overall diabetes control by continuing to set health goals. See care plan progress below.      Care Plan: Diabetes Management   Updates made since 5/28/2022 12:00 AM      Problem: Medications       Goal: Patient Agrees to take Diabetes Medication(s) via insulin pump as prescribed.    Start Date: 10/19/2021   Expected End Date: 10/19/2022   This Visit's Progress: On track   Priority: High   Barriers: No Barriers Identified   Note:    Medication changes:   Continue Jardiance 25 mg daily   Continue Metformin 500 mg to BID- to help with insulin resistance.         Pump settings:  Continue current pump settings.   Basal:  MN-6AM- 1.6 units/hr --  6AM-MN- 1.4 units/hr --      ICR:    MN-10AM: 1:3.5-  10AM- MN: 1:4-     ISF:15-     Target:  100-120     IOB:  3 hours            Discussed and started Tandem T-Slim and Control IQ.   Patient educated on insulin pump therapy, the purpose of insulin pump therapy, advantages and disadvantages of insulin pump therapy and/vs multiple daily injections, what a basal rate and bolus dose are, when to change infusion set and reservoir , demonstrated how to prepare the reservoir and infusion set for use with the pump, discussed ease of usage, basal and bolus, carbohydrate to insulin ratio MN-10AM 1:3.5 and 10AM-MN 1:4, correction factors 1:15, BG target 100-120, approved areas to insert set, carbohydrate  counting, error messages, explained the multiple basal rates are MN-6AM 1.6 and 6AM-MN 1.4 - patient will receive a little bit of insulin throughout 24 hours for a total of 34.8 units, bolus dose are delivered delivered by the inputting grams of carbohydrates, explained that patient will be counting carbohydrates and checking blood glucose before each meal, discussed when to change the reservoir and tubing, demonstrated how to fill the reservoir with insulin, how to apply infusion set. Educated on putting Dexcom G6 sensor information into the t-slim. Patient states understanding. Will follow-up in 72 hours to perform return demonstration and change infusion set and cartridge for the first time. Patient provided with written literature and CDE contact information     Education documents scanned into Media    Follow Up Plan     Follow up in about 1 week (around 6/29/2022) for Insulin Pump Start.    Today's care plan and follow up schedule was discussed with patient.  Cj verbalized understanding of the care plan, goals, and agrees to follow up plan.        The patient was encouraged to communicate with his/her health care provider/physician and care team regarding his/her condition(s) and treatment.  I provided the patient with my contact information today and encouraged to contact me via phone or [facility]'s Patient Portal as needed.

## 2022-06-29 ENCOUNTER — NUTRITION (OUTPATIENT)
Dept: DIABETES | Facility: CLINIC | Age: 57
End: 2022-06-29
Payer: MEDICARE

## 2022-06-29 DIAGNOSIS — E10.65 TYPE 1 DIABETES MELLITUS WITH HYPERGLYCEMIA: ICD-10-CM

## 2022-06-29 PROCEDURE — 99999 PR PBB SHADOW E&M-EST. PATIENT-LVL I: CPT | Mod: PBBFAC,,, | Performed by: DIETITIAN, REGISTERED

## 2022-06-29 PROCEDURE — 99999 PR PBB SHADOW E&M-EST. PATIENT-LVL I: ICD-10-PCS | Mod: PBBFAC,,, | Performed by: DIETITIAN, REGISTERED

## 2022-06-29 PROCEDURE — 95251 PR GLUCOSE MONITOR, 72 HOUR, PHYS INTERP: ICD-10-PCS | Mod: S$GLB,,, | Performed by: NURSE PRACTITIONER

## 2022-06-29 PROCEDURE — 95251 CONT GLUC MNTR ANALYSIS I&R: CPT | Mod: S$GLB,,, | Performed by: NURSE PRACTITIONER

## 2022-06-30 NOTE — PROGRESS NOTES
Continuous Glucose Sensor Report of Personal Device    Cj Barnes is a 56 y.o.male with type 1 diabetes     Interpretation of data from Dexcom G6 with Tandem insulin pump ---     Reason for review: Currently on anti-hyperglycemic therapy and failing to achieve glycemic goals.    Lab Results   Component Value Date    HGBA1C 7.1 (H) 06/10/2022         Current diabetes medications and doses: Tandem T-Slim insulin pump with Lispro     MN-6AM- 1.6 units/hr --  6AM-10AM- 1.4 units/hr --   10AM- MN: -1.4 units/hr      ICR:    MN-6AM: 1:3.5-  6AM- 10AM: 1:3.5   10AM- MN: 1:4-     ISF:15-     Target:  100-120     IOB:  3 hours          ________________________________________________________________    SUMMARY of KEY FINDINGS:      Average glucose: 169  Above 180 mg/dL: 24%   Within  mg/dL: 76%  Below 70 mg/dL: 0%  (Below 54 mg/dL: 0%)      CGM data reviewed and notable for the following trends:  Blood sugar readings are overall well controlled with new insulin pump  Some prandial excursions noted  Some issue with correction      Will make the following changes based on review of data:    Strongly encouraged patient to use sleep mode every night can put in a sleep schedule  Tighten carbohydrate ratio to 1:3.5 - across the board   Tightened ISF at 1600 to MN to 1:12   Continue to remain in control IQ    Jodi Quintero NP

## 2022-07-01 ENCOUNTER — ANTI-COAG VISIT (OUTPATIENT)
Dept: CARDIOLOGY | Facility: CLINIC | Age: 57
End: 2022-07-01
Payer: MEDICARE

## 2022-07-01 DIAGNOSIS — Z79.01 LONG TERM (CURRENT) USE OF ANTICOAGULANTS: ICD-10-CM

## 2022-07-01 DIAGNOSIS — G45.9 TIA (TRANSIENT ISCHEMIC ATTACK): ICD-10-CM

## 2022-07-01 DIAGNOSIS — Z95.828 S/P ASCENDING AORTIC REPLACEMENT: Primary | ICD-10-CM

## 2022-07-01 PROCEDURE — 93793 PR ANTICOAGULANT MGMT FOR PT TAKING WARFARIN: ICD-10-PCS | Mod: S$GLB,,,

## 2022-07-01 PROCEDURE — 93793 ANTICOAG MGMT PT WARFARIN: CPT | Mod: S$GLB,,,

## 2022-07-01 NOTE — PROGRESS NOTES
INR not at goal. Medications, chart, and patient findings reviewed. See calendar for adjustments to dose and follow up plan.    Addendum: Pt states that he has been using Voltaren gel 2-3x/day for 3 days for heel pain as he was not given an oral medication. As this is an NSAID product, possible interaction leading to increased INR. Okay to continue using.

## 2022-07-08 NOTE — PROGRESS NOTES
CC:   Chief Complaint   Patient presents with    Diabetes Mellitus       HPI: Cj Barnes is a 56 y.o. male presents for a follow up visit today for the management of T1DM.   The patient was diagnosed in his early 30's. Initially diagnosed with T2DM, 5 years later he was dx as T1.    He has used an insulin pump since 2006, previous on animas.  He was on the 670 G medtronic pump.    Now on the Tandem T Slim-- started June 2022     Family hx of diabetes: Mother, father, 3 brothers - no one with T1     Hospitalized for diabetes: denies     No personal or FH of thyroid cancer or personal of pancreatic cancer or pancreatitis.     Our last visit  Was in February of 2022  Since then he has started the tandem insulin pump in June 2022 with diabetes education  He followed up for a insulin pump download and review and we made insulin pump setting changes  Today he presents for another insulin pump download and review  Overall doing very well and liking his new insulin pump.  Blood sugar readings are slightly above goal  His last A1c was on his previous pump and was up to 7.1%  See attached downloads    DIABETES COMPLICATIONS: retinopathy, peripheral neuropathy, cardiovascular disease and cerebrovascular disease  TIA     Diabetes Management Status    ASA:  Yes - 81 mg daily and coumadin     Statin: Taking--Crestor 40 mg nightly   ACE/ARB: Taking-- Irbesartan 150 mg daily     Screening or Prevention Patient's value Goal Complete/Controlled?   HgA1C Testing and Control   Lab Results   Component Value Date    HGBA1C 7.1 (H) 06/10/2022      Annually/Less than 8% Yes   Lipid profile : 06/10/2022 Annually Yes   LDL control Lab Results   Component Value Date    LDLCALC 64.4 06/10/2022    Annually/Less than 100 mg/dl  Yes   Nephropathy screening Lab Results   Component Value Date    LABMICR <5.0 11/01/2021     Lab Results   Component Value Date    PROTEINUA Negative 08/16/2019    Annually Yes   Blood pressure BP Readings  from Last 1 Encounters:   07/11/22 90/60    Less than 140/90 Yes   Dilated retinal exam : 08/10/2021- [facility]  Annually No   Foot exam   : 07/11/2022 Annually Yes       CURRENT A1C:    Hemoglobin A1C   Date Value Ref Range Status   06/10/2022 7.1 (H) 4.0 - 5.6 % Final     Comment:     ADA Screening Guidelines:  5.7-6.4%  Consistent with prediabetes  >or=6.5%  Consistent with diabetes    High levels of fetal hemoglobin interfere with the HbA1C  assay. Heterozygous hemoglobin variants (HbS, HgC, etc)do  not significantly interfere with this assay.   However, presence of multiple variants may affect accuracy.     02/09/2022 6.8 (H) 4.0 - 5.6 % Final     Comment:     ADA Screening Guidelines:  5.7-6.4%  Consistent with prediabetes  >or=6.5%  Consistent with diabetes    High levels of fetal hemoglobin interfere with the HbA1C  assay. Heterozygous hemoglobin variants (HbS, HgC, etc)do  not significantly interfere with this assay.   However, presence of multiple variants may affect accuracy.     11/01/2021 6.7 (H) 4.0 - 5.6 % Final     Comment:     ADA Screening Guidelines:  5.7-6.4%  Consistent with prediabetes  >or=6.5%  Consistent with diabetes    High levels of fetal hemoglobin interfere with the HbA1C  assay. Heterozygous hemoglobin variants (HbS, HgC, etc)do  not significantly interfere with this assay.   However, presence of multiple variants may affect accuracy.         GOAL A1C: 6.5-7% - without hypoglycemia    DM MEDICATIONS USED IN THE PAST: Medtronic 670 G   Metformin   Steglatro- lack of coverage.   Jardiance   Humalog   Tandem insulin pump   dexcom       CURRENT DIABETES MEDICATIONS: Tandem T Slim insulin pump in control IQ with Dexcom, Metformin  mg BID  Jardiance 25 mg daily      Insulin Pump:  Tandem T Slim pump with Lispro   Control IQ mode      Pump settings:  Basal:  MN-6AM- 1.6 units/hr --  6AM-10AM- 1.4 units/hr --  10AM- 4PM: 3.5 units/hr--- entered incorrectly   4PM- MN: 3.5 units/hr  -- entered incorrectly      ICR  MN- 6AM: 1:3.5   6AM-10AM:1:3.5   10AM- 4PM-1:4  4PM- MN: 1:4      ISF:  MN- 6AM: 1:15  6AM-10AM:1:15  10AM- 4PM-1:15  4PM- MN: 1:12     Target:  110 ( control IQ)      IOB:  3 hours     Set changed every 2 days  Reservoir changed every 2days    Pump downloaded and reviewed     Average total daily dose is 136.58 units per day   46% basal 44% bolus, 10% correction  0% override      Back up Lantus/Levemir: Yes     BLOOD GLUCOSE MONITORING:   Sensor type: Dexcom G6  Average BG readin  Time in range: 66%  Estimated A1c : N/A  Site change: q10 days     Dexcom from [facility] [employer]       Sensor was downloaded in clinic today and reviewed with patient.   Please see attached document for download.       HYPOGLYCEMIA:  <1% low   0% very low   + hx of hypoglycemia unawareness   He doesn't feel BG readings until the 40-50's - once in the 50's he will have twitching to his eye.   Glucagon kit: yes  Medic alert bracelet: yes       MEALS:  Still eating high CHO diet --on average 251 g per day  Drinks: un sweet tea, water, coke zero.   He CHO counts- he feels comfortable.   Eating a very high carbohydrate diet still despite being educated          CURRENT EXERCISE:  None        Review of Systems  Review of Systems   Constitutional: Negative for appetite change, fatigue and unexpected weight change.   HENT: Negative for trouble swallowing.    Eyes: Negative for visual disturbance.   Respiratory: Negative for shortness of breath.    Cardiovascular: Negative for chest pain.   Gastrointestinal: Negative for nausea.   Endocrine: Negative for polydipsia, polyphagia and polyuria.   Genitourinary:        No Nocturia    Musculoskeletal: Positive for arthralgias.   Skin: Negative for wound.   Neurological: Positive for numbness.   Psychiatric/Behavioral: Positive for sleep disturbance. The patient is nervous/anxious.        Physical Exam   Physical Exam  Vitals and nursing note reviewed.   Constitutional:        General: He is not in acute distress.     Appearance: He is well-developed. He is obese. He is not ill-appearing.   HENT:      Head: Normocephalic and atraumatic.      Right Ear: External ear normal.      Left Ear: External ear normal.      Nose: Nose normal.   Neck:      Thyroid: No thyromegaly.      Trachea: No tracheal deviation.   Cardiovascular:      Rate and Rhythm: Normal rate and regular rhythm.      Heart sounds: No murmur heard.  Pulmonary:      Effort: Pulmonary effort is normal. No respiratory distress.      Breath sounds: Normal breath sounds.   Abdominal:      Palpations: Abdomen is soft.      Tenderness: There is no abdominal tenderness.      Hernia: No hernia is present.   Musculoskeletal:      Cervical back: Normal range of motion and neck supple.   Skin:     General: Skin is warm and dry.      Capillary Refill: Capillary refill takes less than 2 seconds.      Findings: No rash.      Comments: Insulin pump sites and dexcom sites are normal appearing. No lipo hypertropthy or atrophy     Neurological:      Mental Status: He is alert and oriented to person, place, and time.      Cranial Nerves: No cranial nerve deficit.   Psychiatric:         Behavior: Behavior normal.         Judgment: Judgment normal.         FOOT EXAMINATION: Appropriate footwear         Lab Results   Component Value Date    TSH 0.807 11/01/2021       Type 1 diabetes mellitus with hyperglycemia  A1c increased slightly   Now on new Tandem pump with Dexcom integration.   BG readings are above goal but improving. Still getting use to new pump   Encouraged him to cut back on CHO intake with meals-- encouraged 45-60 grams with meals instead of 70- grams   Also encouraged exercise and weight loss.          Medication changes:   Continue Jardiance 25 mg daily   Continue Metformin 500 mg to BID- to help with insulin resistance.       Pump settings:    Continue Tandem T Slim pump with Lispro   Control IQ mode      Pump  settings:  Basal:  MN-6AM- 1.6 units/hr --  6AM-10AM- 1.4 units/hr --  10AM- 4PM: 1.4 units/hr--- corrected   4PM- MN: 1.4 units/hr -- corrected       ICR  MN- 6AM: 1:3.5   6AM-10AM:1:3.5   10AM- 4PM-1:3.5-- tightened   4PM- MN: 1:3.5 -- tightened      ISF: tightened correction   MN- 6AM: 1:12  6AM-10AM:1:12  10AM- 4PM-1:12  4PM- MN: 1:12     Target:  110 ( control IQ)     IOB:  3 hours       -- Reviewed goals of therapy are to get the best control we can without hypoglycemia  -- Reviewed patient's current insulin regimen. Clarified proper insulin dose and timing in relation to meals, etc. Insulin injection sites and proper rotation instructed.    -- Advised frequent self blood glucose monitoring.  Patient encouraged to document glucose results and bring them to every clinic visit  -Continue to use Dexcom   -- Hypoglycemia precautions discussed. Instructed on precautions before driving.    -- Call for Bg repeatedly < 70 or > 180.   -- Close adherence to lifestyle changes recommended.   -- Periodic follow ups for eye evaluations, foot care and dental care suggested.      Patient has diabetes mellitus and manages diabetes with intensive insulin regimen and uses prandial and basal insulin daily-- on insulin pump   Patient requires a therapeutic CGM and is willing to use therapeutic CGM for the necessary frequent adjustments of insulin therapy.  I have completed an in-person visit during the previous 6 months and will continue to have in-person visits every 6 months to assess adherence to their CGM regimen and diabetes treatment plan.  Due to COVID pandemic and need for remote monitoring this tool is medically necessary          Type 1 diabetes mellitus with hypoglycemia unawareness  Avoid hypoglycemia.  Continue to use Dexcom with real-time alerts  Now on Tandem pump with control IQ    Insulin pump in place  See above     Insulin pump fitting or adjustment  See above     Atherosclerosis of native coronary artery of  native heart with angina pectoris  Optimize blood sugar readings    Essential hypertension  BP goal is < 140/90.   Tolerating  ARB  Controlled at goal   Blood pressure goals discussed with patient      Hyperlipidemia with target low density lipoprotein (LDL) cholesterol less than 70 mg/dL  On statin per ADA recommendations  LDL goal < 70. LDL now at goal on Crestor 40 mg nightly. LFTs WNL.   Continue Statin       TIA (transient ischemic attack)  Optimize blood sugar readings    Class 2 severe obesity due to excess calories with serious comorbidity and body mass index (BMI) of 36.0 to 36.9 in adult  Body mass index is 36.81 kg/m².  Increases insulin resistance.   Discussed DM diet and exercise.   Patient does exhibit insulin resistance and is on an SGLT 2 and metformin.        Pump backup plan    If the insulin pump is non functional and discontinued for anticipated more than 20 hours, please give daily injections of:   Long acting insulin Levemir 40  units daily   Short acting insulin Novolog/ Humalog 1:4  for meals according to carb ratios and sensitivity factor in the pump.     When the insulin pump is restarted, do not restart basal rates until at least 22 hours after the last long acting insulin injection. You can set a 0% temporary basal setting that will last until this time and use your pump to bolus for meals and correction.     For any technical insulin pump issues, please contact the insulin pump company; the toll free number is printed on the label on the back of the insulin pump.       If your sugar is running high for a few hours and does not respond to two correction doses from the insulin pump, it may mean that you have a bad pump site and the site should be changed          Follow up in about 3 months (around 10/11/2022).   With labs prior   See oksana in 1 month for pump download         Orders Placed This Encounter   Procedures    Hemoglobin A1C     Standing Status:   Future     Standing Expiration  Date:   1/11/2024    Basic Metabolic Panel     Standing Status:   Future     Standing Expiration Date:   1/11/2024    Microalbumin/Creatinine Ratio, Urine     Standing Status:   Future     Standing Expiration Date:   1/11/2024     Order Specific Question:   Specimen Source     Answer:   Urine    TSH     Standing Status:   Future     Standing Expiration Date:   1/11/2024    Ambulatory referral/consult to Diabetes Education     Standing Status:   Future     Standing Expiration Date:   1/11/2024     Referral Priority:   Routine     Referral Type:   Consultation     Referral Reason:   Specialty Services Required     Referred to Provider:   Katia Pham RD, CDE     Requested Specialty:   Diabetes     Number of Visits Requested:   1           Recommendations were discussed with the patient in detail  The patient verbalized understanding and agrees with the plan outlined as above.

## 2022-07-10 NOTE — PROGRESS NOTES
Subjective:      Patient ID: Cj Barnes is a 56 y.o. male.    Chief Complaint: Foot Pain (Right foot. History of Plantar Fasciitis in this foot) and Diabetes Mellitus (Pcp - Garry Stover MD - 5/3/2022)    Cj is a 56 y.o. male who presents to the podiatry clinic  with complaint of  right foot pain. Onset of the symptoms was about a month ago. Precipitating event: none known. Current symptoms include: ability to bear weight, but with some pain. Aggravating factors: any weight bearing. Symptoms have waxed and waned but are worse overall. Patient has had prior foot problems.     Review of Systems   Constitutional: Negative for chills, decreased appetite and fever.   Cardiovascular: Negative for leg swelling.   Musculoskeletal: Negative for arthritis, joint pain, joint swelling and myalgias.   Gastrointestinal: Negative for nausea and vomiting.   Neurological: Negative for loss of balance, numbness and paresthesias.         Patient Active Problem List   Diagnosis    Essential hypertension    Insulin pump fitting or adjustment    Type 1 diabetes mellitus with hyperglycemia    Hyperlipidemia with target low density lipoprotein (LDL) cholesterol less than 70 mg/dL    Insulin pump in place    Aortic stenosis    Aortic regurgitation, congenital    TIA (transient ischemic attack)    Atherosclerosis of native coronary artery of native heart with angina pectoris    Type 1 diabetes mellitus with hypoglycemia unawareness    Class 2 severe obesity due to excess calories with serious comorbidity and body mass index (BMI) of 36.0 to 36.9 in adult    S/P ascending aortic replacement    Long term (current) use of anticoagulants    Paroxysmal atrial flutter    Trouble swallowing    H/O mechanical aortic valve replacement    Iron deficiency anemia due to chronic blood loss    ANTHONY (iron deficiency anemia)    ACE-inhibitor cough    Diabetic mononeuropathy associated with diabetes mellitus due to  "underlying condition    Major depressive disorder, recurrent, mild    Trigger finger of left thumb       Current Outpatient Medications on File Prior to Visit   Medication Sig Dispense Refill    amoxicillin (AMOXIL) 500 MG capsule Take 4 capsules (2,000 mg total) by mouth as needed (take one hour before dental work). 28 capsule 11    COMFORT EZ PEN NEEDLES 33 gauge x 3/16" Ndle USE AS DIRECTED with Levemir pens  11    empagliflozin (JARDIANCE) 25 mg tablet Take 1 tablet (25 mg total) by mouth once daily. 30 tablet 11    EScitalopram oxalate (LEXAPRO) 10 MG tablet Take 1 tablet (10 mg total) by mouth once daily. 90 tablet 0    gabapentin (NEURONTIN) 300 MG capsule TAKE TWO CAPSULES BY MOUTH THREE TIMES DAILY FOR NEUROPATHY-(NERVE PAIN)- 360 capsule 2    hydroCHLOROthiazide (HYDRODIURIL) 12.5 MG Tab TAKE ONE CAPSULE BY MOUTH ONCE DAILY 90 tablet 2    insulin lispro 100 unit/mL injection USE CONTINOUSLY WITH INSULIN PUMP. MAXIMUM DAILY DOSE  UNITS 50 mL 11    irbesartan (AVAPRO) 150 MG tablet Take 1 tablet (150 mg total) by mouth once daily. 90 tablet 3    metFORMIN (GLUCOPHAGE-XR) 500 MG ER 24hr tablet Take 1 tablet (500 mg total) by mouth 2 (two) times daily with meals. 180 tablet 2    mupirocin (BACTROBAN) 2 % ointment APPLY TO THE AFFECTED AREA(S) THREE TIMES DAILY (Patient taking differently: 3 (three) times daily as needed. APPLY TO THE AFFECTED AREA(S) THREE TIMES DAILY) 22 g 1    nystatin (MYCOSTATIN) cream Apply topically 2 (two) times daily. 1 Tube 5    rosuvastatin (CRESTOR) 40 MG Tab Take 1 tablet (40 mg total) by mouth every evening. 90 tablet 2    tadalafiL (CIALIS) 5 MG tablet Take 5 mg by mouth once daily.      warfarin (COUMADIN) 3 MG tablet TAKE TWO TABLETS BY MOUTH DAILY (Patient taking differently: Stopped 12/30/2021) 60 tablet 11    zolpidem (AMBIEN) 10 mg Tab Take 1 tablet (10 mg total) by mouth nightly as needed (insomnia). 30 tablet 3    aspirin (ECOTRIN) 81 MG EC tablet " Take 1 tablet (81 mg total) by mouth once daily. (Patient taking differently: Take 81 mg by mouth once daily. Stopped 1/3)  0    blood-glucose meter,continuous (DEXCOM G6 ) Misc 1 Device by Misc.(Non-Drug; Combo Route) route once. for 1 dose 1 each 0    glucagon, human recombinant, (GLUCAGON EMERGENCY KIT, HUMAN,) 1 mg SolR Inject 1 mg into the muscle as needed. (Patient not taking: Reported on 5/3/2022) 1 each 0     No current facility-administered medications on file prior to visit.       Review of patient's allergies indicates:  No Known Allergies    Past Surgical History:   Procedure Laterality Date    AORTIC VALVE REPLACEMENT N/A 8/9/2019    Procedure: Replacement-valve-aortic;  Surgeon: Ryan Knight MD;  Location: [facility] OR [address] [place] [address];  Service: Cardiothoracic;  Laterality: N/A;    BACK SURGERY      BENTALL PROCEDURE FOR REPLACEMENT OF AORTIC VALVE, AORTIC ROOT, AND ASCENDING AORTA N/A 8/9/2019    Procedure: BENTALL PROCEDURE;  Surgeon: Ryan Knight MD;  Location: [facility] OR [address] [place] [address];  Service: Cardiothoracic;  Laterality: N/A;    CARDIAC SURGERY      CARPAL TUNNEL RELEASE      COLON SURGERY  2014    COLONOSCOPY N/A 2/17/2020    Procedure: COLONOSCOPY;  Surgeon: Richi Mock MD;  Location: [facility];  Service: Colon and Rectal;  Laterality: N/A;    ELBOW SURGERY      tendon release    ESOPHAGOGASTRODUODENOSCOPY N/A 9/10/2020    Procedure: EGD (ESOPHAGOGASTRODUODENOSCOPY);  Surgeon: Abilio Boo MD;  Location: [facility] ENDO ([address] [place] [address]);  Service: Endoscopy;  Laterality: N/A;  Cardiac clearance received, see telephone encounter 8/27/20-BB  Approval to hold Coumadin prior to procedure received, see telephone encounter 8/27/20-BB  covid-9/7/20-[facility]    FOOT TENDON SURGERY      right and left heart cath Bilateral 01/15/2018    TREATMENT OF CARDIAC ARRHYTHMIA N/A 8/19/2019    Procedure: CARDIOVERSION;  Surgeon: Juan Zapata MD;  Location: [facility] EP LAB;  Service:  "Cardiology;  Laterality: N/A;  afib, dccv only, anes, GP, 3092    TRIGGER FINGER RELEASE      x 2    TRIGGER FINGER RELEASE Left 1/5/2022    Procedure: RELEASE, TRIGGER FINGER,LEFT,SMALL & THUMB;  Surgeon: Dilma Hunter MD;  Location[facility];  Service: Orthopedics;  Laterality: Left;       Family History   Problem Relation Age of Onset    Heart disease Mother     Lung cancer Mother     Heart attack Father     Diabetes Father     HIV Brother        Social History     Socioeconomic History    Marital status:    Tobacco Use    Smoking status: Never Smoker    Smokeless tobacco: Former User     Types: Snuff     Quit date: 8/30/2019    Tobacco comment: since he was 12 yrs old   Substance and Sexual Activity    Alcohol use: No     Comment: socially    Drug use: No   Social History Narrative        2 grown kids    Delivers seafood               Objective:       Vitals:    06/14/22 1457   BP: 125/68   Pulse: 86   Weight: 106 kg (233 lb 11 oz)   Height: 5' 7" (1.702 m)   PainSc:   8   PainLoc: Foot        Physical Exam  Vitals reviewed.   Constitutional:       Appearance: He is well-developed.   Cardiovascular:      Comments: dorsalis pedis and posterior tibial pulses are palpable bilaterally. Capillary refill time is within normal limits. + pedal hair growth         Musculoskeletal:         General: Tenderness and deformity present. Normal range of motion.        Feet:       Comments: Adequate joint range of motion without pain, limitation, nor crepitation Bilateral feet and ankle joints. Muscle strength is 5/5 in all groups bilaterally.         Skin:     General: Skin is warm and dry.      Findings: No erythema, lesion or rash.   Neurological:      Mental Status: He is alert and oriented to person, place, and time.      Sensory: No sensory deficit.      Comments: Pelham-Eliza 5.07 monofilament is intact bilateral feet.      Psychiatric:         Behavior: Behavior normal.             "   Assessment:       Encounter Diagnoses   Name Primary?    Kulwant's deformity of right heel Yes    Right foot pain          Plan:       Cj was seen today for foot pain and diabetes mellitus.    Diagnoses and all orders for this visit:    Kulwant's deformity of right heel  -     X-Ray Foot Complete Right; Future    Right foot pain      I counseled the patient on his conditions, their implications and medical management.    Radiographics independently reviewed in detail with patient.   Patient instructed on adequate icing techniques. Patient should ice the affected area at least once per day x 10 minutes for 10 days . I advised the  patient that extra icing would also be beneficial to ensure adequate anti inflammatory effect   OTC Voltaren gel   CAM boot dispensed and applied to affected foot. Advised to use at all times while weightbearing and ambulating even for few minutes. Advised to use sneaker style shoe in opposite foot to maintain balance. Ok to remove  at night but reapply if patient is to get up in the middle of the night. Verbalized understanding.   No steroids 2/2 warfain/DM

## 2022-07-11 ENCOUNTER — ANTI-COAG VISIT (OUTPATIENT)
Dept: CARDIOLOGY | Facility: CLINIC | Age: 57
End: 2022-07-11
Payer: MEDICARE

## 2022-07-11 ENCOUNTER — TELEPHONE (OUTPATIENT)
Dept: DIABETES | Facility: CLINIC | Age: 57
End: 2022-07-11
Payer: MEDICARE

## 2022-07-11 ENCOUNTER — OFFICE VISIT (OUTPATIENT)
Dept: DIABETES | Facility: CLINIC | Age: 57
End: 2022-07-11
Payer: MEDICARE

## 2022-07-11 VITALS
HEIGHT: 67 IN | WEIGHT: 235 LBS | DIASTOLIC BLOOD PRESSURE: 60 MMHG | HEART RATE: 71 BPM | OXYGEN SATURATION: 95 % | SYSTOLIC BLOOD PRESSURE: 90 MMHG | TEMPERATURE: 98 F | BODY MASS INDEX: 36.88 KG/M2

## 2022-07-11 DIAGNOSIS — Z96.41 INSULIN PUMP IN PLACE: ICD-10-CM

## 2022-07-11 DIAGNOSIS — Z79.01 LONG TERM (CURRENT) USE OF ANTICOAGULANTS: ICD-10-CM

## 2022-07-11 DIAGNOSIS — G45.9 TIA (TRANSIENT ISCHEMIC ATTACK): ICD-10-CM

## 2022-07-11 DIAGNOSIS — Z95.828 S/P ASCENDING AORTIC REPLACEMENT: Primary | ICD-10-CM

## 2022-07-11 DIAGNOSIS — Z71.9 HEALTH EDUCATION/COUNSELING: ICD-10-CM

## 2022-07-11 DIAGNOSIS — E10.65 TYPE 1 DIABETES MELLITUS WITH HYPERGLYCEMIA: Primary | ICD-10-CM

## 2022-07-11 DIAGNOSIS — H60.501 ACUTE OTITIS EXTERNA OF RIGHT EAR, UNSPECIFIED TYPE: ICD-10-CM

## 2022-07-11 DIAGNOSIS — E78.5 HYPERLIPIDEMIA WITH TARGET LOW DENSITY LIPOPROTEIN (LDL) CHOLESTEROL LESS THAN 70 MG/DL: ICD-10-CM

## 2022-07-11 DIAGNOSIS — Z46.81 INSULIN PUMP FITTING OR ADJUSTMENT: ICD-10-CM

## 2022-07-11 DIAGNOSIS — E10.649 TYPE 1 DIABETES MELLITUS WITH HYPOGLYCEMIA UNAWARENESS: ICD-10-CM

## 2022-07-11 DIAGNOSIS — I10 ESSENTIAL HYPERTENSION: ICD-10-CM

## 2022-07-11 DIAGNOSIS — I25.119 ATHEROSCLEROSIS OF NATIVE CORONARY ARTERY OF NATIVE HEART WITH ANGINA PECTORIS: ICD-10-CM

## 2022-07-11 DIAGNOSIS — E66.01 CLASS 2 SEVERE OBESITY DUE TO EXCESS CALORIES WITH SERIOUS COMORBIDITY AND BODY MASS INDEX (BMI) OF 36.0 TO 36.9 IN ADULT: ICD-10-CM

## 2022-07-11 PROCEDURE — 99999 PR PBB SHADOW E&M-EST. PATIENT-LVL V: CPT | Mod: PBBFAC,,, | Performed by: NURSE PRACTITIONER

## 2022-07-11 PROCEDURE — 3074F PR MOST RECENT SYSTOLIC BLOOD PRESSURE < 130 MM HG: ICD-10-PCS | Mod: CPTII,S$GLB,, | Performed by: NURSE PRACTITIONER

## 2022-07-11 PROCEDURE — 4010F ACE/ARB THERAPY RXD/TAKEN: CPT | Mod: CPTII,S$GLB,, | Performed by: NURSE PRACTITIONER

## 2022-07-11 PROCEDURE — 99214 OFFICE O/P EST MOD 30 MIN: CPT | Mod: S$GLB,,, | Performed by: NURSE PRACTITIONER

## 2022-07-11 PROCEDURE — 95251 PR GLUCOSE MONITOR, 72 HOUR, PHYS INTERP: ICD-10-PCS | Mod: S$GLB,,, | Performed by: NURSE PRACTITIONER

## 2022-07-11 PROCEDURE — 3051F PR MOST RECENT HEMOGLOBIN A1C LEVEL 7.0 - < 8.0%: ICD-10-PCS | Mod: CPTII,S$GLB,, | Performed by: NURSE PRACTITIONER

## 2022-07-11 PROCEDURE — 3074F SYST BP LT 130 MM HG: CPT | Mod: CPTII,S$GLB,, | Performed by: NURSE PRACTITIONER

## 2022-07-11 PROCEDURE — 99214 PR OFFICE/OUTPT VISIT, EST, LEVL IV, 30-39 MIN: ICD-10-PCS | Mod: S$GLB,,, | Performed by: NURSE PRACTITIONER

## 2022-07-11 PROCEDURE — 3008F PR BODY MASS INDEX (BMI) DOCUMENTED: ICD-10-PCS | Mod: CPTII,S$GLB,, | Performed by: NURSE PRACTITIONER

## 2022-07-11 PROCEDURE — 95251 CONT GLUC MNTR ANALYSIS I&R: CPT | Mod: S$GLB,,, | Performed by: NURSE PRACTITIONER

## 2022-07-11 PROCEDURE — 4010F PR ACE/ARB THEARPY RXD/TAKEN: ICD-10-PCS | Mod: CPTII,S$GLB,, | Performed by: NURSE PRACTITIONER

## 2022-07-11 PROCEDURE — 3078F DIAST BP <80 MM HG: CPT | Mod: CPTII,S$GLB,, | Performed by: NURSE PRACTITIONER

## 2022-07-11 PROCEDURE — 3051F HG A1C>EQUAL 7.0%<8.0%: CPT | Mod: CPTII,S$GLB,, | Performed by: NURSE PRACTITIONER

## 2022-07-11 PROCEDURE — 3078F PR MOST RECENT DIASTOLIC BLOOD PRESSURE < 80 MM HG: ICD-10-PCS | Mod: CPTII,S$GLB,, | Performed by: NURSE PRACTITIONER

## 2022-07-11 PROCEDURE — 99999 PR PBB SHADOW E&M-EST. PATIENT-LVL V: ICD-10-PCS | Mod: PBBFAC,,, | Performed by: NURSE PRACTITIONER

## 2022-07-11 PROCEDURE — 3008F BODY MASS INDEX DOCD: CPT | Mod: CPTII,S$GLB,, | Performed by: NURSE PRACTITIONER

## 2022-07-11 PROCEDURE — 93793 ANTICOAG MGMT PT WARFARIN: CPT | Mod: S$GLB,,,

## 2022-07-11 PROCEDURE — 93793 PR ANTICOAGULANT MGMT FOR PT TAKING WARFARIN: ICD-10-PCS | Mod: S$GLB,,,

## 2022-07-11 RX ORDER — HYDROCHLOROTHIAZIDE 12.5 MG/1
TABLET ORAL
Qty: 90 TABLET | Refills: 2 | Status: SHIPPED | OUTPATIENT
Start: 2022-07-11 | End: 2023-01-09 | Stop reason: SDUPTHER

## 2022-07-11 RX ORDER — INSULIN LISPRO 100 [IU]/ML
INJECTION, SOLUTION INTRAVENOUS; SUBCUTANEOUS
Qty: 60 ML | Refills: 11 | Status: SHIPPED | OUTPATIENT
Start: 2022-07-11 | End: 2022-11-10 | Stop reason: SDUPTHER

## 2022-07-11 RX ORDER — IRBESARTAN 150 MG/1
150 TABLET ORAL DAILY
Qty: 90 TABLET | Refills: 3 | Status: SHIPPED | OUTPATIENT
Start: 2022-07-11 | End: 2023-01-09 | Stop reason: SDUPTHER

## 2022-07-11 RX ORDER — ROSUVASTATIN CALCIUM 40 MG/1
40 TABLET, COATED ORAL NIGHTLY
Qty: 90 TABLET | Refills: 2 | Status: SHIPPED | OUTPATIENT
Start: 2022-07-11 | End: 2022-11-10 | Stop reason: SDUPTHER

## 2022-07-11 RX ORDER — EMPAGLIFLOZIN 25 MG/1
25 TABLET, FILM COATED ORAL DAILY
Qty: 30 TABLET | Refills: 11 | Status: SHIPPED | OUTPATIENT
Start: 2022-07-11 | End: 2022-11-10 | Stop reason: SDUPTHER

## 2022-07-11 RX ORDER — CIPROFLOXACIN AND DEXAMETHASONE 3; 1 MG/ML; MG/ML
4 SUSPENSION/ DROPS AURICULAR (OTIC) 2 TIMES DAILY
Qty: 7.5 ML | Refills: 0 | Status: SHIPPED | OUTPATIENT
Start: 2022-07-11 | End: 2022-11-10

## 2022-07-11 RX ORDER — METFORMIN HYDROCHLORIDE 500 MG/1
500 TABLET, EXTENDED RELEASE ORAL 2 TIMES DAILY WITH MEALS
Qty: 180 TABLET | Refills: 2 | Status: SHIPPED | OUTPATIENT
Start: 2022-07-11 | End: 2022-11-10 | Stop reason: SDUPTHER

## 2022-07-11 RX ORDER — GABAPENTIN 300 MG/1
CAPSULE ORAL
Qty: 360 CAPSULE | Refills: 2 | Status: SHIPPED | OUTPATIENT
Start: 2022-07-11 | End: 2023-01-09 | Stop reason: SDUPTHER

## 2022-07-11 NOTE — PROGRESS NOTES
"Subjective:      Cj Barnes is a 56 y.o. male who returns today regarding his ED.    Previously on 5 mg Cialis daily for shrinking penis syndrome and to help prevent penis from retracting into suprapubic fat pad. Reports that daily medication has been helpful; He would like to increase daily Cialis to 10 mg to see if that helps with buried penis   S/p aortic valve replacement in 2019, Takes Coumadin 3 mg daily   S/p colon resection in 2014 for Stage II colon cancer    The following portions of the patient's history were reviewed and updated as appropriate: allergies, current medications, past family history, past medical history, past social history, past surgical history and problem list.    Review of Systems  A comprehensive multipoint review of systems was negative except as otherwise stated in the HPI.     Objective:   Vitals: /69 (BP Location: Left arm, Patient Position: Sitting, BP Method: Large (Automatic))   Pulse (!) 56   Resp 18   Ht 5' 7" (1.702 m)   Wt 106.7 kg (235 lb 3.7 oz)   BMI 36.84 kg/m²     Physical Exam   General: alert and oriented, no acute distress  Respiratory: Symmetric expansion, non-labored breathing  Cardiovascular: regular rate and rhythm, no peripheral edema  Abdomen: soft, non distended  Skin: normal coloration and turgor, no rashes, no suspicious skin lesions noted  Neuro: no gross deficits  Psych: normal judgment and insight, normal mood/affect and non-anxious    Lab Review   Urinalysis demonstrates no specimen   Lab Results   Component Value Date    WBC 12.20 12/01/2020    HGB 16.1 12/01/2020    HCT 48.5 12/01/2020    HCT 29 (L) 08/09/2019    MCV 86 12/01/2020     12/01/2020     Lab Results   Component Value Date    CREATININE 0.8 06/10/2022    BUN 17 06/10/2022     Lab Results   Component Value Date    PSA 0.40 06/10/2022    PSADIAG 0.33 04/06/2021       Assessment and Plan:   1. Erectile dysfunction due to type 1 diabetes mellitus  2. Benign prostatic " hyperplasia with lower urinary tract symptoms, symptom details unspecified   --Will trial cilais 10 mg po daily for BPH/ED/buried penis  - tadalafiL (CIALIS) 10 MG tablet; Take 1 tablet (10 mg total) by mouth once daily. for 10 days  Dispense: 12 tablet; Refill: 11  --rtc prn          This note is dictated on M*Modal word recognition program.  There are word recognition mistakes that are occasionally missed on review.

## 2022-07-12 RX ORDER — SYRINGE AND NEEDLE,INSULIN,1ML 31GX15/64"
SYRINGE, EMPTY DISPOSABLE MISCELLANEOUS
Qty: 150 EACH | Refills: 11 | Status: SHIPPED | OUTPATIENT
Start: 2022-07-12 | End: 2024-03-11 | Stop reason: SDUPTHER

## 2022-07-12 RX ORDER — INSULIN DETEMIR 100 [IU]/ML
40 INJECTION, SOLUTION SUBCUTANEOUS NIGHTLY
Qty: 20 ML | Refills: 3 | Status: SHIPPED | OUTPATIENT
Start: 2022-07-12 | End: 2022-11-10 | Stop reason: SDUPTHER

## 2022-07-12 NOTE — ASSESSMENT & PLAN NOTE
On statin per ADA recommendations  LDL goal < 70. LDL now at goal on Crestor 40 mg nightly. LFTs WNL.   Continue Statin

## 2022-07-12 NOTE — ASSESSMENT & PLAN NOTE
Body mass index is 36.81 kg/m².  Increases insulin resistance.   Discussed DM diet and exercise.   Patient does exhibit insulin resistance and is on an SGLT 2 and metformin.

## 2022-07-12 NOTE — ASSESSMENT & PLAN NOTE
Avoid hypoglycemia.  Continue to use Dexcom with real-time alerts  Now on Tandem pump with control IQ

## 2022-07-12 NOTE — ASSESSMENT & PLAN NOTE
A1c increased slightly   Now on new Tandem pump with Dexcom integration.   BG readings are above goal but improving. Still getting use to new pump   Encouraged him to cut back on CHO intake with meals-- encouraged 45-60 grams with meals instead of 70- grams   Also encouraged exercise and weight loss.          Medication changes:   Continue Jardiance 25 mg daily   Continue Metformin 500 mg to BID- to help with insulin resistance.       Pump settings:    Continue Tandem T Slim pump with Lispro   Control IQ mode      Pump settings:  Basal:  MN-6AM- 1.6 units/hr --  6AM-10AM- 1.4 units/hr --  10AM- 4PM: 1.4 units/hr--- corrected   4PM- MN: 1.4 units/hr -- corrected       ICR  MN- 6AM: 1:3.5   6AM-10AM:1:3.5   10AM- 4PM-1:3.5-- tightened   4PM- MN: 1:3.5 -- tightened      ISF: tightened correction   MN- 6AM: 1:12  6AM-10AM:1:12  10AM- 4PM-1:12  4PM- MN: 1:12     Target:  110 ( control IQ)     IOB:  3 hours       -- Reviewed goals of therapy are to get the best control we can without hypoglycemia  -- Reviewed patient's current insulin regimen. Clarified proper insulin dose and timing in relation to meals, etc. Insulin injection sites and proper rotation instructed.    -- Advised frequent self blood glucose monitoring.  Patient encouraged to document glucose results and bring them to every clinic visit  -Continue to use Dexcom   -- Hypoglycemia precautions discussed. Instructed on precautions before driving.    -- Call for Bg repeatedly < 70 or > 180.   -- Close adherence to lifestyle changes recommended.   -- Periodic follow ups for eye evaluations, foot care and dental care suggested.      Patient has diabetes mellitus and manages diabetes with intensive insulin regimen and uses prandial and basal insulin daily-- on insulin pump   Patient requires a therapeutic CGM and is willing to use therapeutic CGM for the necessary frequent adjustments of insulin therapy.  I have completed an in-person visit during the  previous 6 months and will continue to have in-person visits every 6 months to assess adherence to their CGM regimen and diabetes treatment plan.  Due to COVID pandemic and need for remote monitoring this tool is medically necessary

## 2022-07-13 ENCOUNTER — OFFICE VISIT (OUTPATIENT)
Dept: UROLOGY | Facility: CLINIC | Age: 57
End: 2022-07-13
Payer: MEDICARE

## 2022-07-13 VITALS
DIASTOLIC BLOOD PRESSURE: 69 MMHG | SYSTOLIC BLOOD PRESSURE: 121 MMHG | HEART RATE: 56 BPM | BODY MASS INDEX: 36.92 KG/M2 | WEIGHT: 235.25 LBS | RESPIRATION RATE: 18 BRPM | HEIGHT: 67 IN

## 2022-07-13 DIAGNOSIS — N40.1 BENIGN PROSTATIC HYPERPLASIA WITH LOWER URINARY TRACT SYMPTOMS, SYMPTOM DETAILS UNSPECIFIED: ICD-10-CM

## 2022-07-13 DIAGNOSIS — N52.9 ERECTILE DYSFUNCTION DUE TO TYPE 1 DIABETES MELLITUS: Primary | ICD-10-CM

## 2022-07-13 DIAGNOSIS — E10.69 ERECTILE DYSFUNCTION DUE TO TYPE 1 DIABETES MELLITUS: Primary | ICD-10-CM

## 2022-07-13 PROCEDURE — 3074F PR MOST RECENT SYSTOLIC BLOOD PRESSURE < 130 MM HG: ICD-10-PCS | Mod: CPTII,S$GLB,, | Performed by: NURSE PRACTITIONER

## 2022-07-13 PROCEDURE — 3074F SYST BP LT 130 MM HG: CPT | Mod: CPTII,S$GLB,, | Performed by: NURSE PRACTITIONER

## 2022-07-13 PROCEDURE — 1160F RVW MEDS BY RX/DR IN RCRD: CPT | Mod: CPTII,S$GLB,, | Performed by: NURSE PRACTITIONER

## 2022-07-13 PROCEDURE — 4010F PR ACE/ARB THEARPY RXD/TAKEN: ICD-10-PCS | Mod: CPTII,S$GLB,, | Performed by: NURSE PRACTITIONER

## 2022-07-13 PROCEDURE — 4010F ACE/ARB THERAPY RXD/TAKEN: CPT | Mod: CPTII,S$GLB,, | Performed by: NURSE PRACTITIONER

## 2022-07-13 PROCEDURE — 1160F PR REVIEW ALL MEDS BY PRESCRIBER/CLIN PHARMACIST DOCUMENTED: ICD-10-PCS | Mod: CPTII,S$GLB,, | Performed by: NURSE PRACTITIONER

## 2022-07-13 PROCEDURE — 3008F PR BODY MASS INDEX (BMI) DOCUMENTED: ICD-10-PCS | Mod: CPTII,S$GLB,, | Performed by: NURSE PRACTITIONER

## 2022-07-13 PROCEDURE — 3078F PR MOST RECENT DIASTOLIC BLOOD PRESSURE < 80 MM HG: ICD-10-PCS | Mod: CPTII,S$GLB,, | Performed by: NURSE PRACTITIONER

## 2022-07-13 PROCEDURE — 3078F DIAST BP <80 MM HG: CPT | Mod: CPTII,S$GLB,, | Performed by: NURSE PRACTITIONER

## 2022-07-13 PROCEDURE — 3051F PR MOST RECENT HEMOGLOBIN A1C LEVEL 7.0 - < 8.0%: ICD-10-PCS | Mod: CPTII,S$GLB,, | Performed by: NURSE PRACTITIONER

## 2022-07-13 PROCEDURE — 99213 PR OFFICE/OUTPT VISIT, EST, LEVL III, 20-29 MIN: ICD-10-PCS | Mod: S$GLB,,, | Performed by: NURSE PRACTITIONER

## 2022-07-13 PROCEDURE — 3051F HG A1C>EQUAL 7.0%<8.0%: CPT | Mod: CPTII,S$GLB,, | Performed by: NURSE PRACTITIONER

## 2022-07-13 PROCEDURE — 99999 PR PBB SHADOW E&M-EST. PATIENT-LVL V: CPT | Mod: PBBFAC,,, | Performed by: NURSE PRACTITIONER

## 2022-07-13 PROCEDURE — 1159F MED LIST DOCD IN RCRD: CPT | Mod: CPTII,S$GLB,, | Performed by: NURSE PRACTITIONER

## 2022-07-13 PROCEDURE — 99999 PR PBB SHADOW E&M-EST. PATIENT-LVL V: ICD-10-PCS | Mod: PBBFAC,,, | Performed by: NURSE PRACTITIONER

## 2022-07-13 PROCEDURE — 3008F BODY MASS INDEX DOCD: CPT | Mod: CPTII,S$GLB,, | Performed by: NURSE PRACTITIONER

## 2022-07-13 PROCEDURE — 99213 OFFICE O/P EST LOW 20 MIN: CPT | Mod: S$GLB,,, | Performed by: NURSE PRACTITIONER

## 2022-07-13 PROCEDURE — 1159F PR MEDICATION LIST DOCUMENTED IN MEDICAL RECORD: ICD-10-PCS | Mod: CPTII,S$GLB,, | Performed by: NURSE PRACTITIONER

## 2022-07-13 RX ORDER — TADALAFIL 10 MG/1
10 TABLET ORAL DAILY
Qty: 12 TABLET | Refills: 11 | Status: SHIPPED | OUTPATIENT
Start: 2022-07-13 | End: 2022-07-23

## 2022-07-13 RX ORDER — ATORVASTATIN CALCIUM 80 MG/1
80 TABLET, FILM COATED ORAL DAILY
COMMUNITY
Start: 2022-04-30 | End: 2022-11-10

## 2022-07-18 ENCOUNTER — TELEPHONE (OUTPATIENT)
Dept: DIABETES | Facility: CLINIC | Age: 57
End: 2022-07-18
Payer: MEDICARE

## 2022-07-18 NOTE — TELEPHONE ENCOUNTER
Called [employer] to see if Insulin lispro is covered, pharmacy stated it is covered but is too soon to refill as it was just filled on 6/26

## 2022-07-27 ENCOUNTER — ANTI-COAG VISIT (OUTPATIENT)
Dept: CARDIOLOGY | Facility: CLINIC | Age: 57
End: 2022-07-27
Payer: MEDICARE

## 2022-07-27 DIAGNOSIS — G45.9 TIA (TRANSIENT ISCHEMIC ATTACK): ICD-10-CM

## 2022-07-27 DIAGNOSIS — Z95.828 S/P ASCENDING AORTIC REPLACEMENT: Primary | ICD-10-CM

## 2022-07-27 DIAGNOSIS — Z79.01 LONG TERM (CURRENT) USE OF ANTICOAGULANTS: ICD-10-CM

## 2022-07-27 PROCEDURE — 93793 ANTICOAG MGMT PT WARFARIN: CPT | Mod: S$GLB,,,

## 2022-07-27 PROCEDURE — 93793 PR ANTICOAGULANT MGMT FOR PT TAKING WARFARIN: ICD-10-PCS | Mod: S$GLB,,,

## 2022-08-15 ENCOUNTER — NUTRITION (OUTPATIENT)
Dept: DIABETES | Facility: CLINIC | Age: 57
End: 2022-08-15
Payer: MEDICARE

## 2022-08-15 DIAGNOSIS — E10.65 TYPE 1 DIABETES MELLITUS WITH HYPERGLYCEMIA: ICD-10-CM

## 2022-08-15 PROCEDURE — G0108 DIAB MANAGE TRN  PER INDIV: HCPCS | Mod: S$GLB,,, | Performed by: DIETITIAN, REGISTERED

## 2022-08-15 PROCEDURE — 99999 PR PBB SHADOW E&M-EST. PATIENT-LVL II: ICD-10-PCS | Mod: PBBFAC,,, | Performed by: DIETITIAN, REGISTERED

## 2022-08-15 PROCEDURE — 99999 PR PBB SHADOW E&M-EST. PATIENT-LVL II: CPT | Mod: PBBFAC,,, | Performed by: DIETITIAN, REGISTERED

## 2022-08-15 PROCEDURE — G0108 PR DIAB MANAGE TRN  PER INDIV: ICD-10-PCS | Mod: S$GLB,,, | Performed by: DIETITIAN, REGISTERED

## 2022-08-17 ENCOUNTER — ANTI-COAG VISIT (OUTPATIENT)
Dept: CARDIOLOGY | Facility: CLINIC | Age: 57
End: 2022-08-17
Payer: MEDICARE

## 2022-08-17 DIAGNOSIS — G45.9 TIA (TRANSIENT ISCHEMIC ATTACK): ICD-10-CM

## 2022-08-17 DIAGNOSIS — Z79.01 LONG TERM (CURRENT) USE OF ANTICOAGULANTS: ICD-10-CM

## 2022-08-17 DIAGNOSIS — Z95.828 S/P ASCENDING AORTIC REPLACEMENT: Primary | ICD-10-CM

## 2022-08-17 PROCEDURE — 93793 PR ANTICOAGULANT MGMT FOR PT TAKING WARFARIN: ICD-10-PCS | Mod: S$GLB,,,

## 2022-08-17 PROCEDURE — 93793 ANTICOAG MGMT PT WARFARIN: CPT | Mod: S$GLB,,,

## 2022-08-23 NOTE — PROGRESS NOTES
Diabetes Care Specialist Follow-up Note  Author: Katia Pham RD, CDE  Date: 8/23/2022    Program Intake  Reason for Diabetes Program Visit:: Intervention  Type of Intervention:: Individual  Individual: Education  Education: Pattern Management  Current diabetes risk level:: low  In the last 12 months, have you:: used emergency room services  Was the ER or hospital admission related to diabetes?: Yes  Permission to speak with others about care:: no    Lab Results   Component Value Date    HGBA1C 7.1 (H) 06/10/2022     A1c Pre Diabetes Care Specialist Intervention:  7.2%    Clinical    Patient Health Rating  Compared to other people your age, how would you rate your health?: Good    Problem Review  Reviewed Problem List with Patient: yes  Active comorbidities affecting diabetes self-care.: yes  Comorbidities: Cardiovascular Disease, Hypertension, Neuropathy  Reviewed health maintenance: yes    Clinical Assessment  Current Diabetes Treatment: Oral Medication, Insulin, Insulin pump  Have you ever experienced hypoglycemia (low blood sugar)?: yes  In the last month, how often have you experienced low blood sugar?: once every other week  Are you able to tell when your blood sugar is low?: Yes  What symptoms do you experience?: Dizzy/Light-headed, Shaky  Have you ever been hospitalized because your blood sugar was too low?: no  How do you treat hypoglycemia (low blood sugar)?: 1/2 can soda/fruit juice, 4 glucose tablets, 1 tablespoon sugar/honey, 5-6 pieces of hard candy, 1 tube glucose gel  Have you ever experienced hyperglycemia (high blood sugar)?: yes  In the last month, how often have you experienced high blood sugar?: once every other week  Are you able to tell when your blood sugar is high?: Yes  What are your symptoms?: blurry vision, fatigue  Have you ever been hospitalized because your blood sugar was high?: no    Medication Information  How do you obtain your medications?: Patient drives  How many days a week  do you miss your medications?: Never  Do you use a pill box or medication chart to help you manage your medications?: No  Do you sometimes have difficulty refilling your medications?: Yes (see comment)  Medication adherence impacting ability to self-manage diabetes?: No    Labs  Do you have regular lab work to monitor your medications?: Yes  Type of Regular Lab Work: A1c, Cholesterol, Microalbumin, CBC, BMP  Where do you get your labs drawn?: [facility]  Lab Compliance Barriers: No    Nutritional Status  Diet: Regular  Meal Plan 24 Hour Recall: Breakfast, Lunch, Dinner, Snack  Meal Plan 24 Hour Recall - Breakfast: [place] [employer] [place]  Meal Plan 24 Hour Recall - Lunch: chips  Meal Plan 24 Hour Recall - Dinner: [employer] foot [employer] with 3 macadamia nut cookies  Meal Plan 24 Hour Recall - Snack: chips  Change in appetite?: No  Dentation:: Intact  Recent Changes in Weight: No Recent Weight Change  Current nutritional status an area of need that is impacting patient's ability to self-manage diabetes?: No    Additional Social History    Support  Does anyone support you with your diabetes care?: yes  Who supports you?: son/daughter, spouse, self, family member  Who takes you to your medical appointments?: self  Does the current support meet the patient's needs?: Yes  Is Support an area impacting ability to self-manage diabetes?: No    Access to Mass Media & Technology  Does the patient have access to any of the following devices or technologies?: Smart phone  Media or technology needs impacting ability to self-manage diabetes?: No    Cognitive/Behavioral Health  Alert and Oriented: Yes  Difficulty Thinking: No  Requires Prompting: No  Requires assistance for routine expression?: No  Cognitive or behavioral barriers impacting ability to self-manage diabetes?: No    Culture/Anabaptist  Culture or Hinduism beliefs that may impact ability to access healthcare: No    Communication  Language preference: English  Hearing  Problems: Yes  Hearing Assistance: Hearing aid  Vision Problems: Yes  Vision problem type:: Decreased Vision  Vision Assistance: Glasses  Communication needs impacting ability to self-manage diabetes?: No    Health Literacy  Preferred Learning Method: Face to Face, Hands On, Demonstration  How often do you need to have someone help you read instructions, pamphlets, or written material from your doctor or pharmacy?: Never  Health literacy needs impacting ability to self-manage diabetes?: No      Diabetes Self-Management Skills Assessment     Diabetes Disease Process/Treatment Options  Patient/caregiver able to state what happens when someone has diabetes.: yes  Patient/caregiver knows what type of diabetes they have.: yes  Diabetes Type : Type I  Patient/caregiver able to identify at least three signs and symptoms of diabetes.: yes  Identified signs and symptoms:: blurred vision, fatigue, frequent urination, increased thirst, sexual dysfunction, frequent infections  Assessment indicates:: Adequate understanding  Deferred due to:: Other (comment) (previously completed, there has been no change and patient has adequate understanding)  Area of need?: No    Nutrition/Healthy Eating  Challenges to healthy eating:: portion control, snacking between meals and at night  Method of carbohydrate measurement:: carb counting/reading labels  Patient can identify foods that impact blood sugar.: yes  Patient-identified foods:: fruit/fruit juice, starches (bread, pasta, rice, cereal), milk, soda, starchy vegetables (corn, peas, beans), sweets, yogurt  Nutrition/Healthy Eating Skills Assessment Completed:: No  Assessment indicates:: Adequate understanding  Deffered due to:: Other (comment) (previously completed, there has been no change and patient has adequate understanding)  Area of need?: No    Physical Activity/Exercise  Patient's daily activity level:: moderately active  Patient formally exercises outside of work.: no  Reasons  for not exercising:: time constraints, work schedule  Exercise Type: walking  Patient can identify forms of physical activity.: yes  Stated forms of physical activity:: any movement performed by muscles that uses energy, moving to burn calories, manual activity at work, yardwork  Patient can identify reasons why exercise/physical activity is important in diabetes management.: yes  Identified reasons:: helps insulin work better, lowers blood glucose, blood pressure, and cholesterol, strengthens heart, muscles, and bones  Physical Activity/Exercise Skills Assessment Completed: : No  Assessment indicates:: Adequate understanding  Deffered due to:: Other (comment) (previously completed, there has been no change and patient has adequate understanding)  Area of need?: No    Medications  Patient is able to describe current diabetes management routine.: yes  Diabetes management routine:: diet, insulin, oral medications, insulin pump  Patient is able to identify current diabetes medications, dosages, and appropriate timing of medications.: yes  Patient understands the purpose of the medications taken for diabetes.: yes  Patient reports problems or concerns with current medication regimen.: yes  Medication regimen problems/concerns:: other (see comments) (starting tandem today)  Medication Skills Assessment Completed:: Yes  Assessment indicates:: Adequate understanding, Instruction Needed  Area of need?: Yes                        Does better when remembering to put sleep mode on    Home Blood Glucose Monitoring  Patient states that blood sugar is checked at home daily.: yes  Monitoring Method:: personal continuous glucose monitor  Personal CGM type:: Dexcom G6  Patient is able to use personal CGM appropriately.: yes  CGM Report reviewed?: yes  Home Blood Glucose Monitoring Skills Assessment Completed: : Yes  Assessment indicates:: Adequate understanding (starting tandem - adding dexcom to tandem)  Area of need?: No    Acute  Complications  Patient is able to identify types of acute complications: Yes  Patient Identified:: Hypoglycemia, Hyperglycemia, Diabetic Ketoacidosis  Patient is able to state the basic meaning of hypoglycemia?: Yes  Able to state the blood sugar range for hypoglycemia?: yes  Patient can identify general symptoms of hypoglycemia: yes  Patient identified:: shakiness, sweating, dizziness  Able to state proper treatment of hypoglycemia?: yes  Patient identified:: 1/2 can soda/fruit juice, 5-6 pieces of hard candy, 1 tablespoon sugar/honey, 1 tube glucose gel, 4 glucose tablets  Patient is able to state the basic meaning of hyperglycemia?: Yes  Able to state the blood sugar range for hyperglycemia?: yes  Patient able to state proper treatment of hyperglycemia?: yes  Patient identified:: contact provider, take medication as recommended, increase water intake, monitor blood sugar  Patient able to verbalize sick day plan?: yes  Patient-stated sick day plan:: call provider if blood sugar does not improve, check urine for ketones, drink more fluids, drink sugar-free/calorie containing clear liquids if unable to take solid food, seek medical attention for nausea, diarrhea, vomiting, fever with high blood sugar  Acute Complications Skills Assessment Completed: : No  Assessment indicates:: Adequate understanding  Deffered due to:: Other (comment) (previously completed, there has been no change and patient has adequate understanding)  Area of need?: No    Chronic Complications  Patient can identify major chronic complications of diabetes.: yes  Stated chronic complications:: heart disease/heart attack, sexual dysfunction, stroke, kidney disease, neuropathy/nerve damage, retinopathy  Patient can identify ways to prevent or delay diabetes complications.: yes  Stated ways to prevent complications:: controlling blood sugar, maintaining optimal blood glucose control, healthy eating and regular activity  Patient is aware that having  diabetes increases risk of heart disease?: Yes  Patient is aware that heart disease is the leading cause of death and disability in people with diabetes?: Yes  Patient able to state risk factors for heart disease?: Yes  Patient stated risk factors for heart disease:: High blood pressure, Medication non-adherance, High cholesterol, Having diabetes, Diet, Limited activity  Patient is taking statin?: Yes  Chronic Complications Skills Assessment Completed: : Yes  Assessment indicates:: Adequate understanding  Deferred due to:: Other (comment) (previously completed, there has been no change and patient has adequate understanding)  Area of need?: No    Psychosocial/Coping  Patient can identify ways of coping with chronic disease.: yes  Patient-stated ways of coping with chronic disease:: support from loved ones  Psychosocial/Coping Skills Assessment Completed: : No  Assessment indicates:: Adequate understanding  Deffered due to:: Other (comment) (previously completed, there has been no change and patient has adequate understanding)  Area of need?: No      During today's follow-up visit,  the following areas required further assessment and content was provided/reviewed.    Based on today's diabetes care assessment, the following areas of need were identified:      Social 8/15/2022   Support No   Access to Mass Media/Tech No   Cognitive/Behavioral Health No   Culture/Buddhist No   Communication No   Health Literacy No        Clinical 8/15/2022   Medication Adherence No   Lab Compliance No   Nutritional Status No        Diabetes Self-Management Skills 8/15/2022   Diabetes Disease Process/Treatment Options No   Nutrition/Healthy Eating No   Physical Activity/Exercise No   Medication Yes   Home Blood Glucose Monitoring No   Acute Complications No   Chronic Complications No   Psychosocial/Coping No        Today's interventions were provided through individual discussion, instruction, and written materials were provided.     Patient verbalized understanding of instruction and written materials.  Pt was able to return back demonstration of instructions today. Patient understood key points, needs reinforcement and further instruction.     Diabetes Self-Management Care Plan Review:  Diabetes Self-Management Care Plan Review and Evaluation of Progress:    During today's follow-up Brian Diabetes Self-Management Care Plan progress was reviewed and progress was evaluated including his/her input. Cj has agreed to continue his/her journey to improve/maintain overall diabetes control by continuing to set health goals. See care plan progress below.      Care Plan: Diabetes Management   Updates made since 7/24/2022 12:00 AM      Problem: Medications Resolved 8/15/2022   Note:    Patient needs to have an eye exam - states understanding     Goal: Patient Agrees to take Diabetes Medication(s) via insulin pump as prescribed. Completed 8/15/2022   Start Date: 10/19/2021   Expected End Date: 10/19/2022   This Visit's Progress: Met   Recent Progress: On track   Priority: High   Barriers: No Barriers Identified   Note:    Medication changes:   Continue Jardiance 25 mg daily   Continue Metformin 500 mg to BID- to help with insulin resistance.         Pump settings:  Continue current pump settings.   Basal:  MN-6AM- 1.6 units/hr --  6AM-MN- 1.4 units/hr --      ICR:    MN-10AM: 1:3.5-  10AM- MN: 1:4-     ISF:15-     Target:  100-120     IOB:  3 hours                Follow Up Plan     Follow up in about 3 months (around 11/15/2022) for Personal CGM Upload, Insulin Pump Upload.    Today's care plan and follow up schedule was discussed with patient.  Cj verbalized understanding of the care plan, goals, and agrees to follow up plan.        The patient was encouraged to communicate with his/her health care provider/physician and care team regarding his/her condition(s) and treatment.  I provided the patient with my contact information  today and encouraged to contact me via phone or [facility]'s Patient Portal as needed.

## 2022-09-07 ENCOUNTER — ANTI-COAG VISIT (OUTPATIENT)
Dept: CARDIOLOGY | Facility: CLINIC | Age: 57
End: 2022-09-07
Payer: MEDICARE

## 2022-09-07 DIAGNOSIS — Z95.828 S/P ASCENDING AORTIC REPLACEMENT: Primary | ICD-10-CM

## 2022-09-07 DIAGNOSIS — Z79.01 LONG TERM (CURRENT) USE OF ANTICOAGULANTS: ICD-10-CM

## 2022-09-07 DIAGNOSIS — G45.9 TIA (TRANSIENT ISCHEMIC ATTACK): ICD-10-CM

## 2022-09-07 PROCEDURE — 93793 PR ANTICOAGULANT MGMT FOR PT TAKING WARFARIN: ICD-10-PCS | Mod: S$GLB,,,

## 2022-09-07 PROCEDURE — 93793 ANTICOAG MGMT PT WARFARIN: CPT | Mod: S$GLB,,,

## 2022-09-27 ENCOUNTER — PATIENT MESSAGE (OUTPATIENT)
Dept: PRIMARY CARE CLINIC | Facility: CLINIC | Age: 57
End: 2022-09-27
Payer: MEDICARE

## 2022-09-30 DIAGNOSIS — F41.1 GAD (GENERALIZED ANXIETY DISORDER): ICD-10-CM

## 2022-09-30 DIAGNOSIS — F33.0 MAJOR DEPRESSIVE DISORDER, RECURRENT, MILD: ICD-10-CM

## 2022-09-30 RX ORDER — ESCITALOPRAM OXALATE 10 MG/1
10 TABLET ORAL DAILY
Qty: 90 TABLET | Refills: 1 | Status: SHIPPED | OUTPATIENT
Start: 2022-09-30 | End: 2023-10-10 | Stop reason: SDUPTHER

## 2022-09-30 NOTE — TELEPHONE ENCOUNTER
No new care gaps identified.  [facility] Embedded Care Gaps. Reference number: [id number]. 9/30/2022   1:59:38 PM CDT

## 2022-10-03 ENCOUNTER — TELEPHONE (OUTPATIENT)
Dept: DIABETES | Facility: CLINIC | Age: 57
End: 2022-10-03
Payer: MEDICARE

## 2022-10-05 ENCOUNTER — ANTI-COAG VISIT (OUTPATIENT)
Dept: CARDIOLOGY | Facility: CLINIC | Age: 57
End: 2022-10-05
Payer: MEDICARE

## 2022-10-05 DIAGNOSIS — Z95.828 S/P ASCENDING AORTIC REPLACEMENT: Primary | ICD-10-CM

## 2022-10-05 DIAGNOSIS — Z79.01 LONG TERM (CURRENT) USE OF ANTICOAGULANTS: ICD-10-CM

## 2022-10-05 DIAGNOSIS — G45.9 TIA (TRANSIENT ISCHEMIC ATTACK): ICD-10-CM

## 2022-10-05 PROCEDURE — 93793 ANTICOAG MGMT PT WARFARIN: CPT | Mod: S$GLB,,,

## 2022-10-05 PROCEDURE — 93793 PR ANTICOAGULANT MGMT FOR PT TAKING WARFARIN: ICD-10-PCS | Mod: S$GLB,,,

## 2022-11-02 ENCOUNTER — PES CALL (OUTPATIENT)
Dept: ADMINISTRATIVE | Facility: CLINIC | Age: 57
End: 2022-11-02
Payer: MEDICARE

## 2022-11-03 ENCOUNTER — ANTI-COAG VISIT (OUTPATIENT)
Dept: CARDIOLOGY | Facility: CLINIC | Age: 57
End: 2022-11-03
Payer: MEDICARE

## 2022-11-03 DIAGNOSIS — Z79.01 LONG TERM (CURRENT) USE OF ANTICOAGULANTS: ICD-10-CM

## 2022-11-03 DIAGNOSIS — G45.9 TIA (TRANSIENT ISCHEMIC ATTACK): ICD-10-CM

## 2022-11-03 DIAGNOSIS — Z95.2 H/O MECHANICAL AORTIC VALVE REPLACEMENT: ICD-10-CM

## 2022-11-03 DIAGNOSIS — Z95.828 S/P ASCENDING AORTIC REPLACEMENT: Primary | ICD-10-CM

## 2022-11-03 PROCEDURE — 93793 PR ANTICOAGULANT MGMT FOR PT TAKING WARFARIN: ICD-10-PCS | Mod: S$GLB,,,

## 2022-11-03 PROCEDURE — 93793 ANTICOAG MGMT PT WARFARIN: CPT | Mod: S$GLB,,,

## 2022-11-10 ENCOUNTER — OFFICE VISIT (OUTPATIENT)
Dept: DIABETES | Facility: CLINIC | Age: 57
End: 2022-11-10
Payer: MEDICARE

## 2022-11-10 VITALS
BODY MASS INDEX: 37.92 KG/M2 | WEIGHT: 241.63 LBS | SYSTOLIC BLOOD PRESSURE: 110 MMHG | HEART RATE: 70 BPM | DIASTOLIC BLOOD PRESSURE: 60 MMHG | OXYGEN SATURATION: 97 % | HEIGHT: 67 IN

## 2022-11-10 DIAGNOSIS — Z71.9 HEALTH EDUCATION/COUNSELING: ICD-10-CM

## 2022-11-10 DIAGNOSIS — E88.819 INSULIN RESISTANCE: ICD-10-CM

## 2022-11-10 DIAGNOSIS — I10 ESSENTIAL HYPERTENSION: ICD-10-CM

## 2022-11-10 DIAGNOSIS — Z46.81 INSULIN PUMP FITTING OR ADJUSTMENT: ICD-10-CM

## 2022-11-10 DIAGNOSIS — G45.9 TIA (TRANSIENT ISCHEMIC ATTACK): ICD-10-CM

## 2022-11-10 DIAGNOSIS — E66.01 CLASS 2 SEVERE OBESITY DUE TO EXCESS CALORIES WITH SERIOUS COMORBIDITY AND BODY MASS INDEX (BMI) OF 37.0 TO 37.9 IN ADULT: ICD-10-CM

## 2022-11-10 DIAGNOSIS — E78.5 HYPERLIPIDEMIA WITH TARGET LOW DENSITY LIPOPROTEIN (LDL) CHOLESTEROL LESS THAN 70 MG/DL: ICD-10-CM

## 2022-11-10 DIAGNOSIS — Z96.41 INSULIN PUMP IN PLACE: ICD-10-CM

## 2022-11-10 DIAGNOSIS — I25.119 ATHEROSCLEROSIS OF NATIVE CORONARY ARTERY OF NATIVE HEART WITH ANGINA PECTORIS: ICD-10-CM

## 2022-11-10 DIAGNOSIS — E10.3299 TYPE 1 DIABETES MELLITUS WITH MILD NONPROLIFERATIVE RETINOPATHY WITHOUT MACULAR EDEMA, UNSPECIFIED LATERALITY: ICD-10-CM

## 2022-11-10 DIAGNOSIS — E10.649 TYPE 1 DIABETES MELLITUS WITH HYPOGLYCEMIA UNAWARENESS: ICD-10-CM

## 2022-11-10 DIAGNOSIS — E10.65 TYPE 1 DIABETES MELLITUS WITH HYPERGLYCEMIA: Primary | ICD-10-CM

## 2022-11-10 PROCEDURE — 3078F PR MOST RECENT DIASTOLIC BLOOD PRESSURE < 80 MM HG: ICD-10-PCS | Mod: CPTII,S$GLB,, | Performed by: NURSE PRACTITIONER

## 2022-11-10 PROCEDURE — 3061F NEG MICROALBUMINURIA REV: CPT | Mod: CPTII,S$GLB,, | Performed by: NURSE PRACTITIONER

## 2022-11-10 PROCEDURE — 1159F MED LIST DOCD IN RCRD: CPT | Mod: CPTII,S$GLB,, | Performed by: NURSE PRACTITIONER

## 2022-11-10 PROCEDURE — 3061F PR NEG MICROALBUMINURIA RESULT DOCUMENTED/REVIEW: ICD-10-PCS | Mod: CPTII,S$GLB,, | Performed by: NURSE PRACTITIONER

## 2022-11-10 PROCEDURE — 99999 PR PBB SHADOW E&M-EST. PATIENT-LVL IV: CPT | Mod: PBBFAC,,, | Performed by: NURSE PRACTITIONER

## 2022-11-10 PROCEDURE — 4010F ACE/ARB THERAPY RXD/TAKEN: CPT | Mod: CPTII,S$GLB,, | Performed by: NURSE PRACTITIONER

## 2022-11-10 PROCEDURE — 3044F HG A1C LEVEL LT 7.0%: CPT | Mod: CPTII,S$GLB,, | Performed by: NURSE PRACTITIONER

## 2022-11-10 PROCEDURE — 3078F DIAST BP <80 MM HG: CPT | Mod: CPTII,S$GLB,, | Performed by: NURSE PRACTITIONER

## 2022-11-10 PROCEDURE — 3074F SYST BP LT 130 MM HG: CPT | Mod: CPTII,S$GLB,, | Performed by: NURSE PRACTITIONER

## 2022-11-10 PROCEDURE — 95251 CONT GLUC MNTR ANALYSIS I&R: CPT | Mod: S$GLB,,, | Performed by: NURSE PRACTITIONER

## 2022-11-10 PROCEDURE — 3074F PR MOST RECENT SYSTOLIC BLOOD PRESSURE < 130 MM HG: ICD-10-PCS | Mod: CPTII,S$GLB,, | Performed by: NURSE PRACTITIONER

## 2022-11-10 PROCEDURE — 1160F RVW MEDS BY RX/DR IN RCRD: CPT | Mod: CPTII,S$GLB,, | Performed by: NURSE PRACTITIONER

## 2022-11-10 PROCEDURE — 3066F PR DOCUMENTATION OF TREATMENT FOR NEPHROPATHY: ICD-10-PCS | Mod: CPTII,S$GLB,, | Performed by: NURSE PRACTITIONER

## 2022-11-10 PROCEDURE — 99214 OFFICE O/P EST MOD 30 MIN: CPT | Mod: S$GLB,,, | Performed by: NURSE PRACTITIONER

## 2022-11-10 PROCEDURE — 3044F PR MOST RECENT HEMOGLOBIN A1C LEVEL <7.0%: ICD-10-PCS | Mod: CPTII,S$GLB,, | Performed by: NURSE PRACTITIONER

## 2022-11-10 PROCEDURE — 3066F NEPHROPATHY DOC TX: CPT | Mod: CPTII,S$GLB,, | Performed by: NURSE PRACTITIONER

## 2022-11-10 PROCEDURE — 1159F PR MEDICATION LIST DOCUMENTED IN MEDICAL RECORD: ICD-10-PCS | Mod: CPTII,S$GLB,, | Performed by: NURSE PRACTITIONER

## 2022-11-10 PROCEDURE — 4010F PR ACE/ARB THEARPY RXD/TAKEN: ICD-10-PCS | Mod: CPTII,S$GLB,, | Performed by: NURSE PRACTITIONER

## 2022-11-10 PROCEDURE — 99214 PR OFFICE/OUTPT VISIT, EST, LEVL IV, 30-39 MIN: ICD-10-PCS | Mod: S$GLB,,, | Performed by: NURSE PRACTITIONER

## 2022-11-10 PROCEDURE — 99999 PR PBB SHADOW E&M-EST. PATIENT-LVL IV: ICD-10-PCS | Mod: PBBFAC,,, | Performed by: NURSE PRACTITIONER

## 2022-11-10 PROCEDURE — 3008F BODY MASS INDEX DOCD: CPT | Mod: CPTII,S$GLB,, | Performed by: NURSE PRACTITIONER

## 2022-11-10 PROCEDURE — 3008F PR BODY MASS INDEX (BMI) DOCUMENTED: ICD-10-PCS | Mod: CPTII,S$GLB,, | Performed by: NURSE PRACTITIONER

## 2022-11-10 PROCEDURE — 1160F PR REVIEW ALL MEDS BY PRESCRIBER/CLIN PHARMACIST DOCUMENTED: ICD-10-PCS | Mod: CPTII,S$GLB,, | Performed by: NURSE PRACTITIONER

## 2022-11-10 PROCEDURE — 95251 PR GLUCOSE MONITOR, 72 HOUR, PHYS INTERP: ICD-10-PCS | Mod: S$GLB,,, | Performed by: NURSE PRACTITIONER

## 2022-11-10 RX ORDER — INSULIN LISPRO 100 [IU]/ML
INJECTION, SOLUTION INTRAVENOUS; SUBCUTANEOUS
Qty: 60 ML | Refills: 11 | Status: SHIPPED | OUTPATIENT
Start: 2022-11-10 | End: 2023-03-10 | Stop reason: SDUPTHER

## 2022-11-10 RX ORDER — SEMAGLUTIDE 1.34 MG/ML
0.5 INJECTION, SOLUTION SUBCUTANEOUS
Qty: 1 PEN | Refills: 6 | Status: SHIPPED | OUTPATIENT
Start: 2022-11-10 | End: 2023-03-10 | Stop reason: SDUPTHER

## 2022-11-10 RX ORDER — INSULIN DETEMIR 100 [IU]/ML
40 INJECTION, SOLUTION SUBCUTANEOUS NIGHTLY
Qty: 20 ML | Refills: 3 | Status: SHIPPED | OUTPATIENT
Start: 2022-11-10 | End: 2024-03-11 | Stop reason: SDUPTHER

## 2022-11-10 RX ORDER — METFORMIN HYDROCHLORIDE 500 MG/1
500 TABLET, EXTENDED RELEASE ORAL 2 TIMES DAILY WITH MEALS
Qty: 180 TABLET | Refills: 2 | Status: SHIPPED | OUTPATIENT
Start: 2022-11-10 | End: 2023-03-10 | Stop reason: SDUPTHER

## 2022-11-10 RX ORDER — ROSUVASTATIN CALCIUM 40 MG/1
40 TABLET, COATED ORAL NIGHTLY
Qty: 90 TABLET | Refills: 2 | Status: SHIPPED | OUTPATIENT
Start: 2022-11-10 | End: 2023-01-09 | Stop reason: SDUPTHER

## 2022-11-10 RX ORDER — GLUCAGON 3 MG/1
POWDER NASAL
Qty: 2 EACH | Refills: 1 | Status: SHIPPED | OUTPATIENT
Start: 2022-11-10 | End: 2022-12-12

## 2022-11-10 NOTE — PROGRESS NOTES
CC:   Chief Complaint   Patient presents with    Diabetes Mellitus         HPI: Cj Barnes is a 57 y.o. male presents for a follow up visit today for the management of T1DM.   The patient was diagnosed in his early 30's. Initially diagnosed with T2DM, 5 years later he was dx as T1.    He has used an insulin pump since 2006, previous on animas.  He was on the 670 G medtronic pump.    Now on the Tandem T Slim-- started June 2022     Family hx of diabetes: Mother, father, 3 brothers - no one with T1     Hospitalized for diabetes: denies     No personal or FH of thyroid cancer or personal of pancreatic cancer or pancreatitis.     Our last visit was in July of 2022  At that visit we continued his Jardiance, continued his metformin  Adjusted his insulin pump settings  Encouraged patient to remain in control IQ with his tandem T slim pump  His A1c went from 7.1% to 6.7%  See attached downloads   He is frustrated with weight gain  Always feeling hungry  Eating high carbohydrate meals  Had some issues with the sleep mode.  He was putting his pump in to sleep mode at night forgetting to take it off--so his targets were always higher  He is suffering with severe right heel pain which is impacting his ability to exercise      DIABETES COMPLICATIONS: retinopathy, peripheral neuropathy, cardiovascular disease and cerebrovascular disease  TIA     Diabetes Management Status    ASA:  Yes - 81 mg daily and coumadin     Statin: Taking--Crestor 40 mg nightly   ACE/ARB: Taking-- Irbesartan 150 mg daily     Screening or Prevention Patient's value Goal Complete/Controlled?   HgA1C Testing and Control   Lab Results   Component Value Date    HGBA1C 6.7 (H) 11/03/2022      Annually/Less than 8% Yes   Lipid profile : 06/10/2022 Annually Yes   LDL control Lab Results   Component Value Date    LDLCALC 64.4 06/10/2022    Annually/Less than 100 mg/dl  Yes   Nephropathy screening Lab Results   Component Value Date    LABMICR 6.0  11/03/2022     Lab Results   Component Value Date    PROTEINUA Negative 08/16/2019    Annually Yes   Blood pressure BP Readings from Last 1 Encounters:   11/10/22 110/60    Less than 140/90 Yes   Dilated retinal exam : 09/28/2022- [facility]  Annually No   Foot exam   : 07/11/2022 Annually Yes       CURRENT A1C:    Hemoglobin A1C   Date Value Ref Range Status   11/03/2022 6.7 (H) 4.0 - 5.6 % Final     Comment:     ADA Screening Guidelines:  5.7-6.4%  Consistent with prediabetes  >or=6.5%  Consistent with diabetes    High levels of fetal hemoglobin interfere with the HbA1C  assay. Heterozygous hemoglobin variants (HbS, HgC, etc)do  not significantly interfere with this assay.   However, presence of multiple variants may affect accuracy.     06/10/2022 7.1 (H) 4.0 - 5.6 % Final     Comment:     ADA Screening Guidelines:  5.7-6.4%  Consistent with prediabetes  >or=6.5%  Consistent with diabetes    High levels of fetal hemoglobin interfere with the HbA1C  assay. Heterozygous hemoglobin variants (HbS, HgC, etc)do  not significantly interfere with this assay.   However, presence of multiple variants may affect accuracy.     02/09/2022 6.8 (H) 4.0 - 5.6 % Final     Comment:     ADA Screening Guidelines:  5.7-6.4%  Consistent with prediabetes  >or=6.5%  Consistent with diabetes    High levels of fetal hemoglobin interfere with the HbA1C  assay. Heterozygous hemoglobin variants (HbS, HgC, etc)do  not significantly interfere with this assay.   However, presence of multiple variants may affect accuracy.         GOAL A1C: 6.5-7% - without hypoglycemia    DM MEDICATIONS USED IN THE PAST: Medtronic 670 G   Metformin   Steglatro- lack of coverage.   Jardiance   Humalog   Tandem insulin pump   dexcom       CURRENT DIABETES MEDICATIONS: Tandem T Slim insulin pump in control IQ with Dexcom, Metformin  mg BID  Jardiance 25 mg daily      Insulin Pump:  Tandem T Slim pump with Lispro   Control IQ mode        Pump  settings:  Basal:  MN-6AM- 1.6 units/hr --  6AM-10AM- 1.4 units/hr --  10AM- 4PM: 1.4 units/hr--- corrected   4PM- MN: 1.4 units/hr -- corrected         ICR  MN- 6AM: 1:3.5   6AM-10AM:1:3.5   10AM- 4PM-1:3.5-- tightened   4PM- MN: 1:3.5 -- tightened      ISF: tightened correction   MN- 6AM: 1:12  6AM-10AM:1:12  10AM- 4PM-1:12  4PM- MN: 1:12     Target:  110 ( control IQ)      IOB:  3 hours     Set changed every 2 days  Reservoir changed every 2days    Pump downloaded and reviewed     Average total daily dose is 136.74 units per day   26% basal 56% bolus, 18% correction  0% override      Back up Lantus/Levemir: Yes     BLOOD GLUCOSE MONITORING:   Sensor type: Dexcom G6  Average BG reading:  160  Time in range: 72%  Estimated A1c :7.1%  Site change: q10 days     Dexcom from [facility]     2 weeks average is 167  In target range 68%   Estimated A1c 7.3%       Sensor was downloaded in clinic today and reviewed with patient.   Please see attached document for download.       HYPOGLYCEMIA:  <1% low   0% very low   + hx of hypoglycemia unawareness   He doesn't feel BG readings until the 40-50's - once in the 50's he will have twitching to his eye.   Glucagon kit: yes  Medic alert bracelet: yes       MEALS:  Still eating high CHO diet --on average 275 g per day  Drinks: un sweet tea, water, coke zero.   He CHO counts- he feels comfortable.   Eating a very high carbohydrate diet still despite being educated          CURRENT EXERCISE:  None        Review of Systems  Review of Systems   Constitutional:  Negative for appetite change, fatigue and unexpected weight change.   HENT:  Negative for trouble swallowing.    Eyes:  Negative for visual disturbance.   Respiratory:  Negative for shortness of breath.    Cardiovascular:  Negative for chest pain.   Gastrointestinal:  Negative for nausea.   Endocrine: Positive for polyphagia. Negative for polydipsia and polyuria.   Genitourinary:         No Nocturia    Musculoskeletal:  Positive  for arthralgias and gait problem.        Pain right heal   Limited mobility to right hand with numbness and tingling--- from cardiovascular surgery    Skin:  Negative for wound.   Neurological:  Positive for numbness.   Psychiatric/Behavioral:  Positive for sleep disturbance. The patient is nervous/anxious.      Physical Exam   Physical Exam  Vitals and nursing note reviewed.   Constitutional:       General: He is not in acute distress.     Appearance: He is well-developed. He is obese. He is not ill-appearing.   HENT:      Head: Normocephalic and atraumatic.      Right Ear: External ear normal.      Left Ear: External ear normal.      Nose: Nose normal.   Neck:      Thyroid: No thyromegaly.      Trachea: No tracheal deviation.   Cardiovascular:      Rate and Rhythm: Normal rate and regular rhythm.      Heart sounds: No murmur heard.  Pulmonary:      Effort: Pulmonary effort is normal. No respiratory distress.      Breath sounds: Normal breath sounds.   Abdominal:      Palpations: Abdomen is soft.      Tenderness: There is no abdominal tenderness.      Hernia: No hernia is present.   Musculoskeletal:      Cervical back: Normal range of motion and neck supple.   Skin:     General: Skin is warm and dry.      Capillary Refill: Capillary refill takes less than 2 seconds.      Findings: No rash.      Comments: Insulin pump sites and dexcom sites are normal appearing. No lipo hypertropthy or atrophy     Neurological:      Mental Status: He is alert and oriented to person, place, and time.      Cranial Nerves: No cranial nerve deficit.   Psychiatric:         Behavior: Behavior normal.         Judgment: Judgment normal.       FOOT EXAMINATION: Appropriate footwear         Lab Results   Component Value Date    TSH 1.062 11/03/2022       Type 1 diabetes mellitus with hyperglycemia  A1c has greatly improved. 6.7% is reasonable   BG readings are above goal but improving. Still getting use to new pump   Encouraged him to cut  back on CHO intake with meals-- encouraged 45-60 grams with meals instead of 70- grams   Also encouraged exercise and weight loss--limited due to heel pain       Today I set a sleep schedule in his tandem insulin pump so he will not need to turn the sleep mode off  Sleep schedule set for 10:00 p.m. to 6:00 a.m.--to occur daily      Medication changes:   Start Ozempic 0.25 mg weekly for 4 weeks and then increase to 5 mg weekly thereafter--help to cut back on carbohydrate intake, curb appetite, weight loss, help with insulin resistance      Continue Jardiance 25 mg daily   Continue Metformin 500 mg to BID- to help with insulin resistance.       Pump settings:    Continue Tandem T Slim pump with Lispro   Control IQ mode      Pump settings:  Basal:  Continue basal settings  MN-6AM- 1.6 units/hr --  6AM-10AM- 1.4 units/hr --  10AM- 4PM: 1.4 units/hr  4PM- MN: 1.4 units/hr       ICR continue  MN- 6AM: 1:3.5   6AM-10AM:1:3.5   10AM- 4PM-1:3.5   4PM- MN: 1:3.5       ISF:  MN- 6AM: 1:10- tightened   6AM-10AM:1:12  10AM- 4PM-1:12  4PM- MN: 1:12     Target:  110 ( control IQ)     IOB:  3 hours       -- Reviewed goals of therapy are to get the best control we can without hypoglycemia  -- Reviewed patient's current insulin regimen. Clarified proper insulin dose and timing in relation to meals, etc. Insulin injection sites and proper rotation instructed.    -- Advised frequent self blood glucose monitoring.  Patient encouraged to document glucose results and bring them to every clinic visit  -Continue to use Dexcom   -- Hypoglycemia precautions discussed. Instructed on precautions before driving.    -- Call for Bg repeatedly < 70 or > 180.   -- Close adherence to lifestyle changes recommended.   -- Periodic follow ups for eye evaluations, foot care and dental care suggested.      Patient has diabetes mellitus and manages diabetes with intensive insulin regimen and uses prandial and basal insulin daily-- on insulin pump    Patient requires a therapeutic CGM and is willing to use therapeutic CGM for the necessary frequent adjustments of insulin therapy.  I have completed an in-person visit during the previous 6 months and will continue to have in-person visits every 6 months to assess adherence to their CGM regimen and diabetes treatment plan.  Due to COVID pandemic and need for remote monitoring this tool is medically necessary          Type 1 diabetes mellitus with hypoglycemia unawareness  Avoid hypoglycemia.  Continue to use Dexcom with real-time alerts  Now on Tandem pump with control IQ    Insulin pump in place  See above     Insulin pump fitting or adjustment  See above     Atherosclerosis of native coronary artery of native heart with angina pectoris  Optimize blood sugar readings    Essential hypertension  BP goal is < 140/90.   Tolerating  ARB  Controlled at goal   Blood pressure goals discussed with patient      Hyperlipidemia with target low density lipoprotein (LDL) cholesterol less than 70 mg/dL  On statin per ADA recommendations  LDL goal < 70. LDL now at goal on Crestor 40 mg nightly. LFTs WNL.   Continue Statin       TIA (transient ischemic attack)  Optimize blood sugar readings    Class 2 severe obesity due to excess calories with serious comorbidity and body mass index (BMI) of 37.0 to 37.9 in adult  Body mass index is 37.84 kg/m².  Increases insulin resistance.   Discussed DM diet and exercise.   Patient does exhibit insulin resistance and is on an SGLT 2 and metformin.            Pump backup plan    If the insulin pump is non functional and discontinued for anticipated more than 20 hours, please give daily injections of:   Long acting insulin Levemir 40  units daily   Short acting insulin Novolog/ Humalog 1:4  for meals according to carb ratios and sensitivity factor in the pump.     When the insulin pump is restarted, do not restart basal rates until at least 22 hours after the last long acting insulin  injection. You can set a 0% temporary basal setting that will last until this time and use your pump to bolus for meals and correction.     For any technical insulin pump issues, please contact the insulin pump company; the toll free number is printed on the label on the back of the insulin pump.       If your sugar is running high for a few hours and does not respond to two correction doses from the insulin pump, it may mean that you have a bad pump site and the site should be changed          Follow up in about 4 months (around 3/10/2023).   Labs prior           Orders Placed This Encounter   Procedures    CBC Auto Differential     Standing Status:   Future     Standing Expiration Date:   5/10/2024    Lipid Panel     Standing Status:   Future     Standing Expiration Date:   5/10/2024    Hemoglobin A1C     Standing Status:   Future     Standing Expiration Date:   5/10/2024    Comprehensive Metabolic Panel     Standing Status:   Future     Standing Expiration Date:   5/10/2024             Recommendations were discussed with the patient in detail  The patient verbalized understanding and agrees with the plan outlined as above.

## 2022-11-10 NOTE — PATIENT INSTRUCTIONS
Ozempic 0.25 mg weekly for 2-4 weeks & then 0.5 mg weekly thereafter. Discussed MOA & SE.   Please notify me for any abdominal pain, nausea, vomiting, diarrhea.     Continue Jardiance     Continue Metformin     Continue insulin pump

## 2022-11-10 NOTE — ASSESSMENT & PLAN NOTE
A1c has greatly improved. 6.7% is reasonable   BG readings are above goal but improving. Still getting use to new pump   Encouraged him to cut back on CHO intake with meals-- encouraged 45-60 grams with meals instead of 70- grams   Also encouraged exercise and weight loss--limited due to heel pain       Today I set a sleep schedule in his tandem insulin pump so he will not need to turn the sleep mode off  Sleep schedule set for 10:00 p.m. to 6:00 a.m.--to occur daily      Medication changes:   Start Ozempic 0.25 mg weekly for 4 weeks and then increase to 5 mg weekly thereafter--help to cut back on carbohydrate intake, curb appetite, weight loss, help with insulin resistance      Continue Jardiance 25 mg daily   Continue Metformin 500 mg to BID- to help with insulin resistance.       Pump settings:    Continue Tandem T Slim pump with Lispro   Control IQ mode      Pump settings:  Basal:  Continue basal settings  MN-6AM- 1.6 units/hr --  6AM-10AM- 1.4 units/hr --  10AM- 4PM: 1.4 units/hr  4PM- MN: 1.4 units/hr       ICR continue  MN- 6AM: 1:3.5   6AM-10AM:1:3.5   10AM- 4PM-1:3.5   4PM- MN: 1:3.5       ISF:  MN- 6AM: 1:10- tightened   6AM-10AM:1:12  10AM- 4PM-1:12  4PM- MN: 1:12     Target:  110 ( control IQ)     IOB:  3 hours       -- Reviewed goals of therapy are to get the best control we can without hypoglycemia  -- Reviewed patient's current insulin regimen. Clarified proper insulin dose and timing in relation to meals, etc. Insulin injection sites and proper rotation instructed.    -- Advised frequent self blood glucose monitoring.  Patient encouraged to document glucose results and bring them to every clinic visit  -Continue to use Dexcom   -- Hypoglycemia precautions discussed. Instructed on precautions before driving.    -- Call for Bg repeatedly < 70 or > 180.   -- Close adherence to lifestyle changes recommended.   -- Periodic follow ups for eye evaluations, foot care and dental care  suggested.      Patient has diabetes mellitus and manages diabetes with intensive insulin regimen and uses prandial and basal insulin daily-- on insulin pump   Patient requires a therapeutic CGM and is willing to use therapeutic CGM for the necessary frequent adjustments of insulin therapy.  I have completed an in-person visit during the previous 6 months and will continue to have in-person visits every 6 months to assess adherence to their CGM regimen and diabetes treatment plan.  Due to COVID pandemic and need for remote monitoring this tool is medically necessary

## 2022-11-10 NOTE — ASSESSMENT & PLAN NOTE
Body mass index is 37.84 kg/m².  Increases insulin resistance.   Discussed DM diet and exercise.   Patient does exhibit insulin resistance and is on an SGLT 2 and metformin.

## 2022-11-29 ENCOUNTER — PATIENT OUTREACH (OUTPATIENT)
Dept: ADMINISTRATIVE | Facility: HOSPITAL | Age: 57
End: 2022-11-29
Payer: MEDICARE

## 2022-11-29 NOTE — PROGRESS NOTES
Health Maintenance Due   Topic Date Due    COVID-19 Vaccine (4 - Booster for Moderna series) 02/23/2022    Colorectal Cancer Screening  02/17/2023

## 2022-12-08 ENCOUNTER — ANTI-COAG VISIT (OUTPATIENT)
Dept: CARDIOLOGY | Facility: CLINIC | Age: 57
End: 2022-12-08
Payer: MEDICARE

## 2022-12-08 DIAGNOSIS — Z95.2 H/O MECHANICAL AORTIC VALVE REPLACEMENT: ICD-10-CM

## 2022-12-08 DIAGNOSIS — Z79.01 LONG TERM (CURRENT) USE OF ANTICOAGULANTS: ICD-10-CM

## 2022-12-08 DIAGNOSIS — G45.9 TIA (TRANSIENT ISCHEMIC ATTACK): Primary | ICD-10-CM

## 2022-12-08 PROCEDURE — 93793 ANTICOAG MGMT PT WARFARIN: CPT | Mod: S$GLB,,,

## 2022-12-08 PROCEDURE — 93793 PR ANTICOAGULANT MGMT FOR PT TAKING WARFARIN: ICD-10-PCS | Mod: S$GLB,,,

## 2022-12-12 ENCOUNTER — PATIENT MESSAGE (OUTPATIENT)
Dept: DIABETES | Facility: CLINIC | Age: 57
End: 2022-12-12
Payer: MEDICARE

## 2022-12-19 ENCOUNTER — TELEPHONE (OUTPATIENT)
Dept: DIABETES | Facility: CLINIC | Age: 57
End: 2022-12-19
Payer: MEDICARE

## 2023-01-04 ENCOUNTER — PATIENT OUTREACH (OUTPATIENT)
Dept: ADMINISTRATIVE | Facility: HOSPITAL | Age: 58
End: 2023-01-04
Payer: MEDICARE

## 2023-01-04 ENCOUNTER — OFFICE VISIT (OUTPATIENT)
Dept: PODIATRY | Facility: CLINIC | Age: 58
End: 2023-01-04
Payer: MEDICARE

## 2023-01-04 VITALS
DIASTOLIC BLOOD PRESSURE: 71 MMHG | HEIGHT: 67 IN | BODY MASS INDEX: 37.92 KG/M2 | HEART RATE: 64 BPM | SYSTOLIC BLOOD PRESSURE: 120 MMHG | WEIGHT: 241.63 LBS

## 2023-01-04 DIAGNOSIS — M92.61 HAGLUND'S DEFORMITY OF RIGHT HEEL: Primary | ICD-10-CM

## 2023-01-04 DIAGNOSIS — M79.671 RIGHT FOOT PAIN: ICD-10-CM

## 2023-01-04 DIAGNOSIS — M21.6X9 ACQUIRED EQUINUS DEFORMITY OF FOOT, UNSPECIFIED LATERALITY: ICD-10-CM

## 2023-01-04 PROCEDURE — 99999 PR PBB SHADOW E&M-EST. PATIENT-LVL IV: ICD-10-PCS | Mod: PBBFAC,,, | Performed by: PODIATRIST

## 2023-01-04 PROCEDURE — 3008F BODY MASS INDEX DOCD: CPT | Mod: CPTII,S$GLB,, | Performed by: PODIATRIST

## 2023-01-04 PROCEDURE — 3008F PR BODY MASS INDEX (BMI) DOCUMENTED: ICD-10-PCS | Mod: CPTII,S$GLB,, | Performed by: PODIATRIST

## 2023-01-04 PROCEDURE — 1159F PR MEDICATION LIST DOCUMENTED IN MEDICAL RECORD: ICD-10-PCS | Mod: CPTII,S$GLB,, | Performed by: PODIATRIST

## 2023-01-04 PROCEDURE — 99214 OFFICE O/P EST MOD 30 MIN: CPT | Mod: S$GLB,,, | Performed by: PODIATRIST

## 2023-01-04 PROCEDURE — 3078F PR MOST RECENT DIASTOLIC BLOOD PRESSURE < 80 MM HG: ICD-10-PCS | Mod: CPTII,S$GLB,, | Performed by: PODIATRIST

## 2023-01-04 PROCEDURE — 99214 PR OFFICE/OUTPT VISIT, EST, LEVL IV, 30-39 MIN: ICD-10-PCS | Mod: S$GLB,,, | Performed by: PODIATRIST

## 2023-01-04 PROCEDURE — 3078F DIAST BP <80 MM HG: CPT | Mod: CPTII,S$GLB,, | Performed by: PODIATRIST

## 2023-01-04 PROCEDURE — 99999 PR PBB SHADOW E&M-EST. PATIENT-LVL IV: CPT | Mod: PBBFAC,,, | Performed by: PODIATRIST

## 2023-01-04 PROCEDURE — 3074F PR MOST RECENT SYSTOLIC BLOOD PRESSURE < 130 MM HG: ICD-10-PCS | Mod: CPTII,S$GLB,, | Performed by: PODIATRIST

## 2023-01-04 PROCEDURE — 1159F MED LIST DOCD IN RCRD: CPT | Mod: CPTII,S$GLB,, | Performed by: PODIATRIST

## 2023-01-04 PROCEDURE — 3074F SYST BP LT 130 MM HG: CPT | Mod: CPTII,S$GLB,, | Performed by: PODIATRIST

## 2023-01-04 RX ORDER — TRAMADOL HYDROCHLORIDE 50 MG/1
50 TABLET ORAL EVERY 8 HOURS PRN
Qty: 30 TABLET | Refills: 0 | Status: SHIPPED | OUTPATIENT
Start: 2023-01-04 | End: 2023-05-31

## 2023-01-05 NOTE — PROGRESS NOTES
Subjective:      Patient ID: Cj Barnes is a 57 y.o. male.    Chief Complaint: Foot Problem (DM PCP Garry Stover appointment 01/23)    Cj is a 57 y.o. male who presents to the podiatry clinic  with complaint of  right foot pain. Onset of the symptoms was about a month ago. Precipitating event: none known. Current symptoms include: ability to bear weight, but with some pain. Aggravating factors: any weight bearing. Symptoms have waxed and waned but are worse overall. Patient has had prior foot problems.       1/4/23:  Patient presents for follow up right foot pain.  He was last seen in June.  He reports he has been ambulating in the walking boot since his last appointment and utilizing topical Voltaren gel with no relief.  He states that his pain is currently worsened.  He is unable to take oral steroids secondary to Coumadin and diabetes which is well controlled with his new pump.  Patient is requesting alternative treatment options.    Review of Systems   Constitutional: Negative for chills, decreased appetite and fever.   Cardiovascular:  Negative for leg swelling.   Skin:  Negative for dry skin, flushing and itching.   Musculoskeletal:  Negative for arthritis, joint pain, joint swelling and myalgias.   Gastrointestinal:  Negative for nausea and vomiting.   Neurological:  Negative for loss of balance, numbness and paresthesias.       Patient Active Problem List   Diagnosis    Essential hypertension    Insulin pump fitting or adjustment    Type 1 diabetes mellitus with hyperglycemia    Hyperlipidemia with target low density lipoprotein (LDL) cholesterol less than 70 mg/dL    Insulin pump in place    Aortic stenosis    Aortic regurgitation, congenital    TIA (transient ischemic attack)    Atherosclerosis of native coronary artery of native heart with angina pectoris    Type 1 diabetes mellitus with hypoglycemia unawareness    Class 2 severe obesity due to excess calories with serious  "comorbidity and body mass index (BMI) of 37.0 to 37.9 in adult    S/P ascending aortic replacement    Long term (current) use of anticoagulants    Paroxysmal atrial flutter    Trouble swallowing    H/O mechanical aortic valve replacement    Iron deficiency anemia due to chronic blood loss    ANTHONY (iron deficiency anemia)    ACE-inhibitor cough    Diabetic mononeuropathy associated with diabetes mellitus due to underlying condition    Major depressive disorder, recurrent, mild    Trigger finger of left thumb       Current Outpatient Medications on File Prior to Visit   Medication Sig Dispense Refill    amoxicillin (AMOXIL) 500 MG capsule Take 4 capsules (2,000 mg total) by mouth as needed (take one hour before dental work). 28 capsule 11    blood-glucose sensor (DEXCOM G6 SENSOR) Apple 1 sensor every 10 days 10 each 3    blood-glucose transmitter (DEXCOM G6 TRANSMITTER) Apple 1 transmitter every 3 months 1 each 3    COMFORT EZ PEN NEEDLES 33 gauge x 3/16" Ndle USE AS DIRECTED with Levemir pens  11    empagliflozin (JARDIANCE) 25 mg tablet Take 1 tablet (25 mg total) by mouth once daily. 30 tablet 11    EScitalopram oxalate (LEXAPRO) 10 MG tablet Take 1 tablet (10 mg total) by mouth once daily. 90 tablet 1    gabapentin (NEURONTIN) 300 MG capsule TAKE TWO CAPSULES BY MOUTH THREE TIMES DAILY FOR NEUROPATHY-(NERVE PAIN)- 360 capsule 2    glucagon (BAQSIMI) 3 mg/actuation Spry USE ONE actuation into a single nostril no response MAY REPEAT in 15 minutes USE a new device 2 each 1    hydroCHLOROthiazide (HYDRODIURIL) 12.5 MG Tab TAKE ONE CAPSULE BY MOUTH ONCE DAILY 90 tablet 2    insulin detemir U-100 (LEVEMIR U-100 INSULIN) 100 unit/mL injection Inject 40 Units into the skin every evening. To have on file in case of insulin pump failure.  Patient does not need prescription right now 20 mL 3    insulin lispro 100 unit/mL injection USE CONTINOUSLY WITH INSULIN PUMP. MAXIMUM DAILY DOSE  UNITS 60 mL 11    insulin " "syringe-needle U-100 1/2 mL 31 gauge x 15/64" Syrg To use 5 times per day with insulin injections if off of insulin pump.  To have on file in case of pump failure 150 each 11    irbesartan (AVAPRO) 150 MG tablet Take 1 tablet (150 mg total) by mouth once daily. 90 tablet 3    metFORMIN (GLUCOPHAGE-XR) 500 MG ER 24hr tablet Take 1 tablet (500 mg total) by mouth 2 (two) times daily with meals. 180 tablet 2    mupirocin (BACTROBAN) 2 % ointment APPLY TO THE AFFECTED AREA(S) THREE TIMES DAILY (Patient taking differently: 3 (three) times daily as needed. APPLY TO THE AFFECTED AREA(S) THREE TIMES DAILY) 22 g 1    nystatin (MYCOSTATIN) cream Apply topically 2 (two) times daily. 1 Tube 5    rosuvastatin (CRESTOR) 40 MG Tab Take 1 tablet (40 mg total) by mouth every evening. 90 tablet 2    semaglutide (OZEMPIC) 0.25 mg or 0.5 mg(2 mg/1.5 mL) pen injector Inject 0.5 mg into the skin every 7 days. 1 pen 6    tadalafiL (CIALIS) 5 MG tablet Take 5 mg by mouth once daily.      warfarin (COUMADIN) 3 MG tablet TAKE TWO TABLETS BY MOUTH ONCE DAILY 60 tablet 1    zolpidem (AMBIEN) 10 mg Tab TAKE ONE TABLET BY MOUTH AT BEDTIME AS NEEDED 30 tablet 3    aspirin (ECOTRIN) 81 MG EC tablet Take 1 tablet (81 mg total) by mouth once daily. (Patient taking differently: Take 81 mg by mouth once daily. Stopped 1/3)  0    blood-glucose meter,continuous (DEXCOM G6 ) Misc 1 Device by Misc.(Non-Drug; Combo Route) route once. for 1 dose 1 each 0    glucagon, human recombinant, (GLUCAGON EMERGENCY KIT, HUMAN,) 1 mg SolR Inject 1 mg into the muscle as needed. 1 each 0     No current facility-administered medications on file prior to visit.       Review of patient's allergies indicates:  No Known Allergies    Past Surgical History:   Procedure Laterality Date    AORTIC VALVE REPLACEMENT N/A 8/9/2019    Procedure: Replacement-valve-aortic;  Surgeon: Ryan Knight MD;  Location: [facility] OR [address] [place] [address];  Service: Cardiothoracic;  Laterality: N/A; "    BACK SURGERY      BENTALL PROCEDURE FOR REPLACEMENT OF AORTIC VALVE, AORTIC ROOT, AND ASCENDING AORTA N/A 8/9/2019    Procedure: BENTALL PROCEDURE;  Surgeon: Ryan Knight MD;  Location: [facility] OR 2ND FLR;  Service: Cardiothoracic;  Laterality: N/A;    CARDIAC SURGERY      CARPAL TUNNEL RELEASE      COLON SURGERY  2014    COLONOSCOPY N/A 2/17/2020    Procedure: COLONOSCOPY;  Surgeon: Richi Mock MD;  Location: [facility] ENDO;  Service: Colon and Rectal;  Laterality: N/A;    ELBOW SURGERY      tendon release    ESOPHAGOGASTRODUODENOSCOPY N/A 9/10/2020    Procedure: EGD (ESOPHAGOGASTRODUODENOSCOPY);  Surgeon: Abilio Boo MD;  Location: [facility] ENDO (4TH FLR);  Service: Endoscopy;  Laterality: N/A;  Cardiac clearance received, see telephone encounter 8/27/20-[facility]  Approval to hold Coumadin prior to procedure received, see telephone encounter 8/27/20-[facility]  covid-9/7/20-[facility]    FOOT TENDON SURGERY      right and left heart cath Bilateral 01/15/2018    TREATMENT OF CARDIAC ARRHYTHMIA N/A 8/19/2019    Procedure: CARDIOVERSION;  Surgeon: Juan Zapata MD;  Location: [facility] EP LAB;  Service: Cardiology;  Laterality: N/A;  afib, dccv only, anes, GP, 3092    TRIGGER FINGER RELEASE      x 2    TRIGGER FINGER RELEASE Left 1/5/2022    Procedure: RELEASE, TRIGGER FINGER,LEFT,SMALL & THUMB;  Surgeon: Dilma Hunter MD;  Location: [facility] OR;  Service: Orthopedics;  Laterality: Left;       Family History   Problem Relation Age of Onset    Heart disease Mother     Lung cancer Mother     Heart attack Father     Diabetes Father     HIV Brother        Social History     Socioeconomic History    Marital status:    Tobacco Use    Smoking status: Never    Smokeless tobacco: Former     Types: Snuff     Quit date: 8/30/2019    Tobacco comments:     since he was 12 yrs old   Substance and Sexual Activity    Alcohol use: No     Comment: socially    Drug use: No   Social History Narrative        2  "grown kids    Delivers seafood               Objective:       Vitals:    01/04/23 1518   BP: 120/71   Pulse: 64   Weight: 109.6 kg (241 lb 10 oz)   Height: 5' 7" (1.702 m)   PainSc: 10-Worst pain ever   PainLoc: Foot        Physical Exam  Vitals reviewed.   Constitutional:       Appearance: He is well-developed.   Cardiovascular:      Comments: dorsalis pedis and posterior tibial pulses are palpable bilaterally. Capillary refill time is within normal limits. + pedal hair growth         Musculoskeletal:         General: Tenderness and deformity (posterior heel) present. Normal range of motion.        Feet:       Comments: Decreased flexion of foot at ankle joint with inability to flex to neutral position   Adequate joint range of motion without pain, limitation, nor crepitation Bilateral feet and ankle joints. Muscle strength is 5/5 in all groups bilaterally.         Skin:     General: Skin is warm and dry.      Findings: No erythema, lesion or rash.   Neurological:      Mental Status: He is alert and oriented to person, place, and time.      Sensory: No sensory deficit.      Comments: Reddick-Eliza 5.07 monofilament is intact bilateral feet.      Psychiatric:         Behavior: Behavior normal.             Assessment:       Encounter Diagnoses   Name Primary?    Kulwant's deformity of right heel Yes    Right foot pain     Acquired equinus deformity of foot, unspecified laterality          Plan:       Cj was seen today for foot problem.    Diagnoses and all orders for this visit:    Kulwant's deformity of right heel    Right foot pain    Acquired equinus deformity of foot, unspecified laterality    Other orders  -     traMADoL (ULTRAM) 50 mg tablet; Take 1 tablet (50 mg total) by mouth every 8 (eight) hours as needed for Pain.    I counseled the patient on his conditions, their implications and medical management.  Patient presents 6 weeks post conservative treatment and presentation for chronic right heel " pain.    I did review patient's radiographs with him in detail which did exhibit a large burn to the posterior aspect of his right heel.  This is a large palpable mass which is the source of most of his pain and discomfort.    Patient is interested in surgical correction to alleviate this problem.    He is a well-controlled diabetic and overall highly compliant patient.    Recommend surgical referral to Dr. Bowers for further evaluation.    Tramadol prescription dispensed as needed for pain.

## 2023-01-09 ENCOUNTER — OFFICE VISIT (OUTPATIENT)
Dept: CARDIOLOGY | Facility: CLINIC | Age: 58
End: 2023-01-09
Payer: MEDICARE

## 2023-01-09 ENCOUNTER — ANTI-COAG VISIT (OUTPATIENT)
Dept: CARDIOLOGY | Facility: CLINIC | Age: 58
End: 2023-01-09
Payer: MEDICARE

## 2023-01-09 VITALS
OXYGEN SATURATION: 98 % | WEIGHT: 235 LBS | HEART RATE: 72 BPM | DIASTOLIC BLOOD PRESSURE: 59 MMHG | HEIGHT: 67 IN | BODY MASS INDEX: 36.88 KG/M2 | SYSTOLIC BLOOD PRESSURE: 120 MMHG

## 2023-01-09 DIAGNOSIS — Z95.2 H/O MECHANICAL AORTIC VALVE REPLACEMENT: ICD-10-CM

## 2023-01-09 DIAGNOSIS — E08.41 DIABETIC MONONEUROPATHY ASSOCIATED WITH DIABETES MELLITUS DUE TO UNDERLYING CONDITION: ICD-10-CM

## 2023-01-09 DIAGNOSIS — I48.92 PAROXYSMAL ATRIAL FLUTTER: ICD-10-CM

## 2023-01-09 DIAGNOSIS — Z95.828 S/P ASCENDING AORTIC REPLACEMENT: ICD-10-CM

## 2023-01-09 DIAGNOSIS — E10.65 TYPE 1 DIABETES MELLITUS WITH HYPERGLYCEMIA: ICD-10-CM

## 2023-01-09 DIAGNOSIS — E10.649 TYPE 1 DIABETES MELLITUS WITH HYPOGLYCEMIA UNAWARENESS: ICD-10-CM

## 2023-01-09 DIAGNOSIS — Z79.01 LONG TERM (CURRENT) USE OF ANTICOAGULANTS: ICD-10-CM

## 2023-01-09 DIAGNOSIS — G45.9 TIA (TRANSIENT ISCHEMIC ATTACK): Primary | ICD-10-CM

## 2023-01-09 DIAGNOSIS — I25.10 CORONARY ARTERY DISEASE INVOLVING NATIVE CORONARY ARTERY OF NATIVE HEART WITHOUT ANGINA PECTORIS: ICD-10-CM

## 2023-01-09 DIAGNOSIS — Z95.2 H/O MECHANICAL AORTIC VALVE REPLACEMENT: Primary | ICD-10-CM

## 2023-01-09 DIAGNOSIS — Z96.41 INSULIN PUMP IN PLACE: ICD-10-CM

## 2023-01-09 DIAGNOSIS — I10 ESSENTIAL HYPERTENSION: ICD-10-CM

## 2023-01-09 DIAGNOSIS — E78.5 HYPERLIPIDEMIA WITH TARGET LOW DENSITY LIPOPROTEIN (LDL) CHOLESTEROL LESS THAN 70 MG/DL: ICD-10-CM

## 2023-01-09 PROCEDURE — 99999 PR PBB SHADOW E&M-EST. PATIENT-LVL IV: ICD-10-PCS | Mod: PBBFAC,,, | Performed by: INTERNAL MEDICINE

## 2023-01-09 PROCEDURE — 93000 EKG 12-LEAD: ICD-10-PCS | Mod: S$GLB,,, | Performed by: INTERNAL MEDICINE

## 2023-01-09 PROCEDURE — 1160F RVW MEDS BY RX/DR IN RCRD: CPT | Mod: CPTII,S$GLB,, | Performed by: INTERNAL MEDICINE

## 2023-01-09 PROCEDURE — 3078F DIAST BP <80 MM HG: CPT | Mod: CPTII,S$GLB,, | Performed by: INTERNAL MEDICINE

## 2023-01-09 PROCEDURE — 99999 PR PBB SHADOW E&M-EST. PATIENT-LVL IV: CPT | Mod: PBBFAC,,, | Performed by: INTERNAL MEDICINE

## 2023-01-09 PROCEDURE — 93000 ELECTROCARDIOGRAM COMPLETE: CPT | Mod: S$GLB,,, | Performed by: INTERNAL MEDICINE

## 2023-01-09 PROCEDURE — 1159F MED LIST DOCD IN RCRD: CPT | Mod: CPTII,S$GLB,, | Performed by: INTERNAL MEDICINE

## 2023-01-09 PROCEDURE — 3074F PR MOST RECENT SYSTOLIC BLOOD PRESSURE < 130 MM HG: ICD-10-PCS | Mod: CPTII,S$GLB,, | Performed by: INTERNAL MEDICINE

## 2023-01-09 PROCEDURE — 3008F BODY MASS INDEX DOCD: CPT | Mod: CPTII,S$GLB,, | Performed by: INTERNAL MEDICINE

## 2023-01-09 PROCEDURE — 1159F PR MEDICATION LIST DOCUMENTED IN MEDICAL RECORD: ICD-10-PCS | Mod: CPTII,S$GLB,, | Performed by: INTERNAL MEDICINE

## 2023-01-09 PROCEDURE — 3008F PR BODY MASS INDEX (BMI) DOCUMENTED: ICD-10-PCS | Mod: CPTII,S$GLB,, | Performed by: INTERNAL MEDICINE

## 2023-01-09 PROCEDURE — 99214 PR OFFICE/OUTPT VISIT, EST, LEVL IV, 30-39 MIN: ICD-10-PCS | Mod: 25,S$GLB,, | Performed by: INTERNAL MEDICINE

## 2023-01-09 PROCEDURE — 3074F SYST BP LT 130 MM HG: CPT | Mod: CPTII,S$GLB,, | Performed by: INTERNAL MEDICINE

## 2023-01-09 PROCEDURE — 1160F PR REVIEW ALL MEDS BY PRESCRIBER/CLIN PHARMACIST DOCUMENTED: ICD-10-PCS | Mod: CPTII,S$GLB,, | Performed by: INTERNAL MEDICINE

## 2023-01-09 PROCEDURE — 3078F PR MOST RECENT DIASTOLIC BLOOD PRESSURE < 80 MM HG: ICD-10-PCS | Mod: CPTII,S$GLB,, | Performed by: INTERNAL MEDICINE

## 2023-01-09 PROCEDURE — 99214 OFFICE O/P EST MOD 30 MIN: CPT | Mod: 25,S$GLB,, | Performed by: INTERNAL MEDICINE

## 2023-01-09 RX ORDER — IRBESARTAN 150 MG/1
150 TABLET ORAL DAILY
Qty: 90 TABLET | Refills: 3 | Status: SHIPPED | OUTPATIENT
Start: 2023-01-09 | End: 2024-01-24 | Stop reason: SDUPTHER

## 2023-01-09 RX ORDER — GABAPENTIN 300 MG/1
CAPSULE ORAL
Qty: 360 CAPSULE | Refills: 2 | Status: SHIPPED | OUTPATIENT
Start: 2023-01-09 | End: 2023-08-25

## 2023-01-09 RX ORDER — WARFARIN 3 MG/1
6 TABLET ORAL DAILY
Qty: 180 TABLET | Refills: 3 | Status: ON HOLD | OUTPATIENT
Start: 2023-01-09 | End: 2023-12-18

## 2023-01-09 RX ORDER — HYDROCHLOROTHIAZIDE 12.5 MG/1
TABLET ORAL
Qty: 90 TABLET | Refills: 3 | Status: SHIPPED | OUTPATIENT
Start: 2023-01-09 | End: 2024-01-24 | Stop reason: SDUPTHER

## 2023-01-09 RX ORDER — ROSUVASTATIN CALCIUM 40 MG/1
40 TABLET, COATED ORAL NIGHTLY
Qty: 90 TABLET | Refills: 3 | Status: SHIPPED | OUTPATIENT
Start: 2023-01-09 | End: 2024-01-24 | Stop reason: SDUPTHER

## 2023-01-09 NOTE — PROGRESS NOTES
Subjective:      Patient ID: Cj Barnes is a 57 y.o. male.    Chief Complaint: Follow-up and Pre-op Exam (Foot surgery with Dr Bowers @ [facility])    HPI:  Pt is anticipating bone spur surgery at the [facility] due to chronic right foot pain.    Pt's activity has been limited by chronic right foot pain.    Review of Systems   Cardiovascular:  Negative for chest pain, claudication, dyspnea on exertion, irregular heartbeat, leg swelling, near-syncope, orthopnea, palpitations and syncope.      Past Medical History:   Diagnosis Date    Anemia     Aortic stenosis 2018    Cancer 2014    Colon cancer     Coronary artery disease     Diabetes mellitus type I     since in his 30's    Heart murmur     Heel fracture     HTN (hypertension)     Hyperlipidemia LDL goal < 70     Insulin pump in place     MVP (mitral valve prolapse)     Stenosis of aortic and mitral valves         Past Surgical History:   Procedure Laterality Date    AORTIC VALVE REPLACEMENT N/A 8/9/2019    Procedure: Replacement-valve-aortic;  Surgeon: Ryan Knight MD;  [facility];  Service: Cardiothoracic;  Laterality: N/A;    BACK SURGERY      BENTALL PROCEDURE FOR REPLACEMENT OF AORTIC VALVE, AORTIC ROOT, AND ASCENDING AORTA N/A 8/9/2019    Procedure: BENTALL PROCEDURE;  Surgeon: Ryan Knight MD;  Location: [facility] OR 2ND FLR;  Service: Cardiothoracic;  Laterality: N/A;    CARDIAC SURGERY      CARPAL TUNNEL RELEASE      COLON SURGERY  2014    COLONOSCOPY N/A 2/17/2020    Procedure: COLONOSCOPY;  Surgeon: Richi Mock MD;  Location: [facility];  Service: Colon and Rectal;  Laterality: N/A;    ELBOW SURGERY      tendon release    ESOPHAGOGASTRODUODENOSCOPY N/A 9/10/2020    Procedure: EGD (ESOPHAGOGASTRODUODENOSCOPY);  Surgeon: Abilio Boo MD;  Location: [facility] ENDO (4TH FLR);  Service: Endoscopy;  Laterality: N/A;  Cardiac clearance received, see telephone encounter 8/27/20-BB  Approval to hold Coumadin prior to  "procedure received, see telephone encounter 8/27/20-BB  covid-9/7/20-[facility]    FOOT TENDON SURGERY      right and left heart cath Bilateral 01/15/2018    TREATMENT OF CARDIAC ARRHYTHMIA N/A 8/19/2019    Procedure: CARDIOVERSION;  Surgeon: Juan Zapata MD;  Location: [facility] EP LAB;  Service: Cardiology;  Laterality: N/A;  afib, dccv only, anes, GP, 3092    TRIGGER FINGER RELEASE      x 2    TRIGGER FINGER RELEASE Left 1/5/2022    Procedure: RELEASE, TRIGGER FINGER,LEFT,SMALL & THUMB;  Surgeon: Dilma Hunter MD;  Location: [facility] OR;  Service: Orthopedics;  Laterality: Left;       Family History   Problem Relation Age of Onset    Heart disease Mother     Lung cancer Mother     Heart attack Father     Diabetes Father     HIV Brother        Social History     Socioeconomic History    Marital status:    Tobacco Use    Smoking status: Never    Smokeless tobacco: Former     Types: Snuff     Quit date: 8/30/2019    Tobacco comments:     since he was 12 yrs old   Substance and Sexual Activity    Alcohol use: No     Comment: socially    Drug use: No   Social History Narrative        2 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  0    blood-glucose meter,continuous (DEXCOM G6 ) Misc 1 Device by Misc.(Non-Drug; Combo Route) route once. for 1 dose 1 each 0    blood-glucose sensor (DEXCOM G6 SENSOR) Apple 1 sensor every 10 days 10 each 3    blood-glucose transmitter (DEXCOM G6 TRANSMITTER) Apple 1 transmitter every 3 months 1 each 3    COMFORT EZ PEN NEEDLES 33 gauge x 3/16" Ndle USE AS DIRECTED with Levemir pens  11    empagliflozin (JARDIANCE) 25 mg tablet Take 1 tablet (25 mg total) by mouth once daily. 30 tablet 11    EScitalopram oxalate (LEXAPRO) 10 MG tablet Take 1 tablet (10 mg total) by mouth once daily. 90 tablet " "1    gabapentin (NEURONTIN) 300 MG capsule TAKE TWO CAPSULES BY MOUTH THREE TIMES DAILY FOR NEUROPATHY-(NERVE PAIN)- 360 capsule 2    glucagon (BAQSIMI) 3 mg/actuation Spry USE ONE actuation into a single nostril no response MAY REPEAT in 15 minutes USE a new device 2 each 1    glucagon, human recombinant, (GLUCAGON EMERGENCY KIT, HUMAN,) 1 mg SolR Inject 1 mg into the muscle as needed. 1 each 0    hydroCHLOROthiazide (HYDRODIURIL) 12.5 MG Tab TAKE ONE CAPSULE BY MOUTH ONCE DAILY 90 tablet 2    insulin detemir U-100 (LEVEMIR U-100 INSULIN) 100 unit/mL injection Inject 40 Units into the skin every evening. To have on file in case of insulin pump failure.  Patient does not need prescription right now 20 mL 3    insulin lispro 100 unit/mL injection USE CONTINOUSLY WITH INSULIN PUMP. MAXIMUM DAILY DOSE  UNITS 60 mL 11    insulin syringe-needle U-100 1/2 mL 31 gauge x 15/64" Syrg To use 5 times per day with insulin injections if off of insulin pump.  To have on file in case of pump failure 150 each 11    irbesartan (AVAPRO) 150 MG tablet Take 1 tablet (150 mg total) by mouth once daily. 90 tablet 3    metFORMIN (GLUCOPHAGE-XR) 500 MG ER 24hr tablet Take 1 tablet (500 mg total) by mouth 2 (two) times daily with meals. 180 tablet 2    mupirocin (BACTROBAN) 2 % ointment APPLY TO THE AFFECTED AREA(S) THREE TIMES DAILY (Patient taking differently: 3 (three) times daily as needed. APPLY TO THE AFFECTED AREA(S) THREE TIMES DAILY) 22 g 1    nystatin (MYCOSTATIN) cream Apply topically 2 (two) times daily. 1 Tube 5    rosuvastatin (CRESTOR) 40 MG Tab Take 1 tablet (40 mg total) by mouth every evening. 90 tablet 2    semaglutide (OZEMPIC) 0.25 mg or 0.5 mg(2 mg/1.5 mL) pen injector Inject 0.5 mg into the skin every 7 days. 1 pen 6    tadalafiL (CIALIS) 5 MG tablet Take 5 mg by mouth once daily.      traMADoL (ULTRAM) 50 mg tablet Take 1 tablet (50 mg total) by mouth every 8 (eight) hours as needed for Pain. 30 tablet 0    " "warfarin (COUMADIN) 3 MG tablet TAKE TWO TABLETS BY MOUTH ONCE DAILY 60 tablet 1    zolpidem (AMBIEN) 10 mg Tab TAKE ONE TABLET BY MOUTH AT BEDTIME AS NEEDED 30 tablet 3    amoxicillin (AMOXIL) 500 MG capsule Take 4 capsules (2,000 mg total) by mouth as needed (take one hour before dental work). (Patient not taking: Reported on 1/9/2023) 28 capsule 11     No current facility-administered medications on file prior to visit.       Review of patient's allergies indicates:  No Known Allergies  Objective:     Vitals:    01/09/23 0759   BP: (!) 120/59   BP Location: Right arm   Patient Position: Sitting   BP Method: Large (Automatic)   Pulse: 72   SpO2: 98%   Weight: 106.6 kg (235 lb 0.2 oz)   Height: 5' 7" (1.702 m)        Physical Exam  Vitals reviewed.   Constitutional:       General: He is not in acute distress.     Appearance: He is well-developed. He is not diaphoretic.   Eyes:      General: No scleral icterus.  Neck:      Vascular: No carotid bruit or JVD.   Cardiovascular:      Rate and Rhythm: Regular rhythm.      Heart sounds: Murmur (III/VI systolic murmur) heard.     No friction rub. No gallop.      Comments: Crisp metallic aortic valve open and close noise  Pulmonary:      Effort: Pulmonary effort is normal. No respiratory distress.      Breath sounds: Normal breath sounds.   Musculoskeletal:      Right lower leg: No edema.      Left lower leg: No edema.   Skin:     General: Skin is warm and dry.   Neurological:      Mental Status: He is alert and oriented to person, place, and time.   Psychiatric:         Behavior: Behavior normal.         Thought Content: Thought content normal.         Judgment: Judgment normal.      ECG today: NSR, nonspecific T wave abnormality, reviewed by me, no significant change    Lab Visit on 01/09/2023   Component Date Value Ref Range Status    Prothrombin Time 01/09/2023 33.5 (H)  9.0 - 12.5 sec Final    INR 01/09/2023 3.3 (H)  0.8 - 1.2 Final   Lab Visit on 12/08/2022 "   Component Date Value Ref Range Status    Prothrombin Time 12/08/2022 26.8 (H)  9.0 - 12.5 sec Final    INR 12/08/2022 2.6 (H)  0.8 - 1.2 Final   Lab Visit on 11/03/2022   Component Date Value Ref Range Status    Prothrombin Time 11/03/2022 29.1 (H)  9.0 - 12.5 sec Final    INR 11/03/2022 2.8 (H)  0.8 - 1.2 Final    Hemoglobin A1C 11/03/2022 6.7 (H)  4.0 - 5.6 % Final    Estimated Avg Glucose 11/03/2022 146 (H)  68 - 131 mg/dL Final    Sodium 11/03/2022 139  136 - 145 mmol/L Final    Potassium 11/03/2022 4.6  3.5 - 5.1 mmol/L Final    Chloride 11/03/2022 103  95 - 110 mmol/L Final    CO2 11/03/2022 29  23 - 29 mmol/L Final    Glucose 11/03/2022 156 (H)  70 - 110 mg/dL Final    BUN 11/03/2022 16  6 - 20 mg/dL Final    Creatinine 11/03/2022 0.9  0.5 - 1.4 mg/dL Final    Calcium 11/03/2022 9.5  8.7 - 10.5 mg/dL Final    Anion Gap 11/03/2022 7 (L)  8 - 16 mmol/L Final    eGFR 11/03/2022 >60.0  >60 mL/min/1.73 m^2 Final    Microalbumin, Urine 11/03/2022 6.0  ug/mL Final    Creatinine, Urine 11/03/2022 66.0  23.0 - 375.0 mg/dL Final    Microalb/Creat Ratio 11/03/2022 9.1  0.0 - 30.0 ug/mg Final    TSH 11/03/2022 1.062  0.400 - 4.000 uIU/mL Final   Lab Visit on 10/05/2022   Component Date Value Ref Range Status    Prothrombin Time 10/05/2022 29.8 (H)  9.0 - 12.5 sec Final    INR 10/05/2022 2.9 (H)  0.8 - 1.2 Final   Lab Visit on 09/07/2022   Component Date Value Ref Range Status    Prothrombin Time 09/07/2022 25.2 (H)  9.0 - 12.5 sec Final    INR 09/07/2022 2.4 (H)  0.8 - 1.2 Final   Lab Visit on 08/17/2022   Component Date Value Ref Range Status    Prothrombin Time 08/17/2022 24.1 (H)  9.0 - 12.5 sec Final    INR 08/17/2022 2.3 (H)  0.8 - 1.2 Final   Lab Visit on 07/27/2022   Component Date Value Ref Range Status    Prothrombin Time 07/27/2022 24.6 (H)  9.0 - 12.5 sec Final    INR 07/27/2022 2.4 (H)  0.8 - 1.2 Final   Lab Visit on 07/11/2022   Component Date Value Ref Range Status    Prothrombin Time 07/11/2022 24.0 (H)   9.0 - 12.5 sec Final    INR 07/11/2022 2.3 (H)  0.8 - 1.2 Final   (    Assessment:     1. H/O mechanical aortic valve replacement    2. S/P ascending aortic replacement    3. Paroxysmal atrial flutter    4. Hyperlipidemia with target low density lipoprotein (LDL) cholesterol less than 70 mg/dL    5. Essential hypertension    6. Insulin pump in place    7. Type 1 diabetes mellitus with hypoglycemia unawareness    8. Long term (current) use of anticoagulants    9. Diabetic mononeuropathy associated with diabetes mellitus due to underlying condition    10. Coronary artery disease involving native coronary artery of native heart without angina pectoris      Plan:   Cj was seen today for follow-up and pre-op exam.    Diagnoses and all orders for this visit:    H/O mechanical aortic valve replacement  -     IN OFFICE EKG 12-LEAD (to Muse)    S/P ascending aortic replacement  -     IN OFFICE EKG 12-LEAD (to Muse)    Paroxysmal atrial flutter  -     IN OFFICE EKG 12-LEAD (to [place])    Hyperlipidemia with target low density lipoprotein (LDL) cholesterol less than 70 mg/dL  -     IN OFFICE EKG 12-LEAD (to Muse)    Essential hypertension  -     IN OFFICE EKG 12-LEAD (to Muse)    Insulin pump in place  -     IN OFFICE EKG 12-LEAD (to Muse)    Type 1 diabetes mellitus with hypoglycemia unawareness  -     IN OFFICE EKG 12-LEAD (to Muse)    Long term (current) use of anticoagulants    Diabetic mononeuropathy associated with diabetes mellitus due to underlying condition    Coronary artery disease involving native coronary artery of native heart without angina pectoris     Pt is clinically stable for foot surgery.    Recommend holding the warfarin for 5 days prior to surgery and resuming the warfarin the day of surgery.    OK to hold ASA for 3 days prior to foot surgery    Pt will ask NP Ingrid for instructions regarding the insulin pump and surgery    Small risk of aortic valve thrombosis discussed while holding the  warfarin.    F/u with coumadin clinic    Follow up in about 6 months (around 7/9/2023).

## 2023-01-09 NOTE — PROGRESS NOTES
INR 3.3. Pt denies changes. Reviewed patients history. I am checking on INR goal with Dr. Alexander. Goal is acceptable at 2.0-3.0 but considering other cardiac risk factors a goal of 2.5-3.5 may be considered. Dose may not need to be changed once goal is confirmed. Will make dose plan once I receive word from Dr. Alexander.    Update: Dr. Alexander advised to change goal to 2.5-3.5. Noted he saw patient today for cardiac clearance for foot surgery for bone spur. He cleared pt to hold 5 days. We have bridged pt with lovenox in the past. I have asked for his recommendation if lovenox needed with new guidelines. He replied that lovenox not needed since he is in NSR. He added that pt has carbomedics AV and TIA was possibly hypoglycemic episode. Patient may hold without lovenox.        Maintain dose. Repeat INR in 2 weeks to assess dose on new range. We are also waiting on confirmed surgery date.

## 2023-01-09 NOTE — TELEPHONE ENCOUNTER
Informed pt that provider received notification that he is having foot surgery.   Stated to pt  per provider that He should be able to wear his insulin pump up until the day of surgery   Also stated that If he remains in control IQ it should help to prevent hypoglycemia, and If he wants to come out of control IQ I recommend cutting back on his basal rate by 20-25%   He can also put himself in activity mode---which will raise his blood sugar target and help to prevent hypoglycemia, pt verbalized understanding

## 2023-01-11 ENCOUNTER — OFFICE VISIT (OUTPATIENT)
Dept: PODIATRY | Facility: CLINIC | Age: 58
End: 2023-01-11
Payer: MEDICARE

## 2023-01-11 VITALS
HEART RATE: 97 BPM | DIASTOLIC BLOOD PRESSURE: 74 MMHG | SYSTOLIC BLOOD PRESSURE: 132 MMHG | WEIGHT: 224.88 LBS | HEIGHT: 67 IN | BODY MASS INDEX: 35.29 KG/M2

## 2023-01-11 DIAGNOSIS — M92.61 HAGLUND'S DEFORMITY OF RIGHT HEEL: Primary | ICD-10-CM

## 2023-01-11 DIAGNOSIS — M21.6X9 ACQUIRED EQUINUS DEFORMITY OF FOOT, UNSPECIFIED LATERALITY: ICD-10-CM

## 2023-01-11 DIAGNOSIS — M79.671 RIGHT FOOT PAIN: ICD-10-CM

## 2023-01-11 PROCEDURE — 99999 PR PBB SHADOW E&M-EST. PATIENT-LVL IV: CPT | Mod: PBBFAC,,, | Performed by: PODIATRIST

## 2023-01-11 PROCEDURE — 3078F DIAST BP <80 MM HG: CPT | Mod: CPTII,S$GLB,, | Performed by: PODIATRIST

## 2023-01-11 PROCEDURE — 1159F PR MEDICATION LIST DOCUMENTED IN MEDICAL RECORD: ICD-10-PCS | Mod: CPTII,S$GLB,, | Performed by: PODIATRIST

## 2023-01-11 PROCEDURE — 3078F PR MOST RECENT DIASTOLIC BLOOD PRESSURE < 80 MM HG: ICD-10-PCS | Mod: CPTII,S$GLB,, | Performed by: PODIATRIST

## 2023-01-11 PROCEDURE — 99999 PR PBB SHADOW E&M-EST. PATIENT-LVL IV: ICD-10-PCS | Mod: PBBFAC,,, | Performed by: PODIATRIST

## 2023-01-11 PROCEDURE — 1159F MED LIST DOCD IN RCRD: CPT | Mod: CPTII,S$GLB,, | Performed by: PODIATRIST

## 2023-01-11 PROCEDURE — 4010F PR ACE/ARB THEARPY RXD/TAKEN: ICD-10-PCS | Mod: CPTII,S$GLB,, | Performed by: PODIATRIST

## 2023-01-11 PROCEDURE — 3008F PR BODY MASS INDEX (BMI) DOCUMENTED: ICD-10-PCS | Mod: CPTII,S$GLB,, | Performed by: PODIATRIST

## 2023-01-11 PROCEDURE — 3075F PR MOST RECENT SYSTOLIC BLOOD PRESS GE 130-139MM HG: ICD-10-PCS | Mod: CPTII,S$GLB,, | Performed by: PODIATRIST

## 2023-01-11 PROCEDURE — 3075F SYST BP GE 130 - 139MM HG: CPT | Mod: CPTII,S$GLB,, | Performed by: PODIATRIST

## 2023-01-11 PROCEDURE — 99214 OFFICE O/P EST MOD 30 MIN: CPT | Mod: S$GLB,,, | Performed by: PODIATRIST

## 2023-01-11 PROCEDURE — 3008F BODY MASS INDEX DOCD: CPT | Mod: CPTII,S$GLB,, | Performed by: PODIATRIST

## 2023-01-11 PROCEDURE — 4010F ACE/ARB THERAPY RXD/TAKEN: CPT | Mod: CPTII,S$GLB,, | Performed by: PODIATRIST

## 2023-01-11 PROCEDURE — 99214 PR OFFICE/OUTPT VISIT, EST, LEVL IV, 30-39 MIN: ICD-10-PCS | Mod: S$GLB,,, | Performed by: PODIATRIST

## 2023-01-11 NOTE — PROGRESS NOTES
Subjective:      Patient ID: Cj Barnes is a 57 y.o. male.    Chief Complaint: Consult (Bone spur surgery)    Cj is a 57 y.o. male who presents to the podiatry clinic  with complaint of  right foot pain. Onset of the symptoms was about a month ago. Precipitating event: none known. Current symptoms include: ability to bear weight, but with some pain. Aggravating factors: any weight bearing. Symptoms have waxed and waned but are worse overall.  He is here for surgical consultation, he is attempted many surgical treatments over the past 12 months including shoe gear changes, bracing, walking boot, over-the-counter inserts, as well as topical and oral anti-inflammatories.  Review of Systems   Constitutional: Negative for chills, decreased appetite and fever.   Cardiovascular:  Negative for leg swelling.   Skin:  Negative for dry skin, flushing and itching.   Musculoskeletal:  Negative for arthritis, joint pain, joint swelling and myalgias.   Gastrointestinal:  Negative for nausea and vomiting.   Neurological:  Negative for loss of balance, numbness and paresthesias.       Patient Active Problem List   Diagnosis    Essential hypertension    Insulin pump fitting or adjustment    Type 1 diabetes mellitus with hyperglycemia    Hyperlipidemia with target low density lipoprotein (LDL) cholesterol less than 70 mg/dL    Insulin pump in place    Aortic stenosis    Aortic regurgitation, congenital    TIA (transient ischemic attack)    Atherosclerosis of native coronary artery of native heart with angina pectoris    Type 1 diabetes mellitus with hypoglycemia unawareness    Class 2 severe obesity due to excess calories with serious comorbidity and body mass index (BMI) of 37.0 to 37.9 in adult    S/P ascending aortic replacement    Long term (current) use of anticoagulants    Paroxysmal atrial flutter    Trouble swallowing    H/O mechanical aortic valve replacement    Iron deficiency anemia due to chronic blood  "loss    ANTHONY (iron deficiency anemia)    ACE-inhibitor cough    Diabetic mononeuropathy associated with diabetes mellitus due to underlying condition    Major depressive disorder, recurrent, mild    Trigger finger of left thumb    Coronary artery disease involving native coronary artery of native heart without angina pectoris       Current Outpatient Medications on File Prior to Visit   Medication Sig Dispense Refill    blood-glucose sensor (DEXCOM G6 SENSOR) Apple 1 sensor every 10 days 10 each 3    blood-glucose transmitter (DEXCOM G6 TRANSMITTER) Apple 1 transmitter every 3 months 1 each 3    COMFORT EZ PEN NEEDLES 33 gauge x 3/16" Ndle USE AS DIRECTED with Levemir pens  11    empagliflozin (JARDIANCE) 25 mg tablet Take 1 tablet (25 mg total) by mouth once daily. 30 tablet 11    EScitalopram oxalate (LEXAPRO) 10 MG tablet Take 1 tablet (10 mg total) by mouth once daily. 90 tablet 1    gabapentin (NEURONTIN) 300 MG capsule TAKE TWO CAPSULES BY MOUTH THREE TIMES DAILY FOR NEUROPATHY-(NERVE PAIN)- 360 capsule 2    glucagon (BAQSIMI) 3 mg/actuation Spry USE ONE actuation into a single nostril no response MAY REPEAT in 15 minutes USE a new device 2 each 1    hydroCHLOROthiazide (HYDRODIURIL) 12.5 MG Tab TAKE ONE CAPSULE BY MOUTH ONCE DAILY 90 tablet 3    insulin detemir U-100 (LEVEMIR U-100 INSULIN) 100 unit/mL injection Inject 40 Units into the skin every evening. To have on file in case of insulin pump failure.  Patient does not need prescription right now 20 mL 3    insulin lispro 100 unit/mL injection USE CONTINOUSLY WITH INSULIN PUMP. MAXIMUM DAILY DOSE  UNITS 60 mL 11    insulin syringe-needle U-100 1/2 mL 31 gauge x 15/64" Syrg To use 5 times per day with insulin injections if off of insulin pump.  To have on file in case of pump failure 150 each 11    irbesartan (AVAPRO) 150 MG tablet Take 1 tablet (150 mg total) by mouth once daily. 90 tablet 3    metFORMIN (GLUCOPHAGE-XR) 500 MG ER 24hr tablet Take 1 " tablet (500 mg total) by mouth 2 (two) times daily with meals. 180 tablet 2    mupirocin (BACTROBAN) 2 % ointment APPLY TO THE AFFECTED AREA(S) THREE TIMES DAILY (Patient taking differently: 3 (three) times daily as needed. APPLY TO THE AFFECTED AREA(S) THREE TIMES DAILY) 22 g 1    nystatin (MYCOSTATIN) cream Apply topically 2 (two) times daily. 1 Tube 5    rosuvastatin (CRESTOR) 40 MG Tab Take 1 tablet (40 mg total) by mouth every evening. 90 tablet 3    semaglutide (OZEMPIC) 0.25 mg or 0.5 mg(2 mg/1.5 mL) pen injector Inject 0.5 mg into the skin every 7 days. 1 pen 6    tadalafiL (CIALIS) 5 MG tablet Take 5 mg by mouth once daily.      traMADoL (ULTRAM) 50 mg tablet Take 1 tablet (50 mg total) by mouth every 8 (eight) hours as needed for Pain. 30 tablet 0    warfarin (COUMADIN) 3 MG tablet Take 2 tablets (6 mg total) by mouth Daily. 180 tablet 3    zolpidem (AMBIEN) 10 mg Tab TAKE ONE TABLET BY MOUTH AT BEDTIME AS NEEDED 30 tablet 3    amoxicillin (AMOXIL) 500 MG capsule Take 4 capsules (2,000 mg total) by mouth as needed (take one hour before dental work). (Patient not taking: Reported on 1/9/2023) 28 capsule 11    aspirin (ECOTRIN) 81 MG EC tablet Take 1 tablet (81 mg total) by mouth once daily.  0    blood-glucose meter,continuous (DEXCOM G6 ) Misc 1 Device by Misc.(Non-Drug; Combo Route) route once. for 1 dose 1 each 0    glucagon, human recombinant, (GLUCAGON EMERGENCY KIT, HUMAN,) 1 mg SolR Inject 1 mg into the muscle as needed. 1 each 0     No current facility-administered medications on file prior to visit.       Review of patient's allergies indicates:  No Known Allergies    Past Surgical History:   Procedure Laterality Date    AORTIC VALVE REPLACEMENT N/A 8/9/2019    Procedure: Replacement-valve-aortic;  Surgeon: Ryan Knight MD;  Location: [facility] OR [address] [place] [address];  Service: Cardiothoracic;  Laterality: N/A;    BACK SURGERY      BENTALL PROCEDURE FOR REPLACEMENT OF AORTIC VALVE, AORTIC ROOT, AND  ASCENDING AORTA N/A 8/9/2019    Procedure: BENTALL PROCEDURE;  Surgeon: Ryan Knight MD;  Location: [facility] 2ND FLR;  Service: Cardiothoracic;  Laterality: N/A;    CARDIAC SURGERY      CARPAL TUNNEL RELEASE      COLON SURGERY  2014    COLONOSCOPY N/A 2/17/2020    Procedure: COLONOSCOPY;  Surgeon: Richi Mock MD;  Location: [facility] ENDO;  Service: Colon and Rectal;  Laterality: N/A;    ELBOW SURGERY      tendon release    ESOPHAGOGASTRODUODENOSCOPY N/A 9/10/2020    Procedure: EGD (ESOPHAGOGASTRODUODENOSCOPY);  Surgeon: Abilio Boo MD;  Location: [facility] ENDO (4TH FLR);  Service: Endoscopy;  Laterality: N/A;  Cardiac clearance received, see telephone encounter 8/27/20-BB  Approval to hold Coumadin prior to procedure received, see telephone encounter 8/27/20-BB  covid-9/7/20-[facility]    FOOT TENDON SURGERY      right and left heart cath Bilateral 01/15/2018    TREATMENT OF CARDIAC ARRHYTHMIA N/A 8/19/2019    Procedure: CARDIOVERSION;  Surgeon: Juan Zapata MD;  Location: [facility] EP LAB;  Service: Cardiology;  Laterality: N/A;  afib, dccv only, anes, GP, 3092    TRIGGER FINGER RELEASE      x 2    TRIGGER FINGER RELEASE Left 1/5/2022    Procedure: RELEASE, TRIGGER FINGER,LEFT,SMALL & THUMB;  Surgeon: Dilma Hunter MD;  Location: [facility] OR;  Service: Orthopedics;  Laterality: Left;       Family History   Problem Relation Age of Onset    Heart disease Mother     Lung cancer Mother     Heart attack Father     Diabetes Father     HIV Brother        Social History     Socioeconomic History    Marital status:    Tobacco Use    Smoking status: Never    Smokeless tobacco: Former     Types: Snuff     Quit date: 8/30/2019    Tobacco comments:     since he was 12 yrs old   Substance and Sexual Activity    Alcohol use: No     Comment: socially    Drug use: No   Social History Narrative        2 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   Constitutional:       Appearance: He is well-developed.   Cardiovascular:      Comments: dorsalis pedis and posterior tibial pulses are palpable bilaterally. Capillary refill time is within normal limits. + pedal hair growth         Musculoskeletal:         General: Tenderness and deformity (posterior heel) present. Normal range of motion.        Feet:       Comments: Decreased flexion of foot at ankle joint with inability to flex to neutral position   Adequate joint range of motion without pain, limitation, nor crepitation Bilateral feet and ankle joints. Muscle strength is 5/5 in all groups bilaterally.         Skin:     General: Skin is warm and dry.      Findings: No erythema, lesion or rash.   Neurological:      Mental Status: He is alert and oriented to person, place, and time.      Sensory: No sensory deficit.      Comments: [place]-Eliza 5.07 monofilament is intact bilateral feet.      Psychiatric:         Behavior: Behavior normal.             Assessment:       Encounter Diagnoses   Name Primary?    Kulwant's deformity of right heel Yes    Right foot pain     Acquired equinus deformity of foot, unspecified laterality          Plan:       Cj was seen today for consult.    Diagnoses and all orders for this visit:    Kulwant's deformity of right heel    Right foot pain    Acquired equinus deformity of foot, unspecified laterality    The nature of the condition, options for management, as well as potential risks and complications were discussed in detail with patient. Patient was amenable to my recommendations and left my office fully informed and will follow up as instructed or sooner if necessary.      The patient has decided to forego any further conservative treatment and opted for surgical intervention.  Alternative treatments, and benefits of " surgery were discussed with the patient.  Risks and complications, including but not limited to: pain, swelling, numbness, infection, failure to achieve the stated goals, recurrence of problem, nerve and blood vessel damage, scar tissue formation, further need of surgery, loss of limb or life, was discussed in detail with the patient.  All questions asked were answered, and written informed consent was sign and received. The patient will undergo calcaneal posterior exostectomy right foot/ankle as well as possible Oumar gastrocnemius recession right.

## 2023-01-11 NOTE — H&P (VIEW-ONLY)
Subjective:      Patient ID: Cj Barnes is a 57 y.o. male.    Chief Complaint: Consult (Bone spur surgery)    Cj is a 57 y.o. male who presents to the podiatry clinic  with complaint of  right foot pain. Onset of the symptoms was about a month ago. Precipitating event: none known. Current symptoms include: ability to bear weight, but with some pain. Aggravating factors: any weight bearing. Symptoms have waxed and waned but are worse overall.  He is here for surgical consultation, he is attempted many surgical treatments over the past 12 months including shoe gear changes, bracing, walking boot, over-the-counter inserts, as well as topical and oral anti-inflammatories.  Review of Systems   Constitutional: Negative for chills, decreased appetite and fever.   Cardiovascular:  Negative for leg swelling.   Skin:  Negative for dry skin, flushing and itching.   Musculoskeletal:  Negative for arthritis, joint pain, joint swelling and myalgias.   Gastrointestinal:  Negative for nausea and vomiting.   Neurological:  Negative for loss of balance, numbness and paresthesias.       Patient Active Problem List   Diagnosis    Essential hypertension    Insulin pump fitting or adjustment    Type 1 diabetes mellitus with hyperglycemia    Hyperlipidemia with target low density lipoprotein (LDL) cholesterol less than 70 mg/dL    Insulin pump in place    Aortic stenosis    Aortic regurgitation, congenital    TIA (transient ischemic attack)    Atherosclerosis of native coronary artery of native heart with angina pectoris    Type 1 diabetes mellitus with hypoglycemia unawareness    Class 2 severe obesity due to excess calories with serious comorbidity and body mass index (BMI) of 37.0 to 37.9 in adult    S/P ascending aortic replacement    Long term (current) use of anticoagulants    Paroxysmal atrial flutter    Trouble swallowing    H/O mechanical aortic valve replacement    Iron deficiency anemia due to chronic blood  "loss    ANTHONY (iron deficiency anemia)    ACE-inhibitor cough    Diabetic mononeuropathy associated with diabetes mellitus due to underlying condition    Major depressive disorder, recurrent, mild    Trigger finger of left thumb    Coronary artery disease involving native coronary artery of native heart without angina pectoris       Current Outpatient Medications on File Prior to Visit   Medication Sig Dispense Refill    blood-glucose sensor (DEXCOM G6 SENSOR) Apple 1 sensor every 10 days 10 each 3    blood-glucose transmitter (DEXCOM G6 TRANSMITTER) Apple 1 transmitter every 3 months 1 each 3    COMFORT EZ PEN NEEDLES 33 gauge x 3/16" Ndle USE AS DIRECTED with Levemir pens  11    empagliflozin (JARDIANCE) 25 mg tablet Take 1 tablet (25 mg total) by mouth once daily. 30 tablet 11    EScitalopram oxalate (LEXAPRO) 10 MG tablet Take 1 tablet (10 mg total) by mouth once daily. 90 tablet 1    gabapentin (NEURONTIN) 300 MG capsule TAKE TWO CAPSULES BY MOUTH THREE TIMES DAILY FOR NEUROPATHY-(NERVE PAIN)- 360 capsule 2    glucagon (BAQSIMI) 3 mg/actuation Spry USE ONE actuation into a single nostril no response MAY REPEAT in 15 minutes USE a new device 2 each 1    hydroCHLOROthiazide (HYDRODIURIL) 12.5 MG Tab TAKE ONE CAPSULE BY MOUTH ONCE DAILY 90 tablet 3    insulin detemir U-100 (LEVEMIR U-100 INSULIN) 100 unit/mL injection Inject 40 Units into the skin every evening. To have on file in case of insulin pump failure.  Patient does not need prescription right now 20 mL 3    insulin lispro 100 unit/mL injection USE CONTINOUSLY WITH INSULIN PUMP. MAXIMUM DAILY DOSE  UNITS 60 mL 11    insulin syringe-needle U-100 1/2 mL 31 gauge x 15/64" Syrg To use 5 times per day with insulin injections if off of insulin pump.  To have on file in case of pump failure 150 each 11    irbesartan (AVAPRO) 150 MG tablet Take 1 tablet (150 mg total) by mouth once daily. 90 tablet 3    metFORMIN (GLUCOPHAGE-XR) 500 MG ER 24hr tablet Take 1 " tablet (500 mg total) by mouth 2 (two) times daily with meals. 180 tablet 2    mupirocin (BACTROBAN) 2 % ointment APPLY TO THE AFFECTED AREA(S) THREE TIMES DAILY (Patient taking differently: 3 (three) times daily as needed. APPLY TO THE AFFECTED AREA(S) THREE TIMES DAILY) 22 g 1    nystatin (MYCOSTATIN) cream Apply topically 2 (two) times daily. 1 Tube 5    rosuvastatin (CRESTOR) 40 MG Tab Take 1 tablet (40 mg total) by mouth every evening. 90 tablet 3    semaglutide (OZEMPIC) 0.25 mg or 0.5 mg(2 mg/1.5 mL) pen injector Inject 0.5 mg into the skin every 7 days. 1 pen 6    tadalafiL (CIALIS) 5 MG tablet Take 5 mg by mouth once daily.      traMADoL (ULTRAM) 50 mg tablet Take 1 tablet (50 mg total) by mouth every 8 (eight) hours as needed for Pain. 30 tablet 0    warfarin (COUMADIN) 3 MG tablet Take 2 tablets (6 mg total) by mouth Daily. 180 tablet 3    zolpidem (AMBIEN) 10 mg Tab TAKE ONE TABLET BY MOUTH AT BEDTIME AS NEEDED 30 tablet 3    amoxicillin (AMOXIL) 500 MG capsule Take 4 capsules (2,000 mg total) by mouth as needed (take one hour before dental work). (Patient not taking: Reported on 1/9/2023) 28 capsule 11    aspirin (ECOTRIN) 81 MG EC tablet Take 1 tablet (81 mg total) by mouth once daily.  0    blood-glucose meter,continuous (DEXCOM G6 ) Misc 1 Device by Misc.(Non-Drug; Combo Route) route once. for 1 dose 1 each 0    glucagon, human recombinant, (GLUCAGON EMERGENCY KIT, HUMAN,) 1 mg SolR Inject 1 mg into the muscle as needed. 1 each 0     No current facility-administered medications on file prior to visit.       Review of patient's allergies indicates:  No Known Allergies    Past Surgical History:   Procedure Laterality Date    AORTIC VALVE REPLACEMENT N/A 8/9/2019    Procedure: Replacement-valve-aortic;  Surgeon: Ryan Knight MD;  Location: [facility] OR [address] [place] [address];  Service: Cardiothoracic;  Laterality: N/A;    BACK SURGERY      BENTALL PROCEDURE FOR REPLACEMENT OF AORTIC VALVE, AORTIC ROOT, AND  ASCENDING AORTA N/A 8/9/2019    Procedure: BENTALL PROCEDURE;  Surgeon: Ryan Knight MD;  Location: [facility] 2ND FLR;  Service: Cardiothoracic;  Laterality: N/A;    CARDIAC SURGERY      CARPAL TUNNEL RELEASE      COLON SURGERY  2014    COLONOSCOPY N/A 2/17/2020    Procedure: COLONOSCOPY;  Surgeon: Richi Mock MD;  Location: [facility] ENDO;  Service: Colon and Rectal;  Laterality: N/A;    ELBOW SURGERY      tendon release    ESOPHAGOGASTRODUODENOSCOPY N/A 9/10/2020    Procedure: EGD (ESOPHAGOGASTRODUODENOSCOPY);  Surgeon: Abilio Boo MD;  Location: [facility] ENDO (4TH FLR);  Service: Endoscopy;  Laterality: N/A;  Cardiac clearance received, see telephone encounter 8/27/20-BB  Approval to hold Coumadin prior to procedure received, see telephone encounter 8/27/20-BB  covid-9/7/20-[facility]    FOOT TENDON SURGERY      right and left heart cath Bilateral 01/15/2018    TREATMENT OF CARDIAC ARRHYTHMIA N/A 8/19/2019    Procedure: CARDIOVERSION;  Surgeon: Juan Zapata MD;  Location: [facility] EP LAB;  Service: Cardiology;  Laterality: N/A;  afib, dccv only, anes, GP, 3092    TRIGGER FINGER RELEASE      x 2    TRIGGER FINGER RELEASE Left 1/5/2022    Procedure: RELEASE, TRIGGER FINGER,LEFT,SMALL & THUMB;  Surgeon: Dilma Hunter MD;  Location: [facility] OR;  Service: Orthopedics;  Laterality: Left;       Family History   Problem Relation Age of Onset    Heart disease Mother     Lung cancer Mother     Heart attack Father     Diabetes Father     HIV Brother        Social History     Socioeconomic History    Marital status:    Tobacco Use    Smoking status: Never    Smokeless tobacco: Former     Types: Snuff     Quit date: 8/30/2019    Tobacco comments:     since he was 12 yrs old   Substance and Sexual Activity    Alcohol use: No     Comment: socially    Drug use: No   Social History Narrative        2 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   Constitutional:       Appearance: He is well-developed.   Cardiovascular:      Comments: dorsalis pedis and posterior tibial pulses are palpable bilaterally. Capillary refill time is within normal limits. + pedal hair growth         Musculoskeletal:         General: Tenderness and deformity (posterior heel) present. Normal range of motion.        Feet:       Comments: Decreased flexion of foot at ankle joint with inability to flex to neutral position   Adequate joint range of motion without pain, limitation, nor crepitation Bilateral feet and ankle joints. Muscle strength is 5/5 in all groups bilaterally.         Skin:     General: Skin is warm and dry.      Findings: No erythema, lesion or rash.   Neurological:      Mental Status: He is alert and oriented to person, place, and time.      Sensory: No sensory deficit.      Comments: [place]-Eliza 5.07 monofilament is intact bilateral feet.      Psychiatric:         Behavior: Behavior normal.             Assessment:       Encounter Diagnoses   Name Primary?    Kulwant's deformity of right heel Yes    Right foot pain     Acquired equinus deformity of foot, unspecified laterality          Plan:       Cj was seen today for consult.    Diagnoses and all orders for this visit:    Kulwant's deformity of right heel    Right foot pain    Acquired equinus deformity of foot, unspecified laterality    The nature of the condition, options for management, as well as potential risks and complications were discussed in detail with patient. Patient was amenable to my recommendations and left my office fully informed and will follow up as instructed or sooner if necessary.      The patient has decided to forego any further conservative treatment and opted for surgical intervention.  Alternative treatments, and benefits of " surgery were discussed with the patient.  Risks and complications, including but not limited to: pain, swelling, numbness, infection, failure to achieve the stated goals, recurrence of problem, nerve and blood vessel damage, scar tissue formation, further need of surgery, loss of limb or life, was discussed in detail with the patient.  All questions asked were answered, and written informed consent was sign and received. The patient will undergo calcaneal posterior exostectomy right foot/ankle as well as possible Oumar gastrocnemius recession right.

## 2023-01-13 DIAGNOSIS — G47.00 INSOMNIA, UNSPECIFIED TYPE: ICD-10-CM

## 2023-01-13 RX ORDER — ZOLPIDEM TARTRATE 10 MG/1
TABLET ORAL
Qty: 30 TABLET | Refills: 1 | Status: SHIPPED | OUTPATIENT
Start: 2023-01-13 | End: 2023-03-09

## 2023-01-13 NOTE — TELEPHONE ENCOUNTER
----- Message from Radha Ahuja sent at 1/13/2023 11:24 AM CST -----  Contact: [facility]   Requesting an RX refill or new RX.  Is this a refill or new RX: refill   RX name and strength (copy/paste from chart):  zolpidem (AMBIEN) 10 mg Tab  Is this a 30 day or 90 day RX: 60   Pharmacy name and phone # (copy/paste from chart):  [facility] - [place], [place] - [address]   Phone:  [phone]  Fax:  [phone]    The doctors have asked that we provide their patients with the following 2 reminders -- prescription refills can take up to 72 hours, and a friendly reminder that in the future you can use your [facility] account to request refills: yes

## 2023-01-13 NOTE — TELEPHONE ENCOUNTER
No new care gaps identified.  [facility] Embedded Care Gaps. Reference number: [id number]. 1/13/2023   11:47:13 AM CST

## 2023-01-23 ENCOUNTER — PATIENT MESSAGE (OUTPATIENT)
Dept: PRIMARY CARE CLINIC | Facility: CLINIC | Age: 58
End: 2023-01-23
Payer: MEDICARE

## 2023-01-23 ENCOUNTER — TELEPHONE (OUTPATIENT)
Dept: PODIATRY | Facility: CLINIC | Age: 58
End: 2023-01-23
Payer: MEDICARE

## 2023-01-23 ENCOUNTER — PATIENT MESSAGE (OUTPATIENT)
Dept: SURGERY | Facility: HOSPITAL | Age: 58
End: 2023-01-23
Payer: MEDICARE

## 2023-01-23 NOTE — TELEPHONE ENCOUNTER
Spoke to pt and discussed 2/9 surgery date. Also advised pt to contact their PCP to schedule an appointment to be cleared for surgery. Then advised pt that someone will call them the day before surgery between 3p-5p with the surgery arrival time and instructions. Pt verbalized understanding and call ended.

## 2023-01-25 ENCOUNTER — OFFICE VISIT (OUTPATIENT)
Dept: PRIMARY CARE CLINIC | Facility: CLINIC | Age: 58
End: 2023-01-25
Payer: MEDICARE

## 2023-01-25 ENCOUNTER — ANTI-COAG VISIT (OUTPATIENT)
Dept: CARDIOLOGY | Facility: CLINIC | Age: 58
End: 2023-01-25
Payer: MEDICARE

## 2023-01-25 VITALS
RESPIRATION RATE: 18 BRPM | SYSTOLIC BLOOD PRESSURE: 118 MMHG | DIASTOLIC BLOOD PRESSURE: 72 MMHG | OXYGEN SATURATION: 96 % | HEIGHT: 67 IN | HEART RATE: 71 BPM | BODY MASS INDEX: 36.57 KG/M2 | WEIGHT: 233 LBS

## 2023-01-25 DIAGNOSIS — Z01.818 PRE-OPERATIVE CLEARANCE: Primary | ICD-10-CM

## 2023-01-25 DIAGNOSIS — Z79.01 LONG TERM (CURRENT) USE OF ANTICOAGULANTS: Primary | ICD-10-CM

## 2023-01-25 DIAGNOSIS — I10 ESSENTIAL HYPERTENSION: ICD-10-CM

## 2023-01-25 DIAGNOSIS — I25.119 ATHEROSCLEROSIS OF NATIVE CORONARY ARTERY OF NATIVE HEART WITH ANGINA PECTORIS: ICD-10-CM

## 2023-01-25 DIAGNOSIS — I48.92 PAROXYSMAL ATRIAL FLUTTER: ICD-10-CM

## 2023-01-25 DIAGNOSIS — Z95.2 H/O MECHANICAL AORTIC VALVE REPLACEMENT: ICD-10-CM

## 2023-01-25 DIAGNOSIS — I25.10 CORONARY ARTERY DISEASE INVOLVING NATIVE CORONARY ARTERY OF NATIVE HEART WITHOUT ANGINA PECTORIS: ICD-10-CM

## 2023-01-25 PROCEDURE — 3008F PR BODY MASS INDEX (BMI) DOCUMENTED: ICD-10-PCS | Mod: CPTII,S$GLB,, | Performed by: NURSE PRACTITIONER

## 2023-01-25 PROCEDURE — 99499 UNLISTED E&M SERVICE: CPT | Mod: S$GLB,,, | Performed by: NURSE PRACTITIONER

## 2023-01-25 PROCEDURE — 3074F PR MOST RECENT SYSTOLIC BLOOD PRESSURE < 130 MM HG: ICD-10-PCS | Mod: CPTII,S$GLB,, | Performed by: NURSE PRACTITIONER

## 2023-01-25 PROCEDURE — 99214 OFFICE O/P EST MOD 30 MIN: CPT | Mod: S$GLB,,, | Performed by: NURSE PRACTITIONER

## 2023-01-25 PROCEDURE — 3074F SYST BP LT 130 MM HG: CPT | Mod: CPTII,S$GLB,, | Performed by: NURSE PRACTITIONER

## 2023-01-25 PROCEDURE — 4010F ACE/ARB THERAPY RXD/TAKEN: CPT | Mod: CPTII,S$GLB,, | Performed by: NURSE PRACTITIONER

## 2023-01-25 PROCEDURE — 4010F PR ACE/ARB THEARPY RXD/TAKEN: ICD-10-PCS | Mod: CPTII,S$GLB,, | Performed by: NURSE PRACTITIONER

## 2023-01-25 PROCEDURE — 93793 ANTICOAG MGMT PT WARFARIN: CPT | Mod: S$GLB,,,

## 2023-01-25 PROCEDURE — 3078F PR MOST RECENT DIASTOLIC BLOOD PRESSURE < 80 MM HG: ICD-10-PCS | Mod: CPTII,S$GLB,, | Performed by: NURSE PRACTITIONER

## 2023-01-25 PROCEDURE — 99999 PR PBB SHADOW E&M-EST. PATIENT-LVL V: ICD-10-PCS | Mod: PBBFAC,,, | Performed by: NURSE PRACTITIONER

## 2023-01-25 PROCEDURE — 3078F DIAST BP <80 MM HG: CPT | Mod: CPTII,S$GLB,, | Performed by: NURSE PRACTITIONER

## 2023-01-25 PROCEDURE — 93793 PR ANTICOAGULANT MGMT FOR PT TAKING WARFARIN: ICD-10-PCS | Mod: S$GLB,,,

## 2023-01-25 PROCEDURE — 99499 RISK ADDL DX/OHS AUDIT: ICD-10-PCS | Mod: S$GLB,,, | Performed by: NURSE PRACTITIONER

## 2023-01-25 PROCEDURE — 99999 PR PBB SHADOW E&M-EST. PATIENT-LVL V: CPT | Mod: PBBFAC,,, | Performed by: NURSE PRACTITIONER

## 2023-01-25 PROCEDURE — 3008F BODY MASS INDEX DOCD: CPT | Mod: CPTII,S$GLB,, | Performed by: NURSE PRACTITIONER

## 2023-01-25 PROCEDURE — 99214 PR OFFICE/OUTPT VISIT, EST, LEVL IV, 30-39 MIN: ICD-10-PCS | Mod: S$GLB,,, | Performed by: NURSE PRACTITIONER

## 2023-01-25 NOTE — PROGRESS NOTES
Patient, Cj Barnes (MRN #[id number]), presented with a recorded BMI of 36.5 kg/m^2 and a documented comorbidity(s):  - Hypertension  to which the severe obesity is a contributing factor. This is consistent with the definition of severe obesity (BMI 35.0-39.9) with comorbidity (ICD-10 E66.01, Z68.35). The patient's severe obesity was monitored, evaluated, addressed and/or treated. This addendum to the medical record is made on 01/25/2023.

## 2023-01-25 NOTE — PROGRESS NOTES
Chief Complaint  Chief Complaint   Patient presents with    Pre-op Exam       HPI    Patient with upcoming podiatric surgery on Friday 1/27 with Dr. Bowers here for pre op clearance. Under the care of Dr. Alexander recently seen last week with recent EKG with no noted changes. Continues on coumadin for mechanical valve managed per cardiology. Instructed to stop 5 days prior to surgery which has since been stopped. Under the care of endocrinology for management of DM [place] with recent A1C 6.7. Well controlled.     Any previous reaction to anesthesia? no  Any history of prolonged bleeding or bleeding disorders?no  High risk surgery?no  History MI, abnormal stress test, anginal chest pain, stent, nitroglycerin use?no  History of CHF ?no  History of TIA or stroke?no  History of diabetes on insulin?yes  Creatinine clearance >2 or known h/o CKD? no            PAST MEDICAL HISTORY:  Past Medical History:   Diagnosis Date    Anemia     Aortic stenosis 2018    Cancer 2014    Colon cancer     Coronary artery disease     Diabetes mellitus type I     since in his 30's    Heart murmur     Heel fracture     HTN (hypertension)     Hyperlipidemia LDL goal < 70     Insulin pump in place     MVP (mitral valve prolapse)     Stenosis of aortic and mitral valves        PAST SURGICAL HISTORY:  Past Surgical History:   Procedure Laterality Date    AORTIC VALVE REPLACEMENT N/A 8/9/2019    Procedure: Replacement-valve-aortic;  Surgeon: Ryan Knight MD;  Location: [facility] OR [address] [place] [address];  Service: Cardiothoracic;  Laterality: N/A;    BACK SURGERY      BENTALL PROCEDURE FOR REPLACEMENT OF AORTIC VALVE, AORTIC ROOT, AND ASCENDING AORTA N/A 8/9/2019    Procedure: BENTALL PROCEDURE;  Surgeon: Ryan Knight MD;  Location: [facility] OR [address] [place] [address];  Service: Cardiothoracic;  Laterality: N/A;    CARDIAC SURGERY      CARPAL TUNNEL RELEASE      COLON SURGERY  2014    COLONOSCOPY N/A 2/17/2020    Procedure: COLONOSCOPY;  Surgeon: Richi Mock MD;   Location: [facility] ENDO;  Service: Colon and Rectal;  Laterality: N/A;    ELBOW SURGERY      tendon release    ESOPHAGOGASTRODUODENOSCOPY N/A 9/10/2020    Procedure: EGD (ESOPHAGOGASTRODUODENOSCOPY);  Surgeon: Abilio Boo MD;  Location: [facility] ENDO (4TH FLR);  Service: Endoscopy;  Laterality: N/A;  Cardiac clearance received, see telephone encounter 8/27/20-BB  Approval to hold Coumadin prior to procedure received, see telephone encounter 8/27/20-BB  covid-9/7/20-[facility]    FOOT TENDON SURGERY      right and left heart cath Bilateral 01/15/2018    TREATMENT OF CARDIAC ARRHYTHMIA N/A 8/19/2019    Procedure: CARDIOVERSION;  Surgeon: Juan Zapata MD;  Location: [facility] EP LAB;  Service: Cardiology;  Laterality: N/A;  afib, dccv only, anes, GP, 3092    TRIGGER FINGER RELEASE      x 2    TRIGGER FINGER RELEASE Left 1/5/2022    Procedure: RELEASE, TRIGGER FINGER,LEFT,SMALL & THUMB;  Surgeon: Dilma Hunter MD;  Location: [facility] OR;  Service: Orthopedics;  Laterality: Left;       SOCIAL HISTORY:  Social History     Socioeconomic History    Marital status:    Tobacco Use    Smoking status: Never    Smokeless tobacco: Former     Types: Snuff     Quit date: 8/30/2019    Tobacco comments:     since he was 12 yrs old   Substance and Sexual Activity    Alcohol use: No     Comment: socially    Drug use: No   Social History Narrative        2 grown kids    Delivers seafood       FAMILY HISTORY:  Family History   Problem Relation Age of Onset    Heart disease Mother     Lung cancer Mother     Heart attack Father     Diabetes Father     HIV Brother        ALLERGIES AND MEDICATIONS: updated and reviewed.  Review of patient's allergies indicates:  No Known Allergies  Current Outpatient Medications   Medication Sig Dispense Refill    aspirin (ECOTRIN) 81 MG EC tablet Take 1 tablet (81 mg total) by mouth once daily.  0    blood-glucose meter,continuous (DEXCOM G6 ) Misc 1 Device by  "Misc.(Non-Drug; Combo Route) route once. for 1 dose 1 each 0    blood-glucose sensor (DEXCOM G6 SENSOR) Apple 1 sensor every 10 days 10 each 3    blood-glucose transmitter (DEXCOM G6 TRANSMITTER) Apple 1 transmitter every 3 months 1 each 3    COMFORT EZ PEN NEEDLES 33 gauge x 3/16" Ndle USE AS DIRECTED with Levemir pens  11    empagliflozin (JARDIANCE) 25 mg tablet Take 1 tablet (25 mg total) by mouth once daily. 30 tablet 11    EScitalopram oxalate (LEXAPRO) 10 MG tablet Take 1 tablet (10 mg total) by mouth once daily. 90 tablet 1    gabapentin (NEURONTIN) 300 MG capsule TAKE TWO CAPSULES BY MOUTH THREE TIMES DAILY FOR NEUROPATHY-(NERVE PAIN)- 360 capsule 2    glucagon (BAQSIMI) 3 mg/actuation Spry USE ONE actuation into a single nostril no response MAY REPEAT in 15 minutes USE a new device 2 each 1    glucagon, human recombinant, (GLUCAGON EMERGENCY KIT, HUMAN,) 1 mg SolR Inject 1 mg into the muscle as needed. 1 each 0    hydroCHLOROthiazide (HYDRODIURIL) 12.5 MG Tab TAKE ONE CAPSULE BY MOUTH ONCE DAILY 90 tablet 3    insulin detemir U-100 (LEVEMIR U-100 INSULIN) 100 unit/mL injection Inject 40 Units into the skin every evening. To have on file in case of insulin pump failure.  Patient does not need prescription right now 20 mL 3    insulin lispro 100 unit/mL injection USE CONTINOUSLY WITH INSULIN PUMP. MAXIMUM DAILY DOSE  UNITS 60 mL 11    insulin syringe-needle U-100 1/2 mL 31 gauge x 15/64" Syrg To use 5 times per day with insulin injections if off of insulin pump.  To have on file in case of pump failure 150 each 11    irbesartan (AVAPRO) 150 MG tablet Take 1 tablet (150 mg total) by mouth once daily. 90 tablet 3    metFORMIN (GLUCOPHAGE-XR) 500 MG ER 24hr tablet Take 1 tablet (500 mg total) by mouth 2 (two) times daily with meals. 180 tablet 2    mupirocin (BACTROBAN) 2 % ointment APPLY TO THE AFFECTED AREA(S) THREE TIMES DAILY (Patient taking differently: 3 (three) times daily as needed. APPLY TO THE " "AFFECTED AREA(S) THREE TIMES DAILY) 22 g 1    nystatin (MYCOSTATIN) cream Apply topically 2 (two) times daily. 1 Tube 5    rosuvastatin (CRESTOR) 40 MG Tab Take 1 tablet (40 mg total) by mouth every evening. 90 tablet 3    semaglutide (OZEMPIC) 0.25 mg or 0.5 mg(2 mg/1.5 mL) pen injector Inject 0.5 mg into the skin every 7 days. 1 pen 6    tadalafiL (CIALIS) 5 MG tablet Take 5 mg by mouth once daily.      traMADoL (ULTRAM) 50 mg tablet Take 1 tablet (50 mg total) by mouth every 8 (eight) hours as needed for Pain. 30 tablet 0    warfarin (COUMADIN) 3 MG tablet Take 2 tablets (6 mg total) by mouth Daily. 180 tablet 3    zolpidem (AMBIEN) 10 mg Tab TAKE ONE TABLET BY MOUTH AT BEDTIME AS NEEDED 30 tablet 1     No current facility-administered medications for this visit.         ROS  Review of Systems        PHYSICAL EXAM  Vitals:    01/25/23 1239   BP: 118/72   BP Location: Right arm   Patient Position: Sitting   BP Method: Large (Manual)   Pulse: 71   Resp: 18   SpO2: 96%   Weight: 105.7 kg (233 lb 0.4 oz)   Height: 5' 7" (1.702 m)    Body mass index is 36.5 kg/m².  Weight: 105.7 kg (233 lb 0.4 oz)   Height: 5' 7" (170.2 cm)     Physical Exam      Health Maintenance         Date Due Completion Date    COVID-19 Vaccine (5 - Booster for Moderna series) 01/06/2023 11/11/2022    Colorectal Cancer Screening 02/17/2023 2/17/2020    Hemoglobin A1c 05/03/2023 11/3/2022    PROSTATE-SPECIFIC ANTIGEN 06/10/2023 6/10/2022    Lipid Panel 06/10/2023 6/10/2022    Foot Exam 07/11/2023 7/11/2022    Override on 11/8/2021: Done    Override on 10/14/2020: Done    Override on 6/5/2020: Done    Eye Exam 09/28/2023 9/28/2022    Diabetes Urine Screening 11/03/2023 11/3/2022    High Dose Statin 01/25/2024 1/25/2023    Pneumococcal Vaccines (Age 0-64) (3 - PPSV23 if available, else PCV20) 11/09/2028 7/16/2020    TETANUS VACCINE 07/16/2030 7/16/2020              Assessment & Plan    Problem List Items Addressed This Visit    None      Follow-up: " "No follow-ups on file.    Demetria LOPEZ [place]chapito    Medication List with Changes/Refills   Current Medications    ASPIRIN (ECOTRIN) 81 MG EC TABLET    Take 1 tablet (81 mg total) by mouth once daily.    BLOOD-GLUCOSE METER,CONTINUOUS (DEXCOM G6 ) MISC    1 Device by Misc.(Non-Drug; Combo Route) route once. for 1 dose    BLOOD-GLUCOSE SENSOR (DEXCOM G6 SENSOR) MORIS    1 sensor every 10 days    BLOOD-GLUCOSE TRANSMITTER (DEXCOM G6 TRANSMITTER) MORIS    1 transmitter every 3 months    COMFORT EZ PEN NEEDLES 33 GAUGE X 3/16" NDLE    USE AS DIRECTED with Levemir pens    EMPAGLIFLOZIN (JARDIANCE) 25 MG TABLET    Take 1 tablet (25 mg total) by mouth once daily.    ESCITALOPRAM OXALATE (LEXAPRO) 10 MG TABLET    Take 1 tablet (10 mg total) by mouth once daily.    GABAPENTIN (NEURONTIN) 300 MG CAPSULE    TAKE TWO CAPSULES BY MOUTH THREE TIMES DAILY FOR NEUROPATHY-(NERVE PAIN)-    GLUCAGON (BAQSIMI) 3 MG/ACTUATION SPRY    USE ONE actuation into a single nostril no response MAY REPEAT in 15 minutes USE a new device    GLUCAGON, HUMAN RECOMBINANT, (GLUCAGON EMERGENCY KIT, HUMAN,) 1 MG SOLR    Inject 1 mg into the muscle as needed.    HYDROCHLOROTHIAZIDE (HYDRODIURIL) 12.5 MG TAB    TAKE ONE CAPSULE BY MOUTH ONCE DAILY    INSULIN DETEMIR U-100 (LEVEMIR U-100 INSULIN) 100 UNIT/ML INJECTION    Inject 40 Units into the skin every evening. To have on file in case of insulin pump failure.  Patient does not need prescription right now    INSULIN LISPRO 100 UNIT/ML INJECTION    USE CONTINOUSLY WITH INSULIN PUMP. MAXIMUM DAILY DOSE  UNITS    INSULIN SYRINGE-NEEDLE U-100 1/2 ML 31 GAUGE X 15/64" SYRG    To use 5 times per day with insulin injections if off of insulin pump.  To have on file in case of pump failure    IRBESARTAN (AVAPRO) 150 MG TABLET    Take 1 tablet (150 mg total) by mouth once daily.    METFORMIN (GLUCOPHAGE-XR) 500 MG ER 24HR TABLET    Take 1 tablet (500 mg total) by mouth 2 (two) times daily with meals.    " MUPIROCIN (BACTROBAN) 2 % OINTMENT    APPLY TO THE AFFECTED AREA(S) THREE TIMES DAILY    NYSTATIN (MYCOSTATIN) CREAM    Apply topically 2 (two) times daily.    ROSUVASTATIN (CRESTOR) 40 MG TAB    Take 1 tablet (40 mg total) by mouth every evening.    SEMAGLUTIDE (OZEMPIC) 0.25 MG OR 0.5 MG(2 MG/1.5 ML) PEN INJECTOR    Inject 0.5 mg into the skin every 7 days.    TADALAFIL (CIALIS) 5 MG TABLET    Take 5 mg by mouth once daily.    TRAMADOL (ULTRAM) 50 MG TABLET    Take 1 tablet (50 mg total) by mouth every 8 (eight) hours as needed for Pain.    WARFARIN (COUMADIN) 3 MG TABLET    Take 2 tablets (6 mg total) by mouth Daily.    ZOLPIDEM (AMBIEN) 10 MG TAB    TAKE ONE TABLET BY MOUTH AT BEDTIME AS NEEDED   Discontinued Medications    AMOXICILLIN (AMOXIL) 500 MG CAPSULE    Take 4 capsules (2,000 mg total) by mouth as needed (take one hour before dental work).

## 2023-01-25 NOTE — PROGRESS NOTES
INR low. Pt started holding for foot surgery on 1/22. Foot surgery is scheduled 1/27. Pt was supposed to notify us once procedure date was scheduled. We wouldn't have needed INR today. Nevertheless, continue holding dose then resume post procedure per calendar. Call if any questions/concerns or if told to resume differently post op. Recheck INR in 1 week

## 2023-01-25 NOTE — PROGRESS NOTES
" Patient ID: Cj Barnes is a 57 y.o. male.    Chief Complaint: Pre-op Exam      Cj Barnes is in the office pre op clearance.    Patient with upcoming podiatric surgery on Friday 1/27 with Dr. Bowers here for pre op clearance. Under the care of Dr. Alexander recently seen last week with recent EKG with no noted changes. Continues on coumadin for mechanical valve managed per cardiology. Instructed to stop 5 days prior to surgery which has since been stopped. Under the care of endocrinology for management of DM [place] with recent A1C 6.7. Well controlled. Does endorse a history of a questionable TIA?? Years ago with no subsequent events.      Any previous reaction to anesthesia? no  Any history of prolonged bleeding or bleeding disorders? yes  High risk surgery?no  History MI, abnormal stress test, anginal chest pain, stent, nitroglycerin use?no  History of CHF ?no  History of TIA or stroke? yes  History of diabetes on insulin?yes  Creatinine clearance >2 or known h/o CKD? no         Past Medical History:   Diagnosis Date    Anemia     Aortic stenosis 2018    Cancer 2014    Colon cancer     Coronary artery disease     Diabetes mellitus type I     since in his 30's    Heart murmur     Heel fracture     HTN (hypertension)     Hyperlipidemia LDL goal < 70     Insulin pump in place     MVP (mitral valve prolapse)     Stenosis of aortic and mitral valves         Current Outpatient Medications:     aspirin (ECOTRIN) 81 MG EC tablet, Take 1 tablet (81 mg total) by mouth once daily., Disp: , Rfl: 0    blood-glucose meter,continuous (DEXCOM G6 ) Misc, 1 Device by Misc.(Non-Drug; Combo Route) route once. for 1 dose, Disp: 1 each, Rfl: 0    blood-glucose sensor (DEXCOM G6 SENSOR) Apple, 1 sensor every 10 days, Disp: 10 each, Rfl: 3    blood-glucose transmitter (DEXCOM G6 TRANSMITTER) Apple, 1 transmitter every 3 months, Disp: 1 each, Rfl: 3    COMFORT EZ PEN NEEDLES 33 gauge x 3/16" Ndchristian, USE AS DIRECTED with " "Levemir pens, Disp: , Rfl: 11    empagliflozin (JARDIANCE) 25 mg tablet, Take 1 tablet (25 mg total) by mouth once daily., Disp: 30 tablet, Rfl: 11    EScitalopram oxalate (LEXAPRO) 10 MG tablet, Take 1 tablet (10 mg total) by mouth once daily., Disp: 90 tablet, Rfl: 1    gabapentin (NEURONTIN) 300 MG capsule, TAKE TWO CAPSULES BY MOUTH THREE TIMES DAILY FOR NEUROPATHY-(NERVE PAIN)-, Disp: 360 capsule, Rfl: 2    glucagon (BAQSIMI) 3 mg/actuation Spry, USE ONE actuation into a single nostril no response MAY REPEAT in 15 minutes USE a new device, Disp: 2 each, Rfl: 1    glucagon, human recombinant, (GLUCAGON EMERGENCY KIT, HUMAN,) 1 mg SolR, Inject 1 mg into the muscle as needed., Disp: 1 each, Rfl: 0    hydroCHLOROthiazide (HYDRODIURIL) 12.5 MG Tab, TAKE ONE CAPSULE BY MOUTH ONCE DAILY, Disp: 90 tablet, Rfl: 3    insulin detemir U-100 (LEVEMIR U-100 INSULIN) 100 unit/mL injection, Inject 40 Units into the skin every evening. To have on file in case of insulin pump failure.  Patient does not need prescription right now, Disp: 20 mL, Rfl: 3    insulin lispro 100 unit/mL injection, USE CONTINOUSLY WITH INSULIN PUMP. MAXIMUM DAILY DOSE  UNITS, Disp: 60 mL, Rfl: 11    insulin syringe-needle U-100 1/2 mL 31 gauge x 15/64" Syrg, To use 5 times per day with insulin injections if off of insulin pump.  To have on file in case of pump failure, Disp: 150 each, Rfl: 11    irbesartan (AVAPRO) 150 MG tablet, Take 1 tablet (150 mg total) by mouth once daily., Disp: 90 tablet, Rfl: 3    metFORMIN (GLUCOPHAGE-XR) 500 MG ER 24hr tablet, Take 1 tablet (500 mg total) by mouth 2 (two) times daily with meals., Disp: 180 tablet, Rfl: 2    mupirocin (BACTROBAN) 2 % ointment, APPLY TO THE AFFECTED AREA(S) THREE TIMES DAILY (Patient taking differently: 3 (three) times daily as needed. APPLY TO THE AFFECTED AREA(S) THREE TIMES DAILY), Disp: 22 g, Rfl: 1    nystatin (MYCOSTATIN) cream, Apply topically 2 (two) times daily., Disp: 1 Tube, " Rfl: 5    rosuvastatin (CRESTOR) 40 MG Tab, Take 1 tablet (40 mg total) by mouth every evening., Disp: 90 tablet, Rfl: 3    semaglutide (OZEMPIC) 0.25 mg or 0.5 mg(2 mg/1.5 mL) pen injector, Inject 0.5 mg into the skin every 7 days., Disp: 1 pen, Rfl: 6    tadalafiL (CIALIS) 5 MG tablet, Take 5 mg by mouth once daily., Disp: , Rfl:     traMADoL (ULTRAM) 50 mg tablet, Take 1 tablet (50 mg total) by mouth every 8 (eight) hours as needed for Pain., Disp: 30 tablet, Rfl: 0    warfarin (COUMADIN) 3 MG tablet, Take 2 tablets (6 mg total) by mouth Daily., Disp: 180 tablet, Rfl: 3    zolpidem (AMBIEN) 10 mg Tab, TAKE ONE TABLET BY MOUTH AT BEDTIME AS NEEDED, Disp: 30 tablet, Rfl: 1    The ASCVD Risk score (Zabrina DK, et al., 2019) failed to calculate for the following reasons:    The valid total cholesterol range is 130 to 320 mg/dL     Wt Readings from Last 3 Encounters:   01/25/23 105.7 kg (233 lb 0.4 oz)   01/11/23 102 kg (224 lb 13.9 oz)   01/09/23 106.6 kg (235 lb 0.2 oz)     Temp Readings from Last 3 Encounters:   07/11/22 98.1 °F (36.7 °C)   01/05/22 98 °F (36.7 °C) (Oral)   11/08/21 97.5 °F (36.4 °C)     BP Readings from Last 3 Encounters:   01/25/23 118/72   01/11/23 132/74   01/09/23 (!) 120/59     Pulse Readings from Last 3 Encounters:   01/25/23 71   01/11/23 97   01/09/23 72     Resp Readings from Last 3 Encounters:   01/25/23 18   07/13/22 18   05/03/22 18     PF Readings from Last 3 Encounters:   No data found for PF     SpO2 Readings from Last 3 Encounters:   01/25/23 96%   01/09/23 98%   11/10/22 97%        Lab Results   Component Value Date    HGBA1C 6.7 (H) 11/03/2022    HGBA1C 7.1 (H) 06/10/2022    HGBA1C 6.8 (H) 02/09/2022     Lab Results   Component Value Date    GLUF 160 (H) 01/09/2020    LDLCALC 64.4 06/10/2022    CREATININE 0.9 11/03/2022       Review of Systems   Constitutional:  Negative for chills and fever.   HENT:  Negative for ear pain, postnasal drip and sinus pain.    Respiratory:  Negative  for cough and shortness of breath.    Cardiovascular:  Negative for chest pain.   Gastrointestinal:  Negative for diarrhea, nausea and vomiting.         Objective:      Physical Exam  Constitutional:       Appearance: He is well-developed.   HENT:      Head: Normocephalic.      Mouth/Throat:      Pharynx: Uvula midline.   Eyes:      Conjunctiva/sclera: Conjunctivae normal.   Cardiovascular:      Rate and Rhythm: Normal rate and regular rhythm.      Pulses: Normal pulses.           Radial pulses are 2+ on the right side and 2+ on the left side.      Heart sounds: Murmur heard.   Systolic (+ valve click) murmur is present.   Pulmonary:      Effort: Pulmonary effort is normal.      Breath sounds: Normal breath sounds. No wheezing.   Abdominal:      General: Bowel sounds are normal.      Palpations: Abdomen is soft.      Tenderness: There is no abdominal tenderness.   Skin:     General: Skin is warm and dry.      Findings: No rash.   Neurological:      Mental Status: He is alert and oriented to person, place, and time.           Screening recommendations appropriate to age and health status were reviewed.    Pre-operative clearance  -     X-Ray Chest PA And Lateral  -     CBC Auto Differential; Future; Expected date: 01/25/2023  -     Comprehensive Metabolic Panel; Future; Expected date: 01/25/2023    Essential hypertension  -     CBC Auto Differential; Future; Expected date: 01/25/2023  -     Comprehensive Metabolic Panel; Future; Expected date: 01/25/2023    Atherosclerosis of native coronary artery of native heart with angina pectoris    Coronary artery disease involving native coronary artery of native heart without angina pectoris  -     Lipid Panel; Future; Expected date: 01/25/2023        RCRI risk factors include: history of cerebrovascular disease and diabetes requiring treatment with insulin. As such, per RCRI the risk of cardiac death, nonfatal myocardial infarction, or nonfatal cardiac arrest is  10.1  and  the risk of myocardial infarction, pulmonary edema, ventricular fibrillation, primary cardiac arrest, or complete heart block is  10.1 .  Overall this patient can be considered intermediate risk for this low risk procedure. No further cardiac testing is recommended at this time.     Patient denies any symptoms (as per HPI) concerning for undiagnosed lung disease . Would recommend obtaining chest X-ray, sleep study, or PFTs at this time. Patient is a non-smoker. We discussed the benefits of early mobilization and deep breathing after surgery.      Screened patient for alcohol misuse, use of illicit drugs, and personal or family history of anesthetic complications or bleeding diathesis and no substantial concerns were identified.     All current medications were reviewed and at this time no changes to medications are recommended prior to surgery. Patient instructed by cardiology to hold coumadin 5 days prior to surgery. Instructed by endocrinology to hold pump for day of surgery.     I recommend use of standard pre-op and post-op precautions for this patient. In my opinion, he is medically optimized for this procedure, and can proceed pending completion of CXR and lab work.

## 2023-01-26 ENCOUNTER — TELEPHONE (OUTPATIENT)
Dept: PODIATRY | Facility: CLINIC | Age: 58
End: 2023-01-26
Payer: MEDICARE

## 2023-01-26 NOTE — PRE-PROCEDURE INSTRUCTIONS
PreOp Instructions given:   - Verbal medication information (what to hold and what to take) reviewed Insulin pump Instructions - pt v/c/u  - NPO guidelines   - Arrival place directions given; time to be given the day before procedure by the   Surgeon's Office DOSC  - Bathing with antibacterial soap   - Don't wear any jewelry or bring any valuables AM of surgery   - No makeup or moisturizer to face   - No perfume/cologne, powder, lotions or aftershave   Pt. verbalized understanding.   Pt denies any h/o Anesthesia/Sedation complications or side effects.  Patient does not know arrival time.  Explained that this information comes from the surgeon's office and if they haven't heard from them by 2 or 3 pm to call the office.  Patient stated an understanding.

## 2023-01-26 NOTE — TELEPHONE ENCOUNTER
Spoke to pt and relayed their 06:55am  arrival time for surgery. Also informed pt to not eat or drink after midnight including gum, hard candy or mints. Also that the pt must have a ride home from surgery, they will not be allowed to drive after anesthesia. Pt verbalized understanding and call ended.

## 2023-01-27 ENCOUNTER — ANESTHESIA (OUTPATIENT)
Dept: SURGERY | Facility: HOSPITAL | Age: 58
End: 2023-01-27
Payer: MEDICARE

## 2023-01-27 ENCOUNTER — HOSPITAL ENCOUNTER (OUTPATIENT)
Facility: HOSPITAL | Age: 58
Discharge: HOME | End: 2023-01-27
Attending: PODIATRIST | Admitting: PODIATRIST
Payer: MEDICARE

## 2023-01-27 ENCOUNTER — ANESTHESIA EVENT (OUTPATIENT)
Dept: SURGERY | Facility: HOSPITAL | Age: 58
End: 2023-01-27
Payer: MEDICARE

## 2023-01-27 VITALS
SYSTOLIC BLOOD PRESSURE: 135 MMHG | RESPIRATION RATE: 20 BRPM | BODY MASS INDEX: 35.24 KG/M2 | DIASTOLIC BLOOD PRESSURE: 66 MMHG | OXYGEN SATURATION: 98 % | HEART RATE: 72 BPM | TEMPERATURE: 97 F | WEIGHT: 225 LBS

## 2023-01-27 DIAGNOSIS — M76.61 ACHILLES TENDINITIS OF RIGHT LOWER EXTREMITY: ICD-10-CM

## 2023-01-27 LAB — POCT GLUCOSE: 104 MG/DL (ref 70–110)

## 2023-01-27 PROCEDURE — D9220A PRA ANESTHESIA: ICD-10-PCS | Mod: CRNA,,, | Performed by: NURSE ANESTHETIST, CERTIFIED REGISTERED

## 2023-01-27 PROCEDURE — 37000008 HC ANESTHESIA 1ST 15 MINUTES: Performed by: PODIATRIST

## 2023-01-27 PROCEDURE — D9220A PRA ANESTHESIA: Mod: CRNA,,, | Performed by: NURSE ANESTHETIST, CERTIFIED REGISTERED

## 2023-01-27 PROCEDURE — 27201423 OPTIME MED/SURG SUP & DEVICES STERILE SUPPLY: Performed by: PODIATRIST

## 2023-01-27 PROCEDURE — 27654 REPAIR OF ACHILLES TENDON: CPT | Mod: RT,,, | Performed by: PODIATRIST

## 2023-01-27 PROCEDURE — 25000003 PHARM REV CODE 250: Performed by: PODIATRIST

## 2023-01-27 PROCEDURE — 28122 PR PART REMV OTHR TARSAL/METATARSAL: ICD-10-PCS | Mod: 51,RT,, | Performed by: PODIATRIST

## 2023-01-27 PROCEDURE — D9220A PRA ANESTHESIA: ICD-10-PCS | Mod: ANES,,, | Performed by: STUDENT IN AN ORGANIZED HEALTH CARE EDUCATION/TRAINING PROGRAM

## 2023-01-27 PROCEDURE — D9220A PRA ANESTHESIA: Mod: ANES,,, | Performed by: STUDENT IN AN ORGANIZED HEALTH CARE EDUCATION/TRAINING PROGRAM

## 2023-01-27 PROCEDURE — 63600175 PHARM REV CODE 636 W HCPCS: Performed by: NURSE ANESTHETIST, CERTIFIED REGISTERED

## 2023-01-27 PROCEDURE — 71000044 HC DOSC ROUTINE RECOVERY FIRST HOUR: Performed by: PODIATRIST

## 2023-01-27 PROCEDURE — 36000707: Performed by: PODIATRIST

## 2023-01-27 PROCEDURE — C1713 ANCHOR/SCREW BN/BN,TIS/BN: HCPCS | Performed by: PODIATRIST

## 2023-01-27 PROCEDURE — 71000015 HC POSTOP RECOV 1ST HR: Performed by: PODIATRIST

## 2023-01-27 PROCEDURE — 71000016 HC POSTOP RECOV ADDL HR: Performed by: PODIATRIST

## 2023-01-27 PROCEDURE — 82962 GLUCOSE BLOOD TEST: CPT | Performed by: PODIATRIST

## 2023-01-27 PROCEDURE — 36000706: Performed by: PODIATRIST

## 2023-01-27 PROCEDURE — 27654 PR REPAIR ACHILLES TENDON,SECONDARY: ICD-10-PCS | Mod: RT,,, | Performed by: PODIATRIST

## 2023-01-27 PROCEDURE — 28122 PARTIAL REMOVAL OF FOOT BONE: CPT | Mod: 51,RT,, | Performed by: PODIATRIST

## 2023-01-27 PROCEDURE — 37000009 HC ANESTHESIA EA ADD 15 MINS: Performed by: PODIATRIST

## 2023-01-27 PROCEDURE — 25000003 PHARM REV CODE 250: Performed by: NURSE ANESTHETIST, CERTIFIED REGISTERED

## 2023-01-27 DEVICE — KIT SONICANCHOR F/F #2 C-7: Type: IMPLANTABLE DEVICE | Site: FOOT | Status: FUNCTIONAL

## 2023-01-27 DEVICE — IMPLANTABLE DEVICE: Type: IMPLANTABLE DEVICE | Site: FOOT | Status: FUNCTIONAL

## 2023-01-27 RX ORDER — FENTANYL CITRATE 50 UG/ML
INJECTION, SOLUTION INTRAMUSCULAR; INTRAVENOUS
Status: DISCONTINUED | OUTPATIENT
Start: 2023-01-27 | End: 2023-01-27

## 2023-01-27 RX ORDER — FAMOTIDINE 10 MG/ML
INJECTION INTRAVENOUS
Status: DISCONTINUED | OUTPATIENT
Start: 2023-01-27 | End: 2023-01-27

## 2023-01-27 RX ORDER — BUPIVACAINE HYDROCHLORIDE 5 MG/ML
INJECTION, SOLUTION EPIDURAL; INTRACAUDAL
Status: DISCONTINUED | OUTPATIENT
Start: 2023-01-27 | End: 2023-01-27 | Stop reason: HOSPADM

## 2023-01-27 RX ORDER — ONDANSETRON 2 MG/ML
INJECTION INTRAMUSCULAR; INTRAVENOUS
Status: DISCONTINUED | OUTPATIENT
Start: 2023-01-27 | End: 2023-01-27

## 2023-01-27 RX ORDER — LIDOCAINE HYDROCHLORIDE 10 MG/ML
1 INJECTION, SOLUTION EPIDURAL; INFILTRATION; INTRACAUDAL; PERINEURAL ONCE AS NEEDED
Status: COMPLETED | OUTPATIENT
Start: 2023-01-27 | End: 2023-01-27

## 2023-01-27 RX ORDER — CEFAZOLIN SODIUM 1 G/3ML
INJECTION, POWDER, FOR SOLUTION INTRAMUSCULAR; INTRAVENOUS
Status: DISCONTINUED | OUTPATIENT
Start: 2023-01-27 | End: 2023-01-27

## 2023-01-27 RX ORDER — SODIUM CHLORIDE 9 MG/ML
INJECTION, SOLUTION INTRAVENOUS CONTINUOUS
Status: DISCONTINUED | OUTPATIENT
Start: 2023-01-27 | End: 2023-01-27 | Stop reason: HOSPADM

## 2023-01-27 RX ORDER — DEXAMETHASONE SODIUM PHOSPHATE 4 MG/ML
INJECTION, SOLUTION INTRA-ARTICULAR; INTRALESIONAL; INTRAMUSCULAR; INTRAVENOUS; SOFT TISSUE
Status: DISCONTINUED | OUTPATIENT
Start: 2023-01-27 | End: 2023-01-27

## 2023-01-27 RX ORDER — SODIUM CHLORIDE 0.9 % (FLUSH) 0.9 %
10 SYRINGE (ML) INJECTION
Status: DISCONTINUED | OUTPATIENT
Start: 2023-01-27 | End: 2023-01-27 | Stop reason: HOSPADM

## 2023-01-27 RX ORDER — PHENYLEPHRINE HCL IN 0.9% NACL 1 MG/10 ML
SYRINGE (ML) INTRAVENOUS
Status: DISCONTINUED | OUTPATIENT
Start: 2023-01-27 | End: 2023-01-27

## 2023-01-27 RX ORDER — ROCURONIUM BROMIDE 10 MG/ML
INJECTION, SOLUTION INTRAVENOUS
Status: DISCONTINUED | OUTPATIENT
Start: 2023-01-27 | End: 2023-01-27

## 2023-01-27 RX ORDER — LIDOCAINE HYDROCHLORIDE 10 MG/ML
INJECTION INFILTRATION; PERINEURAL
Status: DISCONTINUED | OUTPATIENT
Start: 2023-01-27 | End: 2023-01-27 | Stop reason: HOSPADM

## 2023-01-27 RX ORDER — MIDAZOLAM HYDROCHLORIDE 1 MG/ML
INJECTION, SOLUTION INTRAMUSCULAR; INTRAVENOUS
Status: DISCONTINUED | OUTPATIENT
Start: 2023-01-27 | End: 2023-01-27

## 2023-01-27 RX ORDER — NEOSTIGMINE METHYLSULFATE 0.5 MG/ML
INJECTION, SOLUTION INTRAVENOUS
Status: DISCONTINUED | OUTPATIENT
Start: 2023-01-27 | End: 2023-01-27

## 2023-01-27 RX ORDER — OXYCODONE AND ACETAMINOPHEN 7.5; 325 MG/1; MG/1
1 TABLET ORAL EVERY 6 HOURS PRN
Qty: 27 TABLET | Refills: 0 | Status: SHIPPED | OUTPATIENT
Start: 2023-01-27 | End: 2023-02-01 | Stop reason: SDUPTHER

## 2023-01-27 RX ORDER — AMOXICILLIN AND CLAVULANATE POTASSIUM 500; 125 MG/1; MG/1
1 TABLET, FILM COATED ORAL 2 TIMES DAILY
Qty: 14 TABLET | Refills: 0 | Status: SHIPPED | OUTPATIENT
Start: 2023-01-27 | End: 2023-02-22 | Stop reason: ALTCHOICE

## 2023-01-27 RX ORDER — PROPOFOL 10 MG/ML
VIAL (ML) INTRAVENOUS
Status: DISCONTINUED | OUTPATIENT
Start: 2023-01-27 | End: 2023-01-27

## 2023-01-27 RX ORDER — EPHEDRINE SULFATE 50 MG/ML
INJECTION, SOLUTION INTRAVENOUS
Status: DISCONTINUED | OUTPATIENT
Start: 2023-01-27 | End: 2023-01-27

## 2023-01-27 RX ADMIN — EPHEDRINE SULFATE 10 MG: 50 INJECTION INTRAVENOUS at 10:01

## 2023-01-27 RX ADMIN — NEOSTIGMINE METHYLSULFATE 5 MG: 0.5 INJECTION, SOLUTION INTRAVENOUS at 10:01

## 2023-01-27 RX ADMIN — ROCURONIUM BROMIDE 40 MG: 10 INJECTION INTRAVENOUS at 08:01

## 2023-01-27 RX ADMIN — GLYCOPYRROLATE 0.8 MG: 0.2 INJECTION INTRAMUSCULAR; INTRAVENOUS at 10:01

## 2023-01-27 RX ADMIN — FAMOTIDINE 20 MG: 10 INJECTION INTRAVENOUS at 09:01

## 2023-01-27 RX ADMIN — LIDOCAINE HYDROCHLORIDE 10 MG: 10 INJECTION, SOLUTION EPIDURAL; INFILTRATION; INTRACAUDAL; PERINEURAL at 08:01

## 2023-01-27 RX ADMIN — DEXAMETHASONE SODIUM PHOSPHATE 4 MG: 4 INJECTION INTRA-ARTICULAR; INTRALESIONAL; INTRAMUSCULAR; INTRAVENOUS; SOFT TISSUE at 09:01

## 2023-01-27 RX ADMIN — ONDANSETRON 4 MG: 2 INJECTION INTRAMUSCULAR; INTRAVENOUS at 09:01

## 2023-01-27 RX ADMIN — CEFAZOLIN 2 G: 2 INJECTION, POWDER, FOR SOLUTION INTRAMUSCULAR; INTRAVENOUS at 09:01

## 2023-01-27 RX ADMIN — SODIUM CHLORIDE: 9 INJECTION, SOLUTION INTRAVENOUS at 08:01

## 2023-01-27 RX ADMIN — FENTANYL CITRATE 50 MCG: 50 INJECTION, SOLUTION INTRAMUSCULAR; INTRAVENOUS at 08:01

## 2023-01-27 RX ADMIN — MIDAZOLAM 2 MG: 1 INJECTION INTRAMUSCULAR; INTRAVENOUS at 08:01

## 2023-01-27 RX ADMIN — PROPOFOL 150 MG: 10 INJECTION, EMULSION INTRAVENOUS at 08:01

## 2023-01-27 RX ADMIN — Medication 100 MCG: at 09:01

## 2023-01-27 NOTE — INTERVAL H&P NOTE
The patient has been examined and the H&P has been reviewed:    I concur with the findings and no changes have occurred since H&P was written.    Surgery risks, benefits and alternative options discussed and understood by patient/family.    Trino Bright DPM PGY-1  Podiatric Medicine & Surgery  [facility]  Secure Chat Preferred  Mobile: [phone]  Pager: [phone]

## 2023-01-27 NOTE — PATIENT INSTRUCTIONS
- Follow up in podiatry  clinic with Dr. Bowers  - Keep dressings clean dry and intact  - Patient to take medications as instructed   - Patient to be non weight bearing on right foot, in long CAM boot.  patient to use knee scooter  - Patient to keep dressings clean dry and intact  - Rest ice elevate and compress right foot

## 2023-01-27 NOTE — INTERVAL H&P NOTE
The patient has been examined and the H&P has been reviewed:    I concur with the findings and no changes have occurred since H&P was written.    Surgery risks, benefits and alternative options discussed and understood by patient/family.    Trino Bright DPM PGY-1  Podiatric Medicine & Surgery  [facility]  Secure Chat Preferred  Mobile: [phone]  Pager: [phone]

## 2023-01-27 NOTE — BRIEF OP NOTE
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    DISPOSITION: Home or Self Care    FINAL DIAGNOSIS:  <principal problem not specified>    FOLLOWUP: In clinic    DISCHARGE INSTRUCTIONS:    - Follow up in podiatry  clinic with Dr. Bowers  - Keep dressings clean dry and intact  - Patient to take medications as instructed   - Patient to be non weight bearing on right foot, in long CAM boot.  patient to use knee scooter  - Patient to keep dressings clean dry and intact  - Rest ice elevate and compress right foot

## 2023-01-27 NOTE — ANESTHESIA PREPROCEDURE EVALUATION
01/27/2023  Cj Barnes is a 57 y.o., male     2019 TTE   Mild left ventricular enlargement.   Normal left ventricular systolic function. The estimated ejection fraction is 63%   Septal wall has abnormal motion.   Normal LV diastolic function.   Severe left atrial enlargement.   Normal right ventricular systolic function.   Moderate right atrial enlargement.   Mild aortic regurgitation.   Normal central venous pressure (3 mm Hg).   The estimated PA systolic pressure is 27 mm Hg      Pre-operative evaluation for Procedure(s) (LRB):  EXCISION,TARSAL OR METATARSAL BONE,EXCLUDING TALUS OR CALCANEUS,PARTIAL (Right)    Patient Active Problem List   Diagnosis    Essential hypertension    Insulin pump fitting or adjustment    Type 1 diabetes mellitus with hyperglycemia    Hyperlipidemia with target low density lipoprotein (LDL) cholesterol less than 70 mg/dL    Insulin pump in place    Aortic stenosis    Aortic regurgitation, congenital    TIA (transient ischemic attack)    Atherosclerosis of native coronary artery of native heart with angina pectoris    Type 1 diabetes mellitus with hypoglycemia unawareness    Class 2 severe obesity due to excess calories with serious comorbidity and body mass index (BMI) of 37.0 to 37.9 in adult    S/P ascending aortic replacement    Long term (current) use of anticoagulants    Paroxysmal atrial flutter    Trouble swallowing    H/O mechanical aortic valve replacement    Iron deficiency anemia due to chronic blood loss    ANTHONY (iron deficiency anemia)    ACE-inhibitor cough    Diabetic mononeuropathy associated with diabetes mellitus due to underlying condition    Major depressive disorder, recurrent, mild    Trigger finger of left thumb    Coronary artery disease involving native coronary artery of native heart without angina pectoris         "    Medications Prior to Admission   Medication Sig Dispense Refill Last Dose    empagliflozin (JARDIANCE) 25 mg tablet Take 1 tablet (25 mg total) by mouth once daily. 30 tablet 11 1/26/2023    EScitalopram oxalate (LEXAPRO) 10 MG tablet Take 1 tablet (10 mg total) by mouth once daily. 90 tablet 1 1/26/2023    gabapentin (NEURONTIN) 300 MG capsule TAKE TWO CAPSULES BY MOUTH THREE TIMES DAILY FOR NEUROPATHY-(NERVE PAIN)- 360 capsule 2 1/26/2023    hydroCHLOROthiazide (HYDRODIURIL) 12.5 MG Tab TAKE ONE CAPSULE BY MOUTH ONCE DAILY 90 tablet 3 1/26/2023    irbesartan (AVAPRO) 150 MG tablet Take 1 tablet (150 mg total) by mouth once daily. 90 tablet 3 1/26/2023    metFORMIN (GLUCOPHAGE-XR) 500 MG ER 24hr tablet Take 1 tablet (500 mg total) by mouth 2 (two) times daily with meals. 180 tablet 2 1/26/2023    aspirin (ECOTRIN) 81 MG EC tablet Take 1 tablet (81 mg total) by mouth once daily.  0 1/22/2023 at 0700    blood-glucose meter,continuous (DEXCOM G6 ) Misc 1 Device by Misc.(Non-Drug; Combo Route) route once. for 1 dose 1 each 0     blood-glucose sensor (DEXCOM G6 SENSOR) Apple 1 sensor every 10 days 10 each 3     blood-glucose transmitter (DEXCOM G6 TRANSMITTER) Apple 1 transmitter every 3 months 1 each 3     COMFORT EZ PEN NEEDLES 33 gauge x 3/16" Ndle USE AS DIRECTED with Levemir pens  11     glucagon (BAQSIMI) 3 mg/actuation Spry USE ONE actuation into a single nostril no response MAY REPEAT in 15 minutes USE a new device 2 each 1     glucagon, human recombinant, (GLUCAGON EMERGENCY KIT, HUMAN,) 1 mg SolR Inject 1 mg into the muscle as needed. 1 each 0 Unknown    insulin detemir U-100 (LEVEMIR U-100 INSULIN) 100 unit/mL injection Inject 40 Units into the skin every evening. To have on file in case of insulin pump failure.  Patient does not need prescription right now 20 mL 3     insulin lispro 100 unit/mL injection USE CONTINOUSLY WITH INSULIN PUMP. MAXIMUM DAILY DOSE  UNITS 60 mL 11     " "insulin syringe-needle U-100 1/2 mL 31 gauge x 15/64" Syrg To use 5 times per day with insulin injections if off of insulin pump.  To have on file in case of pump failure 150 each 11     mupirocin (BACTROBAN) 2 % ointment APPLY TO THE AFFECTED AREA(S) THREE TIMES DAILY (Patient taking differently: 3 (three) times daily as needed. APPLY TO THE AFFECTED AREA(S) THREE TIMES DAILY) 22 g 1     nystatin (MYCOSTATIN) cream Apply topically 2 (two) times daily. 1 Tube 5     rosuvastatin (CRESTOR) 40 MG Tab Take 1 tablet (40 mg total) by mouth every evening. 90 tablet 3     semaglutide (OZEMPIC) 0.25 mg or 0.5 mg(2 mg/1.5 mL) pen injector Inject 0.5 mg into the skin every 7 days. 1 pen 6     tadalafiL (CIALIS) 5 MG tablet Take 5 mg by mouth once daily.       traMADoL (ULTRAM) 50 mg tablet Take 1 tablet (50 mg total) by mouth every 8 (eight) hours as needed for Pain. 30 tablet 0     warfarin (COUMADIN) 3 MG tablet Take 2 tablets (6 mg total) by mouth Daily. 180 tablet 3 1/22/2023 at 1900    zolpidem (AMBIEN) 10 mg Tab TAKE ONE TABLET BY MOUTH AT BEDTIME AS NEEDED 30 tablet 1        Review of patient's allergies indicates:  No Known Allergies    Past Medical History:   Diagnosis Date    Anemia     Aortic stenosis 2018    Cancer 2014    Colon cancer     Coronary artery disease     Diabetes mellitus type I     since in his 30's    Heart murmur     Heel fracture     HTN (hypertension)     Hyperlipidemia LDL goal < 70     Insulin pump in place     MVP (mitral valve prolapse)     Stenosis of aortic and mitral valves      Past Surgical History:   Procedure Laterality Date    AORTIC VALVE REPLACEMENT N/A 8/9/2019    Procedure: Replacement-valve-aortic;  Surgeon: Ryan Knight MD;  Location: [facility] OR [address] [place] [address];  Service: Cardiothoracic;  Laterality: N/A;    BACK SURGERY      BENTALL PROCEDURE FOR REPLACEMENT OF AORTIC VALVE, AORTIC ROOT, AND ASCENDING AORTA N/A 8/9/2019    Procedure: BENTALL PROCEDURE;  " Surgeon: Ryan Knight MD;  Location: [facility] 2ND FLR;  Service: Cardiothoracic;  Laterality: N/A;    CARDIAC SURGERY      CARPAL TUNNEL RELEASE      COLON SURGERY  2014    COLONOSCOPY N/A 2/17/2020    Procedure: COLONOSCOPY;  Surgeon: Richi Mock MD;  Location: [facility] ENDO;  Service: Colon and Rectal;  Laterality: N/A;    ELBOW SURGERY      tendon release    ESOPHAGOGASTRODUODENOSCOPY N/A 9/10/2020    Procedure: EGD (ESOPHAGOGASTRODUODENOSCOPY);  Surgeon: Abilio Boo MD;  Location: [facility] ENDO (4TH FLR);  Service: Endoscopy;  Laterality: N/A;  Cardiac clearance received, see telephone encounter 8/27/20-BB  Approval to hold Coumadin prior to procedure received, see telephone encounter 8/27/20-BB  covid-9/7/20-[facility]    FOOT TENDON SURGERY      right and left heart cath Bilateral 01/15/2018    TREATMENT OF CARDIAC ARRHYTHMIA N/A 8/19/2019    Procedure: CARDIOVERSION;  Surgeon: Juan Zapata MD;  Location: [facility] EP LAB;  Service: Cardiology;  Laterality: N/A;  afib, dccv only, anes, GP, 3092    TRIGGER FINGER RELEASE      x 2    TRIGGER FINGER RELEASE Left 1/5/2022    Procedure: RELEASE, TRIGGER FINGER,LEFT,SMALL & THUMB;  Surgeon: Dilma Hunter MD;  Location: [facility];  Service: Orthopedics;  Laterality: Left;     Tobacco Use    Smoking status: Never    Smokeless tobacco: Former     Types: Snuff     Quit date: 8/30/2019    Tobacco comments:     since he was 12 yrs old   Substance and Sexual Activity    Alcohol use: No     Comment: socially    Drug use: No    Sexual activity: Not on file       Objective:     Vital Signs (Most Recent):    Vital Signs (24h Range):           There is no height or weight on file to calculate BMI.        Significant Labs:  All pertinent labs from the last 24 hours have been reviewed.    CBC:   Recent Labs     01/25/23  1343   WBC 9.52   RBC 5.39   HGB 15.6   HCT 47.5      MCV 88   MCH 28.9   MCHC 32.8       CMP:   Recent Labs      01/25/23  1343      K 3.7      CO2 28   BUN 17   CREATININE 0.9   GLU 97   CALCIUM 9.5   ALBUMIN 4.1   PROT 7.5   ALKPHOS 78   ALT 21   AST 21   BILITOT 1.0       INR  Recent Labs     01/25/23  1223   INR 1.4*         Pre-op Assessment    I have reviewed the Patient Summary Reports.     I have reviewed the Nursing Notes. I have reviewed the NPO Status.   I have reviewed the Medications.     Review of Systems      Physical Exam  General: Well nourished    Airway:  Mallampati: II   Mouth Opening: Normal  TM Distance: Normal  Tongue: Normal  Neck ROM: Normal ROM    Dental:Any loose and/or missing teeth verified with patient   Chest/Lungs:  Clear to auscultation    Heart:  Rate: Normal  Rhythm: Regular Rhythm  Murmur: Systolic;    Abdomen:  Normal        Anesthesia Plan  Type of Anesthesia, risks & benefits discussed:    Anesthesia Type: Gen ETT, Gen Supraglottic Airway, Gen Natural Airway, MAC  Intra-op Monitoring Plan: Standard ASA Monitors  Post Op Pain Control Plan: multimodal analgesia and IV/PO Opioids PRN  Induction:  IV  Informed Consent: Informed consent signed with the Patient and all parties understand the risks and agree with anesthesia plan.  All questions answered.   ASA Score: 3    Ready For Surgery From Anesthesia Perspective.     .

## 2023-01-27 NOTE — TRANSFER OF CARE
Anesthesia Transfer of Care Note    Patient: Cj Barnes    Procedure(s) Performed: Procedure(s) (LRB):  OSTECTOMY (Right)  DEBRIDEMENT, TENDON, ACHILLES (Right)    Patient location: PACU    Anesthesia Type: general    Transport from OR: Transported from OR on 6-10 L/min O2 by face mask with adequate spontaneous ventilation    Post pain: adequate analgesia    Post assessment: no apparent anesthetic complications and tolerated procedure well    Post vital signs: stable    Level of consciousness: awake    Nausea/Vomiting: no nausea/vomiting    Complications: none    Transfer of care protocol was followed      Last vitals:   Visit Vitals  /72 (BP Location: Left arm, Patient Position: Lying)   Pulse (!) 59   Temp 36.6 °C (97.9 °F) (Oral)   Resp 18   Wt 102.1 kg (225 lb)   SpO2 96%   BMI 35.24 kg/m²

## 2023-01-27 NOTE — ANESTHESIA PROCEDURE NOTES
Intubation    Date/Time: 1/27/2023 9:00 AM  Performed by: Dilma Daniels CRNA  Authorized by: Sanjay Briggs MD     Intubation:     Induction:  Intravenous    Intubated:  Postinduction    Mask Ventilation:  Easy mask    Attempts:  1    Attempted By:  CRNA    Method of Intubation:  Video laryngoscopy    Blade:  Baptiste 3    Laryngeal View Grade: Grade I - full view of cords      Difficult Airway Encountered?: No      Complications:  None    Airway Device:  Oral endotracheal tube    Airway Device Size:  8.0    Style/Cuff Inflation:  Cuffed (inflated to minimal occlusive pressure)    Inflation Amount (mL):  7    Tube secured:  22    Secured at:  The lips    Placement Verified By:  Capnometry    Complicating Factors:  None    Findings Post-Intubation:  BS equal bilateral and atraumatic/condition of teeth unchanged

## 2023-01-28 NOTE — OP NOTE
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 A tourniquet was applied to the right thigh.  The right lower limb was prepped and draped in a sterile manner. Tourniquet(s) inflated to 300 mmHg.     Attention was directed to the right  posterior heel where a linear incision was made over the posterior heel. Care was taken throughout dissection to avoid damage to neurovascular or tendinous structures. This incision was designed in a manner that, as the incision was deepened to the level of the Achilles, there were 2 flaps that were able to be opened and sutured down so as to be able to gain excellent exposure to the Achilles tendon. The peritenon was incised to be able to reveal the tendon, which appeared to have microtearing and fibrosis. All devitalized tissue and fibrosis was debrided off of the Achilles tendon. The Achilles was then detached from the posterior heel where the large exostosis was identified. Utilizing osteotomes, bone rasps, and other instrumentation, the exostosis of the posterior calcaneus was adequately resected and subsequently secondarily repaired. The area was smoothed down with bone rasps and revealed a normal posterior calcaneus after resection of the exostosis. Next an incidental  finding of retrocalcaneal bursitis was noted. The inflamed bursa tissue was removed using rongeur, #15 blade, and forceps. Next,  the Achilles tendon was reattached to the posterior calcaneus using the Drummond anchor set consisting of bone anchors and the suture material. After the anchor and suture set was used, the Achilles appeared to be well reattached to the posterior calcaneus and there appeared to be no immediate complications from that reattachment.     The surgical site was irrigated with saline solution via bulb syringe.  The surgical site was closed using 3-0 Monocryl and 3-0 nylon. A 50-50 mixture of 30ml of .5% bupivacaine and 1 percent lidocaine was locally infiltrated around the surgical site. The surgical site was dressed with xeroform, 4x4 gauze, kerlix, and ACE wrap. Tourniquet(s) deflated.       The patient was transferred to the recovery room with vital signs stable. Following a period of post op monitoring, the patient will be discharged home with the following postop instructions:   1. Keep dressing clean, dry, and intact until next seen by podiatry.   2. Weightbearing status: nonweightbearing.   3. All necessary prescriptions were ordered and medical management will continue.   4. Contact podiatry with any postop questions or concerns.     Estimated Blood Loss (EBL): 4ml           Implants:   Implant Name Type Inv. Item Serial No.  Lot No. LRB No. Used Action   SONICANCHOR    VANNA [id number] Right 1 Implanted   KIT SONICANCHOR F/F #2 C-7 - [id number]  KIT SONICANCHOR F/F #2 C-7  [email or web address]. [id number] Right 1 Implanted   KIT SONICANCHOR F/F #2 C-7 - [id number]  KIT SONICANCHOR F/F #2 C-7  [email or web address]. [id number] Right 1 Implanted   SONICANCHOR KIT    VANNA [id number] Right 1 Implanted   sonicanchor    VANNA [id number] Right 1 Implanted   sonicanchor kit     [id number] Right 1 Implanted       Specimens:   Specimen (24h ago, onward)      None                     Condition: Good    Disposition: PACU - hemodynamically stable.    Attestation: I was present and scrubbed for the entire procedure.      DISCHARGE INSTRUCTIONS:    Discharge Procedure Orders   CRUTCHES FOR HOME USE     Order Specific Question Answer Comments   Type: Axillary    Height: 5'7    Weight: 102.1 kg (225 lb)    Length of need (1-99 months): 99

## 2023-01-30 NOTE — ANESTHESIA POSTPROCEDURE EVALUATION
Anesthesia Post Evaluation    Patient: Cj Barnes    Procedure(s) Performed: Procedure(s) (LRB):  OSTECTOMY (Right)  DEBRIDEMENT, TENDON, ACHILLES (Right)    Final Anesthesia Type: general      Patient location during evaluation: PACU  Patient participation: Yes- Able to Participate  Level of consciousness: awake and alert  Post-procedure vital signs: reviewed and stable  Pain management: adequate  Airway patency: patent  EVENS mitigation strategies: Extubation while patient is awake  PONV status at discharge: No PONV  Anesthetic complications: no      Cardiovascular status: stable  Respiratory status: spontaneous ventilation and face mask  Hydration status: euvolemic  Follow-up not needed.          Vitals Value Taken Time   /66 01/27/23 1231   Temp 36.2 °C (97.1 °F) 01/27/23 1049   Pulse 71 01/27/23 1237   Resp 20 01/27/23 1130   SpO2 96 % 01/27/23 1237   Vitals shown include unvalidated device data.      No case tracking events are documented in the log.      Pain/Isa Score: No data recorded

## 2023-01-31 ENCOUNTER — TELEPHONE (OUTPATIENT)
Dept: DIABETES | Facility: CLINIC | Age: 58
End: 2023-01-31
Payer: MEDICARE

## 2023-02-01 ENCOUNTER — ANTI-COAG VISIT (OUTPATIENT)
Dept: CARDIOLOGY | Facility: CLINIC | Age: 58
End: 2023-02-01
Payer: MEDICARE

## 2023-02-01 ENCOUNTER — OFFICE VISIT (OUTPATIENT)
Dept: PODIATRY | Facility: CLINIC | Age: 58
End: 2023-02-01
Payer: MEDICARE

## 2023-02-01 VITALS
WEIGHT: 225 LBS | BODY MASS INDEX: 35.31 KG/M2 | HEIGHT: 67 IN | HEART RATE: 75 BPM | SYSTOLIC BLOOD PRESSURE: 120 MMHG | DIASTOLIC BLOOD PRESSURE: 71 MMHG

## 2023-02-01 DIAGNOSIS — Z79.01 LONG TERM (CURRENT) USE OF ANTICOAGULANTS: Primary | ICD-10-CM

## 2023-02-01 DIAGNOSIS — Z95.2 H/O MECHANICAL AORTIC VALVE REPLACEMENT: ICD-10-CM

## 2023-02-01 DIAGNOSIS — I48.92 PAROXYSMAL ATRIAL FLUTTER: ICD-10-CM

## 2023-02-01 DIAGNOSIS — M92.61 HAGLUND'S DEFORMITY OF RIGHT HEEL: Primary | ICD-10-CM

## 2023-02-01 PROCEDURE — 3074F SYST BP LT 130 MM HG: CPT | Mod: CPTII,S$GLB,, | Performed by: PODIATRIST

## 2023-02-01 PROCEDURE — 3078F PR MOST RECENT DIASTOLIC BLOOD PRESSURE < 80 MM HG: ICD-10-PCS | Mod: CPTII,S$GLB,, | Performed by: PODIATRIST

## 2023-02-01 PROCEDURE — 99999 PR PBB SHADOW E&M-EST. PATIENT-LVL IV: ICD-10-PCS | Mod: PBBFAC,,, | Performed by: PODIATRIST

## 2023-02-01 PROCEDURE — 93793 ANTICOAG MGMT PT WARFARIN: CPT | Mod: S$GLB,,,

## 2023-02-01 PROCEDURE — 93793 PR ANTICOAGULANT MGMT FOR PT TAKING WARFARIN: ICD-10-PCS | Mod: S$GLB,,,

## 2023-02-01 PROCEDURE — 99024 POSTOP FOLLOW-UP VISIT: CPT | Mod: S$GLB,,, | Performed by: PODIATRIST

## 2023-02-01 PROCEDURE — 3008F PR BODY MASS INDEX (BMI) DOCUMENTED: ICD-10-PCS | Mod: CPTII,S$GLB,, | Performed by: PODIATRIST

## 2023-02-01 PROCEDURE — 3078F DIAST BP <80 MM HG: CPT | Mod: CPTII,S$GLB,, | Performed by: PODIATRIST

## 2023-02-01 PROCEDURE — 4010F PR ACE/ARB THEARPY RXD/TAKEN: ICD-10-PCS | Mod: CPTII,S$GLB,, | Performed by: PODIATRIST

## 2023-02-01 PROCEDURE — 99024 PR POST-OP FOLLOW-UP VISIT: ICD-10-PCS | Mod: S$GLB,,, | Performed by: PODIATRIST

## 2023-02-01 PROCEDURE — 3074F PR MOST RECENT SYSTOLIC BLOOD PRESSURE < 130 MM HG: ICD-10-PCS | Mod: CPTII,S$GLB,, | Performed by: PODIATRIST

## 2023-02-01 PROCEDURE — 3008F BODY MASS INDEX DOCD: CPT | Mod: CPTII,S$GLB,, | Performed by: PODIATRIST

## 2023-02-01 PROCEDURE — 4010F ACE/ARB THERAPY RXD/TAKEN: CPT | Mod: CPTII,S$GLB,, | Performed by: PODIATRIST

## 2023-02-01 PROCEDURE — 99999 PR PBB SHADOW E&M-EST. PATIENT-LVL IV: CPT | Mod: PBBFAC,,, | Performed by: PODIATRIST

## 2023-02-01 RX ORDER — OXYCODONE AND ACETAMINOPHEN 7.5; 325 MG/1; MG/1
1 TABLET ORAL EVERY 6 HOURS PRN
Qty: 30 TABLET | Refills: 0 | Status: SHIPPED | OUTPATIENT
Start: 2023-02-01 | End: 2023-02-08 | Stop reason: SDUPTHER

## 2023-02-02 NOTE — PROGRESS NOTES
Patient presents approximately 5 days status post calcaneal exostectomy with Achilles tendon debridement and bursectomy.  He is doing well.  Incision healing well no signs of infection or dehiscence.  Patient to remain nonweightbearing.  Follow-up in 1 week and continue postoperative instructions.

## 2023-02-06 ENCOUNTER — ANTI-COAG VISIT (OUTPATIENT)
Dept: CARDIOLOGY | Facility: CLINIC | Age: 58
End: 2023-02-06
Payer: MEDICARE

## 2023-02-06 DIAGNOSIS — Z95.2 H/O MECHANICAL AORTIC VALVE REPLACEMENT: ICD-10-CM

## 2023-02-06 DIAGNOSIS — I48.92 PAROXYSMAL ATRIAL FLUTTER: ICD-10-CM

## 2023-02-06 DIAGNOSIS — Z79.01 LONG TERM (CURRENT) USE OF ANTICOAGULANTS: Primary | ICD-10-CM

## 2023-02-06 PROCEDURE — 93793 ANTICOAG MGMT PT WARFARIN: CPT | Mod: S$GLB,,,

## 2023-02-06 PROCEDURE — 93793 PR ANTICOAGULANT MGMT FOR PT TAKING WARFARIN: ICD-10-PCS | Mod: S$GLB,,,

## 2023-02-07 ENCOUNTER — PATIENT MESSAGE (OUTPATIENT)
Dept: PODIATRY | Facility: CLINIC | Age: 58
End: 2023-02-07
Payer: MEDICARE

## 2023-02-08 ENCOUNTER — OFFICE VISIT (OUTPATIENT)
Dept: PODIATRY | Facility: CLINIC | Age: 58
End: 2023-02-08
Payer: MEDICARE

## 2023-02-08 VITALS
HEART RATE: 67 BPM | DIASTOLIC BLOOD PRESSURE: 66 MMHG | SYSTOLIC BLOOD PRESSURE: 113 MMHG | BODY MASS INDEX: 35.31 KG/M2 | WEIGHT: 225 LBS | HEIGHT: 67 IN

## 2023-02-08 DIAGNOSIS — M92.61 HAGLUND'S DEFORMITY OF RIGHT HEEL: Primary | ICD-10-CM

## 2023-02-08 PROCEDURE — 3078F PR MOST RECENT DIASTOLIC BLOOD PRESSURE < 80 MM HG: ICD-10-PCS | Mod: CPTII,S$GLB,, | Performed by: PODIATRIST

## 2023-02-08 PROCEDURE — 1160F RVW MEDS BY RX/DR IN RCRD: CPT | Mod: CPTII,S$GLB,, | Performed by: PODIATRIST

## 2023-02-08 PROCEDURE — 1159F PR MEDICATION LIST DOCUMENTED IN MEDICAL RECORD: ICD-10-PCS | Mod: CPTII,S$GLB,, | Performed by: PODIATRIST

## 2023-02-08 PROCEDURE — 99999 PR PBB SHADOW E&M-EST. PATIENT-LVL IV: CPT | Mod: PBBFAC,,, | Performed by: PODIATRIST

## 2023-02-08 PROCEDURE — 1159F MED LIST DOCD IN RCRD: CPT | Mod: CPTII,S$GLB,, | Performed by: PODIATRIST

## 2023-02-08 PROCEDURE — 4010F PR ACE/ARB THEARPY RXD/TAKEN: ICD-10-PCS | Mod: CPTII,S$GLB,, | Performed by: PODIATRIST

## 2023-02-08 PROCEDURE — 3008F PR BODY MASS INDEX (BMI) DOCUMENTED: ICD-10-PCS | Mod: CPTII,S$GLB,, | Performed by: PODIATRIST

## 2023-02-08 PROCEDURE — 3008F BODY MASS INDEX DOCD: CPT | Mod: CPTII,S$GLB,, | Performed by: PODIATRIST

## 2023-02-08 PROCEDURE — 3078F DIAST BP <80 MM HG: CPT | Mod: CPTII,S$GLB,, | Performed by: PODIATRIST

## 2023-02-08 PROCEDURE — 4010F ACE/ARB THERAPY RXD/TAKEN: CPT | Mod: CPTII,S$GLB,, | Performed by: PODIATRIST

## 2023-02-08 PROCEDURE — 99024 PR POST-OP FOLLOW-UP VISIT: ICD-10-PCS | Mod: S$GLB,,, | Performed by: PODIATRIST

## 2023-02-08 PROCEDURE — 1160F PR REVIEW ALL MEDS BY PRESCRIBER/CLIN PHARMACIST DOCUMENTED: ICD-10-PCS | Mod: CPTII,S$GLB,, | Performed by: PODIATRIST

## 2023-02-08 PROCEDURE — 99999 PR PBB SHADOW E&M-EST. PATIENT-LVL IV: ICD-10-PCS | Mod: PBBFAC,,, | Performed by: PODIATRIST

## 2023-02-08 PROCEDURE — 99024 POSTOP FOLLOW-UP VISIT: CPT | Mod: S$GLB,,, | Performed by: PODIATRIST

## 2023-02-08 PROCEDURE — 3074F SYST BP LT 130 MM HG: CPT | Mod: CPTII,S$GLB,, | Performed by: PODIATRIST

## 2023-02-08 PROCEDURE — 3074F PR MOST RECENT SYSTOLIC BLOOD PRESSURE < 130 MM HG: ICD-10-PCS | Mod: CPTII,S$GLB,, | Performed by: PODIATRIST

## 2023-02-08 RX ORDER — OXYCODONE AND ACETAMINOPHEN 7.5; 325 MG/1; MG/1
1 TABLET ORAL EVERY 6 HOURS PRN
Qty: 30 TABLET | Refills: 0 | Status: SHIPPED | OUTPATIENT
Start: 2023-02-08 | End: 2023-02-27 | Stop reason: SDUPTHER

## 2023-02-08 NOTE — PROGRESS NOTES
Patient presents approximately 12 days status post calcaneal exostectomy with Achilles tendon debridement and bursectomy.  He is doing well.  Incision healing well no signs of infection or dehiscence.  Patient to remain nonweightbearing.  Follow-up in 1 week and continue postoperative instructions.

## 2023-02-15 ENCOUNTER — OFFICE VISIT (OUTPATIENT)
Dept: PODIATRY | Facility: CLINIC | Age: 58
End: 2023-02-15
Payer: MEDICARE

## 2023-02-15 VITALS
SYSTOLIC BLOOD PRESSURE: 113 MMHG | WEIGHT: 225 LBS | HEIGHT: 67 IN | DIASTOLIC BLOOD PRESSURE: 70 MMHG | BODY MASS INDEX: 35.31 KG/M2 | HEART RATE: 60 BPM

## 2023-02-15 DIAGNOSIS — M92.61 HAGLUND'S DEFORMITY OF RIGHT HEEL: Primary | ICD-10-CM

## 2023-02-15 DIAGNOSIS — M79.671 RIGHT FOOT PAIN: ICD-10-CM

## 2023-02-15 PROCEDURE — 3074F SYST BP LT 130 MM HG: CPT | Mod: CPTII,S$GLB,, | Performed by: PODIATRIST

## 2023-02-15 PROCEDURE — 4010F PR ACE/ARB THEARPY RXD/TAKEN: ICD-10-PCS | Mod: CPTII,S$GLB,, | Performed by: PODIATRIST

## 2023-02-15 PROCEDURE — 3074F PR MOST RECENT SYSTOLIC BLOOD PRESSURE < 130 MM HG: ICD-10-PCS | Mod: CPTII,S$GLB,, | Performed by: PODIATRIST

## 2023-02-15 PROCEDURE — 3008F PR BODY MASS INDEX (BMI) DOCUMENTED: ICD-10-PCS | Mod: CPTII,S$GLB,, | Performed by: PODIATRIST

## 2023-02-15 PROCEDURE — 3008F BODY MASS INDEX DOCD: CPT | Mod: CPTII,S$GLB,, | Performed by: PODIATRIST

## 2023-02-15 PROCEDURE — 99024 POSTOP FOLLOW-UP VISIT: CPT | Mod: S$GLB,,, | Performed by: PODIATRIST

## 2023-02-15 PROCEDURE — 99024 PR POST-OP FOLLOW-UP VISIT: ICD-10-PCS | Mod: S$GLB,,, | Performed by: PODIATRIST

## 2023-02-15 PROCEDURE — 99999 PR PBB SHADOW E&M-EST. PATIENT-LVL IV: ICD-10-PCS | Mod: PBBFAC,,, | Performed by: PODIATRIST

## 2023-02-15 PROCEDURE — 3078F PR MOST RECENT DIASTOLIC BLOOD PRESSURE < 80 MM HG: ICD-10-PCS | Mod: CPTII,S$GLB,, | Performed by: PODIATRIST

## 2023-02-15 PROCEDURE — 4010F ACE/ARB THERAPY RXD/TAKEN: CPT | Mod: CPTII,S$GLB,, | Performed by: PODIATRIST

## 2023-02-15 PROCEDURE — 99999 PR PBB SHADOW E&M-EST. PATIENT-LVL IV: CPT | Mod: PBBFAC,,, | Performed by: PODIATRIST

## 2023-02-15 PROCEDURE — 3078F DIAST BP <80 MM HG: CPT | Mod: CPTII,S$GLB,, | Performed by: PODIATRIST

## 2023-02-22 ENCOUNTER — ANTI-COAG VISIT (OUTPATIENT)
Dept: CARDIOLOGY | Facility: CLINIC | Age: 58
End: 2023-02-22
Payer: MEDICARE

## 2023-02-22 DIAGNOSIS — Z79.01 LONG TERM (CURRENT) USE OF ANTICOAGULANTS: Primary | ICD-10-CM

## 2023-02-22 DIAGNOSIS — Z95.2 H/O MECHANICAL AORTIC VALVE REPLACEMENT: ICD-10-CM

## 2023-02-22 DIAGNOSIS — I48.92 PAROXYSMAL ATRIAL FLUTTER: ICD-10-CM

## 2023-02-22 PROCEDURE — 93793 PR ANTICOAGULANT MGMT FOR PT TAKING WARFARIN: ICD-10-PCS | Mod: S$GLB,,,

## 2023-02-22 PROCEDURE — 93793 ANTICOAG MGMT PT WARFARIN: CPT | Mod: S$GLB,,,

## 2023-02-27 ENCOUNTER — PATIENT MESSAGE (OUTPATIENT)
Dept: DIABETES | Facility: CLINIC | Age: 58
End: 2023-02-27
Payer: MEDICARE

## 2023-02-28 NOTE — PROGRESS NOTES
Patient presents approximately 3 weeks status post calcaneal exostectomy with Achilles tendon debridement and bursectomy.  He is doing well.  Incision healing well no signs of infection or dehiscence.  Patient to remain nonweightbearing.  Follow-up in 1 week and continue postoperative instructions.

## 2023-03-02 ENCOUNTER — TELEPHONE (OUTPATIENT)
Dept: DIABETES | Facility: CLINIC | Age: 58
End: 2023-03-02
Payer: MEDICARE

## 2023-03-02 ENCOUNTER — OFFICE VISIT (OUTPATIENT)
Dept: PODIATRY | Facility: CLINIC | Age: 58
End: 2023-03-02
Payer: MEDICARE

## 2023-03-02 VITALS
HEART RATE: 56 BPM | SYSTOLIC BLOOD PRESSURE: 130 MMHG | DIASTOLIC BLOOD PRESSURE: 71 MMHG | HEIGHT: 67 IN | BODY MASS INDEX: 35.31 KG/M2 | WEIGHT: 225 LBS

## 2023-03-02 DIAGNOSIS — M92.61 HAGLUND'S DEFORMITY OF RIGHT HEEL: Primary | ICD-10-CM

## 2023-03-02 DIAGNOSIS — L97.502 ULCER OF FOOT WITH FAT LAYER EXPOSED, UNSPECIFIED LATERALITY: ICD-10-CM

## 2023-03-02 PROCEDURE — 11042 DBRDMT SUBQ TIS 1ST 20SQCM/<: CPT | Mod: 79,S$GLB,, | Performed by: PODIATRIST

## 2023-03-02 PROCEDURE — 3044F HG A1C LEVEL LT 7.0%: CPT | Mod: CPTII,S$GLB,, | Performed by: PODIATRIST

## 2023-03-02 PROCEDURE — 3075F PR MOST RECENT SYSTOLIC BLOOD PRESS GE 130-139MM HG: ICD-10-PCS | Mod: CPTII,S$GLB,, | Performed by: PODIATRIST

## 2023-03-02 PROCEDURE — 3008F PR BODY MASS INDEX (BMI) DOCUMENTED: ICD-10-PCS | Mod: CPTII,S$GLB,, | Performed by: PODIATRIST

## 2023-03-02 PROCEDURE — 3044F PR MOST RECENT HEMOGLOBIN A1C LEVEL <7.0%: ICD-10-PCS | Mod: CPTII,S$GLB,, | Performed by: PODIATRIST

## 2023-03-02 PROCEDURE — 3008F BODY MASS INDEX DOCD: CPT | Mod: CPTII,S$GLB,, | Performed by: PODIATRIST

## 2023-03-02 PROCEDURE — 3075F SYST BP GE 130 - 139MM HG: CPT | Mod: CPTII,S$GLB,, | Performed by: PODIATRIST

## 2023-03-02 PROCEDURE — 4010F ACE/ARB THERAPY RXD/TAKEN: CPT | Mod: CPTII,S$GLB,, | Performed by: PODIATRIST

## 2023-03-02 PROCEDURE — 11042 PR DEBRIDEMENT, SKIN, SUB-Q TISSUE,=<20 SQ CM: ICD-10-PCS | Mod: 79,S$GLB,, | Performed by: PODIATRIST

## 2023-03-02 PROCEDURE — 99024 PR POST-OP FOLLOW-UP VISIT: ICD-10-PCS | Mod: S$GLB,,, | Performed by: PODIATRIST

## 2023-03-02 PROCEDURE — 3078F PR MOST RECENT DIASTOLIC BLOOD PRESSURE < 80 MM HG: ICD-10-PCS | Mod: CPTII,S$GLB,, | Performed by: PODIATRIST

## 2023-03-02 PROCEDURE — 4010F PR ACE/ARB THEARPY RXD/TAKEN: ICD-10-PCS | Mod: CPTII,S$GLB,, | Performed by: PODIATRIST

## 2023-03-02 PROCEDURE — 99999 PR PBB SHADOW E&M-EST. PATIENT-LVL IV: ICD-10-PCS | Mod: PBBFAC,,, | Performed by: PODIATRIST

## 2023-03-02 PROCEDURE — 99024 POSTOP FOLLOW-UP VISIT: CPT | Mod: S$GLB,,, | Performed by: PODIATRIST

## 2023-03-02 PROCEDURE — 99999 PR PBB SHADOW E&M-EST. PATIENT-LVL IV: CPT | Mod: PBBFAC,,, | Performed by: PODIATRIST

## 2023-03-02 PROCEDURE — 3078F DIAST BP <80 MM HG: CPT | Mod: CPTII,S$GLB,, | Performed by: PODIATRIST

## 2023-03-02 RX ORDER — AMOXICILLIN AND CLAVULANATE POTASSIUM 500; 125 MG/1; MG/1
1 TABLET, FILM COATED ORAL 2 TIMES DAILY
Qty: 20 TABLET | Refills: 0 | Status: SHIPPED | OUTPATIENT
Start: 2023-03-02 | End: 2023-03-12

## 2023-03-02 NOTE — TELEPHONE ENCOUNTER
Spoke to pt, confirmed that his is not receiving any supplies from [employer], will discard any documents sent

## 2023-03-03 ENCOUNTER — ANTI-COAG VISIT (OUTPATIENT)
Dept: CARDIOLOGY | Facility: CLINIC | Age: 58
End: 2023-03-03
Payer: MEDICARE

## 2023-03-03 DIAGNOSIS — I48.92 PAROXYSMAL ATRIAL FLUTTER: ICD-10-CM

## 2023-03-03 DIAGNOSIS — Z95.2 H/O MECHANICAL AORTIC VALVE REPLACEMENT: ICD-10-CM

## 2023-03-03 DIAGNOSIS — Z79.01 LONG TERM (CURRENT) USE OF ANTICOAGULANTS: Primary | ICD-10-CM

## 2023-03-03 PROCEDURE — 93793 PR ANTICOAGULANT MGMT FOR PT TAKING WARFARIN: ICD-10-PCS | Mod: S$GLB,,,

## 2023-03-03 PROCEDURE — 93793 ANTICOAG MGMT PT WARFARIN: CPT | Mod: S$GLB,,,

## 2023-03-03 NOTE — PROGRESS NOTES
INR high again. Noted today that pt reports starting ozempic about 3 months ago which is a DDI and most likely why INR has been running high. Decrease dose. Recheck INR in 1 week

## 2023-03-05 NOTE — PROGRESS NOTES
"  Patient presents approximately 4 weeks status post calcaneal exostectomy with Achilles tendon debridement and bursectomy.   Patient to remain nonweightbearing.  Unfortunately the incision has dehisced somewhat with a central opening measuring approximately 1 cm x 0.5 cm x 0.2 cm.  Mild periwound erythema apparent.  Follow-up in 1 week and continue postoperative instructions.    Wound Debridement    Performed by: Eugene Bowers DPM   Authorized by: Patient    Time out: Immediately prior to procedure a "time out" was called to verify the correct patient, procedure, equipment, support staff and site/side marked as required.   Consent Done?:  Yes (Verbal)  Local anesthesia used?: No       Wound Details:    Location:  Right Achilles tendon/posterior calcaneal     Type of Debridement:  Excisional       Length (cm):  1 cm       Width (cm):  0.5 cm       Depth (cm):  0.2 cm       Percent Debrided (%):  100             Depth of debridement:  Subcutaneous tissue    Tissue debrided:  Dermis, Epidermis and Subcutaneous    Devitalized tissue debrided:  Biofilm, Callus and Necrotic/Eschar    Instruments:  Blade, Curette and Nippers     Bleeding:  Minimal  Hemostasis Achieved: Yes    Method Used:  Pressure  Patient tolerance:  Patient tolerated the procedure well with no immediate complications    Wound care instructions discussed in detail, begin antibiotic therapy, follow-up in 1 week.    "

## 2023-03-07 ENCOUNTER — TELEPHONE (OUTPATIENT)
Dept: DIABETES | Facility: CLINIC | Age: 58
End: 2023-03-07
Payer: MEDICARE

## 2023-03-07 ENCOUNTER — PATIENT MESSAGE (OUTPATIENT)
Dept: DIABETES | Facility: CLINIC | Age: 58
End: 2023-03-07
Payer: MEDICARE

## 2023-03-07 NOTE — TELEPHONE ENCOUNTER
Spoke to pt , informed pt that he needed to call [employer] to cancel reorder program in order to [employer] to stop sending pump request, provided pt with number

## 2023-03-09 ENCOUNTER — ANTI-COAG VISIT (OUTPATIENT)
Dept: CARDIOLOGY | Facility: CLINIC | Age: 58
End: 2023-03-09
Payer: MEDICARE

## 2023-03-09 ENCOUNTER — OFFICE VISIT (OUTPATIENT)
Dept: PODIATRY | Facility: CLINIC | Age: 58
End: 2023-03-09
Payer: MEDICARE

## 2023-03-09 ENCOUNTER — LAB VISIT (OUTPATIENT)
Dept: LAB | Facility: HOSPITAL | Age: 58
End: 2023-03-09
Attending: INTERNAL MEDICINE
Payer: MEDICARE

## 2023-03-09 ENCOUNTER — PATIENT MESSAGE (OUTPATIENT)
Dept: CARDIOLOGY | Facility: CLINIC | Age: 58
End: 2023-03-09

## 2023-03-09 ENCOUNTER — TELEPHONE (OUTPATIENT)
Dept: DIABETES | Facility: CLINIC | Age: 58
End: 2023-03-09
Payer: MEDICARE

## 2023-03-09 VITALS
DIASTOLIC BLOOD PRESSURE: 76 MMHG | HEIGHT: 67 IN | SYSTOLIC BLOOD PRESSURE: 131 MMHG | BODY MASS INDEX: 35.33 KG/M2 | HEART RATE: 68 BPM | WEIGHT: 225.06 LBS

## 2023-03-09 DIAGNOSIS — M92.61 HAGLUND'S DEFORMITY OF RIGHT HEEL: Primary | ICD-10-CM

## 2023-03-09 DIAGNOSIS — Z95.2 H/O MECHANICAL AORTIC VALVE REPLACEMENT: ICD-10-CM

## 2023-03-09 DIAGNOSIS — L97.502 ULCER OF FOOT WITH FAT LAYER EXPOSED, UNSPECIFIED LATERALITY: ICD-10-CM

## 2023-03-09 DIAGNOSIS — I48.92 PAROXYSMAL ATRIAL FLUTTER: ICD-10-CM

## 2023-03-09 DIAGNOSIS — Z79.01 LONG TERM (CURRENT) USE OF ANTICOAGULANTS: Primary | ICD-10-CM

## 2023-03-09 DIAGNOSIS — E08.41 DIABETIC MONONEUROPATHY ASSOCIATED WITH DIABETES MELLITUS DUE TO UNDERLYING CONDITION: ICD-10-CM

## 2023-03-09 LAB
INR PPP: 4.3 (ref 0.8–1.2)
PROTHROMBIN TIME: 40.8 SEC (ref 9–12.5)

## 2023-03-09 PROCEDURE — 3044F HG A1C LEVEL LT 7.0%: CPT | Mod: CPTII,S$GLB,, | Performed by: PODIATRIST

## 2023-03-09 PROCEDURE — 99024 PR POST-OP FOLLOW-UP VISIT: ICD-10-PCS | Mod: S$GLB,,, | Performed by: PODIATRIST

## 2023-03-09 PROCEDURE — 3078F DIAST BP <80 MM HG: CPT | Mod: CPTII,S$GLB,, | Performed by: PODIATRIST

## 2023-03-09 PROCEDURE — 93793 PR ANTICOAGULANT MGMT FOR PT TAKING WARFARIN: ICD-10-PCS | Mod: S$GLB,,,

## 2023-03-09 PROCEDURE — 4010F PR ACE/ARB THEARPY RXD/TAKEN: ICD-10-PCS | Mod: CPTII,S$GLB,, | Performed by: PODIATRIST

## 2023-03-09 PROCEDURE — 3075F PR MOST RECENT SYSTOLIC BLOOD PRESS GE 130-139MM HG: ICD-10-PCS | Mod: CPTII,S$GLB,, | Performed by: PODIATRIST

## 2023-03-09 PROCEDURE — 4010F ACE/ARB THERAPY RXD/TAKEN: CPT | Mod: CPTII,S$GLB,, | Performed by: PODIATRIST

## 2023-03-09 PROCEDURE — 3078F PR MOST RECENT DIASTOLIC BLOOD PRESSURE < 80 MM HG: ICD-10-PCS | Mod: CPTII,S$GLB,, | Performed by: PODIATRIST

## 2023-03-09 PROCEDURE — 99024 POSTOP FOLLOW-UP VISIT: CPT | Mod: S$GLB,,, | Performed by: PODIATRIST

## 2023-03-09 PROCEDURE — 3008F PR BODY MASS INDEX (BMI) DOCUMENTED: ICD-10-PCS | Mod: CPTII,S$GLB,, | Performed by: PODIATRIST

## 2023-03-09 PROCEDURE — 3044F PR MOST RECENT HEMOGLOBIN A1C LEVEL <7.0%: ICD-10-PCS | Mod: CPTII,S$GLB,, | Performed by: PODIATRIST

## 2023-03-09 PROCEDURE — 99999 PR PBB SHADOW E&M-EST. PATIENT-LVL IV: CPT | Mod: PBBFAC,,, | Performed by: PODIATRIST

## 2023-03-09 PROCEDURE — 36415 COLL VENOUS BLD VENIPUNCTURE: CPT | Performed by: INTERNAL MEDICINE

## 2023-03-09 PROCEDURE — 99999 PR PBB SHADOW E&M-EST. PATIENT-LVL IV: ICD-10-PCS | Mod: PBBFAC,,, | Performed by: PODIATRIST

## 2023-03-09 PROCEDURE — 93793 ANTICOAG MGMT PT WARFARIN: CPT | Mod: S$GLB,,,

## 2023-03-09 PROCEDURE — 11042 DBRDMT SUBQ TIS 1ST 20SQCM/<: CPT | Mod: 79,S$GLB,, | Performed by: PODIATRIST

## 2023-03-09 PROCEDURE — 3075F SYST BP GE 130 - 139MM HG: CPT | Mod: CPTII,S$GLB,, | Performed by: PODIATRIST

## 2023-03-09 PROCEDURE — 3008F BODY MASS INDEX DOCD: CPT | Mod: CPTII,S$GLB,, | Performed by: PODIATRIST

## 2023-03-09 PROCEDURE — 11042 PR DEBRIDEMENT, SKIN, SUB-Q TISSUE,=<20 SQ CM: ICD-10-PCS | Mod: 79,S$GLB,, | Performed by: PODIATRIST

## 2023-03-09 PROCEDURE — 85610 PROTHROMBIN TIME: CPT | Performed by: INTERNAL MEDICINE

## 2023-03-09 RX ORDER — OXYCODONE AND ACETAMINOPHEN 7.5; 325 MG/1; MG/1
1 TABLET ORAL EVERY 12 HOURS PRN
Qty: 30 TABLET | Refills: 0 | Status: SHIPPED | OUTPATIENT
Start: 2023-03-09 | End: 2023-03-20 | Stop reason: SDUPTHER

## 2023-03-09 RX ORDER — LEVOFLOXACIN 500 MG/1
500 TABLET, FILM COATED ORAL DAILY
Qty: 7 TABLET | Refills: 0 | Status: SHIPPED | OUTPATIENT
Start: 2023-03-09 | End: 2023-04-11 | Stop reason: ALTCHOICE

## 2023-03-09 NOTE — PROGRESS NOTES
INR high again. Pt did not decrease dose as planned. Additionally, will be starting levaquin x7d. Decrease dose per calendar.

## 2023-03-10 ENCOUNTER — OFFICE VISIT (OUTPATIENT)
Dept: DIABETES | Facility: CLINIC | Age: 58
End: 2023-03-10
Payer: MEDICARE

## 2023-03-10 ENCOUNTER — TELEPHONE (OUTPATIENT)
Dept: DIABETES | Facility: CLINIC | Age: 58
End: 2023-03-10

## 2023-03-10 VITALS
WEIGHT: 226.69 LBS | BODY MASS INDEX: 35.58 KG/M2 | DIASTOLIC BLOOD PRESSURE: 62 MMHG | OXYGEN SATURATION: 97 % | SYSTOLIC BLOOD PRESSURE: 128 MMHG | HEART RATE: 63 BPM | HEIGHT: 67 IN

## 2023-03-10 DIAGNOSIS — I25.119 ATHEROSCLEROSIS OF NATIVE CORONARY ARTERY OF NATIVE HEART WITH ANGINA PECTORIS: ICD-10-CM

## 2023-03-10 DIAGNOSIS — G45.9 TIA (TRANSIENT ISCHEMIC ATTACK): ICD-10-CM

## 2023-03-10 DIAGNOSIS — E10.65 TYPE 1 DIABETES MELLITUS WITH HYPERGLYCEMIA: ICD-10-CM

## 2023-03-10 DIAGNOSIS — I10 ESSENTIAL HYPERTENSION: ICD-10-CM

## 2023-03-10 DIAGNOSIS — E10.649 TYPE 1 DIABETES MELLITUS WITH HYPOGLYCEMIA UNAWARENESS: ICD-10-CM

## 2023-03-10 DIAGNOSIS — Z46.81 INSULIN PUMP FITTING OR ADJUSTMENT: ICD-10-CM

## 2023-03-10 DIAGNOSIS — E66.01 CLASS 2 SEVERE OBESITY DUE TO EXCESS CALORIES WITH SERIOUS COMORBIDITY AND BODY MASS INDEX (BMI) OF 35.0 TO 35.9 IN ADULT: ICD-10-CM

## 2023-03-10 DIAGNOSIS — E78.5 HYPERLIPIDEMIA WITH TARGET LOW DENSITY LIPOPROTEIN (LDL) CHOLESTEROL LESS THAN 70 MG/DL: ICD-10-CM

## 2023-03-10 DIAGNOSIS — Z71.9 HEALTH EDUCATION/COUNSELING: ICD-10-CM

## 2023-03-10 DIAGNOSIS — E10.9 TYPE 1 DIABETES MELLITUS WITHOUT COMPLICATION: Primary | ICD-10-CM

## 2023-03-10 DIAGNOSIS — Z96.41 INSULIN PUMP IN PLACE: ICD-10-CM

## 2023-03-10 PROCEDURE — 99214 OFFICE O/P EST MOD 30 MIN: CPT | Mod: S$GLB,,, | Performed by: NURSE PRACTITIONER

## 2023-03-10 PROCEDURE — 99214 PR OFFICE/OUTPT VISIT, EST, LEVL IV, 30-39 MIN: ICD-10-PCS | Mod: S$GLB,,, | Performed by: NURSE PRACTITIONER

## 2023-03-10 PROCEDURE — 3044F PR MOST RECENT HEMOGLOBIN A1C LEVEL <7.0%: ICD-10-PCS | Mod: CPTII,S$GLB,, | Performed by: NURSE PRACTITIONER

## 2023-03-10 PROCEDURE — 1159F MED LIST DOCD IN RCRD: CPT | Mod: CPTII,S$GLB,, | Performed by: NURSE PRACTITIONER

## 2023-03-10 PROCEDURE — 99999 PR PBB SHADOW E&M-EST. PATIENT-LVL V: ICD-10-PCS | Mod: PBBFAC,,, | Performed by: NURSE PRACTITIONER

## 2023-03-10 PROCEDURE — 95251 PR GLUCOSE MONITOR, 72 HOUR, PHYS INTERP: ICD-10-PCS | Mod: S$GLB,,, | Performed by: NURSE PRACTITIONER

## 2023-03-10 PROCEDURE — 4010F ACE/ARB THERAPY RXD/TAKEN: CPT | Mod: CPTII,S$GLB,, | Performed by: NURSE PRACTITIONER

## 2023-03-10 PROCEDURE — 3044F HG A1C LEVEL LT 7.0%: CPT | Mod: CPTII,S$GLB,, | Performed by: NURSE PRACTITIONER

## 2023-03-10 PROCEDURE — 1159F PR MEDICATION LIST DOCUMENTED IN MEDICAL RECORD: ICD-10-PCS | Mod: CPTII,S$GLB,, | Performed by: NURSE PRACTITIONER

## 2023-03-10 PROCEDURE — 3078F DIAST BP <80 MM HG: CPT | Mod: CPTII,S$GLB,, | Performed by: NURSE PRACTITIONER

## 2023-03-10 PROCEDURE — 95251 CONT GLUC MNTR ANALYSIS I&R: CPT | Mod: S$GLB,,, | Performed by: NURSE PRACTITIONER

## 2023-03-10 PROCEDURE — 99999 PR PBB SHADOW E&M-EST. PATIENT-LVL V: CPT | Mod: PBBFAC,,, | Performed by: NURSE PRACTITIONER

## 2023-03-10 PROCEDURE — 3008F PR BODY MASS INDEX (BMI) DOCUMENTED: ICD-10-PCS | Mod: CPTII,S$GLB,, | Performed by: NURSE PRACTITIONER

## 2023-03-10 PROCEDURE — 3074F PR MOST RECENT SYSTOLIC BLOOD PRESSURE < 130 MM HG: ICD-10-PCS | Mod: CPTII,S$GLB,, | Performed by: NURSE PRACTITIONER

## 2023-03-10 PROCEDURE — 3074F SYST BP LT 130 MM HG: CPT | Mod: CPTII,S$GLB,, | Performed by: NURSE PRACTITIONER

## 2023-03-10 PROCEDURE — 3078F PR MOST RECENT DIASTOLIC BLOOD PRESSURE < 80 MM HG: ICD-10-PCS | Mod: CPTII,S$GLB,, | Performed by: NURSE PRACTITIONER

## 2023-03-10 PROCEDURE — 1160F RVW MEDS BY RX/DR IN RCRD: CPT | Mod: CPTII,S$GLB,, | Performed by: NURSE PRACTITIONER

## 2023-03-10 PROCEDURE — 3008F BODY MASS INDEX DOCD: CPT | Mod: CPTII,S$GLB,, | Performed by: NURSE PRACTITIONER

## 2023-03-10 PROCEDURE — 4010F PR ACE/ARB THEARPY RXD/TAKEN: ICD-10-PCS | Mod: CPTII,S$GLB,, | Performed by: NURSE PRACTITIONER

## 2023-03-10 PROCEDURE — 1160F PR REVIEW ALL MEDS BY PRESCRIBER/CLIN PHARMACIST DOCUMENTED: ICD-10-PCS | Mod: CPTII,S$GLB,, | Performed by: NURSE PRACTITIONER

## 2023-03-10 RX ORDER — BLOOD-GLUCOSE TRANSMITTER
EACH MISCELLANEOUS
Qty: 1 EACH | Refills: 3 | Status: SHIPPED | OUTPATIENT
Start: 2023-03-10 | End: 2024-01-04

## 2023-03-10 RX ORDER — METFORMIN HYDROCHLORIDE 500 MG/1
500 TABLET, EXTENDED RELEASE ORAL 2 TIMES DAILY WITH MEALS
Qty: 180 TABLET | Refills: 2 | Status: SHIPPED | OUTPATIENT
Start: 2023-03-10 | End: 2024-03-11 | Stop reason: SDUPTHER

## 2023-03-10 RX ORDER — INSULIN LISPRO 100 [IU]/ML
INJECTION, SOLUTION INTRAVENOUS; SUBCUTANEOUS
Qty: 60 ML | Refills: 11 | Status: SHIPPED | OUTPATIENT
Start: 2023-03-10 | End: 2023-07-31 | Stop reason: SDUPTHER

## 2023-03-10 RX ORDER — SEMAGLUTIDE 1.34 MG/ML
0.5 INJECTION, SOLUTION SUBCUTANEOUS
Qty: 1 PEN | Refills: 6 | Status: SHIPPED | OUTPATIENT
Start: 2023-03-10 | End: 2024-01-02

## 2023-03-10 RX ORDER — BLOOD-GLUCOSE SENSOR
EACH MISCELLANEOUS
Qty: 10 EACH | Refills: 3 | Status: SHIPPED | OUTPATIENT
Start: 2023-03-10 | End: 2023-06-22 | Stop reason: SDUPTHER

## 2023-03-10 NOTE — PROGRESS NOTES
CC:   Chief Complaint   Patient presents with    Diabetes Mellitus         HPI: Cj Barnes is a 57 y.o. male presents for a follow up visit today for the management of T1DM.   The patient was diagnosed in his early 30's. Initially diagnosed with T2DM, 5 years later he was dx as T1.    He has used an insulin pump since 2006, previous on animas.  He was on the 670 G medtronic pump.    Now on the Tandem T Slim-- started June 2022     Family hx of diabetes: Mother, father, 3 brothers - no one with T1     Hospitalized for diabetes: denies     No personal or FH of thyroid cancer or personal of pancreatic cancer or pancreatitis.     Last visit was in November of 2022  At that visit we started him on Ozempic weekly to help with insulin resistance and to cut back on carbohydrate intake also to facilitate weight loss  Weight went from 241 lb to 226 lb  We also continued his Jardiance and metformin  We continued his tandem insulin pump with Humalog  Tightened his carbohydrate ratio from midnight to 6:00 a.m.  Continued his Dexcom  His A1c is down to 6.1%    He had right heel surgery at the end of January  . Reports that it is having trouble healing -- thinks it is from wearing the boot. BG readings have been very well controlled   Following with wound care   Not infected   Following closely with podiatry     See attached downloads                DIABETES COMPLICATIONS: retinopathy, peripheral neuropathy, cardiovascular disease and cerebrovascular disease  TIA     Diabetes Management Status    ASA:  Yes - 81 mg daily and coumadin     Statin: Taking--Crestor 40 mg nightly   ACE/ARB: Taking-- Irbesartan 150 mg daily     Screening or Prevention Patient's value Goal Complete/Controlled?   HgA1C Testing and Control   Lab Results   Component Value Date    HGBA1C 6.1 (H) 03/03/2023      Annually/Less than 8% Yes   Lipid profile : 03/03/2023 Annually Yes   LDL control Lab Results   Component Value Date    LDLCALC 41.2 (L)  03/03/2023    Annually/Less than 100 mg/dl  Yes   Nephropathy screening Lab Results   Component Value Date    LABMICR 6.0 11/03/2022     Lab Results   Component Value Date    PROTEINUA Negative 08/16/2019    Annually Yes   Blood pressure BP Readings from Last 1 Encounters:   03/10/23 128/62    Less than 140/90 Yes   Dilated retinal exam : 09/28/2022- [facility]  Annually No   Foot exam   : 07/11/2022 Annually Yes       CURRENT A1C:    Hemoglobin A1C   Date Value Ref Range Status   03/03/2023 6.1 (H) 4.0 - 5.6 % Final     Comment:     ADA Screening Guidelines:  5.7-6.4%  Consistent with prediabetes  >or=6.5%  Consistent with diabetes    High levels of fetal hemoglobin interfere with the HbA1C  assay. Heterozygous hemoglobin variants (HbS, HgC, etc)do  not significantly interfere with this assay.   However, presence of multiple variants may affect accuracy.     11/03/2022 6.7 (H) 4.0 - 5.6 % Final     Comment:     ADA Screening Guidelines:  5.7-6.4%  Consistent with prediabetes  >or=6.5%  Consistent with diabetes    High levels of fetal hemoglobin interfere with the HbA1C  assay. Heterozygous hemoglobin variants (HbS, HgC, etc)do  not significantly interfere with this assay.   However, presence of multiple variants may affect accuracy.     06/10/2022 7.1 (H) 4.0 - 5.6 % Final     Comment:     ADA Screening Guidelines:  5.7-6.4%  Consistent with prediabetes  >or=6.5%  Consistent with diabetes    High levels of fetal hemoglobin interfere with the HbA1C  assay. Heterozygous hemoglobin variants (HbS, HgC, etc)do  not significantly interfere with this assay.   However, presence of multiple variants may affect accuracy.         GOAL A1C: 6.5-7% - without hypoglycemia    DM MEDICATIONS USED IN THE PAST: Medtronic 670 G   Metformin   Steglatro- lack of coverage.   Jardiance   Humalog   Tandem insulin pump   dexcom       CURRENT DIABETES MEDICATIONS: Tandem T Slim insulin pump in control IQ with Dexcom,   Ozempic 0.5 mg  weekly         Continue Jardiance 25 mg daily   Continue Metformin 500 mg to BID- to help with insulin resistance.         Pump settings:    Continue Tandem T Slim pump with Lispro   Control IQ mode      Pump settings:  Basal:  Continue basal settings  MN-6AM- 1.6 units/hr --  6AM-10AM- 1.4 units/hr --  10AM- 4PM: 1.4 units/hr  4PM- MN: 1.4 units/hr         ICR continue  MN- 6AM: 1:3.5   6AM-10AM:1:3.5   10AM- 4PM-1:3.5   4PM- MN: 1:3.5         ISF:  MN- 6AM: 1:10- tightened   6AM-10AM:1:12  10AM- 4PM-1:12  4PM- MN: 1:12     Target:  110 ( control IQ)      IOB:  3 hours        Set changed every 2 days  Reservoir changed every 2days    Pump downloaded and reviewed     Average total daily dose is 76.32 units per day   46% basal 42% bolus, 7% correction bolus, 5% control IQ auto bolus  0% override    Control IQ 93% of the time  Entering in 112 g of carbs per day on average    Pump Supplies from: [employer]     Back up Lantus/Levemir: Yes     BLOOD GLUCOSE MONITORING:   Sensor type: Dexcom G6  Average BG readin  Time in range: 94%  5% high, 0% very high, less than 1% low, 0% very low  Estimated A1c :6.6%  Site change: q10 days     Dexcom from [facility]     2 weeks average is 141  In target range 92%    Estimated A1c N/A      Sensor was downloaded in clinic today and reviewed with patient.   Please see attached document for download.       HYPOGLYCEMIA:  <1% low   0% very low   + hx of hypoglycemia unawareness   He doesn't feel BG readings until the 40-50's - once in the 50's he will have twitching to his eye.   Glucagon kit: yes  Medic alert bracelet: yes       MEALS:  Still eating high CHO diet --on average 275 g per day  Drinks: un sweet tea, water, coke zero.   He CHO counts- he feels comfortable.   Eating a very high carbohydrate diet still despite being educated          CURRENT EXERCISE:  None        Review of Systems  Review of Systems   Constitutional:  Positive for appetite change (decreased). Negative  for fatigue and unexpected weight change.   HENT:  Negative for trouble swallowing.    Eyes:  Negative for visual disturbance.   Respiratory:  Negative for shortness of breath.    Cardiovascular:  Negative for chest pain.   Gastrointestinal:  Negative for nausea.   Endocrine: Negative for polydipsia, polyphagia and polyuria.   Genitourinary:         No Nocturia    Musculoskeletal:  Positive for arthralgias and gait problem.        Pain right heal - s/p bone spur surgery   Limited mobility to right hand with numbness and tingling--- from cardiovascular surgery    Skin:  Negative for wound.   Neurological:  Positive for numbness.   Psychiatric/Behavioral:  Positive for sleep disturbance. The patient is nervous/anxious.      Physical Exam   Physical Exam  Vitals and nursing note reviewed.   Constitutional:       General: He is not in acute distress.     Appearance: He is well-developed. He is obese. He is not ill-appearing.   HENT:      Head: Normocephalic and atraumatic.      Right Ear: External ear normal.      Left Ear: External ear normal.      Nose: Nose normal.   Neck:      Thyroid: No thyromegaly.      Trachea: No tracheal deviation.   Cardiovascular:      Rate and Rhythm: Normal rate and regular rhythm.      Heart sounds: No murmur heard.  Pulmonary:      Effort: Pulmonary effort is normal. No respiratory distress.      Breath sounds: Normal breath sounds.   Abdominal:      Palpations: Abdomen is soft.      Tenderness: There is no abdominal tenderness.      Hernia: No hernia is present.   Musculoskeletal:      Cervical back: Normal range of motion and neck supple.   Skin:     General: Skin is warm and dry.      Capillary Refill: Capillary refill takes less than 2 seconds.      Findings: No rash.      Comments: Insulin pump sites and dexcom sites are normal appearing. No lipo hypertropthy or atrophy     Neurological:      Mental Status: He is alert and oriented to person, place, and time.      Cranial Nerves: No  cranial nerve deficit.   Psychiatric:         Behavior: Behavior normal.         Judgment: Judgment normal.       FOOT EXAMINATION: Appropriate footwear         Lab Results   Component Value Date    TSH 1.062 11/03/2022       Type 1 diabetes mellitus without complication  A1c has greatly improved.  Requiring much less insulin since he started the Ozempic and he is losing weight and eating smaller portions  Doing much better and rarely having hypoglycemia  See attached Dexcom download      Medication changes:   Continue Ozempic 0.5 mg weekly--help to cut back on carbohydrate intake, curb appetite, weight loss, help with insulin resistance      Continue Jardiance 25 mg daily   Continue Metformin 500 mg to BID- to help with insulin resistance.       Pump settings:    Continue Tandem T Slim pump with Lispro   Control IQ mode      Pump settings:  Basal:  Continue basal settings  MN-6AM- 1.6 units/hr --  6AM-10AM- 1.4 units/hr --  10AM- 4PM: 1.4 units/hr  4PM- MN: 1.4 units/hr       ICR continue  MN- 6AM: 1:3.5   6AM-10AM:1:3.5   10AM- 4PM-1:3.5   4PM- MN: 1:3.5       ISF:  MN- 6AM: 1:10-    6AM-10AM:1:12  10AM- 4PM-1:12  4PM- MN: 1:12     Target:  110 ( control IQ)     IOB:  3 hours       -- Reviewed goals of therapy are to get the best control we can without hypoglycemia  -- Reviewed patient's current insulin regimen. Clarified proper insulin dose and timing in relation to meals, etc. Insulin injection sites and proper rotation instructed.    -- Advised frequent self blood glucose monitoring.  Patient encouraged to document glucose results and bring them to every clinic visit  -Continue to use Dexcom g6--will send supplies to pharmacy now  -- Hypoglycemia precautions discussed. Instructed on precautions before driving.    -- Call for Bg repeatedly < 70 or > 180.   -- Close adherence to lifestyle changes recommended.   -- Periodic follow ups for eye evaluations, foot care and dental care suggested.      Patient has  diabetes mellitus and manages diabetes with intensive insulin regimen and uses prandial and basal insulin daily-- on insulin pump   Patient requires a therapeutic CGM and is willing to use therapeutic CGM for the necessary frequent adjustments of insulin therapy.  I have completed an in-person visit during the previous 6 months and will continue to have in-person visits every 6 months to assess adherence to their CGM regimen and diabetes treatment plan.  Due to COVID pandemic and need for remote monitoring this tool is medically necessary          Insulin pump in place  See above     Type 1 diabetes mellitus with hypoglycemia unawareness  Avoid hypoglycemia.  Continue to use Dexcom with real-time alerts  Now on Tandem pump with control IQ    Atherosclerosis of native coronary artery of native heart with angina pectoris  Optimize blood sugar readings    Essential hypertension  BP goal is < 140/90.   Tolerating  ARB  Controlled at goal   Blood pressure goals discussed with patient      Hyperlipidemia with target low density lipoprotein (LDL) cholesterol less than 70 mg/dL  On statin per ADA recommendations  LDL goal < 70. LDL now at goal on Crestor 40 mg nightly. LFTs WNL.   Continue Statin       TIA (transient ischemic attack)  Optimize blood sugar readings    Class 2 severe obesity due to excess calories with serious comorbidity and body mass index (BMI) of 35.0 to 35.9 in adult  Body mass index is 35.51 kg/m².  Increases insulin resistance.   Discussed DM diet and exercise.   Patient does exhibit insulin resistance and is on an SGLT 2, GLP1, and metformin.              Pump backup plan    If the insulin pump is non functional and discontinued for anticipated more than 20 hours, please give daily injections of:   Long acting insulin Levemir 40  units daily   Short acting insulin Novolog/ Humalog 1:4  for meals according to carb ratios and sensitivity factor in the pump.     When the insulin pump is restarted, do not  restart basal rates until at least 22 hours after the last long acting insulin injection. You can set a 0% temporary basal setting that will last until this time and use your pump to bolus for meals and correction.     For any technical insulin pump issues, please contact the insulin pump company; the toll free number is printed on the label on the back of the insulin pump.       If your sugar is running high for a few hours and does not respond to two correction doses from the insulin pump, it may mean that you have a bad pump site and the site should be changed          Follow up in about 6 months (around 9/10/2023).   Please call [employer] to see what is going on with his pump supplies   See oksana for tandem pump updates-- T connect upgrade   Follow up with me in 6 months with labs prior     Referral placed to pharmacy assistance to help with the cost of the Ozempic and the Jardiance      Orders Placed This Encounter   Procedures    Hemoglobin A1C     Standing Status:   Future     Standing Expiration Date:   9/10/2024    Basic Metabolic Panel     Standing Status:   Future     Standing Expiration Date:   9/10/2024    Ambulatory referral/consult to Pharmacy Assistance     Standing Status:   Future     Standing Expiration Date:   9/10/2024     Referral Priority:   Routine     Referral Type:   Consultation     Referral Reason:   Specialty Services Required     Number of Visits Requested:   1    Ambulatory referral/consult to Diabetes Education     Standing Status:   Future     Standing Expiration Date:   9/10/2024     Referral Priority:   Routine     Referral Type:   Consultation     Referral Reason:   Specialty Services Required     Referred to Provider:   Oksana Pham RD, CDE     Requested Specialty:   Diabetes     Number of Visits Requested:   1             Recommendations were discussed with the patient in detail  The patient verbalized understanding and agrees with the plan outlined as above.

## 2023-03-10 NOTE — PATIENT INSTRUCTIONS
Please contact Pharmacy Patient Assistance Program to determine eligibility for possible medication financial assistance [phone] or [phone].

## 2023-03-10 NOTE — ASSESSMENT & PLAN NOTE
Body mass index is 35.51 kg/m².  Increases insulin resistance.   Discussed DM diet and exercise.   Patient does exhibit insulin resistance and is on an SGLT 2, GLP1, and metformin.

## 2023-03-10 NOTE — ASSESSMENT & PLAN NOTE
A1c has greatly improved.  Requiring much less insulin since he started the Ozempic and he is losing weight and eating smaller portions  Doing much better and rarely having hypoglycemia  See attached Dexcom download      Medication changes:   Continue Ozempic 0.5 mg weekly--help to cut back on carbohydrate intake, curb appetite, weight loss, help with insulin resistance      Continue Jardiance 25 mg daily   Continue Metformin 500 mg to BID- to help with insulin resistance.       Pump settings:    Continue Tandem T Slim pump with Lispro   Control IQ mode      Pump settings:  Basal:  Continue basal settings  MN-6AM- 1.6 units/hr --  6AM-10AM- 1.4 units/hr --  10AM- 4PM: 1.4 units/hr  4PM- MN: 1.4 units/hr       ICR continue  MN- 6AM: 1:3.5   6AM-10AM:1:3.5   10AM- 4PM-1:3.5   4PM- MN: 1:3.5       ISF:  MN- 6AM: 1:10-    6AM-10AM:1:12  10AM- 4PM-1:12  4PM- MN: 1:12     Target:  110 ( control IQ)     IOB:  3 hours       -- Reviewed goals of therapy are to get the best control we can without hypoglycemia  -- Reviewed patient's current insulin regimen. Clarified proper insulin dose and timing in relation to meals, etc. Insulin injection sites and proper rotation instructed.    -- Advised frequent self blood glucose monitoring.  Patient encouraged to document glucose results and bring them to every clinic visit  -Continue to use Dexcom g6--will send supplies to pharmacy now  -- Hypoglycemia precautions discussed. Instructed on precautions before driving.    -- Call for Bg repeatedly < 70 or > 180.   -- Close adherence to lifestyle changes recommended.   -- Periodic follow ups for eye evaluations, foot care and dental care suggested.      Patient has diabetes mellitus and manages diabetes with intensive insulin regimen and uses prandial and basal insulin daily-- on insulin pump   Patient requires a therapeutic CGM and is willing to use therapeutic CGM for the necessary frequent adjustments of insulin therapy.  I have  completed an in-person visit during the previous 6 months and will continue to have in-person visits every 6 months to assess adherence to their CGM regimen and diabetes treatment plan.  Due to COVID pandemic and need for remote monitoring this tool is medically necessary

## 2023-03-12 NOTE — PROGRESS NOTES
"  Patient presents approximately 4 weeks status post calcaneal exostectomy with Achilles tendon debridement and bursectomy.   Patient to remain nonweightbearing.  Unfortunately the incision has dehisced somewhat with a central opening measuring approximately 1 cm x 0.3 cm x 0.1 cm.  Mild periwound erythema apparent.  Follow-up in 1 week and continue postoperative instructions.    Wound Debridement    Performed by: Eugene Bowers DPM   Authorized by: Patient    Time out: Immediately prior to procedure a "time out" was called to verify the correct patient, procedure, equipment, support staff and site/side marked as required.   Consent Done?:  Yes (Verbal)  Local anesthesia used?: No       Wound Details:    Location:  Right Achilles tendon/posterior calcaneal     Type of Debridement:  Excisional       Length (cm):  1 cm       Width (cm):  0.3 cm       Depth (cm):  0.1 cm       Percent Debrided (%):  100             Depth of debridement:  Subcutaneous tissue    Tissue debrided:  Dermis, Epidermis and Subcutaneous    Devitalized tissue debrided:  Biofilm, Callus and Necrotic/Eschar    Instruments:  Blade, Curette and Nippers     Bleeding:  Minimal  Hemostasis Achieved: Yes    Method Used:  Pressure  Patient tolerance:  Patient tolerated the procedure well with no immediate complications    Wound care instructions discussed in detail, begin antibiotic therapy, follow-up in 1 week.      "

## 2023-03-13 ENCOUNTER — TELEPHONE (OUTPATIENT)
Dept: PHARMACY | Facility: CLINIC | Age: 58
End: 2023-03-13
Payer: MEDICARE

## 2023-03-13 NOTE — TELEPHONE ENCOUNTER
I have spoken with Cj Barnes and informed him of the [employer] and [employer] application process for Jardiance and Ozempic and what's required to apply.  Cj Barnes will provide the following documents: Proof of household Income( such as social security statement, 1099 form, pension statement or 3 consecutive pay stubs, Copy of all Insurance cards( front and back), and OCCP & Patient Authorization Forms      I will follow up with the patient in 5 business days.

## 2023-03-14 ENCOUNTER — ANTI-COAG VISIT (OUTPATIENT)
Dept: CARDIOLOGY | Facility: CLINIC | Age: 58
End: 2023-03-14
Payer: MEDICARE

## 2023-03-14 DIAGNOSIS — Z79.01 LONG TERM (CURRENT) USE OF ANTICOAGULANTS: Primary | ICD-10-CM

## 2023-03-14 DIAGNOSIS — Z95.2 H/O MECHANICAL AORTIC VALVE REPLACEMENT: ICD-10-CM

## 2023-03-14 DIAGNOSIS — I48.92 PAROXYSMAL ATRIAL FLUTTER: ICD-10-CM

## 2023-03-14 PROCEDURE — 93793 PR ANTICOAGULANT MGMT FOR PT TAKING WARFARIN: ICD-10-PCS | Mod: S$GLB,,,

## 2023-03-14 PROCEDURE — 93793 ANTICOAG MGMT PT WARFARIN: CPT | Mod: S$GLB,,,

## 2023-03-16 ENCOUNTER — OFFICE VISIT (OUTPATIENT)
Dept: PODIATRY | Facility: CLINIC | Age: 58
End: 2023-03-16
Payer: MEDICARE

## 2023-03-16 VITALS
HEART RATE: 71 BPM | BODY MASS INDEX: 35.47 KG/M2 | WEIGHT: 226 LBS | DIASTOLIC BLOOD PRESSURE: 70 MMHG | HEIGHT: 67 IN | SYSTOLIC BLOOD PRESSURE: 120 MMHG

## 2023-03-16 DIAGNOSIS — M92.61 HAGLUND'S DEFORMITY OF RIGHT HEEL: Primary | ICD-10-CM

## 2023-03-16 DIAGNOSIS — L97.502 ULCER OF FOOT WITH FAT LAYER EXPOSED, UNSPECIFIED LATERALITY: ICD-10-CM

## 2023-03-16 PROCEDURE — 3008F BODY MASS INDEX DOCD: CPT | Mod: CPTII,S$GLB,, | Performed by: PODIATRIST

## 2023-03-16 PROCEDURE — 87077 CULTURE AEROBIC IDENTIFY: CPT | Performed by: PODIATRIST

## 2023-03-16 PROCEDURE — 99024 PR POST-OP FOLLOW-UP VISIT: ICD-10-PCS | Mod: S$GLB,,, | Performed by: PODIATRIST

## 2023-03-16 PROCEDURE — 3008F PR BODY MASS INDEX (BMI) DOCUMENTED: ICD-10-PCS | Mod: CPTII,S$GLB,, | Performed by: PODIATRIST

## 2023-03-16 PROCEDURE — 3078F PR MOST RECENT DIASTOLIC BLOOD PRESSURE < 80 MM HG: ICD-10-PCS | Mod: CPTII,S$GLB,, | Performed by: PODIATRIST

## 2023-03-16 PROCEDURE — 4010F ACE/ARB THERAPY RXD/TAKEN: CPT | Mod: CPTII,S$GLB,, | Performed by: PODIATRIST

## 2023-03-16 PROCEDURE — 87070 CULTURE OTHR SPECIMN AEROBIC: CPT | Performed by: PODIATRIST

## 2023-03-16 PROCEDURE — 99024 POSTOP FOLLOW-UP VISIT: CPT | Mod: S$GLB,,, | Performed by: PODIATRIST

## 2023-03-16 PROCEDURE — 3074F PR MOST RECENT SYSTOLIC BLOOD PRESSURE < 130 MM HG: ICD-10-PCS | Mod: CPTII,S$GLB,, | Performed by: PODIATRIST

## 2023-03-16 PROCEDURE — 3044F PR MOST RECENT HEMOGLOBIN A1C LEVEL <7.0%: ICD-10-PCS | Mod: CPTII,S$GLB,, | Performed by: PODIATRIST

## 2023-03-16 PROCEDURE — 4010F PR ACE/ARB THEARPY RXD/TAKEN: ICD-10-PCS | Mod: CPTII,S$GLB,, | Performed by: PODIATRIST

## 2023-03-16 PROCEDURE — 3074F SYST BP LT 130 MM HG: CPT | Mod: CPTII,S$GLB,, | Performed by: PODIATRIST

## 2023-03-16 PROCEDURE — 99999 PR PBB SHADOW E&M-EST. PATIENT-LVL IV: ICD-10-PCS | Mod: PBBFAC,,, | Performed by: PODIATRIST

## 2023-03-16 PROCEDURE — 3044F HG A1C LEVEL LT 7.0%: CPT | Mod: CPTII,S$GLB,, | Performed by: PODIATRIST

## 2023-03-16 PROCEDURE — 87186 SC STD MICRODIL/AGAR DIL: CPT | Performed by: PODIATRIST

## 2023-03-16 PROCEDURE — 11042 DBRDMT SUBQ TIS 1ST 20SQCM/<: CPT | Mod: 79,RT,S$GLB, | Performed by: PODIATRIST

## 2023-03-16 PROCEDURE — 99999 PR PBB SHADOW E&M-EST. PATIENT-LVL IV: CPT | Mod: PBBFAC,,, | Performed by: PODIATRIST

## 2023-03-16 PROCEDURE — 87075 CULTR BACTERIA EXCEPT BLOOD: CPT | Performed by: PODIATRIST

## 2023-03-16 PROCEDURE — 11042 PR DEBRIDEMENT, SKIN, SUB-Q TISSUE,=<20 SQ CM: ICD-10-PCS | Mod: 79,RT,S$GLB, | Performed by: PODIATRIST

## 2023-03-16 PROCEDURE — 3078F DIAST BP <80 MM HG: CPT | Mod: CPTII,S$GLB,, | Performed by: PODIATRIST

## 2023-03-18 LAB — BACTERIA SPEC AEROBE CULT: ABNORMAL

## 2023-03-18 RX ORDER — SULFAMETHOXAZOLE AND TRIMETHOPRIM 400; 80 MG/1; MG/1
1 TABLET ORAL 2 TIMES DAILY
Qty: 20 TABLET | Refills: 0 | Status: SHIPPED | OUTPATIENT
Start: 2023-03-18 | End: 2023-04-25 | Stop reason: SDUPTHER

## 2023-03-19 ENCOUNTER — PATIENT MESSAGE (OUTPATIENT)
Dept: PODIATRY | Facility: CLINIC | Age: 58
End: 2023-03-19
Payer: MEDICARE

## 2023-03-20 ENCOUNTER — TELEPHONE (OUTPATIENT)
Dept: PODIATRY | Facility: CLINIC | Age: 58
End: 2023-03-20
Payer: MEDICARE

## 2023-03-20 ENCOUNTER — OFFICE VISIT (OUTPATIENT)
Dept: PODIATRY | Facility: CLINIC | Age: 58
End: 2023-03-20
Payer: MEDICARE

## 2023-03-20 VITALS
SYSTOLIC BLOOD PRESSURE: 110 MMHG | BODY MASS INDEX: 35.47 KG/M2 | HEART RATE: 59 BPM | WEIGHT: 226 LBS | HEIGHT: 67 IN | DIASTOLIC BLOOD PRESSURE: 63 MMHG

## 2023-03-20 DIAGNOSIS — M92.61 HAGLUND'S DEFORMITY OF RIGHT HEEL: Primary | ICD-10-CM

## 2023-03-20 DIAGNOSIS — L97.502 ULCER OF FOOT WITH FAT LAYER EXPOSED, UNSPECIFIED LATERALITY: ICD-10-CM

## 2023-03-20 PROCEDURE — 3074F SYST BP LT 130 MM HG: CPT | Mod: CPTII,S$GLB,, | Performed by: PODIATRIST

## 2023-03-20 PROCEDURE — 3008F BODY MASS INDEX DOCD: CPT | Mod: CPTII,S$GLB,, | Performed by: PODIATRIST

## 2023-03-20 PROCEDURE — 99024 PR POST-OP FOLLOW-UP VISIT: ICD-10-PCS | Mod: S$GLB,,, | Performed by: PODIATRIST

## 2023-03-20 PROCEDURE — 3078F DIAST BP <80 MM HG: CPT | Mod: CPTII,S$GLB,, | Performed by: PODIATRIST

## 2023-03-20 PROCEDURE — 99999 PR PBB SHADOW E&M-EST. PATIENT-LVL IV: ICD-10-PCS | Mod: PBBFAC,,, | Performed by: PODIATRIST

## 2023-03-20 PROCEDURE — 11042 PR DEBRIDEMENT, SKIN, SUB-Q TISSUE,=<20 SQ CM: ICD-10-PCS | Mod: 79,RT,S$GLB, | Performed by: PODIATRIST

## 2023-03-20 PROCEDURE — 3008F PR BODY MASS INDEX (BMI) DOCUMENTED: ICD-10-PCS | Mod: CPTII,S$GLB,, | Performed by: PODIATRIST

## 2023-03-20 PROCEDURE — 3044F PR MOST RECENT HEMOGLOBIN A1C LEVEL <7.0%: ICD-10-PCS | Mod: CPTII,S$GLB,, | Performed by: PODIATRIST

## 2023-03-20 PROCEDURE — 99999 PR PBB SHADOW E&M-EST. PATIENT-LVL IV: CPT | Mod: PBBFAC,,, | Performed by: PODIATRIST

## 2023-03-20 PROCEDURE — 4010F ACE/ARB THERAPY RXD/TAKEN: CPT | Mod: CPTII,S$GLB,, | Performed by: PODIATRIST

## 2023-03-20 PROCEDURE — 3078F PR MOST RECENT DIASTOLIC BLOOD PRESSURE < 80 MM HG: ICD-10-PCS | Mod: CPTII,S$GLB,, | Performed by: PODIATRIST

## 2023-03-20 PROCEDURE — 3074F PR MOST RECENT SYSTOLIC BLOOD PRESSURE < 130 MM HG: ICD-10-PCS | Mod: CPTII,S$GLB,, | Performed by: PODIATRIST

## 2023-03-20 PROCEDURE — 99024 POSTOP FOLLOW-UP VISIT: CPT | Mod: S$GLB,,, | Performed by: PODIATRIST

## 2023-03-20 PROCEDURE — 11042 DBRDMT SUBQ TIS 1ST 20SQCM/<: CPT | Mod: 79,RT,S$GLB, | Performed by: PODIATRIST

## 2023-03-20 PROCEDURE — 3044F HG A1C LEVEL LT 7.0%: CPT | Mod: CPTII,S$GLB,, | Performed by: PODIATRIST

## 2023-03-20 PROCEDURE — 4010F PR ACE/ARB THEARPY RXD/TAKEN: ICD-10-PCS | Mod: CPTII,S$GLB,, | Performed by: PODIATRIST

## 2023-03-20 RX ORDER — OXYCODONE AND ACETAMINOPHEN 7.5; 325 MG/1; MG/1
1 TABLET ORAL EVERY 12 HOURS PRN
Qty: 30 TABLET | Refills: 0 | Status: SHIPPED | OUTPATIENT
Start: 2023-03-20 | End: 2023-04-04 | Stop reason: SDUPTHER

## 2023-03-20 NOTE — TELEPHONE ENCOUNTER
----- Message from Tamara Rico CMA sent at 3/20/2023  9:24 AM CDT -----  Regarding: Order- puraply  Contact: [phone]  Good morning,    Mrs. Gutierrez is calling from [facility] requesting more detailed wound care and progress notes to evaluate request for puraply. Provided Dr. Bowers's fax # [phone]  Please call    Fax # [phone]

## 2023-03-20 NOTE — TELEPHONE ENCOUNTER
Spoke with Brenda at [employer]. Received fax in regards to what what pt needs for his notes to be sent back to them. Fax number of [phone] confirmed with her. I informed her that as soon as I have the information I will fax it over.

## 2023-03-21 ENCOUNTER — TELEPHONE (OUTPATIENT)
Dept: DIABETES | Facility: CLINIC | Age: 58
End: 2023-03-21
Payer: MEDICARE

## 2023-03-21 ENCOUNTER — ANTI-COAG VISIT (OUTPATIENT)
Dept: CARDIOLOGY | Facility: CLINIC | Age: 58
End: 2023-03-21
Payer: MEDICARE

## 2023-03-21 DIAGNOSIS — Z95.2 H/O MECHANICAL AORTIC VALVE REPLACEMENT: ICD-10-CM

## 2023-03-21 DIAGNOSIS — Z79.01 LONG TERM (CURRENT) USE OF ANTICOAGULANTS: Primary | ICD-10-CM

## 2023-03-21 DIAGNOSIS — I48.92 PAROXYSMAL ATRIAL FLUTTER: ICD-10-CM

## 2023-03-21 LAB — BACTERIA SPEC ANAEROBE CULT: NORMAL

## 2023-03-21 PROCEDURE — 93793 ANTICOAG MGMT PT WARFARIN: CPT | Mod: S$GLB,,,

## 2023-03-21 PROCEDURE — 93793 PR ANTICOAGULANT MGMT FOR PT TAKING WARFARIN: ICD-10-PCS | Mod: S$GLB,,,

## 2023-03-21 NOTE — PROGRESS NOTES
INR good. Pt has bactrim in medlist but it says not taking. Please confirm with patient and advise to call if starts new meds. Maintain new dose unless having med changes. Recheck INR in 2 weeks    Update: pt starting bactrim 3/21 x10d. - Decrease dose & repeat INR on Monday

## 2023-03-22 ENCOUNTER — TELEPHONE (OUTPATIENT)
Dept: PODIATRY | Facility: CLINIC | Age: 58
End: 2023-03-22
Payer: MEDICARE

## 2023-03-22 NOTE — PROGRESS NOTES
"  Patient presents approximately 5 weeks status post calcaneal exostectomy with Achilles tendon debridement and bursectomy.   Patient to remain nonweightbearing.  Unfortunately the incision has dehisced somewhat with a central opening measuring approximately 1.5 cm x 1.5 cm x 0.2 cm.  Mild periwound erythema apparent.     Wound Debridement    Performed by: Eugene Bowers DPM   Authorized by: Patient    Time out: Immediately prior to procedure a "time out" was called to verify the correct patient, procedure, equipment, support staff and site/side marked as required.   Consent Done?:  Yes (Verbal)  Local anesthesia used?: No       Wound Details:    Location:  Right Achilles tendon/posterior calcaneal     Type of Debridement:  Excisional       Length (cm):  1.5 cm       Width (cm):  1.5cm       Depth (cm):  0.2cm       Percent Debrided (%):  100             Depth of debridement:  Subcutaneous tissue    Tissue debrided:  Dermis, Epidermis and Subcutaneous    Devitalized tissue debrided:  Biofilm, Callus and Necrotic/Eschar    Instruments:  Blade, Curette and Nippers     Bleeding:  Minimal  Hemostasis Achieved: Yes    Method Used:  Pressure  Patient tolerance:  Patient tolerated the procedure well with no immediate complications    Wound care instructions discussed in detail, begin antibiotic therapy, follow-up in 1 week.    The patient is approximately 6 weeks status post right Achilles tendon procedure with subsequent postoperative dehiscence/wound measuring 1.5 cm in diameter by 0.2 cm in depth.  Patient is a diabetic with the most recent A1c noted to be 6.1:  No infection present.  Palpable pulses noted right lower extremity both posterior tibial and dorsalis pedis.  Patient is not noted to be a smoker or have signs of venous ulceration.  We will request graft therapy to be applied in the office/skin substitute.  He will also visit the wound clinic for 2nd opinion.  Follow-up in 2 weeks.    "

## 2023-03-22 NOTE — TELEPHONE ENCOUNTER
----- Message from Vania Lu MA sent at 3/22/2023  2:44 PM CDT -----  Regarding: mendez Gutierrez with [employer] is calling back to check the status of the info requested on pt.  Please fax [phone] by today.  Please call if you have any questions.

## 2023-03-23 NOTE — PROGRESS NOTES
"  Patient presents approximately 4 weeks status post calcaneal exostectomy with Achilles tendon debridement and bursectomy.   Patient to remain nonweightbearing.  Unfortunately the incision has dehisced somewhat with a central opening measuring approximately 1.1 cm x 0.4 cm x 0.2cm.  Mild periwound erythema apparent.  Follow-up in 1 week and continue postoperative instructions.    Wound Debridement    Performed by: Eugene Bowers DPM   Authorized by: Patient    Time out: Immediately prior to procedure a "time out" was called to verify the correct patient, procedure, equipment, support staff and site/side marked as required.   Consent Done?:  Yes (Verbal)  Local anesthesia used?: No       Wound Details:    Location:  Right Achilles tendon/posterior calcaneal     Type of Debridement:  Excisional       Length (cm):  1.1 cm       Width (cm):  0.4 cm       Depth (cm):  0.2 cm       Percent Debrided (%):  100             Depth of debridement:  Subcutaneous tissue    Tissue debrided:  Dermis, Epidermis and Subcutaneous    Devitalized tissue debrided:  Biofilm, Callus and Necrotic/Eschar    Instruments:  Blade, Curette and Nippers     Bleeding:  Minimal  Hemostasis Achieved: Yes    Method Used:  Pressure  Patient tolerance:  Patient tolerated the procedure well with no immediate complications    Cultures taken/begin broad-spectrum antibiotic therapy.  Wound care instructions discussed in detail, begin antibiotic therapy, follow-up in 1 week.        "

## 2023-03-27 ENCOUNTER — ANTI-COAG VISIT (OUTPATIENT)
Dept: CARDIOLOGY | Facility: CLINIC | Age: 58
End: 2023-03-27
Payer: MEDICARE

## 2023-03-27 ENCOUNTER — PATIENT MESSAGE (OUTPATIENT)
Dept: CARDIOLOGY | Facility: CLINIC | Age: 58
End: 2023-03-27

## 2023-03-27 DIAGNOSIS — I48.92 PAROXYSMAL ATRIAL FLUTTER: ICD-10-CM

## 2023-03-27 DIAGNOSIS — Z79.01 LONG TERM (CURRENT) USE OF ANTICOAGULANTS: Primary | ICD-10-CM

## 2023-03-27 DIAGNOSIS — Z95.2 H/O MECHANICAL AORTIC VALVE REPLACEMENT: ICD-10-CM

## 2023-03-27 PROCEDURE — 93793 ANTICOAG MGMT PT WARFARIN: CPT | Mod: S$GLB,,,

## 2023-03-27 PROCEDURE — 93793 PR ANTICOAGULANT MGMT FOR PT TAKING WARFARIN: ICD-10-PCS | Mod: S$GLB,,,

## 2023-03-27 NOTE — PROGRESS NOTES
INR a tad low. Dose was lowered for DDI Bactrim which he should complete on 3/30. Adjust dose per calendar. Recheck INR in 1 week

## 2023-03-28 ENCOUNTER — TELEPHONE (OUTPATIENT)
Dept: DIABETES | Facility: CLINIC | Age: 58
End: 2023-03-28
Payer: MEDICARE

## 2023-03-29 ENCOUNTER — PES CALL (OUTPATIENT)
Dept: ADMINISTRATIVE | Facility: CLINIC | Age: 58
End: 2023-03-29
Payer: MEDICARE

## 2023-03-29 ENCOUNTER — TELEPHONE (OUTPATIENT)
Dept: DIABETES | Facility: CLINIC | Age: 58
End: 2023-03-29
Payer: MEDICARE

## 2023-03-30 ENCOUNTER — CLINICAL SUPPORT (OUTPATIENT)
Dept: DIABETES | Facility: CLINIC | Age: 58
End: 2023-03-30
Payer: MEDICARE

## 2023-03-30 DIAGNOSIS — E10.9 TYPE 1 DIABETES MELLITUS WITHOUT COMPLICATION: ICD-10-CM

## 2023-03-30 DIAGNOSIS — E10.649 TYPE 1 DIABETES MELLITUS WITH HYPOGLYCEMIA UNAWARENESS: Primary | ICD-10-CM

## 2023-03-30 PROCEDURE — 99999 PR PBB SHADOW E&M-EST. PATIENT-LVL I: CPT | Mod: PBBFAC,,, | Performed by: DIETITIAN, REGISTERED

## 2023-03-30 PROCEDURE — G0108 DIAB MANAGE TRN  PER INDIV: HCPCS | Mod: S$GLB,,, | Performed by: DIETITIAN, REGISTERED

## 2023-03-30 PROCEDURE — 99999 PR PBB SHADOW E&M-EST. PATIENT-LVL I: ICD-10-PCS | Mod: PBBFAC,,, | Performed by: DIETITIAN, REGISTERED

## 2023-03-30 PROCEDURE — G0108 PR DIAB MANAGE TRN  PER INDIV: ICD-10-PCS | Mod: S$GLB,,, | Performed by: DIETITIAN, REGISTERED

## 2023-03-30 NOTE — TELEPHONE ENCOUNTER
A 2nd attempt has been made to retrieve requested documents via PHONE. The final contact attempt will be made in 5 business days   Sent to recommended email but not invalid. Will mail out documents

## 2023-03-31 NOTE — PROGRESS NOTES
Diabetes Care Specialist Progress Note  Author: Katia Pham RD, CDE  Date: 3/31/2023    Program Intake  Reason for Diabetes Program Visit:: Intervention  Type of Intervention:: Individual  Individual: Device Training  Device Training: Insulin Pump Upgrade  Current diabetes risk level:: low  In the last 12 months, have you:: used emergency room services  Was the ER or hospital admission related to diabetes?: Yes  Permission to speak with others about care:: no    Lab Results   Component Value Date    HGBA1C 6.1 (H) 03/03/2023       Clinical    Additional Social History    Diabetes Self-Management Skills Assessment    Diabetes Disease Process/Treatment Options  Diabetes Disease Process/Treatment Options: Skills Assessment Completed: No  Assessment indicates:: Adequate understanding  Deferred due to:: Other (comment)  Area of need?: No    Nutrition/Healthy Eating  Nutrition/Healthy Eating Skills Assessment Completed:: No  Assessment indicates:: Adequate understanding  Deffered due to:: Other (comment)  Area of need?: No    Physical Activity/Exercise  Physical Activity/Exercise Skills Assessment Completed: : No  Assessment indicates:: Adequate understanding  Deffered due to:: Other (comment)  Area of need?: No    Medications  Patient is able to describe current diabetes management routine.: yes  Diabetes management routine:: diet, insulin, oral medications, insulin pump  Patient is able to identify current diabetes medications, dosages, and appropriate timing of medications.: yes  Patient understands the purpose of the medications taken for diabetes.: yes  Patient reports problems or concerns with current medication regimen.: yes  Medication regimen problems/concerns:: other (see comments) (upgrading pump)  Medication Skills Assessment Completed:: Yes  Assessment indicates:: Adequate understanding, Instruction Needed  Area of need?: Yes    Home Blood Glucose Monitoring  Home Blood Glucose Monitoring Skills Assessment  Completed: : No  Assessment indicates:: Adequate understanding  Deferred due to:: Other (comment) (previously completed, there has been no change and patient has adequate understanding)  Area of need?: No    Acute Complications  Acute Complications Skills Assessment Completed: : No  Assessment indicates:: Adequate understanding  Deffered due to:: Other (comment)  Area of need?: No    Chronic Complications  Chronic Complications Skills Assessment Completed: : No  Assessment indicates:: Adequate understanding  Deferred due to:: Other (comment)  Area of need?: No    Psychosocial/Coping  Psychosocial/Coping Skills Assessment Completed: : No  Assessment indicates:: Adequate understanding  Deffered due to:: Other (comment)  Area of need?: No      Diabetes Self Support Plan    Diabetes Self-Management Support Plan  Exercise/nutrition:: use a local track  Stress management:: family  Medication:: Medication Assistance  Review status:: Patient has selected and agrees to support plan., Patient was provided Diabetes Self-Management Support Plan document that includes support options.    Assessment Summary and Plan    Based on today's diabetes care assessment, the following areas of need were identified:      Social 8/15/2022   Support No   Access to Mass Media/Tech No   Cognitive/Behavioral Health No   Culture/Jain No   Communication No   Health Literacy No        Clinical 8/15/2022   Medication Adherence No   Lab Compliance No   Nutritional Status No        Diabetes Self-Management Skills 3/30/2023   Diabetes Disease Process/Treatment Options No   Nutrition/Healthy Eating No   Physical Activity/Exercise No   Medication Yes, upgrading insulin pump to be able to use the T-Connect praveen to remotely bolus insulin with meals   Home Blood Glucose Monitoring No   Acute Complications No   Chronic Complications No   Psychosocial/Coping No          Today's interventions were provided through individual discussion, instruction, and  written materials were provided.      Patient verbalized understanding of instruction and written materials.  Pt was able to return back demonstration of instructions today. Patient understood key points, needs reinforcement and further instruction.     Diabetes Self-Management Care Plan:    Today's Diabetes Self-Management Care Plan was developed with Cj's input. Cj has agreed to work toward the following goal(s) to improve his/her overall diabetes control.      Care Plan: Diabetes Management   Updates made since 3/1/2023 12:00 AM        Problem: Medications         Goal: Patient Agrees to take Diabetes Medication(s) Insulin through the [employer] T-Connect praveen for the next 2 weeks    Start Date: 3/30/2023   Expected End Date: 4/14/2023   This Visit's Progress: On track   Priority: High   Barriers: Lack of Supplies        Task: patient uploaded the uldate to insulin pump Completed 3/31/2023        Task: patient watched interactive upgrade video for T-Connect and T-Slim Completed 3/31/2023        Task: patient downloaded the T-Connect praveen and displayed competency in using it Completed 3/31/2023          Follow Up Plan     Follow up in about 2 weeks (around 4/13/2023) for Insulin Pump Upload, Personal CGM Upload.    Today's care plan and follow up schedule was discussed with patient.  Cj verbalized understanding of the care plan, goals, and agrees to follow up plan.        The patient was encouraged to communicate with his/her health care provider/physician and care team regarding his/her condition(s) and treatment.  I provided the patient with my contact information today and encouraged to contact me via phone or [facility] Patient Portal as needed.     Length of Visit   Total Time: 90 Minutes

## 2023-04-04 ENCOUNTER — LAB VISIT (OUTPATIENT)
Dept: LAB | Facility: HOSPITAL | Age: 58
End: 2023-04-04
Payer: MEDICARE

## 2023-04-04 ENCOUNTER — TELEPHONE (OUTPATIENT)
Dept: SURGERY | Facility: CLINIC | Age: 58
End: 2023-04-04
Payer: MEDICARE

## 2023-04-04 ENCOUNTER — PATIENT MESSAGE (OUTPATIENT)
Dept: CARDIOLOGY | Facility: CLINIC | Age: 58
End: 2023-04-04

## 2023-04-04 ENCOUNTER — ANTI-COAG VISIT (OUTPATIENT)
Dept: CARDIOLOGY | Facility: CLINIC | Age: 58
End: 2023-04-04
Payer: MEDICARE

## 2023-04-04 ENCOUNTER — OFFICE VISIT (OUTPATIENT)
Dept: PODIATRY | Facility: CLINIC | Age: 58
End: 2023-04-04
Payer: MEDICARE

## 2023-04-04 VITALS
HEIGHT: 67 IN | DIASTOLIC BLOOD PRESSURE: 62 MMHG | BODY MASS INDEX: 35.47 KG/M2 | WEIGHT: 226 LBS | SYSTOLIC BLOOD PRESSURE: 104 MMHG | HEART RATE: 71 BPM

## 2023-04-04 DIAGNOSIS — Z95.2 H/O MECHANICAL AORTIC VALVE REPLACEMENT: ICD-10-CM

## 2023-04-04 DIAGNOSIS — Z79.01 LONG TERM (CURRENT) USE OF ANTICOAGULANTS: Primary | ICD-10-CM

## 2023-04-04 DIAGNOSIS — L97.502 ULCER OF FOOT WITH FAT LAYER EXPOSED, UNSPECIFIED LATERALITY: ICD-10-CM

## 2023-04-04 DIAGNOSIS — Z12.11 SCREENING FOR MALIGNANT NEOPLASM OF COLON: Primary | ICD-10-CM

## 2023-04-04 DIAGNOSIS — I48.92 PAROXYSMAL ATRIAL FLUTTER: ICD-10-CM

## 2023-04-04 DIAGNOSIS — M92.61 HAGLUND'S DEFORMITY OF RIGHT HEEL: Primary | ICD-10-CM

## 2023-04-04 DIAGNOSIS — E08.41 DIABETIC MONONEUROPATHY ASSOCIATED WITH DIABETES MELLITUS DUE TO UNDERLYING CONDITION: ICD-10-CM

## 2023-04-04 LAB
INR PPP: 2 (ref 0.8–1.2)
PROTHROMBIN TIME: 20 SEC (ref 9–12.5)

## 2023-04-04 PROCEDURE — 3074F SYST BP LT 130 MM HG: CPT | Mod: CPTII,S$GLB,, | Performed by: PODIATRIST

## 2023-04-04 PROCEDURE — 3074F PR MOST RECENT SYSTOLIC BLOOD PRESSURE < 130 MM HG: ICD-10-PCS | Mod: CPTII,S$GLB,, | Performed by: PODIATRIST

## 2023-04-04 PROCEDURE — 99024 PR POST-OP FOLLOW-UP VISIT: ICD-10-PCS | Mod: S$GLB,,, | Performed by: PODIATRIST

## 2023-04-04 PROCEDURE — 4010F ACE/ARB THERAPY RXD/TAKEN: CPT | Mod: CPTII,S$GLB,, | Performed by: PODIATRIST

## 2023-04-04 PROCEDURE — 36415 COLL VENOUS BLD VENIPUNCTURE: CPT | Performed by: INTERNAL MEDICINE

## 2023-04-04 PROCEDURE — 3008F BODY MASS INDEX DOCD: CPT | Mod: CPTII,S$GLB,, | Performed by: PODIATRIST

## 2023-04-04 PROCEDURE — 3078F PR MOST RECENT DIASTOLIC BLOOD PRESSURE < 80 MM HG: ICD-10-PCS | Mod: CPTII,S$GLB,, | Performed by: PODIATRIST

## 2023-04-04 PROCEDURE — 3078F DIAST BP <80 MM HG: CPT | Mod: CPTII,S$GLB,, | Performed by: PODIATRIST

## 2023-04-04 PROCEDURE — 99999 PR PBB SHADOW E&M-EST. PATIENT-LVL IV: CPT | Mod: PBBFAC,,, | Performed by: PODIATRIST

## 2023-04-04 PROCEDURE — 3044F PR MOST RECENT HEMOGLOBIN A1C LEVEL <7.0%: ICD-10-PCS | Mod: CPTII,S$GLB,, | Performed by: PODIATRIST

## 2023-04-04 PROCEDURE — 4010F PR ACE/ARB THEARPY RXD/TAKEN: ICD-10-PCS | Mod: CPTII,S$GLB,, | Performed by: PODIATRIST

## 2023-04-04 PROCEDURE — 85610 PROTHROMBIN TIME: CPT | Performed by: INTERNAL MEDICINE

## 2023-04-04 PROCEDURE — 99999 PR PBB SHADOW E&M-EST. PATIENT-LVL IV: ICD-10-PCS | Mod: PBBFAC,,, | Performed by: PODIATRIST

## 2023-04-04 PROCEDURE — 93793 PR ANTICOAGULANT MGMT FOR PT TAKING WARFARIN: ICD-10-PCS | Mod: S$GLB,,,

## 2023-04-04 PROCEDURE — 93793 ANTICOAG MGMT PT WARFARIN: CPT | Mod: S$GLB,,,

## 2023-04-04 PROCEDURE — 99024 POSTOP FOLLOW-UP VISIT: CPT | Mod: S$GLB,,, | Performed by: PODIATRIST

## 2023-04-04 PROCEDURE — 3044F HG A1C LEVEL LT 7.0%: CPT | Mod: CPTII,S$GLB,, | Performed by: PODIATRIST

## 2023-04-04 PROCEDURE — 3008F PR BODY MASS INDEX (BMI) DOCUMENTED: ICD-10-PCS | Mod: CPTII,S$GLB,, | Performed by: PODIATRIST

## 2023-04-04 RX ORDER — SEMAGLUTIDE 0.68 MG/ML
0.5 INJECTION, SOLUTION SUBCUTANEOUS
COMMUNITY
Start: 2023-03-28 | End: 2023-06-20 | Stop reason: SDUPTHER

## 2023-04-04 RX ORDER — OXYCODONE AND ACETAMINOPHEN 7.5; 325 MG/1; MG/1
1 TABLET ORAL EVERY 12 HOURS PRN
Qty: 30 TABLET | Refills: 0 | Status: SHIPPED | OUTPATIENT
Start: 2023-04-04 | End: 2023-04-25 | Stop reason: SDUPTHER

## 2023-04-04 NOTE — PROGRESS NOTES
INR low. Pt completed bactrim DDI 4/1. He reports extra greens last week. Increase dose. Recheck INR in 1 week

## 2023-04-04 NOTE — TELEPHONE ENCOUNTER
Left message with patient to see if he just wants to get a colonoscopy scheduled instead of coming in for his appointment.

## 2023-04-10 ENCOUNTER — PATIENT MESSAGE (OUTPATIENT)
Dept: PODIATRY | Facility: CLINIC | Age: 58
End: 2023-04-10
Payer: MEDICARE

## 2023-04-10 NOTE — PROGRESS NOTES
Patient presents status post calcaneal exostectomy with Achilles tendon debridement and bursectomy.   Patient to remain nonweightbearing.  Unfortunately the incision has dehisced somewhat with a central opening measuring approximately 1.0 cm x 1.0 cm x 0.2 cm.  Patient has been improving/going to the wound clinic.  Increased granular tissue noted.  Continue wound clinic follow-up.  Follow-up in 4 weeks.

## 2023-04-11 ENCOUNTER — PATIENT MESSAGE (OUTPATIENT)
Dept: CARDIOLOGY | Facility: CLINIC | Age: 58
End: 2023-04-11

## 2023-04-11 ENCOUNTER — LAB VISIT (OUTPATIENT)
Dept: LAB | Facility: HOSPITAL | Age: 58
End: 2023-04-11
Payer: MEDICARE

## 2023-04-11 ENCOUNTER — ANTI-COAG VISIT (OUTPATIENT)
Dept: CARDIOLOGY | Facility: CLINIC | Age: 58
End: 2023-04-11
Payer: MEDICARE

## 2023-04-11 ENCOUNTER — OFFICE VISIT (OUTPATIENT)
Dept: INTERNAL MEDICINE | Facility: CLINIC | Age: 58
End: 2023-04-11
Payer: MEDICARE

## 2023-04-11 VITALS
DIASTOLIC BLOOD PRESSURE: 60 MMHG | HEART RATE: 62 BPM | OXYGEN SATURATION: 96 % | SYSTOLIC BLOOD PRESSURE: 108 MMHG | HEIGHT: 67 IN | BODY MASS INDEX: 35.82 KG/M2 | WEIGHT: 228.19 LBS

## 2023-04-11 DIAGNOSIS — E10.3293 MILD NONPROLIFERATIVE DIABETIC RETINOPATHY OF BOTH EYES WITHOUT MACULAR EDEMA ASSOCIATED WITH TYPE 1 DIABETES MELLITUS: ICD-10-CM

## 2023-04-11 DIAGNOSIS — Z95.828 S/P ASCENDING AORTIC REPLACEMENT: ICD-10-CM

## 2023-04-11 DIAGNOSIS — I10 ESSENTIAL HYPERTENSION: ICD-10-CM

## 2023-04-11 DIAGNOSIS — E10.69 TYPE 1 DIABETES MELLITUS WITH OBESITY: ICD-10-CM

## 2023-04-11 DIAGNOSIS — R26.9 ABNORMALITY OF GAIT AND MOBILITY: ICD-10-CM

## 2023-04-11 DIAGNOSIS — I48.92 PAROXYSMAL ATRIAL FLUTTER: ICD-10-CM

## 2023-04-11 DIAGNOSIS — Z96.41 INSULIN PUMP IN PLACE: ICD-10-CM

## 2023-04-11 DIAGNOSIS — Z59.9 FINANCIAL DIFFICULTIES: ICD-10-CM

## 2023-04-11 DIAGNOSIS — I70.0 AORTIC ATHEROSCLEROSIS: ICD-10-CM

## 2023-04-11 DIAGNOSIS — Z95.2 H/O MECHANICAL AORTIC VALVE REPLACEMENT: ICD-10-CM

## 2023-04-11 DIAGNOSIS — E66.9 TYPE 1 DIABETES MELLITUS WITH OBESITY: ICD-10-CM

## 2023-04-11 DIAGNOSIS — E66.01 CLASS 2 SEVERE OBESITY DUE TO EXCESS CALORIES WITH SERIOUS COMORBIDITY AND BODY MASS INDEX (BMI) OF 35.0 TO 35.9 IN ADULT: ICD-10-CM

## 2023-04-11 DIAGNOSIS — Q23.1 AORTIC REGURGITATION, CONGENITAL: ICD-10-CM

## 2023-04-11 DIAGNOSIS — E78.5 HYPERLIPIDEMIA WITH TARGET LOW DENSITY LIPOPROTEIN (LDL) CHOLESTEROL LESS THAN 70 MG/DL: ICD-10-CM

## 2023-04-11 DIAGNOSIS — E11.319 DIABETIC RETINAL MICROANEURYSMS: ICD-10-CM

## 2023-04-11 DIAGNOSIS — I25.10 CORONARY ARTERY DISEASE INVOLVING NATIVE CORONARY ARTERY OF NATIVE HEART WITHOUT ANGINA PECTORIS: ICD-10-CM

## 2023-04-11 DIAGNOSIS — D50.0 IRON DEFICIENCY ANEMIA DUE TO CHRONIC BLOOD LOSS: ICD-10-CM

## 2023-04-11 DIAGNOSIS — Z00.00 ENCOUNTER FOR PREVENTIVE HEALTH EXAMINATION: Primary | ICD-10-CM

## 2023-04-11 DIAGNOSIS — L97.502 ULCER OF FOOT WITH FAT LAYER EXPOSED, UNSPECIFIED LATERALITY: ICD-10-CM

## 2023-04-11 DIAGNOSIS — H35.049 DIABETIC RETINAL MICROANEURYSMS: ICD-10-CM

## 2023-04-11 DIAGNOSIS — M92.61 HAGLUND'S DEFORMITY OF RIGHT HEEL: ICD-10-CM

## 2023-04-11 DIAGNOSIS — Z79.01 LONG TERM (CURRENT) USE OF ANTICOAGULANTS: Primary | ICD-10-CM

## 2023-04-11 DIAGNOSIS — F33.0 MAJOR DEPRESSIVE DISORDER, RECURRENT, MILD: ICD-10-CM

## 2023-04-11 DIAGNOSIS — I25.119 ATHEROSCLEROSIS OF NATIVE CORONARY ARTERY OF NATIVE HEART WITH ANGINA PECTORIS: ICD-10-CM

## 2023-04-11 DIAGNOSIS — Z79.01 LONG TERM (CURRENT) USE OF ANTICOAGULANTS: ICD-10-CM

## 2023-04-11 DIAGNOSIS — E10.649 TYPE 1 DIABETES MELLITUS WITH HYPOGLYCEMIA UNAWARENESS: ICD-10-CM

## 2023-04-11 DIAGNOSIS — I35.0 AORTIC VALVE STENOSIS, ETIOLOGY OF CARDIAC VALVE DISEASE UNSPECIFIED: ICD-10-CM

## 2023-04-11 DIAGNOSIS — E08.41 DIABETIC MONONEUROPATHY ASSOCIATED WITH DIABETES MELLITUS DUE TO UNDERLYING CONDITION: ICD-10-CM

## 2023-04-11 LAB
INR PPP: 2 (ref 0.8–1.2)
PROTHROMBIN TIME: 20 SEC (ref 9–12.5)

## 2023-04-11 PROCEDURE — 3008F BODY MASS INDEX DOCD: CPT | Mod: CPTII,S$GLB,, | Performed by: NURSE PRACTITIONER

## 2023-04-11 PROCEDURE — 93793 PR ANTICOAGULANT MGMT FOR PT TAKING WARFARIN: ICD-10-PCS | Mod: S$GLB,,,

## 2023-04-11 PROCEDURE — 3044F HG A1C LEVEL LT 7.0%: CPT | Mod: CPTII,S$GLB,, | Performed by: NURSE PRACTITIONER

## 2023-04-11 PROCEDURE — G0439 PR MEDICARE ANNUAL WELLNESS SUBSEQUENT VISIT: ICD-10-PCS | Mod: S$GLB,,, | Performed by: NURSE PRACTITIONER

## 2023-04-11 PROCEDURE — 99999 PR PBB SHADOW E&M-EST. PATIENT-LVL V: CPT | Mod: PBBFAC,,, | Performed by: NURSE PRACTITIONER

## 2023-04-11 PROCEDURE — 1160F PR REVIEW ALL MEDS BY PRESCRIBER/CLIN PHARMACIST DOCUMENTED: ICD-10-PCS | Mod: CPTII,S$GLB,, | Performed by: NURSE PRACTITIONER

## 2023-04-11 PROCEDURE — 3044F PR MOST RECENT HEMOGLOBIN A1C LEVEL <7.0%: ICD-10-PCS | Mod: CPTII,S$GLB,, | Performed by: NURSE PRACTITIONER

## 2023-04-11 PROCEDURE — 3008F PR BODY MASS INDEX (BMI) DOCUMENTED: ICD-10-PCS | Mod: CPTII,S$GLB,, | Performed by: NURSE PRACTITIONER

## 2023-04-11 PROCEDURE — 1159F PR MEDICATION LIST DOCUMENTED IN MEDICAL RECORD: ICD-10-PCS | Mod: CPTII,S$GLB,, | Performed by: NURSE PRACTITIONER

## 2023-04-11 PROCEDURE — 93793 ANTICOAG MGMT PT WARFARIN: CPT | Mod: S$GLB,,,

## 2023-04-11 PROCEDURE — 85610 PROTHROMBIN TIME: CPT | Performed by: INTERNAL MEDICINE

## 2023-04-11 PROCEDURE — 1159F MED LIST DOCD IN RCRD: CPT | Mod: CPTII,S$GLB,, | Performed by: NURSE PRACTITIONER

## 2023-04-11 PROCEDURE — 99999 PR PBB SHADOW E&M-EST. PATIENT-LVL V: ICD-10-PCS | Mod: PBBFAC,,, | Performed by: NURSE PRACTITIONER

## 2023-04-11 PROCEDURE — 36415 COLL VENOUS BLD VENIPUNCTURE: CPT | Performed by: INTERNAL MEDICINE

## 2023-04-11 PROCEDURE — 3074F SYST BP LT 130 MM HG: CPT | Mod: CPTII,S$GLB,, | Performed by: NURSE PRACTITIONER

## 2023-04-11 PROCEDURE — 3074F PR MOST RECENT SYSTOLIC BLOOD PRESSURE < 130 MM HG: ICD-10-PCS | Mod: CPTII,S$GLB,, | Performed by: NURSE PRACTITIONER

## 2023-04-11 PROCEDURE — 4010F ACE/ARB THERAPY RXD/TAKEN: CPT | Mod: CPTII,S$GLB,, | Performed by: NURSE PRACTITIONER

## 2023-04-11 PROCEDURE — G0439 PPPS, SUBSEQ VISIT: HCPCS | Mod: S$GLB,,, | Performed by: NURSE PRACTITIONER

## 2023-04-11 PROCEDURE — 4010F PR ACE/ARB THEARPY RXD/TAKEN: ICD-10-PCS | Mod: CPTII,S$GLB,, | Performed by: NURSE PRACTITIONER

## 2023-04-11 PROCEDURE — 3078F DIAST BP <80 MM HG: CPT | Mod: CPTII,S$GLB,, | Performed by: NURSE PRACTITIONER

## 2023-04-11 PROCEDURE — 3078F PR MOST RECENT DIASTOLIC BLOOD PRESSURE < 80 MM HG: ICD-10-PCS | Mod: CPTII,S$GLB,, | Performed by: NURSE PRACTITIONER

## 2023-04-11 PROCEDURE — 1160F RVW MEDS BY RX/DR IN RCRD: CPT | Mod: CPTII,S$GLB,, | Performed by: NURSE PRACTITIONER

## 2023-04-11 SDOH — SOCIAL DETERMINANTS OF HEALTH (SDOH): PROBLEM RELATED TO HOUSING AND ECONOMIC CIRCUMSTANCES, UNSPECIFIED: Z59.9

## 2023-04-11 NOTE — PATIENT INSTRUCTIONS
1. Schedule annual with Dr. Garry Stover MD, wanted to see you May 2023.    2. Check to see if you received Covid bivalent booster in Nov 2022.    3. Colonoscopy as scheduled.    4. Listen out for call from  about resources.    Counseling and Referral of Other Preventative  (Italic type indicates deductible and co-insurance are waived)    Patient Name: Cj Barnes  Today's Date: 4/11/2023    Health Maintenance         Date Due Completion Date    COVID-19 Vaccine (5 - Booster for Moderna series) 01/06/2023 11/11/2022    Colorectal Cancer Screening 02/17/2023 2/17/2020    PROSTATE-SPECIFIC ANTIGEN 06/10/2023 6/10/2022    Foot Exam 07/11/2023 7/11/2022    Override on 11/8/2021: Done    Override on 10/14/2020: Done    Override on 6/5/2020: Done    Hemoglobin A1c 09/03/2023 3/3/2023    Eye Exam 09/28/2023 9/28/2022    Diabetes Urine Screening 11/03/2023 11/3/2022    Lipid Panel 03/03/2024 3/3/2023    High Dose Statin 04/04/2024 4/4/2023    Pneumococcal Vaccines (Age 0-64) (3 - PPSV23 if available, else PCV20) 11/09/2028 7/16/2020    TETANUS VACCINE 07/16/2030 7/16/2020          Orders Placed This Encounter   Procedures    Ambulatory referral/consult to Outpatient Case Management       The following information is provided to all patients.  This information is to help you find resources for any of the problems found today that may be affecting your health:                Living healthy guide: www.[facility].[place].gov      Understanding Diabetes: www.diabetes.org      Eating healthy: www.cdc.gov/healthyweight      CDC home safety checklist: www.cdc.gov/steadi/patient.html      Agency on Aging: www.goea.[place].gov      Alcoholics anonymous (AA): www.aa.org      Physical Activity: www.gumaro.nih.gov/yo9fpgl      Tobacco use: www.quitwithusla.org

## 2023-04-11 NOTE — PROGRESS NOTES
"Cj Barnes presented for a  Medicare AWV and comprehensive Health Risk Assessment today. The following components were reviewed and updated:    Medical history  Family History  Social history  Allergies and Current Medications  Health Risk Assessment  Health Maintenance  Care Team         ** See Completed Assessments for Annual Wellness Visit within the encounter summary.**         The following assessments were completed:  Living Situation  CAGE  Depression Screening  Timed Get Up and Go - Deferred, using scooter  Whisper Test - N/A hearing impairment, wears hearing aids  Cognitive Function Screening    Nutrition Screening  ADL Screening  PAQ Screening  Review for Opioid Screening: Pt has rx for oxycodone-acetaminophen prn pain r/t foot wound.   Review for Substance Use Disorders: Patient does not use substance  (If patient has Rx list here)         Vitals:    04/11/23 0828   BP: 108/60   BP Location: Left arm   Pulse: 62   SpO2: 96%   Weight: 103.5 kg (228 lb 2.8 oz)   Height: 5' 7" (1.702 m)     Body mass index is 35.74 kg/m².    Physical Exam  Vitals reviewed.   Constitutional:       Appearance: Normal appearance.   HENT:      Head: Normocephalic.   Cardiovascular:      Rate and Rhythm: Normal rate.   Pulmonary:      Effort: Pulmonary effort is normal.   Abdominal:      General: Bowel sounds are normal.   Musculoskeletal:      Right lower leg: Edema present.      Left lower leg: Edema present.      Comments: Using knee scooter   Skin:     General: Skin is warm and dry.      Capillary Refill: Capillary refill takes less than 2 seconds.   Neurological:      Mental Status: He is alert and oriented to person, place, and time.   Psychiatric:         Behavior: Behavior normal.         Thought Content: Thought content normal.         Judgment: Judgment normal.             Diagnoses and health risks identified today and associated recommendations/orders:    1. Encounter for preventive health " examination  Assessments completed as appropriate.   recommendations reviewed. Discussed covid bivalent booster, believes he received from pharmacy.  F/u with PCP as instructed.    2. Ulcer of foot with fat layer exposed, unspecified laterality  Chronic, stable on current regimen. Followed by podiatry / wound care.    3. Aortic atherosclerosis  Chronic, stable on current regimen. Followed by cardiology. On statin.    4. Paroxysmal atrial flutter  Chronic, stable on current regimen. Followed by cardiology.  - Ambulatory referral/consult to Outpatient Case Management    5. Atherosclerosis of native coronary artery of native heart with angina pectoris  Chronic, stable on current regimen. Followed by cardiology.    6. Major depressive disorder, recurrent, mild  Chronic, stable on current regimen. Followed by PCP.    7. Class 2 severe obesity due to excess calories with serious comorbidity and body mass index (BMI) of 35.0 to 35.9 in adult  Chronic, stable on current regimen. Followed by PCP / diabetes NP. On ozempic.    8. Type 1 diabetes mellitus with obesity  Chronic, stable on current regimen. Followed by PCP / diabetes NP.    9. Type 1 diabetes mellitus with hypoglycemia unawareness  Chronic, stable on current regimen. Followed by PCP / diabetes NP.  - Ambulatory referral/consult to Outpatient Case Management    10. Diabetic mononeuropathy associated with diabetes mellitus due to underlying condition  Chronic, stable on current regimen. Followed by podiatry.    11. Mild nonproliferative diabetic retinopathy of both eyes without macular edema associated with type 1 diabetes mellitus  Chronic, stable on current regimen. Followed by outside optometry.    12. Diabetic retinal microaneurysms  Chronic, stable on current regimen. Followed by outside optometry.    13. Insulin pump in place  Chronic, stable on current regimen. Followed by PCP / diabetes NP.    14. Essential hypertension  Chronic, stable on current regimen.  Followed by cardiology.    15. Aortic valve stenosis, etiology of cardiac valve disease unspecified  Chronic, stable on current regimen. Followed by cardiology.    16. Aortic regurgitation, congenital  Chronic, stable on current regimen. Followed by cardiology.    17. Coronary artery disease involving native coronary artery of native heart without angina pectoris  Chronic, stable on current regimen. Followed by cardiology.    18. H/O mechanical aortic valve replacement  Chronic, stable on current regimen. Followed by cardiology.    19. S/P ascending aortic replacement  Chronic, stable on current regimen. Followed by cardiology.    20. Hyperlipidemia with target low density lipoprotein (LDL) cholesterol less than 70 mg/dL  Chronic, stable on current regimen. Followed by cardiology.    21. Long term (current) use of anticoagulants  Chronic, stable on current regimen. Followed by cardiology.    22. Iron deficiency anemia due to chronic blood loss  Chronic, stable on current regimen. Followed by PCP.    23. Kulwant's deformity of right heel  Chronic, stable on current regimen. Followed by podiatry.    24. Abnormality of gait and mobility  Chronic, stable. Currently using knee scooter, non weight bearing on right foot d/t post op wound/ulcer. Followed by podiatry.    25. Financial difficulties  Chronic, stable. Referred to outpatient case management for resources. Followed by PCP.  - Ambulatory referral/consult to Outpatient Case Management      Provided Cj with a 5-10 year written screening schedule and personal prevention plan. Recommendations were developed using the USPSTF age appropriate recommendations. Education, counseling, and referrals were provided as needed. After Visit Summary printed and given to patient which includes a list of additional screenings\tests needed.    Follow up in about 1 year (around 4/11/2024) for Medicare AWV and with PCP as instructed.       Dafne Dias NP    I offered to  discuss advanced care planning, including how to pick a person who would make decisions for you if you were unable to make them for yourself, called a health care power of , and what kind of decisions you might make such as use of life sustaining treatments such as ventilators and tube feeding when faced with a life limiting illness recorded on a living will that they will need to know. (How you want to be cared for as you near the end of your natural life)     X  Patient has advanced directives written and agrees to provide copies to the institution.

## 2023-04-13 NOTE — TELEPHONE ENCOUNTER
Cj Barnes has provided the necessary documents today to begin the enrollment process into the [employer] and [employer] program. The prescription portion of the application has been sent to the providers office for approval and signature.

## 2023-04-14 ENCOUNTER — PATIENT OUTREACH (OUTPATIENT)
Dept: ADMINISTRATIVE | Facility: OTHER | Age: 58
End: 2023-04-14
Payer: MEDICARE

## 2023-04-14 NOTE — PROGRESS NOTES
CHW - Initial Contact    Dilcia Wood,  Community Health Worker reviewed the Social Determinant of Health questionnaire with patient via telephone today.    Pt identified barriers of most importance are: Financial difficulties, transportation, utility assistance   Referrals to community agencies completed with patient/caregiver consent outside of [facility] include: [facility], [place] [employer] and [employer].   Referrals were put through [facility] - no:   Other information discussed the patient needs / wants help with: None at this time.    Follow-up Outreach - Due: 4/28/2023

## 2023-04-17 ENCOUNTER — TELEPHONE (OUTPATIENT)
Dept: DIABETES | Facility: CLINIC | Age: 58
End: 2023-04-17
Payer: MEDICARE

## 2023-04-17 NOTE — TELEPHONE ENCOUNTER
Hello,       A Patient Assistance [employer] and [employer] Application for Cj Barnes - MRN  [id number] was faxed to your office @ [phone]. Please have Jodi Quintero NP review the application to ensure the prescription is correct. If correct, sign and fax the application back to the Pharmacy Patient Assistance Team @[phone].      If changes need to be made to this application, please let me know so corrections can be made, and the application will be faxed back to you for approval and signature.

## 2023-04-19 ENCOUNTER — ANTI-COAG VISIT (OUTPATIENT)
Dept: CARDIOLOGY | Facility: CLINIC | Age: 58
End: 2023-04-19
Payer: MEDICARE

## 2023-04-19 DIAGNOSIS — Z95.2 H/O MECHANICAL AORTIC VALVE REPLACEMENT: ICD-10-CM

## 2023-04-19 DIAGNOSIS — Z79.01 LONG TERM (CURRENT) USE OF ANTICOAGULANTS: Primary | ICD-10-CM

## 2023-04-19 DIAGNOSIS — I48.92 PAROXYSMAL ATRIAL FLUTTER: ICD-10-CM

## 2023-04-19 PROCEDURE — 93793 PR ANTICOAGULANT MGMT FOR PT TAKING WARFARIN: ICD-10-PCS | Mod: S$GLB,,,

## 2023-04-19 PROCEDURE — 93793 ANTICOAG MGMT PT WARFARIN: CPT | Mod: S$GLB,,,

## 2023-04-25 ENCOUNTER — OFFICE VISIT (OUTPATIENT)
Dept: PODIATRY | Facility: CLINIC | Age: 58
End: 2023-04-25
Payer: MEDICARE

## 2023-04-25 VITALS
HEART RATE: 60 BPM | SYSTOLIC BLOOD PRESSURE: 119 MMHG | DIASTOLIC BLOOD PRESSURE: 62 MMHG | BODY MASS INDEX: 35.82 KG/M2 | WEIGHT: 228.19 LBS | HEIGHT: 67 IN

## 2023-04-25 DIAGNOSIS — E08.41 DIABETIC MONONEUROPATHY ASSOCIATED WITH DIABETES MELLITUS DUE TO UNDERLYING CONDITION: ICD-10-CM

## 2023-04-25 DIAGNOSIS — M92.61 HAGLUND'S DEFORMITY OF RIGHT HEEL: Primary | ICD-10-CM

## 2023-04-25 DIAGNOSIS — L97.502 ULCER OF FOOT WITH FAT LAYER EXPOSED, UNSPECIFIED LATERALITY: ICD-10-CM

## 2023-04-25 PROCEDURE — 3044F HG A1C LEVEL LT 7.0%: CPT | Mod: CPTII,S$GLB,, | Performed by: PODIATRIST

## 2023-04-25 PROCEDURE — 1159F PR MEDICATION LIST DOCUMENTED IN MEDICAL RECORD: ICD-10-PCS | Mod: CPTII,S$GLB,, | Performed by: PODIATRIST

## 2023-04-25 PROCEDURE — 3078F DIAST BP <80 MM HG: CPT | Mod: CPTII,S$GLB,, | Performed by: PODIATRIST

## 2023-04-25 PROCEDURE — 99024 PR POST-OP FOLLOW-UP VISIT: ICD-10-PCS | Mod: S$GLB,,, | Performed by: PODIATRIST

## 2023-04-25 PROCEDURE — 3008F BODY MASS INDEX DOCD: CPT | Mod: CPTII,S$GLB,, | Performed by: PODIATRIST

## 2023-04-25 PROCEDURE — 87077 CULTURE AEROBIC IDENTIFY: CPT | Performed by: PODIATRIST

## 2023-04-25 PROCEDURE — 3078F PR MOST RECENT DIASTOLIC BLOOD PRESSURE < 80 MM HG: ICD-10-PCS | Mod: CPTII,S$GLB,, | Performed by: PODIATRIST

## 2023-04-25 PROCEDURE — 87076 CULTURE ANAEROBE IDENT EACH: CPT | Performed by: PODIATRIST

## 2023-04-25 PROCEDURE — 99999 PR PBB SHADOW E&M-EST. PATIENT-LVL IV: CPT | Mod: PBBFAC,,, | Performed by: PODIATRIST

## 2023-04-25 PROCEDURE — 99024 POSTOP FOLLOW-UP VISIT: CPT | Mod: S$GLB,,, | Performed by: PODIATRIST

## 2023-04-25 PROCEDURE — 87186 SC STD MICRODIL/AGAR DIL: CPT | Performed by: PODIATRIST

## 2023-04-25 PROCEDURE — 87075 CULTR BACTERIA EXCEPT BLOOD: CPT | Performed by: PODIATRIST

## 2023-04-25 PROCEDURE — 4010F PR ACE/ARB THEARPY RXD/TAKEN: ICD-10-PCS | Mod: CPTII,S$GLB,, | Performed by: PODIATRIST

## 2023-04-25 PROCEDURE — 87070 CULTURE OTHR SPECIMN AEROBIC: CPT | Performed by: PODIATRIST

## 2023-04-25 PROCEDURE — 1159F MED LIST DOCD IN RCRD: CPT | Mod: CPTII,S$GLB,, | Performed by: PODIATRIST

## 2023-04-25 PROCEDURE — 3008F PR BODY MASS INDEX (BMI) DOCUMENTED: ICD-10-PCS | Mod: CPTII,S$GLB,, | Performed by: PODIATRIST

## 2023-04-25 PROCEDURE — 4010F ACE/ARB THERAPY RXD/TAKEN: CPT | Mod: CPTII,S$GLB,, | Performed by: PODIATRIST

## 2023-04-25 PROCEDURE — 99999 PR PBB SHADOW E&M-EST. PATIENT-LVL IV: ICD-10-PCS | Mod: PBBFAC,,, | Performed by: PODIATRIST

## 2023-04-25 PROCEDURE — 3074F PR MOST RECENT SYSTOLIC BLOOD PRESSURE < 130 MM HG: ICD-10-PCS | Mod: CPTII,S$GLB,, | Performed by: PODIATRIST

## 2023-04-25 PROCEDURE — 3074F SYST BP LT 130 MM HG: CPT | Mod: CPTII,S$GLB,, | Performed by: PODIATRIST

## 2023-04-25 PROCEDURE — 3044F PR MOST RECENT HEMOGLOBIN A1C LEVEL <7.0%: ICD-10-PCS | Mod: CPTII,S$GLB,, | Performed by: PODIATRIST

## 2023-04-25 RX ORDER — SULFAMETHOXAZOLE AND TRIMETHOPRIM 400; 80 MG/1; MG/1
1 TABLET ORAL 2 TIMES DAILY
Qty: 20 TABLET | Refills: 0 | Status: SHIPPED | OUTPATIENT
Start: 2023-04-25 | End: 2023-05-31

## 2023-04-25 RX ORDER — OXYCODONE AND ACETAMINOPHEN 7.5; 325 MG/1; MG/1
1 TABLET ORAL EVERY 12 HOURS PRN
Qty: 30 TABLET | Refills: 0 | Status: SHIPPED | OUTPATIENT
Start: 2023-04-25 | End: 2023-05-16 | Stop reason: SDUPTHER

## 2023-04-27 DIAGNOSIS — G47.00 INSOMNIA, UNSPECIFIED TYPE: ICD-10-CM

## 2023-04-27 LAB — BACTERIA SPEC AEROBE CULT: ABNORMAL

## 2023-04-28 ENCOUNTER — ANTI-COAG VISIT (OUTPATIENT)
Dept: CARDIOLOGY | Facility: CLINIC | Age: 58
End: 2023-04-28
Payer: MEDICARE

## 2023-04-28 ENCOUNTER — PATIENT MESSAGE (OUTPATIENT)
Dept: CARDIOLOGY | Facility: CLINIC | Age: 58
End: 2023-04-28

## 2023-04-28 DIAGNOSIS — Z95.2 H/O MECHANICAL AORTIC VALVE REPLACEMENT: ICD-10-CM

## 2023-04-28 DIAGNOSIS — Z79.01 LONG TERM (CURRENT) USE OF ANTICOAGULANTS: Primary | ICD-10-CM

## 2023-04-28 DIAGNOSIS — I48.92 PAROXYSMAL ATRIAL FLUTTER: ICD-10-CM

## 2023-04-28 PROCEDURE — 93793 PR ANTICOAGULANT MGMT FOR PT TAKING WARFARIN: ICD-10-PCS | Mod: S$GLB,,,

## 2023-04-28 PROCEDURE — 93793 ANTICOAG MGMT PT WARFARIN: CPT | Mod: S$GLB,,,

## 2023-04-28 NOTE — PROGRESS NOTES
INR not at goal. Medications, chart, and patient findings reviewed. See calendar for adjustments to dose and follow up plan.    DDI bactrim started 4/26 x10d

## 2023-04-28 NOTE — TELEPHONE ENCOUNTER
Please see the attached refill request.   Pain Refusal Text: I offered to prescribe pain medication but the patient refused to take this medication.

## 2023-05-01 ENCOUNTER — PATIENT OUTREACH (OUTPATIENT)
Dept: ADMINISTRATIVE | Facility: OTHER | Age: 58
End: 2023-05-01
Payer: MEDICARE

## 2023-05-01 LAB
BACTERIA SPEC ANAEROBE CULT: ABNORMAL

## 2023-05-01 RX ORDER — ZOLPIDEM TARTRATE 10 MG/1
TABLET ORAL
Qty: 30 TABLET | Refills: 0 | Status: SHIPPED | OUTPATIENT
Start: 2023-05-01 | End: 2023-05-31 | Stop reason: SDUPTHER

## 2023-05-01 NOTE — PROGRESS NOTES
CHW - Follow Up    Dilcia Wood Community Health Worker completed a follow up visit with patient via telephone today.  Pt/Caregiver reported:  Mr. Barnes stated that he received the resource information that CHW mailed him, but has not completed the forms.  Pt stated his children are helping him out right now due to his foot.       CHW - Case Closure    Mr. Barnes denied any additional needs at this time and agrees with episode closure at this time.  CHW provided patient with Community Health Worker's contact information and encouraged her to contact this Community Health Worker if additional needs arise.

## 2023-05-02 NOTE — PROGRESS NOTES
"  Patient presents status post calcaneal exostectomy with Achilles tendon debridement and bursectomy.   Patient to remain nonweightbearing.  Incision dehisced full-thickness with proximal undermining wound.  He has been seeing wound care in [facility].  Mild discomfort.  Feels he is improving overall.  Wound measures 1.2 cm x 0.8 cm by 0.2 cm.  This is granular with some fibrotic tissue.  There is some undermining proximally.  This area was also debrided with a curette and cultured.  Patient will begin broad-spectrum antibiotic therapy for now.  Continue wound care.  Patient has been improving/going to the wound clinic.  Increased granular tissue noted.  Continue wound clinic follow-up.  Follow-up in 2 weeks.    Wound Debridement    Performed by: Eugene Bowers DPM   Authorized by: Patient    Time out: Immediately prior to procedure a "time out" was called to verify the correct patient, procedure, equipment, support staff and site/side marked as required.   Consent Done?:  Yes (Verbal)  Local anesthesia used?: No       Wound Details:    Location:  Right foot     Type of Debridement:  Excisional       Length (cm):  1.2       Width (cm):  0.8       Depth (cm):  0.2       Percent Debrided (%):  100             Depth of debridement:  Subcutaneous tissue    Tissue debrided:  Dermis, Epidermis and Subcutaneous    Devitalized tissue debrided:  Biofilm, Callus and Necrotic/Eschar    Instruments:  Blade, Curette and Nippers     Bleeding:  Minimal  Hemostasis Achieved: Yes    Method Used:  Pressure  Patient tolerance:  Patient tolerated the procedure well with no immediate complications    "

## 2023-05-03 ENCOUNTER — PATIENT MESSAGE (OUTPATIENT)
Dept: CARDIOLOGY | Facility: CLINIC | Age: 58
End: 2023-05-03

## 2023-05-03 ENCOUNTER — ANTI-COAG VISIT (OUTPATIENT)
Dept: CARDIOLOGY | Facility: CLINIC | Age: 58
End: 2023-05-03
Payer: MEDICARE

## 2023-05-03 DIAGNOSIS — Z79.01 LONG TERM (CURRENT) USE OF ANTICOAGULANTS: Primary | ICD-10-CM

## 2023-05-03 DIAGNOSIS — Z95.2 H/O MECHANICAL AORTIC VALVE REPLACEMENT: ICD-10-CM

## 2023-05-03 DIAGNOSIS — I48.92 PAROXYSMAL ATRIAL FLUTTER: ICD-10-CM

## 2023-05-03 PROCEDURE — 93793 PR ANTICOAGULANT MGMT FOR PT TAKING WARFARIN: ICD-10-PCS | Mod: S$GLB,,,

## 2023-05-03 PROCEDURE — 93793 ANTICOAG MGMT PT WARFARIN: CPT | Mod: S$GLB,,,

## 2023-05-03 NOTE — PROGRESS NOTES
At goal but completing course of smx/tmp. Will lower warfarin dose again but just slightly. Will repeat INR next week. See calendar.

## 2023-05-09 ENCOUNTER — PATIENT MESSAGE (OUTPATIENT)
Dept: CARDIOLOGY | Facility: CLINIC | Age: 58
End: 2023-05-09

## 2023-05-09 ENCOUNTER — ANTI-COAG VISIT (OUTPATIENT)
Dept: CARDIOLOGY | Facility: CLINIC | Age: 58
End: 2023-05-09
Payer: MEDICARE

## 2023-05-09 DIAGNOSIS — I48.92 PAROXYSMAL ATRIAL FLUTTER: ICD-10-CM

## 2023-05-09 DIAGNOSIS — Z95.2 H/O MECHANICAL AORTIC VALVE REPLACEMENT: ICD-10-CM

## 2023-05-09 DIAGNOSIS — Z79.01 LONG TERM (CURRENT) USE OF ANTICOAGULANTS: Primary | ICD-10-CM

## 2023-05-09 PROCEDURE — 93793 ANTICOAG MGMT PT WARFARIN: CPT | Mod: S$GLB,,,

## 2023-05-09 PROCEDURE — 93793 PR ANTICOAGULANT MGMT FOR PT TAKING WARFARIN: ICD-10-PCS | Mod: S$GLB,,,

## 2023-05-09 NOTE — PROGRESS NOTES
INR low. Dose was lowered for DDI w/ bactrim which is now complete. Increase dose. Recheck INR in 1 week

## 2023-05-10 ENCOUNTER — PATIENT MESSAGE (OUTPATIENT)
Dept: CARDIOLOGY | Facility: CLINIC | Age: 58
End: 2023-05-10
Payer: MEDICARE

## 2023-05-16 ENCOUNTER — OFFICE VISIT (OUTPATIENT)
Dept: PODIATRY | Facility: CLINIC | Age: 58
End: 2023-05-16
Payer: MEDICARE

## 2023-05-16 ENCOUNTER — PATIENT MESSAGE (OUTPATIENT)
Dept: CARDIOLOGY | Facility: CLINIC | Age: 58
End: 2023-05-16

## 2023-05-16 ENCOUNTER — LAB VISIT (OUTPATIENT)
Dept: LAB | Facility: HOSPITAL | Age: 58
End: 2023-05-16
Attending: INTERNAL MEDICINE
Payer: MEDICARE

## 2023-05-16 ENCOUNTER — ANTI-COAG VISIT (OUTPATIENT)
Dept: CARDIOLOGY | Facility: CLINIC | Age: 58
End: 2023-05-16
Payer: MEDICARE

## 2023-05-16 VITALS
DIASTOLIC BLOOD PRESSURE: 64 MMHG | BODY MASS INDEX: 35.64 KG/M2 | WEIGHT: 227.06 LBS | HEIGHT: 67 IN | SYSTOLIC BLOOD PRESSURE: 125 MMHG | HEART RATE: 59 BPM

## 2023-05-16 DIAGNOSIS — E08.41 DIABETIC MONONEUROPATHY ASSOCIATED WITH DIABETES MELLITUS DUE TO UNDERLYING CONDITION: ICD-10-CM

## 2023-05-16 DIAGNOSIS — Z79.01 LONG TERM (CURRENT) USE OF ANTICOAGULANTS: Primary | ICD-10-CM

## 2023-05-16 DIAGNOSIS — I48.92 PAROXYSMAL ATRIAL FLUTTER: ICD-10-CM

## 2023-05-16 DIAGNOSIS — Z95.2 H/O MECHANICAL AORTIC VALVE REPLACEMENT: ICD-10-CM

## 2023-05-16 DIAGNOSIS — M92.61 HAGLUND'S DEFORMITY OF RIGHT HEEL: Primary | ICD-10-CM

## 2023-05-16 DIAGNOSIS — L97.502 ULCER OF FOOT WITH FAT LAYER EXPOSED, UNSPECIFIED LATERALITY: ICD-10-CM

## 2023-05-16 LAB
INR PPP: 2.1 (ref 0.8–1.2)
PROTHROMBIN TIME: 21.3 SEC (ref 9–12.5)

## 2023-05-16 PROCEDURE — 3078F DIAST BP <80 MM HG: CPT | Mod: CPTII,,, | Performed by: PODIATRIST

## 2023-05-16 PROCEDURE — 3074F PR MOST RECENT SYSTOLIC BLOOD PRESSURE < 130 MM HG: ICD-10-PCS | Mod: CPTII,,, | Performed by: PODIATRIST

## 2023-05-16 PROCEDURE — 3008F PR BODY MASS INDEX (BMI) DOCUMENTED: ICD-10-PCS | Mod: CPTII,,, | Performed by: PODIATRIST

## 2023-05-16 PROCEDURE — 3074F SYST BP LT 130 MM HG: CPT | Mod: CPTII,,, | Performed by: PODIATRIST

## 2023-05-16 PROCEDURE — 99024 POSTOP FOLLOW-UP VISIT: CPT | Mod: ,,, | Performed by: PODIATRIST

## 2023-05-16 PROCEDURE — 99999 PR PBB SHADOW E&M-EST. PATIENT-LVL IV: CPT | Mod: PBBFAC,,, | Performed by: PODIATRIST

## 2023-05-16 PROCEDURE — 93793 ANTICOAG MGMT PT WARFARIN: CPT | Mod: ,,,

## 2023-05-16 PROCEDURE — 3044F PR MOST RECENT HEMOGLOBIN A1C LEVEL <7.0%: ICD-10-PCS | Mod: CPTII,,, | Performed by: PODIATRIST

## 2023-05-16 PROCEDURE — 3044F HG A1C LEVEL LT 7.0%: CPT | Mod: CPTII,,, | Performed by: PODIATRIST

## 2023-05-16 PROCEDURE — 11042 PR DEBRIDEMENT, SKIN, SUB-Q TISSUE,=<20 SQ CM: ICD-10-PCS | Mod: ,,, | Performed by: PODIATRIST

## 2023-05-16 PROCEDURE — 4010F PR ACE/ARB THEARPY RXD/TAKEN: ICD-10-PCS | Mod: CPTII,,, | Performed by: PODIATRIST

## 2023-05-16 PROCEDURE — 36415 COLL VENOUS BLD VENIPUNCTURE: CPT | Performed by: INTERNAL MEDICINE

## 2023-05-16 PROCEDURE — 85610 PROTHROMBIN TIME: CPT | Performed by: INTERNAL MEDICINE

## 2023-05-16 PROCEDURE — 93793 PR ANTICOAGULANT MGMT FOR PT TAKING WARFARIN: ICD-10-PCS | Mod: ,,,

## 2023-05-16 PROCEDURE — 3078F PR MOST RECENT DIASTOLIC BLOOD PRESSURE < 80 MM HG: ICD-10-PCS | Mod: CPTII,,, | Performed by: PODIATRIST

## 2023-05-16 PROCEDURE — 4010F ACE/ARB THERAPY RXD/TAKEN: CPT | Mod: CPTII,,, | Performed by: PODIATRIST

## 2023-05-16 PROCEDURE — 99024 PR POST-OP FOLLOW-UP VISIT: ICD-10-PCS | Mod: ,,, | Performed by: PODIATRIST

## 2023-05-16 PROCEDURE — 99214 OFFICE O/P EST MOD 30 MIN: CPT | Performed by: PODIATRIST

## 2023-05-16 PROCEDURE — 11042 DBRDMT SUBQ TIS 1ST 20SQCM/<: CPT | Mod: ,,, | Performed by: PODIATRIST

## 2023-05-16 PROCEDURE — 3008F BODY MASS INDEX DOCD: CPT | Mod: CPTII,,, | Performed by: PODIATRIST

## 2023-05-16 PROCEDURE — 99999 PR PBB SHADOW E&M-EST. PATIENT-LVL IV: ICD-10-PCS | Mod: PBBFAC,,, | Performed by: PODIATRIST

## 2023-05-16 RX ORDER — OXYCODONE AND ACETAMINOPHEN 7.5; 325 MG/1; MG/1
1 TABLET ORAL EVERY 12 HOURS PRN
Qty: 30 TABLET | Refills: 0 | Status: SHIPPED | OUTPATIENT
Start: 2023-05-16 | End: 2023-06-20 | Stop reason: SDUPTHER

## 2023-05-21 NOTE — PROGRESS NOTES
"    Patient presents status post calcaneal exostectomy with Achilles tendon debridement and bursectomy.   Patient to remain nonweightbearing.  Incision dehisced full-thickness with proximal undermining wound.  He has been seeing wound care in [facility].  Mild discomfort.  Feels he is improving overall.  Wound measures 1.0 cm x 0.8 cm by 0.2 cm, post debridement.  This is granular with some fibrotic tissue.  There is some undermining proximally.  This area was also debrided with a curette and cultured.  Patient will begin broad-spectrum antibiotic therapy for now.  Continue wound care.  Patient has been improving/going to the wound clinic.  Increased granular tissue noted.  Continue wound clinic follow-up.  Follow-up in 2 weeks.    Wound Debridement    Performed by: Eugene Bowers DPM   Authorized by: Patient    Time out: Immediately prior to procedure a "time out" was called to verify the correct patient, procedure, equipment, support staff and site/side marked as required.   Consent Done?:  Yes (Verbal)  Local anesthesia used?: No       Wound Details:    Location:  Right foot     Type of Debridement:  Excisional       Length (cm):  1.0       Width (cm):  0.8       Depth (cm):  0.2       Percent Debrided (%):  100             Depth of debridement:  Subcutaneous tissue    Tissue debrided:  Dermis, Epidermis and Subcutaneous    Devitalized tissue debrided:  Biofilm, Callus and Necrotic/Eschar    Instruments:  Blade, Curette and Nippers     Bleeding:  Minimal  Hemostasis Achieved: Yes    Method Used:  Pressure  Patient tolerance:  Patient tolerated the procedure well with no immediate complications    "

## 2023-05-23 ENCOUNTER — ANTI-COAG VISIT (OUTPATIENT)
Dept: CARDIOLOGY | Facility: CLINIC | Age: 58
End: 2023-05-23
Payer: MEDICARE

## 2023-05-23 ENCOUNTER — PATIENT MESSAGE (OUTPATIENT)
Dept: CARDIOLOGY | Facility: CLINIC | Age: 58
End: 2023-05-23

## 2023-05-23 DIAGNOSIS — Z79.01 LONG TERM (CURRENT) USE OF ANTICOAGULANTS: Primary | ICD-10-CM

## 2023-05-23 DIAGNOSIS — I48.92 PAROXYSMAL ATRIAL FLUTTER: ICD-10-CM

## 2023-05-23 DIAGNOSIS — Z95.2 H/O MECHANICAL AORTIC VALVE REPLACEMENT: ICD-10-CM

## 2023-05-23 PROCEDURE — 93793 ANTICOAG MGMT PT WARFARIN: CPT | Mod: S$GLB,,,

## 2023-05-23 PROCEDURE — 93793 PR ANTICOAGULANT MGMT FOR PT TAKING WARFARIN: ICD-10-PCS | Mod: S$GLB,,,

## 2023-05-25 DIAGNOSIS — G47.00 INSOMNIA, UNSPECIFIED TYPE: ICD-10-CM

## 2023-05-25 RX ORDER — ZOLPIDEM TARTRATE 10 MG/1
TABLET ORAL
Qty: 30 TABLET | Refills: 0 | OUTPATIENT
Start: 2023-05-25

## 2023-05-31 ENCOUNTER — OFFICE VISIT (OUTPATIENT)
Dept: PRIMARY CARE CLINIC | Facility: CLINIC | Age: 58
End: 2023-05-31
Payer: MEDICARE

## 2023-05-31 VITALS
OXYGEN SATURATION: 95 % | HEART RATE: 61 BPM | DIASTOLIC BLOOD PRESSURE: 62 MMHG | HEIGHT: 67 IN | SYSTOLIC BLOOD PRESSURE: 118 MMHG | BODY MASS INDEX: 35.83 KG/M2 | WEIGHT: 228.31 LBS | TEMPERATURE: 98 F | RESPIRATION RATE: 18 BRPM

## 2023-05-31 DIAGNOSIS — E10.65 TYPE 1 DIABETES MELLITUS WITH HYPERGLYCEMIA: ICD-10-CM

## 2023-05-31 DIAGNOSIS — G47.00 INSOMNIA, UNSPECIFIED TYPE: ICD-10-CM

## 2023-05-31 DIAGNOSIS — J30.2 SEASONAL ALLERGIES: ICD-10-CM

## 2023-05-31 DIAGNOSIS — R05.1 ACUTE COUGH: Primary | ICD-10-CM

## 2023-05-31 PROCEDURE — 3044F HG A1C LEVEL LT 7.0%: CPT | Mod: CPTII,S$GLB,, | Performed by: STUDENT IN AN ORGANIZED HEALTH CARE EDUCATION/TRAINING PROGRAM

## 2023-05-31 PROCEDURE — 1160F RVW MEDS BY RX/DR IN RCRD: CPT | Mod: CPTII,S$GLB,, | Performed by: STUDENT IN AN ORGANIZED HEALTH CARE EDUCATION/TRAINING PROGRAM

## 2023-05-31 PROCEDURE — 1159F PR MEDICATION LIST DOCUMENTED IN MEDICAL RECORD: ICD-10-PCS | Mod: CPTII,S$GLB,, | Performed by: STUDENT IN AN ORGANIZED HEALTH CARE EDUCATION/TRAINING PROGRAM

## 2023-05-31 PROCEDURE — 3078F DIAST BP <80 MM HG: CPT | Mod: CPTII,S$GLB,, | Performed by: STUDENT IN AN ORGANIZED HEALTH CARE EDUCATION/TRAINING PROGRAM

## 2023-05-31 PROCEDURE — 1160F PR REVIEW ALL MEDS BY PRESCRIBER/CLIN PHARMACIST DOCUMENTED: ICD-10-PCS | Mod: CPTII,S$GLB,, | Performed by: STUDENT IN AN ORGANIZED HEALTH CARE EDUCATION/TRAINING PROGRAM

## 2023-05-31 PROCEDURE — 3044F PR MOST RECENT HEMOGLOBIN A1C LEVEL <7.0%: ICD-10-PCS | Mod: CPTII,S$GLB,, | Performed by: STUDENT IN AN ORGANIZED HEALTH CARE EDUCATION/TRAINING PROGRAM

## 2023-05-31 PROCEDURE — 3008F BODY MASS INDEX DOCD: CPT | Mod: CPTII,S$GLB,, | Performed by: STUDENT IN AN ORGANIZED HEALTH CARE EDUCATION/TRAINING PROGRAM

## 2023-05-31 PROCEDURE — 3008F PR BODY MASS INDEX (BMI) DOCUMENTED: ICD-10-PCS | Mod: CPTII,S$GLB,, | Performed by: STUDENT IN AN ORGANIZED HEALTH CARE EDUCATION/TRAINING PROGRAM

## 2023-05-31 PROCEDURE — 99999 PR PBB SHADOW E&M-EST. PATIENT-LVL III: CPT | Mod: PBBFAC,,, | Performed by: STUDENT IN AN ORGANIZED HEALTH CARE EDUCATION/TRAINING PROGRAM

## 2023-05-31 PROCEDURE — 4010F ACE/ARB THERAPY RXD/TAKEN: CPT | Mod: CPTII,S$GLB,, | Performed by: STUDENT IN AN ORGANIZED HEALTH CARE EDUCATION/TRAINING PROGRAM

## 2023-05-31 PROCEDURE — 99214 PR OFFICE/OUTPT VISIT, EST, LEVL IV, 30-39 MIN: ICD-10-PCS | Mod: S$GLB,,, | Performed by: STUDENT IN AN ORGANIZED HEALTH CARE EDUCATION/TRAINING PROGRAM

## 2023-05-31 PROCEDURE — 99214 OFFICE O/P EST MOD 30 MIN: CPT | Mod: S$GLB,,, | Performed by: STUDENT IN AN ORGANIZED HEALTH CARE EDUCATION/TRAINING PROGRAM

## 2023-05-31 PROCEDURE — 3078F PR MOST RECENT DIASTOLIC BLOOD PRESSURE < 80 MM HG: ICD-10-PCS | Mod: CPTII,S$GLB,, | Performed by: STUDENT IN AN ORGANIZED HEALTH CARE EDUCATION/TRAINING PROGRAM

## 2023-05-31 PROCEDURE — 3074F PR MOST RECENT SYSTOLIC BLOOD PRESSURE < 130 MM HG: ICD-10-PCS | Mod: CPTII,S$GLB,, | Performed by: STUDENT IN AN ORGANIZED HEALTH CARE EDUCATION/TRAINING PROGRAM

## 2023-05-31 PROCEDURE — 1159F MED LIST DOCD IN RCRD: CPT | Mod: CPTII,S$GLB,, | Performed by: STUDENT IN AN ORGANIZED HEALTH CARE EDUCATION/TRAINING PROGRAM

## 2023-05-31 PROCEDURE — 3074F SYST BP LT 130 MM HG: CPT | Mod: CPTII,S$GLB,, | Performed by: STUDENT IN AN ORGANIZED HEALTH CARE EDUCATION/TRAINING PROGRAM

## 2023-05-31 PROCEDURE — 4010F PR ACE/ARB THEARPY RXD/TAKEN: ICD-10-PCS | Mod: CPTII,S$GLB,, | Performed by: STUDENT IN AN ORGANIZED HEALTH CARE EDUCATION/TRAINING PROGRAM

## 2023-05-31 PROCEDURE — 99999 PR PBB SHADOW E&M-EST. PATIENT-LVL III: ICD-10-PCS | Mod: PBBFAC,,, | Performed by: STUDENT IN AN ORGANIZED HEALTH CARE EDUCATION/TRAINING PROGRAM

## 2023-05-31 RX ORDER — PANTOPRAZOLE SODIUM 40 MG/1
40 TABLET, DELAYED RELEASE ORAL DAILY
Qty: 30 TABLET | Refills: 11 | Status: SHIPPED | OUTPATIENT
Start: 2023-05-31 | End: 2023-05-31

## 2023-05-31 RX ORDER — ZOLPIDEM TARTRATE 10 MG/1
10 TABLET ORAL NIGHTLY PRN
Qty: 30 TABLET | Refills: 1 | Status: SHIPPED | OUTPATIENT
Start: 2023-05-31 | End: 2023-07-24 | Stop reason: SDUPTHER

## 2023-05-31 RX ORDER — DOXYCYCLINE 100 MG/1
100 CAPSULE ORAL EVERY 12 HOURS
Qty: 10 CAPSULE | Refills: 0 | Status: SHIPPED | OUTPATIENT
Start: 2023-06-05 | End: 2023-06-10

## 2023-05-31 RX ORDER — PANTOPRAZOLE SODIUM 40 MG/1
40 TABLET, DELAYED RELEASE ORAL DAILY
Qty: 30 TABLET | Refills: 2 | Status: SHIPPED | OUTPATIENT
Start: 2023-05-31 | End: 2024-05-30

## 2023-05-31 RX ORDER — FLUTICASONE PROPIONATE 50 MCG
2 SPRAY, SUSPENSION (ML) NASAL DAILY
Qty: 15.8 ML | Refills: 5 | Status: SHIPPED | OUTPATIENT
Start: 2023-05-31 | End: 2023-11-05

## 2023-05-31 NOTE — PROGRESS NOTES
"Subjective:       Patient ID: Cj Barnes is a 57 y.o. male.    Chief Complaint: Medication Refill and Cough    HPI:  57 y.o. male presents to [facility] with complaints of cough.    Patient reports cough began about 4 days ago. Mostly at night and in morning. Can cough up sputum, non-bloody. Cough drops and OTC meds    Sick contacts?: No  Fever?: No  Shortness of breath?: No  Loss of taste smell?: No  Received flu vaccine?: Yes  Received COVID vaccine?: Yes x4; has never had COVID    Has been taking TUMS and suffering with reflux recently.    Patient is also requesting refill on Ambien.    Review of Systems   Constitutional:  Negative for chills, diaphoresis, fatigue and fever.   HENT:  Positive for postnasal drip, rhinorrhea and sneezing. Negative for congestion, sinus pressure and sore throat.    Eyes:  Negative for discharge and itching.   Respiratory:  Positive for cough. Negative for shortness of breath.    Cardiovascular:  Negative for chest pain and palpitations.   Gastrointestinal:  Negative for abdominal pain, diarrhea, nausea and vomiting.   Musculoskeletal:  Negative for joint swelling and myalgias.   Skin:  Positive for wound (Right foot, surgery in 1/2023. Following Wound care twice weekly). Negative for rash.     Objective:      Vitals:    05/31/23 0933   BP: 118/62   BP Location: Left arm   Patient Position: Sitting   BP Method: Medium (Manual)   Pulse: 61   Resp: 18   Temp: 97.5 °F (36.4 °C)   TempSrc: Temporal   SpO2: 95%   Weight: 103.5 kg (228 lb 4.6 oz)   Height: 5' 7" (1.702 m)     Physical Exam  Vitals reviewed.   Constitutional:       General: He is not in acute distress.     Appearance: Normal appearance. He is not ill-appearing.   HENT:      Head: Normocephalic and atraumatic.      Mouth/Throat:      Mouth: Mucous membranes are moist.      Pharynx: Oropharynx is clear. No oropharyngeal exudate or posterior oropharyngeal erythema.   Eyes:      General:         Right eye: No " discharge.         Left eye: No discharge.      Conjunctiva/sclera: Conjunctivae normal.   Cardiovascular:      Rate and Rhythm: Normal rate and regular rhythm.      Pulses: Normal pulses.      Heart sounds: No murmur heard.  Pulmonary:      Effort: Pulmonary effort is normal.      Breath sounds: Normal breath sounds.   Abdominal:      Comments: Diastasis recti   Musculoskeletal:         General: No deformity.      Cervical back: Neck supple. No rigidity.   Lymphadenopathy:      Cervical: No cervical adenopathy.   Skin:     General: Skin is warm and dry.      Coloration: Skin is not jaundiced.   Neurological:      General: No focal deficit present.      Mental Status: He is alert and oriented to person, place, and time.   Psychiatric:         Mood and Affect: Mood normal.         Behavior: Behavior normal.           Lab Results   Component Value Date     03/03/2023    K 4.4 03/03/2023     03/03/2023    CO2 26 03/03/2023    BUN 13 03/03/2023    CREATININE 0.9 03/03/2023    ANIONGAP 10 03/03/2023     Lab Results   Component Value Date    HGBA1C 6.1 (H) 03/03/2023     Lab Results   Component Value Date    BNP 31 08/06/2020     (H) 08/16/2019       Lab Results   Component Value Date    WBC 7.83 03/03/2023    HGB 15.0 03/03/2023    HCT 46.8 03/03/2023    HCT 29 (L) 08/09/2019     03/03/2023    GRAN 5.5 03/03/2023    GRAN 70.3 03/03/2023     Lab Results   Component Value Date    CHOL 95 (L) 03/03/2023    HDL 31 (L) 03/03/2023    LDLCALC 41.2 (L) 03/03/2023    TRIG 114 03/03/2023          Current Outpatient Medications:     aspirin (ECOTRIN) 81 MG EC tablet, Take 1 tablet (81 mg total) by mouth once daily., Disp: , Rfl: 0    BAQSIMI 3 mg/actuation Spry, USE ONE actuation into a single nostril no response MAY REPEAT in 15 minutes USE a new device, Disp: 2 each, Rfl: 1    blood-glucose meter,continuous (DEXCOM G6 ) Misc, 1 Device by Misc.(Non-Drug; Combo Route) route once. for 1 dose, Disp:  "1 each, Rfl: 0    blood-glucose sensor (DEXCOM G6 SENSOR) Apple, 1 sensor every 10 days, Disp: 10 each, Rfl: 3    blood-glucose transmitter (DEXCOM G6 TRANSMITTER) Apple, 1 transmitter every 3 months, Disp: 1 each, Rfl: 3    COMFORT EZ PEN NEEDLES 33 gauge x 3/16" Ndle, USE AS DIRECTED with Levemir pens, Disp: , Rfl: 11    EScitalopram oxalate (LEXAPRO) 10 MG tablet, Take 1 tablet (10 mg total) by mouth once daily., Disp: 90 tablet, Rfl: 1    gabapentin (NEURONTIN) 300 MG capsule, TAKE TWO CAPSULES BY MOUTH THREE TIMES DAILY FOR NEUROPATHY-(NERVE PAIN)-, Disp: 360 capsule, Rfl: 2    glucagon, human recombinant, (GLUCAGON EMERGENCY KIT, HUMAN,) 1 mg SolR, Inject 1 mg into the muscle as needed., Disp: 1 each, Rfl: 0    hydroCHLOROthiazide (HYDRODIURIL) 12.5 MG Tab, TAKE ONE CAPSULE BY MOUTH ONCE DAILY, Disp: 90 tablet, Rfl: 3    insulin detemir U-100 (LEVEMIR U-100 INSULIN) 100 unit/mL injection, Inject 40 Units into the skin every evening. To have on file in case of insulin pump failure.  Patient does not need prescription right now, Disp: 20 mL, Rfl: 3    insulin lispro 100 unit/mL injection, USE CONTINOUSLY WITH INSULIN PUMP. MAXIMUM DAILY DOSE  UNITS, Disp: 60 mL, Rfl: 11    insulin syringe-needle U-100 1/2 mL 31 gauge x 15/64" Syrg, To use 5 times per day with insulin injections if off of insulin pump.  To have on file in case of pump failure, Disp: 150 each, Rfl: 11    irbesartan (AVAPRO) 150 MG tablet, Take 1 tablet (150 mg total) by mouth once daily., Disp: 90 tablet, Rfl: 3    metFORMIN (GLUCOPHAGE-XR) 500 MG ER 24hr tablet, Take 1 tablet (500 mg total) by mouth 2 (two) times daily with meals., Disp: 180 tablet, Rfl: 2    mupirocin (BACTROBAN) 2 % ointment, APPLY TO THE AFFECTED AREA(S) THREE TIMES DAILY (Patient taking differently: 3 (three) times daily as needed. APPLY TO THE AFFECTED AREA(S) THREE TIMES DAILY), Disp: 22 g, Rfl: 1    nystatin (MYCOSTATIN) cream, Apply topically 2 (two) times daily., " Disp: 1 Tube, Rfl: 5    oxyCODONE-acetaminophen (PERCOCET) 7.5-325 mg per tablet, Take 1 tablet by mouth every 12 (twelve) hours as needed for Pain., Disp: 30 tablet, Rfl: 0    OZEMPIC 0.25 mg or 0.5 mg (2 mg/3 mL) PnIj, Inject 0.5 mg into the skin every 7 days., Disp: , Rfl:     rosuvastatin (CRESTOR) 40 MG Tab, Take 1 tablet (40 mg total) by mouth every evening., Disp: 90 tablet, Rfl: 3    semaglutide (OZEMPIC) 0.25 mg or 0.5 mg(2 mg/1.5 mL) pen injector, Inject 0.5 mg into the skin every 7 days., Disp: 1 pen, Rfl: 6    warfarin (COUMADIN) 3 MG tablet, Take 2 tablets (6 mg total) by mouth Daily., Disp: 180 tablet, Rfl: 3    [START ON 6/5/2023] doxycycline (VIBRAMYCIN) 100 MG Cap, Take 1 capsule (100 mg total) by mouth every 12 (twelve) hours. for 5 days, Disp: 10 capsule, Rfl: 0    empagliflozin (JARDIANCE) 25 mg tablet, Take 1 tablet (25 mg total) by mouth once daily., Disp: 30 tablet, Rfl: 11    fluticasone propionate (FLONASE) 50 mcg/actuation nasal spray, 2 sprays (100 mcg total) by Each Nostril route once daily., Disp: 15.8 mL, Rfl: 5    pantoprazole (PROTONIX) 40 MG tablet, Take 1 tablet (40 mg total) by mouth once daily., Disp: 30 tablet, Rfl: 11    zolpidem (AMBIEN) 10 mg Tab, Take 1 tablet (10 mg total) by mouth nightly as needed (insomina)., Disp: 30 tablet, Rfl: 1        Assessment:       1. Acute cough    2. Insomnia, unspecified type    3. Type 1 diabetes mellitus with hyperglycemia    4. Seasonal allergies           Plan:       Acute cough  -     pantoprazole (PROTONIX) 40 MG tablet; Take 1 tablet (40 mg total) by mouth once daily.  Dispense: 30 tablet; Refill: 11  -     fluticasone propionate (FLONASE) 50 mcg/actuation nasal spray; 2 sprays (100 mcg total) by Each Nostril route once daily.  Dispense: 15.8 mL; Refill: 5  -     doxycycline (VIBRAMYCIN) 100 MG Cap; Take 1 capsule (100 mg total) by mouth every 12 (twelve) hours. for 5 days  Dispense: 10 capsule; Refill: 0    Insomnia, unspecified  type  -     zolpidem (AMBIEN) 10 mg Tab; Take 1 tablet (10 mg total) by mouth nightly as needed (insomina).  Dispense: 30 tablet; Refill: 1  - Encouraged patient to discuss alternated with PCP at follow-up as disruption in sleep patterns occur on medication with long term use, pt is agreeable  - RTC in 1 month with PCP  - Encouraged compliance with CPAP  - Sleep tips provided today's visit    Type 1 diabetes mellitus with hyperglycemia  -     empagliflozin (JARDIANCE) 25 mg tablet; Take 1 tablet (25 mg total) by mouth once daily.  Dispense: 30 tablet; Refill: 11    Seasonal allergies  -     fluticasone propionate (FLONASE) 50 mcg/actuation nasal spray; 2 sprays (100 mcg total) by Each Nostril route once daily.  Dispense: 15.8 mL; Refill: 5    RTC in 1 month with PCP

## 2023-06-02 ENCOUNTER — PATIENT MESSAGE (OUTPATIENT)
Dept: CARDIOLOGY | Facility: CLINIC | Age: 58
End: 2023-06-02

## 2023-06-02 ENCOUNTER — ANTI-COAG VISIT (OUTPATIENT)
Dept: CARDIOLOGY | Facility: CLINIC | Age: 58
End: 2023-06-02
Payer: MEDICARE

## 2023-06-02 DIAGNOSIS — I48.92 PAROXYSMAL ATRIAL FLUTTER: ICD-10-CM

## 2023-06-02 DIAGNOSIS — Z95.2 H/O MECHANICAL AORTIC VALVE REPLACEMENT: ICD-10-CM

## 2023-06-02 DIAGNOSIS — Z79.01 LONG TERM (CURRENT) USE OF ANTICOAGULANTS: Primary | ICD-10-CM

## 2023-06-02 PROCEDURE — 93793 ANTICOAG MGMT PT WARFARIN: CPT | Mod: S$GLB,,,

## 2023-06-02 PROCEDURE — 93793 PR ANTICOAGULANT MGMT FOR PT TAKING WARFARIN: ICD-10-PCS | Mod: S$GLB,,,

## 2023-06-02 NOTE — PROGRESS NOTES
INR at goal. Medications and chart reviewed. No changes noted to necessitate adjustment of warfarin or follow-up plan. See calendar.  Notify CC if pt starts doxy.

## 2023-06-06 ENCOUNTER — ANTI-COAG VISIT (OUTPATIENT)
Dept: CARDIOLOGY | Facility: CLINIC | Age: 58
End: 2023-06-06
Payer: MEDICARE

## 2023-06-06 DIAGNOSIS — Z79.01 LONG TERM (CURRENT) USE OF ANTICOAGULANTS: Primary | ICD-10-CM

## 2023-06-06 DIAGNOSIS — I48.92 PAROXYSMAL ATRIAL FLUTTER: ICD-10-CM

## 2023-06-06 DIAGNOSIS — Z95.2 H/O MECHANICAL AORTIC VALVE REPLACEMENT: ICD-10-CM

## 2023-06-06 PROCEDURE — 93793 PR ANTICOAGULANT MGMT FOR PT TAKING WARFARIN: ICD-10-PCS | Mod: S$GLB,,,

## 2023-06-06 PROCEDURE — 93793 ANTICOAG MGMT PT WARFARIN: CPT | Mod: S$GLB,,,

## 2023-06-06 NOTE — PROGRESS NOTES
INR at goal. Medications and chart reviewed. No changes noted to necessitate adjustment of warfarin or follow-up plan. See calendar.    Has Rx for doxy but per previous notes not taking at this time. Please remind to call if he starts it.

## 2023-06-14 ENCOUNTER — OFFICE VISIT (OUTPATIENT)
Dept: PODIATRY | Facility: CLINIC | Age: 58
End: 2023-06-14
Payer: MEDICARE

## 2023-06-14 VITALS
BODY MASS INDEX: 35.79 KG/M2 | HEART RATE: 60 BPM | HEIGHT: 67 IN | SYSTOLIC BLOOD PRESSURE: 109 MMHG | DIASTOLIC BLOOD PRESSURE: 61 MMHG | WEIGHT: 228 LBS

## 2023-06-14 DIAGNOSIS — M92.61 HAGLUND'S DEFORMITY OF RIGHT HEEL: Primary | ICD-10-CM

## 2023-06-14 DIAGNOSIS — L97.502 ULCER OF FOOT WITH FAT LAYER EXPOSED, UNSPECIFIED LATERALITY: ICD-10-CM

## 2023-06-14 PROCEDURE — 3008F BODY MASS INDEX DOCD: CPT | Mod: CPTII,S$GLB,, | Performed by: PODIATRIST

## 2023-06-14 PROCEDURE — 3008F PR BODY MASS INDEX (BMI) DOCUMENTED: ICD-10-PCS | Mod: CPTII,S$GLB,, | Performed by: PODIATRIST

## 2023-06-14 PROCEDURE — 3074F PR MOST RECENT SYSTOLIC BLOOD PRESSURE < 130 MM HG: ICD-10-PCS | Mod: CPTII,S$GLB,, | Performed by: PODIATRIST

## 2023-06-14 PROCEDURE — 99999 PR PBB SHADOW E&M-EST. PATIENT-LVL IV: CPT | Mod: PBBFAC,,, | Performed by: PODIATRIST

## 2023-06-14 PROCEDURE — 99024 POSTOP FOLLOW-UP VISIT: CPT | Mod: 79,S$GLB,, | Performed by: PODIATRIST

## 2023-06-14 PROCEDURE — 4010F ACE/ARB THERAPY RXD/TAKEN: CPT | Mod: CPTII,S$GLB,, | Performed by: PODIATRIST

## 2023-06-14 PROCEDURE — 3044F PR MOST RECENT HEMOGLOBIN A1C LEVEL <7.0%: ICD-10-PCS | Mod: CPTII,S$GLB,, | Performed by: PODIATRIST

## 2023-06-14 PROCEDURE — 99024 PR POST-OP FOLLOW-UP VISIT: ICD-10-PCS | Mod: 79,S$GLB,, | Performed by: PODIATRIST

## 2023-06-14 PROCEDURE — 3074F SYST BP LT 130 MM HG: CPT | Mod: CPTII,S$GLB,, | Performed by: PODIATRIST

## 2023-06-14 PROCEDURE — 3078F PR MOST RECENT DIASTOLIC BLOOD PRESSURE < 80 MM HG: ICD-10-PCS | Mod: CPTII,S$GLB,, | Performed by: PODIATRIST

## 2023-06-14 PROCEDURE — 99999 PR PBB SHADOW E&M-EST. PATIENT-LVL IV: ICD-10-PCS | Mod: PBBFAC,,, | Performed by: PODIATRIST

## 2023-06-14 PROCEDURE — 4010F PR ACE/ARB THEARPY RXD/TAKEN: ICD-10-PCS | Mod: CPTII,S$GLB,, | Performed by: PODIATRIST

## 2023-06-14 PROCEDURE — 3078F DIAST BP <80 MM HG: CPT | Mod: CPTII,S$GLB,, | Performed by: PODIATRIST

## 2023-06-14 PROCEDURE — 11042 DBRDMT SUBQ TIS 1ST 20SQCM/<: CPT | Mod: RT,S$GLB,, | Performed by: PODIATRIST

## 2023-06-14 PROCEDURE — 11042 PR DEBRIDEMENT, SKIN, SUB-Q TISSUE,=<20 SQ CM: ICD-10-PCS | Mod: RT,S$GLB,, | Performed by: PODIATRIST

## 2023-06-14 PROCEDURE — 3044F HG A1C LEVEL LT 7.0%: CPT | Mod: CPTII,S$GLB,, | Performed by: PODIATRIST

## 2023-06-20 ENCOUNTER — ANTI-COAG VISIT (OUTPATIENT)
Dept: CARDIOLOGY | Facility: CLINIC | Age: 58
End: 2023-06-20
Payer: MEDICARE

## 2023-06-20 ENCOUNTER — PATIENT MESSAGE (OUTPATIENT)
Dept: PODIATRY | Facility: CLINIC | Age: 58
End: 2023-06-20
Payer: MEDICARE

## 2023-06-20 ENCOUNTER — OFFICE VISIT (OUTPATIENT)
Dept: PRIMARY CARE CLINIC | Facility: CLINIC | Age: 58
End: 2023-06-20
Payer: MEDICARE

## 2023-06-20 VITALS
SYSTOLIC BLOOD PRESSURE: 122 MMHG | HEART RATE: 71 BPM | BODY MASS INDEX: 36 KG/M2 | TEMPERATURE: 97 F | WEIGHT: 229.38 LBS | OXYGEN SATURATION: 96 % | DIASTOLIC BLOOD PRESSURE: 72 MMHG | RESPIRATION RATE: 18 BRPM | HEIGHT: 67 IN

## 2023-06-20 DIAGNOSIS — Z95.2 H/O MECHANICAL AORTIC VALVE REPLACEMENT: ICD-10-CM

## 2023-06-20 DIAGNOSIS — Z79.01 LONG TERM (CURRENT) USE OF ANTICOAGULANTS: Primary | ICD-10-CM

## 2023-06-20 DIAGNOSIS — I48.92 PAROXYSMAL ATRIAL FLUTTER: ICD-10-CM

## 2023-06-20 DIAGNOSIS — Z12.5 PROSTATE CANCER SCREENING: ICD-10-CM

## 2023-06-20 DIAGNOSIS — E10.649 TYPE 1 DIABETES MELLITUS WITH HYPOGLYCEMIA UNAWARENESS: ICD-10-CM

## 2023-06-20 DIAGNOSIS — Z00.00 ANNUAL PHYSICAL EXAM: Primary | ICD-10-CM

## 2023-06-20 PROCEDURE — 3044F PR MOST RECENT HEMOGLOBIN A1C LEVEL <7.0%: ICD-10-PCS | Mod: CPTII,S$GLB,, | Performed by: FAMILY MEDICINE

## 2023-06-20 PROCEDURE — 3074F SYST BP LT 130 MM HG: CPT | Mod: CPTII,S$GLB,, | Performed by: FAMILY MEDICINE

## 2023-06-20 PROCEDURE — 3078F PR MOST RECENT DIASTOLIC BLOOD PRESSURE < 80 MM HG: ICD-10-PCS | Mod: CPTII,S$GLB,, | Performed by: FAMILY MEDICINE

## 2023-06-20 PROCEDURE — 1159F PR MEDICATION LIST DOCUMENTED IN MEDICAL RECORD: ICD-10-PCS | Mod: CPTII,S$GLB,, | Performed by: FAMILY MEDICINE

## 2023-06-20 PROCEDURE — 99396 PR PREVENTIVE VISIT,EST,40-64: ICD-10-PCS | Mod: GZ,S$GLB,, | Performed by: FAMILY MEDICINE

## 2023-06-20 PROCEDURE — 93793 ANTICOAG MGMT PT WARFARIN: CPT | Mod: S$GLB,,,

## 2023-06-20 PROCEDURE — 99396 PREV VISIT EST AGE 40-64: CPT | Mod: GZ,S$GLB,, | Performed by: FAMILY MEDICINE

## 2023-06-20 PROCEDURE — 3074F PR MOST RECENT SYSTOLIC BLOOD PRESSURE < 130 MM HG: ICD-10-PCS | Mod: CPTII,S$GLB,, | Performed by: FAMILY MEDICINE

## 2023-06-20 PROCEDURE — 3078F DIAST BP <80 MM HG: CPT | Mod: CPTII,S$GLB,, | Performed by: FAMILY MEDICINE

## 2023-06-20 PROCEDURE — 3044F HG A1C LEVEL LT 7.0%: CPT | Mod: CPTII,S$GLB,, | Performed by: FAMILY MEDICINE

## 2023-06-20 PROCEDURE — 93793 PR ANTICOAGULANT MGMT FOR PT TAKING WARFARIN: ICD-10-PCS | Mod: S$GLB,,,

## 2023-06-20 PROCEDURE — 4010F ACE/ARB THERAPY RXD/TAKEN: CPT | Mod: CPTII,S$GLB,, | Performed by: FAMILY MEDICINE

## 2023-06-20 PROCEDURE — 3008F PR BODY MASS INDEX (BMI) DOCUMENTED: ICD-10-PCS | Mod: CPTII,S$GLB,, | Performed by: FAMILY MEDICINE

## 2023-06-20 PROCEDURE — 1160F PR REVIEW ALL MEDS BY PRESCRIBER/CLIN PHARMACIST DOCUMENTED: ICD-10-PCS | Mod: CPTII,S$GLB,, | Performed by: FAMILY MEDICINE

## 2023-06-20 PROCEDURE — 1160F RVW MEDS BY RX/DR IN RCRD: CPT | Mod: CPTII,S$GLB,, | Performed by: FAMILY MEDICINE

## 2023-06-20 PROCEDURE — 99999 PR PBB SHADOW E&M-EST. PATIENT-LVL V: ICD-10-PCS | Mod: PBBFAC,,, | Performed by: FAMILY MEDICINE

## 2023-06-20 PROCEDURE — 99999 PR PBB SHADOW E&M-EST. PATIENT-LVL V: CPT | Mod: PBBFAC,,, | Performed by: FAMILY MEDICINE

## 2023-06-20 PROCEDURE — 3008F BODY MASS INDEX DOCD: CPT | Mod: CPTII,S$GLB,, | Performed by: FAMILY MEDICINE

## 2023-06-20 PROCEDURE — 4010F PR ACE/ARB THEARPY RXD/TAKEN: ICD-10-PCS | Mod: CPTII,S$GLB,, | Performed by: FAMILY MEDICINE

## 2023-06-20 PROCEDURE — 1159F MED LIST DOCD IN RCRD: CPT | Mod: CPTII,S$GLB,, | Performed by: FAMILY MEDICINE

## 2023-06-20 RX ORDER — OXYCODONE AND ACETAMINOPHEN 7.5; 325 MG/1; MG/1
1 TABLET ORAL EVERY 12 HOURS PRN
Qty: 30 TABLET | Refills: 0 | Status: SHIPPED | OUTPATIENT
Start: 2023-06-20 | End: 2023-07-12 | Stop reason: ALTCHOICE

## 2023-06-20 NOTE — PROGRESS NOTES
"Subjective:       Patient ID: Cj Barnes is a 57 y.o. male.    Chief Complaint: Annual Exam    Cj Barnes is a 57 y.o. male seen today for a routine checkup. The patient has no specific complaints or concerns at this time.  No recent illness or injury.  Compliant with all prescribed medications without adverse side effects.     Review of Systems   Constitutional:  Negative for chills, fatigue and fever.   HENT:  Negative for congestion.    Eyes:  Negative for visual disturbance.   Respiratory:  Negative for cough and shortness of breath.    Cardiovascular:  Negative for chest pain and palpitations.   Gastrointestinal:  Negative for abdominal pain, nausea and vomiting.   Genitourinary:  Negative for difficulty urinating.   Musculoskeletal:  Negative for arthralgias.   Skin:  Positive for wound. Negative for rash.   Allergic/Immunologic: Negative for immunocompromised state.   Neurological:  Negative for dizziness.   Psychiatric/Behavioral:  Negative for sleep disturbance.      Objective:      Vitals:    06/20/23 1013   BP: 122/72   BP Location: Right arm   Patient Position: Sitting   BP Method: Medium (Manual)   Pulse: 71   Resp: 18   Temp: 97.1 °F (36.2 °C)   TempSrc: Temporal   SpO2: 96%   Weight: 104.1 kg (229 lb 6.2 oz)   Height: 5' 7" (1.702 m)     BP Readings from Last 5 Encounters:   06/20/23 122/72   06/14/23 109/61   05/31/23 118/62   05/16/23 125/64   04/25/23 119/62     Wt Readings from Last 5 Encounters:   06/20/23 104.1 kg (229 lb 6.2 oz)   06/14/23 103.4 kg (228 lb)   05/31/23 103.5 kg (228 lb 4.6 oz)   05/16/23 103 kg (227 lb 1.2 oz)   04/25/23 103.5 kg (228 lb 2.8 oz)     Physical Exam  Vitals and nursing note reviewed.   Constitutional:       General: He is not in acute distress.     Appearance: Normal appearance. He is well-developed.   HENT:      Head: Normocephalic and atraumatic.   Cardiovascular:      Rate and Rhythm: Normal rate and regular rhythm.      Heart sounds: Murmur " heard.   Pulmonary:      Effort: Pulmonary effort is normal.      Breath sounds: Normal breath sounds.   Musculoskeletal:      Right lower leg: No edema.      Left lower leg: No edema.   Skin:     General: Skin is warm and dry.   Neurological:      Mental Status: He is alert and oriented to person, place, and time.   Psychiatric:         Mood and Affect: Mood normal.         Behavior: Behavior normal.       Lab Results   Component Value Date    WBC 7.83 03/03/2023    HGB 15.0 03/03/2023    HCT 46.8 03/03/2023     03/03/2023    CHOL 95 (L) 03/03/2023    TRIG 114 03/03/2023    HDL 31 (L) 03/03/2023    ALT 15 03/03/2023    AST 15 03/03/2023     03/03/2023    K 4.4 03/03/2023     03/03/2023    CREATININE 0.9 03/03/2023    BUN 13 03/03/2023    CO2 26 03/03/2023    TSH 1.062 11/03/2022    PSA 0.40 06/10/2022    INR 2.6 (H) 06/20/2023    GLUF 160 (H) 01/09/2020    HGBA1C 6.1 (H) 03/03/2023      Assessment:       1. Annual physical exam    2. Prostate cancer screening    3. H/O mechanical aortic valve replacement    4. Type 1 diabetes mellitus with hypoglycemia unawareness        Plan:       Annual physical exam  All age-appropriate screening UTD or scheduled  Prostate cancer screening  -     PSA, Screening; Future; Expected date: 06/20/2023    H/O mechanical aortic valve replacement  Stable, followed by cardiology  Type 1 diabetes mellitus with hypoglycemia unawareness  Excellent glycemic control on current regimen (T slim insulin pump with Dexcom CGM)    Medication List with Changes/Refills   Current Medications    ASPIRIN (ECOTRIN) 81 MG EC TABLET    Take 1 tablet (81 mg total) by mouth once daily.    BAQSIMI 3 MG/ACTUATION SPRY    USE ONE actuation into a single nostril no response MAY REPEAT in 15 minutes USE a new device    BLOOD-GLUCOSE METER,CONTINUOUS (DEXCOM G6 ) MISC    1 Device by Misc.(Non-Drug; Combo Route) route once. for 1 dose    BLOOD-GLUCOSE SENSOR (DEXCOM G6 SENSOR) MORIS    1  "sensor every 10 days    BLOOD-GLUCOSE TRANSMITTER (DEXCOM G6 TRANSMITTER) MORIS    1 transmitter every 3 months    COMFORT EZ PEN NEEDLES 33 GAUGE X 3/16" NDLE    USE AS DIRECTED with Levemir pens    EMPAGLIFLOZIN (JARDIANCE) 25 MG TABLET    Take 1 tablet (25 mg total) by mouth once daily.    ESCITALOPRAM OXALATE (LEXAPRO) 10 MG TABLET    Take 1 tablet (10 mg total) by mouth once daily.    FLUTICASONE PROPIONATE (FLONASE) 50 MCG/ACTUATION NASAL SPRAY    2 sprays (100 mcg total) by Each Nostril route once daily.    GABAPENTIN (NEURONTIN) 300 MG CAPSULE    TAKE TWO CAPSULES BY MOUTH THREE TIMES DAILY FOR NEUROPATHY-(NERVE PAIN)-    GLUCAGON, HUMAN RECOMBINANT, (GLUCAGON EMERGENCY KIT, HUMAN,) 1 MG SOLR    Inject 1 mg into the muscle as needed.    HYDROCHLOROTHIAZIDE (HYDRODIURIL) 12.5 MG TAB    TAKE ONE CAPSULE BY MOUTH ONCE DAILY    INSULIN DETEMIR U-100 (LEVEMIR U-100 INSULIN) 100 UNIT/ML INJECTION    Inject 40 Units into the skin every evening. To have on file in case of insulin pump failure.  Patient does not need prescription right now    INSULIN LISPRO 100 UNIT/ML INJECTION    USE CONTINOUSLY WITH INSULIN PUMP. MAXIMUM DAILY DOSE  UNITS    INSULIN SYRINGE-NEEDLE U-100 1/2 ML 31 GAUGE X 15/64" SYRG    To use 5 times per day with insulin injections if off of insulin pump.  To have on file in case of pump failure    IRBESARTAN (AVAPRO) 150 MG TABLET    Take 1 tablet (150 mg total) by mouth once daily.    METFORMIN (GLUCOPHAGE-XR) 500 MG ER 24HR TABLET    Take 1 tablet (500 mg total) by mouth 2 (two) times daily with meals.    MUPIROCIN (BACTROBAN) 2 % OINTMENT    APPLY TO THE AFFECTED AREA(S) THREE TIMES DAILY    NYSTATIN (MYCOSTATIN) CREAM    Apply topically 2 (two) times daily.    OXYCODONE-ACETAMINOPHEN (PERCOCET) 7.5-325 MG PER TABLET    Take 1 tablet by mouth every 12 (twelve) hours as needed for Pain.    PANTOPRAZOLE (PROTONIX) 40 MG TABLET    Take 1 tablet (40 mg total) by mouth once daily.    " ROSUVASTATIN (CRESTOR) 40 MG TAB    Take 1 tablet (40 mg total) by mouth every evening.    SEMAGLUTIDE (OZEMPIC) 0.25 MG OR 0.5 MG(2 MG/1.5 ML) PEN INJECTOR    Inject 0.5 mg into the skin every 7 days.    WARFARIN (COUMADIN) 3 MG TABLET    Take 2 tablets (6 mg total) by mouth Daily.    ZOLPIDEM (AMBIEN) 10 MG TAB    Take 1 tablet (10 mg total) by mouth nightly as needed (insomina).   Discontinued Medications    OZEMPIC 0.25 MG OR 0.5 MG (2 MG/3 ML) PNIJ    Inject 0.5 mg into the skin every 7 days.

## 2023-06-20 NOTE — PROGRESS NOTES
"    Patient presents status post calcaneal exostectomy with Achilles tendon debridement and bursectomy.   Patient to remain nonweightbearing.  Incision dehisced full-thickness with proximal undermining wound.  He has been seeing wound care in [facility].  Mild discomfort.  Feels he is improving overall.  Wound measures 0.8 cm x 0.8 cm by 0.1 cm, post debridement.  This is granular with some fibrotic tissue.  There is some undermining proximally.  This area was also debrided with a curette and cultured.  Patient will begin broad-spectrum antibiotic therapy for now.  Continue wound care.  Patient has been improving/going to the wound clinic.  Increased granular tissue noted.  Continue wound clinic follow-up.  Follow-up in 2 weeks.    Wound Debridement    Performed by: Eugene Bowers DPM   Authorized by: Patient    Time out: Immediately prior to procedure a "time out" was called to verify the correct patient, procedure, equipment, support staff and site/side marked as required.   Consent Done?:  Yes (Verbal)  Local anesthesia used?: No       Wound Details:    Location:  Right foot     Type of Debridement:  Excisional       Length (cm):  0.8       Width (cm):  0.8       Depth (cm):  0.1       Percent Debrided (%):  100             Depth of debridement:  Subcutaneous tissue    Tissue debrided:  Dermis, Epidermis and Subcutaneous    Devitalized tissue debrided:  Biofilm, Callus and Necrotic/Eschar    Instruments:  Blade, Curette and Nippers     Bleeding:  Minimal  Hemostasis Achieved: Yes    Method Used:  Pressure  Patient tolerance:  Patient tolerated the procedure well with no immediate complications      "

## 2023-06-22 DIAGNOSIS — E10.649 TYPE 1 DIABETES MELLITUS WITH HYPOGLYCEMIA UNAWARENESS: ICD-10-CM

## 2023-06-22 DIAGNOSIS — E10.65 TYPE 1 DIABETES MELLITUS WITH HYPERGLYCEMIA: ICD-10-CM

## 2023-06-22 DIAGNOSIS — Z96.41 INSULIN PUMP IN PLACE: ICD-10-CM

## 2023-06-22 DIAGNOSIS — Z46.81 INSULIN PUMP FITTING OR ADJUSTMENT: ICD-10-CM

## 2023-06-22 RX ORDER — BLOOD-GLUCOSE SENSOR
EACH MISCELLANEOUS
Qty: 10 EACH | Refills: 3 | Status: SHIPPED | OUTPATIENT
Start: 2023-06-22 | End: 2024-01-04

## 2023-06-22 NOTE — TELEPHONE ENCOUNTER
The order for pts insulin pump is in your box, can you please sign it? I filled out as much as I could    intraluminal device intraluminal device

## 2023-07-08 DIAGNOSIS — E10.65 TYPE 1 DIABETES MELLITUS WITH HYPERGLYCEMIA: ICD-10-CM

## 2023-07-10 ENCOUNTER — PATIENT MESSAGE (OUTPATIENT)
Dept: CARDIOLOGY | Facility: CLINIC | Age: 58
End: 2023-07-10

## 2023-07-10 ENCOUNTER — ANTI-COAG VISIT (OUTPATIENT)
Dept: CARDIOLOGY | Facility: CLINIC | Age: 58
End: 2023-07-10
Payer: MEDICARE

## 2023-07-10 DIAGNOSIS — I48.92 PAROXYSMAL ATRIAL FLUTTER: ICD-10-CM

## 2023-07-10 DIAGNOSIS — Z95.2 H/O MECHANICAL AORTIC VALVE REPLACEMENT: ICD-10-CM

## 2023-07-10 DIAGNOSIS — Z79.01 LONG TERM (CURRENT) USE OF ANTICOAGULANTS: Primary | ICD-10-CM

## 2023-07-10 PROCEDURE — 93793 PR ANTICOAGULANT MGMT FOR PT TAKING WARFARIN: ICD-10-PCS | Mod: S$GLB,,,

## 2023-07-10 PROCEDURE — 93793 ANTICOAG MGMT PT WARFARIN: CPT | Mod: S$GLB,,,

## 2023-07-10 RX ORDER — SEMAGLUTIDE 0.68 MG/ML
INJECTION, SOLUTION SUBCUTANEOUS
Qty: 3 ML | Refills: 6 | Status: SHIPPED | OUTPATIENT
Start: 2023-07-10 | End: 2024-01-02

## 2023-07-12 ENCOUNTER — OFFICE VISIT (OUTPATIENT)
Dept: CARDIOLOGY | Facility: CLINIC | Age: 58
End: 2023-07-12
Payer: MEDICARE

## 2023-07-12 VITALS
DIASTOLIC BLOOD PRESSURE: 55 MMHG | WEIGHT: 232.81 LBS | HEART RATE: 64 BPM | HEIGHT: 67 IN | SYSTOLIC BLOOD PRESSURE: 108 MMHG | OXYGEN SATURATION: 95 % | BODY MASS INDEX: 36.54 KG/M2

## 2023-07-12 DIAGNOSIS — Z95.2 H/O MECHANICAL AORTIC VALVE REPLACEMENT: ICD-10-CM

## 2023-07-12 DIAGNOSIS — E10.649 TYPE 1 DIABETES MELLITUS WITH HYPOGLYCEMIA UNAWARENESS: ICD-10-CM

## 2023-07-12 DIAGNOSIS — E08.41 DIABETIC MONONEUROPATHY ASSOCIATED WITH DIABETES MELLITUS DUE TO UNDERLYING CONDITION: ICD-10-CM

## 2023-07-12 DIAGNOSIS — I25.10 CORONARY ARTERY DISEASE INVOLVING NATIVE CORONARY ARTERY OF NATIVE HEART WITHOUT ANGINA PECTORIS: ICD-10-CM

## 2023-07-12 DIAGNOSIS — E78.5 HYPERLIPIDEMIA WITH TARGET LOW DENSITY LIPOPROTEIN (LDL) CHOLESTEROL LESS THAN 70 MG/DL: ICD-10-CM

## 2023-07-12 DIAGNOSIS — Z95.828 S/P ASCENDING AORTIC REPLACEMENT: Primary | ICD-10-CM

## 2023-07-12 PROCEDURE — 1160F RVW MEDS BY RX/DR IN RCRD: CPT | Mod: CPTII,S$GLB,, | Performed by: INTERNAL MEDICINE

## 2023-07-12 PROCEDURE — 99999 PR PBB SHADOW E&M-EST. PATIENT-LVL IV: ICD-10-PCS | Mod: PBBFAC,,, | Performed by: INTERNAL MEDICINE

## 2023-07-12 PROCEDURE — 3044F PR MOST RECENT HEMOGLOBIN A1C LEVEL <7.0%: ICD-10-PCS | Mod: CPTII,S$GLB,, | Performed by: INTERNAL MEDICINE

## 2023-07-12 PROCEDURE — 99999 PR PBB SHADOW E&M-EST. PATIENT-LVL IV: CPT | Mod: PBBFAC,,, | Performed by: INTERNAL MEDICINE

## 2023-07-12 PROCEDURE — 3078F DIAST BP <80 MM HG: CPT | Mod: CPTII,S$GLB,, | Performed by: INTERNAL MEDICINE

## 2023-07-12 PROCEDURE — 3044F HG A1C LEVEL LT 7.0%: CPT | Mod: CPTII,S$GLB,, | Performed by: INTERNAL MEDICINE

## 2023-07-12 PROCEDURE — 1159F MED LIST DOCD IN RCRD: CPT | Mod: CPTII,S$GLB,, | Performed by: INTERNAL MEDICINE

## 2023-07-12 PROCEDURE — 3078F PR MOST RECENT DIASTOLIC BLOOD PRESSURE < 80 MM HG: ICD-10-PCS | Mod: CPTII,S$GLB,, | Performed by: INTERNAL MEDICINE

## 2023-07-12 PROCEDURE — 93000 EKG 12-LEAD: ICD-10-PCS | Mod: S$GLB,,, | Performed by: INTERNAL MEDICINE

## 2023-07-12 PROCEDURE — 4010F ACE/ARB THERAPY RXD/TAKEN: CPT | Mod: CPTII,S$GLB,, | Performed by: INTERNAL MEDICINE

## 2023-07-12 PROCEDURE — 3074F PR MOST RECENT SYSTOLIC BLOOD PRESSURE < 130 MM HG: ICD-10-PCS | Mod: CPTII,S$GLB,, | Performed by: INTERNAL MEDICINE

## 2023-07-12 PROCEDURE — 1159F PR MEDICATION LIST DOCUMENTED IN MEDICAL RECORD: ICD-10-PCS | Mod: CPTII,S$GLB,, | Performed by: INTERNAL MEDICINE

## 2023-07-12 PROCEDURE — 1160F PR REVIEW ALL MEDS BY PRESCRIBER/CLIN PHARMACIST DOCUMENTED: ICD-10-PCS | Mod: CPTII,S$GLB,, | Performed by: INTERNAL MEDICINE

## 2023-07-12 PROCEDURE — 3008F BODY MASS INDEX DOCD: CPT | Mod: CPTII,S$GLB,, | Performed by: INTERNAL MEDICINE

## 2023-07-12 PROCEDURE — 99213 PR OFFICE/OUTPT VISIT, EST, LEVL III, 20-29 MIN: ICD-10-PCS | Mod: 25,S$GLB,, | Performed by: INTERNAL MEDICINE

## 2023-07-12 PROCEDURE — 99213 OFFICE O/P EST LOW 20 MIN: CPT | Mod: 25,S$GLB,, | Performed by: INTERNAL MEDICINE

## 2023-07-12 PROCEDURE — 4010F PR ACE/ARB THEARPY RXD/TAKEN: ICD-10-PCS | Mod: CPTII,S$GLB,, | Performed by: INTERNAL MEDICINE

## 2023-07-12 PROCEDURE — 3008F PR BODY MASS INDEX (BMI) DOCUMENTED: ICD-10-PCS | Mod: CPTII,S$GLB,, | Performed by: INTERNAL MEDICINE

## 2023-07-12 PROCEDURE — 93000 ELECTROCARDIOGRAM COMPLETE: CPT | Mod: S$GLB,,, | Performed by: INTERNAL MEDICINE

## 2023-07-12 PROCEDURE — 3074F SYST BP LT 130 MM HG: CPT | Mod: CPTII,S$GLB,, | Performed by: INTERNAL MEDICINE

## 2023-07-13 ENCOUNTER — OFFICE VISIT (OUTPATIENT)
Dept: PODIATRY | Facility: CLINIC | Age: 58
End: 2023-07-13
Payer: MEDICARE

## 2023-07-13 VITALS
HEART RATE: 69 BPM | HEIGHT: 67 IN | BODY MASS INDEX: 36.34 KG/M2 | SYSTOLIC BLOOD PRESSURE: 145 MMHG | WEIGHT: 231.5 LBS | DIASTOLIC BLOOD PRESSURE: 69 MMHG

## 2023-07-13 DIAGNOSIS — L97.502 ULCER OF FOOT WITH FAT LAYER EXPOSED, UNSPECIFIED LATERALITY: ICD-10-CM

## 2023-07-13 DIAGNOSIS — M92.61 HAGLUND'S DEFORMITY OF RIGHT HEEL: Primary | ICD-10-CM

## 2023-07-13 PROCEDURE — 1159F MED LIST DOCD IN RCRD: CPT | Mod: CPTII,S$GLB,, | Performed by: PODIATRIST

## 2023-07-13 PROCEDURE — 4010F ACE/ARB THERAPY RXD/TAKEN: CPT | Mod: CPTII,S$GLB,, | Performed by: PODIATRIST

## 2023-07-13 PROCEDURE — 3044F HG A1C LEVEL LT 7.0%: CPT | Mod: CPTII,S$GLB,, | Performed by: PODIATRIST

## 2023-07-13 PROCEDURE — 3008F BODY MASS INDEX DOCD: CPT | Mod: CPTII,S$GLB,, | Performed by: PODIATRIST

## 2023-07-13 PROCEDURE — 11042 DBRDMT SUBQ TIS 1ST 20SQCM/<: CPT | Mod: RT,S$GLB,, | Performed by: PODIATRIST

## 2023-07-13 PROCEDURE — 3008F PR BODY MASS INDEX (BMI) DOCUMENTED: ICD-10-PCS | Mod: CPTII,S$GLB,, | Performed by: PODIATRIST

## 2023-07-13 PROCEDURE — 99999 PR PBB SHADOW E&M-EST. PATIENT-LVL IV: ICD-10-PCS | Mod: PBBFAC,,, | Performed by: PODIATRIST

## 2023-07-13 PROCEDURE — 99212 OFFICE O/P EST SF 10 MIN: CPT | Mod: 25,S$GLB,, | Performed by: PODIATRIST

## 2023-07-13 PROCEDURE — 1159F PR MEDICATION LIST DOCUMENTED IN MEDICAL RECORD: ICD-10-PCS | Mod: CPTII,S$GLB,, | Performed by: PODIATRIST

## 2023-07-13 PROCEDURE — 3077F PR MOST RECENT SYSTOLIC BLOOD PRESSURE >= 140 MM HG: ICD-10-PCS | Mod: CPTII,S$GLB,, | Performed by: PODIATRIST

## 2023-07-13 PROCEDURE — 4010F PR ACE/ARB THEARPY RXD/TAKEN: ICD-10-PCS | Mod: CPTII,S$GLB,, | Performed by: PODIATRIST

## 2023-07-13 PROCEDURE — 99999 PR PBB SHADOW E&M-EST. PATIENT-LVL IV: CPT | Mod: PBBFAC,,, | Performed by: PODIATRIST

## 2023-07-13 PROCEDURE — 99212 PR OFFICE/OUTPT VISIT, EST, LEVL II, 10-19 MIN: ICD-10-PCS | Mod: 25,S$GLB,, | Performed by: PODIATRIST

## 2023-07-13 PROCEDURE — 3077F SYST BP >= 140 MM HG: CPT | Mod: CPTII,S$GLB,, | Performed by: PODIATRIST

## 2023-07-13 PROCEDURE — 11042 PR DEBRIDEMENT, SKIN, SUB-Q TISSUE,=<20 SQ CM: ICD-10-PCS | Mod: RT,S$GLB,, | Performed by: PODIATRIST

## 2023-07-13 PROCEDURE — 3078F PR MOST RECENT DIASTOLIC BLOOD PRESSURE < 80 MM HG: ICD-10-PCS | Mod: CPTII,S$GLB,, | Performed by: PODIATRIST

## 2023-07-13 PROCEDURE — 3044F PR MOST RECENT HEMOGLOBIN A1C LEVEL <7.0%: ICD-10-PCS | Mod: CPTII,S$GLB,, | Performed by: PODIATRIST

## 2023-07-13 PROCEDURE — 3078F DIAST BP <80 MM HG: CPT | Mod: CPTII,S$GLB,, | Performed by: PODIATRIST

## 2023-07-18 NOTE — TELEPHONE ENCOUNTER
Several attempts have been made to retrieve income documents. Patient still has not returned documents and application has denied due to incomplete packet.

## 2023-07-21 NOTE — PROGRESS NOTES
"    Patient presents status post calcaneal exostectomy with Achilles tendon debridement and bursectomy.   Patient to remain nonweightbearing.  Incision dehisced full-thickness with proximal undermining wound.  He has been seeing wound care in [facility].  Mild discomfort.  Feels he is improving overall.  Wound measures 0.3 cm x 0.2 cm by 0.1 cm, post debridement.  This is granular with some fibrotic tissue.  There is some undermining proximally.  This area was also debrided with a curette and cultured.  Patient will begin broad-spectrum antibiotic therapy for now.  Continue wound care.  Patient has been improving/going to the wound clinic.  Increased granular tissue noted.  Continue wound clinic follow-up.  Follow-up in 4 weeks.    Wound Debridement    Performed by: Eugene Bowers DPM   Authorized by: Patient    Time out: Immediately prior to procedure a "time out" was called to verify the correct patient, procedure, equipment, support staff and site/side marked as required.   Consent Done?:  Yes (Verbal)  Local anesthesia used?: No       Wound Details:    Location:  Right foot     Type of Debridement:  Excisional       Length (cm):  0.3       Width (cm):  0.2       Depth (cm):  0.1       Percent Debrided (%):  100             Depth of debridement:  Subcutaneous tissue    Tissue debrided:  Dermis, Epidermis and Subcutaneous    Devitalized tissue debrided:  Biofilm, Callus and Necrotic/Eschar    Instruments:  Blade, Curette and Nippers     Bleeding:  Minimal  Hemostasis Achieved: Yes    Method Used:  Pressure  Patient tolerance:  Patient tolerated the procedure well with no immediate complications        "

## 2023-07-24 DIAGNOSIS — G47.00 INSOMNIA, UNSPECIFIED TYPE: ICD-10-CM

## 2023-07-24 RX ORDER — TADALAFIL 10 MG/1
10 TABLET ORAL DAILY PRN
COMMUNITY
End: 2023-07-24 | Stop reason: SDUPTHER

## 2023-07-24 RX ORDER — ZOLPIDEM TARTRATE 10 MG/1
10 TABLET ORAL NIGHTLY PRN
Qty: 30 TABLET | Refills: 0 | Status: SHIPPED | OUTPATIENT
Start: 2023-07-24 | End: 2023-08-23

## 2023-07-24 NOTE — TELEPHONE ENCOUNTER
No care due was identified.  [facility] Embedded Care Due Messages. Reference number: [id number].   7/24/2023 8:50:11 AM CDT   All other review of systems negative, except as noted in HPI

## 2023-07-24 NOTE — TELEPHONE ENCOUNTER
----- Message from Serena Lima sent at 7/24/2023  3:53 PM CDT -----  Contact: pt @ [phone]  Rx Refill/Request    Is this a Refill or New Rx:  refill    Rx Name and Strength:  tadalafiL (CIALIS) 10 MG tablet\    Preferred Pharmacy with phone number:[facility] - [address]  [address] [place] [address]  Phone: [phone] Fax: [phone]      Communication Preference:Asking for call back  Additional Information

## 2023-07-25 RX ORDER — TADALAFIL 10 MG/1
10 TABLET ORAL DAILY PRN
Qty: 90 TABLET | Refills: 3 | Status: SHIPPED | OUTPATIENT
Start: 2023-07-25

## 2023-07-30 DIAGNOSIS — E10.65 TYPE 1 DIABETES MELLITUS WITH HYPERGLYCEMIA: Primary | ICD-10-CM

## 2023-07-31 RX ORDER — INSULIN LISPRO 100 [IU]/ML
INJECTION, SOLUTION INTRAVENOUS; SUBCUTANEOUS
Qty: 60 ML | Refills: 1 | Status: SHIPPED | OUTPATIENT
Start: 2023-07-31 | End: 2023-11-10 | Stop reason: SDUPTHER

## 2023-08-03 ENCOUNTER — TELEPHONE (OUTPATIENT)
Dept: ENDOSCOPY | Facility: HOSPITAL | Age: 58
End: 2023-08-03

## 2023-08-03 ENCOUNTER — CLINICAL SUPPORT (OUTPATIENT)
Dept: ENDOSCOPY | Facility: HOSPITAL | Age: 58
End: 2023-08-03
Payer: MEDICARE

## 2023-08-03 DIAGNOSIS — Z12.11 SCREENING FOR MALIGNANT NEOPLASM OF COLON: ICD-10-CM

## 2023-08-03 RX ORDER — SODIUM, POTASSIUM,MAG SULFATES 17.5-3.13G
SOLUTION, RECONSTITUTED, ORAL ORAL
Qty: 1 KIT | Refills: 0 | Status: SHIPPED | OUTPATIENT
Start: 2023-08-03 | End: 2024-01-02 | Stop reason: ALTCHOICE

## 2023-08-03 NOTE — PLAN OF CARE
Spoke to patient to schedule procedure(s) Colonoscopy       Physician to perform procedure(s) Dr. MAUREEN Louis  Date of Procedure (s) 10/26/23  Arrival Time 6:45 AM  Time of Procedure(s) 7:45 AM   Location of Procedure(s) [place] 4th Floor  Type of Rx Prep sent to patient: Suprep  Instructions provided to patient via [facility]    Patient was informed on the following information and verbalized understanding. Screening questionnaire reviewed with patient and complete. If procedure requires anesthesia, a responsible adult needs to be present to accompany the patient home, patient cannot drive after receiving anesthesia. Appointment details are tentative, especially check-in time. Patient will receive a prep-op call 4 days prior to confirm check-in time for procedure. If applicable the patient should contact their pharmacy to verify Rx for procedure prep is ready for pick-up. Patient was advised to call the scheduling department at [phone] if pharmacy states no Rx is available. Patient was advised to call the endoscopy scheduling department if any questions or concerns arise.      [facility] Scheduling Department

## 2023-08-03 NOTE — TELEPHONE ENCOUNTER
Spoke to patient to schedule procedure(s) Colonoscopy       Physician to perform procedure(s) Dr. MAUREEN Louis  Date of Procedure (s) 10/26/23  Arrival Time 6:45 AM  Time of Procedure(s) 7:45 AM   Location of Procedure(s) [address]  Type of Rx Prep sent to patient: Suprep  Instructions provided to patient via [facility]    Patient was informed on the following information and verbalized understanding. Screening questionnaire reviewed with patient and complete. If procedure requires anesthesia, a responsible adult needs to be present to accompany the patient home, patient cannot drive after receiving anesthesia. Appointment details are tentative, especially check-in time. Patient will receive a prep-op call 4 days prior to confirm check-in time for procedure. If applicable the patient should contact their pharmacy to verify Rx for procedure prep is ready for pick-up. Patient was advised to call the scheduling department at [phone] if pharmacy states no Rx is available. Patient was advised to call the endoscopy scheduling department if any questions or concerns arise.       Endoscopy Scheduling Department         Colonoscopy Procedure Prep Instructions        Date of procedure: 10/26/23 Arrive at: 6:45AM     Location of Department:   [facility] [address] [place] [address]  Take the [facility] Elevators to 4th Floor Endoscopy Lab     As soon as possible:   your prep from pharmacy and over the counter DULCOLAX LAXATIVE TABLETS      On the day before your procedure   What You CAN do:   You may have clear liquids ONLY -see below for list.      Liquids That Are OK to Drink:   Water  Sports drinks (Gatorade, Power-Aid)  Coffee or tea (no cream or nondairy creamer)  Clear juices without pulp (apple, white grape)  Gelatin desserts (no fruit or toppings)  Clear soda (sprite, coke, ginger ale)  Chicken broth (until 12 midnight the night before procedure)        What You CANNOT do:    Do not EAT solid food, drink milk or anything   colored red.  Do not drink alcohol.  Do not take oral medications within 1 hour of starting   each dose of SUPREP.  No gum chewing or candy morning of procedure.        Note:   (Please disregard the insert instructions from pharmacy).  SUPREP Bowel Prep Kit is indicated for cleansing of the colon as a preparation for colonoscopy in adults.   Be sure to tell your doctor about all the medicines you take, including prescription and non-prescription medicines, vitamins, and herbal supplements. SUPREP Bowel Prep Kit may affect how other medicines work.  Medication taken by mouth may not be absorbed properly when taken within 1 hour before the start of each dose of SUPREP Bowel Prep Kit.     It is not uncommon to experience some abdominal cramping, nausea and/or vomiting when taking the prep. If you have nausea and/or vomiting while taking the prep, stop drinking for 20 to 30 minutes then continue.     How to take prep:     SUPREP Bowel Prep Kit is a (2-day) prep.   Both 6-ounce bottles are required for a complete preparation for colonoscopy. Dilute the solution concentrate as directed prior to use. You must drink water with each dose of SUPREP, and additional water after each dose.     DOSE 1--Day Before Colonoscopy 10/25/23     Drink at least 6 to 8 glasses of clear liquids from time you wake up until you begin your prep and then continue until bedtime to avoid dehydration.      12:00 pm (NOON) Take four (4) Dulcolax (Bisacodyl) tablets with at least 8 ounces or more of clear liquids.       6:00 pm:     You must complete Steps 1 through 4 using one (1) 6-ounce bottle before going to bed as shown below:     Step 1-Pour ONE (1) 6-ounce bottle of SUPREP liquid into the mixing container.  Step 2-Add cool drinking water to the 16-ounce line on the container and mix.  Step 3-Drink ALL the liquid in the container.  Step 4-You must drink two (2) more 16-ounce containers of water  over the next 1 hour.  IMPORTANT: If you experience preparation-related symptoms (for example, nausea, bloating, or cramping), stop, or slow the rate of drinking the additional water until your symptoms decrease.     DOSE 2--Day of the Colonoscopy 10/26/23 at 2-3 AM     For this dose, repeat Steps 1 through 4 shown above using the other 6-ounce bottle.   You may continue drinking water/clear liquids until   4 hours before your colonoscopy or as directed by the scheduling nurse  3:45AM.     For more information about your procedure, please watch this informational video. It is important to watch this animated consent video prior to your arrival. If you haven't watched the video prior to arriving, you are required to watch it during admission which can cause delays.      Options for viewing:  Using a keyboard:  press and hold the control tab (Ctrl) and left mouse click to follow link          Colonoscopy Instructional Video                                                         OR     Type link address into your web browser's address bar:  [email or web address]     Using a mobile phone: tap on web address/link.      Comments: If you are taking any injectable medication (s) for weight loss and/or diabetes  weekly, please hold for 7 days prior to your scheduled procedure 10/19/23.           IMPORTANT INFORMATION TO KNOW BEFORE YOUR PROCEDURE     [facility] [place] 4th Floor     If your procedure requires the administration of anesthesia, it is necessary for a responsible adult to drive you home. (Medical Transportation, Uber, Lyft, Taxi, etc. may ONLY be used if a responsible adult is present to accompany you home.  The responsible adult CAN'T be the  of the service).       person must be available to return to pick you up within 30 minutes of being notified of discharge.      Due to the limited socially distant seating in our waiting room, please limit your guest  (1) who accompany you for this procedure. If someone accompanies you for this procedure into the facility:     Consider having them proceed to an area that is socially distant other than the lobby until a member of the medical team contacts them to provide an update after the procedure.      Also, please consider being dropped off and picked up from the facility.        Please bring a picture ID, insurance card, & copayment     Take Medications as directed below:        1) Stop taking Coumadin/Jantoven (warfarin) 5 days (prior to the procedure) on:  10/21/23.                2) If you are taking any injectable medication (s) for weight loss and/or diabetes  weekly, please hold for 7 days prior to your scheduled procedure 10/19/23.      If you begin taking any blood thinning medications, please contact the  listed below as soon as possible.     If you are diabetic see the attached instruction sheet regarding your medication.      If you take HEART, BLOOD PRESSURE, SEIZURE, PAIN, LUNG (including inhalers/nebulizers), ANTI-REJECTION (transplant patients), or PSYCHIATRIC medications, please take at your regular times with a sip of water or as directed by the scheduling nurse.      Important contact information:     Endoscopy [phone] Hours of operation Monday-Friday 8:00-4:30pm.     Questions about insurance or financial obligations call [phone] or [phone].     If you have questions regarding the prep or need to reschedule, please call [phone]. After hours questions requiring immediate assistance, contact [facility] nurse line at [phone] or [phone].   NOTE:      On occasion, unforeseen circumstances may cause a delay in your procedure start time. We respect your time and appreciate your patience during these circumstances.            Diabetes Medication Instructions

## 2023-08-04 ENCOUNTER — TELEPHONE (OUTPATIENT)
Dept: ENDOSCOPY | Facility: HOSPITAL | Age: 58
End: 2023-08-04
Payer: MEDICARE

## 2023-08-04 NOTE — TELEPHONE ENCOUNTER
Patient has a scheduled procedure Colonoscopy on 10/26/23 and is currently taking a blood thinner prescribed by your office. In order to ensure patient safety, we would like to confirm that the patient can place their blood thinner medication on hold for the procedure. Can he/she discontinue Coumadin/Jantoven (warfarin) for a minimum of 5 days prior to the procedure?     Thank you for your prompt reply.    [facility] Endoscopy Scheduling

## 2023-08-04 NOTE — TELEPHONE ENCOUNTER
We have cleared this patient to hold coumadin 5 days for colonoscopy 10/26. We are planning to bridge with lovenox due to mechanical aortic valve. Please let us know if you have any questions or concerns otherwise we will proceed as planned.     Respectfully,   Mary Carmen Valentine, PharmD, [facility]  Coumadin Clinic  Ph [phone]

## 2023-08-07 ENCOUNTER — TELEPHONE (OUTPATIENT)
Dept: ENDOSCOPY | Facility: HOSPITAL | Age: 58
End: 2023-08-07
Payer: MEDICARE

## 2023-08-07 NOTE — TELEPHONE ENCOUNTER
Patient has been approved to hold Coumadin/Jantoven (warfarin) for 5 days day(s) per [facility]in Clinic.

## 2023-08-07 NOTE — TELEPHONE ENCOUNTER
Thanks for reaching out. Patient may hold warfarin 5 days for procedure. Please keep us updated if plans change. He will not require bridging.

## 2023-08-07 NOTE — PROGRESS NOTES
"Received request for clearance for cscope on 10/26 to hold warfarin 5 days. Pt may hold. As noted for procedure in January, Dr. Alexander confirmed recommendation without bridge. Per that note, "He replied that lovenox not needed since he is in NSR. He added that pt has carbomedics AV and TIA was possibly hypoglycemic episode. Patient may hold without lovenox."           "

## 2023-08-07 NOTE — TELEPHONE ENCOUNTER
----- Message from Rupal Do RN sent at 8/3/2023  4:25 PM CDT -----  Regarding: 10/26/23 BT  The patient is currently under an internal cardiologist Coumadin Clinic care and requires a blood thinner Coumadin/Jantoven (warfarin) for their upcoming scheduled Colonoscopy on 10/26/23.

## 2023-08-15 ENCOUNTER — ANTI-COAG VISIT (OUTPATIENT)
Dept: CARDIOLOGY | Facility: CLINIC | Age: 58
End: 2023-08-15
Payer: MEDICARE

## 2023-08-15 DIAGNOSIS — Z79.01 LONG TERM (CURRENT) USE OF ANTICOAGULANTS: Primary | ICD-10-CM

## 2023-08-15 DIAGNOSIS — Z95.2 H/O MECHANICAL AORTIC VALVE REPLACEMENT: ICD-10-CM

## 2023-08-15 DIAGNOSIS — I48.92 PAROXYSMAL ATRIAL FLUTTER: ICD-10-CM

## 2023-08-15 PROCEDURE — 93793 ANTICOAG MGMT PT WARFARIN: CPT | Mod: S$GLB,,,

## 2023-08-15 PROCEDURE — 93793 PR ANTICOAGULANT MGMT FOR PT TAKING WARFARIN: ICD-10-PCS | Mod: S$GLB,,,

## 2023-08-17 DIAGNOSIS — G47.00 INSOMNIA, UNSPECIFIED TYPE: ICD-10-CM

## 2023-08-21 ENCOUNTER — OFFICE VISIT (OUTPATIENT)
Dept: PODIATRY | Facility: CLINIC | Age: 58
End: 2023-08-21
Payer: MEDICARE

## 2023-08-21 VITALS
DIASTOLIC BLOOD PRESSURE: 74 MMHG | HEIGHT: 67 IN | SYSTOLIC BLOOD PRESSURE: 119 MMHG | HEART RATE: 70 BPM | WEIGHT: 225.75 LBS | BODY MASS INDEX: 35.43 KG/M2

## 2023-08-21 DIAGNOSIS — M92.61 HAGLUND'S DEFORMITY OF RIGHT HEEL: Primary | ICD-10-CM

## 2023-08-21 PROCEDURE — 1159F MED LIST DOCD IN RCRD: CPT | Mod: CPTII,S$GLB,, | Performed by: PODIATRIST

## 2023-08-21 PROCEDURE — 3074F PR MOST RECENT SYSTOLIC BLOOD PRESSURE < 130 MM HG: ICD-10-PCS | Mod: CPTII,S$GLB,, | Performed by: PODIATRIST

## 2023-08-21 PROCEDURE — 4010F ACE/ARB THERAPY RXD/TAKEN: CPT | Mod: CPTII,S$GLB,, | Performed by: PODIATRIST

## 2023-08-21 PROCEDURE — 3044F PR MOST RECENT HEMOGLOBIN A1C LEVEL <7.0%: ICD-10-PCS | Mod: CPTII,S$GLB,, | Performed by: PODIATRIST

## 2023-08-21 PROCEDURE — 1159F PR MEDICATION LIST DOCUMENTED IN MEDICAL RECORD: ICD-10-PCS | Mod: CPTII,S$GLB,, | Performed by: PODIATRIST

## 2023-08-21 PROCEDURE — 99024 POSTOP FOLLOW-UP VISIT: CPT | Mod: S$GLB,,, | Performed by: PODIATRIST

## 2023-08-21 PROCEDURE — 3078F DIAST BP <80 MM HG: CPT | Mod: CPTII,S$GLB,, | Performed by: PODIATRIST

## 2023-08-21 PROCEDURE — 99999 PR PBB SHADOW E&M-EST. PATIENT-LVL IV: ICD-10-PCS | Mod: PBBFAC,,, | Performed by: PODIATRIST

## 2023-08-21 PROCEDURE — 3044F HG A1C LEVEL LT 7.0%: CPT | Mod: CPTII,S$GLB,, | Performed by: PODIATRIST

## 2023-08-21 PROCEDURE — 3074F SYST BP LT 130 MM HG: CPT | Mod: CPTII,S$GLB,, | Performed by: PODIATRIST

## 2023-08-21 PROCEDURE — 99999 PR PBB SHADOW E&M-EST. PATIENT-LVL IV: CPT | Mod: PBBFAC,,, | Performed by: PODIATRIST

## 2023-08-21 PROCEDURE — 3008F PR BODY MASS INDEX (BMI) DOCUMENTED: ICD-10-PCS | Mod: CPTII,S$GLB,, | Performed by: PODIATRIST

## 2023-08-21 PROCEDURE — 3008F BODY MASS INDEX DOCD: CPT | Mod: CPTII,S$GLB,, | Performed by: PODIATRIST

## 2023-08-21 PROCEDURE — 3078F PR MOST RECENT DIASTOLIC BLOOD PRESSURE < 80 MM HG: ICD-10-PCS | Mod: CPTII,S$GLB,, | Performed by: PODIATRIST

## 2023-08-21 PROCEDURE — 4010F PR ACE/ARB THEARPY RXD/TAKEN: ICD-10-PCS | Mod: CPTII,S$GLB,, | Performed by: PODIATRIST

## 2023-08-21 PROCEDURE — 99024 PR POST-OP FOLLOW-UP VISIT: ICD-10-PCS | Mod: S$GLB,,, | Performed by: PODIATRIST

## 2023-08-23 RX ORDER — ZOLPIDEM TARTRATE 10 MG/1
10 TABLET ORAL NIGHTLY
Qty: 30 TABLET | Refills: 0 | Status: SHIPPED | OUTPATIENT
Start: 2023-08-23 | End: 2023-09-21

## 2023-08-24 NOTE — PROGRESS NOTES
Patient presents status post calcaneal exostectomy with Achilles tendon debridement and bursectomy.  Postop wound dehiscence has subsequently healed.  He is in shoe gear at this point.  No tenderness to the posterior Achilles tendon insertion and calcaneal surgical sites.  Continue slow transition back into normal activity and shoe gear and follow-up as needed at this point/3 months for routine diabetic foot evaluation.

## 2023-08-25 DIAGNOSIS — E10.65 TYPE 1 DIABETES MELLITUS WITH HYPERGLYCEMIA: ICD-10-CM

## 2023-08-25 RX ORDER — GABAPENTIN 300 MG/1
CAPSULE ORAL
Qty: 360 CAPSULE | Refills: 2 | Status: SHIPPED | OUTPATIENT
Start: 2023-08-25 | End: 2023-10-19

## 2023-09-05 ENCOUNTER — PATIENT MESSAGE (OUTPATIENT)
Dept: CARDIOLOGY | Facility: CLINIC | Age: 58
End: 2023-09-05

## 2023-09-05 ENCOUNTER — PATIENT MESSAGE (OUTPATIENT)
Dept: DIABETES | Facility: CLINIC | Age: 58
End: 2023-09-05
Payer: MEDICARE

## 2023-09-05 ENCOUNTER — TELEPHONE (OUTPATIENT)
Dept: DIABETES | Facility: CLINIC | Age: 58
End: 2023-09-05
Payer: MEDICARE

## 2023-09-05 ENCOUNTER — ANTI-COAG VISIT (OUTPATIENT)
Dept: CARDIOLOGY | Facility: CLINIC | Age: 58
End: 2023-09-05
Payer: MEDICARE

## 2023-09-05 DIAGNOSIS — Z95.2 H/O MECHANICAL AORTIC VALVE REPLACEMENT: ICD-10-CM

## 2023-09-05 DIAGNOSIS — I48.92 PAROXYSMAL ATRIAL FLUTTER: ICD-10-CM

## 2023-09-05 DIAGNOSIS — Z79.01 LONG TERM (CURRENT) USE OF ANTICOAGULANTS: Primary | ICD-10-CM

## 2023-09-05 PROCEDURE — 93793 ANTICOAG MGMT PT WARFARIN: CPT | Mod: S$GLB,,,

## 2023-09-05 PROCEDURE — 93793 PR ANTICOAGULANT MGMT FOR PT TAKING WARFARIN: ICD-10-PCS | Mod: S$GLB,,,

## 2023-09-06 ENCOUNTER — TELEPHONE (OUTPATIENT)
Dept: DIABETES | Facility: CLINIC | Age: 58
End: 2023-09-06
Payer: MEDICARE

## 2023-09-06 NOTE — TELEPHONE ENCOUNTER
----- Message from Alba Orozco LPN sent at 9/6/2023  1:16 PM CDT -----  Pt stated that he believes that the day he had labs he was low due to bleeding out from sensor, stated when he changed sensor  he was 96 and 148, 140  , stated he has not had anymore low blood sugars

## 2023-09-11 ENCOUNTER — OFFICE VISIT (OUTPATIENT)
Dept: DIABETES | Facility: CLINIC | Age: 58
End: 2023-09-11
Payer: MEDICARE

## 2023-09-11 ENCOUNTER — ANTI-COAG VISIT (OUTPATIENT)
Dept: CARDIOLOGY | Facility: CLINIC | Age: 58
End: 2023-09-11
Payer: MEDICARE

## 2023-09-11 ENCOUNTER — PATIENT MESSAGE (OUTPATIENT)
Dept: CARDIOLOGY | Facility: CLINIC | Age: 58
End: 2023-09-11

## 2023-09-11 ENCOUNTER — PATIENT MESSAGE (OUTPATIENT)
Dept: PODIATRY | Facility: CLINIC | Age: 58
End: 2023-09-11
Payer: MEDICARE

## 2023-09-11 VITALS
WEIGHT: 225 LBS | DIASTOLIC BLOOD PRESSURE: 72 MMHG | HEIGHT: 67 IN | SYSTOLIC BLOOD PRESSURE: 115 MMHG | HEART RATE: 58 BPM | BODY MASS INDEX: 35.31 KG/M2 | OXYGEN SATURATION: 97 %

## 2023-09-11 DIAGNOSIS — Z95.2 H/O MECHANICAL AORTIC VALVE REPLACEMENT: ICD-10-CM

## 2023-09-11 DIAGNOSIS — Z46.81 INSULIN PUMP FITTING OR ADJUSTMENT: ICD-10-CM

## 2023-09-11 DIAGNOSIS — E78.5 HYPERLIPIDEMIA WITH TARGET LOW DENSITY LIPOPROTEIN (LDL) CHOLESTEROL LESS THAN 70 MG/DL: ICD-10-CM

## 2023-09-11 DIAGNOSIS — E10.9 TYPE 1 DIABETES MELLITUS WITHOUT COMPLICATION: Primary | ICD-10-CM

## 2023-09-11 DIAGNOSIS — I10 ESSENTIAL HYPERTENSION: ICD-10-CM

## 2023-09-11 DIAGNOSIS — I25.119 ATHEROSCLEROSIS OF NATIVE CORONARY ARTERY OF NATIVE HEART WITH ANGINA PECTORIS: ICD-10-CM

## 2023-09-11 DIAGNOSIS — Z79.01 LONG TERM (CURRENT) USE OF ANTICOAGULANTS: Primary | ICD-10-CM

## 2023-09-11 DIAGNOSIS — G45.9 TIA (TRANSIENT ISCHEMIC ATTACK): ICD-10-CM

## 2023-09-11 DIAGNOSIS — Z96.41 INSULIN PUMP IN PLACE: ICD-10-CM

## 2023-09-11 DIAGNOSIS — E66.01 CLASS 2 SEVERE OBESITY DUE TO EXCESS CALORIES WITH SERIOUS COMORBIDITY AND BODY MASS INDEX (BMI) OF 35.0 TO 35.9 IN ADULT: ICD-10-CM

## 2023-09-11 DIAGNOSIS — E10.649 TYPE 1 DIABETES MELLITUS WITH HYPOGLYCEMIA UNAWARENESS: ICD-10-CM

## 2023-09-11 DIAGNOSIS — I48.92 PAROXYSMAL ATRIAL FLUTTER: ICD-10-CM

## 2023-09-11 DIAGNOSIS — Z71.9 HEALTH EDUCATION/COUNSELING: ICD-10-CM

## 2023-09-11 PROCEDURE — 99999 PR PBB SHADOW E&M-EST. PATIENT-LVL V: CPT | Mod: PBBFAC,,, | Performed by: NURSE PRACTITIONER

## 2023-09-11 PROCEDURE — 4010F PR ACE/ARB THEARPY RXD/TAKEN: ICD-10-PCS | Mod: CPTII,S$GLB,, | Performed by: NURSE PRACTITIONER

## 2023-09-11 PROCEDURE — 3078F PR MOST RECENT DIASTOLIC BLOOD PRESSURE < 80 MM HG: ICD-10-PCS | Mod: CPTII,S$GLB,, | Performed by: NURSE PRACTITIONER

## 2023-09-11 PROCEDURE — 3008F PR BODY MASS INDEX (BMI) DOCUMENTED: ICD-10-PCS | Mod: CPTII,S$GLB,, | Performed by: NURSE PRACTITIONER

## 2023-09-11 PROCEDURE — 3078F DIAST BP <80 MM HG: CPT | Mod: CPTII,S$GLB,, | Performed by: NURSE PRACTITIONER

## 2023-09-11 PROCEDURE — 3044F PR MOST RECENT HEMOGLOBIN A1C LEVEL <7.0%: ICD-10-PCS | Mod: CPTII,S$GLB,, | Performed by: NURSE PRACTITIONER

## 2023-09-11 PROCEDURE — 99214 PR OFFICE/OUTPT VISIT, EST, LEVL IV, 30-39 MIN: ICD-10-PCS | Mod: S$GLB,,, | Performed by: NURSE PRACTITIONER

## 2023-09-11 PROCEDURE — 3044F HG A1C LEVEL LT 7.0%: CPT | Mod: CPTII,S$GLB,, | Performed by: NURSE PRACTITIONER

## 2023-09-11 PROCEDURE — 99999 PR PBB SHADOW E&M-EST. PATIENT-LVL V: ICD-10-PCS | Mod: PBBFAC,,, | Performed by: NURSE PRACTITIONER

## 2023-09-11 PROCEDURE — 3074F SYST BP LT 130 MM HG: CPT | Mod: CPTII,S$GLB,, | Performed by: NURSE PRACTITIONER

## 2023-09-11 PROCEDURE — 3074F PR MOST RECENT SYSTOLIC BLOOD PRESSURE < 130 MM HG: ICD-10-PCS | Mod: CPTII,S$GLB,, | Performed by: NURSE PRACTITIONER

## 2023-09-11 PROCEDURE — 4010F ACE/ARB THERAPY RXD/TAKEN: CPT | Mod: CPTII,S$GLB,, | Performed by: NURSE PRACTITIONER

## 2023-09-11 PROCEDURE — 93793 PR ANTICOAGULANT MGMT FOR PT TAKING WARFARIN: ICD-10-PCS | Mod: S$GLB,,,

## 2023-09-11 PROCEDURE — 95251 CONT GLUC MNTR ANALYSIS I&R: CPT | Mod: S$GLB,,, | Performed by: NURSE PRACTITIONER

## 2023-09-11 PROCEDURE — 93793 ANTICOAG MGMT PT WARFARIN: CPT | Mod: S$GLB,,,

## 2023-09-11 PROCEDURE — 95251 PR GLUCOSE MONITOR, 72 HOUR, PHYS INTERP: ICD-10-PCS | Mod: S$GLB,,, | Performed by: NURSE PRACTITIONER

## 2023-09-11 PROCEDURE — 99214 OFFICE O/P EST MOD 30 MIN: CPT | Mod: S$GLB,,, | Performed by: NURSE PRACTITIONER

## 2023-09-11 PROCEDURE — 3008F BODY MASS INDEX DOCD: CPT | Mod: CPTII,S$GLB,, | Performed by: NURSE PRACTITIONER

## 2023-09-11 NOTE — ASSESSMENT & PLAN NOTE
Well controlled   Requiring much less insulin since he started the Ozempic   See attached Dexcom download      Medication changes:   Continue Ozempic 0.5 mg weekly--help to cut back on carbohydrate intake, curb appetite, weight loss, help with insulin resistance      Continue Jardiance 25 mg daily   Continue Metformin 500 mg to BID- to help with insulin resistance.       Pump settings:    Continue Tandem T Slim pump with Lispro   Control IQ mode      Pump settings:  Basal:  Continue basal settings  MN-6AM- 1.6 units/hr --  6AM-10AM- 1.4 units/hr --  10AM- 4PM: 1.4 units/hr  4PM- MN: 1.4 units/hr       ICR continue  MN- 6AM: 1:3.5   6AM-10AM:1:3.5   10AM- 4PM-1:3.5   4PM- MN: 1:3.5       ISF:  MN- 6AM: 1:10-    6AM-10AM:1:12  10AM- 4PM-1:16-- cut back to prevent hypoglycemia   4PM- MN: 1:12     Target:  110 ( control IQ)     IOB:  3 hours     Also adjusted his sl;eep mode-- he is currently working night shift every night   Changed sleep mode to 630AM- noon     -- Reviewed goals of therapy are to get the best control we can without hypoglycemia  -- Reviewed patient's current insulin regimen. Clarified proper insulin dose and timing in relation to meals, etc. Insulin injection sites and proper rotation instructed.    -- Advised frequent self blood glucose monitoring.  Patient encouraged to document glucose results and bring them to every clinic visit  -Continue to use Dexcom g6--supplies from pharmacy   -- Hypoglycemia precautions discussed. Instructed on precautions before driving.    -- Call for Bg repeatedly < 70 or > 180.   -- Close adherence to lifestyle changes recommended.   -- Periodic follow ups for eye evaluations, foot care and dental care suggested.      Patient has diabetes mellitus and manages diabetes with intensive insulin regimen and uses prandial and basal insulin daily-- on insulin pump   Patient requires a therapeutic CGM and is willing to use therapeutic CGM for the necessary frequent adjustments  of insulin therapy.  I have completed an in-person visit during the previous 6 months and will continue to have in-person visits every 6 months to assess adherence to their CGM regimen and diabetes treatment plan.  Due to COVID pandemic and need for remote monitoring this tool is medically necessary

## 2023-09-11 NOTE — ASSESSMENT & PLAN NOTE
Body mass index is 35.24 kg/m².  Increases insulin resistance.   Discussed DM diet and exercise.   Patient does exhibit insulin resistance and is on an SGLT 2, GLP1, and metformin.

## 2023-09-11 NOTE — PROGRESS NOTES
CC:   Chief Complaint   Patient presents with    Diabetes Mellitus         HPI: Cj Barnes is a 57 y.o. male presents for a follow up visit today for the management of T1DM.   The patient was diagnosed in his early 30's. Initially diagnosed with T2DM, 5 years later he was dx as T1.    He has used an insulin pump since 2006, previous on animas.  He was on the 670 G medtronic pump.    Now on the Tandem T Slim-- started June 2022     Family hx of diabetes: Mother, father, 3 brothers - no one with T1     Hospitalized for diabetes: denies     No personal or FH of thyroid cancer or personal of pancreatic cancer or pancreatitis.       Our last visit was in March 2023  At that visit we continued the ozempic   Continued the Jardiance and metformin   Continued the Tandem insulin pump -- no setting changes at the last visit   Continued the dexocm G6   A1c down to 5.8%   Denies frequent hypoglycemia   Sometimes overnight because sleep mode is on but he is actually working                   DIABETES COMPLICATIONS: retinopathy, peripheral neuropathy, cardiovascular disease and cerebrovascular disease  TIA     Diabetes Management Status    ASA:  Yes - 81 mg daily and coumadin     Statin: Taking--Crestor 40 mg nightly   ACE/ARB: Taking-- Irbesartan 150 mg daily     Screening or Prevention Patient's value Goal Complete/Controlled?   HgA1C Testing and Control   Lab Results   Component Value Date    HGBA1C 5.8 (H) 09/05/2023      Annually/Less than 8% Yes   Lipid profile : 07/10/2023 Annually Yes   LDL control Lab Results   Component Value Date    LDLCALC 57.0 (L) 07/10/2023    Annually/Less than 100 mg/dl  Yes   Nephropathy screening Lab Results   Component Value Date    LABMICR 6.0 11/03/2022     Lab Results   Component Value Date    PROTEINUA Negative 08/16/2019    Annually Yes   Blood pressure BP Readings from Last 1 Encounters:   09/11/23 115/72    Less than 140/90 Yes   Dilated retinal exam : 09/28/2022- [facility]  Annually No   Foot exam   : 06/14/2023 Annually Yes       CURRENT A1C:    Hemoglobin A1C   Date Value Ref Range Status   09/05/2023 5.8 (H) 4.0 - 5.6 % Final     Comment:     ADA Screening Guidelines:  5.7-6.4%  Consistent with prediabetes  >or=6.5%  Consistent with diabetes    High levels of fetal hemoglobin interfere with the HbA1C  assay. Heterozygous hemoglobin variants (HbS, HgC, etc)do  not significantly interfere with this assay.   However, presence of multiple variants may affect accuracy.     03/03/2023 6.1 (H) 4.0 - 5.6 % Final     Comment:     ADA Screening Guidelines:  5.7-6.4%  Consistent with prediabetes  >or=6.5%  Consistent with diabetes    High levels of fetal hemoglobin interfere with the HbA1C  assay. Heterozygous hemoglobin variants (HbS, HgC, etc)do  not significantly interfere with this assay.   However, presence of multiple variants may affect accuracy.     11/03/2022 6.7 (H) 4.0 - 5.6 % Final     Comment:     ADA Screening Guidelines:  5.7-6.4%  Consistent with prediabetes  >or=6.5%  Consistent with diabetes    High levels of fetal hemoglobin interfere with the HbA1C  assay. Heterozygous hemoglobin variants (HbS, HgC, etc)do  not significantly interfere with this assay.   However, presence of multiple variants may affect accuracy.         GOAL A1C: 6.5-7% - without hypoglycemia    DM MEDICATIONS USED IN THE PAST: Medtronic 670 G   Metformin   Steglatro- lack of coverage.   Jardiance   Humalog   Tandem insulin pump   dexcom       CURRENT DIABETES MEDICATIONS: Tandem T Slim insulin pump in control IQ with Dexcom,   Ozempic 0.5 mg weekly         Continue Jardiance 25 mg daily   Continue Metformin 500 mg to BID- to help with insulin resistance.         Pump settings:    Continue Tandem T Slim pump with Lispro   Control IQ mode      Pump settings:  Basal:  Continue basal settings  MN-6AM- 1.6 units/hr --  6AM-10AM- 1.4 units/hr --  10AM- 4PM: 1.4 units/hr  4PM- MN: 1.4 units/hr         ICR  continue  MN- 6AM: 1:3.5   6AM-10AM:1:3.5   10AM- 4PM-1:3.5   4PM- MN: 1:3.5         ISF:  MN- 6AM: 1:10-   6AM-10AM:1:12  10AM- 4PM-1:12  4PM- MN: 1:12     Target:  110 ( control IQ)      IOB:  3 hours        Set changed every 2 days  Reservoir changed every 2days    Pump downloaded and reviewed     Average total daily dose is 87.81 units per day   40% basal 42% bolus, 18% correction bolus  0% override    Control IQ 99% of the time  Entering in 128 g of carbs per day on average    Pump Supplies from: [facility]     Back up Lantus/Levemir: Yes     BLOOD GLUCOSE MONITORING:   Sensor type: Dexcom G6  Average BG readin  Time in range: 92%  7% high, 0% very high, less than 1% low, <1% very low  Estimated A1c :6.6%  Site change: q10 days     Dexcom from pharmacy     2 weeks average is 128  In target range 93%    Estimated A1c 6.4%      Sensor was downloaded in clinic today and reviewed with patient.   Please see attached document for download.       HYPOGLYCEMIA:  <1% low   <1% very low   + hx of hypoglycemia unawareness   He doesn't feel BG readings until the 40-50's - once in the 50's he will have twitching to his eye.   Glucagon kit: yes  Medic alert bracelet: yes       MEALS:  eating less CHO   Drinks: un sweet tea, water, coke zero.   He CHO counts- he feels comfortable.   Eating a very high carbohydrate diet still despite being educated          CURRENT EXERCISE:  None        Review of Systems  Review of Systems   Constitutional:  Positive for appetite change (decreased). Negative for fatigue and unexpected weight change.   HENT:  Negative for trouble swallowing.    Eyes:  Negative for visual disturbance.   Respiratory:  Negative for shortness of breath.    Cardiovascular:  Negative for chest pain.   Gastrointestinal:  Negative for nausea.   Endocrine: Negative for polydipsia, polyphagia and polyuria.   Genitourinary:         No Nocturia    Musculoskeletal:  Positive for arthralgias, back pain (left lower  back) and gait problem.        Limited mobility to right hand with numbness and tingling--- from cardiovascular surgery    Skin:  Negative for wound.   Neurological:  Positive for numbness.   Psychiatric/Behavioral:  Positive for sleep disturbance. The patient is nervous/anxious.        Physical Exam   Physical Exam  Vitals and nursing note reviewed.   Constitutional:       General: He is not in acute distress.     Appearance: He is well-developed. He is obese. He is not ill-appearing.   HENT:      Head: Normocephalic and atraumatic.      Right Ear: External ear normal.      Left Ear: External ear normal.      Nose: Nose normal.   Neck:      Thyroid: No thyromegaly.      Trachea: No tracheal deviation.   Cardiovascular:      Rate and Rhythm: Normal rate and regular rhythm.      Heart sounds: No murmur heard.  Pulmonary:      Effort: Pulmonary effort is normal. No respiratory distress.      Breath sounds: Normal breath sounds.   Abdominal:      Palpations: Abdomen is soft.      Tenderness: There is no abdominal tenderness.      Hernia: No hernia is present.   Musculoskeletal:      Cervical back: Normal range of motion and neck supple.   Skin:     General: Skin is warm and dry.      Capillary Refill: Capillary refill takes less than 2 seconds.      Findings: No rash.      Comments: Insulin pump sites and dexcom sites are normal appearing. No lipo hypertropthy or atrophy     Neurological:      Mental Status: He is alert and oriented to person, place, and time.      Cranial Nerves: No cranial nerve deficit.   Psychiatric:         Behavior: Behavior normal.         Judgment: Judgment normal.         FOOT EXAMINATION: Appropriate footwear         Lab Results   Component Value Date    TSH 2.063 07/10/2023       Type 1 diabetes mellitus without complication  Well controlled   Requiring much less insulin since he started the Ozempic   See attached Dexcom download      Medication changes:   Continue Ozempic 0.5 mg  weekly--help to cut back on carbohydrate intake, curb appetite, weight loss, help with insulin resistance      Continue Jardiance 25 mg daily   Continue Metformin 500 mg to BID- to help with insulin resistance.       Pump settings:    Continue Tandem T Slim pump with Lispro   Control IQ mode      Pump settings:  Basal:  Continue basal settings  MN-6AM- 1.6 units/hr --  6AM-10AM- 1.4 units/hr --  10AM- 4PM: 1.4 units/hr  4PM- MN: 1.4 units/hr       ICR continue  MN- 6AM: 1:3.5   6AM-10AM:1:3.5   10AM- 4PM-1:3.5   4PM- MN: 1:3.5       ISF:  MN- 6AM: 1:10-    6AM-10AM:1:12  10AM- 4PM-1:16-- cut back to prevent hypoglycemia   4PM- MN: 1:12     Target:  110 ( control IQ)     IOB:  3 hours     Also adjusted his sl;eep mode-- he is currently working night shift every night   Changed sleep mode to 630AM- noon     -- Reviewed goals of therapy are to get the best control we can without hypoglycemia  -- Reviewed patient's current insulin regimen. Clarified proper insulin dose and timing in relation to meals, etc. Insulin injection sites and proper rotation instructed.    -- Advised frequent self blood glucose monitoring.  Patient encouraged to document glucose results and bring them to every clinic visit  -Continue to use Dexcom g6--supplies from pharmacy   -- Hypoglycemia precautions discussed. Instructed on precautions before driving.    -- Call for Bg repeatedly < 70 or > 180.   -- Close adherence to lifestyle changes recommended.   -- Periodic follow ups for eye evaluations, foot care and dental care suggested.      Patient has diabetes mellitus and manages diabetes with intensive insulin regimen and uses prandial and basal insulin daily-- on insulin pump   Patient requires a therapeutic CGM and is willing to use therapeutic CGM for the necessary frequent adjustments of insulin therapy.  I have completed an in-person visit during the previous 6 months and will continue to have in-person visits every 6 months to assess  adherence to their CGM regimen and diabetes treatment plan.  Due to COVID pandemic and need for remote monitoring this tool is medically necessary          Insulin pump fitting or adjustment  See above     Insulin pump in place  See above     Type 1 diabetes mellitus with hypoglycemia unawareness  Avoid hypoglycemia.  Continue to use Dexcom with real-time alerts  Now on Tandem pump with control IQ    Atherosclerosis of native coronary artery of native heart with angina pectoris  Optimize blood sugar readings    Essential hypertension  BP goal is < 140/90.   Tolerating  ARB  Controlled at goal   Blood pressure goals discussed with patient      Hyperlipidemia with target low density lipoprotein (LDL) cholesterol less than 70 mg/dL  On statin per ADA recommendations  LDL goal < 70. LDL now at goal on Crestor 40 mg nightly. LFTs WNL.   Continue Statin       TIA (transient ischemic attack)  Optimize blood sugar readings    Class 2 severe obesity due to excess calories with serious comorbidity and body mass index (BMI) of 35.0 to 35.9 in adult  Body mass index is 35.24 kg/m².  Increases insulin resistance.   Discussed DM diet and exercise.   Patient does exhibit insulin resistance and is on an SGLT 2, GLP1, and metformin.            Pump backup plan    If the insulin pump is non functional and discontinued for anticipated more than 20 hours, please give daily injections of:   Long acting insulin Levemir 40  units daily   Short acting insulin Novolog/ Humalog 1:4  for meals according to carb ratios and sensitivity factor in the pump.     When the insulin pump is restarted, do not restart basal rates until at least 22 hours after the last long acting insulin injection. You can set a 0% temporary basal setting that will last until this time and use your pump to bolus for meals and correction.     For any technical insulin pump issues, please contact the insulin pump company; the toll free number is printed on the label on  the back of the insulin pump.       If your sugar is running high for a few hours and does not respond to two correction doses from the insulin pump, it may mean that you have a bad pump site and the site should be changed          Follow up in about 6 months (around 3/11/2024).   Follow up with me in 6 months with labs prior         Orders Placed This Encounter   Procedures    Hemoglobin A1C     Standing Status:   Future     Standing Expiration Date:   3/11/2025    Comprehensive Metabolic Panel     Standing Status:   Future     Standing Expiration Date:   3/11/2025    Microalbumin/Creatinine Ratio, Urine     Standing Status:   Future     Standing Expiration Date:   3/11/2025     Order Specific Question:   Specimen Source     Answer:   Urine             Recommendations were discussed with the patient in detail  The patient verbalized understanding and agrees with the plan outlined as above.

## 2023-09-14 ENCOUNTER — OFFICE VISIT (OUTPATIENT)
Dept: PODIATRY | Facility: CLINIC | Age: 58
End: 2023-09-14
Payer: MEDICARE

## 2023-09-14 VITALS
DIASTOLIC BLOOD PRESSURE: 72 MMHG | HEIGHT: 67 IN | SYSTOLIC BLOOD PRESSURE: 116 MMHG | HEART RATE: 60 BPM | BODY MASS INDEX: 35.33 KG/M2 | WEIGHT: 225.06 LBS

## 2023-09-14 DIAGNOSIS — M92.61 HAGLUND'S DEFORMITY OF RIGHT HEEL: Primary | ICD-10-CM

## 2023-09-14 DIAGNOSIS — L97.502 ULCER OF FOOT WITH FAT LAYER EXPOSED, UNSPECIFIED LATERALITY: ICD-10-CM

## 2023-09-14 PROCEDURE — 3008F PR BODY MASS INDEX (BMI) DOCUMENTED: ICD-10-PCS | Mod: CPTII,S$GLB,, | Performed by: PODIATRIST

## 2023-09-14 PROCEDURE — 99024 PR POST-OP FOLLOW-UP VISIT: ICD-10-PCS | Mod: S$GLB,,, | Performed by: PODIATRIST

## 2023-09-14 PROCEDURE — 3044F PR MOST RECENT HEMOGLOBIN A1C LEVEL <7.0%: ICD-10-PCS | Mod: CPTII,S$GLB,, | Performed by: PODIATRIST

## 2023-09-14 PROCEDURE — 99999 PR PBB SHADOW E&M-EST. PATIENT-LVL IV: CPT | Mod: PBBFAC,,, | Performed by: PODIATRIST

## 2023-09-14 PROCEDURE — 3078F PR MOST RECENT DIASTOLIC BLOOD PRESSURE < 80 MM HG: ICD-10-PCS | Mod: CPTII,S$GLB,, | Performed by: PODIATRIST

## 2023-09-14 PROCEDURE — 4010F PR ACE/ARB THEARPY RXD/TAKEN: ICD-10-PCS | Mod: CPTII,S$GLB,, | Performed by: PODIATRIST

## 2023-09-14 PROCEDURE — 99024 POSTOP FOLLOW-UP VISIT: CPT | Mod: S$GLB,,, | Performed by: PODIATRIST

## 2023-09-14 PROCEDURE — 3008F BODY MASS INDEX DOCD: CPT | Mod: CPTII,S$GLB,, | Performed by: PODIATRIST

## 2023-09-14 PROCEDURE — 3074F PR MOST RECENT SYSTOLIC BLOOD PRESSURE < 130 MM HG: ICD-10-PCS | Mod: CPTII,S$GLB,, | Performed by: PODIATRIST

## 2023-09-14 PROCEDURE — 99999 PR PBB SHADOW E&M-EST. PATIENT-LVL IV: ICD-10-PCS | Mod: PBBFAC,,, | Performed by: PODIATRIST

## 2023-09-14 PROCEDURE — 3044F HG A1C LEVEL LT 7.0%: CPT | Mod: CPTII,S$GLB,, | Performed by: PODIATRIST

## 2023-09-14 PROCEDURE — 3078F DIAST BP <80 MM HG: CPT | Mod: CPTII,S$GLB,, | Performed by: PODIATRIST

## 2023-09-14 PROCEDURE — 4010F ACE/ARB THERAPY RXD/TAKEN: CPT | Mod: CPTII,S$GLB,, | Performed by: PODIATRIST

## 2023-09-14 PROCEDURE — 1159F MED LIST DOCD IN RCRD: CPT | Mod: CPTII,S$GLB,, | Performed by: PODIATRIST

## 2023-09-14 PROCEDURE — 1159F PR MEDICATION LIST DOCUMENTED IN MEDICAL RECORD: ICD-10-PCS | Mod: CPTII,S$GLB,, | Performed by: PODIATRIST

## 2023-09-14 PROCEDURE — 3074F SYST BP LT 130 MM HG: CPT | Mod: CPTII,S$GLB,, | Performed by: PODIATRIST

## 2023-09-18 ENCOUNTER — PATIENT MESSAGE (OUTPATIENT)
Dept: PRIMARY CARE CLINIC | Facility: CLINIC | Age: 58
End: 2023-09-18
Payer: MEDICARE

## 2023-09-21 ENCOUNTER — TELEPHONE (OUTPATIENT)
Dept: DIABETES | Facility: CLINIC | Age: 58
End: 2023-09-21
Payer: MEDICARE

## 2023-09-21 ENCOUNTER — PATIENT MESSAGE (OUTPATIENT)
Dept: CARDIOLOGY | Facility: CLINIC | Age: 58
End: 2023-09-21

## 2023-09-21 ENCOUNTER — ANTI-COAG VISIT (OUTPATIENT)
Dept: CARDIOLOGY | Facility: CLINIC | Age: 58
End: 2023-09-21
Payer: MEDICARE

## 2023-09-21 DIAGNOSIS — G47.00 INSOMNIA, UNSPECIFIED TYPE: ICD-10-CM

## 2023-09-21 DIAGNOSIS — I48.92 PAROXYSMAL ATRIAL FLUTTER: ICD-10-CM

## 2023-09-21 DIAGNOSIS — Z95.2 H/O MECHANICAL AORTIC VALVE REPLACEMENT: ICD-10-CM

## 2023-09-21 DIAGNOSIS — Z79.01 LONG TERM (CURRENT) USE OF ANTICOAGULANTS: Primary | ICD-10-CM

## 2023-09-21 PROCEDURE — 93793 PR ANTICOAGULANT MGMT FOR PT TAKING WARFARIN: ICD-10-PCS | Mod: S$GLB,,,

## 2023-09-21 PROCEDURE — 93793 ANTICOAG MGMT PT WARFARIN: CPT | Mod: S$GLB,,,

## 2023-09-21 RX ORDER — ZOLPIDEM TARTRATE 10 MG/1
10 TABLET ORAL NIGHTLY
Qty: 30 TABLET | Refills: 0 | OUTPATIENT
Start: 2023-09-21

## 2023-09-21 RX ORDER — ZOLPIDEM TARTRATE 10 MG/1
10 TABLET ORAL NIGHTLY
Qty: 30 TABLET | Refills: 1 | Status: SHIPPED | OUTPATIENT
Start: 2023-09-21 | End: 2023-11-16

## 2023-09-21 NOTE — PROGRESS NOTES
Patient presents status post calcaneal exostectomy with Achilles tendon debridement and bursectomy.  Postop wound dehiscence has subsequently healed.  He is in shoe gear at this point.  No tenderness to the posterior Achilles tendon insertion and calcaneal surgical sites.  Continue slow transition back into normal shoe gear and activity.  Follow-up in 6-8 weeks.

## 2023-09-21 NOTE — TELEPHONE ENCOUNTER
----- Message from Lauren Macedo sent at 9/21/2023 10:29 AM CDT -----  Type:  RX Refill Request     Who Called: Susannah (wife)     Refill or New Rx: refill     RX Name and Strength: zolpidem (AMBIEN) 10 mg Tab    How is the patient currently taking it? (ex. 1XDay): TAKE ONE TABLET BY MOUTH AT BEDTIME AS NEEDED FOR INSOMNIA - Oral    Is this a 30 day or 90 day RX: 90 days     Preferred Pharmacy with phone number: [facility] - [place], [place] - [address]   Phone:  [phone]  Fax:  [phone]          Local or Mail Order: local     Would the patient rather a call back or a response via [facility]? Call back     Best Call Back Number: [phone]    Additional Information:

## 2023-09-21 NOTE — TELEPHONE ENCOUNTER
No care due was identified.  [facility] Embedded Care Due Messages. Reference number: [id number].   9/21/2023 8:05:42 AM CDT

## 2023-09-21 NOTE — TELEPHONE ENCOUNTER
Spoke to pt stated provided had already sent over refills for gabapentin on 8/25, pt verbalized understanding

## 2023-09-21 NOTE — TELEPHONE ENCOUNTER
No care due was identified.  [facility] Embedded Care Due Messages. Reference number: [id number].   9/21/2023 12:33:15 PM CDT

## 2023-10-02 ENCOUNTER — OFFICE VISIT (OUTPATIENT)
Dept: PODIATRY | Facility: CLINIC | Age: 58
End: 2023-10-02
Payer: MEDICARE

## 2023-10-02 ENCOUNTER — LAB VISIT (OUTPATIENT)
Dept: LAB | Facility: HOSPITAL | Age: 58
End: 2023-10-02
Payer: MEDICARE

## 2023-10-02 ENCOUNTER — ANTI-COAG VISIT (OUTPATIENT)
Dept: CARDIOLOGY | Facility: CLINIC | Age: 58
End: 2023-10-02
Payer: MEDICARE

## 2023-10-02 VITALS
HEIGHT: 67 IN | HEART RATE: 58 BPM | WEIGHT: 225.06 LBS | BODY MASS INDEX: 35.33 KG/M2 | DIASTOLIC BLOOD PRESSURE: 65 MMHG | SYSTOLIC BLOOD PRESSURE: 118 MMHG

## 2023-10-02 DIAGNOSIS — Z95.2 H/O MECHANICAL AORTIC VALVE REPLACEMENT: ICD-10-CM

## 2023-10-02 DIAGNOSIS — Z79.01 LONG TERM (CURRENT) USE OF ANTICOAGULANTS: Primary | ICD-10-CM

## 2023-10-02 DIAGNOSIS — Z79.01 LONG TERM (CURRENT) USE OF ANTICOAGULANTS: ICD-10-CM

## 2023-10-02 DIAGNOSIS — E08.41 DIABETIC MONONEUROPATHY ASSOCIATED WITH DIABETES MELLITUS DUE TO UNDERLYING CONDITION: ICD-10-CM

## 2023-10-02 DIAGNOSIS — L97.502 ULCER OF FOOT WITH FAT LAYER EXPOSED, UNSPECIFIED LATERALITY: ICD-10-CM

## 2023-10-02 DIAGNOSIS — M92.61 HAGLUND'S DEFORMITY OF RIGHT HEEL: Primary | ICD-10-CM

## 2023-10-02 DIAGNOSIS — I48.92 PAROXYSMAL ATRIAL FLUTTER: ICD-10-CM

## 2023-10-02 LAB
INR PPP: 2.7 (ref 0.8–1.2)
PROTHROMBIN TIME: 27.3 SEC (ref 9–12.5)

## 2023-10-02 PROCEDURE — 3044F PR MOST RECENT HEMOGLOBIN A1C LEVEL <7.0%: ICD-10-PCS | Mod: CPTII,S$GLB,, | Performed by: PODIATRIST

## 2023-10-02 PROCEDURE — 11042 DBRDMT SUBQ TIS 1ST 20SQCM/<: CPT | Mod: S$GLB,,, | Performed by: PODIATRIST

## 2023-10-02 PROCEDURE — 99999 PR PBB SHADOW E&M-EST. PATIENT-LVL II: CPT | Mod: PBBFAC,,, | Performed by: PODIATRIST

## 2023-10-02 PROCEDURE — 4010F ACE/ARB THERAPY RXD/TAKEN: CPT | Mod: CPTII,S$GLB,, | Performed by: PODIATRIST

## 2023-10-02 PROCEDURE — 99024 POSTOP FOLLOW-UP VISIT: CPT | Mod: S$GLB,,, | Performed by: PODIATRIST

## 2023-10-02 PROCEDURE — 3044F HG A1C LEVEL LT 7.0%: CPT | Mod: CPTII,S$GLB,, | Performed by: PODIATRIST

## 2023-10-02 PROCEDURE — 93793 ANTICOAG MGMT PT WARFARIN: CPT | Mod: S$GLB,,,

## 2023-10-02 PROCEDURE — 3078F DIAST BP <80 MM HG: CPT | Mod: CPTII,S$GLB,, | Performed by: PODIATRIST

## 2023-10-02 PROCEDURE — 36415 COLL VENOUS BLD VENIPUNCTURE: CPT | Performed by: INTERNAL MEDICINE

## 2023-10-02 PROCEDURE — 3074F PR MOST RECENT SYSTOLIC BLOOD PRESSURE < 130 MM HG: ICD-10-PCS | Mod: CPTII,S$GLB,, | Performed by: PODIATRIST

## 2023-10-02 PROCEDURE — 3078F PR MOST RECENT DIASTOLIC BLOOD PRESSURE < 80 MM HG: ICD-10-PCS | Mod: CPTII,S$GLB,, | Performed by: PODIATRIST

## 2023-10-02 PROCEDURE — 99024 PR POST-OP FOLLOW-UP VISIT: ICD-10-PCS | Mod: S$GLB,,, | Performed by: PODIATRIST

## 2023-10-02 PROCEDURE — 99999 PR PBB SHADOW E&M-EST. PATIENT-LVL II: ICD-10-PCS | Mod: PBBFAC,,, | Performed by: PODIATRIST

## 2023-10-02 PROCEDURE — 93793 PR ANTICOAGULANT MGMT FOR PT TAKING WARFARIN: ICD-10-PCS | Mod: S$GLB,,,

## 2023-10-02 PROCEDURE — 3074F SYST BP LT 130 MM HG: CPT | Mod: CPTII,S$GLB,, | Performed by: PODIATRIST

## 2023-10-02 PROCEDURE — 4010F PR ACE/ARB THEARPY RXD/TAKEN: ICD-10-PCS | Mod: CPTII,S$GLB,, | Performed by: PODIATRIST

## 2023-10-02 PROCEDURE — 85610 PROTHROMBIN TIME: CPT | Performed by: INTERNAL MEDICINE

## 2023-10-02 PROCEDURE — 11042 PR DEBRIDEMENT, SKIN, SUB-Q TISSUE,=<20 SQ CM: ICD-10-PCS | Mod: S$GLB,,, | Performed by: PODIATRIST

## 2023-10-10 DIAGNOSIS — F33.0 MAJOR DEPRESSIVE DISORDER, RECURRENT, MILD: ICD-10-CM

## 2023-10-10 DIAGNOSIS — F41.1 GAD (GENERALIZED ANXIETY DISORDER): ICD-10-CM

## 2023-10-10 RX ORDER — ESCITALOPRAM OXALATE 10 MG/1
10 TABLET ORAL DAILY
Qty: 90 TABLET | Refills: 3 | Status: SHIPPED | OUTPATIENT
Start: 2023-10-10

## 2023-10-10 NOTE — TELEPHONE ENCOUNTER
No care due was identified.  [facility] Embedded Care Due Messages. Reference number: [id number].   10/10/2023 2:13:12 PM CDT

## 2023-10-17 NOTE — PLAN OF CARE
Problem: Physical Therapy Goal  Goal: Physical Therapy Goal  Pt goals until 8/20/19    1. Pt supine to sit with sternal prec with SBA-not met  2. Pt sit to supine with sternal prec with SBA-not met  3. Pt sit to stand with no AD with supervision-not met  4. Pt to perform gait 200ft with no AD with supervision.-not met  5. Pt to up/down 2 steps with R UE rail with CGA.-not met      Outcome: Ongoing (interventions implemented as appropriate)  Pt's goals set and pt will benefit from skilled PT services to work towards improved functional mobility including: bed mobility, transfers, up/down steps, and gait.   Yadira Ibrahim, PT  8/11/2019           no

## 2023-10-18 ENCOUNTER — OFFICE VISIT (OUTPATIENT)
Dept: PODIATRY | Facility: CLINIC | Age: 58
End: 2023-10-18
Payer: MEDICARE

## 2023-10-18 ENCOUNTER — PATIENT MESSAGE (OUTPATIENT)
Dept: CARDIOLOGY | Facility: CLINIC | Age: 58
End: 2023-10-18
Payer: MEDICARE

## 2023-10-18 VITALS
DIASTOLIC BLOOD PRESSURE: 65 MMHG | SYSTOLIC BLOOD PRESSURE: 118 MMHG | HEIGHT: 67 IN | HEART RATE: 55 BPM | WEIGHT: 228.63 LBS | BODY MASS INDEX: 35.88 KG/M2

## 2023-10-18 DIAGNOSIS — E08.41 DIABETIC MONONEUROPATHY ASSOCIATED WITH DIABETES MELLITUS DUE TO UNDERLYING CONDITION: ICD-10-CM

## 2023-10-18 DIAGNOSIS — L97.502 ULCER OF FOOT WITH FAT LAYER EXPOSED, UNSPECIFIED LATERALITY: ICD-10-CM

## 2023-10-18 DIAGNOSIS — M92.61 HAGLUND'S DEFORMITY OF RIGHT HEEL: Primary | ICD-10-CM

## 2023-10-18 PROCEDURE — 12020 TX SUPFC WND DEHSN SMPL CLSR: CPT | Mod: S$GLB,,, | Performed by: PODIATRIST

## 2023-10-18 PROCEDURE — 99999 PR PBB SHADOW E&M-EST. PATIENT-LVL IV: ICD-10-PCS | Mod: PBBFAC,,, | Performed by: PODIATRIST

## 2023-10-18 PROCEDURE — 3044F HG A1C LEVEL LT 7.0%: CPT | Mod: CPTII,S$GLB,, | Performed by: PODIATRIST

## 2023-10-18 PROCEDURE — 4010F ACE/ARB THERAPY RXD/TAKEN: CPT | Mod: CPTII,S$GLB,, | Performed by: PODIATRIST

## 2023-10-18 PROCEDURE — 12020 PR CLOSURE SUPERF WND DEHIS SIMPLE: ICD-10-PCS | Mod: S$GLB,,, | Performed by: PODIATRIST

## 2023-10-18 PROCEDURE — 3074F SYST BP LT 130 MM HG: CPT | Mod: CPTII,S$GLB,, | Performed by: PODIATRIST

## 2023-10-18 PROCEDURE — 4010F PR ACE/ARB THEARPY RXD/TAKEN: ICD-10-PCS | Mod: CPTII,S$GLB,, | Performed by: PODIATRIST

## 2023-10-18 PROCEDURE — 99999 PR PBB SHADOW E&M-EST. PATIENT-LVL IV: CPT | Mod: PBBFAC,,, | Performed by: PODIATRIST

## 2023-10-18 PROCEDURE — 3074F PR MOST RECENT SYSTOLIC BLOOD PRESSURE < 130 MM HG: ICD-10-PCS | Mod: CPTII,S$GLB,, | Performed by: PODIATRIST

## 2023-10-18 PROCEDURE — 3078F DIAST BP <80 MM HG: CPT | Mod: CPTII,S$GLB,, | Performed by: PODIATRIST

## 2023-10-18 PROCEDURE — 99024 POSTOP FOLLOW-UP VISIT: CPT | Mod: S$GLB,,, | Performed by: PODIATRIST

## 2023-10-18 PROCEDURE — 3044F PR MOST RECENT HEMOGLOBIN A1C LEVEL <7.0%: ICD-10-PCS | Mod: CPTII,S$GLB,, | Performed by: PODIATRIST

## 2023-10-18 PROCEDURE — 99024 PR POST-OP FOLLOW-UP VISIT: ICD-10-PCS | Mod: S$GLB,,, | Performed by: PODIATRIST

## 2023-10-18 PROCEDURE — 3078F PR MOST RECENT DIASTOLIC BLOOD PRESSURE < 80 MM HG: ICD-10-PCS | Mod: CPTII,S$GLB,, | Performed by: PODIATRIST

## 2023-10-19 ENCOUNTER — ANTI-COAG VISIT (OUTPATIENT)
Dept: CARDIOLOGY | Facility: CLINIC | Age: 58
End: 2023-10-19
Payer: MEDICARE

## 2023-10-19 ENCOUNTER — PATIENT MESSAGE (OUTPATIENT)
Dept: ENDOSCOPY | Facility: HOSPITAL | Age: 58
End: 2023-10-19
Payer: MEDICARE

## 2023-10-19 ENCOUNTER — PATIENT MESSAGE (OUTPATIENT)
Dept: PODIATRY | Facility: CLINIC | Age: 58
End: 2023-10-19
Payer: MEDICARE

## 2023-10-19 DIAGNOSIS — Z95.2 H/O MECHANICAL AORTIC VALVE REPLACEMENT: ICD-10-CM

## 2023-10-19 DIAGNOSIS — I48.92 PAROXYSMAL ATRIAL FLUTTER: ICD-10-CM

## 2023-10-19 DIAGNOSIS — Z79.01 LONG TERM (CURRENT) USE OF ANTICOAGULANTS: Primary | ICD-10-CM

## 2023-10-19 PROCEDURE — 93793 PR ANTICOAGULANT MGMT FOR PT TAKING WARFARIN: ICD-10-PCS | Mod: S$GLB,,,

## 2023-10-19 PROCEDURE — 93793 ANTICOAG MGMT PT WARFARIN: CPT | Mod: S$GLB,,,

## 2023-10-19 RX ORDER — PREGABALIN 75 MG/1
75 CAPSULE ORAL 2 TIMES DAILY
Qty: 60 CAPSULE | Refills: 1 | Status: ON HOLD | OUTPATIENT
Start: 2023-10-19 | End: 2023-12-18 | Stop reason: HOSPADM

## 2023-10-19 RX ORDER — DOXYCYCLINE 100 MG/1
100 TABLET ORAL 2 TIMES DAILY
Qty: 20 TABLET | Refills: 0 | Status: SHIPPED | OUTPATIENT
Start: 2023-10-19 | End: 2024-01-02 | Stop reason: ALTCHOICE

## 2023-10-19 NOTE — PROGRESS NOTES
INR a tad low. Pt has cscope planned next week. Noted pt is seeing wound care and was prescribed doxycycline today. I have messaged GI to make sure ok to proceed with cscope with active wound infection. Will update plan when I hear back.     Per GI, should be no problem for cscope next week. Follow calendar for holding and resuming dose. Pt started doxy 10/20. Less concern for DDI since pt will be holding. Reevaluate the week after procedure.

## 2023-10-26 ENCOUNTER — ANESTHESIA EVENT (OUTPATIENT)
Dept: ENDOSCOPY | Facility: HOSPITAL | Age: 58
End: 2023-10-26
Payer: MEDICARE

## 2023-10-26 ENCOUNTER — HOSPITAL ENCOUNTER (OUTPATIENT)
Facility: HOSPITAL | Age: 58
Discharge: HOME | End: 2023-10-26
Attending: COLON & RECTAL SURGERY | Admitting: COLON & RECTAL SURGERY
Payer: MEDICARE

## 2023-10-26 ENCOUNTER — ANESTHESIA (OUTPATIENT)
Dept: ENDOSCOPY | Facility: HOSPITAL | Age: 58
End: 2023-10-26
Payer: MEDICARE

## 2023-10-26 VITALS
RESPIRATION RATE: 12 BRPM | DIASTOLIC BLOOD PRESSURE: 67 MMHG | TEMPERATURE: 98 F | HEART RATE: 50 BPM | WEIGHT: 230 LBS | BODY MASS INDEX: 36.1 KG/M2 | OXYGEN SATURATION: 98 % | HEIGHT: 67 IN | SYSTOLIC BLOOD PRESSURE: 130 MMHG

## 2023-10-26 DIAGNOSIS — Z12.11 SCREENING FOR MALIGNANT NEOPLASM OF COLON: Primary | ICD-10-CM

## 2023-10-26 LAB
INR PPP: 1.1 (ref 0.8–1.2)
PROTHROMBIN TIME: 12.1 SEC (ref 9–12.5)

## 2023-10-26 PROCEDURE — 45385 COLONOSCOPY W/LESION REMOVAL: CPT | Mod: PT | Performed by: COLON & RECTAL SURGERY

## 2023-10-26 PROCEDURE — 37000009 HC ANESTHESIA EA ADD 15 MINS: Performed by: COLON & RECTAL SURGERY

## 2023-10-26 PROCEDURE — 63600175 PHARM REV CODE 636 W HCPCS: Performed by: NURSE ANESTHETIST, CERTIFIED REGISTERED

## 2023-10-26 PROCEDURE — 45385 PR COLONOSCOPY,REMV LESN,SNARE: ICD-10-PCS | Mod: PT,,, | Performed by: COLON & RECTAL SURGERY

## 2023-10-26 PROCEDURE — 25000003 PHARM REV CODE 250: Performed by: NURSE ANESTHETIST, CERTIFIED REGISTERED

## 2023-10-26 PROCEDURE — 45385 COLONOSCOPY W/LESION REMOVAL: CPT | Mod: PT,,, | Performed by: COLON & RECTAL SURGERY

## 2023-10-26 PROCEDURE — 27201089 HC SNARE, DISP (ANY): Performed by: COLON & RECTAL SURGERY

## 2023-10-26 PROCEDURE — 88305 TISSUE EXAM BY PATHOLOGIST: CPT | Performed by: PATHOLOGY

## 2023-10-26 PROCEDURE — 37000008 HC ANESTHESIA 1ST 15 MINUTES: Performed by: COLON & RECTAL SURGERY

## 2023-10-26 PROCEDURE — 85610 PROTHROMBIN TIME: CPT | Performed by: COLON & RECTAL SURGERY

## 2023-10-26 PROCEDURE — 88305 TISSUE EXAM BY PATHOLOGIST: ICD-10-PCS | Mod: 26,,, | Performed by: PATHOLOGY

## 2023-10-26 PROCEDURE — E9220 PRA ENDO ANESTHESIA: ICD-10-PCS | Mod: PT,,, | Performed by: NURSE ANESTHETIST, CERTIFIED REGISTERED

## 2023-10-26 PROCEDURE — E9220 PRA ENDO ANESTHESIA: HCPCS | Mod: PT,,, | Performed by: NURSE ANESTHETIST, CERTIFIED REGISTERED

## 2023-10-26 PROCEDURE — 88305 TISSUE EXAM BY PATHOLOGIST: CPT | Mod: 26,,, | Performed by: PATHOLOGY

## 2023-10-26 RX ORDER — SODIUM CHLORIDE 9 MG/ML
INJECTION, SOLUTION INTRAVENOUS CONTINUOUS
Status: DISCONTINUED | OUTPATIENT
Start: 2023-10-26 | End: 2023-10-26 | Stop reason: HOSPADM

## 2023-10-26 RX ORDER — PROPOFOL 10 MG/ML
VIAL (ML) INTRAVENOUS
Status: DISCONTINUED | OUTPATIENT
Start: 2023-10-26 | End: 2023-10-26

## 2023-10-26 RX ORDER — LIDOCAINE HYDROCHLORIDE 20 MG/ML
INJECTION INTRAVENOUS
Status: DISCONTINUED | OUTPATIENT
Start: 2023-10-26 | End: 2023-10-26

## 2023-10-26 RX ADMIN — SODIUM CHLORIDE: 0.9 INJECTION, SOLUTION INTRAVENOUS at 08:10

## 2023-10-26 RX ADMIN — LIDOCAINE HYDROCHLORIDE 50 MG: 20 INJECTION INTRAVENOUS at 08:10

## 2023-10-26 RX ADMIN — PROPOFOL 50 MG: 10 INJECTION, EMULSION INTRAVENOUS at 08:10

## 2023-10-26 NOTE — H&P
COLONOSCOPY HISTORY AND PHYSICAL EXAM    Procedure : Colonoscopy      INDICATIONS: personal history of colon cancer      Last Colonoscopy:  2020  Findings: Normal       Past Medical History:   Diagnosis Date    Anemia     Aortic stenosis 2018    Cancer 2014    Colon cancer     Coronary artery disease     Diabetes mellitus type I     since in his 30's    Heart murmur     Heel fracture     HTN (hypertension)     Hyperlipidemia LDL goal < 70     Insulin pump in place     MVP (mitral valve prolapse)     Stenosis of aortic and mitral valves      Sedation Problems: NO  Family History   Problem Relation Age of Onset    Heart disease Mother     Lung cancer Mother     Heart attack Father     Diabetes Father     HIV Brother      Fam Hx of Sedation Problems: NO  Social History     Socioeconomic History    Marital status:    Tobacco Use    Smoking status: Never    Smokeless tobacco: Former     Types: Snuff     Quit date: 8/30/2019    Tobacco comments:     since he was 12 yrs old   Substance and Sexual Activity    Alcohol use: No     Comment: socially    Drug use: No   Social History Narrative        2 grown kids    Delivers seafood     Social Determinants of Health     Financial Resource Strain: Medium Risk (4/11/2023)    Overall Financial Resource Strain (CARDIA)     Difficulty of Paying Living Expenses: Somewhat hard   Food Insecurity: Food Insecurity Present (4/11/2023)    Hunger Vital Sign     Worried About Running Out of Food in the Last Year: Often true     Ran Out of Food in the Last Year: Often true   Transportation Needs: Unmet Transportation Needs (4/11/2023)    PRAPARE - Transportation     Lack of Transportation (Medical): Yes     Lack of Transportation (Non-Medical): No   Physical Activity: Inactive (4/11/2023)    Exercise Vital Sign     Days of Exercise per Week: 0 days     Minutes of Exercise per Session: 0 min   Stress: Stress Concern Present (4/11/2023)    [employer]  "- Occupational Stress Questionnaire     Feeling of Stress : Rather much   Social Connections: Socially Isolated (4/11/2023)    Social Connection and Isolation Panel [NHANES]     Frequency of Communication with Friends and Family: More than three times a week     Frequency of Social Gatherings with Friends and Family: Twice a week     Attends Yazdanism Services: Never     Active Member of Clubs or Organizations: No     Attends Club or Organization Meetings: Never     Marital Status:    Housing Stability: Unknown (4/11/2023)    Housing Stability Vital Sign     Unable to Pay for Housing in the Last Year: No     Unstable Housing in the Last Year: No       Review of Systems - Negative except   Respiratory ROS: no dyspnea  Cardiovascular ROS: no exertional chest pain  Gastrointestinal ROS: NO abdominal discomfort,  NO rectal bleeding  Musculoskeletal ROS: no muscular pain  Neurological ROS: no recent stroke    Physical Exam:  BP (!) 180/79 (BP Location: Left arm, Patient Position: Lying)   Pulse (!) 58   Temp 98.1 °F (36.7 °C)   Resp 16   Ht 5' 7" (1.702 m)   Wt 104.3 kg (230 lb)   SpO2 100%   BMI 36.02 kg/m²   General: no distress  Head: normocephalic  Mallampati Score   Neck: supple, symmetrical, trachea midline  Lungs:  clear to auscultation bilaterally and normal respiratory effort  Heart: regular rate and rhythm and no murmur  Abdomen: soft, non-tender non-distented; bowel sounds normal; no masses,  no organomegaly  Extremities: no cyanosis or edema, or clubbing    ASA:  II    PLAN  COLONOSCOPY.    SedationPlan :MAC    The details of the procedure, the possible need for biopsy or polypectomy and the potential risks including bleeding, perforation, missed polyps were discussed in detail.     "

## 2023-10-26 NOTE — ANESTHESIA POSTPROCEDURE EVALUATION
Anesthesia Post Evaluation    Patient: Cj Barnes    Procedure(s) Performed: Procedure(s) (LRB):  COLONOSCOPY (N/A)    Final Anesthesia Type: general      Patient location during evaluation: GI PACU  Patient participation: Yes- Able to Participate  Level of consciousness: awake and alert  Post-procedure vital signs: reviewed and stable  Pain management: adequate  Airway patency: patent    PONV status at discharge: No PONV  Anesthetic complications: no      Cardiovascular status: stable  Respiratory status: unassisted and spontaneous ventilation  Hydration status: euvolemic  Follow-up not needed.          Vitals Value Taken Time   /67 10/26/23 0930   Temp 36.5 °C (97.7 °F) 10/26/23 0900   Pulse 50 10/26/23 0930   Resp 12 10/26/23 0930   SpO2 98 % 10/26/23 0930         Event Time   Out of Recovery 09:40:22         Pain/Isa Score: Isa Score: 9 (10/26/2023  9:02 AM)

## 2023-10-26 NOTE — PROVATION PATIENT INSTRUCTIONS
Discharge Summary/Instructions after an Endoscopic Procedure  Patient Name: Cj Barnes  Patient MRN: [id number]  Patient YOB: 1965 Thursday, October 26, 2023  Yadira Louis MD  Dear patient,  As a result of recent federal legislation (The Federal Cures Act), you may   receive lab or pathology results from your procedure in your [facility]   account before your physician is able to contact you. Your physician or   their representative will relay the results to you with their   recommendations at their soonest availability.  Thank you,  RESTRICTIONS:  During your procedure today, you received medications for sedation.  These   medications may affect your judgment, balance and coordination.  Therefore,   for 24 hours, you have the following restrictions:   - DO NOT drive a car, operate machinery, make legal/financial decisions,   sign important papers or drink alcohol.    ACTIVITY:  Today: no heavy lifting, straining or running due to procedural   sedation/anesthesia.  The following day: return to full activity including work.  DIET:  Eat and drink normally unless instructed otherwise.     TREATMENT FOR COMMON SIDE EFFECTS:  - Mild abdominal pain, nausea, belching, bloating or excessive gas:  rest,   eat lightly and use a heating pad.  - Sore Throat: treat with throat lozenges and/or gargle with warm salt   water.  - Because air was used during the procedure, expelling large amounts of air   from your rectum or belching is normal.  - If a bowel prep was taken, you may not have a bowel movement for 1-3 days.    This is normal.  SYMPTOMS TO WATCH FOR AND REPORT TO YOUR PHYSICIAN:  1. Abdominal pain or bloating, other than gas cramps.  2. Chest pain.  3. Back pain.  4. Signs of infection such as: chills or fever occurring within 24 hours   after the procedure.  5. Rectal bleeding, which would show as bright red, maroon, or black stools.   (A tablespoon of blood from the rectum is not serious,  especially if   hemorrhoids are present.)  6. Vomiting.  7. Weakness or dizziness.  GO DIRECTLY TO THE NEAREST EMERGENCY ROOM IF YOU HAVE ANY OF THE FOLLOWING:      Difficulty breathing              Chills and/or fever over 101 F   Persistent vomiting and/or vomiting blood   Severe abdominal pain   Severe chest pain   Black, tarry stools   Bleeding- more than one tablespoon   Any other symptom or condition that you feel may need urgent attention  Your doctor recommends these additional instructions:  If any biopsies were taken, your doctors clinic will contact you in 1 to 2   weeks with any results.  - Discharge patient to home.   - Resume previous diet.   - Continue present medications.   - Await pathology results.   - Repeat colonoscopy in 5 years for surveillance.   - Return to primary care physician.   - Written discharge instructions were provided to the patient.   - The signs and symptoms of potential delayed complications were discussed   with the patient.   - Patient has a contact number available for emergencies.   - Return to normal activities tomorrow.  For questions, problems or results please call your physician - Yadira Louis MD at Work:  [phone].  [facility], EMERGENCY ROOM PHONE NUMBER: [phone]  IF A COMPLICATION OR EMERGENCY SITUATION ARISES AND YOU ARE UNABLE TO REACH   YOUR PHYSICIAN - GO DIRECTLY TO THE EMERGENCY ROOM.  Yadira Louis MD  10/26/2023 8:56:16 AM  This report has been verified and signed electronically.  Dear patient,  As a result of recent federal legislation (The Federal Cures Act), you may   receive lab or pathology results from your procedure in your [facility]   account before your physician is able to contact you. Your physician or   their representative will relay the results to you with their   recommendations at their soonest availability.  Thank you,  PROVATION

## 2023-10-26 NOTE — TRANSFER OF CARE
"Anesthesia Transfer of Care Note    Patient: Cj Barnes    Procedure(s) Performed: Procedure(s) (LRB):  COLONOSCOPY (N/A)    Patient location: PACU    Anesthesia Type: general    Transport from OR: Transported from OR on room air with adequate spontaneous ventilation    Post pain: adequate analgesia    Post assessment: no apparent anesthetic complications and tolerated procedure well    Post vital signs: stable    Level of consciousness: awake and alert    Nausea/Vomiting: no nausea/vomiting    Complications: none    Transfer of care protocol was followed      Last vitals:   Visit Vitals  BP (!) 180/79 (BP Location: Left arm, Patient Position: Lying)   Pulse (!) 58   Temp 36.7 °C (98.1 °F)   Resp 16   Ht 5' 7" (1.702 m)   Wt 104.3 kg (230 lb)   SpO2 100%   BMI 36.02 kg/m²     " · Noted to have fungal rash in groin and scrotum  · Wound care consulted, see recommendations  · Continue supportive care with nystatin powder, lotrisone

## 2023-10-26 NOTE — ANESTHESIA PREPROCEDURE EVALUATION
10/26/2023  Cj Barnes is a 57 y.o., male s/p aortic valve replacement for bicuspid valve. No cardiopulm symptoms currently. Here for colonoscopy    2019 TTE   Mild left ventricular enlargement.   Normal left ventricular systolic function. The estimated ejection fraction is 63%   Septal wall has abnormal motion.   Normal LV diastolic function.   Severe left atrial enlargement.   Normal right ventricular systolic function.   Moderate right atrial enlargement.   Mild aortic regurgitation.   Normal central venous pressure (3 mm Hg).   The estimated PA systolic pressure is 27 mm Hg      Patient Active Problem List   Diagnosis    Essential hypertension    Insulin pump fitting or adjustment    Hyperlipidemia with target low density lipoprotein (LDL) cholesterol less than 70 mg/dL    Insulin pump in place    Aortic stenosis    Aortic regurgitation, congenital    TIA (transient ischemic attack)    Atherosclerosis of native coronary artery of native heart with angina pectoris    Type 1 diabetes mellitus with hypoglycemia unawareness    Class 2 severe obesity due to excess calories with serious comorbidity and body mass index (BMI) of 35.0 to 35.9 in adult    S/P ascending aortic replacement    Long term (current) use of anticoagulants    Paroxysmal atrial flutter    Trouble swallowing    H/O mechanical aortic valve replacement    Iron deficiency anemia due to chronic blood loss    ANTHONY (iron deficiency anemia)    ACE-inhibitor cough    Diabetic mononeuropathy associated with diabetes mellitus due to underlying condition    Major depressive disorder, recurrent, mild    Trigger finger of left thumb    Coronary artery disease involving native coronary artery of native heart without angina pectoris     Review of patient's allergies indicates:  No Known Allergies    Past Medical  History:   Diagnosis Date    Anemia     Aortic stenosis 2018    Cancer 2014    Colon cancer     Coronary artery disease     Diabetes mellitus type I     since in his 30's    Heart murmur     Heel fracture     HTN (hypertension)     Hyperlipidemia LDL goal < 70     Insulin pump in place     MVP (mitral valve prolapse)     Stenosis of aortic and mitral valves      Past Surgical History:   Procedure Laterality Date    AORTIC VALVE REPLACEMENT N/A 8/9/2019    Procedure: Replacement-valve-aortic;  Surgeon: Ryan Knight MD;  [facility];  Service: Cardiothoracic;  Laterality: N/A;    BACK SURGERY      BENTALL PROCEDURE FOR REPLACEMENT OF AORTIC VALVE, AORTIC ROOT, AND ASCENDING AORTA N/A 8/9/2019    Procedure: BENTALL PROCEDURE;  Surgeon: Ryan Knight MD;  [facility];  Service: Cardiothoracic;  Laterality: N/A;    CARDIAC SURGERY      CARPAL TUNNEL RELEASE      COLON SURGERY  2014    COLONOSCOPY N/A 2/17/2020    Procedure: COLONOSCOPY;  Surgeon: Richi Mock MD;  [facility];  Service: Colon and Rectal;  Laterality: N/A;    DEBRIDEMENT OF ACHILLES TENDON Right 1/27/2023    Procedure: DEBRIDEMENT, TENDON, ACHILLES;  Surgeon: Eugene Bowers DPM;  Location: [facility] OR [address] [place] [address];  Service: Podiatry;  Laterality: Right;    ELBOW SURGERY      tendon release    ESOPHAGOGASTRODUODENOSCOPY N/A 9/10/2020    Procedure: EGD (ESOPHAGOGASTRODUODENOSCOPY);  Surgeon: Abilio Boo MD;  Location: [facility] (4TH FLR);  Service: Endoscopy;  Laterality: N/A;  Cardiac clearance received, see telephone encounter 8/27/20-BB  Approval to hold Coumadin prior to procedure received, see telephone encounter 8/27/20-BB  covid-9/7/20-[facility]    FOOT TENDON SURGERY      OSTECTOMY Right 1/27/2023    Procedure: OSTECTOMY;  Surgeon: Eugene Bowers DPM;  Location: [facility] OR [address] [place] [address];  Service: Podiatry;  Laterality: Right;  calcaneal    right and left heart  cath Bilateral 01/15/2018    TREATMENT OF CARDIAC ARRHYTHMIA N/A 8/19/2019    Procedure: CARDIOVERSION;  Surgeon: Juan Zapata MD;  Location: [facility] EP LAB;  Service: Cardiology;  Laterality: N/A;  afib, dccv only, anes, GP, 3092    TRIGGER FINGER RELEASE      x 2    TRIGGER FINGER RELEASE Left 1/5/2022    Procedure: RELEASE, TRIGGER FINGER,LEFT,SMALL & THUMB;  Surgeon: Dilma Hunter MD;  Location: [facility] OR;  Service: Orthopedics;  Laterality: Left;     Tobacco Use    Smoking status: Never    Smokeless tobacco: Former     Types: Snuff     Quit date: 8/30/2019    Tobacco comments:     since he was 12 yrs old   Substance and Sexual Activity    Alcohol use: No     Comment: socially    Drug use: No    Sexual activity: Not on file       Objective:     Vital Signs (Most Recent):    Vital Signs (24h Range):  Temp:  [36.7 °C (98.1 °F)] 36.7 °C (98.1 °F)  Pulse:  [58] 58  Resp:  [16] 16  SpO2:  [100 %] 100 %  BP: (180)/(79) 180/79        Pre-op Assessment    I have reviewed the Patient Summary Reports.     I have reviewed the Nursing Notes. I have reviewed the NPO Status.   I have reviewed the Medications.     Review of Systems  Anesthesia Hx:  No problems with previous Anesthesia  History of prior surgery of interest to airway management or planning:  Denies Personal Hx of Anesthesia complications.   Cardiovascular:   Hypertension Valvular problems/Murmurs CAD asymptomatic     Pulmonary:  Pulmonary Normal    Renal/:  Renal/ Normal     Hepatic/GI:   Bowel Prep. GERD, well controlled    Neurological:   Denies CVA. Denies Seizures.    Endocrine:   Diabetes, well controlled        Physical Exam  General: Well nourished    Airway:  Mallampati: II   Mouth Opening: Normal  TM Distance: Normal  Tongue: Normal  Neck ROM: Normal ROM    Dental:  Intact  Any loose and/or missing teeth verified with patient   Chest/Lungs:  Clear to auscultation    Heart:  Rate: Normal  Rhythm: Regular Rhythm  Murmur:  Systolic;        Anesthesia Plan  Type of Anesthesia, risks & benefits discussed:    Anesthesia Type: Gen Natural Airway  Intra-op Monitoring Plan: Standard ASA Monitors  Induction:  IV  Informed Consent: Informed consent signed with the Patient and all parties understand the risks and agree with anesthesia plan.  All questions answered.   ASA Score: 3    Ready For Surgery From Anesthesia Perspective.     .

## 2023-10-29 NOTE — PROGRESS NOTES
Patient presents status post calcaneal exostectomy with Achilles tendon debridement and bursectomy.  Postop wound dehiscence has a small open lesion which is improving.  He is in shoe gear at this point.  No tenderness to the posterior Achilles tendon insertion and calcaneal surgical sites.  Continue slow transition back into normal shoe gear and activity.  Follow-up in 4 weeks.

## 2023-10-30 ENCOUNTER — PATIENT OUTREACH (OUTPATIENT)
Dept: ADMINISTRATIVE | Facility: HOSPITAL | Age: 58
End: 2023-10-30
Payer: MEDICARE

## 2023-10-30 ENCOUNTER — PATIENT MESSAGE (OUTPATIENT)
Dept: ADMINISTRATIVE | Facility: HOSPITAL | Age: 58
End: 2023-10-30
Payer: MEDICARE

## 2023-10-30 NOTE — PROGRESS NOTES
Health Maintenance Due   Topic Date Due    Eye Exam  09/28/2023    Diabetes Urine Screening  11/03/2023     Immunizations - reviewed and updated   Care Everywhere - triggered   Care Teams - updated   Outreach - Patient overdue for diabetic eye exam, portal message sent.   JULIA sent to [facility] Dilma [facility]ahn [facility].        Reta from [facility] called to give an update. Advised that there still is no bed available at this time. Provider has been notified.      Janett Mcguire  08/22/18 1015

## 2023-10-30 NOTE — LETTER
AUTHORIZATION FOR RELEASE OF   CONFIDENTIAL INFORMATION    Dear Dr. Chauhan,    We are seeing Cj Barnes, date of birth 1965, in the clinic at [facility]. Garry Stover MD is the patient's PCP. Cj Barnes has an outstanding lab/procedure at the time we reviewed his chart. In order to help keep his health information updated, he has authorized us to request the following medical record(s):        (  )  MAMMOGRAM                                      (  )  COLONOSCOPY      (  )  PAP SMEAR                                          (  )  OUTSIDE LAB RESULTS     (  )  DEXA SCAN                                          (X)  EYE EXAM            (  )  FOOT EXAM                                          (  )  ENTIRE RECORD     (  )  OUTSIDE IMMUNIZATIONS                 (  )  _______________       ATTN: MEGGAN      Please fax records to Garry Stover MD, [phone]     If you have any questions, please contact Meggan at [phone].           Patient Name: Cj Barnes  : 1965  Patient Phone #: [phone]

## 2023-11-01 ENCOUNTER — PATIENT MESSAGE (OUTPATIENT)
Dept: CARDIOLOGY | Facility: CLINIC | Age: 58
End: 2023-11-01

## 2023-11-01 ENCOUNTER — LAB VISIT (OUTPATIENT)
Dept: LAB | Facility: HOSPITAL | Age: 58
End: 2023-11-01
Attending: INTERNAL MEDICINE
Payer: MEDICARE

## 2023-11-01 ENCOUNTER — ANTI-COAG VISIT (OUTPATIENT)
Dept: CARDIOLOGY | Facility: CLINIC | Age: 58
End: 2023-11-01
Payer: MEDICARE

## 2023-11-01 ENCOUNTER — OFFICE VISIT (OUTPATIENT)
Dept: PODIATRY | Facility: CLINIC | Age: 58
End: 2023-11-01
Payer: MEDICARE

## 2023-11-01 VITALS
DIASTOLIC BLOOD PRESSURE: 67 MMHG | WEIGHT: 229.94 LBS | BODY MASS INDEX: 36.09 KG/M2 | SYSTOLIC BLOOD PRESSURE: 130 MMHG | HEART RATE: 50 BPM | HEIGHT: 67 IN

## 2023-11-01 DIAGNOSIS — Z95.2 H/O MECHANICAL AORTIC VALVE REPLACEMENT: ICD-10-CM

## 2023-11-01 DIAGNOSIS — Z79.01 LONG TERM (CURRENT) USE OF ANTICOAGULANTS: ICD-10-CM

## 2023-11-01 DIAGNOSIS — I48.92 PAROXYSMAL ATRIAL FLUTTER: ICD-10-CM

## 2023-11-01 DIAGNOSIS — Z79.01 LONG TERM (CURRENT) USE OF ANTICOAGULANTS: Primary | ICD-10-CM

## 2023-11-01 DIAGNOSIS — L97.502 ULCER OF FOOT WITH FAT LAYER EXPOSED, UNSPECIFIED LATERALITY: ICD-10-CM

## 2023-11-01 DIAGNOSIS — M92.61 HAGLUND'S DEFORMITY OF RIGHT HEEL: Primary | ICD-10-CM

## 2023-11-01 DIAGNOSIS — E08.41 DIABETIC MONONEUROPATHY ASSOCIATED WITH DIABETES MELLITUS DUE TO UNDERLYING CONDITION: ICD-10-CM

## 2023-11-01 LAB
FINAL PATHOLOGIC DIAGNOSIS: NORMAL
INR PPP: 1.9 (ref 0.8–1.2)
Lab: NORMAL
PROTHROMBIN TIME: 19.7 SEC (ref 9–12.5)

## 2023-11-01 PROCEDURE — 3044F PR MOST RECENT HEMOGLOBIN A1C LEVEL <7.0%: ICD-10-PCS | Mod: CPTII,S$GLB,, | Performed by: PODIATRIST

## 2023-11-01 PROCEDURE — 99999 PR PBB SHADOW E&M-EST. PATIENT-LVL II: ICD-10-PCS | Mod: PBBFAC,,, | Performed by: PODIATRIST

## 2023-11-01 PROCEDURE — 93793 PR ANTICOAGULANT MGMT FOR PT TAKING WARFARIN: ICD-10-PCS | Mod: S$GLB,,,

## 2023-11-01 PROCEDURE — 3044F HG A1C LEVEL LT 7.0%: CPT | Mod: CPTII,S$GLB,, | Performed by: PODIATRIST

## 2023-11-01 PROCEDURE — 3075F SYST BP GE 130 - 139MM HG: CPT | Mod: CPTII,S$GLB,, | Performed by: PODIATRIST

## 2023-11-01 PROCEDURE — 99024 POSTOP FOLLOW-UP VISIT: CPT | Mod: S$GLB,,, | Performed by: PODIATRIST

## 2023-11-01 PROCEDURE — 85610 PROTHROMBIN TIME: CPT | Performed by: INTERNAL MEDICINE

## 2023-11-01 PROCEDURE — 99024 PR POST-OP FOLLOW-UP VISIT: ICD-10-PCS | Mod: S$GLB,,, | Performed by: PODIATRIST

## 2023-11-01 PROCEDURE — 3078F PR MOST RECENT DIASTOLIC BLOOD PRESSURE < 80 MM HG: ICD-10-PCS | Mod: CPTII,S$GLB,, | Performed by: PODIATRIST

## 2023-11-01 PROCEDURE — 3075F PR MOST RECENT SYSTOLIC BLOOD PRESS GE 130-139MM HG: ICD-10-PCS | Mod: CPTII,S$GLB,, | Performed by: PODIATRIST

## 2023-11-01 PROCEDURE — 36415 COLL VENOUS BLD VENIPUNCTURE: CPT | Performed by: INTERNAL MEDICINE

## 2023-11-01 PROCEDURE — 4010F PR ACE/ARB THEARPY RXD/TAKEN: ICD-10-PCS | Mod: CPTII,S$GLB,, | Performed by: PODIATRIST

## 2023-11-01 PROCEDURE — 99999 PR PBB SHADOW E&M-EST. PATIENT-LVL II: CPT | Mod: PBBFAC,,, | Performed by: PODIATRIST

## 2023-11-01 PROCEDURE — 4010F ACE/ARB THERAPY RXD/TAKEN: CPT | Mod: CPTII,S$GLB,, | Performed by: PODIATRIST

## 2023-11-01 PROCEDURE — 3078F DIAST BP <80 MM HG: CPT | Mod: CPTII,S$GLB,, | Performed by: PODIATRIST

## 2023-11-01 PROCEDURE — 93793 ANTICOAG MGMT PT WARFARIN: CPT | Mod: S$GLB,,,

## 2023-11-01 NOTE — PROGRESS NOTES
INR low. He took slightly lower dose than planned post procedure. Adjust dose per calendar. Recheck INR in 1 week

## 2023-11-05 DIAGNOSIS — R05.1 ACUTE COUGH: ICD-10-CM

## 2023-11-05 DIAGNOSIS — J30.2 SEASONAL ALLERGIES: ICD-10-CM

## 2023-11-05 RX ORDER — FLUTICASONE PROPIONATE 50 MCG
SPRAY, SUSPENSION (ML) NASAL
Qty: 16 G | Refills: 5 | Status: SHIPPED | OUTPATIENT
Start: 2023-11-05

## 2023-11-05 NOTE — TELEPHONE ENCOUNTER
Refill Routing Note   Medication(s) are not appropriate for processing by [facility] for the following reason(s):      No active prescription written by provider    [facility] action(s):  Defer Care Due:  None identified            Appointments  past 12m or future 3m with PCP    Date Provider   Last Visit   6/20/2023 Garry Stover MD   Next Visit   Visit date not found Garry Stover MD   ED visits in past 90 days: 0        Note composed:12:55 PM 11/05/2023

## 2023-11-05 NOTE — TELEPHONE ENCOUNTER
No care due was identified.  [facility] Embedded Care Due Messages. Reference number: [id number].   11/05/2023 8:02:53 AM CST

## 2023-11-08 ENCOUNTER — PATIENT MESSAGE (OUTPATIENT)
Dept: CARDIOLOGY | Facility: CLINIC | Age: 58
End: 2023-11-08

## 2023-11-08 ENCOUNTER — ANTI-COAG VISIT (OUTPATIENT)
Dept: CARDIOLOGY | Facility: CLINIC | Age: 58
End: 2023-11-08
Payer: MEDICARE

## 2023-11-08 DIAGNOSIS — I48.92 PAROXYSMAL ATRIAL FLUTTER: ICD-10-CM

## 2023-11-08 DIAGNOSIS — Z79.01 LONG TERM (CURRENT) USE OF ANTICOAGULANTS: Primary | ICD-10-CM

## 2023-11-08 DIAGNOSIS — Z95.2 H/O MECHANICAL AORTIC VALVE REPLACEMENT: ICD-10-CM

## 2023-11-08 PROCEDURE — 93793 ANTICOAG MGMT PT WARFARIN: CPT | Mod: S$GLB,,,

## 2023-11-08 PROCEDURE — 93793 PR ANTICOAGULANT MGMT FOR PT TAKING WARFARIN: ICD-10-PCS | Mod: S$GLB,,,

## 2023-11-10 DIAGNOSIS — E10.65 TYPE 1 DIABETES MELLITUS WITH HYPERGLYCEMIA: ICD-10-CM

## 2023-11-10 RX ORDER — INSULIN LISPRO 100 [IU]/ML
INJECTION, SOLUTION INTRAVENOUS; SUBCUTANEOUS
Qty: 60 ML | Refills: 11 | Status: SHIPPED | OUTPATIENT
Start: 2023-11-10 | End: 2024-03-11 | Stop reason: SDUPTHER

## 2023-11-11 NOTE — PROGRESS NOTES
Patient presents status post calcaneal exostectomy with Achilles tendon debridement and bursectomy.  Postop wound dehiscence has a small open lesion which is improving.  He is in shoe gear at this point.  No tenderness to the posterior Achilles tendon insertion and calcaneal surgical sites.  Continue slow transition back into normal shoe gear and activity.  Follow-up in 4 weeks.  Wound care discussed in detail.  May consider wound clinic follow-up versus closure of the wound.

## 2023-11-12 ENCOUNTER — PATIENT MESSAGE (OUTPATIENT)
Dept: DIABETES | Facility: CLINIC | Age: 58
End: 2023-11-12
Payer: MEDICARE

## 2023-11-14 ENCOUNTER — ANTI-COAG VISIT (OUTPATIENT)
Dept: CARDIOLOGY | Facility: CLINIC | Age: 58
End: 2023-11-14
Payer: MEDICARE

## 2023-11-14 ENCOUNTER — OFFICE VISIT (OUTPATIENT)
Dept: PODIATRY | Facility: CLINIC | Age: 58
End: 2023-11-14
Payer: MEDICARE

## 2023-11-14 ENCOUNTER — PATIENT MESSAGE (OUTPATIENT)
Dept: CARDIOLOGY | Facility: CLINIC | Age: 58
End: 2023-11-14

## 2023-11-14 ENCOUNTER — LAB VISIT (OUTPATIENT)
Dept: LAB | Facility: HOSPITAL | Age: 58
End: 2023-11-14
Attending: INTERNAL MEDICINE
Payer: MEDICARE

## 2023-11-14 VITALS
WEIGHT: 229.94 LBS | DIASTOLIC BLOOD PRESSURE: 67 MMHG | HEIGHT: 67 IN | BODY MASS INDEX: 36.09 KG/M2 | SYSTOLIC BLOOD PRESSURE: 130 MMHG | HEART RATE: 50 BPM

## 2023-11-14 DIAGNOSIS — Z79.01 LONG TERM (CURRENT) USE OF ANTICOAGULANTS: ICD-10-CM

## 2023-11-14 DIAGNOSIS — L97.502 ULCER OF FOOT WITH FAT LAYER EXPOSED, UNSPECIFIED LATERALITY: ICD-10-CM

## 2023-11-14 DIAGNOSIS — I48.92 PAROXYSMAL ATRIAL FLUTTER: ICD-10-CM

## 2023-11-14 DIAGNOSIS — M92.61 HAGLUND'S DEFORMITY OF RIGHT HEEL: Primary | ICD-10-CM

## 2023-11-14 DIAGNOSIS — Z79.01 LONG TERM (CURRENT) USE OF ANTICOAGULANTS: Primary | ICD-10-CM

## 2023-11-14 DIAGNOSIS — Z95.2 H/O MECHANICAL AORTIC VALVE REPLACEMENT: ICD-10-CM

## 2023-11-14 LAB
INR PPP: 2.3 (ref 0.8–1.2)
PROTHROMBIN TIME: 23.4 SEC (ref 9–12.5)

## 2023-11-14 PROCEDURE — 3044F HG A1C LEVEL LT 7.0%: CPT | Mod: CPTII,S$GLB,, | Performed by: PODIATRIST

## 2023-11-14 PROCEDURE — 99213 PR OFFICE/OUTPT VISIT, EST, LEVL III, 20-29 MIN: ICD-10-PCS | Mod: 25,S$GLB,, | Performed by: PODIATRIST

## 2023-11-14 PROCEDURE — 3008F BODY MASS INDEX DOCD: CPT | Mod: CPTII,S$GLB,, | Performed by: PODIATRIST

## 2023-11-14 PROCEDURE — 3075F SYST BP GE 130 - 139MM HG: CPT | Mod: CPTII,S$GLB,, | Performed by: PODIATRIST

## 2023-11-14 PROCEDURE — 12020 PR CLOSURE SUPERF WND DEHIS SIMPLE: ICD-10-PCS | Mod: S$GLB,,, | Performed by: PODIATRIST

## 2023-11-14 PROCEDURE — 3075F PR MOST RECENT SYSTOLIC BLOOD PRESS GE 130-139MM HG: ICD-10-PCS | Mod: CPTII,S$GLB,, | Performed by: PODIATRIST

## 2023-11-14 PROCEDURE — 4010F ACE/ARB THERAPY RXD/TAKEN: CPT | Mod: CPTII,S$GLB,, | Performed by: PODIATRIST

## 2023-11-14 PROCEDURE — 3078F PR MOST RECENT DIASTOLIC BLOOD PRESSURE < 80 MM HG: ICD-10-PCS | Mod: CPTII,S$GLB,, | Performed by: PODIATRIST

## 2023-11-14 PROCEDURE — 93793 PR ANTICOAGULANT MGMT FOR PT TAKING WARFARIN: ICD-10-PCS | Mod: S$GLB,,,

## 2023-11-14 PROCEDURE — 85610 PROTHROMBIN TIME: CPT | Performed by: INTERNAL MEDICINE

## 2023-11-14 PROCEDURE — 12020 TX SUPFC WND DEHSN SMPL CLSR: CPT | Mod: S$GLB,,, | Performed by: PODIATRIST

## 2023-11-14 PROCEDURE — 3078F DIAST BP <80 MM HG: CPT | Mod: CPTII,S$GLB,, | Performed by: PODIATRIST

## 2023-11-14 PROCEDURE — 3008F PR BODY MASS INDEX (BMI) DOCUMENTED: ICD-10-PCS | Mod: CPTII,S$GLB,, | Performed by: PODIATRIST

## 2023-11-14 PROCEDURE — 99213 OFFICE O/P EST LOW 20 MIN: CPT | Mod: 25,S$GLB,, | Performed by: PODIATRIST

## 2023-11-14 PROCEDURE — 3044F PR MOST RECENT HEMOGLOBIN A1C LEVEL <7.0%: ICD-10-PCS | Mod: CPTII,S$GLB,, | Performed by: PODIATRIST

## 2023-11-14 PROCEDURE — 99999 PR PBB SHADOW E&M-EST. PATIENT-LVL II: CPT | Mod: PBBFAC,,, | Performed by: PODIATRIST

## 2023-11-14 PROCEDURE — 99999 PR PBB SHADOW E&M-EST. PATIENT-LVL II: ICD-10-PCS | Mod: PBBFAC,,, | Performed by: PODIATRIST

## 2023-11-14 PROCEDURE — 36415 COLL VENOUS BLD VENIPUNCTURE: CPT | Performed by: INTERNAL MEDICINE

## 2023-11-14 PROCEDURE — 4010F PR ACE/ARB THEARPY RXD/TAKEN: ICD-10-PCS | Mod: CPTII,S$GLB,, | Performed by: PODIATRIST

## 2023-11-14 PROCEDURE — 93793 ANTICOAG MGMT PT WARFARIN: CPT | Mod: S$GLB,,,

## 2023-11-14 RX ORDER — CEPHALEXIN 500 MG/1
500 CAPSULE ORAL EVERY 12 HOURS
Qty: 10 CAPSULE | Refills: 0 | Status: SHIPPED | OUTPATIENT
Start: 2023-11-14 | End: 2023-12-28

## 2023-11-14 NOTE — PROGRESS NOTES
INR not at goal. Medications, chart, and patient findings reviewed. See calendar for adjustments to dose and follow up plan.    Pt reports abx to be called in by podiatry however nothing called in yet. He will let us know once he gets new med. Pt is still undergoing wound care for foot and had stitches put in today. Also, pt reports will be going on a cruise 12/3 for 2 weeks

## 2023-11-15 ENCOUNTER — PATIENT MESSAGE (OUTPATIENT)
Dept: CARDIOLOGY | Facility: CLINIC | Age: 58
End: 2023-11-15
Payer: MEDICARE

## 2023-11-16 DIAGNOSIS — G47.00 INSOMNIA, UNSPECIFIED TYPE: ICD-10-CM

## 2023-11-16 RX ORDER — ZOLPIDEM TARTRATE 10 MG/1
10 TABLET ORAL NIGHTLY
Qty: 30 TABLET | Refills: 1 | Status: SHIPPED | OUTPATIENT
Start: 2023-11-16 | End: 2024-01-02

## 2023-11-16 NOTE — TELEPHONE ENCOUNTER
No care due was identified.  [facility] Embedded Care Due Messages. Reference number: [id number].   11/16/2023 8:06:07 AM CST

## 2023-11-17 DIAGNOSIS — E10.65 TYPE 1 DIABETES MELLITUS WITH HYPERGLYCEMIA: ICD-10-CM

## 2023-11-17 RX ORDER — GABAPENTIN 300 MG/1
CAPSULE ORAL
Qty: 360 CAPSULE | Refills: 2 | Status: SHIPPED | OUTPATIENT
Start: 2023-11-17

## 2023-11-20 NOTE — PROGRESS NOTES
"  Patient presents approximately 4 weeks status post calcaneal exostectomy with Achilles tendon debridement and bursectomy.   Patient to remain nonweightbearing.  Unfortunately the incision has dehisced somewhat with a central opening measuring approximately 0.5 cm x 0.4 cm x 0.2cm.  Mild periwound erythema apparent.  Follow-up in 1 week and continue postoperative instructions.    Wound Debridement    Performed by: Eugene Bowers DPM   Authorized by: Patient    Time out: Immediately prior to procedure a "time out" was called to verify the correct patient, procedure, equipment, support staff and site/side marked as required.   Consent Done?:  Yes (Verbal)  Local anesthesia used?: No       Wound Details:    Location:  Right Achilles tendon/posterior calcaneal     Type of Debridement:  Excisional       Length (cm):  0.5 cm       Width (cm):  0.4 cm       Depth (cm):  0.2 cm       Percent Debrided (%):  100             Depth of debridement:  Subcutaneous tissue    Tissue debrided:  Dermis, Epidermis and Subcutaneous    Devitalized tissue debrided:  Biofilm, Callus and Necrotic/Eschar    Instruments:  Blade, Curette and Nippers     Bleeding:  Minimal  Hemostasis Achieved: Yes    Method Used:  Pressure  Patient tolerance:  Patient tolerated the procedure well with no immediate complications    Procedure: Wound closure  Right ankle performed by:  Eugene bowers DPM    Wound to the posterior right ankle anesthetized with 5 cc of 0.5% Marcaine plain.  The area was prepped scrubbed and draped.  After debridement as documented above, 3-0 nylon suture material was utilized to close the wound consisting of simple interrupted sutures.  The area was sterilely dressed.  Wound care was discussed in detail.  This was tolerated well no complications.      Follow-up in 2-4 weeks.            "

## 2023-11-21 ENCOUNTER — PATIENT MESSAGE (OUTPATIENT)
Dept: CARDIOLOGY | Facility: CLINIC | Age: 58
End: 2023-11-21

## 2023-11-21 ENCOUNTER — ANTI-COAG VISIT (OUTPATIENT)
Dept: CARDIOLOGY | Facility: CLINIC | Age: 58
End: 2023-11-21
Payer: MEDICARE

## 2023-11-21 DIAGNOSIS — I48.92 PAROXYSMAL ATRIAL FLUTTER: ICD-10-CM

## 2023-11-21 DIAGNOSIS — Z79.01 LONG TERM (CURRENT) USE OF ANTICOAGULANTS: Primary | ICD-10-CM

## 2023-11-21 DIAGNOSIS — Z95.2 H/O MECHANICAL AORTIC VALVE REPLACEMENT: ICD-10-CM

## 2023-11-21 PROCEDURE — 93793 ANTICOAG MGMT PT WARFARIN: CPT | Mod: S$GLB,,,

## 2023-11-21 PROCEDURE — 93793 PR ANTICOAGULANT MGMT FOR PT TAKING WARFARIN: ICD-10-PCS | Mod: S$GLB,,,

## 2023-11-21 NOTE — PROGRESS NOTES
INR good. Based on trend will adjust dose. He is on cephalexin for wound. Recheck INR in 1 week to assess and plan for going out of town 12/3 x2w

## 2023-11-28 ENCOUNTER — OFFICE VISIT (OUTPATIENT)
Dept: PODIATRY | Facility: CLINIC | Age: 58
End: 2023-11-28
Payer: MEDICARE

## 2023-11-28 ENCOUNTER — LAB VISIT (OUTPATIENT)
Dept: LAB | Facility: HOSPITAL | Age: 58
End: 2023-11-28
Attending: INTERNAL MEDICINE
Payer: MEDICARE

## 2023-11-28 ENCOUNTER — ANTI-COAG VISIT (OUTPATIENT)
Dept: CARDIOLOGY | Facility: CLINIC | Age: 58
End: 2023-11-28
Payer: MEDICARE

## 2023-11-28 VITALS
SYSTOLIC BLOOD PRESSURE: 125 MMHG | HEIGHT: 67 IN | WEIGHT: 229.94 LBS | HEART RATE: 65 BPM | BODY MASS INDEX: 36.09 KG/M2 | DIASTOLIC BLOOD PRESSURE: 75 MMHG

## 2023-11-28 DIAGNOSIS — M92.61 HAGLUND'S DEFORMITY OF RIGHT HEEL: Primary | ICD-10-CM

## 2023-11-28 DIAGNOSIS — Z95.2 H/O MECHANICAL AORTIC VALVE REPLACEMENT: ICD-10-CM

## 2023-11-28 DIAGNOSIS — Z79.01 LONG TERM (CURRENT) USE OF ANTICOAGULANTS: Primary | ICD-10-CM

## 2023-11-28 DIAGNOSIS — L97.502 ULCER OF FOOT WITH FAT LAYER EXPOSED, UNSPECIFIED LATERALITY: ICD-10-CM

## 2023-11-28 DIAGNOSIS — E08.41 DIABETIC MONONEUROPATHY ASSOCIATED WITH DIABETES MELLITUS DUE TO UNDERLYING CONDITION: ICD-10-CM

## 2023-11-28 DIAGNOSIS — Z79.01 LONG TERM (CURRENT) USE OF ANTICOAGULANTS: ICD-10-CM

## 2023-11-28 DIAGNOSIS — I48.92 PAROXYSMAL ATRIAL FLUTTER: ICD-10-CM

## 2023-11-28 LAB
INR PPP: 4 (ref 0.8–1.2)
PROTHROMBIN TIME: 39.3 SEC (ref 9–12.5)

## 2023-11-28 PROCEDURE — 99999 PR PBB SHADOW E&M-EST. PATIENT-LVL IV: CPT | Mod: PBBFAC,,, | Performed by: PODIATRIST

## 2023-11-28 PROCEDURE — 99213 PR OFFICE/OUTPT VISIT, EST, LEVL III, 20-29 MIN: ICD-10-PCS | Mod: S$GLB,,, | Performed by: PODIATRIST

## 2023-11-28 PROCEDURE — 3074F SYST BP LT 130 MM HG: CPT | Mod: CPTII,S$GLB,, | Performed by: PODIATRIST

## 2023-11-28 PROCEDURE — 3078F DIAST BP <80 MM HG: CPT | Mod: CPTII,S$GLB,, | Performed by: PODIATRIST

## 2023-11-28 PROCEDURE — 3044F HG A1C LEVEL LT 7.0%: CPT | Mod: CPTII,S$GLB,, | Performed by: PODIATRIST

## 2023-11-28 PROCEDURE — 99213 OFFICE O/P EST LOW 20 MIN: CPT | Mod: S$GLB,,, | Performed by: PODIATRIST

## 2023-11-28 PROCEDURE — 99999 PR PBB SHADOW E&M-EST. PATIENT-LVL IV: ICD-10-PCS | Mod: PBBFAC,,, | Performed by: PODIATRIST

## 2023-11-28 PROCEDURE — 85610 PROTHROMBIN TIME: CPT | Performed by: INTERNAL MEDICINE

## 2023-11-28 PROCEDURE — 93793 PR ANTICOAGULANT MGMT FOR PT TAKING WARFARIN: ICD-10-PCS | Mod: S$GLB,,,

## 2023-11-28 PROCEDURE — 4010F ACE/ARB THERAPY RXD/TAKEN: CPT | Mod: CPTII,S$GLB,, | Performed by: PODIATRIST

## 2023-11-28 PROCEDURE — 3078F PR MOST RECENT DIASTOLIC BLOOD PRESSURE < 80 MM HG: ICD-10-PCS | Mod: CPTII,S$GLB,, | Performed by: PODIATRIST

## 2023-11-28 PROCEDURE — 36415 COLL VENOUS BLD VENIPUNCTURE: CPT | Performed by: INTERNAL MEDICINE

## 2023-11-28 PROCEDURE — 3008F PR BODY MASS INDEX (BMI) DOCUMENTED: ICD-10-PCS | Mod: CPTII,S$GLB,, | Performed by: PODIATRIST

## 2023-11-28 PROCEDURE — 4010F PR ACE/ARB THEARPY RXD/TAKEN: ICD-10-PCS | Mod: CPTII,S$GLB,, | Performed by: PODIATRIST

## 2023-11-28 PROCEDURE — 3008F BODY MASS INDEX DOCD: CPT | Mod: CPTII,S$GLB,, | Performed by: PODIATRIST

## 2023-11-28 PROCEDURE — 3074F PR MOST RECENT SYSTOLIC BLOOD PRESSURE < 130 MM HG: ICD-10-PCS | Mod: CPTII,S$GLB,, | Performed by: PODIATRIST

## 2023-11-28 PROCEDURE — 3044F PR MOST RECENT HEMOGLOBIN A1C LEVEL <7.0%: ICD-10-PCS | Mod: CPTII,S$GLB,, | Performed by: PODIATRIST

## 2023-11-28 PROCEDURE — 93793 ANTICOAG MGMT PT WARFARIN: CPT | Mod: S$GLB,,,

## 2023-11-28 RX ORDER — INFLUENZA VIRUS VACCINE 15; 15; 15; 15 UG/.5ML; UG/.5ML; UG/.5ML; UG/.5ML
SUSPENSION INTRAMUSCULAR
COMMUNITY
Start: 2023-10-12 | End: 2024-01-02

## 2023-11-28 RX ORDER — METHYLPREDNISOLONE 4 MG/1
TABLET ORAL
Qty: 21 TABLET | Refills: 1 | Status: SHIPPED | OUTPATIENT
Start: 2023-11-28 | End: 2024-01-02

## 2023-11-28 RX ORDER — COVID-19 VACCINE, MRNA 0.05 MG/.48ML
INJECTION, SUSPENSION INTRAMUSCULAR
COMMUNITY
Start: 2023-10-12

## 2023-11-28 RX ORDER — OXYCODONE AND ACETAMINOPHEN 7.5; 325 MG/1; MG/1
1 TABLET ORAL EVERY 8 HOURS PRN
Qty: 30 TABLET | Refills: 0 | Status: ON HOLD | OUTPATIENT
Start: 2023-11-28 | End: 2023-12-18 | Stop reason: HOSPADM

## 2023-11-28 RX ORDER — DOXYCYCLINE 100 MG/1
100 TABLET ORAL 2 TIMES DAILY
Qty: 30 TABLET | Refills: 0 | Status: SHIPPED | OUTPATIENT
Start: 2023-11-28 | End: 2024-01-02 | Stop reason: SDUPTHER

## 2023-11-28 NOTE — PROGRESS NOTES
INR high. Pt now has gout. He had his stiches removed from wound. He will be starting a medrol dose pack and resuming doxycycline for 15d course. Decrease dose per calendar. Unfortunately, pt will be going out of town 12/3 for 2 weeks so uinable to check INR sooner for appropriate assessment.

## 2023-12-06 ENCOUNTER — PATIENT MESSAGE (OUTPATIENT)
Dept: DIABETES | Facility: CLINIC | Age: 58
End: 2023-12-06
Payer: MEDICARE

## 2023-12-09 NOTE — PROGRESS NOTES
Patient presents status post wound closure secondary to previous dehiscence Achilles tendon debridement.  He is doing very well.  No pain.  Incision healing very well after wound closure last visit.  Incision is healing well no signs of dehiscence or infection at this point.  Recommend to leave sutures intact for 1-2 weeks.  He is going on vacation and will follow-up at that time upon return for re-evaluation.

## 2023-12-17 PROBLEM — R79.89 ELEVATED TROPONIN: Status: ACTIVE | Noted: 2023-12-17

## 2023-12-17 PROBLEM — J10.1 INFLUENZA A: Status: ACTIVE | Noted: 2023-12-17

## 2023-12-18 DIAGNOSIS — Z79.01 LONG TERM (CURRENT) USE OF ANTICOAGULANTS: ICD-10-CM

## 2023-12-18 DIAGNOSIS — G45.9 TIA (TRANSIENT ISCHEMIC ATTACK): ICD-10-CM

## 2023-12-18 DIAGNOSIS — Z95.828 S/P ASCENDING AORTIC REPLACEMENT: ICD-10-CM

## 2023-12-18 DIAGNOSIS — I48.92 PAROXYSMAL ATRIAL FLUTTER: Primary | ICD-10-CM

## 2023-12-18 DIAGNOSIS — Z95.2 H/O MECHANICAL AORTIC VALVE REPLACEMENT: ICD-10-CM

## 2023-12-18 RX ORDER — WARFARIN 3 MG/1
6 TABLET ORAL DAILY
Qty: 180 TABLET | Refills: 3 | Status: SHIPPED | OUTPATIENT
Start: 2023-12-18 | End: 2024-01-24 | Stop reason: SDUPTHER

## 2023-12-19 ENCOUNTER — TELEPHONE (OUTPATIENT)
Dept: DIABETES | Facility: CLINIC | Age: 58
End: 2023-12-19
Payer: MEDICARE

## 2023-12-19 ENCOUNTER — PATIENT MESSAGE (OUTPATIENT)
Dept: DIABETES | Facility: CLINIC | Age: 58
End: 2023-12-19
Payer: MEDICARE

## 2023-12-19 ENCOUNTER — PATIENT OUTREACH (OUTPATIENT)
Dept: ADMINISTRATIVE | Facility: HOSPITAL | Age: 58
End: 2023-12-19
Payer: MEDICARE

## 2023-12-19 ENCOUNTER — PATIENT MESSAGE (OUTPATIENT)
Dept: CARDIOLOGY | Facility: CLINIC | Age: 58
End: 2023-12-19
Payer: MEDICARE

## 2023-12-19 ENCOUNTER — ANTI-COAG VISIT (OUTPATIENT)
Dept: CARDIOLOGY | Facility: CLINIC | Age: 58
End: 2023-12-19
Payer: MEDICARE

## 2023-12-19 DIAGNOSIS — Z95.2 H/O MECHANICAL AORTIC VALVE REPLACEMENT: ICD-10-CM

## 2023-12-19 DIAGNOSIS — I48.92 PAROXYSMAL ATRIAL FLUTTER: ICD-10-CM

## 2023-12-19 DIAGNOSIS — Z79.01 LONG TERM (CURRENT) USE OF ANTICOAGULANTS: Primary | ICD-10-CM

## 2023-12-19 DIAGNOSIS — E66.01 CLASS 2 SEVERE OBESITY DUE TO EXCESS CALORIES WITH SERIOUS COMORBIDITY AND BODY MASS INDEX (BMI) OF 35.0 TO 35.9 IN ADULT: Primary | ICD-10-CM

## 2023-12-19 RX ORDER — SEMAGLUTIDE 0.5 MG/.5ML
0.5 INJECTION, SOLUTION SUBCUTANEOUS
Qty: 2 ML | Refills: 6 | Status: SHIPPED | OUTPATIENT
Start: 2023-12-19 | End: 2024-01-02

## 2023-12-19 NOTE — PROGRESS NOTES
Health Maintenance Due   Topic Date Due    Eye Exam  09/28/2023    RSV Vaccine (Age 60+ and Pregnant patients) (1 - 1-dose 60+ series) Never done        Chart review done.   HM updated.   Immunizations reviewed & updated.   Care Everywhere updated.

## 2023-12-19 NOTE — TELEPHONE ENCOUNTER
Let him know that his insurance will not cover the Ozempic because he is T1  We can try [employer] and see if they will cover that   I also recommend that he call his insurance and fight it   [employer] probably needs a PA  Let me know what happens

## 2023-12-19 NOTE — PROGRESS NOTES
Pt went to ER 12/17, admitted over night. Pt returned from 2 week cruise c/o 5 days of resp symptoms w/ N/V. He tested positive for flu and was started on tamiflu. He was kept overnight to watch troponin levels & d/c 12/18. INR was a little high. Please check if pt held/missed any doses especially in the past 2 days.     Update 12/20: Pt reports taking his normal dose as planned. Please have pt repeat INR today or tomorrow for assessment. Plan for 1.5mg today

## 2023-12-19 NOTE — TELEPHONE ENCOUNTER
Spoke with pharmacist. Informed the insurance provider will no longer authorize the Ozempic for type 1 DM.

## 2023-12-20 ENCOUNTER — PATIENT OUTREACH (OUTPATIENT)
Dept: ADMINISTRATIVE | Facility: CLINIC | Age: 58
End: 2023-12-20
Payer: MEDICARE

## 2023-12-20 NOTE — PROGRESS NOTES
2nd attempt to make TCC Call. Left voicemail. Please call [phone] leave your first and last name and date of birth for Abilio. I will return your call.

## 2023-12-20 NOTE — PROGRESS NOTES
C3 nurse attempted to contact Cj Barnes for a TCC post hospital discharge follow up call. No answer. Left voicemail with callback information. The patient has a scheduled HOSFU appointment with Garry Stover MD on 01/02/24 @ 6660.

## 2023-12-20 NOTE — PROGRESS NOTES
Pt confirmed he has been taking his normal warfarin dosing of 4.5mg daily, 3mg on Wednesday .    Pt states that when he was d/c they wanted him to take  6mg daily, but he forgot and has been taking normal dosing.

## 2023-12-21 ENCOUNTER — TELEPHONE (OUTPATIENT)
Dept: CARDIOLOGY | Facility: CLINIC | Age: 58
End: 2023-12-21
Payer: MEDICARE

## 2023-12-21 ENCOUNTER — ANTI-COAG VISIT (OUTPATIENT)
Dept: CARDIOLOGY | Facility: CLINIC | Age: 58
End: 2023-12-21
Payer: MEDICARE

## 2023-12-21 DIAGNOSIS — Z79.01 LONG TERM (CURRENT) USE OF ANTICOAGULANTS: Primary | ICD-10-CM

## 2023-12-21 DIAGNOSIS — Z95.2 H/O MECHANICAL AORTIC VALVE REPLACEMENT: ICD-10-CM

## 2023-12-21 DIAGNOSIS — I48.92 PAROXYSMAL ATRIAL FLUTTER: ICD-10-CM

## 2023-12-21 PROCEDURE — 93793 ANTICOAG MGMT PT WARFARIN: CPT | Mod: S$GLB,,,

## 2023-12-21 PROCEDURE — 93793 PR ANTICOAGULANT MGMT FOR PT TAKING WARFARIN: ICD-10-PCS | Mod: S$GLB,,,

## 2023-12-21 NOTE — TELEPHONE ENCOUNTER
Received a critical lab results from Trell at [facility] lab.    PT 64.4         INR  6.76    Reached out to patient regarding critical lab.  No answer.  Left detailed message regarding holding dose until he hears from the coumadin clinic with instructions.

## 2023-12-21 NOTE — PROGRESS NOTES
INR very high. Pt has the flu. He is not eating much. Hold & decrease dose for now. Reevaluate on Tuesday

## 2023-12-26 ENCOUNTER — TELEPHONE (OUTPATIENT)
Dept: CARDIOLOGY | Facility: CLINIC | Age: 58
End: 2023-12-26
Payer: MEDICARE

## 2023-12-26 ENCOUNTER — ANTI-COAG VISIT (OUTPATIENT)
Dept: CARDIOLOGY | Facility: CLINIC | Age: 58
End: 2023-12-26
Payer: MEDICARE

## 2023-12-26 DIAGNOSIS — Z79.01 LONG TERM (CURRENT) USE OF ANTICOAGULANTS: Primary | ICD-10-CM

## 2023-12-26 DIAGNOSIS — Z95.2 H/O MECHANICAL AORTIC VALVE REPLACEMENT: ICD-10-CM

## 2023-12-26 DIAGNOSIS — I48.92 PAROXYSMAL ATRIAL FLUTTER: ICD-10-CM

## 2023-12-26 PROCEDURE — 93793 ANTICOAG MGMT PT WARFARIN: CPT | Mod: S$GLB,,,

## 2023-12-26 PROCEDURE — 93793 PR ANTICOAGULANT MGMT FOR PT TAKING WARFARIN: ICD-10-PCS | Mod: S$GLB,,,

## 2023-12-26 NOTE — PROGRESS NOTES
INR increased despite holding for 3 days last week. He is still suffering from flu symptoms. He is not eating much. Hold warfarin. Repeat INR in 2 days

## 2023-12-26 NOTE — TELEPHONE ENCOUNTER
Spoke with Mary at [facility] lab.  Patient has critical lab value: INR 7.98  Dr. De La Fuente notified.    Spoke with Shayy at Coumadin clinic, they received the lab value and will reaxch out to the patient.

## 2023-12-28 ENCOUNTER — OFFICE VISIT (OUTPATIENT)
Dept: PODIATRY | Facility: CLINIC | Age: 58
End: 2023-12-28
Payer: MEDICARE

## 2023-12-28 ENCOUNTER — PATIENT MESSAGE (OUTPATIENT)
Dept: CARDIOLOGY | Facility: CLINIC | Age: 58
End: 2023-12-28
Payer: MEDICARE

## 2023-12-28 ENCOUNTER — ANTI-COAG VISIT (OUTPATIENT)
Dept: CARDIOLOGY | Facility: CLINIC | Age: 58
End: 2023-12-28
Payer: MEDICARE

## 2023-12-28 VITALS
DIASTOLIC BLOOD PRESSURE: 71 MMHG | HEART RATE: 89 BPM | SYSTOLIC BLOOD PRESSURE: 117 MMHG | BODY MASS INDEX: 36.88 KG/M2 | HEIGHT: 67 IN | WEIGHT: 235 LBS

## 2023-12-28 DIAGNOSIS — I48.92 PAROXYSMAL ATRIAL FLUTTER: ICD-10-CM

## 2023-12-28 DIAGNOSIS — Z79.01 LONG TERM (CURRENT) USE OF ANTICOAGULANTS: Primary | ICD-10-CM

## 2023-12-28 DIAGNOSIS — L97.502 ULCER OF FOOT WITH FAT LAYER EXPOSED, UNSPECIFIED LATERALITY: Primary | ICD-10-CM

## 2023-12-28 DIAGNOSIS — M86.00 ACUTE HEMATOGENOUS OSTEOMYELITIS, UNSPECIFIED SITE: ICD-10-CM

## 2023-12-28 DIAGNOSIS — Z95.2 H/O MECHANICAL AORTIC VALVE REPLACEMENT: ICD-10-CM

## 2023-12-28 PROCEDURE — 99214 OFFICE O/P EST MOD 30 MIN: CPT | Mod: 25,S$GLB,, | Performed by: PODIATRIST

## 2023-12-28 PROCEDURE — 87070 CULTURE OTHR SPECIMN AEROBIC: CPT | Performed by: PODIATRIST

## 2023-12-28 PROCEDURE — 87186 SC STD MICRODIL/AGAR DIL: CPT | Performed by: PODIATRIST

## 2023-12-28 PROCEDURE — 87147 CULTURE TYPE IMMUNOLOGIC: CPT | Performed by: PODIATRIST

## 2023-12-28 PROCEDURE — 87077 CULTURE AEROBIC IDENTIFY: CPT | Performed by: PODIATRIST

## 2023-12-28 PROCEDURE — 87075 CULTR BACTERIA EXCEPT BLOOD: CPT | Performed by: PODIATRIST

## 2023-12-28 PROCEDURE — 3074F SYST BP LT 130 MM HG: CPT | Mod: CPTII,S$GLB,, | Performed by: PODIATRIST

## 2023-12-28 PROCEDURE — 99999 PR PBB SHADOW E&M-EST. PATIENT-LVL V: CPT | Mod: PBBFAC,,, | Performed by: PODIATRIST

## 2023-12-28 PROCEDURE — 3008F BODY MASS INDEX DOCD: CPT | Mod: CPTII,S$GLB,, | Performed by: PODIATRIST

## 2023-12-28 PROCEDURE — 3078F DIAST BP <80 MM HG: CPT | Mod: CPTII,S$GLB,, | Performed by: PODIATRIST

## 2023-12-28 RX ORDER — SULFAMETHOXAZOLE AND TRIMETHOPRIM 400; 80 MG/1; MG/1
1 TABLET ORAL 2 TIMES DAILY
Qty: 20 TABLET | Refills: 0 | Status: SHIPPED | OUTPATIENT
Start: 2023-12-28 | End: 2024-01-09

## 2023-12-28 RX ORDER — CIPROFLOXACIN 500 MG/1
500 TABLET ORAL EVERY 12 HOURS
Qty: 20 TABLET | Refills: 0 | OUTPATIENT
Start: 2023-12-28 | End: 2024-01-16

## 2023-12-28 RX ORDER — PREGABALIN 75 MG/1
CAPSULE ORAL
COMMUNITY
End: 2024-01-02

## 2023-12-28 RX ORDER — OXYCODONE AND ACETAMINOPHEN 7.5; 325 MG/1; MG/1
TABLET ORAL
COMMUNITY
End: 2024-01-02

## 2023-12-28 RX ORDER — CODEINE PHOSPHATE AND GUAIFENESIN 10; 100 MG/5ML; MG/5ML
10 SOLUTION ORAL EVERY 4 HOURS PRN
COMMUNITY
End: 2024-01-02

## 2023-12-28 NOTE — PROGRESS NOTES
INR high still. Pt did not hold as planned, took 3mg yesterday. He is feeling a little better from the flu but now has a wound infection. He will be starting cipro and bactrim today for 10d course; both are potential DDIs. Hold an additional dose today then decrease dose. Repeat INR Tuesday

## 2024-01-01 LAB
BACTERIA SPEC AEROBE CULT: ABNORMAL
BACTERIA SPEC AEROBE CULT: ABNORMAL

## 2024-01-02 ENCOUNTER — ANTI-COAG VISIT (OUTPATIENT)
Dept: CARDIOLOGY | Facility: CLINIC | Age: 59
End: 2024-01-02
Payer: MEDICARE

## 2024-01-02 ENCOUNTER — PATIENT MESSAGE (OUTPATIENT)
Dept: CARDIOLOGY | Facility: CLINIC | Age: 59
End: 2024-01-02
Payer: MEDICARE

## 2024-01-02 ENCOUNTER — OFFICE VISIT (OUTPATIENT)
Dept: PRIMARY CARE CLINIC | Facility: CLINIC | Age: 59
End: 2024-01-02
Payer: MEDICARE

## 2024-01-02 VITALS
DIASTOLIC BLOOD PRESSURE: 72 MMHG | HEIGHT: 67 IN | OXYGEN SATURATION: 96 % | TEMPERATURE: 98 F | WEIGHT: 212.5 LBS | SYSTOLIC BLOOD PRESSURE: 132 MMHG | BODY MASS INDEX: 33.35 KG/M2 | RESPIRATION RATE: 18 BRPM | HEART RATE: 81 BPM

## 2024-01-02 DIAGNOSIS — I48.92 PAROXYSMAL ATRIAL FLUTTER: ICD-10-CM

## 2024-01-02 DIAGNOSIS — E08.41 DIABETIC MONONEUROPATHY ASSOCIATED WITH DIABETES MELLITUS DUE TO UNDERLYING CONDITION: ICD-10-CM

## 2024-01-02 DIAGNOSIS — Z95.2 H/O MECHANICAL AORTIC VALVE REPLACEMENT: ICD-10-CM

## 2024-01-02 DIAGNOSIS — L97.502 ULCER OF FOOT WITH FAT LAYER EXPOSED, UNSPECIFIED LATERALITY: ICD-10-CM

## 2024-01-02 DIAGNOSIS — I25.119 ATHEROSCLEROSIS OF NATIVE CORONARY ARTERY OF NATIVE HEART WITH ANGINA PECTORIS: ICD-10-CM

## 2024-01-02 DIAGNOSIS — J10.1 INFLUENZA A: Primary | ICD-10-CM

## 2024-01-02 DIAGNOSIS — F33.0 MAJOR DEPRESSIVE DISORDER, RECURRENT, MILD: ICD-10-CM

## 2024-01-02 DIAGNOSIS — I70.0 AORTIC ATHEROSCLEROSIS: ICD-10-CM

## 2024-01-02 DIAGNOSIS — Z79.01 LONG TERM (CURRENT) USE OF ANTICOAGULANTS: Primary | ICD-10-CM

## 2024-01-02 DIAGNOSIS — R79.89 ELEVATED TROPONIN: ICD-10-CM

## 2024-01-02 DIAGNOSIS — E10.649 TYPE 1 DIABETES MELLITUS WITH HYPOGLYCEMIA UNAWARENESS: ICD-10-CM

## 2024-01-02 PROBLEM — E66.01 CLASS 2 SEVERE OBESITY DUE TO EXCESS CALORIES WITH SERIOUS COMORBIDITY AND BODY MASS INDEX (BMI) OF 35.0 TO 35.9 IN ADULT: Status: RESOLVED | Noted: 2019-04-23 | Resolved: 2024-01-02

## 2024-01-02 PROBLEM — E66.812 CLASS 2 SEVERE OBESITY DUE TO EXCESS CALORIES WITH SERIOUS COMORBIDITY AND BODY MASS INDEX (BMI) OF 35.0 TO 35.9 IN ADULT: Status: RESOLVED | Noted: 2019-04-23 | Resolved: 2024-01-02

## 2024-01-02 LAB — BACTERIA SPEC ANAEROBE CULT: NORMAL

## 2024-01-02 PROCEDURE — 99999 PR PBB SHADOW E&M-EST. PATIENT-LVL V: CPT | Mod: PBBFAC,,, | Performed by: FAMILY MEDICINE

## 2024-01-02 PROCEDURE — 99214 OFFICE O/P EST MOD 30 MIN: CPT | Mod: S$GLB,,, | Performed by: FAMILY MEDICINE

## 2024-01-02 NOTE — PROGRESS NOTES
Subjective:       Patient ID: Cj Barnes is a 58 y.o. male.    Chief Complaint: Hospital Follow Up    HPI: Mr. Barnes is a 59 yo man who came in with abdominal pain which he attributes from coughing and upper resp symptoms x 5 days. Also has N/V, positive for influenzae A. troponin mildly bumped 0.03, flat on repeat. EKG with ST changes and ST depression/TWI inferior leads II and III. EKG in July showed normalization of T wave changes. Same EKG changes in Jan though so may be chronic.      * No surgery found *      Hospital Course: Pt was admitted for influenza and started on antivirals.  He was able to pass a 6min walk prior to discharge and did not require home oxygen.  He was also found to have demand ischemia with mildly elevated and flat troponins likely 2/2 influenza.  He will follow up with cardiology upon discharge.      Patient remained hemodynamically stable throughout hospitalization without acute events.  At the time of discharge patient stated they were feeling much better and ready to go home.  Patient agrees to take all medications as prescribed in the medication reconciliation.  Patient agrees to follow-up with PCP and specialists as instructed.     Patient deemed medically stable for discharge on 12/18/2023.  Return precautions advised. Patient in agreement with discharge plan.    Since discharge, he reports that he is doing much better overall.  Still having coughing spells, but improving.  No exertional chest pain.  Shortness of breath improving.  No fevers or chills.  Appetite has essentially normalized.  Scheduled for repeat echo and follow-up with cardiology in a few weeks.  Recently saw Podiatry for foot ulcer, started on antibiotics, scheduled for MRI      Review of Systems   Constitutional:  Negative for fever.   Respiratory:  Positive for cough and chest tightness. Negative for shortness of breath and wheezing.    Cardiovascular:  Negative for chest pain, palpitations and leg  "swelling.   Skin:  Positive for wound.   Neurological:  Negative for dizziness.       Objective:      Vitals:    01/02/24 1050   BP: 132/72   BP Location: Left arm   Patient Position: Sitting   BP Method: Medium (Manual)   Pulse: 81   Resp: 18   Temp: 97.7 °F (36.5 °C)   TempSrc: Temporal   SpO2: 96%   Weight: 96.4 kg (212 lb 8.4 oz)   Height: 5' 7" (1.702 m)     BP Readings from Last 5 Encounters:   01/02/24 132/72   12/28/23 117/71   12/18/23 (!) 145/65   11/28/23 125/75   11/14/23 130/67     Wt Readings from Last 5 Encounters:   01/02/24 96.4 kg (212 lb 8.4 oz)   12/28/23 106.6 kg (235 lb 0.2 oz)   12/17/23 106.6 kg (235 lb)   11/28/23 104.3 kg (229 lb 15 oz)   11/14/23 104.3 kg (229 lb 15 oz)     Physical Exam  Vitals and nursing note reviewed.   Constitutional:       General: He is not in acute distress.     Appearance: Normal appearance. He is well-developed.   HENT:      Head: Normocephalic and atraumatic.   Cardiovascular:      Rate and Rhythm: Normal rate and regular rhythm.      Heart sounds: Normal heart sounds.   Pulmonary:      Effort: Pulmonary effort is normal. No respiratory distress.      Breath sounds: No wheezing, rhonchi or rales.   Musculoskeletal:      Right lower leg: No edema.      Left lower leg: No edema.   Skin:     General: Skin is warm and dry.   Neurological:      Mental Status: He is alert and oriented to person, place, and time.   Psychiatric:         Mood and Affect: Mood normal.         Behavior: Behavior normal.         Lab Results   Component Value Date    WBC 13.94 (H) 12/18/2023    HGB 11.8 (L) 12/18/2023    HCT 35.4 (L) 12/18/2023     (L) 12/18/2023    CHOL 81 (L) 12/18/2023    TRIG 59 12/18/2023    HDL 26 (L) 12/18/2023    ALT 17 12/18/2023    AST 51 (H) 12/18/2023     12/18/2023    K 3.0 (L) 12/18/2023    CL 98 12/18/2023    CREATININE 0.8 12/18/2023    BUN 21 (H) 12/18/2023    CO2 26 12/18/2023    TSH 2.063 07/10/2023    PSA 0.32 07/10/2023    INR 3.2 (H) " 01/02/2024    GLUF 160 (H) 01/09/2020    HGBA1C 5.8 (H) 09/05/2023      Assessment:       1. Influenza A    2. Elevated troponin    3. Ulcer of foot with fat layer exposed, unspecified laterality    4. Aortic atherosclerosis    5. Diabetic mononeuropathy associated with diabetes mellitus due to underlying condition    6. Paroxysmal atrial flutter    7. Major depressive disorder, recurrent, mild    8. Atherosclerosis of native coronary artery of native heart with angina pectoris    9. Type 1 diabetes mellitus with hypoglycemia unawareness        Plan:       Influenza A  Clinically resolved, cough should improve over the next few weeks  Elevated troponin  Likely due to influenza.  Follow-up with cardiology as scheduled  Ulcer of foot with fat layer exposed, unspecified laterality  Treatment as per podiatry  Aortic atherosclerosis  Continue current medical management  Diabetic mononeuropathy associated with diabetes mellitus due to underlying condition  Follow-up with diabetes management  Paroxysmal atrial flutter  Continue warfarin  Major depressive disorder, recurrent, mild  Stable  Atherosclerosis of native coronary artery of native heart with angina pectoris  As above  Type 1 diabetes mellitus with hypoglycemia unawareness  As above    Medication List with Changes/Refills   Current Medications    ASPIRIN (ECOTRIN) 81 MG EC TABLET    Take 1 tablet (81 mg total) by mouth once daily.    BAQSIMI 3 MG/ACTUATION SPRY    USE ONE actuation into a single nostril no response MAY REPEAT in 15 minutes USE a new device    BLOOD-GLUCOSE METER,CONTINUOUS (DEXCOM G6 ) MISC    1 Device by Misc.(Non-Drug; Combo Route) route once. for 1 dose    BLOOD-GLUCOSE SENSOR (DEXCOM G6 SENSOR) MORIS    1 sensor every 10 days    BLOOD-GLUCOSE TRANSMITTER (DEXCOM G6 TRANSMITTER) MORIS    1 transmitter every 3 months    CIPROFLOXACIN HCL (CIPRO) 500 MG TABLET    Take 1 tablet (500 mg total) by mouth every 12 (twelve) hours.    COMFORT EZ  "PEN NEEDLES 33 GAUGE X 3/16" NDLE    USE AS DIRECTED with Levemir pens    COMIRNATY 2023-24, 12Y UP,,PF, 30 MCG/0.3 ML INJECTION        EMPAGLIFLOZIN (JARDIANCE) 25 MG TABLET    Take 1 tablet (25 mg total) by mouth once daily.    ESCITALOPRAM OXALATE (LEXAPRO) 10 MG TABLET    Take 1 tablet (10 mg total) by mouth once daily.    FLUTICASONE PROPIONATE (FLONASE) 50 MCG/ACTUATION NASAL SPRAY    SPRAY TWO SPRAYS IN EACH NOSTRIL DAILY    GABAPENTIN (NEURONTIN) 300 MG CAPSULE    TAKE TWO CAPSULES BY MOUTH THREE TIMES DAILY AS NEEDED FOR nerve pain    GLUCAGON, HUMAN RECOMBINANT, (GLUCAGON EMERGENCY KIT, HUMAN,) 1 MG SOLR    Inject 1 mg into the muscle as needed.    HYDROCHLOROTHIAZIDE (HYDRODIURIL) 12.5 MG TAB    TAKE ONE CAPSULE BY MOUTH ONCE DAILY    INSULIN DETEMIR U-100 (LEVEMIR U-100 INSULIN) 100 UNIT/ML INJECTION    Inject 40 Units into the skin every evening. To have on file in case of insulin pump failure.  Patient does not need prescription right now    INSULIN LISPRO 100 UNIT/ML INJECTION    USE CONTINUOUSLY WITH INSULIN PUMP(MAX DAILY DOSE  UNITS)    INSULIN SYRINGE-NEEDLE U-100 1/2 ML 31 GAUGE X 15/64" SYRG    To use 5 times per day with insulin injections if off of insulin pump.  To have on file in case of pump failure    IRBESARTAN (AVAPRO) 150 MG TABLET    Take 1 tablet (150 mg total) by mouth once daily.    METFORMIN (GLUCOPHAGE-XR) 500 MG ER 24HR TABLET    Take 1 tablet (500 mg total) by mouth 2 (two) times daily with meals.    MUPIROCIN (BACTROBAN) 2 % OINTMENT    APPLY TO THE AFFECTED AREA(S) THREE TIMES DAILY    NYSTATIN (MYCOSTATIN) CREAM    Apply topically 2 (two) times daily.    PANTOPRAZOLE (PROTONIX) 40 MG TABLET    Take 1 tablet (40 mg total) by mouth once daily.    ROSUVASTATIN (CRESTOR) 40 MG TAB    Take 1 tablet (40 mg total) by mouth every evening.    SULFAMETHOXAZOLE-TRIMETHOPRIM 400-80MG (BACTRIM,SEPTRA) 400-80 MG PER TABLET    Take 1 tablet by mouth 2 (two) times daily.    TADALAFIL " (CIALIS) 10 MG TABLET    Take 1 tablet (10 mg total) by mouth daily as needed for Erectile Dysfunction.    WARFARIN (COUMADIN) 3 MG TABLET    Take 2 tablets (6 mg total) by mouth Daily. 4.5mg everyday except Tuesday takes 6mg   Discontinued Medications    BENZONATATE (TESSALON) 100 MG CAPSULE    Take 1 capsule (100 mg total) by mouth 3 (three) times daily as needed for Cough.    DOXYCYCLINE MONOHYDRATE 100 MG TAB    Take 1 tablet (100 mg total) by mouth 2 (two) times daily.    DOXYCYCLINE MONOHYDRATE 100 MG TAB    Take 1 tablet (100 mg total) by mouth 2 (two) times daily.    FLUARIX QUAD 3957-2638, PF, 60 MCG (15 MCG X 4)/0.5 ML SYRG        GUAIFENESIN-CODEINE 100-10 MG/5 ML (TUSSI-ORGANIDIN NR)  MG/5 ML SYRUP    Take 10 mLs by mouth every 4 (four) hours as needed.    METHYLPREDNISOLONE (MEDROL DOSEPACK) 4 MG TABLET    use as directed    OSELTAMIVIR (TAMIFLU) 75 MG CAPSULE    Take 1 capsule (75 mg total) by mouth 2 (two) times daily. for 5 days    OXYCODONE-ACETAMINOPHEN (PERCOCET) 7.5-325 MG PER TABLET    TAKE ONE TABLET BY MOUTH EVERY TWELVE HOURS as needed for pain Oral for 15 Days    OZEMPIC 0.25 MG OR 0.5 MG (2 MG/3 ML) PEN INJECTOR    inject 0.5mg into THE SKIN every SEVEN DAYS    PREGABALIN (LYRICA) 75 MG CAPSULE    TAKE ONE CAPSULE BY MOUTH TWICE DAILY Oral for 30 Days    SEMAGLUTIDE (OZEMPIC) 0.25 MG OR 0.5 MG(2 MG/1.5 ML) PEN INJECTOR    Inject 0.5 mg into the skin every 7 days.    SEMAGLUTIDE, WEIGHT LOSS, (WEGOVY) 0.5 MG/0.5 ML PNIJ    Inject 0.5 mg into the skin every 7 days.    SODIUM,POTASSIUM,MAG SULFATES (SUPREP BOWEL PREP KIT) 17.5-3.13-1.6 GRAM SOLR    Follow instructions given by Endoscopy scheduling nurse    ZOLPIDEM (AMBIEN) 10 MG TAB    TAKE ONE TABLET BY MOUTH AT BEDTIME AS NEEDED FOR INSOMNIA       Transitional Care Note    Family and/or Caretaker present at visit?  No.  Diagnostic tests reviewed/disposition: I have reviewed all completed as well as pending diagnostic tests at the time  of discharge.  Disease/illness education: yes  Home health/community services discussion/referrals: Patient does not have home health established from hospital visit.  They do not need home health.  If needed, we will set up home health for the patient.   Establishment or re-establishment of referral orders for community resources: No other necessary community resources.   Discussion with other health care providers: No discussion with other health care providers necessary.

## 2024-01-04 ENCOUNTER — NUTRITION (OUTPATIENT)
Dept: DIABETES | Facility: CLINIC | Age: 59
End: 2024-01-04
Payer: MEDICARE

## 2024-01-04 ENCOUNTER — TELEPHONE (OUTPATIENT)
Dept: DIABETES | Facility: CLINIC | Age: 59
End: 2024-01-04
Payer: MEDICARE

## 2024-01-04 ENCOUNTER — PATIENT MESSAGE (OUTPATIENT)
Dept: DIABETES | Facility: CLINIC | Age: 59
End: 2024-01-04
Payer: MEDICARE

## 2024-01-04 VITALS — BODY MASS INDEX: 33.35 KG/M2 | HEIGHT: 67 IN | WEIGHT: 212.5 LBS

## 2024-01-04 DIAGNOSIS — E10.649 TYPE 1 DIABETES MELLITUS WITH HYPOGLYCEMIA UNAWARENESS: ICD-10-CM

## 2024-01-04 DIAGNOSIS — E10.649 TYPE 1 DIABETES MELLITUS WITH HYPOGLYCEMIA UNAWARENESS: Primary | ICD-10-CM

## 2024-01-04 DIAGNOSIS — E10.9 TYPE 1 DIABETES MELLITUS WITHOUT COMPLICATION: Primary | ICD-10-CM

## 2024-01-04 PROCEDURE — 99999 PR PBB SHADOW E&M-EST. PATIENT-LVL I: CPT | Mod: PBBFAC,,, | Performed by: DIETITIAN, REGISTERED

## 2024-01-04 PROCEDURE — G0108 DIAB MANAGE TRN  PER INDIV: HCPCS | Mod: S$GLB,,, | Performed by: DIETITIAN, REGISTERED

## 2024-01-04 RX ORDER — BLOOD-GLUCOSE SENSOR
1 EACH MISCELLANEOUS
Qty: 3 EACH | Refills: 11 | Status: SHIPPED | OUTPATIENT
Start: 2024-01-04 | End: 2024-01-04 | Stop reason: SDUPTHER

## 2024-01-04 RX ORDER — BLOOD-GLUCOSE SENSOR
1 EACH MISCELLANEOUS
Qty: 3 EACH | Refills: 11 | Status: SHIPPED | OUTPATIENT
Start: 2024-01-04 | End: 2025-01-03

## 2024-01-04 NOTE — TELEPHONE ENCOUNTER
Dexcom G7 sent to pharmacy -- [facility]   Needs to make sure that they give him the dexcom g7 with the white line on it that works with Tandem insulin pump   He needs to contact tandem for upgrade to G7   See oksana for help with upgrade to dexcom G7

## 2024-01-04 NOTE — PROGRESS NOTES
"Diabetes Care Specialist Follow-up Note  Author: Katia Pham RD, CDE  Date: 1/4/2024    Program Intake  Reason for Diabetes Program Visit:: Intervention  Type of Intervention:: Individual  Individual: Device Training  Education: Pattern Management  Device Training: Other (isulin pump and dexcom sensor)  Current diabetes risk level:: low  In the last 12 months, have you:: used emergency room services  Was the ER or hospital admission related to diabetes?: Yes  Permission to speak with others about care:: no    Lab Results   Component Value Date    HGBA1C 5.8 (H) 09/05/2023     A1c Pre Diabetes Care Specialist Intervention:  6.1%    Clinical    Weight: 96.4 kg (212 lb 8.4 oz)   Height: 5' 7" (170.2 cm)   Body mass index is 33.29 kg/m².    Problem Review  Reviewed health maintenance: yes         Medication Information  Medication adherence impacting ability to self-manage diabetes?: No    Labs  Lab Compliance Barriers: No    Nutritional Status  Diet: Regular  Meal Plan 24 Hour Recall: Breakfast, Lunch, Dinner, Snack  Meal Plan 24 Hour Recall - Breakfast: ssomething fast or biscuit and sausage  Meal Plan 24 Hour Recall - Lunch: chips  Meal Plan 24 Hour Recall - Dinner: subway foot long with 3 macadamia nut cookies  Meal Plan 24 Hour Recall - Snack: chips  Current nutritional status an area of need that is impacting patient's ability to self-manage diabetes?: No    Physical activity/Exercise:   No change    SMBG: using the dexcom G6 and the tandem pump  Patient went on a cruise and set up sensor on the .  Now his tandem is not connecting and he is not able to use auto mode.    Additional Social History    Support  Is Support an area impacting ability to self-manage diabetes?: No    Access to [employer]  Media or technology needs impacting ability to self-manage diabetes?: No    Cognitive/Behavioral Health  Cognitive or behavioral barriers impacting ability to self-manage diabetes?: " No    Culture/Uatsdin  Culture or Judaism beliefs that may impact ability to access healthcare: No    Communication  Communication needs impacting ability to self-manage diabetes?: No    Health Literacy  Health literacy needs impacting ability to self-manage diabetes?: No      Diabetes Self-Management Skills Assessment     Diabetes Disease Process/Treatment Options  Diabetes Disease Process/Treatment Options: Skills Assessment Completed: No  Assessment indicates:: Adequate understanding  Deferred due to:: Other (comment)  Area of need?: No    Nutrition/Healthy Eating  Nutrition/Healthy Eating Skills Assessment Completed:: No  Assessment indicates:: Adequate understanding  Deffered due to:: Other (comment)  Area of need?: No    Physical Activity/Exercise  Physical Activity/Exercise Skills Assessment Completed: : No  Assessment indicates:: Adequate understanding  Deffered due to:: Other (comment)  Area of need?: No    Medications  Patient is able to describe current diabetes management routine.: yes  Diabetes management routine:: diet, insulin, oral medications, insulin pump  Patient is able to identify current diabetes medications, dosages, and appropriate timing of medications.: yes  Patient understands the purpose of the medications taken for diabetes.: yes  Patient reports problems or concerns with current medication regimen.: yes  Medication regimen problems/concerns:: other (see comments) (upgrading pump)  Medication Skills Assessment Completed:: Yes  Assessment indicates:: Adequate understanding  Area of need?: No    Home Blood Glucose Monitoring  Patient states that blood sugar is checked at home daily.: yes  Monitoring Method:: personal continuous glucose monitor  Personal CGM type:: Dexcom G6  Patient is able to use personal CGM appropriately.: yes  CGM Report reviewed?: no  Home Blood Glucose Monitoring Skills Assessment Completed: : No  Assessment indicates:: Adequate understanding  Deferred due to::  Other (comment) (previously completed, there has been no change and patient has adequate understanding)  Area of need?: Yes    Acute Complications  Acute Complications Skills Assessment Completed: : No  Assessment indicates:: Adequate understanding  Deffered due to:: Other (comment)  Area of need?: No    Chronic Complications  Chronic Complications Skills Assessment Completed: : No  Assessment indicates:: Adequate understanding  Deferred due to:: Other (comment)  Area of need?: No    Psychosocial/Coping  Psychosocial/Coping Skills Assessment Completed: : No  Assessment indicates:: Adequate understanding  Deffered due to:: Other (comment)  Area of need?: No      During today's follow-up visit,  the following areas required further assessment and content was provided/reviewed.    Based on today's diabetes care assessment, the following areas of need were identified:          1/4/2024    12:02 AM   Social   Support No   Access to [employer] No   Cognitive/Behavioral Health No   Culture/Religious No   Communication No   Health Literacy No            1/4/2024    12:02 AM   Clinical   Medication Adherence No   Lab Compliance No   Nutritional Status No            1/4/2024    12:02 AM   Diabetes Self-Management Skills   Diabetes Disease Process/Treatment Options No   Nutrition/Healthy Eating No   Physical Activity/Exercise No   Medication No   Home Blood Glucose Monitoring Yes, educated that the handheld  and the tandem pump compete for the dexcom bluetooth signal. He can use the phone and the  or the phone and the tandem, but not the phone, , and the tandem. Started the new sensor at time of visit with the tandem pump,  is at home with a dead battery, patient is now reconnected to the tandem pump and the phone and verbalizes understanding. Patient is interested in upgrading to the dexcom G7, he will need to reach out to tandem to get the course to upgrade his tslim to be compatible with  the G7   Acute Complications No   Chronic Complications No   Psychosocial/Coping No        Today's interventions were provided through individual discussion, instruction, and written materials were provided.    Patient verbalized understanding of instruction and written materials.  Pt was able to return back demonstration of instructions today. Patient understood key points, needs reinforcement and further instruction.     Diabetes Self-Management Care Plan Review and Evaluation of Progress:    During today's follow-up Brian Diabetes Self-Management Care Plan progress was reviewed and progress was evaluated including his/her input. Cj has agreed to continue his/her journey to improve/maintain overall diabetes control by continuing to set health goals. See care plan progress below.      Care Plan: Diabetes Management   Updates made since 12/5/2023 12:00 AM        Problem: Medications         Goal: Patient Agrees to take Diabetes Medication(s) Insulin through the Tandem T-Connect praveen for the next 2 weeks    Start Date: 3/30/2023   Expected End Date: 4/14/2023   This Visit's Progress: On track   Recent Progress: On track   Priority: High   Barriers: Lack of Supplies          Follow Up Plan     Follow up in about 4 weeks (around 2/1/2024) for General Follow-up, Personal CGM Upload, Insulin Pump Upload.    Today's care plan and follow up schedule was discussed with patient.  Cj verbalized understanding of the care plan, goals, and agrees to follow up plan.        The patient was encouraged to communicate with his/her health care provider/physician and care team regarding his/her condition(s) and treatment.  I provided the patient with my contact information today and encouraged to contact me via phone or [facility]'s Patient Portal as needed.     Length of Visit   Total Time: 40 Minutes

## 2024-01-04 NOTE — TELEPHONE ENCOUNTER
Patient is interested in upgrading to the decom G7.  He will need to reach out to tandem to get the upgrade for his pump and then he can use the G7 with his device

## 2024-01-05 ENCOUNTER — TELEPHONE (OUTPATIENT)
Dept: PODIATRY | Facility: CLINIC | Age: 59
End: 2024-01-05
Payer: MEDICARE

## 2024-01-05 ENCOUNTER — TELEPHONE (OUTPATIENT)
Dept: DIABETES | Facility: CLINIC | Age: 59
End: 2024-01-05

## 2024-01-05 DIAGNOSIS — E10.65 TYPE 1 DIABETES MELLITUS WITH HYPERGLYCEMIA: ICD-10-CM

## 2024-01-05 RX ORDER — SEMAGLUTIDE 0.68 MG/ML
INJECTION, SOLUTION SUBCUTANEOUS
Qty: 3 ML | Refills: 6 | Status: SHIPPED | OUTPATIENT
Start: 2024-01-05

## 2024-01-05 NOTE — TELEPHONE ENCOUNTER
Called patient to inform Daniel of the results per DR Bowers of the possible bone infection that may require IV antibiotics. I also informed patient that orders was placed for referral to wound care center/hyperbarics therapy. Also asking the patient if he would like Dr Bowers to perform bone biopsy or keep all his next point of care at one facility. Patient gave verbal understanding of directives I shared per Dr Bowers recommendations. He will follow up with main campus if no one reaches out to get him scheduled as I forwarded the referral over for patient to get scheduled at their office.

## 2024-01-08 ENCOUNTER — PATIENT MESSAGE (OUTPATIENT)
Dept: PODIATRY | Facility: CLINIC | Age: 59
End: 2024-01-08
Payer: MEDICARE

## 2024-01-08 ENCOUNTER — TELEPHONE (OUTPATIENT)
Dept: PODIATRY | Facility: CLINIC | Age: 59
End: 2024-01-08
Payer: MEDICARE

## 2024-01-08 NOTE — PROGRESS NOTES
Subjective:      Patient ID: Cj Barnes is a 58 y.o. male.    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 male who presents to the podiatry clinic  with complaint of  right foot pain. Onset of the symptoms was several months ago. Precipitating event:  Previous surgery Kulwant's deformity right heel . Current symptoms include: ability to bear weight, but with some pain. Aggravating factors: any weight bearing. Symptoms have gradually worsened. Patient has had no prior foot problems. Evaluation to date:  Previous evaluation/radiographs/surgery/antibiotic therapy is/wound care . Treatment to date:  As stated previously. Patients rates pain 5/10 on pain scale.    Review of Systems   Constitutional: Negative for chills, decreased appetite, fever and night sweats.   HENT:  Negative for congestion, ear discharge, nosebleeds and tinnitus.    Eyes:  Negative for double vision, pain and visual disturbance.   Cardiovascular:  Negative for chest pain, claudication, cyanosis and palpitations.   Respiratory:  Negative for cough, hemoptysis, shortness of breath and wheezing.    Endocrine: Negative for cold intolerance and heat intolerance.   Hematologic/Lymphatic: Negative for adenopathy and bleeding problem.   Skin:  Positive for dry skin, poor wound healing and unusual hair distribution.   Musculoskeletal:  Positive for arthritis, joint pain and stiffness. Negative for myalgias.   Gastrointestinal:  Negative for abdominal pain, dysphagia, nausea and vomiting.   Genitourinary:  Negative for dysuria, flank pain, hematuria and pelvic pain.   Neurological:  Positive for disturbances in coordination, numbness, paresthesias and sensory change.   Psychiatric/Behavioral:  Negative for altered mental status, hallucinations and suicidal ideas. The patient does not have insomnia.    Allergic/Immunologic:  Negative for environmental allergies and persistent infections.         Objective:      Physical Exam  Vitals reviewed.   Constitutional:       Appearance: He is well-developed.   HENT:      Head: Normocephalic and atraumatic.   Cardiovascular:      Pulses:           Dorsalis pedis pulses are 1+ on the right side and 1+ on the left side.        Posterior tibial pulses are 1+ on the right side and 1+ on the left side.   Pulmonary:      Effort: Pulmonary effort is normal.   Musculoskeletal:         General: Normal range of motion.      Comments: Anterior, lateral, and posterior muscle groups bilateral lower extremities show strength 4 over 5 symmetrically. Inspection and palpation of the joints and bones reveal no crepitus or joint effusion. No tenderness upon palpation. Mild plantar flexor contractures noted to digits 2 through 5 bilaterally.  Angle and base of gait are normal.   Feet:      Right foot:      Skin integrity: Callus and dry skin present.      Left foot:      Skin integrity: Callus and dry skin present.   Skin:     General: Skin is warm and dry.      Capillary Refill: Capillary refill takes 2 to 3 seconds.      Coloration: Skin is pale.      Comments: Skin bilateral lower extremities noted to be thin, dry, and atrophic.    Posterior right heel ulceration measuring 1.2 cm x 1.0 cm x 0.2 cm to level subcutaneous tissue post debridement.   Neurological:      Mental Status: He is alert and oriented to person, place, and time.      Sensory: Sensory deficit present.      Motor: Atrophy present.      Deep Tendon Reflexes: Reflexes abnormal.      Reflex Scores:       Patellar reflexes are 1+ on the right side and 1+ on the left side.       Achilles reflexes are 1+ on the right side and 1+ on the left side.     Comments: Sharp, dull, light touch, and vibratory sensation are diminished bilaterally. Proprioceptive sensation is intact to both lower extremities. [place] Eliza monofilament exam shows loss of  protective sensation to plantar toes 1 through 5 bilaterally. Deep tendon reflexes to the patellar tendons is 1 over 4 bilaterally symmetrical. Deep tendon reflexes to the Achilles tendon is 0 over 4 bilaterally symmetrical. No ankle clonus or Babinski reflex noted bilaterally. Coordination is fair to both lower extremities.  Patient admits to intermittent burning and tingling in the feet.   Psychiatric:         Behavior: Behavior normal.           Assessment:       Encounter Diagnoses   Name Primary?    Ulcer of foot with fat layer exposed, unspecified laterality Yes    Acute hematogenous osteomyelitis, unspecified site      Independent visualization of imaging was performed.  Results were reviewed in detail with patient.       Plan:       Cj was seen today for foot problem, foot ulcer and diabetes mellitus.    Diagnoses and all orders for this visit:    Ulcer of foot with fat layer exposed, unspecified laterality  -     MRI Foot (Hindfoot) Right Without Contrast; Future  -     CULTURE, AEROBIC  (SPECIFY SOURCE)  -     CULTURE, ANAEROBE  -     Cancel: Ambulatory referral/consult to Wound Clinic; Future  -     Ambulatory referral/consult to Wound Clinic; Future    Acute hematogenous osteomyelitis, unspecified site  -     MRI Foot (Hindfoot) Right Without Contrast; Future  -     CULTURE, AEROBIC  (SPECIFY SOURCE)  -     CULTURE, ANAEROBE  -     Cancel: Ambulatory referral/consult to Wound Clinic; Future  -     Ambulatory referral/consult to Wound Clinic; Future    Other orders  -     sulfamethoxazole-trimethoprim 400-80mg (BACTRIM,SEPTRA) 400-80 mg per tablet; Take 1 tablet by mouth 2 (two) times daily.  -     ciprofloxacin HCl (CIPRO) 500 MG tablet; Take 1 tablet (500 mg total) by mouth every 12 (twelve) hours.      I counseled the patient on his conditions, their implications and medical management.    Wound Debridement    Performed by: Eugene Bowers DPM   Authorized by: Patient    Time out:  "Immediately prior to procedure a "time out" was called to verify the correct patient, procedure, equipment, support staff and site/side marked as required.   Consent Done?:  Yes (Verbal)  Local anesthesia used?: No       Wound Details:    Location:  Right posterior heel     Type of Debridement:  Excisional       Length (cm):  1.2       Width (cm):  1.0       Depth (cm):  0.2       Percent Debrided (%):  100             Depth of debridement:  Subcutaneous tissue    Tissue debrided:  Dermis, Epidermis and Subcutaneous    Devitalized tissue debrided:  Biofilm, Callus and Necrotic/Eschar    Instruments:  Blade, Curette and Nippers     Bleeding:  Minimal  Hemostasis Achieved: Yes    Method Used:  Pressure  Patient tolerance:  Patient tolerated the procedure well with no immediate complications    The nature of the condition, options for management, as well as potential risks and complications were discussed in detail with patient. Patient was amenable to my recommendations and left my office fully informed and will follow up as instructed or sooner if necessary.      Wound care ordered as well as bone biopsy/patient may have that done here or at the wound care facility.  MRI ordered likely osteomyelitis due to ongoing opening closing of wound.  No pain to the posterior heel with good Achilles healing however ongoing wound likely at this point suggestive of underlying osteomyelitis.  Recommend hyperbaric oxygen therapy and wound care as well as bone biopsy for IV antibiotic therapy to allow appropriate closure.  May consider primary closure has well versus skin graft substitute if covered by insurance/previously denied.  Follow-up after wound clinic consultation.        "

## 2024-01-08 NOTE — TELEPHONE ENCOUNTER
Called patient to give confirmation of office visit on tomorrow for wound care. He did not hear from the wound care center as of today 1/8/2024 so he will keep his scheduled office visit on 1/9/24

## 2024-01-09 ENCOUNTER — PATIENT MESSAGE (OUTPATIENT)
Dept: CARDIOLOGY | Facility: CLINIC | Age: 59
End: 2024-01-09

## 2024-01-09 ENCOUNTER — OFFICE VISIT (OUTPATIENT)
Dept: PODIATRY | Facility: CLINIC | Age: 59
End: 2024-01-09
Payer: MEDICARE

## 2024-01-09 ENCOUNTER — ANTI-COAG VISIT (OUTPATIENT)
Dept: CARDIOLOGY | Facility: CLINIC | Age: 59
End: 2024-01-09
Payer: MEDICARE

## 2024-01-09 VITALS
HEART RATE: 70 BPM | HEIGHT: 67 IN | DIASTOLIC BLOOD PRESSURE: 68 MMHG | WEIGHT: 212 LBS | BODY MASS INDEX: 33.27 KG/M2 | SYSTOLIC BLOOD PRESSURE: 130 MMHG

## 2024-01-09 DIAGNOSIS — Z95.2 H/O MECHANICAL AORTIC VALVE REPLACEMENT: ICD-10-CM

## 2024-01-09 DIAGNOSIS — Z79.01 LONG TERM (CURRENT) USE OF ANTICOAGULANTS: Primary | ICD-10-CM

## 2024-01-09 DIAGNOSIS — M86.00 ACUTE HEMATOGENOUS OSTEOMYELITIS, UNSPECIFIED SITE: ICD-10-CM

## 2024-01-09 DIAGNOSIS — I48.92 PAROXYSMAL ATRIAL FLUTTER: ICD-10-CM

## 2024-01-09 DIAGNOSIS — L97.502 ULCER OF FOOT WITH FAT LAYER EXPOSED, UNSPECIFIED LATERALITY: ICD-10-CM

## 2024-01-09 PROCEDURE — 88305 TISSUE EXAM BY PATHOLOGIST: CPT | Performed by: PATHOLOGY

## 2024-01-09 PROCEDURE — 87176 TISSUE HOMOGENIZATION CULTR: CPT | Performed by: PODIATRIST

## 2024-01-09 PROCEDURE — 87070 CULTURE OTHR SPECIMN AEROBIC: CPT | Performed by: PODIATRIST

## 2024-01-09 PROCEDURE — 88305 TISSUE EXAM BY PATHOLOGIST: CPT | Mod: 26,,, | Performed by: PATHOLOGY

## 2024-01-09 PROCEDURE — 11042 DBRDMT SUBQ TIS 1ST 20SQCM/<: CPT | Mod: 51,S$GLB,, | Performed by: PODIATRIST

## 2024-01-09 PROCEDURE — 20220 BONE BIOPSY TROCAR/NDL SUPFC: CPT | Mod: S$GLB,,, | Performed by: PODIATRIST

## 2024-01-09 PROCEDURE — 87075 CULTR BACTERIA EXCEPT BLOOD: CPT | Performed by: PODIATRIST

## 2024-01-09 PROCEDURE — 99213 OFFICE O/P EST LOW 20 MIN: CPT | Mod: 25,S$GLB,, | Performed by: PODIATRIST

## 2024-01-09 PROCEDURE — 88311 DECALCIFY TISSUE: CPT | Performed by: PATHOLOGY

## 2024-01-09 PROCEDURE — 99999 PR PBB SHADOW E&M-EST. PATIENT-LVL IV: CPT | Mod: PBBFAC,,, | Performed by: PODIATRIST

## 2024-01-09 PROCEDURE — 93793 ANTICOAG MGMT PT WARFARIN: CPT | Mod: S$GLB,,,

## 2024-01-09 RX ORDER — SULFAMETHOXAZOLE AND TRIMETHOPRIM 400; 80 MG/1; MG/1
1 TABLET ORAL DAILY
Qty: 10 TABLET | Refills: 0 | Status: SHIPPED | OUTPATIENT
Start: 2024-01-09 | End: 2024-01-23

## 2024-01-09 RX ORDER — OXYCODONE AND ACETAMINOPHEN 7.5; 325 MG/1; MG/1
1 TABLET ORAL EVERY 12 HOURS PRN
Qty: 20 TABLET | Refills: 0 | Status: SHIPPED | OUTPATIENT
Start: 2024-01-09 | End: 2024-01-16

## 2024-01-09 NOTE — PROGRESS NOTES
INR not at goal. Pt has completed DDIs. He reports incorrect higher dose than advised of 3mg daily which is surprising with low INR. Medications, chart, and patient findings reviewed. See calendar for adjustments to dose and follow up plan.

## 2024-01-10 ENCOUNTER — TELEPHONE (OUTPATIENT)
Dept: WOUND CARE | Facility: HOSPITAL | Age: 59
End: 2024-01-10
Payer: MEDICARE

## 2024-01-10 NOTE — TELEPHONE ENCOUNTER
Spoke to patient in regards to HBO referral. Patient stated that he had a bone biopsy yesterday when he saw Dr. Bowers, and he has a follow up appointment with him on 01/23/24. Patient would like a follow up call in regards to HBO referral after he sees Dr. Bowers and discusses bone biopsy results.

## 2024-01-12 LAB — BACTERIA SPEC AEROBE CULT: ABNORMAL

## 2024-01-14 DIAGNOSIS — G47.00 INSOMNIA, UNSPECIFIED TYPE: ICD-10-CM

## 2024-01-14 NOTE — TELEPHONE ENCOUNTER
Care Due:                  Date            Visit Type   Department     Provider  --------------------------------------------------------------------------------                                [facility]  Last Visit: 01-      FOLLOW UP    PRIMARY CARE   Garry Stover  Next Visit: None Scheduled  None         None Found                                                            Last  Test          Frequency    Reason                     Performed    Due Date  --------------------------------------------------------------------------------    HBA1C.......  6 months...  empagliflozin............  09- 03-    [facility] Embedded Care Due Messages. Reference number: [id number].   1/14/2024 8:03:57 AM CST

## 2024-01-15 NOTE — PROGRESS NOTES
Subjective:      Patient ID: Cj Barnes is a 58 y.o. male.    Chief Complaint:   Wound Care (Right heel, swelling, leaked pus) and Results (MRI f/u/)    Cj is a 58 y.o. male who presents to the podiatry clinic  with complaint of  right foot pain. Onset of the symptoms was several months ago. Precipitating event:  Previous surgery Kulwant's deformity right heel . Current symptoms include: ability to bear weight, but with some pain. Aggravating factors: any weight bearing. Symptoms have gradually worsened. Patient has had no prior foot problems. Evaluation to date:  Previous evaluation/radiographs/surgery/antibiotic therapy is/wound care . Treatment to date:  As stated previously. Patients rates pain 5/10 on pain scale.  He is here for follow-up wound care/bone biopsy.  Review of Systems   Constitutional: Negative for chills, decreased appetite, fever and night sweats.   HENT:  Negative for congestion, ear discharge, nosebleeds and tinnitus.    Eyes:  Negative for double vision, pain and visual disturbance.   Cardiovascular:  Negative for chest pain, claudication, cyanosis and palpitations.   Respiratory:  Negative for cough, hemoptysis, shortness of breath and wheezing.    Endocrine: Negative for cold intolerance and heat intolerance.   Hematologic/Lymphatic: Negative for adenopathy and bleeding problem.   Skin:  Positive for dry skin, poor wound healing and unusual hair distribution.   Musculoskeletal:  Positive for arthritis, joint pain and stiffness. Negative for myalgias.   Gastrointestinal:  Negative for abdominal pain, dysphagia, nausea and vomiting.   Genitourinary:  Negative for dysuria, flank pain, hematuria and pelvic pain.   Neurological:  Positive for disturbances in coordination, numbness, paresthesias and sensory change.   Psychiatric/Behavioral:  Negative for altered mental status, hallucinations and suicidal ideas. The patient does not have insomnia.    Allergic/Immunologic:  Negative for environmental allergies and persistent infections.           Objective:      Physical Exam  Vitals reviewed.   Constitutional:       Appearance: He is well-developed.   HENT:      Head: Normocephalic and atraumatic.   Cardiovascular:      Pulses:           Dorsalis pedis pulses are 1+ on the right side and 1+ on the left side.        Posterior tibial pulses are 1+ on the right side and 1+ on the left side.   Pulmonary:      Effort: Pulmonary effort is normal.   Musculoskeletal:         General: Normal range of motion.      Comments: Anterior, lateral, and posterior muscle groups bilateral lower extremities show strength 4 over 5 symmetrically. Inspection and palpation of the joints and bones reveal no crepitus or joint effusion. No tenderness upon palpation. Mild plantar flexor contractures noted to digits 2 through 5 bilaterally.  Angle and base of gait are normal.   Feet:      Right foot:      Skin integrity: Callus and dry skin present.      Left foot:      Skin integrity: Callus and dry skin present.   Skin:     General: Skin is warm and dry.      Capillary Refill: Capillary refill takes 2 to 3 seconds.      Coloration: Skin is pale.      Comments: Skin bilateral lower extremities noted to be thin, dry, and atrophic.    Posterior right heel ulceration measuring 1.0 cm x 1.0 cm x 0.2 cm to level subcutaneous tissue post debridement.   Neurological:      Mental Status: He is alert and oriented to person, place, and time.      Sensory: Sensory deficit present.      Motor: Atrophy present.      Deep Tendon Reflexes: Reflexes abnormal.      Reflex Scores:       Patellar reflexes are 1+ on the right side and 1+ on the left side.       Achilles reflexes are 1+ on the right side and 1+ on the left side.     Comments: Sharp, dull, light touch, and vibratory sensation are diminished bilaterally. Proprioceptive sensation is intact to both lower extremities. [place] Eliza monofilament exam shows loss of  "protective sensation to plantar toes 1 through 5 bilaterally. Deep tendon reflexes to the patellar tendons is 1 over 4 bilaterally symmetrical. Deep tendon reflexes to the Achilles tendon is 0 over 4 bilaterally symmetrical. No ankle clonus or Babinski reflex noted bilaterally. Coordination is fair to both lower extremities.  Patient admits to intermittent burning and tingling in the feet.   Psychiatric:         Behavior: Behavior normal.             Assessment:       Encounter Diagnoses   Name Primary?    Ulcer of foot with fat layer exposed, unspecified laterality     Acute hematogenous osteomyelitis, unspecified site      Independent visualization of imaging was performed.  Results were reviewed in detail with patient.       Plan:       Cj was seen today for wound care and results.    Diagnoses and all orders for this visit:    Ulcer of foot with fat layer exposed, unspecified laterality  -     Aerobic culture (Specify Source)  -     Culture, Anaerobic  -     Specimen to Pathology Orthopedics    Acute hematogenous osteomyelitis, unspecified site  -     Aerobic culture (Specify Source)  -     Culture, Anaerobic  -     Specimen to Pathology Orthopedics    Other orders  -     oxyCODONE-acetaminophen (PERCOCET) 7.5-325 mg per tablet; Take 1 tablet by mouth every 12 (twelve) hours as needed for Pain.  -     sulfamethoxazole-trimethoprim 400-80mg (BACTRIM,SEPTRA) 400-80 mg per tablet; Take 1 tablet by mouth once daily.      I counseled the patient on his conditions, their implications and medical management.    Wound Debridement    Performed by: Eugene Bowers DPM   Authorized by: Patient    Time out: Immediately prior to procedure a "time out" was called to verify the correct patient, procedure, equipment, support staff and site/side marked as required.   Consent Done?:  Yes (Verbal)  Local anesthesia used?: No       Wound Details:    Location:  Right posterior heel     Type of Debridement:  Excisional     "   Length (cm):  1.0       Width (cm):  1.0       Depth (cm):  0.2       Percent Debrided (%):  100             Depth of debridement:  Subcutaneous tissue    Tissue debrided:  Dermis, Epidermis and Subcutaneous    Devitalized tissue debrided:  Biofilm, Callus and Necrotic/Eschar    Instruments:  Blade, Curette and Nippers     Bleeding:  Minimal  Hemostasis Achieved: Yes    Method Used:  Pressure  Patient tolerance:  Patient tolerated the procedure well with no immediate complications    The nature of the condition, options for management, as well as potential risks and complications were discussed in detail with patient. Patient was amenable to my recommendations and left my office fully informed and will follow up as instructed or sooner if necessary.      Bone Biopsy  Procedure Note          Indication/Diagnosis:  Right posterior calcaneus    Consent Type:  Written/verbal     Time Out Completed: yes    Aseptic Technique: Betadine    Anesthesia:  5 cc 0.5% Marcaine plain    Anesthetic Drugs Used:  Same    Instrumentation:  Jamshidi needle.     Procedure Site:  Right posterior heel    Complications: none    EBL :  Minimal    Specimen :  Bone biopsy right posterior heel    Procedure:  Area was anesthetized and prepped.  The area was debrided as described above followed by bone biopsy taken with deep trocar.  Area was flushed and bandage sterilely.    Wound care discussed in detail.  Will refer to Infectious Disease pending bone biopsy results.  We will continue wound care in office, follow-up in 1 week.

## 2024-01-16 ENCOUNTER — ANTI-COAG VISIT (OUTPATIENT)
Dept: CARDIOLOGY | Facility: CLINIC | Age: 59
End: 2024-01-16
Payer: MEDICARE

## 2024-01-16 ENCOUNTER — TELEPHONE (OUTPATIENT)
Dept: PODIATRY | Facility: CLINIC | Age: 59
End: 2024-01-16
Payer: MEDICARE

## 2024-01-16 ENCOUNTER — HOSPITAL ENCOUNTER (EMERGENCY)
Facility: HOSPITAL | Age: 59
Discharge: HOME | End: 2024-01-16
Attending: EMERGENCY MEDICINE
Payer: MEDICARE

## 2024-01-16 VITALS
WEIGHT: 220 LBS | BODY MASS INDEX: 34.53 KG/M2 | HEIGHT: 67 IN | TEMPERATURE: 98 F | DIASTOLIC BLOOD PRESSURE: 71 MMHG | OXYGEN SATURATION: 95 % | SYSTOLIC BLOOD PRESSURE: 127 MMHG | HEART RATE: 57 BPM | RESPIRATION RATE: 16 BRPM

## 2024-01-16 DIAGNOSIS — I48.92 PAROXYSMAL ATRIAL FLUTTER: ICD-10-CM

## 2024-01-16 DIAGNOSIS — Z79.01 LONG TERM (CURRENT) USE OF ANTICOAGULANTS: Primary | ICD-10-CM

## 2024-01-16 DIAGNOSIS — Z95.2 H/O MECHANICAL AORTIC VALVE REPLACEMENT: ICD-10-CM

## 2024-01-16 DIAGNOSIS — M86.9 OSTEOMYELITIS, UNSPECIFIED SITE, UNSPECIFIED TYPE: Primary | ICD-10-CM

## 2024-01-16 DIAGNOSIS — L08.9 RIGHT FOOT INFECTION: ICD-10-CM

## 2024-01-16 LAB
ALBUMIN SERPL BCP-MCNC: 3.5 G/DL (ref 3.5–5.2)
ALP SERPL-CCNC: 70 U/L (ref 55–135)
ALT SERPL W/O P-5'-P-CCNC: 14 U/L (ref 10–44)
ANION GAP SERPL CALC-SCNC: 7 MMOL/L (ref 8–16)
AST SERPL-CCNC: 21 U/L (ref 10–40)
BACTERIA SPEC ANAEROBE CULT: NORMAL
BASOPHILS # BLD AUTO: 0.06 K/UL (ref 0–0.2)
BASOPHILS NFR BLD: 1.3 % (ref 0–1.9)
BILIRUB SERPL-MCNC: 0.6 MG/DL (ref 0.1–1)
BUN SERPL-MCNC: 12 MG/DL (ref 6–20)
CALCIUM SERPL-MCNC: 9.1 MG/DL (ref 8.7–10.5)
CHLORIDE SERPL-SCNC: 103 MMOL/L (ref 95–110)
CO2 SERPL-SCNC: 27 MMOL/L (ref 23–29)
CREAT SERPL-MCNC: 0.8 MG/DL (ref 0.5–1.4)
DIFFERENTIAL METHOD BLD: ABNORMAL
EOSINOPHIL # BLD AUTO: 0.2 K/UL (ref 0–0.5)
EOSINOPHIL NFR BLD: 4.8 % (ref 0–8)
ERYTHROCYTE [DISTWIDTH] IN BLOOD BY AUTOMATED COUNT: 16.2 % (ref 11.5–14.5)
EST. GFR  (NO RACE VARIABLE): >60 ML/MIN/1.73 M^2
FINAL PATHOLOGIC DIAGNOSIS: NORMAL
GLUCOSE SERPL-MCNC: 128 MG/DL (ref 70–110)
GRAM STN SPEC: NORMAL
GRAM STN SPEC: NORMAL
GROSS: NORMAL
HCT VFR BLD AUTO: 40.3 % (ref 40–54)
HGB BLD-MCNC: 12.5 G/DL (ref 14–18)
IMM GRANULOCYTES # BLD AUTO: 0.01 K/UL (ref 0–0.04)
IMM GRANULOCYTES NFR BLD AUTO: 0.2 % (ref 0–0.5)
LYMPHOCYTES # BLD AUTO: 1.6 K/UL (ref 1–4.8)
LYMPHOCYTES NFR BLD: 34 % (ref 18–48)
Lab: NORMAL
MCH RBC QN AUTO: 27.1 PG (ref 27–31)
MCHC RBC AUTO-ENTMCNC: 31 G/DL (ref 32–36)
MCV RBC AUTO: 87 FL (ref 82–98)
MONOCYTES # BLD AUTO: 0.4 K/UL (ref 0.3–1)
MONOCYTES NFR BLD: 7.8 % (ref 4–15)
NEUTROPHILS # BLD AUTO: 2.5 K/UL (ref 1.8–7.7)
NEUTROPHILS NFR BLD: 51.9 % (ref 38–73)
NRBC BLD-RTO: 0 /100 WBC
PLATELET # BLD AUTO: 212 K/UL (ref 150–450)
PMV BLD AUTO: 10 FL (ref 9.2–12.9)
POTASSIUM SERPL-SCNC: 3.7 MMOL/L (ref 3.5–5.1)
PROT SERPL-MCNC: 8 G/DL (ref 6–8.4)
RBC # BLD AUTO: 4.61 M/UL (ref 4.6–6.2)
SODIUM SERPL-SCNC: 137 MMOL/L (ref 136–145)
WBC # BLD AUTO: 4.76 K/UL (ref 3.9–12.7)

## 2024-01-16 PROCEDURE — 87102 FUNGUS ISOLATION CULTURE: CPT

## 2024-01-16 PROCEDURE — 87076 CULTURE ANAEROBE IDENT EACH: CPT

## 2024-01-16 PROCEDURE — 93793 ANTICOAG MGMT PT WARFARIN: CPT | Mod: S$GLB,,,

## 2024-01-16 PROCEDURE — 99283 EMERGENCY DEPT VISIT LOW MDM: CPT | Mod: ,,, | Performed by: PODIATRIST

## 2024-01-16 PROCEDURE — 80053 COMPREHEN METABOLIC PANEL: CPT | Performed by: PHYSICIAN ASSISTANT

## 2024-01-16 PROCEDURE — 87040 BLOOD CULTURE FOR BACTERIA: CPT | Mod: 59 | Performed by: PHYSICIAN ASSISTANT

## 2024-01-16 PROCEDURE — 87205 SMEAR GRAM STAIN: CPT

## 2024-01-16 PROCEDURE — 87075 CULTR BACTERIA EXCEPT BLOOD: CPT | Mod: 59

## 2024-01-16 PROCEDURE — 87186 SC STD MICRODIL/AGAR DIL: CPT

## 2024-01-16 PROCEDURE — 87070 CULTURE OTHR SPECIMN AEROBIC: CPT

## 2024-01-16 PROCEDURE — 87206 SMEAR FLUORESCENT/ACID STAI: CPT

## 2024-01-16 PROCEDURE — 87116 MYCOBACTERIA CULTURE: CPT

## 2024-01-16 PROCEDURE — 99284 EMERGENCY DEPT VISIT MOD MDM: CPT

## 2024-01-16 PROCEDURE — 87077 CULTURE AEROBIC IDENTIFY: CPT

## 2024-01-16 PROCEDURE — 25000003 PHARM REV CODE 250: Performed by: EMERGENCY MEDICINE

## 2024-01-16 PROCEDURE — 85025 COMPLETE CBC W/AUTO DIFF WBC: CPT | Performed by: PHYSICIAN ASSISTANT

## 2024-01-16 RX ORDER — OXYCODONE HYDROCHLORIDE 5 MG/1
10 TABLET ORAL
Status: COMPLETED | OUTPATIENT
Start: 2024-01-16 | End: 2024-01-16

## 2024-01-16 RX ORDER — OXYCODONE AND ACETAMINOPHEN 7.5; 325 MG/1; MG/1
1 TABLET ORAL EVERY 6 HOURS PRN
Qty: 12 TABLET | Refills: 0 | Status: SHIPPED | OUTPATIENT
Start: 2024-01-16 | End: 2024-01-19

## 2024-01-16 RX ORDER — CIPROFLOXACIN 500 MG/1
500 TABLET ORAL EVERY 12 HOURS
Qty: 20 TABLET | Refills: 0 | Status: SHIPPED | OUTPATIENT
Start: 2024-01-16 | End: 2024-01-23

## 2024-01-16 RX ORDER — ZOLPIDEM TARTRATE 10 MG/1
10 TABLET ORAL NIGHTLY
Qty: 30 TABLET | Refills: 1 | OUTPATIENT
Start: 2024-01-16

## 2024-01-16 RX ADMIN — OXYCODONE HYDROCHLORIDE 10 MG: 5 TABLET ORAL at 04:01

## 2024-01-16 NOTE — TELEPHONE ENCOUNTER
Spoke with pt. Pt and wife state no nausea, chills, vomiting, trips, or falls. Pt states no pain immediately after bone biopsy and that the pain started a few days ago. Pt states that there is increased pain and discoloration. Pt also states that it is sensitive to touch and having a burning sensation. Advised pt to go to the [facility] [place] emergency room if able to make it due to the weather. Pt verbalized understanding.

## 2024-01-16 NOTE — PROGRESS NOTES
INR not at goal. Medications, chart, and patient findings reviewed. See calendar for adjustments to dose and follow up plan.    Adddendum 1/17: Pt went to ER after dosing plans made yesterday for foot wound. He was started back on cipro for another 10d course. Adjust dose per calendar. Follow up next week as planned.

## 2024-01-16 NOTE — DISCHARGE INSTRUCTIONS
Perform the wet-to-dry dressings using Betadine as instructed in the ER.  Continue using your Percocet as needed for pain.  Switch your Bactrim to ciprofloxacin.  Be sure to follow-up with Dr. Bowers in clinic.  Return to the ER for any high fevers, severe pain, weakness, or other concerning symptoms.

## 2024-01-16 NOTE — ASSESSMENT & PLAN NOTE
58-year-old male with PMH DM I, HTN, HLD, aortic atherosclerosis presents to ED 1/16/24 for concerns of ongoing pain, post-op infection in right heel surgical site. 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 Bowers 1/27/23. Last seen by Dr. Eugene Bowers DPM 1/9/24 to which he was given percocet 7.5 and Bactrim, but states that course of Bactrim had not shown improvement in infective symptoms. Per patient, and wife at bedside, attests that infection has been going on and off for large amount of time since the procedure had occurred but had otherwise been largely stable aside from pain.  States that he can not put pressure on it, but is otherwise able to ambulate.    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 MRI 1/4 showing tendinosis of Achilles with edema of calcaneus, possible osteomyelitis of calcaneus at fluid-filled tract. Superior aspect to right heel presenting with ulcer to the level subcutaneous, erythematous periwound and tender to palpation. Tracking distally 2.5cm. No discernible probe to bone. No fluctuance or crepitance noted. No malodor.     Plan:  - Deep wound cx taken today of ulcer of right heel, sent to microbiology.  - Dressed with betadine wet-to-dry, secured with kerlix, Ace to moderate compression. Patient states that he has a shower bag at home and is instructed to keep dressings clean, dry, and intact.  - Outpatient follow-up with Dr. Bowers scheduled later in the week, tentatively 1/18.  - Patient is instructed to refer to outpatient ID for IV antibiotics.  - Disposition per ED on pain regimen, 10-day course of cipro. Ambulatory referral to ID    - Due to chronic and lengthy course of infection, as well as stable nature of wound, admission to hospital is contraindicated per podiatry perspective at this time. AFVSS and no leukocytosis. Per collateral with Dr. Bowers, woundcare will be performed in-office, and referral to ID for  antibiotics is necessary. Patient instructed that if new, worsening changes occur to present to the ED immediately.    Discharge recs:  Patient to follow up in Podiatry Clinic outpatient, Podiatry will arrange.  Abx plan per ID, ambulatory referral to ID for IV antibiotics.   Patient to only to transfer in short distances to affected foot, partial weightbearing to heel  Patient to keep dressings clean dry and intact  Patient explained the importance of daily foot checks

## 2024-01-16 NOTE — TELEPHONE ENCOUNTER
"----- Message from Elizabeth Michelle sent at 1/16/2024 12:44 PM CST -----  Regarding: sooner date  Contact: [phone]  Name Of Caller: wife     Contact Preference?:  [phone]     What is the nature of the call?: would like to be seen sooner due to pt have infection in foot, experiencing pain, discolored and experiencing an burning sensation and sensitive to touch     Additional Notes:    "Thank you for all that you do for our patients"        "

## 2024-01-16 NOTE — SUBJECTIVE & OBJECTIVE
Scheduled Meds:  Continuous Infusions:  PRN Meds:    Review of patient's allergies indicates:  No Known Allergies     Past Medical History:   Diagnosis Date    Anemia     Aortic stenosis 2018    Cancer 2014    Colon cancer     Coronary artery disease     Diabetes mellitus type I     since in his 30's    Heart murmur     Heel fracture     HTN (hypertension)     Hyperlipidemia LDL goal < 70     Insulin pump in place     MVP (mitral valve prolapse)     Stenosis of aortic and mitral valves      Past Surgical History:   Procedure Laterality Date    AORTIC VALVE REPLACEMENT N/A 8/9/2019    Procedure: Replacement-valve-aortic;  Surgeon: Ryan Knight MD;  Location: [facility] OR [address] [place] [address];  Service: Cardiothoracic;  Laterality: N/A;    BACK SURGERY      BENTALL PROCEDURE FOR REPLACEMENT OF AORTIC VALVE, AORTIC ROOT, AND ASCENDING AORTA N/A 8/9/2019    Procedure: BENTALL PROCEDURE;  Surgeon: Ryan Knight MD;  Location: [facility] OR [address] [place] [address];  Service: Cardiothoracic;  Laterality: N/A;    CARDIAC SURGERY      CARPAL TUNNEL RELEASE      COLON SURGERY  2014    COLONOSCOPY N/A 2/17/2020    Procedure: COLONOSCOPY;  Surgeon: Richi Mock MD;  Location: [facility];  Service: Colon and Rectal;  Laterality: N/A;    COLONOSCOPY N/A 10/26/2023    Procedure: COLONOSCOPY;  Surgeon: Yadira Louis MD;  [facility]  Service: Endoscopy;  Laterality: N/A;  ok to hold Coumadin x5 days per [facility], see telephone encounter 8/4/23  ref by KELSIE Cao/ [facility] inst portal-RB  10/19-precall complete-pt verbalized understanding of holding Ozempic-MS    DEBRIDEMENT OF ACHILLES TENDON Right 1/27/2023    Procedure: DEBRIDEMENT, TENDON, ACHILLES;  Surgeon: Eugene Bowers DPM;  Location: [facility] OR [address] [place] [address];  Service: Podiatry;  Laterality: Right;    ELBOW SURGERY      tendon release    ESOPHAGOGASTRODUODENOSCOPY N/A 9/10/2020    Procedure: EGD (ESOPHAGOGASTRODUODENOSCOPY);  Surgeon: Abilio Boo MD;  Location: [facility]  ENDO (4TH FLR);  Service: Endoscopy;  Laterality: N/A;  Cardiac clearance received, see telephone encounter 8/27/20-BB  Approval to hold Coumadin prior to procedure received, see telephone encounter 8/27/20-BB  covid-9/7/20-[facility]    FOOT TENDON SURGERY      OSTECTOMY Right 1/27/2023    Procedure: OSTECTOMY;  Surgeon: Eugene Bowers DPM;  Location: [facility] OR 2ND FLR;  Service: Podiatry;  Laterality: Right;  calcaneal    right and left heart cath Bilateral 01/15/2018    TREATMENT OF CARDIAC ARRHYTHMIA N/A 8/19/2019    Procedure: CARDIOVERSION;  Surgeon: Juan Zapata MD;  Location: [facility] EP LAB;  Service: Cardiology;  Laterality: N/A;  afib, dccv only, anes, GP, 3092    TRIGGER FINGER RELEASE      x 2    TRIGGER FINGER RELEASE Left 1/5/2022    Procedure: RELEASE, TRIGGER FINGER,LEFT,SMALL & THUMB;  Surgeon: Dilma Hunter MD;  Location: [facility];  Service: Orthopedics;  Laterality: Left;       Family History       Problem Relation (Age of Onset)    Diabetes Father    HIV Brother    Heart attack Father    Heart disease Mother    Lung cancer Mother          Tobacco Use    Smoking status: Never    Smokeless tobacco: Former     Types: Snuff     Quit date: 8/30/2019    Tobacco comments:     since he was 12 yrs old   Substance and Sexual Activity    Alcohol use: No     Comment: socially    Drug use: No    Sexual activity: Yes     Partners: Female     Review of Systems   Constitutional:  Negative for chills and fever.   Respiratory:  Negative for cough, shortness of breath and wheezing.    Cardiovascular:  Negative for leg swelling.   Gastrointestinal:  Negative for diarrhea, nausea and vomiting.   Skin:  Positive for wound.     Objective:     Vital Signs (Most Recent):  Temp: 98 °F (36.7 °C) (01/16/24 1430)  Pulse: 73 (01/16/24 1430)  Resp: 16 (01/16/24 1636)  BP: 123/61 (01/16/24 1430)  SpO2: 97 % (01/16/24 1430) Vital Signs (24h Range):  Temp:  [98 °F (36.7 °C)] 98 °F (36.7 °C)  Pulse:  [73]  73  Resp:  [16-18] 16  SpO2:  [97 %] 97 %  BP: (123)/(61) 123/61     Weight: 99.8 kg (220 lb)  Body mass index is 34.46 kg/m².    Foot Exam    General  Orientation: alert and oriented to person, place, and time   Affect: appropriate       Right Foot/Ankle     Inspection and Palpation  Skin Exam: drainage, dry skin and ulcer;     Neurovascular  Dorsalis pedis: 1+  Posterior tibial: 1+  Saphenous nerve sensation: diminished  Tibial nerve sensation: diminished  Superficial peroneal nerve sensation: diminished  Deep peroneal nerve sensation: diminished  Sural nerve sensation: diminished    Comments  Skin right lower extremity noted to be thin, dry, and atrophic.    Superior aspect to right heel presenting with ulcer to the level subcutaneous, erythematous periwound and tender to palpation. Tracking distally 2.5cm. No discernible probe to bone. No fluctuance or crepitance noted. No malodor.     Left Foot/Ankle      Neurovascular  Dorsalis pedis: 1+  Posterior tibial: 1+  Saphenous nerve sensation: diminished  Tibial nerve sensation: diminished  Superficial peroneal nerve sensation: diminished  Deep peroneal nerve sensation: diminished  Sural nerve sensation: diminished          Laboratory:  CBC:   Recent Labs   Lab 01/16/24  1453   WBC 4.76   RBC 4.61   HGB 12.5*   HCT 40.3      MCV 87   MCH 27.1   MCHC 31.0*     CMP:   Recent Labs   Lab 01/16/24  1453   *   CALCIUM 9.1   ALBUMIN 3.5   PROT 8.0      K 3.7   CO2 27      BUN 12   CREATININE 0.8   ALKPHOS 70   ALT 14   AST 21   BILITOT 0.6     Microbiology Results (last 7 days)       Procedure Component Value Units Date/Time    Fungus culture [[id number]] Collected: 01/16/24 1617    Order Status: Sent Specimen: Wound from Foot, Right Updated: 01/16/24 1626    AFB Culture & Smear [[id number]] Collected: 01/16/24 1617    Order Status: Sent Specimen: Wound from Foot, Right Updated: 01/16/24 1626    Culture, Anaerobic [[id number]] Collected: 01/16/24  1617    Order Status: Sent Specimen: Wound from Foot, Right Updated: 01/16/24 1626    Gram stain [[id number]] Collected: 01/16/24 1617    Order Status: Sent Specimen: Wound from Foot, Right Updated: 01/16/24 1625    Aerobic culture [[id number]] Collected: 01/16/24 1617    Order Status: Sent Specimen: Wound from Foot, Right Updated: 01/16/24 1625    Blood culture #1 **CANNOT BE ORDERED STAT** [[id number]] Collected: 01/16/24 1453    Order Status: Sent Specimen: Blood from Peripheral, Antecubital, Left Updated: 01/16/24 1518    Blood culture #2 **CANNOT BE ORDERED STAT** [[id number]] Collected: 01/16/24 1453    Order Status: Sent Specimen: Blood from Peripheral, Antecubital, Right Updated: 01/16/24 1518          All pertinent labs reviewed within the last 24 hours.    Diagnostic Results:  MRI: I have reviewed all pertinent results/findings within the past 24 hours.  1/4 showing tendinosis of Achilles with edema of calcaneus, possible osteomyelitis of calcaneus at fluid-filled tract.     Clinical Findings:  Right heel (-) probe to bone, tracking distally  Right heel

## 2024-01-16 NOTE — ED PROVIDER NOTES
Encounter Date: 1/16/2024       History     Chief Complaint   Patient presents with    Foot Pain     R foot surg yr ago has hardware and now it's infected, had bone bx     59 yo M with pmhx IDDM 1, AV replacement on coumadin, HTN, HLD, CAD, Achilles tendon repair with current right foot infection presents with a chief complaint of right foot pain.  Patient follows with Dr. Bowers of podiatry.  He had an MRI performed approx 2 weeks ago that resulted as s/p Achilles tendon debridement with underlying tendinosis.  Fluid collection extending from the skin through the central portion the Achilles extending to bone, with edema of calcaneus.  Findings worrisome for that of osteomyelitis of the calcaneus at site of fluid-filled tract.  Patient follows with Dr. Bowers in clinic and was seen on January 9th, 1 week ago.  The wound was debrided and he was started on Bactrim.  He reports 100% compliance with Bactrim.  Broth from culture has returned positive for bacillus.  He is supposed to follow up with ID but has not done so.  He is improving somewhat but over the past 3-4 days he reports worsening pain and swelling.  Pain and swelling is centered over the right Achilles.  No new injury.  He notes drainage but this is and no worse or changed from usual.  He tried to get in touch with Dr. Bowers but Dr. Bowers was unable to get into clinic so he was sent to the ER.  Pain is currently 7 to 8/10 but is controlled with oxycodone 7.5 mg.           Review of patient's allergies indicates:  No Known Allergies  Past Medical History:   Diagnosis Date    Anemia     Aortic stenosis 2018    Cancer 2014    Colon cancer     Coronary artery disease     Diabetes mellitus type I     since in his 30's    Heart murmur     Heel fracture     HTN (hypertension)     Hyperlipidemia LDL goal < 70     Insulin pump in place     MVP (mitral valve prolapse)     Stenosis of aortic and mitral valves      Past Surgical History:   Procedure Laterality Date     AORTIC VALVE REPLACEMENT N/A 8/9/2019    Procedure: Replacement-valve-aortic;  Surgeon: Ryan Knight MD;  [facility];  Service: Cardiothoracic;  Laterality: N/A;    BACK SURGERY      BENTALL PROCEDURE FOR REPLACEMENT OF AORTIC VALVE, AORTIC ROOT, AND ASCENDING AORTA N/A 8/9/2019    Procedure: BENTALL PROCEDURE;  Surgeon: Ryan Knight MD;  [facility];  Service: Cardiothoracic;  Laterality: N/A;    CARDIAC SURGERY      CARPAL TUNNEL RELEASE      COLON SURGERY  2014    COLONOSCOPY N/A 2/17/2020    Procedure: COLONOSCOPY;  Surgeon: Richi Mock MD;  Location: [facility] ENDO;  Service: Colon and Rectal;  Laterality: N/A;    COLONOSCOPY N/A 10/26/2023    Procedure: COLONOSCOPY;  Surgeon: Yadira Louis MD;  Location: [facility] ENDO (4TH FLR);  Service: Endoscopy;  Laterality: N/A;  ok to hold Coumadin x5 days per [facility], see telephone encounter 8/4/23  ref by KELSIE Cao/ [facility] portal-RB  10/19-precall complete-pt verbalized understanding of holding Ozempic-MS    DEBRIDEMENT OF ACHILLES TENDON Right 1/27/2023    Procedure: DEBRIDEMENT, TENDON, ACHILLES;  Surgeon: Eugene Bowers DPM;  [facility];  Service: Podiatry;  Laterality: Right;    ELBOW SURGERY      tendon release    ESOPHAGOGASTRODUODENOSCOPY N/A 9/10/2020    Procedure: EGD (ESOPHAGOGASTRODUODENOSCOPY);  Surgeon: Abilio Boo MD;  Location: [facility] ENDO (4TH FLR);  Service: Endoscopy;  Laterality: N/A;  Cardiac clearance received, see telephone encounter 8/27/20-BB  Approval to hold Coumadin prior to procedure received, see telephone encounter 8/27/20-BB  covid-9/7/20-[facility]    FOOT TENDON SURGERY      OSTECTOMY Right 1/27/2023    Procedure: OSTECTOMY;  Surgeon: Eugene Bowers DPM;  [facility];  Service: Podiatry;  Laterality: Right;  calcaneal    right and left heart cath Bilateral 01/15/2018    TREATMENT OF CARDIAC ARRHYTHMIA N/A 8/19/2019     Procedure: CARDIOVERSION;  Surgeon: Juan Zapata MD;  Location: [facility] EP LAB;  Service: Cardiology;  Laterality: N/A;  afib, dccv only, anes, GP, 3092    TRIGGER FINGER RELEASE      x 2    TRIGGER FINGER RELEASE Left 1/5/2022    Procedure: RELEASE, TRIGGER FINGER,LEFT,SMALL & THUMB;  Surgeon: Dilma Hunter MD;  Location: [facility] OR;  Service: Orthopedics;  Laterality: Left;     Family History   Problem Relation Age of Onset    Heart disease Mother     Lung cancer Mother     Heart attack Father     Diabetes Father     HIV Brother      Social History     Tobacco Use    Smoking status: Never    Smokeless tobacco: Former     Types: Snuff     Quit date: 8/30/2019    Tobacco comments:     since he was 12 yrs old   Substance Use Topics    Alcohol use: No     Comment: socially    Drug use: No     Review of Systems    Physical Exam     Initial Vitals [01/16/24 1430]   BP Pulse Resp Temp SpO2   123/61 73 18 98 °F (36.7 °C) 97 %      MAP       --         Physical Exam    Nursing note and vitals reviewed.  Constitutional: He appears well-developed and well-nourished. He is not diaphoretic. No distress.   HENT:   Head: Normocephalic.   Eyes: No scleral icterus.   Cardiovascular:  Normal rate.           Pulmonary/Chest: No stridor. No respiratory distress.   Abdominal: He exhibits no distension.   Musculoskeletal:      Comments: Swelling throughout the right ankle most pronounced over the posterior aspect/Achilles, there is tenderness to palpation at base of calcaneus.  There is a 2 cm open ulcer with some minimal weeping.  No crepitus palpated.  Distal foot sensation and cap refill preserved and intact     Neurological: He is alert.   Skin: Skin is warm.   Psychiatric: Thought content normal.         ED Course   Procedures  Labs Reviewed   CBC W/ AUTO DIFFERENTIAL - Abnormal; Notable for the following components:       Result Value    Hemoglobin 12.5 (*)     MCHC 31.0 (*)     RDW 16.2 (*)     All other components within  normal limits   COMPREHENSIVE METABOLIC PANEL - Abnormal; Notable for the following components:    Glucose 128 (*)     Anion Gap 7 (*)     All other components within normal limits   CULTURE, BLOOD   CULTURE, BLOOD   GRAM STAIN   CULTURE, AEROBIC  (SPECIFY SOURCE)   CULTURE, ANAEROBIC   CULTURE, FUNGUS   AFB CULTURE & SMEAR          Imaging Results    None          Medications   oxyCODONE immediate release tablet 10 mg (10 mg Oral Given 1/16/24 1106)     Medical Decision Making  59 yo M with pmhx IDDM 1, AV replacement on coumadin, HTN, HLD, CAD, Achilles tendon repair with current right foot infection presents with a chief complaint of right foot pain.      He has known osteo and is compliant with Bactrim.  He underwent recent debridement but swelling has worsened.  He is well-appearing.  Serum labs ordered by provider in triage revealed a CBC without leukocytosis of 4.76.  CMP was unremarkable.  He does not appear septic.  Podiatry was consulted who is performing wet-to-dry dressings with Betadine.  He reviewed dressing changes with the patient who is comfortable doing so.  He recommend changing Bactrim to Cipro and a prescription was provided.  He was able to arrange a follow-up with Dr. Bowers in clinic.  Ambulatory referral to Infectious Disease was provided at their request.  Patient is comfortable with discharge.  I refilled his pain medications.  Return precautions.  No evidence of necrotizing soft tissue infection.  No evidence of new abscess.  No evidence of sepsis to require inpatient admission.    Risk  Prescription drug management.                                      Clinical Impression:  Final diagnoses:  [M86.9] Osteomyelitis, unspecified site, unspecified type (Primary)          ED Disposition Condition    Discharge Stable          ED Prescriptions       Medication Sig Dispense Start Date End Date Auth. Provider    ciprofloxacin HCl (CIPRO) 500 MG tablet Take 1 tablet (500 mg total) by mouth every 12  (twelve) hours. for 10 days 20 tablet 1/16/2024 1/26/2024 Maurizio Almeida MD    oxyCODONE-acetaminophen (PERCOCET) 7.5-325 mg per tablet Take 1 tablet by mouth every 6 (six) hours as needed for Pain. 12 tablet 1/16/2024 1/19/2024 Maurizio Almeida MD          Follow-up Information       Follow up With Specialties Details Why Contact Info    Eugene Bowers DPM Podiatry Schedule an appointment as soon as possible for a visit   [address] [facility] [address]  [phone]      [facility] - Emergency Dept Emergency Medicine  As needed, If symptoms worsen [address] [facility] [id number] [phone]             Maurizio Almeida MD  01/16/24 1701

## 2024-01-16 NOTE — HPI
58-year-old male with PMH DM I, HTN, HLD, aortic atherosclerosis presents to ED 1/16/24 for concerns of ongoing pain, post-op infection in right heel surgical site. 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 Bowers 1/27/23. Last seen by Dr. Eugene Bowers DPM 1/9/24. Rates pain 8/10, worse than it had been in recent weeks; was given percocet 7.5 and Bactrim, but states that course of Bactrim had not shown improvement in infective symptoms. 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 MRI 1/4 showing tendinosis of Achilles with edema of calcaneus, possible osteomyelitis of calcaneus at fluid-filled tract.  Per patient, and wife at bedside, attests that infection has been going on and off for large amount of time since the procedure had occurred but had otherwise been largely stable aside from pain.  States that he can not put pressure on it, but is otherwise able to ambulate.  Denies nausea, vomiting, fevers, chills, shortness of breath.  No other pedal complaints at this time.

## 2024-01-17 ENCOUNTER — PATIENT MESSAGE (OUTPATIENT)
Dept: CARDIOLOGY | Facility: CLINIC | Age: 59
End: 2024-01-17
Payer: MEDICARE

## 2024-01-17 ENCOUNTER — TELEPHONE (OUTPATIENT)
Dept: CARDIOLOGY | Facility: CLINIC | Age: 59
End: 2024-01-17
Payer: MEDICARE

## 2024-01-17 NOTE — TELEPHONE ENCOUNTER
I spoke with pt: echo shows heart is strong and aortic valve prosthesis is working normally.  Pt reassured.  Pt has an appt soon.

## 2024-01-18 ENCOUNTER — OFFICE VISIT (OUTPATIENT)
Dept: PODIATRY | Facility: CLINIC | Age: 59
End: 2024-01-18
Payer: MEDICARE

## 2024-01-18 VITALS
BODY MASS INDEX: 34.53 KG/M2 | WEIGHT: 220 LBS | DIASTOLIC BLOOD PRESSURE: 74 MMHG | HEIGHT: 67 IN | HEART RATE: 68 BPM | SYSTOLIC BLOOD PRESSURE: 121 MMHG

## 2024-01-18 DIAGNOSIS — M86.00 ACUTE HEMATOGENOUS OSTEOMYELITIS, UNSPECIFIED SITE: ICD-10-CM

## 2024-01-18 DIAGNOSIS — L97.502 ULCER OF FOOT WITH FAT LAYER EXPOSED, UNSPECIFIED LATERALITY: Primary | ICD-10-CM

## 2024-01-18 PROCEDURE — 99999 PR PBB SHADOW E&M-EST. PATIENT-LVL IV: CPT | Mod: PBBFAC,,, | Performed by: PODIATRIST

## 2024-01-18 PROCEDURE — 11042 DBRDMT SUBQ TIS 1ST 20SQCM/<: CPT | Mod: RT,S$GLB,, | Performed by: PODIATRIST

## 2024-01-18 PROCEDURE — 99213 OFFICE O/P EST LOW 20 MIN: CPT | Mod: 25,S$GLB,, | Performed by: PODIATRIST

## 2024-01-18 RX ORDER — DOXYCYCLINE 100 MG/1
100 TABLET ORAL 2 TIMES DAILY
Qty: 20 TABLET | Refills: 0 | Status: SHIPPED | OUTPATIENT
Start: 2024-01-18 | End: 2024-01-23

## 2024-01-20 LAB — BACTERIA SPEC AEROBE CULT: ABNORMAL

## 2024-01-21 LAB
BACTERIA BLD CULT: NORMAL
BACTERIA BLD CULT: NORMAL

## 2024-01-22 LAB — BACTERIA SPEC ANAEROBE CULT: ABNORMAL

## 2024-01-23 ENCOUNTER — ANTI-COAG VISIT (OUTPATIENT)
Dept: CARDIOLOGY | Facility: CLINIC | Age: 59
End: 2024-01-23
Payer: MEDICARE

## 2024-01-23 ENCOUNTER — OFFICE VISIT (OUTPATIENT)
Dept: PODIATRY | Facility: CLINIC | Age: 59
End: 2024-01-23
Payer: MEDICARE

## 2024-01-23 ENCOUNTER — OFFICE VISIT (OUTPATIENT)
Dept: INFECTIOUS DISEASES | Facility: CLINIC | Age: 59
End: 2024-01-23
Payer: MEDICARE

## 2024-01-23 ENCOUNTER — TELEPHONE (OUTPATIENT)
Dept: INFECTIOUS DISEASES | Facility: CLINIC | Age: 59
End: 2024-01-23

## 2024-01-23 ENCOUNTER — PATIENT MESSAGE (OUTPATIENT)
Dept: CARDIOLOGY | Facility: CLINIC | Age: 59
End: 2024-01-23

## 2024-01-23 ENCOUNTER — LAB VISIT (OUTPATIENT)
Dept: LAB | Facility: HOSPITAL | Age: 59
End: 2024-01-23
Payer: MEDICARE

## 2024-01-23 VITALS
WEIGHT: 220 LBS | HEIGHT: 67 IN | DIASTOLIC BLOOD PRESSURE: 72 MMHG | BODY MASS INDEX: 34.53 KG/M2 | HEART RATE: 62 BPM | SYSTOLIC BLOOD PRESSURE: 119 MMHG

## 2024-01-23 VITALS
SYSTOLIC BLOOD PRESSURE: 121 MMHG | HEART RATE: 72 BPM | WEIGHT: 223.31 LBS | TEMPERATURE: 98 F | HEIGHT: 67 IN | BODY MASS INDEX: 35.05 KG/M2 | DIASTOLIC BLOOD PRESSURE: 67 MMHG

## 2024-01-23 DIAGNOSIS — M79.671 RIGHT FOOT PAIN: ICD-10-CM

## 2024-01-23 DIAGNOSIS — Z79.01 LONG TERM (CURRENT) USE OF ANTICOAGULANTS: Primary | ICD-10-CM

## 2024-01-23 DIAGNOSIS — L97.502 ULCER OF FOOT WITH FAT LAYER EXPOSED, UNSPECIFIED LATERALITY: ICD-10-CM

## 2024-01-23 DIAGNOSIS — M86.9 OSTEOMYELITIS OF RIGHT FOOT, UNSPECIFIED TYPE: ICD-10-CM

## 2024-01-23 DIAGNOSIS — M86.9 OSTEOMYELITIS, UNSPECIFIED SITE, UNSPECIFIED TYPE: ICD-10-CM

## 2024-01-23 DIAGNOSIS — I48.92 PAROXYSMAL ATRIAL FLUTTER: ICD-10-CM

## 2024-01-23 DIAGNOSIS — Z79.01 LONG TERM (CURRENT) USE OF ANTICOAGULANTS: ICD-10-CM

## 2024-01-23 DIAGNOSIS — Z95.2 H/O MECHANICAL AORTIC VALVE REPLACEMENT: ICD-10-CM

## 2024-01-23 DIAGNOSIS — L97.419 HEEL ULCERATION, RIGHT, WITH UNSPECIFIED SEVERITY: Primary | ICD-10-CM

## 2024-01-23 LAB
INR PPP: 2.2 (ref 0.8–1.2)
PROTHROMBIN TIME: 22.7 SEC (ref 9–12.5)

## 2024-01-23 PROCEDURE — 99999 PR PBB SHADOW E&M-EST. PATIENT-LVL V: CPT | Mod: PBBFAC,,, | Performed by: INTERNAL MEDICINE

## 2024-01-23 PROCEDURE — 93793 ANTICOAG MGMT PT WARFARIN: CPT | Mod: S$GLB,,,

## 2024-01-23 PROCEDURE — 85610 PROTHROMBIN TIME: CPT | Performed by: INTERNAL MEDICINE

## 2024-01-23 PROCEDURE — 99204 OFFICE O/P NEW MOD 45 MIN: CPT | Mod: S$GLB,,, | Performed by: INTERNAL MEDICINE

## 2024-01-23 PROCEDURE — 99214 OFFICE O/P EST MOD 30 MIN: CPT | Mod: S$GLB,,, | Performed by: PODIATRIST

## 2024-01-23 PROCEDURE — 36415 COLL VENOUS BLD VENIPUNCTURE: CPT | Performed by: INTERNAL MEDICINE

## 2024-01-23 PROCEDURE — 99999 PR PBB SHADOW E&M-EST. PATIENT-LVL IV: CPT | Mod: PBBFAC,,, | Performed by: PODIATRIST

## 2024-01-23 RX ORDER — METRONIDAZOLE 500 MG/1
500 TABLET ORAL EVERY 12 HOURS
Qty: 84 TABLET | Refills: 0 | Status: SHIPPED | OUTPATIENT
Start: 2024-01-23 | End: 2024-03-05

## 2024-01-23 NOTE — PROGRESS NOTES
[facility]    2024 4:30 PM    Assessment/Plan:    Patient presents today with subtherapeutic  INR.    Assessment of patient findings and chart review: pt followed by ID outpatient for bacteremia 2/2 to wound infection; new start ceftriaxone/daptomycin IV and metronidazole PO; metronidazole may increase the serum concentration of warfarin    Recommendation for patient's warfarin regimen: Continue current maintenance dose due to DDI with metronidazole    Recommend repeat INR in 2 days  _________________________________________________________________    Cj Barnes (58 y.o.) is followed by the [facility].    Anticoagulation Summary  As of 2024      INR goal:  2.5-3.5   TTR:  70.0 % (4.4 y)   INR used for dosin.2 (2024)   Warfarin maintenance plan:  3 mg (3 mg x 1) every Mon, Wed, Fri; 4.5 mg (3 mg x 1.5) all other days   Weekly warfarin total:  27 mg   Plan last modified:  Carmen Iyer, PharmD (2024)   Next INR check:  2024   Target end date:      Indications    Long term (current) use of anticoagulants [Z79.01]  Paroxysmal atrial flutter [I48.92]  H/O mechanical aortic valve replacement [Z95.2]                 Anticoagulation Episode Summary       INR check location:      Preferred lab:      Send INR reminders to:  [facility] COUMADIN MONITORING POOL    Comments:  [facility]          Anticoagulation Care Providers       Provider Role Specialty Phone number    Ryan Alexander MD Referring Cardiology [phone]            Patient Findings       Positives:  Change in medications    Negatives:  Signs/symptoms of thrombosis, Signs/symptoms of bleeding, Laboratory test error suspected, Change in health, Change in alcohol use, Change in activity, Upcoming invasive procedure, Emergency department visit, Upcoming dental procedure, Missed doses, Extra doses, Change in  diet/appetite, Hospital admission, Bruising, Other complaints    Comments:  Pt confirmed correct doses. Pt states he is now on metroNIDAZOLE (FLAGYL) 500 MG tablet Every 12 hours and sent home with IV drip per Dr. Almanza. Pt will start new antibiotic today no other changes

## 2024-01-23 NOTE — PROGRESS NOTES
Subjective:      Patient ID: Cj Barnes is a 58 y.o. male.    Chief Complaint:Infection      History of Present Illness    58 year old male with a history of DM complicated by peripheral neuropathy in his feet bilaterally.  He had a bone spur and ultimately underwent partial ostectomy of calcaneus with retro calcaneal bursa removal and debridement of achilles tendon on jan 27, 2023  Patient states that the surgical wound has never healed up since.  The wounds have been complicated by infections.  Wound cultures in march 2023 were positive for E coli and multiple anaerobes.  He was treated with augmentin and then levofloxacin.  He also received some courses of bactrim as well.  Wound cultures obtained in December 2023 were positive for group A strep and MSSA.  He was treated with bactrim and ciprofloxacin.  An MRI was obtained of his foot on jan 4, 2024 that showed possible osteomyelitis of the bone.  He had a wound culture on 1/9/24 that was positive for bacillus.  He developed an acute worsening of pain and went to the ER on 1/16/24.  At that time, it was noted that the wound tracked about 2.5 cm distally.  Cultures were obtained and returned positive for MSSA and Porphyromonas.    Medical History  DM  HTN  Hyperlipidemia  Peripheral neuropathy 2/2 DM    Surgical History  Aortic valve replacement in 2019.    Social History  He has never smoked before.  Used to chew tobacco but quit in 2019.  No illicit substance use. Owns 2 dogs at home.        Review of Systems   Constitutional: Positive for malaise/fatigue and night sweats. Negative for chills, decreased appetite, fever, weight gain and weight loss.   HENT:  Negative for congestion, ear pain, hearing loss, hoarse voice, sore throat and tinnitus.    Eyes:  Negative for blurred vision, redness and visual disturbance.   Cardiovascular:  Negative for chest pain, leg swelling and palpitations.   Respiratory:  Positive for cough. Negative for hemoptysis,  shortness of breath, sputum production and wheezing.    Hematologic/Lymphatic: Bruises/bleeds easily.   Skin:  Positive for itching. Negative for dry skin, rash and suspicious lesions.   Musculoskeletal:  Negative for back pain, joint pain, myalgias and neck pain.   Gastrointestinal:  Negative for abdominal pain, constipation, diarrhea, heartburn, nausea and vomiting.   Genitourinary:  Negative for dysuria, flank pain, frequency, hematuria, hesitancy and urgency.   Neurological:  Negative for dizziness, headaches, numbness, paresthesias and weakness.   Psychiatric/Behavioral:  Negative for depression and memory loss. The patient does not have insomnia and is not nervous/anxious.    Objective:   Physical Exam  Vitals and nursing note reviewed.   Constitutional:       General: He is not in acute distress.     Appearance: He is well-developed. He is not diaphoretic.   HENT:      Head: Normocephalic and atraumatic.      Right Ear: External ear normal.      Left Ear: External ear normal.      Nose: Nose normal.      Mouth/Throat:      Pharynx: No oropharyngeal exudate.   Eyes:      General: No scleral icterus.        Right eye: No discharge.         Left eye: No discharge.      Conjunctiva/sclera: Conjunctivae normal.      Pupils: Pupils are equal, round, and reactive to light.   Neck:      Thyroid: No thyromegaly.      Vascular: No JVD.      Trachea: No tracheal deviation.   Cardiovascular:      Rate and Rhythm: Normal rate and regular rhythm.      Heart sounds: No murmur heard.     No friction rub. No gallop.   Pulmonary:      Effort: Pulmonary effort is normal. No respiratory distress.      Breath sounds: Normal breath sounds. No stridor. No wheezing or rales.   Chest:      Chest wall: No tenderness.   Abdominal:      General: Bowel sounds are normal. There is no distension.      Palpations: Abdomen is soft. There is no mass.      Tenderness: There is no abdominal tenderness. There is no guarding or rebound.    Musculoskeletal:         General: No tenderness. Normal range of motion.      Cervical back: Normal range of motion and neck supple.   Lymphadenopathy:      Cervical: No cervical adenopathy.   Skin:     General: Skin is warm.      Coloration: Skin is not pale.      Findings: No erythema or rash.      Comments: Wound to right calcaneus with surrounding erythema.   Neurological:      Mental Status: He is alert and oriented to person, place, and time.      Cranial Nerves: No cranial nerve deficit.      Motor: No abnormal muscle tone.      Coordination: Coordination normal.      Deep Tendon Reflexes: Reflexes are normal and symmetric. Reflexes normal.   Psychiatric:         Behavior: Behavior normal.         Thought Content: Thought content normal.         Judgment: Judgment normal.   Assessment:           1. Ulcer of foot with fat layer exposed, unspecified laterality    2. Acute hematogenous osteomyelitis, unspecified site    3. Osteomyelitis, unspecified site, unspecified type      58 year old patient with non-healing post surgical wound that has been present since January of 2023.  Cultures in the past were positive for E coli, group A strep, MSSA and anaerobes.  Most recently he was seen in the ER on jan 16, 2024 and cultures were positive for MSSA and Porphyromonas.  An MRI obtained this month showed possible osteomyelitis of the foot.  Given how long this has persisted, I will favor treating with broad spectrum IV antibiotics.  Will start the patient on Daptomycin 800 mg IV q 24, Ceftriaxone 2 grams IV q 24 and oral metronidazole 500 mg po q 12.  Will plan to treat the patient for 6 weeks.  Check cbc, cmp, cpk, crp once a week.  Arrange follow up with me in 3 weeks.  Will refer the patient for ankle brachial index to assess circulation to his lower leg.  Plan:      Ulcer of foot with fat layer exposed, unspecified laterality  -     Ambulatory referral/consult to Infectious Disease    Acute hematogenous  osteomyelitis, unspecified site  -     Ambulatory referral/consult to Infectious Disease    Osteomyelitis, unspecified site, unspecified type  -     Ambulatory referral/consult to Infectious Disease

## 2024-01-23 NOTE — TELEPHONE ENCOUNTER
Outpatient Antibiotic Therapy Plan:    Referral to [facility].  Referral for Home Health services.     Picc Line placement    1) Infection: Bacteremia    2) Antibiotics:Daptomycin and eftraixone    Intravenous antibiotics:  Daptomycin IV q 24 hours   Cefriaxone IV q 24 hours       3) Therapy Duration:  3 weeks    Estimated end date of IV antibiotics: 02/14/2024    4) Outpatient Weekly Labs:    Order the following labs to be drawn on Mondays:   CBC  CMP   CRP    CPK (when on Daptomycin)  Vancomycin trough. Target 15-20    Additional labs to be drawn on Thursdays:  CMP   Vancomycin trough. Target 15-20    If vancomycin trough is not at target (15-20) prior to discharge, schedule vancomycin trough to be drawn before their fourth outpatient dose.    5) Fax Lab Results to Infectious Diseases Provider: Jon Mallory MD    [facility] Fax Number: [phone]    6) Ordering Information:    Orders Mode: Verbal with readback  Ordering Provider: Jon Mallory MD

## 2024-01-24 ENCOUNTER — TELEPHONE (OUTPATIENT)
Dept: WOUND CARE | Facility: HOSPITAL | Age: 59
End: 2024-01-24
Payer: MEDICARE

## 2024-01-24 ENCOUNTER — OFFICE VISIT (OUTPATIENT)
Dept: CARDIOLOGY | Facility: CLINIC | Age: 59
End: 2024-01-24
Payer: MEDICARE

## 2024-01-24 ENCOUNTER — HOSPITAL ENCOUNTER (OUTPATIENT)
Dept: RADIOLOGY | Facility: HOSPITAL | Age: 59
Discharge: HOME | End: 2024-01-24
Attending: INTERNAL MEDICINE
Payer: MEDICARE

## 2024-01-24 ENCOUNTER — HOSPITAL ENCOUNTER (OUTPATIENT)
Dept: VASCULAR SURGERY | Facility: CLINIC | Age: 59
Discharge: HOME | End: 2024-01-24
Attending: INTERNAL MEDICINE
Payer: MEDICARE

## 2024-01-24 VITALS
OXYGEN SATURATION: 95 % | WEIGHT: 219.25 LBS | DIASTOLIC BLOOD PRESSURE: 57 MMHG | HEART RATE: 63 BPM | SYSTOLIC BLOOD PRESSURE: 120 MMHG | BODY MASS INDEX: 34.34 KG/M2

## 2024-01-24 DIAGNOSIS — G45.9 TIA (TRANSIENT ISCHEMIC ATTACK): ICD-10-CM

## 2024-01-24 DIAGNOSIS — M86.9 OSTEOMYELITIS, UNSPECIFIED SITE, UNSPECIFIED TYPE: ICD-10-CM

## 2024-01-24 DIAGNOSIS — I10 ESSENTIAL HYPERTENSION: ICD-10-CM

## 2024-01-24 DIAGNOSIS — I70.0 AORTIC ATHEROSCLEROSIS: ICD-10-CM

## 2024-01-24 DIAGNOSIS — L97.502 ULCER OF FOOT WITH FAT LAYER EXPOSED, UNSPECIFIED LATERALITY: ICD-10-CM

## 2024-01-24 DIAGNOSIS — E10.649 TYPE 1 DIABETES MELLITUS WITH HYPOGLYCEMIA UNAWARENESS: ICD-10-CM

## 2024-01-24 DIAGNOSIS — Z79.01 LONG TERM (CURRENT) USE OF ANTICOAGULANTS: ICD-10-CM

## 2024-01-24 DIAGNOSIS — I25.10 CORONARY ARTERY DISEASE INVOLVING NATIVE CORONARY ARTERY OF NATIVE HEART WITHOUT ANGINA PECTORIS: ICD-10-CM

## 2024-01-24 DIAGNOSIS — E78.5 HYPERLIPIDEMIA WITH TARGET LOW DENSITY LIPOPROTEIN (LDL) CHOLESTEROL LESS THAN 70 MG/DL: ICD-10-CM

## 2024-01-24 DIAGNOSIS — Z95.2 H/O MECHANICAL AORTIC VALVE REPLACEMENT: Primary | ICD-10-CM

## 2024-01-24 DIAGNOSIS — R79.89 ELEVATED TROPONIN: ICD-10-CM

## 2024-01-24 DIAGNOSIS — Z95.828 S/P ASCENDING AORTIC REPLACEMENT: ICD-10-CM

## 2024-01-24 DIAGNOSIS — I48.92 PAROXYSMAL ATRIAL FLUTTER: ICD-10-CM

## 2024-01-24 PROCEDURE — 93923 UPR/LXTR ART STDY 3+ LVLS: CPT | Mod: S$GLB,,, | Performed by: SURGERY

## 2024-01-24 PROCEDURE — 93000 ELECTROCARDIOGRAM COMPLETE: CPT | Mod: S$GLB,,, | Performed by: INTERNAL MEDICINE

## 2024-01-24 PROCEDURE — 99213 OFFICE O/P EST LOW 20 MIN: CPT | Mod: S$GLB,,, | Performed by: INTERNAL MEDICINE

## 2024-01-24 PROCEDURE — 71045 X-RAY EXAM CHEST 1 VIEW: CPT | Mod: 26,$0,, | Performed by: RADIOLOGY

## 2024-01-24 PROCEDURE — 99999 PR PBB SHADOW E&M-EST. PATIENT-LVL IV: CPT | Mod: PBBFAC,,, | Performed by: INTERNAL MEDICINE

## 2024-01-24 RX ORDER — IRBESARTAN 150 MG/1
150 TABLET ORAL DAILY
Qty: 90 TABLET | Refills: 3 | Status: SHIPPED | OUTPATIENT
Start: 2024-01-24

## 2024-01-24 RX ORDER — ROSUVASTATIN CALCIUM 40 MG/1
40 TABLET, COATED ORAL NIGHTLY
Qty: 90 TABLET | Refills: 3 | Status: SHIPPED | OUTPATIENT
Start: 2024-01-24 | End: 2025-01-23

## 2024-01-24 RX ORDER — HYDROCHLOROTHIAZIDE 12.5 MG/1
TABLET ORAL
Qty: 90 TABLET | Refills: 3 | Status: SHIPPED | OUTPATIENT
Start: 2024-01-24

## 2024-01-24 RX ORDER — WARFARIN 3 MG/1
6 TABLET ORAL DAILY
Qty: 180 TABLET | Refills: 3 | Status: SHIPPED | OUTPATIENT
Start: 2024-01-24

## 2024-01-24 NOTE — PROGRESS NOTES
"  Subjective:      Patient ID: Cj Barnes is a 58 y.o. male.    Chief Complaint: Follow-up    HPI:  Pt was recently dx with osteomyelitis following an osteotomy last January.    Pt is on antibiotics per ID.    Pt had the flu while on a cruise.  Pt was hospitalized for 3 days.    Review of Systems   Cardiovascular:  Negative for chest pain, claudication, dyspnea on exertion, irregular heartbeat, leg swelling, near-syncope, orthopnea, palpitations and syncope.      Pt has occasional "little tingling feelings in chest which may last 30 seconds " which occur while relaxed    Past Medical History:   Diagnosis Date    Anemia     Aortic stenosis 2018    Cancer 2014    Colon cancer     Coronary artery disease     Diabetes mellitus type I     since in his 30's    Heart murmur     Heel fracture     HTN (hypertension)     Hyperlipidemia LDL goal < 70     Insulin pump in place     MVP (mitral valve prolapse)     Stenosis of aortic and mitral valves         Past Surgical History:   Procedure Laterality Date    AORTIC VALVE REPLACEMENT N/A 8/9/2019    Procedure: Replacement-valve-aortic;  Surgeon: Ryan Knight MD;  Location: [facility] OR [address] [place] [address];  Service: Cardiothoracic;  Laterality: N/A;    BACK SURGERY      BENTALL PROCEDURE FOR REPLACEMENT OF AORTIC VALVE, AORTIC ROOT, AND ASCENDING AORTA N/A 8/9/2019    Procedure: BENTALL PROCEDURE;  Surgeon: Ryan Knight MD;  [facility];  Service: Cardiothoracic;  Laterality: N/A;    CARDIAC SURGERY      CARPAL TUNNEL RELEASE      COLON SURGERY  2014    COLONOSCOPY N/A 2/17/2020    Procedure: COLONOSCOPY;  Surgeon: Richi Mock MD;  [facility];  Service: Colon and Rectal;  Laterality: N/A;    COLONOSCOPY N/A 10/26/2023    Procedure: COLONOSCOPY;  Surgeon: Yadira Louis MD;  Location: [facility] (4TH FLR);  Service: Endoscopy;  Laterality: N/A;  ok to hold Coumadin x5 days per [facility], see telephone encounter 8/4/23  ref by " S.Banard,NP/ suprep inst portal-RB  10/19-precall complete-pt verbalized understanding of holding Ozempic-MS    DEBRIDEMENT OF ACHILLES TENDON Right 1/27/2023    Procedure: DEBRIDEMENT, TENDON, ACHILLES;  Surgeon: Eugene Bowers DPM;  Location: [facility] OR 2ND FLR;  Service: Podiatry;  Laterality: Right;    ELBOW SURGERY      tendon release    ESOPHAGOGASTRODUODENOSCOPY N/A 9/10/2020    Procedure: EGD (ESOPHAGOGASTRODUODENOSCOPY);  Surgeon: Abilio Boo MD;  Location: [facility] ENDO (4TH FLR);  Service: Endoscopy;  Laterality: N/A;  Cardiac clearance received, see telephone encounter 8/27/20-BB  Approval to hold Coumadin prior to procedure received, see telephone encounter 8/27/20-BB  covid-9/7/20-[facility]    FOOT TENDON SURGERY      OSTECTOMY Right 1/27/2023    Procedure: OSTECTOMY;  Surgeon: Eugene Bowers DPM;  [address]  Service: Podiatry;  Laterality: Right;  calcaneal    right and left heart cath Bilateral 01/15/2018    TREATMENT OF CARDIAC ARRHYTHMIA N/A 8/19/2019    Procedure: CARDIOVERSION;  Surgeon: Juan Zapata MD;  Location: [facility] EP LAB;  Service: Cardiology;  Laterality: N/A;  afib, dccv only, anes, GP, [id number]    TRIGGER FINGER RELEASE      x 2    TRIGGER FINGER RELEASE Left 1/5/2022    Procedure: RELEASE, TRIGGER FINGER,LEFT,SMALL & THUMB;  Surgeon: Dilma Hunter MD;  Location: [facility] OR;  Service: Orthopedics;  Laterality: Left;       Family History   Problem Relation Age of Onset    Heart disease Mother     Lung cancer Mother     Heart attack Father     Diabetes Father     HIV Brother        Social History     Socioeconomic History    Marital status:    Tobacco Use    Smoking status: Never    Smokeless tobacco: Former     Types: Snuff     Quit date: 8/30/2019    Tobacco comments:     since he was 12 yrs old   Substance and Sexual Activity    Alcohol use: No     Comment: socially    Drug use: No    Sexual activity: Yes     Partners: Female   Social History  "Narrative        2 grown kids    Delivers seafood     Social Determinants of Health     Financial Resource Strain: Low Risk  (12/18/2023)    Overall Financial Resource Strain (CARDIA)     Difficulty of Paying Living Expenses: Not hard at all   Food Insecurity: No Food Insecurity (12/18/2023)    Hunger Vital Sign     Worried About Running Out of Food in the Last Year: Never true     Ran Out of Food in the Last Year: Never true   Transportation Needs: No Transportation Needs (12/18/2023)    PRAPARE - Transportation     Lack of Transportation (Medical): No     Lack of Transportation (Non-Medical): No   Physical Activity: Inactive (4/11/2023)    Exercise Vital Sign     Days of Exercise per Week: 0 days     Minutes of Exercise per Session: 0 min   Stress: No Stress Concern Present (12/18/2023)    [place] [employer] - Occupational Stress Questionnaire     Feeling of Stress : Only a little   Social Connections: Moderately Integrated (12/18/2023)    Social Connection and Isolation Panel [NHANES]     Frequency of Communication with Friends and Family: Three times a week     Frequency of Social Gatherings with Friends and Family: Three times a week     Attends Gnosticism Services: 1 to 4 times per year     Active Member of Clubs or Organizations: Yes     Attends Club or Organization Meetings: 1 to 4 times per year     Marital Status:    Housing Stability: Low Risk  (12/18/2023)    Housing Stability Vital Sign     Unable to Pay for Housing in the Last Year: No     Number of Places Lived in the Last Year: 1     Unstable Housing in the Last Year: No       Current Outpatient Medications on File Prior to Visit   Medication Sig Dispense Refill    aspirin (ECOTRIN) 81 MG EC tablet Take 1 tablet (81 mg total) by mouth once daily.  0    blood-glucose sensor (DEXCOM G7 SENSOR) Apple 1 Device by Misc.(Non-Drug; Combo Route) route every 10 days. 3 each 11    COMFORT EZ PEN NEEDLES 33 gauge x 3/16" Ndle " "USE AS DIRECTED with Levemir pens  11    COMIRNATY 2023-24, 12Y UP,,PF, 30 mcg/0.3 mL injection       empagliflozin (JARDIANCE) 25 mg tablet Take 1 tablet (25 mg total) by mouth once daily. 30 tablet 11    EScitalopram oxalate (LEXAPRO) 10 MG tablet Take 1 tablet (10 mg total) by mouth once daily. (Patient taking differently: Take 5 mg by mouth once daily.) 90 tablet 3    fluticasone propionate (FLONASE) 50 mcg/actuation nasal spray SPRAY TWO SPRAYS IN EACH NOSTRIL DAILY 16 g 5    gabapentin (NEURONTIN) 300 MG capsule TAKE TWO CAPSULES BY MOUTH THREE TIMES DAILY AS NEEDED FOR nerve pain 360 capsule 2    glucagon, human recombinant, (GLUCAGON EMERGENCY KIT, HUMAN,) 1 mg SolR Inject 1 mg into the muscle as needed. 1 each 0    hydroCHLOROthiazide (HYDRODIURIL) 12.5 MG Tab TAKE ONE CAPSULE BY MOUTH ONCE DAILY 90 tablet 3    insulin detemir U-100 (LEVEMIR U-100 INSULIN) 100 unit/mL injection Inject 40 Units into the skin every evening. To have on file in case of insulin pump failure.  Patient does not need prescription right now 20 mL 3    insulin lispro 100 unit/mL injection USE CONTINUOUSLY WITH INSULIN PUMP(MAX DAILY DOSE  UNITS) 60 mL 11    insulin syringe-needle U-100 1/2 mL 31 gauge x 15/64" Syrg To use 5 times per day with insulin injections if off of insulin pump.  To have on file in case of pump failure 150 each 11    irbesartan (AVAPRO) 150 MG tablet Take 1 tablet (150 mg total) by mouth once daily. 90 tablet 3    metFORMIN (GLUCOPHAGE-XR) 500 MG ER 24hr tablet Take 1 tablet (500 mg total) by mouth 2 (two) times daily with meals. 180 tablet 2    metroNIDAZOLE (FLAGYL) 500 MG tablet Take 1 tablet (500 mg total) by mouth every 12 (twelve) hours. 84 tablet 0    mupirocin (BACTROBAN) 2 % ointment APPLY TO THE AFFECTED AREA(S) THREE TIMES DAILY (Patient taking differently: 3 (three) times daily as needed. APPLY TO THE AFFECTED AREA(S) THREE TIMES DAILY) 22 g 1    nystatin (MYCOSTATIN) cream Apply topically 2 " (two) times daily. 1 Tube 5    pantoprazole (PROTONIX) 40 MG tablet Take 1 tablet (40 mg total) by mouth once daily. 30 tablet 2    rosuvastatin (CRESTOR) 40 MG Tab Take 1 tablet (40 mg total) by mouth every evening. 90 tablet 3    tadalafiL (CIALIS) 10 MG tablet Take 1 tablet (10 mg total) by mouth daily as needed for Erectile Dysfunction. 90 tablet 3    warfarin (COUMADIN) 3 MG tablet Take 2 tablets (6 mg total) by mouth Daily. 4.5mg everyday except Tuesday takes 6mg 180 tablet 3    BAQSIMI 3 mg/actuation Spry USE ONE actuation into a single nostril no response MAY REPEAT in 15 minutes USE a new device (Patient not taking: Reported on 1/24/2024) 2 each 1    blood-glucose meter,continuous (DEXCOM G6 ) Misc 1 Device by Misc.(Non-Drug; Combo Route) route once. for 1 dose 1 each 0    OZEMPIC 0.25 mg or 0.5 mg (2 mg/3 mL) pen injector inject 0.5mg into THE SKIN every SEVEN DAYS (Patient not taking: Reported on 1/24/2024) 3 mL 6     No current facility-administered medications on file prior to visit.       Review of patient's allergies indicates:  No Known Allergies  Objective:     Vitals:    01/24/24 0808   BP: (!) 120/57   BP Location: Left arm   Patient Position: Sitting   BP Method: Large (Automatic)   Pulse: 63   SpO2: 95%   Weight: 99.5 kg (219 lb 4 oz)        Physical Exam  Constitutional:       General: He is not in acute distress.     Appearance: He is well-developed. He is not diaphoretic.   Eyes:      General: No scleral icterus.  Neck:      Vascular: No carotid bruit or JVD.   Cardiovascular:      Rate and Rhythm: Regular rhythm.      Pulses:           Dorsalis pedis pulses are 2+ on the right side.        Posterior tibial pulses are 2+ on the right side.      Heart sounds: Normal heart sounds. No murmur heard.     No friction rub. No gallop.      Comments: Crisp metallic aortic valve open and close noise.    Pulmonary:      Effort: Pulmonary effort is normal. No respiratory distress.      Breath  "sounds: Normal breath sounds.   Musculoskeletal:      Right lower leg: Edema (trace right ankle edema) present.      Left lower leg: No edema.   Skin:     General: Skin is warm and dry.   Neurological:      Mental Status: He is alert and oriented to person, place, and time.   Psychiatric:         Behavior: Behavior normal.         Thought Content: Thought content normal.         Judgment: Judgment normal.          ECG today reviewed by me: sinus bradycardia at 57 bpm, clockwise rotation.    Accession #: [id number]  Transthoracic echo (TTE) complete    Height:  5' 7" (1.702 m)   Weight:  96.2 kg (212 lb 1.3 oz)   Blood Pressure:  Not recorded    Date of Study:  1/16/24   Ordering Provider:  Ryan Alexander MD   Clinical Indications:  S/P ascending aortic replacement [Z95.828 (ICD-10-CM)], H/O mechanical aortic valve replacement [Z95.2 (ICD-10-CM)]       Interpreting Physicians  Performing Staff   Ryan Alexander MD Tech:  Salina Wall        Reason for Exam  Priority: Routine  Dx: S/P ascending aortic replacement [Z95.828 (ICD-10-CM)]; H/O mechanical aortic valve replacement [Z95.2 (ICD-10-CM)]     View Images Vital Vitrea     Show images for Echo Saline Bubble? No  Summary         Left Ventricle: The left ventricle is normal in size. Mildly increased wall thickness. Normal wall motion. There is normal systolic function. Ejection fraction by visual approximation is 55%. Grade II diastolic dysfunction.    Right Ventricle: Normal right ventricular cavity size. Systolic function is normal.    Left Atrium: Left atrium is mildly dilated.    Aortic Valve: There is a mechanical valve in the aortic position. Normal prosthetic valve function.  No perivalvular leak    Mitral Valve: There is no stenosis.    Pulmonic Valve: There is no stenosis.    IVC/SVC: Normal venous pressure at 3 mmHg.       Lab Visit on 01/23/2024   Component Date Value Ref Range Status    Prothrombin Time 01/23/2024 22.7 (H)  9.0 - 12.5 sec " Final    INR 01/23/2024 2.2 (H)  0.8 - 1.2 Final   Admission on 01/16/2024, Discharged on 01/16/2024   Component Date Value Ref Range Status    WBC 01/16/2024 4.76  3.90 - 12.70 K/uL Final    RBC 01/16/2024 4.61  4.60 - 6.20 M/uL Final    Hemoglobin 01/16/2024 12.5 (L)  14.0 - 18.0 g/dL Final    Hematocrit 01/16/2024 40.3  40.0 - 54.0 % Final    MCV 01/16/2024 87  82 - 98 fL Final    MCH 01/16/2024 27.1  27.0 - 31.0 pg Final    MCHC 01/16/2024 31.0 (L)  32.0 - 36.0 g/dL Final    RDW 01/16/2024 16.2 (H)  11.5 - 14.5 % Final    Platelets 01/16/2024 212  150 - 450 K/uL Final    MPV 01/16/2024 10.0  9.2 - 12.9 fL Final    Immature Granulocytes 01/16/2024 0.2  0.0 - 0.5 % Final    Gran # (ANC) 01/16/2024 2.5  1.8 - 7.7 K/uL Final    Immature Grans (Abs) 01/16/2024 0.01  0.00 - 0.04 K/uL Final    Lymph # 01/16/2024 1.6  1.0 - 4.8 K/uL Final    Mono # 01/16/2024 0.4  0.3 - 1.0 K/uL Final    Eos # 01/16/2024 0.2  0.0 - 0.5 K/uL Final    Baso # 01/16/2024 0.06  0.00 - 0.20 K/uL Final    nRBC 01/16/2024 0  0 /100 WBC Final    Gran % 01/16/2024 51.9  38.0 - 73.0 % Final    Lymph % 01/16/2024 34.0  18.0 - 48.0 % Final    Mono % 01/16/2024 7.8  4.0 - 15.0 % Final    Eosinophil % 01/16/2024 4.8  0.0 - 8.0 % Final    Basophil % 01/16/2024 1.3  0.0 - 1.9 % Final    Differential Method 01/16/2024 Automated   Final    Sodium 01/16/2024 137  136 - 145 mmol/L Final    Potassium 01/16/2024 3.7  3.5 - 5.1 mmol/L Final    Chloride 01/16/2024 103  95 - 110 mmol/L Final    CO2 01/16/2024 27  23 - 29 mmol/L Final    Glucose 01/16/2024 128 (H)  70 - 110 mg/dL Final    BUN 01/16/2024 12  6 - 20 mg/dL Final    Creatinine 01/16/2024 0.8  0.5 - 1.4 mg/dL Final    Calcium 01/16/2024 9.1  8.7 - 10.5 mg/dL Final    Total Protein 01/16/2024 8.0  6.0 - 8.4 g/dL Final    Albumin 01/16/2024 3.5  3.5 - 5.2 g/dL Final    Total Bilirubin 01/16/2024 0.6  0.1 - 1.0 mg/dL Final    Alkaline Phosphatase 01/16/2024 70  55 - 135 U/L Final    AST 01/16/2024 21  10  - 40 U/L Final    ALT 01/16/2024 14  10 - 44 U/L Final    eGFR 01/16/2024 >60.0  >60 mL/min/1.73 m^2 Final    Anion Gap 01/16/2024 7 (L)  8 - 16 mmol/L Final    Blood Culture, Routine 01/16/2024 No growth after 5 days.   Final    Blood Culture, Routine 01/16/2024 No growth after 5 days.   Final    Gram Stain Result 01/16/2024 No WBC's   Final    Gram Stain Result 01/16/2024 No organisms seen   Final    Aerobic Bacterial Culture 01/16/2024  (A)   Final                    Value:STAPHYLOCOCCUS AUREUS  Few  Skin jena also present      Anaerobic Culture 01/16/2024  (A)   Final                    Value:PORPHYROMONAS SOMERAE  Moderate      Fungus (Mycology) Culture 01/16/2024 Culture in progress   Preliminary    AFB Culture & Smear 01/16/2024 Culture in progress   Preliminary    AFB CULTURE STAIN 01/16/2024 No acid fast bacilli seen.   Final   Hospital Outpatient Visit on 01/16/2024   Component Date Value Ref Range Status    BSA 01/16/2024 2.13  m2 Final    LVOT stroke volume 01/16/2024 71.85  cm3 Final    LVIDd 01/16/2024 4.60  3.5 - 6.0 cm Final    LV Systolic Volume 01/16/2024 55.42  mL Final    LV Systolic Volume Index 01/16/2024 26.8  mL/m2 Final    LVIDs 01/16/2024 3.63  2.1 - 4.0 cm Final    LV Diastolic Volume 01/16/2024 97.49  mL Final    LV Diastolic Volume Index 01/16/2024 47.10  mL/m2 Final    IVS 01/16/2024 1.3 (A)  0.6 - 1.1 cm Final    LVOT diameter 01/16/2024 1.71  cm Final    LVOT area 01/16/2024 2.3  cm2 Final    FS 01/16/2024 21 (A)  28 - 44 % Final    Left Ventricle Relative Wall Thick* 01/16/2024 0.59  cm Final    Posterior Wall 01/16/2024 1.35 (A)  0.6 - 1.1 cm Final    LV mass 01/16/2024 236.67  g Final    LV Mass Index 01/16/2024 114  g/m2 Final    MV Peak E Tommy 01/16/2024 0.92  m/s Final    TDI LATERAL 01/16/2024 0.11  m/s Final    TDI SEPTAL 01/16/2024 0.07  m/s Final    E/E' ratio 01/16/2024 10.22  m/s Final    MV Peak A Tommy 01/16/2024 0.79  m/s Final    E/A ratio 01/16/2024 1.16   Final    IVRT  01/16/2024 83.73  msec Final    E wave deceleration time 01/16/2024 236.57  msec Final    LV SEPTAL E/E' RATIO 01/16/2024 13.14  m/s Final    LV LATERAL E/E' RATIO 01/16/2024 8.36  m/s Final    PV Peak S Tommy 01/16/2024 0.56  m/s Final    PV Peak D Tommy 01/16/2024 0.55  m/s Final    Pulm vein S/D ratio 01/16/2024 1.02   Final    LVOT peak tommy 01/16/2024 1.61  m/s Final    Left Ventricular Outflow Tract Karissa* 01/16/2024 1.00  cm/s Final    Left Ventricular Outflow Tract Karissa* 01/16/2024 4.84  mmHg Final    RVDD 01/16/2024 2.84  cm Final    LA size 01/16/2024 4.5  cm Final    Left Atrium Minor Axis 01/16/2024 4.73  cm Final    Left Atrium Major Axis 01/16/2024 5.08  cm Final    RA Major Axis 01/16/2024 5.10  cm Final    AV mean gradient 01/16/2024 12  mmHg Final    AV peak gradient 01/16/2024 23  mmHg Final    Ao peak tommy 01/16/2024 2.42  m/s Final    Ao VTI 01/16/2024 53.40  cm Final    LVOT peak VTI 01/16/2024 31.30  cm Final    AV valve area 01/16/2024 1.35  cm² Final    AV Velocity Ratio 01/16/2024 0.67   Final    AV index (prosthetic) 01/16/2024 0.59   Final    CAM by Velocity Ratio 01/16/2024 1.53  cm² Final    MV mean gradient 01/16/2024 2  mmHg Final    MV peak gradient 01/16/2024 4  mmHg Final    MV valve area by continuity eq 01/16/2024 1.82  cm2 Final    MV VTI 01/16/2024 39.4  cm Final    PV PEAK VELOCITY 01/16/2024 1.18  m/s Final    PV peak gradient 01/16/2024 6  mmHg Final    STJ 01/16/2024 2.59  cm Final    Ascending aorta 01/16/2024 2.85  cm Final    IVC diameter 01/16/2024 1.35  cm Final    Mean e' 01/16/2024 0.09  m/s Final    ZLVIDS 01/16/2024 -0.47   Final    ZLVIDD 01/16/2024 -3.13   Final    LA Volume Index 01/16/2024 35.3  mL/m2 Final    LA volume 01/16/2024 73.08  cm3 Final    LA volume (mod) 01/16/2024 41.00  cm3 Final    LA WIDTH 01/16/2024 3.9  cm Final    LA Volume Index (Mod) 01/16/2024 19.8  mL/m2 Final    RA Width 01/16/2024 3.0  cm Final    EF 01/16/2024 55  % Final    Est. RA pres  01/16/2024 3  mmHg Final    TR Max Tommy 01/16/2024 1.8  m/s Final    TV resting pulmonary artery pressu* 01/16/2024 16  mmHg Final    RV TB RVSP 01/16/2024 5  mmHg Final    Triscuspid Valve Regurgitation Pea* 01/16/2024 13  mmHg Final   Lab Visit on 01/16/2024   Component Date Value Ref Range Status    Prothrombin Time 01/16/2024 18.2 (H)  9.0 - 12.5 sec Final    INR 01/16/2024 1.8 (H)  0.8 - 1.2 Final   Office Visit on 01/09/2024   Component Date Value Ref Range Status    Aerobic Bacterial Culture 01/09/2024  (A)   Final                    Value:BACILLUS SPECIES  From broth only      Anaerobic Culture 01/09/2024 No anaerobes isolated   Final    Final Pathologic Diagnosis 01/09/2024    Final                    Value:BONE, RIGHT HEEL, BIOPSY:  - Fragment of partially mineralized cartilage and fibroconnective tissue with scattered chronic focally acute inflammation  - Medullary cavity not present for evaluation  - Multiple deeper levels examined       Gross 01/09/2024    Final                    Value:Pathology ID:  [id number]  Patient ID:  [id number]     The specimen is received in formalin labeled &quot;R heel&quot;.  The specimen consists of a tan-white fragment of soft tissue measuring 0.7 cm in length by 0.2 cm in diameter.  The specimen is submitted entirely in cassette BZI--1-A.    Montserrat Siddiqui MS  Grossing Technologist      Disclaimer 01/09/2024 Unless the case is a 'gross only' or additional testing only, the final diagnosis for each specimen is based on a microscopic examination of appropriate tissue sections.   Final   Lab Visit on 01/09/2024   Component Date Value Ref Range Status    Prothrombin Time 01/09/2024 21.6 (H)  9.0 - 12.5 sec Final    INR 01/09/2024 2.1 (H)  0.8 - 1.2 Final   Lab Visit on 01/02/2024   Component Date Value Ref Range Status    Prothrombin Time 01/02/2024 32.1 (H)  9.0 - 12.5 sec Final    INR 01/02/2024 3.2 (H)  0.8 - 1.2 Final   Lab Visit on 12/28/2023   Component Date Value  Ref Range Status    Prothrombin Time 12/28/2023 39.8 (H)  9.0 - 12.5 sec Final    INR 12/28/2023 4.0 (H)  0.8 - 1.2 Final   Office Visit on 12/28/2023   Component Date Value Ref Range Status    Aerobic Bacterial Culture 12/28/2023  (A)   Final                    Value:STREPTOCOCCUS PYOGENES (GROUP A)  Many  Beta-hemolytic streptococci are routinely susceptible to   penicillins,cephalosporins and carbapenems.      Aerobic Bacterial Culture 12/28/2023  (A)   Final                    Value:STAPHYLOCOCCUS AUREUS  Many      Anaerobic Culture 12/28/2023 No anaerobes isolated   Final   Lab Visit on 12/26/2023   Component Date Value Ref Range Status    Prothrombin Time 12/26/2023 75.2 (H)  9.0 - 12.5 sec Final    INR 12/26/2023 8.0 (HH)  0.8 - 1.2 Final   There may be more visits with results that are not included.   (MRI Foot ([place]) Right Without Contrast  Status: Final result     [facility] Results Release    [facility] Status: Active  Results Release     PACS Images for [facility] [place] Viewer     Show images for MRI Foot ([place]) Right Without Contrast  All Reviewers List    Siobhan Garcia MA on 1/5/2024 08:40     MRI Foot ([place]) Right Without Contrast  Order: [id number]  Status: Final result     Visible to patient: Yes (seen)     Next appt: Today at 02:00 PM in Vascular Surgery (VASCULAR, LAB)     Dx: Acute hematogenous osteomyelitis, uns...     0 Result Notes  Details    Reading Physician Reading Date Result Priority   Aly Bro MD  [phone] 1/4/2024 Routine     Narrative & Impression  EXAMINATION:  MRI FOOT (HINDFOOT) RIGHT WITHOUT CONTRAST     CLINICAL HISTORY:  Foot swelling, diabetic, osteomyelitis suspected, xray done;  Non-pressure chronic ulcer of other part of unspecified foot with fat layer exposed     TECHNIQUE:  Multiplanar multisequence MRI examination of the RIGHT ankle.     COMPARISON:  1/7/23     FINDINGS:  Postoperative Findings: S/p calcaneal exostectomy with Achilles tendon  debridement and bursectomy.     TENDONS:  Posterior tibial, flexor digitorum longus, flexor hallucis longus, peroneus longus and brevis, and extensor tendons are intact.Peroneus longus tendonosis.     Achilles tendon demonstrates thickening consistent with tendinosis.  Abnormal fluid extending from skin through Achilles to calcaneus with bone marrow edema, reactive vs more likely osteomyelitis.  Overall, the findings are consistent with incision dehiscence to the skin, through the Achilles tendon to level of bone.  At the level of osseous edema calcaneus, the cortical margins are ill-defined, worrisome for osteomyelitis with geographic low signal intensity on T1.     LIGAMENTS:  Anterior talofibular, calcaneofibular, posterior talofibular ligaments are intact. Anterior and posterior tibiofibular (syndesmotic) ligaments are intact. Deltoid, spring, and Lis Franc ligaments are intact.     MUSCLES:  Normal bulk and signal.The tarsal tunnel muscles including abductor hallucis longus, flexor digitorum brevis, and abductor digiti minimi are without denervation or atrophy.     BONES:  No fracture, contusion, or marrow replacement process.     CARTILAGE:  Tibiotalar, subtalar, and joint spaces of the midfoot are preserved.  Talar dome is normal.     MISC: Osseous calcaneal spur.  Plantar fascia and sinus tarsi are normal.Lateral cord of plantar fascia demonstrates normal appearance and insertion upon the base of the 5th MT.     Impression:     *S/P Achilles tendon debridement with underlying tendinosis.  Fluid collection extending from the skin through the central portion the Achilles extending to bone, with edema of calcaneus.  Findings worrisome for that of osteomyelitis of the calcaneus at site of fluid-filled tract.        Electronically signed by: Aly Bro MD  Date:                                            01/04/2024  Time:                                           12:47   Transthoracic echo (TTE)  "complete    Height:  5' 7" (1.702 m)   Weight:  96.2 kg (212 lb 1.3 oz)   Blood Pressure:  Not recorded    Date of Study:  1/16/24   Ordering Provider:  Ryan Alexander MD   Clinical Indications:  S/P ascending aortic replacement [Z95.828 (ICD-10-CM)], H/O mechanical aortic valve replacement [Z95.2 (ICD-10-CM)]       Interpreting Physicians  Performing Staff   Ryan Alexander MD Tech:  Salina Wall        Reason for Exam  Priority: Routine  Dx: S/P ascending aortic replacement [Z95.828 (ICD-10-CM)]; H/O mechanical aortic valve replacement [Z95.2 (ICD-10-CM)]     View Images Vital Vitrea     Show images for Echo Saline Bubble? No  Summary         Left Ventricle: The left ventricle is normal in size. Mildly increased wall thickness. Normal wall motion. There is normal systolic function. Ejection fraction by visual approximation is 55%. Grade II diastolic dysfunction.    Right Ventricle: Normal right ventricular cavity size. Systolic function is normal.    Left Atrium: Left atrium is mildly dilated.    Aortic Valve: There is a mechanical valve in the aortic position. Normal prosthetic valve function.  No perivalvular leak    Mitral Valve: There is no stenosis.    Pulmonic Valve: There is no stenosis.    IVC/SVC: Normal venous pressure at 3 mmHg.   Accession #: [id number]  Cj Barnes  Cardiac catheterization  Order# [id number]  Reading physician: Palomo Turner MD Ordering physician: Palomo Turner MD Study date: 1/15/18     Patient Information    Name MRN Description   Daniel Barnes [id number] 52 y.o. male     Physicians    Panel Physicians Referring Physician Case Authorizing Physician   Palomo Turner MD (Primary)  Palomo Turner MD     Indications    Aortic regurgitation, congenital [Q23.1 (ICD-10-CM)]   Chest pain, unspecified type [R07.9 (ICD-10-CM)]   Hypertension, unspecified type [I10 (ICD-10-CM)]   SOB (shortness of breath) [R06.02 (ICD-10-CM)]     Summary       The left ventricular " systolic function is normal.  The ejection fraction is greater than 55% by visual estimate.  Ost 1st Diag to 1st Diag lesion 40% stenosed.  Prox LAD to Mid LAD lesion 30% stenosed.  Mid LAD to Dist LAD lesion 40% stenosed.  Prox Cx to Mid Cx lesion 55% stenosed.  Dist Cx lesion 55% stenosed.  RPDA-2 lesion 60% stenosed.  RPDA-1 lesion 45% stenosed.  There were no complications during the procedure. Estimated blood loss is 25 mL. No specimen was collected.  Patient aortic angulation is type I.  Procedural findings include normal LVEF.  No evidence of coarctation of thoracic aorta  Moderate to severe aortic stenosis with bicuspid aortic valve. Valve area is 0.9 cm² with a mean gradient of 36 mmHg  Normal pulmonary artery pressure and right sided heart pressures  Normal left ventricular diastolic function with normal LVEDP and wedge pressure  Preserved cardiac output 4.7 L/m     Procedure;  1.  Diagnostic left heart catheterization with selective coronary angiography  2.  Diagnostic left ventriculogram  3.  Diagnostic right heart catheterization  4.  Diagnostic study of aortic stenosis with dual lumen pigtail  5.  Diagnostic thoracic aortic with aortic arch angiography        Description of procedure  Patient brought to the catheterization laboratory placed in the supine position on the catheterization table.  The right groin was prepped and draped in the standard sterile fashion.  IV anesthesia was delivered with fentanyl and Versed by conscious sedation protocol.  After local anesthesia with 1% lidocaine the right common femoral artery was entered percutaneously and a 6 Burmese standard sheath was inserted over wire.  Next the right common femoral vein was entered percutaneously and a 6 Burmese standard sheath was inserted over wire.  We then proceeded with the diagnostic left heart catheterization and a 6 [place] JL4 catheter was inserted over wire into the left main coronary artery.  Angiography was performed in  multiple views.  The 6 Italian JL4 catheter was removed and exchanged over wire for a 6 Italian JR4 catheter.  The 6 Italian JR4 catheter was inserted into the right coronary artery.  Angiography was performed in multiple views.  The 6 Italian JR4 was then repositioned into the left ventricle.  An exchange length wire was used to remove the 6 Italian JR4 catheter and inserted a 6 Italian dual lumen pigtail catheter.  The pigtail catheter was positioned into the left ventricle.  Simultaneous pressures were recorded.  An LV gram was performed with 10 cc of contrast by hand injection.  Pullback was then performed across the aortic valve.  The 6 Italian dual lumen pigtail catheter was then removed and exchanged over wire for a 6 Italian pigtail catheter.  The 6 Italian pigtail catheter was inserted into the aortic arch.  Aortic arch angiography was then performed with 24 mL of contrast at 12 mL/s and 700 psi.  The 6 Italian pigtail catheter was then removed over wire.  The sheath was flushed and a single image is taken the right common femoral artery.  We then proceeded with the diagnostic right heart catheterization and a 6 [place] pulmonary artery catheter was inserted through the venous sheath with the balloon inflated into the pulmonary artery and into the wedge position.  Wedge pressure was recorded.  The balloon was deflated and pulmonary artery pressure was recorded.  Cardiac outputs were then performed ×3 by femoral dilution technique.  The [place] catheter was then pulled back into the right ventricle and right atrium in succession and pressures were recorded.  The [place] pulmonary artery catheter was then removed through the venous sheath and the venous sheath was flushed.  The arterial sheath was removed and hemostasis was obtained with deployment of a vascade device.  The venous sheath was removed and hemostasis was obtained with deployment of a minx device.  This completed our procedure and the patient  tolerated the procedure well.  There were no complications.  He was in stable condition upon transport to the recovery room in PACU.     Complications; none  Blood loss; 25 mL  Contrast; 112 mL     Results-hemodynamic  1.  Aortic pressure was 108/61 with a mean of 81 mmHg  2.  LV pressure was 140/2 with LVEDP of 13 mmHg  3.  32mm gradient on pullback across the aortic valve  4.  Pulmonary wedge pressure was 10 mmHg  5.  PA pressure was 29/8 with a mean of 18 mmHg  6.  RV pressure was started on an LVEDP of 10 mmHg  7.  RA pressure was 6 mmHg  8.  Aortic valve area was  0.9 cm2  9.  Mean gradient was 36 mmHg  10.  Cardiac output was 4.7 L/m     Results- angiographic  1.  LV gram showed normal left ventricular chamber size and systolic function EF 55-60%  2.  Left main is a 3 mm vessel which bifurcates into LAD and left circumflex.  There is minimal intimal disease of the mid and distal left main with some mild calcification but no significant stenosis  3.  LAD is a medium size vessel in its proximal and midportion tapers to a small vessels that [place] the apex.  It gives off several septal  branches off of its proximal and midportion and 4 diagonal branches.  The first diagonal branch is a medium size vessel and the second, third, and fourth diagonal branches are very small caliber vessels that are short in length.. There is some mild scattered disease of the proximal/mid LAD with 30% stenosis.  There is another focal area of 40% stenosis in the mid to distal LAD.  There is also 40% stenosis of the mid first diagonal branch.  The remainder the LAD and its branches are free of disease.  4.  Left circumflex is a medium to large size vessel which gives off 4 obtuse marginal branches before tapers to a small vessel at the AV groove.  The first 2 obtuse marginal branches are very small caliber vessels that are short in length.  The third obtuse marginal branch is small to medium in size and the fourth obtuse  marginal branch is a medium to large size vessel which bifurcates early into superior and inferior branches.  There is of 55% stenosis of the proximal/mid circumflex after the takeoff of the second obtuse marginal branch.  There is some mild intimal disease of the remainder the circumflex.  There is also a focal 55% stenosis of the proximal portion of the inferior branch of the fourth obtuse marginal.  The remainder the circumflex and its branches are free of disease.  5.  RCA is a medium size vessel which is dominant giving off PDA and PLB branches.  It also gives off a conus branch and acute marginal branches.  There is a focal 40-45% stenosis of the mid PDA and another focal 55-60% stenosis of the distal PDA which is a small-caliber vessel  6.  The thoracic aorta and aortic arch are all normal caliber vessels with no evidence of coarctation of the descending thoracic aorta.  Carotids and subclavian arteries are of normal diameter with no evidence of disease     Conclusion  1.  Moderate to severe aortic stenosis with congenital bicuspid aortic valve.  Valve area 0.9 cm² with a mean gradient of 36 mmHg  2.  Mild to moderate three-vessel coronary disease with 55% stenosis of the proximal to mid circumflex and another focal 60% stenosis of fourth obtuse marginal branch.  Mild disease of the mid LAD and diagonal branch.  Moderate disease of the PDA.  3.  Normal left ventricular chamber size and systolic function EF 55%  4.  Normal left ventricular diastolic function with normal LVEDP  5.  Normal pulmonary artery and right heart pressures  6.  Preserved cardiac output  7.  No evidence of coarctation of the thoracic aorta     Recommendations  1.  Standard post-angiography care orders  2.  Follow-up in one week to discuss timing of AVR surgery  3.  Aggressive medical management of CAD and risk factor                  Procedures    Left heart cath   Right heart cath     Author: Ryan Knight MD Author Type:  Physician Filed: 8/9/2019  2:11 PM   Note Status: Signed Cosign: Cosign Not Required Creation Time: 8/9/2019  2:09 PM   : Ryan Knight MD (Physician)                    [facility]  Cardiothoracic Surgery  Operative Note     SUMMARY      Date of Procedure: 8/9/2019      Procedure: Procedure(s) (LRB):     BENTALL PROCEDURE with a 23 mm [employer] mechanical valved conduit [id number]     Surgeon(s) and Role:     * Ryan Knight MD - Primary     * Yasmin Ch MD - Fellow     Assisting Surgeon: None     Pre-Operative Diagnosis: Nonrheumatic aortic valve stenosis [I35.0]     Post-Operative Diagnosis: Post-Op Diagnosis Codes:     * Nonrheumatic aortic valve stenosis [I35.0]     Anesthesia: General           Description of the Findings of the Procedure: Heavily calcified congenitally bicuspid  (L-R fusion) valve.                   Assessment:     1. H/O mechanical aortic valve replacement    2. Elevated troponin    3. S/P ascending aortic replacement    4. Paroxysmal atrial flutter    5. Aortic atherosclerosis    6. Coronary artery disease involving native coronary artery of native heart without angina pectoris    7. Type 1 diabetes mellitus with hypoglycemia unawareness    8. Long term (current) use of anticoagulants    9. Ulcer of foot with fat layer exposed, unspecified laterality      Plan:   Cj was seen today for follow-up.    Diagnoses and all orders for this visit:    H/O mechanical aortic valve replacement  -     IN OFFICE EKG 12-LEAD (to Muse)    Elevated troponin  -     Ambulatory referral/consult to Cardiology  -     IN OFFICE EKG 12-LEAD (to Muse)    S/P ascending aortic replacement  -     IN OFFICE EKG 12-LEAD (to Muse)    Paroxysmal atrial flutter  -     IN OFFICE EKG 12-LEAD (to Muse)    Aortic atherosclerosis  -     IN OFFICE EKG 12-LEAD (to [place])    Coronary artery disease involving native coronary artery of native heart without angina pectoris  -     IN  OFFICE EKG 12-LEAD (to Muse)    Type 1 diabetes mellitus with hypoglycemia unawareness  -     IN OFFICE EKG 12-LEAD (to Muse)    Long term (current) use of anticoagulants  -     IN OFFICE EKG 12-LEAD (to Muse)    Ulcer of foot with fat layer exposed, unspecified laterality  -     IN OFFICE EKG 12-LEAD (to Muse)     Pt is doing well    Same meds    F/u with ID    F/u with podiatry    F/u with coumadin clinic    F/u with Dr Stover    RTC 6 months to see Dr De La Fuente    Follow up in about 6 months (around 7/24/2024).

## 2024-01-24 NOTE — PROGRESS NOTES
Subjective:     Patient ID: Cj Barnes is a 58 y.o. male.    Chief Complaint: Wound Care (Wound care right heel)    Cj is a 58 y.o. male who presents to the clinic for evaluation and treatment of high risk feet. Cj has a past medical history of Anemia, Aortic stenosis (2018), Cancer (2014), Colon cancer, Coronary artery disease, Diabetes mellitus type I, Heart murmur, Heel fracture, HTN (hypertension), Hyperlipidemia LDL goal < 70, Insulin pump in place, MVP (mitral valve prolapse), and Stenosis of aortic and mitral valves. The patient's chief complaint is foot ulcer, R . Using iodosorb w/o issues     PCP: Garry Stover MD    Date Last Seen by PCP:   Chief Complaint   Patient presents with    Wound Care     Wound care right heel     Hemoglobin A1C   Date Value Ref Range Status   09/05/2023 5.8 (H) 4.0 - 5.6 % Final     Comment:     ADA Screening Guidelines:  5.7-6.4%  Consistent with prediabetes  >or=6.5%  Consistent with diabetes    High levels of fetal hemoglobin interfere with the HbA1C  assay. Heterozygous hemoglobin variants (HbS, HgC, etc)do  not significantly interfere with this assay.   However, presence of multiple variants may affect accuracy.     03/03/2023 6.1 (H) 4.0 - 5.6 % Final     Comment:     ADA Screening Guidelines:  5.7-6.4%  Consistent with prediabetes  >or=6.5%  Consistent with diabetes    High levels of fetal hemoglobin interfere with the HbA1C  assay. Heterozygous hemoglobin variants (HbS, HgC, etc)do  not significantly interfere with this assay.   However, presence of multiple variants may affect accuracy.     11/03/2022 6.7 (H) 4.0 - 5.6 % Final     Comment:     ADA Screening Guidelines:  5.7-6.4%  Consistent with prediabetes  >or=6.5%  Consistent with diabetes    High levels of fetal hemoglobin interfere with the HbA1C  assay. Heterozygous hemoglobin variants (HbS, HgC, etc)do  not significantly interfere with this assay.   However, presence of multiple  variants may affect accuracy.         Review of Systems   Constitutional: Negative for chills, decreased appetite and fever.   Cardiovascular:  Negative for leg swelling.   Musculoskeletal:  Negative for arthritis, joint pain, joint swelling and myalgias.   Gastrointestinal:  Negative for nausea and vomiting.   Neurological:  Negative for loss of balance, numbness and paresthesias.          Patient Active Problem List   Diagnosis    Essential hypertension    Insulin pump fitting or adjustment    Hyperlipidemia with target low density lipoprotein (LDL) cholesterol less than 70 mg/dL    Insulin pump in place    Aortic stenosis    Aortic regurgitation, congenital    TIA (transient ischemic attack)    Atherosclerosis of native coronary artery of native heart with angina pectoris    Type 1 diabetes mellitus with hypoglycemia unawareness    S/P ascending aortic replacement    Long term (current) use of anticoagulants    Paroxysmal atrial flutter    Trouble swallowing    H/O mechanical aortic valve replacement    Iron deficiency anemia due to chronic blood loss    ANTHONY (iron deficiency anemia)    ACE-inhibitor cough    Diabetic mononeuropathy associated with diabetes mellitus due to underlying condition    Major depressive disorder, recurrent, mild    Trigger finger of left thumb    Coronary artery disease involving native coronary artery of native heart without angina pectoris    Elevated troponin    Ulcer of foot with fat layer exposed, unspecified laterality    Aortic atherosclerosis       Current Outpatient Medications on File Prior to Visit   Medication Sig Dispense Refill    aspirin (ECOTRIN) 81 MG EC tablet Take 1 tablet (81 mg total) by mouth once daily.  0    BAQSIMI 3 mg/actuation Spry USE ONE actuation into a single nostril no response MAY REPEAT in 15 minutes USE a new device (Patient not taking: Reported on 1/24/2024) 2 each 1    blood-glucose meter,continuous (DEXCOM G6 ) Misc 1 Device by  "Misc.(Non-Drug; Combo Route) route once. for 1 dose 1 each 0    blood-glucose sensor (DEXCOM G7 SENSOR) Apple 1 Device by Misc.(Non-Drug; Combo Route) route every 10 days. 3 each 11    COMFORT EZ PEN NEEDLES 33 gauge x 3/16" Ndle USE AS DIRECTED with Levemir pens  11    COMIRNATY 2023-24, 12Y UP,,PF, 30 mcg/0.3 mL injection       empagliflozin (JARDIANCE) 25 mg tablet Take 1 tablet (25 mg total) by mouth once daily. 30 tablet 11    EScitalopram oxalate (LEXAPRO) 10 MG tablet Take 1 tablet (10 mg total) by mouth once daily. (Patient taking differently: Take 5 mg by mouth once daily.) 90 tablet 3    fluticasone propionate (FLONASE) 50 mcg/actuation nasal spray SPRAY TWO SPRAYS IN EACH NOSTRIL DAILY 16 g 5    gabapentin (NEURONTIN) 300 MG capsule TAKE TWO CAPSULES BY MOUTH THREE TIMES DAILY AS NEEDED FOR nerve pain 360 capsule 2    glucagon, human recombinant, (GLUCAGON EMERGENCY KIT, HUMAN,) 1 mg SolR Inject 1 mg into the muscle as needed. 1 each 0    hydroCHLOROthiazide (HYDRODIURIL) 12.5 MG Tab TAKE ONE CAPSULE BY MOUTH ONCE DAILY 90 tablet 3    insulin detemir U-100 (LEVEMIR U-100 INSULIN) 100 unit/mL injection Inject 40 Units into the skin every evening. To have on file in case of insulin pump failure.  Patient does not need prescription right now 20 mL 3    insulin lispro 100 unit/mL injection USE CONTINUOUSLY WITH INSULIN PUMP(MAX DAILY DOSE  UNITS) 60 mL 11    insulin syringe-needle U-100 1/2 mL 31 gauge x 15/64" Syrg To use 5 times per day with insulin injections if off of insulin pump.  To have on file in case of pump failure 150 each 11    irbesartan (AVAPRO) 150 MG tablet Take 1 tablet (150 mg total) by mouth once daily. 90 tablet 3    metFORMIN (GLUCOPHAGE-XR) 500 MG ER 24hr tablet Take 1 tablet (500 mg total) by mouth 2 (two) times daily with meals. 180 tablet 2    mupirocin (BACTROBAN) 2 % ointment APPLY TO THE AFFECTED AREA(S) THREE TIMES DAILY (Patient taking differently: 3 (three) times daily as " needed. APPLY TO THE AFFECTED AREA(S) THREE TIMES DAILY) 22 g 1    nystatin (MYCOSTATIN) cream Apply topically 2 (two) times daily. 1 Tube 5    OZEMPIC 0.25 mg or 0.5 mg (2 mg/3 mL) pen injector inject 0.5mg into THE SKIN every SEVEN DAYS (Patient not taking: Reported on 1/24/2024) 3 mL 6    pantoprazole (PROTONIX) 40 MG tablet Take 1 tablet (40 mg total) by mouth once daily. 30 tablet 2    rosuvastatin (CRESTOR) 40 MG Tab Take 1 tablet (40 mg total) by mouth every evening. 90 tablet 3    tadalafiL (CIALIS) 10 MG tablet Take 1 tablet (10 mg total) by mouth daily as needed for Erectile Dysfunction. 90 tablet 3    warfarin (COUMADIN) 3 MG tablet Take 2 tablets (6 mg total) by mouth Daily. 4.5mg everyday except Tuesday takes 6mg 180 tablet 3     No current facility-administered medications on file prior to visit.       Review of patient's allergies indicates:  No Known Allergies    Past Surgical History:   Procedure Laterality Date    AORTIC VALVE REPLACEMENT N/A 8/9/2019    Procedure: Replacement-valve-aortic;  Surgeon: Ryan Knight MD;  Location: [facility] OR [address] [place] [address];  Service: Cardiothoracic;  Laterality: N/A;    BACK SURGERY      BENTALL PROCEDURE FOR REPLACEMENT OF AORTIC VALVE, AORTIC ROOT, AND ASCENDING AORTA N/A 8/9/2019    Procedure: BENTALL PROCEDURE;  Surgeon: Ryan Knight MD;  Location: [facility] OR [address] [place] [address];  Service: Cardiothoracic;  Laterality: N/A;    CARDIAC SURGERY      CARPAL TUNNEL RELEASE      COLON SURGERY  2014    COLONOSCOPY N/A 2/17/2020    Procedure: COLONOSCOPY;  Surgeon: Richi Mock MD;  Location: [facility] ENDO;  Service: Colon and Rectal;  Laterality: N/A;    COLONOSCOPY N/A 10/26/2023    Procedure: COLONOSCOPY;  Surgeon: Yadira Louis MD;  Location: [facility] ENDO (4TH FLR);  Service: Endoscopy;  Laterality: N/A;  ok to hold Coumadin x5 days per [facility], see telephone encounter 8/4/23  ref by KELSIE Cao/ nito inst portal-RB  10/19-precall complete-pt verbalized  understanding of holding Ozempic-MS    DEBRIDEMENT OF ACHILLES TENDON Right 1/27/2023    Procedure: DEBRIDEMENT, TENDON, ACHILLES;  Surgeon: Eugene Bowers DPM;  Location: [facility] OR [facility] FLR;  Service: Podiatry;  Laterality: Right;    ELBOW SURGERY      tendon release    ESOPHAGOGASTRODUODENOSCOPY N/A 9/10/2020    Procedure: EGD (ESOPHAGOGASTRODUODENOSCOPY);  Surgeon: Abilio Boo MD;  Location: [facility] ENDO (4TH FLR);  Service: Endoscopy;  Laterality: N/A;  Cardiac clearance received, see telephone encounter 8/27/20-  Approval to hold Coumadin prior to procedure received, see telephone encounter 8/27/20-[facility]  covid-9/7/20-[facility]    FOOT TENDON SURGERY      OSTECTOMY Right 1/27/2023    Procedure: OSTECTOMY;  Surgeon: Eugene Bowers DPM;  Location: [facility] OR 2ND FLR;  Service: Podiatry;  Laterality: Right;  calcaneal    right and left heart cath Bilateral 01/15/2018    TREATMENT OF CARDIAC ARRHYTHMIA N/A 8/19/2019    Procedure: CARDIOVERSION;  Surgeon: Juan Zapata MD;  Location: [facility] EP LAB;  Service: Cardiology;  Laterality: N/A;  afib, dccv only, anes, GP, 3092    TRIGGER FINGER RELEASE      x 2    TRIGGER FINGER RELEASE Left 1/5/2022    Procedure: RELEASE, TRIGGER FINGER,LEFT,SMALL & THUMB;  Surgeon: Dilma Hunter MD;  Location: [facility] OR;  Service: Orthopedics;  Laterality: Left;       Family History   Problem Relation Age of Onset    Heart disease Mother     Lung cancer Mother     Heart attack Father     Diabetes Father     HIV Brother        Social History     Socioeconomic History    Marital status:    Tobacco Use    Smoking status: Never    Smokeless tobacco: Former     Types: Snuff     Quit date: 8/30/2019    Tobacco comments:     since he was 12 yrs old   Substance and Sexual Activity    Alcohol use: No     Comment: socially    Drug use: No    Sexual activity: Yes     Partners: Female   Social History Narrative        2 grown kids    Delivers seafood     Social  "Determinants of Health     Financial Resource Strain: Low Risk  (12/18/2023)    Overall Financial Resource Strain (CARDIA)     Difficulty of Paying Living Expenses: Not hard at all   Food Insecurity: No Food Insecurity (12/18/2023)    Hunger Vital Sign     Worried About Running Out of Food in the Last Year: Never true     Ran Out of Food in the Last Year: Never true   Transportation Needs: No Transportation Needs (12/18/2023)    PRAPARE - Transportation     Lack of Transportation (Medical): No     Lack of Transportation (Non-Medical): No   Physical Activity: Inactive (4/11/2023)    Exercise Vital Sign     Days of Exercise per Week: 0 days     Minutes of Exercise per Session: 0 min   Stress: No Stress Concern Present (12/18/2023)    [employer] - Occupational Stress Questionnaire     Feeling of Stress : Only a little   Social Connections: Moderately Integrated (12/18/2023)    Social Connection and Isolation Panel [NHANES]     Frequency of Communication with Friends and Family: Three times a week     Frequency of Social Gatherings with Friends and Family: Three times a week     Attends Taoist Services: 1 to 4 times per year     Active Member of Clubs or Organizations: Yes     Attends Club or Organization Meetings: 1 to 4 times per year     Marital Status:    Housing Stability: Low Risk  (12/18/2023)    Housing Stability Vital Sign     Unable to Pay for Housing in the Last Year: No     Number of Places Lived in the Last Year: 1     Unstable Housing in the Last Year: No           Objective:     Vitals:    01/23/24 1310   BP: 119/72   Pulse: 62   Weight: 99.8 kg (220 lb)   Height: 5' 7" (1.702 m)   PainSc:   4   PainLoc: Foot        Physical Exam  Vitals reviewed.   Constitutional:       Appearance: He is well-developed.   Cardiovascular:      Comments: dorsalis pedis and posterior tibial pulses are palpable bilaterally. Capillary refill time is within normal limits. + pedal hair " growth         Musculoskeletal:         General: Swelling (r heel) and tenderness (r heel) present. Normal range of motion.      Comments: Adequate joint range of motion without pain, limitation, nor crepitation Bilateral feet and ankle joints. Muscle strength is 5/5 in all groups bilaterally.         Feet:      Right foot:      Skin integrity: Ulcer present.      Left foot:      Skin integrity: No ulcer.   Skin:     General: Skin is warm and dry.      Findings: No erythema, lesion or rash.   Neurological:      Mental Status: He is alert and oriented to person, place, and time.      Sensory: No sensory deficit.      Comments: [place]-Eliza 5.07 monofilament is intact bilateral feet.      Psychiatric:         Behavior: Behavior normal.       1.23.24    0.5x0.2x0.1 cm lesion full thickness posterior r heel w/ serous drainage. Granular base   Assessment:      Encounter Diagnoses   Name Primary?    Heel ulceration, right, with unspecified severity Yes    Right foot pain     Osteomyelitis of right foot, unspecified type      Plan:     Cj was seen today for wound care.    Diagnoses and all orders for this visit:    Heel ulceration, right, with unspecified severity    Right foot pain    Osteomyelitis of right foot, unspecified type      I counseled the patient on his conditions, their implications and medical management.    Independent review of patients previous clinical notes    Reviewed current medication(s), and lab(s) specific to foot ailment(s) with patient in detail. All questions answered     Coordination with ID regarding abx therapy     Debridement: area deeply cleansed with saline moistened gauze     Dressings: Betadine  Offloading:Foam    Follow-up:Patient is to return to the clinic in 1-2w w/ Dr Bowers   for follow-up but [facility] if any signs of infection, such as fever, chills, sweats, increased redness or pain.    Short-term goals include maintaining good offloading and  minimizing bioburden, promoting granulation and epithelialization to healing.  Long-term goals include keeping the wound healed by good offloading and medical management under the direction of internist.    32 minutes of face-to-face spent in direct  counseling, chart review and coordination of care

## 2024-01-25 ENCOUNTER — ANTI-COAG VISIT (OUTPATIENT)
Dept: CARDIOLOGY | Facility: CLINIC | Age: 59
End: 2024-01-25
Payer: MEDICARE

## 2024-01-25 ENCOUNTER — PATIENT MESSAGE (OUTPATIENT)
Dept: CARDIOLOGY | Facility: CLINIC | Age: 59
End: 2024-01-25

## 2024-01-25 DIAGNOSIS — I48.92 PAROXYSMAL ATRIAL FLUTTER: ICD-10-CM

## 2024-01-25 DIAGNOSIS — Z95.2 H/O MECHANICAL AORTIC VALVE REPLACEMENT: ICD-10-CM

## 2024-01-25 DIAGNOSIS — Z79.01 LONG TERM (CURRENT) USE OF ANTICOAGULANTS: Primary | ICD-10-CM

## 2024-01-25 PROCEDURE — 93793 ANTICOAG MGMT PT WARFARIN: CPT | Mod: S$GLB,,,

## 2024-01-25 NOTE — PROGRESS NOTES
[facility]    2024 9:22 AM    Assessment/Plan:    Patient presents today with therapeutic INR.    Assessment of patient findings and chart review: reviewed; of note, patient recently started metronidazole on  per ID recommendations. INR increased from 2.2 to 2.7, likely caused from the DDI of metronidazole and warfarin. Recommend lowering doses throughout the weekend and re-check INR on Monday,  with close follow up.    Recommendation for patient's warfarin regimen:  Lower doses per calendar while on metronidazole    Recommend repeat INR in 4 days  _________________________________________________________________    Cj Barnes (58 y.o.) is followed by the [facility].    Anticoagulation Summary  As of 2024      INR goal:  2.5-3.5   TTR:  69.9 % (4.4 y)   INR used for dosin.7 (2024)   Warfarin maintenance plan:  3 mg (3 mg x 1) every Mon, Wed, Fri; 4.5 mg (3 mg x 1.5) all other days   Weekly warfarin total:  27 mg   Plan last modified:  Carmen Iyer, PharmD (2024)   Next INR check:  2024   Target end date:      Indications    Long term (current) use of anticoagulants [Z79.01]  Paroxysmal atrial flutter [I48.92]  H/O mechanical aortic valve replacement [Z95.2]                 Anticoagulation Episode Summary       INR check location:      Preferred lab:      Send INR reminders to:  [facility] COUMADIN MONITORING POOL    Comments:  [facility]          Anticoagulation Care Providers       Provider Role Specialty Phone number    Ryan Alexander MD Referring Cardiology [phone]

## 2024-01-26 NOTE — PROGRESS NOTES
Subjective:      Patient ID: Cj Barnes is a 58 y.o. male.    Chief Complaint:   Wound Check (Right heel with drainage )    Cj is a 58 y.o. male who presents to the podiatry clinic  with complaint of  right foot pain. Onset of the symptoms was several months ago. Precipitating event:  Previous surgery Kulwant's deformity right heel . Current symptoms include: ability to bear weight, but with some pain. Aggravating factors: any weight bearing. Symptoms have gradually worsened. Patient has had no prior foot problems. Evaluation to date:  Previous evaluation/radiographs/surgery/antibiotic therapy is/wound care .     Here for follow-up wound care.  Upcoming appointment with Infectious Disease for IV antibiotic therapy.  Review of Systems   Constitutional: Negative for chills, decreased appetite, fever and night sweats.   HENT:  Negative for congestion, ear discharge, nosebleeds and tinnitus.    Eyes:  Negative for double vision, pain and visual disturbance.   Cardiovascular:  Negative for chest pain, claudication, cyanosis and palpitations.   Respiratory:  Negative for cough, hemoptysis, shortness of breath and wheezing.    Endocrine: Negative for cold intolerance and heat intolerance.   Hematologic/Lymphatic: Negative for adenopathy and bleeding problem.   Skin:  Positive for dry skin, poor wound healing and unusual hair distribution.   Musculoskeletal:  Positive for arthritis, joint pain and stiffness. Negative for myalgias.   Gastrointestinal:  Negative for abdominal pain, dysphagia, nausea and vomiting.   Genitourinary:  Negative for dysuria, flank pain, hematuria and pelvic pain.   Neurological:  Positive for disturbances in coordination, numbness, paresthesias and sensory change.   Psychiatric/Behavioral:  Negative for altered mental status, hallucinations and suicidal ideas. The patient does not have insomnia.    Allergic/Immunologic: Negative for environmental allergies and persistent  infections.           Objective:      Physical Exam  Vitals reviewed.   Constitutional:       Appearance: He is well-developed.   HENT:      Head: Normocephalic and atraumatic.   Cardiovascular:      Pulses:           Dorsalis pedis pulses are 1+ on the right side and 1+ on the left side.        Posterior tibial pulses are 1+ on the right side and 1+ on the left side.   Pulmonary:      Effort: Pulmonary effort is normal.   Musculoskeletal:         General: Normal range of motion.      Comments: Anterior, lateral, and posterior muscle groups bilateral lower extremities show strength 4 over 5 symmetrically. Inspection and palpation of the joints and bones reveal no crepitus or joint effusion. No tenderness upon palpation. Mild plantar flexor contractures noted to digits 2 through 5 bilaterally.  Angle and base of gait are normal.   Feet:      Right foot:      Skin integrity: Callus and dry skin present.      Left foot:      Skin integrity: Callus and dry skin present.   Skin:     General: Skin is warm and dry.      Capillary Refill: Capillary refill takes 2 to 3 seconds.      Coloration: Skin is pale.      Comments: Skin bilateral lower extremities noted to be thin, dry, and atrophic.    Posterior right heel ulceration measuring 0.7cm x 0.5cm x 0.2 cm to level subcutaneous tissue post debridement.   Neurological:      Mental Status: He is alert and oriented to person, place, and time.      Sensory: Sensory deficit present.      Motor: Atrophy present.      Deep Tendon Reflexes: Reflexes abnormal.      Reflex Scores:       Patellar reflexes are 1+ on the right side and 1+ on the left side.       Achilles reflexes are 1+ on the right side and 1+ on the left side.     Comments: Sharp, dull, light touch, and vibratory sensation are diminished bilaterally. Proprioceptive sensation is intact to both lower extremities. [place] monofilament exam shows loss of protective sensation to plantar toes 1 through 5  "bilaterally. Deep tendon reflexes to the patellar tendons is 1 over 4 bilaterally symmetrical. Deep tendon reflexes to the Achilles tendon is 0 over 4 bilaterally symmetrical. No ankle clonus or Babinski reflex noted bilaterally. Coordination is fair to both lower extremities.  Patient admits to intermittent burning and tingling in the feet.   Psychiatric:         Behavior: Behavior normal.             Assessment:       Encounter Diagnoses   Name Primary?    Ulcer of foot with fat layer exposed, unspecified laterality Yes    Acute hematogenous osteomyelitis, unspecified site      Independent visualization of imaging was performed.  Results were reviewed in detail with patient.       Plan:       Cj was seen today for wound check.    Diagnoses and all orders for this visit:    Ulcer of foot with fat layer exposed, unspecified laterality    Acute hematogenous osteomyelitis, unspecified site    Other orders  -     Discontinue: doxycycline monohydrate 100 mg Tab; Take 1 tablet (100 mg total) by mouth 2 (two) times daily.      I counseled the patient on his conditions, their implications and medical management.    Wound Debridement    Performed by: Eugene Bowers DPM   Authorized by: Patient    Time out: Immediately prior to procedure a "time out" was called to verify the correct patient, procedure, equipment, support staff and site/side marked as required.   Consent Done?:  Yes (Verbal)  Local anesthesia used?: No       Wound Details:    Location:  Right posterior heel     Type of Debridement:  Excisional       Length (cm):  0.7       Width (cm):  0.5       Depth (cm):  0.2       Percent Debrided (%):  100             Depth of debridement:  Subcutaneous tissue    Tissue debrided:  Dermis, Epidermis and Subcutaneous    Devitalized tissue debrided:  Biofilm, Callus and Necrotic/Eschar    Instruments:  Blade, Curette and Nippers     Bleeding:  Minimal  Hemostasis Achieved: Yes    Method Used:  Pressure  Patient " tolerance:  Patient tolerated the procedure well with no immediate complications    The nature of the condition, options for management, as well as potential risks and complications were discussed in detail with patient. Patient was amenable to my recommendations and left my office fully informed and will follow up as instructed or sooner if necessary    Wound care discussed/follow-up with Infectious Disease for appropriate IV antibiotic therapy.  Follow-up in 2 weeks.

## 2024-01-29 ENCOUNTER — PATIENT MESSAGE (OUTPATIENT)
Dept: CARDIOLOGY | Facility: CLINIC | Age: 59
End: 2024-01-29

## 2024-01-29 ENCOUNTER — ANTI-COAG VISIT (OUTPATIENT)
Dept: CARDIOLOGY | Facility: CLINIC | Age: 59
End: 2024-01-29
Payer: MEDICARE

## 2024-01-29 DIAGNOSIS — Z79.01 LONG TERM (CURRENT) USE OF ANTICOAGULANTS: Primary | ICD-10-CM

## 2024-01-29 DIAGNOSIS — I48.92 PAROXYSMAL ATRIAL FLUTTER: ICD-10-CM

## 2024-01-29 DIAGNOSIS — Z95.2 H/O MECHANICAL AORTIC VALVE REPLACEMENT: ICD-10-CM

## 2024-01-29 PROCEDURE — 93793 ANTICOAG MGMT PT WARFARIN: CPT | Mod: S$GLB,,,

## 2024-01-30 ENCOUNTER — NUTRITION (OUTPATIENT)
Dept: DIABETES | Facility: CLINIC | Age: 59
End: 2024-01-30
Payer: MEDICARE

## 2024-01-30 ENCOUNTER — PATIENT MESSAGE (OUTPATIENT)
Dept: CARDIOLOGY | Facility: CLINIC | Age: 59
End: 2024-01-30
Payer: MEDICARE

## 2024-01-30 DIAGNOSIS — E10.649 TYPE 1 DIABETES MELLITUS WITH HYPOGLYCEMIA UNAWARENESS: ICD-10-CM

## 2024-01-30 PROCEDURE — 99999 PR PBB SHADOW E&M-EST. PATIENT-LVL II: CPT | Mod: PBBFAC,,, | Performed by: DIETITIAN, REGISTERED

## 2024-01-30 PROCEDURE — G0108 DIAB MANAGE TRN  PER INDIV: HCPCS | Mod: S$GLB,,, | Performed by: DIETITIAN, REGISTERED

## 2024-01-30 NOTE — PROGRESS NOTES
INR a tad low. Dose lowered for DDI flagyl which started 1/23 for a 6w course. Adjust dose per calendar. Reevaluate next week.

## 2024-02-05 ENCOUNTER — OFFICE VISIT (OUTPATIENT)
Dept: PODIATRY | Facility: CLINIC | Age: 59
End: 2024-02-05
Payer: MEDICARE

## 2024-02-05 VITALS
WEIGHT: 219 LBS | HEART RATE: 68 BPM | SYSTOLIC BLOOD PRESSURE: 111 MMHG | DIASTOLIC BLOOD PRESSURE: 66 MMHG | HEIGHT: 67 IN | BODY MASS INDEX: 34.37 KG/M2

## 2024-02-05 DIAGNOSIS — L97.419 HEEL ULCERATION, RIGHT, WITH UNSPECIFIED SEVERITY: Primary | ICD-10-CM

## 2024-02-05 DIAGNOSIS — M79.671 RIGHT FOOT PAIN: ICD-10-CM

## 2024-02-05 DIAGNOSIS — M86.9 OSTEOMYELITIS OF RIGHT FOOT, UNSPECIFIED TYPE: ICD-10-CM

## 2024-02-05 PROCEDURE — 99213 OFFICE O/P EST LOW 20 MIN: CPT | Mod: S$GLB,,, | Performed by: PODIATRIST

## 2024-02-05 PROCEDURE — 99999 PR PBB SHADOW E&M-EST. PATIENT-LVL IV: CPT | Mod: PBBFAC,,, | Performed by: PODIATRIST

## 2024-02-06 ENCOUNTER — ANTI-COAG VISIT (OUTPATIENT)
Dept: CARDIOLOGY | Facility: CLINIC | Age: 59
End: 2024-02-06
Payer: MEDICARE

## 2024-02-06 ENCOUNTER — PATIENT MESSAGE (OUTPATIENT)
Dept: CARDIOLOGY | Facility: CLINIC | Age: 59
End: 2024-02-06

## 2024-02-06 DIAGNOSIS — Z79.01 LONG TERM (CURRENT) USE OF ANTICOAGULANTS: Primary | ICD-10-CM

## 2024-02-06 DIAGNOSIS — Z95.2 H/O MECHANICAL AORTIC VALVE REPLACEMENT: ICD-10-CM

## 2024-02-06 DIAGNOSIS — I48.92 PAROXYSMAL ATRIAL FLUTTER: ICD-10-CM

## 2024-02-06 PROCEDURE — 93793 ANTICOAG MGMT PT WARFARIN: CPT | Mod: S$GLB,,,

## 2024-02-07 NOTE — PROGRESS NOTES
Diabetes Care Specialist Follow-up Note  Author: Katia Pham RD, CDE  Date: 2/7/2024    Program Intake  Reason for Diabetes Program Visit:: Intervention  Type of Intervention:: Individual  Education: Advanced Pump  Device Training: Insulin Pump Upgrade  Current diabetes risk level:: low  In the last 12 months, have you:: used emergency room services  Was the ER or hospital admission related to diabetes?: Yes  Permission to speak with others about care:: no  Continuous Glucose Monitoring  Patient has CGM: Yes  Personal CGM type:: Dexcom G7  GMI Date: 02/07/24  GMI Value: 6.9 %    Lab Results   Component Value Date    HGBA1C 5.8 (H) 09/05/2023     A1c Pre Diabetes Care Specialist Intervention:  6.1%    Clinical    Patient Health Rating  Compared to other people your age, how would you rate your health?: Good    Problem Review  Reviewed Problem List with Patient: yes  Reviewed health maintenance: yes    Medication Information  Medication adherence impacting ability to self-manage diabetes?: No    Labs  Lab Compliance Barriers: No    Nutritional Status  Meal Plan 24 Hour Recall: Breakfast, Lunch, Dinner, Snack  Meal Plan 24 Hour Recall - Breakfast: ssomething fast or biscuit and sausage  Meal Plan 24 Hour Recall - Lunch: chips  Meal Plan 24 Hour Recall - Dinner: [employer] foot long with 3 macadamia nut cookies  Meal Plan 24 Hour Recall - Snack: chips  Current nutritional status an area of need that is impacting patient's ability to self-manage diabetes?: No    Physical activity/Exercise:   No change    SMBG:    using the dexcom G6 and the tandem pump       Additional Social History    Support  Is Support an area impacting ability to self-manage diabetes?: No    Access to Mass Media & Technology  Media or technology needs impacting ability to self-manage diabetes?: No    Cognitive/Behavioral Health  Cognitive or behavioral barriers impacting ability to self-manage diabetes?: No    Culture/Tenriism  Culture or Sikhism  beliefs that may impact ability to access healthcare: No    Communication  Communication needs impacting ability to self-manage diabetes?: No    Health Literacy  Health literacy needs impacting ability to self-manage diabetes?: No      Diabetes Self-Management Skills Assessment     Diabetes Disease Process/Treatment Options  Diabetes Disease Process/Treatment Options: Skills Assessment Completed: No  Assessment indicates:: Adequate understanding  Deferred due to:: Other (comment)  Area of need?: No    Nutrition/Healthy Eating  Nutrition/Healthy Eating Skills Assessment Completed:: No  Assessment indicates:: Adequate understanding  Deffered due to:: Other (comment)  Area of need?: No    Physical Activity/Exercise  Physical Activity/Exercise Skills Assessment Completed: : No  Assessment indicates:: Adequate understanding  Deffered due to:: Other (comment)  Area of need?: No    Medications  Patient is able to describe current diabetes management routine.: yes  Diabetes management routine:: diet, insulin, oral medications, insulin pump  Patient is able to identify current diabetes medications, dosages, and appropriate timing of medications.: yes  Patient understands the purpose of the medications taken for diabetes.: yes  Patient reports problems or concerns with current medication regimen.: yes  Medication regimen problems/concerns:: other (see comments) (upgrading pump)  Medication Skills Assessment Completed:: Yes  Assessment indicates:: Adequate understanding  Area of need?: Yes    Home Blood Glucose Monitoring  Patient states that blood sugar is checked at home daily.: yes  Monitoring Method:: personal continuous glucose monitor  Personal CGM type:: Dexcom G7  Patient is able to use personal CGM appropriately.: yes  CGM Report reviewed?: yes  Home Blood Glucose Monitoring Skills Assessment Completed: : Yes  Assessment indicates:: Adequate understanding  Deferred due to:: Other (comment) (previously completed, there  has been no change and patient has adequate understanding)  Area of need?: No    Acute Complications  Acute Complications Skills Assessment Completed: : No  Assessment indicates:: Adequate understanding  Deffered due to:: Other (comment)  Area of need?: No    Chronic Complications  Chronic Complications Skills Assessment Completed: : No  Assessment indicates:: Adequate understanding  Deferred due to:: Other (comment)  Area of need?: No    Psychosocial/Coping  Psychosocial/Coping Skills Assessment Completed: : No  Assessment indicates:: Adequate understanding  Deffered due to:: Other (comment)  Area of need?: No      During today's follow-up visit,  the following areas required further assessment and content was provided/reviewed.    Based on today's diabetes care assessment, the following areas of need were identified:          1/30/2024    12:02 AM   Social   Support No   Access to Mass Media/Tech No   Cognitive/Behavioral Health No   Culture/Advent No   Communication No   Health Literacy No            1/30/2024    12:02 AM   Clinical   Medication Adherence No   Lab Compliance No   Nutritional Status No            1/30/2024    12:02 AM   Diabetes Self-Management Skills   Diabetes Disease Process/Treatment Options No   Nutrition/Healthy Eating No   Physical Activity/Exercise No   Medication Yes, patient changed to the Dexcom G7, at time of visit today we went through the tandem upgrade program to incorporate the Dexcom G7 with the T-Slim, upgrade downloaded to patient's pump. Since switching to the dexcom G7 it seems patient's glucose has been running higher - recommend comparing G7 reading to FSG and if off recommend calibration.    Home Blood Glucose Monitoring No   Acute Complications No, Comparison of previous CGM data on 9/11/2023 to current  Average Glucose 151 increased from 137  Standard Deviation 33 increased from 27  GMI 6.9 increased from 6.6    Time in Range  <1% of time patient was in Very High Range  slightly increased from 0%  15% of time patient was in High Range increased from 7%  84% of time patient was in Range decreased from 92%  <1% of time patient was in Low Range no change from <1%  0% of time patient was in Very Low Range slightly decreased from <1%   Chronic Complications No   Psychosocial/Coping No        Today's interventions were provided through individual discussion, instruction, and written materials were provided.    Patient verbalized understanding of instruction and written materials.  Pt was able to return back demonstration of instructions today. Patient understood key points, needs reinforcement and further instruction.     Diabetes Self-Management Care Plan Review and Evaluation of Progress:    During today's follow-up Cj's Diabetes Self-Management Care Plan progress was reviewed and progress was evaluated including his/her input. Cj has agreed to continue his/her journey to improve/maintain overall diabetes control by continuing to set health goals. See care plan progress below.      Care Plan: Diabetes Management   Updates made since 1/8/2024 12:00 AM        Problem: Medications         Goal: Patient Agrees to take Diabetes Medication(s) Insulin through the Tandem T-Connect praveen for the next 2 weeks    Start Date: 3/30/2023   Expected End Date: 4/14/2023   This Visit's Progress: Met   Recent Progress: On track   Priority: High   Barriers: Lack of Supplies          Follow Up Plan     Follow up in about 4 weeks (around 2/27/2024) for Insulin Pump Upload, Personal CGM Upload.    Today's care plan and follow up schedule was discussed with patient.  Cj verbalized understanding of the care plan, goals, and agrees to follow up plan.        The patient was encouraged to communicate with his/her health care provider/physician and care team regarding his/her condition(s) and treatment.  I provided the patient with my contact information today and encouraged to contact me  via phone or [facility] Patient Portal as needed.     Length of Visit   Total Time: 70 Minutes

## 2024-02-13 NOTE — PROGRESS NOTES
Subjective:      Patient ID: Cj Barnes is a 58 y.o. male.    Chief Complaint:   Wound Care    Cj is a 58 y.o. male who presents to the podiatry clinic  with complaint of  right foot pain. Onset of the symptoms was several months ago. Precipitating event:  Previous surgery Kulwant's deformity right heel . Current symptoms include: ability to bear weight, but with some pain. Aggravating factors: any weight bearing. Symptoms have gradually worsened. Patient has had no prior foot problems. Evaluation to date:  Previous evaluation/radiographs/surgery/antibiotic therapy is/wound care .     Here for follow-up wound care.  Currently on antibiotic therapy for osteomyelitis right heel.  Improving.  Review of Systems   Constitutional: Negative for chills, decreased appetite, fever and night sweats.   HENT:  Negative for congestion, ear discharge, nosebleeds and tinnitus.    Eyes:  Negative for double vision, pain and visual disturbance.   Cardiovascular:  Negative for chest pain, claudication, cyanosis and palpitations.   Respiratory:  Negative for cough, hemoptysis, shortness of breath and wheezing.    Endocrine: Negative for cold intolerance and heat intolerance.   Hematologic/Lymphatic: Negative for adenopathy and bleeding problem.   Skin:  Positive for dry skin, poor wound healing and unusual hair distribution.   Musculoskeletal:  Positive for arthritis, joint pain and stiffness. Negative for myalgias.   Gastrointestinal:  Negative for abdominal pain, dysphagia, nausea and vomiting.   Genitourinary:  Negative for dysuria, flank pain, hematuria and pelvic pain.   Neurological:  Positive for disturbances in coordination, numbness, paresthesias and sensory change.   Psychiatric/Behavioral:  Negative for altered mental status, hallucinations and suicidal ideas. The patient does not have insomnia.    Allergic/Immunologic: Negative for environmental allergies and persistent infections.           Objective:       Physical Exam  Vitals reviewed.   Constitutional:       Appearance: He is well-developed.   HENT:      Head: Normocephalic and atraumatic.   Cardiovascular:      Pulses:           Dorsalis pedis pulses are 1+ on the right side and 1+ on the left side.        Posterior tibial pulses are 1+ on the right side and 1+ on the left side.   Pulmonary:      Effort: Pulmonary effort is normal.   Musculoskeletal:         General: Normal range of motion.      Comments: Anterior, lateral, and posterior muscle groups bilateral lower extremities show strength 4 over 5 symmetrically. Inspection and palpation of the joints and bones reveal no crepitus or joint effusion. No tenderness upon palpation. Mild plantar flexor contractures noted to digits 2 through 5 bilaterally.  Angle and base of gait are normal.   Feet:      Right foot:      Skin integrity: Callus and dry skin present.      Left foot:      Skin integrity: Callus and dry skin present.   Skin:     General: Skin is warm and dry.      Capillary Refill: Capillary refill takes 2 to 3 seconds.      Coloration: Skin is pale.      Comments: Skin bilateral lower extremities noted to be thin, dry, and atrophic.    Posterior right heel ulceration much improved essentially healed at this point.   Neurological:      Mental Status: He is alert and oriented to person, place, and time.      Sensory: Sensory deficit present.      Motor: Atrophy present.      Deep Tendon Reflexes: Reflexes abnormal.      Reflex Scores:       Patellar reflexes are 1+ on the right side and 1+ on the left side.       Achilles reflexes are 1+ on the right side and 1+ on the left side.     Comments: Sharp, dull, light touch, and vibratory sensation are diminished bilaterally. Proprioceptive sensation is intact to both lower extremities. [place] Eliza monofilament exam shows loss of protective sensation to plantar toes 1 through 5 bilaterally. Deep tendon reflexes to the patellar tendons is 1 over 4  bilaterally symmetrical. Deep tendon reflexes to the Achilles tendon is 0 over 4 bilaterally symmetrical. No ankle clonus or Babinski reflex noted bilaterally. Coordination is fair to both lower extremities.  Patient admits to intermittent burning and tingling in the feet.   Psychiatric:         Behavior: Behavior normal.             Assessment:       Encounter Diagnoses   Name Primary?    Heel ulceration, right, with unspecified severity Yes    Right foot pain     Osteomyelitis of right foot, unspecified type      Independent visualization of imaging was performed.  Results were reviewed in detail with patient.       Plan:       Cj was seen today for wound care.    Diagnoses and all orders for this visit:    Heel ulceration, right, with unspecified severity    Right foot pain    Osteomyelitis of right foot, unspecified type      I counseled the patient on his conditions, their implications and medical management.    The nature of the condition, options for management, as well as potential risks and complications were discussed in detail with patient. Patient was amenable to my recommendations and left my office fully informed and will follow up as instructed or sooner if necessary.      Continue antibiotic therapy and local wound care.  Follow-up in 2 weeks.

## 2024-02-14 ENCOUNTER — ANTI-COAG VISIT (OUTPATIENT)
Dept: CARDIOLOGY | Facility: CLINIC | Age: 59
End: 2024-02-14
Payer: MEDICARE

## 2024-02-14 ENCOUNTER — PATIENT MESSAGE (OUTPATIENT)
Dept: CARDIOLOGY | Facility: CLINIC | Age: 59
End: 2024-02-14

## 2024-02-14 DIAGNOSIS — Z79.01 LONG TERM (CURRENT) USE OF ANTICOAGULANTS: Primary | ICD-10-CM

## 2024-02-14 DIAGNOSIS — Z95.2 H/O MECHANICAL AORTIC VALVE REPLACEMENT: ICD-10-CM

## 2024-02-14 DIAGNOSIS — I48.92 PAROXYSMAL ATRIAL FLUTTER: ICD-10-CM

## 2024-02-14 PROCEDURE — 93793 ANTICOAG MGMT PT WARFARIN: CPT | Mod: S$GLB,,,

## 2024-02-19 LAB — FUNGUS SPEC CULT: NORMAL

## 2024-02-22 ENCOUNTER — OFFICE VISIT (OUTPATIENT)
Dept: INFECTIOUS DISEASES | Facility: CLINIC | Age: 59
End: 2024-02-22
Payer: MEDICARE

## 2024-02-22 ENCOUNTER — TELEPHONE (OUTPATIENT)
Dept: OPHTHALMOLOGY | Facility: CLINIC | Age: 59
End: 2024-02-22
Payer: MEDICARE

## 2024-02-22 ENCOUNTER — OFFICE VISIT (OUTPATIENT)
Dept: PODIATRY | Facility: CLINIC | Age: 59
End: 2024-02-22
Payer: MEDICARE

## 2024-02-22 VITALS
BODY MASS INDEX: 35.12 KG/M2 | HEART RATE: 67 BPM | WEIGHT: 223.75 LBS | HEIGHT: 67 IN | TEMPERATURE: 98 F | SYSTOLIC BLOOD PRESSURE: 119 MMHG | DIASTOLIC BLOOD PRESSURE: 67 MMHG

## 2024-02-22 VITALS — BODY MASS INDEX: 35.12 KG/M2 | WEIGHT: 223.75 LBS | HEIGHT: 67 IN

## 2024-02-22 DIAGNOSIS — L97.502 ULCER OF FOOT WITH FAT LAYER EXPOSED, UNSPECIFIED LATERALITY: Primary | ICD-10-CM

## 2024-02-22 DIAGNOSIS — L97.419 HEEL ULCERATION, RIGHT, WITH UNSPECIFIED SEVERITY: Primary | ICD-10-CM

## 2024-02-22 DIAGNOSIS — M86.9 OSTEOMYELITIS, UNSPECIFIED SITE, UNSPECIFIED TYPE: ICD-10-CM

## 2024-02-22 PROCEDURE — 99999 PR PBB SHADOW E&M-EST. PATIENT-LVL III: CPT | Mod: PBBFAC,,, | Performed by: PODIATRIST

## 2024-02-22 PROCEDURE — 99999 PR PBB SHADOW E&M-EST. PATIENT-LVL V: CPT | Mod: PBBFAC,,, | Performed by: INTERNAL MEDICINE

## 2024-02-22 PROCEDURE — 99024 POSTOP FOLLOW-UP VISIT: CPT | Mod: S$GLB,,, | Performed by: PODIATRIST

## 2024-02-22 PROCEDURE — 99214 OFFICE O/P EST MOD 30 MIN: CPT | Mod: S$GLB,,, | Performed by: INTERNAL MEDICINE

## 2024-02-22 NOTE — TELEPHONE ENCOUNTER
LVM to schedule exam and requested return call or portal message      ----- Message from Agnes Meredith OD sent at 2/22/2024  1:28 PM CST -----  Offer him march 11 at 3pm and schedule photos at 4 pm   thanks  ----- Message -----  From: Franca Story MA  Sent: 2/22/2024   1:25 PM CST  To: [facility] Staff    Hello! This pt states that he has been unable to contact or schedule an appt with  /masoud for his eye exam. If someone could reach out to him it would be greatly appreciated. Thank you!

## 2024-02-22 NOTE — PROGRESS NOTES
Subjective:      Patient ID: Cj Barnes is a 58 y.o. male.    Chief Complaint:Follow-up      History of Present Illness    58 year old male with a history of DM complicated by peripheral neuropathy in his feet bilaterally.  He had a bone spur and ultimately underwent partial ostectomy of calcaneus with retro calcaneal bursa removal and debridement of achilles tendon on jan 27, 2023  Patient states that the surgical wound has never healed up since.  The wounds have been complicated by infections.  Wound cultures in march 2023 were positive for E coli and multiple anaerobes.  He was treated with augmentin and then levofloxacin.  He also received some courses of bactrim as well.  Wound cultures obtained in December 2023 were positive for group A strep and MSSA.  He was treated with bactrim and ciprofloxacin.  An MRI was obtained of his foot on jan 4, 2024 that showed possible osteomyelitis of the bone.  He had a wound culture on 1/9/24 that was positive for bacillus.  He developed an acute worsening of pain and went to the ER on 1/16/24.  At that time, it was noted that the wound tracked about 2.5 cm distally.  Cultures were obtained and returned positive for MSSA and Porphyromonas.    Interval History  Pain is better.  Able to ambulate.       Medical History  DM  HTN  Hyperlipidemia  Peripheral neuropathy 2/2 DM     Surgical History  Aortic valve replacement in 2019.     Social History  He has never smoked before.  Used to chew tobacco but quit in 2019.  No illicit substance use. Owns 2 dogs at home.       Review of Systems   Constitutional: Negative for chills, decreased appetite, fever, malaise/fatigue, night sweats, weight gain and weight loss.   HENT:  Negative for congestion, ear pain, hearing loss, hoarse voice, sore throat and tinnitus.    Eyes:  Negative for blurred vision, redness and visual disturbance.   Cardiovascular:  Negative for chest pain, leg swelling and palpitations.   Respiratory:   Negative for cough, hemoptysis, shortness of breath, sputum production and wheezing.    Hematologic/Lymphatic: Negative for adenopathy. Does not bruise/bleed easily.   Skin:  Negative for dry skin, itching, rash and suspicious lesions.   Musculoskeletal:  Negative for back pain, joint pain, myalgias and neck pain.   Gastrointestinal:  Negative for abdominal pain, constipation, diarrhea, heartburn, nausea and vomiting.   Genitourinary:  Negative for dysuria, flank pain, frequency, hematuria, hesitancy and urgency.   Neurological:  Negative for dizziness, headaches, numbness, paresthesias and weakness.   Psychiatric/Behavioral:  Negative for depression and memory loss. The patient does not have insomnia and is not nervous/anxious.    Objective:   Physical Exam  Vitals and nursing note reviewed.   Constitutional:       General: He is not in acute distress.     Appearance: He is well-developed. He is not diaphoretic.   HENT:      Head: Normocephalic and atraumatic.      Right Ear: External ear normal.      Left Ear: External ear normal.      Nose: Nose normal.      Mouth/Throat:      Pharynx: No oropharyngeal exudate.   Eyes:      General: No scleral icterus.        Right eye: No discharge.         Left eye: No discharge.      Conjunctiva/sclera: Conjunctivae normal.      Pupils: Pupils are equal, round, and reactive to light.   Neck:      Thyroid: No thyromegaly.      Vascular: No JVD.      Trachea: No tracheal deviation.   Cardiovascular:      Rate and Rhythm: Normal rate and regular rhythm.      Heart sounds: No murmur heard.     No friction rub. No gallop.   Pulmonary:      Effort: Pulmonary effort is normal. No respiratory distress.      Breath sounds: Normal breath sounds. No stridor. No wheezing or rales.   Chest:      Chest wall: No tenderness.   Abdominal:      General: Bowel sounds are normal. There is no distension.      Palpations: Abdomen is soft. There is no mass.      Tenderness: There is no abdominal  tenderness. There is no guarding or rebound.   Musculoskeletal:         General: No tenderness. Normal range of motion.      Cervical back: Normal range of motion and neck supple.   Lymphadenopathy:      Cervical: No cervical adenopathy.   Skin:     General: Skin is warm.      Coloration: Skin is not pale.      Findings: No erythema or rash.      Comments: Wound to right calcaneus with surrounding erythema.   Neurological:      Mental Status: He is alert and oriented to person, place, and time.      Cranial Nerves: No cranial nerve deficit.      Motor: No abnormal muscle tone.      Coordination: Coordination normal.      Deep Tendon Reflexes: Reflexes are normal and symmetric. Reflexes normal.   Psychiatric:         Behavior: Behavior normal.         Thought Content: Thought content normal.         Judgment: Judgment normal.             Assessment:     1. Ulcer of foot with fat layer exposed, unspecified laterality    2. Osteomyelitis, unspecified site, unspecified type      Wound appears to be improving slightly.  Continue IV daptomycin and IV ceftriaxone to complete a 6 week course.  Plan:      Ulcer of foot with fat layer exposed, unspecified laterality   - antibiotics as above    Osteomyelitis, unspecified site, unspecified type   - antibiotics as above

## 2024-02-23 ENCOUNTER — TELEPHONE (OUTPATIENT)
Dept: PODIATRY | Facility: CLINIC | Age: 59
End: 2024-02-23
Payer: MEDICARE

## 2024-02-27 ENCOUNTER — ANTI-COAG VISIT (OUTPATIENT)
Dept: CARDIOLOGY | Facility: CLINIC | Age: 59
End: 2024-02-27
Payer: MEDICARE

## 2024-02-27 DIAGNOSIS — Z95.2 H/O MECHANICAL AORTIC VALVE REPLACEMENT: ICD-10-CM

## 2024-02-27 DIAGNOSIS — Z79.01 LONG TERM (CURRENT) USE OF ANTICOAGULANTS: Primary | ICD-10-CM

## 2024-02-27 DIAGNOSIS — I48.92 PAROXYSMAL ATRIAL FLUTTER: ICD-10-CM

## 2024-02-27 PROCEDURE — 93793 ANTICOAG MGMT PT WARFARIN: CPT | Mod: S$GLB,,,

## 2024-02-27 NOTE — PROGRESS NOTES
INR not at goal due to missed dose. Medications, chart, and patient findings reviewed. See calendar for adjustments to dose and follow up plan.

## 2024-03-04 ENCOUNTER — PATIENT MESSAGE (OUTPATIENT)
Dept: CARDIOLOGY | Facility: CLINIC | Age: 59
End: 2024-03-04

## 2024-03-04 ENCOUNTER — ANTI-COAG VISIT (OUTPATIENT)
Dept: CARDIOLOGY | Facility: CLINIC | Age: 59
End: 2024-03-04
Payer: MEDICARE

## 2024-03-04 DIAGNOSIS — Z79.01 LONG TERM (CURRENT) USE OF ANTICOAGULANTS: Primary | ICD-10-CM

## 2024-03-04 DIAGNOSIS — Z95.2 H/O MECHANICAL AORTIC VALVE REPLACEMENT: ICD-10-CM

## 2024-03-04 DIAGNOSIS — I48.92 PAROXYSMAL ATRIAL FLUTTER: ICD-10-CM

## 2024-03-04 PROCEDURE — 93793 ANTICOAG MGMT PT WARFARIN: CPT | Mod: S$GLB,,,

## 2024-03-04 NOTE — PROGRESS NOTES
INR not at goal. Medications and chart reviewed. See calendar.    Advise per calendar for today/tomorrow. Follow up after 3/5 MD appt to confirm medication plans then will plan further warfarin dosing/follow up     Abx complete including flagyl. Increase dose. Recheck INR in 1 week

## 2024-03-05 ENCOUNTER — OFFICE VISIT (OUTPATIENT)
Dept: INFECTIOUS DISEASES | Facility: CLINIC | Age: 59
End: 2024-03-05
Payer: MEDICARE

## 2024-03-05 ENCOUNTER — INFUSION (OUTPATIENT)
Dept: INFECTIOUS DISEASES | Facility: HOSPITAL | Age: 59
End: 2024-03-05
Payer: MEDICARE

## 2024-03-05 ENCOUNTER — OFFICE VISIT (OUTPATIENT)
Dept: PODIATRY | Facility: CLINIC | Age: 59
End: 2024-03-05
Payer: MEDICARE

## 2024-03-05 ENCOUNTER — PATIENT MESSAGE (OUTPATIENT)
Dept: CARDIOLOGY | Facility: CLINIC | Age: 59
End: 2024-03-05
Payer: MEDICARE

## 2024-03-05 VITALS
HEART RATE: 60 BPM | DIASTOLIC BLOOD PRESSURE: 61 MMHG | RESPIRATION RATE: 18 BRPM | SYSTOLIC BLOOD PRESSURE: 130 MMHG | OXYGEN SATURATION: 96 % | TEMPERATURE: 98 F

## 2024-03-05 VITALS
WEIGHT: 223 LBS | HEIGHT: 67 IN | BODY MASS INDEX: 35 KG/M2 | HEART RATE: 72 BPM | DIASTOLIC BLOOD PRESSURE: 72 MMHG | SYSTOLIC BLOOD PRESSURE: 129 MMHG

## 2024-03-05 DIAGNOSIS — M79.671 RIGHT FOOT PAIN: ICD-10-CM

## 2024-03-05 DIAGNOSIS — M86.9 OSTEOMYELITIS OF RIGHT FOOT, UNSPECIFIED TYPE: ICD-10-CM

## 2024-03-05 DIAGNOSIS — M86.9 OSTEOMYELITIS, UNSPECIFIED SITE, UNSPECIFIED TYPE: Primary | ICD-10-CM

## 2024-03-05 DIAGNOSIS — L97.419 HEEL ULCERATION, RIGHT, WITH UNSPECIFIED SEVERITY: Primary | ICD-10-CM

## 2024-03-05 LAB
ACID FAST MOD KINY STN SPEC: NORMAL
MYCOBACTERIUM SPEC QL CULT: NORMAL

## 2024-03-05 PROCEDURE — 99999 PR PBB SHADOW E&M-EST. PATIENT-LVL IV: CPT | Mod: PBBFAC,,, | Performed by: PODIATRIST

## 2024-03-05 PROCEDURE — 99214 OFFICE O/P EST MOD 30 MIN: CPT | Mod: S$GLB,,, | Performed by: INTERNAL MEDICINE

## 2024-03-05 PROCEDURE — 99213 OFFICE O/P EST LOW 20 MIN: CPT | Mod: S$GLB,,, | Performed by: PODIATRIST

## 2024-03-05 NOTE — PROGRESS NOTES
Subjective:     Patient ID: Cj Barnes is a 58 y.o. male    Chief Complaint: osteomyelitis    HPI: 58M patient of Dr. Mallory who is being treated for osteomyelitis of the heel with daptomycin and ceftriaxone. EOT is today. Seen in podiatry clinic. Patient denies any new issues with the wound. No pain or drainage.       Immunization History   Administered Date(s) Administered    COVID-19 Vaccine 11/11/2022    COVID-19, MRNA, LN-S, PF (MODERNA FULL 0.5 ML DOSE) 02/11/2021, 03/11/2021, 12/29/2021    COVID-19, mRNA, LNP-S, bivalent booster, PF (Moderna Omicron)12 + YEARS 11/11/2022    Influenza 12/17/2018, 09/05/2020, 11/03/2021    Influenza - Quadrivalent 09/04/2020    Influenza - Quadrivalent - MDCK - PF 12/13/2018    Influenza - Quadrivalent - PF *Preferred* (6 months and older) 11/10/2009, 12/13/2018, 09/05/2019, 09/04/2020, 10/28/2021, 11/11/2022, 10/12/2023    Influenza A (H1N1) 2009 Monovalent - IM - PF 11/16/2009    Influenza Split 11/10/2009, 12/13/2018    Pneumococcal Conjugate - 13 Valent 07/16/2020    Pneumococcal Polysaccharide - 23 Valent 06/08/2013    Td - PF (ADULT) 07/16/2020    Tetanus 07/16/2020    Zoster Recombinant 05/03/2022, 08/12/2022        Review of Systems   All other systems reviewed and are negative.       Past Medical History:   Diagnosis Date    Anemia     Aortic stenosis 2018    Cancer 2014    Colon cancer     Coronary artery disease     Diabetes mellitus type I     since in his 30's    Heart murmur     Heel fracture     HTN (hypertension)     Hyperlipidemia LDL goal < 70     Insulin pump in place     MVP (mitral valve prolapse)     Stenosis of aortic and mitral valves      Past Surgical History:   Procedure Laterality Date    AORTIC VALVE REPLACEMENT N/A 8/9/2019    Procedure: Replacement-valve-aortic;  Surgeon: Ryan Knight MD;  Location: [facility] OR [address] [place] [address];  Service: Cardiothoracic;  Laterality: N/A;    BACK SURGERY      BENTALL PROCEDURE FOR REPLACEMENT OF AORTIC VALVE,  AORTIC ROOT, AND ASCENDING AORTA N/A 8/9/2019    Procedure: BENTALL PROCEDURE;  Surgeon: Ryan Knight MD;  [facility];  Service: Cardiothoracic;  Laterality: N/A;    CARDIAC SURGERY      CARPAL TUNNEL RELEASE      COLON SURGERY  2014    COLONOSCOPY N/A 2/17/2020    Procedure: COLONOSCOPY;  Surgeon: Richi Mock MD;  Location: [facility] ENDO;  Service: Colon and Rectal;  Laterality: N/A;    COLONOSCOPY N/A 10/26/2023    Procedure: COLONOSCOPY;  Surgeon: Yadira Louis MD;  Location: [facility] ENDO (4TH FLR);  Service: Endoscopy;  Laterality: N/A;  ok to hold Coumadin x5 days per [facility], see telephone encounter 8/4/23  ref by KELSIE Cao/ suprep inst portal-RB  10/19-precall complete-pt verbalized understanding of holding Ozempic-MS    DEBRIDEMENT OF ACHILLES TENDON Right 1/27/2023    Procedure: DEBRIDEMENT, TENDON, ACHILLES;  Surgeon: Eugene Bowers DPM;  [facility];  Service: Podiatry;  Laterality: Right;    ELBOW SURGERY      tendon release    ESOPHAGOGASTRODUODENOSCOPY N/A 9/10/2020    Procedure: EGD (ESOPHAGOGASTRODUODENOSCOPY);  Surgeon: Abilio Boo MD;  Location: [facility] ENDO (4TH FLR);  Service: Endoscopy;  Laterality: N/A;  Cardiac clearance received, see telephone encounter 8/27/20-BB  Approval to hold Coumadin prior to procedure received, see telephone encounter 8/27/20-BB  covid-9/7/20-[facility]    FOOT TENDON SURGERY      OSTECTOMY Right 1/27/2023    Procedure: OSTECTOMY;  Surgeon: Eugene Bowers DPM;  [facility];  Service: Podiatry;  Laterality: Right;  calcaneal    right and left heart cath Bilateral 01/15/2018    TREATMENT OF CARDIAC ARRHYTHMIA N/A 8/19/2019    Procedure: CARDIOVERSION;  Surgeon: Juan Zapata MD;  Location: [facility] EP LAB;  Service: Cardiology;  Laterality: N/A;  afib, dccv only, anes, GP, 3092    TRIGGER FINGER RELEASE      x 2    TRIGGER FINGER RELEASE Left 1/5/2022    Procedure: RELEASE, TRIGGER  FINGER,LEFT,SMALL & THUMB;  Surgeon: Dilma Hunter MD;  Location: [facility];  Service: Orthopedics;  Laterality: Left;     Family History   Problem Relation Age of Onset    Heart disease Mother     Lung cancer Mother     Heart attack Father     Diabetes Father     HIV Brother      Social History     Tobacco Use    Smoking status: Never    Smokeless tobacco: Former     Types: Snuff     Quit date: 8/30/2019    Tobacco comments:     since he was 12 yrs old   Substance Use Topics    Alcohol use: No     Comment: socially    Drug use: No       Objective:     Physical Exam  Constitutional:       General: He is not in acute distress.     Appearance: Normal appearance. He is well-developed. He is not ill-appearing or diaphoretic.   HENT:      Head: Normocephalic and atraumatic.      Right Ear: External ear normal.      Left Ear: External ear normal.      Nose: Nose normal.   Eyes:      General: No scleral icterus.        Right eye: No discharge.         Left eye: No discharge.      Extraocular Movements: Extraocular movements intact.      Conjunctiva/sclera: Conjunctivae normal.   Pulmonary:      Effort: Pulmonary effort is normal. No respiratory distress.      Breath sounds: No stridor.   Musculoskeletal:      Comments: R heel wound well healed without active drainage or warmth, nontender    Skin:     General: Skin is dry.      Coloration: Skin is not jaundiced or pale.      Findings: No erythema.   Neurological:      General: No focal deficit present.      Mental Status: He is alert and oriented to person, place, and time. Mental status is at baseline.   Psychiatric:         Mood and Affect: Mood normal.         Behavior: Behavior normal.         Thought Content: Thought content normal.         Judgment: Judgment normal.         Data:    All data, including recent labs, radiology, and pathology, has been independently reviewed.    Labs:    Recent Labs   Lab Result Units 02/20/24  0925 02/27/24  1004 02/27/24  1051  03/04/24  0907 03/05/24  1005   WBC K/uL 6.01 6.61  --   --  6.37   Hemoglobin g/dL 14.0 13.5*  --   --  13.2*   Hematocrit % 43.8 41.2  --   --  40.8   Sodium mmol/L 137 138  --  140 139   Potassium mmol/L 3.7 3.7  --  4.1 3.5   Chloride mmol/L 104 104  --  104 105   BUN mg/dL 15 18  --  11 15   Creatinine mg/dL 1.0 0.7  --  0.9 0.7   AST U/L 24 21  --  23 23   ALT U/L 19 17  --  15 15   Alkaline Phosphatase U/L 59 54*  --  54* 55   Total Bilirubin mg/dL 0.3 0.5  --  0.4 0.4   CRP mg/L 1.5 0.9  --   --  1.4   INR   --   --    < > 2.0*  --     < > = values in this interval not displayed.        Radiology:    No results found in the last 24 hours.     Assessment:    1. Osteomyelitis, unspecified site, unspecified type  Patient has completed course of IV antibiotics  Will remove line today  Counseled patient on signs and symptoms to monitor for. He will contact us if he develops any new issues at the site  Follow up w/ ID as needed    PICC line removal         Follow up PRN    The total time for evaluation and management services performed on 3/5/24 was greater than 30 minutes.     Cony García DO  Infectious Disease

## 2024-03-05 NOTE — PROGRESS NOTES
Patient arrives  for right PICC removal as ordered - PICC removed without difficulty from right  upper medial aspect of arm - measurement marked at 38 cm with entire catheter intact without compromise noted - sterile vaseline gauze placed with sterile 2x2 gauze and large tegaderm with instructions to leave dry and in place x 24 hours. No signs of infection noted to site - no swelling or drainage    Completed 30 minutes observation period without difficulty left suite with dressing clean dry and intact.

## 2024-03-06 NOTE — PROGRESS NOTES
Subjective:      Patient ID: Cj Barnes is a 58 y.o. male.    Chief Complaint:   Wound Care and Wound Check (Right heel with foreign object protruding )    Cj is a 58 y.o. male who presents to the podiatry clinic  with complaint of  right foot pain. Onset of the symptoms was several months ago. Precipitating event:  Previous surgery Kulwant's deformity right heel . Current symptoms include: ability to bear weight, but with some pain. Aggravating factors: any weight bearing. Symptoms have gradually worsened. Patient has had no prior foot problems. Evaluation to date:  Previous evaluation/radiographs/surgery/antibiotic therapy is/wound care .     Here for follow-up wound care.  Currently on antibiotic therapy for osteomyelitis right heel.  Improving.  No new complaints today.  Review of Systems   Constitutional: Negative for chills, decreased appetite, fever and night sweats.   HENT:  Negative for congestion, ear discharge, nosebleeds and tinnitus.    Eyes:  Negative for double vision, pain and visual disturbance.   Cardiovascular:  Negative for chest pain, claudication, cyanosis and palpitations.   Respiratory:  Negative for cough, hemoptysis, shortness of breath and wheezing.    Endocrine: Negative for cold intolerance and heat intolerance.   Hematologic/Lymphatic: Negative for adenopathy and bleeding problem.   Skin:  Positive for dry skin, poor wound healing and unusual hair distribution.   Musculoskeletal:  Positive for arthritis, joint pain and stiffness. Negative for myalgias.   Gastrointestinal:  Negative for abdominal pain, dysphagia, nausea and vomiting.   Genitourinary:  Negative for dysuria, flank pain, hematuria and pelvic pain.   Neurological:  Positive for disturbances in coordination, numbness, paresthesias and sensory change.   Psychiatric/Behavioral:  Negative for altered mental status, hallucinations and suicidal ideas. The patient does not have insomnia.    Allergic/Immunologic:  Negative for environmental allergies and persistent infections.           Objective:      Physical Exam  Vitals reviewed.   Constitutional:       Appearance: He is well-developed.   HENT:      Head: Normocephalic and atraumatic.   Cardiovascular:      Pulses:           Dorsalis pedis pulses are 1+ on the right side and 1+ on the left side.        Posterior tibial pulses are 1+ on the right side and 1+ on the left side.   Pulmonary:      Effort: Pulmonary effort is normal.   Musculoskeletal:         General: Normal range of motion.      Comments: Anterior, lateral, and posterior muscle groups bilateral lower extremities show strength 4 over 5 symmetrically. Inspection and palpation of the joints and bones reveal no crepitus or joint effusion. No tenderness upon palpation. Mild plantar flexor contractures noted to digits 2 through 5 bilaterally.  Angle and base of gait are normal.   Feet:      Right foot:      Skin integrity: Callus and dry skin present.      Left foot:      Skin integrity: Callus and dry skin present.   Skin:     General: Skin is warm and dry.      Capillary Refill: Capillary refill takes 2 to 3 seconds.      Coloration: Skin is pale.      Comments: Skin bilateral lower extremities noted to be thin, dry, and atrophic.    Posterior right heel ulceration much improved essentially healed at this point.   Neurological:      Mental Status: He is alert and oriented to person, place, and time.      Sensory: Sensory deficit present.      Motor: Atrophy present.      Deep Tendon Reflexes: Reflexes abnormal.      Reflex Scores:       Patellar reflexes are 1+ on the right side and 1+ on the left side.       Achilles reflexes are 1+ on the right side and 1+ on the left side.     Comments: Sharp, dull, light touch, and vibratory sensation are diminished bilaterally. Proprioceptive sensation is intact to both lower extremities. [place] Eliza monofilament exam shows loss of protective sensation to plantar toes 1  through 5 bilaterally. Deep tendon reflexes to the patellar tendons is 1 over 4 bilaterally symmetrical. Deep tendon reflexes to the Achilles tendon is 0 over 4 bilaterally symmetrical. No ankle clonus or Babinski reflex noted bilaterally. Coordination is fair to both lower extremities.  Patient admits to intermittent burning and tingling in the feet.   Psychiatric:         Behavior: Behavior normal.             Assessment:       Encounter Diagnosis   Name Primary?    Heel ulceration, right, with unspecified severity Yes     Independent visualization of imaging was performed.  Results were reviewed in detail with patient.       Plan:       Cj was seen today for wound care and wound check.    Diagnoses and all orders for this visit:    Heel ulceration, right, with unspecified severity      I counseled the patient on his conditions, their implications and medical management.    The nature of the condition, options for management, as well as potential risks and complications were discussed in detail with patient. Patient was amenable to my recommendations and left my office fully informed and will follow up as instructed or sooner if necessary.      Continue antibiotic therapy and local wound care.  Follow-up in 2 weeks.

## 2024-03-10 NOTE — PROGRESS NOTES
Subjective:      Patient ID: Cj Barnes is a 58 y.o. male.    Chief Complaint:   Wound Check    Cj is a 58 y.o. male who presents to the podiatry clinic  with complaint of  right foot pain. Onset of the symptoms was several months ago. Precipitating event:  Previous surgery Kulwant's deformity right heel . Current symptoms include: ability to bear weight, but with some pain. Aggravating factors: any weight bearing. Symptoms have gradually worsened. Patient has had no prior foot problems. Evaluation to date:  Previous evaluation/radiographs/surgery/antibiotic therapy is/wound care .     Here for follow-up wound care.  Currently on antibiotic therapy for osteomyelitis right heel.  Today's his last day of PICC line.  Doing very well no pain.  Review of Systems   Constitutional: Negative for chills, decreased appetite, fever and night sweats.   HENT:  Negative for congestion, ear discharge, nosebleeds and tinnitus.    Eyes:  Negative for double vision, pain and visual disturbance.   Cardiovascular:  Negative for chest pain, claudication, cyanosis and palpitations.   Respiratory:  Negative for cough, hemoptysis, shortness of breath and wheezing.    Endocrine: Negative for cold intolerance and heat intolerance.   Hematologic/Lymphatic: Negative for adenopathy and bleeding problem.   Skin:  Positive for dry skin, poor wound healing and unusual hair distribution.   Musculoskeletal:  Positive for arthritis, joint pain and stiffness. Negative for myalgias.   Gastrointestinal:  Negative for abdominal pain, dysphagia, nausea and vomiting.   Genitourinary:  Negative for dysuria, flank pain, hematuria and pelvic pain.   Neurological:  Positive for disturbances in coordination, numbness, paresthesias and sensory change.   Psychiatric/Behavioral:  Negative for altered mental status, hallucinations and suicidal ideas. The patient does not have insomnia.    Allergic/Immunologic: Negative for environmental allergies  and persistent infections.           Objective:      Physical Exam  Vitals reviewed.   Constitutional:       Appearance: He is well-developed.   HENT:      Head: Normocephalic and atraumatic.   Cardiovascular:      Pulses:           Dorsalis pedis pulses are 1+ on the right side and 1+ on the left side.        Posterior tibial pulses are 1+ on the right side and 1+ on the left side.   Pulmonary:      Effort: Pulmonary effort is normal.   Musculoskeletal:         General: Normal range of motion.      Comments: Anterior, lateral, and posterior muscle groups bilateral lower extremities show strength 4 over 5 symmetrically. Inspection and palpation of the joints and bones reveal no crepitus or joint effusion. No tenderness upon palpation. Mild plantar flexor contractures noted to digits 2 through 5 bilaterally.  Angle and base of gait are normal.   Feet:      Right foot:      Skin integrity: Callus and dry skin present.      Left foot:      Skin integrity: Callus and dry skin present.   Skin:     General: Skin is warm and dry.      Capillary Refill: Capillary refill takes 2 to 3 seconds.      Coloration: Skin is pale.      Comments: Skin bilateral lower extremities noted to be thin, dry, and atrophic.    Posterior right heel ulceration appears to be healed.   Neurological:      Mental Status: He is alert and oriented to person, place, and time.      Sensory: Sensory deficit present.      Motor: Atrophy present.      Deep Tendon Reflexes: Reflexes abnormal.      Reflex Scores:       Patellar reflexes are 1+ on the right side and 1+ on the left side.       Achilles reflexes are 1+ on the right side and 1+ on the left side.     Comments: Sharp, dull, light touch, and vibratory sensation are diminished bilaterally. Proprioceptive sensation is intact to both lower extremities. [place] Eliza monofilament exam shows loss of protective sensation to plantar toes 1 through 5 bilaterally. Deep tendon reflexes to the patellar  tendons is 1 over 4 bilaterally symmetrical. Deep tendon reflexes to the Achilles tendon is 0 over 4 bilaterally symmetrical. No ankle clonus or Babinski reflex noted bilaterally. Coordination is fair to both lower extremities.  Patient admits to intermittent burning and tingling in the feet.   Psychiatric:         Behavior: Behavior normal.             Assessment:       Encounter Diagnoses   Name Primary?    Heel ulceration, right, with unspecified severity Yes    Right foot pain     Osteomyelitis of right foot, unspecified type      Independent visualization of imaging was performed.  Results were reviewed in detail with patient.       Plan:       Cj was seen today for wound check.    Diagnoses and all orders for this visit:    Heel ulceration, right, with unspecified severity    Right foot pain    Osteomyelitis of right foot, unspecified type      I counseled the patient on his conditions, their implications and medical management.    The nature of the condition, options for management, as well as potential risks and complications were discussed in detail with patient. Patient was amenable to my recommendations and left my office fully informed and will follow up as instructed or sooner if necessary.      Follow-up in 4 weeks for final evaluation.  At that point if all is well, we will move forward with periodic diabetic foot evaluations.

## 2024-03-11 ENCOUNTER — OFFICE VISIT (OUTPATIENT)
Dept: DIABETES | Facility: CLINIC | Age: 59
End: 2024-03-11
Payer: MEDICARE

## 2024-03-11 ENCOUNTER — ANTI-COAG VISIT (OUTPATIENT)
Dept: CARDIOLOGY | Facility: CLINIC | Age: 59
End: 2024-03-11
Payer: MEDICARE

## 2024-03-11 ENCOUNTER — PATIENT MESSAGE (OUTPATIENT)
Dept: CARDIOLOGY | Facility: CLINIC | Age: 59
End: 2024-03-11

## 2024-03-11 VITALS
WEIGHT: 225.31 LBS | HEIGHT: 67 IN | BODY MASS INDEX: 35.36 KG/M2 | SYSTOLIC BLOOD PRESSURE: 98 MMHG | HEART RATE: 67 BPM | DIASTOLIC BLOOD PRESSURE: 60 MMHG | OXYGEN SATURATION: 97 %

## 2024-03-11 DIAGNOSIS — E10.649 TYPE 1 DIABETES MELLITUS WITH HYPOGLYCEMIA UNAWARENESS: Primary | ICD-10-CM

## 2024-03-11 DIAGNOSIS — E10.65 TYPE 1 DIABETES MELLITUS WITH HYPERGLYCEMIA: ICD-10-CM

## 2024-03-11 DIAGNOSIS — E10.9 TYPE 1 DIABETES MELLITUS WITHOUT COMPLICATION: ICD-10-CM

## 2024-03-11 DIAGNOSIS — G45.9 TIA (TRANSIENT ISCHEMIC ATTACK): ICD-10-CM

## 2024-03-11 DIAGNOSIS — Z79.01 LONG TERM (CURRENT) USE OF ANTICOAGULANTS: Primary | ICD-10-CM

## 2024-03-11 DIAGNOSIS — Z96.41 INSULIN PUMP IN PLACE: ICD-10-CM

## 2024-03-11 DIAGNOSIS — Z95.2 H/O MECHANICAL AORTIC VALVE REPLACEMENT: ICD-10-CM

## 2024-03-11 DIAGNOSIS — I48.92 PAROXYSMAL ATRIAL FLUTTER: ICD-10-CM

## 2024-03-11 DIAGNOSIS — I25.119 ATHEROSCLEROSIS OF NATIVE CORONARY ARTERY OF NATIVE HEART WITH ANGINA PECTORIS: ICD-10-CM

## 2024-03-11 DIAGNOSIS — I10 ESSENTIAL HYPERTENSION: ICD-10-CM

## 2024-03-11 DIAGNOSIS — E66.01 CLASS 2 SEVERE OBESITY DUE TO EXCESS CALORIES WITH SERIOUS COMORBIDITY AND BODY MASS INDEX (BMI) OF 35.0 TO 35.9 IN ADULT: ICD-10-CM

## 2024-03-11 DIAGNOSIS — T14.8XXA BLISTER: ICD-10-CM

## 2024-03-11 DIAGNOSIS — E78.5 HYPERLIPIDEMIA WITH TARGET LOW DENSITY LIPOPROTEIN (LDL) CHOLESTEROL LESS THAN 70 MG/DL: ICD-10-CM

## 2024-03-11 PROCEDURE — 99999 PR PBB SHADOW E&M-EST. PATIENT-LVL IV: CPT | Mod: PBBFAC,,, | Performed by: NURSE PRACTITIONER

## 2024-03-11 PROCEDURE — 99214 OFFICE O/P EST MOD 30 MIN: CPT | Mod: S$GLB,,, | Performed by: NURSE PRACTITIONER

## 2024-03-11 PROCEDURE — 95251 CONT GLUC MNTR ANALYSIS I&R: CPT | Mod: S$GLB,,, | Performed by: NURSE PRACTITIONER

## 2024-03-11 PROCEDURE — 93793 ANTICOAG MGMT PT WARFARIN: CPT | Mod: S$GLB,,,

## 2024-03-11 RX ORDER — METFORMIN HYDROCHLORIDE 500 MG/1
500 TABLET, EXTENDED RELEASE ORAL 2 TIMES DAILY WITH MEALS
Qty: 180 TABLET | Refills: 2 | Status: SHIPPED | OUTPATIENT
Start: 2024-03-11

## 2024-03-11 RX ORDER — SYRINGE AND NEEDLE,INSULIN,1ML 31GX15/64"
SYRINGE, EMPTY DISPOSABLE MISCELLANEOUS
Qty: 150 EACH | Refills: 11 | Status: SHIPPED | OUTPATIENT
Start: 2024-03-11

## 2024-03-11 RX ORDER — INSULIN LISPRO 100 [IU]/ML
INJECTION, SOLUTION INTRAVENOUS; SUBCUTANEOUS
Qty: 60 ML | Refills: 11 | Status: SHIPPED | OUTPATIENT
Start: 2024-03-11 | End: 2024-04-15 | Stop reason: SDUPTHER

## 2024-03-11 RX ORDER — INSULIN DETEMIR 100 [IU]/ML
40 INJECTION, SOLUTION SUBCUTANEOUS NIGHTLY
Qty: 20 ML | Refills: 3 | Status: SHIPPED | OUTPATIENT
Start: 2024-03-11 | End: 2024-03-11 | Stop reason: SDUPTHER

## 2024-03-11 RX ORDER — BLOOD-GLUCOSE SENSOR
1 EACH MISCELLANEOUS
Qty: 3 EACH | Refills: 11 | Status: SHIPPED | OUTPATIENT
Start: 2024-03-11 | End: 2025-03-11

## 2024-03-11 RX ORDER — INSULIN DETEMIR 100 [IU]/ML
40 INJECTION, SOLUTION SUBCUTANEOUS NIGHTLY
Qty: 20 ML | Refills: 3 | Status: SHIPPED | OUTPATIENT
Start: 2024-03-11 | End: 2024-06-19 | Stop reason: SDUPTHER

## 2024-03-11 RX ORDER — MUPIROCIN 20 MG/G
OINTMENT TOPICAL
Qty: 22 G | Refills: 3 | Status: SHIPPED | OUTPATIENT
Start: 2024-03-11

## 2024-03-11 NOTE — PROGRESS NOTES
CC:   Chief Complaint   Patient presents with    Diabetes Mellitus         HPI: Cj Barnes is a 58 y.o. male presents for a follow up visit today for the management of T1DM.   The patient was diagnosed in his early 30's. Initially diagnosed with T2DM, 5 years later he was dx as T1.    He has used an insulin pump since 2006, previous on animas.  He was on the 670 G medtronic pump.    Now on the Tandem T Slim-- started June 2022     Family hx of diabetes: Mother, father, 3 brothers - no one with T1     Hospitalized for diabetes: denies     No personal or FH of thyroid cancer or personal of pancreatic cancer or pancreatitis.       Our last visit was in September 2023  At that visit we continued his regimen and continued his pump   We cut back on his ISF from 10PM- 4PM   Adjusted his sleep mode   Continued his dexcom   Insurance stopped paying for the ozempic due to him being a T1DM   He has upgraded to the dexcom G7 -- using with his tandem pump     He went on a cruise in December-- he came home with the swine flu and was hospitalized for 3 days   Shortly after that he developed osteomyelitis (1/2024)- he went to the ER   MRI of the foot- showed-- osteomyelitis   He followed with ID --was on IV antibiotics for 6 weeks with a PICC line   Now all healed     A1c 6.3%     See attached downloads     Some elevations noted following lunch particularly  He would like to wait a little bit more time before making adjustments to his settings as he has had a lot going on with the flu and then osteomyelitis                          DIABETES COMPLICATIONS: retinopathy, peripheral neuropathy, cardiovascular disease and cerebrovascular disease  TIA     Diabetes Management Status    ASA:  Yes - 81 mg daily and coumadin     Statin: Taking--Crestor 40 mg nightly   ACE/ARB: Taking-- Irbesartan 150 mg daily     Screening or Prevention Patient's value Goal Complete/Controlled?   HgA1C Testing and Control   Lab Results    Component Value Date    HGBA1C 6.3 (H) 03/04/2024      Annually/Less than 8% Yes   Lipid profile : 12/18/2023 Annually Yes   LDL control Lab Results   Component Value Date    LDLCALC 43.2 (L) 12/18/2023    Annually/Less than 100 mg/dl  Yes   Nephropathy screening Lab Results   Component Value Date    LABMICR <5.0 03/04/2024     Lab Results   Component Value Date    PROTEINUA 1+ (A) 12/17/2023    Annually Yes   Blood pressure BP Readings from Last 1 Encounters:   03/11/24 98/60    Less than 140/90 Yes   Dilated retinal exam : 09/28/2022- [facility]  Annually No   Foot exam   : 01/16/2024 Annually Yes       CURRENT A1C:    Hemoglobin A1C   Date Value Ref Range Status   03/04/2024 6.3 (H) 4.0 - 5.6 % Final     Comment:     ADA Screening Guidelines:  5.7-6.4%  Consistent with prediabetes  >or=6.5%  Consistent with diabetes    High levels of fetal hemoglobin interfere with the HbA1C  assay. Heterozygous hemoglobin variants (HbS, HgC, etc)do  not significantly interfere with this assay.   However, presence of multiple variants may affect accuracy.     09/05/2023 5.8 (H) 4.0 - 5.6 % Final     Comment:     ADA Screening Guidelines:  5.7-6.4%  Consistent with prediabetes  >or=6.5%  Consistent with diabetes    High levels of fetal hemoglobin interfere with the HbA1C  assay. Heterozygous hemoglobin variants (HbS, HgC, etc)do  not significantly interfere with this assay.   However, presence of multiple variants may affect accuracy.     03/03/2023 6.1 (H) 4.0 - 5.6 % Final     Comment:     ADA Screening Guidelines:  5.7-6.4%  Consistent with prediabetes  >or=6.5%  Consistent with diabetes    High levels of fetal hemoglobin interfere with the HbA1C  assay. Heterozygous hemoglobin variants (HbS, HgC, etc)do  not significantly interfere with this assay.   However, presence of multiple variants may affect accuracy.         GOAL A1C: 6.5-7% - without hypoglycemia    DM MEDICATIONS USED IN THE PAST: Medtronic 670 G   Metformin    Steglatro- lack of coverage.   Jardiance   Humalog   Tandem insulin pump   dexcom       CURRENT DIABETES MEDICATIONS: Tandem T Slim insulin pump in control IQ with Dexcom  Continue Jardiance 25 mg daily   Continue Metformin 500 mg to BID- to help with insulin resistance.         Pump settings:    Continue Tandem T Slim pump with Lispro   Control IQ mode      Pump settings:  Basal:  Continue basal settings  MN-6AM- 1.6 units/hr --  6AM-10AM- 1.4 units/hr --  10AM- 4PM: 1.4 units/hr  4PM- MN: 1.4 units/hr         ICR continue  MN- 6AM: 1:3.5   6AM-10AM:1:3.5   10AM- 4PM-1:3.5   4PM- MN: 1:3.5         ISF:  MN- 6AM: 1:10-   6AM-10AM:1:12  10AM- 4PM-1:16  4PM- MN: 1:12     Target:  110 ( control IQ)      IOB:  3 hours        Set changed every 2 days  Reservoir changed every 2days    Pump downloaded and reviewed     Average total daily dose is 8110.83 units per day   36% basal 64% bolus  0% override    Control IQ 94% of the time  Entering in 180 g of carbs per day on average    Pump Supplies from: [employer]     Back up Lantus/Levemir: Yes     BLOOD GLUCOSE MONITORING:   Sensor type: Dexcom G7  Average BG reading 155    Time in range: 78%  21% high, <1% very high, 0% low, 0% very low  Estimated A1c 7%  Site change: q10 days     Dexcom from pharmacy     2 weeks average is 157  In target range 74%    Estimated A1c 7.1%      Sensor was downloaded in clinic today and reviewed with patient.   Please see attached document for download.       HYPOGLYCEMIA:  0%    + hx of hypoglycemia unawareness   He doesn't feel BG readings until the 40-50's - once in the 50's he will have twitching to his eye.   Glucagon kit: yes  Medic alert bracelet: yes       MEALS:  eating more CHO off the ozempic-- taking in more insulin too   Drinks: un sweet tea, water, coke zero.   He CHO counts- he feels comfortable.   Eating a very high carbohydrate diet still despite being educated          CURRENT EXERCISE:  None        Review of  Systems  Review of Systems   Constitutional:  Negative for appetite change, fatigue and unexpected weight change.   HENT:  Negative for trouble swallowing.    Eyes:  Negative for visual disturbance.   Respiratory:  Negative for shortness of breath.    Cardiovascular:  Negative for chest pain.   Gastrointestinal:  Negative for nausea.   Endocrine: Negative for polydipsia, polyphagia and polyuria.   Genitourinary:         No Nocturia    Musculoskeletal:  Positive for arthralgias. Negative for back pain and gait problem.        Limited mobility to right hand with numbness and tingling--- from cardiovascular surgery    Skin:  Negative for wound.   Neurological:  Positive for numbness.   Psychiatric/Behavioral:  Positive for sleep disturbance. The patient is nervous/anxious.        Physical Exam   Physical Exam  Vitals and nursing note reviewed.   Constitutional:       General: He is not in acute distress.     Appearance: He is well-developed. He is obese. He is not ill-appearing.   HENT:      Head: Normocephalic and atraumatic.      Right Ear: External ear normal.      Left Ear: External ear normal.      Nose: Nose normal.   Neck:      Thyroid: No thyromegaly.      Trachea: No tracheal deviation.   Cardiovascular:      Rate and Rhythm: Normal rate and regular rhythm.      Heart sounds: No murmur heard.  Pulmonary:      Effort: Pulmonary effort is normal. No respiratory distress.      Breath sounds: Normal breath sounds.   Abdominal:      Palpations: Abdomen is soft.      Tenderness: There is no abdominal tenderness.      Hernia: No hernia is present.   Musculoskeletal:      Cervical back: Normal range of motion and neck supple.   Skin:     General: Skin is warm and dry.      Capillary Refill: Capillary refill takes less than 2 seconds.      Findings: No rash.      Comments: Insulin pump sites and dexcom sites are normal appearing. No lipo hypertropthy or atrophy     Neurological:      Mental Status: He is alert and  oriented to person, place, and time.      Cranial Nerves: No cranial nerve deficit.   Psychiatric:         Behavior: Behavior normal.         Judgment: Judgment normal.         FOOT EXAMINATION: Appropriate footwear         Lab Results   Component Value Date    TSH 2.063 07/10/2023       Type 1 diabetes mellitus without complication  Well controlled --A1c has increased but still reasonable  Needing more insulin off the ozempic--- unfortunately his insurance will not cover the Ozempic due to him being a type 1 diabetic  We did discuss possibly tightening his carbohydrate ratio at lunchtime since he is having some postprandial excursions---he wants to give it a couple more weeks before we do that  See attached Dexcom download      Medication changes:   Insurance will not cover ozempic     Continue Jardiance 25 mg daily   Continue Metformin 500 mg to BID- to help with insulin resistance.       Pump settings:    Continue Tandem T Slim pump with Lispro   Control IQ mode      Pump settings:  Basal:  Continue basal settings  MN-6AM- 1.6 units/hr --  6AM-10AM- 1.4 units/hr --  10AM- 4PM: 1.4 units/hr  4PM- MN: 1.4 units/hr       ICR continue  MN- 6AM: 1:3.5   6AM-10AM:1:3.5   10AM- 4PM-1:3.5 ---consider tightening to 1:3-- if postprandial excursions continue with lunch  4PM- MN: 1:3.5       ISF:  MN- 6AM: 1:10-    6AM-10AM:1:12  10AM- 4PM-1:16--   4PM- MN: 1:12     Target:  110 ( control IQ)     IOB:  3 hours       -- Reviewed goals of therapy are to get the best control we can without hypoglycemia  -- Reviewed patient's current insulin regimen. Clarified proper insulin dose and timing in relation to meals, etc. Insulin injection sites and proper rotation instructed.    -- Advised frequent self blood glucose monitoring.  Patient encouraged to document glucose results and bring them to every clinic visit  -Continue to use Dexcom G7--supplies from pharmacy   -- Hypoglycemia precautions discussed. Instructed on precautions  before driving.    -- Call for Bg repeatedly < 70 or > 180.   -- Close adherence to lifestyle changes recommended.   -- Periodic follow ups for eye evaluations, foot care and dental care suggested.      Patient has diabetes mellitus and manages diabetes with intensive insulin regimen and uses prandial and basal insulin daily-- on insulin pump   Patient requires a therapeutic CGM and is willing to use therapeutic CGM for the necessary frequent adjustments of insulin therapy.  I have completed an in-person visit during the previous 6 months and will continue to have in-person visits every 6 months to assess adherence to their CGM regimen and diabetes treatment plan.  Due to COVID pandemic and need for remote monitoring this tool is medically necessary          Insulin pump in place  See above     Type 1 diabetes mellitus with hypoglycemia unawareness  Avoid hypoglycemia.  Continue to use Dexcom with real-time alerts  Now on Tandem pump with control IQ    Atherosclerosis of native coronary artery of native heart with angina pectoris  Optimize blood sugar readings    Essential hypertension  BP goal is < 140/90.   Tolerating  ARB  Controlled at goal   Blood pressure goals discussed with patient      Hyperlipidemia with target low density lipoprotein (LDL) cholesterol less than 70 mg/dL  On statin per ADA recommendations  LDL goal < 70. LDL now at goal on Crestor 40 mg nightly. LFTs WNL.   Continue Statin       TIA (transient ischemic attack)  Optimize blood sugar readings    Class 2 severe obesity due to excess calories with serious comorbidity and body mass index (BMI) of 35.0 to 35.9 in adult  Body mass index is 35.29 kg/m².  Increases insulin resistance.   Discussed DM diet and exercise.   Patient does exhibit insulin resistance and is on an SGLT 2, and metformin.  Insurance will not cover Ozempic             Pump backup plan    If the insulin pump is non functional and discontinued for anticipated more than 20 hours,  please give daily injections of:   Long acting insulin Levemir 40  units daily   Short acting insulin Novolog/ Humalog 1:4  for meals according to carb ratios and sensitivity factor in the pump.     When the insulin pump is restarted, do not restart basal rates until at least 22 hours after the last long acting insulin injection. You can set a 0% temporary basal setting that will last until this time and use your pump to bolus for meals and correction.     For any technical insulin pump issues, please contact the insulin pump company; the toll free number is printed on the label on the back of the insulin pump.       If your sugar is running high for a few hours and does not respond to two correction doses from the insulin pump, it may mean that you have a bad pump site and the site should be changed          Follow up in about 6 months (around 9/11/2024).   Follow up with me in 6 months with labs prior         Orders Placed This Encounter   Procedures    Hemoglobin A1C     Standing Status:   Future     Standing Expiration Date:   9/11/2025    Lipid Panel     Standing Status:   Future     Standing Expiration Date:   9/11/2025    Microalbumin/Creatinine Ratio, Urine     Standing Status:   Future     Standing Expiration Date:   9/11/2025     Order Specific Question:   Specimen Source     Answer:   Urine    TSH     Standing Status:   Future     Standing Expiration Date:   9/11/2025             Recommendations were discussed with the patient in detail  The patient verbalized understanding and agrees with the plan outlined as above.

## 2024-03-11 NOTE — ASSESSMENT & PLAN NOTE
Well controlled --A1c has increased but still reasonable  Needing more insulin off the ozempic--- unfortunately his insurance will not cover the Ozempic due to him being a type 1 diabetic  We did discuss possibly tightening his carbohydrate ratio at lunchtime since he is having some postprandial excursions---he wants to give it a couple more weeks before we do that  See attached Dexcom download      Medication changes:   Insurance will not cover ozempic     Continue Jardiance 25 mg daily   Continue Metformin 500 mg to BID- to help with insulin resistance.       Pump settings:    Continue Tandem T Slim pump with Lispro   Control IQ mode      Pump settings:  Basal:  Continue basal settings  MN-6AM- 1.6 units/hr --  6AM-10AM- 1.4 units/hr --  10AM- 4PM: 1.4 units/hr  4PM- MN: 1.4 units/hr       ICR continue  MN- 6AM: 1:3.5   6AM-10AM:1:3.5   10AM- 4PM-1:3.5 ---consider tightening to 1:3-- if postprandial excursions continue with lunch  4PM- MN: 1:3.5       ISF:  MN- 6AM: 1:10-    6AM-10AM:1:12  10AM- 4PM-1:16--   4PM- MN: 1:12     Target:  110 ( control IQ)     IOB:  3 hours       -- Reviewed goals of therapy are to get the best control we can without hypoglycemia  -- Reviewed patient's current insulin regimen. Clarified proper insulin dose and timing in relation to meals, etc. Insulin injection sites and proper rotation instructed.    -- Advised frequent self blood glucose monitoring.  Patient encouraged to document glucose results and bring them to every clinic visit  -Continue to use Dexcom G7--supplies from pharmacy   -- Hypoglycemia precautions discussed. Instructed on precautions before driving.    -- Call for Bg repeatedly < 70 or > 180.   -- Close adherence to lifestyle changes recommended.   -- Periodic follow ups for eye evaluations, foot care and dental care suggested.      Patient has diabetes mellitus and manages diabetes with intensive insulin regimen and uses prandial and basal insulin daily-- on insulin  pump   Patient requires a therapeutic CGM and is willing to use therapeutic CGM for the necessary frequent adjustments of insulin therapy.  I have completed an in-person visit during the previous 6 months and will continue to have in-person visits every 6 months to assess adherence to their CGM regimen and diabetes treatment plan.  Due to COVID pandemic and need for remote monitoring this tool is medically necessary

## 2024-03-12 ENCOUNTER — OFFICE VISIT (OUTPATIENT)
Dept: OPTOMETRY | Facility: CLINIC | Age: 59
End: 2024-03-12
Payer: MEDICARE

## 2024-03-12 DIAGNOSIS — H35.60: ICD-10-CM

## 2024-03-12 DIAGNOSIS — E08.41 DIABETIC MONONEUROPATHY ASSOCIATED WITH DIABETES MELLITUS DUE TO UNDERLYING CONDITION: ICD-10-CM

## 2024-03-12 DIAGNOSIS — H52.4 PRESBYOPIA: ICD-10-CM

## 2024-03-12 DIAGNOSIS — I10 ESSENTIAL HYPERTENSION: ICD-10-CM

## 2024-03-12 DIAGNOSIS — E10.649 TYPE 1 DIABETES MELLITUS WITH HYPOGLYCEMIA UNAWARENESS: Primary | ICD-10-CM

## 2024-03-12 PROCEDURE — 99999 PR PBB SHADOW E&M-EST. PATIENT-LVL III: CPT | Mod: PBBFAC,,, | Performed by: OPTOMETRIST

## 2024-03-12 PROCEDURE — 92015 DETERMINE REFRACTIVE STATE: CPT | Mod: S$GLB,,, | Performed by: OPTOMETRIST

## 2024-03-12 PROCEDURE — 99204 OFFICE O/P NEW MOD 45 MIN: CPT | Mod: S$GLB,,, | Performed by: OPTOMETRIST

## 2024-03-12 PROCEDURE — 92250 FUNDUS PHOTOGRAPHY W/I&R: CPT | Mod: S$GLB,,, | Performed by: OPTOMETRIST

## 2024-03-14 NOTE — PROGRESS NOTES
Subjective:      Patient ID: Cj Barnes is a 57 y.o. male.    Chief Complaint: Follow-up    HPI:  Had foot surgery in January and is still going to wound care    Pt feels better on CPAP    Review of Systems   Cardiovascular:  Negative for chest pain, claudication, dyspnea on exertion, irregular heartbeat, leg swelling, near-syncope, orthopnea, palpitations and syncope.      Past Medical History:   Diagnosis Date    Anemia     Aortic stenosis 2018    Cancer 2014    Colon cancer     Coronary artery disease     Diabetes mellitus type I     since in his 30's    Heart murmur     Heel fracture     HTN (hypertension)     Hyperlipidemia LDL goal < 70     Insulin pump in place     MVP (mitral valve prolapse)     Stenosis of aortic and mitral valves         Past Surgical History:   Procedure Laterality Date    AORTIC VALVE REPLACEMENT N/A 8/9/2019    Procedure: Replacement-valve-aortic;  Surgeon: Ryan Knight MD;  Location: [facility] OR [address] [place] [address];  Service: Cardiothoracic;  Laterality: N/A;    BACK SURGERY      BENTALL PROCEDURE FOR REPLACEMENT OF AORTIC VALVE, AORTIC ROOT, AND ASCENDING AORTA N/A 8/9/2019    Procedure: BENTALL PROCEDURE;  Surgeon: Ryan Knight MD;  [facility];  Service: Cardiothoracic;  Laterality: N/A;    CARDIAC SURGERY      CARPAL TUNNEL RELEASE      COLON SURGERY  2014    COLONOSCOPY N/A 2/17/2020    Procedure: COLONOSCOPY;  Surgeon: Richi Mock MD;  Location: [facility];  Service: Colon and Rectal;  Laterality: N/A;    DEBRIDEMENT OF ACHILLES TENDON Right 1/27/2023    Procedure: DEBRIDEMENT, TENDON, ACHILLES;  Surgeon: Eugene Bowers DPM;  Location: [facility] OR 2ND FLR;  Service: Podiatry;  Laterality: Right;    ELBOW SURGERY      tendon release    ESOPHAGOGASTRODUODENOSCOPY N/A 9/10/2020    Procedure: EGD (ESOPHAGOGASTRODUODENOSCOPY);  Surgeon: Abilio Boo MD;  Location: [facility] ENDO (4TH FLR);  Service: Endoscopy;  Laterality: N/A;  Cardiac clearance received, see  77 y.o. male presenting [facility] with pertinent medical history of A-fib on Coumadin, HTN, HLD, CAD & MI s/p CABG x5 (2016) and PTCA, IDDM II, Parkinson's, GERD, embolic CVA, late latent syphilis, and sepsis  r/t right hand cellulitis with extensor tenosynovitis c/b BSI mediated by GAS, MRSA s/p surgical debridement on 2/23/2024 at [facility] on IV Rocephin 2 g every 24 hours via central line in right chest-stop date 3/27/2024, 2/21/2024 blood cultures positive for group A strep bacteremia tx with Vancomycin initially, late latent syphilis treated with IM Bicillin weekly x 3 weeks (3/4/2024-second dose), GI bleed s/p EGD (3/4/2024) showing 3 nonbleeding ulcer and swelling of gastric outlet who presents to the ED from [facility] with complaints of H/H of 7.0 and 22.3, weakness, fatigue, melena stools for 2 to 3 days, and decreased appetite for 4 to 5 days. Met with Palliative care for GOC discussion and patient has made the determination to change his code status. Patient was sleeping but arousable upon me entering. He was oriented and able to make his wishes known. He states he does not want to have chest compression or a breathing tube if his heart were to stop. DNR with no intubation entered into orders and form filled out and placed on chart.    telephone encounter 8/27/20-[facility]  Approval to hold Coumadin prior to procedure received, see telephone encounter 8/27/20-[facility]  covid-9/7/20-[facility]    FOOT TENDON SURGERY      OSTECTOMY Right 1/27/2023    Procedure: OSTECTOMY;  Surgeon: Eugene Bowers DPM;  Location: [address] FLR;  Service: Podiatry;  Laterality: Right;  calcaneal    right and left heart cath Bilateral 01/15/2018    TREATMENT OF CARDIAC ARRHYTHMIA N/A 8/19/2019    Procedure: CARDIOVERSION;  Surgeon: Juan Zapata MD;  Location: [facility] EP LAB;  Service: Cardiology;  Laterality: N/A;  afib, dccv only, anes, GP, 3092    TRIGGER FINGER RELEASE      x 2    TRIGGER FINGER RELEASE Left 1/5/2022    Procedure: RELEASE, TRIGGER FINGER,LEFT,SMALL & THUMB;  Surgeon: Dilma Hunter MD;  Location: [facility];  Service: Orthopedics;  Laterality: Left;       Family History   Problem Relation Age of Onset    Heart disease Mother     Lung cancer Mother     Heart attack Father     Diabetes Father     HIV Brother        Social History     Socioeconomic History    Marital status:    Tobacco Use    Smoking status: Never    Smokeless tobacco: Former     Types: Snuff     Quit date: 8/30/2019    Tobacco comments:     since he was 12 yrs old   Substance and Sexual Activity    Alcohol use: No     Comment: socially    Drug use: No   Social History Narrative        2 grown kids    Delivers seafood     Social Determinants of Health     Financial Resource Strain: Medium Risk    Difficulty of Paying Living Expenses: Somewhat hard   Food Insecurity: Food Insecurity Present    Worried About Running Out of Food in the Last Year: Often true    Ran Out of Food in the Last Year: Often true   Transportation Needs: Unmet Transportation Needs    Lack of Transportation (Medical): Yes    Lack of Transportation (Non-Medical): No   Physical Activity: Inactive    Days of Exercise per Week: 0 days    Minutes of Exercise per Session: 0 min   Stress: Stress  "Concern Present    Feeling of Stress : Rather much   Social Connections: Socially Isolated    Frequency of Communication with Friends and Family: More than three times a week    Frequency of Social Gatherings with Friends and Family: Twice a week    Attends Pentec[employer]al Services: Never    Active Member of Clubs or Organizations: No    Attends Club or Organization Meetings: Never    Marital Status:    Housing Stability: Unknown    Unable to Pay for Housing in the Last Year: No    Unstable Housing in the Last Year: No       Current Outpatient Medications on File Prior to Visit   Medication Sig Dispense Refill    aspirin (ECOTRIN) 81 MG EC tablet Take 1 tablet (81 mg total) by mouth once daily.  0    BAQSIMI 3 mg/actuation Spry USE ONE actuation into a single nostril no response MAY REPEAT in 15 minutes USE a new device 2 each 1    blood-glucose meter,continuous (DEXCOM G6 ) Misc 1 Device by Misc.(Non-Drug; Combo Route) route once. for 1 dose 1 each 0    blood-glucose sensor (DEXCOM G6 SENSOR) Apple 1 sensor every 10 days 10 each 3    blood-glucose transmitter (DEXCOM G6 TRANSMITTER) Apple 1 transmitter every 3 months 1 each 3    COMFORT EZ PEN NEEDLES 33 gauge x 3/16" Ndle USE AS DIRECTED with Levemir pens  11    empagliflozin (JARDIANCE) 25 mg tablet Take 1 tablet (25 mg total) by mouth once daily. 30 tablet 11    EScitalopram oxalate (LEXAPRO) 10 MG tablet Take 1 tablet (10 mg total) by mouth once daily. 90 tablet 1    fluticasone propionate (FLONASE) 50 mcg/actuation nasal spray 2 sprays (100 mcg total) by Each Nostril route once daily. 15.8 mL 5    gabapentin (NEURONTIN) 300 MG capsule TAKE TWO CAPSULES BY MOUTH THREE TIMES DAILY FOR NEUROPATHY-(NERVE PAIN)- 360 capsule 2    glucagon, human recombinant, (GLUCAGON EMERGENCY KIT, HUMAN,) 1 mg SolR Inject 1 mg into the muscle as needed. 1 each 0    hydroCHLOROthiazide (HYDRODIURIL) 12.5 MG Tab TAKE ONE CAPSULE BY MOUTH ONCE DAILY 90 tablet 3    insulin " "detemir U-100 (LEVEMIR U-100 INSULIN) 100 unit/mL injection Inject 40 Units into the skin every evening. To have on file in case of insulin pump failure.  Patient does not need prescription right now 20 mL 3    insulin lispro 100 unit/mL injection USE CONTINOUSLY WITH INSULIN PUMP. MAXIMUM DAILY DOSE  UNITS 60 mL 11    insulin syringe-needle U-100 1/2 mL 31 gauge x 15/64" Syrg To use 5 times per day with insulin injections if off of insulin pump.  To have on file in case of pump failure 150 each 11    irbesartan (AVAPRO) 150 MG tablet Take 1 tablet (150 mg total) by mouth once daily. 90 tablet 3    metFORMIN (GLUCOPHAGE-XR) 500 MG ER 24hr tablet Take 1 tablet (500 mg total) by mouth 2 (two) times daily with meals. 180 tablet 2    mupirocin (BACTROBAN) 2 % ointment APPLY TO THE AFFECTED AREA(S) THREE TIMES DAILY (Patient taking differently: 3 (three) times daily as needed. APPLY TO THE AFFECTED AREA(S) THREE TIMES DAILY) 22 g 1    nystatin (MYCOSTATIN) cream Apply topically 2 (two) times daily. 1 Tube 5    OZEMPIC 0.25 mg or 0.5 mg (2 mg/3 mL) pen injector inject 0.5mg into THE SKIN every SEVEN DAYS 3 mL 6    pantoprazole (PROTONIX) 40 MG tablet Take 1 tablet (40 mg total) by mouth once daily. 30 tablet 2    rosuvastatin (CRESTOR) 40 MG Tab Take 1 tablet (40 mg total) by mouth every evening. 90 tablet 3    semaglutide (OZEMPIC) 0.25 mg or 0.5 mg(2 mg/1.5 mL) pen injector Inject 0.5 mg into the skin every 7 days. 1 pen 6    warfarin (COUMADIN) 3 MG tablet Take 2 tablets (6 mg total) by mouth Daily. 180 tablet 3    zolpidem (AMBIEN) 10 mg Tab Take 1 tablet (10 mg total) by mouth nightly as needed (insomina). 30 tablet 1    [DISCONTINUED] oxyCODONE-acetaminophen (PERCOCET) 7.5-325 mg per tablet Take 1 tablet by mouth every 12 (twelve) hours as needed for Pain. (Patient not taking: Reported on 7/12/2023) 30 tablet 0     No current facility-administered medications on file prior to visit.       Review of patient's " "allergies indicates:  No Known Allergies  Objective:     Vitals:    07/12/23 0837   BP: (!) 108/55   BP Location: Right arm   Patient Position: Sitting   BP Method: Large (Automatic)   Pulse: 64   SpO2: 95%   Weight: 105.6 kg (232 lb 12.9 oz)   Height: 5' 7" (1.702 m)        Physical Exam  Constitutional:       General: He is not in acute distress.     Appearance: He is well-developed. He is not diaphoretic.   Eyes:      General: No scleral icterus.  Neck:      Vascular: No carotid bruit or JVD.   Cardiovascular:      Rate and Rhythm: Regular rhythm.      Heart sounds: Normal heart sounds. No murmur heard.    No friction rub. No gallop.      Comments: Crisp metallic aortic valve open and close noise.  III/VI systolic murmur radiates to neck  Pulmonary:      Effort: Pulmonary effort is normal. No respiratory distress.      Breath sounds: Normal breath sounds.   Musculoskeletal:      Right lower leg: No edema.      Left lower leg: No edema.   Skin:     General: Skin is warm and dry.   Neurological:      Mental Status: He is alert and oriented to person, place, and time.   Psychiatric:         Behavior: Behavior normal.         Thought Content: Thought content normal.         Judgment: Judgment normal.      ECG today reviewed by me: NSR with first degree AV block, otherwise WNL and unchanged      Lab Visit on 07/10/2023   Component Date Value Ref Range Status    Prothrombin Time 07/10/2023 28.7 (H)  9.0 - 12.5 sec Final    INR 07/10/2023 2.9 (H)  0.8 - 1.2 Final    WBC 07/10/2023 8.48  3.90 - 12.70 K/uL Final    RBC 07/10/2023 5.12  4.60 - 6.20 M/uL Final    Hemoglobin 07/10/2023 14.3  14.0 - 18.0 g/dL Final    Hematocrit 07/10/2023 45.0  40.0 - 54.0 % Final    MCV 07/10/2023 88  82 - 98 fL Final    MCH 07/10/2023 27.9  27.0 - 31.0 pg Final    MCHC 07/10/2023 31.8 (L)  32.0 - 36.0 g/dL Final    RDW 07/10/2023 14.2  11.5 - 14.5 % Final    Platelets 07/10/2023 194  150 - 450 K/uL Final    MPV 07/10/2023 10.3  9.2 - 12.9 " fL Final    Immature Granulocytes 07/10/2023 0.2  0.0 - 0.5 % Final    Gran # (ANC) 07/10/2023 5.8  1.8 - 7.7 K/uL Final    Immature Grans (Abs) 07/10/2023 0.02  0.00 - 0.04 K/uL Final    Lymph # 07/10/2023 1.9  1.0 - 4.8 K/uL Final    Mono # 07/10/2023 0.5  0.3 - 1.0 K/uL Final    Eos # 07/10/2023 0.2  0.0 - 0.5 K/uL Final    Baso # 07/10/2023 0.04  0.00 - 0.20 K/uL Final    nRBC 07/10/2023 0  0 /100 WBC Final    Gran % 07/10/2023 68.2  38.0 - 73.0 % Final    Lymph % 07/10/2023 22.6  18.0 - 48.0 % Final    Mono % 07/10/2023 6.0  4.0 - 15.0 % Final    Eosinophil % 07/10/2023 2.5  0.0 - 8.0 % Final    Basophil % 07/10/2023 0.5  0.0 - 1.9 % Final    Differential Method 07/10/2023 Automated   Final    Sodium 07/10/2023 142  136 - 145 mmol/L Final    Potassium 07/10/2023 4.4  3.5 - 5.1 mmol/L Final    Chloride 07/10/2023 103  95 - 110 mmol/L Final    CO2 07/10/2023 28  23 - 29 mmol/L Final    Glucose 07/10/2023 143 (H)  70 - 110 mg/dL Final    BUN 07/10/2023 18  6 - 20 mg/dL Final    Creatinine 07/10/2023 1.1  0.5 - 1.4 mg/dL Final    Calcium 07/10/2023 9.8  8.7 - 10.5 mg/dL Final    Total Protein 07/10/2023 7.4  6.0 - 8.4 g/dL Final    Albumin 07/10/2023 4.0  3.5 - 5.2 g/dL Final    Total Bilirubin 07/10/2023 0.6  0.1 - 1.0 mg/dL Final    Alkaline Phosphatase 07/10/2023 81  55 - 135 U/L Final    AST 07/10/2023 21  10 - 40 U/L Final    ALT 07/10/2023 19  10 - 44 U/L Final    eGFR 07/10/2023 >60.0  >60 mL/min/1.73 m^2 Final    Anion Gap 07/10/2023 11  8 - 16 mmol/L Final    Cholesterol 07/10/2023 121  120 - 199 mg/dL Final    Triglycerides 07/10/2023 160 (H)  30 - 150 mg/dL Final    HDL 07/10/2023 32 (L)  40 - 75 mg/dL Final    LDL Cholesterol 07/10/2023 57.0 (L)  63.0 - 159.0 mg/dL Final    HDL/Cholesterol Ratio 07/10/2023 26.4  20.0 - 50.0 % Final    Total Cholesterol/HDL Ratio 07/10/2023 3.8  2.0 - 5.0 Final    Non-HDL Cholesterol 07/10/2023 89  mg/dL Final    TSH 07/10/2023 2.063  0.400 - 4.000 uIU/mL Final    PSA,  Screen 07/10/2023 0.32  0.00 - 4.00 ng/mL Final   Lab Visit on 06/20/2023   Component Date Value Ref Range Status    Prothrombin Time 06/20/2023 26.4 (H)  9.0 - 12.5 sec Final    INR 06/20/2023 2.6 (H)  0.8 - 1.2 Final   Lab Visit on 06/06/2023   Component Date Value Ref Range Status    Prothrombin Time 06/06/2023 32.8 (H)  9.0 - 12.5 sec Final    INR 06/06/2023 3.3 (H)  0.8 - 1.2 Final   Lab Visit on 06/02/2023   Component Date Value Ref Range Status    Prothrombin Time 06/02/2023 30.4 (H)  9.0 - 12.5 sec Final    INR 06/02/2023 3.0 (H)  0.8 - 1.2 Final   Lab Visit on 05/23/2023   Component Date Value Ref Range Status    Prothrombin Time 05/23/2023 27.5 (H)  9.0 - 12.5 sec Final    INR 05/23/2023 2.7 (H)  0.8 - 1.2 Final   Lab Visit on 05/16/2023   Component Date Value Ref Range Status    Prothrombin Time 05/16/2023 21.3 (H)  9.0 - 12.5 sec Final    INR 05/16/2023 2.1 (H)  0.8 - 1.2 Final   Lab Visit on 05/09/2023   Component Date Value Ref Range Status    Prothrombin Time 05/09/2023 20.8 (H)  9.0 - 12.5 sec Final    INR 05/09/2023 2.0 (H)  0.8 - 1.2 Final   Lab Visit on 05/02/2023   Component Date Value Ref Range Status    Prothrombin Time 05/02/2023 30.4 (H)  9.0 - 12.5 sec Final    INR 05/02/2023 3.0 (H)  0.8 - 1.2 Final   Lab Visit on 04/28/2023   Component Date Value Ref Range Status    Prothrombin Time 04/28/2023 40.2 (H)  9.0 - 12.5 sec Final    INR 04/28/2023 4.1 (H)  0.8 - 1.2 Final   Office Visit on 04/25/2023   Component Date Value Ref Range Status    Aerobic Bacterial Culture 04/25/2023  (A)   Final                    Value:ESCHERICHIA COLI  Many  Skin jena also present      Anaerobic Culture 04/25/2023  (A)   Final                    Value:PORPHYROMONAS SOMERAE  Many      Anaerobic Culture 04/25/2023  (A)   Final                    Value:PEPTOSTREPTOCOCCUS SP  Many  further identified as  Peptostreptococcus canis      Anaerobic Culture 04/25/2023  (A)   Final                    Value:ANAEROCOCCUS  SPECIES  Many  further identified as  Anaerococcus murdochii      Anaerobic Culture 04/25/2023  (A)   Final                    Value:ANAEROCOCCUS VAGINALIS  Many     There may be more visits with results that are not included.   (    Assessment:     1. S/P ascending aortic replacement    2. H/O mechanical aortic valve replacement    3. Hyperlipidemia with target low density lipoprotein (LDL) cholesterol less than 70 mg/dL    4. Type 1 diabetes mellitus with hypoglycemia unawareness    5. Diabetic mononeuropathy associated with diabetes mellitus due to underlying condition    6. Coronary artery disease involving native coronary artery of native heart without angina pectoris      Plan:   Cj was seen today for follow-up.    Diagnoses and all orders for this visit:    S/P ascending aortic replacement  -     IN OFFICE EKG 12-LEAD (to [place])  -     Echo Saline Bubble? No; Future    H/O mechanical aortic valve replacement  -     IN OFFICE EKG 12-LEAD (to [place])  -     Echo Saline Bubble? No; Future    Hyperlipidemia with target low density lipoprotein (LDL) cholesterol less than 70 mg/dL  -     IN OFFICE EKG 12-LEAD (to [place])    Type 1 diabetes mellitus with hypoglycemia unawareness  -     IN OFFICE EKG 12-LEAD (to [place])    Diabetic mononeuropathy associated with diabetes mellitus due to underlying condition  -     IN OFFICE EKG 12-LEAD (to [place])    Coronary artery disease involving native coronary artery of native heart without angina pectoris  -     IN OFFICE EKG 12-LEAD (to [place])     Pt is doing well     Same meds    RTC 6 months with echocardiogram    F/u with Dr Stover    F/u with coumadin clinic    F/u with podiatry    Follow up in about 6 months (around 1/12/2024).

## 2024-03-14 NOTE — PROGRESS NOTES
HPI    JACQUELYN: 1 yr    CC: Pt is here today for a routine eye exam. Pt states that his vision has   been stable since his last exam.    (-)Dryness  (-)Burning  (-)Itchiness  (-)Tearing  (-)Flashes  (-)Floaters   (+)Photophobia - at night  (-)Eye Pain      Diabetic: yes  A1C: 6.3 on 3/4/2024    Contact Lens: no    Eye Meds: no    PD: 66.5    Last edited by Anne-Marie Munoz MA on 3/12/2024 10:30 AM.            Assessment /Plan     For exam results, see Encounter Report.        Retinal hemorrhage noted on examination, unspecified laterality  Type 1 diabetes mellitus with mild nonproliferative retinopathy without macular edema, unspecified laterality  Mild nonproliferative diabetic retinopathy of both eyes without macular edema associated with type 1 diabetes mellitus  Diabetic retinal microaneurysms              Stable since photos in March 2018              Discussed importance of A1c control     DFE/photos Next visit     Essential hypertension  NS (nuclear sclerosis), bilateral              Mild, monitor     Presbyopia              Rx specs     RTC 6 mo dfe/ fundus photos

## 2024-03-18 ENCOUNTER — PATIENT MESSAGE (OUTPATIENT)
Dept: CARDIOLOGY | Facility: CLINIC | Age: 59
End: 2024-03-18

## 2024-03-18 ENCOUNTER — ANTI-COAG VISIT (OUTPATIENT)
Dept: CARDIOLOGY | Facility: CLINIC | Age: 59
End: 2024-03-18
Payer: MEDICARE

## 2024-03-18 DIAGNOSIS — Z95.2 H/O MECHANICAL AORTIC VALVE REPLACEMENT: ICD-10-CM

## 2024-03-18 DIAGNOSIS — I48.92 PAROXYSMAL ATRIAL FLUTTER: ICD-10-CM

## 2024-03-18 DIAGNOSIS — Z79.01 LONG TERM (CURRENT) USE OF ANTICOAGULANTS: Primary | ICD-10-CM

## 2024-03-18 PROCEDURE — 93793 ANTICOAG MGMT PT WARFARIN: CPT | Mod: S$GLB,,,

## 2024-03-25 ENCOUNTER — ANTI-COAG VISIT (OUTPATIENT)
Dept: CARDIOLOGY | Facility: CLINIC | Age: 59
End: 2024-03-25
Payer: MEDICARE

## 2024-03-25 ENCOUNTER — PATIENT MESSAGE (OUTPATIENT)
Dept: CARDIOLOGY | Facility: CLINIC | Age: 59
End: 2024-03-25

## 2024-03-25 ENCOUNTER — LAB VISIT (OUTPATIENT)
Dept: LAB | Facility: HOSPITAL | Age: 59
End: 2024-03-25
Payer: MEDICARE

## 2024-03-25 DIAGNOSIS — Z95.2 H/O MECHANICAL AORTIC VALVE REPLACEMENT: ICD-10-CM

## 2024-03-25 DIAGNOSIS — I48.92 PAROXYSMAL ATRIAL FLUTTER: ICD-10-CM

## 2024-03-25 DIAGNOSIS — Z79.01 LONG TERM (CURRENT) USE OF ANTICOAGULANTS: Primary | ICD-10-CM

## 2024-03-25 DIAGNOSIS — Z79.01 LONG TERM (CURRENT) USE OF ANTICOAGULANTS: ICD-10-CM

## 2024-03-25 LAB
INR PPP: 2.8 (ref 0.8–1.2)
PROTHROMBIN TIME: 28.1 SEC (ref 9–12.5)

## 2024-03-25 PROCEDURE — 93793 ANTICOAG MGMT PT WARFARIN: CPT | Mod: S$GLB,,,

## 2024-03-25 PROCEDURE — 36415 COLL VENOUS BLD VENIPUNCTURE: CPT | Performed by: INTERNAL MEDICINE

## 2024-03-25 PROCEDURE — 85610 PROTHROMBIN TIME: CPT | Performed by: INTERNAL MEDICINE

## 2024-03-28 ENCOUNTER — OFFICE VISIT (OUTPATIENT)
Dept: PODIATRY | Facility: CLINIC | Age: 59
End: 2024-03-28
Payer: MEDICARE

## 2024-03-28 VITALS
HEART RATE: 57 BPM | SYSTOLIC BLOOD PRESSURE: 126 MMHG | BODY MASS INDEX: 35.29 KG/M2 | DIASTOLIC BLOOD PRESSURE: 57 MMHG | WEIGHT: 224.88 LBS | HEIGHT: 67 IN | RESPIRATION RATE: 18 BRPM

## 2024-03-28 DIAGNOSIS — Z87.2 HEALED ULCER OF FOOT ON EXAMINATION: Primary | ICD-10-CM

## 2024-03-28 PROCEDURE — 4010F ACE/ARB THERAPY RXD/TAKEN: CPT | Mod: CPTII,S$GLB,, | Performed by: PODIATRIST

## 2024-03-28 PROCEDURE — 3044F HG A1C LEVEL LT 7.0%: CPT | Mod: CPTII,S$GLB,, | Performed by: PODIATRIST

## 2024-03-28 PROCEDURE — 99213 OFFICE O/P EST LOW 20 MIN: CPT | Mod: S$GLB,,, | Performed by: PODIATRIST

## 2024-03-28 PROCEDURE — 99999 PR PBB SHADOW E&M-EST. PATIENT-LVL IV: CPT | Mod: PBBFAC,,, | Performed by: PODIATRIST

## 2024-03-28 PROCEDURE — 3066F NEPHROPATHY DOC TX: CPT | Mod: CPTII,S$GLB,, | Performed by: PODIATRIST

## 2024-03-28 PROCEDURE — 3074F SYST BP LT 130 MM HG: CPT | Mod: CPTII,S$GLB,, | Performed by: PODIATRIST

## 2024-03-28 PROCEDURE — 3078F DIAST BP <80 MM HG: CPT | Mod: CPTII,S$GLB,, | Performed by: PODIATRIST

## 2024-03-28 PROCEDURE — 3061F NEG MICROALBUMINURIA REV: CPT | Mod: CPTII,S$GLB,, | Performed by: PODIATRIST

## 2024-03-28 PROCEDURE — 3008F BODY MASS INDEX DOCD: CPT | Mod: CPTII,S$GLB,, | Performed by: PODIATRIST

## 2024-04-01 ENCOUNTER — ANTI-COAG VISIT (OUTPATIENT)
Dept: CARDIOLOGY | Facility: CLINIC | Age: 59
End: 2024-04-01
Payer: MEDICARE

## 2024-04-01 DIAGNOSIS — Z79.01 LONG TERM (CURRENT) USE OF ANTICOAGULANTS: Primary | ICD-10-CM

## 2024-04-01 DIAGNOSIS — I48.92 PAROXYSMAL ATRIAL FLUTTER: ICD-10-CM

## 2024-04-01 DIAGNOSIS — Z95.2 H/O MECHANICAL AORTIC VALVE REPLACEMENT: ICD-10-CM

## 2024-04-01 PROCEDURE — 93793 ANTICOAG MGMT PT WARFARIN: CPT | Mod: S$GLB,,,

## 2024-04-08 ENCOUNTER — TELEPHONE (OUTPATIENT)
Dept: DIABETES | Facility: CLINIC | Age: 59
End: 2024-04-08
Payer: MEDICARE

## 2024-04-08 NOTE — TELEPHONE ENCOUNTER
Spoke [facility] stated they would send out document from provider to sign and send back, clarified updated fax number

## 2024-04-15 ENCOUNTER — PATIENT MESSAGE (OUTPATIENT)
Dept: DIABETES | Facility: CLINIC | Age: 59
End: 2024-04-15
Payer: MEDICARE

## 2024-04-15 ENCOUNTER — ANTI-COAG VISIT (OUTPATIENT)
Dept: CARDIOLOGY | Facility: CLINIC | Age: 59
End: 2024-04-15
Payer: MEDICARE

## 2024-04-15 ENCOUNTER — PATIENT MESSAGE (OUTPATIENT)
Dept: CARDIOLOGY | Facility: CLINIC | Age: 59
End: 2024-04-15

## 2024-04-15 DIAGNOSIS — Z79.01 LONG TERM (CURRENT) USE OF ANTICOAGULANTS: Primary | ICD-10-CM

## 2024-04-15 DIAGNOSIS — Z95.2 H/O MECHANICAL AORTIC VALVE REPLACEMENT: ICD-10-CM

## 2024-04-15 DIAGNOSIS — E10.65 TYPE 1 DIABETES MELLITUS WITH HYPERGLYCEMIA: ICD-10-CM

## 2024-04-15 DIAGNOSIS — I48.92 PAROXYSMAL ATRIAL FLUTTER: ICD-10-CM

## 2024-04-15 PROCEDURE — 93793 ANTICOAG MGMT PT WARFARIN: CPT | Mod: S$GLB,,,

## 2024-04-15 RX ORDER — INSULIN LISPRO 100 [IU]/ML
INJECTION, SOLUTION INTRAVENOUS; SUBCUTANEOUS
Qty: 60 ML | Refills: 11 | Status: SHIPPED | OUTPATIENT
Start: 2024-04-15

## 2024-04-21 DIAGNOSIS — R05.1 ACUTE COUGH: ICD-10-CM

## 2024-04-21 NOTE — TELEPHONE ENCOUNTER
No care due was identified.  [facility] Embedded Care Due Messages. Reference number: [id number].   4/21/2024 8:01:45 AM CDT

## 2024-04-22 RX ORDER — PANTOPRAZOLE SODIUM 40 MG/1
40 TABLET, DELAYED RELEASE ORAL
Qty: 90 TABLET | Refills: 1 | Status: SHIPPED | OUTPATIENT
Start: 2024-04-22

## 2024-04-22 NOTE — TELEPHONE ENCOUNTER
Refill Routing Note   Medication(s) are not appropriate for processing by [facility] for the following reason(s):        ED/Hospital Visit since last OV with provider  No active prescription written by provider  Responsible provider unclear    [facility] action(s):  Defer             Appointments  past 12m or future 3m with PCP    Date Provider   Last Visit   1/2/2024 Garry Stover MD   Next Visit   Visit date not found Garry Stover MD   ED visits in past 90 days: 0        Note composed:11:38 AM 04/22/2024

## 2024-04-28 DIAGNOSIS — J30.2 SEASONAL ALLERGIES: ICD-10-CM

## 2024-04-28 DIAGNOSIS — R05.1 ACUTE COUGH: ICD-10-CM

## 2024-04-28 NOTE — TELEPHONE ENCOUNTER
No care due was identified.  [facility] Embedded Care Due Messages. Reference number: [id number].   4/28/2024 8:03:44 AM CDT

## 2024-04-29 ENCOUNTER — ANTI-COAG VISIT (OUTPATIENT)
Dept: CARDIOLOGY | Facility: CLINIC | Age: 59
End: 2024-04-29
Payer: MEDICARE

## 2024-04-29 DIAGNOSIS — I48.92 PAROXYSMAL ATRIAL FLUTTER: ICD-10-CM

## 2024-04-29 DIAGNOSIS — Z79.01 LONG TERM (CURRENT) USE OF ANTICOAGULANTS: Primary | ICD-10-CM

## 2024-04-29 DIAGNOSIS — Z95.2 H/O MECHANICAL AORTIC VALVE REPLACEMENT: ICD-10-CM

## 2024-04-29 PROCEDURE — 93793 ANTICOAG MGMT PT WARFARIN: CPT | Mod: S$GLB,,,

## 2024-04-29 RX ORDER — FLUTICASONE PROPIONATE 50 MCG
SPRAY, SUSPENSION (ML) NASAL
Qty: 48 G | Refills: 2 | Status: SHIPPED | OUTPATIENT
Start: 2024-04-29

## 2024-04-29 NOTE — TELEPHONE ENCOUNTER
Refill Decision Note   Basimgracie Cameron  is requesting a refill authorization.  Brief Assessment and Rationale for Refill:  Approve     Medication Therapy Plan:        Comments:     Note composed:12:59 PM 04/29/2024

## 2024-04-30 DIAGNOSIS — E10.65 TYPE 1 DIABETES MELLITUS WITH HYPERGLYCEMIA: ICD-10-CM

## 2024-04-30 DIAGNOSIS — Z00.00 ENCOUNTER FOR MEDICARE ANNUAL WELLNESS EXAM: ICD-10-CM

## 2024-04-30 RX ORDER — GABAPENTIN 300 MG/1
CAPSULE ORAL
Qty: 360 CAPSULE | Refills: 1 | Status: SHIPPED | OUTPATIENT
Start: 2024-04-30

## 2024-04-30 NOTE — TELEPHONE ENCOUNTER
Refill Routing Note   Medication(s) are not appropriate for processing by [facility] for the following reason(s):        Outside of protocol    [facility] action(s):  Route             Appointments  past 12m or future 3m with PCP    Date Provider   Last Visit   1/2/2024 Garry Stover MD   Next Visit   Visit date not found Garry Stover MD   ED visits in past 90 days: 0        Note composed:8:31 AM 04/30/2024

## 2024-05-09 NOTE — PROGRESS NOTES
Subjective:      Patient ID: Cj Barnes is a 58 y.o. male.    Chief Complaint:   Wound Care (Rt heel ) and Diabetes Mellitus    Cj is a 58 y.o. male who presents to the podiatry clinic  with complaint of  right foot pain. Onset of the symptoms was several months ago. Precipitating event:  Previous surgery Kulwant's deformity right heel . Current symptoms include: ability to bear weight, but with some pain. Aggravating factors: any weight bearing. Symptoms have gradually worsened. Patient has had no prior foot problems. Evaluation to date:  Previous evaluation/radiographs/surgery/antibiotic therapy is/wound care .     Here for follow-up wound care.  Currently on antibiotic therapy for osteomyelitis right heel.  Today's his last day of PICC line.  Doing very well no pain.    3.28.24:   Patient presents for evaluation of healed posterior right heel ulceration.  Review of Systems   Constitutional: Negative for chills, decreased appetite, fever and night sweats.   HENT:  Negative for congestion, ear discharge, nosebleeds and tinnitus.    Eyes:  Negative for double vision, pain and visual disturbance.   Cardiovascular:  Negative for chest pain, claudication, cyanosis and palpitations.   Respiratory:  Negative for cough, hemoptysis, shortness of breath and wheezing.    Endocrine: Negative for cold intolerance and heat intolerance.   Hematologic/Lymphatic: Negative for adenopathy and bleeding problem.   Skin:  Positive for dry skin, poor wound healing and unusual hair distribution. Negative for color change.   Musculoskeletal:  Positive for arthritis, joint pain and stiffness. Negative for myalgias.   Gastrointestinal:  Negative for abdominal pain, dysphagia, nausea and vomiting.   Genitourinary:  Negative for dysuria, flank pain, hematuria and pelvic pain.   Neurological:  Positive for disturbances in coordination, numbness, paresthesias and sensory change.   Psychiatric/Behavioral:  Negative for  "altered mental status, hallucinations and suicidal ideas. The patient does not have insomnia.    Allergic/Immunologic: Negative for environmental allergies and persistent infections.           Objective:      Physical Exam  Vitals reviewed.   Constitutional:       Appearance: He is well-developed.   HENT:      Head: Normocephalic and atraumatic.   Cardiovascular:      Pulses:           Dorsalis pedis pulses are 1+ on the right side and 1+ on the left side.        Posterior tibial pulses are 1+ on the right side and 1+ on the left side.   Pulmonary:      Effort: Pulmonary effort is normal.   Musculoskeletal:         General: Normal range of motion.   Feet:      Right foot:      Skin integrity: Callus and dry skin present.      Left foot:      Skin integrity: Callus and dry skin present.   Skin:     General: Skin is warm and dry.      Capillary Refill: Capillary refill takes 2 to 3 seconds.      Coloration: Skin is pale.      Findings: No erythema or lesion.      Comments: Skin bilateral lower extremities noted to be thin, dry, and atrophic.    Posterior right heel  healed.   Neurological:      Mental Status: He is alert and oriented to person, place, and time.      Motor: No weakness.      Deep Tendon Reflexes:      Reflex Scores:       Patellar reflexes are 1+ on the right side and 1+ on the left side.       Achilles reflexes are 1+ on the right side and 1+ on the left side.  Psychiatric:         Behavior: Behavior normal.             Assessment:       Encounter Diagnosis   Name Primary?    Healed ulcer of foot on examination Yes     Independent visualization of imaging was performed.  Results were reviewed in detail with patient.       Plan:       Cj Branch" was seen today for wound care and diabetes mellitus.    Diagnoses and all orders for this visit:    Healed ulcer of foot on examination      I counseled the patient on his conditions, their implications and medical management.    The nature of the " condition, options for management, as well as potential risks and complications were discussed in detail with patient. Patient was amenable to my recommendations and left my office fully informed and will follow up as instructed or sooner if necessary.        Ulceration has remained well healed.  Continue current shoe gear.  Follow up as needed for high-risk foot care and examinations.

## 2024-05-13 ENCOUNTER — PATIENT MESSAGE (OUTPATIENT)
Dept: CARDIOLOGY | Facility: CLINIC | Age: 59
End: 2024-05-13

## 2024-05-13 ENCOUNTER — ANTI-COAG VISIT (OUTPATIENT)
Dept: CARDIOLOGY | Facility: CLINIC | Age: 59
End: 2024-05-13
Payer: MEDICARE

## 2024-05-13 DIAGNOSIS — Z95.2 H/O MECHANICAL AORTIC VALVE REPLACEMENT: ICD-10-CM

## 2024-05-13 DIAGNOSIS — Z79.01 LONG TERM (CURRENT) USE OF ANTICOAGULANTS: Primary | ICD-10-CM

## 2024-05-13 DIAGNOSIS — I48.92 PAROXYSMAL ATRIAL FLUTTER: ICD-10-CM

## 2024-05-13 PROCEDURE — 93793 ANTICOAG MGMT PT WARFARIN: CPT | Mod: S$GLB,,,

## 2024-05-20 ENCOUNTER — ANTI-COAG VISIT (OUTPATIENT)
Dept: CARDIOLOGY | Facility: CLINIC | Age: 59
End: 2024-05-20
Payer: MEDICARE

## 2024-05-20 ENCOUNTER — PATIENT MESSAGE (OUTPATIENT)
Dept: CARDIOLOGY | Facility: CLINIC | Age: 59
End: 2024-05-20

## 2024-05-20 DIAGNOSIS — Z79.01 LONG TERM (CURRENT) USE OF ANTICOAGULANTS: Primary | ICD-10-CM

## 2024-05-20 DIAGNOSIS — I48.92 PAROXYSMAL ATRIAL FLUTTER: ICD-10-CM

## 2024-05-20 DIAGNOSIS — Z95.2 H/O MECHANICAL AORTIC VALVE REPLACEMENT: ICD-10-CM

## 2024-05-20 PROCEDURE — 93793 ANTICOAG MGMT PT WARFARIN: CPT | Mod: S$GLB,,,

## 2024-05-29 ENCOUNTER — ANTI-COAG VISIT (OUTPATIENT)
Dept: CARDIOLOGY | Facility: CLINIC | Age: 59
End: 2024-05-29
Payer: MEDICARE

## 2024-05-29 ENCOUNTER — PATIENT MESSAGE (OUTPATIENT)
Dept: CARDIOLOGY | Facility: CLINIC | Age: 59
End: 2024-05-29

## 2024-05-29 DIAGNOSIS — Z79.01 LONG TERM (CURRENT) USE OF ANTICOAGULANTS: Primary | ICD-10-CM

## 2024-05-29 DIAGNOSIS — Z95.2 H/O MECHANICAL AORTIC VALVE REPLACEMENT: ICD-10-CM

## 2024-05-29 DIAGNOSIS — I48.92 PAROXYSMAL ATRIAL FLUTTER: ICD-10-CM

## 2024-05-29 PROCEDURE — 93793 ANTICOAG MGMT PT WARFARIN: CPT | Mod: S$GLB,,,

## 2024-06-05 ENCOUNTER — PATIENT MESSAGE (OUTPATIENT)
Dept: CARDIOLOGY | Facility: CLINIC | Age: 59
End: 2024-06-05

## 2024-06-05 ENCOUNTER — ANTI-COAG VISIT (OUTPATIENT)
Dept: CARDIOLOGY | Facility: CLINIC | Age: 59
End: 2024-06-05
Payer: MEDICARE

## 2024-06-05 DIAGNOSIS — Z79.01 LONG TERM (CURRENT) USE OF ANTICOAGULANTS: Primary | ICD-10-CM

## 2024-06-05 DIAGNOSIS — Z95.2 H/O MECHANICAL AORTIC VALVE REPLACEMENT: ICD-10-CM

## 2024-06-05 DIAGNOSIS — I48.92 PAROXYSMAL ATRIAL FLUTTER: ICD-10-CM

## 2024-06-05 PROCEDURE — 93793 ANTICOAG MGMT PT WARFARIN: CPT | Mod: S$GLB,,,

## 2024-06-12 ENCOUNTER — PATIENT MESSAGE (OUTPATIENT)
Dept: CARDIOLOGY | Facility: CLINIC | Age: 59
End: 2024-06-12

## 2024-06-12 ENCOUNTER — ANTI-COAG VISIT (OUTPATIENT)
Dept: CARDIOLOGY | Facility: CLINIC | Age: 59
End: 2024-06-12
Payer: MEDICARE

## 2024-06-12 DIAGNOSIS — I48.92 PAROXYSMAL ATRIAL FLUTTER: ICD-10-CM

## 2024-06-12 DIAGNOSIS — Z79.01 LONG TERM (CURRENT) USE OF ANTICOAGULANTS: Primary | ICD-10-CM

## 2024-06-12 DIAGNOSIS — Z95.2 H/O MECHANICAL AORTIC VALVE REPLACEMENT: ICD-10-CM

## 2024-06-12 PROCEDURE — 93793 ANTICOAG MGMT PT WARFARIN: CPT | Mod: S$GLB,,,

## 2024-06-19 ENCOUNTER — PATIENT MESSAGE (OUTPATIENT)
Dept: DIABETES | Facility: CLINIC | Age: 59
End: 2024-06-19
Payer: MEDICARE

## 2024-06-19 DIAGNOSIS — E10.65 TYPE 1 DIABETES MELLITUS WITH HYPERGLYCEMIA: ICD-10-CM

## 2024-06-19 RX ORDER — INSULIN DETEMIR 100 [IU]/ML
40 INJECTION, SOLUTION SUBCUTANEOUS NIGHTLY
Qty: 20 ML | Refills: 3 | Status: SHIPPED | OUTPATIENT
Start: 2024-06-19 | End: 2025-06-19

## 2024-06-26 ENCOUNTER — PATIENT MESSAGE (OUTPATIENT)
Dept: CARDIOLOGY | Facility: CLINIC | Age: 59
End: 2024-06-26

## 2024-06-26 ENCOUNTER — ANTI-COAG VISIT (OUTPATIENT)
Dept: CARDIOLOGY | Facility: CLINIC | Age: 59
End: 2024-06-26
Payer: MEDICARE

## 2024-06-26 DIAGNOSIS — Z79.01 LONG TERM (CURRENT) USE OF ANTICOAGULANTS: Primary | ICD-10-CM

## 2024-06-26 DIAGNOSIS — Z95.2 H/O MECHANICAL AORTIC VALVE REPLACEMENT: ICD-10-CM

## 2024-06-26 DIAGNOSIS — I48.92 PAROXYSMAL ATRIAL FLUTTER: ICD-10-CM

## 2024-06-26 PROCEDURE — 93793 ANTICOAG MGMT PT WARFARIN: CPT | Mod: S$GLB,,,

## 2024-06-26 NOTE — PROGRESS NOTES
INR at goal. Medications and chart reviewed. No changes noted to necessitate adjustment of warfarin or follow-up plan. See calendar.    Watch trend

## 2024-07-10 ENCOUNTER — ANTI-COAG VISIT (OUTPATIENT)
Dept: CARDIOLOGY | Facility: CLINIC | Age: 59
End: 2024-07-10
Payer: MEDICARE

## 2024-07-10 ENCOUNTER — PATIENT MESSAGE (OUTPATIENT)
Dept: CARDIOLOGY | Facility: CLINIC | Age: 59
End: 2024-07-10

## 2024-07-10 DIAGNOSIS — Z79.01 LONG TERM (CURRENT) USE OF ANTICOAGULANTS: Primary | ICD-10-CM

## 2024-07-10 DIAGNOSIS — I48.92 PAROXYSMAL ATRIAL FLUTTER: ICD-10-CM

## 2024-07-10 DIAGNOSIS — Z95.2 H/O MECHANICAL AORTIC VALVE REPLACEMENT: ICD-10-CM

## 2024-07-22 ENCOUNTER — TELEPHONE (OUTPATIENT)
Dept: ORTHOPEDICS | Facility: CLINIC | Age: 59
End: 2024-07-22
Payer: MEDICARE

## 2024-07-22 DIAGNOSIS — M25.522 LEFT ELBOW PAIN: Primary | ICD-10-CM

## 2024-07-22 NOTE — TELEPHONE ENCOUNTER
Spoke c pt. Confirmed appt location & time c Dr. Desai 07/25/24 with XR prior. Pt expressed understanding & was thankful.      ----- Message from Ester Jacques sent at 7/22/2024 11:20 AM CDT -----  Type: Patient Call Back    Who called:pt     What is the request in detail:pt requesting to get order for xray for left elbow. Please call pt     Can the clinic reply by [facility]?    Would the patient rather a call back or a response via [facility]? call    Best call back number:[phone] (home)       Additional Information:

## 2024-07-25 ENCOUNTER — OFFICE VISIT (OUTPATIENT)
Dept: ORTHOPEDICS | Facility: CLINIC | Age: 59
End: 2024-07-25
Payer: MEDICARE

## 2024-07-25 ENCOUNTER — HOSPITAL ENCOUNTER (OUTPATIENT)
Dept: RADIOLOGY | Facility: OTHER | Age: 59
Discharge: HOME | End: 2024-07-25
Attending: ORTHOPAEDIC SURGERY
Payer: MEDICARE

## 2024-07-25 DIAGNOSIS — M25.522 LEFT ELBOW PAIN: ICD-10-CM

## 2024-07-25 DIAGNOSIS — G56.20 ULNAR NEUROPATHY AT ELBOW, UNSPECIFIED LATERALITY: Primary | ICD-10-CM

## 2024-07-25 DIAGNOSIS — G56.22 ULNAR NEUROPATHY AT ELBOW, LEFT: ICD-10-CM

## 2024-07-25 PROCEDURE — 73080 X-RAY EXAM OF ELBOW: CPT | Mod: 26,LT,, | Performed by: RADIOLOGY

## 2024-07-25 PROCEDURE — 73080 X-RAY EXAM OF ELBOW: CPT | Mod: TC,FY,LT

## 2024-07-25 PROCEDURE — 99999 PR PBB SHADOW E&M-EST. PATIENT-LVL III: CPT | Mod: PBBFAC,,, | Performed by: ORTHOPAEDIC SURGERY

## 2024-07-25 NOTE — PROGRESS NOTES
"Subjective:      Patient ID: Cj Barnes is a 58 y.o. male.    Chief Complaint: Pain of the Left Elbow      HPI  Cj Barnes is a right hand dominant 58 y.o. male presenting today for left elbow pain. 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  There was not a history of trauma.  Onset of symptoms began about eight months.  He describes it as popping and shooting  8/10 pain, that radiates into the biceps.  Symptoms aggravated by activity.Pt reports loss of  strength and is dropping things. Pt denies lateral elbow pain. Pt states that and is keeping him up at night due to positional pain. Pt states that OTC medications have been taking, but nothing is making it better.        The patient denies any fevers, chills, N/V, D/C and presents for evaluation.      Review of patient's allergies indicates:  No Known Allergies      Current Outpatient Medications   Medication Sig Dispense Refill    aspirin (ECOTRIN) 81 MG EC tablet Take 1 tablet (81 mg total) by mouth once daily.  0    BAQSIMI 3 mg/actuation Spry USE ONE actuation into a single nostril no response MAY REPEAT in 15 minutes USE a new device 2 each 1    blood-glucose sensor (DEXCOM G7 SENSOR) Apple 1 Device by Misc.(Non-Drug; Combo Route) route every 10 days. 3 each 11    COMFORT EZ PEN NEEDLES 33 gauge x 3/16" Ndle USE AS DIRECTED with Levemir pens  11    COMIRNATY 2023-24, 12Y UP,,PF, 30 mcg/0.3 mL injection       empagliflozin (JARDIANCE) 25 mg tablet Take 1 tablet (25 mg total) by mouth once daily. 30 tablet 11    EScitalopram oxalate (LEXAPRO) 10 MG tablet Take 1 tablet (10 mg total) by mouth once daily. (Patient taking differently: Take 5 mg by mouth once daily.) 90 tablet 3    fluticasone propionate (FLONASE) 50 mcg/actuation nasal spray SPRAY TWO SPRAYS IN EACH NOSTRIL DAILY 48 g 2    gabapentin (NEURONTIN) 300 MG capsule TAKE TWO CAPSULES BY MOUTH THREE TIMES DAILY AS NEEDED FOR nerve " "pain 360 capsule 1    glucagon, human recombinant, (GLUCAGON EMERGENCY KIT, HUMAN,) 1 mg SolR Inject 1 mg into the muscle as needed. (Patient not taking: Reported on 3/11/2024) 1 each 0    hydroCHLOROthiazide (HYDRODIURIL) 12.5 MG Tab TAKE ONE CAPSULE BY MOUTH ONCE DAILY 90 tablet 3    insulin detemir U-100 (LEVEMIR U-100 INSULIN) 100 unit/mL injection Inject 40 Units into the skin every evening. To have on file in case of insulin pump failure.  Patient does not need prescription right now. Vials please 20 mL 3    insulin lispro 100 unit/mL injection USE CONTINUOUSLY WITH INSULIN PUMP(MAX DAILY DOSE  UNITS) 60 mL 11    insulin syringe-needle U-100 1/2 mL 31 gauge x 15/64" Syrg To use 5 times per day with insulin injections if off of insulin pump.  To have on file in case of pump failure 150 each 11    irbesartan (AVAPRO) 150 MG tablet Take 1 tablet (150 mg total) by mouth once daily. 90 tablet 3    metFORMIN (GLUCOPHAGE-XR) 500 MG ER 24hr tablet Take 1 tablet (500 mg total) by mouth 2 (two) times daily with meals. 180 tablet 2    mupirocin (BACTROBAN) 2 % ointment To use 2 times per day as needed 22 g 3    nystatin (MYCOSTATIN) cream Apply topically 2 (two) times daily. 1 Tube 5    pantoprazole (PROTONIX) 40 MG tablet TAKE ONE TABLET BY MOUTH DAILY 90 tablet 1    rosuvastatin (CRESTOR) 40 MG Tab Take 1 tablet (40 mg total) by mouth every evening. 90 tablet 3    tadalafiL (CIALIS) 10 MG tablet Take 1 tablet (10 mg total) by mouth daily as needed for Erectile Dysfunction. 90 tablet 3    warfarin (COUMADIN) 3 MG tablet Take 2 tablets (6 mg total) by mouth Daily. 4.5mg everyday except Tuesday takes 6mg 180 tablet 3     No current facility-administered medications for this visit.       Past Medical History:   Diagnosis Date    Anemia     Aortic stenosis 2018    Cancer 2014    Colon cancer     Coronary artery disease     Diabetes mellitus type I     since in his 30's    Heart murmur     Heel fracture     HTN " (hypertension)     Hyperlipidemia LDL goal < 70     Insulin pump in place     MVP (mitral valve prolapse)     Stenosis of aortic and mitral valves        Past Surgical History:   Procedure Laterality Date    AORTIC VALVE REPLACEMENT N/A 8/9/2019    Procedure: Replacement-valve-aortic;  Surgeon: Ryan Knight MD;  Location: [facility] OR [address] [place] [address];  Service: Cardiothoracic;  Laterality: N/A;    BACK SURGERY      BENTALL PROCEDURE FOR REPLACEMENT OF AORTIC VALVE, AORTIC ROOT, AND ASCENDING AORTA N/A 8/9/2019    Procedure: BENTALL PROCEDURE;  Surgeon: Ryan Knight MD;  Location: [facility] OR [address] [place] [address];  Service: Cardiothoracic;  Laterality: N/A;    CARDIAC SURGERY      CARPAL TUNNEL RELEASE      COLON SURGERY  2014    COLONOSCOPY N/A 2/17/2020    Procedure: COLONOSCOPY;  Surgeon: Richi Mock MD;  Location: [facility];  Service: Colon and Rectal;  Laterality: N/A;    COLONOSCOPY N/A 10/26/2023    Procedure: COLONOSCOPY;  Surgeon: Yadira Louis MD;  Location: [facility] ENDO (4TH FLR);  Service: Endoscopy;  Laterality: N/A;  ok to hold Coumadin x5 days per [facility], see telephone encounter 8/4/23  ref by KELSIE Cao/ suprep inst portal-RB  10/19-precall complete-pt verbalized understanding of holding Ozempic-MS    DEBRIDEMENT OF ACHILLES TENDON Right 1/27/2023    Procedure: DEBRIDEMENT, TENDON, ACHILLES;  Surgeon: Eugene Bowers DPM;  Location: [facility] OR [address] [place] [address];  Service: Podiatry;  Laterality: Right;    ELBOW SURGERY      tendon release    ESOPHAGOGASTRODUODENOSCOPY N/A 9/10/2020    Procedure: EGD (ESOPHAGOGASTRODUODENOSCOPY);  Surgeon: Abilio Boo MD;  Location: [facility] ENDO (4TH FLR);  Service: Endoscopy;  Laterality: N/A;  Cardiac clearance received, see telephone encounter 8/27/20-BB  Approval to hold Coumadin prior to procedure received, see telephone encounter 8/27/20-BB  covid-9/7/20-[facility]    FOOT TENDON SURGERY      OSTECTOMY Right 1/27/2023    Procedure: OSTECTOMY;  Surgeon:  Eugene Bowers DPM;  Location: [facility] 2ND FLR;  Service: Podiatry;  Laterality: Right;  calcaneal    right and left heart cath Bilateral 01/15/2018    TREATMENT OF CARDIAC ARRHYTHMIA N/A 8/19/2019    Procedure: CARDIOVERSION;  Surgeon: Juan Zapata MD;  Location: [facility] EP LAB;  Service: Cardiology;  Laterality: N/A;  afib, dccv only, anes, GP, 3092    TRIGGER FINGER RELEASE      x 2    TRIGGER FINGER RELEASE Left 1/5/2022    Procedure: RELEASE, TRIGGER FINGER,LEFT,SMALL & THUMB;  Surgeon: Dilma Hunter MD;  Location: [facility] OR;  Service: Orthopedics;  Laterality: Left;       Review of Systems:  Constitutional: Negative for chills and fever.   Respiratory: Negative for cough and shortness of breath.    Gastrointestinal: Negative for nausea and vomiting.   Skin: Negative for rash.   Neurological: Negative for dizziness and headaches.   Psychiatric/Behavioral: Negative for depression.   MSK as in HPI       OBJECTIVE:     PHYSICAL EXAM:  There were no vitals taken for this visit.    GEN:  NAD, well-developed, well-groomed.  NEURO: Awake, alert, and oriented. Normal attention and concentration.    PSYCH: Normal mood and affect. Behavior is normal.  HEENT: No cervical lymphadenopathy noted.  CARDIOVASCULAR: Radial pulses 2+ bilaterally. No LE edema noted.  PULMONARY: Breath sounds normal. No respiratory distress.  SKIN: Intact, no rashes.      MSK:   Hand/Wrist Musculoskeletal Exam  RUE:  Good active ROM of the wrist and fingers. AIN/PIN/Radial/Median/Ulnar Nerves assessed in isolation without deficit. Radial & Ulnar arteries palpated 2+. Capillary Refill <3s.    LUE: TTP at medial aspect of elbow, over the ulnar nerve. Postive tinels at the cubital tunnel.  Positive provocative examination for cubital tunnel Good active ROM of the wrist and fingers. AIN/PIN/Radial/Median/Ulnar Nerves assessed in isolation without deficit. Radial & Ulnar arteries palpated 2+. Capillary Refill <3s.      RADIOGRAPHS:  Left  Elbow:  FINDINGS:  Elbow complete three views left.     There is DJD.  There is a triceps insertion spur.  No joint effusion seen.  No fracture, dislocation, bone destruction, or foreign body seen.  Comments: I have personally reviewed the imaging and I agree with the above radiologist's report.    ASSESSMENT/PLAN:   No diagnosis found.       No orders of the defined types were placed in this encounter.       Plan:   [place] [facility] [place]  F/u with Lloyd to discuss surgery          The patient indicates understanding of these issues and agrees to the plan.        This note has been scribed in part by Oliva Armas, ATC/L, OTC, my Sports Medicine Assistant (SMA). This SMA performed & documented a complete history pre-assessment including the history of present illness, which I, Dilma Hunter MD, explored & confirmed personally with the patient. The SMA has scribed portions of this note including my physical exanimation, diagnostic imaging interpretation, procedures performed, my plan of care & diagnosis. I agree that the scribed documentation is accurate & complete.

## 2024-07-31 ENCOUNTER — ANTI-COAG VISIT (OUTPATIENT)
Dept: CARDIOLOGY | Facility: CLINIC | Age: 59
End: 2024-07-31
Payer: MEDICARE

## 2024-07-31 ENCOUNTER — PATIENT MESSAGE (OUTPATIENT)
Dept: CARDIOLOGY | Facility: CLINIC | Age: 59
End: 2024-07-31

## 2024-07-31 DIAGNOSIS — Z95.2 H/O MECHANICAL AORTIC VALVE REPLACEMENT: ICD-10-CM

## 2024-07-31 DIAGNOSIS — Z79.01 LONG TERM (CURRENT) USE OF ANTICOAGULANTS: Primary | ICD-10-CM

## 2024-07-31 DIAGNOSIS — I48.92 PAROXYSMAL ATRIAL FLUTTER: ICD-10-CM

## 2024-07-31 PROCEDURE — 93793 ANTICOAG MGMT PT WARFARIN: CPT | Mod: S$GLB,,,

## 2024-08-06 ENCOUNTER — DOCUMENTATION ONLY (OUTPATIENT)
Dept: ORTHOPEDICS | Facility: CLINIC | Age: 59
End: 2024-08-06
Payer: MEDICARE

## 2024-08-06 ENCOUNTER — TELEPHONE (OUTPATIENT)
Dept: NEUROLOGY | Facility: CLINIC | Age: 59
End: 2024-08-06
Payer: MEDICARE

## 2024-08-06 ENCOUNTER — PATIENT MESSAGE (OUTPATIENT)
Dept: ORTHOPEDICS | Facility: CLINIC | Age: 59
End: 2024-08-06
Payer: MEDICARE

## 2024-08-15 ENCOUNTER — PATIENT MESSAGE (OUTPATIENT)
Dept: ORTHOPEDICS | Facility: CLINIC | Age: 59
End: 2024-08-15
Payer: MEDICARE

## 2024-08-21 ENCOUNTER — ANTI-COAG VISIT (OUTPATIENT)
Dept: CARDIOLOGY | Facility: CLINIC | Age: 59
End: 2024-08-21
Payer: MEDICARE

## 2024-08-21 ENCOUNTER — PATIENT MESSAGE (OUTPATIENT)
Dept: CARDIOLOGY | Facility: CLINIC | Age: 59
End: 2024-08-21

## 2024-08-21 DIAGNOSIS — I48.92 PAROXYSMAL ATRIAL FLUTTER: ICD-10-CM

## 2024-08-21 DIAGNOSIS — Z79.01 LONG TERM (CURRENT) USE OF ANTICOAGULANTS: Primary | ICD-10-CM

## 2024-08-21 DIAGNOSIS — Z95.2 H/O MECHANICAL AORTIC VALVE REPLACEMENT: ICD-10-CM

## 2024-08-21 PROCEDURE — 93793 ANTICOAG MGMT PT WARFARIN: CPT | Mod: S$GLB,,,

## 2024-08-21 NOTE — PROGRESS NOTES
INR low due to diet. Medications, chart, and patient findings reviewed. See calendar for adjustments to dose and follow up plan.

## 2024-08-28 ENCOUNTER — PATIENT MESSAGE (OUTPATIENT)
Dept: DIABETES | Facility: CLINIC | Age: 59
End: 2024-08-28
Payer: MEDICARE

## 2024-08-29 ENCOUNTER — TELEPHONE (OUTPATIENT)
Dept: PREADMISSION TESTING | Facility: HOSPITAL | Age: 59
End: 2024-08-29
Payer: MEDICARE

## 2024-08-29 ENCOUNTER — OFFICE VISIT (OUTPATIENT)
Dept: ORTHOPEDICS | Facility: CLINIC | Age: 59
End: 2024-08-29
Payer: MEDICARE

## 2024-08-29 ENCOUNTER — DOCUMENTATION ONLY (OUTPATIENT)
Dept: ORTHOPEDICS | Facility: CLINIC | Age: 59
End: 2024-08-29

## 2024-08-29 DIAGNOSIS — M25.522 LEFT ELBOW PAIN: ICD-10-CM

## 2024-08-29 DIAGNOSIS — G56.22 ULNAR NEUROPATHY AT ELBOW, LEFT: Primary | ICD-10-CM

## 2024-08-29 PROCEDURE — 3061F NEG MICROALBUMINURIA REV: CPT | Mod: CPTII,S$GLB,, | Performed by: ORTHOPAEDIC SURGERY

## 2024-08-29 PROCEDURE — 99999 PR PBB SHADOW E&M-EST. PATIENT-LVL III: CPT | Mod: PBBFAC,,, | Performed by: ORTHOPAEDIC SURGERY

## 2024-08-29 PROCEDURE — 1159F MED LIST DOCD IN RCRD: CPT | Mod: CPTII,S$GLB,, | Performed by: ORTHOPAEDIC SURGERY

## 2024-08-29 PROCEDURE — 3066F NEPHROPATHY DOC TX: CPT | Mod: CPTII,S$GLB,, | Performed by: ORTHOPAEDIC SURGERY

## 2024-08-29 PROCEDURE — 4010F ACE/ARB THERAPY RXD/TAKEN: CPT | Mod: CPTII,S$GLB,, | Performed by: ORTHOPAEDIC SURGERY

## 2024-08-29 PROCEDURE — 99214 OFFICE O/P EST MOD 30 MIN: CPT | Mod: S$GLB,,, | Performed by: ORTHOPAEDIC SURGERY

## 2024-08-29 PROCEDURE — 3044F HG A1C LEVEL LT 7.0%: CPT | Mod: CPTII,S$GLB,, | Performed by: ORTHOPAEDIC SURGERY

## 2024-08-29 NOTE — PROGRESS NOTES
"Subjective:      Patient ID: Cj Barnes is a 58 y.o. male.    Chief Complaint: Pain of the Left Elbow      HPI  Cj Barnes is a right hand dominant 58 y.o. male presenting today for left elbow pain. 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  There was not a history of trauma.  Onset of symptoms began about eight months.  He describes it as popping and shooting  8/10 pain, that radiates into the biceps.  Symptoms aggravated by activity.Pt reports loss of  strength and is dropping things. Pt denies lateral elbow pain. Pt states that and is keeping him up at night due to positional pain. Pt states that OTC medications have been taking, but nothing is making it better.        The patient denies any fevers, chills, N/V, D/C and presents for evaluation.    Interval Update 08/29/24  Pt presents in clinic today to review his [facility] EMG 08/15/2024. Pt reports constant pain that is radiating up his left arm into the triceps and down into the forearm. Pt states that resting his elbow causes increase numbness. He is ready to proceed with surgery       Review of patient's allergies indicates:  No Known Allergies      Current Outpatient Medications   Medication Sig Dispense Refill    aspirin (ECOTRIN) 81 MG EC tablet Take 1 tablet (81 mg total) by mouth once daily.  0    BAQSIMI 3 mg/actuation Spry USE ONE actuation into a single nostril no response MAY REPEAT in 15 minutes USE a new device 2 each 1    blood-glucose sensor (DEXCOM G7 SENSOR) Apple 1 Device by Misc.(Non-Drug; Combo Route) route every 10 days. 3 each 11    COMFORT EZ PEN NEEDLES 33 gauge x 3/16" Ndle USE AS DIRECTED with Levemir pens  11    COMIRNATY 2023-24, 12Y UP,,PF, 30 mcg/0.3 mL injection       empagliflozin (JARDIANCE) 25 mg tablet Take 1 tablet (25 mg total) by mouth once daily. 30 tablet 11    EScitalopram oxalate (LEXAPRO) 10 MG tablet Take 1 tablet (10 mg total) by mouth once daily. " "(Patient taking differently: Take 5 mg by mouth once daily.) 90 tablet 3    fluticasone propionate (FLONASE) 50 mcg/actuation nasal spray SPRAY TWO SPRAYS IN EACH NOSTRIL DAILY 48 g 2    gabapentin (NEURONTIN) 300 MG capsule TAKE TWO CAPSULES BY MOUTH THREE TIMES DAILY AS NEEDED FOR nerve pain 360 capsule 1    glucagon, human recombinant, (GLUCAGON EMERGENCY KIT, HUMAN,) 1 mg SolR Inject 1 mg into the muscle as needed. (Patient not taking: Reported on 3/11/2024) 1 each 0    hydroCHLOROthiazide (HYDRODIURIL) 12.5 MG Tab TAKE ONE CAPSULE BY MOUTH ONCE DAILY 90 tablet 3    insulin detemir U-100 (LEVEMIR U-100 INSULIN) 100 unit/mL injection Inject 40 Units into the skin every evening. To have on file in case of insulin pump failure.  Patient does not need prescription right now. Vials please 20 mL 3    insulin lispro 100 unit/mL injection USE CONTINUOUSLY WITH INSULIN PUMP(MAX DAILY DOSE  UNITS) 60 mL 11    insulin syringe-needle U-100 1/2 mL 31 gauge x 15/64" Syrg To use 5 times per day with insulin injections if off of insulin pump.  To have on file in case of pump failure 150 each 11    irbesartan (AVAPRO) 150 MG tablet Take 1 tablet (150 mg total) by mouth once daily. 90 tablet 3    metFORMIN (GLUCOPHAGE-XR) 500 MG ER 24hr tablet Take 1 tablet (500 mg total) by mouth 2 (two) times daily with meals. 180 tablet 2    mupirocin (BACTROBAN) 2 % ointment To use 2 times per day as needed 22 g 3    nystatin (MYCOSTATIN) cream Apply topically 2 (two) times daily. 1 Tube 5    pantoprazole (PROTONIX) 40 MG tablet TAKE ONE TABLET BY MOUTH DAILY 90 tablet 1    rosuvastatin (CRESTOR) 40 MG Tab Take 1 tablet (40 mg total) by mouth every evening. 90 tablet 3    tadalafiL (CIALIS) 10 MG tablet Take 1 tablet (10 mg total) by mouth daily as needed for Erectile Dysfunction. 90 tablet 3    warfarin (COUMADIN) 3 MG tablet Take 2 tablets (6 mg total) by mouth Daily. 4.5mg everyday except Tuesday takes 6mg 180 tablet 3     No current " facility-administered medications for this visit.       Past Medical History:   Diagnosis Date    Anemia     Aortic stenosis 2018    Cancer 2014    Colon cancer     Coronary artery disease     Diabetes mellitus type I     since in his 30's    Heart murmur     Heel fracture     HTN (hypertension)     Hyperlipidemia LDL goal < 70     Insulin pump in place     MVP (mitral valve prolapse)     Stenosis of aortic and mitral valves        Past Surgical History:   Procedure Laterality Date    AORTIC VALVE REPLACEMENT N/A 8/9/2019    Procedure: Replacement-valve-aortic;  Surgeon: Ryan Knight MD;  Location: [facility] OR [address] [place] [address];  Service: Cardiothoracic;  Laterality: N/A;    BACK SURGERY      BENTALL PROCEDURE FOR REPLACEMENT OF AORTIC VALVE, AORTIC ROOT, AND ASCENDING AORTA N/A 8/9/2019    Procedure: BENTALL PROCEDURE;  Surgeon: Ryan Knight MD;  Location: [facility] OR [address] [place] [address];  Service: Cardiothoracic;  Laterality: N/A;    CARDIAC SURGERY      CARPAL TUNNEL RELEASE      COLON SURGERY  2014    COLONOSCOPY N/A 2/17/2020    Procedure: COLONOSCOPY;  Surgeon: Richi Mock MD;  Location: [facility];  Service: Colon and Rectal;  Laterality: N/A;    COLONOSCOPY N/A 10/26/2023    Procedure: COLONOSCOPY;  Surgeon: Yadira Louis MD;  Location: [facility] ENDO (4TH FLR);  Service: Endoscopy;  Laterality: N/A;  ok to hold Coumadin x5 days per [facility], see telephone encounter 8/4/23  ref by KELSIE Cao/ suprep inst portal-RB  10/19-precall complete-pt verbalized understanding of holding Ozempic-[place]    DEBRIDEMENT OF ACHILLES TENDON Right 1/27/2023    Procedure: DEBRIDEMENT, TENDON, ACHILLES;  Surgeon: Eugene Bowers DPM;  [facility];  Service: Podiatry;  Laterality: Right;    ELBOW SURGERY      tendon release    ESOPHAGOGASTRODUODENOSCOPY N/A 9/10/2020    Procedure: EGD (ESOPHAGOGASTRODUODENOSCOPY);  Surgeon: Abilio Boo MD;  Location: [facility] ENDO (4TH FLR);  Service: Endoscopy;  Laterality: N/A;   Cardiac clearance received, see telephone encounter 8/27/20-BB  Approval to hold Coumadin prior to procedure received, see telephone encounter 8/27/20-BB  covid-9/7/20-[facility]    FOOT TENDON SURGERY      OSTECTOMY Right 1/27/2023    Procedure: OSTECTOMY;  Surgeon: Eugene Bowers DPM;  Location: [address] FLR;  Service: Podiatry;  Laterality: Right;  calcaneal    right and left heart cath Bilateral 01/15/2018    TREATMENT OF CARDIAC ARRHYTHMIA N/A 8/19/2019    Procedure: CARDIOVERSION;  Surgeon: Juan Zapata MD;  Location: [facility] EP LAB;  Service: Cardiology;  Laterality: N/A;  afib, dccv only, anes, GP, 3092    TRIGGER FINGER RELEASE      x 2    TRIGGER FINGER RELEASE Left 1/5/2022    Procedure: RELEASE, TRIGGER FINGER,LEFT,SMALL & THUMB;  Surgeon: Dilma Hunter MD;  Location: [facility];  Service: Orthopedics;  Laterality: Left;       Review of Systems:  Constitutional: Negative for chills and fever.   Respiratory: Negative for cough and shortness of breath.    Gastrointestinal: Negative for nausea and vomiting.   Skin: Negative for rash.   Neurological: Negative for dizziness and headaches.   Psychiatric/Behavioral: Negative for depression.   MSK as in HPI       OBJECTIVE:     PHYSICAL EXAM:  There were no vitals taken for this visit.    GEN:  NAD, well-developed, well-groomed.  NEURO: Awake, alert, and oriented. Normal attention and concentration.    PSYCH: Normal mood and affect. Behavior is normal.  HEENT: No cervical lymphadenopathy noted.  CARDIOVASCULAR: Radial pulses 2+ bilaterally. No LE edema noted.  PULMONARY: Breath sounds normal. No respiratory distress.  SKIN: Intact, no rashes.      MSK:   Hand/Wrist Musculoskeletal Exam  RUE:  Good active ROM of the wrist and fingers. AIN/PIN/Radial/Median/Ulnar Nerves assessed in isolation with PIN deficit. Radial & Ulnar arteries palpated 2+. Capillary Refill <3s. Positive tinels at the cubital tunnel, negative tinels at the carpal  tunnel    LUE: TTP at medial aspect of elbow, over the ulnar nerve. Good active ROM of the wrist and fingers. AIN/PIN/Radial/Median/Ulnar Nerves assessed in isolation without deficit. Radial & Ulnar arteries palpated 2+. Capillary Refill <3s.  Positive provocative examination for cubital tunnel. Negative tinels carpal tunnel.      RADIOGRAPHS:  Left Elbow:  FINDINGS:  Elbow complete three views left.     There is DJD.  There is a triceps insertion spur.  No joint effusion seen.  No fracture, dislocation, bone destruction, or foreign body seen.  Comments: I have personally reviewed the imaging and I agree with the above radiologist's report.    EMG 08/15/2024  Bilateral moderate CTS, Bilateral CuTS: severe on the right and moderate on the left with mild Guyon's on the right side    ASSESSMENT/PLAN:   No diagnosis found.       No orders of the defined types were placed in this encounter.       Plan:     Left ulnar nerve decompression at elbow  Pt has no complaints or provacative exam for CTS ( he had CTR in the 80's)   I have explained the risks, benefits, and alternatives of the procedure to the patient in great detail. The patient voices understanding and all questions have been answered. The patient agrees with to proceed as planned. Consents were performed in clinic.       The patient indicates understanding of these issues and agrees to the plan.        This note has been scribed in part by Oliva Armas, ATC/L, OTC, my Sports Medicine Assistant (SMA). This SMA performed & documented a complete history pre-assessment including the history of present illness, which I, Dilma Hunter MD, explored & confirmed personally with the patient. The SMA has scribed portions of this note including my physical exanimation, diagnostic imaging interpretation, procedures performed, my plan of care & diagnosis. I agree that the scribed documentation is accurate & complete.

## 2024-08-31 DIAGNOSIS — F41.1 GAD (GENERALIZED ANXIETY DISORDER): ICD-10-CM

## 2024-08-31 DIAGNOSIS — F33.0 MAJOR DEPRESSIVE DISORDER, RECURRENT, MILD: ICD-10-CM

## 2024-08-31 NOTE — TELEPHONE ENCOUNTER
No care due was identified.  [facility] Embedded Care Due Messages. Reference number: [id number].   8/31/2024 8:04:00 AM CDT

## 2024-09-03 RX ORDER — ESCITALOPRAM OXALATE 10 MG/1
10 TABLET ORAL
Qty: 90 TABLET | Refills: 1 | Status: SHIPPED | OUTPATIENT
Start: 2024-09-03

## 2024-09-03 NOTE — TELEPHONE ENCOUNTER
Refill Decision Note   Cj Barnes  is requesting a refill authorization.  Brief Assessment and Rationale for Refill:  Approve     Medication Therapy Plan:  ED noted reviewed. Therapy unchanged. Will approve 90 days and alert PCP of acute care.      Comments:     Note composed:9:11 AM 09/03/2024

## 2024-09-04 ENCOUNTER — ANESTHESIA EVENT (OUTPATIENT)
Dept: SURGERY | Facility: OTHER | Age: 59
End: 2024-09-04
Payer: MEDICARE

## 2024-09-04 ENCOUNTER — HOSPITAL ENCOUNTER (OUTPATIENT)
Dept: PREADMISSION TESTING | Facility: OTHER | Age: 59
Discharge: HOME | End: 2024-09-04
Attending: ORTHOPAEDIC SURGERY
Payer: MEDICARE

## 2024-09-04 ENCOUNTER — ANTI-COAG VISIT (OUTPATIENT)
Dept: CARDIOLOGY | Facility: CLINIC | Age: 59
End: 2024-09-04
Payer: MEDICARE

## 2024-09-04 VITALS
TEMPERATURE: 98 F | HEART RATE: 52 BPM | OXYGEN SATURATION: 97 % | WEIGHT: 224 LBS | HEIGHT: 67 IN | RESPIRATION RATE: 16 BRPM | SYSTOLIC BLOOD PRESSURE: 126 MMHG | DIASTOLIC BLOOD PRESSURE: 61 MMHG | BODY MASS INDEX: 35.16 KG/M2

## 2024-09-04 DIAGNOSIS — I48.92 PAROXYSMAL ATRIAL FLUTTER: ICD-10-CM

## 2024-09-04 DIAGNOSIS — Z79.01 LONG TERM (CURRENT) USE OF ANTICOAGULANTS: Primary | ICD-10-CM

## 2024-09-04 DIAGNOSIS — Z95.2 H/O MECHANICAL AORTIC VALVE REPLACEMENT: ICD-10-CM

## 2024-09-04 DIAGNOSIS — Z01.818 PREOP TESTING: Primary | ICD-10-CM

## 2024-09-04 LAB
ANION GAP SERPL CALC-SCNC: 10 MMOL/L (ref 8–16)
BASOPHILS # BLD AUTO: 0.07 K/UL (ref 0–0.2)
BASOPHILS NFR BLD: 0.9 % (ref 0–1.9)
BUN SERPL-MCNC: 13 MG/DL (ref 6–20)
CALCIUM SERPL-MCNC: 9 MG/DL (ref 8.7–10.5)
CHLORIDE SERPL-SCNC: 104 MMOL/L (ref 95–110)
CO2 SERPL-SCNC: 26 MMOL/L (ref 23–29)
CREAT SERPL-MCNC: 1 MG/DL (ref 0.5–1.4)
DIFFERENTIAL METHOD BLD: ABNORMAL
EOSINOPHIL # BLD AUTO: 0.2 K/UL (ref 0–0.5)
EOSINOPHIL NFR BLD: 2.4 % (ref 0–8)
ERYTHROCYTE [DISTWIDTH] IN BLOOD BY AUTOMATED COUNT: 15 % (ref 11.5–14.5)
EST. GFR  (NO RACE VARIABLE): >60 ML/MIN/1.73 M^2
GLUCOSE SERPL-MCNC: 123 MG/DL (ref 70–110)
HCT VFR BLD AUTO: 43.3 % (ref 40–54)
HGB BLD-MCNC: 13.6 G/DL (ref 14–18)
IMM GRANULOCYTES # BLD AUTO: 0.02 K/UL (ref 0–0.04)
IMM GRANULOCYTES NFR BLD AUTO: 0.2 % (ref 0–0.5)
LYMPHOCYTES # BLD AUTO: 1.6 K/UL (ref 1–4.8)
LYMPHOCYTES NFR BLD: 19.8 % (ref 18–48)
MCH RBC QN AUTO: 26.2 PG (ref 27–31)
MCHC RBC AUTO-ENTMCNC: 31.4 G/DL (ref 32–36)
MCV RBC AUTO: 83 FL (ref 82–98)
MONOCYTES # BLD AUTO: 0.5 K/UL (ref 0.3–1)
MONOCYTES NFR BLD: 6.2 % (ref 4–15)
NEUTROPHILS # BLD AUTO: 5.7 K/UL (ref 1.8–7.7)
NEUTROPHILS NFR BLD: 70.5 % (ref 38–73)
NRBC BLD-RTO: 0 /100 WBC
PLATELET # BLD AUTO: 172 K/UL (ref 150–450)
PMV BLD AUTO: 10.3 FL (ref 9.2–12.9)
POTASSIUM SERPL-SCNC: 3.8 MMOL/L (ref 3.5–5.1)
RBC # BLD AUTO: 5.2 M/UL (ref 4.6–6.2)
SODIUM SERPL-SCNC: 140 MMOL/L (ref 136–145)
WBC # BLD AUTO: 8.05 K/UL (ref 3.9–12.7)

## 2024-09-04 PROCEDURE — 80048 BASIC METABOLIC PNL TOTAL CA: CPT | Performed by: ANESTHESIOLOGY

## 2024-09-04 PROCEDURE — 85025 COMPLETE CBC W/AUTO DIFF WBC: CPT | Performed by: ANESTHESIOLOGY

## 2024-09-04 PROCEDURE — 36415 COLL VENOUS BLD VENIPUNCTURE: CPT | Performed by: ANESTHESIOLOGY

## 2024-09-04 PROCEDURE — 93793 ANTICOAG MGMT PT WARFARIN: CPT | Mod: S$GLB,,,

## 2024-09-04 RX ORDER — SODIUM CHLORIDE, SODIUM LACTATE, POTASSIUM CHLORIDE, CALCIUM CHLORIDE 600; 310; 30; 20 MG/100ML; MG/100ML; MG/100ML; MG/100ML
INJECTION, SOLUTION INTRAVENOUS CONTINUOUS
OUTPATIENT
Start: 2024-09-04

## 2024-09-04 RX ORDER — LIDOCAINE HYDROCHLORIDE 10 MG/ML
0.5 INJECTION, SOLUTION EPIDURAL; INFILTRATION; INTRACAUDAL; PERINEURAL ONCE
OUTPATIENT
Start: 2024-09-04 | End: 2024-09-04

## 2024-09-04 NOTE — ANESTHESIA PREPROCEDURE EVALUATION
09/04/2024  Cj Barnes is a 58 y.o., male.      Pre-op Assessment    I have reviewed the Patient Summary Reports.     I have reviewed the Nursing Notes. I have reviewed the NPO Status.   I have reviewed the Medications.     Review of Systems  Anesthesia Hx:  No previous Anesthesia   History of prior surgery of interest to airway management or planning: heart surgery.         Denies Family Hx of Anesthesia complications.    Denies Personal Hx of Anesthesia complications.                    Social:  Non-Smoker       Hematology/Oncology:  Hematology Normal   Oncology Normal                Hematology Comments: On Coumadin                    EENT/Dental:  EENT/Dental Normal           Cardiovascular:     Hypertension Valvular problems/Murmurs, AS, MVP  CAD    Dysrhythmias  Angina     hyperlipidemia    S/P AVR,S/P Cardioversion  Followed by Dr Benjamin Martinez last yr wnl                               Pulmonary:        Sleep Apnea, CPAP                Renal/:  Renal/ Normal                 Hepatic/GI:  Hepatic/GI Normal                    Musculoskeletal:  Musculoskeletal Normal                Neurological:  TIA,                                     Endocrine:  Diabetes, using insulin   Insulin pump        Dermatological:  Skin Normal    Psych:  Psychiatric History  depression                Physical Exam  General: Cooperative, Alert and Oriented    Airway:  Mallampati: II   Mouth Opening: Normal    Dental:  Caps / Implants, Intact    Anesthesia Plan  Type of Anesthesia, risks & benefits discussed:    Anesthesia Type: Gen Supraglottic Airway  Intra-op Monitoring Plan: Standard ASA Monitors  Post Op Pain Control Plan: multimodal analgesia and peripheral nerve block  Informed Consent: Informed consent signed with the Patient and all parties understand the risks and agree with anesthesia plan.  All questions  answered.   ASA Score: 3  Anesthesia Plan Notes: Plan GA for case,possible post op block discussed  On coumadin,has insulin pump  Previous AVR    Ready For Surgery From Anesthesia Perspective.     .

## 2024-09-04 NOTE — DISCHARGE INSTRUCTIONS
Information to Prepare you for your Surgery    PRE-ADMIT TESTING   [address]  [place] [facility]  [address]     Your surgery has been scheduled at [facility]. We are pleased to have the opportunity to serve you. For Further Information please call [phone].    On the day of surgery please report to Registration on the 1st floor of the [facility].    CONTACT YOUR PHYSICIAN'S OFFICE THE DAY PRIOR TO YOUR SURGERY TO OBTAIN YOUR ARRIVAL TIME.     The evening before surgery do not eat anything after 9 p.m. ( this includes hard candy, chewing gum and mints).  You may only have  WATER  from 9 p.m. until you leave your home.   DO NOT DRINK ANY LIQUIDS ON THE WAY TO THE HOSPITAL.      Why does your anesthesiologist allow you to drink Gatorade/Powerade before surgery?  Gatorade/Powerade helps to increase your comfort before surgery and to decrease your nausea after surgery.   The carbohydrates in Gatorade/Powerade help reduce your body's stress response to surgery.  If you are a diabetic-drink only water prior to surgery.    Outpatient Surgery- May allow 2 adults (18 and older)/ Support Persons (1 being the designated ) for all surgical/procedural patients. A breastfeeding mother will be allowed her infant and 2 adult Support Persons. No one under the age of 18 will be allowed in the building.      SPECIAL MEDICATION INSTRUCTIONS: TAKE medications checked off by the Anesthesiologist on your Medication List.    Surgery Patients:  If you take ASPIRIN - Your PHYSICIAN/SURGEON will need to inform you IF/OR when you need to stop taking aspirin prior to your surgery.     Starting the week prior to surgery, do not take any medications containing IBUPROFEN or NSAIDS (Advil, Aleve, BC, Goody's, Ketorolac, Meloxicam, Mobic, Motrin, Naproxen, Toradol, etc).  If you are not sure if you should take a medicine please call your surgeon's office.  You may take Tylenol.    Do Not Wear any make-up  (especially eye make-up) to surgery. Please remove any false eyelashes or eyelash extensions. If you arrive the day of surgery with makeup/eyelashes on you will be required to remove prior to surgery. (There is a risk of corneal abrasions if eye makeup/eyelash extensions are not removed)    Leave all valuables at home.   Do Not wear any jewelry or watches, including any metal in body piercings. Jewelry must be removed prior to coming to the hospital.  There is a possibility that rings that are unable to be removed may be cut off if they are on the surgical extremity.    Please remove all hair extensions, wigs, clips and any other metal accessories/ ornaments from your hair.  These items may pose a flammable/fire risk in Surgery and must be removed.    Do not shave your surgical area at least 5 days prior to your surgery. The surgical prep will be performed at the hospital according to Infection Control regulations.    Contact Lens must be removed before surgery. Either do not wear the contact lens or bring a case and solution for storage.  Please bring a container for eyeglasses or dentures as required.  Bring any paperwork your physician has provided, such as consent forms,  history and physicals, doctor's orders, etc.   Bring comfortable clothes that are loose fitting to wear upon discharge. Take into consideration the type of surgery being performed.  Maintain your diet as advised per your physician the day prior to surgery.    Adequate rest the night before surgery is advised.   Park in the Parking lot behind the hospital or in the [facility] across the street from the parking lot. Parking is complimentary.  If you will be discharged the same day as your procedure, please arrange for a responsible adult to drive you home or to accompany you if traveling by taxi.   YOU WILL NOT BE PERMITTED TO DRIVE OR TO LEAVE THE HOSPITAL ALONE AFTER SURGERY.   If you are being discharged the same day, it is  strongly recommended that you arrange for someone to remain with you for the first 24 hrs following your surgery.    The Surgeon will speak to your family/visitor after your surgery regarding the outcome of your surgery and post op care.  The Surgeon may speak to you after your surgery, but there is a possibility you may not remember the details.  Please check with your family members regarding the conversation with the Surgeon.    We strongly recommend whoever is bringing you home be present for discharge instructions.  This will ensure a thorough understanding for your post op home care.              Bathing Instructions with Hibiclens  Shower the evening before and morning of your procedure with Chlorhexidine (Hibiclens)    Do not use Chlorhexidine on your face or genitals. Do not get in your eyes.  Wash your face with water and your regular face wash/soap  Use your regular shampoo  Apply Chlorhexidine (Hibiclens) directly on your skin or on a wet washcloth and wash gently. When showering: Move away from the shower stream when applying Chlorhexidine (Hibiclens) to avoid rinsing off too soon.  Rinse thoroughly with warm water  Do not dilute Chlorhexidine (Hibiclens)   Dry off as usual, do not use any deodorant, powder, body lotions, perfume, after shave or cologne.     If the patient has fever, cough, or signs/symptoms of Flu or Covid please do not come in for your surgery.   Contact your surgeon and your primary care physician for further instructions.

## 2024-09-05 ENCOUNTER — TELEPHONE (OUTPATIENT)
Dept: DIABETES | Facility: CLINIC | Age: 59
End: 2024-09-05
Payer: MEDICARE

## 2024-09-05 ENCOUNTER — OFFICE VISIT (OUTPATIENT)
Dept: INTERNAL MEDICINE | Facility: CLINIC | Age: 59
End: 2024-09-05
Payer: MEDICARE

## 2024-09-05 ENCOUNTER — TELEPHONE (OUTPATIENT)
Dept: CARDIOLOGY | Facility: CLINIC | Age: 59
End: 2024-09-05
Payer: MEDICARE

## 2024-09-05 ENCOUNTER — PATIENT MESSAGE (OUTPATIENT)
Dept: CARDIOLOGY | Facility: CLINIC | Age: 59
End: 2024-09-05
Payer: MEDICARE

## 2024-09-05 VITALS
BODY MASS INDEX: 36.57 KG/M2 | WEIGHT: 233 LBS | OXYGEN SATURATION: 98 % | DIASTOLIC BLOOD PRESSURE: 67 MMHG | TEMPERATURE: 98 F | SYSTOLIC BLOOD PRESSURE: 141 MMHG | HEART RATE: 56 BPM | HEIGHT: 67 IN | RESPIRATION RATE: 18 BRPM

## 2024-09-05 DIAGNOSIS — I83.92 ASYMPTOMATIC VARICOSE VEINS OF LEFT LOWER EXTREMITY: ICD-10-CM

## 2024-09-05 DIAGNOSIS — E08.41 DIABETIC MONONEUROPATHY ASSOCIATED WITH DIABETES MELLITUS DUE TO UNDERLYING CONDITION: ICD-10-CM

## 2024-09-05 DIAGNOSIS — D64.9 ANEMIA, UNSPECIFIED TYPE: ICD-10-CM

## 2024-09-05 DIAGNOSIS — F33.0 MAJOR DEPRESSIVE DISORDER, RECURRENT, MILD: ICD-10-CM

## 2024-09-05 DIAGNOSIS — K21.9 GASTROESOPHAGEAL REFLUX DISEASE, UNSPECIFIED WHETHER ESOPHAGITIS PRESENT: ICD-10-CM

## 2024-09-05 DIAGNOSIS — I10 ESSENTIAL HYPERTENSION: ICD-10-CM

## 2024-09-05 DIAGNOSIS — Z85.038 HISTORY OF COLON CANCER: ICD-10-CM

## 2024-09-05 DIAGNOSIS — Z86.19 HISTORY OF STAPH INFECTION: ICD-10-CM

## 2024-09-05 DIAGNOSIS — T78.40XD ALLERGY, SUBSEQUENT ENCOUNTER: ICD-10-CM

## 2024-09-05 DIAGNOSIS — Z79.01 LONG TERM (CURRENT) USE OF ANTICOAGULANTS: ICD-10-CM

## 2024-09-05 DIAGNOSIS — I70.0 AORTIC ATHEROSCLEROSIS: ICD-10-CM

## 2024-09-05 DIAGNOSIS — Z95.2 H/O MECHANICAL AORTIC VALVE REPLACEMENT: ICD-10-CM

## 2024-09-05 DIAGNOSIS — Z98.890 PERSONAL HISTORY OF SPINE SURGERY: ICD-10-CM

## 2024-09-05 DIAGNOSIS — G47.30 SLEEP APNEA, UNSPECIFIED TYPE: ICD-10-CM

## 2024-09-05 DIAGNOSIS — G45.9 TIA (TRANSIENT ISCHEMIC ATTACK): ICD-10-CM

## 2024-09-05 DIAGNOSIS — I48.92 PAROXYSMAL ATRIAL FLUTTER: ICD-10-CM

## 2024-09-05 DIAGNOSIS — Z96.41 INSULIN PUMP IN PLACE: ICD-10-CM

## 2024-09-05 DIAGNOSIS — Z97.4 USES HEARING AID: ICD-10-CM

## 2024-09-05 DIAGNOSIS — I25.10 CORONARY ARTERY DISEASE INVOLVING NATIVE CORONARY ARTERY OF NATIVE HEART WITHOUT ANGINA PECTORIS: ICD-10-CM

## 2024-09-05 DIAGNOSIS — Z01.818 PREOP EXAMINATION: Primary | ICD-10-CM

## 2024-09-05 DIAGNOSIS — E78.5 HYPERLIPIDEMIA WITH TARGET LOW DENSITY LIPOPROTEIN (LDL) CHOLESTEROL LESS THAN 70 MG/DL: ICD-10-CM

## 2024-09-05 PROBLEM — Q23.1 AORTIC REGURGITATION, CONGENITAL: Status: RESOLVED | Noted: 2018-01-15 | Resolved: 2024-09-05

## 2024-09-05 PROBLEM — R13.10 TROUBLE SWALLOWING: Status: RESOLVED | Noted: 2019-08-18 | Resolved: 2024-09-05

## 2024-09-05 PROBLEM — I35.0 AORTIC STENOSIS: Status: RESOLVED | Noted: 2017-11-15 | Resolved: 2024-09-05

## 2024-09-05 PROBLEM — R79.89 ELEVATED TROPONIN: Status: RESOLVED | Noted: 2023-12-17 | Resolved: 2024-09-05

## 2024-09-05 PROBLEM — T78.40XA ALLERGIES: Status: ACTIVE | Noted: 2024-09-05

## 2024-09-05 PROBLEM — M65.312 TRIGGER FINGER OF LEFT THUMB: Status: RESOLVED | Noted: 2022-01-05 | Resolved: 2024-09-05

## 2024-09-05 PROBLEM — I25.119 ATHEROSCLEROSIS OF NATIVE CORONARY ARTERY OF NATIVE HEART WITH ANGINA PECTORIS: Status: RESOLVED | Noted: 2018-06-14 | Resolved: 2024-09-05

## 2024-09-05 PROBLEM — L97.502: Status: RESOLVED | Noted: 2024-01-02 | Resolved: 2024-09-05

## 2024-09-05 PROCEDURE — 99999 PR PBB SHADOW E&M-EST. PATIENT-LVL V: CPT | Mod: PBBFAC,,, | Performed by: HOSPITALIST

## 2024-09-05 RX ORDER — MUPIROCIN 20 MG/G
OINTMENT TOPICAL 2 TIMES DAILY
Qty: 30 G | Refills: 0 | Status: SHIPPED | OUTPATIENT
Start: 2024-09-09 | End: 2024-09-14

## 2024-09-05 NOTE — ASSESSMENT & PLAN NOTE
Weight related conditions     Known to have     HTN  Hyperlipidemia   Sleep apnea   Acid reflux   Osteoarthritis    Not troubled with / Not known to have        Gout       Encouraged maintaining healthy weight for improved health

## 2024-09-05 NOTE — ASSESSMENT & PLAN NOTE
He is currently taking hydrochlorothiazide and irbesartan    Hypertension-  Blood pressure is acceptable .  I suggest holding ---hydrochlorothiazide, irbesartan----- on the morning of the surgery and can continue that  post operatively under blood pressure, electrolyte and renal function monitoring as long as they are acceptable.I suggest addressing pain control as uncontrolled pain can increased blood pressure

## 2024-09-05 NOTE — ASSESSMENT & PLAN NOTE
He has tingling and numbness of the feet as well as his right little and ring finger and the gabapentin helps  He had osteomyelitis of right in the past which has since resolved  Cultures grew MSSA

## 2024-09-05 NOTE — TELEPHONE ENCOUNTER
----- Message from Corinna Mccarthy MD sent at 9/5/2024 11:43 AM CDT -----  Team    I saw this pleasant patient for perioperative care this AM    Dr Desai - If OK with you , I plan on keeping him on Aspirin jack op     Ms Jodi Quintero- - I am going to keep him on the Insulin pump until he presents for surgery - Do you think he can stay on the same Insulin dose until he presents for surgery ?  I understand that his surgery Is short     Ms Carmen Iyer- He is on Warfarin   Appreciate your help with jack operative anticoagulation     Dr De La Fuente- He is a patient of Dr Alexander and since [facility] group [facility] may be coming to see you for Cardiac care     Thanks all

## 2024-09-05 NOTE — HPI
History of present illness- I had the pleasure of meeting this pleasant 58 y.o. gentleman in the pre op clinic prior to his elective Orthopedic surgery. The patient is new to me .  Offered to have family to be on the phone during the consultation      I have obtained the history by speaking to the patient and by reviewing the electronic health records.    Events leading up to surgery / History of presenting illness -    I have obtained the information by speaking to him  He has pain in the left elbow area which is a burning pain that goes up into the upper arm and down into the forearm for about 6 months  His pain score is 7-8/10  He takes 2 extra-strength Tylenol in the morning time the pain which relieves some pain  Moving, Physical activity increases the pain    He had the opposite elbow surgery done for the same reason in 2020 -he has some numbness and tingling of right little and ring finger from that  He was a  for about 40 years time    Relevant health conditions of significance for the perioperative period/ History of presenting illness -    T1DM.  HTN  Osteomyelitis-  Severe aortic stenosis.  Bicuspid aortic valve  S/P ascending aortic replacement   Ascending aortic aneurysm  Aortic root replacement (Bentall procedure) with a 23 mm   [employer] mechanical valved conduit.  8/9/2019  Paroxysmal atrial flutter patient required cardioversion for A.Flutter  History of   colon cancer in 2015 with resection, no chemo or radiation.    BMI 36.49   Acid reflux  Sleep apnea,     Not known to have  Lung problem, Thyroid problem, Kidney problem, deep vein thrombosis, pulmonary embolism, COVID infection, fatty liver ,  tobacco smoking, edema, mental health problems     Lives with wife  Single story house   Works part time Taxi  Pets- 3 dogs  Children -2-a grown daughter and son  Has help post op

## 2024-09-05 NOTE — PROGRESS NOTES
INR good. Noted pt is scheduled for surgery with Dr. Hunter 9/11. We inquired if pt needs to hold with him and he reports he has not been given instructions but has preop visit tomorrow with Dr. Mccarthy. Message sent to Dr. Hunter asking if warfarin needs to be held. Per our last clearance and procedure plans, pt held 5d for cscope without lovenox per Dr. Alexander. Pt has AVR and has been in NSR. We would recommend same plan if patient needs to hold. We will follow up tomorrow after preop visit and pending reply from MD    Update 9/5: Pt cleared for surgery. No specific mention of how many or if pt needs to hold warfarin. I am still waiting for Dr. Hunter to respond. I have also asked Dr. Mccarthy if he knows.     Received response from Dr. Hunter's office that pt needs to hold 5 days. Please follow calendar for holding and post procedure dosing plan.

## 2024-09-05 NOTE — ASSESSMENT & PLAN NOTE
This was around 2018 and he was not sure if this is related to low glucose or real TIA imaging from that time showed    MRI-No evidence of an acute infarct.   US carotid- No evidence of a hemodynamically significant carotid bifurcation stenosis.     As per his information he had momentary weakness that resolved with improved sugar intake -     No recurrence    Risk factors    Diabetes  Blood pressure  Cholesterol  Nonsmoker    Prevention    Aspirin  Statin    Risk , benefits of ASA use in the perioperative period discussed     After discussing the risks benefits of aspirin use, he is comfortable to stay on aspirin for surgery

## 2024-09-05 NOTE — ASSESSMENT & PLAN NOTE
Postoperatively after his surgery in 2019 which has since resolved      Thrombo-embolic event within the last 3 months- none     - Restoration of normal sinus rhythm from atrial fibrillation or atrial flutter, either by cardioversion or ablation, within the last month- none     - Pulmonary vein isolation within the last 2 months- none     - Left atrial appendage thrombus within the last 3 months- none .    No contraindications to holding anticoagulation for surgery

## 2024-09-05 NOTE — ASSESSMENT & PLAN NOTE
Doing good and has allergies during spring time  Doing good from an allergy standpoint and does not have any asthma or eczema

## 2024-09-05 NOTE — ASSESSMENT & PLAN NOTE
Severe aortic stenosis.  Bicuspid aortic valve  S/P ascending aortic replacement   Ascending aortic aneurysm  Aortic root replacement (Bentall procedure) with a 23 mm   [employer] mechanical valved conduit.  8/9/2019  Paroxysmal atrial flutter patient required cardioversion for A.Flutter    He is doing good from a heart standpoint    Jan 2024      Left Ventricle: The left ventricle is normal in size. Mildly increased wall thickness. Normal wall motion. There is normal systolic function. Ejection fraction by visual approximation is 55%. Grade II diastolic dysfunction.    Right Ventricle: Normal right ventricular cavity size. Systolic function is normal.    Left Atrium: Left atrium is mildly dilated.    Aortic Valve: There is a mechanical valve in the aortic position. Normal prosthetic valve function.  No perivalvular leak    Mitral Valve: There is no stenosis.    Pulmonic Valve: There is no stenosis.    IVC/SVC: Normal venous pressure at 3 mmHg.    No short of breath or chest pain  He stays mobile

## 2024-09-05 NOTE — ASSESSMENT & PLAN NOTE
Sleep apnea   Uses CPAP .  Suggested bringing for hospital use .   Informed the risk of worsening sleep apnea in the perioperative period and suggest using CPAP  use any time in 24 hrs ( day or night )for planned sleep     I suggest  caution with usage of medication that can cause respiratory suppression in the perioperative period    Avoidance of  supine sleep, weight gain , alcoholic beverages , care with , sedative , CNS depressant use indicated  since all of these can worsen EVENS

## 2024-09-05 NOTE — ASSESSMENT & PLAN NOTE
No overt GI/ blood losses  No stomach upset/ ulcer   He had colon cancer in 2015   He received IV iron as oral iron was not helping him  He gets regular colonoscopies and to his understanding has no recurrence of cancer  No long standing NSAID use he was apart from aspirin  Discussed follow up     I suggest monitoring the hemoglobin in the perioperative period

## 2024-09-05 NOTE — ASSESSMENT & PLAN NOTE
Does not sound Cardiac   Doing good  GERD-  I suggest continuation of the Proton pump inhibitor in the perioperative period . I suggest aspiration precautions

## 2024-09-05 NOTE — TELEPHONE ENCOUNTER
----- Message from Justine Braswell MA sent at 9/5/2024 11:10 AM CDT -----  Regarding: FW: Coumadin prior to surgery  Robert Hancock,  This is a Vorhoff pt.  He needs to be cleared for surgery next Wednesday.  Can you see if you can fit him in somewhere in your clinic since he lives in [place].  I tried to call him and left a message to call the [facility].  Thanks,   Justine  ----- Message -----  From: Dilcia French  Sent: 8/29/2024   1:34 PM CDT  To: Benjamin Terry Rushing, & [facility] Pool  Subject: Coumadin prior to surgery                        Pt is schedule for an ulnar nerve decompression 09/11/24 with Dr. Lloyd Edouard    Pt reports taking Coumadin      Can you please make a recommendation for stopping or a bridge prior to surgery?     Please let me know if you have any additional questions or concerns.     Thank you,     Dilcia French, MEd, ATC/L, OTC  Clinical/OR Assistant to Dilma Hunter MD  [facility]  [phone] o.  [phone] fax

## 2024-09-05 NOTE — ASSESSMENT & PLAN NOTE
T1DM.  Metformin, Jardiance, insulin pump  Suggested not to take metformin the night before surgery and on the morning of surgery  Suggested to hold Jardiance 3 days before surgery and the last day of use will be on Saturday September 7th    He had anesthesiology evaluation yesterday and to his understanding he is going to come in with the insulin pump in place and it is a short 30 minutes surgery    Hemoglobin A1c- 6.4   Capillary glucose check-he is on continuous glucose monitoring  Pre meal glucose about 120      Diabetes Complications     Microvascular     Not known to have   Diabetes affecting the eyes, Kidneys   No reported open areas on the feet   Feet care suggested     Macrovascular      TIA  known to have CAD  No suggestion of  lower extremity claudication      Diabetes Mellitus-I suggest monitoring the glucose in the perioperative period ( Before meals and bed time,if the patient is on oral feeds or every 6 hourly ,if the patient is NPO )  Blood glucose target in hospitalized patients is 140-180. Oral Hypoglycemic agents are generally avoided during the hospital stay . If glucose is consistently elevated ,I suggest using basal ,prandial Insulin regimen to control the glucose , as elevated glucose can be associated with adverse surgical out comes. Please consider involving Hospital Medicine or Endocrinology ,if any help is needed with Glucose control. Patient will be instructed based on the pre op clinic guidelines  about adjustment of diabetic treatment (If applicable )  considering the NPO status for Surgery      I had educated that uncontrolled DM can cause post op complications,risk of infection, wound healing problem,increased length of stay in hospital and its associated complications.I suggest exercise as much as possible and follow diabetic diet

## 2024-09-05 NOTE — ASSESSMENT & PLAN NOTE
Mild to moderate three-vessel coronary disease with 55% stenosis of the proximal to mid circumflex and another focal 60% stenosis of fourth obtuse marginal branch.  Mild disease of the mid LAD and diagonal branch.  Moderate disease of the PDA     Does not have any chest pain

## 2024-09-05 NOTE — ASSESSMENT & PLAN NOTE
He is on warfarin since he had the valve replacement  He had a mechanical valve replacement and he has with the [facility]

## 2024-09-05 NOTE — ASSESSMENT & PLAN NOTE
History of   colon cancer in 2015 with resection, no chemo or radiation.  He last had colonoscopy October 2023 and is expecting to have 1 coming up  No change in the bowel habit or rectal bleeding

## 2024-09-05 NOTE — ASSESSMENT & PLAN NOTE
MSSA      In an attempt to prevent Surgical site infection , with the up coming surgery , suggest the following        Previous MSSA infection    Intra nasal Bactroban for 5 days ( start  2 days pre op )   IV Ancef (  If no allergy / Intolerance) ,  for surgeries that require systemic antibiotic to prevent surgical site infections    Instructions for use of  nasal Bactroban    Generic ointment or generic cream (30gram tube) - place a drop on a q tip , insert into to very tip of the nose only , then press on the nose gently to distribute.   .

## 2024-09-05 NOTE — PROGRESS NOTES
9/5/24: patient has pre-op cardiac clearance today 9/5/24 and was advised by physician to get dosing instructions from the coumadin clinic.  Patient states that when he has had surgeries in the past that he would hold his coumadin for 3-4 days.

## 2024-09-05 NOTE — OUTPATIENT SUBJECTIVE & OBJECTIVE
"Outpatient Subjective & Objective     Chief complaint-Preoperative evaluation, Perioperative Medical management, complication reduction plan     Active cardiac conditions- none    Revised cardiac risk index predictors- insulin requiring diabetes mellitus and history of cerebrovascular disease    Functional capacity -Examples of physical activity  can take 1 flight of stairs----- He can undertake all the above activities without  chest pain,chest tightness,   ,dizziness,lightheadedness making his exercise tolerance more,  than 4 Mets.   Gets mildly winded on walking long distances-attributes to lack of exercise      Review of Systems   Constitutional:  Negative for chills and fever.   HENT:          Sleep apnea   Eyes:         No unusual vision changes   Respiratory:          No cough , phlegm    No Hemoptysis   Cardiovascular:         As noted   Gastrointestinal:         Bowels- Regular    Endocrine:        Prednisone use > 20 mg daily for 3 weeks- none   Genitourinary:  Negative for dysuria.        No urinary hesitancy    Musculoskeletal:         As above       Skin:  Negative for rash.   Neurological:  Negative for syncope.        No unilateral weakness   Hematological:         Current use of Anticoagulants  Yes   Psychiatric/Behavioral:            No SI/HI               No anesthesia, bleeding, cardiac problems, PONV  Medications and Allergies reviewed in epic.   FH- No anesthesia,bleeding / venous thrombosis ,  disease in family      Physical Exam  Blood pressure (!) 141/67, pulse (!) 56, temperature 98.3 °F (36.8 °C), temperature source Oral, resp. rate 18, height 5' 7" (1.702 m), weight 105.7 kg (233 lb), SpO2 98%.  No dizziness  No hypo thyroid symptoms      Physical Exam  Constitutional- Vitals - Body mass index is 36.49 kg/m².,   Vitals:    09/05/24 0756   BP: (!) 141/67   Pulse: (!) 56   Resp: 18   Temp: 98.3 °F (36.8 °C)     General appearance-Conscious,Coherent  Eyes- No conjunctival icterus,pupils  " round  and reactive to light   ENT-Oral cavity- moist    , Hearing grossly normal   Neck- No thyromegaly ,Trachea -central, No jugular venous distension,   No Carotid Bruit   Cardiovascular -Heart Sounds- mechanical 2nd heart sounds,  Normal ,  systolic murmur aortic area ,  systolic murmur pulmonary area , systolic murmur tricuspid area ,  systolic murmur mitral area, and  systolic murmur axillary area   , No gallop rhythm   Respiratory - Normal Respiratory Effort, Normal breath sounds,  no wheeze , and  no forced expiratory wheeze    Peripheral pitting pedal edema-- none  and  varicose veins left lower extremity, no calf pain   Gastrointestinal -Soft abdomen, No palpable masses, Non Tender,Liver,Spleen not palpable. No-- free fluid and shifting dullness  Musculoskeletal- No finger Clubbing. Strength grossly normal   Lymphatic-No Palpable cervical, axillary,Inguinal lymphadenopathy   Psychiatric - normal effect,Orientation  Rt Dorsalis pedis pulses-palpable    Lt Dorsalis pedis pulses- palpable   Rt Posterior tibial pulses -palpable   Left posterior tibial pulses -palpable   Miscellaneous -  Surgical scar sternum  and  no renal bruit     Investigations  Lab and Imaging have been reviewed in [facility].      Review of Medicine tests    EKG- I had independently reviewed the EKG from--1/24/2024  It was reported to be showing     Sinus bradycardia   Poor R wave progression in precordial leads   Borderline Abnormal ECG   When compared with ECG of 17-DEC-2023 17:58,   Premature ventricular complexes are no longer Present   Vent. rate has decreased BY  41 BPM   Questionable change in The axis   Nonspecific T wave abnormality no longer evident in Lateral leads   QT has shortened     Review of clinical lab tests:  Lab Results   Component Value Date    CREATININE 1.0 09/04/2024    HGB 13.6 (L) 09/04/2024     09/04/2024           Review of old records- Was done and information gathered regards to events leading to surgery  and health conditions of significance in the perioperative period.    Outpatient Subjective & Objective

## 2024-09-05 NOTE — PROGRESS NOTES
[facility]  Progress Note    Patient Name: Cj Barnes  MRN: [id number]  Date of Evaluation- 09/05/2024  PCP- Garry Stover MD    Future cases for Daniel Barnes [[id number]]       Case ID Status Date Time Rafal Procedure Provider Location    [id number] [facility] 9/11/2024  2:30 PM 90 LEFT ELBOW DECOMPRESSION, NERVE, ULNAR Lloyd-Dilma Edouard MD [[id number]] [facility] OR            HPI:  History of present illness- I had the pleasure of meeting this pleasant 58 y.o. gentleman in the pre op clinic prior to his elective Orthopedic surgery. The patient is new to me .  Offered to have family to be on the phone during the consultation      I have obtained the history by speaking to the patient and by reviewing the electronic health records.    Events leading up to surgery / History of presenting illness -    I have obtained the information by speaking to him  He has pain in the left elbow area which is a burning pain that goes up into the upper arm and down into the forearm for about 6 months  His pain score is 7-8/10  He takes 2 extra-strength Tylenol in the morning time the pain which relieves some pain  Moving, Physical activity increases the pain    He had the opposite elbow surgery done for the same reason in 2020 -he has some numbness and tingling of right little and ring finger from that  He was a  for about 40 years time    Relevant health conditions of significance for the perioperative period/ History of presenting illness -    T1DM.  HTN  Osteomyelitis-  Severe aortic stenosis.  Bicuspid aortic valve  S/P ascending aortic replacement   Ascending aortic aneurysm  Aortic root replacement (Bentall procedure) with a 23 mm   Carbomedics mechanical valved conduit.  8/9/2019  Paroxysmal atrial flutter patient required cardioversion for A.Flutter  History of   colon cancer in 2015 with resection, no chemo or radiation.    BMI 36.49   Acid reflux  Sleep apnea,     Not known to have  Lung problem, Thyroid  "problem, Kidney problem, deep vein thrombosis, pulmonary embolism, COVID infection, fatty liver ,  tobacco smoking, edema, mental health problems     Lives with wife  Single story house   Works part time [employer]  Pets- 3 dogs  Children -2-a grown daughter and son  Has help post op         Subjective/ Objective:     Chief complaint-Preoperative evaluation, Perioperative Medical management, complication reduction plan     Active cardiac conditions- none    Revised cardiac risk index predictors- insulin requiring diabetes mellitus and history of cerebrovascular disease    Functional capacity -Examples of physical activity  can take 1 flight of stairs----- He can undertake all the above activities without  chest pain,chest tightness,   ,dizziness,lightheadedness making his exercise tolerance more,  than 4 Mets.   Gets mildly winded on walking long distances-attributes to lack of exercise      Review of Systems   Constitutional:  Negative for chills and fever.   HENT:          Sleep apnea   Eyes:         No unusual vision changes   Respiratory:          No cough , phlegm    No Hemoptysis   Cardiovascular:         As noted   Gastrointestinal:         Bowels- Regular    Endocrine:        Prednisone use > 20 mg daily for 3 weeks- none   Genitourinary:  Negative for dysuria.        No urinary hesitancy    Musculoskeletal:         As above       Skin:  Negative for rash.   Neurological:  Negative for syncope.        No unilateral weakness   Hematological:         Current use of Anticoagulants  Yes   Psychiatric/Behavioral:            No SI/HI               No anesthesia, bleeding, cardiac problems, PONV  Medications and Allergies reviewed in epic.   FH- No anesthesia,bleeding / venous thrombosis ,  disease in family      Physical Exam  Blood pressure (!) 141/67, pulse (!) 56, temperature 98.3 °F (36.8 °C), temperature source Oral, resp. rate 18, height 5' 7" (1.702 m), weight 105.7 kg (233 lb), SpO2 98%.  No dizziness  No hypo " thyroid symptoms      Physical Exam  Constitutional- Vitals - Body mass index is 36.49 kg/m².,   Vitals:    09/05/24 0756   BP: (!) 141/67   Pulse: (!) 56   Resp: 18   Temp: 98.3 °F (36.8 °C)     General appearance-Conscious,Coherent  Eyes- No conjunctival icterus,pupils  round  and reactive to light   ENT-Oral cavity- moist    , Hearing grossly normal   Neck- No thyromegaly ,Trachea -central, No jugular venous distension,   No Carotid Bruit   Cardiovascular -Heart Sounds- mechanical 2nd heart sounds,  Normal ,  systolic murmur aortic area ,  systolic murmur pulmonary area , systolic murmur tricuspid area ,  systolic murmur mitral area, and  systolic murmur axillary area   , No gallop rhythm   Respiratory - Normal Respiratory Effort, Normal breath sounds,  no wheeze , and  no forced expiratory wheeze    Peripheral pitting pedal edema-- none  and  varicose veins left lower extremity, no calf pain   Gastrointestinal -Soft abdomen, No palpable masses, Non Tender,Liver,Spleen not palpable. No-- free fluid and shifting dullness  Musculoskeletal- No finger Clubbing. Strength grossly normal   Lymphatic-No Palpable cervical, axillary,Inguinal lymphadenopathy   Psychiatric - normal effect,Orientation  Rt Dorsalis pedis pulses-palpable    Lt Dorsalis pedis pulses- palpable   Rt Posterior tibial pulses -palpable   Left posterior tibial pulses -palpable   Miscellaneous -  Surgical scar sternum  and  no renal bruit     Investigations  Lab and Imaging have been reviewed in epic.      Review of Medicine tests    EKG- I had independently reviewed the EKG from--1/24/2024  It was reported to be showing     Sinus bradycardia   Poor R wave progression in precordial leads   Borderline Abnormal ECG   When compared with ECG of 17-DEC-2023 17:58,   Premature ventricular complexes are no longer Present   Vent. rate has decreased BY  41 BPM   Questionable change in The axis   Nonspecific T wave abnormality no longer evident in Lateral leads    QT has shortened     Review of clinical lab tests:  Lab Results   Component Value Date    CREATININE 1.0 09/04/2024    HGB 13.6 (L) 09/04/2024     09/04/2024           Review of old records- Was done and information gathered regards to events leading to surgery and health conditions of significance in the perioperative period.        Preoperative cardiac risk assessment-  The patient does not have any active cardiac conditions . Revised cardiac risk index predictors- -2--.Functional capacity is more than 4 Mets. He will be undergoing a Orthopedic procedure that carries a Moderate Risk risk     Risk of a major Cardiac event ( Defined as death, myocardial infarction, or cardiac arrest at 30 days after noncardiac surgery), based on RCRI score     10.1%         No further cardiac work up is indicated prior to proceeding with the surgery     Orders Placed This Encounter    mupirocin (BACTROBAN) 2 % ointment       American Society of Anesthesiologists Physical status classification ( ASA ) class: 2     Postoperative pulmonary complication risk assessment:      ARISCAT ( Canet) risk index- risk class -  Low, if duration of surgery is under 3 hours, intermediate, if duration of surgery is over 3 hours          Assessment/Plan:     TIA (transient ischemic attack)  This was around 2018 and he was not sure if this is related to low glucose or real TIA imaging from that time showed    MRI-No evidence of an acute infarct.   US carotid- No evidence of a hemodynamically significant carotid bifurcation stenosis.     As per his information he had momentary weakness that resolved with improved sugar intake -     No recurrence    Risk factors    Diabetes  Blood pressure  Cholesterol  Nonsmoker    Prevention    Aspirin  Statin    Risk , benefits of ASA use in the perioperative period discussed     After discussing the risks benefits of aspirin use, he is comfortable to stay on aspirin for surgery    Diabetic mononeuropathy  associated with diabetes mellitus due to underlying condition  He has tingling and numbness of the feet as well as his right little and ring finger and the gabapentin helps  He had osteomyelitis of right in the past which has since resolved  Cultures grew MSSA    Major depressive disorder, recurrent, mild  He does not feel depressed and he feels that he has some anxiety for which he takes Lexapro    I suggest monitoring the sodium as SIADH from Le  use and hypersecretion of ADH associated with surgery can reduce sodium in the perioperative period     Considered bleeding risk with Lexapro on aspirin but continuing on those medication    Paroxysmal atrial flutter  Postoperatively after his surgery in 2019 which has since resolved      Thrombo-embolic event within the last 3 months- none     - Restoration of normal sinus rhythm from atrial fibrillation or atrial flutter, either by cardioversion or ablation, within the last month- none     - Pulmonary vein isolation within the last 2 months- none     - Left atrial appendage thrombus within the last 3 months- none .    No contraindications to holding anticoagulation for surgery     Hyperlipidemia with target low density lipoprotein (LDL) cholesterol less than 70 mg/dL  HLD-I  suggest continuation of statin during the entire perioperative period.     H/O mechanical aortic valve replacement  Severe aortic stenosis.  Bicuspid aortic valve  S/P ascending aortic replacement   Ascending aortic aneurysm  Aortic root replacement (Bentall procedure) with a 23 mm   Carbomedics mechanical valved conduit.  8/9/2019  Paroxysmal atrial flutter patient required cardioversion for A.Flutter    He is doing good from a heart standpoint    Jan 2024      Left Ventricle: The left ventricle is normal in size. Mildly increased wall thickness. Normal wall motion. There is normal systolic function. Ejection fraction by visual approximation is 55%. Grade II diastolic dysfunction.    Right  Ventricle: Normal right ventricular cavity size. Systolic function is normal.    Left Atrium: Left atrium is mildly dilated.    Aortic Valve: There is a mechanical valve in the aortic position. Normal prosthetic valve function.  No perivalvular leak    Mitral Valve: There is no stenosis.    Pulmonic Valve: There is no stenosis.    IVC/SVC: Normal venous pressure at 3 mmHg.    No short of breath or chest pain  He stays mobile    Essential hypertension  He is currently taking hydrochlorothiazide and irbesartan    Hypertension-  Blood pressure is acceptable .  I suggest holding ---hydrochlorothiazide, irbesartan----- on the morning of the surgery and can continue that  post operatively under blood pressure, electrolyte and renal function monitoring as long as they are acceptable.I suggest addressing pain control as uncontrolled pain can increased blood pressure      Coronary artery disease involving native coronary artery of native heart without angina pectoris   Mild to moderate three-vessel coronary disease with 55% stenosis of the proximal to mid circumflex and another focal 60% stenosis of fourth obtuse marginal branch.  Mild disease of the mid LAD and diagonal branch.  Moderate disease of the PDA     Does not have any chest pain    Aortic atherosclerosis  No suggestions of lower extremity intermittent claudication     Long term (current) use of anticoagulants  He is on warfarin since he had the valve replacement  He had a mechanical valve replacement and he has with the [facility]    Anemia  No overt GI/ blood losses  No stomach upset/ ulcer   He had colon cancer in 2015   He received IV iron as oral iron was not helping him  He gets regular colonoscopies and to his understanding has no recurrence of cancer  No long standing NSAID use he was apart from aspirin  Discussed follow up     I suggest monitoring the hemoglobin in the perioperative period     Insulin pump in place  T1DM.  Metformin,  Jardiance, insulin pump  Suggested not to take metformin the night before surgery and on the morning of surgery  Suggested to hold Jardiance 3 days before surgery and the last day of use will be on Saturday September 7th    He had anesthesiology evaluation yesterday and to his understanding he is going to come in with the insulin pump in place and it is a short 30 minutes surgery    Hemoglobin A1c- 6.4   Capillary glucose check-he is on continuous glucose monitoring  Pre meal glucose about 120      Diabetes Complications     Microvascular     Not known to have   Diabetes affecting the eyes, Kidneys   No reported open areas on the feet   Feet care suggested     Macrovascular      TIA  known to have CAD  No suggestion of  lower extremity claudication      Diabetes Mellitus-I suggest monitoring the glucose in the perioperative period ( Before meals and bed time,if the patient is on oral feeds or every 6 hourly ,if the patient is NPO )  Blood glucose target in hospitalized patients is 140-180. Oral Hypoglycemic agents are generally avoided during the hospital stay . If glucose is consistently elevated ,I suggest using basal ,prandial Insulin regimen to control the glucose , as elevated glucose can be associated with adverse surgical out comes. Please consider involving Hospital Medicine or Endocrinology ,if any help is needed with Glucose control. Patient will be instructed based on the pre op clinic guidelines  about adjustment of diabetic treatment (If applicable )  considering the NPO status for Surgery      I had educated that uncontrolled DM can cause post op complications,risk of infection, wound healing problem,increased length of stay in hospital and its associated complications.I suggest exercise as much as possible and follow diabetic diet               Allergies  Doing good and has allergies during spring time  Doing good from an allergy standpoint and does not have any asthma or eczema     Sleep apnea  Sleep  apnea   Uses CPAP .  Suggested bringing for hospital use .   Informed the risk of worsening sleep apnea in the perioperative period and suggest using CPAP  use any time in 24 hrs ( day or night )for planned sleep     I suggest  caution with usage of medication that can cause respiratory suppression in the perioperative period    Avoidance of  supine sleep, weight gain , alcoholic beverages , care with , sedative , CNS depressant use indicated  since all of these can worsen EVENS         History of colon cancer     History of   colon cancer in 2015 with resection, no chemo or radiation.  He last had colonoscopy October 2023 and is expecting to have 1 coming up  No change in the bowel habit or rectal bleeding    BMI 36.0-36.9,adult  Weight related conditions     Known to have     HTN  Hyperlipidemia   Sleep apnea   Acid reflux   Osteoarthritis    Not troubled with / Not known to have        Gout       Encouraged maintaining healthy weight for improved health     Acid reflux    Does not sound Cardiac   Doing good  GERD-  I suggest continuation of the Proton pump inhibitor in the perioperative period . I suggest aspiration precautions    History of staph infection  MSSA      In an attempt to prevent Surgical site infection , with the up coming surgery , suggest the following        Previous MSSA infection    Intra nasal Bactroban for 5 days ( start  2 days pre op )   IV Ancef (  If no allergy / Intolerance) ,  for surgeries that require systemic antibiotic to prevent surgical site infections    Instructions for use of  nasal Bactroban    Generic ointment or generic cream (30gram tube) - place a drop on a q tip , insert into to very tip of the nose only , then press on the nose gently to distribute.   .      Uses hearing aid  Noise related  Wears hearing aids which I suggested to bring for hospital use    Personal history of spine surgery  Around 2007/2008   Doing well with no leg weakness    Asymptomatic varicose veins of  left lower extremity  Increased risk of thrombosis in the jack operative period , compression stocking use discussed         Preventive perioperative care    Thromboembolic prophylaxis:  His risk factors for thrombosis include surgical procedure and age.I suggest  thromboembolic prophylaxis ( mechanical/pharmacological, weighing the risk benefits of pharmacological agent use considering jack procedural bleeding )  during the perioperative period.I suggested being active in the post operative period.      Postoperative pulmonary complication prophylaxis-- I suggest incentive spirometry use, early ambulation, and end tidal carbon dioxide monitoring  , oral care , head end of bed elevation      Renal complication prophylaxis-Risk factors for renal complications include diabetes mellitus and hypertension . I suggest keeping him well hydrated in the perioperative period.I suggested drinking 2 litre's of water a day      Surgical site Infection Prophylaxis-I  suggest appropriate antibiotic for Prophylaxis against Surgical site infections    Skin antibacterial discussed          This visit was focused on Preoperative evaluation, Perioperative Medical management, complication reduction plans. I suggest that the patient follows up with primary care or relevant sub specialists for ongoing health care.    I appreciate the opportunity to be involved in this patients care. Please feel free to contact me if there were any questions about this consultation.    Patient is optimized    Patient/ care giver/ Family member was instructed to call and update me about any changes to health,  medication, office visits ,testing out side of the jack operative care center , hospitalizations between now and surgery      Corinna Mccarthy MD  Internal Medicine  [facility]   Cell Phone- [phone]    History of COVID - /no   COVID vaccination status -yes    COVID screening     No fever   No cough   No sore throat   No loss  of taste or smell   No muscle aches   No nausea, vomiting , diarrhea     I have spent ---110--- minutes of time which includes, time spent to prepare to see the patient , obtaining history ,performing examination, counseling/Educating the patient , Documenting clinical information in the record    --  9/5/2024- 11 42     Checked for over-the-counter medication      Messaged surgeon   About  plan on keeping him on Aspirin jack op   Messaged [facility] Pharm D about help with jack operative anticoagulation   --    9/5/2024- 1722    Corresponded with the [facility] Clinic pharmacist  Holding warfarin for 5 days without bridging anticoagulation  Corresponded with endocrinology nurse practitioner     fine to keep him on his pump   He wears a Tandem / Dexcom AID system.. so as long as he is on both and in control IQ he should be fine   If there is concerns for lows- they can place him in activity mode and this will raise his blood sugar target

## 2024-09-05 NOTE — ASSESSMENT & PLAN NOTE
He does not feel depressed and he feels that he has some anxiety for which he takes Lexapro    I suggest monitoring the sodium as SIADH from Le  use and hypersecretion of ADH associated with surgery can reduce sodium in the perioperative period     Considered bleeding risk with Lexapro on aspirin but continuing on those medication

## 2024-09-09 DIAGNOSIS — E10.65 TYPE 1 DIABETES MELLITUS WITH HYPERGLYCEMIA: ICD-10-CM

## 2024-09-09 RX ORDER — GABAPENTIN 300 MG/1
CAPSULE ORAL
Qty: 360 CAPSULE | Refills: 0 | Status: SHIPPED | OUTPATIENT
Start: 2024-09-09

## 2024-09-09 RX ORDER — TRAMADOL HYDROCHLORIDE 50 MG/1
50 TABLET ORAL EVERY 6 HOURS PRN
Qty: 10 TABLET | Refills: 0 | Status: SHIPPED | OUTPATIENT
Start: 2024-09-09

## 2024-09-09 RX ORDER — ONDANSETRON 4 MG/1
4 TABLET, ORALLY DISINTEGRATING ORAL EVERY 6 HOURS PRN
Qty: 14 TABLET | Refills: 0 | Status: SHIPPED | OUTPATIENT
Start: 2024-09-09

## 2024-09-09 NOTE — TELEPHONE ENCOUNTER
No care due was identified.  [facility] Embedded Care Due Messages. Reference number: [id number].   9/09/2024 9:02:07 AM CDT

## 2024-09-10 ENCOUNTER — TELEPHONE (OUTPATIENT)
Dept: ORTHOPEDICS | Facility: CLINIC | Age: 59
End: 2024-09-10
Payer: MEDICARE

## 2024-09-10 NOTE — TELEPHONE ENCOUNTER
Spoke c pt. Informed pt of 6:00 arrival time for  surgery at the [facility]. Reminded pt of NPO status. Pt expressed understanding & was thankful.

## 2024-09-10 NOTE — TELEPHONE ENCOUNTER
Spoke c pt. Informed pt of 7:00 arrival time for surgery at the [facility]. Reminded pt of NPO status. Pt expressed understanding & was thankful.         There is no upslanting of palpebral fissures and no epicanthal folds.  The ears are normal set and normal in appearance.  There are no anomalies of nose or mouth.

## 2024-09-11 ENCOUNTER — ANESTHESIA (OUTPATIENT)
Dept: SURGERY | Facility: OTHER | Age: 59
End: 2024-09-11
Payer: MEDICARE

## 2024-09-12 ENCOUNTER — CLINICAL SUPPORT (OUTPATIENT)
Dept: DIABETES | Facility: CLINIC | Age: 59
End: 2024-09-12
Payer: MEDICARE

## 2024-09-12 ENCOUNTER — OFFICE VISIT (OUTPATIENT)
Dept: DIABETES | Facility: CLINIC | Age: 59
End: 2024-09-12
Payer: MEDICARE

## 2024-09-12 VITALS
DIASTOLIC BLOOD PRESSURE: 75 MMHG | SYSTOLIC BLOOD PRESSURE: 137 MMHG | BODY MASS INDEX: 36.57 KG/M2 | OXYGEN SATURATION: 98 % | HEART RATE: 57 BPM | WEIGHT: 233 LBS | HEIGHT: 67 IN

## 2024-09-12 DIAGNOSIS — Z71.9 HEALTH EDUCATION/COUNSELING: ICD-10-CM

## 2024-09-12 DIAGNOSIS — I25.119 ATHEROSCLEROSIS OF NATIVE CORONARY ARTERY OF NATIVE HEART WITH ANGINA PECTORIS: ICD-10-CM

## 2024-09-12 DIAGNOSIS — I10 ESSENTIAL HYPERTENSION: ICD-10-CM

## 2024-09-12 DIAGNOSIS — G45.9 TIA (TRANSIENT ISCHEMIC ATTACK): ICD-10-CM

## 2024-09-12 DIAGNOSIS — E10.649 TYPE 1 DIABETES MELLITUS WITH HYPOGLYCEMIA UNAWARENESS: ICD-10-CM

## 2024-09-12 DIAGNOSIS — E10.9 TYPE 1 DIABETES MELLITUS WITHOUT COMPLICATION: Primary | ICD-10-CM

## 2024-09-12 DIAGNOSIS — E66.01 CLASS 2 SEVERE OBESITY DUE TO EXCESS CALORIES WITH SERIOUS COMORBIDITY AND BODY MASS INDEX (BMI) OF 36.0 TO 36.9 IN ADULT: ICD-10-CM

## 2024-09-12 DIAGNOSIS — E10.649 TYPE 1 DIABETES MELLITUS WITH HYPOGLYCEMIA UNAWARENESS: Primary | ICD-10-CM

## 2024-09-12 DIAGNOSIS — Z46.81 INSULIN PUMP FITTING OR ADJUSTMENT: ICD-10-CM

## 2024-09-12 DIAGNOSIS — E78.5 HYPERLIPIDEMIA WITH TARGET LOW DENSITY LIPOPROTEIN (LDL) CHOLESTEROL LESS THAN 70 MG/DL: ICD-10-CM

## 2024-09-12 DIAGNOSIS — E10.65 TYPE 1 DIABETES MELLITUS WITH HYPERGLYCEMIA: ICD-10-CM

## 2024-09-12 DIAGNOSIS — Z96.41 INSULIN PUMP IN PLACE: ICD-10-CM

## 2024-09-12 PROCEDURE — 3008F BODY MASS INDEX DOCD: CPT | Mod: CPTII,95,, | Performed by: NURSE PRACTITIONER

## 2024-09-12 PROCEDURE — 3066F NEPHROPATHY DOC TX: CPT | Mod: CPTII,95,, | Performed by: NURSE PRACTITIONER

## 2024-09-12 PROCEDURE — 3044F HG A1C LEVEL LT 7.0%: CPT | Mod: CPTII,95,, | Performed by: NURSE PRACTITIONER

## 2024-09-12 PROCEDURE — 4010F ACE/ARB THERAPY RXD/TAKEN: CPT | Mod: CPTII,95,, | Performed by: NURSE PRACTITIONER

## 2024-09-12 PROCEDURE — 1160F RVW MEDS BY RX/DR IN RCRD: CPT | Mod: CPTII,95,, | Performed by: NURSE PRACTITIONER

## 2024-09-12 PROCEDURE — 1159F MED LIST DOCD IN RCRD: CPT | Mod: CPTII,95,, | Performed by: NURSE PRACTITIONER

## 2024-09-12 PROCEDURE — 3061F NEG MICROALBUMINURIA REV: CPT | Mod: CPTII,95,, | Performed by: NURSE PRACTITIONER

## 2024-09-12 PROCEDURE — 3075F SYST BP GE 130 - 139MM HG: CPT | Mod: CPTII,95,, | Performed by: NURSE PRACTITIONER

## 2024-09-12 PROCEDURE — 95251 CONT GLUC MNTR ANALYSIS I&R: CPT | Mod: NDTC,,, | Performed by: NURSE PRACTITIONER

## 2024-09-12 PROCEDURE — 3078F DIAST BP <80 MM HG: CPT | Mod: CPTII,95,, | Performed by: NURSE PRACTITIONER

## 2024-09-12 PROCEDURE — 99214 OFFICE O/P EST MOD 30 MIN: CPT | Mod: 95,,, | Performed by: NURSE PRACTITIONER

## 2024-09-12 RX ORDER — SEMAGLUTIDE 0.5 MG/.5ML
0.5 INJECTION, SOLUTION SUBCUTANEOUS
Qty: 0.5 ML | Refills: 0 | Status: SHIPPED | OUTPATIENT
Start: 2024-09-12

## 2024-09-12 NOTE — Clinical Note
1. Follow up with me in 6 months with labs prior  2. Put him on the nurse schedule for 12 PM on Wednesday - I am going to adjust his pump settings  3. Wegovy PA-- was it denied?? Can we see why if it was??

## 2024-09-12 NOTE — PROGRESS NOTES
The patient location is: work- [place]   The chief complaint leading to consultation is: Diabetes management- follow up     Visit type: audiovisual    Face to Face time with patient: 15 minutes   30 minutes of total time spent on the encounter, which includes face to face time and non-face to face time preparing to see the patient (eg, review of tests), Obtaining and/or reviewing separately obtained history, Documenting clinical information in the electronic or other health record, Independently interpreting results (not separately reported) and communicating results to the patient/family/caregiver, or Care coordination (not separately reported).         Each patient to whom he or she provides medical services by telemedicine is:  (1) informed of the relationship between the physician and patient and the respective role of any other health care provider with respect to management of the patient; and (2) notified that he or she may decline to receive medical services by telemedicine and may withdraw from such care at any time.    Notes:         CC:   Chief Complaint   Patient presents with    Diabetes Mellitus         HPI: Cj Barnes is a 58 y.o. male presents for a follow up visit today for the management of T1DM.   The patient was diagnosed in his early 30's. Initially diagnosed with T2DM, 5 years later he was dx as T1.    He has used an insulin pump since 2006, previous on animas.  He was on the 670 G medtronic pump.    Now on the Tandem T Slim-- started June 2022     Family hx of diabetes: Mother, father, 3 brothers - no one with T1     Hospitalized for diabetes: denies     No personal or FH of thyroid cancer or personal of pancreatic cancer or pancreatitis.       Our last visit was in March of 2024  At that visit we continued his regimen  His recent A1c is 6.4%  He feels that he needs a tighter carbohydrate ratio as he is sometimes entering in more carbs than he is eating in order to make sure that he gets  more insulin to prevent hyperglycemia  See attached downloads  He would like to see if his insurance will cover Wegovy                           DIABETES COMPLICATIONS: retinopathy, peripheral neuropathy, cardiovascular disease and cerebrovascular disease  TIA     Diabetes Management Status    ASA:  Yes - 81 mg daily and coumadin     Statin: Taking--Crestor 40 mg nightly   ACE/ARB: Taking-- Irbesartan 150 mg daily     Screening or Prevention Patient's value Goal Complete/Controlled?   HgA1C Testing and Control   Lab Results   Component Value Date    HGBA1C 6.4 (H) 09/04/2024      Annually/Less than 8% Yes   Lipid profile : 09/04/2024 Annually Yes   LDL control Lab Results   Component Value Date    LDLCALC 62.6 (L) 09/04/2024    Annually/Less than 100 mg/dl  Yes   Nephropathy screening Lab Results   Component Value Date    LABMICR <5.0 09/04/2024     Lab Results   Component Value Date    PROTEINUA 1+ (A) 12/17/2023    Annually Yes   Blood pressure BP Readings from Last 1 Encounters:   09/12/24 137/75    Less than 140/90 Yes   Dilated retinal exam : 03/14/2024- [facility]  Annually No   Foot exam   : 01/16/2024 Annually Yes       CURRENT A1C:    Hemoglobin A1C   Date Value Ref Range Status   09/04/2024 6.4 (H) 4.0 - 5.6 % Final     Comment:     ADA Screening Guidelines:  5.7-6.4%  Consistent with prediabetes  >or=6.5%  Consistent with diabetes    High levels of fetal hemoglobin interfere with the HbA1C  assay. Heterozygous hemoglobin variants (HbS, HgC, etc)do  not significantly interfere with this assay.   However, presence of multiple variants may affect accuracy.     03/04/2024 6.3 (H) 4.0 - 5.6 % Final     Comment:     ADA Screening Guidelines:  5.7-6.4%  Consistent with prediabetes  >or=6.5%  Consistent with diabetes    High levels of fetal hemoglobin interfere with the HbA1C  assay. Heterozygous hemoglobin variants (HbS, HgC, etc)do  not significantly interfere with this assay.   However, presence of  multiple variants may affect accuracy.     09/05/2023 5.8 (H) 4.0 - 5.6 % Final     Comment:     ADA Screening Guidelines:  5.7-6.4%  Consistent with prediabetes  >or=6.5%  Consistent with diabetes    High levels of fetal hemoglobin interfere with the HbA1C  assay. Heterozygous hemoglobin variants (HbS, HgC, etc)do  not significantly interfere with this assay.   However, presence of multiple variants may affect accuracy.         GOAL A1C: 6.5-7% - without hypoglycemia    DM MEDICATIONS USED IN THE PAST: Medtronic 670 G   Metformin   Steglatro- lack of coverage.   Jardiance   Humalog   Tandem insulin pump   dexcom       CURRENT DIABETES MEDICATIONS:   Insurance will not cover ozempic      Jardiance 25 mg daily    Metformin 500 mg to BID- to help with insulin resistance.       Tandem T Slim insulin pump in control IQ with Dexcom  Pump settings:    Continue Tandem T Slim pump with Lispro   Control IQ mode      Pump settings:  Basal:  Continue basal settings  MN-6AM- 1.6 units/hr --  6AM-10AM- 1.4 units/hr --  10AM- 4PM: 1.4 units/hr  4PM- MN: 1.4 units/hr         ICR continue  MN- 6AM: 1:3.5   6AM-10AM:1:3.5   10AM- 4PM-1:3.5 ---consider tightening to 1:3-- if postprandial excursions continue with lunch  4PM- MN: 1:3.5         ISF:  MN- 6AM: 1:10-    6AM-10AM:1:12  10AM- 4PM-1:16--   4PM- MN: 1:12     Target:  110 ( control IQ)      IOB:  3 hours     Set changed every 2 days  Reservoir changed every 2days    Pump downloaded and reviewed     Average total daily dose is 109.86 units per day   35% basal 65% bolus  0% override    Control IQ 91% of the time  Entering in 169g of carbs per day on average    Pump Supplies from: [employer]     Back up Lantus/Levemir: Yes     BLOOD GLUCOSE MONITORING:   Sensor type: Dexcom G7  Average BG reading 157  Time in range: 75%  21% high, 3.1% very high, 0.2% low, 0.1% very low  Estimated A1c 7.1%  Site change: q10 days     Dexcom from pharmacy       Sensor was downloaded in clinic  today and reviewed with patient.   Please see attached document for download.       HYPOGLYCEMIA: rarely happening per patient    + hx of hypoglycemia unawareness   He doesn't feel BG readings until the 40-50's - once in the 50's he will have twitching to his eye.   Glucagon kit: yes  Medic alert bracelet: yes         MEALS:  eating more CHO off the ozempic-- taking in more insulin too   Drinks: un sweet tea, water, coke zero.   He CHO counts- he feels comfortable.   Eating a very high carbohydrate diet still despite being educated          CURRENT EXERCISE:  None        Review of Systems  Review of Systems   Constitutional:  Negative for appetite change, fatigue and unexpected weight change.   HENT:  Negative for trouble swallowing.    Eyes:  Negative for visual disturbance.   Respiratory:  Negative for shortness of breath.    Cardiovascular:  Negative for chest pain.   Gastrointestinal:  Negative for nausea.   Endocrine: Negative for polydipsia, polyphagia and polyuria.   Genitourinary:         No Nocturia    Musculoskeletal:  Positive for arthralgias. Negative for back pain and gait problem.        Limited mobility to right hand with numbness and tingling--- from cardiovascular surgery    Skin:  Negative for wound.   Neurological:  Positive for numbness.   Psychiatric/Behavioral:  Positive for sleep disturbance. The patient is nervous/anxious.        Physical Exam   Physical Exam  Vitals and nursing note reviewed.   Constitutional:       General: He is not in acute distress.     Appearance: He is obese. He is not ill-appearing.   HENT:      Head: Normocephalic and atraumatic.      Nose: Nose normal.   Pulmonary:      Effort: Pulmonary effort is normal.   Neurological:      General: No focal deficit present.      Mental Status: He is alert and oriented to person, place, and time.   Psychiatric:         Mood and Affect: Mood normal.         Behavior: Behavior normal.         Thought Content: Thought content normal.          Judgment: Judgment normal.         FOOT EXAMINATION:  Deferred due to virtual visit      Lab Results   Component Value Date    TSH 1.597 09/04/2024             Type 1 diabetes mellitus without complication  Well controlled -  Needing more insulin off the ozempic--- unfortunately his insurance will not cover the Ozempic due to him being a type 1 diabetic-- will try for wegovy today.   He feels he needs a tighter CHO ratio during the day      Medication changes:   Insurance will not cover ozempic   Will try Wegovy 0.5 mg weekly     Continue Jardiance 25 mg daily   Continue Metformin 500 mg to BID- to help with insulin resistance.       Pump settings:    Continue Tandem T Slim pump with Lispro   Control IQ mode      Pump settings:  Basal:  adjusted based on download   MN-6AM- 1.6 units/hr --  6AM-10AM- 1.5 units/hr --increased   10AM- 4PM: 1.5 units/hr-- increased   4PM- MN: 1.5 units/hr -- increased       ICR continue  MN- 6AM: 1:3.5   6AM-10AM:1:3.5  10AM- 4PM-1:3--tightened  4PM- MN: 1:3--tightened      ISF:  MN- 6AM: 1:10-    6AM-10AM:1:12  10AM- 4PM-1:16--   4PM- MN: 1:12     Target:  110 ( control IQ)     IOB:  3 hours       -- Reviewed goals of therapy are to get the best control we can without hypoglycemia  -- Reviewed patient's current insulin regimen. Clarified proper insulin dose and timing in relation to meals, etc. Insulin injection sites and proper rotation instructed.    -- Advised frequent self blood glucose monitoring.  Patient encouraged to document glucose results and bring them to every clinic visit  -Continue to use Dexcom G7--supplies from pharmacy   -- Hypoglycemia precautions discussed. Instructed on precautions before driving.    -- Call for Bg repeatedly < 70 or > 180.   -- Close adherence to lifestyle changes recommended.   -- Periodic follow ups for eye evaluations, foot care and dental care suggested.      Patient has diabetes mellitus and manages diabetes with intensive insulin  regimen and uses prandial and basal insulin daily-- on insulin pump   Patient requires a therapeutic CGM and is willing to use therapeutic CGM for the necessary frequent adjustments of insulin therapy.  I have completed an in-person visit during the previous 6 months and will continue to have in-person visits every 6 months to assess adherence to their CGM regimen and diabetes treatment plan.  Due to COVID pandemic and need for remote monitoring this tool is medically necessary          Insulin pump fitting or adjustment  See above     Insulin pump in place  See above     Type 1 diabetes mellitus with hypoglycemia unawareness  Avoid hypoglycemia.  Continue to use Dexcom with real-time alerts  Now on Tandem pump with control IQ    Atherosclerosis of native coronary artery of native heart with angina pectoris  Optimize blood sugar readings    Essential hypertension  BP goal is < 140/90.   Tolerating  ARB  Blood pressure goals discussed with patient      Hyperlipidemia with target low density lipoprotein (LDL) cholesterol less than 70 mg/dL  On statin per ADA recommendations  LDL goal < 70. LDL now at goal on Crestor 40 mg nightly. LFTs WNL.   Continue Statin       TIA (transient ischemic attack)  Optimize blood sugar readings    Class 2 severe obesity due to excess calories with serious comorbidity and body mass index (BMI) of 36.0 to 36.9 in adult  Body mass index is 36.49 kg/m².  Increases insulin resistance.   Discussed DM diet and exercise.   Patient does exhibit insulin resistance and is on an SGLT 2, and metformin.  Insurance will not cover Ozempic   Will try Wegovy           Pump backup plan    If the insulin pump is non functional and discontinued for anticipated more than 20 hours, please give daily injections of:   Long acting insulin Levemir 40  units daily   Short acting insulin Novolog/ Humalog 1:4  for meals according to carb ratios and sensitivity factor in the pump.     When the insulin pump is  restarted, do not restart basal rates until at least 22 hours after the last long acting insulin injection. You can set a 0% temporary basal setting that will last until this time and use your pump to bolus for meals and correction.     For any technical insulin pump issues, please contact the insulin pump company; the toll free number is printed on the label on the back of the insulin pump.       If your sugar is running high for a few hours and does not respond to two correction doses from the insulin pump, it may mean that you have a bad pump site and the site should be changed          Follow up in about 6 months (around 3/12/2025).   1. Follow up with me in 6 months with labs prior   2. Put him on the nurse schedule for 12 PM on Wednesday - I am going to adjust his pump settings   3. Wegovy PA-- was it denied?? Can we see why if it was??         Orders Placed This Encounter   Procedures    Hemoglobin A1C     Standing Status:   Future     Standing Expiration Date:   3/16/2026    Comprehensive Metabolic Panel     Standing Status:   Future     Standing Expiration Date:   3/16/2026    Lipid Panel     Standing Status:   Future     Standing Expiration Date:   3/16/2026    Microalbumin/Creatinine Ratio, Urine     Standing Status:   Future     Standing Expiration Date:   3/16/2026     Order Specific Question:   Specimen Source     Answer:   Urine    TSH     Standing Status:   Future     Standing Expiration Date:   3/16/2026             Recommendations were discussed with the patient in detail  The patient verbalized understanding and agrees with the plan outlined as above.

## 2024-09-16 RX ORDER — BLOOD-GLUCOSE SENSOR
1 EACH MISCELLANEOUS
Qty: 3 EACH | Refills: 11 | Status: SHIPPED | OUTPATIENT
Start: 2024-09-16 | End: 2025-09-16

## 2024-09-16 RX ORDER — METFORMIN HYDROCHLORIDE 500 MG/1
500 TABLET, EXTENDED RELEASE ORAL 2 TIMES DAILY WITH MEALS
Qty: 180 TABLET | Refills: 2 | Status: SHIPPED | OUTPATIENT
Start: 2024-09-16

## 2024-09-16 RX ORDER — SYRINGE AND NEEDLE,INSULIN,1ML 31GX15/64"
SYRINGE, EMPTY DISPOSABLE MISCELLANEOUS
Qty: 150 EACH | Refills: 11 | Status: SHIPPED | OUTPATIENT
Start: 2024-09-16

## 2024-09-16 RX ORDER — INSULIN LISPRO 100 [IU]/ML
INJECTION, SOLUTION INTRAVENOUS; SUBCUTANEOUS
Qty: 60 ML | Refills: 11 | Status: SHIPPED | OUTPATIENT
Start: 2024-09-16

## 2024-09-16 RX ORDER — INSULIN DETEMIR 100 [IU]/ML
40 INJECTION, SOLUTION SUBCUTANEOUS NIGHTLY
Qty: 20 ML | Refills: 3 | Status: SHIPPED | OUTPATIENT
Start: 2024-09-16 | End: 2025-09-16

## 2024-09-16 NOTE — ASSESSMENT & PLAN NOTE
Body mass index is 36.49 kg/m².  Increases insulin resistance.   Discussed DM diet and exercise.   Patient does exhibit insulin resistance and is on an SGLT 2, and metformin.  Insurance will not cover Ozempic   Will try Wegovy

## 2024-09-16 NOTE — ASSESSMENT & PLAN NOTE
Well controlled -  Needing more insulin off the ozempic--- unfortunately his insurance will not cover the Ozempic due to him being a type 1 diabetic-- will try for wegovy today.   He feels he needs a tighter CHO ratio during the day      Medication changes:   Insurance will not cover ozempic   Will try Wegovy 0.5 mg weekly     Continue Jardiance 25 mg daily   Continue Metformin 500 mg to BID- to help with insulin resistance.       Pump settings:    Continue Tandem T Slim pump with Lispro   Control IQ mode      Pump settings:  Basal:  adjusted based on download   MN-6AM- 1.6 units/hr --  6AM-10AM- 1.5 units/hr --increased   10AM- 4PM: 1.5 units/hr-- increased   4PM- MN: 1.5 units/hr -- increased       ICR continue  MN- 6AM: 1:3.5   6AM-10AM:1:3.5  10AM- 4PM-1:3--tightened  4PM- MN: 1:3--tightened      ISF:  MN- 6AM: 1:10-    6AM-10AM:1:12  10AM- 4PM-1:16--   4PM- MN: 1:12     Target:  110 ( control IQ)     IOB:  3 hours       -- Reviewed goals of therapy are to get the best control we can without hypoglycemia  -- Reviewed patient's current insulin regimen. Clarified proper insulin dose and timing in relation to meals, etc. Insulin injection sites and proper rotation instructed.    -- Advised frequent self blood glucose monitoring.  Patient encouraged to document glucose results and bring them to every clinic visit  -Continue to use Dexcom G7--supplies from pharmacy   -- Hypoglycemia precautions discussed. Instructed on precautions before driving.    -- Call for Bg repeatedly < 70 or > 180.   -- Close adherence to lifestyle changes recommended.   -- Periodic follow ups for eye evaluations, foot care and dental care suggested.      Patient has diabetes mellitus and manages diabetes with intensive insulin regimen and uses prandial and basal insulin daily-- on insulin pump   Patient requires a therapeutic CGM and is willing to use therapeutic CGM for the necessary frequent adjustments of insulin therapy.  I have  completed an in-person visit during the previous 6 months and will continue to have in-person visits every 6 months to assess adherence to their CGM regimen and diabetes treatment plan.  Due to COVID pandemic and need for remote monitoring this tool is medically necessary

## 2024-09-18 ENCOUNTER — PATIENT MESSAGE (OUTPATIENT)
Dept: CARDIOLOGY | Facility: CLINIC | Age: 59
End: 2024-09-18

## 2024-09-18 ENCOUNTER — TELEPHONE (OUTPATIENT)
Dept: DIABETES | Facility: CLINIC | Age: 59
End: 2024-09-18

## 2024-09-18 ENCOUNTER — ANTI-COAG VISIT (OUTPATIENT)
Dept: CARDIOLOGY | Facility: CLINIC | Age: 59
End: 2024-09-18
Payer: MEDICARE

## 2024-09-18 ENCOUNTER — CLINICAL SUPPORT (OUTPATIENT)
Dept: DIABETES | Facility: CLINIC | Age: 59
End: 2024-09-18
Payer: MEDICARE

## 2024-09-18 DIAGNOSIS — E10.9 TYPE 1 DIABETES MELLITUS WITHOUT COMPLICATION: Primary | ICD-10-CM

## 2024-09-18 DIAGNOSIS — Z95.2 H/O MECHANICAL AORTIC VALVE REPLACEMENT: ICD-10-CM

## 2024-09-18 DIAGNOSIS — I48.92 PAROXYSMAL ATRIAL FLUTTER: ICD-10-CM

## 2024-09-18 DIAGNOSIS — Z79.01 LONG TERM (CURRENT) USE OF ANTICOAGULANTS: Primary | ICD-10-CM

## 2024-09-18 DIAGNOSIS — E10.649 TYPE 1 DIABETES MELLITUS WITH HYPOGLYCEMIA UNAWARENESS: ICD-10-CM

## 2024-09-18 DIAGNOSIS — Z96.41 INSULIN PUMP IN PLACE: ICD-10-CM

## 2024-09-18 DIAGNOSIS — Z46.81 INSULIN PUMP FITTING OR ADJUSTMENT: ICD-10-CM

## 2024-09-18 PROCEDURE — 93793 ANTICOAG MGMT PT WARFARIN: CPT | Mod: S$GLB,,,

## 2024-09-18 PROCEDURE — 99499 UNLISTED E&M SERVICE: CPT | Mod: S$GLB,,, | Performed by: FAMILY MEDICINE
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
## 2024-09-18 NOTE — PROGRESS NOTES
Presents today for me to input the setting changes that we discussed at his recent virtual visit  Below where the pump setting changes made          Pump settings:    Continue Tandem T Slim pump with Lispro   Control IQ mode      Pump settings:  Basal:  adjusted based on download   MN-6AM- 1.6 units/hr --  6AM-10AM- 1.5 units/hr --increased   10AM- 4PM: 1.5 units/hr-- increased   4PM- MN: 1.5 units/hr -- increased         ICR continue  MN- 6AM: 1:3.5   6AM-10AM:1:3.5  10AM- 4PM-1:3--tightened  4PM- MN: 1:3--tightened        ISF:  MN- 6AM: 1:10-    6AM-10AM:1:12  10AM- 4PM-1:16--   4PM- MN: 1:12     Target:  110 ( control IQ)      IOB:  3 hours

## 2024-09-19 ENCOUNTER — PATIENT MESSAGE (OUTPATIENT)
Dept: PRIMARY CARE CLINIC | Facility: CLINIC | Age: 59
End: 2024-09-19
Payer: MEDICARE

## 2024-09-25 ENCOUNTER — ANTI-COAG VISIT (OUTPATIENT)
Dept: CARDIOLOGY | Facility: CLINIC | Age: 59
End: 2024-09-25
Payer: MEDICARE

## 2024-09-25 ENCOUNTER — PATIENT MESSAGE (OUTPATIENT)
Dept: CARDIOLOGY | Facility: CLINIC | Age: 59
End: 2024-09-25

## 2024-09-25 ENCOUNTER — PATIENT MESSAGE (OUTPATIENT)
Dept: DIABETES | Facility: CLINIC | Age: 59
End: 2024-09-25
Payer: MEDICARE

## 2024-09-25 DIAGNOSIS — Z95.2 H/O MECHANICAL AORTIC VALVE REPLACEMENT: ICD-10-CM

## 2024-09-25 DIAGNOSIS — I48.92 PAROXYSMAL ATRIAL FLUTTER: ICD-10-CM

## 2024-09-25 DIAGNOSIS — Z79.01 LONG TERM (CURRENT) USE OF ANTICOAGULANTS: Primary | ICD-10-CM

## 2024-09-25 PROCEDURE — 93793 ANTICOAG MGMT PT WARFARIN: CPT | Mod: S$GLB,,,

## 2024-09-25 NOTE — PROGRESS NOTES
INR back in range. Resume maintenance dose. Recheck INR in 2 weeks and will plan for new surgery date at that time    Update: Pt will not be able to check in 2 weeks, he will be on a cruise. Hold 5d for surgery 10/16. Follow calendar for post procedure dosing. Please make sure he understands postop dosing because he didn't follow correctly last time. R/s INR to the week following surgery.

## 2024-09-26 ENCOUNTER — PATIENT MESSAGE (OUTPATIENT)
Dept: CARDIOLOGY | Facility: CLINIC | Age: 59
End: 2024-09-26
Payer: MEDICARE

## 2024-09-27 ENCOUNTER — NUTRITION (OUTPATIENT)
Dept: DIABETES | Facility: CLINIC | Age: 59
End: 2024-09-27
Payer: MEDICARE

## 2024-09-27 DIAGNOSIS — E10.9 TYPE 1 DIABETES MELLITUS WITHOUT COMPLICATION: Primary | ICD-10-CM

## 2024-09-27 DIAGNOSIS — E10.649 TYPE 1 DIABETES MELLITUS WITH HYPOGLYCEMIA UNAWARENESS: ICD-10-CM

## 2024-09-27 PROCEDURE — G0108 DIAB MANAGE TRN  PER INDIV: HCPCS | Mod: S$GLB,,, | Performed by: DIETITIAN, REGISTERED

## 2024-10-02 NOTE — PROGRESS NOTES
Diabetes Care Specialist Follow-up Note  Author: Katia Pham RD, CDE  Date: 10/2/2024    Program Intake  Reason for Diabetes Program Visit:: Intervention  Type of Intervention:: Individual  Individual: Device Training  Education: Advanced Pump  Current diabetes risk level:: low  In the last 12 months, have you:: used emergency room services  Was the ER or hospital admission related to diabetes?: Yes  Permission to speak with others about care:: no  Continuous Glucose Monitoring  Patient has CGM: Yes  Personal CGM type:: Dexcom G7  GMI Date: 02/07/24  GMI Value: 6.9 %    Lab Results   Component Value Date    HGBA1C 6.4 (H) 09/04/2024     A1c Pre Diabetes Care Specialist Intervention:  6.1%    Clinical    Lifestyle/Coping/Support  Psychosocial/Coping Skills Assessment Completed: : No  Assessment indicates:: Adequate understanding  Deffered due to:: Other (comment)  Area of need?: No                   Nutritional Status  Meal Plan 24 Hour Recall - Breakfast: something fast or biscuit and sausage  Meal Plan 24 Hour Recall - Lunch: chips  Meal Plan 24 Hour Recall - Dinner: subway foot long with 3 macadamia nut cookies  Meal Plan 24 Hour Recall - Snack: chips    Physical activity/Exercise:   No change    SMBG:    using the dexcom G7 and the tandem pump     Additional Social History      Diabetes Self-Management Skills Assessment     Diabetes Disease Process/Treatment Options  Diabetes Disease Process/Treatment Options: Skills Assessment Completed: No  Assessment indicates:: Adequate understanding  Deferred due to:: Other (comment)  Area of need?: No    Nutrition/Healthy Eating  Meal Plan 24 Hour Recall - Breakfast: something fast or biscuit and sausage  Meal Plan 24 Hour Recall - Lunch: chips  Meal Plan 24 Hour Recall - Dinner: subway foot long with 3 macadamia nut cookies  Meal Plan 24 Hour Recall - Snack: chips  Meal Plan 24 Hour Recall - Beverage: water or sugar free drink  Who shops/cooks?: self or  spouse  Nutrition/Healthy Eating Skills Assessment Completed:: No  Assessment indicates:: Adequate understanding  Deffered due to:: Other (comment)  Area of need?: No    Physical Activity/Exercise  Physical Activity/Exercise Skills Assessment Completed: : No  Assessment indicates:: Adequate understanding  Deffered due to:: Other (comment)  Area of need?: No    Medications  Patient is able to describe current diabetes management routine.: yes  Diabetes management routine:: diet, insulin, oral medications, insulin pump  Patient is able to identify current diabetes medications, dosages, and appropriate timing of medications.: yes  Patient understands the purpose of the medications taken for diabetes.: yes  Patient reports problems or concerns with current medication regimen.: yes  Medication regimen problems/concerns:: other (see comments) (upgrading pump)  Medication Skills Assessment Completed:: Yes  Assessment indicates:: Adequate understanding  Area of need?: Yes    Home Blood Glucose Monitoring  Patient states that blood sugar is checked at home daily.: yes  Monitoring Method:: personal continuous glucose monitor  Personal CGM type:: Dexcom G7  Home Blood Glucose Monitoring Skills Assessment Completed: : No  Assessment indicates:: Adequate understanding  Deferred due to:: Other (comment) (previously completed, there has been no change and patient has adequate understanding)  Area of need?: No    Acute Complications  Acute Complications Skills Assessment Completed: : No  Assessment indicates:: Adequate understanding  Deffered due to:: Other (comment)  Area of need?: No    Chronic Complications  Reviewed health maintenance: yes  Chronic Complications Skills Assessment Completed: : No  Assessment indicates:: Adequate understanding  Deferred due to:: Other (comment)  Area of need?: No    Psychosocial/Coping  Psychosocial/Coping Skills Assessment Completed: : No  Assessment indicates:: Adequate understanding  Deffered  due to:: Other (comment)      During today's follow-up visit,  the following areas required further assessment and content was provided/reviewed.    Based on today's diabetes care assessment, the following areas of need were identified:          2/7/2024    12:05 PM   Social   Support No   Access to Mass Media/Tech No   Cognitive/Behavioral Health No   Culture/Amish No   Communication No   Health Literacy No            2/7/2024    12:05 PM   Clinical   Medication Adherence No   Lab Compliance No   Nutritional Status No            1/30/2024    12:02 AM   Diabetes Self-Management Skills   Diabetes Disease Process/Treatment Options No   Nutrition/Healthy Eating No   Physical Activity/Exercise No   Medication Yes, patient upgraded phone and it is no longer compatible with the t-connect praveen   Home Blood Glucose Monitoring No   Acute Complications No   Chronic Complications No   Psychosocial/Coping No        Today's interventions were provided through individual discussion, instruction, and written materials were provided.    Patient verbalized understanding of instruction and written materials.  Pt was able to return back demonstration of instructions today. Patient understood key points, needs reinforcement and further instruction.     Diabetes Self-Management Care Plan Review and Evaluation of Progress:    During today's follow-up Cj's Diabetes Self-Management Care Plan progress was reviewed and progress was evaluated including his/her input. Cj has agreed to continue his/her journey to improve/maintain overall diabetes control by continuing to set health goals. See care plan progress below.      Care Plan: Diabetes Management   Updates made since 9/2/2024 12:00 AM        Problem: Medications Resolved 9/27/2024        Goal: Patient Agrees to take Diabetes Medication(s) Insulin through the Tandem T-Connect praveen for the next 2 weeks Completed 9/27/2024   Start Date: 3/30/2023   Expected End Date:  4/14/2023   This Visit's Progress: Not met   Recent Progress: Met   Priority: High   Barriers: Lack of Supplies          Follow Up Plan     Follow up in about 4 weeks (around 10/25/2024) for General Follow-up.    Today's care plan and follow up schedule was discussed with patient.  Cj verbalized understanding of the care plan, goals, and agrees to follow up plan.        The patient was encouraged to communicate with his/her health care provider/physician and care team regarding his/her condition(s) and treatment.  I provided the patient with my contact information today and encouraged to contact me via phone or [facility] Patient Portal as needed.     Length of Visit   Total Time: 30 Minutes

## 2024-10-08 ENCOUNTER — TELEPHONE (OUTPATIENT)
Dept: DIABETES | Facility: CLINIC | Age: 59
End: 2024-10-08
Payer: MEDICARE

## 2024-10-15 ENCOUNTER — PATIENT MESSAGE (OUTPATIENT)
Dept: DIABETES | Facility: CLINIC | Age: 59
End: 2024-10-15
Payer: MEDICARE

## 2024-10-15 ENCOUNTER — TELEPHONE (OUTPATIENT)
Dept: ORTHOPEDICS | Facility: CLINIC | Age: 59
End: 2024-10-15
Payer: MEDICARE

## 2024-10-15 NOTE — TELEPHONE ENCOUNTER
Spoke c pt. Informed pt of 0630 arrival time for  10/16/24 surgery at the [facility]. Reminded pt of NPO status. Pt expressed understanding & was thankful.

## 2024-10-16 ENCOUNTER — HOSPITAL ENCOUNTER (OUTPATIENT)
Facility: OTHER | Age: 59
Discharge: HOME | End: 2024-10-16
Attending: ORTHOPAEDIC SURGERY | Admitting: ORTHOPAEDIC SURGERY
Payer: MEDICARE

## 2024-10-16 DIAGNOSIS — G56.22 ULNAR NEUROPATHY OF LEFT UPPER EXTREMITY: Primary | ICD-10-CM

## 2024-10-16 DIAGNOSIS — Z79.01 LONG TERM (CURRENT) USE OF ANTICOAGULANTS: ICD-10-CM

## 2024-10-16 DIAGNOSIS — F41.1 GAD (GENERALIZED ANXIETY DISORDER): ICD-10-CM

## 2024-10-16 DIAGNOSIS — Z98.890 POST-OPERATIVE STATE: ICD-10-CM

## 2024-10-16 DIAGNOSIS — F33.0 MAJOR DEPRESSIVE DISORDER, RECURRENT, MILD: ICD-10-CM

## 2024-10-16 DIAGNOSIS — G56.20 ULNAR NEUROPATHY, UNSPECIFIED LATERALITY: ICD-10-CM

## 2024-10-16 DIAGNOSIS — I48.92 PAROXYSMAL ATRIAL FLUTTER: ICD-10-CM

## 2024-10-16 DIAGNOSIS — G45.9 TIA (TRANSIENT ISCHEMIC ATTACK): ICD-10-CM

## 2024-10-16 DIAGNOSIS — Z95.828 S/P ASCENDING AORTIC REPLACEMENT: ICD-10-CM

## 2024-10-16 DIAGNOSIS — Z95.2 H/O MECHANICAL AORTIC VALVE REPLACEMENT: ICD-10-CM

## 2024-10-16 LAB — GLUCOSE SERPL-MCNC: 143 MG/DL (ref 70–110)

## 2024-10-16 PROCEDURE — 64415 NJX AA&/STRD BRCH PLXS IMG: CPT | Mod: 59,LT,, | Performed by: ANESTHESIOLOGY

## 2024-10-16 PROCEDURE — 63600175 PHARM REV CODE 636 W HCPCS: Performed by: NURSE ANESTHETIST, CERTIFIED REGISTERED

## 2024-10-16 PROCEDURE — 71000016 HC POSTOP RECOV ADDL HR: Performed by: ORTHOPAEDIC SURGERY

## 2024-10-16 PROCEDURE — 25000003 PHARM REV CODE 250

## 2024-10-16 PROCEDURE — 63600175 PHARM REV CODE 636 W HCPCS: Performed by: ANESTHESIOLOGY

## 2024-10-16 PROCEDURE — 82962 GLUCOSE BLOOD TEST: CPT | Performed by: ORTHOPAEDIC SURGERY

## 2024-10-16 PROCEDURE — 64415 NJX AA&/STRD BRCH PLXS IMG: CPT | Performed by: ANESTHESIOLOGY

## 2024-10-16 PROCEDURE — 36000707: Performed by: ORTHOPAEDIC SURGERY

## 2024-10-16 PROCEDURE — 71000039 HC RECOVERY, EACH ADD'L HOUR: Performed by: ORTHOPAEDIC SURGERY

## 2024-10-16 PROCEDURE — 64718 REVISE ULNAR NERVE AT ELBOW: CPT | Mod: LT,,, | Performed by: ORTHOPAEDIC SURGERY

## 2024-10-16 PROCEDURE — 71000033 HC RECOVERY, INTIAL HOUR: Performed by: ORTHOPAEDIC SURGERY

## 2024-10-16 PROCEDURE — 37000009 HC ANESTHESIA EA ADD 15 MINS: Performed by: ORTHOPAEDIC SURGERY

## 2024-10-16 PROCEDURE — 37000008 HC ANESTHESIA 1ST 15 MINUTES: Performed by: ORTHOPAEDIC SURGERY

## 2024-10-16 PROCEDURE — 71000015 HC POSTOP RECOV 1ST HR: Performed by: ORTHOPAEDIC SURGERY

## 2024-10-16 PROCEDURE — 36000706: Performed by: ORTHOPAEDIC SURGERY

## 2024-10-16 PROCEDURE — 63600175 PHARM REV CODE 636 W HCPCS

## 2024-10-16 DEVICE — MEMBRANE CLARIX 1K 2.5CMX2.5CM: Type: IMPLANTABLE DEVICE | Site: ELBOW | Status: FUNCTIONAL

## 2024-10-16 RX ORDER — LIDOCAINE HYDROCHLORIDE 20 MG/ML
INJECTION INTRAVENOUS
Status: DISCONTINUED | OUTPATIENT
Start: 2024-10-16 | End: 2024-10-16

## 2024-10-16 RX ORDER — GLUCAGON 1 MG
1 KIT INJECTION
Status: DISCONTINUED | OUTPATIENT
Start: 2024-10-16 | End: 2024-10-16 | Stop reason: HOSPADM

## 2024-10-16 RX ORDER — CEFAZOLIN SODIUM 1 G/3ML
2 INJECTION, POWDER, FOR SOLUTION INTRAMUSCULAR; INTRAVENOUS
Status: COMPLETED | OUTPATIENT
Start: 2024-10-16 | End: 2024-10-16

## 2024-10-16 RX ORDER — FENTANYL CITRATE 50 UG/ML
INJECTION, SOLUTION INTRAMUSCULAR; INTRAVENOUS
Status: DISCONTINUED | OUTPATIENT
Start: 2024-10-16 | End: 2024-10-16

## 2024-10-16 RX ORDER — MUPIROCIN 20 MG/G
OINTMENT TOPICAL
Status: DISCONTINUED | OUTPATIENT
Start: 2024-10-16 | End: 2024-10-16 | Stop reason: HOSPADM

## 2024-10-16 RX ORDER — MIDAZOLAM HYDROCHLORIDE 1 MG/ML
INJECTION INTRAMUSCULAR; INTRAVENOUS
Status: DISCONTINUED | OUTPATIENT
Start: 2024-10-16 | End: 2024-10-16

## 2024-10-16 RX ORDER — HYDROMORPHONE HYDROCHLORIDE 2 MG/ML
0.4 INJECTION, SOLUTION INTRAMUSCULAR; INTRAVENOUS; SUBCUTANEOUS EVERY 5 MIN PRN
Status: DISCONTINUED | OUTPATIENT
Start: 2024-10-16 | End: 2024-10-16 | Stop reason: HOSPADM

## 2024-10-16 RX ORDER — LIDOCAINE HYDROCHLORIDE 10 MG/ML
0.5 INJECTION, SOLUTION EPIDURAL; INFILTRATION; INTRACAUDAL; PERINEURAL ONCE
Status: DISCONTINUED | OUTPATIENT
Start: 2024-10-16 | End: 2024-10-16 | Stop reason: HOSPADM

## 2024-10-16 RX ORDER — PROCHLORPERAZINE EDISYLATE 5 MG/ML
5 INJECTION INTRAMUSCULAR; INTRAVENOUS EVERY 30 MIN PRN
Status: DISCONTINUED | OUTPATIENT
Start: 2024-10-16 | End: 2024-10-16 | Stop reason: HOSPADM

## 2024-10-16 RX ORDER — OXYCODONE HYDROCHLORIDE 5 MG/1
5 TABLET ORAL
Status: DISCONTINUED | OUTPATIENT
Start: 2024-10-16 | End: 2024-10-16 | Stop reason: HOSPADM

## 2024-10-16 RX ORDER — PROPOFOL 10 MG/ML
VIAL (ML) INTRAVENOUS
Status: DISCONTINUED | OUTPATIENT
Start: 2024-10-16 | End: 2024-10-16

## 2024-10-16 RX ORDER — DIPHENHYDRAMINE HYDROCHLORIDE 50 MG/ML
25 INJECTION INTRAMUSCULAR; INTRAVENOUS EVERY 6 HOURS PRN
Status: DISCONTINUED | OUTPATIENT
Start: 2024-10-16 | End: 2024-10-16 | Stop reason: HOSPADM

## 2024-10-16 RX ORDER — ROPIVACAINE HYDROCHLORIDE 5 MG/ML
INJECTION, SOLUTION EPIDURAL; INFILTRATION; PERINEURAL
Status: DISCONTINUED | OUTPATIENT
Start: 2024-10-16 | End: 2024-10-16

## 2024-10-16 RX ORDER — VASOPRESSIN 20 [USP'U]/ML
INJECTION, SOLUTION INTRAMUSCULAR; SUBCUTANEOUS
Status: DISCONTINUED | OUTPATIENT
Start: 2024-10-16 | End: 2024-10-16

## 2024-10-16 RX ORDER — SODIUM CHLORIDE 0.9 % (FLUSH) 0.9 %
3 SYRINGE (ML) INJECTION
Status: DISCONTINUED | OUTPATIENT
Start: 2024-10-16 | End: 2024-10-16 | Stop reason: HOSPADM

## 2024-10-16 RX ORDER — ONDANSETRON HYDROCHLORIDE 2 MG/ML
INJECTION, SOLUTION INTRAVENOUS
Status: DISCONTINUED | OUTPATIENT
Start: 2024-10-16 | End: 2024-10-16

## 2024-10-16 RX ORDER — SODIUM CHLORIDE, SODIUM LACTATE, POTASSIUM CHLORIDE, CALCIUM CHLORIDE 600; 310; 30; 20 MG/100ML; MG/100ML; MG/100ML; MG/100ML
INJECTION, SOLUTION INTRAVENOUS CONTINUOUS
Status: DISCONTINUED | OUTPATIENT
Start: 2024-10-16 | End: 2024-10-16 | Stop reason: HOSPADM

## 2024-10-16 RX ORDER — ATROPINE SULFATE 0.4 MG/ML
INJECTION, SOLUTION ENDOTRACHEAL; INTRAMEDULLARY; INTRAMUSCULAR; INTRAVENOUS; SUBCUTANEOUS
Status: DISCONTINUED | OUTPATIENT
Start: 2024-10-16 | End: 2024-10-16

## 2024-10-16 RX ORDER — MEPERIDINE HYDROCHLORIDE 25 MG/ML
12.5 INJECTION INTRAMUSCULAR; INTRAVENOUS; SUBCUTANEOUS ONCE AS NEEDED
Status: DISCONTINUED | OUTPATIENT
Start: 2024-10-16 | End: 2024-10-16 | Stop reason: HOSPADM

## 2024-10-16 RX ADMIN — VASOPRESSIN 2 UNITS: 20 INJECTION INTRAVENOUS at 09:10

## 2024-10-16 RX ADMIN — CEFAZOLIN 2 G: 330 INJECTION, POWDER, FOR SOLUTION INTRAMUSCULAR; INTRAVENOUS at 08:10

## 2024-10-16 RX ADMIN — LIDOCAINE HYDROCHLORIDE 100 MG: 20 INJECTION, SOLUTION INTRAVENOUS at 08:10

## 2024-10-16 RX ADMIN — MUPIROCIN: 20 OINTMENT TOPICAL at 08:10

## 2024-10-16 RX ADMIN — SODIUM CHLORIDE, SODIUM LACTATE, POTASSIUM CHLORIDE, AND CALCIUM CHLORIDE: 600; 310; 30; 20 INJECTION, SOLUTION INTRAVENOUS at 08:10

## 2024-10-16 RX ADMIN — PROPOFOL 160 MG: 10 INJECTION, EMULSION INTRAVENOUS at 08:10

## 2024-10-16 RX ADMIN — MIDAZOLAM HYDROCHLORIDE 1 MG: 1 INJECTION INTRAMUSCULAR; INTRAVENOUS at 08:10

## 2024-10-16 RX ADMIN — VASOPRESSIN 1 UNITS: 20 INJECTION INTRAVENOUS at 08:10

## 2024-10-16 RX ADMIN — ROPIVACAINE HYDROCHLORIDE 30 ML: 5 INJECTION, SOLUTION EPIDURAL; INFILTRATION; PERINEURAL at 11:10

## 2024-10-16 RX ADMIN — MIDAZOLAM HYDROCHLORIDE 1 MG: 1 INJECTION INTRAMUSCULAR; INTRAVENOUS at 09:10

## 2024-10-16 RX ADMIN — FENTANYL CITRATE 100 MCG: 50 INJECTION, SOLUTION INTRAMUSCULAR; INTRAVENOUS at 08:10

## 2024-10-16 RX ADMIN — ONDANSETRON HYDROCHLORIDE 4 MG: 2 INJECTION INTRAMUSCULAR; INTRAVENOUS at 08:10

## 2024-10-16 RX ADMIN — ATROPINE SULFATE 1 MG: 0.4 INJECTION, SOLUTION INTRAMUSCULAR; INTRAVENOUS; SUBCUTANEOUS at 08:10

## 2024-10-16 NOTE — ADDENDUM NOTE
Addendum  created 10/16/24 1108 by Miko Solis MD    Child order released for a procedure order, Clinical Note Signed, Intraprocedure Blocks edited, SmartForm saved

## 2024-10-16 NOTE — TRANSFER OF CARE
Anesthesia Transfer of Care Note    Patient: Cj Barnes    Procedure(s) Performed: Procedure(s) (LRB):  LEFT ELBOW DECOMPRESSION, NERVE, ULNAR (Left)    Patient location: PACU    Anesthesia Type: general    Transport from OR: Transported from OR on 6-10 L/min O2 by face mask with adequate spontaneous ventilation    Post pain: adequate analgesia    Post assessment: no apparent anesthetic complications    Post vital signs: stable    Level of consciousness: awake    Nausea/Vomiting: no nausea/vomiting    Complications: none    Transfer of care protocol was followed      Last vitals: Visit Vitals  BP (!) 104/49   Pulse (!) 57   Temp 36.1 °C (97 °F) (Temporal)   Resp 16   SpO2 100%

## 2024-10-16 NOTE — ANESTHESIA POSTPROCEDURE EVALUATION
Anesthesia Post Evaluation    Patient: Cj Barnes    Procedure(s) Performed: Procedure(s) (LRB):  LEFT ELBOW DECOMPRESSION, NERVE, ULNAR (Left)    Final Anesthesia Type: general      Patient location during evaluation: PACU  Patient participation: Yes- Able to Participate  Level of consciousness: awake and alert  Post-procedure vital signs: reviewed and stable  Pain management: adequate  Airway patency: patent    PONV status at discharge: No PONV  Anesthetic complications: no      Cardiovascular status: blood pressure returned to baseline  Respiratory status: unassisted and spontaneous ventilation  Hydration status: euvolemic  Follow-up not needed.              Vitals Value Taken Time   /56 10/16/24 1047   Temp 36.1 °C (97 °F) 10/16/24 0954   Pulse 70 10/16/24 1050   Resp 16 10/16/24 1045   SpO2 95 % 10/16/24 1050   Vitals shown include unfiled device data.      No case tracking events are documented in the log.      Pain/Isa Score: Isa Score: 10 (10/16/2024 10:30 AM)

## 2024-10-16 NOTE — H&P
"No change in the H&P as per below:         Patient ID: Cj Barnes is a 58 y.o. male.     Chief Complaint: Pain of the Left Elbow        HPI  Cj Barnes is a right hand dominant 58 y.o. male presenting today for left elbow pain. 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  There was not a history of trauma.  Onset of symptoms began about eight months.  He describes it as popping and shooting  8/10 pain, that radiates into the biceps.  Symptoms aggravated by activity.Pt reports loss of  strength and is dropping things. Pt denies lateral elbow pain. Pt states that and is keeping him up at night due to positional pain. Pt states that OTC medications have been taking, but nothing is making it better.         The patient denies any fevers, chills, N/V, D/C and presents for evaluation.     Interval Update 08/29/24  Pt presents in clinic today to review his [facility] EMG 08/15/2024. Pt reports constant pain that is radiating up his left arm into the triceps and down into the forearm. Pt states that resting his elbow causes increase numbness. He is ready to proceed with surgery         Review of patient's allergies indicates:  No Known Allergies       Current Medications          Current Outpatient Medications   Medication Sig Dispense Refill    aspirin (ECOTRIN) 81 MG EC tablet Take 1 tablet (81 mg total) by mouth once daily.   0    BAQSIMI 3 mg/actuation Spry USE ONE actuation into a single nostril no response MAY REPEAT in 15 minutes USE a new device 2 each 1    blood-glucose sensor (DEXCOM G7 SENSOR) Apple 1 Device by Misc.(Non-Drug; Combo Route) route every 10 days. 3 each 11    COMFORT EZ PEN NEEDLES 33 gauge x 3/16" Ndle USE AS DIRECTED with Levemir pens   11    COMIRNATY 2023-24, 12Y UP,,PF, 30 mcg/0.3 mL injection          empagliflozin (JARDIANCE) 25 mg tablet Take 1 tablet (25 mg total) by mouth once daily. 30 tablet 11    EScitalopram oxalate " "(LEXAPRO) 10 MG tablet Take 1 tablet (10 mg total) by mouth once daily. (Patient taking differently: Take 5 mg by mouth once daily.) 90 tablet 3    fluticasone propionate (FLONASE) 50 mcg/actuation nasal spray SPRAY TWO SPRAYS IN EACH NOSTRIL DAILY 48 g 2    gabapentin (NEURONTIN) 300 MG capsule TAKE TWO CAPSULES BY MOUTH THREE TIMES DAILY AS NEEDED FOR nerve pain 360 capsule 1    glucagon, human recombinant, (GLUCAGON EMERGENCY KIT, HUMAN,) 1 mg SolR Inject 1 mg into the muscle as needed. (Patient not taking: Reported on 3/11/2024) 1 each 0    hydroCHLOROthiazide (HYDRODIURIL) 12.5 MG Tab TAKE ONE CAPSULE BY MOUTH ONCE DAILY 90 tablet 3    insulin detemir U-100 (LEVEMIR U-100 INSULIN) 100 unit/mL injection Inject 40 Units into the skin every evening. To have on file in case of insulin pump failure.  Patient does not need prescription right now. Vials please 20 mL 3    insulin lispro 100 unit/mL injection USE CONTINUOUSLY WITH INSULIN PUMP(MAX DAILY DOSE  UNITS) 60 mL 11    insulin syringe-needle U-100 1/2 mL 31 gauge x 15/64" Syrg To use 5 times per day with insulin injections if off of insulin pump.  To have on file in case of pump failure 150 each 11    irbesartan (AVAPRO) 150 MG tablet Take 1 tablet (150 mg total) by mouth once daily. 90 tablet 3    metFORMIN (GLUCOPHAGE-XR) 500 MG ER 24hr tablet Take 1 tablet (500 mg total) by mouth 2 (two) times daily with meals. 180 tablet 2    mupirocin (BACTROBAN) 2 % ointment To use 2 times per day as needed 22 g 3    nystatin (MYCOSTATIN) cream Apply topically 2 (two) times daily. 1 Tube 5    pantoprazole (PROTONIX) 40 MG tablet TAKE ONE TABLET BY MOUTH DAILY 90 tablet 1    rosuvastatin (CRESTOR) 40 MG Tab Take 1 tablet (40 mg total) by mouth every evening. 90 tablet 3    tadalafiL (CIALIS) 10 MG tablet Take 1 tablet (10 mg total) by mouth daily as needed for Erectile Dysfunction. 90 tablet 3    warfarin (COUMADIN) 3 MG tablet Take 2 tablets (6 mg total) by mouth " Daily. 4.5mg everyday except Tuesday takes 6mg 180 tablet 3      No current facility-administered medications for this visit.                 Past Medical History:   Diagnosis Date    Anemia      Aortic stenosis 2018    Cancer 2014    Colon cancer      Coronary artery disease      Diabetes mellitus type I       since in his 30's    Heart murmur      Heel fracture      HTN (hypertension)      Hyperlipidemia LDL goal < 70      Insulin pump in place      MVP (mitral valve prolapse)      Stenosis of aortic and mitral valves                 Past Surgical History:   Procedure Laterality Date    AORTIC VALVE REPLACEMENT N/A 8/9/2019     Procedure: Replacement-valve-aortic;  Surgeon: Ryan Knight MD;  Location: [facility] OR [address] [place] [address];  Service: Cardiothoracic;  Laterality: N/A;    BACK SURGERY        BENTALL PROCEDURE FOR REPLACEMENT OF AORTIC VALVE, AORTIC ROOT, AND ASCENDING AORTA N/A 8/9/2019     Procedure: BENTALL PROCEDURE;  Surgeon: Ryan Knight MD;  Location: [facility] OR [address] [place] [address];  Service: Cardiothoracic;  Laterality: N/A;    CARDIAC SURGERY        CARPAL TUNNEL RELEASE        COLON SURGERY   2014    COLONOSCOPY N/A 2/17/2020     Procedure: COLONOSCOPY;  Surgeon: Richi Mock MD;  Location: [facility];  Service: Colon and Rectal;  Laterality: N/A;    COLONOSCOPY N/A 10/26/2023     Procedure: COLONOSCOPY;  Surgeon: Yadira Louis MD;  Location: [facility] ([address] [place] [address]);  Service: Endoscopy;  Laterality: N/A;  ok to hold Coumadin x5 days per [facility], see telephone encounter 8/4/23  ref by KELSIE Cao/ suprep inst portal-RB  10/19-precall complete-pt verbalized understanding of holding Ozempic-MS    DEBRIDEMENT OF ACHILLES TENDON Right 1/27/2023     Procedure: DEBRIDEMENT, TENDON, ACHILLES;  Surgeon: Eugene Bowers DPM;  Location: [facility] OR [address] [place] [address];  Service: Podiatry;  Laterality: Right;    ELBOW SURGERY         tendon release    ESOPHAGOGASTRODUODENOSCOPY N/A 9/10/2020     Procedure: EGD  (ESOPHAGOGASTRODUODENOSCOPY);  Surgeon: Abilio Boo MD;  Location: [facility] ENDO (4TH FLR);  Service: Endoscopy;  Laterality: N/A;  Cardiac clearance received, see telephone encounter 8/27/20-BB  Approval to hold Coumadin prior to procedure received, see telephone encounter 8/27/20-BB  covid-9/7/20-[facility]    FOOT TENDON SURGERY        OSTECTOMY Right 1/27/2023     Procedure: OSTECTOMY;  Surgeon: Eugene Bowers DPM;  Location: [facility] 2ND FLR;  Service: Podiatry;  Laterality: Right;  calcaneal    right and left heart cath Bilateral 01/15/2018    TREATMENT OF CARDIAC ARRHYTHMIA N/A 8/19/2019     Procedure: CARDIOVERSION;  Surgeon: Juan Zapata MD;  Location: [facility] EP LAB;  Service: Cardiology;  Laterality: N/A;  afib, dccv only, anes, GP, 3092    TRIGGER FINGER RELEASE         x 2    TRIGGER FINGER RELEASE Left 1/5/2022     Procedure: RELEASE, TRIGGER FINGER,LEFT,SMALL & THUMB;  Surgeon: Dilma Hunter MD;  Location: [facility] OR;  Service: Orthopedics;  Laterality: Left;         Review of Systems:  Constitutional: Negative for chills and fever.   Respiratory: Negative for cough and shortness of breath.    Gastrointestinal: Negative for nausea and vomiting.   Skin: Negative for rash.   Neurological: Negative for dizziness and headaches.   Psychiatric/Behavioral: Negative for depression.   MSK as in HPI         OBJECTIVE:      PHYSICAL EXAM:  There were no vitals taken for this visit.     GEN:  NAD, well-developed, well-groomed.  NEURO: Awake, alert, and oriented. Normal attention and concentration.    PSYCH: Normal mood and affect. Behavior is normal.  HEENT: No cervical lymphadenopathy noted.  CARDIOVASCULAR: Radial pulses 2+ bilaterally. No LE edema noted.  PULMONARY: Breath sounds normal. No respiratory distress.  SKIN: Intact, no rashes.       MSK:   Hand/Wrist Musculoskeletal Exam  RUE:  Good active ROM of the wrist and fingers. AIN/PIN/Radial/Median/Ulnar Nerves assessed in isolation with  PIN deficit. Radial & Ulnar arteries palpated 2+. Capillary Refill <3s. Positive tinels at the cubital tunnel, negative tinels at the carpal tunnel     LUE: TTP at medial aspect of elbow, over the ulnar nerve. Good active ROM of the wrist and fingers. AIN/PIN/Radial/Median/Ulnar Nerves assessed in isolation without deficit. Radial & Ulnar arteries palpated 2+. Capillary Refill <3s.  Positive provocative examination for cubital tunnel. Negative tinels carpal tunnel.        RADIOGRAPHS:  Left Elbow:  FINDINGS:  Elbow complete three views left.     There is DJD.  There is a triceps insertion spur.  No joint effusion seen.  No fracture, dislocation, bone destruction, or foreign body seen.  Comments: I have personally reviewed the imaging and I agree with the above radiologist's report.     EMG 08/15/2024  Bilateral moderate CTS, Bilateral CuTS: severe on the right and moderate on the left with mild Guyon's on the right side     ASSESSMENT/PLAN:   No diagnosis found.     No orders of the defined types were placed in this encounter.         Plan:      Left ulnar nerve decompression at elbow  Pt has no complaints or provacative exam for CTS ( he had CTR in the 80's)   I have explained the risks, benefits, and alternatives of the procedure to the patient in great detail. The patient voices understanding and all questions have been answered. The patient agrees with to proceed as planned. Consents were performed in clinic.         The patient indicates understanding of these issues and agrees to the plan.           This note has been scribed in part by Oliva Armas, ATC/L, OTC, my Sports Medicine Assistant (SMA). This SMA performed & documented a complete history pre-assessment including the history of present illness, which I, Dilma Hunter MD, explored & confirmed personally with the patient. The SMA has scribed portions of this note including my physical exanimation, diagnostic imaging interpretation,  procedures performed, my plan of care & diagnosis. I agree that the scribed documentation is accurate & complete.

## 2024-10-16 NOTE — OR NURSING
Dr Dasilva and Dr Lloyd Edouard  notified that pt took HCTZ and Losartan , and ASA this morning . Dr Lloyd cheng also notified of skin break L forearm

## 2024-10-16 NOTE — ANESTHESIA PROCEDURE NOTES
Left costoclavicular    Patient location during procedure: post-op   Block not for primary anesthetic.  Reason for block: at surgeon's request and post-op pain management   Post-op Pain Location: Left elbow   Timeout: 10/16/2024 11:02 AM   End time: 10/16/2024 11:07 AM    Staffing  Authorizing Provider: Danyel Dasilva MD  Performing Provider: Miko Solis MD    Staffing  Other anesthesia staff: Miko Solis MD  Performed by: Miko Solis MD  Authorized by: Danyel Dasilva MD    Preanesthetic Checklist  Completed: patient identified, IV checked, site marked, risks and benefits discussed, surgical consent, monitors and equipment checked, pre-op evaluation and timeout performed  Peripheral Block  Patient position: supine  Prep: ChloraPrep  Patient monitoring: heart rate, cardiac monitor, continuous pulse ox and frequent blood pressure checks  Block type: infraclavicular  Laterality: left  Injection technique: single shot  Needle  Needle type: Echogenic   Needle gauge: 20 G  Needle length: 4 in  Needle localization: ultrasound guidance and anatomical landmarks   -ultrasound image captured on disc.  Assessment  Injection assessment: negative aspiration, negative parasthesia and local visualized surrounding nerve  Paresthesia pain: none  Heart rate change: no  Slow fractionated injection: yes  Pain Tolerance: comfortable throughout block and no complaints  Medications:    Medications: ropivacaine (NAROPIN) injection 0.5% - Perineural   30 mL - 10/16/2024 11:07:00 AM    Additional Notes  Costoclavicular approach to infraclavicular block  Post op rescue block after examined by surgeon

## 2024-10-16 NOTE — ANESTHESIA PROCEDURE NOTES
Intubation    Date/Time: 10/16/2024 8:41 AM    Performed by: Mariella Arias CRNA  Authorized by: Danyel Dasilva MD    Intubation:     Induction:  Intravenous    Intubated:  Postinduction    Mask Ventilation:  N/a    Attempts:  1    Attempted By:  CRNA    Difficult Airway Encountered?: No      Complications:  None    Airway Device:  Supraglottic airway/LMA (igel)    Airway Device Size:  5.0    Style/Cuff Inflation:  Cuffed (inflated to minimal occlusive pressure)    Secured at:  The lips    Placement Verified By:  Capnometry    Complicating Factors:  None    Findings Post-Intubation:  BS equal bilateral and atraumatic/condition of teeth unchanged

## 2024-10-16 NOTE — BRIEF OP NOTE
[facility] - Surgery ([place])  Brief Operative Note    Surgery Date: 10/16/2024     Surgeons and Role:     * Dilma Hunter MD - Primary    Assisting Surgeon: AYLA Cavazos MD     Pre-op Diagnosis:  Ulnar neuropathy at elbow, left [G56.22]  Left elbow pain [M25.522]    Post-op Diagnosis:  Post-Op Diagnosis Codes:     * Ulnar neuropathy at elbow, left [G56.22]     * Left elbow pain [M25.522]    Procedure(s) (LRB):  LEFT ELBOW DECOMPRESSION, NERVE, ULNAR (Left)    Anesthesia: General/Regional    Operative Findings: see full op note     Estimated Blood Loss: 0 cc         Specimens:   Specimen (24h ago, onward)      None              Discharge Note    OUTCOME: Patient tolerated treatment/procedure well without complication and is now ready for discharge.    DISPOSITION: Home or Self Care    FINAL DIAGNOSIS:  Post-Op Diagnosis Codes:     * Ulnar neuropathy at elbow, left [G56.22]     * Left elbow pain [M25.522]    FOLLOWUP: In clinic    DISCHARGE INSTRUCTIONS:    Discharge Procedure Orders   Ambulatory referral/consult to Physical/Occupational Therapy   Standing Status: Future   Referral Priority: Routine Referral Type: Occupational Therapy   Referral Reason: Specialty Services Required   Requested Specialty: Occupational Therapy   Number of Visits Requested: 1     Diet general     Call MD for:  temperature >100.4     Call MD for:  persistent nausea and vomiting     Call MD for:  severe uncontrolled pain     Call MD for:  difficulty breathing, headache or visual disturbances     Call MD for:  redness, tenderness, or signs of infection (pain, swelling, redness, odor or green/yellow discharge around incision site)     Call MD for:  hives     Call MD for:  persistent dizziness or light-headedness     Call MD for:  extreme fatigue     Keep surgical extremity elevated     Lifting restrictions   Order Comments: Do not lift more than the weight of a coffee cup for the next 4-6 weeks.     Leave dressing on - Keep it clean, dry,  and intact until clinic visit

## 2024-10-17 VITALS
RESPIRATION RATE: 16 BRPM | SYSTOLIC BLOOD PRESSURE: 117 MMHG | DIASTOLIC BLOOD PRESSURE: 59 MMHG | HEART RATE: 76 BPM | OXYGEN SATURATION: 95 % | TEMPERATURE: 98 F

## 2024-10-21 NOTE — OP NOTE
Hospitalist Progress Note    Subjective:   Daily Progress Note: 9/26/2021 9:23 AM    Hospital Course:  58-year-old female with a recent diagnosis of renal tubular acidosis type I with severe hypokalemia presented to the emergency room again today complaining of generalized weakness, poor appetite, severe fatigue with no energy to do anything. Symptoms became worse when she tries to stand up. States taking medications regularly. Denies any dizziness, visual changes, chest pain, palpitations, SOB, N/V, abd pain, diarrhea, constipation, or new sensory changes. On recent admission she is diagnosed with renal tubular acidosis type I likely secondary to chronic use of NSAIDs that she stopped. Unable to get information about duration of this condition with the patient. In the ED, borderline hypotension with BP 91/65. Significant labs showing WBC 13.8, potassium 2.6, CO2 13, BUN 33, creatinine 2.38. Started on IV fluids and potassium bicarbonate. Nephrology consult. Admitted for LOLITA, renal tubular acidosis.        Subjective:    Patient seen and examined at bedside. She reports fatigue. Otherwise denies all other complaints.      Current Facility-Administered Medications   Medication Dose Route Frequency    pregabalin (LYRICA) capsule 150 mg  150 mg Oral BID    rOPINIRole (REQUIP) tablet 0.25 mg  0.25 mg Oral TID    spironolactone (ALDACTONE) tablet 12.5 mg  12.5 mg Oral BID    sodium citrate-citric acid (BICITRA) 500-334 mg/5 mL oral solution 15 mL  15 mL Oral TID    sodium chloride (NS) flush 5-40 mL  5-40 mL IntraVENous PRN    acetaminophen (TYLENOL) tablet 650 mg  650 mg Oral Q6H PRN    Or    acetaminophen (TYLENOL) suppository 650 mg  650 mg Rectal Q6H PRN    ondansetron (ZOFRAN ODT) tablet 4 mg  4 mg Oral Q6H PRN    Or    ondansetron (ZOFRAN) injection 4 mg  4 mg IntraVENous Q6H PRN    lactated Ringers infusion  125 mL/hr IntraVENous CONTINUOUS        Review of Systems  Constitutional: + [facility]  Surgery Department  Operative Note    SUMMARY     Date of Procedure: 10/16/2024     Procedure: Procedure(s) (LRB):  LEFT ELBOW DECOMPRESSION, NERVE, ULNAR (Left)     Surgeons and Role:     * Dilma Hunter MD - Primary    Assisting Surgeon: None    Pre-Operative Diagnosis: Ulnar neuropathy at elbow, left [G56.22]  Left elbow pain [M25.522]    Post-Operative Diagnosis: Post-Op Diagnosis Codes:     * Ulnar neuropathy at elbow, left [G56.22]     * Left elbow pain [M25.522]    Anesthesia: General    Technical Procedures Used: surgery    Description of the Findings of the Procedure: Indication for procedure: Mr Barnes is a 57 yo male left cubital tunnel he has numbness and tingling pain at the elbow positive provocative examination after much discussion with the patient elected for surgical intervention risks and benefits were explained to the patient in clinic consents were signed in clinic     Procedure in detail the correct site was marked with the patient's participation the holding area the patient was brought to the operating room placed in supine position underwent general anesthesia his left upper extremity was prepped and draped normal sterile fashion a well-padded sterile tourniquet was placed on the left upper extremity a time-out was conducted for the correct procedure to be indicated IV antibiotics given patient preoperatively incision was marked out just posterior to the medial epicondyle the arm was exsanguinated with an Esmarch tourniquet was insufflated 250 mmHg incision was made careful dissection down to the ulnar nerve it was completely decompressed there were fascicles that you could easily see due to the significant amount of compression that is been ongoing once it was completely decompressed and this was also assessed with the mosquito hemostat to confirm that there was no compression the elbow was placed through range of motion showed the nerve did not sublux  fatigue/malaise, Appetite is poor, No fevers, No chills, No sweats. Respiratory: No Shortness of Breath, No cough, No wheezing  Cardiovascular: No chest pain, No palpitations, No extremity edema  Gastrointestinal: No nausea, No vomiting, No diarrhea, No abdominal pain  Genitourinary: No frequency, No dysuria, No hematuria  Integument/breast: No skin lesion(s)   Neurological: No Confusion, No headaches, No dizziness      Objective:     Visit Vitals  BP (!) 94/58 (BP 1 Location: Left upper arm, BP Patient Position: At rest)   Pulse (!) 57   Temp 97.9 °F (36.6 °C)   Resp 16   Ht 5' (1.524 m)   Wt 83.9 kg (185 lb)   LMP  (LMP Unknown) Comment: menopause   SpO2 98%   BMI 36.13 kg/m²      O2 Device: None (Room air)    Temp (24hrs), Av °F (36.7 °C), Min:97.9 °F (36.6 °C), Max:98.3 °F (36.8 °C)      No intake/output data recorded.  1901 -  0700  In:  [I.V.:]  Out: -     PHYSICAL EXAM:  Constitutional: Middle-aged  female. No acute distress  Skin: Extremities and face reveal no rashes. HEENT: Sclerae anicteric. PERRL. No oral ulcers. The neck is supple and no masses. Cardiovascular: Regular rate and rhythm. + S1/S2. No murmur or gallop. Respiratory:  Clear breath sounds bilaterally with no wheezes, rales, or rhonchi. GI: Abdomen nondistended, soft, and nontender. Normal active bowel sounds. Rectal: Deferred   Musculoskeletal: No pitting edema of the lower legs. Able to move all ext  Neurological:  Patient is alert and oriented x3.  Cranial nerves II-XII grossly intact  Psychiatric: Mood appears appropriate       Data Review    Recent Results (from the past 24 hour(s))   CBC WITH AUTOMATED DIFF    Collection Time: 21  6:07 PM   Result Value Ref Range    WBC 13.8 (H) 3.6 - 11.0 K/uL    RBC 4.92 3.80 - 5.20 M/uL    HGB 15.1 11.5 - 16.0 g/dL    HCT 43.0 35.0 - 47.0 %    MCV 87.4 80.0 - 99.0 FL    MCH 30.7 26.0 - 34.0 PG    MCHC 35.1 30.0 - 36.5 g/dL    RDW 14.3 11.5 - 14.5 % the areas irrigated copious amounts normal saline clear X was sutured into position Vicryl Monocryl Dermabond closed the skin sterile dressing was applied patient was placed in a well-padded long-arm posterior splint tolerated suture well was brought to cover room in stable condition    Postop plans patient keep the dressing clean dry intact will see the patient back in 2 weeks' time therapy to be initiated     Postoperative assessment prior to block was performed patient had sensation and abduction of his fingers    Significant Surgical Tasks Conducted by the Assistant(s), if Applicable: retraction    Complications: No    Estimated Blood Loss (EBL): * No values recorded between 10/16/2024  9:04 AM and 10/16/2024  9:53 AM *           Implants:   Implant Name Type Inv. Item Serial No.  Lot No. LRB No. Used Action   MEMBRANE CLARIX 1K 2.5CMX2.5CM - [id number]  MEMBRANE CLARIX 1K 2.5CMX2.5CM [id number] [employer] [id number] Left 6 Implanted       Specimens:   Specimen (24h ago, onward)      None                    Condition: Good    Disposition: PACU - hemodynamically stable.    Attestation: I performed the procedure.    Discharge Note    SUMMARY     Admit Date: 10/16/2024    Discharge Date and Time: 10/16/2024 12:44 PM    Hospital Course (synopsis of major diagnoses, care, treatment, and services provided during the course of the hospital stay): surgery     Final Diagnosis: Post-Op Diagnosis Codes:     * Ulnar neuropathy at elbow, left [G56.22]     * Left elbow pain [M25.522]    Disposition: Home or Self Care    Follow Up/Patient Instructions:     Medications:  Reconciled Home Medications:      Medication List        START taking these medications      ondansetron 4 MG Tbdl  Commonly known as: ZOFRAN-ODT  Take 1 tablet (4 mg total) by mouth every 6 (six) hours as needed (For Nausea). Bedside Delivery     traMADoL 50 mg tablet  Commonly known as: ULTRAM  Take 1 tablet (50 mg  "total) by mouth every 6 (six) hours as needed for Pain.            CONTINUE taking these medications      aspirin 81 MG EC tablet  Commonly known as: ECOTRIN  Take 1 tablet (81 mg total) by mouth once daily.     COMFORT EZ PEN NEEDLES 33 gauge x 3/16" Ndle  Generic drug: pen needle, diabetic  USE AS DIRECTED with Levemir pens     COMIRNATY 2023-24 (12Y UP)(PF) 30 mcg/0.3 mL injection  Generic drug: COVID gks87-11(12up)(raxt)(PF)     DEXCOM G7 SENSOR Apple  Generic drug: blood-glucose sensor  1 Device by Misc.(Non-Drug; Combo Route) route every 10 days.     empagliflozin 25 mg tablet  Commonly known as: JARDIANCE  Take 1 tablet (25 mg total) by mouth once daily.     EScitalopram oxalate 10 MG tablet  Commonly known as: LEXAPRO  TAKE ONE TABLET BY MOUTH ONCE DAILY     fluticasone propionate 50 mcg/actuation nasal spray  Commonly known as: FLONASE  SPRAY TWO SPRAYS IN EACH NOSTRIL DAILY     gabapentin 300 MG capsule  Commonly known as: NEURONTIN  TAKE TWO CAPSULES BY MOUTH THREE TIMES DAILY AS NEEDED FOR nerve pain     GLUCAGON (HUMAN RECOMBINANT) 1 mg Solr  Generic drug: glucagon  Inject 1 mg into the muscle as needed.     hydroCHLOROthiazide 12.5 MG Tab  Commonly known as: HYDRODIURIL  TAKE ONE CAPSULE BY MOUTH ONCE DAILY     insulin lispro 100 unit/mL injection  USE CONTINUOUSLY WITH INSULIN PUMP(MAX DAILY DOSE  UNITS)     insulin syringe-needle U-100 1/2 mL 31 gauge x 15/64" Syrg  To use 5 times per day with insulin injections if off of insulin pump.  To have on file in case of pump failure     irbesartan 150 MG tablet  Commonly known as: AVAPRO  Take 1 tablet (150 mg total) by mouth once daily.     LEVEMIR U-100 INSULIN 100 unit/mL injection  Generic drug: insulin detemir U-100  Inject 40 Units into the skin every evening. To have on file in case of insulin pump failure.  Patient does not need prescription right now. Vials please     metFORMIN 500 MG ER 24hr tablet  Commonly known as: GLUCOPHAGE-XR  Take 1 " PLATELET 833 348 - 207 K/uL    MPV 11.6 8.9 - 12.9 FL    NRBC 0.0 0.0  WBC    ABSOLUTE NRBC 0.00 0.00 - 0.01 K/uL    NEUTROPHILS 67 32 - 75 %    LYMPHOCYTES 22 12 - 49 %    MONOCYTES 7 5 - 13 %    EOSINOPHILS 2 0 - 7 %    BASOPHILS 1 0 - 1 %    IMMATURE GRANULOCYTES 1 (H) 0 - 0.5 %    ABS. NEUTROPHILS 9.4 (H) 1.8 - 8.0 K/UL    ABS. LYMPHOCYTES 3.0 0.8 - 3.5 K/UL    ABS. MONOCYTES 1.0 0.0 - 1.0 K/UL    ABS. EOSINOPHILS 0.2 0.0 - 0.4 K/UL    ABS. BASOPHILS 0.1 0.0 - 0.1 K/UL    ABS. IMM. GRANS. 0.1 (H) 0.00 - 0.04 K/UL    DF AUTOMATED     METABOLIC PANEL, COMPREHENSIVE    Collection Time: 09/25/21  6:07 PM   Result Value Ref Range    Sodium 138 136 - 145 mmol/L    Potassium 2.6 (LL) 3.5 - 5.1 mmol/L    Chloride 110 (H) 97 - 108 mmol/L    CO2 13 (LL) 21 - 32 mmol/L    Anion gap 15 5 - 15 mmol/L    Glucose 147 (H) 65 - 100 mg/dL    BUN 33 (H) 6 - 20 mg/dL    Creatinine 2.38 (H) 0.55 - 1.02 mg/dL    BUN/Creatinine ratio 14 12 - 20      GFR est AA 25 (L) >60 ml/min/1.73m2    GFR est non-AA 21 (L) >60 ml/min/1.73m2    Calcium 10.3 (H) 8.5 - 10.1 mg/dL    Bilirubin, total 0.3 0.2 - 1.0 mg/dL    AST (SGOT) 9 (L) 15 - 37 U/L    ALT (SGPT) 12 12 - 78 U/L    Alk.  phosphatase 70 45 - 117 U/L    Protein, total 7.9 6.4 - 8.2 g/dL    Albumin 4.1 3.5 - 5.0 g/dL    Globulin 3.8 2.0 - 4.0 g/dL    A-G Ratio 1.1 1.1 - 2.2         No orders to display       Active Problems:    Hypokalemia (11/4/2020)      Acute kidney injury ([address] (9/25/2021)        Assessment/Plan:     Acute renal failure  - Likely secondary to poor oral intake versus polyuria  - Started on IV fluids   - Monitor renal functions  - Nephrology consult     Severe hypokalemia with metabolic acidosis  - Started on oral potassium supplements along with supplementation IV  - Due to metabolic acidosis requested for potassium bicarbonate    Renal tubular acidosis distal type I   - May need further evaluation outpatient and follow-up patient already has appointment with nephrology     Anxiety with depression - on treatment. Need to check the medications if they have any side effect profile at this.     COPD - with no exacerbation not on any medications stable      DVT Prophylaxis: SCDs  Code Status: Full  POA:    Dispo: Improvement in renal functions. Nephrology consult. Care Plan discussed with: patient and nursing    Total time spent with patient: >35 minutes. tablet (500 mg total) by mouth 2 (two) times daily with meals.     mupirocin 2 % ointment  Commonly known as: BACTROBAN  To use 2 times per day as needed     nystatin cream  Commonly known as: MYCOSTATIN  Apply topically 2 (two) times daily.     pantoprazole 40 MG tablet  Commonly known as: PROTONIX  TAKE ONE TABLET BY MOUTH DAILY     rosuvastatin 40 MG Tab  Commonly known as: CRESTOR  Take 1 tablet (40 mg total) by mouth every evening.     tadalafiL 10 MG tablet  Commonly known as: CIALIS  Take 1 tablet (10 mg total) by mouth daily as needed for Erectile Dysfunction.     TYLENOL ORAL  Take 500 mg by mouth daily as needed. Pain     warfarin 3 MG tablet  Commonly known as: COUMADIN  Take 2 tablets (6 mg total) by mouth Daily. 4.5mg everyday except Tuesday takes 6mg     WEGOVY 0.5 mg/0.5 mL Pnij  Generic drug: semaglutide (weight loss)  Inject 0.5 mg into the skin every 7 days.            ASK your doctor about these medications      BAQSIMI 3 mg/actuation Spry  Generic drug: glucagon  USE ONE actuation into a single nostril no response MAY REPEAT in 15 minutes USE a new device            Discharge Procedure Orders   Ambulatory referral/consult to Physical/Occupational Therapy   Standing Status: Future   Referral Priority: Routine Referral Type: Occupational Therapy   Referral Reason: Specialty Services Required   Requested Specialty: Occupational Therapy   Number of Visits Requested: 1     Ambulatory referral/consult to Physical/Occupational Therapy   Standing Status: Future   Referral Priority: Routine Referral Type: Physical Medicine   Referral Reason: Specialty Services Required   Number of Visits Requested: 1     Ambulatory referral/consult to Physical/Occupational Therapy   Standing Status: Future   Referral Priority: Routine Referral Type: Occupational Therapy   Referral Reason: Specialty Services Required   Requested Specialty: Occupational Therapy   Number of Visits Requested: 1     Diet general     Call MD  for:  temperature >100.4     Call MD for:  persistent nausea and vomiting     Call MD for:  severe uncontrolled pain     Call MD for:  difficulty breathing, headache or visual disturbances     Call MD for:  redness, tenderness, or signs of infection (pain, swelling, redness, odor or green/yellow discharge around incision site)     Call MD for:  hives     Call MD for:  persistent dizziness or light-headedness     Call MD for:  extreme fatigue     Keep surgical extremity elevated     Lifting restrictions   Order Comments: Do not lift more than the weight of a coffee cup for the next 4-6 weeks.     Leave dressing on - Keep it clean, dry, and intact until clinic visit

## 2024-10-23 ENCOUNTER — ANTI-COAG VISIT (OUTPATIENT)
Dept: CARDIOLOGY | Facility: CLINIC | Age: 59
End: 2024-10-23
Payer: MEDICARE

## 2024-10-23 ENCOUNTER — PATIENT MESSAGE (OUTPATIENT)
Dept: CARDIOLOGY | Facility: CLINIC | Age: 59
End: 2024-10-23

## 2024-10-23 DIAGNOSIS — Z79.01 LONG TERM (CURRENT) USE OF ANTICOAGULANTS: Primary | ICD-10-CM

## 2024-10-23 DIAGNOSIS — I48.92 PAROXYSMAL ATRIAL FLUTTER: ICD-10-CM

## 2024-10-23 DIAGNOSIS — Z95.2 H/O MECHANICAL AORTIC VALVE REPLACEMENT: ICD-10-CM

## 2024-10-23 PROCEDURE — 93793 ANTICOAG MGMT PT WARFARIN: CPT | Mod: S$GLB,,,

## 2024-10-23 NOTE — PROGRESS NOTES
INR still low after holding for surgery last week. Pt confirmed dose as planned. No changes reported. Bolus dose today then resume maintenance dose. Recheck INR in 1 week    Noted Dr. Alexander no longer at [facility]. Please advise pt to make new follow up appt with cardiology; [phone]

## 2024-10-24 ENCOUNTER — OFFICE VISIT (OUTPATIENT)
Dept: ORTHOPEDICS | Facility: CLINIC | Age: 59
End: 2024-10-24
Payer: MEDICARE

## 2024-10-24 ENCOUNTER — TELEPHONE (OUTPATIENT)
Dept: ORTHOPEDICS | Facility: CLINIC | Age: 59
End: 2024-10-24
Payer: MEDICARE

## 2024-10-24 DIAGNOSIS — Z98.890 POST-OPERATIVE STATE: Primary | ICD-10-CM

## 2024-10-24 DIAGNOSIS — G56.22 ULNAR NEUROPATHY AT ELBOW, LEFT: ICD-10-CM

## 2024-10-24 NOTE — PROGRESS NOTES
Patient reports 8 days status post ulnar nerve decompression of the left elbow.  He notes that the splint is bothersome at the wrist.  He has removed all the cast padding at that location.  Splint was removed no signs of any infection or irritation at the incision site.  We will place patient back in a long arm ortho plaster splint.  Patient we will follow up in 1 week for suture removal.

## 2024-10-24 NOTE — TELEPHONE ENCOUNTER
Spoke c pt. Confirmed appt location & time c Noel Wheatley PA-C 10/24/24 for dressing check, no splint change. Pt expressed understanding & was thankful.

## 2024-10-28 ENCOUNTER — PATIENT MESSAGE (OUTPATIENT)
Dept: PRIMARY CARE CLINIC | Facility: CLINIC | Age: 59
End: 2024-10-28
Payer: MEDICARE

## 2024-10-30 ENCOUNTER — ANTI-COAG VISIT (OUTPATIENT)
Dept: CARDIOLOGY | Facility: CLINIC | Age: 59
End: 2024-10-30
Payer: MEDICARE

## 2024-10-30 ENCOUNTER — PATIENT MESSAGE (OUTPATIENT)
Dept: CARDIOLOGY | Facility: CLINIC | Age: 59
End: 2024-10-30

## 2024-10-30 ENCOUNTER — CLINICAL SUPPORT (OUTPATIENT)
Dept: REHABILITATION | Facility: HOSPITAL | Age: 59
End: 2024-10-30
Attending: ORTHOPAEDIC SURGERY
Payer: MEDICARE

## 2024-10-30 ENCOUNTER — OFFICE VISIT (OUTPATIENT)
Dept: ORTHOPEDICS | Facility: CLINIC | Age: 59
End: 2024-10-30
Payer: MEDICARE

## 2024-10-30 VITALS — HEIGHT: 67 IN | BODY MASS INDEX: 36.57 KG/M2 | WEIGHT: 233 LBS

## 2024-10-30 DIAGNOSIS — Z79.01 LONG TERM (CURRENT) USE OF ANTICOAGULANTS: Primary | ICD-10-CM

## 2024-10-30 DIAGNOSIS — G56.22 ULNAR NEUROPATHY OF LEFT UPPER EXTREMITY: ICD-10-CM

## 2024-10-30 DIAGNOSIS — Z98.890 POST-OPERATIVE STATE: Primary | ICD-10-CM

## 2024-10-30 DIAGNOSIS — Z98.890 POST-OPERATIVE STATE: ICD-10-CM

## 2024-10-30 DIAGNOSIS — R29.898 DECREASED GRIP STRENGTH OF LEFT HAND: ICD-10-CM

## 2024-10-30 DIAGNOSIS — G56.20 ULNAR NEUROPATHY, UNSPECIFIED LATERALITY: ICD-10-CM

## 2024-10-30 DIAGNOSIS — G56.22 ULNAR NEUROPATHY AT ELBOW, LEFT: ICD-10-CM

## 2024-10-30 DIAGNOSIS — Z95.2 H/O MECHANICAL AORTIC VALVE REPLACEMENT: ICD-10-CM

## 2024-10-30 DIAGNOSIS — I48.92 PAROXYSMAL ATRIAL FLUTTER: ICD-10-CM

## 2024-10-30 DIAGNOSIS — M25.522 ELBOW PAIN, LEFT: Primary | ICD-10-CM

## 2024-10-30 PROCEDURE — 99999 PR PBB SHADOW E&M-EST. PATIENT-LVL IV: CPT | Mod: PBBFAC,,,

## 2024-10-30 PROCEDURE — 93793 ANTICOAG MGMT PT WARFARIN: CPT | Mod: S$GLB,,,

## 2024-10-30 PROCEDURE — 97530 THERAPEUTIC ACTIVITIES: CPT

## 2024-10-30 PROCEDURE — 97165 OT EVAL LOW COMPLEX 30 MIN: CPT

## 2024-11-06 DIAGNOSIS — E10.65 TYPE 1 DIABETES MELLITUS WITH HYPERGLYCEMIA: ICD-10-CM

## 2024-11-06 RX ORDER — GABAPENTIN 300 MG/1
CAPSULE ORAL
Qty: 360 CAPSULE | Refills: 0 | Status: SHIPPED | OUTPATIENT
Start: 2024-11-06

## 2024-11-06 NOTE — TELEPHONE ENCOUNTER
No care due was identified.  Health [facility] Embedded Care Due Messages. Reference number: [id number].   11/06/2024 9:08:14 AM CST

## 2024-11-13 ENCOUNTER — ANTI-COAG VISIT (OUTPATIENT)
Dept: CARDIOLOGY | Facility: CLINIC | Age: 59
End: 2024-11-13
Payer: MEDICARE

## 2024-11-13 ENCOUNTER — LAB VISIT (OUTPATIENT)
Dept: LAB | Facility: OTHER | Age: 59
End: 2024-11-13
Attending: FAMILY MEDICINE
Payer: MEDICARE

## 2024-11-13 ENCOUNTER — CLINICAL SUPPORT (OUTPATIENT)
Dept: REHABILITATION | Facility: HOSPITAL | Age: 59
End: 2024-11-13
Payer: MEDICARE

## 2024-11-13 ENCOUNTER — PATIENT MESSAGE (OUTPATIENT)
Dept: CARDIOLOGY | Facility: CLINIC | Age: 59
End: 2024-11-13

## 2024-11-13 DIAGNOSIS — Z79.01 LONG TERM (CURRENT) USE OF ANTICOAGULANTS: Primary | ICD-10-CM

## 2024-11-13 DIAGNOSIS — R29.898 DECREASED GRIP STRENGTH OF LEFT HAND: ICD-10-CM

## 2024-11-13 DIAGNOSIS — I48.92 PAROXYSMAL ATRIAL FLUTTER: ICD-10-CM

## 2024-11-13 DIAGNOSIS — Z95.2 H/O MECHANICAL AORTIC VALVE REPLACEMENT: ICD-10-CM

## 2024-11-13 DIAGNOSIS — Z79.01 LONG TERM (CURRENT) USE OF ANTICOAGULANTS: ICD-10-CM

## 2024-11-13 DIAGNOSIS — M25.522 ELBOW PAIN, LEFT: Primary | ICD-10-CM

## 2024-11-13 LAB
INR PPP: 2.2 (ref 0.8–1.2)
PROTHROMBIN TIME: 23.3 SEC (ref 9–12.5)

## 2024-11-13 PROCEDURE — 97110 THERAPEUTIC EXERCISES: CPT

## 2024-11-13 PROCEDURE — 85610 PROTHROMBIN TIME: CPT | Performed by: INTERNAL MEDICINE

## 2024-11-13 PROCEDURE — 97530 THERAPEUTIC ACTIVITIES: CPT

## 2024-11-13 PROCEDURE — 93793 ANTICOAG MGMT PT WARFARIN: CPT | Mod: S$GLB,,,

## 2024-11-13 PROCEDURE — 36415 COLL VENOUS BLD VENIPUNCTURE: CPT | Performed by: INTERNAL MEDICINE

## 2024-11-13 PROCEDURE — 97140 MANUAL THERAPY 1/> REGIONS: CPT

## 2024-11-13 NOTE — PROGRESS NOTES
[facility]  Occupational Therapy Treatment Note    Date: 11/13/2024  Name: Cj Barnes  Clinic Number: [id number]    Therapy Diagnosis:   Encounter Diagnoses   Name Primary?    Elbow pain, left Yes    Decreased  strength of left hand      Physician: Dilma Hunter, *    Physician: Dilma Hunter, *  Physician Orders: Eval and Treat  L Ulnar Nerve Decompression  DOS: 10/16/24  Nerve Glides and Scar massage     Surgical Procedure and Date: 10/16/24,   LEFT ELBOW DECOMPRESSION, NERVE, ULNAR (Left)      Evaluation Date: 10/30/2024     Plan of Care Certification Period: 12/31/24     Date of Return to MD: 6 weeks post op      Visit # / Visits authorized: 2 / 8  Insurance Authorization Period Expiration: PENDING      FOTO #1: FSM___nt__ / GOAL _____  FOTO #2:   FOTO #3:      Precautions:  Standard and Weightbearing     Time In:9:00  Time Out: 9:45  Total Appointment Time (timed & untimed codes): 45 minutes      SUBJECTIVE     Pt reports: A little soreness at the elbow when resting on it.   He was compliant with home exercise program given last session.     Pain: 2/10  Location: left elbow    OBJECTIVE   Objective Measures updated at progress report unless specified.    Observation/Appearance:  Skin intact and Skin dry. 5 cm incision medial aspect of elbow, stitches removed this date.      Edema. Measured in centimeters.  Left  10/30/2024         Proximal Wrist Crease 18   Elbow crease  29.5                Elbow  ROM. Measured in degrees.    10/30/2024         Elbow ext/flex  0/132    Sup/pro 80/80         Full fit   Full opposition                  Sensation     Special Tests:          Strength (Dyanmometer) and Pinch Strength (Pinch Gauge)  Measured in pounds and psi. Average of three trials.    10/30/2024 10/30/2024     Right  Left    Rung II 62.5 56.1    Key Pinch       3pt Pinch       2pt Pinch             Manual Muscle Testing:  BICEPS, BRACHIALIS,  BRACHIORADIALIS 3+/5         CMS Impairment/Limitation/Restriction for FOTO Survey     Therapist reviewed FOTO scores for Cj Barnes on 10/30/2024.   FOTO documents entered into [employer] - see Media section.     Limitation Score: ___nt___%      Goal: ______%  Category: Self Care         Treatment     Daniel received the treatments listed below:     Supervised modalities after being cleared for contradictions: Hot Pack - x10 min      Manual therapy techniques: Soft tissue Mobilization were applied to the: medial L elbow for 10 minutes, including:  IASTM medial elbow scar for mobility and desensitization    Therapeutic exercises to develop ROM for 15 minutes, including:  Elbow curls x3 planes   Wrist pres 3 planes   Yellow flex bar   Isospheres       Patient Education and Home Exercises      Education provided:   - HEP  - Progress towards goals     Written Home Exercises Provided: Patient instructed to cont prior HEP.  Exercises were reviewed and Daniel was able to demonstrate them prior to the end of the session.  Daniel demonstrated good  understanding of the HEP provided. See EMR under Patient Instructions for exercises provided during therapy sessions.      ASSESSMENT      Pt demo's significant improvements in ROM and endurance. At this time his only complaint is mild sensitivity with direct input to incision site. Pt educated on scar massage and endurance exercises at this time. He is schedule to follow up with NP in 2 weeks plan to follow up as needed.      Daniel is progressing  towards his goals and there are no updates to goals at this time. Pt prognosis is Good.     Pt will continue to benefit from skilled outpatient occupational therapy to address the deficits listed in the problem list on initial evaluation, provide pt/family education and to maximize pt's level of independence in the home and community environment.     Pt's spiritual, cultural and educational needs considered and pt agreeable to plan of  care and goals.    Anticipated barriers to occupational therapy: none    Goals:  Short Term Goals: (6-8 weeks):  1)  Patient to be IND with HEP and modalities for pain management  2)  Increase ROM 3-10 degrees to increase functional hand use for ADLs/work/leisure activities  3)   Decrease edema .1-.5mm to increase joint mobility /flexibility for functional hand use.   4)  Increase  strength 2-8 lbs. For driving, household chores     PLAN     Continue skilled occupational therapy with individualized plan of care focusing on light strengthening initiation      Mary Adler OT

## 2024-11-13 NOTE — PROGRESS NOTES
INR low. Pt missed a dose. Notes that he started Wegovy in the last 2 weeks. Will need to assess for changes on INR. Bolus dose today. Recheck INR in 1 week    Still needs cardiology follow up scheduled. Call [phone]

## 2024-11-20 ENCOUNTER — ANTI-COAG VISIT (OUTPATIENT)
Dept: CARDIOLOGY | Facility: CLINIC | Age: 59
End: 2024-11-20
Payer: MEDICARE

## 2024-11-20 ENCOUNTER — PATIENT MESSAGE (OUTPATIENT)
Dept: CARDIOLOGY | Facility: CLINIC | Age: 59
End: 2024-11-20

## 2024-11-20 DIAGNOSIS — Z95.2 H/O MECHANICAL AORTIC VALVE REPLACEMENT: ICD-10-CM

## 2024-11-20 DIAGNOSIS — I48.92 PAROXYSMAL ATRIAL FLUTTER: ICD-10-CM

## 2024-11-20 DIAGNOSIS — Z79.01 LONG TERM (CURRENT) USE OF ANTICOAGULANTS: Primary | ICD-10-CM

## 2024-11-20 PROCEDURE — 93793 ANTICOAG MGMT PT WARFARIN: CPT | Mod: S$GLB,,,

## 2024-11-20 NOTE — PROGRESS NOTES
Pt advised via [employer]. Advised pt make an appointment with cardiologist. States he is still trying to get an appointment

## 2024-11-20 NOTE — PROGRESS NOTES
INR not at goal. Recently started Wegovy. Medications, chart, and patient findings reviewed. See calendar for adjustments to dose and follow up plan.    Still needs cardiology follow up scheduled. Call [phone]

## 2024-11-22 ENCOUNTER — TELEPHONE (OUTPATIENT)
Dept: CARDIOLOGY | Facility: CLINIC | Age: 59
End: 2024-11-22
Payer: MEDICARE

## 2024-11-22 NOTE — TELEPHONE ENCOUNTER
----- Message from Med Assistant Minaya sent at 11/22/2024 11:13 AM CST -----  Hello,    Patient is established in cards department. He was seeing Dr. Alexander. He was to follow up with Dr. De La Fuente in six months but he has since retired. He was trying to to follow up with a cardiologist at the [facility]. Is there anyone that can assist him?    EVELYNE Maddox  Ed Navigator

## 2024-11-27 ENCOUNTER — OFFICE VISIT (OUTPATIENT)
Dept: ORTHOPEDICS | Facility: CLINIC | Age: 59
End: 2024-11-27
Payer: MEDICARE

## 2024-11-27 VITALS — WEIGHT: 233.25 LBS | BODY MASS INDEX: 36.61 KG/M2 | HEIGHT: 67 IN

## 2024-11-27 DIAGNOSIS — Z98.890 POST-OPERATIVE STATE: Primary | ICD-10-CM

## 2024-11-27 DIAGNOSIS — G56.22 ULNAR NEUROPATHY AT ELBOW, LEFT: ICD-10-CM

## 2024-11-27 PROCEDURE — 99999 PR PBB SHADOW E&M-EST. PATIENT-LVL IV: CPT | Mod: PBBFAC,,,

## 2024-11-27 NOTE — PROGRESS NOTES
"HPI interval 11/27/2024  Mr. Barnes is here today for a postop visit.  Patient is 6 weeks status post left ulnar nerve decompression by Dr. Lloyd hurd on 10/16/2024.  He reports he is doing well.  He denies any numbness or tingling.  He denies any pain.  He reports he has been going to therapy and continues to do scar massage.  Denies any decreased range of motion.  Reports improvement in swelling at his left elbow      HPI interval 10/30/2024    Mr. Barnes is here today for a post-operative visit.  He is 14 days status post left cubital tunnel ulnar nerve decompression by Dr. Hunter on 10/16/2024 . He reports that he is well.  Pain is 2/10.  He is not taking pain medication .  He denies fever, chills, and sweats since the time of the surgery.  He reports mild tenderness at left with associated . He denies any numbness or tingling.       HPI interval 10/24/2024  Patient reports 8 days status post ulnar nerve decompression of the left elbow.  He notes that the splint is bothersome at the wrist.  He has removed all the cast padding at that location.  Splint was removed no signs of any infection or irritation at the incision site.  We will place patient back in a long arm ortho plaster splint.  Patient we will follow up in 1 week for suture removal            Physical exam:    Vitals:    11/27/24 1042   Weight: 105.8 kg (233 lb 4 oz)   Height: 5' 7" (1.702 m)   PainSc: 0-No pain     Vital signs are stable, patient is afebrile.  Patient is well dressed and well groomed, no acute distress.  Alert and oriented to person, place, and time.  Post op dressing taken down.  Incision is clean, dry and intact.  There is no erythema or exudate.  There is no sign of any infection. He is NVI. Sutures ends were not noted.  Mild swelling left elbow    Assessment:   s/p left elbow ulnar nerve decompression  Cj "Daniel" was seen today for post-op evaluation and sore.    Diagnoses and all orders for this visit:    Post-operative " state    Ulnar neuropathy at elbow, left         Plan:      Continue scar massage to be done 3 times a day.  Patient we will follow up as needed.  He demonstrated good range of motion today occupational therapy as needed continue performing exercises at home.

## 2024-12-04 ENCOUNTER — PATIENT MESSAGE (OUTPATIENT)
Dept: CARDIOLOGY | Facility: CLINIC | Age: 59
End: 2024-12-04

## 2024-12-04 ENCOUNTER — ANTI-COAG VISIT (OUTPATIENT)
Dept: CARDIOLOGY | Facility: CLINIC | Age: 59
End: 2024-12-04
Payer: MEDICARE

## 2024-12-04 DIAGNOSIS — Z79.01 LONG TERM (CURRENT) USE OF ANTICOAGULANTS: Primary | ICD-10-CM

## 2024-12-04 DIAGNOSIS — I48.92 PAROXYSMAL ATRIAL FLUTTER: ICD-10-CM

## 2024-12-04 DIAGNOSIS — Z95.2 H/O MECHANICAL AORTIC VALVE REPLACEMENT: ICD-10-CM

## 2024-12-04 PROCEDURE — 93793 ANTICOAG MGMT PT WARFARIN: CPT | Mod: S$GLB,,,

## 2024-12-18 ENCOUNTER — PATIENT MESSAGE (OUTPATIENT)
Dept: CARDIOLOGY | Facility: CLINIC | Age: 59
End: 2024-12-18

## 2024-12-18 ENCOUNTER — ANTI-COAG VISIT (OUTPATIENT)
Dept: CARDIOLOGY | Facility: CLINIC | Age: 59
End: 2024-12-18
Payer: MEDICARE

## 2024-12-18 DIAGNOSIS — Z79.01 LONG TERM (CURRENT) USE OF ANTICOAGULANTS: Primary | ICD-10-CM

## 2024-12-18 DIAGNOSIS — Z95.2 H/O MECHANICAL AORTIC VALVE REPLACEMENT: ICD-10-CM

## 2024-12-18 DIAGNOSIS — I48.92 PAROXYSMAL ATRIAL FLUTTER: ICD-10-CM

## 2024-12-18 PROCEDURE — 93793 ANTICOAG MGMT PT WARFARIN: CPT | Mod: S$GLB,,,

## 2024-12-19 ENCOUNTER — PATIENT MESSAGE (OUTPATIENT)
Dept: ADMINISTRATIVE | Facility: HOSPITAL | Age: 59
End: 2024-12-19
Payer: MEDICARE

## 2024-12-20 DIAGNOSIS — E10.65 TYPE 1 DIABETES MELLITUS WITH HYPERGLYCEMIA: ICD-10-CM

## 2024-12-20 RX ORDER — METFORMIN HYDROCHLORIDE 500 MG/1
500 TABLET, EXTENDED RELEASE ORAL 2 TIMES DAILY WITH MEALS
Qty: 180 TABLET | Refills: 2 | Status: SHIPPED | OUTPATIENT
Start: 2024-12-20

## 2024-12-26 DIAGNOSIS — E10.649 TYPE 1 DIABETES MELLITUS WITH HYPOGLYCEMIA UNAWARENESS: ICD-10-CM

## 2024-12-26 RX ORDER — BLOOD-GLUCOSE SENSOR
EACH MISCELLANEOUS
Qty: 3 EACH | Refills: 11 | Status: SHIPPED | OUTPATIENT
Start: 2024-12-26

## 2025-01-06 DIAGNOSIS — E10.65 TYPE 1 DIABETES MELLITUS WITH HYPERGLYCEMIA: ICD-10-CM

## 2025-01-06 RX ORDER — GABAPENTIN 300 MG/1
CAPSULE ORAL
Qty: 360 CAPSULE | Refills: 0 | Status: SHIPPED | OUTPATIENT
Start: 2025-01-06

## 2025-01-06 NOTE — TELEPHONE ENCOUNTER
Care Due:                  Date            Visit Type   Department     Provider  --------------------------------------------------------------------------------                                [facility]  Last Visit: 01-      FOLLOW UP    PRIMARY CARE   Garry Stover  Next Visit: None Scheduled  None         None Found                                                            Last  Test          Frequency    Reason                     Performed    Due Date  --------------------------------------------------------------------------------    Office Visit  15 months..  EScitalopram,              01- 03-                             pantoprazole.............    [employer] Embedded Care Due Messages. Reference number: [id number].   1/06/2025 10:09:39 AM CST

## 2025-01-08 ENCOUNTER — PATIENT MESSAGE (OUTPATIENT)
Dept: CARDIOLOGY | Facility: CLINIC | Age: 60
End: 2025-01-08

## 2025-01-08 ENCOUNTER — ANTI-COAG VISIT (OUTPATIENT)
Dept: CARDIOLOGY | Facility: CLINIC | Age: 60
End: 2025-01-08
Payer: MEDICARE

## 2025-01-08 DIAGNOSIS — Z95.2 H/O MECHANICAL AORTIC VALVE REPLACEMENT: ICD-10-CM

## 2025-01-08 DIAGNOSIS — Z79.01 LONG TERM (CURRENT) USE OF ANTICOAGULANTS: Primary | ICD-10-CM

## 2025-01-08 DIAGNOSIS — I48.92 PAROXYSMAL ATRIAL FLUTTER: ICD-10-CM

## 2025-01-08 PROCEDURE — 93793 ANTICOAG MGMT PT WARFARIN: CPT | Mod: S$GLB,,,

## 2025-01-08 NOTE — TELEPHONE ENCOUNTER
Called for pt, no answer. LM on VM to return call    One refill approved, but patient needs follow up appointment.

## 2025-01-15 ENCOUNTER — OFFICE VISIT (OUTPATIENT)
Dept: CARDIOLOGY | Facility: CLINIC | Age: 60
End: 2025-01-15
Payer: MEDICARE

## 2025-01-15 VITALS
HEART RATE: 75 BPM | WEIGHT: 236.13 LBS | SYSTOLIC BLOOD PRESSURE: 106 MMHG | OXYGEN SATURATION: 97 % | DIASTOLIC BLOOD PRESSURE: 56 MMHG | BODY MASS INDEX: 36.98 KG/M2

## 2025-01-15 DIAGNOSIS — E66.01 CLASS 2 SEVERE OBESITY DUE TO EXCESS CALORIES WITH SERIOUS COMORBIDITY AND BODY MASS INDEX (BMI) OF 36.0 TO 36.9 IN ADULT: ICD-10-CM

## 2025-01-15 DIAGNOSIS — I25.10 CORONARY ARTERY DISEASE INVOLVING NATIVE CORONARY ARTERY OF NATIVE HEART WITHOUT ANGINA PECTORIS: ICD-10-CM

## 2025-01-15 DIAGNOSIS — Z29.89 INDICATION PRESENT FOR ENDOCARDITIS PROPHYLAXIS: ICD-10-CM

## 2025-01-15 DIAGNOSIS — E78.5 HYPERLIPIDEMIA WITH TARGET LOW DENSITY LIPOPROTEIN (LDL) CHOLESTEROL LESS THAN 70 MG/DL: ICD-10-CM

## 2025-01-15 DIAGNOSIS — E66.812 CLASS 2 SEVERE OBESITY DUE TO EXCESS CALORIES WITH SERIOUS COMORBIDITY AND BODY MASS INDEX (BMI) OF 36.0 TO 36.9 IN ADULT: ICD-10-CM

## 2025-01-15 DIAGNOSIS — I48.92 PAROXYSMAL ATRIAL FLUTTER: ICD-10-CM

## 2025-01-15 DIAGNOSIS — I10 ESSENTIAL HYPERTENSION: ICD-10-CM

## 2025-01-15 DIAGNOSIS — Z95.828 S/P ASCENDING AORTIC REPLACEMENT: Primary | ICD-10-CM

## 2025-01-15 PROCEDURE — 99999 PR PBB SHADOW E&M-EST. PATIENT-LVL IV: CPT | Mod: PBBFAC,,, | Performed by: INTERNAL MEDICINE

## 2025-01-15 PROCEDURE — 3078F DIAST BP <80 MM HG: CPT | Mod: CPTII,S$GLB,, | Performed by: INTERNAL MEDICINE

## 2025-01-15 PROCEDURE — 3008F BODY MASS INDEX DOCD: CPT | Mod: CPTII,S$GLB,, | Performed by: INTERNAL MEDICINE

## 2025-01-15 PROCEDURE — 1159F MED LIST DOCD IN RCRD: CPT | Mod: CPTII,S$GLB,, | Performed by: INTERNAL MEDICINE

## 2025-01-15 PROCEDURE — 3074F SYST BP LT 130 MM HG: CPT | Mod: CPTII,S$GLB,, | Performed by: INTERNAL MEDICINE

## 2025-01-15 PROCEDURE — 93000 ELECTROCARDIOGRAM COMPLETE: CPT | Mod: S$GLB,,, | Performed by: INTERNAL MEDICINE

## 2025-01-15 PROCEDURE — 99204 OFFICE O/P NEW MOD 45 MIN: CPT | Mod: S$GLB,,, | Performed by: INTERNAL MEDICINE

## 2025-01-15 NOTE — PROGRESS NOTES
Cardiology    1/15/2025  11:40 AM    Problem list  Patient Active Problem List   Diagnosis    Essential hypertension    Insulin pump fitting or adjustment    Hyperlipidemia with target low density lipoprotein (LDL) cholesterol less than 70 mg/dL    Insulin pump in place    TIA (transient ischemic attack)    Type 1 diabetes mellitus with hypoglycemia unawareness    Class 2 severe obesity due to excess calories with serious comorbidity and body mass index (BMI) of 36.0 to 36.9 in adult    S/P ascending aortic replacement    Long term (current) use of anticoagulants    Paroxysmal atrial flutter    H/O mechanical aortic valve replacement    Iron deficiency anemia due to chronic blood loss    ANTHONY (iron deficiency anemia)    ACE-inhibitor cough    Diabetic mononeuropathy associated with diabetes mellitus due to underlying condition    Major depressive disorder, recurrent, mild    Coronary artery disease involving native coronary artery of native heart without angina pectoris    Aortic atherosclerosis    Anemia    Allergies    Sleep apnea    History of colon cancer    BMI 36.0-36.9,adult    Acid reflux    History of staph infection    Uses hearing aid    Personal history of spine surgery    Asymptomatic varicose veins of left lower extremity    Elbow pain, left    Decreased  strength of left hand    Indication present for endocarditis prophylaxis       CC:  Establish care    HPI:  Patient is here to establish care.  He last saw Dr Alexander in Jan 2024.  Patient is a pleasant 59-year-old man with history of bicuspid aortic valve who underwent Bentall procedure in 2019.  He has a mechanical aortic valve and being monitor by warfarin Clinic.  He has been doing well.  He denies any chest pain or shortness of breath.  He denies any fever chills.  He denies any bleeding.    Medications  Current Outpatient Medications   Medication Sig Dispense Refill    acetaminophen (TYLENOL ORAL) Take 500 mg by mouth daily as needed.  "Pain      BAQSIMI 3 mg/actuation Spry USE ONE actuation into a single nostril no response MAY REPEAT in 15 minutes USE a new device 2 each 1    COMFORT EZ PEN NEEDLES 33 gauge x 3/16" Ndle USE AS DIRECTED with Levemir pens  11    COMIRNATY 2023-24, 12Y UP,,PF, 30 mcg/0.3 mL injection       DEXCOM G7 SENSOR Apple USE ONE SENSOR EVERY 10 DAYS 3 each 11    empagliflozin (JARDIANCE) 25 mg tablet Take 1 tablet (25 mg total) by mouth once daily. 30 tablet 11    EScitalopram oxalate (LEXAPRO) 10 MG tablet TAKE ONE TABLET BY MOUTH ONCE DAILY (Patient taking differently: Take 5 mg by mouth once daily.) 90 tablet 1    fluticasone propionate (FLONASE) 50 mcg/actuation nasal spray SPRAY TWO SPRAYS IN EACH NOSTRIL DAILY 48 g 2    gabapentin (NEURONTIN) 300 MG capsule TAKE TWO CAPSULES BY MOUTH THREE TIMES DAILY AS NEEDED FOR nerve pain 360 capsule 0    hydroCHLOROthiazide (HYDRODIURIL) 12.5 MG Tab TAKE ONE CAPSULE BY MOUTH ONCE DAILY 90 tablet 3    insulin detemir U-100 (LEVEMIR U-100 INSULIN) 100 unit/mL injection Inject 40 Units into the skin every evening. To have on file in case of insulin pump failure.  Patient does not need prescription right now. Vials please 20 mL 3    insulin lispro 100 unit/mL injection USE CONTINUOUSLY WITH INSULIN PUMP(MAX DAILY DOSE  UNITS) 60 mL 11    insulin syringe-needle U-100 1/2 mL 31 gauge x 15/64" Syrg To use 5 times per day with insulin injections if off of insulin pump.  To have on file in case of pump failure 150 each 11    irbesartan (AVAPRO) 150 MG tablet Take 1 tablet (150 mg total) by mouth once daily. 90 tablet 3    metFORMIN (GLUCOPHAGE-XR) 500 MG ER 24hr tablet TAKE ONE TABLET BY MOUTH TWICE DAILY WITH MEALS 180 tablet 2    mupirocin (BACTROBAN) 2 % ointment To use 2 times per day as needed 22 g 3    nystatin (MYCOSTATIN) cream Apply topically 2 (two) times daily. 1 Tube 5    pantoprazole (PROTONIX) 40 MG tablet TAKE ONE TABLET BY MOUTH DAILY 90 tablet 0    rosuvastatin " "(CRESTOR) 40 MG Tab Take 1 tablet (40 mg total) by mouth every evening. 90 tablet 3    tadalafiL (CIALIS) 10 MG tablet Take 1 tablet (10 mg total) by mouth daily as needed for Erectile Dysfunction. (Patient taking differently: Take 10 mg by mouth daily as needed for Erectile Dysfunction. 4 days a week) 90 tablet 3    warfarin (COUMADIN) 3 MG tablet Take 2 tablets (6 mg total) by mouth Daily. 4.5mg everyday except Tuesday takes 6mg (Patient taking differently: Take 6 mg by mouth. M,F 6 mg  4.5 mg every other day) 180 tablet 3    aspirin (ECOTRIN) 81 MG EC tablet Take 1 tablet (81 mg total) by mouth once daily.  0    glucagon, human recombinant, (GLUCAGON EMERGENCY KIT, HUMAN,) 1 mg SolR Inject 1 mg into the muscle as needed. (Patient not taking: Reported on 9/12/2024) 1 each 0    ondansetron (ZOFRAN-ODT) 4 MG TbDL Take 1 tablet (4 mg total) by mouth every 6 (six) hours as needed (For Nausea). Bedside Delivery 14 tablet 0    semaglutide, weight loss, (WEGOVY) 0.5 mg/0.5 mL PnIj Inject 0.5 mg into the skin every 7 days. (Patient not taking: Reported on 1/15/2025) 0.5 mL 0    traMADoL (ULTRAM) 50 mg tablet Take 1 tablet (50 mg total) by mouth every 6 (six) hours as needed for Pain. 10 tablet 0     No current facility-administered medications for this visit.      Prior to Admission medications    Medication Sig Start Date End Date Taking? Authorizing Provider   acetaminophen (TYLENOL ORAL) Take 500 mg by mouth daily as needed. Pain   Yes Provider, Historical   BAQSIMI 3 mg/actuation Spry USE ONE actuation into a single nostril no response MAY REPEAT in 15 minutes USE a new device 2/27/23  Yes Jodi Quintero, KELSIE   COMFORT EZ PEN NEEDLES 33 gauge x 3/16" Ndle USE AS DIRECTED with Levemir pens 4/30/19  Yes Provider, Historical   COMIRNATY 2023-24, 12Y UP,,PF, 30 mcg/0.3 mL injection  10/12/23  Yes Provider, Historical   DEXCOM G7 SENSOR Apple USE ONE SENSOR EVERY 10 DAYS 12/26/24  Yes Jodi Quintero, NP   empagliflozin " "(JARDIANCE) 25 mg tablet Take 1 tablet (25 mg total) by mouth once daily. 9/16/24  Yes Jodi Quintero NP   EScitalopram oxalate (LEXAPRO) 10 MG tablet TAKE ONE TABLET BY MOUTH ONCE DAILY  Patient taking differently: Take 5 mg by mouth once daily. 9/3/24  Yes Garry Stover MD   fluticasone propionate (FLONASE) 50 mcg/actuation nasal spray SPRAY TWO SPRAYS IN EACH NOSTRIL DAILY 4/29/24  Yes Garry Stover MD   gabapentin (NEURONTIN) 300 MG capsule TAKE TWO CAPSULES BY MOUTH THREE TIMES DAILY AS NEEDED FOR nerve pain 1/6/25  Yes Garry Stover MD   hydroCHLOROthiazide (HYDRODIURIL) 12.5 MG Tab TAKE ONE CAPSULE BY MOUTH ONCE DAILY 1/24/24  Yes Ryan Alexander MD   insulin detemir U-100 (LEVEMIR U-100 INSULIN) 100 unit/mL injection Inject 40 Units into the skin every evening. To have on file in case of insulin pump failure.  Patient does not need prescription right now. Vials please 9/16/24 9/16/25 Yes Jodi Quintero NP   insulin lispro 100 unit/mL injection USE CONTINUOUSLY WITH INSULIN PUMP(MAX DAILY DOSE  UNITS) 9/16/24  Yes Jodi Quintero NP   insulin syringe-needle U-100 1/2 mL 31 gauge x 15/64" Syrg To use 5 times per day with insulin injections if off of insulin pump.  To have on file in case of pump failure 9/16/24  Yes Jodi Quintero NP   irbesartan (AVAPRO) 150 MG tablet Take 1 tablet (150 mg total) by mouth once daily. 1/24/24  Yes Ryan Alexander MD   metFORMIN (GLUCOPHAGE-XR) 500 MG ER 24hr tablet TAKE ONE TABLET BY MOUTH TWICE DAILY WITH MEALS 12/20/24  Yes Jodi Quintero NP   mupirocin (BACTROBAN) 2 % ointment To use 2 times per day as needed 3/11/24  Yes Jodi Quintero NP   nystatin (MYCOSTATIN) cream Apply topically 2 (two) times daily. 4/6/21  Yes Herlinda James NP   pantoprazole (PROTONIX) 40 MG tablet TAKE ONE TABLET BY MOUTH DAILY 10/28/24  Yes Garry Stover MD   rosuvastatin (CRESTOR) 40 MG Tab Take 1 tablet (40 mg total) by mouth every evening. 1/24/24 " 1/23/25 Yes Ryan Alexander MD   tadalafiL (CIALIS) 10 MG tablet Take 1 tablet (10 mg total) by mouth daily as needed for Erectile Dysfunction.  Patient taking differently: Take 10 mg by mouth daily as needed for Erectile Dysfunction. 4 days a week 7/25/23  Yes Herlinda James NP   warfarin (COUMADIN) 3 MG tablet Take 2 tablets (6 mg total) by mouth Daily. 4.5mg everyday except Tuesday takes 6mg  Patient taking differently: Take 6 mg by mouth. M,F 6 mg  4.5 mg every other day 1/24/24  Yes Ryan Alexander MD   aspirin (ECOTRIN) 81 MG EC tablet Take 1 tablet (81 mg total) by mouth once daily. 8/22/19 10/16/24  Demetria Rodriguez NP   glucagon, human recombinant, (GLUCAGON EMERGENCY KIT, HUMAN,) 1 mg SolR Inject 1 mg into the muscle as needed.  Patient not taking: Reported on 9/12/2024 4/23/19 9/5/24  Jodi Quintero NP   ondansetron (ZOFRAN-ODT) 4 MG TbDL Take 1 tablet (4 mg total) by mouth every 6 (six) hours as needed (For Nausea). Bedside Delivery 9/9/24   Johnny Wheatley PA-C   semaglutide, weight loss, (WEGOVY) 0.5 mg/0.5 mL PnIj Inject 0.5 mg into the skin every 7 days.  Patient not taking: Reported on 1/15/2025 9/12/24   Jodi Quintero NP   traMADoL (ULTRAM) 50 mg tablet Take 1 tablet (50 mg total) by mouth every 6 (six) hours as needed for Pain. 9/9/24   Johnny Wheatley PA-C         History  Past Medical History:   Diagnosis Date    Anemia     Aortic stenosis 2018    Cancer 2014    Colon cancer     Coronary artery disease     Diabetes mellitus type I     since in his 30's    Heart murmur     Heel fracture     HTN (hypertension)     Hyperlipidemia LDL goal < 70     Insulin pump in place     MVP (mitral valve prolapse)     EVENS (obstructive sleep apnea)     uses CPAP    Stenosis of aortic and mitral valves     Uses hearing aid      Past Surgical History:   Procedure Laterality Date    AORTIC VALVE REPLACEMENT N/A 08/09/2019    Procedure: Replacement-valve-aortic;  Surgeon: Ryan Knight MD;   Location: [address] FLR;  Service: Cardiothoracic;  Laterality: N/A;    BACK SURGERY      lumbar fusion with lidia placement    BENTALL PROCEDURE FOR REPLACEMENT OF AORTIC VALVE, AORTIC ROOT, AND ASCENDING AORTA N/A 08/09/2019    Procedure: BENTALL PROCEDURE;  Surgeon: Ryan Knight MD;  [facility];  Service: Cardiothoracic;  Laterality: N/A;    CARDIAC SURGERY      CARPAL TUNNEL RELEASE      COLON SURGERY  2014    COLONOSCOPY N/A 02/17/2020    Procedure: COLONOSCOPY;  Surgeon: Richi Mock MD;  Location: [facility] ENDO;  Service: Colon and Rectal;  Laterality: N/A;    COLONOSCOPY N/A 10/26/2023    Procedure: COLONOSCOPY;  Surgeon: Yadira Louis MD;  Location: [facility] ENDO (4TH FLR);  Service: Endoscopy;  Laterality: N/A;  ok to hold Coumadin x5 days per [facility], see telephone encounter 8/4/23  ref by KELSIE Cao/ suprep inst portal-RB  10/19-precall complete-pt verbalized understanding of holding Ozempic-MS    DEBRIDEMENT OF ACHILLES TENDON Right 01/27/2023    Procedure: DEBRIDEMENT, TENDON, ACHILLES;  Surgeon: Eugene Bowers DPM;  [facility];  Service: Podiatry;  Laterality: Right;    DECOMPRESSION, NERVE, ULNAR Left 10/16/2024    Procedure: LEFT ELBOW DECOMPRESSION, NERVE, ULNAR;  Surgeon: Dilma Hunter MD;  Location: [facility];  Service: Orthopedics;  Laterality: Left;  REGIONAL AFTER    ELBOW SURGERY      tendon release    ESOPHAGOGASTRODUODENOSCOPY N/A 09/10/2020    Procedure: EGD (ESOPHAGOGASTRODUODENOSCOPY);  Surgeon: Abilio Boo MD;  Location: [facility] ENDO (4TH FLR);  Service: Endoscopy;  Laterality: N/A;  Cardiac clearance received, see telephone encounter 8/27/20-BB  Approval to hold Coumadin prior to procedure received, see telephone encounter 8/27/20-BB  covid-9/7/20-[facility]    FOOT TENDON SURGERY      OSTECTOMY Right 01/27/2023    Procedure: OSTECTOMY;  Surgeon: Eugene Bowers DPM;  [address]  Service:  Podiatry;  Laterality: Right;  calcaneal    right and left heart cath Bilateral 01/15/2018    TREATMENT OF CARDIAC ARRHYTHMIA N/A 08/19/2019    Procedure: CARDIOVERSION;  Surgeon: Juan Zapata MD;  Location: [facility] EP LAB;  Service: Cardiology;  Laterality: N/A;  afib, dccv only, anes, GP, [id number]    TRIGGER FINGER RELEASE      x 2    TRIGGER FINGER RELEASE Left 01/05/2022    Procedure: RELEASE, TRIGGER FINGER,LEFT,SMALL & THUMB;  Surgeon: Dilma Hunter MD;  Location: [facility] OR;  Service: Orthopedics;  Laterality: Left;     Social History     Socioeconomic History    Marital status: Unknown   Tobacco Use    Smoking status: Never    Smokeless tobacco: Former     Types: Snuff     Quit date: 8/30/2019    Tobacco comments:     since he was 12 yrs old   Substance and Sexual Activity    Alcohol use: Yes     Comment: socially    Drug use: No    Sexual activity: Yes     Partners: Female   Social History Narrative        2 grown kids    Delivers seafood     Social Drivers of Health     Financial Resource Strain: Medium Risk (2/22/2024)    Overall Financial Resource Strain (CARDIA)     Difficulty of Paying Living Expenses: Somewhat hard   Food Insecurity: Food Insecurity Present (2/22/2024)    Hunger Vital Sign     Worried About Running Out of Food in the Last Year: Sometimes true     Ran Out of Food in the Last Year: Sometimes true   Transportation Needs: Unknown (2/22/2024)    PRAPARE - Transportation     Lack of Transportation (Medical): No   Physical Activity: Insufficiently Active (2/22/2024)    Exercise Vital Sign     Days of Exercise per Week: 3 days     Minutes of Exercise per Session: 30 min   Stress: Stress Concern Present (2/22/2024)    [place] [employer] - Occupational Stress Questionnaire     Feeling of Stress : To some extent   Housing Stability: Low Risk  (2/22/2024)    Housing Stability Vital Sign     Unable to Pay for Housing in the Last Year: No     Number of Places Lived in  the Last Year: 1     Unstable Housing in the Last Year: No         Allergies  Review of patient's allergies indicates:  No Known Allergies      Review of Systems   Review of Systems   Constitutional: Negative for fever and malaise/fatigue.   Cardiovascular: Negative.    Respiratory: Negative.           Physical Exam  Wt Readings from Last 1 Encounters:   01/15/25 107.1 kg (236 lb 1.8 oz)     BP Readings from Last 3 Encounters:   01/15/25 (!) 106/56   11/21/24 126/60   10/16/24 (!) 117/59     Pulse Readings from Last 1 Encounters:   01/15/25 75     Body mass index is 36.98 kg/m².    Physical Exam  Vitals reviewed.   Constitutional:       Appearance: He is well-developed.   HENT:      Head: Atraumatic.   Eyes:      General: No scleral icterus.  Neck:      Vascular: Normal carotid pulses. No carotid bruit, hepatojugular reflux or JVD.   Cardiovascular:      Rate and Rhythm: Normal rate and regular rhythm.      Chest Wall: PMI is not displaced.      Pulses: Intact distal pulses.           Carotid pulses are 2+ on the right side and 2+ on the left side.       Radial pulses are 2+ on the right side and 2+ on the left side.        Dorsalis pedis pulses are 2+ on the right side and 2+ on the left side.      Heart sounds: S1 normal and S2 normal. Murmur heard.      No friction rub.      Comments: Metallic click.  Pulmonary:      Effort: Pulmonary effort is normal. No respiratory distress.      Breath sounds: Normal breath sounds. No stridor. No wheezing or rales.   Chest:      Chest wall: No tenderness.   Abdominal:      General: Bowel sounds are normal.      Palpations: Abdomen is soft.   Musculoskeletal:      Cervical back: Neck supple. No edema.   Skin:     General: Skin is warm and dry.      Nails: There is no clubbing.   Neurological:      Mental Status: He is alert and oriented to person, place, and time.   Psychiatric:         Behavior: Behavior normal.         Thought Content: Thought content normal.              Assessment  1. Paroxysmal atrial flutter  Resolved    2. Class 2 severe obesity due to excess calories with serious comorbidity and body mass index (BMI) of 36.0 to 36.9 in adult  Unchanged    3. S/P ascending aortic replacement (Primary)  Stable    4. Coronary artery disease involving native coronary artery of native heart without angina pectoris  Stable no angina    5. Essential hypertension  Well controlled.  Continue current medications and monitor    6. Hyperlipidemia with target low density lipoprotein (LDL) cholesterol less than 70 mg/dL  On statin    7. Indication present for endocarditis prophylaxis  Unchanged        Plan and Discussion  Discussed his EKG today which showed normal sinus rhythm rate of 73 with nonspecific T-waves.  Reviewed his echocardiogram in January 20, 2024 which showed good left ventricular function and functioning mechanical aortic valve.  Reminded him regarding antibiotic prophylaxis given his mechanical aortic valve.  Continue INR monitoring with warfarin Clinic    Follow Up  6 months      Anshu Medina MD, F.A.C.C, F.S.C.A.I.      Total professional time spent for the encounter: 30 minutes  Time was spent preparing to see the patient, reviewing results of prior testing, obtaining and/or reviewing separately obtained history, performing a medically appropriate examination and interview, counseling and educating the patient/family, ordering medications/tests/procedures, referring and communicating with other health care professionals, documenting clinical information in the electronic health record, and independently interpreting results.    Disclaimer: This document was created using voice recognition software ([employer]). Although it may be edited, this document may contain errors related to incorrect recognition of the spoken word. Please call the physician if clarification is needed.

## 2025-01-28 ENCOUNTER — OFFICE VISIT (OUTPATIENT)
Dept: UROLOGY | Facility: CLINIC | Age: 60
End: 2025-01-28
Payer: MEDICARE

## 2025-01-28 VITALS
WEIGHT: 238.44 LBS | HEART RATE: 67 BPM | BODY MASS INDEX: 37.42 KG/M2 | HEIGHT: 67 IN | DIASTOLIC BLOOD PRESSURE: 78 MMHG | SYSTOLIC BLOOD PRESSURE: 123 MMHG

## 2025-01-28 DIAGNOSIS — R35.0 URINARY FREQUENCY: Primary | ICD-10-CM

## 2025-01-28 DIAGNOSIS — R82.90 ABNORMAL URINALYSIS: ICD-10-CM

## 2025-01-28 DIAGNOSIS — N39.43 POST-VOID DRIBBLING: ICD-10-CM

## 2025-01-28 LAB
BACTERIA #/AREA URNS HPF: NORMAL /HPF
BILIRUB SERPL-MCNC: NORMAL MG/DL
BILIRUB UR QL STRIP: NEGATIVE
BLOOD URINE, POC: NORMAL
CLARITY UR: CLEAR
CLARITY, POC UA: CLEAR
COLOR UR: COLORLESS
COLOR, POC UA: YELLOW
GLUCOSE UR QL STRIP: 2000
GLUCOSE UR QL STRIP: ABNORMAL
HGB UR QL STRIP: ABNORMAL
KETONES UR QL STRIP: NEGATIVE
KETONES UR QL STRIP: NORMAL
LEUKOCYTE ESTERASE UR QL STRIP: NEGATIVE
LEUKOCYTE ESTERASE URINE, POC: NORMAL
MICROSCOPIC COMMENT: NORMAL
NITRITE UR QL STRIP: NEGATIVE
NITRITE, POC UA: NORMAL
PH UR STRIP: 7 [PH] (ref 5–8)
PH, POC UA: 5
PROT UR QL STRIP: NEGATIVE
PROTEIN, POC: NORMAL
RBC #/AREA URNS HPF: 4 /HPF (ref 0–4)
SP GR UR STRIP: 1.02 (ref 1–1.03)
SPECIFIC GRAVITY, POC UA: 1
URN SPEC COLLECT METH UR: ABNORMAL
UROBILINOGEN UR STRIP-ACNC: NEGATIVE EU/DL
UROBILINOGEN, POC UA: NORMAL
YEAST URNS QL MICRO: NORMAL

## 2025-01-28 PROCEDURE — 87086 URINE CULTURE/COLONY COUNT: CPT | Performed by: NURSE PRACTITIONER

## 2025-01-28 PROCEDURE — 99999 PR PBB SHADOW E&M-EST. PATIENT-LVL V: CPT | Mod: PBBFAC,,, | Performed by: NURSE PRACTITIONER

## 2025-01-28 PROCEDURE — 3074F SYST BP LT 130 MM HG: CPT | Mod: CPTII,S$GLB,, | Performed by: NURSE PRACTITIONER

## 2025-01-28 PROCEDURE — 81002 URINALYSIS NONAUTO W/O SCOPE: CPT | Mod: S$GLB,,, | Performed by: NURSE PRACTITIONER

## 2025-01-28 PROCEDURE — 99214 OFFICE O/P EST MOD 30 MIN: CPT | Mod: S$GLB,,, | Performed by: NURSE PRACTITIONER

## 2025-01-28 PROCEDURE — 1160F RVW MEDS BY RX/DR IN RCRD: CPT | Mod: CPTII,S$GLB,, | Performed by: NURSE PRACTITIONER

## 2025-01-28 PROCEDURE — 1159F MED LIST DOCD IN RCRD: CPT | Mod: CPTII,S$GLB,, | Performed by: NURSE PRACTITIONER

## 2025-01-28 PROCEDURE — 3078F DIAST BP <80 MM HG: CPT | Mod: CPTII,S$GLB,, | Performed by: NURSE PRACTITIONER

## 2025-01-28 PROCEDURE — 3008F BODY MASS INDEX DOCD: CPT | Mod: CPTII,S$GLB,, | Performed by: NURSE PRACTITIONER

## 2025-01-28 RX ORDER — TAMSULOSIN HYDROCHLORIDE 0.4 MG/1
0.4 CAPSULE ORAL DAILY
Qty: 30 CAPSULE | Refills: 11 | Status: SHIPPED | OUTPATIENT
Start: 2025-01-28 | End: 2025-02-27

## 2025-01-28 NOTE — PROGRESS NOTES
"Subjective:      Cj Barnes is a 59 y.o. male who returns today regarding his BPH.    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  Presents today for gradually worsening LUTS.    Benign Prostatic Hypertrophy  Patient complains of lower urinary tract symptoms. He reports frequency, incomplete emptying, and intermittency. He denies straining. Patient states symptoms are of moderate severity. Onset of symptoms was several months ago and was gradual in onset. He has no personal history and no family history of prostate cancer. He reports a history of no complicating symptoms. He denies flank pain, gross hematuria, kidney stones, and recurrent UTI.  +DM    The following portions of the patient's history were reviewed and updated as appropriate: allergies, current medications, past family history, past medical history, past social history, past surgical history and problem list.    Review of Systems  A comprehensive multipoint review of systems was negative except as otherwise stated in the HPI.     Objective:   Vitals: /78 (BP Location: Left arm, Patient Position: Sitting)   Pulse 67   Ht 5' 7" (1.702 m)   Wt 108.2 kg (238 lb 6.8 oz)   BMI 37.34 kg/m²     Physical Exam   General: alert and oriented, no acute distress  Respiratory: Symmetric expansion, non-labored breathing  Cardiovascular: regular rate and rhythm, no peripheral edema  Abdomen: soft, non distended  Genitourinary:   Prostate: unable to palpate base; no palpable nodules; seminal vesicles not palpated  Rectum: normal rectal tone, no rectal mass, normal perineum  Skin: normal coloration and turgor, no rashes, no suspicious skin lesions noted  Neuro: no gross deficits  Psych: normal judgment and insight, normal mood/affect, and non-anxious    Lab Review   Urinalysis demonstrates positive for red blood cells, glucose  Lab Results   Component Value Date    WBC 8.05 09/04/2024    HGB 13.6 (L) 09/04/2024    HCT 43.3 09/04/2024    HCT 29 (L) 08/09/2019    " MCV 83 09/04/2024     09/04/2024     Lab Results   Component Value Date    CREATININE 1.0 09/04/2024    BUN 13 09/04/2024     Lab Results   Component Value Date    PSA 0.32 07/10/2023    PSADIAG 0.33 04/06/2021     Lab Results   Component Value Date    PSA 0.32 07/10/2023    PSA 0.40 06/10/2022    PSA 0.25 03/23/2020    PSA 0.38 12/11/2017    PSA 0.2 04/07/2008    PSADIAG 0.33 04/06/2021         Assessment and Plan:   1. Urinary frequency  2. Post-void dribbling  - US Pelvis Limited Non OB; Future  - Prostate Specific Antigen, Diagnostic; Future  - tamsulosin (FLOMAX) 0.4 mg Cap; Take 1 capsule (0.4 mg total) by mouth once daily.  Dispense: 30 capsule; Refill: 11  --consider cysto if LUTS remain after starting Flomax     3. Abnormal urinalysis  --UA today shows blood; denies gross hematuria   - Urinalysis  - Urine Culture High Risk         This note is dictated on M*Modal word recognition program.  There are word recognition mistakes that are occasionally missed on review.

## 2025-01-29 LAB — BACTERIA UR CULT: NO GROWTH

## 2025-01-30 DIAGNOSIS — R31.29 MICROSCOPIC HEMATURIA: Primary | ICD-10-CM

## 2025-01-31 ENCOUNTER — TELEPHONE (OUTPATIENT)
Dept: ORTHOPEDICS | Facility: CLINIC | Age: 60
End: 2025-01-31
Payer: MEDICARE

## 2025-01-31 NOTE — TELEPHONE ENCOUNTER
Changed his appointment from 2/13 to 2/18 at 9:00----- Message from Halley sent at 1/31/2025  2:39 PM CST -----  Type:  Patient Returning Call    Who Called: pt  Who Left Message for Patient: Dilcia French  Does the patient know what this is regarding?: yes  Would the patient rather a call back or a response via [facility]? call  Best Call Back Number: [phone]  Additional Information: pt received a call regarding rescheduling to either the week before or after 2/13/25; pt said to add him to the schedule for a date the week after 2/13/25      I, , tried to schedule this for pt but the soonest availability I seen was 3/27/25 and pt wanted sooner

## 2025-02-01 LAB
OHS QRS DURATION: 88 MS
OHS QTC CALCULATION: 427 MS

## 2025-02-07 ENCOUNTER — ANTI-COAG VISIT (OUTPATIENT)
Dept: CARDIOLOGY | Facility: CLINIC | Age: 60
End: 2025-02-07
Payer: MEDICARE

## 2025-02-07 DIAGNOSIS — Z95.2 H/O MECHANICAL AORTIC VALVE REPLACEMENT: ICD-10-CM

## 2025-02-07 DIAGNOSIS — Z79.01 LONG TERM (CURRENT) USE OF ANTICOAGULANTS: Primary | ICD-10-CM

## 2025-02-07 DIAGNOSIS — I48.92 PAROXYSMAL ATRIAL FLUTTER: ICD-10-CM

## 2025-02-07 PROCEDURE — 93793 ANTICOAG MGMT PT WARFARIN: CPT | Mod: S$GLB,,,

## 2025-02-07 NOTE — PROGRESS NOTES
INR from last week. We scheduled and planned INR on 1/30. Unbeknownst to us the lab was rescheduled and discovered today. Please advise pt to notify CC if rescheduling INR.     INR at goal. Medications and chart reviewed. No changes noted to necessitate adjustment of warfarin or follow-up plan. See calendar.

## 2025-02-17 DIAGNOSIS — I48.92 PAROXYSMAL ATRIAL FLUTTER: ICD-10-CM

## 2025-02-17 DIAGNOSIS — Z79.01 LONG TERM (CURRENT) USE OF ANTICOAGULANTS: ICD-10-CM

## 2025-02-17 DIAGNOSIS — I10 ESSENTIAL HYPERTENSION: ICD-10-CM

## 2025-02-17 DIAGNOSIS — G45.9 TIA (TRANSIENT ISCHEMIC ATTACK): ICD-10-CM

## 2025-02-17 DIAGNOSIS — Z95.828 S/P ASCENDING AORTIC REPLACEMENT: ICD-10-CM

## 2025-02-17 DIAGNOSIS — Z95.2 H/O MECHANICAL AORTIC VALVE REPLACEMENT: ICD-10-CM

## 2025-02-17 RX ORDER — HYDROCHLOROTHIAZIDE 12.5 MG/1
TABLET ORAL
Qty: 90 TABLET | Refills: 3 | Status: SHIPPED | OUTPATIENT
Start: 2025-02-17

## 2025-02-17 RX ORDER — WARFARIN 3 MG/1
6 TABLET ORAL DAILY
Qty: 90 TABLET | Refills: 3 | Status: SHIPPED | OUTPATIENT
Start: 2025-02-17

## 2025-02-17 RX ORDER — WARFARIN 3 MG/1
6 TABLET ORAL DAILY
Qty: 90 TABLET | Refills: 3 | Status: SHIPPED | OUTPATIENT
Start: 2025-02-17 | End: 2025-02-17 | Stop reason: SDUPTHER

## 2025-02-18 ENCOUNTER — HOSPITAL ENCOUNTER (OUTPATIENT)
Dept: RADIOLOGY | Facility: OTHER | Age: 60
Discharge: HOME | End: 2025-02-18
Attending: ORTHOPAEDIC SURGERY
Payer: MEDICARE

## 2025-02-18 ENCOUNTER — OFFICE VISIT (OUTPATIENT)
Dept: ORTHOPEDICS | Facility: CLINIC | Age: 60
End: 2025-02-18
Payer: MEDICARE

## 2025-02-18 VITALS — WEIGHT: 238.13 LBS | HEIGHT: 67 IN | BODY MASS INDEX: 37.37 KG/M2

## 2025-02-18 DIAGNOSIS — M25.512 ACUTE PAIN OF LEFT SHOULDER: Primary | ICD-10-CM

## 2025-02-18 DIAGNOSIS — M25.512 ACUTE PAIN OF LEFT SHOULDER: ICD-10-CM

## 2025-02-18 DIAGNOSIS — Z98.890 POST-OPERATIVE STATE: ICD-10-CM

## 2025-02-18 PROCEDURE — 99999 PR PBB SHADOW E&M-EST. PATIENT-LVL IV: CPT | Mod: PBBFAC,,, | Performed by: ORTHOPAEDIC SURGERY

## 2025-02-18 PROCEDURE — 73030 X-RAY EXAM OF SHOULDER: CPT | Mod: TC,FY,LT

## 2025-02-18 PROCEDURE — 20610 DRAIN/INJ JOINT/BURSA W/O US: CPT | Mod: LT,S$GLB,, | Performed by: ORTHOPAEDIC SURGERY

## 2025-02-18 PROCEDURE — 99213 OFFICE O/P EST LOW 20 MIN: CPT | Mod: 25,S$GLB,, | Performed by: ORTHOPAEDIC SURGERY

## 2025-02-18 PROCEDURE — 3008F BODY MASS INDEX DOCD: CPT | Mod: CPTII,S$GLB,, | Performed by: ORTHOPAEDIC SURGERY

## 2025-02-18 RX ADMIN — TRIAMCINOLONE ACETONIDE 80 MG: 40 INJECTION, SUSPENSION INTRA-ARTICULAR; INTRAMUSCULAR at 09:02

## 2025-02-18 RX ADMIN — TRIAMCINOLONE ACETONIDE 20 MG: 40 INJECTION, SUSPENSION INTRA-ARTICULAR; INTRAMUSCULAR at 09:02

## 2025-02-18 NOTE — PROCEDURES
Tendon Sheath    Date/Time: 2/18/2025 9:00 AM    Performed by: Dilma Hunter MD  Authorized by: Dilma Hunter MD    Consent Done?:  Yes (Verbal)  Indications:  Pain  Timeout: prior to procedure the correct patient, procedure, and site was verified    Prep: patient was prepped and draped in usual sterile fashion      Local anesthesia used?: Yes    Anesthesia:  Local infiltration  Local anesthetic:  Lidocaine 1% without epinephrine  Location:  Shoulder  Site:  L bicep tendon  Medications:  20 mg triamcinolone acetonide 40 mg/mL

## 2025-02-18 NOTE — PROGRESS NOTES
Subjective:      Patient ID: Cj Barnes is a 59 y.o. male.    Chief Complaint: Pain of the Left Elbow and Pain of the Left Shoulder      HPI  Cj Barnes is a right hand dominant 59 y.o. male presenting today for left shoulder pain.  There was a history of trauma.  Onset of symptoms began 4-5 months ago.  He describes it as sharp 8/10 pain, currently a 5 at rest, is localized to the left shoulder joint and pain shoots down toward the elbow.  Symptoms aggravated by activity and lifting.  Pain does radiate to the distal humerus and is worse at night and keeps him from sleeping. Previous treatments include acetaminophen which have provided poor relief.  Alleviating factors include nothing .        The patient denies any fevers, chills, N/V, D/C and presents for evaluation.      Review of patient's allergies indicates:  No Known Allergies      Current Medications[1]    Past Medical History:   Diagnosis Date    Anemia     Aortic stenosis 2018    Cancer 2014    Colon cancer     Coronary artery disease     Diabetes mellitus type I     since in his 30's    Heart murmur     Heel fracture     HTN (hypertension)     Hyperlipidemia LDL goal < 70     Insulin pump in place     MVP (mitral valve prolapse)     EVENS (obstructive sleep apnea)     uses CPAP    Stenosis of aortic and mitral valves     Uses hearing aid        Past Surgical History:   Procedure Laterality Date    AORTIC VALVE REPLACEMENT N/A 08/09/2019    Procedure: Replacement-valve-aortic;  Surgeon: Ryan Knight MD;  Location: [facility] OR [address] [place] [address];  Service: Cardiothoracic;  Laterality: N/A;    BACK SURGERY      lumbar fusion with lidia placement    BENTALL PROCEDURE FOR REPLACEMENT OF AORTIC VALVE, AORTIC ROOT, AND ASCENDING AORTA N/A 08/09/2019    Procedure: BENTALL PROCEDURE;  Surgeon: Ryan Knight MD;  Location: [facility] OR [address] [place] [address];  Service: Cardiothoracic;  Laterality: N/A;    CARDIAC SURGERY      CARPAL TUNNEL RELEASE      COLON SURGERY  2014     COLONOSCOPY N/A 02/17/2020    Procedure: COLONOSCOPY;  Surgeon: Richi Mock MD;  Location: [facility] ENDO;  Service: Colon and Rectal;  Laterality: N/A;    COLONOSCOPY N/A 10/26/2023    Procedure: COLONOSCOPY;  Surgeon: Yadira Louis MD;  Location: [facility] ENDO (4TH FLR);  Service: Endoscopy;  Laterality: N/A;  ok to hold Coumadin x5 days per [facility], see telephone encounter 8/4/23  ref by KELSIE Cao/ suprep inst portal-RB  10/19-precall complete-pt verbalized understanding of holding Ozempic-MS    DEBRIDEMENT OF ACHILLES TENDON Right 01/27/2023    Procedure: DEBRIDEMENT, TENDON, ACHILLES;  Surgeon: Eugene Bowers DPM;  [address];  Service: Podiatry;  Laterality: Right;    DECOMPRESSION, NERVE, ULNAR Left 10/16/2024    Procedure: LEFT ELBOW DECOMPRESSION, NERVE, ULNAR;  Surgeon: Dilma Hunter MD;  Location: [facility] OR;  Service: Orthopedics;  Laterality: Left;  REGIONAL AFTER    ELBOW SURGERY      tendon release    ESOPHAGOGASTRODUODENOSCOPY N/A 09/10/2020    Procedure: EGD (ESOPHAGOGASTRODUODENOSCOPY);  Surgeon: Abilio Boo MD;  Location: [facility] ENDO (4TH FLR);  Service: Endoscopy;  Laterality: N/A;  Cardiac clearance received, see telephone encounter 8/27/20-BB  Approval to hold Coumadin prior to procedure received, see telephone encounter 8/27/20-BB  covid-9/7/20-[facility]    FOOT TENDON SURGERY      OSTECTOMY Right 01/27/2023    Procedure: OSTECTOMY;  Surgeon: Eugene Bowers DPM;  [address];  Service: Podiatry;  Laterality: Right;  calcaneal    right and left heart cath Bilateral 01/15/2018    TREATMENT OF CARDIAC ARRHYTHMIA N/A 08/19/2019    Procedure: CARDIOVERSION;  Surgeon: Juan Zapata MD;  Location: [facility] EP LAB;  Service: Cardiology;  Laterality: N/A;  afib, dccv only, anes, GP, [id number]    TRIGGER FINGER RELEASE      x 2    TRIGGER FINGER RELEASE Left 01/05/2022    Procedure: RELEASE, TRIGGER FINGER,LEFT,SMALL & THUMB;  Surgeon:  "Dilma Hunter MD;  Location: [facility];  Service: Orthopedics;  Laterality: Left;       Review of Systems:  Constitutional: Negative for chills and fever.   Respiratory: Negative for cough and shortness of breath.    Gastrointestinal: Negative for nausea and vomiting.   Skin: Negative for rash.   Neurological: Negative for dizziness and headaches.   Psychiatric/Behavioral: Negative for depression.   MSK as in HPI       OBJECTIVE:     PHYSICAL EXAM:  Ht 5' 7" (1.702 m)   Wt 108 kg (238 lb 1.6 oz)   BMI 37.29 kg/m²     GEN:  NAD, well-developed, well-groomed.  NEURO: Awake, alert, and oriented. Normal attention and concentration.    PSYCH: Normal mood and affect. Behavior is normal.  HEENT: No cervical lymphadenopathy noted.  CARDIOVASCULAR: Radial pulses 2+ bilaterally. No LE edema noted.  PULMONARY: Breath sounds normal. No respiratory distress.  SKIN: Intact, no rashes.      MSK:   Hand/Wrist Musculoskeletal Exam    LUE:  Good active ROM of the wrist and fingers. Pt shoulder ROM limited to 160 FF, ER to 30 and IR to his hip. Pt has positive Speeds and Bello impingement sign. AIN/PIN/Radial/Median/Ulnar Nerves assessed in isolation without deficit. Radial & Ulnar arteries palpated 2+. Capillary Refill <3s.      RADIOGRAPHS:  Left Shoulder 2/18/25    Comments: I have personally reviewed the imaging and I agree with the above radiologist's report.    ASSESSMENT/PLAN:   No diagnosis found.       No orders of the defined types were placed in this encounter.       Plan:     Subacromial injection Left shoulder and Left biceps tendon   Follow up in 3 mos or PRN  Referral to PT for left shoulder placed           The patient indicates understanding of these issues and agrees to the plan.        This note has been scribed in part by Oliva Armas, ATC/L, OTC, my Sports Medicine Assistant (SMA). This SMA performed & documented a complete history pre-assessment including the history of present illness, which " "I, Dilma Hunter MD, explored & confirmed personally with the patient. The [facility] has scribed portions of this note including my physical exanimation, diagnostic imaging interpretation, procedures performed, my plan of care & diagnosis. I agree that the scribed documentation is accurate & complete.           [1]   Current Outpatient Medications   Medication Sig Dispense Refill    acetaminophen (TYLENOL ORAL) Take 500 mg by mouth daily as needed. Pain      BAQSIMI 3 mg/actuation Spry USE ONE actuation into a single nostril no response MAY REPEAT in 15 minutes USE a new device 2 each 1    COMFORT EZ PEN NEEDLES 33 gauge x 3/16" Ndle USE AS DIRECTED with Levemir pens  11    COMIRNATY 2023-24, 12Y UP,,PF, 30 mcg/0.3 mL injection       DEXCOM G7 SENSOR Apple USE ONE SENSOR EVERY 10 DAYS 3 each 11    empagliflozin (JARDIANCE) 25 mg tablet Take 1 tablet (25 mg total) by mouth once daily. 30 tablet 11    EScitalopram oxalate (LEXAPRO) 10 MG tablet TAKE ONE TABLET BY MOUTH ONCE DAILY (Patient taking differently: Take 5 mg by mouth once daily.) 90 tablet 1    fluticasone propionate (FLONASE) 50 mcg/actuation nasal spray SPRAY TWO SPRAYS IN EACH NOSTRIL DAILY 48 g 2    gabapentin (NEURONTIN) 300 MG capsule TAKE TWO CAPSULES BY MOUTH THREE TIMES DAILY AS NEEDED FOR nerve pain 360 capsule 0    hydroCHLOROthiazide 12.5 MG Tab TAKE ONE CAPSULE BY MOUTH ONCE DAILY 90 tablet 3    insulin detemir U-100 (LEVEMIR U-100 INSULIN) 100 unit/mL injection Inject 40 Units into the skin every evening. To have on file in case of insulin pump failure.  Patient does not need prescription right now. Vials please 20 mL 3    insulin lispro 100 unit/mL injection USE CONTINUOUSLY WITH INSULIN PUMP(MAX DAILY DOSE  UNITS) 60 mL 11    insulin syringe-needle U-100 1/2 mL 31 gauge x 15/64" Syrg To use 5 times per day with insulin injections if off of insulin pump.  To have on file in case of pump failure 150 each 11    irbesartan (AVAPRO) 150 MG " tablet Take 1 tablet (150 mg total) by mouth once daily. 90 tablet 3    metFORMIN (GLUCOPHAGE-XR) 500 MG ER 24hr tablet TAKE ONE TABLET BY MOUTH TWICE DAILY WITH MEALS 180 tablet 2    mupirocin (BACTROBAN) 2 % ointment To use 2 times per day as needed 22 g 3    nystatin (MYCOSTATIN) cream Apply topically 2 (two) times daily. 1 Tube 5    pantoprazole (PROTONIX) 40 MG tablet TAKE ONE TABLET BY MOUTH DAILY 90 tablet 0    semaglutide, weight loss, (WEGOVY) 0.5 mg/0.5 mL PnIj Inject 0.5 mg into the skin every 7 days. 0.5 mL 0    tadalafiL (CIALIS) 10 MG tablet Take 1 tablet (10 mg total) by mouth daily as needed for Erectile Dysfunction. (Patient taking differently: Take 10 mg by mouth daily as needed for Erectile Dysfunction. 4 days a week) 90 tablet 3    tamsulosin (FLOMAX) 0.4 mg Cap Take 1 capsule (0.4 mg total) by mouth once daily. 30 capsule 11    warfarin (COUMADIN) 3 MG tablet Take 2 tablets (6 mg total) by mouth Daily. M,F 6 mg  4.5 mg every other day 90 tablet 3    aspirin (ECOTRIN) 81 MG EC tablet Take 1 tablet (81 mg total) by mouth once daily.  0    glucagon, human recombinant, (GLUCAGON EMERGENCY KIT, HUMAN,) 1 mg SolR Inject 1 mg into the muscle as needed. 1 each 0    rosuvastatin (CRESTOR) 40 MG Tab Take 1 tablet (40 mg total) by mouth every evening. 90 tablet 3     No current facility-administered medications for this visit.

## 2025-02-18 NOTE — PROCEDURES
Large Joint Aspiration/Injection: L subacromial bursa    Date/Time: 2/18/2025 9:00 AM    Performed by: Dilma Hunter MD  Authorized by: Dilma Hunter MD    Consent Done?:  Yes (Verbal)  Indications:  Pain  Timeout: prior to procedure the correct patient, procedure, and site was verified      Local anesthesia used?: Yes    Anesthesia:  Local infiltration  Local anesthetic:  Lidocaine 1% without epinephrine    Details:  Needle Size:  22 G  Approach:  Posterior  Location:  Shoulder  Site:  L subacromial bursa  Medications:  80 mg triamcinolone acetonide 40 mg/mL

## 2025-02-26 NOTE — PROGRESS NOTES
"  Subjective:      Cj Barnes is a 59 y.o. male who returns today regarding his BPH.    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  Presents today for gradually worsening LUTS.    Benign Prostatic Hypertrophy  Patient complains of lower urinary tract symptoms. He reports frequency, incomplete emptying, and intermittency. He denies straining. Patient states symptoms are of moderate severity. Onset of symptoms was several months ago and was gradual in onset. He has no personal history and no family history of prostate cancer. He reports a history of no complicating symptoms. He denies flank pain, gross hematuria, kidney stones, and recurrent UTI.      Presents today to discuss ultrasound showing prostate 3.2 x 2.5 x 3.4 cm corresponding to an estimated prostate volume of 14 mL.  His repeat microscopic urinalysis showed 5 RBCs/HPF. UC negative   No improvement in LUTS with trial of Flomax     History DM, TIA, aortic valve replacement, atrial flutter-on Eliquis and ASA    The following portions of the patient's history were reviewed and updated as appropriate: allergies, current medications, past family history, past medical history, past social history, past surgical history and problem list.    Review of Systems  A comprehensive multipoint review of systems was negative except as otherwise stated in the HPI.     Objective:   Vitals: BP (!) 144/85 (BP Location: Left arm, Patient Position: Sitting)   Pulse (!) 56   Ht 5' 7" (1.702 m)   Wt 107.6 kg (237 lb 3.4 oz)   BMI 37.15 kg/m²     Physical Exam   General: alert and oriented, no acute distress  Respiratory: Symmetric expansion, non-labored breathing  Cardiovascular: regular rate and rhythm, no peripheral edema  Abdomen: soft, non distended  Genitourinary:   Prostate: unable to palpate base; no palpable nodules; seminal vesicles not palpated  Rectum: normal rectal tone, no rectal mass, normal perineum  Skin: normal coloration and turgor, no rashes, no suspicious " skin lesions noted  Neuro: no gross deficits  Psych: normal judgment and insight, normal mood/affect, and non-anxious    Lab Review   Urinalysis demonstrates positive for red blood cells, glucose  Lab Results   Component Value Date    WBC 8.05 09/04/2024    HGB 13.6 (L) 09/04/2024    HCT 43.3 09/04/2024    HCT 29 (L) 08/09/2019    MCV 83 09/04/2024     09/04/2024     Lab Results   Component Value Date    CREATININE 1.0 09/04/2024    BUN 13 09/04/2024     Lab Results   Component Value Date    PSA 0.32 07/10/2023    PSADIAG 0.26 01/30/2025     Lab Results   Component Value Date    PSA 0.32 07/10/2023    PSA 0.40 06/10/2022    PSA 0.25 03/23/2020    PSA 0.38 12/11/2017    PSA 0.2 04/07/2008    PSADIAG 0.26 01/30/2025    PSADIAG 0.33 04/06/2021       US PELVIS LIMITED NON OB     CLINICAL HISTORY:  Frequency of micturition     TECHNIQUE:  Limited ultrasound of the pelvis was performed with pre and postvoid imaging of the urinary bladder and imaging of the prostate gland obtained.     COMPARISON:  None     FINDINGS:  The urinary bladder is smooth walled and appears grossly unremarkable.  Prevoid bladder measurements were obtained suggesting a prevoid bladder volume of 281 mL.  Following voiding, repeat images were obtained suggesting a postvoid residual of 14 mL.     The prostate gland does not appear enlarged measuring 3.2 x 2.5 x 3.4 cm corresponding to an estimated prostate volume of 14 mL.     Impression:     Unremarkable sonographic appearance of the urinary bladder and prostate gland.        Electronically signed by:Js Tamayo MD  Date:                                            01/30/2025  Time:                                           08:51    Assessment and Plan:   1. Urinary frequency  2. Post-void dribbling  --after discussion patient wishes to try OAB medication.  Will try mirabegron 25 mg q.day    3. Microscopic hematuria  --repeat UA shows 5 RBCs on HPF; urine culture negative   --proceed with  full hematuria workup including CT urogram and cystoscope will evaluate for BPH/HAYES at the time of cystoscope            This note is dictated on M*Modal word recognition program.  There are word recognition mistakes that are occasionally missed on review.

## 2025-02-27 ENCOUNTER — OFFICE VISIT (OUTPATIENT)
Dept: UROLOGY | Facility: CLINIC | Age: 60
End: 2025-02-27
Payer: MEDICARE

## 2025-02-27 ENCOUNTER — PATIENT MESSAGE (OUTPATIENT)
Dept: CARDIOLOGY | Facility: CLINIC | Age: 60
End: 2025-02-27

## 2025-02-27 ENCOUNTER — ANTI-COAG VISIT (OUTPATIENT)
Dept: CARDIOLOGY | Facility: CLINIC | Age: 60
End: 2025-02-27
Payer: MEDICARE

## 2025-02-27 VITALS
DIASTOLIC BLOOD PRESSURE: 85 MMHG | WEIGHT: 237.19 LBS | HEART RATE: 56 BPM | HEIGHT: 67 IN | BODY MASS INDEX: 37.23 KG/M2 | SYSTOLIC BLOOD PRESSURE: 144 MMHG

## 2025-02-27 DIAGNOSIS — R31.29 MICROSCOPIC HEMATURIA: ICD-10-CM

## 2025-02-27 DIAGNOSIS — R35.0 URINARY FREQUENCY: Primary | ICD-10-CM

## 2025-02-27 DIAGNOSIS — Z95.2 H/O MECHANICAL AORTIC VALVE REPLACEMENT: ICD-10-CM

## 2025-02-27 DIAGNOSIS — I48.92 PAROXYSMAL ATRIAL FLUTTER: ICD-10-CM

## 2025-02-27 DIAGNOSIS — Z79.01 LONG TERM (CURRENT) USE OF ANTICOAGULANTS: Primary | ICD-10-CM

## 2025-02-27 LAB
BILIRUB SERPL-MCNC: NORMAL MG/DL
BLOOD URINE, POC: NORMAL
CLARITY, POC UA: NORMAL
COLOR, POC UA: YELLOW
GLUCOSE UR QL STRIP: 500
KETONES UR QL STRIP: NORMAL
LEUKOCYTE ESTERASE URINE, POC: NORMAL
NITRITE, POC UA: NORMAL
PH, POC UA: 5
POC RESIDUAL URINE VOLUME: 0 ML (ref 0–100)
PROTEIN, POC: NORMAL
SPECIFIC GRAVITY, POC UA: 1.01
UROBILINOGEN, POC UA: NORMAL

## 2025-02-27 PROCEDURE — 1159F MED LIST DOCD IN RCRD: CPT | Mod: CPTII,S$GLB,, | Performed by: NURSE PRACTITIONER

## 2025-02-27 PROCEDURE — 3077F SYST BP >= 140 MM HG: CPT | Mod: CPTII,S$GLB,, | Performed by: NURSE PRACTITIONER

## 2025-02-27 PROCEDURE — 1160F RVW MEDS BY RX/DR IN RCRD: CPT | Mod: CPTII,S$GLB,, | Performed by: NURSE PRACTITIONER

## 2025-02-27 PROCEDURE — 51798 US URINE CAPACITY MEASURE: CPT | Mod: S$GLB,,, | Performed by: NURSE PRACTITIONER

## 2025-02-27 PROCEDURE — 3079F DIAST BP 80-89 MM HG: CPT | Mod: CPTII,S$GLB,, | Performed by: NURSE PRACTITIONER

## 2025-02-27 PROCEDURE — 3008F BODY MASS INDEX DOCD: CPT | Mod: CPTII,S$GLB,, | Performed by: NURSE PRACTITIONER

## 2025-02-27 PROCEDURE — 99999 PR PBB SHADOW E&M-EST. PATIENT-LVL IV: CPT | Mod: PBBFAC,,, | Performed by: NURSE PRACTITIONER

## 2025-02-27 PROCEDURE — 99214 OFFICE O/P EST MOD 30 MIN: CPT | Mod: S$GLB,,, | Performed by: NURSE PRACTITIONER

## 2025-02-27 PROCEDURE — 81002 URINALYSIS NONAUTO W/O SCOPE: CPT | Mod: S$GLB,,, | Performed by: NURSE PRACTITIONER

## 2025-02-27 RX ORDER — MIRABEGRON 25 MG/1
25 TABLET, FILM COATED, EXTENDED RELEASE ORAL DAILY
Qty: 30 TABLET | Refills: 11 | Status: SHIPPED | OUTPATIENT
Start: 2025-02-27 | End: 2026-02-27

## 2025-02-27 NOTE — PROGRESS NOTES
INR low. Pt was on a cruise last week and diet was off. Pt reports eating more greens than normal. Bolus dose today then resume maintenance dose. Repeat INR with upcoming labs scheduled 3/11

## 2025-03-03 RX ORDER — TRIAMCINOLONE ACETONIDE 40 MG/ML
20 INJECTION, SUSPENSION INTRA-ARTICULAR; INTRAMUSCULAR
Status: DISCONTINUED | OUTPATIENT
Start: 2025-02-18 | End: 2025-03-03 | Stop reason: HOSPADM

## 2025-03-03 RX ORDER — TRIAMCINOLONE ACETONIDE 40 MG/ML
80 INJECTION, SUSPENSION INTRA-ARTICULAR; INTRAMUSCULAR
Status: DISCONTINUED | OUTPATIENT
Start: 2025-02-18 | End: 2025-03-03 | Stop reason: HOSPADM

## 2025-03-03 NOTE — PROGRESS NOTES
"Patient ID: Cj Barnes is a 59 y.o. male.    Chief Complaint: Pain of the Left Elbow and Pain of the Left Shoulder    History of Present Illness    CHIEF COMPLAINT:  Right shoulder pain.    HPI:  Mr. Barnes presents for follow-up of his ulnar nerve decompression surgery performed in October 2024, which is doing well except for occasional discomfort when putting on laces. His primary concern is shoulder pain, which has worsened over the past couple of months. He reports sharp pain in the ball and socket area that radiates down to the distal humerus, stating that it feels like something is in the bone, causing a stabbing sensation. He also mentions constant pain in a specific area. Pain interferes with sleep and limits his range of motion, with difficulty raising his arm fully and inability to reach behind his back. He denies any new activities that might have caused the pain, but mentions trying a "charge machine."    He reports a history of bursitis in both shoulders and has had multiple injections in the past. He previously received PRP treatment in his right shoulder, which provided relief for about two years. Mr. Barnes expresses frustration with the current pain level, stating that he can no longer tolerate the pain on this side.    He denies any recent trauma or injury to the shoulder area.    PREVIOUS TREATMENTS:  Mr. Barnes has received multiple injections in both shoulders for bursitis in the past. He underwent PRP treatment in the right shoulder approximately two years ago, which provided benefit for about two years.    SURGICAL HISTORY:  Mr. Barnes underwent ulnar nerve decompression in October 2024.      ROS:  General: denies fever, denies chills, denies fatigue, denies weight gain, denies weight loss  Eyes: denies vision changes, denies redness, denies discharge  ENT: denies ear pain, denies nasal congestion, denies sore throat  Cardiovascular: denies chest pain, denies palpitations, denies lower " extremity edema  Respiratory: denies cough, denies shortness of breath  Gastrointestinal: denies abdominal pain, denies nausea, denies vomiting, denies diarrhea, denies constipation, denies blood in stool  Genitourinary: denies dysuria, denies hematuria, denies frequency  Musculoskeletal: reports joint pain, denies muscle pain  Skin: denies rash, denies lesion  Neurological: denies headache, denies dizziness, denies numbness, denies tingling  Psychiatric: denies anxiety, denies depression, denies sleep difficulty         Physical Exam    Musculoskeletal: Positive Speed pain provocation shoulder test (Right). Positive Speed pain provocation shoulder test (Left). Muscle spasm in shoulder. Impingement in shoulder.  Skin: No redness in shoulder. No significant swelling in shoulder.         Assessment & Plan     Discussed potential future injection, pending x-ray results.   Mentioned PRP (Platelet-Rich Plasma) treatment as an option, performed by sports medicine specialist if patient interested.   Avoid putting pressure on the ulnar nerve when lacing shoes.   Ordered XR Shoulder.   Return to the office immediately after completing x-rays on the first floor.              No follow-ups on file.    This note was generated with the assistance of ambient listening technology. Verbal consent was obtained by the patient and accompanying visitor(s) for the recording of patient appointment to facilitate this note. I attest to having reviewed and edited the generated note for accuracy, though some syntax or spelling errors may persist. Please contact the author of this note for any clarification.

## 2025-03-06 ENCOUNTER — RESULTS FOLLOW-UP (OUTPATIENT)
Dept: UROLOGY | Facility: CLINIC | Age: 60
End: 2025-03-06

## 2025-03-10 ENCOUNTER — PATIENT MESSAGE (OUTPATIENT)
Dept: DIABETES | Facility: CLINIC | Age: 60
End: 2025-03-10
Payer: MEDICARE

## 2025-03-10 DIAGNOSIS — E10.65 TYPE 1 DIABETES MELLITUS WITH HYPERGLYCEMIA: ICD-10-CM

## 2025-03-10 RX ORDER — GABAPENTIN 300 MG/1
CAPSULE ORAL
Qty: 360 CAPSULE | Refills: 0 | Status: SHIPPED | OUTPATIENT
Start: 2025-03-10

## 2025-03-10 NOTE — TELEPHONE ENCOUNTER
Care Due:                  Date            Visit Type   Department     Provider  --------------------------------------------------------------------------------                                [facility]  Last Visit: 01-      FOLLOW UP    PRIMARY CARE   Garry Stover  Next Visit: None Scheduled  None         None Found                                                            Last  Test          Frequency    Reason                     Performed    Due Date  --------------------------------------------------------------------------------    Office Visit  15 months..  EScitalopram,              01- 03-                             pantoprazole.............    [employer] Embedded Care Due Messages. Reference number: [id number].   3/10/2025 9:26:29 AM CDT

## 2025-03-11 ENCOUNTER — PATIENT MESSAGE (OUTPATIENT)
Dept: CARDIOLOGY | Facility: CLINIC | Age: 60
End: 2025-03-11

## 2025-03-11 ENCOUNTER — ANTI-COAG VISIT (OUTPATIENT)
Dept: CARDIOLOGY | Facility: CLINIC | Age: 60
End: 2025-03-11
Payer: MEDICARE

## 2025-03-11 ENCOUNTER — RESULTS FOLLOW-UP (OUTPATIENT)
Dept: DIABETES | Facility: CLINIC | Age: 60
End: 2025-03-11

## 2025-03-11 DIAGNOSIS — Z95.2 H/O MECHANICAL AORTIC VALVE REPLACEMENT: ICD-10-CM

## 2025-03-11 DIAGNOSIS — I48.92 PAROXYSMAL ATRIAL FLUTTER: ICD-10-CM

## 2025-03-11 DIAGNOSIS — Z79.01 LONG TERM (CURRENT) USE OF ANTICOAGULANTS: Primary | ICD-10-CM

## 2025-03-11 PROCEDURE — 93793 ANTICOAG MGMT PT WARFARIN: CPT | Mod: S$GLB,,,

## 2025-03-18 ENCOUNTER — PATIENT MESSAGE (OUTPATIENT)
Dept: CARDIOLOGY | Facility: CLINIC | Age: 60
End: 2025-03-18

## 2025-03-18 ENCOUNTER — ANTI-COAG VISIT (OUTPATIENT)
Dept: CARDIOLOGY | Facility: CLINIC | Age: 60
End: 2025-03-18
Payer: MEDICARE

## 2025-03-18 ENCOUNTER — OFFICE VISIT (OUTPATIENT)
Dept: DIABETES | Facility: CLINIC | Age: 60
End: 2025-03-18
Payer: MEDICARE

## 2025-03-18 ENCOUNTER — PATIENT MESSAGE (OUTPATIENT)
Dept: DIABETES | Facility: CLINIC | Age: 60
End: 2025-03-18

## 2025-03-18 VITALS
HEART RATE: 61 BPM | BODY MASS INDEX: 37.43 KG/M2 | DIASTOLIC BLOOD PRESSURE: 80 MMHG | SYSTOLIC BLOOD PRESSURE: 138 MMHG | OXYGEN SATURATION: 97 % | WEIGHT: 239 LBS

## 2025-03-18 DIAGNOSIS — Z46.81 INSULIN PUMP FITTING OR ADJUSTMENT: ICD-10-CM

## 2025-03-18 DIAGNOSIS — E78.5 HYPERLIPIDEMIA WITH TARGET LOW DENSITY LIPOPROTEIN (LDL) CHOLESTEROL LESS THAN 70 MG/DL: ICD-10-CM

## 2025-03-18 DIAGNOSIS — I48.92 PAROXYSMAL ATRIAL FLUTTER: ICD-10-CM

## 2025-03-18 DIAGNOSIS — E10.65 TYPE 1 DIABETES MELLITUS WITH HYPERGLYCEMIA: ICD-10-CM

## 2025-03-18 DIAGNOSIS — E66.812 CLASS 2 SEVERE OBESITY DUE TO EXCESS CALORIES WITH SERIOUS COMORBIDITY AND BODY MASS INDEX (BMI) OF 37.0 TO 37.9 IN ADULT: ICD-10-CM

## 2025-03-18 DIAGNOSIS — E10.649 TYPE 1 DIABETES MELLITUS WITH HYPOGLYCEMIA UNAWARENESS: Primary | ICD-10-CM

## 2025-03-18 DIAGNOSIS — E66.01 CLASS 2 SEVERE OBESITY DUE TO EXCESS CALORIES WITH SERIOUS COMORBIDITY AND BODY MASS INDEX (BMI) OF 37.0 TO 37.9 IN ADULT: ICD-10-CM

## 2025-03-18 DIAGNOSIS — I25.119 ATHEROSCLEROSIS OF NATIVE CORONARY ARTERY OF NATIVE HEART WITH ANGINA PECTORIS: ICD-10-CM

## 2025-03-18 DIAGNOSIS — Z79.01 LONG TERM (CURRENT) USE OF ANTICOAGULANTS: Primary | ICD-10-CM

## 2025-03-18 DIAGNOSIS — G45.9 TIA (TRANSIENT ISCHEMIC ATTACK): ICD-10-CM

## 2025-03-18 DIAGNOSIS — Z96.41 INSULIN PUMP IN PLACE: ICD-10-CM

## 2025-03-18 DIAGNOSIS — Z71.9 HEALTH EDUCATION/COUNSELING: ICD-10-CM

## 2025-03-18 DIAGNOSIS — Z95.2 H/O MECHANICAL AORTIC VALVE REPLACEMENT: ICD-10-CM

## 2025-03-18 DIAGNOSIS — I10 ESSENTIAL HYPERTENSION: ICD-10-CM

## 2025-03-18 PROCEDURE — 99999 PR PBB SHADOW E&M-EST. PATIENT-LVL V: CPT | Mod: PBBFAC,,, | Performed by: NURSE PRACTITIONER

## 2025-03-18 RX ORDER — INSULIN LISPRO 100 [IU]/ML
INJECTION, SOLUTION INTRAVENOUS; SUBCUTANEOUS
Qty: 60 ML | Refills: 11 | Status: SHIPPED | OUTPATIENT
Start: 2025-03-18

## 2025-03-18 RX ORDER — INSULIN GLARGINE 100 [IU]/ML
INJECTION, SOLUTION SUBCUTANEOUS
Qty: 20 ML | Refills: 11 | Status: SHIPPED | OUTPATIENT
Start: 2025-03-18

## 2025-03-18 RX ORDER — BLOOD-GLUCOSE SENSOR
1 EACH MISCELLANEOUS
Qty: 3 EACH | Refills: 11 | Status: SHIPPED | OUTPATIENT
Start: 2025-03-18

## 2025-03-18 RX ORDER — ROSUVASTATIN CALCIUM 40 MG/1
40 TABLET, COATED ORAL NIGHTLY
Qty: 90 TABLET | Refills: 3 | Status: SHIPPED | OUTPATIENT
Start: 2025-03-18 | End: 2026-03-18

## 2025-03-18 RX ORDER — METFORMIN HYDROCHLORIDE 500 MG/1
500 TABLET, EXTENDED RELEASE ORAL 2 TIMES DAILY WITH MEALS
Qty: 180 TABLET | Refills: 2 | Status: SHIPPED | OUTPATIENT
Start: 2025-03-18

## 2025-03-18 NOTE — ASSESSMENT & PLAN NOTE
"BP goal is < 140/90.   Tolerating  ARB  Blood pressure goals discussed with patient  BP high end of normal   Monitor BP at home   Let us know if running >140/90"s   "

## 2025-03-18 NOTE — ASSESSMENT & PLAN NOTE
A1 has increased to 6.9% -- he is not happy   Needing more insulin off the ozempic--- unfortunately his insurance will not cover the Ozempic due to him being a type 1 diabetic-- will try for wegovy today.         Medication changes:   Insurance will not cover ozempic or Wegovy    Continue Jardiance 25 mg daily   Continue Metformin 500 mg to BID- to help with insulin resistance.       Pump settings:    Continue Tandem T Slim pump with Lispro   Control IQ mode      Pump settings:  Basal:  adjusted based on download   MN-6AM- 1.7 units/hr - increased - he feels his AM sugars are above where he would like it to be   6AM-10AM- 1.5 units/hr --  10AM- 4PM: 1.5 units/hr--   4PM- MN: 1.5 units/hr --      ICR continue  MN- 6AM: 1:3.5   6AM-10AM:1:3.5  10AM- 4PM-1:3-  4PM- MN: 1:3--      ISF:  MN- 6AM: 1:10-    6AM-10AM:1:12  10AM- 4PM-1:16-  4PM- MN: 1:12     Target:  110 ( control IQ)     IOB:  3 hours       -- Reviewed goals of therapy are to get the best control we can without hypoglycemia  -- Reviewed patient's current insulin regimen. Clarified proper insulin dose and timing in relation to meals, etc. Insulin injection sites and proper rotation instructed.    -- Advised frequent self blood glucose monitoring.  Patient encouraged to document glucose results and bring them to every clinic visit  -Continue to use Dexcom G7--supplies from pharmacy   -- Hypoglycemia precautions discussed. Instructed on precautions before driving.    -- Call for Bg repeatedly < 70 or > 180.   -- Close adherence to lifestyle changes recommended.   -- Periodic follow ups for eye evaluations, foot care and dental care suggested.      Patient has diabetes mellitus and manages diabetes with intensive insulin regimen and uses prandial and basal insulin daily-- on insulin pump   Patient requires a therapeutic CGM and is willing to use therapeutic CGM for the necessary frequent adjustments of insulin therapy.  I have completed an in-person visit  during the previous 6 months and will continue to have in-person visits every 6 months to assess adherence to their CGM regimen and diabetes treatment plan.  Due to COVID pandemic and need for remote monitoring this tool is medically necessary

## 2025-03-18 NOTE — PATIENT INSTRUCTIONS
Pump backup plan    If the insulin pump is non functional and discontinued for anticipated more than 20 hours, please give daily injections of:   Long acting insulin  Lantus 40  units daily   Short acting insulin Novolog/ Humalog 1:4  for meals according to carb ratios and sensitivity factor in the pump.     When the insulin pump is restarted, do not restart basal rates until at least 22 hours after the last long acting insulin injection. You can set a 0% temporary basal setting that will last until this time and use your pump to bolus for meals and correction.     For any technical insulin pump issues, please contact the insulin pump company; the toll free number is printed on the label on the back of the insulin pump.       If your sugar is running high for a few hours and does not respond to two correction doses from the insulin pump, it may mean that you have a bad pump site and the site should be changed

## 2025-03-18 NOTE — ASSESSMENT & PLAN NOTE
Body mass index is 37.43 kg/m².  Increases insulin resistance.   Discussed DM diet and exercise.   Patient does exhibit insulin resistance and is on an SGLT 2, and metformin.  Insurance will not cover Ozempic or Wegovy

## 2025-03-18 NOTE — PROGRESS NOTES
CC:   Chief Complaint   Patient presents with    Diabetes Mellitus         HPI: Cj Barnes is a 59 y.o. male presents for a follow up visit today for the management of T1DM.   The patient was diagnosed in his early 30's. Initially diagnosed with T2DM, 5 years later he was dx as T1.    He has used an insulin pump since 2006, previous on animas.  He was on the 670 G medtronic pump.    Now on the Tandem T Slim-- started June 2022     Family hx of diabetes: Mother, father, 3 brothers - no one with T1     Hospitalized for diabetes: denies     No personal or FH of thyroid cancer or personal of pancreatic cancer or pancreatitis.       Our last visit was in September 2024 -- virtual visit   At that visit we adjusted basal rates   Tightened his ICR in the evening   Continued the dexcom G7   Since last visit- he had a steroid injection to left shoulder   Also went on a cruise and had issues with the dexcom -- dexcom unlinked -- had to buy a glucometer while on the cruise   Out of the Jardiance for a while. Now has a $300 co-pay now -- was off the Jardiance for 2-3 weeks   A1c went from 6.4% to 6.9% -- he is not happy about it   See attached downloads   He feels that he needs more insulin overnight during sleep  He is waking up with blood sugars in the 130s or higher and he is not happy about that  He is using the sleep mode  He recently restarted the Jardiance and does feel that his sugars are improving  We also discussed sugars could have been running high with prandial excursions after his steroid injection    Insurance would not cover Wegovy                                 DIABETES COMPLICATIONS: retinopathy, peripheral neuropathy, cardiovascular disease and cerebrovascular disease  TIA   + urine MAC x1     Diabetes Management Status    ASA:  Yes - 81 mg daily and coumadin     Statin: Taking--Crestor 40 mg nightly   ACE/ARB: Taking-- Irbesartan 150 mg daily     Screening or Prevention Patient's value Goal  Complete/Controlled?   HgA1C Testing and Control   Lab Results   Component Value Date    HGBA1C 6.9 (H) 03/11/2025      Annually/Less than 8% Yes   Lipid profile : 03/11/2025 Annually Yes   LDL control Lab Results   Component Value Date    LDLCALC 73.2 03/11/2025    Annually/Less than 100 mg/dl  Yes   Nephropathy screening Lab Results   Component Value Date    LABMICR 45.0 03/11/2025     Lab Results   Component Value Date    PROTEINUA Negative 01/28/2025    Annually Yes   Blood pressure BP Readings from Last 1 Encounters:   03/18/25 138/80    Less than 140/90 Yes   Dilated retinal exam : 03/14/2024- [facility]  Annually No   Foot exam   : 03/18/2025 Annually Yes       CURRENT A1C:    Hemoglobin A1C   Date Value Ref Range Status   03/11/2025 6.9 (H) 4.0 - 5.6 % Final     Comment:     ADA Screening Guidelines:  5.7-6.4%  Consistent with prediabetes  >or=6.5%  Consistent with diabetes    High levels of fetal hemoglobin interfere with the HbA1C  assay. Heterozygous hemoglobin variants (HbS, HgC, etc)do  not significantly interfere with this assay.   However, presence of multiple variants may affect accuracy.     09/04/2024 6.4 (H) 4.0 - 5.6 % Final     Comment:     ADA Screening Guidelines:  5.7-6.4%  Consistent with prediabetes  >or=6.5%  Consistent with diabetes    High levels of fetal hemoglobin interfere with the HbA1C  assay. Heterozygous hemoglobin variants (HbS, HgC, etc)do  not significantly interfere with this assay.   However, presence of multiple variants may affect accuracy.     03/04/2024 6.3 (H) 4.0 - 5.6 % Final     Comment:     ADA Screening Guidelines:  5.7-6.4%  Consistent with prediabetes  >or=6.5%  Consistent with diabetes    High levels of fetal hemoglobin interfere with the HbA1C  assay. Heterozygous hemoglobin variants (HbS, HgC, etc)do  not significantly interfere with this assay.   However, presence of multiple variants may affect accuracy.         GOAL A1C: 6.5-7% - without  hypoglycemia    DM MEDICATIONS USED IN THE PAST: Medtronic 670 G   Metformin   Steglatro- lack of coverage.   Jardiance   Humalog   Tandem insulin pump   dexcom       CURRENT DIABETES MEDICATIONS:   Insurance will not cover ozempic      Jardiance 25 mg daily    Metformin 500 mg to BID- to help with insulin resistance.       Tandem T Slim insulin pump in control IQ with Dexcom  Pump settings:    Control IQ mode      Pump settings:  Basal:  adjusted based on download   MN-6AM- 1.6 units/hr --  6AM-10AM- 1.5 units/hr --increased   10AM- 4PM: 1.5 units/hr-- increased   4PM- MN: 1.5 units/hr -- increased         ICR continue  MN- 6AM: 1:3.5   6AM-10AM:1:3.5  10AM- 4PM-1:3--tightened  4PM- MN: 1:3--tightened        ISF:  MN- 6AM: 1:10-    6AM-10AM:1:12  10AM- 4PM-1:16--   4PM- MN: 1:12     Target:  110 ( control IQ)      IOB:  3 hours        Set changed every 2 days  Reservoir changed every 2days    Pump downloaded and reviewed     Average total daily dose is 120.78 units per day   33% basal 67% bolus  0% override    Control IQ 95% of the time  Entering in 203 g of carbs per day on average    Pump Supplies from: [facility]     Back up Lantus/Levemir: Yes     BLOOD GLUCOSE MONITORING:   Sensor type: Dexcom G7  Average BG reading 145  Time in range: 86%  14% high, 0% very high, 0% low, 0% very low  Estimated A1c  6.8%  Site change: q10 days    2 weeks prior-- average BG is 176  57% in range   GMI- 7.5%      Dexcom from pharmacy       Sensor was downloaded in clinic today and reviewed with patient.   Please see attached document for download.       HYPOGLYCEMIA: rarely happening per patient    + hx of hypoglycemia unawareness   He doesn't feel BG readings until the 40-50's - once in the 50's he will have twitching to his eye.   Glucagon kit: yes  Medic alert bracelet: yes         MEALS:  eating more CHO off the ozempic-- taking in more insulin too   Drinks: un sweet tea, water, coke zero.   He CHO counts- he feels  comfortable.   Eating a very high carbohydrate diet still despite being educated          CURRENT EXERCISE:  None        Review of Systems  Review of Systems   Constitutional:  Negative for appetite change, fatigue and unexpected weight change.   HENT:  Negative for trouble swallowing.    Eyes:  Negative for visual disturbance.   Respiratory:  Negative for shortness of breath.    Cardiovascular:  Negative for chest pain.   Gastrointestinal:  Negative for nausea.   Endocrine: Negative for polydipsia, polyphagia and polyuria.   Genitourinary:         No Nocturia    Musculoskeletal:  Negative for arthralgias, back pain and gait problem.        Limited mobility to right hand with numbness and tingling--- from cardiovascular surgery    C/o foot cramping at night while sleeping sometimes     Skin:  Negative for wound.   Neurological:  Positive for numbness.   Psychiatric/Behavioral:  Positive for sleep disturbance. The patient is nervous/anxious.        Physical Exam   Physical Exam  Vitals and nursing note reviewed.   Constitutional:       General: He is not in acute distress.     Appearance: He is well-developed. He is obese. He is not ill-appearing.   HENT:      Head: Normocephalic and atraumatic.      Right Ear: External ear normal.      Left Ear: External ear normal.      Nose: Nose normal.   Neck:      Thyroid: No thyromegaly.      Trachea: No tracheal deviation.   Cardiovascular:      Rate and Rhythm: Normal rate and regular rhythm.      Comments: + mechanical click   Pulmonary:      Effort: Pulmonary effort is normal. No respiratory distress.      Breath sounds: Normal breath sounds.   Abdominal:      Palpations: Abdomen is soft.      Tenderness: There is no abdominal tenderness.      Hernia: No hernia is present.   Musculoskeletal:      Cervical back: Normal range of motion and neck supple.   Skin:     General: Skin is warm and dry.      Capillary Refill: Capillary refill takes less than 2 seconds.      Findings:  No rash.      Comments: Pumps and dexcom sites are normal appearing. No lipo hypertropthy or atrophy     Neurological:      General: No focal deficit present.      Mental Status: He is alert and oriented to person, place, and time.      Cranial Nerves: No cranial nerve deficit.   Psychiatric:         Mood and Affect: Mood normal.         Behavior: Behavior normal.         Thought Content: Thought content normal.         Judgment: Judgment normal.         FOOT EXAMINATION:  Appropriate footwear     Protective Sensation (w/ 10 gram monofilament):  Right: Intact  Left: Intact    Visual Inspection:  Normal -  Bilateral, Nails Intact - without Evidence of Foot Deformity- Bilateral, and Dry Skin -  Bilateral    Pedal Pulses:   Right: Present  Left: Present    Posterior Tibialis Pulses:   Right:Present  Left: Present        Lab Results   Component Value Date    TSH 4.498 (H) 03/11/2025             Type 1 diabetes mellitus with hyperglycemia  A1 has increased to 6.9% -- he is not happy   Needing more insulin off the ozempic--- unfortunately his insurance will not cover the Ozempic due to him being a type 1 diabetic-- will try for wegovy today.         Medication changes:   Insurance will not cover ozempic or Wegovy    Continue Jardiance 25 mg daily   Continue Metformin 500 mg to BID- to help with insulin resistance.       Pump settings:    Continue Tandem T Slim pump with Lispro   Control IQ mode      Pump settings:  Basal:  adjusted based on download   MN-6AM- 1.7 units/hr - increased - he feels his AM sugars are above where he would like it to be   6AM-10AM- 1.5 units/hr --  10AM- 4PM: 1.5 units/hr--   4PM- MN: 1.5 units/hr --      ICR continue  MN- 6AM: 1:3.5   6AM-10AM:1:3.5  10AM- 4PM-1:3-  4PM- MN: 1:3--      ISF:  MN- 6AM: 1:10-    6AM-10AM:1:12  10AM- 4PM-1:16-  4PM- MN: 1:12     Target:  110 ( control IQ)     IOB:  3 hours       -- Reviewed goals of therapy are to get the best control we can without  "hypoglycemia  -- Reviewed patient's current insulin regimen. Clarified proper insulin dose and timing in relation to meals, etc. Insulin injection sites and proper rotation instructed.    -- Advised frequent self blood glucose monitoring.  Patient encouraged to document glucose results and bring them to every clinic visit  -Continue to use Dexcom G7--supplies from pharmacy   -- Hypoglycemia precautions discussed. Instructed on precautions before driving.    -- Call for Bg repeatedly < 70 or > 180.   -- Close adherence to lifestyle changes recommended.   -- Periodic follow ups for eye evaluations, foot care and dental care suggested.      Patient has diabetes mellitus and manages diabetes with intensive insulin regimen and uses prandial and basal insulin daily-- on insulin pump   Patient requires a therapeutic CGM and is willing to use therapeutic CGM for the necessary frequent adjustments of insulin therapy.  I have completed an in-person visit during the previous 6 months and will continue to have in-person visits every 6 months to assess adherence to their CGM regimen and diabetes treatment plan.  Due to COVID pandemic and need for remote monitoring this tool is medically necessary          Type 1 diabetes mellitus with hypoglycemia unawareness  Avoid hypoglycemia.  Continue to use Dexcom with real-time alerts  Now on Tandem pump with control IQ    Insulin pump in place  See above     Insulin pump fitting or adjustment  See above     Atherosclerosis of native coronary artery of native heart with angina pectoris  Optimize blood sugar readings    Essential hypertension  BP goal is < 140/90.   Tolerating  ARB  Blood pressure goals discussed with patient  BP high end of normal   Monitor BP at home   Let us know if running >140/90"s     Hyperlipidemia with target low density lipoprotein (LDL) cholesterol less than 70 mg/dL  On statin per ADA recommendations  LDL goal < 70. LDL slightly above goal on Crestor 40 mg " nightly. LFTs WNL.   Continue Statin   Repeat Lipids with RTC   If still above goal, consider adding in Zetia     TIA (transient ischemic attack)  Optimize blood sugar readings    Class 2 severe obesity due to excess calories with serious comorbidity and body mass index (BMI) of 37.0 to 37.9 in adult  Body mass index is 37.43 kg/m².  Increases insulin resistance.   Discussed DM diet and exercise.   Patient does exhibit insulin resistance and is on an SGLT 2, and metformin.  Insurance will not cover Ozempic or Wegovy             Pump backup plan    If the insulin pump is non functional and discontinued for anticipated more than 20 hours, please give daily injections of:   Long acting insulin  Lantus 40  units daily   Short acting insulin Novolog/ Humalog 1:4  for meals according to carb ratios and sensitivity factor in the pump.     When the insulin pump is restarted, do not restart basal rates until at least 22 hours after the last long acting insulin injection. You can set a 0% temporary basal setting that will last until this time and use your pump to bolus for meals and correction.     For any technical insulin pump issues, please contact the insulin pump company; the toll free number is printed on the label on the back of the insulin pump.       If your sugar is running high for a few hours and does not respond to two correction doses from the insulin pump, it may mean that you have a bad pump site and the site should be changed            Follow up in about 6 months (around 9/18/2025).   1. Schedule eye exam please - Dr. MAUREEN Meredith   2. Follow up with me in 6 months with labs prior           Orders Placed This Encounter   Procedures    Hemoglobin A1C     Standing Status:   Future     Expected Date:   9/18/2025     Expiration Date:   9/18/2026    Microalbumin/Creatinine Ratio, Urine     Standing Status:   Future     Expected Date:   9/18/2025     Expiration Date:   9/18/2026     Specimen Source:   Urine    Lipid  Panel     Standing Status:   Future     Expected Date:   9/18/2025     Expiration Date:   9/18/2026    TSH     Standing Status:   Future     Expected Date:   9/18/2025     Expiration Date:   9/18/2026    Comprehensive Metabolic Panel     Standing Status:   Future     Expected Date:   9/18/2025     Expiration Date:   9/18/2026    CBC Auto Differential     Standing Status:   Future     Expected Date:   9/18/2025     Expiration Date:   9/18/2026             Recommendations were discussed with the patient in detail  The patient verbalized understanding and agrees with the plan outlined as above.

## 2025-03-18 NOTE — ASSESSMENT & PLAN NOTE
On statin per ADA recommendations  LDL goal < 70. LDL slightly above goal on Crestor 40 mg nightly. LFTs WNL.   Continue Statin   Repeat Lipids with RTC   If still above goal, consider adding in Zetia

## 2025-03-24 DIAGNOSIS — J30.2 SEASONAL ALLERGIES: ICD-10-CM

## 2025-03-24 DIAGNOSIS — Z00.00 ENCOUNTER FOR MEDICARE ANNUAL WELLNESS EXAM: ICD-10-CM

## 2025-03-24 DIAGNOSIS — R05.1 ACUTE COUGH: ICD-10-CM

## 2025-03-24 RX ORDER — FLUTICASONE PROPIONATE 50 MCG
SPRAY, SUSPENSION (ML) NASAL
Qty: 48 G | Refills: 0 | Status: SHIPPED | OUTPATIENT
Start: 2025-03-24

## 2025-03-24 NOTE — TELEPHONE ENCOUNTER
No care due was identified.  [facility] Embedded Care Due Messages. Reference number: [id number].   3/24/2025 8:03:59 AM CDT

## 2025-03-25 NOTE — TELEPHONE ENCOUNTER
Refill Decision Note   Cj Barnes  is requesting a refill authorization.  Brief Assessment and Rationale for Refill:  Approve     Medication Therapy Plan:  no change in meds per ED visit    Medication Reconciliation Completed: No   Comments:     No Care Gaps recommended.     Note composed:9:18 PM 03/24/2025

## 2025-03-26 ENCOUNTER — OFFICE VISIT (OUTPATIENT)
Dept: PRIMARY CARE CLINIC | Facility: CLINIC | Age: 60
End: 2025-03-26
Payer: MEDICARE

## 2025-03-26 ENCOUNTER — PROCEDURE VISIT (OUTPATIENT)
Dept: UROLOGY | Facility: CLINIC | Age: 60
End: 2025-03-26
Payer: MEDICARE

## 2025-03-26 VITALS
TEMPERATURE: 98 F | HEART RATE: 77 BPM | WEIGHT: 237.88 LBS | BODY MASS INDEX: 37.26 KG/M2 | OXYGEN SATURATION: 98 % | SYSTOLIC BLOOD PRESSURE: 96 MMHG | DIASTOLIC BLOOD PRESSURE: 61 MMHG

## 2025-03-26 VITALS
DIASTOLIC BLOOD PRESSURE: 76 MMHG | HEART RATE: 74 BPM | BODY MASS INDEX: 36.87 KG/M2 | SYSTOLIC BLOOD PRESSURE: 117 MMHG | HEIGHT: 67 IN | WEIGHT: 234.88 LBS

## 2025-03-26 DIAGNOSIS — E08.41 DIABETIC MONONEUROPATHY ASSOCIATED WITH DIABETES MELLITUS DUE TO UNDERLYING CONDITION: ICD-10-CM

## 2025-03-26 DIAGNOSIS — Z23 NEED FOR VACCINATION: ICD-10-CM

## 2025-03-26 DIAGNOSIS — R35.0 URINARY FREQUENCY: Primary | ICD-10-CM

## 2025-03-26 DIAGNOSIS — R31.29 MICROSCOPIC HEMATURIA: ICD-10-CM

## 2025-03-26 DIAGNOSIS — Z00.00 ANNUAL PHYSICAL EXAM: Primary | ICD-10-CM

## 2025-03-26 LAB
BILIRUB SERPL-MCNC: NORMAL MG/DL
BLOOD URINE, POC: 5
CLARITY, POC UA: CLEAR
COLOR, POC UA: COLORLESS
GLUCOSE UR QL STRIP: 2000
KETONES UR QL STRIP: NORMAL
LEUKOCYTE ESTERASE URINE, POC: NORMAL
NITRITE, POC UA: NORMAL
PH, POC UA: NORMAL
PROTEIN, POC: NORMAL
SPECIFIC GRAVITY, POC UA: 1.01
UROBILINOGEN, POC UA: NORMAL

## 2025-03-26 PROCEDURE — 81002 URINALYSIS NONAUTO W/O SCOPE: CPT | Mod: S$GLB,,, | Performed by: STUDENT IN AN ORGANIZED HEALTH CARE EDUCATION/TRAINING PROGRAM

## 2025-03-26 PROCEDURE — 99999 PR PBB SHADOW E&M-EST. PATIENT-LVL V: CPT | Mod: PBBFAC,,, | Performed by: FAMILY MEDICINE

## 2025-03-26 PROCEDURE — 52000 CYSTOURETHROSCOPY: CPT | Mod: S$GLB,,, | Performed by: STUDENT IN AN ORGANIZED HEALTH CARE EDUCATION/TRAINING PROGRAM

## 2025-03-26 RX ORDER — SULFAMETHOXAZOLE AND TRIMETHOPRIM 800; 160 MG/1; MG/1
1 TABLET ORAL ONCE
Status: COMPLETED | OUTPATIENT
Start: 2025-03-26 | End: 2025-03-26

## 2025-03-26 RX ORDER — LIDOCAINE HYDROCHLORIDE 20 MG/ML
JELLY TOPICAL
Status: COMPLETED | OUTPATIENT
Start: 2025-03-26 | End: 2025-03-26

## 2025-03-26 RX ADMIN — SULFAMETHOXAZOLE AND TRIMETHOPRIM 1 TABLET: 800; 160 TABLET ORAL at 10:03

## 2025-03-26 RX ADMIN — LIDOCAINE HYDROCHLORIDE: 20 JELLY TOPICAL at 10:03

## 2025-03-26 NOTE — PROCEDURES
Cystoscopy    Date/Time: 3/26/2025 10:00 AM    Performed by: Sanjay Bowden MD  Authorized by: Herlinda James NP    Consent Done?:  Yes (Written)  Timeout: prior to procedure the correct patient, procedure, and site was verified    Prep: patient was prepped and draped in usual sterile fashion    Local anesthesia used?: Yes    Anesthesia:  Intraurethral instillation  Indications: hematuria and BPH    Position:  Supine  Anesthesia:  Intraurethral instillation  Preparation: Patient was prepped and draped in usual sterile fashion    External exam normal: Yes    Urethra normal: Yes    Prostate normal: No (minimal bilobar enlargement)    Bladder neck normal: Yes    Bladder normal: Yes     patient tolerated the procedure well with no immediate complications  Comments:      Negative hematuria workup. Very slight lateral lobe hypertrophy, not concordant with LUTS. Given no improvement on Flomax or Myrbetriq, would refer for VUDS to evaluate for HAYES. If HAYES is confirmed, would favor UroLift over TURP given comorbidities.

## 2025-03-26 NOTE — PROGRESS NOTES
Assessment:       1. Annual physical exam    2. Need for vaccination    3. Diabetic mononeuropathy associated with diabetes mellitus due to underlying condition         Plan:       Annual physical exam    Need for vaccination  -     RSV, preF A and preF B,PF, (ABRYSVO) 120 mcg/0.5 mL SolR vaccine; Inject 0.5 mLs (120 mcg total) into the muscle once. for 1 dose  Dispense: 0.5 mL; Refill: 0    Diabetic mononeuropathy associated with diabetes mellitus due to underlying condition      Assessment & Plan    - Reviewed prostate issues and recent cystoscopy with Dr. Bowden.  - Noted recent elevation in A1C to 6.9, likely due to cortisone injections for shoulder.  - Considered eligibility for COVID booster and RSV vaccine given diabetes as a complicating factor.  - Evaluated need for enhanced annual wellness visit, determined it is not critical given current health status and up-to-date preventive care.  - Discussed potential benefits of RSV vaccination given diabetes.    DIABETIC MONONEUROPATHY ASSOCIATED WITH DIABETES MELLITUS DUE TO UNDERLYING CONDITION:   Noted an increase in the patient's HbA1c to 6.9 from previous levels of around 6.4.   Reported no significant lows or highs, with no readings over 300.   Patient reports trouble with feet, including cramping and numbness.   Acknowledged the patient's foot issues and upcoming podiatrist appointment in April.   Recommend getting RSV vaccine due to diabetes being a potential complicating factor.    TYPE 2 DIABETES MELLITUS WITH DIABETIC NEUROPATHY:   Recommend seeing a podiatrist before the cruise; appointment scheduled for April.    URINARY DIFFICULTIES:   Patient reports trouble with urination, specifically continued leakage after finishing.   Patient has undergone a cystoscopy with Dr. Bowden    Recommend further tests, including catheterization and rectal exam.    HEMATURIA:   Patient reports blood in urine.   Ordered tests to investigate the cause.    SWEATING  "DISORDER:   Patient reports sweating for the past couple of nights without fever.       Medication List with Changes/Refills   New Medications    RSV, PREF A AND PREF B,PF, (ABRYSVO) 120 MCG/0.5 ML SOLR VACCINE    Inject 0.5 mLs (120 mcg total) into the muscle once. for 1 dose   Current Medications    ACETAMINOPHEN (TYLENOL ORAL)    Take 500 mg by mouth daily as needed. Pain    ASPIRIN (ECOTRIN) 81 MG EC TABLET    Take 1 tablet (81 mg total) by mouth once daily.    BAQSIMI 3 MG/ACTUATION SPRY    USE ONE actuation into a single nostril no response MAY REPEAT in 15 minutes USE a new device    BLOOD-GLUCOSE SENSOR (DEXCOM G7 SENSOR) MORIS    Inject 1 Device into the skin every 10 days.    COMFORT EZ PEN NEEDLES 33 GAUGE X 3/16" NDLE    USE AS DIRECTED with Levemir pens    COMIRNAT 2023-24, 12Y UP,,PF, 30 MCG/0.3 ML INJECTION        EMPAGLIFLOZIN (JARDIANCE) 25 MG TABLET    Take 1 tablet (25 mg total) by mouth once daily.    ESCITALOPRAM OXALATE (LEXAPRO) 10 MG TABLET    TAKE ONE TABLET BY MOUTH ONCE DAILY    FLUTICASONE PROPIONATE (FLONASE) 50 MCG/ACTUATION NASAL SPRAY    SPRAY TWO SPRAYS IN EACH NOSTRIL DAILY    GABAPENTIN (NEURONTIN) 300 MG CAPSULE    TAKE TWO CAPSULES BY MOUTH THREE TIMES DAILY AS NEEDED FOR nerve pain    GLUCAGON, HUMAN RECOMBINANT, (GLUCAGON EMERGENCY KIT, HUMAN,) 1 MG SOLR    Inject 1 mg into the muscle as needed.    HYDROCHLOROTHIAZIDE 12.5 MG TAB    TAKE ONE CAPSULE BY MOUTH ONCE DAILY    INSULIN GLARGINE U-100, LANTUS, (LANTUS U-100 INSULIN) 100 UNIT/ML INJECTION    Inject 40 Units into the skin every evening. To have on file in case of insulin pump failure. Vials please    INSULIN LISPRO 100 UNIT/ML INJECTION    USE CONTINUOUSLY WITH INSULIN PUMP(MAX DAILY DOSE  UNITS    INSULIN SYRINGE-NEEDLE U-100 1/2 ML 31 GAUGE X 15/64" SYRG    To use 5 times per day with insulin injections if off of insulin pump.  To have on file in case of pump failure    IRBESARTAN (AVAPRO) 150 MG TABLET    Take " 1 tablet (150 mg total) by mouth once daily.    METFORMIN (GLUCOPHAGE-XR) 500 MG ER 24HR TABLET    Take 1 tablet (500 mg total) by mouth 2 (two) times daily with meals.    MIRABEGRON (MYRBETRIQ) 25 MG TB24 ER TABLET    Take 1 tablet (25 mg total) by mouth once daily.    MUPIROCIN (BACTROBAN) 2 % OINTMENT    To use 2 times per day as needed    NYSTATIN (MYCOSTATIN) CREAM    Apply topically 2 (two) times daily.    PANTOPRAZOLE (PROTONIX) 40 MG TABLET    TAKE ONE TABLET BY MOUTH DAILY    ROSUVASTATIN (CRESTOR) 40 MG TAB    Take 1 tablet (40 mg total) by mouth every evening.    TADALAFIL (CIALIS) 10 MG TABLET    Take 1 tablet (10 mg total) by mouth daily as needed for Erectile Dysfunction.    TAMSULOSIN (FLOMAX) 0.4 MG CAP    Take 1 capsule (0.4 mg total) by mouth once daily.    WARFARIN (COUMADIN) 3 MG TABLET    Take 2 tablets (6 mg total) by mouth Daily. M,F 6 mg  4.5 mg every other day         Subjective:    Patient ID: Cj Barnes is a 59 y.o. male.  Chief Complaint: Annual Exam    HPI  History of Present Illness    CHIEF COMPLAINT:  Patient presents today for annual follow-up    GENITOURINARY:  He reports post-void dribbling and hematuria, currently undergoing urologic workup including recent cystoscopy.    DIABETES:  His A1C has recently elevated to 6.9 from his usual 6.4. He denies any significant hypoglycemic episodes and reports blood sugar has not exceeded 300.    NEUROLOGIC:  He experiences foot cramps and numbness affecting his mobility, particularly during travel. Due to these symptoms and hip discomfort during extended walking, he is considering using a scooter for an upcoming cruise.    CONSTITUTIONAL:  He reports night sweats for the past couple nights without associated fever or chills. His weight remains stable between 235-240 lbs.      ROS:  Constitutional: -fevers, +night sweats  ENT: -difficulty swallowing  Cardiovascular: -chest pain, -palpitations  Genitourinary: +hematuria  Male  Genitourinary: +post-urination dribbling  Musculoskeletal: +muscle cramps  Neurological: +numbness       Review of Systems    Objective:      Vitals:    03/26/25 1216   BP: 96/61   BP Location: Left arm   Patient Position: Sitting   Pulse: 77   Temp: 97.5 °F (36.4 °C)   TempSrc: Temporal   SpO2: 98%   Weight: 107.9 kg (237 lb 14 oz)     BP Readings from Last 5 Encounters:   03/26/25 96/61   03/26/25 117/76   03/18/25 138/80   02/27/25 (!) 144/85   01/28/25 123/78     Wt Readings from Last 5 Encounters:   03/26/25 107.9 kg (237 lb 14 oz)   03/26/25 106.5 kg (234 lb 14.4 oz)   03/18/25 108.4 kg (239 lb)   02/27/25 107.6 kg (237 lb 3.4 oz)   02/18/25 108 kg (238 lb 1.6 oz)     Physical Exam  Physical Exam    General: Well-developed. Well-nourished. No acute distress.  Eyes: EOMI. Sclerae anicteric.  HENT: Normocephalic. Atraumatic. Nares patent. Moist oral mucosa.  Cardiovascular: Regular rate. Regular rhythm. No murmurs. No rubs. No gallops. Normal S1, S2.  Respiratory: Normal respiratory effort. Clear to auscultation bilaterally. No rales. No rhonchi. No wheezing.  Musculoskeletal: No  obvious deformity.  Extremities: No lower extremity edema.  Neurological: Alert & oriented x3. No slurred speech. Normal gait.  Psychiatric: Normal mood. Normal affect. Good insight. Good judgment.  Skin: Warm. Dry. No rash.         Lab Results   Component Value Date    WBC 8.05 09/04/2024    HGB 13.6 (L) 09/04/2024    HCT 43.3 09/04/2024     09/04/2024    CHOL 133 03/11/2025    TRIG 44 03/11/2025    HDL 51 03/11/2025    ALT 26 03/11/2025    AST 29 03/11/2025     03/11/2025    K 4.5 03/11/2025     03/11/2025    CREATININE 0.9 03/11/2025    BUN 17 03/11/2025    CO2 27 03/11/2025    TSH 4.498 (H) 03/11/2025    PSA 0.32 07/10/2023    INR 2.6 (H) 03/18/2025    GLUF 160 (H) 01/09/2020    HGBA1C 6.9 (H) 03/11/2025      This note was generated with the assistance of ambient listening technology. Verbal consent was obtained  by the patient and accompanying visitor(s) for the recording of patient appointment to facilitate this note. I attest to having reviewed and edited the generated note for accuracy, though some syntax or spelling errors may persist. Please contact the author of this note for any clarification.

## 2025-03-27 RX ORDER — LIDOCAINE HYDROCHLORIDE 20 MG/ML
JELLY TOPICAL ONCE
OUTPATIENT
Start: 2025-03-27 | End: 2025-03-27

## 2025-04-02 ENCOUNTER — TELEPHONE (OUTPATIENT)
Dept: DIABETES | Facility: CLINIC | Age: 60
End: 2025-04-02
Payer: MEDICARE

## 2025-04-03 ENCOUNTER — TELEPHONE (OUTPATIENT)
Dept: UROLOGY | Facility: CLINIC | Age: 60
End: 2025-04-03
Payer: MEDICARE

## 2025-04-03 NOTE — TELEPHONE ENCOUNTER
4/3/25 @ 10:45 am called patient to schedule urodynamics procedure I was able to schedule the patient in

## 2025-04-04 ENCOUNTER — PATIENT MESSAGE (OUTPATIENT)
Dept: CARDIOLOGY | Facility: CLINIC | Age: 60
End: 2025-04-04

## 2025-04-04 ENCOUNTER — ANTI-COAG VISIT (OUTPATIENT)
Dept: CARDIOLOGY | Facility: CLINIC | Age: 60
End: 2025-04-04
Payer: MEDICARE

## 2025-04-04 DIAGNOSIS — Z95.2 H/O MECHANICAL AORTIC VALVE REPLACEMENT: ICD-10-CM

## 2025-04-04 DIAGNOSIS — I48.92 PAROXYSMAL ATRIAL FLUTTER: ICD-10-CM

## 2025-04-04 DIAGNOSIS — Z79.01 LONG TERM (CURRENT) USE OF ANTICOAGULANTS: Primary | ICD-10-CM

## 2025-04-04 NOTE — PROGRESS NOTES
[facility]    04/04/2025    Cj Barnes (59 y.o.) is followed by the [facility].      Assessment/Plan:    Cj Barnes presents with a subtherapeutic  INR. Goal INR: 2.5-3.5    Lab Results   Component Value Date    INR 2.3 (H) 03/31/2025    INR 2.6 (H) 03/18/2025    INR 2.4 (H) 03/11/2025       Assessment of patient findings per MA/LPN and chart review:   The following significant findings were found:   None    Recommendation for patient's warfarin regimen:   No change was made to warfarin therapy during this visit and patient has been instructed to continue their current warfarin regimen.  INR is from 3/31 therefore too outdated to act on at this time    Recommended repeat INR in 3 days      Jannet Sanchez, PharmD, BCPS  Clinical Pharmacist - [facility]  Preferred Contact: Secure Messaging or In Basket Message

## 2025-04-07 ENCOUNTER — ANTI-COAG VISIT (OUTPATIENT)
Dept: CARDIOLOGY | Facility: CLINIC | Age: 60
End: 2025-04-07
Payer: MEDICARE

## 2025-04-07 DIAGNOSIS — Z95.2 H/O MECHANICAL AORTIC VALVE REPLACEMENT: ICD-10-CM

## 2025-04-07 DIAGNOSIS — I48.92 PAROXYSMAL ATRIAL FLUTTER: ICD-10-CM

## 2025-04-07 DIAGNOSIS — Z79.01 LONG TERM (CURRENT) USE OF ANTICOAGULANTS: Primary | ICD-10-CM

## 2025-04-07 PROCEDURE — 93793 ANTICOAG MGMT PT WARFARIN: CPT | Mod: S$GLB,,, | Performed by: PHARMACIST

## 2025-04-07 NOTE — PROGRESS NOTES
INR slightly subtherapeutic today however, in light of his upcoming FUDS procedure on 4/9, it's helpful to favor the lower end of his target range.  No other changes noted via chart review.  No changes reported by patient.  Will maintain current dose x 1 more week; dose increase will likely be necessary next week.

## 2025-04-09 ENCOUNTER — PROCEDURE VISIT (OUTPATIENT)
Facility: CLINIC | Age: 60
End: 2025-04-09
Payer: MEDICARE

## 2025-04-09 ENCOUNTER — HOSPITAL ENCOUNTER (OUTPATIENT)
Dept: RADIOLOGY | Facility: HOSPITAL | Age: 60
Discharge: HOME | End: 2025-04-09
Attending: UROLOGY
Payer: MEDICARE

## 2025-04-09 VITALS
RESPIRATION RATE: 18 BRPM | SYSTOLIC BLOOD PRESSURE: 135 MMHG | DIASTOLIC BLOOD PRESSURE: 77 MMHG | TEMPERATURE: 98 F | HEART RATE: 62 BPM | BODY MASS INDEX: 38.15 KG/M2 | WEIGHT: 243.63 LBS

## 2025-04-09 DIAGNOSIS — R35.0 URINARY FREQUENCY: ICD-10-CM

## 2025-04-09 PROCEDURE — 74450 X-RAY URETHRA/BLADDER: CPT | Mod: TC

## 2025-04-09 PROCEDURE — 51741 ELECTRO-UROFLOWMETRY FIRST: CPT | Mod: 26,51,S$GLB, | Performed by: UROLOGY

## 2025-04-09 PROCEDURE — 74455 X-RAY URETHRA/BLADDER: CPT | Mod: 26,S$GLB,, | Performed by: UROLOGY

## 2025-04-09 PROCEDURE — 51728 CYSTOMETROGRAM W/VP: CPT | Mod: 26,S$GLB,, | Performed by: UROLOGY

## 2025-04-09 PROCEDURE — 51600 INJECTION FOR BLADDER X-RAY: CPT | Mod: 51,S$GLB,, | Performed by: UROLOGY

## 2025-04-09 PROCEDURE — 51797 INTRAABDOMINAL PRESSURE TEST: CPT | Mod: 26,S$GLB,, | Performed by: UROLOGY

## 2025-04-09 PROCEDURE — 51784 ANAL/URINARY MUSCLE STUDY: CPT | Mod: 26,51,S$GLB, | Performed by: UROLOGY

## 2025-04-09 NOTE — PROCEDURES
Urodynamic Report    [facility]       Referring Physician:  Elias Hyatt MD    YOB: 1965  Date of Exam: 4/9/2025    HPI: Patient complains of lower urinary tract symptoms. He reports frequency, incomplete emptying, and intermittency. He denies straining. Patient states symptoms are of moderate severity. Onset of symptoms was several months ago and was gradual in onset. He has no personal history and no family history of prostate cancer. He reports a history of no complicating symptoms. He denies flank pain, gross hematuria, kidney stones, and recurrent UTI.        Presents today to discuss ultrasound showing prostate 3.2 x 2.5 x 3.4 cm corresponding to an estimated prostate volume of 14 mL.  His repeat microscopic urinalysis showed 5 RBCs/HPF. UC negative   No improvement in LUTS with trial of Flomax      History DM, TIA, aortic valve replacement, atrial flutter-on Eliquis and ASA    Cystometrogram:    Position: Sitting  Filling Rate: 30 mL/sec   Catheter: 7F  Fluid:Conray       Cath PVR prior: 150 mL Voiding Diary Capacity: Not Available   First Sensation: 80 mL First Desire: 313 mL   Strong Desire: 395 mL Cystometric Capacity: 437 mL       Pdet at correction: 6 cm H2O Compliance: Normal       Detrusor Overactivity (DO): Absent  Volume first DO: No DO   Max DO Pressure: No DO Urgency Incontinence: Absent        Leak Point Pressure Testing:        Volume Tested: 150 mL  Valsalva Leak Point Pressure: No Leak   Stress Induced DO: Absent          Voiding Study:        Voided Volume: 178 mL PVR: 259 mL   Maximum Flow: 5 mL/sec Average Flow 3 mL/sec   Max Pdet: [id number]  Pdet at Max Flow: [id number]   EMG Storage: Normal Recruitment  EMG Voiding: Normal quiescence       Fluroscopic Imaging:        Bladder Contour: Smooth Vesicoureteral Reflux:No VUR   Bladder Neck at Rest: closed Bladder Neck/Urethra Voiding:Narrowed - Fixed       Impression:        Sensation:Normal    Capacity: Normal     Compliance:Normal    Detrusor Overactivity:Absent    Continence: No Incontinence    Contractility: Bladder Outlet Obstruction    Emptying:Unsatisfactory - Bladder Outlet Obstruction    Coordination:Coordinated Voiding          Summary:  This study was performed in accordance with the current AUA/SUFU Guideline on Adult Urodynamics. On filling phase he demonstrates normal first and strong desire with a normal bladder capacity. There is no detrusor overactivity (DO) demonstrated on this exam (though absence of DO on urodynamic evaluation does not exclude it as causative agent of urgency symptoms). Bladder compliance at capacity is normal. On fluoroscopy, the bladder contour is smooth. There is no VUR demonstrated on this exam.     On stress testing, with adequate cough and valsalva effort, there is no SHANTA demonstrated and patient reports no clinical history of SHANTA.    On voiding phase, voiding pressures are elevated relative to an attenuated flow, suggestive of bladder outlet obstruction. Bladder outlet obstruction index (BOOI = 42) is in the obstructive range.  On VCUG, the urethra appears narrowed throughout the prostatic urethra . The patient is unable to void to completion during the study or on independent flow rate which is consistent with the history of voiding difficulty. The patient reports this is the normal voiding pattern.

## 2025-04-09 NOTE — PATIENT INSTRUCTIONS
FLUORO URODYNAMIC STUDY (FUDS)  DISCHARGE INSTRUCTIONS    You have had a procedure that will require time to properly heal. Follow the instructions you have been given on how to care for yourself once you are home. Below is additional information to help in your recovery.    ACTIVITY  There are no restrictions in activity. Start doing again the things you did before the procedure.  You may experience a slight burning sensation and notice a small amount of blood in your urine after your procedure. This will clear up within a day. Call the clinic if it continues beyond 48 hours.  If you are sent home with a catheter in place, only take showers until the catheter is removed.    DIET  Continue your normal diet. You may eat the same foods you ate before your procedure.  Drink plenty of fluids during the first 24 to 48 hours following your procedure.    MEDICATIONS  Resume all other previous medications from your prescribing physician.  Continue any pre-procedure antibiotics until they are all gone.    SIGNS AND SYMPTOMS TO REPORT TO THE DOCTOR  Chills or fever greater than 101° F within 24 hours of procedure.  Changes in urination, such as increased bleeding, foul smell, cloudy urine, or painful urination.  Call your doctor with any questions or concerns.    For any emergency situation, call 911 immediately or go to your nearest emergency room.    [facility]  [phone]

## 2025-04-10 ENCOUNTER — OFFICE VISIT (OUTPATIENT)
Dept: PODIATRY | Facility: CLINIC | Age: 60
End: 2025-04-10
Payer: MEDICARE

## 2025-04-10 VITALS
HEART RATE: 64 BPM | SYSTOLIC BLOOD PRESSURE: 111 MMHG | DIASTOLIC BLOOD PRESSURE: 67 MMHG | HEIGHT: 67 IN | WEIGHT: 239.63 LBS | BODY MASS INDEX: 37.61 KG/M2

## 2025-04-10 DIAGNOSIS — B35.1 ONYCHOMYCOSIS: ICD-10-CM

## 2025-04-10 DIAGNOSIS — L84 CORN OR CALLUS: ICD-10-CM

## 2025-04-10 DIAGNOSIS — M79.671 PAIN IN BOTH FEET: ICD-10-CM

## 2025-04-10 DIAGNOSIS — G57.31 DEEP PERONEAL NEUROPATHY, RIGHT: ICD-10-CM

## 2025-04-10 DIAGNOSIS — E08.41 DIABETIC MONONEUROPATHY ASSOCIATED WITH DIABETES MELLITUS DUE TO UNDERLYING CONDITION: Primary | ICD-10-CM

## 2025-04-10 DIAGNOSIS — M79.672 PAIN IN BOTH FEET: ICD-10-CM

## 2025-04-10 PROCEDURE — 3008F BODY MASS INDEX DOCD: CPT | Mod: CPTII,S$GLB,, | Performed by: PODIATRIST

## 2025-04-10 PROCEDURE — 11056 PARNG/CUTG B9 HYPRKR LES 2-4: CPT | Mod: S$GLB,,, | Performed by: PODIATRIST

## 2025-04-10 PROCEDURE — 64450 NJX AA&/STRD OTHER PN/BRANCH: CPT | Mod: XS,50,S$GLB, | Performed by: PODIATRIST

## 2025-04-10 PROCEDURE — 11721 DEBRIDE NAIL 6 OR MORE: CPT | Mod: XS,S$GLB,, | Performed by: PODIATRIST

## 2025-04-10 PROCEDURE — 3060F POS MICROALBUMINURIA REV: CPT | Mod: CPTII,S$GLB,, | Performed by: PODIATRIST

## 2025-04-10 PROCEDURE — 99999 PR PBB SHADOW E&M-EST. PATIENT-LVL IV: CPT | Mod: PBBFAC,,, | Performed by: PODIATRIST

## 2025-04-10 PROCEDURE — 3074F SYST BP LT 130 MM HG: CPT | Mod: CPTII,S$GLB,, | Performed by: PODIATRIST

## 2025-04-10 PROCEDURE — 3078F DIAST BP <80 MM HG: CPT | Mod: CPTII,S$GLB,, | Performed by: PODIATRIST

## 2025-04-10 PROCEDURE — 3044F HG A1C LEVEL LT 7.0%: CPT | Mod: CPTII,S$GLB,, | Performed by: PODIATRIST

## 2025-04-10 PROCEDURE — 99213 OFFICE O/P EST LOW 20 MIN: CPT | Mod: 25,S$GLB,, | Performed by: PODIATRIST

## 2025-04-10 PROCEDURE — 3066F NEPHROPATHY DOC TX: CPT | Mod: CPTII,S$GLB,, | Performed by: PODIATRIST

## 2025-04-10 RX ORDER — PREGABALIN 150 MG/1
150 CAPSULE ORAL 2 TIMES DAILY
Qty: 60 CAPSULE | Refills: 3 | Status: SHIPPED | OUTPATIENT
Start: 2025-04-10 | End: 2025-10-09

## 2025-04-14 ENCOUNTER — PATIENT MESSAGE (OUTPATIENT)
Dept: CARDIOLOGY | Facility: CLINIC | Age: 60
End: 2025-04-14

## 2025-04-14 ENCOUNTER — ANTI-COAG VISIT (OUTPATIENT)
Dept: CARDIOLOGY | Facility: CLINIC | Age: 60
End: 2025-04-14
Payer: MEDICARE

## 2025-04-14 DIAGNOSIS — Z95.2 H/O MECHANICAL AORTIC VALVE REPLACEMENT: ICD-10-CM

## 2025-04-14 DIAGNOSIS — Z79.01 LONG TERM (CURRENT) USE OF ANTICOAGULANTS: Primary | ICD-10-CM

## 2025-04-14 DIAGNOSIS — I48.92 PAROXYSMAL ATRIAL FLUTTER: ICD-10-CM

## 2025-04-14 PROCEDURE — 93793 ANTICOAG MGMT PT WARFARIN: CPT | Mod: S$GLB,,,

## 2025-04-14 NOTE — PROGRESS NOTES
Pt reports taking pregabalin (LYRICA) 150 MG capsule -started 4/10, instead of gabapentin. Confirmed correct doses. No other changes

## 2025-04-19 NOTE — PROGRESS NOTES
Subjective:      Patient ID: Cj Barnes is a 59 y.o. male.    Chief Complaint:   Foot Pain (Right foot tinging and numbness on and off/Left foot cramping on and off) and Diabetic Foot Exam (Pcp Garry Stover MD 03/26/2025/)    Cj is a 59 y.o. male who presents to the podiatry clinic  with complaint of  right foot pain. Onset of the symptoms was several months ago. Precipitating event:  Previous surgery Kulwant's deformity right heel . Current symptoms include: ability to bear weight, but with some pain. Aggravating factors: any weight bearing. Symptoms have gradually worsened. Patient has had no prior foot problems. Evaluation to date:  Previous evaluation/radiographs/surgery/antibiotic therapy is/wound care .     Presents today for routine diabetic foot evaluation/increase burning and tenderness to the tops of both feet.    Review of Systems   Constitutional: Negative for chills, decreased appetite, fever and night sweats.   HENT:  Negative for congestion, ear discharge, nosebleeds and tinnitus.    Eyes:  Negative for double vision, pain and visual disturbance.   Cardiovascular:  Negative for chest pain, claudication, cyanosis and palpitations.   Respiratory:  Negative for cough, hemoptysis, shortness of breath and wheezing.    Endocrine: Negative for cold intolerance and heat intolerance.   Hematologic/Lymphatic: Negative for adenopathy and bleeding problem.   Skin:  Positive for dry skin, poor wound healing and unusual hair distribution.   Musculoskeletal:  Positive for arthritis, joint pain and stiffness. Negative for myalgias.   Gastrointestinal:  Negative for abdominal pain, dysphagia, nausea and vomiting.   Genitourinary:  Negative for dysuria, flank pain, hematuria and pelvic pain.   Neurological:  Positive for disturbances in coordination, numbness, paresthesias and sensory change.   Psychiatric/Behavioral:  Negative for altered mental status, hallucinations and suicidal ideas. The  patient does not have insomnia.    Allergic/Immunologic: Negative for environmental allergies and persistent infections.           Objective:      Physical Exam  Vitals reviewed.   Constitutional:       Appearance: He is well-developed.   HENT:      Head: Normocephalic and atraumatic.   Cardiovascular:      Pulses:           Dorsalis pedis pulses are 1+ on the right side and 1+ on the left side.        Posterior tibial pulses are 1+ on the right side and 1+ on the left side.   Pulmonary:      Effort: Pulmonary effort is normal.   Musculoskeletal:         General: Normal range of motion.      Comments: Anterior, lateral, and posterior muscle groups bilateral lower extremities show strength 4 over 5 symmetrically. Inspection and palpation of the joints and bones reveal no crepitus or joint effusion. No tenderness upon palpation. Mild plantar flexor contractures noted to digits 2 through 5 bilaterally.  Angle and base of gait are normal.   Feet:      Right foot:      Skin integrity: Callus and dry skin present.      Left foot:      Skin integrity: Callus and dry skin present.   Skin:     General: Skin is warm and dry.      Capillary Refill: Capillary refill takes 2 to 3 seconds.      Coloration: Skin is pale.      Comments: Skin bilateral lower extremities noted to be thin, dry, and atrophic.    Posterior right heel ulceration appears to be healed.   Neurological:      Mental Status: He is alert and oriented to person, place, and time.      Sensory: Sensory deficit present.      Motor: Atrophy present.      Deep Tendon Reflexes: Reflexes abnormal.      Reflex Scores:       Patellar reflexes are 1+ on the right side and 1+ on the left side.       Achilles reflexes are 1+ on the right side and 1+ on the left side.     Comments: Sharp, dull, light touch, and vibratory sensation are diminished bilaterally. Proprioceptive sensation is intact to both lower extremities. Petroleum Eliza monofilament exam shows loss of  "protective sensation to plantar toes 1 through 5 bilaterally. Deep tendon reflexes to the patellar tendons is 1 over 4 bilaterally symmetrical. Deep tendon reflexes to the Achilles tendon is 0 over 4 bilaterally symmetrical. No ankle clonus or Babinski reflex noted bilaterally. Coordination is fair to both lower extremities.  Patient admits to intermittent burning and tingling in the feet.   Psychiatric:         Behavior: Behavior normal.       Positive Tinel sign/provocation sign noted to bilateral deep peroneal nerves.      Assessment:       Encounter Diagnoses   Name Primary?    Diabetic mononeuropathy associated with diabetes mellitus due to underlying condition Yes    Deep peroneal neuropathy, right     Corn or callus     Onychomycosis     Pain in both feet      Independent visualization of imaging was performed.  Results were reviewed in detail with patient.       Plan:       Cj Branch" was seen today for foot pain and diabetic foot exam.    Diagnoses and all orders for this visit:    Diabetic mononeuropathy associated with diabetes mellitus due to underlying condition  -     pregabalin (LYRICA) 150 MG capsule; Take 1 capsule (150 mg total) by mouth 2 (two) times daily.    Deep peroneal neuropathy, right    Corn or callus    Onychomycosis    Pain in both feet  -     pregabalin (LYRICA) 150 MG capsule; Take 1 capsule (150 mg total) by mouth 2 (two) times daily.      I counseled the patient on his conditions, their implications and medical management.    The nature of the condition, options for management, as well as potential risks and complications were discussed in detail with patient. Patient was amenable to my recommendations and left my office fully informed and will follow up as instructed or sooner if necessary.      Discussed options for peripheral neuropathy/nerve entrapment syndrome including nerve block therapy, surgical nerve entrapment decompression procedures, and various vitamins and " supplementation available shown to improve nerve function.    Decision making:  Chronic illnesses discussed in detail, previous records/notes and imaging independently reviewed, prescription drug management performed in addition to lengthy discussion regarding both conservative and surgical treatment options.    Routine Foot Care    Performed by:  Eugene Bowers. LAUREN  Authorized by:  Patient     Consent Done?:  Yes (Verbal)     Nail Care Type:  Debride  Location(s): All  (Left 1st Toe, Left 3rd Toe, Left 2nd Toe, Left 4th Toe, Left 5th Toe, Right 1st Toe, Right 2nd Toe, Right 3rd Toe, Right 4th Toe and Right 5th Toe)  Patient tolerance:  Patient tolerated the procedure well with no immediate complications     With patient's permission, the toenails mentioned above were aggressively reduced and debrided using a nail nipper, removing all offending nail and debris. The patient will continue to monitor the areas daily, inspect the feet, wear protective shoe gear when ambulatory, and moisturizer to maintain skin integrity.      Callus Care Type: Debride    With patient's permission, the calluses/hyperkeratotic lesions mentioned above were aggressively reduced and debrided using a number 15 blade. The patient will continue to monitor the areas daily, inspect the feet, wear protective shoe gear when ambulatory, and moisturizer to maintain skin integrity.     Nerve injection:    Performed by:  Eugene Bowers DPM    Preop diagnosis:  Neuropathy symptoms/paresthesias/pain    The area overlying the bilateral deep peroneal nerve(s) was sterilely prepped, verbal consent was obtained, 2 cc's of 0.5% Marcaine plain was infiltrated around the affected nerve(s) for a diagnostic nerve block.  This was well tolerated with no complications.      Follow-up in 3 months.

## 2025-04-23 ENCOUNTER — PATIENT MESSAGE (OUTPATIENT)
Dept: CARDIOLOGY | Facility: CLINIC | Age: 60
End: 2025-04-23

## 2025-04-23 ENCOUNTER — OFFICE VISIT (OUTPATIENT)
Dept: UROLOGY | Facility: CLINIC | Age: 60
End: 2025-04-23
Payer: MEDICARE

## 2025-04-23 ENCOUNTER — ANTI-COAG VISIT (OUTPATIENT)
Dept: CARDIOLOGY | Facility: CLINIC | Age: 60
End: 2025-04-23
Payer: MEDICARE

## 2025-04-23 VITALS
HEART RATE: 81 BPM | WEIGHT: 239.63 LBS | DIASTOLIC BLOOD PRESSURE: 74 MMHG | BODY MASS INDEX: 37.61 KG/M2 | HEIGHT: 67 IN | SYSTOLIC BLOOD PRESSURE: 124 MMHG

## 2025-04-23 DIAGNOSIS — N13.8 BPH WITH OBSTRUCTION/LOWER URINARY TRACT SYMPTOMS: Primary | ICD-10-CM

## 2025-04-23 DIAGNOSIS — I48.92 PAROXYSMAL ATRIAL FLUTTER: ICD-10-CM

## 2025-04-23 DIAGNOSIS — Z79.01 LONG TERM (CURRENT) USE OF ANTICOAGULANTS: Primary | ICD-10-CM

## 2025-04-23 DIAGNOSIS — Z95.2 H/O MECHANICAL AORTIC VALVE REPLACEMENT: ICD-10-CM

## 2025-04-23 DIAGNOSIS — N40.1 BPH WITH OBSTRUCTION/LOWER URINARY TRACT SYMPTOMS: Primary | ICD-10-CM

## 2025-04-23 LAB
BILIRUB SERPL-MCNC: NORMAL MG/DL
BLOOD URINE, POC: NORMAL
CLARITY, POC UA: NORMAL
COLOR, POC UA: YELLOW
GLUCOSE UR QL STRIP: 2000
KETONES UR QL STRIP: NORMAL
LEUKOCYTE ESTERASE URINE, POC: NORMAL
NITRITE, POC UA: NORMAL
PH, POC UA: 5
PROTEIN, POC: NORMAL
SPECIFIC GRAVITY, POC UA: 1
UROBILINOGEN, POC UA: NORMAL

## 2025-04-23 PROCEDURE — 87086 URINE CULTURE/COLONY COUNT: CPT | Performed by: STUDENT IN AN ORGANIZED HEALTH CARE EDUCATION/TRAINING PROGRAM

## 2025-04-23 PROCEDURE — 81002 URINALYSIS NONAUTO W/O SCOPE: CPT | Mod: S$GLB,,, | Performed by: STUDENT IN AN ORGANIZED HEALTH CARE EDUCATION/TRAINING PROGRAM

## 2025-04-23 PROCEDURE — 3044F HG A1C LEVEL LT 7.0%: CPT | Mod: CPTII,S$GLB,, | Performed by: STUDENT IN AN ORGANIZED HEALTH CARE EDUCATION/TRAINING PROGRAM

## 2025-04-23 PROCEDURE — 3074F SYST BP LT 130 MM HG: CPT | Mod: CPTII,S$GLB,, | Performed by: STUDENT IN AN ORGANIZED HEALTH CARE EDUCATION/TRAINING PROGRAM

## 2025-04-23 PROCEDURE — 3066F NEPHROPATHY DOC TX: CPT | Mod: CPTII,S$GLB,, | Performed by: STUDENT IN AN ORGANIZED HEALTH CARE EDUCATION/TRAINING PROGRAM

## 2025-04-23 PROCEDURE — 3008F BODY MASS INDEX DOCD: CPT | Mod: CPTII,S$GLB,, | Performed by: STUDENT IN AN ORGANIZED HEALTH CARE EDUCATION/TRAINING PROGRAM

## 2025-04-23 PROCEDURE — 3078F DIAST BP <80 MM HG: CPT | Mod: CPTII,S$GLB,, | Performed by: STUDENT IN AN ORGANIZED HEALTH CARE EDUCATION/TRAINING PROGRAM

## 2025-04-23 PROCEDURE — 3060F POS MICROALBUMINURIA REV: CPT | Mod: CPTII,S$GLB,, | Performed by: STUDENT IN AN ORGANIZED HEALTH CARE EDUCATION/TRAINING PROGRAM

## 2025-04-23 PROCEDURE — 99999 PR PBB SHADOW E&M-EST. PATIENT-LVL V: CPT | Mod: PBBFAC,,, | Performed by: STUDENT IN AN ORGANIZED HEALTH CARE EDUCATION/TRAINING PROGRAM

## 2025-04-23 PROCEDURE — 99215 OFFICE O/P EST HI 40 MIN: CPT | Mod: S$GLB,,, | Performed by: STUDENT IN AN ORGANIZED HEALTH CARE EDUCATION/TRAINING PROGRAM

## 2025-04-23 NOTE — PROGRESS NOTES
"[facility] - Urology [place] [address]   Clinic Note    SUBJECTIVE:     Chief Complaint: BPH with LUTS    History of Present Illness:  Cj Barnes is a 59 y.o. male who presents to clinic for BPH with LUTS. He is established to our clinic.     On Flomax and Myrbetriq. Cystoscopy with me showed mild bilobar prostatic enlargement. VUDS with Dr. Hyatt showed bladder outlet obstruction (BOOI of 42), with narrowing in prostatic urethra on fluoroscopy. Pelvic US shows 14 cc prostate.    PMH notable for mechanical heart valve on warfarin, CAD, T1DM with insulin pump.      OBJECTIVE:     Estimated body mass index is 37.53 kg/m² as calculated from the following:    Height as of this encounter: 5' 7" (1.702 m).    Weight as of this encounter: 108.7 kg (239 lb 10.2 oz).    Vital Signs (Most Recent)  Pulse: 81 (04/23/25 1430)  BP: 124/74 (04/23/25 1430)    Physical Exam  Vitals reviewed.   Constitutional:       Appearance: Normal appearance.   HENT:      Head: Normocephalic and atraumatic.   Cardiovascular:      Rate and Rhythm: Normal rate.   Skin:     General: Skin is warm and dry.   Neurological:      General: No focal deficit present.      Mental Status: He is alert and oriented to person, place, and time.   Psychiatric:         Mood and Affect: Mood normal.         Behavior: Behavior normal.         Thought Content: Thought content normal.         Judgment: Judgment normal.     Lab Results   Component Value Date    BUN 17 03/11/2025    CREATININE 0.9 03/11/2025    WBC 8.05 09/04/2024    HGB 13.6 (L) 09/04/2024    HCT 43.3 09/04/2024     09/04/2024    AST 29 03/11/2025    ALT 26 03/11/2025    ALKPHOS 65 03/11/2025    ALBUMIN 3.5 03/11/2025    HGBA1C 6.9 (H) 03/11/2025        Lab Results   Component Value Date    PSA 0.32 07/10/2023    PSA 0.40 06/10/2022    PSA 0.25 03/23/2020    PSA 0.38 12/11/2017    PSA 0.2 04/07/2008    PSADIAG 0.26 01/30/2025    PSADIAG 0.33 04/06/2021       Urine dip showed no blood, " leukocyte esterase, and nitrite. Negative protein.     ASSESSMENT     1. BPH with obstruction/lower urinary tract symptoms    2. H/O mechanical aortic valve replacement      PLAN:   1. BPH with obstruction/lower urinary tract symptoms  -     POCT urine dipstick without microscope  -     Urine Culture High Risk  -     Case Request Operating Room: CYSTOSCOPY, WITH INSERTION OF UROLIFT IMPLANT    2. H/O mechanical aortic valve replacement    Discussed UroLift vs TURP; would favor UroLift given comorbidities.   Patient wishes to proceed with UroLift. OK to continue warfarin, should confirm INR is not supratherapeutic within 72 hours before procedure.  Will obtain clearance from patient's cardiologist, Dr. Medina.    Sanjay Bowden MD     ADDENDUM: cleared by Dr. Medina for procedure.

## 2025-04-25 ENCOUNTER — RESULTS FOLLOW-UP (OUTPATIENT)
Dept: UROLOGY | Facility: CLINIC | Age: 60
End: 2025-04-25

## 2025-04-25 LAB — BACTERIA UR CULT: NO GROWTH

## 2025-04-29 ENCOUNTER — TELEPHONE (OUTPATIENT)
Dept: PODIATRY | Facility: CLINIC | Age: 60
End: 2025-04-29
Payer: MEDICARE

## 2025-04-29 NOTE — TELEPHONE ENCOUNTER
Call patient in regards to appointment on 06/23/2025 with Dr. Bowers due to him being out of clinic to rescheduled. Patient answer and was made aware and verbally confirmed new appointment date and time.

## 2025-05-07 ENCOUNTER — ANTI-COAG VISIT (OUTPATIENT)
Dept: CARDIOLOGY | Facility: CLINIC | Age: 60
End: 2025-05-07
Payer: MEDICARE

## 2025-05-07 ENCOUNTER — PATIENT MESSAGE (OUTPATIENT)
Dept: CARDIOLOGY | Facility: CLINIC | Age: 60
End: 2025-05-07

## 2025-05-07 DIAGNOSIS — I48.92 PAROXYSMAL ATRIAL FLUTTER: ICD-10-CM

## 2025-05-07 DIAGNOSIS — Z79.01 LONG TERM (CURRENT) USE OF ANTICOAGULANTS: Primary | ICD-10-CM

## 2025-05-07 DIAGNOSIS — Z95.2 H/O MECHANICAL AORTIC VALVE REPLACEMENT: ICD-10-CM

## 2025-05-07 PROCEDURE — 93793 ANTICOAG MGMT PT WARFARIN: CPT | Mod: S$GLB,,,

## 2025-05-09 DIAGNOSIS — E10.65 TYPE 1 DIABETES MELLITUS WITH HYPERGLYCEMIA: ICD-10-CM

## 2025-05-09 RX ORDER — GABAPENTIN 300 MG/1
CAPSULE ORAL
Qty: 360 CAPSULE | Refills: 1 | Status: SHIPPED | OUTPATIENT
Start: 2025-05-09

## 2025-05-09 NOTE — TELEPHONE ENCOUNTER
No care due was identified.  Health [facility] Embedded Care Due Messages. Reference number: [id number].   5/09/2025 9:13:59 AM CDT

## 2025-05-13 RX ORDER — TADALAFIL 10 MG/1
10 TABLET ORAL DAILY PRN
Qty: 90 TABLET | Refills: 3 | Status: SHIPPED | OUTPATIENT
Start: 2025-05-13

## 2025-05-14 ENCOUNTER — E-CONSULT (OUTPATIENT)
Dept: CARDIOLOGY | Facility: OTHER | Age: 60
End: 2025-05-14
Payer: MEDICARE

## 2025-05-14 DIAGNOSIS — N40.1 BPH WITH OBSTRUCTION/LOWER URINARY TRACT SYMPTOMS: Primary | ICD-10-CM

## 2025-05-14 DIAGNOSIS — Z95.2 H/O MECHANICAL AORTIC VALVE REPLACEMENT: ICD-10-CM

## 2025-05-14 DIAGNOSIS — Z95.828 S/P ASCENDING AORTIC REPLACEMENT: Primary | ICD-10-CM

## 2025-05-14 DIAGNOSIS — N13.8 BPH WITH OBSTRUCTION/LOWER URINARY TRACT SYMPTOMS: Primary | ICD-10-CM

## 2025-05-14 NOTE — CONSULTS
[facility] CARDIOLOGY  Response for E-Consult     Patient Name: Cj Barnes  MRN: [id number]  Primary Care Provider: Garry Stover MD   Requesting Provider: Herlinda James NP  Consults    Recommendation:  Okay to proceed with UroLift surgery with Dr. Bowden who will be able to do surgery without interruption with his warfarin.  Discussed with Dr. Bowden and Herlinda Jamse NP.    Additional future steps to consider: none    Total time of Consultation: 10 minute    I did speak to the requesting provider verbally about this.     *This eConsult is based on the clinical data available to me and is furnished without benefit of a physical examination. The eConsult will need to be interpreted in light of any clinical issues or changes in patient status not available to me at the time of filing this eConsults. Significant changes in patient condition or level of acuity should result in immediate formal consultation and reevaluation. Please alert me if you have further questions.    Thank you for this eConsult referral.     Anshu Medina MD  [facility] CARDIOLOGY

## 2025-05-18 ENCOUNTER — PATIENT MESSAGE (OUTPATIENT)
Dept: OPTOMETRY | Facility: CLINIC | Age: 60
End: 2025-05-18
Payer: MEDICARE

## 2025-05-18 DIAGNOSIS — R05.1 ACUTE COUGH: ICD-10-CM

## 2025-05-18 NOTE — TELEPHONE ENCOUNTER
No care due was identified.  Health [facility] Embedded Care Due Messages. Reference number: [id number].   5/18/2025 6:06:13 PM CDT

## 2025-05-19 ENCOUNTER — OFFICE VISIT (OUTPATIENT)
Dept: OPTOMETRY | Facility: CLINIC | Age: 60
End: 2025-05-19
Payer: MEDICARE

## 2025-05-19 DIAGNOSIS — H10.31 ACUTE CONJUNCTIVITIS OF RIGHT EYE, UNSPECIFIED ACUTE CONJUNCTIVITIS TYPE: Primary | ICD-10-CM

## 2025-05-19 PROCEDURE — 3066F NEPHROPATHY DOC TX: CPT | Mod: CPTII,S$GLB,, | Performed by: OPTOMETRIST

## 2025-05-19 PROCEDURE — 1160F RVW MEDS BY RX/DR IN RCRD: CPT | Mod: CPTII,S$GLB,, | Performed by: OPTOMETRIST

## 2025-05-19 PROCEDURE — 3060F POS MICROALBUMINURIA REV: CPT | Mod: CPTII,S$GLB,, | Performed by: OPTOMETRIST

## 2025-05-19 PROCEDURE — 99999 PR PBB SHADOW E&M-EST. PATIENT-LVL III: CPT | Mod: PBBFAC,,, | Performed by: OPTOMETRIST

## 2025-05-19 PROCEDURE — 3044F HG A1C LEVEL LT 7.0%: CPT | Mod: CPTII,S$GLB,, | Performed by: OPTOMETRIST

## 2025-05-19 PROCEDURE — 1159F MED LIST DOCD IN RCRD: CPT | Mod: CPTII,S$GLB,, | Performed by: OPTOMETRIST

## 2025-05-19 PROCEDURE — 99212 OFFICE O/P EST SF 10 MIN: CPT | Mod: S$GLB,,, | Performed by: OPTOMETRIST

## 2025-05-19 RX ORDER — FLUOROMETHOLONE 1 MG/ML
1 SUSPENSION/ DROPS OPHTHALMIC 4 TIMES DAILY
Qty: 5 ML | Refills: 0 | Status: SHIPPED | OUTPATIENT
Start: 2025-05-19 | End: 2025-05-26

## 2025-05-19 RX ORDER — PANTOPRAZOLE SODIUM 40 MG/1
40 TABLET, DELAYED RELEASE ORAL DAILY
Qty: 90 TABLET | Refills: 3 | Status: SHIPPED | OUTPATIENT
Start: 2025-05-19

## 2025-05-19 NOTE — TELEPHONE ENCOUNTER
Cj Barnes  is requesting a refill authorization.  Brief Assessment and Rationale for Refill:  Approve     Medication Therapy Plan:         Comments:     Note composed:1:12 PM 05/19/2025

## 2025-05-20 NOTE — PROGRESS NOTES
HPI    DLS: 3/12/2024    60 yo M presents today for urgent visit. Pt reports itching, redness, mild   pain and irritation OD x 2 weeks. Pt also reports with bump on top and   bottom lid OD. Using OTC gtts and eye wash for relief. No vision   complaints.    Hemoglobin A1C       Date                     Value               Ref Range             Status                03/11/2025               6.9 (H)             4.0 - 5.6 %           Final                  09/04/2024               6.4 (H)             4.0 - 5.6 %           Final                 03/04/2024               6.3 (H)             4.0 - 5.6 %           Final                Last edited by Gisselle Michelle MA on 5/19/2025  3:56 PM.            Assessment /Plan     For exam results, see Encounter Report.    Acute conjunctivitis of right eye, unspecified acute conjunctivitis type     Start    fluorometholone 0.1% (FML) 0.1 % DrpS; Place 1 drop into the right eye 4 (four) times daily. for 7 days, then BID x 1 week , then IVZIA PRN      RTC 1 month for annual exam / DFE

## 2025-05-21 ENCOUNTER — ANTI-COAG VISIT (OUTPATIENT)
Dept: CARDIOLOGY | Facility: CLINIC | Age: 60
End: 2025-05-21
Payer: MEDICARE

## 2025-05-21 ENCOUNTER — PATIENT MESSAGE (OUTPATIENT)
Dept: CARDIOLOGY | Facility: CLINIC | Age: 60
End: 2025-05-21

## 2025-05-21 DIAGNOSIS — I48.92 PAROXYSMAL ATRIAL FLUTTER: ICD-10-CM

## 2025-05-21 DIAGNOSIS — Z95.2 H/O MECHANICAL AORTIC VALVE REPLACEMENT: ICD-10-CM

## 2025-05-21 DIAGNOSIS — Z79.01 LONG TERM (CURRENT) USE OF ANTICOAGULANTS: Primary | ICD-10-CM

## 2025-05-21 PROCEDURE — 93793 ANTICOAG MGMT PT WARFARIN: CPT | Mod: S$GLB,,,

## 2025-05-21 NOTE — PROGRESS NOTES
"INR high. No significant changes reported. He reports urology procedure planned 5/27. Reviewed chart. Per urology note:    "Patient wishes to proceed with UroLift. OK to continue warfarin, should confirm INR is not supratherapeutic within 72 hours before procedure.  Will obtain clearance from patient's cardiologist, Dr. Medina."    Clearance done. Schedule INR 5/26. We will be more cautious with dosing to have INR on lower end Monday. Hold & plan dose decrease.     "

## 2025-05-26 ENCOUNTER — ANTI-COAG VISIT (OUTPATIENT)
Dept: CARDIOLOGY | Facility: CLINIC | Age: 60
End: 2025-05-26
Payer: MEDICARE

## 2025-05-26 DIAGNOSIS — Z95.2 H/O MECHANICAL AORTIC VALVE REPLACEMENT: ICD-10-CM

## 2025-05-26 DIAGNOSIS — I48.92 PAROXYSMAL ATRIAL FLUTTER: ICD-10-CM

## 2025-05-26 DIAGNOSIS — Z79.01 LONG TERM (CURRENT) USE OF ANTICOAGULANTS: Primary | ICD-10-CM

## 2025-05-26 PROCEDURE — 93793 ANTICOAG MGMT PT WARFARIN: CPT | Mod: S$GLB,,,

## 2025-05-26 NOTE — PROGRESS NOTES
[facility]    05/26/2025    Cj Barnes (59 y.o.) is followed by the [facility].      Assessment/Plan:    Cj Barnes presents with a therapeutic INR. Goal INR: 2.5-3.5    Lab Results   Component Value Date    INR 2.6 (H) 05/26/2025    INR 4.1 (H) 05/21/2025    INR 3.5 (H) 05/07/2025    PROTIME 27.2 (H) 05/26/2025    PROTIME 41.0 (H) 05/21/2025    PROTIME 35.5 (H) 05/07/2025       Assessment of patient findings per MA/LPN and chart review:   The following significant findings were found:   Scheduled urolift tomorrow 5/27/25     Recommendation for patient's warfarin regimen:   No change was made to warfarin therapy during this visit and patient has been instructed to continue their current warfarin regimen.  INR is on lower end of goal so it is appropriate to continue with scheduled urolift tomorrow 5/27/25    Recommended repeat INR in 1 week      Kaitlin Su, PharmD  Preferred Contact: Secure Messaging or In Basket Message

## 2025-05-26 NOTE — PROGRESS NOTES
Reviewed and agree with the resident's plan    Update 5/28: noted DDI w/ medrol started 5/27. Decrease dose per calendar starting today

## 2025-05-28 DIAGNOSIS — I10 ESSENTIAL HYPERTENSION: ICD-10-CM

## 2025-05-28 RX ORDER — IRBESARTAN 150 MG/1
150 TABLET ORAL
Qty: 90 TABLET | Refills: 3 | Status: SHIPPED | OUTPATIENT
Start: 2025-05-28

## 2025-05-28 NOTE — PROGRESS NOTES
Spoke with pt who confirmed he is taking phenazopyridine (PYRIDIUM) 200 MG tablet and methylPREDNISolone (MEDROL DOSEPACK) 4 mg tablet. He did not take any ibuprofen. Pt states that he is not having any issues with bright red blood in urine and that everything has been fine.

## 2025-06-02 ENCOUNTER — ANTI-COAG VISIT (OUTPATIENT)
Dept: CARDIOLOGY | Facility: CLINIC | Age: 60
End: 2025-06-02
Payer: MEDICARE

## 2025-06-02 ENCOUNTER — PATIENT MESSAGE (OUTPATIENT)
Dept: CARDIOLOGY | Facility: CLINIC | Age: 60
End: 2025-06-02

## 2025-06-02 DIAGNOSIS — Z95.2 H/O MECHANICAL AORTIC VALVE REPLACEMENT: ICD-10-CM

## 2025-06-02 DIAGNOSIS — I48.92 PAROXYSMAL ATRIAL FLUTTER: ICD-10-CM

## 2025-06-02 DIAGNOSIS — Z79.01 LONG TERM (CURRENT) USE OF ANTICOAGULANTS: Primary | ICD-10-CM

## 2025-06-02 PROCEDURE — 93793 ANTICOAG MGMT PT WARFARIN: CPT | Mod: S$GLB,,,

## 2025-06-09 ENCOUNTER — ANTI-COAG VISIT (OUTPATIENT)
Dept: CARDIOLOGY | Facility: CLINIC | Age: 60
End: 2025-06-09
Payer: MEDICARE

## 2025-06-09 ENCOUNTER — PATIENT MESSAGE (OUTPATIENT)
Dept: CARDIOLOGY | Facility: CLINIC | Age: 60
End: 2025-06-09

## 2025-06-09 DIAGNOSIS — Z95.2 H/O MECHANICAL AORTIC VALVE REPLACEMENT: ICD-10-CM

## 2025-06-09 DIAGNOSIS — Z79.01 LONG TERM (CURRENT) USE OF ANTICOAGULANTS: Primary | ICD-10-CM

## 2025-06-09 DIAGNOSIS — I48.92 PAROXYSMAL ATRIAL FLUTTER: ICD-10-CM

## 2025-06-09 PROCEDURE — 93793 ANTICOAG MGMT PT WARFARIN: CPT | Mod: S$GLB,,,

## 2025-06-16 ENCOUNTER — ANTI-COAG VISIT (OUTPATIENT)
Dept: CARDIOLOGY | Facility: CLINIC | Age: 60
End: 2025-06-16
Payer: MEDICARE

## 2025-06-16 ENCOUNTER — PATIENT MESSAGE (OUTPATIENT)
Dept: CARDIOLOGY | Facility: CLINIC | Age: 60
End: 2025-06-16

## 2025-06-16 DIAGNOSIS — I48.92 PAROXYSMAL ATRIAL FLUTTER: ICD-10-CM

## 2025-06-16 DIAGNOSIS — Z79.01 LONG TERM (CURRENT) USE OF ANTICOAGULANTS: Primary | ICD-10-CM

## 2025-06-16 DIAGNOSIS — Z95.2 H/O MECHANICAL AORTIC VALVE REPLACEMENT: ICD-10-CM

## 2025-06-16 PROCEDURE — 93793 ANTICOAG MGMT PT WARFARIN: CPT | Mod: S$GLB,,,

## 2025-06-26 ENCOUNTER — PATIENT MESSAGE (OUTPATIENT)
Dept: CARDIOLOGY | Facility: CLINIC | Age: 60
End: 2025-06-26

## 2025-06-26 ENCOUNTER — OFFICE VISIT (OUTPATIENT)
Dept: PODIATRY | Facility: CLINIC | Age: 60
End: 2025-06-26
Payer: MEDICARE

## 2025-06-26 ENCOUNTER — ANTI-COAG VISIT (OUTPATIENT)
Dept: CARDIOLOGY | Facility: CLINIC | Age: 60
End: 2025-06-26
Payer: MEDICARE

## 2025-06-26 VITALS
WEIGHT: 244.5 LBS | BODY MASS INDEX: 38.37 KG/M2 | HEART RATE: 58 BPM | DIASTOLIC BLOOD PRESSURE: 78 MMHG | SYSTOLIC BLOOD PRESSURE: 159 MMHG | HEIGHT: 67 IN

## 2025-06-26 DIAGNOSIS — G57.81 SURAL NEURITIS, RIGHT: ICD-10-CM

## 2025-06-26 DIAGNOSIS — Z95.2 H/O MECHANICAL AORTIC VALVE REPLACEMENT: ICD-10-CM

## 2025-06-26 DIAGNOSIS — M79.671 PAIN IN BOTH FEET: ICD-10-CM

## 2025-06-26 DIAGNOSIS — E08.41 DIABETIC MONONEUROPATHY ASSOCIATED WITH DIABETES MELLITUS DUE TO UNDERLYING CONDITION: Primary | ICD-10-CM

## 2025-06-26 DIAGNOSIS — M79.672 PAIN IN BOTH FEET: ICD-10-CM

## 2025-06-26 DIAGNOSIS — I48.92 PAROXYSMAL ATRIAL FLUTTER: ICD-10-CM

## 2025-06-26 DIAGNOSIS — Z79.01 LONG TERM (CURRENT) USE OF ANTICOAGULANTS: Primary | ICD-10-CM

## 2025-06-26 DIAGNOSIS — M92.61 HAGLUND'S DEFORMITY OF RIGHT HEEL: ICD-10-CM

## 2025-06-26 PROCEDURE — 93793 ANTICOAG MGMT PT WARFARIN: CPT | Mod: S$GLB,,,

## 2025-06-26 PROCEDURE — 99999 PR PBB SHADOW E&M-EST. PATIENT-LVL IV: CPT | Mod: PBBFAC,,, | Performed by: PODIATRIST

## 2025-06-26 RX ORDER — AMITRIPTYLINE HYDROCHLORIDE 10 MG/1
10 TABLET, FILM COATED ORAL NIGHTLY PRN
Qty: 30 TABLET | Refills: 2 | Status: SHIPPED | OUTPATIENT
Start: 2025-06-26 | End: 2026-06-26

## 2025-06-26 NOTE — PROGRESS NOTES
[address] [facility], [facility] [address] ? [place], [address] ? Phone [phone] ? Fax [phone]           Return to Work/School    Patient: Tay Hernandez  YOB: 2000   Date: 01/18/2022      To Whom It May Concern:     Tay Hernandez was in contact with/seen in my office on 01/18/2022. COVID-19 is present in our communities across the [facility]. Not all patients are eligible or appropriate to be tested. In this situation, your employee meets the following criteria:     Tay Hernandez has met the criteria for COVID-19 testing and has a NEGATIVE result. The employee can return to work once they are asymptomatic for 24 hours without the use of fever reducing medications (Tylenol, Motrin, etc).     Return 01/20/2022    If you have any questions or concerns, or if I can be of further assistance, please do not hesitate to contact me.     Sincerely,      Tena Greer NP         INR not at goal. Medications, chart, and patient findings reviewed. See calendar for adjustments to dose and follow up plan.

## 2025-07-02 NOTE — PROGRESS NOTES
Subjective:      Patient ID: Cj Barnes is a 59 y.o. male.    Chief Complaint:   Foot Pain (B/L)    Cj is a 59 y.o. male who presents to the podiatry clinic  with complaint of  right foot pain. Onset of the symptoms was several months ago. Precipitating event: Previous surgery Kulwant's deformity right heel. Current symptoms include: ability to bear weight, but with some pain. Aggravating factors: any weight bearing. Symptoms have gradually worsened. Patient has had no prior foot problems. Evaluation to date: Previous evaluation/radiographs/surgery/antibiotic therapy is/wound care.     Overall doing well/having nerve type pain to the outside of the right ankle.    Review of Systems   Constitutional: Negative for chills, decreased appetite, fever and night sweats.   HENT:  Negative for congestion, ear discharge, nosebleeds and tinnitus.    Eyes:  Negative for double vision, pain and visual disturbance.   Cardiovascular:  Negative for chest pain, claudication, cyanosis and palpitations.   Respiratory:  Negative for cough, hemoptysis, shortness of breath and wheezing.    Endocrine: Negative for cold intolerance and heat intolerance.   Hematologic/Lymphatic: Negative for adenopathy and bleeding problem.   Skin:  Positive for dry skin, poor wound healing and unusual hair distribution.   Musculoskeletal:  Positive for arthritis, joint pain and stiffness. Negative for myalgias.   Gastrointestinal:  Negative for abdominal pain, dysphagia, nausea and vomiting.   Genitourinary:  Negative for dysuria, flank pain, hematuria and pelvic pain.   Neurological:  Positive for disturbances in coordination, numbness, paresthesias and sensory change.   Psychiatric/Behavioral:  Negative for altered mental status, hallucinations and suicidal ideas. The patient does not have insomnia.    Allergic/Immunologic: Negative for environmental allergies and persistent infections.           Objective:      Physical Exam  Vitals  reviewed.   Constitutional:       Appearance: He is well-developed.   HENT:      Head: Normocephalic and atraumatic.   Cardiovascular:      Pulses:           Dorsalis pedis pulses are 1+ on the right side and 1+ on the left side.        Posterior tibial pulses are 1+ on the right side and 1+ on the left side.   Pulmonary:      Effort: Pulmonary effort is normal.   Musculoskeletal:         General: Normal range of motion.      Comments: Anterior, lateral, and posterior muscle groups bilateral lower extremities show strength 4 over 5 symmetrically. Inspection and palpation of the joints and bones reveal no crepitus or joint effusion. No tenderness upon palpation. Mild plantar flexor contractures noted to digits 2 through 5 bilaterally.  Angle and base of gait are normal.   Feet:      Right foot:      Skin integrity: Callus and dry skin present.      Left foot:      Skin integrity: Callus and dry skin present.   Skin:     General: Skin is warm and dry.      Capillary Refill: Capillary refill takes 2 to 3 seconds.      Coloration: Skin is pale.      Comments: Skin bilateral lower extremities noted to be thin, dry, and atrophic.    Posterior right heel ulceration appears to be healed.   Neurological:      Mental Status: He is alert and oriented to person, place, and time.      Sensory: Sensory deficit present.      Motor: Atrophy present.      Deep Tendon Reflexes: Reflexes abnormal.      Reflex Scores:       Patellar reflexes are 1+ on the right side and 1+ on the left side.       Achilles reflexes are 1+ on the right side and 1+ on the left side.     Comments: Sharp, dull, light touch, and vibratory sensation are diminished bilaterally. Proprioceptive sensation is intact to both lower extremities. [place] Eliza monofilament exam shows loss of protective sensation to plantar toes 1 through 5 bilaterally. Deep tendon reflexes to the patellar tendons is 1 over 4 bilaterally symmetrical. Deep tendon reflexes to the  "Achilles tendon is 0 over 4 bilaterally symmetrical. No ankle clonus or Babinski reflex noted bilaterally. Coordination is fair to both lower extremities.  Patient admits to intermittent burning and tingling in the feet.    Positive provocation sign/Tinel sign noted to the right sural nerve.   Psychiatric:         Behavior: Behavior normal.     Positive Tinel sign/provocation sign noted to bilateral deep peroneal nerves.      Assessment:       Encounter Diagnoses   Name Primary?    Diabetic mononeuropathy associated with diabetes mellitus due to underlying condition Yes    Pain in both feet     Kulwant's deformity of right heel     Sural neuritis, right      Independent visualization of imaging was performed.  Results were reviewed in detail with patient.       Plan:       Cj Branch" was seen today for foot pain.    Diagnoses and all orders for this visit:    Diabetic mononeuropathy associated with diabetes mellitus due to underlying condition    Pain in both feet    Kulwant's deformity of right heel    Sural neuritis, right    Other orders  -     amitriptyline (ELAVIL) 10 MG tablet; Take 1 tablet (10 mg total) by mouth nightly as needed for Insomnia.      I counseled the patient on his conditions, their implications and medical management.    The nature of the condition, options for management, as well as potential risks and complications were discussed in detail with patient. Patient was amenable to my recommendations and left my office fully informed and will follow up as instructed or sooner if necessary.      Discussed options for peripheral neuropathy/nerve entrapment syndrome including nerve block therapy, surgical nerve entrapment decompression procedures, and various vitamins and supplementation available shown to improve nerve function.    Decision making:  Chronic illnesses discussed in detail, previous records/notes and imaging independently reviewed, prescription drug management performed " in addition to lengthy discussion regarding both conservative and surgical treatment options.    Nerve injection:    Performed by:  Eugene Bowers DPM    Preop diagnosis:  Neuropathy symptoms/paresthesias/pain    The area overlying the right sural nerve(s) was sterilely prepped, verbal consent was obtained, 2 cc's of 0.5% Marcaine plain was infiltrated around the affected nerve(s) for a diagnostic nerve block.  This was well tolerated with no complications.      Follow-up in 3 months.     Consent (Ear)/Introductory Paragraph: The rationale for Mohs was explained to the patient and consent was obtained. The risks, benefits and alternatives to therapy were discussed in detail. Specifically, the risks of ear deformity, infection, scarring, bleeding, prolonged wound healing, incomplete removal, allergy to anesthesia, nerve injury and recurrence were addressed. Prior to the procedure, the treatment site was clearly identified and confirmed by the patient. All components of Universal Protocol/PAUSE Rule completed.

## 2025-07-03 ENCOUNTER — PATIENT MESSAGE (OUTPATIENT)
Dept: PODIATRY | Facility: CLINIC | Age: 60
End: 2025-07-03
Payer: MEDICARE

## 2025-07-03 RX ORDER — MELOXICAM 15 MG/1
15 TABLET ORAL DAILY
Qty: 30 TABLET | Refills: 0 | Status: SHIPPED | OUTPATIENT
Start: 2025-07-03

## 2025-07-09 ENCOUNTER — ANTI-COAG VISIT (OUTPATIENT)
Dept: CARDIOLOGY | Facility: CLINIC | Age: 60
End: 2025-07-09
Payer: MEDICARE

## 2025-07-09 ENCOUNTER — PATIENT MESSAGE (OUTPATIENT)
Dept: CARDIOLOGY | Facility: CLINIC | Age: 60
End: 2025-07-09

## 2025-07-09 ENCOUNTER — OFFICE VISIT (OUTPATIENT)
Dept: UROLOGY | Facility: CLINIC | Age: 60
End: 2025-07-09
Payer: MEDICARE

## 2025-07-09 ENCOUNTER — PATIENT MESSAGE (OUTPATIENT)
Dept: DIABETES | Facility: CLINIC | Age: 60
End: 2025-07-09
Payer: MEDICARE

## 2025-07-09 ENCOUNTER — TELEPHONE (OUTPATIENT)
Dept: DIABETES | Facility: CLINIC | Age: 60
End: 2025-07-09
Payer: MEDICARE

## 2025-07-09 VITALS
SYSTOLIC BLOOD PRESSURE: 126 MMHG | DIASTOLIC BLOOD PRESSURE: 73 MMHG | WEIGHT: 244.5 LBS | HEIGHT: 67 IN | HEART RATE: 85 BPM | BODY MASS INDEX: 38.37 KG/M2

## 2025-07-09 DIAGNOSIS — E66.01 CLASS 2 SEVERE OBESITY DUE TO EXCESS CALORIES WITH SERIOUS COMORBIDITY AND BODY MASS INDEX (BMI) OF 38.0 TO 38.9 IN ADULT: ICD-10-CM

## 2025-07-09 DIAGNOSIS — Z95.2 H/O MECHANICAL AORTIC VALVE REPLACEMENT: ICD-10-CM

## 2025-07-09 DIAGNOSIS — I70.0 AORTIC ATHEROSCLEROSIS: ICD-10-CM

## 2025-07-09 DIAGNOSIS — G47.30 SLEEP APNEA, UNSPECIFIED TYPE: ICD-10-CM

## 2025-07-09 DIAGNOSIS — I25.10 CORONARY ARTERY DISEASE INVOLVING NATIVE CORONARY ARTERY OF NATIVE HEART WITHOUT ANGINA PECTORIS: Primary | ICD-10-CM

## 2025-07-09 DIAGNOSIS — N40.1 BPH WITH OBSTRUCTION/LOWER URINARY TRACT SYMPTOMS: Primary | ICD-10-CM

## 2025-07-09 DIAGNOSIS — I48.92 PAROXYSMAL ATRIAL FLUTTER: ICD-10-CM

## 2025-07-09 DIAGNOSIS — Z79.01 LONG TERM (CURRENT) USE OF ANTICOAGULANTS: Primary | ICD-10-CM

## 2025-07-09 DIAGNOSIS — N13.8 BPH WITH OBSTRUCTION/LOWER URINARY TRACT SYMPTOMS: Primary | ICD-10-CM

## 2025-07-09 DIAGNOSIS — E66.812 CLASS 2 SEVERE OBESITY DUE TO EXCESS CALORIES WITH SERIOUS COMORBIDITY AND BODY MASS INDEX (BMI) OF 38.0 TO 38.9 IN ADULT: ICD-10-CM

## 2025-07-09 LAB
BILIRUB SERPL-MCNC: NORMAL MG/DL
BLOOD URINE, POC: NORMAL
CLARITY, POC UA: CLEAR
COLOR, POC UA: YELLOW
GLUCOSE UR QL STRIP: NORMAL
KETONES UR QL STRIP: NORMAL
LEUKOCYTE ESTERASE URINE, POC: NORMAL
NITRITE, POC UA: NORMAL
PH, POC UA: 5
POC RESIDUAL URINE VOLUME: 0 ML (ref 0–100)
PROTEIN, POC: NORMAL
SPECIFIC GRAVITY, POC UA: 1.01
UROBILINOGEN, POC UA: NORMAL

## 2025-07-09 PROCEDURE — 3066F NEPHROPATHY DOC TX: CPT | Mod: CPTII,S$GLB,, | Performed by: STUDENT IN AN ORGANIZED HEALTH CARE EDUCATION/TRAINING PROGRAM

## 2025-07-09 PROCEDURE — 3044F HG A1C LEVEL LT 7.0%: CPT | Mod: CPTII,S$GLB,, | Performed by: STUDENT IN AN ORGANIZED HEALTH CARE EDUCATION/TRAINING PROGRAM

## 2025-07-09 PROCEDURE — 3074F SYST BP LT 130 MM HG: CPT | Mod: CPTII,S$GLB,, | Performed by: STUDENT IN AN ORGANIZED HEALTH CARE EDUCATION/TRAINING PROGRAM

## 2025-07-09 PROCEDURE — 51798 US URINE CAPACITY MEASURE: CPT | Mod: S$GLB,,, | Performed by: STUDENT IN AN ORGANIZED HEALTH CARE EDUCATION/TRAINING PROGRAM

## 2025-07-09 PROCEDURE — 99999 PR PBB SHADOW E&M-EST. PATIENT-LVL IV: CPT | Mod: PBBFAC,,, | Performed by: STUDENT IN AN ORGANIZED HEALTH CARE EDUCATION/TRAINING PROGRAM

## 2025-07-09 PROCEDURE — 1159F MED LIST DOCD IN RCRD: CPT | Mod: CPTII,S$GLB,, | Performed by: STUDENT IN AN ORGANIZED HEALTH CARE EDUCATION/TRAINING PROGRAM

## 2025-07-09 PROCEDURE — 4010F ACE/ARB THERAPY RXD/TAKEN: CPT | Mod: CPTII,S$GLB,, | Performed by: STUDENT IN AN ORGANIZED HEALTH CARE EDUCATION/TRAINING PROGRAM

## 2025-07-09 PROCEDURE — 3008F BODY MASS INDEX DOCD: CPT | Mod: CPTII,S$GLB,, | Performed by: STUDENT IN AN ORGANIZED HEALTH CARE EDUCATION/TRAINING PROGRAM

## 2025-07-09 PROCEDURE — 81002 URINALYSIS NONAUTO W/O SCOPE: CPT | Mod: S$GLB,,, | Performed by: STUDENT IN AN ORGANIZED HEALTH CARE EDUCATION/TRAINING PROGRAM

## 2025-07-09 PROCEDURE — 99213 OFFICE O/P EST LOW 20 MIN: CPT | Mod: S$GLB,,, | Performed by: STUDENT IN AN ORGANIZED HEALTH CARE EDUCATION/TRAINING PROGRAM

## 2025-07-09 PROCEDURE — 3060F POS MICROALBUMINURIA REV: CPT | Mod: CPTII,S$GLB,, | Performed by: STUDENT IN AN ORGANIZED HEALTH CARE EDUCATION/TRAINING PROGRAM

## 2025-07-09 PROCEDURE — 3078F DIAST BP <80 MM HG: CPT | Mod: CPTII,S$GLB,, | Performed by: STUDENT IN AN ORGANIZED HEALTH CARE EDUCATION/TRAINING PROGRAM

## 2025-07-09 RX ORDER — TIRZEPATIDE 5 MG/.5ML
5 INJECTION, SOLUTION SUBCUTANEOUS
Qty: 4 PEN | Refills: 3 | Status: SHIPPED | OUTPATIENT
Start: 2025-07-09

## 2025-07-09 RX ORDER — TIRZEPATIDE 2.5 MG/.5ML
2.5 INJECTION, SOLUTION SUBCUTANEOUS
Qty: 4 PEN | Refills: 0 | Status: SHIPPED | OUTPATIENT
Start: 2025-07-09

## 2025-07-09 NOTE — PROGRESS NOTES
"[facility] - Urology [facility] [address]   Clinic Note    SUBJECTIVE:     Chief Complaint: BPH with LUTS    History of Present Illness:  Cj Barnes is a 59 y.o. male who presents to clinic for BPH with LUTS. He is established to our clinic.     On Flomax and Myrbetriq. Cystoscopy with me showed mild bilobar prostatic enlargement. VUDS with Dr. Hyatt showed bladder outlet obstruction (BOOI of 42), with narrowing in prostatic urethra on fluoroscopy. Pelvic US shows 14 cc prostate.    He underwent UroLift with me 5/27/25. LUTS now fairly well controlled (IPSS 6/2). Off Myrbetriq prior to UroLift, on Flomax now.     PMH notable for mechanical heart valve on warfarin, CAD, T1DM with insulin pump.      OBJECTIVE:     Estimated body mass index is 38.29 kg/m² as calculated from the following:    Height as of this encounter: 5' 7" (1.702 m).    Weight as of this encounter: 110.9 kg (244 lb 7.8 oz).    Vital Signs (Most Recent)  Pulse: 85 (07/09/25 0856)  BP: 126/73 (07/09/25 0856)    Physical Exam  Vitals reviewed.   Constitutional:       Appearance: Normal appearance.   HENT:      Head: Normocephalic and atraumatic.   Cardiovascular:      Rate and Rhythm: Normal rate.   Skin:     General: Skin is warm and dry.   Neurological:      General: No focal deficit present.      Mental Status: He is alert and oriented to person, place, and time.   Psychiatric:         Mood and Affect: Mood normal.         Behavior: Behavior normal.         Thought Content: Thought content normal.         Judgment: Judgment normal.       Lab Results   Component Value Date    BUN 17 03/11/2025    CREATININE 0.9 03/11/2025    WBC 8.05 09/04/2024    HGB 13.6 (L) 09/04/2024    HCT 43.3 09/04/2024     09/04/2024    AST 29 03/11/2025    ALT 26 03/11/2025    ALKPHOS 65 03/11/2025    ALBUMIN 3.5 03/11/2025    HGBA1C 6.9 (H) 03/11/2025        Lab Results   Component Value Date    PSA 0.32 07/10/2023    PSA 0.40 06/10/2022    PSA 0.25 03/23/2020    " PSA 0.38 12/11/2017    PSA 0.2 04/07/2008    PSADIAG 0.26 01/30/2025    PSADIAG 0.33 04/06/2021       Urine dip showed no blood, leukocyte esterase, and nitrite. Negative protein.     ASSESSMENT     1. BPH with obstruction/lower urinary tract symptoms        PLAN:   1. BPH with obstruction/lower urinary tract symptoms        D/c Flomax, can restart if LUTS worsen.   RTC 1 year or sooner PRN    Sanjay Bowden MD

## 2025-07-09 NOTE — PROGRESS NOTES
INR high. Pt having inflammation/injury to foot/ankle. He was prescribed mobic 7/3. No other changes. Decrease dose for now. Recheck INR in 1 week

## 2025-07-16 ENCOUNTER — PATIENT MESSAGE (OUTPATIENT)
Dept: CARDIOLOGY | Facility: CLINIC | Age: 60
End: 2025-07-16

## 2025-07-16 ENCOUNTER — ANTI-COAG VISIT (OUTPATIENT)
Dept: CARDIOLOGY | Facility: CLINIC | Age: 60
End: 2025-07-16
Payer: MEDICARE

## 2025-07-16 DIAGNOSIS — Z95.2 H/O MECHANICAL AORTIC VALVE REPLACEMENT: ICD-10-CM

## 2025-07-16 DIAGNOSIS — Z79.01 LONG TERM (CURRENT) USE OF ANTICOAGULANTS: Primary | ICD-10-CM

## 2025-07-16 DIAGNOSIS — I48.92 PAROXYSMAL ATRIAL FLUTTER: ICD-10-CM

## 2025-07-16 PROCEDURE — 93793 ANTICOAG MGMT PT WARFARIN: CPT | Mod: S$GLB,,,

## 2025-07-16 NOTE — PROGRESS NOTES
INR increased despite dose decrease. He reports no greens this week of which he only has once per week. Decrease dose per calendar. Resume greens once per week asap. Recheck INR in 1 week

## 2025-07-21 ENCOUNTER — PATIENT OUTREACH (OUTPATIENT)
Dept: ADMINISTRATIVE | Facility: HOSPITAL | Age: 60
End: 2025-07-21
Payer: MEDICARE

## 2025-07-21 NOTE — PROGRESS NOTES
VBC F/U outreach done     Care Coordination Encounter Details:       MyChart Portal Status:         [x]  Reviewed MyChart Portal Status offered / enrolled if applicable        Additional Notes:     MyChart Outcomes: Pt is enrolled & active          Updates Requested / Reviewed:        Updated Care Coordination Note, Care Everywhere, , and Immunizations Reconciliation Completed or Queried: [place]         Health Maintenance Screening(s) Due:      Health Maintenance Topics Overdue:      VB Score: 0     Patient is not due for any topics at this time.    Pneumonia Vaccine              Health Maintenance Topic(s) Outreach Outcomes & Actions Taken:    Lab(s) - Outreach Outcomes & Actions Taken  : Overdue Lab(s) Scheduled. Diabetic labs scheduled for 9/11/2025    Eye Exam - Outreach Outcomes & Actions Taken  : Eye Camera Scheduled or Optometry/Ophthalmology Referral Placed/Appt Scheduled. Patient has an appointment with Dr. Meredith on 7/29/2025      Chronic Disease Management:     Diabetes Measures        Lab Results   Component Value Date    HGBA1C 6.9 (H) 03/11/2025           [x]  Reviewed chart for active Diabetes diagnosis     []  Scheduled necessary follow up appointments if needed         Additional Notes:  Stable. Labs scheduled for 9/11/2025           Hypertension Measures        BP Readings from Last 1 Encounters:   07/09/25 126/73           [x]  Reviewed chart for active Hypertension diagnosis     []  Reviewed & documented Home BP Cuff     []  Documented a Remote BP if needed & applicable     []  Scheduled necessary follow up appointments with Primary Care if needed           Provider Team Continuity:     Last PCP Visit Date: 3/26/2025          [x]  Reviewed Primary Care Provider Visits, Annual Wellness Visit, and Future          Appointments to ensure appointments have been scheduled and/or           completed         Social Determinants of Health          [x]  Reviewed, completed, and/or  updated the following sections:                  Food Insecurity, Transportation Needs, Financial Resource Strain,                 Tobacco Use           Care Management, Digital Medicine, and/or Education Referrals    OPCM Risk Score: 51.4         Next Steps - Referral Actions:

## 2025-07-23 ENCOUNTER — PATIENT MESSAGE (OUTPATIENT)
Dept: CARDIOLOGY | Facility: CLINIC | Age: 60
End: 2025-07-23

## 2025-07-23 ENCOUNTER — OFFICE VISIT (OUTPATIENT)
Dept: CARDIOLOGY | Facility: CLINIC | Age: 60
End: 2025-07-23
Payer: MEDICARE

## 2025-07-23 ENCOUNTER — ANTI-COAG VISIT (OUTPATIENT)
Dept: CARDIOLOGY | Facility: CLINIC | Age: 60
End: 2025-07-23
Payer: MEDICARE

## 2025-07-23 VITALS
HEART RATE: 67 BPM | SYSTOLIC BLOOD PRESSURE: 112 MMHG | DIASTOLIC BLOOD PRESSURE: 66 MMHG | WEIGHT: 245.81 LBS | OXYGEN SATURATION: 97 % | BODY MASS INDEX: 38.5 KG/M2

## 2025-07-23 DIAGNOSIS — I25.10 CORONARY ARTERY DISEASE INVOLVING NATIVE CORONARY ARTERY OF NATIVE HEART WITHOUT ANGINA PECTORIS: ICD-10-CM

## 2025-07-23 DIAGNOSIS — E78.5 HYPERLIPIDEMIA WITH TARGET LOW DENSITY LIPOPROTEIN (LDL) CHOLESTEROL LESS THAN 70 MG/DL: ICD-10-CM

## 2025-07-23 DIAGNOSIS — Z95.2 H/O MECHANICAL AORTIC VALVE REPLACEMENT: Primary | ICD-10-CM

## 2025-07-23 DIAGNOSIS — Z95.828 S/P ASCENDING AORTIC REPLACEMENT: ICD-10-CM

## 2025-07-23 DIAGNOSIS — Z79.01 LONG TERM (CURRENT) USE OF ANTICOAGULANTS: Primary | ICD-10-CM

## 2025-07-23 DIAGNOSIS — I48.92 PAROXYSMAL ATRIAL FLUTTER: ICD-10-CM

## 2025-07-23 DIAGNOSIS — Z29.89 INDICATION PRESENT FOR ENDOCARDITIS PROPHYLAXIS: ICD-10-CM

## 2025-07-23 DIAGNOSIS — Z79.01 LONG TERM (CURRENT) USE OF ANTICOAGULANTS: ICD-10-CM

## 2025-07-23 DIAGNOSIS — I10 ESSENTIAL HYPERTENSION: ICD-10-CM

## 2025-07-23 DIAGNOSIS — Z95.2 H/O MECHANICAL AORTIC VALVE REPLACEMENT: ICD-10-CM

## 2025-07-23 PROCEDURE — 99999 PR PBB SHADOW E&M-EST. PATIENT-LVL II: CPT | Mod: PBBFAC,,, | Performed by: INTERNAL MEDICINE

## 2025-07-23 NOTE — PROGRESS NOTES
"        [facility]    7/23/2025  10:21 AM    Problem list  Problem List[1]    History of Present Illness    Mr. Barnes presents today for follow up.  He is doing well from a cardiac standpoint.  He denies any chest pain or shortness breath.  He denies any fever or chills.  He denies any bleeding.  He is tolerating his medications.  He denies any bleeding problems associated with anticoagulation therapy.           Medications  Current Medications[2]   Prior to Admission medications    Medication Sig Start Date End Date Taking? Authorizing Provider   acetaminophen (TYLENOL ORAL) Take 500 mg by mouth daily as needed. Pain   Yes Provider, Historical   BAQSIMI 3 mg/actuation Spry USE ONE actuation into a single nostril no response MAY REPEAT in 15 minutes USE a new device 2/27/23  Yes Jodi Quintero NP   blood-glucose sensor (DEXCOM G7 SENSOR) Apple Inject 1 Device into the skin every 10 days. 3/18/25  Yes Jodi Quintero NP   COMFORT EZ PEN NEEDLES 33 gauge x 3/16" Ndle USE AS DIRECTED with Levemir pens 4/30/19  Yes Provider, Historical   empagliflozin (JARDIANCE) 25 mg tablet Take 1 tablet (25 mg total) by mouth once daily. 3/18/25  Yes Jodi Quintero NP   EScitalopram oxalate (LEXAPRO) 10 MG tablet TAKE ONE TABLET BY MOUTH ONCE DAILY 9/3/24  Yes Garry Stover MD   gabapentin (NEURONTIN) 300 MG capsule TAKE TWO CAPSULES BY MOUTH THREE TIMES DAILY AS NEEDED FOR nerve pain 5/9/25  Yes Demetria Morrissey NP   hydroCHLOROthiazide 12.5 MG Tab TAKE ONE CAPSULE BY MOUTH ONCE DAILY 2/17/25  Yes Anshu Medina MD   insulin lispro 100 unit/mL injection USE CONTINUOUSLY WITH INSULIN PUMP(MAX DAILY DOSE  UNITS 3/18/25  Yes Jodi Quintero NP   insulin syringe-needle U-100 1/2 mL 31 gauge x 15/64" Syrg To use 5 times per day with insulin injections if off of insulin pump.  To have on file in case of pump failure 9/16/24  Yes Jodi Quintero NP   irbesartan (AVAPRO) 150 MG tablet TAKE ONE TABLET BY MOUTH DAILY " 5/28/25  Yes Garry Stover MD   meloxicam (MOBIC) 15 MG tablet Take 1 tablet (15 mg total) by mouth once daily. 7/3/25  Yes Eugene Bowers DPM   metFORMIN (GLUCOPHAGE-XR) 500 MG ER 24hr tablet Take 1 tablet (500 mg total) by mouth 2 (two) times daily with meals. 3/18/25  Yes Jodi Quintero NP   mupirocin (BACTROBAN) 2 % ointment To use 2 times per day as needed 3/11/24  Yes Jodi Quintero NP   nystatin (MYCOSTATIN) cream Apply topically 2 (two) times daily. 4/6/21  Yes Herlinda James NP   pantoprazole (PROTONIX) 40 MG tablet Take 1 tablet (40 mg total) by mouth once daily. 5/19/25  Yes Garry Stover MD   rosuvastatin (CRESTOR) 40 MG Tab Take 1 tablet (40 mg total) by mouth every evening. 3/18/25 3/18/26 Yes Jodi Quintero NP   tadalafiL (CIALIS) 10 MG tablet Take 1 tablet (10 mg total) by mouth daily as needed for Erectile Dysfunction. 5/13/25  Yes Herlinda James NP   warfarin (COUMADIN) 3 MG tablet Take 2 tablets (6 mg total) by mouth Daily. M,F 6 mg  4.5 mg every other day 2/17/25  Yes Anshu Medina MD   amitriptyline (ELAVIL) 10 MG tablet Take 1 tablet (10 mg total) by mouth nightly as needed for Insomnia.  Patient not taking: Reported on 7/23/2025 6/26/25 6/26/26  Eugene Bowers DPM   aspirin (ECOTRIN) 81 MG EC tablet Take 1 tablet (81 mg total) by mouth once daily. 8/22/19 3/18/25  Demetria Rodriguez NP   fluticasone propionate (FLONASE) 50 mcg/actuation nasal spray SPRAY TWO SPRAYS IN EACH NOSTRIL DAILY  Patient not taking: Reported on 7/23/2025 3/24/25   Garry Stover MD   glucagon, human recombinant, (GLUCAGON EMERGENCY KIT, HUMAN,) 1 mg SolR Inject 1 mg into the muscle as needed. 4/23/19 2/27/25  Jodi Quintero NP   methylPREDNISolone (MEDROL DOSEPACK) 4 mg tablet use as directed  Patient not taking: Reported on 7/23/2025 5/27/25   Sanjay Bowden MD   mirabegron (MYRBETRIQ) 25 mg Tb24 ER tablet Take 1 tablet (25 mg total) by mouth once daily.  Patient not taking:  Reported on 7/23/2025 2/27/25 2/27/26  Herlinda James NP   pregabalin (LYRICA) 150 MG capsule Take 1 capsule (150 mg total) by mouth 2 (two) times daily. 4/10/25 10/9/25  Eugene Bowers DPM   tamsulosin (FLOMAX) 0.4 mg Cap Take 1 capsule (0.4 mg total) by mouth once daily. 1/28/25 5/27/25  Herlinda James NP   tirzepatide, weight loss, (ZEPBOUND) 2.5 mg/0.5 mL PnIj Inject 2.5 mg into the skin every 7 days.  Patient not taking: Reported on 7/23/2025 7/9/25   Jodi Quinetro NP   tirzepatide, weight loss, (ZEPBOUND) 5 mg/0.5 mL PnIj Inject 5 mg into the skin every 7 days.  Patient not taking: Reported on 7/23/2025 7/9/25   Jodi Quintero NP         History  Past Medical History:   Diagnosis Date    Anemia     Aortic stenosis 2018    Cancer 2014    Colon cancer     Coronary artery disease     Diabetes mellitus type I     since in his 30's    Heart murmur     Heel fracture     HTN (hypertension)     Hyperlipidemia LDL goal < 70     Insulin pump in place     MVP (mitral valve prolapse)     EVENS (obstructive sleep apnea)     uses CPAP    Stenosis of aortic and mitral valves     Uses hearing aid      Past Surgical History:   Procedure Laterality Date    AORTIC VALVE REPLACEMENT N/A 08/09/2019    Procedure: Replacement-valve-aortic;  Surgeon: Ryan Knight MD;  Location: [address];  Service: Cardiothoracic;  Laterality: N/A;    BACK SURGERY      lumbar fusion with lidia placement    BENTALL PROCEDURE FOR REPLACEMENT OF AORTIC VALVE, AORTIC ROOT, AND ASCENDING AORTA N/A 08/09/2019    Procedure: BENTALL PROCEDURE;  Surgeon: Ryan Knight MD;  Location: [address];  Service: Cardiothoracic;  Laterality: N/A;    CARDIAC SURGERY      CARPAL TUNNEL RELEASE      COLON SURGERY  2014    COLONOSCOPY N/A 02/17/2020    Procedure: COLONOSCOPY;  Surgeon: Richi Mock MD;  Location: [facility];  Service: Colon and Rectal;  Laterality: N/A;    COLONOSCOPY N/A 10/26/2023    Procedure: COLONOSCOPY;   Surgeon: Yadira Louis MD;  Location: [facility] ENDO (4TH FLR);  Service: Endoscopy;  Laterality: N/A;  ok to hold Coumadin x5 days per [facility], see telephone encounter 8/4/23  ref by KELSIE Cao/ suprep inst portal-RB  10/19-precall complete-pt verbalized understanding of holding Ozempic-MS    CYSTOSCOPY WITH INSERTION OF MINIMALLY INVASIVE IMPLANT TO ENLARGE PROSTATIC URETHRA N/A 5/27/2025    Procedure: CYSTOSCOPY, WITH INSERTION OF UROLIFT IMPLANT;  Surgeon: Sanjay Bowden MD;  Location[facility];  Service: Urology;  Laterality: N/A;  OK to continue warfarin; check INR within 72 hrs to confirm not supratherapeutic    DEBRIDEMENT OF ACHILLES TENDON Right 01/27/2023    Procedure: DEBRIDEMENT, TENDON, ACHILLES;  Surgeon: Eugene Bowers DPM;  [address];  Service: Podiatry;  Laterality: Right;    DECOMPRESSION, NERVE, ULNAR Left 10/16/2024    Procedure: LEFT ELBOW DECOMPRESSION, NERVE, ULNAR;  Surgeon: Dilma Hunter MD;  Location: [facility] OR;  Service: Orthopedics;  Laterality: Left;  REGIONAL AFTER    ELBOW SURGERY      tendon release    ESOPHAGOGASTRODUODENOSCOPY N/A 09/10/2020    Procedure: EGD (ESOPHAGOGASTRODUODENOSCOPY);  Surgeon: Abilio Boo MD;  [facility]  Service: Endoscopy;  Laterality: N/A;  Cardiac clearance received, see telephone encounter 8/27/20-BB  Approval to hold Coumadin prior to procedure received, see telephone encounter 8/27/20-BB  covid-9/7/20-[facility]    FOOT TENDON SURGERY      OSTECTOMY Right 01/27/2023    Procedure: OSTECTOMY;  Surgeon: Eugene Bowers DPM;  [address];  Service: Podiatry;  Laterality: Right;  calcaneal    Prostatic Urethral Lift (Urolift)  05/27/2025    right and left heart cath Bilateral 01/15/2018    TREATMENT OF CARDIAC ARRHYTHMIA N/A 08/19/2019    Procedure: CARDIOVERSION;  Surgeon: Juan Zapata MD;  Location: [facility] EP LAB;  Service: Cardiology;  Laterality: N/A;  afib, dccv  only, anes, GP, [id number]    TRIGGER FINGER RELEASE      x 2    TRIGGER FINGER RELEASE Left 01/05/2022    Procedure: RELEASE, TRIGGER FINGER,LEFT,SMALL & THUMB;  Surgeon: Dilma Hunter MD;  Location: [facility];  Service: Orthopedics;  Laterality: Left;     Social History[3]      Allergies  Review of patient's allergies indicates:  No Known Allergies      Review of Systems   Review of Systems   Constitutional: Negative for fever and malaise/fatigue.   Cardiovascular: Negative.    Respiratory: Negative.           Physical Exam  Wt Readings from Last 1 Encounters:   07/23/25 111.5 kg (245 lb 13 oz)     BP Readings from Last 3 Encounters:   07/23/25 112/66   07/09/25 126/73   06/26/25 (!) 159/78     Pulse Readings from Last 1 Encounters:   07/23/25 67     Body mass index is 38.5 kg/m².    Physical Exam  Vitals reviewed.   Constitutional:       Appearance: He is well-developed.   HENT:      Head: Atraumatic.   Eyes:      General: No scleral icterus.  Neck:      Vascular: Normal carotid pulses. No carotid bruit, hepatojugular reflux or JVD.   Cardiovascular:      Rate and Rhythm: Normal rate and regular rhythm.      Chest Wall: PMI is not displaced.      Pulses: Intact distal pulses.           Carotid pulses are 2+ on the right side and 2+ on the left side.       Radial pulses are 2+ on the right side and 2+ on the left side.        Dorsalis pedis pulses are 2+ on the right side and 2+ on the left side.      Heart sounds: S1 normal and S2 normal. Murmur heard.      No friction rub.      Comments: Metallic click.  Pulmonary:      Effort: Pulmonary effort is normal. No respiratory distress.      Breath sounds: Normal breath sounds. No stridor. No wheezing or rales.   Chest:      Chest wall: No tenderness.   Abdominal:      General: Bowel sounds are normal.      Palpations: Abdomen is soft.   Musculoskeletal:      Cervical back: Neck supple. No edema.   Skin:     General: Skin is warm and dry.      Nails: There is no  clubbing.   Neurological:      Mental Status: He is alert and oriented to person, place, and time.   Psychiatric:         Behavior: Behavior normal.         Thought Content: Thought content normal.             Assessment  1. H/O mechanical aortic valve replacement (Primary)  Stable    2. Coronary artery disease involving native coronary artery of native heart without angina pectoris  Stable no angina.  Continue current medications and monitor    3. S/P ascending aortic replacement  Stable    4. Indication present for endocarditis prophylaxis  Unchanged    5. Essential hypertension  Well controlled on current medications, continue medications and monitor    6. Hyperlipidemia with target low density lipoprotein (LDL) cholesterol less than 70 mg/dL  Stable.  Continue current medications and monitor    7. Long term (current) use of anticoagulants  Stable.  Followed by warfarin Clinic        Plan and Discussion  He is doing well from cardiac standpoint.  He denies any cardiac symptoms.  His blood pressure is well controlled.  Recommend to continue with current medications.    Follow Up  6 months        Anshu Medina MD, F.A.C.C, F.S.C.A.I.      Total professional time spent for the encounter: 25 minutes  Time was spent preparing to see the patient, reviewing results of prior testing, obtaining and/or reviewing separately obtained history, performing a medically appropriate examination and interview, counseling and educating the patient/family, ordering medications/tests/procedures, referring and communicating with other health care professionals, documenting clinical information in the electronic health record, and independently interpreting results.    This note was generated with the assistance of ambient listening technology. Verbal consent was obtained by the patient and accompanying visitor(s) for the recording of patient appointment to facilitate this note. I attest to having reviewed and edited the generated  "note for accuracy, though some syntax or spelling errors may persist. Please contact the author of this note for any clarification.           [1]   Patient Active Problem List  Diagnosis    Essential hypertension    Insulin pump fitting or adjustment    Hyperlipidemia with target low density lipoprotein (LDL) cholesterol less than 70 mg/dL    Insulin pump in place    TIA (transient ischemic attack)    Type 1 diabetes mellitus with hypoglycemia unawareness    Class 2 severe obesity due to excess calories with serious comorbidity and body mass index (BMI) of 38.0 to 38.9 in adult    S/P ascending aortic replacement    Long term (current) use of anticoagulants    Paroxysmal atrial flutter    H/O mechanical aortic valve replacement    Iron deficiency anemia due to chronic blood loss    ANTHONY (iron deficiency anemia)    ACE-inhibitor cough    Diabetic mononeuropathy associated with diabetes mellitus due to underlying condition    Major depressive disorder, recurrent, mild    Coronary artery disease involving native coronary artery of native heart without angina pectoris    Aortic atherosclerosis    Anemia    Allergies    Sleep apnea    History of colon cancer    BMI 36.0-36.9,adult    Acid reflux    History of staph infection    Uses hearing aid    Personal history of spine surgery    Asymptomatic varicose veins of left lower extremity    Elbow pain, left    Decreased  strength of left hand    Indication present for endocarditis prophylaxis   [2]   Current Outpatient Medications   Medication Sig Dispense Refill    acetaminophen (TYLENOL ORAL) Take 500 mg by mouth daily as needed. Pain      BAQSIMI 3 mg/actuation Spry USE ONE actuation into a single nostril no response MAY REPEAT in 15 minutes USE a new device 2 each 1    blood-glucose sensor (DEXCOM G7 SENSOR) Apple Inject 1 Device into the skin every 10 days. 3 each 11    COMFORT EZ PEN NEEDLES 33 gauge x 3/16" Ndle USE AS DIRECTED with Levemir pens  11    " "empagliflozin (JARDIANCE) 25 mg tablet Take 1 tablet (25 mg total) by mouth once daily. 30 tablet 11    EScitalopram oxalate (LEXAPRO) 10 MG tablet TAKE ONE TABLET BY MOUTH ONCE DAILY 90 tablet 1    gabapentin (NEURONTIN) 300 MG capsule TAKE TWO CAPSULES BY MOUTH THREE TIMES DAILY AS NEEDED FOR nerve pain 360 capsule 1    hydroCHLOROthiazide 12.5 MG Tab TAKE ONE CAPSULE BY MOUTH ONCE DAILY 90 tablet 3    insulin lispro 100 unit/mL injection USE CONTINUOUSLY WITH INSULIN PUMP(MAX DAILY DOSE  UNITS 60 mL 11    insulin syringe-needle U-100 1/2 mL 31 gauge x 15/64" Syrg To use 5 times per day with insulin injections if off of insulin pump.  To have on file in case of pump failure 150 each 11    irbesartan (AVAPRO) 150 MG tablet TAKE ONE TABLET BY MOUTH DAILY 90 tablet 3    meloxicam (MOBIC) 15 MG tablet Take 1 tablet (15 mg total) by mouth once daily. 30 tablet 0    metFORMIN (GLUCOPHAGE-XR) 500 MG ER 24hr tablet Take 1 tablet (500 mg total) by mouth 2 (two) times daily with meals. 180 tablet 2    mupirocin (BACTROBAN) 2 % ointment To use 2 times per day as needed 22 g 3    nystatin (MYCOSTATIN) cream Apply topically 2 (two) times daily. 1 Tube 5    pantoprazole (PROTONIX) 40 MG tablet Take 1 tablet (40 mg total) by mouth once daily. 90 tablet 3    rosuvastatin (CRESTOR) 40 MG Tab Take 1 tablet (40 mg total) by mouth every evening. 90 tablet 3    tadalafiL (CIALIS) 10 MG tablet Take 1 tablet (10 mg total) by mouth daily as needed for Erectile Dysfunction. 90 tablet 3    warfarin (COUMADIN) 3 MG tablet Take 2 tablets (6 mg total) by mouth Daily. M,F 6 mg  4.5 mg every other day 90 tablet 3    amitriptyline (ELAVIL) 10 MG tablet Take 1 tablet (10 mg total) by mouth nightly as needed for Insomnia. (Patient not taking: Reported on 7/23/2025) 30 tablet 2    aspirin (ECOTRIN) 81 MG EC tablet Take 1 tablet (81 mg total) by mouth once daily.  0    fluticasone propionate (FLONASE) 50 mcg/actuation nasal spray SPRAY TWO " SPRAYS IN EACH NOSTRIL DAILY (Patient not taking: Reported on 7/23/2025) 48 g 0    glucagon, human recombinant, (GLUCAGON EMERGENCY KIT, HUMAN,) 1 mg SolR Inject 1 mg into the muscle as needed. 1 each 0    methylPREDNISolone (MEDROL DOSEPACK) 4 mg tablet use as directed (Patient not taking: Reported on 7/23/2025) 21 each 0    mirabegron (MYRBETRIQ) 25 mg Tb24 ER tablet Take 1 tablet (25 mg total) by mouth once daily. (Patient not taking: Reported on 7/23/2025) 30 tablet 11    pregabalin (LYRICA) 150 MG capsule Take 1 capsule (150 mg total) by mouth 2 (two) times daily. 60 capsule 3    tamsulosin (FLOMAX) 0.4 mg Cap Take 1 capsule (0.4 mg total) by mouth once daily. 30 capsule 11    tirzepatide, weight loss, (ZEPBOUND) 2.5 mg/0.5 mL PnIj Inject 2.5 mg into the skin every 7 days. (Patient not taking: Reported on 7/23/2025) 4 Pen 0    tirzepatide, weight loss, (ZEPBOUND) 5 mg/0.5 mL PnIj Inject 5 mg into the skin every 7 days. (Patient not taking: Reported on 7/23/2025) 4 Pen 3     No current facility-administered medications for this visit.   [3]   Social History  Socioeconomic History    Marital status: Unknown   Tobacco Use    Smoking status: Never    Smokeless tobacco: Former     Types: Snuff     Quit date: 8/30/2019    Tobacco comments:     since he was 12 yrs old   Substance and Sexual Activity    Alcohol use: Yes     Comment: socially    Drug use: No    Sexual activity: Yes     Partners: Female   Social History Narrative        2 grown kids    Delivers seafood     Social Drivers of Health     Financial Resource Strain: Medium Risk (4/9/2025)    Overall Financial Resource Strain (CARDIA)     Difficulty of Paying Living Expenses: Somewhat hard   Food Insecurity: Food Insecurity Present (4/9/2025)    Hunger Vital Sign     Worried About Running Out of Food in the Last Year: Sometimes true     Ran Out of Food in the Last Year: Often true   Transportation Needs: Unmet Transportation Needs (4/9/2025)    CLOTILDE  - Transportation     Lack of Transportation (Medical): Yes     Lack of Transportation (Non-Medical): No   Physical Activity: Insufficiently Active (4/9/2025)    Exercise Vital Sign     Days of Exercise per Week: 2 days     Minutes of Exercise per Session: 70 min   Stress: Stress Concern Present (4/9/2025)    [employer] [place] [employer] - Occupational Stress Questionnaire     Feeling of Stress : To some extent   Housing Stability: Low Risk  (4/9/2025)    Housing Stability Vital Sign     Unable to Pay for Housing in the Last Year: No     Number of Times Moved in the Last Year: 0     Homeless in the Last Year: No

## 2025-07-23 NOTE — PROGRESS NOTES
[facility]    2025 10:54 AM    Assessment/Plan:    Patient presents today with subtherapeutic  INR.    Assessment of patient findings and chart review: no new changes    Recommendation for patient's warfarin regimen: Increase maintenance dose    Recommend repeat INR in 1 week  _________________________________________________________________    Baismlettykenneth LAY Barnes (59 y.o.) is followed by the [facility].    Anticoagulation Summary  As of 2025      INR goal:  2.5-3.5   TTR:  66.5% (5.9 y)   INR used for dosin.3 (2025)   Warfarin maintenance plan:  4.5 mg (3 mg x 1.5) every day   Weekly warfarin total:  31.5 mg   Plan last modified:  Carmen Iyer, PharmD (2025)   Next INR check:  2025   Target end date:  --    Indications    Long term (current) use of anticoagulants [Z79.01]  Paroxysmal atrial flutter [I48.92]  H/O mechanical aortic valve replacement [Z95.2]                 Anticoagulation Episode Summary       INR check location:  --    Preferred lab:  --    Send INR reminders to:  [facility] COUMADIN MONITORING POOL    Comments:  [facility]          Anticoagulation Care Providers       Provider Role Specialty Phone number    Anshu Medina MD [facility] Interventional Cardiology [phone]

## 2025-07-25 ENCOUNTER — TELEPHONE (OUTPATIENT)
Dept: DIABETES | Facility: CLINIC | Age: 60
End: 2025-07-25
Payer: MEDICARE

## 2025-07-29 ENCOUNTER — ANTI-COAG VISIT (OUTPATIENT)
Dept: CARDIOLOGY | Facility: CLINIC | Age: 60
End: 2025-07-29
Payer: MEDICARE

## 2025-07-29 ENCOUNTER — PATIENT MESSAGE (OUTPATIENT)
Dept: CARDIOLOGY | Facility: CLINIC | Age: 60
End: 2025-07-29

## 2025-07-29 DIAGNOSIS — Z79.01 LONG TERM (CURRENT) USE OF ANTICOAGULANTS: Primary | ICD-10-CM

## 2025-07-29 DIAGNOSIS — Z95.2 H/O MECHANICAL AORTIC VALVE REPLACEMENT: ICD-10-CM

## 2025-07-29 DIAGNOSIS — I48.92 PAROXYSMAL ATRIAL FLUTTER: ICD-10-CM

## 2025-07-29 PROCEDURE — 93793 ANTICOAG MGMT PT WARFARIN: CPT | Mod: S$GLB,,,

## 2025-08-05 ENCOUNTER — PATIENT MESSAGE (OUTPATIENT)
Dept: CARDIOLOGY | Facility: CLINIC | Age: 60
End: 2025-08-05

## 2025-08-05 ENCOUNTER — ANTI-COAG VISIT (OUTPATIENT)
Dept: CARDIOLOGY | Facility: CLINIC | Age: 60
End: 2025-08-05
Payer: MEDICARE

## 2025-08-05 DIAGNOSIS — I48.92 PAROXYSMAL ATRIAL FLUTTER: ICD-10-CM

## 2025-08-05 DIAGNOSIS — Z95.2 H/O MECHANICAL AORTIC VALVE REPLACEMENT: ICD-10-CM

## 2025-08-05 DIAGNOSIS — Z79.01 LONG TERM (CURRENT) USE OF ANTICOAGULANTS: Primary | ICD-10-CM

## 2025-08-05 PROCEDURE — 93793 ANTICOAG MGMT PT WARFARIN: CPT | Mod: S$GLB,,,

## 2025-08-05 NOTE — PROGRESS NOTES
[facility]    2025 10:21 AM    Assessment/Plan:    Patient presents today with subtherapeutic  INR.    Assessment of patient findings and chart review: Pt reports eating a couple salads over the weekend. Not sure if this is more than his normal routine or in place of cabbage he is planned for every week. Dose/INR has been labile. Dose has been stable higher.     Recommendation for patient's warfarin regimen: Increase maintenance dose    Recommend repeat INR in 1 week  _________________________________________________________________    Cj Barnes (59 y.o.) is followed by the [facility].    Anticoagulation Summary  As of 2025      INR goal:  2.5-3.5   TTR:  66.1% (6 y)   INR used for dosin.9 (2025)   Warfarin maintenance plan:  6 mg (3 mg x 2) every e, Fri; 4.5 mg (3 mg x 1.5) all other days   Weekly warfarin total:  34.5 mg   Plan last modified:  Carmen Iyer, PharmD (2025)   Next INR check:  2025   Target end date:  --    Indications    Long term (current) use of anticoagulants [Z79.01]  Paroxysmal atrial flutter [I48.92]  H/O mechanical aortic valve replacement [Z95.2]                 Anticoagulation Episode Summary       INR check location:  --    Preferred lab:  --    Send INR reminders to:  [facility] COUMADIN MONITORING POOL    Comments:  [facility]          Anticoagulation Care Providers       Provider Role Specialty Phone number    Anshu Medina MD Responsible Interventional Cardiology [phone]

## 2025-08-12 ENCOUNTER — ANTI-COAG VISIT (OUTPATIENT)
Dept: CARDIOLOGY | Facility: CLINIC | Age: 60
End: 2025-08-12
Payer: MEDICARE

## 2025-08-12 ENCOUNTER — PATIENT MESSAGE (OUTPATIENT)
Dept: CARDIOLOGY | Facility: CLINIC | Age: 60
End: 2025-08-12

## 2025-08-12 DIAGNOSIS — Z79.01 LONG TERM (CURRENT) USE OF ANTICOAGULANTS: Primary | ICD-10-CM

## 2025-08-12 DIAGNOSIS — Z95.2 H/O MECHANICAL AORTIC VALVE REPLACEMENT: ICD-10-CM

## 2025-08-12 DIAGNOSIS — I48.92 PAROXYSMAL ATRIAL FLUTTER: ICD-10-CM

## 2025-08-12 PROCEDURE — 93793 ANTICOAG MGMT PT WARFARIN: CPT | Mod: S$GLB,,,

## 2025-08-19 ENCOUNTER — ANTI-COAG VISIT (OUTPATIENT)
Dept: CARDIOLOGY | Facility: CLINIC | Age: 60
End: 2025-08-19
Payer: MEDICARE

## 2025-08-19 ENCOUNTER — PATIENT MESSAGE (OUTPATIENT)
Dept: CARDIOLOGY | Facility: CLINIC | Age: 60
End: 2025-08-19

## 2025-08-19 DIAGNOSIS — I48.92 PAROXYSMAL ATRIAL FLUTTER: ICD-10-CM

## 2025-08-19 DIAGNOSIS — Z79.01 LONG TERM (CURRENT) USE OF ANTICOAGULANTS: Primary | ICD-10-CM

## 2025-08-19 DIAGNOSIS — Z95.2 H/O MECHANICAL AORTIC VALVE REPLACEMENT: ICD-10-CM

## 2025-08-19 PROCEDURE — 93793 ANTICOAG MGMT PT WARFARIN: CPT | Mod: S$GLB,,,

## 2025-08-27 DIAGNOSIS — E10.65 TYPE 1 DIABETES MELLITUS WITH HYPERGLYCEMIA: ICD-10-CM

## 2025-08-27 RX ORDER — GABAPENTIN 300 MG/1
CAPSULE ORAL
Qty: 360 CAPSULE | Refills: 2 | Status: SHIPPED | OUTPATIENT
Start: 2025-08-27

## 2025-09-02 ENCOUNTER — PATIENT OUTREACH (OUTPATIENT)
Facility: OTHER | Age: 60
End: 2025-09-02
Payer: MEDICARE

## 2025-09-03 ENCOUNTER — ANTI-COAG VISIT (OUTPATIENT)
Dept: CARDIOLOGY | Facility: CLINIC | Age: 60
End: 2025-09-03
Payer: MEDICARE

## 2025-09-03 DIAGNOSIS — I48.92 PAROXYSMAL ATRIAL FLUTTER: ICD-10-CM

## 2025-09-03 DIAGNOSIS — Z79.01 LONG TERM (CURRENT) USE OF ANTICOAGULANTS: Primary | ICD-10-CM

## 2025-09-03 DIAGNOSIS — Z95.2 H/O MECHANICAL AORTIC VALVE REPLACEMENT: ICD-10-CM

## 2025-09-03 PROCEDURE — 93793 ANTICOAG MGMT PT WARFARIN: CPT | Mod: S$GLB,,,

## (undated) DEVICE — BELLOW CANN HEMOBLAST 1.65GR

## (undated) DEVICE — DRESSING ADH ISLAND 3.6 X 14

## (undated) DEVICE — SYR B-D DISP CONTROL 10CC100/C

## (undated) DEVICE — SPONGE GAUZE 16PLY 4X4

## (undated) DEVICE — DRAPE T EXTRM SURG 121X128X90

## (undated) DEVICE — GLOVE BIOGEL ECLIPSE SZ 7

## (undated) DEVICE — CUFF TOURNIQUET DL PRT

## (undated) DEVICE — BANDAGE ACE NON LATEX 2IN

## (undated) DEVICE — GAUZE DERMACEA LOW PLY 3X4YRDS

## (undated) DEVICE — SUT VICRYL BR 1 GEN 27 CT-1

## (undated) DEVICE — DRAPE SPLIT STERILE

## (undated) DEVICE — NDL SAFETY 22G X 1.5 ECLIPSE

## (undated) DEVICE — ELECTRODE REM PLYHSV RETURN 9

## (undated) DEVICE — TRAY MINOR ORTHO OMC

## (undated) DEVICE — SONIC DRILL

## (undated) DEVICE — WIRE INTRAMYOCARDIAL TEMP

## (undated) DEVICE — NDL BOX COUNTER

## (undated) DEVICE — BLADE SAW STERNAL 5/BX

## (undated) DEVICE — SUT SILK 2-0 SH 18IN BLACK

## (undated) DEVICE — BLADE SURG STAINLESS STEEL #15

## (undated) DEVICE — CLOSURE SKIN STERI STRIP 1/2X4

## (undated) DEVICE — DRAIN CHANNEL ROUND 19FR

## (undated) DEVICE — GOWN POLY REINF BRTH SLV XL

## (undated) DEVICE — KIT SAHARA DRAPE DRAW/LIFT

## (undated) DEVICE — ADAPTER PERFUSION 1/4

## (undated) DEVICE — BANDAGE MATRIX HK LOOP 2IN 5YD

## (undated) DEVICE — SUT PROLENE 4-0 RB-1 BL MO

## (undated) DEVICE — DRAIN CHEST DRY SUCTION

## (undated) DEVICE — SUT 4/0 18IN ETHILON BL P3

## (undated) DEVICE — SEE MEDLINE ITEM 146417

## (undated) DEVICE — UNDERPAD ULTRASORB 300LB 30X36

## (undated) DEVICE — SUT PROLENE 4-0 SH BLU 36IN

## (undated) DEVICE — DRESSING N ADH OIL EMUL 3X3

## (undated) DEVICE — SOL PVP-I SCRUB 7.5% 4OZ

## (undated) DEVICE — TAPE MEDIPORE SOFT CLOTH 2X2

## (undated) DEVICE — SUT SILK BLK BR. 2 2-60

## (undated) DEVICE — Device

## (undated) DEVICE — GAUZE SPONGE 4X4 12PLY

## (undated) DEVICE — SEE MEDLINE ITEM 157117

## (undated) DEVICE — BANDAGE ELASTIC 3IN ACE NS

## (undated) DEVICE — DRESSING AQUACEL SACRAL 9 X 9

## (undated) DEVICE — SUT ETHIBOND EXCEL 2-0 SH-2

## (undated) DEVICE — APPLICATOR CHLORAPREP ORN 26ML

## (undated) DEVICE — SUT MONOCRYL 4-0 PS-2

## (undated) DEVICE — GLOVE BIOGEL PI MICRO SZ 7.5

## (undated) DEVICE — SONIC TIP

## (undated) DEVICE — SUT PROLENE TF 5-0 24IN

## (undated) DEVICE — HEMOSTAT SURGICEL NU-KNIT 6X9

## (undated) DEVICE — TRAY HEART

## (undated) DEVICE — PACK UPPER EXTREMITY BAPTIST

## (undated) DEVICE — SOL 9P NACL IRR PIC IL

## (undated) DEVICE — HOSE DUAL W/CPC CONNECTORS

## (undated) DEVICE — STOCKINET 4INX48

## (undated) DEVICE — TOURNIQUET SB QC DP 18X4IN

## (undated) DEVICE — PLEDGET SUT SOFT 3/8X3/16X1/16

## (undated) DEVICE — INSERTS STEALTH FIBRA SIZE 5

## (undated) DEVICE — KIT ADULT TOURNIQUET SNARE

## (undated) DEVICE — GLOVE BIOGEL PI MICRO INDIC 7

## (undated) DEVICE — BANDAGE MATRIX HK LOOP 4IN 5YD

## (undated) DEVICE — KIT URINARY CATH URINE METER

## (undated) DEVICE — SOL POVIDONE SCRUB IODINE 4 OZ

## (undated) DEVICE — BANDAGE DERMACEA STRETCH 4X1IN

## (undated) DEVICE — TAPE SILK 3IN

## (undated) DEVICE — SUT 2-0 VICRYL / CT-1

## (undated) DEVICE — DRAPE C-ARM MINI DISP

## (undated) DEVICE — SUT 3-0 CTD VICRYL 27IN PS

## (undated) DEVICE — BANDAGE ESMARK ELASTIC ST 4X9

## (undated) DEVICE — TRAY SKIN SCRUB WET PREMIUM

## (undated) DEVICE — SPONGE COTTON TRAY 4X4IN

## (undated) DEVICE — CONTAINER SPECIMEN STRL 4OZ

## (undated) DEVICE — CANNULA RETROGRADE CARDIOPLEG

## (undated) DEVICE — SUT 2/0 30IN SILK BLK BRAI

## (undated) DEVICE — WIPE ESENTA BARR STNG FREE 3ML

## (undated) DEVICE — CLAMP LONG JAW ISOLATOR OPEN

## (undated) DEVICE — BANDAGE ELAS SOFTWRAP ST 4X5YD

## (undated) DEVICE — BANDAGE SOFFORM STER 2IN

## (undated) DEVICE — SUT 2/0 30IN ETHIBOND

## (undated) DEVICE — SHEET CARDIOVASCULAR SPLIT

## (undated) DEVICE — DRESSING MEPILEX BORDR AG 4X12

## (undated) DEVICE — DRESSING XEROFORM FOIL PK 1X8

## (undated) DEVICE — DRAPE SLUSH WARMER WITH DISC

## (undated) DEVICE — SUT 2/0 36IN COATED VICRYL

## (undated) DEVICE — FORCEP STRAIGHT DISP

## (undated) DEVICE — TAPE SILK LIKE CLOTH 2IN 1.5YD

## (undated) DEVICE — SOL NACL STRL BOTTLE 1000ML

## (undated) DEVICE — SEE MEDLINE ITEM 146322

## (undated) DEVICE — RETRACTOR OCTOBASE INSERT HOLD

## (undated) DEVICE — FOGERTY SOFT JAW DISP 2/PK

## (undated) DEVICE — SUT 6 18IN STEEL MONO CCS

## (undated) DEVICE — PAD CAST SPECIALIST STRL 4

## (undated) DEVICE — DRAPE STERI-DRAPE 1000 17X11IN

## (undated) DEVICE — DRAPE THREE-QTR REINF 53X77IN

## (undated) DEVICE — PAD UNDERPAD 30X30

## (undated) DEVICE — SOL IRR SOD CHL .9% POUR

## (undated) DEVICE — SEE MEDLINE ITEM 152622

## (undated) DEVICE — PAD DEFIB CADENCE ADULT R2

## (undated) DEVICE — SPLINT PLASTER FAST SET 5X30IN

## (undated) DEVICE — TOURNIQUET SB QC DP 34X4IN

## (undated) DEVICE — CORD BIPOLAR 12 FOOT

## (undated) DEVICE — KIT COLLECTION E SWAB REGULAR

## (undated) DEVICE — SUT PROLENE 4-0 BL MONO TF

## (undated) DEVICE — SOCKINETTE DOUBLE PLY 4X48IN

## (undated) DEVICE — BUCKET PLASTER DISPOSABLE